# Patient Record
Sex: MALE | Race: WHITE | NOT HISPANIC OR LATINO | Employment: OTHER | URBAN - METROPOLITAN AREA
[De-identification: names, ages, dates, MRNs, and addresses within clinical notes are randomized per-mention and may not be internally consistent; named-entity substitution may affect disease eponyms.]

---

## 2023-10-20 ENCOUNTER — HOSPITAL ENCOUNTER (INPATIENT)
Facility: HOSPITAL | Age: 68
LOS: 4 days | Discharge: NURSING, REHAB OR LONG-TERM CARE | DRG: 064 | End: 2023-10-25
Attending: EMERGENCY MEDICINE | Admitting: FAMILY MEDICINE
Payer: MEDICARE

## 2023-10-20 DIAGNOSIS — R41.82 AMS (ALTERED MENTAL STATUS): ICD-10-CM

## 2023-10-20 DIAGNOSIS — N39.0 URINARY TRACT INFECTION WITHOUT HEMATURIA, SITE UNSPECIFIED: ICD-10-CM

## 2023-10-20 DIAGNOSIS — I63.9 STROKE: ICD-10-CM

## 2023-10-20 DIAGNOSIS — R07.9 CHEST PAIN: ICD-10-CM

## 2023-10-20 DIAGNOSIS — B37.49 CANDIDAL UTI (URINARY TRACT INFECTION): ICD-10-CM

## 2023-10-20 DIAGNOSIS — N50.89 SCROTUM SWELLING: ICD-10-CM

## 2023-10-20 DIAGNOSIS — I63.9 CEREBROVASCULAR ACCIDENT (CVA), UNSPECIFIED MECHANISM: Primary | ICD-10-CM

## 2023-10-20 DIAGNOSIS — R41.82 ALTERED MENTAL STATUS: Chronic | ICD-10-CM

## 2023-10-20 DIAGNOSIS — T40.601A OPIATE OVERDOSE, ACCIDENTAL OR UNINTENTIONAL, INITIAL ENCOUNTER: ICD-10-CM

## 2023-10-20 DIAGNOSIS — N28.9 RENAL DYSFUNCTION: ICD-10-CM

## 2023-10-20 DIAGNOSIS — T42.4X1A BENZODIAZEPINE OVERDOSE, ACCIDENTAL OR UNINTENTIONAL, INITIAL ENCOUNTER: ICD-10-CM

## 2023-10-20 LAB
ALBUMIN SERPL BCP-MCNC: 3.1 G/DL (ref 3.5–5.2)
ALLENS TEST: YES
ALP SERPL-CCNC: 113 U/L (ref 55–135)
ALT SERPL W/O P-5'-P-CCNC: 16 U/L (ref 10–44)
AMMONIA PLAS-SCNC: 26 UMOL/L (ref 10–50)
ANION GAP SERPL CALC-SCNC: 14 MMOL/L (ref 8–16)
AST SERPL-CCNC: 19 U/L (ref 10–40)
BACTERIA #/AREA URNS HPF: ABNORMAL /HPF
BASOPHILS # BLD AUTO: 0.03 K/UL (ref 0–0.2)
BASOPHILS NFR BLD: 0.3 % (ref 0–1.9)
BILIRUB SERPL-MCNC: 0.3 MG/DL (ref 0.1–1)
BILIRUB UR QL STRIP: NEGATIVE
BUN SERPL-MCNC: 30 MG/DL (ref 8–23)
CALCIUM SERPL-MCNC: 9.9 MG/DL (ref 8.7–10.5)
CHLORIDE SERPL-SCNC: 102 MMOL/L (ref 95–110)
CHOLEST SERPL-MCNC: 132 MG/DL (ref 120–199)
CHOLEST/HDLC SERPL: 2.4 {RATIO} (ref 2–5)
CLARITY UR: ABNORMAL
CO2 SERPL-SCNC: 21 MMOL/L (ref 23–29)
COLOR UR: YELLOW
CREAT SERPL-MCNC: 1.9 MG/DL (ref 0.5–1.4)
DIFFERENTIAL METHOD: ABNORMAL
EOSINOPHIL # BLD AUTO: 0.3 K/UL (ref 0–0.5)
EOSINOPHIL NFR BLD: 2.8 % (ref 0–8)
ERYTHROCYTE [DISTWIDTH] IN BLOOD BY AUTOMATED COUNT: 14.1 % (ref 11.5–14.5)
EST. GFR  (NO RACE VARIABLE): 38 ML/MIN/1.73 M^2
FIO2: 21 %
GLUCOSE SERPL-MCNC: 124 MG/DL (ref 70–110)
GLUCOSE UR QL STRIP: NEGATIVE
HCT VFR BLD AUTO: 28.3 % (ref 40–54)
HDLC SERPL-MCNC: 54 MG/DL (ref 40–75)
HDLC SERPL: 40.9 % (ref 20–50)
HGB BLD-MCNC: 9.1 G/DL (ref 14–18)
HGB UR QL STRIP: ABNORMAL
HYALINE CASTS #/AREA URNS LPF: 2 /LPF
IMM GRANULOCYTES # BLD AUTO: 0.06 K/UL (ref 0–0.04)
IMM GRANULOCYTES NFR BLD AUTO: 0.5 % (ref 0–0.5)
INR PPP: 1 (ref 0.8–1.2)
KETONES UR QL STRIP: NEGATIVE
LACTATE SERPL-SCNC: 1.3 MMOL/L (ref 0.5–2.2)
LDLC SERPL CALC-MCNC: 67 MG/DL (ref 63–159)
LEUKOCYTE ESTERASE UR QL STRIP: ABNORMAL
LYMPHOCYTES # BLD AUTO: 1 K/UL (ref 1–4.8)
LYMPHOCYTES NFR BLD: 8.3 % (ref 18–48)
MCH RBC QN AUTO: 27.7 PG (ref 27–31)
MCHC RBC AUTO-ENTMCNC: 32.2 G/DL (ref 32–36)
MCV RBC AUTO: 86 FL (ref 82–98)
MICROSCOPIC COMMENT: ABNORMAL
MONOCYTES # BLD AUTO: 0.8 K/UL (ref 0.3–1)
MONOCYTES NFR BLD: 6.6 % (ref 4–15)
NEUTROPHILS # BLD AUTO: 9.3 K/UL (ref 1.8–7.7)
NEUTROPHILS NFR BLD: 81.5 % (ref 38–73)
NITRITE UR QL STRIP: NEGATIVE
NONHDLC SERPL-MCNC: 78 MG/DL
NRBC BLD-RTO: 0 /100 WBC
PCO2 BLDA: 43.2 MMHG (ref 35–45)
PH SMN: 7.32 [PH] (ref 7.35–7.45)
PH UR STRIP: 6 [PH] (ref 5–8)
PLATELET # BLD AUTO: 281 K/UL (ref 150–450)
PMV BLD AUTO: 9.7 FL (ref 9.2–12.9)
PO2 BLDA: 89 MMHG (ref 80–100)
POC BASE DEFICIT: -3.5 MMOL/L (ref -2–2)
POC HCO3: 22.4 MMOL/L (ref 24–28)
POC PERFORMED BY: ABNORMAL
POC SATURATED O2: 97 % (ref 95–100)
POCT GLUCOSE: 127 MG/DL (ref 70–110)
POTASSIUM SERPL-SCNC: 4.7 MMOL/L (ref 3.5–5.1)
PROT SERPL-MCNC: 7.7 G/DL (ref 6–8.4)
PROT UR QL STRIP: ABNORMAL
PROTHROMBIN TIME: 11.3 SEC (ref 9–12.5)
RBC # BLD AUTO: 3.28 M/UL (ref 4.6–6.2)
RBC #/AREA URNS HPF: 24 /HPF (ref 0–4)
SODIUM SERPL-SCNC: 137 MMOL/L (ref 136–145)
SP GR UR STRIP: 1.02 (ref 1–1.03)
SPECIMEN SOURCE: ABNORMAL
SQUAMOUS #/AREA URNS HPF: 0 /HPF
TRIGL SERPL-MCNC: 55 MG/DL (ref 30–150)
TROPONIN I SERPL DL<=0.01 NG/ML-MCNC: 0.01 NG/ML (ref 0–0.03)
TSH SERPL DL<=0.005 MIU/L-ACNC: 1 UIU/ML (ref 0.4–4)
UNIDENT CRYS URNS QL MICRO: 3
URN SPEC COLLECT METH UR: ABNORMAL
UROBILINOGEN UR STRIP-ACNC: NEGATIVE EU/DL
WBC # BLD AUTO: 11.39 K/UL (ref 3.9–12.7)
WBC #/AREA URNS HPF: >100 /HPF (ref 0–5)
WBC CLUMPS URNS QL MICRO: ABNORMAL
YEAST URNS QL MICRO: ABNORMAL

## 2023-10-20 PROCEDURE — 96375 TX/PRO/DX INJ NEW DRUG ADDON: CPT

## 2023-10-20 PROCEDURE — 80061 LIPID PANEL: CPT | Performed by: EMERGENCY MEDICINE

## 2023-10-20 PROCEDURE — 96374 THER/PROPH/DIAG INJ IV PUSH: CPT

## 2023-10-20 PROCEDURE — 87077 CULTURE AEROBIC IDENTIFY: CPT | Performed by: EMERGENCY MEDICINE

## 2023-10-20 PROCEDURE — 87086 URINE CULTURE/COLONY COUNT: CPT | Performed by: EMERGENCY MEDICINE

## 2023-10-20 PROCEDURE — 25000003 PHARM REV CODE 250: Performed by: EMERGENCY MEDICINE

## 2023-10-20 PROCEDURE — 63600175 PHARM REV CODE 636 W HCPCS: Performed by: EMERGENCY MEDICINE

## 2023-10-20 PROCEDURE — 99285 EMERGENCY DEPT VISIT HI MDM: CPT | Mod: 25

## 2023-10-20 PROCEDURE — 84484 ASSAY OF TROPONIN QUANT: CPT | Performed by: EMERGENCY MEDICINE

## 2023-10-20 PROCEDURE — 87150 DNA/RNA AMPLIFIED PROBE: CPT | Performed by: EMERGENCY MEDICINE

## 2023-10-20 PROCEDURE — 84443 ASSAY THYROID STIM HORMONE: CPT | Performed by: EMERGENCY MEDICINE

## 2023-10-20 PROCEDURE — 96361 HYDRATE IV INFUSION ADD-ON: CPT

## 2023-10-20 PROCEDURE — 93010 EKG 12-LEAD: ICD-10-PCS | Mod: ,,, | Performed by: INTERNAL MEDICINE

## 2023-10-20 PROCEDURE — 63700000 PHARM REV CODE 250 ALT 637 W/O HCPCS: Performed by: EMERGENCY MEDICINE

## 2023-10-20 PROCEDURE — 93005 ELECTROCARDIOGRAM TRACING: CPT

## 2023-10-20 PROCEDURE — 87040 BLOOD CULTURE FOR BACTERIA: CPT | Mod: 59 | Performed by: EMERGENCY MEDICINE

## 2023-10-20 PROCEDURE — 85610 PROTHROMBIN TIME: CPT | Performed by: EMERGENCY MEDICINE

## 2023-10-20 PROCEDURE — 80053 COMPREHEN METABOLIC PANEL: CPT | Performed by: EMERGENCY MEDICINE

## 2023-10-20 PROCEDURE — 82803 BLOOD GASES ANY COMBINATION: CPT

## 2023-10-20 PROCEDURE — 82140 ASSAY OF AMMONIA: CPT | Performed by: EMERGENCY MEDICINE

## 2023-10-20 PROCEDURE — 81000 URINALYSIS NONAUTO W/SCOPE: CPT | Performed by: EMERGENCY MEDICINE

## 2023-10-20 PROCEDURE — 99900035 HC TECH TIME PER 15 MIN (STAT)

## 2023-10-20 PROCEDURE — 85025 COMPLETE CBC W/AUTO DIFF WBC: CPT | Performed by: EMERGENCY MEDICINE

## 2023-10-20 PROCEDURE — 87186 SC STD MICRODIL/AGAR DIL: CPT | Performed by: EMERGENCY MEDICINE

## 2023-10-20 PROCEDURE — 83605 ASSAY OF LACTIC ACID: CPT | Performed by: EMERGENCY MEDICINE

## 2023-10-20 PROCEDURE — G0378 HOSPITAL OBSERVATION PER HR: HCPCS

## 2023-10-20 PROCEDURE — 93010 ELECTROCARDIOGRAM REPORT: CPT | Mod: ,,, | Performed by: INTERNAL MEDICINE

## 2023-10-20 PROCEDURE — 87088 URINE BACTERIA CULTURE: CPT | Performed by: EMERGENCY MEDICINE

## 2023-10-20 PROCEDURE — 36600 WITHDRAWAL OF ARTERIAL BLOOD: CPT

## 2023-10-20 RX ORDER — ONDANSETRON 2 MG/ML
4 INJECTION INTRAMUSCULAR; INTRAVENOUS EVERY 8 HOURS PRN
Status: DISCONTINUED | OUTPATIENT
Start: 2023-10-21 | End: 2023-10-25 | Stop reason: HOSPADM

## 2023-10-20 RX ORDER — SODIUM CHLORIDE 0.9 % (FLUSH) 0.9 %
10 SYRINGE (ML) INJECTION EVERY 12 HOURS PRN
Status: DISCONTINUED | OUTPATIENT
Start: 2023-10-21 | End: 2023-10-25 | Stop reason: HOSPADM

## 2023-10-20 RX ORDER — IBUPROFEN 200 MG
24 TABLET ORAL
Status: DISCONTINUED | OUTPATIENT
Start: 2023-10-21 | End: 2023-10-25 | Stop reason: HOSPADM

## 2023-10-20 RX ORDER — GLUCAGON 1 MG
1 KIT INJECTION
Status: DISCONTINUED | OUTPATIENT
Start: 2023-10-21 | End: 2023-10-25 | Stop reason: HOSPADM

## 2023-10-20 RX ORDER — FLUCONAZOLE 100 MG/1
200 TABLET ORAL
Status: COMPLETED | OUTPATIENT
Start: 2023-10-20 | End: 2023-10-20

## 2023-10-20 RX ORDER — NALOXONE HCL 0.4 MG/ML
0.4 VIAL (ML) INJECTION
Status: COMPLETED | OUTPATIENT
Start: 2023-10-20 | End: 2023-10-20

## 2023-10-20 RX ORDER — CLOPIDOGREL BISULFATE 75 MG/1
600 TABLET ORAL
Status: COMPLETED | OUTPATIENT
Start: 2023-10-20 | End: 2023-10-20

## 2023-10-20 RX ORDER — ACETAMINOPHEN 325 MG/1
650 TABLET ORAL EVERY 4 HOURS PRN
Status: DISCONTINUED | OUTPATIENT
Start: 2023-10-21 | End: 2023-10-25 | Stop reason: HOSPADM

## 2023-10-20 RX ORDER — NALOXONE HCL 0.4 MG/ML
0.02 VIAL (ML) INJECTION
Status: DISCONTINUED | OUTPATIENT
Start: 2023-10-21 | End: 2023-10-25 | Stop reason: HOSPADM

## 2023-10-20 RX ORDER — IBUPROFEN 200 MG
16 TABLET ORAL
Status: DISCONTINUED | OUTPATIENT
Start: 2023-10-21 | End: 2023-10-25 | Stop reason: HOSPADM

## 2023-10-20 RX ORDER — POLYETHYLENE GLYCOL 3350 17 G/17G
17 POWDER, FOR SOLUTION ORAL DAILY
Status: DISCONTINUED | OUTPATIENT
Start: 2023-10-21 | End: 2023-10-25 | Stop reason: HOSPADM

## 2023-10-20 RX ORDER — ASPIRIN 325 MG
325 TABLET ORAL
Status: COMPLETED | OUTPATIENT
Start: 2023-10-20 | End: 2023-10-20

## 2023-10-20 RX ORDER — KETOROLAC TROMETHAMINE 30 MG/ML
15 INJECTION, SOLUTION INTRAMUSCULAR; INTRAVENOUS
Status: COMPLETED | OUTPATIENT
Start: 2023-10-20 | End: 2023-10-20

## 2023-10-20 RX ADMIN — IOHEXOL 100 ML: 350 INJECTION, SOLUTION INTRAVENOUS at 11:10

## 2023-10-20 RX ADMIN — NALXONE HYDROCHLORIDE 0.4 MG: 0.4 INJECTION INTRAMUSCULAR; INTRAVENOUS; SUBCUTANEOUS at 11:10

## 2023-10-20 RX ADMIN — ASPIRIN 325 MG ORAL TABLET 325 MG: 325 PILL ORAL at 11:10

## 2023-10-20 RX ADMIN — KETOROLAC TROMETHAMINE 15 MG: 30 INJECTION, SOLUTION INTRAMUSCULAR; INTRAVENOUS at 10:10

## 2023-10-20 RX ADMIN — FLUCONAZOLE 200 MG: 100 TABLET ORAL at 11:10

## 2023-10-20 RX ADMIN — SODIUM CHLORIDE 1000 ML: 9 INJECTION, SOLUTION INTRAVENOUS at 10:10

## 2023-10-20 RX ADMIN — NALXONE HYDROCHLORIDE 0.4 MG: 0.4 INJECTION INTRAMUSCULAR; INTRAVENOUS; SUBCUTANEOUS at 10:10

## 2023-10-20 RX ADMIN — CLOPIDOGREL BISULFATE 600 MG: 75 TABLET ORAL at 11:10

## 2023-10-20 RX ADMIN — CEFTRIAXONE SODIUM 1 G: 1 INJECTION, POWDER, FOR SOLUTION INTRAMUSCULAR; INTRAVENOUS at 11:10

## 2023-10-20 NOTE — Clinical Note
Diagnosis: Cerebrovascular accident (CVA), unspecified mechanism [[id number]]   Future Attending Provider: NORMAN BOB [[id number]]   Admitting Provider:: NORMAN BOB [[id number]]

## 2023-10-21 PROBLEM — D64.9 NORMOCYTIC ANEMIA: Status: ACTIVE | Noted: 2023-10-21

## 2023-10-21 PROBLEM — R73.9 HYPERGLYCEMIA: Status: ACTIVE | Noted: 2023-10-21

## 2023-10-21 PROBLEM — N39.0 UTI (URINARY TRACT INFECTION): Status: ACTIVE | Noted: 2023-10-21

## 2023-10-21 PROBLEM — Z85.118 HX OF CANCER OF LUNG: Status: ACTIVE | Noted: 2023-10-21

## 2023-10-21 PROBLEM — Z85.51 HX OF BLADDER CANCER: Status: ACTIVE | Noted: 2023-10-21

## 2023-10-21 PROBLEM — R41.82 ALTERED MENTAL STATUS: Chronic | Status: ACTIVE | Noted: 2023-10-21

## 2023-10-21 PROBLEM — R79.89 ELEVATED SERUM CREATININE: Status: ACTIVE | Noted: 2023-10-21

## 2023-10-21 PROBLEM — R41.82 ALTERED MENTAL STATUS: Status: ACTIVE | Noted: 2023-10-21

## 2023-10-21 LAB
ANION GAP SERPL CALC-SCNC: 10 MMOL/L (ref 8–16)
BASOPHILS # BLD AUTO: 0.03 K/UL (ref 0–0.2)
BASOPHILS NFR BLD: 0.3 % (ref 0–1.9)
BUN SERPL-MCNC: 25 MG/DL (ref 8–23)
CALCIUM SERPL-MCNC: 9.4 MG/DL (ref 8.7–10.5)
CHLORIDE SERPL-SCNC: 106 MMOL/L (ref 95–110)
CO2 SERPL-SCNC: 22 MMOL/L (ref 23–29)
CREAT SERPL-MCNC: 1.6 MG/DL (ref 0.5–1.4)
DIFFERENTIAL METHOD: ABNORMAL
EOSINOPHIL # BLD AUTO: 0.3 K/UL (ref 0–0.5)
EOSINOPHIL NFR BLD: 3.6 % (ref 0–8)
ERYTHROCYTE [DISTWIDTH] IN BLOOD BY AUTOMATED COUNT: 14 % (ref 11.5–14.5)
EST. GFR  (NO RACE VARIABLE): 46 ML/MIN/1.73 M^2
GLUCOSE SERPL-MCNC: 115 MG/DL (ref 70–110)
HCT VFR BLD AUTO: 25.4 % (ref 40–54)
HGB BLD-MCNC: 8.3 G/DL (ref 14–18)
IMM GRANULOCYTES # BLD AUTO: 0.03 K/UL (ref 0–0.04)
IMM GRANULOCYTES NFR BLD AUTO: 0.3 % (ref 0–0.5)
LYMPHOCYTES # BLD AUTO: 0.8 K/UL (ref 1–4.8)
LYMPHOCYTES NFR BLD: 9.1 % (ref 18–48)
MCH RBC QN AUTO: 27.9 PG (ref 27–31)
MCHC RBC AUTO-ENTMCNC: 32.7 G/DL (ref 32–36)
MCV RBC AUTO: 86 FL (ref 82–98)
MONOCYTES # BLD AUTO: 0.6 K/UL (ref 0.3–1)
MONOCYTES NFR BLD: 6.6 % (ref 4–15)
NEUTROPHILS # BLD AUTO: 7.4 K/UL (ref 1.8–7.7)
NEUTROPHILS NFR BLD: 80.1 % (ref 38–73)
NRBC BLD-RTO: 0 /100 WBC
PLATELET # BLD AUTO: 239 K/UL (ref 150–450)
PMV BLD AUTO: 9.5 FL (ref 9.2–12.9)
POCT GLUCOSE: 136 MG/DL (ref 70–110)
POTASSIUM SERPL-SCNC: 4.4 MMOL/L (ref 3.5–5.1)
RBC # BLD AUTO: 2.97 M/UL (ref 4.6–6.2)
SODIUM SERPL-SCNC: 138 MMOL/L (ref 136–145)
WBC # BLD AUTO: 9.19 K/UL (ref 3.9–12.7)

## 2023-10-21 PROCEDURE — 11000001 HC ACUTE MED/SURG PRIVATE ROOM

## 2023-10-21 PROCEDURE — 25000003 PHARM REV CODE 250: Performed by: FAMILY MEDICINE

## 2023-10-21 PROCEDURE — 80048 BASIC METABOLIC PNL TOTAL CA: CPT | Performed by: STUDENT IN AN ORGANIZED HEALTH CARE EDUCATION/TRAINING PROGRAM

## 2023-10-21 PROCEDURE — 25500020 PHARM REV CODE 255: Performed by: EMERGENCY MEDICINE

## 2023-10-21 PROCEDURE — 92610 EVALUATE SWALLOWING FUNCTION: CPT

## 2023-10-21 PROCEDURE — 94761 N-INVAS EAR/PLS OXIMETRY MLT: CPT

## 2023-10-21 PROCEDURE — 36415 COLL VENOUS BLD VENIPUNCTURE: CPT | Performed by: STUDENT IN AN ORGANIZED HEALTH CARE EDUCATION/TRAINING PROGRAM

## 2023-10-21 PROCEDURE — 99900035 HC TECH TIME PER 15 MIN (STAT)

## 2023-10-21 PROCEDURE — 99499 NO LOS: ICD-10-PCS | Mod: ,,, | Performed by: PSYCHIATRY & NEUROLOGY

## 2023-10-21 PROCEDURE — 27000221 HC OXYGEN, UP TO 24 HOURS

## 2023-10-21 PROCEDURE — 99499 UNLISTED E&M SERVICE: CPT | Mod: ,,, | Performed by: PSYCHIATRY & NEUROLOGY

## 2023-10-21 PROCEDURE — 97535 SELF CARE MNGMENT TRAINING: CPT

## 2023-10-21 PROCEDURE — 85025 COMPLETE CBC W/AUTO DIFF WBC: CPT | Performed by: STUDENT IN AN ORGANIZED HEALTH CARE EDUCATION/TRAINING PROGRAM

## 2023-10-21 RX ORDER — TAMSULOSIN HYDROCHLORIDE 0.4 MG/1
0.4 CAPSULE ORAL DAILY
COMMUNITY
End: 2023-11-03 | Stop reason: SDUPTHER

## 2023-10-21 RX ORDER — ALPRAZOLAM 0.5 MG/1
1 TABLET ORAL 3 TIMES DAILY PRN
COMMUNITY
End: 2023-11-03 | Stop reason: SDUPTHER

## 2023-10-21 RX ORDER — ESCITALOPRAM OXALATE 20 MG/1
20 TABLET ORAL DAILY
COMMUNITY
End: 2023-11-03 | Stop reason: SDUPTHER

## 2023-10-21 RX ORDER — TAMSULOSIN HYDROCHLORIDE 0.4 MG/1
0.4 CAPSULE ORAL DAILY
Status: DISCONTINUED | OUTPATIENT
Start: 2023-10-22 | End: 2023-10-25 | Stop reason: HOSPADM

## 2023-10-21 RX ORDER — METOPROLOL SUCCINATE 50 MG/1
50 TABLET, EXTENDED RELEASE ORAL DAILY
Status: DISCONTINUED | OUTPATIENT
Start: 2023-10-22 | End: 2023-10-25 | Stop reason: HOSPADM

## 2023-10-21 RX ORDER — HYDROCODONE BITARTRATE AND ACETAMINOPHEN 10; 325 MG/1; MG/1
1 TABLET ORAL EVERY 6 HOURS PRN
COMMUNITY
End: 2023-11-03 | Stop reason: SDUPTHER

## 2023-10-21 RX ORDER — SIMVASTATIN 40 MG/1
40 TABLET, FILM COATED ORAL NIGHTLY
COMMUNITY
End: 2023-11-03 | Stop reason: ALTCHOICE

## 2023-10-21 RX ORDER — LEVOTHYROXINE SODIUM 125 UG/1
125 TABLET ORAL
COMMUNITY
End: 2023-11-03 | Stop reason: SDUPTHER

## 2023-10-21 RX ORDER — ALPRAZOLAM 1 MG/1
1 TABLET ORAL 3 TIMES DAILY PRN
Status: DISCONTINUED | OUTPATIENT
Start: 2023-10-21 | End: 2023-10-25 | Stop reason: HOSPADM

## 2023-10-21 RX ORDER — AMLODIPINE BESYLATE 5 MG/1
5 TABLET ORAL DAILY
Status: DISCONTINUED | OUTPATIENT
Start: 2023-10-22 | End: 2023-10-25 | Stop reason: HOSPADM

## 2023-10-21 RX ORDER — ESCITALOPRAM OXALATE 10 MG/1
20 TABLET ORAL DAILY
Status: DISCONTINUED | OUTPATIENT
Start: 2023-10-22 | End: 2023-10-25 | Stop reason: HOSPADM

## 2023-10-21 RX ORDER — METOPROLOL SUCCINATE 50 MG/1
50 TABLET, EXTENDED RELEASE ORAL DAILY
COMMUNITY
End: 2023-11-03 | Stop reason: SDUPTHER

## 2023-10-21 RX ORDER — CYCLOBENZAPRINE HCL 10 MG
10 TABLET ORAL 3 TIMES DAILY PRN
COMMUNITY
End: 2023-11-03 | Stop reason: SDUPTHER

## 2023-10-21 RX ORDER — AMLODIPINE BESYLATE 5 MG/1
5 TABLET ORAL DAILY
COMMUNITY
End: 2023-11-03 | Stop reason: SDUPTHER

## 2023-10-21 RX ORDER — CYCLOBENZAPRINE HCL 10 MG
10 TABLET ORAL 3 TIMES DAILY PRN
Status: DISCONTINUED | OUTPATIENT
Start: 2023-10-21 | End: 2023-10-25 | Stop reason: HOSPADM

## 2023-10-21 RX ORDER — LEVOTHYROXINE SODIUM 125 UG/1
125 TABLET ORAL
Status: DISCONTINUED | OUTPATIENT
Start: 2023-10-22 | End: 2023-10-25 | Stop reason: HOSPADM

## 2023-10-21 RX ORDER — ALBUTEROL SULFATE 90 UG/1
1-2 AEROSOL, METERED RESPIRATORY (INHALATION) EVERY 4 HOURS PRN
COMMUNITY
End: 2023-11-03 | Stop reason: SDUPTHER

## 2023-10-21 RX ORDER — IBUPROFEN 200 MG
1 TABLET ORAL
COMMUNITY
End: 2023-11-03 | Stop reason: SDUPTHER

## 2023-10-21 RX ORDER — ATORVASTATIN CALCIUM 20 MG/1
20 TABLET, FILM COATED ORAL NIGHTLY
Status: DISCONTINUED | OUTPATIENT
Start: 2023-10-21 | End: 2023-10-25 | Stop reason: HOSPADM

## 2023-10-21 RX ORDER — ALBUTEROL SULFATE 0.63 MG/3ML
0.63 SOLUTION RESPIRATORY (INHALATION) 3 TIMES DAILY PRN
COMMUNITY
End: 2023-11-03 | Stop reason: SDUPTHER

## 2023-10-21 RX ORDER — HYDROCODONE BITARTRATE AND ACETAMINOPHEN 10; 325 MG/1; MG/1
1 TABLET ORAL EVERY 6 HOURS PRN
Status: DISCONTINUED | OUTPATIENT
Start: 2023-10-21 | End: 2023-10-23

## 2023-10-21 RX ADMIN — HYDROCODONE BITARTRATE AND ACETAMINOPHEN 1 TABLET: 10; 325 TABLET ORAL at 05:10

## 2023-10-21 RX ADMIN — ATORVASTATIN CALCIUM 20 MG: 20 TABLET, FILM COATED ORAL at 08:10

## 2023-10-21 NOTE — PROGRESS NOTES
[facility] Medicine  Progress Note    Patient Name: Geovani Currie  MRN: [id number]  Patient Class: IP- Inpatient   Admission Date: 10/20/2023  Length of Stay: 0 days  Attending Physician: Tae Motley MD  Primary Care Provider: No primary care provider on file.        Subjective:     Principal Problem:Altered mental status        HPI: Geovani Currie is a 68-year-old male with a past medical history of bladder cancer, lung cancer who presents with altered mental status.     Patient was sent in from the airport as patient was experiencing worsening lethargy and shallow respiration.  He was noted to be satting around 90% on room air and was placed on 2 L.  Patient just moved from [place] to [place] to be closer to family for cancer treatments.  Family is concerned that over the past few days he had become increasingly weak.  At this time, There is some concern that on the plane he may have taken additional doses of opioids, since he quickly awoke to Narcan.  Of note, patient recently had a stent placed in his left kidney right nephrostomy tube due to kidney issues.     In the ED, triage vitals were significant for hypoxia, patient was placed on 5 L. CBC showed normocytic anemia.  CMP was significant for low albumin, elevated sugars.  TSH , lactic acid, troponin within normal range.  UA was concerning for white blood cells, RBCs, yeast although no bacteria.  ABG was reassuring with no CO2 retention     Code stroke was called for altered mental status.  Neurologist recommends no lytics, CTA, admission for MRI.     CT head with moderate area of hypoattenuation in the parietal cortex suggesting infarct although radiologist favors chronic process.  Small probable remote areas of lacunar infarct involving the left caudate, bilateral thalamus and right lateral basal ganglia.  CTA head and neck with no evidence of acute intracranial abnormality.  No high-grade stenosis or major vessel occlusion.   Spiculated lesion noticed in the right posterior lung apex.     Chest x-ray shows no acute or significant focal chest abnormality.     With administration of Narcan, improvement in patient's mental status.     ED provider, gave patient ceftriaxone and Diflucan for UA.  Due to concern for subacute/acute CVA, aspirin and Plavix were ordered.     Medicine was consulted for patient's altered mental status    Interval History: no acute event overnight. Reviewed labs/vitals and radiology reports. Appreciate vascular neurology consult. Awaiting neuro consult in-house. On ASA for now. NPO and speech evaluation.    Review of Systems    Constitutional:  Positive for fatigue. Negative for activity change and appetite change.   HENT:  Negative for ear pain and facial swelling.    Respiratory:  Negative for choking.    Cardiovascular:  Negative for chest pain and leg swelling.   Gastrointestinal:  Negative for blood in stool and constipation.   Endocrine: Negative for polydipsia and polyphagia.   Genitourinary:  Negative for enuresis and flank pain.   Musculoskeletal:  Negative for gait problem and joint swelling.   Allergic/Immunologic: Negative for food allergies and immunocompromised state.   Neurological:  Negative for seizures and speech difficulty.   Hematological:  Negative for adenopathy. Does not bruise/bleed easily.   Psychiatric/Behavioral:  Positive for confusion. Negative for sleep disturbance.           Objective:     Vital Signs (Most Recent):  Temp: 97.5 °F (36.4 °C) (10/21/23 0524)  Pulse: 71 (10/21/23 0738)  Resp: 18 (10/21/23 0738)  BP: (!) 112/53 (10/21/23 0738)  SpO2: 100 % (10/21/23 0738) Vital Signs (24h Range):  Temp:  [97.5 °F (36.4 °C)-98.5 °F (36.9 °C)] 97.5 °F (36.4 °C)  Pulse:  [66-89] 71  Resp:  [8-31] 18  SpO2:  [80 %-100 %] 100 %  BP: (105-137)/(53-63) 112/53     Weight: 75.7 kg (166 lb 14.2 oz)  There is no height or weight on file to calculate BMI.    Intake/Output Summary (Last 24 hours) at  10/21/2023 0849  Last data filed at 10/21/2023 0838  Gross per 24 hour   Intake 1099 ml   Output 1200 ml   Net -101 ml      Physical Exam      Vitals reviewed.   Constitutional:       Appearance: He is ill-appearing.   HENT:      Head: Atraumatic.      Right Ear: There is no impacted cerumen.      Left Ear: There is no impacted cerumen.      Nose: No congestion or rhinorrhea.      Mouth/Throat:      Pharynx: No oropharyngeal exudate or posterior oropharyngeal erythema.   Eyes:      General:         Right eye: No discharge.         Left eye: No discharge.   Neck:      Vascular: No carotid bruit.   Cardiovascular:      Rate and Rhythm: Regular rhythm. Bradycardia present.      Heart sounds: No murmur heard.     No gallop.   Pulmonary:      Effort: No respiratory distress.      Breath sounds: No wheezing.   Abdominal:      General: There is no distension.      Tenderness: There is no abdominal tenderness.   Genitourinary:     Comments: Nephrostomy tube in place  Musculoskeletal:         General: No deformity.      Cervical back: No tenderness.   Skin:     Coloration: Skin is not jaundiced.      Findings: No bruising.   Neurological:      Mental Status: He is disoriented.   Psychiatric:         Mood and Affect: Mood normal.           Significant Labs: All pertinent labs within the past 24 hours have been reviewed.  CBC:   Recent Labs   Lab 10/20/23  2219 10/21/23  0830   WBC 11.39 9.19   HGB 9.1* 8.3*   HCT 28.3* 25.4*    239     CMP:   Recent Labs   Lab 10/20/23  2219      K 4.7      CO2 21*   *   BUN 30*   CREATININE 1.9*   CALCIUM 9.9   PROT 7.7   ALBUMIN 3.1*   BILITOT 0.3   ALKPHOS 113   AST 19   ALT 16   ANIONGAP 14       Significant Imaging: I have reviewed all pertinent imaging results/findings within the past 24 hours.  I have reviewed and interpreted all pertinent imaging results/findings within the past 24 hours.    Assessment/Plan:      Active Diagnoses:    Diagnosis Date Noted POA     PRINCIPAL PROBLEM:  Altered mental status [R41.82] 10/21/2023 Unknown     Chronic    Elevated serum creatinine [R79.89] 10/21/2023 Yes    Normocytic anemia [D64.9] 10/21/2023 Yes    Hx of bladder cancer [Z85.51] 10/21/2023 Not Applicable    Hx of cancer of lung [Z85.118] 10/21/2023 Not Applicable    UTI (urinary tract infection) [N39.0] 10/21/2023 Yes    Hyperglycemia [R73.9] 10/21/2023 Unknown      Problems Resolved During this Admission:     VTE Risk Mitigation (From admission, onward)           Ordered     IP VTE LOW RISK PATIENT  Once         10/20/23 2347     Place sequential compression device  Until discontinued         10/20/23 2347                * Altered mental status  -improvement with Narcan  -TSH, ammonia within normal range.  Point of care glucose elevated at 127.  -ABG unremarkable for CO2 elevation  -CT head unremarkable  -blood cultures and urine has been ordered  -stroke call in the ED     Plan:  -likely at least part due to opioid use  -MRI ordered  -continue to monitor  -consult neurology further evaluation        Hyperglycemia  Slightly elevated sugars on presentation     Plan:  May consider sliding scale if sugars consistently above 180        UTI (urinary tract infection)  UA noted to have WBC, RBC, yeast.  Increased WBC in UA, may be due to nephrostomy tube  Urine culture pending  S/p ceftriaxone and Diflucan in the ED     Plan:  We will currently hold ceftriaxone and Diflucan, low threshold to restart        Hx of cancer of lung  -CT scan shows spiculated mass  -known history     Plan:  May defer to outpatient treatment        Hx of bladder cancer  Hematuria noted on UA     Plan:  Defer to outpatient treatment        Normocytic anemia  Likely due to anemia of chronic disease     Plan:  Transfuse for less than hemoglobin 7     Elevated serum creatinine  Creatinine of 1.9 admission, unclear baseline  Patient with history of left ureteral stent, and right nephrostomy tube placement      Plan:  Daily BMP  Flush nephrostomy tube at least twice daily  No need to call Urology at this time          Tae Motley MD  Department of [facility] Medicine   [facility]

## 2023-10-21 NOTE — ASSESSMENT & PLAN NOTE
Creatinine of 1.9 admission, unclear baseline  Patient with history of left ureteral stent, and right nephrostomy tube placement    Plan:  Daily BMP  Flush nephrostomy tube at least twice daily  No need to call Urology at this time

## 2023-10-21 NOTE — ED PROVIDER NOTES
Encounter Date: 10/20/2023       History     Chief Complaint   Patient presents with    Altered Mental Status     Per EMS patient with altered mental status and shallow respirations noted during flight from [place], per wife.  Patient picked up at the airport.  Opens eyes to loud voice.  Rests with eyes closed between care.  History of lung CA reported.     68-year-old male history of bladder cancer, lung cancer presents for altered mental status.  Family states that he moved today from [place] to [place] to be closer to family so that he can continue cancer treatment.  He last received treatment involving chemotherapy and radiation 2 years ago.  He recently has had some kidney problems had a stent placed in the left kidney and a right nephrostomy tube.  Family has noted over the past few days a steady decline.  He is having more difficulty with ambulation has been more somnolent.  Today on the flight he became fairly obtunded and was transferred to the ED.  He is minimally responsive on arrival and unable to contribute to his own history.  Family is concerned he takes Norco and Xanax and there is a possibility for unintentional overdose due to multiple family members administering his medications today    The history is provided by a relative.     Review of patient's allergies indicates:  Not on File  No past medical history on file.  No past surgical history on file.  No family history on file.     Review of Systems    Physical Exam     Initial Vitals   BP Pulse Resp Temp SpO2   10/20/23 2151 10/20/23 2151 10/20/23 2151 10/20/23 2200 10/20/23 2151   114/61 73 10 98.5 °F (36.9 °C) 96 %      MAP       --                Physical Exam    Nursing note and vitals reviewed.  Constitutional: He appears well-developed and well-nourished. He appears distressed.   Chronically ill-appearing   HENT:   Head: Normocephalic and atraumatic.   Eyes: Conjunctivae and EOM are normal. Pupils are equal, round, and reactive to  light.   Neck: Neck supple. No tracheal deviation present. No JVD present.   Normal range of motion.  Cardiovascular:  Normal rate and regular rhythm.           Pulmonary/Chest: Breath sounds normal. No stridor. No respiratory distress.   Mild bradypnea   Abdominal: Abdomen is soft.   Right nephrostomy tube   Musculoskeletal:      Cervical back: Normal range of motion and neck supple.     Neurological: GCS eye subscore is 3. GCS verbal subscore is 2. GCS motor subscore is 6.   Globally decreased but equal strength bilaterally.  Able to follow basic commands   Skin: Skin is warm and dry.         ED Course   Procedures  Labs Reviewed   CBC W/ AUTO DIFFERENTIAL - Abnormal; Notable for the following components:       Result Value    RBC 3.28 (*)     Hemoglobin 9.1 (*)     Hematocrit 28.3 (*)     Gran # (ANC) 9.3 (*)     Immature Grans (Abs) 0.06 (*)     Gran % 81.5 (*)     Lymph % 8.3 (*)     All other components within normal limits   COMPREHENSIVE METABOLIC PANEL - Abnormal; Notable for the following components:    CO2 21 (*)     Glucose 124 (*)     BUN 30 (*)     Creatinine 1.9 (*)     Albumin 3.1 (*)     eGFR 38 (*)     All other components within normal limits   URINALYSIS, REFLEX TO URINE CULTURE - Abnormal; Notable for the following components:    Appearance, UA Hazy (*)     Protein, UA 1+ (*)     Occult Blood UA 2+ (*)     Leukocytes, UA 3+ (*)     All other components within normal limits    Narrative:     Specimen Source->Urine   URINALYSIS MICROSCOPIC - Abnormal; Notable for the following components:    RBC, UA 24 (*)     WBC, UA >100 (*)     WBC Clumps, UA Few (*)     Yeast, UA Occasional (*)     Hyaline Casts, UA 2 (*)     All other components within normal limits    Narrative:     Specimen Source->Urine   POCT GLUCOSE - Abnormal; Notable for the following components:    POCT Glucose 127 (*)     All other components within normal limits   CULTURE, BLOOD   CULTURE, BLOOD   CULTURE, URINE   PROTIME-INR   TSH    LIPID PANEL   TROPONIN I   LACTIC ACID, PLASMA   AMMONIA   POCT GLUCOSE, HAND-HELD DEVICE     EKG Readings: (Independently Interpreted)   Initial Reading: No STEMI. Rhythm: Normal Sinus Rhythm. Heart Rate: 73. Ectopy: No Ectopy. Conduction: Normal. ST Segments: Normal ST Segments. T Waves: Normal. Axis: Normal. Clinical Impression: Normal Sinus Rhythm       Imaging Results              CTA Head and Neck (xpd) (In process)                      X-Ray Chest AP Portable (In process)                       CT Head Without Contrast (Final result)  Result time 10/20/23 22:51:40      Final result by Beni Bernal MD (10/20/23 22:51:40)                   Impression:      1. Involutional changes with chronic microvascular ischemic changes in the periventricular white matter.  2. Moderate area of hypoattenuation in the left posterior parietal cortex suggesting infarction.  I favor a chronic process though age indeterminate.  MRI follow-up would better characterize.  3. Small probable remote areas of lacunar infarction involving the left caudate, bilateral thalamus and right lateral basal ganglia.  4. This report was flagged in Epic as abnormal.      Electronically signed by: Beni Bernal  Date:    10/20/2023  Time:    22:51               Narrative:    EXAMINATION:  CT HEAD WITHOUT CONTRAST    CLINICAL HISTORY:  Neuro deficit, acute, stroke suspected;  not otherwise specified.    TECHNIQUE:  Low dose axial CT images obtained throughout the head without intravenous contrast. Sagittal and coronal reconstructions were performed.    COMPARISON:  None.    FINDINGS:  Intracranial compartment:    No midline shift or acute hydrocephalus.  No extra-axial blood or fluid collections.    Moderate involutional changes and chronic microvascular ischemic changes in the periventricular white matter.    Moderate area of mild hypoattenuation in the left posterior parietal cortex suggesting infarction.  I favor a chronic process though age  indeterminate.  MRI follow-up would better characterize.    There is a small probable remote lacunar infarct of the left caudate.    Small probable remote lacunar infarcts at the bilateral thalamus and right lateral basal ganglia.    No parenchymal mass, hemorrhage, edema or major vascular distribution infarct.    Probable minimal bilateral senescent basal ganglia calcifications left greater than right.    Skull/extracranial contents (limited evaluation): No fracture. Mastoid air cells and paranasal sinuses are essentially clear.                                    X-Rays:   Independently Interpreted Readings:   Chest X-Ray: Normal heart size. Possible infiltrate vs malignancy     Medications   sodium chloride 0.9% bolus 1,000 mL 1,000 mL (1,000 mLs Intravenous New Bag 10/20/23 2227)   cefTRIAXone (ROCEPHIN) 1 g in dextrose 5 % in water (D5W) 100 mL IVPB (MB+) (has no administration in time range)   fluconazole tablet 200 mg (has no administration in time range)   aspirin tablet 325 mg (has no administration in time range)   clopidogreL tablet 600 mg (has no administration in time range)   iohexoL (OMNIPAQUE 350) injection 100 mL (has no administration in time range)   naloxone 0.4 mg/mL injection 0.4 mg (0.4 mg Intravenous Given 10/20/23 2227)   ketorolac injection 15 mg (15 mg Intravenous Given 10/20/23 2253)     Medical Decision Making  Stroke code called on initial evaluation due to change in mental status.  Suspect element of polypharmacy and unintentional overdose combined benzodiazepines and opiates.  Evaluate for sepsis, electrolyte derangement, respiratory failure, renal failure    Amount and/or Complexity of Data Reviewed  Independent Historian: caregiver  Labs: ordered. Decision-making details documented in ED Course.  Radiology: ordered and independent interpretation performed.     Details: CT evidence of remote verses subacute CVA  ECG/medicine tests: ordered and independent interpretation  performed.  Discussion of management or test interpretation with external provider(s): Case discussed with stroke Neurology Dr. Lora, recommends no lytics, CTA, admission for MRI.  Case discussed with hospital medicine service for admission to Dr. Otto oscar.    Risk  OTC drugs.  Prescription drug management.  Decision regarding hospitalization.    Critical Care  Total time providing critical care: 65 minutes      Additional MDM:     NIH Stroke Scale:   Interval = baseline (upon arrival/admit)  Level of consciousness = 2 - stuporous  LOC questions - ask patient the month and his/her age: unable to perform.  LOC commands = 1 - performs one correctly  Best gaze = 0 - normal  Visual = 0 - no visual loss  Facial palsy = 0 - normal  Motor left arm =  0 - no drift  Motor right arm =  0 - no drift  Motor left leg = 1 - drift  Motor right leg =  1 - drift  Limb ataxia - finger-nose-finger & heel-shin tests on both sides: unable to perform.  Sensory = 0 - normal  Best language = 1 - mild to moderate aphasia  Dysarthria = 2 - near to unintelligible  Extinction and inattention = 0 - no neglect              ED Course as of 10/20/23 2325   Fri Oct 20, 2023   2217 Case discussed with stroke neurology, no lytics, poor candidate, recommend cta, admit for mri [AP]   2258 Patients mental status much improved with narcan, patient complaining of headache [AP]   2258 ABG reassuring, no CO2 retention [AP]   2258 UA positive for yeast, many white cells, fewer red cells.  Diflucan and ceftriaxone ordered [AP]   2259 CBC shows mild anemia no leukocytosis [AP]   2259 Ammonia level normal.  Lactate normal unlikely sepsis [AP]   2259 Normal pt/inr [AP]   2300 CT shows possible subacute/acute CVA, asa and plavix ordered [AP]   2304 Chemistry shows renal dysfunction unknown baseline or if chronic or acute, glucose normal.  Patient receiving IV fluids [AP]      ED Course User Index  [AP] Archie Obrien, DO                    Clinical  Impression:   Final diagnoses:  [I63.9] Stroke  [R41.82] AMS (altered mental status)  [I63.9] Cerebrovascular accident (CVA), unspecified mechanism (Primary)  [N28.9] Renal dysfunction  [N39.0] Urinary tract infection without hematuria, site unspecified  [B37.49] Candidal UTI (urinary tract infection)  [T40.601A] Opiate overdose, accidental or unintentional, initial encounter  [T42.4X1A] Benzodiazepine overdose, accidental or unintentional, initial encounter        ED Disposition Condition    Observation Stable                Archie Obrien, DO  10/20/23 3845       Archie Obrien, DO  10/20/23 7190

## 2023-10-21 NOTE — ED NOTES
Pt awakens to sternal rub at this time. Pt quickly falling back asleep after opening eyes. MD made aware. Awaiting new orders.

## 2023-10-21 NOTE — ED NOTES
Pt reports to ED via EMS from airport. EMS was called s/t pt being lethargic and having shallow respirations. Upon EMS arrival pt's SPO2 was 90% on RA. Pt arrives on 2 L NC. Pt has hx of bladder and lung cancer. Pt moving to [place] from [place] to be closer to family and for cancer treatments. Upon arrival to ED, pt lethargic; pt opens eyes to sternal rub and then quickly closes eyes. Shallow respirations noted. MD at bedside.     Adult Physical Assessment  LOC: Geovani Currie, 68 y.o. male verified via two identifiers.  The patient is lethargic at this time. Pt opens eyes to sternal rub and quickly falls asleep. Pt requiring repeating stimulation.   APPEARANCE: Patient in respiratory distress at this time. Patient is clean and well groomed, patient's clothing is properly fastened.  SKIN:The skin is warm and dry, color consistent with ethnicity, patient has normal skin turgor and moist mucus membranes. Nephrostomy insertion site noted to right flank.   MUSCULOSKELETAL: pt obtunded. No movement at this time.  RESPIRATORY: Airway is open and patent. Bradypnea noted with RR of 8. Respirations shallow.   CARDIAC: Patient has a normal rate and rhythm. capillary refill < 3 seconds in all extremities. +1 pitting edema noted to BLE.   ABDOMEN: Soft to palpation, no abdominal distention noted. Bowel sounds present in all four quadrants.  NEUROLOGIC: pt obtunded. Pt opens eyes to vigorous shaking/pain. Pt disoriented to situation, place. Pt oriented to time and person. Slurred speech noted. Aphasia noted. No effort against gravity to BLE at this time. Some effort against gravity to BUE. No facial droop noted. PERRL noted, pupils 3 mm.

## 2023-10-21 NOTE — SUBJECTIVE & OBJECTIVE
No past medical history on file.    No past surgical history on file.    Review of patient's allergies indicates:  No Known Allergies    No current facility-administered medications on file prior to encounter.     No current outpatient medications on file prior to encounter.     Family History    None       Tobacco Use    Smoking status: Not on file    Smokeless tobacco: Not on file   Substance and Sexual Activity    Alcohol use: Not on file    Drug use: Not on file    Sexual activity: Not on file     Review of Systems   Constitutional:  Positive for fatigue. Negative for activity change and appetite change.   HENT:  Negative for ear pain and facial swelling.    Respiratory:  Negative for choking.    Cardiovascular:  Negative for chest pain and leg swelling.   Gastrointestinal:  Negative for blood in stool and constipation.   Endocrine: Negative for polydipsia and polyphagia.   Genitourinary:  Negative for enuresis and flank pain.   Musculoskeletal:  Negative for gait problem and joint swelling.   Allergic/Immunologic: Negative for food allergies and immunocompromised state.   Neurological:  Negative for seizures and speech difficulty.   Hematological:  Negative for adenopathy. Does not bruise/bleed easily.   Psychiatric/Behavioral:  Positive for confusion. Negative for sleep disturbance.      Objective:     Vital Signs (Most Recent):  Temp: 97.5 °F (36.4 °C) (10/21/23 0524)  Pulse: 69 (10/21/23 0524)  Resp: 20 (10/21/23 0524)  BP: (!) 106/54 (10/21/23 0524)  SpO2: 99 % (10/21/23 0524) Vital Signs (24h Range):  Temp:  [97.5 °F (36.4 °C)-98.5 °F (36.9 °C)] 97.5 °F (36.4 °C)  Pulse:  [66-89] 69  Resp:  [8-31] 20  SpO2:  [80 %-100 %] 99 %  BP: (105-137)/(53-63) 106/54     Weight: 75.7 kg (166 lb 14.2 oz)  There is no height or weight on file to calculate BMI.     Physical Exam  Vitals reviewed.   Constitutional:       Appearance: He is ill-appearing.   HENT:      Head: Atraumatic.      Right Ear: There is no impacted  "cerumen.      Left Ear: There is no impacted cerumen.      Nose: No congestion or rhinorrhea.      Mouth/Throat:      Pharynx: No oropharyngeal exudate or posterior oropharyngeal erythema.   Eyes:      General:         Right eye: No discharge.         Left eye: No discharge.   Neck:      Vascular: No carotid bruit.   Cardiovascular:      Rate and Rhythm: Regular rhythm. Bradycardia present.      Heart sounds: No murmur heard.     No gallop.   Pulmonary:      Effort: No respiratory distress.      Breath sounds: No wheezing.   Abdominal:      General: There is no distension.      Tenderness: There is no abdominal tenderness.   Genitourinary:     Comments: Nephrostomy tube in place  Musculoskeletal:         General: No deformity.      Cervical back: No tenderness.   Skin:     Coloration: Skin is not jaundiced.      Findings: No bruising.   Neurological:      Mental Status: He is disoriented.   Psychiatric:         Mood and Affect: Mood normal.                Significant Labs: All pertinent labs within the past 24 hours have been reviewed.  Bilirubin:   Recent Labs   Lab 10/20/23  2219   BILITOT 0.3     Blood Culture: No results for input(s): "LABBLOO" in the last 48 hours.  BMP:   Recent Labs   Lab 10/20/23  2219   *      K 4.7      CO2 21*   BUN 30*   CREATININE 1.9*   CALCIUM 9.9     CBC:   Recent Labs   Lab 10/20/23  2219   WBC 11.39   HGB 9.1*   HCT 28.3*        CMP:   Recent Labs   Lab 10/20/23  2219      K 4.7      CO2 21*   *   BUN 30*   CREATININE 1.9*   CALCIUM 9.9   PROT 7.7   ALBUMIN 3.1*   BILITOT 0.3   ALKPHOS 113   AST 19   ALT 16   ANIONGAP 14     Lactic Acid:   Recent Labs   Lab 10/20/23  2222   LACTATE 1.3     Lipase: No results for input(s): "LIPASE" in the last 48 hours.  Lipid Panel:   Recent Labs   Lab 10/20/23  2219   CHOL 132   HDL 54   LDLCALC 67.0   TRIG 55   CHOLHDL 40.9     Troponin:   Recent Labs   Lab 10/20/23  2219   TROPONINI 0.010     TSH: "   Recent Labs   Lab 10/20/23  2213   TSH 1.001       Significant Imaging: I have reviewed all pertinent imaging results/findings within the past 24 hours.  I have reviewed and interpreted all pertinent imaging results/findings within the past 24 hours.

## 2023-10-21 NOTE — CONSULTS
RD providing remote coverage.   Consulted for possible stroke - cardiac diet education. Pt diet recently advanced, SLP edie completed and pt able to tolerate regular solid diet with thin liquids.   - attached clinical reference materials handouts to discharge documents.    On site RD to provide in-person education as warranted,   education materials to be provided with discharge instructions.      Please re-consult as needed.  Thank you.   Indira Guo, RDN, LDN

## 2023-10-21 NOTE — SUBJECTIVE & OBJECTIVE
2  Woke up with symptoms?: no    Recent bleeding noted: no  Does the patient take any Blood Thinners? no  Medications: No relevant medications    Current Facility-Administered Medications:     [START ON 10/21/2023] acetaminophen tablet 650 mg, 650 mg, Oral, Q4H PRN, Aditi Campos MD    cefTRIAXone (ROCEPHIN) 1 g in dextrose 5 % in water (D5W) 100 mL IVPB (MB+), 1 g, Intravenous, ED 1 Time, Archie Obrien, DO, Last Rate: 200 mL/hr at 10/20/23 2333, 1 g at 10/20/23 2333    [START ON 10/21/2023] dextrose 10% bolus 125 mL 125 mL, 12.5 g, Intravenous, PRN, Aditi Campos MD    [START ON 10/21/2023] dextrose 10% bolus 250 mL 250 mL, 25 g, Intravenous, PRN, Aditi Campos MD    [START ON 10/21/2023] glucagon (human recombinant) injection 1 mg, 1 mg, Intramuscular, PRN, Aditi Campos MD    [START ON 10/21/2023] glucose chewable tablet 16 g, 16 g, Oral, PRN, Aditi Campos MD    [START ON 10/21/2023] glucose chewable tablet 24 g, 24 g, Oral, PRN, Aditi Campos MD    [START ON 10/21/2023] naloxone 0.4 mg/mL injection 0.02 mg, 0.02 mg, Intravenous, PRN, Aditi Campos MD    [START ON 10/21/2023] ondansetron injection 4 mg, 4 mg, Intravenous, Q8H PRN, Aditi Campos MD    [START ON 10/21/2023] ondansetron injection 4 mg, 4 mg, Intravenous, Q8H PRN, Aditi Campos MD    [START ON 10/21/2023] polyethylene glycol packet 17 g, 17 g, Oral, Daily, Aditi Campos MD    [START ON 10/21/2023] sodium chloride 0.9% flush 10 mL, 10 mL, Intravenous, Q12H PRN, Aditi Campos MD  No current outpatient medications on file.      Past Medical History: no relevant history    Past Surgical History: no major surgeries within the last 2 weeks    Family History: no relevant history    Social History: unable to obtain    Allergies: No Known Allergies No relevant allergies    Review of Systems  Objective:   Vitals: Blood pressure (!) 127/57, pulse 89, temperature 98.5  °F (36.9 °C), temperature source Axillary, resp. rate 19, weight 86.2 kg (190 lb), SpO2 98 %. BP: 127/57    CT READ: Yes  Abnormal CT Involutional changes with chronic microvascular ischemic changes in the periventricular white matter.. Moderate area of hypoattenuation in the left posterior parietal cortex suggesting infarction.    Physical Exam

## 2023-10-21 NOTE — ASSESSMENT & PLAN NOTE
-CT scan shows spiculated mass  -known history    Plan:  May defer to outpatient treatment     Cimetidine Counseling:  I discussed with the patient the risks of Cimetidine including but not limited to gynecomastia, headache, diarrhea, nausea, drowsiness, arrhythmias, pancreatitis, skin rashes, psychosis, bone marrow suppression and kidney toxicity.

## 2023-10-21 NOTE — ASSESSMENT & PLAN NOTE
-improvement with Narcan  -TSH, ammonia within normal range.  Point of care glucose elevated at 127.  -ABG unremarkable for CO2 elevation  -CT head unremarkable  -blood cultures and urine has been ordered  -stroke call in the ED    Plan:  -likely at least part due to opioid use  -MRI ordered  -continue to monitor  -consult neurology further evaluation

## 2023-10-21 NOTE — H&P
[facility] Medicine  History & Physical    Patient Name: Geovani Currie  MRN: [id number]  Patient Class: OP- Observation  Admission Date: 10/20/2023  Attending Physician: Naomi Lutz   Primary Care Provider: No primary care provider on file.         Patient information was obtained from patient and ER records.     Subjective:     Principal Problem:Altered mental status    Chief Complaint:   Chief Complaint   Patient presents with    Altered Mental Status     Per EMS patient with altered mental status and shallow respirations noted during flight from [place], per wife.  Patient picked up at the airport.  Opens eyes to loud voice.  Rests with eyes closed between care.  History of lung CA reported.        HPI: Geovani Currie is a 68-year-old male with a past medical history of bladder cancer, lung cancer who presents with altered mental status.    Patient was sent in from the airport as patient was experiencing worsening lethargy and shallow respiration.  He was noted to be satting around 90% on room air and was placed on 2 L.  Patient just moved from [place] to [place] to be closer to family for cancer treatments.  Family is concerned that over the past few days he had become increasingly weak.  At this time, There is some concern that on the plane he may have taken additional doses of opioids, since he quickly awoke to Narcan.  Of note, patient recently had a stent placed in his left kidney right nephrostomy tube due to kidney issues.    In the ED, triage vitals were significant for hypoxia, patient was placed on 5 L. CBC showed normocytic anemia.  CMP was significant for low albumin, elevated sugars.  TSH , lactic acid, troponin within normal range.  UA was concerning for white blood cells, RBCs, yeast although no bacteria.  ABG was reassuring with no CO2 retention    Code stroke was called for altered mental status.  Neurologist recommends no lytics, CTA, admission for  MRI.    CT head with moderate area of hypoattenuation in the parietal cortex suggesting infarct although radiologist favors chronic process.  Small probable remote areas of lacunar infarct involving the left caudate, bilateral thalamus and right lateral basal ganglia.  CTA head and neck with no evidence of acute intracranial abnormality.  No high-grade stenosis or major vessel occlusion.  Spiculated lesion noticed in the right posterior lung apex.    Chest x-ray shows no acute or significant focal chest abnormality.    With administration of Narcan, improvement in patient's mental status.    ED provider, gave patient ceftriaxone and Diflucan for UA.  Due to concern for subacute/acute CVA, aspirin and Plavix were ordered.    Medicine was consulted for patient's altered mental status      No past medical history on file.    No past surgical history on file.    Review of patient's allergies indicates:  No Known Allergies    No current facility-administered medications on file prior to encounter.     No current outpatient medications on file prior to encounter.     Family History    None       Tobacco Use    Smoking status: Not on file    Smokeless tobacco: Not on file   Substance and Sexual Activity    Alcohol use: Not on file    Drug use: Not on file    Sexual activity: Not on file     Review of Systems   Constitutional:  Positive for fatigue. Negative for activity change and appetite change.   HENT:  Negative for ear pain and facial swelling.    Respiratory:  Negative for choking.    Cardiovascular:  Negative for chest pain and leg swelling.   Gastrointestinal:  Negative for blood in stool and constipation.   Endocrine: Negative for polydipsia and polyphagia.   Genitourinary:  Negative for enuresis and flank pain.   Musculoskeletal:  Negative for gait problem and joint swelling.   Allergic/Immunologic: Negative for food allergies and immunocompromised state.   Neurological:  Negative for seizures and speech  difficulty.   Hematological:  Negative for adenopathy. Does not bruise/bleed easily.   Psychiatric/Behavioral:  Positive for confusion. Negative for sleep disturbance.      Objective:     Vital Signs (Most Recent):  Temp: 97.5 °F (36.4 °C) (10/21/23 0524)  Pulse: 69 (10/21/23 0524)  Resp: 20 (10/21/23 0524)  BP: (!) 106/54 (10/21/23 0524)  SpO2: 99 % (10/21/23 0524) Vital Signs (24h Range):  Temp:  [97.5 °F (36.4 °C)-98.5 °F (36.9 °C)] 97.5 °F (36.4 °C)  Pulse:  [66-89] 69  Resp:  [8-31] 20  SpO2:  [80 %-100 %] 99 %  BP: (105-137)/(53-63) 106/54     Weight: 75.7 kg (166 lb 14.2 oz)  There is no height or weight on file to calculate BMI.     Physical Exam  Vitals reviewed.   Constitutional:       Appearance: He is ill-appearing.   HENT:      Head: Atraumatic.      Right Ear: There is no impacted cerumen.      Left Ear: There is no impacted cerumen.      Nose: No congestion or rhinorrhea.      Mouth/Throat:      Pharynx: No oropharyngeal exudate or posterior oropharyngeal erythema.   Eyes:      General:         Right eye: No discharge.         Left eye: No discharge.   Neck:      Vascular: No carotid bruit.   Cardiovascular:      Rate and Rhythm: Regular rhythm. Bradycardia present.      Heart sounds: No murmur heard.     No gallop.   Pulmonary:      Effort: No respiratory distress.      Breath sounds: No wheezing.   Abdominal:      General: There is no distension.      Tenderness: There is no abdominal tenderness.   Genitourinary:     Comments: Nephrostomy tube in place  Musculoskeletal:         General: No deformity.      Cervical back: No tenderness.   Skin:     Coloration: Skin is not jaundiced.      Findings: No bruising.   Neurological:      Mental Status: He is disoriented.   Psychiatric:         Mood and Affect: Mood normal.                Significant Labs: All pertinent labs within the past 24 hours have been reviewed.  Bilirubin:   Recent Labs   Lab 10/20/23  2219   BILITOT 0.3     Blood Culture: No results  "for input(s): "LABBLOO" in the last 48 hours.  BMP:   Recent Labs   Lab 10/20/23  2219   *      K 4.7      CO2 21*   BUN 30*   CREATININE 1.9*   CALCIUM 9.9     CBC:   Recent Labs   Lab 10/20/23  2219   WBC 11.39   HGB 9.1*   HCT 28.3*        CMP:   Recent Labs   Lab 10/20/23  2219      K 4.7      CO2 21*   *   BUN 30*   CREATININE 1.9*   CALCIUM 9.9   PROT 7.7   ALBUMIN 3.1*   BILITOT 0.3   ALKPHOS 113   AST 19   ALT 16   ANIONGAP 14     Lactic Acid:   Recent Labs   Lab 10/20/23  2222   LACTATE 1.3     Lipase: No results for input(s): "LIPASE" in the last 48 hours.  Lipid Panel:   Recent Labs   Lab 10/20/23  2219   CHOL 132   HDL 54   LDLCALC 67.0   TRIG 55   CHOLHDL 40.9     Troponin:   Recent Labs   Lab 10/20/23  2219   TROPONINI 0.010     TSH:   Recent Labs   Lab 10/20/23  2219   TSH 1.001       Significant Imaging: I have reviewed all pertinent imaging results/findings within the past 24 hours.  I have reviewed and interpreted all pertinent imaging results/findings within the past 24 hours.    Assessment/Plan:     * Altered mental status  -improvement with Narcan  -TSH, ammonia within normal range.  Point of care glucose elevated at 127.  -ABG unremarkable for CO2 elevation  -CT head unremarkable  -blood cultures and urine has been ordered  -stroke call in the ED    Plan:  -likely at least part due to opioid use  -MRI ordered  -continue to monitor  -consult neurology further evaluation      Hyperglycemia  Slightly elevated sugars on presentation    Plan:  May consider sliding scale if sugars consistently above 180      UTI (urinary tract infection)  UA noted to have WBC, RBC, yeast.  Increased WBC in UA, may be due to nephrostomy tube  Urine culture pending  S/p ceftriaxone and Diflucan in the ED    Plan:  We will currently hold ceftriaxone and Diflucan, low threshold to restart      Hx of cancer of lung  -CT scan shows spiculated mass  -known history    Plan:  May " defer to outpatient treatment      Hx of bladder cancer  Hematuria noted on UA    Plan:  Defer to outpatient treatment      Normocytic anemia  Likely due to anemia of chronic disease    Plan:  Transfuse for less than hemoglobin 7    Elevated serum creatinine  Creatinine of 1.9 admission, unclear baseline  Patient with history of left ureteral stent, and right nephrostomy tube placement    Plan:  Daily BMP  Flush nephrostomy tube at least twice daily  No need to call Urology at this time        VTE Risk Mitigation (From admission, onward)         Ordered     IP VTE LOW RISK PATIENT  Once         10/20/23 2347     Place sequential compression device  Until discontinued         10/20/23 2347                   On 10/21/2023, patient should be placed in hospital observation services under my care.        Aditi Campos MD  Department of Hospital Medicine  [facility]

## 2023-10-21 NOTE — ED NOTES
[facility] called with symptoms per request of Dr Obrien.  Neuro telestroke spoke directly to Dr Obrien with order to cancel consult.

## 2023-10-21 NOTE — ASSESSMENT & PLAN NOTE
Slightly elevated sugars on presentation    Plan:  May consider sliding scale if sugars consistently above 180

## 2023-10-21 NOTE — PLAN OF CARE
Problem: SLP  Goal: SLP Goal  Description: Goals:   1. Patient will successfully participate in [id number] evaluation to further assess for any communication impairments.   2. Patient will successfully participate in clinical swallow evaluation and tolerate po trials with no overt s/s of aspiration.   3. Patient will tolerate regular consistency/thin liquids po diet with no overt s/s of aspiration.   Outcome: Ongoing, Progressing     10/21/2023: BSE completed. The patient is safe for a regular diet with thin liquids. ST noted slight L facial weakness, dysarthria with decreased intelligibility and difficulty with speech tasks. ST will further assess.   Marquis Hurt M.S., CCC-SLP   Speech Language Pathologist

## 2023-10-21 NOTE — HPI
"Per chart " 68-year-old male history of bladder cancer, lung cancer presents for altered mental status.  Family states that he moved today from [place] to [place] to be closer to family so that he can continue cancer treatment.  He last received treatment involving chemotherapy and radiation 2 years ago.  He recently has had some kidney problems had a stent placed in the left kidney and a right nephrostomy tube.  Family has noted over the past few days a steady decline.  He is having more difficulty with ambulation has been more somnolent.  Today on the flight he became fairly obtunded and was transferred to the ED.  He is minimally responsive on arrival and unable to contribute to his own history.  Family is concerned he takes Norco and Xanax and there is a possibility for unintentional overdose due to multiple family members administering his medications today"     "

## 2023-10-21 NOTE — PT/OT/SLP EVAL
Speech Language Pathology Evaluation  Bedside Swallow    Patient Name:  Geovani Currie   MRN:  [id number]  Admitting Diagnosis: Altered mental status    Recommendations:                 General Recommendations:  Dysphagia therapy and Speech language evaluation  Diet recommendations:  Regular Diet - IDDSI Level 7, Thin liquids - IDDSI Level 0   Aspiration Precautions: 1 bite/sip at a time, Alternating bites/sips, Assistance with meals, Feed only when awake/alert, HOB to 90 degrees, Meds whole 1 at a time, Monitor for s/s of aspiration, Remain upright 30 minutes post meal, Small bites/sips, and Standard aspiration precautions   General Precautions: Standard, fall  Communication strategies:  none    Assessment:     Geovani Currie is a 68 y.o. male admitted with altered mental status. The patient is presenting with slight L facial weakness, dysarthria with decreased intelligibility and possible speech/lang impairment. Patient is without dysphagia and safe for a regular diet with thin liquids. ST will follow.     History:     HPI: Geovani Currie is a 68-year-old male with a past medical history of bladder cancer, lung cancer who presents with altered mental status.     [place] as patient was experiencing worsening lethargy and shallow respiration.  He was noted to be satting around 90% on room air and was placed on 2 L.  Patient just moved from [place] to [place] to be closer to family for cancer treatments.  Family is concerned that over the past few days he had become increasingly weak.  At this time, There is some concern that on the plane he may have taken additional doses of opioids, since he quickly awoke to Narcan.  Of note, patient recently had a stent placed in his left kidney right nephrostomy tube due to kidney issues.     In the ED, triage vitals were significant for hypoxia, patient was placed on 5 L. CBC showed normocytic anemia.  CMP was significant for low albumin, elevated  "sugars.  TSH , lactic acid, troponin within normal range.  UA was concerning for white blood cells, RBCs, yeast although no bacteria.  ABG was reassuring with no CO2 retention     Code stroke was called for altered mental status.  Neurologist recommends no lytics, CTA, admission for MRI.     CT head with moderate area of hypoattenuation in the parietal cortex suggesting infarct although radiologist favors chronic process.  Small probable remote areas of lacunar infarct involving the left caudate, bilateral thalamus and right lateral basal ganglia.  CTA head and neck with no evidence of acute intracranial abnormality.  No high-grade stenosis or major vessel occlusion.  Spiculated lesion noticed in the right posterior lung apex.     Chest x-ray shows no acute or significant focal chest abnormality.     With administration of Narcan, improvement in patient's mental status.     ED provider, gave patient ceftriaxone and Diflucan for UA.  Due to concern for subacute/acute CVA, aspirin and Plavix were ordered.     Medicine was consulted for patient's altered mental status       No past medical history on file.    No past surgical history on file.    Social History: Patient is from [place] but was coming to [place] to be closer to his family for treatment    Prior Intubation HX:  None this admit     Modified Barium Swallow: None per EMR    Chest X-Rays: 10/20 - No acute or significant focal chest abnormality.     Prior diet: Regular/thin    Occupation/hobbies/homemaking: None stated     Subjective     RN approved ST for evaluation. ST entered the patients room with the patient laying in bed, asleep. Patient required MAX verbal and tactile cues to arouse and attend to ST. The patient was able to arouse and greet ST.   Patient goals: "get some coffee"    Pain/Comfort:  Pain Rating 1: 0/10    Respiratory Status:  Nasal Cannula    Objective:     Oral Musculature Evaluation  Oral Musculature: left weakness  Dentition: " edentulous  Secretion Management: adequate  Mucosal Quality: good  Mandibular Strength and Mobility: WFL  Oral Labial Strength and Mobility: WFL  Lingual Strength and Mobility: WFL  Velar Elevation: WFL  Buccal Strength and Mobility: WFL  Volitional Cough: intact  Volitional Swallow: intact  Voice Prior to PO Intake: audible, however, decreased intelligibility    Bedside Swallow Eval:   Consistencies Assessed:  Thin liquids x7 single straw sips   Puree x5 tsp bites   Solids x2 bite sized pieces       Oral Phase:   WFL    Pharyngeal Phase:   no overt clinical signs/symptoms of aspiration  no overt clinical signs/symptoms of pharyngeal dysphagia    Compensatory Strategies  None    Treatment: The patient was able to consume all items presented without overt s/s of aspiration. Patient is safe for a regular diet with thin liquids despite edentulous state.     ST noted patient with slight L facial weakness. Patient also with decreased intelligibility - pt stating his speech is currently not at his baseline. Patient stating that his speech sounds more slurred. The patient was AAOx4 and followed commands accurately. Patient with some difficulty naming common objects. ST will further assess speech/language.     ST completed extensive education with the patient on SLP role, POC, diagnostic information, importance of utilizing safe swallowing precautions and all results/recs. The patient verbalized good understanding. ST allowed time for all of the patients questions to be asked/answered that were within ST's scope. ST notified RN of all results/recs.     Goals:   Multidisciplinary Problems       SLP Goals          Problem: SLP    Goal Priority Disciplines Outcome   SLP Goal     SLP Ongoing, Progressing   Description: Goals:   1. Patient will successfully participate in [id number] evaluation to further assess for any communication impairments.   2. Patient will successfully participate in clinical swallow  evaluation and tolerate po trials with no overt s/s of aspiration.   3. Patient will tolerate regular consistency/thin liquids po diet with no overt s/s of aspiration.                        Plan:     Patient to be seen:  3 x/week   Plan of Care expires:  11/20/23  Plan of Care reviewed with:  patient   SLP Follow-Up:  Yes       Discharge recommendations:   (TBD)   Barriers to Discharge:  None    Time Tracking:     SLP Treatment Date:   10/21/23  Speech Start Time:  1000  Speech Stop Time:  1021     Speech Total Time (min):  21 min    Billable Minutes: Eval Swallow and Oral Function 11 and Self Care/Home Management Training 10    10/21/2023       Marquis Hurt M.S., CCC-SLP   Speech Language Pathologist

## 2023-10-21 NOTE — HPI
Geovani Currie is a 68-year-old male with a past medical history of bladder cancer, lung cancer who presents with altered mental status.    Patient was sent in from the airport as patient was experiencing worsening lethargy and shallow respiration.  He was noted to be satting around 90% on room air and was placed on 2 L.  Patient just moved from [place] to [place] to be closer to family for cancer treatments.  Family is concerned that over the past few days he had become increasingly weak.  At this time, There is some concern that on the plane he may have taken additional doses of opioids, since he quickly awoke to Narcan.  Of note, patient recently had a stent placed in his left kidney right nephrostomy tube due to kidney issues.    In the ED, triage vitals were significant for hypoxia, patient was placed on 5 L. CBC showed normocytic anemia.  CMP was significant for low albumin, elevated sugars.  TSH , lactic acid, troponin within normal range.  UA was concerning for white blood cells, RBCs, yeast although no bacteria.  ABG was reassuring with no CO2 retention    Code stroke was called for altered mental status.  Neurologist recommends no lytics, CTA, admission for MRI.    CT head with moderate area of hypoattenuation in the parietal cortex suggesting infarct although radiologist favors chronic process.  Small probable remote areas of lacunar infarct involving the left caudate, bilateral thalamus and right lateral basal ganglia.  CTA head and neck with no evidence of acute intracranial abnormality.  No high-grade stenosis or major vessel occlusion.  Spiculated lesion noticed in the right posterior lung apex.    Chest x-ray shows no acute or significant focal chest abnormality.    With administration of Narcan, improvement in patient's mental status.    ED provider, gave patient ceftriaxone and Diflucan for UA.  Due to concern for subacute/acute CVA, aspirin and Plavix were ordered.    Medicine was consulted  for patient's altered mental status

## 2023-10-21 NOTE — ED NOTES
Bed: EXAM 14  Expected date: 10/20/23  Expected time: 9:42 PM  Means of arrival: Ambulance Service  Comments:

## 2023-10-21 NOTE — CONSULTS
"NEUROLOGY FLOOR CONSULT    Reason for consult:  altered mental status    CC:  "altered mental status, respiratory distress"    HPI:   Geovani Currie is a 68 y.o. w/ Hx of bladder cancer status post R nephrostomy tube placement 3 wks ago, hypothyroidism, HTN who initially presented from the airport for lethargy and respiratory distress. In the ED, pt was found to be hypoxic with respiratory depression and was given naloxone which resulted in improvement in his respiratory status. He was also stroke activated and was not deemed a candidate for TNK.    Most of history is provided by daughters., who state that pt was in his usual state of health until around 3 days ago, when he became more lethargic with muffled speech, was not able to ambulate at all, and became diffuse tremulous. Per family, he had similar symptoms when he was diagnosed with sepsis secondary to a L nephrostomy tube last year. At baseline, pt is able to converse normally, although he does have some short term memory deficits and hearing impairment.  He is able to ambulate slowly with a walker, but has a shuffling gait; and he is able to feed and clothe himself.  At home he takes Xanax 1mg BID for anxiety which he has not received since being in the hospital.    Overall, since being admitted to the hospital, pt has improved, and is now more oriented to his surrounds although he is not back to baseline.     ROS: As per HPI    Histories:     Allergies:  Patient has no known allergies.    Current Medications:    Current Facility-Administered Medications   Medication Dose Route Frequency Provider Last Rate Last Admin    acetaminophen tablet 650 mg  650 mg Oral Q4H PRN Aditi Campos MD        dextrose 10% bolus 125 mL 125 mL  12.5 g Intravenous PRN Aditi Campos MD        dextrose 10% bolus 250 mL 250 mL  25 g Intravenous PRN Aditi Campos MD        glucagon (human recombinant) injection 1 mg  1 mg Intramuscular PRN Beth, " MD Aditi        glucose chewable tablet 16 g  16 g Oral PRN Aditi Campos MD        glucose chewable tablet 24 g  24 g Oral PRN Aditi Campos MD        naloxone 0.4 mg/mL injection 0.02 mg  0.02 mg Intravenous PRN Aditi Campos MD        ondansetron injection 4 mg  4 mg Intravenous Q8H PRN Aditi Campos MD        ondansetron injection 4 mg  4 mg Intravenous Q8H PRN Aditi Campos MD        polyethylene glycol packet 17 g  17 g Oral Daily Aditi Campos MD        sodium chloride 0.9% flush 10 mL  10 mL Intravenous Q12H PRN Aditi Campos MD           Past Medical/Surgical/Family History:  Medical: No past medical history on file.   Surgeries: No past surgical history on file.   Family: No family history on file., no family history of nerve or muscle disease    Social History:    Substance Abuse/Dependence History:  Tobacco: unknown tobacco use  EtOH:  uknown  Ilicits: denies    Occupational/Employment History:  Occupation: unknown      Current Evaluation:     Vital Signs:   Vitals:    10/21/23 1205   BP:    Pulse: 88   Resp:    Temp:         Neurological Examination   Orientation  Alert, awake, oriented to self, place, time, and situation. Perseverates and has difficulty following complex commands.   Memory  Recent memory impaired.  remote memory intact.  Language  No dysarthria, No aphasia.   Cranial Nerves  PERRL, VF intact, EOMI, V1-V3 intact, symmetric facial expression, hearing grossly intact, SCM & TPZ 5/5, tongue midline, symmetric palate elevation.  Motor  Normal Bulk  Normal Tone  5/5 strength in 4 extremities  Diffusely tremulous both at rest and with action in all extremities   Sensory  Normal to light touch throughout  DTR   +3 symmetric throughout in upper and lower extremities  Plantar responses flexor  Cerebellar/Gait  Normal finger to nose and heel to shin.   Gait deferred     LABORATORY STUDIES:          Component Ref Range & Units 08:30 1 d ago    Sodium 136 - 145 mmol/L 138 137   Potassium 3.5 - 5.1 mmol/L 4.4 4.7   Chloride 95 - 110 mmol/L 106 102   CO2 23 - 29 mmol/L 22 Low  21 Low    Glucose 70 - 110 mg/dL 115 High  124 High    BUN 8 - 23 mg/dL 25 High  30 High    Creatinine 0.5 - 1.4 mg/dL 1.6 High  1.9 High    Calcium 8.7 - 10.5 mg/dL 9.4 9.9   Anion Gap 8 - 16 mmol/L 10 14   eGFR >60 mL/min/1.73 m^2 46 Abnormal  38 Abnormal         omponent Ref Range & Units 08:30 1 d ago   WBC 3.90 - 12.70 K/uL 9.19 11.39   RBC 4.60 - 6.20 M/uL 2.97 Low  3.28 Low    Hemoglobin 14.0 - 18.0 g/dL 8.3 Low  9.1 Low    Hematocrit 40.0 - 54.0 % 25.4 Low  28.3 Low    MCV 82 - 98 fL 86 86   MCH 27.0 - 31.0 pg 27.9 27.7   MCHC 32.0 - 36.0 g/dL 32.7 32.2   RDW 11.5 - 14.5 % 14.0 14.1   Platelets 150 - 450 K/uL 239 281   MPV 9.2 - 12.9 fL 9.5 9.7   Immature Granulocytes 0.0 - 0.5 % 0.3 0.5   Gran # (ANC) 1.8 - 7.7 K/uL 7.4 9.3 High    Immature Grans (Abs) 0.00 - 0.04 K/uL 0.03 0.06 High  CM   Comment: Mild elevation in immature granulocytes is non specific and  can be seen in a variety of conditions including stress response,  acute inflammation, trauma and pregnancy. Correlation with other  laboratory and clinical findings is essential.   Lymph # 1.0 - 4.8 K/uL 0.8 Low  1.0   Mono # 0.3 - 1.0 K/uL 0.6 0.8   Eos # 0.0 - 0.5 K/uL 0.3 0.3   Baso # 0.00 - 0.20 K/uL 0.03 0.03   nRBC 0 /100 WBC 0 0   Gran % 38.0 - 73.0 % 80.1 High  81.5 High    Lymph % 18.0 - 48.0 % 9.1 Low  8.3 Low    Mono % 4.0 - 15.0 % 6.6 6.6   Eosinophil % 0.0 - 8.0 % 3.6 2.8   Basophil % 0.0 - 1.9 % 0.3 0.3   Differential Method  Automated Automated          Component Ref Range & Units 1 d ago   Ammonia 10 - 50 umol/L 26     omponent Ref Range & Units 1 d ago   TSH 0.400 - 4.000 uIU/mL 1.001     omponent Ref Range & Units 1 d ago   Specimen UA  Urine, Clean Catch   Color, UA Yellow, Straw, Andra Yellow   Appearance, UA Clear Hazy Abnormal    pH, UA 5.0 - 8.0 6.0   Specific Gravity, UA 1.005 - 1.030 1.020    Protein, UA Negative 1+ Abnormal    Comment: Recommend a 24 hour urine protein or a urine  protein/creatinine ratio if globulin induced proteinuria is  clinically suspected.   Glucose, UA Negative Negative   Ketones, UA Negative Negative   Bilirubin (UA) Negative Negative   Occult Blood UA Negative 2+ Abnormal    Nitrite, UA Negative Negative   Urobilinogen, UA <2.0 EU/dL Negative   Leukocytes, UA Negative 3+ Abnormal      RADIOLOGY STUDIES:  I have personally reviewed the images performed.     HEAD CT:  pression:     1. Involutional changes with chronic microvascular ischemic changes in the periventricular white matter.  2. Moderate area of hypoattenuation in the left posterior parietal cortex suggesting infarction.  I favor a chronic process though age indeterminate.  MRI follow-up would better characterize.  3. Small probable remote areas of lacunar infarction involving the left caudate, bilateral thalamus and right lateral basal ganglia.  4. This report was flagged in Epic as abnormal.    CTA head and neck:  mpression:     No evidence of acute intracranial abnormality since earlier examination 22:20.  Focus of low density favoring nonacute infarct in the posterior left parietal lobe.     Lacunar infarcts  in left caudate left thalami and basal ganglia previously described and better visualized on prior CT as motion artifact limits evaluation on this exam     There is no high-grade stenosis or major vessel occlusion.     Spiculated lesion measuring up to 16 mm in the right posterior lung apex.  For a solid nodule >8 mm, Fleischner Society 2017 guidelines recommend considering CT, PET/CT or tissue sampling at 3 months.  This finding was relayed to the emergency room physician, Dr. Obrien at the time of interpretation via secure chat.     BRAIN MRI  INDINGS:  There is no restricted diffusion to suggest acute infarction.  Mild moderate size region of encephalomalacia left parietal lobe suggestive for prior  infarction.  T2 flair signal hyperintensity underlies this compatible gliosis and scarring.  There is small focus of encephalomalacia extending across midline to the anterior body corpus callosum which may be additional prior infarction with punctate left thalamic and right basal gangliar remote lacunar-type infarcts with small remote left caudate lacunar type infarct     There is subtle scattered T2 FLAIR signal hyperintensities elsewhere supratentorial white matter which is nonspecific and may be mild chronic ischemic change.     Major intracranial skull base T2 flow voids are present     There is no midline shift.  Generalized cerebral volume loss with compensatory enlargement ventricles sulci and cisterns without hydrocephalus.  No abnormal parenchymal susceptibility to suggest parenchymal hemorrhage.     Impression:     Mild moderate sized remote left parietal infarction with encephalomalacia     There are few scattered small to punctate sized remote bilateral basal gangliar and left thalamic lacunar type infarcts     No evidence for acute infarction.     Please see above for additional details.      Assessment:  VILMA Currie is a 68 y.o. w/ Hx of bladder cancer, HTN, hypothyroidism who presented with respiratory depression and encephalopathy. Respiratory depression thought to be due to opioid and possible benzodiazepine use given response to naloxone. Although pt's mental status has improved since admission, he is still not at baseline per family and has some deficits in attention/concentration. Given this and pt's tremulousness and urine analysis, there is concern for a urinary tract infection.    Chronic L parietoocipital CVA  -Recommend TTE   -Aspirin 81 mg daily, continue home statin    Acute encephalopathy  -Restart home Xanax to avoid withdrawal  -Recommend empiric treatment of potential UTI         Case discussed with Dr. Reis   Will continue to follow.    Marcio Alvarenga MD  [facility],  PGY-IV

## 2023-10-21 NOTE — ASSESSMENT & PLAN NOTE
UA noted to have WBC, RBC, yeast.  Increased WBC in UA, may be due to nephrostomy tube  Urine culture pending  S/p ceftriaxone and Diflucan in the ED    Plan:  We will currently hold ceftriaxone and Diflucan, low threshold to restart

## 2023-10-21 NOTE — CONSULTS
[facility]  Vascular Neurology  Comprehensive Stroke Center  TeleVascular Neurology Acute Consultation Note      Consults    Consulting Provider: MAAME HUERTA  Current Providers  No providers found    Patient Location:  [facility] EMERGENCY DEPARTMENT Emergency Department  Spoke hospital nurse at bedside with patient assisting consultant.     Patient information was obtained from patient.         Assessment/Plan:   CT head - no acute intra-cranial process.      Oral aspirin 81 mg, if PO not possible then rectal aspirin 300 mg now.  Head of bed flat, IV Fluids, permissive hypertension  CTA head and neck with and without contrast- PRELIM NO LVO  Neuro consult if in house available or transfer for neuro consult  Stroke/TIA work-up with MRI brain, Echo, PT/OT/ Speech and swallow evaluation.        Diagnoses:   Stroke like symptoms    STROKE DOCUMENTATION     Acute Stroke Times:   Acute Stroke Times   Last Known Normal Date: 10/18/23  Unknown Normal Time: Unknown Time  Stroke Team Called Date: 10/20/23  Stroke Team Called Time: 2215  Stroke Team Arrival Date: 10/20/23  Stroke Team Arrival Time: 2215  Thrombolytic Therapy Recommended: No    NIH Scale:  1a. Level of Consciousness: 2-->Not alert, requires repeated stimulation to attend, or is obtunded and requires strong or painful stimulation to make movements (not stereotyped)  1b. LOC Questions: 0-->Answers both questions correctly  1c. LOC Commands: 1-->Performs one task correctly  2. Best Gaze: 0-->Normal  3. Visual: 0-->No visual loss  4. Facial Palsy: 0-->Normal symmetrical movements  5a. Motor Arm, Left: 0-->No drift, limb holds 90 (or 45) degrees for full 10 secs  5b. Motor Arm, Right: 0-->No drift, limb holds 90 (or 45) degrees for full 10 secs  6a. Motor Leg, Left: 1-->Drift, leg falls by the end of the 5-sec period but does not hit bed  6b. Motor Leg, Right: 1-->Drift, leg falls by the end of the 5-sec period but does not  "hit bed  7. Limb Ataxia: 0-->Absent  8. Sensory: 0-->Normal, no sensory loss  9. Best Language: 1-->Mild-to-moderate aphasia, some obvious loss of fluency or facility of comprehension, without significant limitation on ideas expressed or form of expression. Reduction of speech and/or comprehension, however, makes conversation. . . (see row details)  10. Dysarthria: 2-->Severe dysarthria, patients speech is so slurred as to be unintelligible in the absence of or out of proportion to any dysphasia, or is mute/anarthric  11. Extinction and Inattention (formerly Neglect): 0-->No abnormality  Total (NIH Stroke Scale): 8     Modified [place] Coma Scale:    ABCD2 Score:    [id number] Score:   HAS -BLED Score:   ICH Score:   Hunt & Hauser Classification:       Blood pressure (!) 127/57, pulse 89, temperature 98.5 °F (36.9 °C), temperature source Axillary, resp. rate 19, weight 86.2 kg (190 lb), SpO2 98 %.  Eligible for thrombolytic therapy?: Yes  Thrombolytic therapy recomended: Thrombolytic therapy not recommended due to Outside of treatment window   Possible Interventional Revascularization Candidate? No; No large vessel occlusion identified on imaging     Disposition Recommendation: admit to inpatient    Subjective:     History of Present Illness:  Per chart " 68-year-old male history of bladder cancer, lung cancer presents for altered mental status.  Family states that he moved today from [place] to [place] to be closer to family so that he can continue cancer treatment.  He last received treatment involving chemotherapy and radiation 2 years ago.  He recently has had some kidney problems had a stent placed in the left kidney and a right nephrostomy tube.  Family has noted over the past few days a steady decline.  He is having more difficulty with ambulation has been more somnolent.  Today on the flight he became fairly obtunded and was transferred to the ED.  He is minimally responsive on arrival and " "unable to contribute to his own history.  Family is concerned he takes Norco and Xanax and there is a possibility for unintentional overdose due to multiple family members administering his medications today"       2  Woke up with symptoms?: no    Recent bleeding noted: no  Does the patient take any Blood Thinners? no  Medications: No relevant medications    Current Facility-Administered Medications:     [START ON 10/21/2023] acetaminophen tablet 650 mg, 650 mg, Oral, Q4H PRN, Aditi Campos MD    cefTRIAXone (ROCEPHIN) 1 g in dextrose 5 % in water (D5W) 100 mL IVPB (MB+), 1 g, Intravenous, ED 1 Time, Archie Obrien, DO, Last Rate: 200 mL/hr at 10/20/23 2333, 1 g at 10/20/23 2333    [START ON 10/21/2023] dextrose 10% bolus 125 mL 125 mL, 12.5 g, Intravenous, PRN, Aditi Campos MD    [START ON 10/21/2023] dextrose 10% bolus 250 mL 250 mL, 25 g, Intravenous, PRN, Aditi Campos MD    [START ON 10/21/2023] glucagon (human recombinant) injection 1 mg, 1 mg, Intramuscular, PRN, Aditi Campos MD    [START ON 10/21/2023] glucose chewable tablet 16 g, 16 g, Oral, PRN, Aditi Campos MD    [START ON 10/21/2023] glucose chewable tablet 24 g, 24 g, Oral, PRN, Aditi Campos MD    [START ON 10/21/2023] naloxone 0.4 mg/mL injection 0.02 mg, 0.02 mg, Intravenous, PRN, Aditi Campos MD    [START ON 10/21/2023] ondansetron injection 4 mg, 4 mg, Intravenous, Q8H PRN, Aditi Campos MD    [START ON 10/21/2023] ondansetron injection 4 mg, 4 mg, Intravenous, Q8H PRN, Aditi Campos MD    [START ON 10/21/2023] polyethylene glycol packet 17 g, 17 g, Oral, Daily, Aditi Campos MD    [START ON 10/21/2023] sodium chloride 0.9% flush 10 mL, 10 mL, Intravenous, Q12H PRN, Aditi Campos MD  No current outpatient medications on file.      Past Medical History: no relevant history    Past Surgical History: no major surgeries within the last 2 weeks    Family " History: no relevant history    Social History: unable to obtain    Allergies: No Known Allergies No relevant allergies    Review of Systems  Objective:   Vitals: Blood pressure (!) 127/57, pulse 89, temperature 98.5 °F (36.9 °C), temperature source Axillary, resp. rate 19, weight 86.2 kg (190 lb), SpO2 98 %. BP: 127/57    CT READ: Yes  Abnormal CT Involutional changes with chronic microvascular ischemic changes in the periventricular white matter.. Moderate area of hypoattenuation in the left posterior parietal cortex suggesting infarction.    Physical Exam          Recommended the emergency room physician to have a brief discussion with the patient and/or family if available regarding the  risks and benefits of treatment, and to briefly document the occurrence of that discussion in his clinical encounter note.     The attending portion of this evaluation, treatment, and documentation was performed per Jessi Lora MD via audio only.    Billing code: No LOS (audio only consult)    In your opinion, this was a: Tier 1 Van Negative    Consult End Time: 12:10 AM     Jessi Lora MD  [facility] Stroke [facility]  Vascular Neurology   [facility]

## 2023-10-22 LAB
ANION GAP SERPL CALC-SCNC: 10 MMOL/L (ref 8–16)
BACTERIA UR CULT: ABNORMAL
BASOPHILS # BLD AUTO: 0.03 K/UL (ref 0–0.2)
BASOPHILS NFR BLD: 0.4 % (ref 0–1.9)
BUN SERPL-MCNC: 19 MG/DL (ref 8–23)
CALCIUM SERPL-MCNC: 9.5 MG/DL (ref 8.7–10.5)
CHLORIDE SERPL-SCNC: 105 MMOL/L (ref 95–110)
CO2 SERPL-SCNC: 24 MMOL/L (ref 23–29)
CREAT SERPL-MCNC: 1.3 MG/DL (ref 0.5–1.4)
DIFFERENTIAL METHOD: ABNORMAL
EOSINOPHIL # BLD AUTO: 0.5 K/UL (ref 0–0.5)
EOSINOPHIL NFR BLD: 5.3 % (ref 0–8)
ERYTHROCYTE [DISTWIDTH] IN BLOOD BY AUTOMATED COUNT: 14.1 % (ref 11.5–14.5)
EST. GFR  (NO RACE VARIABLE): 59 ML/MIN/1.73 M^2
GLUCOSE SERPL-MCNC: 100 MG/DL (ref 70–110)
HCT VFR BLD AUTO: 24.9 % (ref 40–54)
HGB BLD-MCNC: 7.8 G/DL (ref 14–18)
IMM GRANULOCYTES # BLD AUTO: 0.05 K/UL (ref 0–0.04)
IMM GRANULOCYTES NFR BLD AUTO: 0.6 % (ref 0–0.5)
LYMPHOCYTES # BLD AUTO: 1.4 K/UL (ref 1–4.8)
LYMPHOCYTES NFR BLD: 15.8 % (ref 18–48)
MCH RBC QN AUTO: 27 PG (ref 27–31)
MCHC RBC AUTO-ENTMCNC: 31.3 G/DL (ref 32–36)
MCV RBC AUTO: 86 FL (ref 82–98)
MONOCYTES # BLD AUTO: 0.8 K/UL (ref 0.3–1)
MONOCYTES NFR BLD: 9 % (ref 4–15)
MRSA ID BY PCR: NEGATIVE
NEUTROPHILS # BLD AUTO: 5.9 K/UL (ref 1.8–7.7)
NEUTROPHILS NFR BLD: 68.9 % (ref 38–73)
NRBC BLD-RTO: 0 /100 WBC
PLATELET # BLD AUTO: 258 K/UL (ref 150–450)
PMV BLD AUTO: 9.3 FL (ref 9.2–12.9)
POCT GLUCOSE: 113 MG/DL (ref 70–110)
POCT GLUCOSE: 130 MG/DL (ref 70–110)
POCT GLUCOSE: 134 MG/DL (ref 70–110)
POTASSIUM SERPL-SCNC: 5 MMOL/L (ref 3.5–5.1)
RBC # BLD AUTO: 2.89 M/UL (ref 4.6–6.2)
SODIUM SERPL-SCNC: 139 MMOL/L (ref 136–145)
STAPH AUREUS ID BY PCR: NEGATIVE
WBC # BLD AUTO: 8.53 K/UL (ref 3.9–12.7)

## 2023-10-22 PROCEDURE — 25000003 PHARM REV CODE 250: Performed by: FAMILY MEDICINE

## 2023-10-22 PROCEDURE — 11000001 HC ACUTE MED/SURG PRIVATE ROOM

## 2023-10-22 PROCEDURE — 94761 N-INVAS EAR/PLS OXIMETRY MLT: CPT

## 2023-10-22 PROCEDURE — 36415 COLL VENOUS BLD VENIPUNCTURE: CPT | Performed by: STUDENT IN AN ORGANIZED HEALTH CARE EDUCATION/TRAINING PROGRAM

## 2023-10-22 PROCEDURE — 25000003 PHARM REV CODE 250: Performed by: STUDENT IN AN ORGANIZED HEALTH CARE EDUCATION/TRAINING PROGRAM

## 2023-10-22 PROCEDURE — 85025 COMPLETE CBC W/AUTO DIFF WBC: CPT | Performed by: STUDENT IN AN ORGANIZED HEALTH CARE EDUCATION/TRAINING PROGRAM

## 2023-10-22 PROCEDURE — 63600175 PHARM REV CODE 636 W HCPCS: Performed by: FAMILY MEDICINE

## 2023-10-22 PROCEDURE — 80048 BASIC METABOLIC PNL TOTAL CA: CPT | Performed by: STUDENT IN AN ORGANIZED HEALTH CARE EDUCATION/TRAINING PROGRAM

## 2023-10-22 RX ADMIN — CEFTRIAXONE SODIUM 1 G: 1 INJECTION, POWDER, FOR SOLUTION INTRAMUSCULAR; INTRAVENOUS at 11:10

## 2023-10-22 RX ADMIN — AMLODIPINE BESYLATE 5 MG: 5 TABLET ORAL at 08:10

## 2023-10-22 RX ADMIN — ALPRAZOLAM 1 MG: 1 TABLET ORAL at 08:10

## 2023-10-22 RX ADMIN — METOPROLOL SUCCINATE 50 MG: 50 TABLET, EXTENDED RELEASE ORAL at 08:10

## 2023-10-22 RX ADMIN — TAMSULOSIN HYDROCHLORIDE 0.4 MG: 0.4 CAPSULE ORAL at 08:10

## 2023-10-22 RX ADMIN — LEVOTHYROXINE SODIUM 125 MCG: 125 TABLET ORAL at 06:10

## 2023-10-22 RX ADMIN — ATORVASTATIN CALCIUM 20 MG: 20 TABLET, FILM COATED ORAL at 08:10

## 2023-10-22 RX ADMIN — HYDROCODONE BITARTRATE AND ACETAMINOPHEN 1 TABLET: 10; 325 TABLET ORAL at 04:10

## 2023-10-22 RX ADMIN — HYDROCODONE BITARTRATE AND ACETAMINOPHEN 1 TABLET: 10; 325 TABLET ORAL at 06:10

## 2023-10-22 RX ADMIN — POLYETHYLENE GLYCOL 3350 17 G: 17 POWDER, FOR SOLUTION ORAL at 08:10

## 2023-10-22 RX ADMIN — ESCITALOPRAM OXALATE 20 MG: 10 TABLET ORAL at 08:10

## 2023-10-22 NOTE — PROGRESS NOTES
[facility] Medicine  Progress Note    Patient Name: Geovani Currie  MRN: [id number]  Patient Class: IP- Inpatient   Admission Date: 10/20/2023  Length of Stay: 1 days  Attending Physician: Tae Motley MD  Primary Care Provider: No primary care provider on file.        Subjective:     Principal Problem:Altered mental status      HPI: Geovani Currie is a 68-year-old male with a past medical history of bladder cancer, lung cancer who presents with altered mental status.     [place] airport as patient was experiencing worsening lethargy and shallow respiration.  He was noted to be satting around 90% on room air and was placed on 2 L.  Patient just moved from [place] to [place] to be closer to family for cancer treatments.  Family is concerned that over the past few days he had become increasingly weak.  At this time, There is some concern that on the plane he may have taken additional doses of opioids, since he quickly awoke to Narcan.  Of note, patient recently had a stent placed in his left kidney right nephrostomy tube due to kidney issues.     In the ED, triage vitals were significant for hypoxia, patient was placed on 5 L. CBC showed normocytic anemia.  CMP was significant for low albumin, elevated sugars.  TSH , lactic acid, troponin within normal range.  UA was concerning for white blood cells, RBCs, yeast although no bacteria.  ABG was reassuring with no CO2 retention     Code stroke was called for altered mental status.  Neurologist recommends no lytics, CTA, admission for MRI.     CT head with moderate area of hypoattenuation in the parietal cortex suggesting infarct although radiologist favors chronic process.  Small probable remote areas of lacunar infarct involving the left caudate, bilateral thalamus and right lateral basal ganglia.  CTA head and neck with no evidence of acute intracranial abnormality.  No high-grade stenosis or major vessel occlusion.   Spiculated lesion noticed in the right posterior lung apex.     Chest x-ray shows no acute or significant focal chest abnormality.     With administration of Narcan, improvement in patient's mental status.     ED provider, gave patient ceftriaxone and Diflucan for UA.  Due to concern for subacute/acute CVA, aspirin and Plavix were ordered.     Medicine was consulted for patient's altered mental status         Interval History: no acute event overnight. MRI shows old infarct. Will monitor for another day and plan DC in AM.     Review of Systems      Constitutional:  Positive for fatigue. Negative for activity change and appetite change.   HENT:  Negative for ear pain and facial swelling.    Respiratory:  Negative for choking.    Cardiovascular:  Negative for chest pain and leg swelling.   Gastrointestinal:  Negative for blood in stool and constipation.   Endocrine: Negative for polydipsia and polyphagia.   Genitourinary:  Negative for enuresis and flank pain.   Musculoskeletal:  Negative for gait problem and joint swelling.   Allergic/Immunologic: Negative for food allergies and immunocompromised state.   Neurological:  Negative for seizures and speech difficulty.   Hematological:  Negative for adenopathy. Does not bruise/bleed easily.   Psychiatric/Behavioral:  Positive for confusion. Negative for sleep disturbance.      Objective:     Vital Signs (Most Recent):  Temp: 98.7 °F (37.1 °C) (10/22/23 0735)  Pulse: 81 (10/22/23 0735)  Resp: 18 (10/22/23 0735)  BP: (!) 126/58 (10/22/23 0735)  SpO2: (!) 93 % (10/22/23 0850) Vital Signs (24h Range):  Temp:  [98.3 °F (36.8 °C)-99.4 °F (37.4 °C)] 98.7 °F (37.1 °C)  Pulse:  [] 81  Resp:  [18-20] 18  SpO2:  [92 %-99 %] 93 %  BP: (116-142)/(56-65) 126/58     Weight: 75.7 kg (166 lb 14.2 oz)  Body mass index is 24.65 kg/m².    Intake/Output Summary (Last 24 hours) at 10/22/2023 0997  Last data filed at 10/22/2023 0255  Gross per 24 hour   Intake --   Output 900 ml   Net  -900 ml      Physical Exam      Vitals reviewed.   Constitutional:       Appearance: He is ill-appearing.   HENT:      Head: Atraumatic.      Right Ear: There is no impacted cerumen.      Left Ear: There is no impacted cerumen.      Nose: No congestion or rhinorrhea.      Mouth/Throat:      Pharynx: No oropharyngeal exudate or posterior oropharyngeal erythema.   Eyes:      General:         Right eye: No discharge.         Left eye: No discharge.   Neck:      Vascular: No carotid bruit.   Cardiovascular:      Rate and Rhythm: Regular rhythm. Bradycardia present.      Heart sounds: No murmur heard.     No gallop.   Pulmonary:      Effort: No respiratory distress.      Breath sounds: No wheezing.   Abdominal:      General: There is no distension.      Tenderness: There is no abdominal tenderness.   Genitourinary:     Comments: Nephrostomy tube in place  Musculoskeletal:         General: No deformity.      Cervical back: No tenderness.   Skin:     Coloration: Skin is not jaundiced.      Findings: No bruising.   Neurological:      Mental Status: He is disoriented.   Psychiatric:         Mood and Affect: Mood normal.        Significant Labs: All pertinent labs within the past 24 hours have been reviewed.  BMP:   Recent Labs   Lab 10/22/23  0414         K 5.0      CO2 24   BUN 19   CREATININE 1.3   CALCIUM 9.5     CBC:   Recent Labs   Lab 10/20/23  2219 10/21/23  0830 10/22/23  0414   WBC 11.39 9.19 8.53   HGB 9.1* 8.3* 7.8*   HCT 28.3* 25.4* 24.9*    239 258     CMP:   Recent Labs   Lab 10/20/23  2219 10/21/23  0830 10/22/23  0414    138 139   K 4.7 4.4 5.0    106 105   CO2 21* 22* 24   * 115* 100   BUN 30* 25* 19   CREATININE 1.9* 1.6* 1.3   CALCIUM 9.9 9.4 9.5   PROT 7.7  --   --    ALBUMIN 3.1*  --   --    BILITOT 0.3  --   --    ALKPHOS 113  --   --    AST 19  --   --    ALT 16  --   --    ANIONGAP 14 10 10       Significant Imaging: I have reviewed all pertinent imaging  results/findings within the past 24 hours.  I have reviewed and interpreted all pertinent imaging results/findings within the past 24 hours.    Assessment/Plan:      Active Diagnoses:    Diagnosis Date Noted POA    PRINCIPAL PROBLEM:  Altered mental status [R41.82] 10/21/2023 Unknown     Chronic    Elevated serum creatinine [R79.89] 10/21/2023 Yes    Normocytic anemia [D64.9] 10/21/2023 Yes    Hx of bladder cancer [Z85.51] 10/21/2023 Not Applicable    Hx of cancer of lung [Z85.118] 10/21/2023 Not Applicable    UTI (urinary tract infection) [N39.0] 10/21/2023 Yes    Hyperglycemia [R73.9] 10/21/2023 Unknown      Problems Resolved During this Admission:     VTE Risk Mitigation (From admission, onward)           Ordered     IP VTE LOW RISK PATIENT  Once         10/20/23 2347     Place sequential compression device  Until discontinued         10/20/23 2347                     * Altered mental status  -improvement with Narcan  -TSH, ammonia within normal range.  Point of care glucose elevated at 127.  -ABG unremarkable for CO2 elevation  -CT head unremarkable  -blood cultures and urine has been ordered  -stroke call in the ED     Plan:  -likely at least part due to opioid use  -MRI ordered - no acute stroke  -continue to monitor  -consult neurology- appreciate assistance  -TTE ordered        Hyperglycemia  Slightly elevated sugars on presentation     Plan:  May consider sliding scale if sugars consistently above 180        UTI (urinary tract infection)  UA noted to have WBC, RBC, yeast.  Increased WBC in UA, may be due to nephrostomy tube  Urine culture pending  S/p ceftriaxone and Diflucan in the ED  Restart rocephin IV        Hx of cancer of lung  -CT scan shows spiculated mass  -known history     Plan:  May defer to outpatient treatment        Hx of bladder cancer  Hematuria noted on UA     Plan:  Defer to outpatient treatment        Normocytic anemia  Likely due to anemia of chronic disease     Plan:  Transfuse for  less than hemoglobin 7     Elevated serum creatinine  Creatinine of 1.9 admission, unclear baseline  Patient with history of left ureteral stent, and right nephrostomy tube placement     Plan:  Daily BMP  Flush nephrostomy tube at least twice daily  No need to call Urology at this time          Tae Motley MD  Department of [facility] Medicine   [facility]

## 2023-10-22 NOTE — PROGRESS NOTES
[facility] Progress Note    Geovani Currie  1955  [id number]      Subjective:   Patient is a 68 y.o. male who was admitted on 10/20/2023 for altered mental status and respiratory depression.     According to family, pt is much improved today, is speaking more and able to walk and is very close to baseline .     ROS: negative except as above    Objective:  Vitals:    10/22/23 1623   BP:    Pulse: 82   Resp:    Temp:      Scheduled Meds:   amLODIPine  5 mg Oral Daily    atorvastatin  20 mg Oral QHS    cefTRIAXone (ROCEPHIN) IVPB  1 g Intravenous Q12H    EScitalopram oxalate  20 mg Oral Daily    levothyroxine  125 mcg Oral Before breakfast    metoprolol succinate  50 mg Oral Daily    polyethylene glycol  17 g Oral Daily    tamsulosin  0.4 mg Oral Daily       Neurologic Physical Examination:   Orientation  Alert, awake, oriented to self, place, time, and situation. Some difficulty following complex commands.   Memory  Recent memory impaired.  remote memory intact.  Language  No dysarthria, No aphasia.   Cranial Nerves  PERRL, VF intact, EOMI, V1-V3 intact, symmetric facial expression, hearing grossly intact, SCM & TPZ 5/5, tongue midline, symmetric palate elevation.  Motor  Normal Bulk  Normal Tone  5/5 strength in 4 extremities   Sensory  Normal to light touch throughout  Romberg negative    DTR   +3 symmetric throughout in upper and lower extremities  Plantar responses flexor  Cerebellar/Gait  Normal finger to nose and heel to shin.   Shuffling gait with short strides .  Laboratory Findings:   Recent Results (from the past 24 hour(s))   CBC with Automated Differential    Collection Time: 10/22/23  4:14 AM   Result Value Ref Range    WBC 8.53 3.90 - 12.70 K/uL    RBC 2.89 (L) 4.60 - 6.20 M/uL    Hemoglobin 7.8 (L) 14.0 - 18.0 g/dL    Hematocrit 24.9 (L) 40.0 - 54.0 %    MCV 86 82 - 98 fL    MCH 27.0 27.0 - 31.0 pg    MCHC 31.3 (L) 32.0 - 36.0 g/dL    RDW 14.1 11.5 - 14.5 %    Platelets 258 150 - 450 K/uL    MPV  9.3 9.2 - 12.9 fL    Immature Granulocytes 0.6 (H) 0.0 - 0.5 %    Gran # (ANC) 5.9 1.8 - 7.7 K/uL    Immature Grans (Abs) 0.05 (H) 0.00 - 0.04 K/uL    Lymph # 1.4 1.0 - 4.8 K/uL    Mono # 0.8 0.3 - 1.0 K/uL    Eos # 0.5 0.0 - 0.5 K/uL    Baso # 0.03 0.00 - 0.20 K/uL    nRBC 0 0 /100 WBC    Gran % 68.9 38.0 - 73.0 %    Lymph % 15.8 (L) 18.0 - 48.0 %    Mono % 9.0 4.0 - 15.0 %    Eosinophil % 5.3 0.0 - 8.0 %    Basophil % 0.4 0.0 - 1.9 %    Differential Method Automated    Basic Metabolic Panel (BMP)    Collection Time: 10/22/23  4:14 AM   Result Value Ref Range    Sodium 139 136 - 145 mmol/L    Potassium 5.0 3.5 - 5.1 mmol/L    Chloride 105 95 - 110 mmol/L    CO2 24 23 - 29 mmol/L    Glucose 100 70 - 110 mg/dL    BUN 19 8 - 23 mg/dL    Creatinine 1.3 0.5 - 1.4 mg/dL    Calcium 9.5 8.7 - 10.5 mg/dL    Anion Gap 10 8 - 16 mmol/L    eGFR 59 (A) >60 mL/min/1.73 m^2   POCT glucose    Collection Time: 10/22/23 12:04 PM   Result Value Ref Range    POCT Glucose 134 (H) 70 - 110 mg/dL   POCT glucose    Collection Time: 10/22/23  4:13 PM   Result Value Ref Range    POCT Glucose 130 (H) 70 - 110 mg/dL     Urine Culture, Routine      Abnormal   GRAM NEGATIVE NICHOLE  >100,000 cfu/ml  Identification and susceptibility pending         Neuroimaging:   US Scrotum And Testicles    Result Date: 10/22/2023  EXAMINATION: US SCROTUM AND TESTICLES CLINICAL HISTORY: scotum swelling; Other specified disorders of the male genital organs TECHNIQUE: Sonography of the scrotum and testes. COMPARISON: None. FINDINGS: Right Testicle: *Size: 3.3 x 2.4 x 2.4 cm *Appearance: Normal. *Flow: Normal arterial and venous flow *Epididymis: Normal. *Hydrocele: None. *Varicocele: None. Left Testicle: *Size: 3.9 x 2.3 x 2.5 cm *Appearance: Normal. *Flow: Normal arterial and venous flow *Epididymis: Normal. *Hydrocele: Trace. *Varicocele: None. Other findings: None.     No significant abnormality. Electronically signed by: Daquan Laird MD Date:    10/22/2023  Time:    10:48    MRI Brain Without Contrast    Result Date: 10/21/2023  EXAMINATION: MRI BRAIN WITHOUT CONTRAST CLINICAL HISTORY: Stroke, follow up; TECHNIQUE: Sagittal and axial T1, axial T2, axial FLAIR, axial gradient and axial diffusion imaging of the whole brain without contrast. COMPARISON: CT with CTA 10/20/2023 FINDINGS: There is no restricted diffusion to suggest acute infarction.  Mild moderate size region of encephalomalacia left parietal lobe suggestive for prior infarction.  T2 flair signal hyperintensity underlies this compatible gliosis and scarring.  There is small focus of encephalomalacia extending across midline to the anterior body corpus callosum which may be additional prior infarction with punctate left thalamic and right basal gangliar remote lacunar-type infarcts with small remote left caudate lacunar type infarct There is subtle scattered T2 FLAIR signal hyperintensities elsewhere supratentorial white matter which is nonspecific and may be mild chronic ischemic change. Major intracranial skull base T2 flow voids are present There is no midline shift.  Generalized cerebral volume loss with compensatory enlargement ventricles sulci and cisterns without hydrocephalus.  No abnormal parenchymal susceptibility to suggest parenchymal hemorrhage.     Mild moderate sized remote left parietal infarction with encephalomalacia There are few scattered small to punctate sized remote bilateral basal gangliar and left thalamic lacunar type infarcts No evidence for acute infarction. Please see above for additional details. Electronically signed by: Elvin Alvarez DO Date:    10/21/2023 Time:    15:09    CTA Head and Neck (xpd)    Result Date: 10/21/2023  EXAMINATION: CTA HEAD AND NECK (XPD) CLINICAL HISTORY: Neuro deficit, acute, stroke suspected; TECHNIQUE: Non contrast low dose axial images were obtained through the head.  CT angiogram was performed from the level of the anjana to the top of the head  following the IV administration of 100mL of Omnipaque 350.   Sagittal and coronal reconstructions and maximum intensity projection reconstructions were performed. Arterial stenosis percentages are based on NASCET measurement criteria. COMPARISON: CT brain obtained earlier 10/20/23 FINDINGS: Motion artifact is present on several axial images prior to and following contrast. Intracranial Compartment: CT brain appears stable compared to the prior CT 22:20, the ventricles and sulci are mildly prominent consistent with patient's age without evidence of hydrocephalus. There is no extra-axial acute hemorrhage or fluid collection. There is focal low density in the left posterior parietal lobe with an appearance favoring a chronic infarct though there may be a component which is more age indeterminate.  Basal ganglia lacunar infarcts were described on prior CT and are better visualized previously and are noted on this exam in the left basal ganglia.  There is no parenchymal enhancing mass, hemorrhage, or edema.  No other loss of the gray-white matter junction differentiation is seen throughout the brain parenchyma.  Posterior fossa structures pituitary gland grossly appear intact as imaged allowing for artifact. Skull/Extracranial Contents (limited evaluation): No fracture. Mastoid air cells and paranasal sinuses are essentially clear. Non-Vascular Structures of the Neck/Thoracic Inlet (limited evaluation): There is a spiculated mass in the right apex posteriorly measuring 16 x 11 by 13 mm axial image 78 series 9 suspicious for a neoplastic nodule.  Emphysematous changes noted in the lung apices.  Cervical spondylosis noted without subluxation most pronounced at C5-C6. Aorta: The left vertebral artery appears to arise from the very proximal left subclavian artery.  Normal 3 vessel arch.  Heavy atherosclerotic calcifications noted in the imaged aortic arch. Extracranial carotid circulation: There is mild atherosclerotic  calcification and soft plaque in the carotid bifurcation bilaterally greater on the left.  No hemodynamically significant stenosis, aneurysmal dilatation, or dissection. Extracranial vertebral circulation: No hemodynamically significant stenosis, aneurysmal dilatation, or dissection. Intracranial Arteries: No focal high-grade stenosis, occlusion, or aneurysm. Venous structures (limited evaluation): Normal.     No evidence of acute intracranial abnormality since earlier examination 22:20.  Focus of low density favoring nonacute infarct in the posterior left parietal lobe. Lacunar infarcts  in left caudate left thalami and basal ganglia previously described and better visualized on prior CT as motion artifact limits evaluation on this exam There is no high-grade stenosis or major vessel occlusion. Spiculated lesion measuring up to 16 mm in the right posterior lung apex.  For a solid nodule >8 mm, Fleischner Society 2017 guidelines recommend considering CT, PET/CT or tissue sampling at 3 months.  This finding was relayed to the emergency room physician, Dr. Obrien at the time of interpretation via secure chat. Electronically signed by: Florinda Yousif Date:    10/21/2023 Time:    00:56    X-Ray Chest AP Portable    Result Date: 10/20/2023  EXAMINATION: XR CHEST AP PORTABLE CLINICAL HISTORY: Stroke; TECHNIQUE: Single frontal view of the chest was performed. COMPARISON: None FINDINGS: Small band of atelectasis or scarring is noted in the left costophrenic angle otherwise the lungs are clear.  There is a normal appearance of pulmonary vasculature and there is no pleural effusion or pneumothorax as imaged. The cardiac silhouette is normal in size. The hilar and mediastinal contours are unremarkable. Bones are intact.     No acute or significant focal chest abnormality. Electronically signed by: Florinda Yousif Date:    10/20/2023 Time:    23:47    CT Head Without Contrast    Result Date: 10/20/2023  EXAMINATION: CT HEAD  WITHOUT CONTRAST CLINICAL HISTORY: Neuro deficit, acute, stroke suspected;  not otherwise specified. TECHNIQUE: Low dose axial CT images obtained throughout the head without intravenous contrast. Sagittal and coronal reconstructions were performed. COMPARISON: None. FINDINGS: Intracranial compartment: No midline shift or acute hydrocephalus.  No extra-axial blood or fluid collections. Moderate involutional changes and chronic microvascular ischemic changes in the periventricular white matter. Moderate area of mild hypoattenuation in the left posterior parietal cortex suggesting infarction.  I favor a chronic process though age indeterminate.  MRI follow-up would better characterize. There is a small probable remote lacunar infarct of the left caudate. Small probable remote lacunar infarcts at the bilateral thalamus and right lateral basal ganglia. No parenchymal mass, hemorrhage, edema or major vascular distribution infarct. Probable minimal bilateral senescent basal ganglia calcifications left greater than right. Skull/extracranial contents (limited evaluation): No fracture. Mastoid air cells and paranasal sinuses are essentially clear.     1. Involutional changes with chronic microvascular ischemic changes in the periventricular white matter. 2. Moderate area of hypoattenuation in the left posterior parietal cortex suggesting infarction.  I favor a chronic process though age indeterminate.  MRI follow-up would better characterize. 3. Small probable remote areas of lacunar infarction involving the left caudate, bilateral thalamus and right lateral basal ganglia. 4. This report was flagged in Epic as abnormal. Electronically signed by: Beni Escamilla Date:    10/20/2023 Time:    22:51    \      Assessment/Plan:      Geovani Currie is a 68 y.o. w/ Hx of bladder cancer, HTN, hypothyroidism who presented with respiratory depression and encephalopathy. Respiratory depression thought to be due to opioid and possible  benzodiazepine use given response to naloxone.      Chronic L parietoocipital CVA  -Recommend TTE   -Aspirin 81 mg daily, continue home statin  -PT/OT     Acute encephalopathy  -Treatment of UTI per primary team  -Continue Xanax prn    Will sign off, please call with further questions/concerns.         Marcio Calix MD,   [employer] Neurology PGY 4

## 2023-10-23 LAB
ANION GAP SERPL CALC-SCNC: 10 MMOL/L (ref 8–16)
ASCENDING AORTA: 3.54 CM
AV INDEX (PROSTH): 0.38
AV MEAN GRADIENT: 28 MMHG
AV PEAK GRADIENT: 41 MMHG
AV REGURGITATION PRESSURE HALF TIME: 375.04 MS
AV VALVE AREA BY VELOCITY RATIO: 1.3 CM²
AV VALVE AREA: 1.34 CM²
AV VELOCITY RATIO: 0.37
BASOPHILS # BLD AUTO: 0.03 K/UL (ref 0–0.2)
BASOPHILS NFR BLD: 0.4 % (ref 0–1.9)
BSA FOR ECHO PROCEDURE: 1.91 M2
BUN SERPL-MCNC: 21 MG/DL (ref 8–23)
CALCIUM SERPL-MCNC: 9.5 MG/DL (ref 8.7–10.5)
CHLORIDE SERPL-SCNC: 105 MMOL/L (ref 95–110)
CO2 SERPL-SCNC: 24 MMOL/L (ref 23–29)
CREAT SERPL-MCNC: 1.3 MG/DL (ref 0.5–1.4)
CV ECHO LV RWT: 0.4 CM
DIFFERENTIAL METHOD: ABNORMAL
DOP CALC AO PEAK VEL: 3.2 M/S
DOP CALC AO VTI: 72.8 CM
DOP CALC LVOT AREA: 3.5 CM2
DOP CALC LVOT DIAMETER: 2.11 CM
DOP CALC LVOT PEAK VEL: 1.19 M/S
DOP CALC LVOT STROKE VOLUME: 97.51 CM3
DOP CALC MV VTI: 32.6 CM
DOP CALCLVOT PEAK VEL VTI: 27.9 CM
E WAVE DECELERATION TIME: 234.37 MSEC
E/A RATIO: 0.76
E/E' RATIO: 16.15 M/S
ECHO LV POSTERIOR WALL: 1.07 CM (ref 0.6–1.1)
EJECTION FRACTION: 60 %
EOSINOPHIL # BLD AUTO: 0.5 K/UL (ref 0–0.5)
EOSINOPHIL NFR BLD: 6.8 % (ref 0–8)
ERYTHROCYTE [DISTWIDTH] IN BLOOD BY AUTOMATED COUNT: 14.4 % (ref 11.5–14.5)
EST. GFR  (NO RACE VARIABLE): 59 ML/MIN/1.73 M^2
FRACTIONAL SHORTENING: 39 % (ref 28–44)
GLUCOSE SERPL-MCNC: 112 MG/DL (ref 70–110)
HCT VFR BLD AUTO: 26.6 % (ref 40–54)
HGB BLD-MCNC: 8.5 G/DL (ref 14–18)
IMM GRANULOCYTES # BLD AUTO: 0.04 K/UL (ref 0–0.04)
IMM GRANULOCYTES NFR BLD AUTO: 0.5 % (ref 0–0.5)
INTERVENTRICULAR SEPTUM: 1.08 CM (ref 0.6–1.1)
IVC DIAMETER: 1.82 CM
LA MAJOR: 4.32 CM
LA MINOR: 5.2 CM
LA WIDTH: 3.8 CM
LEFT ATRIUM SIZE: 3.29 CM
LEFT ATRIUM VOLUME INDEX MOD: 24.8 ML/M2
LEFT ATRIUM VOLUME INDEX: 26.3 ML/M2
LEFT ATRIUM VOLUME MOD: 47.45 CM3
LEFT ATRIUM VOLUME: 50.15 CM3
LEFT INTERNAL DIMENSION IN SYSTOLE: 3.27 CM (ref 2.1–4)
LEFT VENTRICLE DIASTOLIC VOLUME INDEX: 71.87 ML/M2
LEFT VENTRICLE DIASTOLIC VOLUME: 137.27 ML
LEFT VENTRICLE MASS INDEX: 117 G/M2
LEFT VENTRICLE SYSTOLIC VOLUME INDEX: 22.7 ML/M2
LEFT VENTRICLE SYSTOLIC VOLUME: 43.28 ML
LEFT VENTRICULAR INTERNAL DIMENSION IN DIASTOLE: 5.33 CM (ref 3.5–6)
LEFT VENTRICULAR MASS: 222.82 G
LV LATERAL E/E' RATIO: 15 M/S
LV SEPTAL E/E' RATIO: 17.5 M/S
LVOT MG: 3.06 MMHG
LVOT MV: 0.83 CM/S
LYMPHOCYTES # BLD AUTO: 1.1 K/UL (ref 1–4.8)
LYMPHOCYTES NFR BLD: 15 % (ref 18–48)
MCH RBC QN AUTO: 27 PG (ref 27–31)
MCHC RBC AUTO-ENTMCNC: 32 G/DL (ref 32–36)
MCV RBC AUTO: 84 FL (ref 82–98)
MONOCYTES # BLD AUTO: 0.7 K/UL (ref 0.3–1)
MONOCYTES NFR BLD: 9.2 % (ref 4–15)
MV A" WAVE DURATION": 106.57 MSEC
MV MEAN GRADIENT: 3 MMHG
MV PEAK A VEL: 1.39 M/S
MV PEAK E VEL: 1.05 M/S
MV PEAK GRADIENT: 8 MMHG
MV STENOSIS PRESSURE HALF TIME: 67.97 MS
MV VALVE AREA BY CONTINUITY EQUATION: 2.99 CM2
MV VALVE AREA P 1/2 METHOD: 3.24 CM2
NEUTROPHILS # BLD AUTO: 5.1 K/UL (ref 1.8–7.7)
NEUTROPHILS NFR BLD: 68.1 % (ref 38–73)
NRBC BLD-RTO: 0 /100 WBC
OHS LV EJECTION FRACTION SIMPSONS BIPLANE MOD: 61 %
PISA AR MAX VEL: 4.32 M/S
PISA TR MAX VEL: 2.71 M/S
PLATELET # BLD AUTO: 284 K/UL (ref 150–450)
PMV BLD AUTO: 9.1 FL (ref 9.2–12.9)
POCT GLUCOSE: 109 MG/DL (ref 70–110)
POTASSIUM SERPL-SCNC: 4.5 MMOL/L (ref 3.5–5.1)
PULM VEIN S/D RATIO: 1.39
PV MV: 0.96 M/S
PV PEAK D VEL: 0.38 M/S
PV PEAK GRADIENT: 8 MMHG
PV PEAK S VEL: 0.53 M/S
PV PEAK VELOCITY: 1.41 M/S
RA MAJOR: 4.87 CM
RA PRESSURE ESTIMATED: 8 MMHG
RA WIDTH: 4.16 CM
RBC # BLD AUTO: 3.15 M/UL (ref 4.6–6.2)
RIGHT VENTRICULAR END-DIASTOLIC DIMENSION: 3.33 CM
RV TB RVSP: 11 MMHG
RV TISSUE DOPPLER FREE WALL SYSTOLIC VELOCITY 1 (APICAL 4 CHAMBER VIEW): 14.34 CM/S
SINUS: 3.71 CM
SODIUM SERPL-SCNC: 139 MMOL/L (ref 136–145)
STJ: 3.08 CM
TDI LATERAL: 0.07 M/S
TDI SEPTAL: 0.06 M/S
TDI: 0.07 M/S
TR MAX PG: 29 MMHG
TRICUSPID ANNULAR PLANE SYSTOLIC EXCURSION: 1.73 CM
TV REST PULMONARY ARTERY PRESSURE: 37 MMHG
WBC # BLD AUTO: 7.48 K/UL (ref 3.9–12.7)
Z-SCORE OF LEFT VENTRICULAR DIMENSION IN END DIASTOLE: -0.08
Z-SCORE OF LEFT VENTRICULAR DIMENSION IN END SYSTOLE: -0.1

## 2023-10-23 PROCEDURE — 97530 THERAPEUTIC ACTIVITIES: CPT

## 2023-10-23 PROCEDURE — 25000003 PHARM REV CODE 250: Performed by: STUDENT IN AN ORGANIZED HEALTH CARE EDUCATION/TRAINING PROGRAM

## 2023-10-23 PROCEDURE — 11000001 HC ACUTE MED/SURG PRIVATE ROOM

## 2023-10-23 PROCEDURE — 97165 OT EVAL LOW COMPLEX 30 MIN: CPT

## 2023-10-23 PROCEDURE — 97116 GAIT TRAINING THERAPY: CPT

## 2023-10-23 PROCEDURE — 63600175 PHARM REV CODE 636 W HCPCS: Performed by: NURSE PRACTITIONER

## 2023-10-23 PROCEDURE — 25000003 PHARM REV CODE 250: Performed by: FAMILY MEDICINE

## 2023-10-23 PROCEDURE — 97162 PT EVAL MOD COMPLEX 30 MIN: CPT

## 2023-10-23 PROCEDURE — 25000003 PHARM REV CODE 250: Performed by: NURSE PRACTITIONER

## 2023-10-23 PROCEDURE — 80048 BASIC METABOLIC PNL TOTAL CA: CPT | Performed by: STUDENT IN AN ORGANIZED HEALTH CARE EDUCATION/TRAINING PROGRAM

## 2023-10-23 PROCEDURE — 85025 COMPLETE CBC W/AUTO DIFF WBC: CPT | Performed by: STUDENT IN AN ORGANIZED HEALTH CARE EDUCATION/TRAINING PROGRAM

## 2023-10-23 PROCEDURE — 36415 COLL VENOUS BLD VENIPUNCTURE: CPT | Performed by: STUDENT IN AN ORGANIZED HEALTH CARE EDUCATION/TRAINING PROGRAM

## 2023-10-23 PROCEDURE — 97535 SELF CARE MNGMENT TRAINING: CPT

## 2023-10-23 PROCEDURE — 92507 TX SP LANG VOICE COMM INDIV: CPT

## 2023-10-23 RX ORDER — HYDROCODONE BITARTRATE AND ACETAMINOPHEN 5; 325 MG/1; MG/1
1 TABLET ORAL EVERY 6 HOURS PRN
Status: DISCONTINUED | OUTPATIENT
Start: 2023-10-23 | End: 2023-10-25 | Stop reason: HOSPADM

## 2023-10-23 RX ADMIN — ALPRAZOLAM 1 MG: 1 TABLET ORAL at 09:10

## 2023-10-23 RX ADMIN — TAMSULOSIN HYDROCHLORIDE 0.4 MG: 0.4 CAPSULE ORAL at 08:10

## 2023-10-23 RX ADMIN — HYDROCODONE BITARTRATE AND ACETAMINOPHEN 1 TABLET: 5; 325 TABLET ORAL at 06:10

## 2023-10-23 RX ADMIN — METOPROLOL SUCCINATE 50 MG: 50 TABLET, EXTENDED RELEASE ORAL at 08:10

## 2023-10-23 RX ADMIN — POLYETHYLENE GLYCOL 3350 17 G: 17 POWDER, FOR SOLUTION ORAL at 08:10

## 2023-10-23 RX ADMIN — CEFEPIME 2 G: 2 INJECTION, POWDER, FOR SOLUTION INTRAVENOUS at 11:10

## 2023-10-23 RX ADMIN — AMLODIPINE BESYLATE 5 MG: 5 TABLET ORAL at 08:10

## 2023-10-23 RX ADMIN — ACETAMINOPHEN 650 MG: 325 TABLET ORAL at 09:10

## 2023-10-23 RX ADMIN — ESCITALOPRAM OXALATE 20 MG: 10 TABLET ORAL at 08:10

## 2023-10-23 RX ADMIN — LEVOTHYROXINE SODIUM 125 MCG: 125 TABLET ORAL at 05:10

## 2023-10-23 RX ADMIN — ATORVASTATIN CALCIUM 20 MG: 20 TABLET, FILM COATED ORAL at 09:10

## 2023-10-23 NOTE — ASSESSMENT & PLAN NOTE
-improvement with Narcan  -TSH, ammonia within normal range.  Point of care glucose elevated at 127.  -ABG unremarkable for CO2 elevation  -CT head unremarkable  -blood cultures and urine has been ordered  -stroke call in the ED    Plan:  -likely at least part due to opioid use  -MRI ordered  -continue to monitor  -consult neurology further evaluation      10/23/2023  MRI of brain, mild moderate sized remote left parietal infarction with encephalomalacia   There are few scattered small to punctate sized remote bilateral basal gangliar and left thalamic lacunar type infarcts   No evidence of acute infarction   PT/OT evaluate and treat

## 2023-10-23 NOTE — PT/OT/SLP PROGRESS
"Speech Language Pathology Treatment    Patient Name:  Geovani Currie   MRN:  [id number]  Admitting Diagnosis: Altered mental status    Recommendations:                 General Recommendations:  Follow-up not indicated  Diet recommendations:  Regular Diet - IDDSI Level 7, Liquid Diet Level: Thin liquids - IDDSI Level 0   Aspiration Precautions: Meds whole 1 at a time and Standard aspiration precautions   General Precautions: Standard, fall  Communication strategies:  none    Assessment:     Geovani Currie is a 68 y.o. male with a dx of Altered mental status (Chronic), who presents with improved cognition and speech, with family and pt reporting cognitive-communicative abilities are at baseline. Rec cont current regular diet with thin liquids, with no further ST required 2/2 pt at baseline for cognition and communication.      Subjective     Pt was alert and awake with multiple family members at b/s (x3 daughters and x1 son?). Pt agreeable to ST f/u for cont ax of speech and cognition.     Patient goals: "I want to get out of here"     Pain/Comfort:  Pain Rating 1: 0/10    Respiratory Status: Room air    Objective:     Has the patient been evaluated by SLP for swallowing?   Yes  Keep patient NPO? No   Current Respiratory Status:        Cognition/Communication: Pt's speech was 100% intelligible at the conversational level. Pt was able to make all wants/needs known verbally w/o difficulty. Pt oriented x3 and is aware of reason for admit.     Pt's family's main concern was getting pt out of bed and out of the room for mental health purposes. ST communicated role of ST and that concerns would be relayed to PT/OT. Per family and pt, pt's speech is at baseline, as well as cognition. Pt's family even stating they are seeing his personality come through again. Pt and family in agreement with ST recs for no further ST at this time 2/2 pt at baseline for cognition and speech.      Goals:   Multidisciplinary Problems       SLP " Goals       Not on file              Multidisciplinary Problems (Resolved)          Problem: SLP    Goal Priority Disciplines Outcome   SLP Goal   (Resolved)    Low SLP Met   Description: Goals:   1. Patient will successfully participate in pqaxwt-[id number]-nszlifmpv evaluation to further assess for any communication impairments.   2. Patient will successfully participate in clinical swallow evaluation and tolerate po trials with no overt s/s of aspiration.   3. Patient will tolerate regular consistency/thin liquids po diet with no overt s/s of aspiration.                        Plan:     Patient to be seen:  3 x/week   Plan of Care expires:  11/20/23  Plan of Care reviewed with:  patient, daughter, son   SLP Follow-Up:  No       Discharge recommendations:  No Therapy Indicated   Barriers to Discharge:  None    Time Tracking:     SLP Treatment Date:   10/23/23  Speech Start Time:  0926  Speech Stop Time:  0946     Speech Total Time (min):  20 min    Billable Minutes: Speech Therapy Individual 20    10/23/2023

## 2023-10-23 NOTE — PLAN OF CARE
went to meet with patient. Patient asleep, but daughters were at bedside. Daughter Stephania stated patient came from [place] to live here in [place] with his wife. He uses a walker and cane at home. He does not drive, so family will transport. PT/OT consulted to work with patient. Family requested [facility]. They are aware of REHAB process. Facility will establish care with new PCP after discharge and family are aware. Family encouraged to call with any questions or concerns.  will continue to follow patient through transitions of care and assist with any discharge needs.     Margaret with [facility] stated they do have beds (referral sent via [facility]).  will get back with her to update Phone#[phone].    --Ceferino CAMPOS stated patient would be medically ready possibly by tomorrow. Yahaira with [facility] updated and will follow-up with  tomorrow. I also updated daughter.    Patient Contacts    Name Relation Home Work Mobile   Stephania Mann Daughter   [phone]   Tessie Randle Daughter   [phone]   DARSHANAELIAS Spouse   [phone]     Future Appointments   Date Time Provider Department Center   10/30/2023 11:00 AM Gertrude Kimball MD [facility]         10/23/23 1255   Discharge Assessment   Assessment Type Discharge Planning Assessment   Confirmed/corrected address, phone number and insurance Yes   Confirmed Demographics Correct on Facesheet   Source of Information family;health record   People in Home significant other;spouse   Facility Arrived From: Home with Home Health   Do you expect to return to your current living situation? No   Prior to hospitilization cognitive status: Unable to Assess   Current cognitive status: Unable to Assess   Walking or Climbing Stairs ambulation difficulty, requires equipment   Dressing/Bathing bathing difficulty, requires equipment   Equipment Currently Used at Home walker, rolling;cane, straight   Do you  take prescription medications? Yes   Do you have prescription coverage? Yes   Do you have any problems affording any of your prescribed medications? No   Is the patient taking medications as prescribed? yes   How do you get to doctors appointments? family or friend will provide   Are you on dialysis? No   Do you take coumadin? No   Discharge Plan discussed with: Adult children   Transition of Care Barriers None   Discharge Plan A Rehab   Discharge Plan B Skilled Nursing Facility   Physical Activity   On average, how many days per week do you engage in moderate to strenuous exercise (like a brisk walk)? Patient refu   On average, how many minutes do you engage in exercise at this level? Patient refu   Financial Resource Strain   How hard is it for you to pay for the very basics like food, housing, medical care, and heating? Patient refu   Housing Stability   In the last 12 months, was there a time when you were not able to pay the mortgage or rent on time? OR   In the last 12 months, was there a time when you did not have a steady place to sleep or slept in a shelter (including now)? OR   Transportation Needs   In the past 12 months, has lack of transportation kept you from medical appointments or from getting medications? Patient refu   In the past 12 months, has lack of transportation kept you from meetings, work, or from getting things needed for daily living? Patient refu   Food Insecurity   Within the past 12 months, you worried that your food would run out before you got the money to buy more. Patient refu   Within the past 12 months, the food you bought just didn't last and you didn't have money to get more. Patient refu   Stress   Do you feel stress - tense, restless, nervous, or anxious, or unable to sleep at night because your mind is troubled all the time - these days? Patient refu   Social Connections   In a typical week, how many times do you talk on the phone with family, friends, or neighbors? More  than 3   How often do you get together with friends or relatives? More than 3   How often do you attend Roman Catholic or Voodoo services? Patient refu   Do you belong to any clubs or organizations such as Roman [employer] groups, unions, fraternal or athletic groups, or school groups? Patient refu   How often do you attend meetings of the clubs or organizations you belong to? Patient refu   Are you , , , , never , or living with a partner? Patient refu   Alcohol Use   Q1: How often do you have a drink containing alcohol? Patient refu   Q2: How many drinks containing alcohol do you have on a typical day when you are drinking? Patient refu   Q3: How often do you have six or more drinks on one occasion? Patient refu     Sondra Tai RN    [phone]

## 2023-10-23 NOTE — HOSPITAL COURSE
Mr Currie is a 68 year old male who presents to [facility] for evaluation of altered mental status. He was started on IV antibiotic and symptoms have improved. Mental status is now back to baseline. Family members are at bedside, states patient needs Rehab. Will consult PT/OT for further evaluation and treatment.

## 2023-10-23 NOTE — PLAN OF CARE
Problem: SLP  Goal: SLP Goal  Description: Goals:   1. Patient will successfully participate in abohad-awtleosd-ffwkwpeuw evaluation to further assess for any communication impairments.   2. Patient will successfully participate in clinical swallow evaluation and tolerate po trials with no overt s/s of aspiration.   3. Patient will tolerate regular consistency/thin liquids po diet with no overt s/s of aspiration.     Outcome: Met    Pt with resolved mild dysarthria and improved cognitive status today. Family at baseline stating pt is now at cognitive baseline. No further ST is required at this time.

## 2023-10-23 NOTE — PT/OT/SLP EVAL
Physical Therapy Evaluation and treatment    Patient Name:  Geovani Currie   MRN:  [id number]    Recommendations:     Discharge Recommendations: Moderate Intensity Therapy   Discharge Equipment Recommendations:  (TBD-defer to post acute)   Barriers to discharge: Decreased caregiver support    Assessment:     Geovani Currie is a 68 y.o. male admitted with a medical diagnosis of Altered mental status.  He presents with the following impairments/functional limitations: impaired endurance, impaired balance, decreased safety awareness, decreased coordination, gait instability, impaired coordination.  Pt is A&O but delay in processing time.  Good strength but decreased balance.  Gait with and without AD almost shuffling gait but can improve to reciprocal gait with cueing.  Then to push RW too far in front of him along with decreased endurance.    3 falls in past 3 months.  Remains fall risk.  Pt benefit from mod intensity post acute to improve balance and endurance especially since patient will be starting chemo soon.      Rehab Prognosis: Good; patient would benefit from acute skilled PT services to address these deficits and reach maximum level of function.    Recent Surgery: * No surgery found *      Plan:     During this hospitalization, patient to be seen 5 x/week to address the identified rehab impairments via gait training, therapeutic activities, therapeutic exercises and progress toward the following goals:    Plan of Care Expires:  11/22/23    Subjective     Chief Complaint: none stated   Patient/Family Comments/goals: get stronger before he starts chemo   Pain/Comfort:  Pain Rating 1: 0/10    Patients cultural, spiritual, Religion conflicts given the current situation:  (none stated)    Living Environment:  Pt lived in [place] with his wife.  In process of moving to [place]-flew here with wife and daughters -admitted after episode on plane her. Pt and wife will be living with daughter and her 5 kids ranging from  "6-18.  1 story house, 4" TAMI.  WIS with built in seat and GB   Prior to admission, patients level of function was amb with SBQC in house for limited distances.  Has rollator he will sit to rest.  Rollator becomes wc and that is used for community distances.  Requiring A recently for dressing and getting in and out of shower.  Equipment used at home: cane, quad, rollator (per daughter- rollator becomes wc).  DME owned (not currently used):  left in [place] .  Upon discharge, patient will have assistance from wife during day while daughter at work.  Wife is in fair health at best- recently hospitalized in [place].    Objective:     Communicated with nurse prior to session.  Patient found  slouched down in bed with feet almost off right side of bed and body near L side rail  with nephrostomy, peripheral IV  upon PT entry to room.daughter Sera present    General Precautions: Standard, fall  Orthopedic Precautions:N/A   Braces: N/A  Respiratory Status: Room air    Exams:  Cognitive Exam:  Patient is oriented to Person, Place, Time, and Situation.  There is a delay in response and processing time .  He is only able to recall 1/3 words OT asked him to remember  Gross Motor Coordination:  impaired  Postural Exam:  Patient presented with the following abnormalities:    -       Rounded shoulders  -       Forward head  Sensation: denies tingling or numbness in feet or hand   RLE ROM: WFL  RLE Strength: WFL  LLE ROM: WFL  LLE Strength: WFL    Functional Mobility:  Bed Mobility:     Supine to Sit: supervision  Sit to Supine: supervision  Transfers:     Sit to Stand:  stand by assistance with no AD  Gait: pt amb in room without AD and close SBA.  Almost shuffling gait- reciprocal but with very small step length B.  Amb 60' with RW and close SBA/CGA for safety-again very small steps  but able to take full reciprocal steps when instructed but quickly reverts back without repeated cues. Also RW is too far in front of him requiring assist " for walker management    Balance: P+/F-      AM-PAC 6 CLICK MOBILITY  Total Score:21       Treatment & Education:  Pt and daughter instructed in role of PT and POC.  Discussed post acute options.   Instructed in safety, use of call button, bed alarm when family not present. OK for pt to go in wc off unit if not hooked to IV and OK with nursing.    Walker management and gait sequence instructions as above.      Patient left HOB elevated with all lines intact, call button in reach, nurse notified, and daughter present.    GOALS:   Multidisciplinary Problems       Physical Therapy Goals          Problem: Physical Therapy    Goal Priority Disciplines Outcome Goal Variances Interventions   Physical Therapy Goal     PT, PT/OT Ongoing, Progressing     Description: Goals to be met by: 23     Patient will increase functional independence with mobility by performin. Supine to sit with Modified [place]  2. Sit to supine with Modified [place]  3. Sit to stand transfer with Modified [place]  4. Bed to chair transfer with Modified [place] using No Assistive Device  5. Gait  x >100 feet with Supervision using least restrictive AD.   6. Ascend/Descend 4 inch curb step with Supervision using least restrictive AD. .                         History:     No past medical history on file.    No past surgical history on file.    Time Tracking:     PT Received On: 10/23/23  PT Start Time: 1414     PT Stop Time: 1459  PT Total Time (min): 45 min total time, overlap OT    Billable Minutes: Evaluation 10, Gait Training 10, and Therapeutic Activity 15      10/23/2023

## 2023-10-23 NOTE — PT/OT/SLP EVAL
Occupational Therapy   Evaluation and Treatment    Name: Geovani Currie  MRN: [id number]  Admitting Diagnosis: Altered mental status  Recent Surgery: * No surgery found *      Recommendations:     Discharge Recommendations: Moderate Intensity Therapy  Discharge Equipment Recommendations:  to be determined by next level of care  Barriers to discharge:  Other (Comment) (fall risk)    Assessment:     Geovani Currie is a 68 y.o. male with a medical diagnosis of Altered mental status.  He presents with .The primary encounter diagnosis was Cerebrovascular accident (CVA), unspecified mechanism. Diagnoses of Stroke, AMS (altered mental status), Renal dysfunction, Urinary tract infection without hematuria, site unspecified, Candidal UTI (urinary tract infection), Opiate overdose, accidental or unintentional, initial encounter, Benzodiazepine overdose, accidental or unintentional, initial encounter, Chest pain, and Scrotum swelling were also pertinent to this visit.  . Performance deficits affecting function: weakness, gait instability, impaired endurance, impaired self care skills, impaired cognition, impaired functional mobility, decreased coordination, impaired balance, impaired cardiopulmonary response to activity.      OT evaluation completed, coeval with PT in consideration of patient's low endurance. Patient moved from [place] to [place] last week to live with daughter and have increased care/support during future cancer treatments. Per patient's daughter patient most recently has had 3 falls in past 3 months (2 in the past month), modified independently mobilizes with cane or rollator with shuffled pattern, modified independently completes toileting regimen including emptying nephrostomy bag, decline in cognition, and requiring light assist for dressing, bathing, and transfers in and out of shower. Patient on evaluation this date supervision for bed mobility, SBA for transfers, CGA/SBA for mobility with small steps  with and without rolling walker but is at risk for falls as is below age related norm for Timed Up and Go. Patient needing minimal assist to SBA for most ADLs. Brief Interview for Mental Status (BIMS) performed on this date with score of 8/15 indicating impairment (score of 00-07: severe impairment; score of 08-12: moderate impairment; score of 13-15 is cognitively intact). Upon medically stable recommend moderate intensity therapy.     Rehab Prognosis: Good; patient would benefit from acute skilled OT services to address these deficits and reach maximum level of function.       Plan:     Patient to be seen 4 x/week to address the above listed problems via self-care/home management, therapeutic activities, therapeutic exercises  Plan of Care Expires: 11/20/23  Plan of Care Reviewed with: patient, daughter    Subjective     Chief Complaint: no complaints  Patient/Family Comments/goals: patient's daughter indicates that patient has had more difficulty with cognition recently and has had more episodes of a shuffled walking pattern    Occupational Profile:  Living Environment: Patient and his spouse just moved from [place] to [place] so patient can have support of more family during future cancer treatments. Patient brought to [facility] from airport and has not yet been to daughter's home here. Home is a single story home with threshold to enter; daughter and her 5 children reside there ages 18 years old to 6 years old. Walk in shower with built in seat.  Previous level of function: Per patient's daughter patient most recently has had 3 falls in past 3 months (2 in the past month), modified independently mobilizes with cane or rollator with shuffled pattern, modified independently completes toileting regimen including emptying nephrostomy bag, decline in cognition, and requiring light assist for dressing, bathing, and transfers in and out of shower. Retired .  Equipment Used at Home: other (see comments),  cane, quad (combination rollator/wheelchair (per patient's daughter))  Assistance upon Discharge: daughter; however she works fulltime - in process of transitioning to part time    Pain/Comfort:  Pain Rating 1: 0/10  Pain Addressed 1: Reposition  Pain Rating Post-Intervention 1: 0/10      Objective:     Communicated with: nursing prior to session.  Patient found HOB elevated with nephrostomy, peripheral IV upon OT entry to room.    General Precautions: Standard, fall  Orthopedic Precautions: N/A  Braces: N/A  Respiratory Status: Room air    Occupational Performance:    Bed Mobility:    Patient completed Supine to Sit with supervision  Patient completed Sit to Supine with supervision    Functional Mobility/Transfers:  Patient completed Sit <> Stand Transfer with stand by assistance  with  no assistive device and rolling walker   Patient completed Toilet Transfer Step Transfer technique with stand by assistance with  grab bars  Patient completed  Shower Transfer Step Transfer technique with contact guard assistance with grab bars and via dry run / clothing on approach  Functional Mobility: in room mobility to / from bathroom to bed with CGA / SBA, short, shuffled steps with limited cervical/ BUE movement. Out of room mobility with rolling walker, CGA / SBA with short, shuffled steps minimally improved with verbal cueing. PT provided physical balance assist - OT placed small visual markers on the floor to enhance larger steps; improved step length with visual marker cue. Up to moderate verbal/visual  cues required for proximity of walker / turning within walker    Activities of Daily Living:  Bathing: in context of shower, seated on built in seat with clothing on; simulation performance - minimal assist to moderate assist inferred   Upper Body Dressing: full assessment not performed; adjusts gown, requires assist for management of buttons; infer minimal assist  Lower Body Dressing: contact guard assistance ; seated on  context of commode; performs cross leg technique to doff/ don socks  Toileting: no voiding occurred during session; has nephrostomy; anticipate minimal assist needed - patient able to manage pullup     Cognitive/Visual Perceptual:  Cognitive/Psychosocial Skills:     -       Oriented to: Person and hospital (not city but is new to this area), month, year, but not day of week   -       Follows Commands/attention:mild processing delay; responds better with layman term instruction  -       Memory: Impaired STM and  Brief Interview for Mental Status (BIMS) performed on this date with score of 8/15 indicating impairment (score of 00-07: severe impairment; score of 08-12: moderate impairment; score of 13-15 is cognitively intact).   -       Safety awareness/insight to disability: requires assistance  -       Mood/Affect/Coping skills/emotional control: cooperative  Visual/Perceptual: intact visual tracking    Physical Exam:  Dominant hand:    -       right  Upper Extremity Range of Motion:     -       Right Upper Extremity: WFL  -       Left Upper Extremity: WFL  Upper Extremity Strength:    -       Right Upper Extremity: WFL  -       Left Upper Extremity: WFL   Strength:    -       Right Upper Extremity: WFL  -       Left Upper Extremity: WFL  Timed Up and Go: impaired/ below age related norm (requires > 1 minute)    AMPAC 6 Click ADL:  AMPAC Total Score: 20    Treatment & Education:  Patient educated on role of OT/ POC development. Co-evaluation with physical therapy in consideration of complexity. Occupational profile developed with interview of patient but with frequent need to further ask information from daughter for further collateral. Patient guided to transition eob for assessment. Initiated ADL / functional mobility training as above with instruction on safety, scanning ahead, fall prevention, and sequencing cues. Instructed patient in above functional mobility with addition of visual cue markers on floor  resulting in improved step length. Denies shortness of breath but does demonstrate mild increased exertion. Returned to bed. Answered questions within scope.      Patient left HOB elevated with all lines intact, call button in reach, bed alarm on, and nursing notified    GOALS:   Multidisciplinary Problems       Occupational Therapy Goals          Problem: Occupational Therapy    Goal Priority Disciplines Outcome Interventions   Occupational Therapy Goal     OT, PT/OT Ongoing, Progressing    Description: Goals to be met by: 11/20/2023     Patient will increase functional independence with ADLs by performing:    UE Dressing with Stand-by Assistance with simplified clothing  LE Dressing with Set-up Assistance.  Toileting from toilet with Modified [place] inclusive of management of nephrostomy bag for hygiene and clothing management.   Functional mobility to /from bathroom inclusive of Toilet transfer to toilet with Modified [place].  Timed UP and go in < 20 seconds with use of least restrictive device to support reduced risk for falls when mobilizing in room to partake in self care regimen.                         History:     No past medical history on file.    No past surgical history on file.    Time Tracking:     OT Date of Treatment: 10/23/23  OT Start Time: 1414  OT Stop Time: 1459  OT Total Time (min): 45 min total time; overlap PT    Billable Minutes:Evaluation 10 min  Self Care/Home Management 15 min  Therapeutic Activity 10 min    10/23/2023

## 2023-10-23 NOTE — PROGRESS NOTES
[facility]  Progress Note    Patient Name: Geovani Currie  MRN: [id number]  Patient Class: IP- Inpatient   Admission Date: 10/20/2023  Length of Stay: 2 days  Attending Physician: Naomi Lutz*  Primary Care Provider: Regi, Primary Doctor        Subjective:     Principal Problem:Altered mental status        HPI:  Geovani Currie is a 68-year-old male with a past medical history of bladder cancer, lung cancer who presents with altered mental status.    [place] as patient was experiencing worsening lethargy and shallow respiration.  He was noted to be satting around 90% on room air and was placed on 2 L.  Patient just moved from [place] to [place] to be closer to family for cancer treatments.  Family is concerned that over the past few days he had become increasingly weak.  At this time, There is some concern that on the plane he may have taken additional doses of opioids, since he quickly awoke to Narcan.  Of note, patient recently had a stent placed in his left kidney right nephrostomy tube due to kidney issues.    In the ED, triage vitals were significant for hypoxia, patient was placed on 5 L. CBC showed normocytic anemia.  CMP was significant for low albumin, elevated sugars.  TSH , lactic acid, troponin within normal range.  UA was concerning for white blood cells, RBCs, yeast although no bacteria.  ABG was reassuring with no CO2 retention    Code stroke was called for altered mental status.  Neurologist recommends no lytics, CTA, admission for MRI.    CT head with moderate area of hypoattenuation in the parietal cortex suggesting infarct although radiologist favors chronic process.  Small probable remote areas of lacunar infarct involving the left caudate, bilateral thalamus and right lateral basal ganglia.  CTA head and neck with no evidence of acute intracranial abnormality.  No high-grade stenosis or major vessel occlusion.  Spiculated  lesion noticed in the right posterior lung apex.    Chest x-ray shows no acute or significant focal chest abnormality.    With administration of Narcan, improvement in patient's mental status.    ED provider, gave patient ceftriaxone and Diflucan for UA.  Due to concern for subacute/acute CVA, aspirin and Plavix were ordered.    Medicine was consulted for patient's altered mental status      Overview/Hospital Course:  Mr Currie is a 68 year old male who presents to [facility] for evaluation of altered mental status. He was started on IV antibiotic and symptoms have improved. Mental status is now back to baseline. Family members are at bedside, states patient needs Rehab. Will consult PT/OT for further evaluation and treatment.       Interval History: PT/OT evaluate and treat.     Review of Systems   Constitutional:  Positive for activity change and fatigue.   Gastrointestinal:  Positive for abdominal pain (mild lower abdominal pain).   Neurological:  Positive for weakness.     Objective:     Vital Signs (Most Recent):  Temp: 97.3 °F (36.3 °C) (10/23/23 0725)  Pulse: 78 (10/23/23 0725)  Resp: 18 (10/23/23 0725)  BP: (!) 149/65 (10/23/23 0725)  SpO2: (!) 94 % (10/23/23 0725) Vital Signs (24h Range):  Temp:  [97.1 °F (36.2 °C)-97.9 °F (36.6 °C)] 97.3 °F (36.3 °C)  Pulse:  [73-82] 78  Resp:  [16-20] 18  SpO2:  [92 %-98 %] 94 %  BP: (114-149)/(52-65) 149/65     Weight: 75.3 kg (166 lb)  Body mass index is 24.51 kg/m².  No intake or output data in the 24 hours ending 10/23/23 1152      Physical Exam  Vitals and nursing note reviewed.   HENT:      Head: Normocephalic and atraumatic.   Cardiovascular:      Heart sounds: No murmur heard.     No friction rub. No gallop.   Pulmonary:      Effort: No respiratory distress.      Breath sounds: No stridor. No wheezing, rhonchi or rales.   Chest:      Chest wall: No tenderness.   Abdominal:      General: There is no distension.      Palpations: There is no mass.      Tenderness: There  is abdominal tenderness in the suprapubic area. There is no right CVA tenderness, left CVA tenderness, guarding or rebound.      Hernia: No hernia is present.   Musculoskeletal:         General: No swelling, tenderness, deformity or signs of injury.      Right lower leg: No edema.      Left lower leg: No edema.   Skin:     Coloration: Skin is not jaundiced or pale.      Findings: No bruising, erythema, lesion or rash.   Neurological:      Cranial Nerves: No cranial nerve deficit.      Sensory: No sensory deficit.      Motor: No weakness.      Coordination: Coordination normal.      Gait: Gait normal.      Deep Tendon Reflexes: Reflexes normal.             Significant Labs: All pertinent labs within the past 24 hours have been reviewed.  CBC:   Recent Labs   Lab 10/22/23  0414 10/23/23  0340   WBC 8.53 7.48   HGB 7.8* 8.5*   HCT 24.9* 26.6*    284     CMP:   Recent Labs   Lab 10/22/23  0414 10/23/23  0340    139   K 5.0 4.5    105   CO2 24 24    112*   BUN 19 21   CREATININE 1.3 1.3   CALCIUM 9.5 9.5   ANIONGAP 10 10       Significant Imaging:     Imaging Results              CTA Head and Neck (xpd) (Final result)  Result time 10/21/23 00:56:04      Final result by Florinda Yousif MD (10/21/23 00:56:04)                   Impression:      No evidence of acute intracranial abnormality since earlier examination 22:20.  Focus of low density favoring nonacute infarct in the posterior left parietal lobe.    Lacunar infarcts  in left caudate left thalami and basal ganglia previously described and better visualized on prior CT as motion artifact limits evaluation on this exam    There is no high-grade stenosis or major vessel occlusion.    Spiculated lesion measuring up to 16 mm in the right posterior lung apex.  For a solid nodule >8 mm, Fleischner Society 2017 guidelines recommend considering CT, PET/CT or tissue sampling at 3 months.  This finding was relayed to the emergency room physician,  Dr. Obrien at the time of interpretation via secure chat.      Electronically signed by: Florindayue Yousif  Date:    10/21/2023  Time:    00:56               Narrative:    EXAMINATION:  CTA HEAD AND NECK (XPD)    CLINICAL HISTORY:  Neuro deficit, acute, stroke suspected;    TECHNIQUE:  Non contrast low dose axial images were obtained through the head.  CT angiogram was performed from the level of the anjana to the top of the head following the IV administration of 100mL of Omnipaque 350.   Sagittal and coronal reconstructions and maximum intensity projection reconstructions were performed. Arterial stenosis percentages are based on NASCET measurement criteria.    COMPARISON:  CT brain obtained earlier 10/20/23    FINDINGS:  Motion artifact is present on several axial images prior to and following contrast.    Intracranial Compartment:    CT brain appears stable compared to the prior CT 22:20, the ventricles and sulci are mildly prominent consistent with patient's age without evidence of hydrocephalus. There is no extra-axial acute hemorrhage or fluid collection.    There is focal low density in the left posterior parietal lobe with an appearance favoring a chronic infarct though there may be a component which is more age indeterminate.  Basal ganglia lacunar infarcts were described on prior CT and are better visualized previously and are noted on this exam in the left basal ganglia.  There is no parenchymal enhancing mass, hemorrhage, or edema.  No other loss of the gray-white matter junction differentiation is seen throughout the brain parenchyma.  Posterior fossa structures pituitary gland grossly appear intact as imaged allowing for artifact.    Skull/Extracranial Contents (limited evaluation): No fracture. Mastoid air cells and paranasal sinuses are essentially clear.    Non-Vascular Structures of the Neck/Thoracic Inlet (limited evaluation): There is a spiculated mass in the right apex posteriorly measuring 16 x  11 by 13 mm axial image 78 series 9 suspicious for a neoplastic nodule.  Emphysematous changes noted in the lung apices.  Cervical spondylosis noted without subluxation most pronounced at C5-C6.    Aorta: The left vertebral artery appears to arise from the very proximal left subclavian artery.  Normal 3 vessel arch.  Heavy atherosclerotic calcifications noted in the imaged aortic arch.    Extracranial carotid circulation: There is mild atherosclerotic calcification and soft plaque in the carotid bifurcation bilaterally greater on the left.  No hemodynamically significant stenosis, aneurysmal dilatation, or dissection.    Extracranial vertebral circulation: No hemodynamically significant stenosis, aneurysmal dilatation, or dissection.    Intracranial Arteries: No focal high-grade stenosis, occlusion, or aneurysm.    Venous structures (limited evaluation): Normal.                                       X-Ray Chest AP Portable (Final result)  Result time 10/20/23 23:47:45      Final result by Florinda Yousif MD (10/20/23 23:47:45)                   Impression:      No acute or significant focal chest abnormality.      Electronically signed by: Florinda Yousif  Date:    10/20/2023  Time:    23:47               Narrative:    EXAMINATION:  XR CHEST AP PORTABLE    CLINICAL HISTORY:  Stroke;    TECHNIQUE:  Single frontal view of the chest was performed.    COMPARISON:  None    FINDINGS:  Small band of atelectasis or scarring is noted in the left costophrenic angle otherwise the lungs are clear.  There is a normal appearance of pulmonary vasculature and there is no pleural effusion or pneumothorax as imaged.    The cardiac silhouette is normal in size. The hilar and mediastinal contours are unremarkable.    Bones are intact.                                        CT Head Without Contrast (Final result)  Result time 10/20/23 22:51:40      Final result by Beni Bernal MD (10/20/23 22:51:40)                    Impression:      1. Involutional changes with chronic microvascular ischemic changes in the periventricular white matter.  2. Moderate area of hypoattenuation in the left posterior parietal cortex suggesting infarction.  I favor a chronic process though age indeterminate.  MRI follow-up would better characterize.  3. Small probable remote areas of lacunar infarction involving the left caudate, bilateral thalamus and right lateral basal ganglia.  4. This report was flagged in Epic as abnormal.      Electronically signed by: Beni Escamilla  Date:    10/20/2023  Time:    22:51               Narrative:    EXAMINATION:  CT HEAD WITHOUT CONTRAST    CLINICAL HISTORY:  Neuro deficit, acute, stroke suspected;  not otherwise specified.    TECHNIQUE:  Low dose axial CT images obtained throughout the head without intravenous contrast. Sagittal and coronal reconstructions were performed.    COMPARISON:  None.    FINDINGS:  Intracranial compartment:    No midline shift or acute hydrocephalus.  No extra-axial blood or fluid collections.    Moderate involutional changes and chronic microvascular ischemic changes in the periventricular white matter.    Moderate area of mild hypoattenuation in the left posterior parietal cortex suggesting infarction.  I favor a chronic process though age indeterminate.  MRI follow-up would better characterize.    There is a small probable remote lacunar infarct of the left caudate.    Small probable remote lacunar infarcts at the bilateral thalamus and right lateral basal ganglia.    No parenchymal mass, hemorrhage, edema or major vascular distribution infarct.    Probable minimal bilateral senescent basal ganglia calcifications left greater than right.    Skull/extracranial contents (limited evaluation): No fracture. Mastoid air cells and paranasal sinuses are essentially clear.                                         Assessment/Plan:      * Altered mental status  -improvement with Narcan  -TSH,  ammonia within normal range.  Point of care glucose elevated at 127.  -ABG unremarkable for CO2 elevation  -CT head unremarkable  -blood cultures and urine has been ordered  -stroke call in the ED    Plan:  -likely at least part due to opioid use  -MRI ordered  -continue to monitor  -consult neurology further evaluation      10/23/2023  Resolved   MRI of brain, mild moderate sized remote left parietal infarction with encephalomalacia   There are few scattered small to punctate sized remote bilateral basal gangliar and left thalamic lacunar type infarcts   No evidence of acute infarction   PT/OT evaluate and treat     Hyperglycemia  Slightly elevated sugars on presentation    Plan:  May consider sliding scale if sugars consistently above 180      UTI (urinary tract infection)  UA noted to have WBC, RBC, yeast.  Increased WBC in UA, may be due to nephrostomy tube  Urine culture pending  S/p ceftriaxone and Diflucan in the ED    Plan:  We will currently hold ceftriaxone and Diflucan, low threshold to restart      10/23  Urine culture positive for Enterobacter cloacae  Transition Rocephin to Cefepime     Consult ID for further treatment recommendation       Hx of cancer of lung  -CT scan shows spiculated mass  -known history    Plan:  May defer to outpatient treatment      Hx of bladder cancer  Hematuria noted on UA    Plan:  Defer to outpatient treatment      Normocytic anemia  Likely due to anemia of chronic disease    Plan:  Transfuse for less than hemoglobin 7    Elevated serum creatinine  Creatinine of 1.9 admission, unclear baseline  Patient with history of left ureteral stent, and right nephrostomy tube placement    Plan:  Daily BMP  Flush nephrostomy tube at least twice daily  No need to call Urology at this time    10/23  Renal function stable         VTE Risk Mitigation (From admission, onward)           Ordered     IP VTE LOW RISK PATIENT  Once         10/20/23 5239     Place sequential compression device   Until discontinued         10/20/23 7237                    Discharge Planning   TANIA:      Code Status: Full Code   Is the patient medically ready for discharge?:     Reason for patient still in hospital (select all that apply): Patient trending condition and Treatment                     Dmoenico Rogers NP  Department of [facility]   [facility]

## 2023-10-23 NOTE — PLAN OF CARE
OT evaluation completed, coeval with PT in consideration of patient's low endurance. Patient moved from [place] to [place] last week to live with daughter and have increased care/support during future cancer treatments. Per patient's daughter patient most recently has had 3 falls in past 3 months (2 in the past month), modified independently mobilizes with cane or rollator with shuffled pattern, modified independently completes toileting regimen including emptying nephrostomy bag, decline in cognition, and requiring light assist for dressing, bathing, and transfers in and out of shower. Patient on evaluation this date supervision for bed mobility, SBA for transfers, CGA/SBA for mobility with small steps with and without rolling walker but is at risk for falls as is below age related norm for Timed Up and Go. Patient needing minimal assist to SBA for most ADLs. Brief Interview for Mental Status (BIMS) performed on this date with score of 8/15 indicating impairment (score of 00-07: severe impairment; score of 08-12: moderate impairment; score of 13-15 is cognitively intact). Upon medically stable recommend moderate intensity therapy.    Problem: Occupational Therapy  Goal: Occupational Therapy Goal  Description: Goals to be met by: 11/20/2023     Patient will increase functional independence with ADLs by performing:    UE Dressing with Stand-by Assistance with simplified clothing  LE Dressing with Set-up Assistance.  Toileting from toilet with Modified [place] inclusive of management of nephrostomy bag for hygiene and clothing management.   Functional mobility to /from bathroom inclusive of Toilet transfer to toilet with Modified [place].  Timed UP and go in < 20 seconds with use of least restrictive device to support reduced risk for falls when mobilizing in room to partake in self care regimen.    Outcome: Ongoing, Progressing

## 2023-10-23 NOTE — ASSESSMENT & PLAN NOTE
Creatinine of 1.9 admission, unclear baseline  Patient with history of left ureteral stent, and right nephrostomy tube placement    Plan:  Daily BMP  Flush nephrostomy tube at least twice daily  No need to call Urology at this time    10/23  Renal function stable

## 2023-10-23 NOTE — PT/OT/SLP PROGRESS
Occupational Therapy evaluation attempt      Patient Name:  Geovani Currie   MRN:  [id number]    Patient not seen today secondary to Other (Comment) (cardiology diagnostics). Will follow-up as able.    10/23/2023

## 2023-10-23 NOTE — SUBJECTIVE & OBJECTIVE
Interval History: PT/OT evaluate and treat.     Review of Systems   Constitutional:  Positive for activity change and fatigue.   Gastrointestinal:  Positive for abdominal pain (mild lower abdominal pain).   Neurological:  Positive for weakness.     Objective:     Vital Signs (Most Recent):  Temp: 97.3 °F (36.3 °C) (10/23/23 0725)  Pulse: 78 (10/23/23 0725)  Resp: 18 (10/23/23 0725)  BP: (!) 149/65 (10/23/23 0725)  SpO2: (!) 94 % (10/23/23 0725) Vital Signs (24h Range):  Temp:  [97.1 °F (36.2 °C)-97.9 °F (36.6 °C)] 97.3 °F (36.3 °C)  Pulse:  [73-82] 78  Resp:  [16-20] 18  SpO2:  [92 %-98 %] 94 %  BP: (114-149)/(52-65) 149/65     Weight: 75.3 kg (166 lb)  Body mass index is 24.51 kg/m².  No intake or output data in the 24 hours ending 10/23/23 1152      Physical Exam  Vitals and nursing note reviewed.   HENT:      Head: Normocephalic and atraumatic.   Cardiovascular:      Heart sounds: No murmur heard.     No friction rub. No gallop.   Pulmonary:      Effort: No respiratory distress.      Breath sounds: No stridor. No wheezing, rhonchi or rales.   Chest:      Chest wall: No tenderness.   Abdominal:      General: There is no distension.      Palpations: There is no mass.      Tenderness: There is abdominal tenderness in the suprapubic area. There is no right CVA tenderness, left CVA tenderness, guarding or rebound.      Hernia: No hernia is present.   Musculoskeletal:         General: No swelling, tenderness, deformity or signs of injury.      Right lower leg: No edema.      Left lower leg: No edema.   Skin:     Coloration: Skin is not jaundiced or pale.      Findings: No bruising, erythema, lesion or rash.   Neurological:      Cranial Nerves: No cranial nerve deficit.      Sensory: No sensory deficit.      Motor: No weakness.      Coordination: Coordination normal.      Gait: Gait normal.      Deep Tendon Reflexes: Reflexes normal.             Significant Labs: All pertinent labs within the past 24 hours have been  reviewed.  CBC:   Recent Labs   Lab 10/22/23  0414 10/23/23  0340   WBC 8.53 7.48   HGB 7.8* 8.5*   HCT 24.9* 26.6*    284     CMP:   Recent Labs   Lab 10/22/23  0414 10/23/23  0340    139   K 5.0 4.5    105   CO2 24 24    112*   BUN 19 21   CREATININE 1.3 1.3   CALCIUM 9.5 9.5   ANIONGAP 10 10       Significant Imaging:     Imaging Results              CTA Head and Neck (xpd) (Final result)  Result time 10/21/23 00:56:04      Final result by Florinda Yousif MD (10/21/23 00:56:04)                   Impression:      No evidence of acute intracranial abnormality since earlier examination 22:20.  Focus of low density favoring nonacute infarct in the posterior left parietal lobe.    Lacunar infarcts  in left caudate left thalami and basal ganglia previously described and better visualized on prior CT as motion artifact limits evaluation on this exam    There is no high-grade stenosis or major vessel occlusion.    Spiculated lesion measuring up to 16 mm in the right posterior lung apex.  For a solid nodule >8 mm, Fleischner Society 2017 guidelines recommend considering CT, PET/CT or tissue sampling at 3 months.  This finding was relayed to the emergency room physician, Dr. Obrien at the time of interpretation via secure chat.      Electronically signed by: Florinda Yousif  Date:    10/21/2023  Time:    00:56               Narrative:    EXAMINATION:  CTA HEAD AND NECK (XPD)    CLINICAL HISTORY:  Neuro deficit, acute, stroke suspected;    TECHNIQUE:  Non contrast low dose axial images were obtained through the head.  CT angiogram was performed from the level of the anjana to the top of the head following the IV administration of 100mL of Omnipaque 350.   Sagittal and coronal reconstructions and maximum intensity projection reconstructions were performed. Arterial stenosis percentages are based on NASCET measurement criteria.    COMPARISON:  CT brain obtained earlier  10/20/23    FINDINGS:  Motion artifact is present on several axial images prior to and following contrast.    Intracranial Compartment:    CT brain appears stable compared to the prior CT 22:20, the ventricles and sulci are mildly prominent consistent with patient's age without evidence of hydrocephalus. There is no extra-axial acute hemorrhage or fluid collection.    There is focal low density in the left posterior parietal lobe with an appearance favoring a chronic infarct though there may be a component which is more age indeterminate.  Basal ganglia lacunar infarcts were described on prior CT and are better visualized previously and are noted on this exam in the left basal ganglia.  There is no parenchymal enhancing mass, hemorrhage, or edema.  No other loss of the gray-white matter junction differentiation is seen throughout the brain parenchyma.  Posterior fossa structures pituitary gland grossly appear intact as imaged allowing for artifact.    Skull/Extracranial Contents (limited evaluation): No fracture. Mastoid air cells and paranasal sinuses are essentially clear.    Non-Vascular Structures of the Neck/Thoracic Inlet (limited evaluation): There is a spiculated mass in the right apex posteriorly measuring 16 x 11 by 13 mm axial image 78 series 9 suspicious for a neoplastic nodule.  Emphysematous changes noted in the lung apices.  Cervical spondylosis noted without subluxation most pronounced at C5-C6.    Aorta: The left vertebral artery appears to arise from the very proximal left subclavian artery.  Normal 3 vessel arch.  Heavy atherosclerotic calcifications noted in the imaged aortic arch.    Extracranial carotid circulation: There is mild atherosclerotic calcification and soft plaque in the carotid bifurcation bilaterally greater on the left.  No hemodynamically significant stenosis, aneurysmal dilatation, or dissection.    Extracranial vertebral circulation: No hemodynamically significant stenosis,  aneurysmal dilatation, or dissection.    Intracranial Arteries: No focal high-grade stenosis, occlusion, or aneurysm.    Venous structures (limited evaluation): Normal.                                       X-Ray Chest AP Portable (Final result)  Result time 10/20/23 23:47:45      Final result by Florinda Yousif MD (10/20/23 23:47:45)                   Impression:      No acute or significant focal chest abnormality.      Electronically signed by: Florinda Yousif  Date:    10/20/2023  Time:    23:47               Narrative:    EXAMINATION:  XR CHEST AP PORTABLE    CLINICAL HISTORY:  Stroke;    TECHNIQUE:  Single frontal view of the chest was performed.    COMPARISON:  None    FINDINGS:  Small band of atelectasis or scarring is noted in the left costophrenic angle otherwise the lungs are clear.  There is a normal appearance of pulmonary vasculature and there is no pleural effusion or pneumothorax as imaged.    The cardiac silhouette is normal in size. The hilar and mediastinal contours are unremarkable.    Bones are intact.                                        CT Head Without Contrast (Final result)  Result time 10/20/23 22:51:40      Final result by Beni Bernal MD (10/20/23 22:51:40)                   Impression:      1. Involutional changes with chronic microvascular ischemic changes in the periventricular white matter.  2. Moderate area of hypoattenuation in the left posterior parietal cortex suggesting infarction.  I favor a chronic process though age indeterminate.  MRI follow-up would better characterize.  3. Small probable remote areas of lacunar infarction involving the left caudate, bilateral thalamus and right lateral basal ganglia.  4. This report was flagged in Epic as abnormal.      Electronically signed by: Beni Bernal  Date:    10/20/2023  Time:    22:51               Narrative:    EXAMINATION:  CT HEAD WITHOUT CONTRAST    CLINICAL HISTORY:  Neuro deficit, acute, stroke suspected;  not  otherwise specified.    TECHNIQUE:  Low dose axial CT images obtained throughout the head without intravenous contrast. Sagittal and coronal reconstructions were performed.    COMPARISON:  None.    FINDINGS:  Intracranial compartment:    No midline shift or acute hydrocephalus.  No extra-axial blood or fluid collections.    Moderate involutional changes and chronic microvascular ischemic changes in the periventricular white matter.    Moderate area of mild hypoattenuation in the left posterior parietal cortex suggesting infarction.  I favor a chronic process though age indeterminate.  MRI follow-up would better characterize.    There is a small probable remote lacunar infarct of the left caudate.    Small probable remote lacunar infarcts at the bilateral thalamus and right lateral basal ganglia.    No parenchymal mass, hemorrhage, edema or major vascular distribution infarct.    Probable minimal bilateral senescent basal ganglia calcifications left greater than right.    Skull/extracranial contents (limited evaluation): No fracture. Mastoid air cells and paranasal sinuses are essentially clear.

## 2023-10-23 NOTE — PLAN OF CARE
Problem: Adult Inpatient Plan of Care  Goal: Plan of Care Review  10/23/2023 0631 by Estefany Jacob LPN  Outcome: Ongoing, Progressing  10/23/2023 0613 by Estefany Jacob LPN  Outcome: Ongoing, Progressing  Goal: Patient-Specific Goal (Individualized)  10/23/2023 0631 by Estefany Jacob LPN  Outcome: Ongoing, Progressing  10/23/2023 0613 by Estefany Jacob LPN  Outcome: Ongoing, Progressing  Goal: Absence of Hospital-Acquired Illness or Injury  10/23/2023 0631 by Estefany Jacob LPN  Outcome: Ongoing, Progressing  10/23/2023 0613 by Estefany Jacob LPN  Outcome: Ongoing, Progressing  Goal: Optimal Comfort and Wellbeing  Outcome: Ongoing, Progressing  Goal: Readiness for Transition of Care  Outcome: Ongoing, Progressing

## 2023-10-23 NOTE — ASSESSMENT & PLAN NOTE
UA noted to have WBC, RBC, yeast.  Increased WBC in UA, may be due to nephrostomy tube  Urine culture pending  S/p ceftriaxone and Diflucan in the ED    Plan:  We will currently hold ceftriaxone and Diflucan, low threshold to restart      10/23  Urine culture sensitives results reviewed   Transition Rocephin to Cefepime

## 2023-10-23 NOTE — PLAN OF CARE
Patient eval with OT.  Pt is A&O but delay in processing time.  Good strength but decreased balance.  Gait with and without AD almost shuffling gait but can improve to reciprocal gait with cueing.  Then to push RW too far in front of him along with decreased endurance  3 falls in past 3 months.  Remains fall risk.  Pt benefit from mod intensity post acute to improve balance and endurance prior to starting chemo   .   Problem: Physical Therapy  Goal: Physical Therapy Goal  Description: Goals to be met by: 23     Patient will increase functional independence with mobility by performin. Supine to sit with Modified [place]  2. Sit to supine with Modified [place]  3. Sit to stand transfer with Modified [place]  4. Bed to chair transfer with Modified [place] using No Assistive Device  5. Gait  x >100 feet with Supervision using least restrictive AD.   6. Ascend/Descend 4 inch curb step with Supervision using least restrictive AD. .    Outcome: Ongoing, Progressing

## 2023-10-24 LAB
ALBUMIN SERPL BCP-MCNC: 2.9 G/DL (ref 3.5–5.2)
ALP SERPL-CCNC: 128 U/L (ref 55–135)
ALT SERPL W/O P-5'-P-CCNC: 23 U/L (ref 10–44)
ANION GAP SERPL CALC-SCNC: 15 MMOL/L (ref 8–16)
AST SERPL-CCNC: 16 U/L (ref 10–40)
BACTERIA BLD CULT: ABNORMAL
BASOPHILS # BLD AUTO: 0.05 K/UL (ref 0–0.2)
BASOPHILS NFR BLD: 0.6 % (ref 0–1.9)
BILIRUB SERPL-MCNC: 0.2 MG/DL (ref 0.1–1)
BUN SERPL-MCNC: 18 MG/DL (ref 8–23)
CALCIUM SERPL-MCNC: 10 MG/DL (ref 8.7–10.5)
CHLORIDE SERPL-SCNC: 104 MMOL/L (ref 95–110)
CO2 SERPL-SCNC: 19 MMOL/L (ref 23–29)
CREAT SERPL-MCNC: 1.3 MG/DL (ref 0.5–1.4)
DIFFERENTIAL METHOD: ABNORMAL
EOSINOPHIL # BLD AUTO: 0.5 K/UL (ref 0–0.5)
EOSINOPHIL NFR BLD: 5.4 % (ref 0–8)
ERYTHROCYTE [DISTWIDTH] IN BLOOD BY AUTOMATED COUNT: 14.2 % (ref 11.5–14.5)
EST. GFR  (NO RACE VARIABLE): 59 ML/MIN/1.73 M^2
GLUCOSE SERPL-MCNC: 153 MG/DL (ref 70–110)
HCT VFR BLD AUTO: 30.4 % (ref 40–54)
HGB BLD-MCNC: 9.7 G/DL (ref 14–18)
IMM GRANULOCYTES # BLD AUTO: 0.05 K/UL (ref 0–0.04)
IMM GRANULOCYTES NFR BLD AUTO: 0.6 % (ref 0–0.5)
LYMPHOCYTES # BLD AUTO: 1 K/UL (ref 1–4.8)
LYMPHOCYTES NFR BLD: 11.6 % (ref 18–48)
MCH RBC QN AUTO: 27.4 PG (ref 27–31)
MCHC RBC AUTO-ENTMCNC: 31.9 G/DL (ref 32–36)
MCV RBC AUTO: 86 FL (ref 82–98)
MONOCYTES # BLD AUTO: 0.5 K/UL (ref 0.3–1)
MONOCYTES NFR BLD: 6 % (ref 4–15)
NEUTROPHILS # BLD AUTO: 6.7 K/UL (ref 1.8–7.7)
NEUTROPHILS NFR BLD: 75.8 % (ref 38–73)
NRBC BLD-RTO: 0 /100 WBC
PLATELET # BLD AUTO: 349 K/UL (ref 150–450)
PMV BLD AUTO: 9.2 FL (ref 9.2–12.9)
POCT GLUCOSE: 150 MG/DL (ref 70–110)
POTASSIUM SERPL-SCNC: 4.4 MMOL/L (ref 3.5–5.1)
PROT SERPL-MCNC: 7.1 G/DL (ref 6–8.4)
RBC # BLD AUTO: 3.54 M/UL (ref 4.6–6.2)
SARS-COV-2 RDRP RESP QL NAA+PROBE: NEGATIVE
SODIUM SERPL-SCNC: 138 MMOL/L (ref 136–145)
WBC # BLD AUTO: 8.86 K/UL (ref 3.9–12.7)

## 2023-10-24 PROCEDURE — 80053 COMPREHEN METABOLIC PANEL: CPT | Performed by: INTERNAL MEDICINE

## 2023-10-24 PROCEDURE — 25000003 PHARM REV CODE 250: Performed by: STUDENT IN AN ORGANIZED HEALTH CARE EDUCATION/TRAINING PROGRAM

## 2023-10-24 PROCEDURE — 63600175 PHARM REV CODE 636 W HCPCS: Performed by: INTERNAL MEDICINE

## 2023-10-24 PROCEDURE — C1751 CATH, INF, PER/CENT/MIDLINE: HCPCS

## 2023-10-24 PROCEDURE — 63600175 PHARM REV CODE 636 W HCPCS: Performed by: NURSE PRACTITIONER

## 2023-10-24 PROCEDURE — 97530 THERAPEUTIC ACTIVITIES: CPT

## 2023-10-24 PROCEDURE — 25000003 PHARM REV CODE 250: Performed by: NURSE PRACTITIONER

## 2023-10-24 PROCEDURE — 97116 GAIT TRAINING THERAPY: CPT | Mod: CQ

## 2023-10-24 PROCEDURE — 11000001 HC ACUTE MED/SURG PRIVATE ROOM

## 2023-10-24 PROCEDURE — 97535 SELF CARE MNGMENT TRAINING: CPT

## 2023-10-24 PROCEDURE — 97110 THERAPEUTIC EXERCISES: CPT

## 2023-10-24 PROCEDURE — 25000003 PHARM REV CODE 250: Performed by: FAMILY MEDICINE

## 2023-10-24 PROCEDURE — U0002 COVID-19 LAB TEST NON-CDC: HCPCS | Performed by: INTERNAL MEDICINE

## 2023-10-24 PROCEDURE — 97110 THERAPEUTIC EXERCISES: CPT | Mod: CQ

## 2023-10-24 PROCEDURE — 99222 1ST HOSP IP/OBS MODERATE 55: CPT | Mod: GC,,, | Performed by: INTERNAL MEDICINE

## 2023-10-24 PROCEDURE — 99222 PR INITIAL HOSPITAL CARE,LEVL II: ICD-10-PCS | Mod: GC,,, | Performed by: INTERNAL MEDICINE

## 2023-10-24 PROCEDURE — 25000003 PHARM REV CODE 250: Performed by: INTERNAL MEDICINE

## 2023-10-24 PROCEDURE — 36415 COLL VENOUS BLD VENIPUNCTURE: CPT | Performed by: INTERNAL MEDICINE

## 2023-10-24 PROCEDURE — 85025 COMPLETE CBC W/AUTO DIFF WBC: CPT | Performed by: INTERNAL MEDICINE

## 2023-10-24 PROCEDURE — 36410 VNPNXR 3YR/> PHY/QHP DX/THER: CPT

## 2023-10-24 RX ADMIN — LEVOTHYROXINE SODIUM 125 MCG: 125 TABLET ORAL at 06:10

## 2023-10-24 RX ADMIN — HYDROCODONE BITARTRATE AND ACETAMINOPHEN 1 TABLET: 5; 325 TABLET ORAL at 03:10

## 2023-10-24 RX ADMIN — ATORVASTATIN CALCIUM 20 MG: 20 TABLET, FILM COATED ORAL at 09:10

## 2023-10-24 RX ADMIN — ERTAPENEM 1 G: 1 INJECTION INTRAMUSCULAR; INTRAVENOUS at 02:10

## 2023-10-24 RX ADMIN — CEFEPIME 2 G: 2 INJECTION, POWDER, FOR SOLUTION INTRAVENOUS at 10:10

## 2023-10-24 RX ADMIN — ACETAMINOPHEN 650 MG: 325 TABLET ORAL at 09:10

## 2023-10-24 RX ADMIN — ESCITALOPRAM OXALATE 20 MG: 10 TABLET ORAL at 09:10

## 2023-10-24 RX ADMIN — POLYETHYLENE GLYCOL 3350 17 G: 17 POWDER, FOR SOLUTION ORAL at 09:10

## 2023-10-24 RX ADMIN — ALPRAZOLAM 1 MG: 1 TABLET ORAL at 09:10

## 2023-10-24 RX ADMIN — ALPRAZOLAM 1 MG: 1 TABLET ORAL at 10:10

## 2023-10-24 RX ADMIN — METOPROLOL SUCCINATE 50 MG: 50 TABLET, EXTENDED RELEASE ORAL at 09:10

## 2023-10-24 RX ADMIN — AMLODIPINE BESYLATE 5 MG: 5 TABLET ORAL at 09:10

## 2023-10-24 RX ADMIN — TAMSULOSIN HYDROCHLORIDE 0.4 MG: 0.4 CAPSULE ORAL at 09:10

## 2023-10-24 RX ADMIN — HYDROCODONE BITARTRATE AND ACETAMINOPHEN 1 TABLET: 5; 325 TABLET ORAL at 09:10

## 2023-10-24 NOTE — PLAN OF CARE
Continue OT POC. Progression this date. Indicates decreased mood and 10/10 aggravated mood secondary due to being in the hospital; improved (reduced) to 5/10 post session incorporating change of environment / out of room activity.  Handheld assist for functional mobility this date. Improved speed of movement with use of visual floor markers (which supports increasing step length).  Note to follow. Recommending moderate intensity therapy when medically stable.    Problem: Occupational Therapy  Goal: Occupational Therapy Goal  Description: Goals to be met by: 11/20/2023     Patient will increase functional independence with ADLs by performing:    UE Dressing with Stand-by Assistance with simplified clothing  LE Dressing with Set-up Assistance.  Toileting from toilet with Modified [place] inclusive of management of nephrostomy bag for hygiene and clothing management.   Functional mobility to /from bathroom inclusive of Toilet transfer to toilet with Modified [place].  Timed UP and go in < 20 seconds with use of least restrictive device to support reduced risk for falls when mobilizing in room to partake in self care regimen.    Outcome: Ongoing, Progressing

## 2023-10-24 NOTE — PLAN OF CARE
spoke with Margaret at [facility] (Phone#[phone]) to discuss discharge plans if patient ready today. I told her awaiting report from MD team and will update her. She requested a COVID screen as well. RAPID COVID TEST ordered.    RECOMMENDATIONS:   Discontinue Cefepime.  Start IV/IM Ertapenem 1g Q24H for total course of 14 days (10/22/23 - 11/4/23).  Urine culture evident for MDR Enterobacter.   Blood cultures (10/20) + CoNS, probable contaminant, no susceptibilities.     Thanks for this consult!. Please call if you have any questions. ID will sign off.        Margaret stated they do have medication available. Midline ordered for possible transfer today.    1430--Facility orders sent via Careport. ID and Urology follow-up appointments requested.     1520-- met with patient and daughter Tessie. Patient choice form signed. I updated them still awaiting midline/picc line for transport to facility. House Supervisor Shana unable to specify time they will come. Yahaira with [facility] updated. She will call me once she reviewed orders and let me know if cut off time to set up transport. Patient's daughter updated and follow-up information reviewed as well. All questions answered. PATIENT AWAITING PICC LINE AND CLEARANCE FROM FACILITY.    6805-- spoke with Yahaira at [facility]. She stated since line not placed today, plan for discharge to facility tomorrow since getting late for transfer. I called and updated daughter Tessie. Nursing staff aware and Dr. Stone updated.    Patient Contacts    Name Relation Home Work Mobile   Stephania Mann Daughter   [phone]   Tessie Randle Daughter   [phone]   ELIAS GILLILAND Spouse   [phone]     Name Relationship Specialty Phone Fax Address Order                [facility]  Rehabilitation [phone] [address] [place] [address]         Next Steps: Follow  up  Appointment:   Instructions: Rehab Facility        Infectious Diseases Infectious Diseases  Infectious Diseases [phone] [address] [facility] [address]         Next Steps: Follow up  Appointment:   Instructions: Infectious Disease Follow-Up Requested        [facility]  Urology [phone] [address] [facility] [address]         Next Steps: Follow up  Appointment:   Instructions: Urology Follow-Up Requested          10/24/23 1526   Final Note   Assessment Type Final Discharge Note   Anticipated Discharge Disposition Rehab   Post-Acute Status   Post-Acute Authorization Placement   Post-Acute Placement Status Pending post-acute provider review/more information requested   Discharge Delays   (Orders reviewed/picc line/transport)     Sondra Tai RN    [phone]

## 2023-10-24 NOTE — PLAN OF CARE
Problem: Fall Injury Risk  Goal: Absence of Fall and Fall-Related Injury  Outcome: Ongoing, Progressing     Problem: UTI (Urinary Tract Infection)  Goal: Improved Infection Symptoms  Outcome: Ongoing, Progressing     Problem: Adult Inpatient Plan of Care  Goal: Plan of Care Review  Outcome: Ongoing, Progressing  Goal: Patient-Specific Goal (Individualized)  Outcome: Ongoing, Progressing  Goal: Absence of Hospital-Acquired Illness or Injury  Outcome: Ongoing, Progressing  Goal: Optimal Comfort and Wellbeing  Outcome: Ongoing, Progressing  Goal: Readiness for Transition of Care  Outcome: Ongoing, Progressing

## 2023-10-24 NOTE — PT/OT/SLP PROGRESS
"Occupational Therapy   Treatment    Name: Geovani Currie  MRN: [id number]  Admitting Diagnosis:  Altered mental status       Recommendations:     Discharge Recommendations: Moderate Intensity Therapy  Discharge Equipment Recommendations:  to be determined by next level of care  Barriers to discharge:  Other (Comment) (no barriers to d/c to next care level)    Assessment:     Geovani Currie is a 68 y.o. male with a medical diagnosis of Altered mental status.  He presents with ..The primary encounter diagnosis was Cerebrovascular accident (CVA), unspecified mechanism. Diagnoses of Stroke, AMS (altered mental status), Renal dysfunction, Urinary tract infection without hematuria, site unspecified, Candidal UTI (urinary tract infection), Opiate overdose, accidental or unintentional, initial encounter, Benzodiazepine overdose, accidental or unintentional, initial encounter, Chest pain, and Scrotum swelling were also pertinent to this visit.    . Performance deficits affecting function are weakness, impaired endurance, impaired cognition, impaired self care skills, impaired functional mobility, gait instability, impaired balance, pain, impaired cardiopulmonary response to activity, decreased lower extremity function, impaired skin.     Continue OT POC. Progression this date. Recommending moderate intensity therapy when medically stable     Rehab Prognosis:  Good; patient would benefit from acute skilled OT services to address these deficits and reach maximum level of function.       Plan:     Patient to be seen 4 x/week to address the above listed problems via self-care/home management, therapeutic activities, therapeutic exercises  Plan of Care Expires: 11/20/23  Plan of Care Reviewed with: patient, daughter    Subjective     Chief Complaint: "10/10 aggravated today"  Patient/Family Comments/goals: Patient indicates decreased mood  Pain/Comfort:  Pain Rating 1: other (see comments) (8/10 back pain; 6/10 "kidney" pain; " indicates 10/10 aggravation)  Pain Addressed 1: Reposition, Cessation of Activity, Nurse notified  Pain Rating Post-Intervention 1: other (see comments) (pain not rated post task; indicates feeling better; aggravation lowered to 5/10)    Objective:     Communicated with: nursing prior to session.  Patient found HOB elevated with nephrostomy, Other (comments) (telesitter) upon OT entry to room.    General Precautions: Standard, fall    Orthopedic Precautions:N/A  Braces: N/A  Respiratory Status: Room air     Occupational Performance:     Bed Mobility:    Patient completed Supine to Sit with supervision     Functional Mobility/Transfers:  Patient completed Sit <> Stand Transfer with CGA  with  handheld assist to supervision   Patient completed Toilet Transfer Step Transfer technique with stand by assistance with  no AD  Patient completed  Shower Transfer Step Transfer technique with contact guard assistance with grab bar  with minimal verbal cues for placement  Functional Mobility: in room mobility with handheld assist, CGA slow steps inclusive in / out of bathroom; out of room (in quiet environment / hallway) mobility x4 trials during Timed Up and Go with handheld assist.   Trial 1 without cues: minimal assist > 40 seconds, small shuffled steps  Trial 2 with min feedback verbal cues for increasing step length: CGA ~40 seconds, small steps  Trial 3 with min feedback verbal cues AND with visual floor marker cues to increase step length: CGA, larger steps, 35 seconds (Improved speed of movement with use of visual floor markers which supports increasing step length).     Trial 4 (with same cues as trial 3): CGA, larger steps, 30 seconds  Standing rest break between trial 1 and 2; seated rest break between trial 2 and 3; standing rest break between trial 3 and 4; endorses 4/10 fatigue with activity    Activities of Daily Living:  Toileting: minimum assistance ; BM attempt; unable / does not void (but performs clothing  "management at [facility])      [facility] 6 Click ADL: 21    Treatment & Education:  Patient oriented to self, month/year, and hospital setting. Patient indicating 10/10 aggravated mood secondary due to being in the hospital.  Guided for ADL toileting / transitional movements in / out of shower as above followed by seated rest break.   Transported out of room; in context of large window room overlooking new scenery guided for therapuetic exercise of 2 x5 repetitive chair pushups/ with controlled tricep extension followed by alternating gentle BUE movement in chair.  Functional mobility training as above. Minimal pursed lip cues provided.  Improved (reduced) self rating of "aggravation" to 5/10 post session incorporating change of environment / out of room activity in addition to intentional therapeutic use of self.   Ended session sitting eob with meal tray in reach, daughter present.    Patient left sitting edge of bed with all lines intact, call button in reach, bed alarm on, nursing notified, and daughter present    GOALS:   Multidisciplinary Problems       Occupational Therapy Goals          Problem: Occupational Therapy    Goal Priority Disciplines Outcome Interventions   Occupational Therapy Goal     OT, PT/OT Ongoing, Progressing    Description: Goals to be met by: 11/20/2023     Patient will increase functional independence with ADLs by performing:    UE Dressing with Stand-by Assistance with simplified clothing  LE Dressing with Set-up Assistance.  Toileting from toilet with Modified [place] inclusive of management of nephrostomy bag for hygiene and clothing management.   Functional mobility to /from bathroom inclusive of Toilet transfer to toilet with Modified [place].  Timed UP and go in < 20 seconds with use of least restrictive device to support reduced risk for falls when mobilizing in room to partake in self care regimen.                         Time Tracking:     OT Date of Treatment: 10/24/23  OT " Start Time: 1138  OT Stop Time: 1219  OT Total Time (min): 41 min    Billable Minutes:Self Care/Home Management 10 min  Therapeutic Activity 20 min  Therapeutic Exercise 11 min    OT/CHAPARRITA: OT          10/24/2023

## 2023-10-24 NOTE — PLAN OF CARE
NENITA sent Facility Transfer Orders via careport to [facility] Phone: [phone]. CM will follow.     2:40PM NENITA sent updated facility transfer orders.     DEB Ramsey  [phone]     10/24/23 1426   Post-Acute Status   Post-Acute Authorization Placement   Discharge Plan   Discharge Plan A Rehab

## 2023-10-24 NOTE — PLAN OF CARE
Problem: Physical Therapy  Goal: Physical Therapy Goal  Description: Goals to be met by: 23     Patient will increase functional independence with mobility by performin. Supine to sit with Modified [place]  2. Sit to supine with Modified [place]  3. Sit to stand transfer with Modified [place]  4. Bed to chair transfer with Modified [place] using No Assistive Device  5. Gait  x >100 feet with Supervision using least restrictive AD.   6. Ascend/Descend 4 inch curb step with Supervision using least restrictive AD. .    Outcome: Ongoing, Progressing

## 2023-10-24 NOTE — PLAN OF CARE
[facility] [place]   Rehabilitation [phone] [address] [facility] [place] [address]              Next Steps: Follow up  Appointment:   Instructions: Rehab Facility           Infectious Diseases Infectious Diseases   Infectious Diseases [phone] [address] [facility] [address]             Next Steps: Follow up  Appointment:   Instructions: Infectious Disease Follow-Up Requested           [facility]   Urology [phone] [address] [facility] [address]             Next Steps: Follow up  Appointment:   Instructions: Urology Follow-Up Requested        Sondra Tai RN    [phone]

## 2023-10-24 NOTE — CONSULTS
Progress Note  [facility]      Consulting Physician: Bertha Martinez MD  Date of Admission: 10/20/2023  Date of Consult: 10/24/2023  Reason for Consult: UTI antibiotic recommendations    Antibiotics:  Ceftriaxone 10/20-dc; 10/22-d/c  Cefepime 10/23 - current    ASSESSMENT:   MDR Enterobacter UTI. Currently, patient is HDS and at baseline. Initially encephalopathic on admission. Mild neutrophil-predominant leukocytosis on initial labwork that has since resolved. Urine Cx on 10/21 evident for MDR Enterobacter UTI. Given patient's risk for encephalopathy with recent presentation, would rather switch to Ertapenem than cefepime which poses significant risk for cefepime-induced encephalopathy. Plan treatment for 14 days with ertapenem.  History of bladder cancer  L Parietal CVA  BANDAR s/p improvement with resuscitation  HTN  HLD  Hypothyroidism    RECOMMENDATIONS:   Discontinue Cefepime.  Start IV/IM Ertapenem 1g Q24H for total course of 14 days (10/22/23 - 11/4/23).  Urine culture evident for MDR Enterobacter.   Blood cultures (10/20) + CoNS, probable contaminant, no susceptibilities.    Thanks for this consult!. Please call if you have any questions. ID will sign off.     Linda Gaston MD  Internal Medicine/Pediatrics -IV  [facility]  [phone] pager    Subjective:   Medications reviewed; current antibiotics:  Antibiotics (From admission, onward)      Start     Stop Route Frequency Ordered    10/23/23 1045  ceFEPIme (MAXIPIME) 2 g in dextrose 5 % in water (D5W) 100 mL IVPB (MB+)         -- IV Every 12 hours (non-standard times) 10/23/23 0932            Chief Complaint: MDR Enterobacter UTI    History of Present Illness:  Patient is a 68 y.o. year old male with HTN, HLD, Hypothyroidism, Hx/o bladder cancer who presented initially with encephalopathy and respiratory distress and found to have L parietal CVA dx on MRI on 10/21 and MDR Enterobacter UTI, for which IV antibiotics with cefepime were started  on 1022. Initially had BANDAR, which has now since resolved.     Review of Symptoms:  Review of Systems   Constitutional:  Negative for chills, fever and malaise/fatigue.   HENT:  Negative for congestion, sinus pain and sore throat.    Respiratory:  Negative for cough, sputum production and shortness of breath.    Cardiovascular:  Negative for chest pain, palpitations and leg swelling.   Gastrointestinal:  Negative for abdominal pain, diarrhea, nausea and vomiting.   Genitourinary:  Negative for dysuria, frequency and urgency.   Musculoskeletal:  Negative for back pain, joint pain and neck pain.   Skin:  Negative for rash.   Neurological:  Negative for tremors, focal weakness, weakness and headaches.   All other systems reviewed and are negative.      Past Medical History:  No past medical history on file.    Past Surgical History:  No past surgical history on file.    Family History:  No family history on file.    Social History:  Social History     Socioeconomic History    Marital status:      Social Determinants of Health     Financial Resource Strain: Unknown (10/23/2023)    Overall Financial Resource Strain (CARDIA)     Difficulty of Paying Living Expenses: Patient refused   Food Insecurity: Unknown (10/23/2023)    Hunger Vital Sign     Worried About Running Out of Food in the Last Year: Patient refused     Ran Out of Food in the Last Year: Patient refused   Transportation Needs: Unknown (10/23/2023)    PRAPARE - Transportation     Lack of Transportation (Medical): Patient refused     Lack of Transportation (Non-Medical): Patient refused   Physical Activity: Unknown (10/23/2023)    Exercise Vital Sign     Days of Exercise per Week: Patient refused     Minutes of Exercise per Session: Patient refused   Stress: Unknown (10/23/2023)    [employer] - Occupational Stress Questionnaire     Feeling of Stress : Patient refused   Social Connections: Unknown (10/23/2023)    Social  Connection and Isolation Panel [NHANES]     Frequency of Communication with Friends and Family: More than three times a week     Frequency of Social Gatherings with Friends and Family: More than three times a week     Attends [employer] Services: Patient refused     Active Member of Clubs or Organizations: Patient refused     Attends Club or Organization Meetings: Patient refused     Marital Status: Patient refused   Housing Stability: Unknown (10/23/2023)    Housing Stability Vital Sign     Unable to Pay for Housing in the Last Year: Patient refused     Unstable Housing in the Last Year: Patient refused       Allergies:  Review of patient's allergies indicates:  No Known Allergies    Pertinent Medications:  Antibiotics:   Antibiotics (From admission, onward)      Start     Stop Route Frequency Ordered    10/23/23 1045  ceFEPIme (MAXIPIME) 2 g in dextrose 5 % in water (D5W) 100 mL IVPB (MB+)         -- IV Every 12 hours (non-standard times) 10/23/23 0932            Objective:     Physical Exam:  VS (24h):   Vitals:    10/24/23 0747   BP: 136/64   Pulse: 75   Resp: 18   Temp: 98.4 °F (36.9 °C)     Temp:  [97.5 °F (36.4 °C)-98.5 °F (36.9 °C)]     Physical Exam  Vitals reviewed.   Constitutional:       General: He is not in acute distress.     Appearance: Normal appearance. He is normal weight. He is not ill-appearing or toxic-appearing.   HENT:      Head: Normocephalic and atraumatic.      Nose: Nose normal. No congestion or rhinorrhea.      Mouth/Throat:      Mouth: Mucous membranes are moist.      Pharynx: Oropharynx is clear.   Eyes:      Extraocular Movements: Extraocular movements intact.      Pupils: Pupils are equal, round, and reactive to light.   Cardiovascular:      Rate and Rhythm: Normal rate and regular rhythm.      Pulses: Normal pulses.      Heart sounds: Normal heart sounds. No murmur heard.     No friction rub. No gallop.   Pulmonary:      Effort: Pulmonary effort is normal.      Breath sounds: Normal  "breath sounds.   Abdominal:      General: Abdomen is flat. Bowel sounds are normal. There is no distension.      Palpations: Abdomen is soft. There is no mass.      Tenderness: There is no abdominal tenderness.   Musculoskeletal:         General: Normal range of motion.      Cervical back: Normal range of motion. No rigidity.      Right lower leg: No edema.      Left lower leg: No edema.   Lymphadenopathy:      Cervical: No cervical adenopathy.   Skin:     General: Skin is warm and dry.      Capillary Refill: Capillary refill takes less than 2 seconds.   Neurological:      General: No focal deficit present.      Mental Status: He is alert and oriented to person, place, and time. Mental status is at baseline.   Psychiatric:         Mood and Affect: Mood normal.         Behavior: Behavior normal.         Thought Content: Thought content normal.         Laboratory  Lab Results   Component Value Date    WBC 7.48 10/23/2023    WBC 8.53 10/22/2023    WBC 9.19 10/21/2023    WBC 11.39 10/20/2023    HGB 8.5 (L) 10/23/2023    HCT 26.6 (L) 10/23/2023    MCV 84 10/23/2023     10/23/2023     No results for input(s): "GLU", "NA", "K", "CL", "CO2", "BUN", "CREATININE", "CALCIUM", "MG" in the last 24 hours.  Lab Results   Component Value Date    ALT 16 10/20/2023    AST 19 10/20/2023    ALKPHOS 113 10/20/2023    BILITOT 0.3 10/20/2023       Microbiology (Last 7 Days)  Microbiology Results (last 7 days)       Procedure Component Value Units Date/Time    Blood culture #1 **CANNOT BE ORDERED STAT** [[id number]] Collected: 10/20/23 2222    Order Status: Completed Specimen: Blood from Peripheral, Antecubital, Left Updated: 10/24/23 0612     Blood Culture, Routine No Growth to date      No Growth to date      No Growth to date      No Growth to date    Blood culture #2 **CANNOT BE ORDERED STAT** [[id number]]  (Abnormal) Collected: 10/20/23 2334    Order Status: Completed Specimen: Blood from Peripheral, Hand, Right Updated: " 10/23/23 0828     Blood Culture, Routine Gram stain aer bottle: Gram positive cocci in clusters resembling Staph      Results called to and read back by: Estefany Jacob 10/22/2023  03:30      COAGULASE-NEGATIVE STAPHYLOCOCCUS SPECIES  Organism is a probable contaminant      Urine culture [[id number]]  (Abnormal)  (Susceptibility) Collected: 10/20/23 2224    Order Status: Completed Specimen: Urine Updated: 10/22/23 2109     Urine Culture, Routine ENTEROBACTER CLOACAE  > 100,000 cfu/ml      Narrative:      Specimen Source->Urine    MRSA/SA Rapid ID by PCR from Blood culture [[id number]] Collected: 10/20/23 2344    Order Status: Completed Updated: 10/22/23 0442     Staph aureus ID by PCR Negative     Methicillin Resistant ID by PCR Negative              ASSESSMENT/PLAN:   See as above.    Active Hospital Problems    Diagnosis  POA    *Altered mental status [R41.82]  Unknown     Chronic    Elevated serum creatinine [R79.89]  Yes    Normocytic anemia [D64.9]  Yes    Hx of bladder cancer [Z85.51]  Not Applicable    Hx of cancer of lung [Z85.118]  Not Applicable    UTI (urinary tract infection) [N39.0]  Yes    Hyperglycemia [R73.9]  Unknown      Resolved Hospital Problems   No resolved problems to display.

## 2023-10-24 NOTE — PT/OT/SLP PROGRESS
Physical Therapy Treatment    Patient Name:  Geovani Currie   MRN:  [id number]    Recommendations:     Discharge Recommendations: Moderate Intensity Therapy  Discharge Equipment Recommendations:  (TBD)  Barriers to discharge:  decreased mobility,strength and endurance    Assessment:     Geovani Currie is a 68 y.o. male admitted with a medical diagnosis of Altered mental status.  He presents with the following impairments/functional limitations: impaired endurance, impaired functional mobility, impaired balance, gait instability, decreased lower extremity function, decreased safety awareness, impaired coordination,pt with good participation and requires assistance with mobility at this time,pt will benefit from moderate intensity therapy upon discharge.    Rehab Prognosis: Fair; patient would benefit from acute skilled PT services to address these deficits and reach maximum level of function.    Recent Surgery: * No surgery found *      Plan:     During this hospitalization, patient to be seen 5 x/week to address the identified rehab impairments via gait training, therapeutic activities, therapeutic exercises, neuromuscular re-education and progress toward the following goals:    Plan of Care Expires:  11/22/23    Subjective     Chief Complaint: n/a  Patient/Family Comments/goals: pt requested to use bathroom.  Pain/Comfort:  Pain Rating 1: 0/10      Objective:     Communicated with nsg prior to session.  Patient found supine with nephrostomy, peripheral IV upon PT entry to room.     General Precautions: Standard, fall  Orthopedic Precautions: N/A  Braces: N/A  Respiratory Status: Room air     Functional Mobility:  Bed Mobility:     Supine to Sit: supervision and stand by assistance  Transfers:     Sit to Stand:  contact guard assistance with rolling walker  Toilet Transfer: contact guard assistance and minimum assistance with  no AD  using  ambulation  Gait: amb ~65' with RW and SBA/CGA and amb ~15' w/o AD and Min A  to bathroom  Balance: fair standing balance with RW      AM-PAC 6 CLICK MOBILITY  Turning over in bed (including adjusting bedclothes, sheets and blankets)?: 4  Sitting down on and standing up from a chair with arms (e.g., wheelchair, bedside commode, etc.): 3  Moving from lying on back to sitting on the side of the bed?: 4  Moving to and from a bed to a chair (including a wheelchair)?: 3  Need to walk in hospital room?: 3  Climbing 3-5 steps with a railing?: 3  Basic Mobility Total Score: 20       Treatment & Education: le seated ex's X 10-12 reps inc: ap,laq's and hip flex      Patient left sitting edge of bed with all lines intact, call button in reach, and daughter present..    GOALS:   Multidisciplinary Problems       Physical Therapy Goals          Problem: Physical Therapy    Goal Priority Disciplines Outcome Goal Variances Interventions   Physical Therapy Goal     PT, PT/OT Ongoing, Progressing     Description: Goals to be met by: 23     Patient will increase functional independence with mobility by performin. Supine to sit with Modified [place]  2. Sit to supine with Modified [place]  3. Sit to stand transfer with Modified [place]  4. Bed to chair transfer with Modified [place] using No Assistive Device  5. Gait  x >100 feet with Supervision using least restrictive AD.   6. Ascend/Descend 4 inch curb step with Supervision using least restrictive AD. .                         Time Tracking:     PT Received On: 10/24/23  PT Start Time:       PT Stop Time:    PT Total Time (min):       Billable Minutes: Gait Training 14 and Therapeutic Exercise 10    Treatment Type: Treatment        Number of PTA visits since last PT visit: 1     10/24/2023

## 2023-10-24 NOTE — PLAN OF CARE
[facility]    FACILITY TRANSFER ORDERS      Patient Name: Geovani Currie  YOB: 1955    PCP: No, Primary Doctor   PCP Address: None  PCP Phone Number: None  PCP Fax: None    Encounter Date: 10/25/2023    Admit to: [facility]    Vital Signs:  Routine    Diagnoses:   Active Hospital Problems    Diagnosis  POA    *Altered mental status [R41.82]  Unknown     Chronic    Elevated serum creatinine [R79.89]  Yes    Normocytic anemia [D64.9]  Yes    Hx of bladder cancer [Z85.51]  Not Applicable    Hx of cancer of lung [Z85.118]  Not Applicable    UTI (urinary tract infection) [N39.0]  Yes    Hyperglycemia [R73.9]  Unknown      Resolved Hospital Problems   No resolved problems to display.       Allergies:Review of patient's allergies indicates:  No Known Allergies    Diet: cardiac diet    Activities: Activity as tolerated    Goals of Care Treatment Preferences:  Code Status: Full Code      Nursing:  Nephrostomy tube management    Labs: CBC and BMP Daily for 3 days     CONSULTS:    Physical Therapy to evaluate and treat. , Occupational Therapy to evaluate and treat., and Speech Therapy to evaluate and treat for Language and Cognition.    MISCELLANEOUS CARE:  Routine Skin for Bedridden Patients: Apply moisture barrier cream to all skin folds and wet areas in perineal area daily and after baths and all bowel movements. and Flush nephrostomy tube at least twice daily    MIDLINE Care change dressing every 7 days and PRN    WOUND CARE ORDERS  None    Medications: Review discharge medications with patient and family and provide education.      ERTAPENEM 1g IV q24 End Date: 11/4/2023    Current Discharge Medication List        START taking these medications    Details   sodium chloride 0.9 % PgBk 100 mL with ertapenem 1 gram SolR 1 g Inject 1 g into the vein once daily. for 11 days           CONTINUE these medications which have NOT CHANGED    Details   albuterol (ACCUNEB) 0.63 mg/3 mL Nebu Take 0.63 mg by  nebulization 3 (three) times daily as needed. Rescue      albuterol (PROVENTIL/VENTOLIN HFA) 90 mcg/actuation inhaler Inhale 1-2 puffs into the lungs every 4 (four) hours as needed for Wheezing. Rescue      ALPRAZolam (XANAX) 0.5 MG tablet Take 1 mg by mouth 3 (three) times daily as needed for Anxiety.      amLODIPine (NORVASC) 5 MG tablet Take 5 mg by mouth once daily.      cyclobenzaprine (FLEXERIL) 10 MG tablet Take 10 mg by mouth 3 (three) times daily as needed for Muscle spasms.      EScitalopram oxalate (LEXAPRO) 20 MG tablet Take 20 mg by mouth once daily.      HYDROcodone-acetaminophen (NORCO)  mg per tablet Take 1 tablet by mouth every 6 (six) hours as needed for Pain.      levothyroxine (SYNTHROID) 125 MCG tablet Take 125 mcg by mouth before breakfast.      metoprolol succinate (TOPROL-XL) 50 MG 24 hr tablet Take 50 mg by mouth once daily.      nicotine (NICODERM CQ) 21 mg/24 hr Place 1 patch onto the skin every 24 hours.      simvastatin (ZOCOR) 40 MG tablet Take 40 mg by mouth every evening.      tamsulosin (FLOMAX) 0.4 mg Cap Take 0.4 mg by mouth once daily.                Immunizations Administered as of 10/25/2023  Reviewed on 10/21/2023      No immunizations on file.            Unknown    Some patients may experience side effects after vaccination.  These may include fever, headache, muscle or joint aches.  Most symptoms resolve with 24-48 hours and do not require urgent medical evaluation unless they persist for more than 72 hours or symptoms are concerning for an unrelated medical condition.          _________________________________  Bertha Martinez MD  10/25/2023

## 2023-10-25 VITALS
HEART RATE: 74 BPM | RESPIRATION RATE: 12 BRPM | DIASTOLIC BLOOD PRESSURE: 56 MMHG | BODY MASS INDEX: 24.59 KG/M2 | TEMPERATURE: 98 F | WEIGHT: 166 LBS | SYSTOLIC BLOOD PRESSURE: 115 MMHG | OXYGEN SATURATION: 93 % | HEIGHT: 69 IN

## 2023-10-25 LAB
ALBUMIN SERPL BCP-MCNC: 3 G/DL (ref 3.5–5.2)
ALP SERPL-CCNC: 121 U/L (ref 55–135)
ALT SERPL W/O P-5'-P-CCNC: 24 U/L (ref 10–44)
ANION GAP SERPL CALC-SCNC: 9 MMOL/L (ref 8–16)
AST SERPL-CCNC: 15 U/L (ref 10–40)
BASOPHILS # BLD AUTO: 0.05 K/UL (ref 0–0.2)
BASOPHILS NFR BLD: 0.5 % (ref 0–1.9)
BILIRUB SERPL-MCNC: 0.2 MG/DL (ref 0.1–1)
BUN SERPL-MCNC: 25 MG/DL (ref 8–23)
CALCIUM SERPL-MCNC: 9.6 MG/DL (ref 8.7–10.5)
CHLORIDE SERPL-SCNC: 105 MMOL/L (ref 95–110)
CO2 SERPL-SCNC: 24 MMOL/L (ref 23–29)
CREAT SERPL-MCNC: 1.6 MG/DL (ref 0.5–1.4)
DIFFERENTIAL METHOD: ABNORMAL
EOSINOPHIL # BLD AUTO: 0.6 K/UL (ref 0–0.5)
EOSINOPHIL NFR BLD: 6.1 % (ref 0–8)
ERYTHROCYTE [DISTWIDTH] IN BLOOD BY AUTOMATED COUNT: 14.1 % (ref 11.5–14.5)
EST. GFR  (NO RACE VARIABLE): 46 ML/MIN/1.73 M^2
GLUCOSE SERPL-MCNC: 110 MG/DL (ref 70–110)
HCT VFR BLD AUTO: 28.7 % (ref 40–54)
HGB BLD-MCNC: 9.3 G/DL (ref 14–18)
IMM GRANULOCYTES # BLD AUTO: 0.07 K/UL (ref 0–0.04)
IMM GRANULOCYTES NFR BLD AUTO: 0.7 % (ref 0–0.5)
LYMPHOCYTES # BLD AUTO: 1.3 K/UL (ref 1–4.8)
LYMPHOCYTES NFR BLD: 14 % (ref 18–48)
MCH RBC QN AUTO: 27.3 PG (ref 27–31)
MCHC RBC AUTO-ENTMCNC: 32.4 G/DL (ref 32–36)
MCV RBC AUTO: 84 FL (ref 82–98)
MONOCYTES # BLD AUTO: 0.7 K/UL (ref 0.3–1)
MONOCYTES NFR BLD: 7 % (ref 4–15)
NEUTROPHILS # BLD AUTO: 6.7 K/UL (ref 1.8–7.7)
NEUTROPHILS NFR BLD: 71.7 % (ref 38–73)
NRBC BLD-RTO: 0 /100 WBC
PLATELET # BLD AUTO: 347 K/UL (ref 150–450)
PMV BLD AUTO: 9.2 FL (ref 9.2–12.9)
POCT GLUCOSE: 166 MG/DL (ref 70–110)
POTASSIUM SERPL-SCNC: 4.5 MMOL/L (ref 3.5–5.1)
PROT SERPL-MCNC: 7.2 G/DL (ref 6–8.4)
RBC # BLD AUTO: 3.41 M/UL (ref 4.6–6.2)
SODIUM SERPL-SCNC: 138 MMOL/L (ref 136–145)
WBC # BLD AUTO: 9.39 K/UL (ref 3.9–12.7)

## 2023-10-25 PROCEDURE — 80053 COMPREHEN METABOLIC PANEL: CPT | Performed by: INTERNAL MEDICINE

## 2023-10-25 PROCEDURE — 25000003 PHARM REV CODE 250: Performed by: STUDENT IN AN ORGANIZED HEALTH CARE EDUCATION/TRAINING PROGRAM

## 2023-10-25 PROCEDURE — 36415 COLL VENOUS BLD VENIPUNCTURE: CPT | Performed by: INTERNAL MEDICINE

## 2023-10-25 PROCEDURE — S4991 NICOTINE PATCH NONLEGEND: HCPCS | Performed by: INTERNAL MEDICINE

## 2023-10-25 PROCEDURE — 85025 COMPLETE CBC W/AUTO DIFF WBC: CPT | Performed by: INTERNAL MEDICINE

## 2023-10-25 PROCEDURE — 63600175 PHARM REV CODE 636 W HCPCS: Performed by: INTERNAL MEDICINE

## 2023-10-25 PROCEDURE — 25000003 PHARM REV CODE 250: Performed by: FAMILY MEDICINE

## 2023-10-25 PROCEDURE — 25000003 PHARM REV CODE 250: Performed by: INTERNAL MEDICINE

## 2023-10-25 PROCEDURE — 25000003 PHARM REV CODE 250: Performed by: NURSE PRACTITIONER

## 2023-10-25 RX ORDER — SODIUM CHLORIDE 0.9 % (FLUSH) 0.9 %
10 SYRINGE (ML) INJECTION EVERY 6 HOURS
Status: CANCELLED | OUTPATIENT
Start: 2023-10-25

## 2023-10-25 RX ORDER — SODIUM CHLORIDE 0.9 % (FLUSH) 0.9 %
10 SYRINGE (ML) INJECTION
Status: CANCELLED | OUTPATIENT
Start: 2023-10-25

## 2023-10-25 RX ORDER — IBUPROFEN 200 MG
1 TABLET ORAL DAILY
Status: DISCONTINUED | OUTPATIENT
Start: 2023-10-25 | End: 2023-10-25 | Stop reason: HOSPADM

## 2023-10-25 RX ADMIN — ACETAMINOPHEN 650 MG: 325 TABLET ORAL at 12:10

## 2023-10-25 RX ADMIN — ESCITALOPRAM OXALATE 20 MG: 10 TABLET ORAL at 09:10

## 2023-10-25 RX ADMIN — LEVOTHYROXINE SODIUM 125 MCG: 125 TABLET ORAL at 05:10

## 2023-10-25 RX ADMIN — HYDROCODONE BITARTRATE AND ACETAMINOPHEN 1 TABLET: 5; 325 TABLET ORAL at 05:10

## 2023-10-25 RX ADMIN — METOPROLOL SUCCINATE 50 MG: 50 TABLET, EXTENDED RELEASE ORAL at 09:10

## 2023-10-25 RX ADMIN — ERTAPENEM 1 G: 1 INJECTION INTRAMUSCULAR; INTRAVENOUS at 01:10

## 2023-10-25 RX ADMIN — TAMSULOSIN HYDROCHLORIDE 0.4 MG: 0.4 CAPSULE ORAL at 09:10

## 2023-10-25 RX ADMIN — ALPRAZOLAM 1 MG: 1 TABLET ORAL at 12:10

## 2023-10-25 RX ADMIN — AMLODIPINE BESYLATE 5 MG: 5 TABLET ORAL at 09:10

## 2023-10-25 RX ADMIN — POLYETHYLENE GLYCOL 3350 17 G: 17 POWDER, FOR SOLUTION ORAL at 09:10

## 2023-10-25 RX ADMIN — NICOTINE 1 PATCH: 21 PATCH, EXTENDED RELEASE TRANSDERMAL at 11:10

## 2023-10-25 NOTE — NURSING
Report called to [facility]  at this time and patient has been medicated with Xanax and Tylenol prior to discharge to facility.

## 2023-10-25 NOTE — ASSESSMENT & PLAN NOTE
UA noted to have WBC, RBC, yeast.  Increased WBC in UA, may be due to nephrostomy tube  Urine culture pending  S/p ceftriaxone and Diflucan in the ED    Plan:  We will currently hold ceftriaxone and Diflucan, low threshold to restart      10/23  Urine culture sensitives results reviewed   Enterobacter  ID recs 2 weeks of Ertapenem  Midline placed  Plan for transfer to SNF in am

## 2023-10-25 NOTE — PROGRESS NOTES
[facility] Medicine  Progress Note    Patient Name: Geovani Currie  MRN: [id number]  Patient Class: IP- Inpatient   Admission Date: 10/20/2023  Length of Stay: 3 days  Attending Physician: Bertha Martinez MD  Primary Care Provider: Regi, Primary Doctor        Subjective:     Principal Problem:Altered mental status        HPI:  Geovani Currie is a 68-year-old male with a past medical history of bladder cancer, lung cancer who presents with altered mental status.    Patient [place] from the airport as patient was experiencing worsening lethargy and shallow respiration.  He was noted to be satting around 90% on room air and was placed on 2 L.  Patient just moved from [place] to [place] to be closer to family for cancer treatments.  Family is concerned that over the past few days he had become increasingly weak.  At this time, There is some concern that on the plane he may have taken additional doses of opioids, since he quickly awoke to Narcan.  Of note, patient recently had a stent placed in his left kidney right nephrostomy tube due to kidney issues.    In the ED, triage vitals were significant for hypoxia, patient was placed on 5 L. CBC showed normocytic anemia.  CMP was significant for low albumin, elevated sugars.  TSH , lactic acid, troponin within normal range.  UA was concerning for white blood cells, RBCs, yeast although no bacteria.  ABG was reassuring with no CO2 retention    Code stroke was called for altered mental status.  Neurologist recommends no lytics, CTA, admission for MRI.    CT head with moderate area of hypoattenuation in the parietal cortex suggesting infarct although radiologist favors chronic process.  Small probable remote areas of lacunar infarct involving the left caudate, bilateral thalamus and right lateral basal ganglia.  CTA head and neck with no evidence of acute intracranial abnormality.  No high-grade stenosis or major vessel occlusion.  Spiculated lesion  noticed in the right posterior lung apex.    Chest x-ray shows no acute or significant focal chest abnormality.    With administration of Narcan, improvement in patient's mental status.    ED provider, gave patient ceftriaxone and Diflucan for UA.  Due to concern for subacute/acute CVA, aspirin and Plavix were ordered.    Medicine was consulted for patient's altered mental status      Overview/Hospital Course:  Mr Currie is a 68 year old male who presents to [facility] for evaluation of altered mental status. He was started on IV antibiotic and symptoms have improved. Mental status is now back to baseline. Family members are at bedside, states patient needs Rehab. Will consult PT/OT for further evaluation and treatment.       Interval History: Today is upset that he can not get up without assistance.    Review of Systems   Constitutional:  Positive for activity change and fatigue.   Gastrointestinal:  Positive for abdominal pain (mild lower abdominal pain).   Musculoskeletal:  Positive for back pain.   Neurological:  Positive for weakness.     Objective:     Vital Signs (Most Recent):  Temp: 98.4 °F (36.9 °C) (10/24/23 0747)  Pulse: 77 (10/24/23 1154)  Resp: 18 (10/24/23 1536)  BP: 136/64 (10/24/23 0747)  SpO2: (!) 93 % (10/24/23 0747) Vital Signs (24h Range):  Temp:  [97.7 °F (36.5 °C)-98.5 °F (36.9 °C)] 98.4 °F (36.9 °C)  Pulse:  [67-77] 77  Resp:  [16-18] 18  SpO2:  [90 %-93 %] 93 %  BP: (121-136)/(61-64) 136/64     Weight: 75.3 kg (166 lb)  Body mass index is 24.51 kg/m².    Intake/Output Summary (Last 24 hours) at 10/24/2023 1915  Last data filed at 10/24/2023 0918  Gross per 24 hour   Intake --   Output 1050 ml   Net -1050 ml         Physical Exam  Vitals and nursing note reviewed.   HENT:      Head: Normocephalic and atraumatic.   Cardiovascular:      Heart sounds: No murmur heard.     No friction rub. No gallop.   Pulmonary:      Effort: No respiratory distress.      Breath sounds: No stridor. No wheezing,  rhonchi or rales.   Chest:      Chest wall: No tenderness.   Abdominal:      General: There is no distension.      Palpations: There is no mass.      Tenderness: There is abdominal tenderness in the suprapubic area. There is no right CVA tenderness, left CVA tenderness, guarding or rebound.      Hernia: No hernia is present.   Musculoskeletal:         General: No swelling, tenderness, deformity or signs of injury.      Right lower leg: No edema.      Left lower leg: No edema.   Skin:     Coloration: Skin is not jaundiced or pale.      Findings: No bruising, erythema, lesion or rash.   Neurological:      Cranial Nerves: No cranial nerve deficit.      Sensory: No sensory deficit.      Motor: No weakness.      Coordination: Coordination normal.      Gait: Gait normal.      Deep Tendon Reflexes: Reflexes normal.             Significant Labs: All pertinent labs within the past 24 hours have been reviewed.  CBC:   Recent Labs   Lab 10/23/23  0340 10/24/23  0910   WBC 7.48 8.86   HGB 8.5* 9.7*   HCT 26.6* 30.4*    349     CMP:   Recent Labs   Lab 10/23/23  0340 10/24/23  0911    138   K 4.5 4.4    104   CO2 24 19*   * 153*   BUN 21 18   CREATININE 1.3 1.3   CALCIUM 9.5 10.0   PROT  --  7.1   ALBUMIN  --  2.9*   BILITOT  --  0.2   ALKPHOS  --  128   AST  --  16   ALT  --  23   ANIONGAP 10 15       Significant Imaging: I have reviewed all pertinent imaging results/findings within the past 24 hours.      Assessment/Plan:      * Altered mental status  -improvement with Narcan  -TSH, ammonia within normal range.  Point of care glucose elevated at 127.  -ABG unremarkable for CO2 elevation  -CT head unremarkable  -blood cultures and urine has been ordered  -stroke call in the ED    Plan:  -likely at least part due to opioid use  -MRI ordered  -continue to monitor  -consult neurology further evaluation      10/23/2023  MRI of brain, mild moderate sized remote left parietal infarction with encephalomalacia    There are few scattered small to punctate sized remote bilateral basal gangliar and left thalamic lacunar type infarcts   No evidence of acute infarction   PT/OT evaluate and treat     Hyperglycemia  Slightly elevated sugars on presentation    Plan:  May consider sliding scale if sugars consistently above 180      UTI (urinary tract infection)  UA noted to have WBC, RBC, yeast.  Increased WBC in UA, may be due to nephrostomy tube  Urine culture pending  S/p ceftriaxone and Diflucan in the ED    Plan:  We will currently hold ceftriaxone and Diflucan, low threshold to restart      10/23  Urine culture sensitives results reviewed   Enterobacter  ID recs 2 weeks of Ertapenem  Midline placed  Plan for transfer to SNF in am      Hx of cancer of lung  -CT scan shows spiculated mass  -known history    Plan:  May defer to outpatient treatment      Hx of bladder cancer  Hematuria noted on UA    Plan:  Defer to outpatient treatment      Normocytic anemia  Likely due to anemia of chronic disease    Plan:  Transfuse for less than hemoglobin 7    Elevated serum creatinine  Creatinine of 1.9 admission, unclear baseline  Patient with history of left ureteral stent, and right nephrostomy tube placement    Plan:  Daily BMP  Flush nephrostomy tube at least twice daily  No need to call Urology at this time    10/23  Renal function stable         VTE Risk Mitigation (From admission, onward)         Ordered     IP VTE LOW RISK PATIENT  Once         10/20/23 2347     Place sequential compression device  Until discontinued         10/20/23 2347                Discharge Planning   TANIA:      Code Status: Full Code   Is the patient medically ready for discharge?:     Reason for patient still in hospital (select all that apply): Pending disposition  Discharge Plan A: Rehab   Discharge Delays:  (Orders reviewed/picc line/transport)              Bertha Martinez MD  Department of Hospital Medicine   [facility]

## 2023-10-25 NOTE — PLAN OF CARE
Future Appointments   Date Time Provider Department Center   10/27/2023  3:20 PM Vy Holden, JEANINEP-МАРИЯ [facility] IM [place]   11/2/2023 11:00 AM Joshua Jorgensen MD [facility] URO [place]   11/3/2023 10:30 AM Jeremiah Hartman DO [facility]  Rehabilitation [phone] [address] [facility] [place] [address]      Next Steps: Follow up  Appointment:   Instructions: Rehab Facility     Sondra Tai RN    [phone]

## 2023-10-25 NOTE — PLAN OF CARE
VN note: VN completed AVS and attachments and notified bedside nurse, Valdo. Will cont to be available and intervene prn.

## 2023-10-25 NOTE — PT/OT/SLP PROGRESS
Physical Therapy      Patient Name:  Geovani Currie   MRN:  [id number]    Patient not seen today secondary to  (pt is being washed up by his daughter and awaiting transfer(1300)). Will follow-up n/a.

## 2023-10-25 NOTE — SUBJECTIVE & OBJECTIVE
Interval History: Today is upset that he can not get up without assistance.    Review of Systems   Constitutional:  Positive for activity change and fatigue.   Gastrointestinal:  Positive for abdominal pain (mild lower abdominal pain).   Musculoskeletal:  Positive for back pain.   Neurological:  Positive for weakness.     Objective:     Vital Signs (Most Recent):  Temp: 98.4 °F (36.9 °C) (10/24/23 0747)  Pulse: 77 (10/24/23 1154)  Resp: 18 (10/24/23 1536)  BP: 136/64 (10/24/23 0747)  SpO2: (!) 93 % (10/24/23 0747) Vital Signs (24h Range):  Temp:  [97.7 °F (36.5 °C)-98.5 °F (36.9 °C)] 98.4 °F (36.9 °C)  Pulse:  [67-77] 77  Resp:  [16-18] 18  SpO2:  [90 %-93 %] 93 %  BP: (121-136)/(61-64) 136/64     Weight: 75.3 kg (166 lb)  Body mass index is 24.51 kg/m².    Intake/Output Summary (Last 24 hours) at 10/24/2023 1915  Last data filed at 10/24/2023 0918  Gross per 24 hour   Intake --   Output 1050 ml   Net -1050 ml         Physical Exam  Vitals and nursing note reviewed.   HENT:      Head: Normocephalic and atraumatic.   Cardiovascular:      Heart sounds: No murmur heard.     No friction rub. No gallop.   Pulmonary:      Effort: No respiratory distress.      Breath sounds: No stridor. No wheezing, rhonchi or rales.   Chest:      Chest wall: No tenderness.   Abdominal:      General: There is no distension.      Palpations: There is no mass.      Tenderness: There is abdominal tenderness in the suprapubic area. There is no right CVA tenderness, left CVA tenderness, guarding or rebound.      Hernia: No hernia is present.   Musculoskeletal:         General: No swelling, tenderness, deformity or signs of injury.      Right lower leg: No edema.      Left lower leg: No edema.   Skin:     Coloration: Skin is not jaundiced or pale.      Findings: No bruising, erythema, lesion or rash.   Neurological:      Cranial Nerves: No cranial nerve deficit.      Sensory: No sensory deficit.      Motor: No weakness.      Coordination:  Coordination normal.      Gait: Gait normal.      Deep Tendon Reflexes: Reflexes normal.             Significant Labs: All pertinent labs within the past 24 hours have been reviewed.  CBC:   Recent Labs   Lab 10/23/23  0340 10/24/23  0910   WBC 7.48 8.86   HGB 8.5* 9.7*   HCT 26.6* 30.4*    349     CMP:   Recent Labs   Lab 10/23/23  0340 10/24/23  0911    138   K 4.5 4.4    104   CO2 24 19*   * 153*   BUN 21 18   CREATININE 1.3 1.3   CALCIUM 9.5 10.0   PROT  --  7.1   ALBUMIN  --  2.9*   BILITOT  --  0.2   ALKPHOS  --  128   AST  --  16   ALT  --  23   ANIONGAP 10 15       Significant Imaging: I have reviewed all pertinent imaging results/findings within the past 24 hours.

## 2023-10-26 ENCOUNTER — OFFICE VISIT (OUTPATIENT)
Dept: INFECTIOUS DISEASES | Facility: CLINIC | Age: 68
End: 2023-10-26
Payer: MEDICARE

## 2023-10-26 VITALS — BODY MASS INDEX: 24.59 KG/M2 | WEIGHT: 166 LBS | HEIGHT: 69 IN

## 2023-10-26 DIAGNOSIS — Z85.51 HX OF BLADDER CANCER: ICD-10-CM

## 2023-10-26 DIAGNOSIS — N30.00 ACUTE CYSTITIS WITHOUT HEMATURIA: ICD-10-CM

## 2023-10-26 DIAGNOSIS — Z85.118 HX OF CANCER OF LUNG: ICD-10-CM

## 2023-10-26 LAB — BACTERIA BLD CULT: NORMAL

## 2023-10-26 PROCEDURE — 99213 OFFICE O/P EST LOW 20 MIN: CPT | Mod: 95,,, | Performed by: STUDENT IN AN ORGANIZED HEALTH CARE EDUCATION/TRAINING PROGRAM

## 2023-10-26 PROCEDURE — 99213 PR OFFICE/OUTPT VISIT, EST, LEVL III, 20-29 MIN: ICD-10-PCS | Mod: 95,,, | Performed by: STUDENT IN AN ORGANIZED HEALTH CARE EDUCATION/TRAINING PROGRAM

## 2023-10-26 NOTE — ASSESSMENT & PLAN NOTE
--Patient completing 2 week course of ertapenem 1 g daily for MDR Enterobacter UTI  --EOC will be 11/4/23  --For intensive drug toxicity monitoring, patient needs weekly CBC and CMP due to renal insufficiency associated with ertapenem   --Recommend outpatient urology follow up

## 2023-10-26 NOTE — PROGRESS NOTES
The patient location is: Rehab  The chief complaint leading to consultation is: Hospital follow up, Enterobacter UTI     Visit type: audiovisual    Notes:    Subjective:    HPI: 68 y.o. male with pertinent PMHx of bladder and lung cancer who presents as hospital follow up after being brought to [facility] on 10/20 with altered mental status. Patient was [place] in from the airport with worsening lethargy and shallow respiration. He was noted to be saturating around 90% on room air and was placed on 2 L. Patient just moved from [place] to [place] to be closer to family for cancer treatments. Family is concerned that over the past few days he had become increasingly weak. In the ER, arrival vitals were significant for hypoxia, patient was placed on 5 L. CBC showed normocytic anemia. CMP was significant for low albumin, elevated sugars. TSH , lactic acid, troponin within normal range. U/A was concerning for pyuria >100. Urine culture grew Enterobacter cloacae >100,000 cfu/mL. CT head with moderate area of hypoattenuation in the parietal cortex suggesting infarct although radiologist favors chronic process. Small probable remote areas of lacunar infarct involving the left caudate, bilateral thalamus and right lateral basal ganglia. CTA head and neck with no evidence of acute intracranial abnormality. No high-grade stenosis or major vessel occlusion. Spiculated lesion noticed in the right posterior lung apex. Chest x-ray showed no acute or significant focal chest abnormality. MRI brain with no acute stroke. Infectious Diseases consulted. Recommend stopping cefepime patient had been on due to risk for neurotoxicity. Switched to ertapenem 1 g daily for total of 14 days (stop date 11/4).    Today patient being seen virtually. With daughter and wife. Per daughter, patient had received 10 days of oral antibiotics while in [place] and nephrostomy tube was infected. They are waiting for referral to heme/onc for 2  months of chemo. Still establishing care since moving to [place]. Will be in rehab for 2 weeks. Seeing urologist 11/2. Stent in left kidney 3 months ago for stones and kidney failed. 9/18 both kidneys failed so stent placed and nephrostomy tube on right. Only dribbles from penis. All urine from nephrostomy tube. High grade bladder cancer, lung cancer, and lymph node involvement. Still trying to get education on next steps for chemo. Patient denies burning with urine outflow at this time. No fever or chills. Discussed possible routes of infection including obstruction and proximity to GI tract where Enterobacter resides. Wife concerned because she was septic around same time as patient and thought it may be contagious. However, she had ischemic colitis which could also translocate the same family of bacteria. Family voiced understanding. Tolerating IV abx without incidence. Labs and culture data from admission reviewed.    Urine Culture, Routine      Abnormal   ENTEROBACTER CLOACAE   > 100,000 cfu/ml  VC      Resulting Agency [facility]      Susceptibility   Enterobacter cloacae     CULTURE, URINE     Cefepime 4 mcg/mL Sensitive     Ceftriaxone 2 mcg/mL Intermediate     Ciprofloxacin 2 mcg/mL Intermediate     Ertapenem <=0.5 mcg/mL Sensitive     Gentamicin <=4 mcg/mL Sensitive     Levofloxacin 4 mcg/mL Intermediate     Meropenem <=1 mcg/mL Sensitive     Nitrofurantoin >64 mcg/mL Resistant     Piperacillin/Tazo <=16 mcg/mL Sensitive     Tobramycin <=4 mcg/mL Sensitive     Trimeth/Sulfa >2/38 mcg/mL Resistant               Linear View          Narrative  Performed by: [facility]  Specimen Source->Urine      Specimen Collected: 10/20/23 22:24 Last Resulted: 10/22/23 21:09             Review of Systems:   Negative unless otherwise noted positive-  Gen- Weakness/ Fatigue  Neuro- Confusion  CV- Chest Pain/ Palpitations  Resp: Cough/ SOB  GI- Nausea/Vomiting  Extrem- Pain/Swelling      Objective:    Physical Exam:  General- Patient alert  and oriented x3 in NAD  IOANA PEREZ, EOMI, OP clear  Resp- No increased WOB noted. Not using accessory muscles.  Skin-  No Jaundice. No visible skin lesions.      Plan:  UTI (urinary tract infection)  --Patient completing 2 week course of ertapenem 1 g daily for MDR Enterobacter UTI  --EOC will be 11/4/23  --For intensive drug toxicity monitoring, patient needs weekly CBC and CMP due to renal insufficiency associated with ertapenem   --Recommend outpatient urology follow up     Hx of cancer of lung  Awaiting follow up with heme/onc.     Hx of bladder cancer  Awaiting follow up with urology.        Face to Face time with patient: 20 minutes   30 minutes of total time spent on the encounter, which includes face to face time and non-face to face time preparing to see the patient (eg, review of tests), Obtaining and/or reviewing separately obtained history, Documenting clinical information in the electronic or other health record, Independently interpreting results (not separately reported) and communicating results to the patient/family/caregiver, or Care coordination (not separately reported).     Each patient to whom he or she provides medical services by telemedicine is:  (1) informed of the relationship between the physician and patient and the respective role of any other health care provider with respect to management of the patient; and (2) notified that he or she may decline to receive medical services by telemedicine and may withdraw from such care at any time.

## 2023-10-31 ENCOUNTER — TELEPHONE (OUTPATIENT)
Dept: INFECTIOUS DISEASES | Facility: CLINIC | Age: 68
End: 2023-10-31
Payer: MEDICARE

## 2023-10-31 NOTE — TELEPHONE ENCOUNTER
Spoke with pt's daughter, she wants to know since pt infection is recurring will he need to be on IV ABx long term. Offered to have Dr. Hartman reach out, she opted to wait until appt on 11/6.

## 2023-11-01 NOTE — DISCHARGE SUMMARY
[facility] Discharge Summary    Attending Physician: Juan    Date of Admit: 10/20/2023  Date of Discharge: 10/25/2023    Discharge to: SNF  Condition: Stable    Discharge Diagnoses     Acute Encephalopathy  UTI, Enterobacter  H/o Bladder cancer  Spiculated Lesion [place] Lung  BANDAR on CKD    Consultants and Procedures     Consultants:  Infectious Disease, Vascular neurology, General Neurology    Procedures:   None    Brief History of Present Illness       Geovani Currie is a 68-year-old male with a past medical history of bladder cancer, lung cancer who presents with altered mental status.     Patient was [place] in from the airport as patient was experiencing worsening lethargy and shallow respiration.  He was noted to be satting around 90% on room air and was placed on 2 L.  Patient just moved from [place] to [place] to be closer to family for cancer treatments.  Family is concerned that over the past few days he had become increasingly weak.  At this time, There is some concern that on the plane he may have taken additional doses of opioids, since he quickly awoke to Narcan.  Of note, patient recently had a stent placed in his left kidney right nephrostomy tube due to kidney issues.     In the ED, triage vitals were significant for hypoxia, patient was placed on 5 L. CBC showed normocytic anemia.  CMP was significant for low albumin, elevated sugars.  TSH , lactic acid, troponin within normal range.  UA was concerning for white blood cells, RBCs, yeast although no bacteria.  ABG was reassuring with no CO2 retention     Code stroke was called for altered mental status.  Neurologist recommends no lytics, CTA, admission for MRI.     CT head with moderate area of hypoattenuation in the parietal cortex suggesting infarct although radiologist favors chronic process.  Small probable remote areas of lacunar infarct involving the left caudate, bilateral thalamus and right lateral basal ganglia.  CTA head and  "neck with no evidence of acute intracranial abnormality.  No high-grade stenosis or major vessel occlusion.  Spiculated lesion noticed in the right posterior lung apex.       For the full HPI please refer to the History & Physical from this admission.    Hospital Course By Problem with Pertinent Findings     Altered mental status-Improved  -improvement with Narcan  -likely at least part due to opioid use  -MRI   Mild moderate sized remote left parietal infarction with encephalomalacia  There are few scattered small to punctate sized remote bilateral basal gangliar and left thalamic lacunar type infarcts  No evidence for acute infarction.  No evidence of acute infarction   PT/OT evaluate and treat      UTI (urinary tract infection)  UA noted to have WBC, RBC, yeast.  Increased WBC in UA, may be due to nephrostomy tube  Urine culture sensitives results reviewed   Enterobacter  ID recs 2 weeks of Ertapenem  Midline placed  Plan for transfer to SNF i    Hx of cancer of lung  -CT scan shows spiculated mass  -known history  - Defer to outpatient treatment     Hx of bladder cancer  Hematuria noted on UA  Defer to outpatient treatment, Urology appt     Normocytic anemia  Likely due to anemia of chronic disease     Elevated serum creatinine  Creatinine of 1.9 admission--> improved to 1.6  Unclear baseline  Patient with history of left ureteral stent, and right nephrostomy tube placement  Flush nephrostomy tube at least twice daily  No need to call Urology at this time     Discharge Time: Greater than 30 minutes? Yes    BP (!) 115/56 (BP Location: Right arm, Patient Position: Lying)   Pulse 74   Temp 97.8 °F (36.6 °C)   Resp 12   Ht 5' 9" (1.753 m)   Wt 75.3 kg (166 lb)   SpO2 (!) 93%   BMI 24.51 kg/m²       Discharge Medications        Medication List        START taking these medications      sodium chloride 0.9 % PgBk 100 mL with ertapenem 1 gram SolR 1 g  Inject 1 g into the vein once daily. for 11 days        "     CONTINUE taking these medications      * albuterol 0.63 mg/3 mL Nebu  Commonly known as: ACCUNEB     * albuterol 90 mcg/actuation inhaler  Commonly known as: PROVENTIL/VENTOLIN HFA     ALPRAZolam 0.5 MG tablet  Commonly known as: XANAX     amLODIPine 5 MG tablet  Commonly known as: NORVASC     cyclobenzaprine 10 MG tablet  Commonly known as: FLEXERIL     EScitalopram oxalate 20 MG tablet  Commonly known as: LEXAPRO     HYDROcodone-acetaminophen  mg per tablet  Commonly known as: NORCO     levothyroxine 125 MCG tablet  Commonly known as: SYNTHROID     metoprolol succinate 50 MG 24 hr tablet  Commonly known as: TOPROL-XL     nicotine 21 mg/24 hr  Commonly known as: NICODERM CQ     simvastatin 40 MG tablet  Commonly known as: ZOCOR     tamsulosin 0.4 mg Cap  Commonly known as: FLOMAX           * This list has 2 medication(s) that are the same as other medications prescribed for you. Read the directions carefully, and ask your doctor or other care provider to review them with you.                   Where to Get Your Medications        Information about where to get these medications is not yet available    Ask your nurse or doctor about these medications  sodium chloride 0.9 % PgBk 100 mL with ertapenem 1 gram SolR 1 g         Discharge Information:   Diet:  Cardiac    Physical Activity:  As tolerated    Instructions:  1. Take all medications as prescribed  2. Keep all follow-up appointments  3. Return to the hospital or call your primary care physicians if any worsening symptoms such as  occur.      Follow-Up Appointments:  PCP in 1 week  Urology in 1-2 weeks  ID in 1-2 weeks      Follow up items for PCP and tests that have not resulted at time of discharge:   None      Bertha Martinez MD  [facility] Medicine

## 2023-11-02 ENCOUNTER — OFFICE VISIT (OUTPATIENT)
Dept: UROLOGY | Facility: CLINIC | Age: 68
End: 2023-11-02
Payer: MEDICARE

## 2023-11-02 VITALS
WEIGHT: 156.31 LBS | TEMPERATURE: 98 F | HEART RATE: 86 BPM | DIASTOLIC BLOOD PRESSURE: 72 MMHG | SYSTOLIC BLOOD PRESSURE: 113 MMHG | BODY MASS INDEX: 23.15 KG/M2 | RESPIRATION RATE: 18 BRPM | HEIGHT: 69 IN

## 2023-11-02 DIAGNOSIS — C67.9 BLADDER CANCER METASTASIZED TO INTRA-ABDOMINAL LYMPH NODES: Primary | ICD-10-CM

## 2023-11-02 DIAGNOSIS — I63.9 CEREBROVASCULAR ACCIDENT (CVA), UNSPECIFIED MECHANISM: ICD-10-CM

## 2023-11-02 DIAGNOSIS — C34.90 MALIGNANT NEOPLASM OF LUNG, UNSPECIFIED LATERALITY, UNSPECIFIED PART OF LUNG: ICD-10-CM

## 2023-11-02 DIAGNOSIS — C77.2 BLADDER CANCER METASTASIZED TO INTRA-ABDOMINAL LYMPH NODES: Primary | ICD-10-CM

## 2023-11-02 PROCEDURE — 99204 PR OFFICE/OUTPT VISIT, NEW, LEVL IV, 45-59 MIN: ICD-10-PCS | Mod: S$PBB,,, | Performed by: UROLOGY

## 2023-11-02 PROCEDURE — G0180 MD CERTIFICATION HHA PATIENT: HCPCS | Mod: ,,, | Performed by: FAMILY MEDICINE

## 2023-11-02 PROCEDURE — 99215 OFFICE O/P EST HI 40 MIN: CPT | Mod: PBBFAC,PN | Performed by: UROLOGY

## 2023-11-02 PROCEDURE — 99204 OFFICE O/P NEW MOD 45 MIN: CPT | Mod: S$PBB,,, | Performed by: UROLOGY

## 2023-11-02 PROCEDURE — 99999 PR PBB SHADOW E&M-EST. PATIENT-LVL V: CPT | Mod: PBBFAC,,, | Performed by: UROLOGY

## 2023-11-02 PROCEDURE — 99999 PR PBB SHADOW E&M-EST. PATIENT-LVL V: ICD-10-PCS | Mod: PBBFAC,,, | Performed by: UROLOGY

## 2023-11-02 NOTE — PHYSICIAN QUERY
PT Name: Geovani Currie  MR #: [id number]    DOCUMENTATION CLARIFICATION     CDS/: Talha Demarco RN, BSN   Contact information: [email or web address]   This form is a permanent document in the medical record.     Query Date: November 2, 2023    By submitting this query, we are merely seeking further clarification of documentation. Please utilize your independent clinical judgment when addressing the question(s) below.    The Medical Record contains the following:   Indicators   Supporting Clinical Findings Location in Medical Record   X AMS, Confusion,  LOC, etc.  presents with altered mental status.  patient was experiencing worsening lethargy and shallow respiration.        Assessment/Plan:   Altered mental status  -improvement with Narcan  -likely at least part due to opioid use          He was started on IV antibiotic and symptoms have improved. Mental status is now back to baseline.     Hospital Medicine H&P 10/20/2023 (filed 10/21/2023)          Hospital Medicine H&P 10/20/2023 (filed 10/21/2023)              Hospital Medicine PN 10/24/2023         X Acute/Chronic Illness Altered mental status  -likely at least part due to opioid use  UTI (urinary tract infection)        Chronic L parietoocipital CVA  Acute encephalopathy        Discharge Diagnoses:  Acute Encephalopathy  UTI, Enterobacter     Hospital Medicine H&P 10/20/2023 (filed 10/21/2023)          Neurology CN 10/21/2023 (filed 10/25/2023)        Hospital Medicine Discharge Summary 10/25/2023 (filed 11/1/2023   X Radiology Findings   MRI of brain, mild moderate sized remote left parietal infarction with encephalomalacia   There are few scattered small to punctate sized remote bilateral basal gangliar and left thalamic lacunar type infarcts   No evidence of acute infarction        Hospital Medicine PN 10/24/2023    Electrolyte Imbalance     X Medication cefTRIAXone (ROCEPHIN) 1 g in dextrose 5 % in water (D5W) 100 mL IVPB (MB+)Dose: 1 g : 200 mL/hr  : Intravenous : ED 1 Time    fluconazole tablet 200 mgDose: 200 mg : Oral : ED 1 Time    ketorolac injection 15 mgDose: 15 mg : Intravenous : ED 1 Time    naloxone 0.4 mg/mL injection 0.4 mgDose: 0.4 mg : Intravenous : ED 1 Time            cefTRIAXone (ROCEPHIN) 1 g in dextrose 5 % in water (D5W) 100 mL IVPB (MB+)Dose: 1 g : 200 mL/hr : Intravenous : Every 12 hours (non-standard times)              ceFEPIme (MAXIPIME) 2 g in dextrose 5 % in water (D5W) 100 mL IVPB (MB+)Dose: 2 g : 200 mL/hr : Intravenous : Every 12 hours (non-standard times)    ertapenem (INVANZ) 1 g in sodium chloride 0.9 % 100 mL IVPB (MB+)Dose: 1 g : 200 mL/hr : Intravenous : Every 24 hours (non-standard times)       MAR 10/20/2023                                MAR 10/22/2023                  MAR 10/24/2023   X Treatment         ED provider, gave patient ceftriaxone and Diflucan for UA.     Due to concern for subacute/acute CVA, aspirin and Plavix were ordered.            ID recs 2 weeks of Ertapenem  Midline placed  Plan for transfer to SNF [facility]   Hospital Medicine H&P 10/20/2023 (filed 10/21/2023)              Hospital Medicine Discharge Summary 10/25/2023 (filed 11/1/2023   X Other Pt's SPO2 80% on RA. 5 L NC applied to pt and SPO2 increased to >90%.          There is some concern that on the plane he may have taken additional doses of opioids, since he quickly awoke to Narcan.    --In the ED, triage vitals were significant for hypoxia, patient was placed on 5 L.       -TSH, ammonia within normal range.  Point of care glucose elevated at 127.  -ABG unremarkable for CO2 elevation  -CT head unremarkable   Registered Nurse note ED - 10/20/2023 (filed 10/21/2023)          Hospital Medicine H&P 10/20/2023 (filed 10/21/2023)            [facility] Medicine H&P 10/20/2023 (filed 10/21/2023)     The noted clinical guidelines are only system guidelines and do not replace the providers clinical judgment.    The National Spencer of Neurologic Disorders and  Stroke (NINDS) of the NIH describes encephalopathy as any diffuse disease of the brain that alters brain function or structure.      Provider, after study, please clarify the Acute Encephalopathy diagnosis associated with above clinical findings:    [   x] Metabolic Encephalopathy - Due to electrolyte imbalance, metabolic derangements, or infectious processes, includes Septic Encephalopathy, Uremic Encephalopathy     [   ] Toxic Encephalopathy - Due to drugs, chemicals, or other toxic substances     [   ] Encephalopathy, unspecified        [   ] Other Encephalopathy (please specify): ____________________     [   ] Other neurological condition- Includes Post-ictal altered mental status (please specify condition): ________________________     [   ]  Clinically Undetermined         Please document in your progress notes daily for the duration of treatment until resolved, and include in your discharge summary.    References:  CHRIS Hedrick RN, CCDS. (2018, June 9). Notes from the Instructor: Encephalopathy tips. Retrieved October 22, 2020, from [email or web address]    ICD-9-CM Coding Clinic First Quarter 2013, Effective with discharges: October 21, 2013 [facility] § Seizure with encephalopathy due to postictal state (2013).    ICD-10-CM/[facility] Integrated Codebook (Version V.20.8.10.0) [Computer software]. (2020). Retrieved October 21, 2020.    National [place] of Neurological Disorders and Stroke. (2019, March 27). Retrieved October 22, 2020, from [email or web address].nih.gov/Disorders/[email or web address]    Form No. 98591

## 2023-11-02 NOTE — PROGRESS NOTES
Chief Complaint:   Encounter Diagnoses   Name Primary?    Cerebrovascular accident (CVA), unspecified mechanism     Bladder cancer metastasized to intra-abdominal lymph nodes Yes    Malignant neoplasm of lung, unspecified laterality, unspecified part of lung        HPI:  MAURA Currie kennedi 68 y.o. male who presents with bladder cancer and lung cancer.  From what I can gather, Patient was recently brought over here by his daughter from [place] after recently being diagnosed with high-grade muscle invasive metastatic bladder cancer and new diagnosis of a lung cancer.  He has an indwelling right nephrostomy tube and left indwelling ureteral stent secondary to hydronephrosis due to his bladder cancer.  It sounds as if the left kidney is atrophic.  The right percutaneous nephrostomy tube was placed about 2 months ago.  The left ureteral stent was placed about 6 weeks ago.    His bladder cancer was recurrent.  He states he did have bladder cancer 3 years ago which was treated with a TURBT and chemo and radiation. Its unclear whether this was muscle invasive at the time.  It sounds as if radical cystectomy was offered at that time and patient elected not to proceed in this manner.    He did have a PET scan showing a 15 mm para-aortic node that was FDG avid on PET scan.        History:  Social History     Tobacco Use    Smoking status: Former     Types: Cigarettes     Passive exposure: Never    Smokeless tobacco: Never   Substance Use Topics    Alcohol use: Never    Drug use: Never     Past Medical History:   Diagnosis Date    COPD (chronic obstructive pulmonary disease)     Hypertension      Past Surgical History:   Procedure Laterality Date    APPENDECTOMY      CATARACT EXTRACTION      NEPHROSTOMY       Family History   Problem Relation Age of Onset    Cancer Father     Cancer Mother        Current Outpatient Medications on File Prior to Visit   Medication Sig Dispense Refill    albuterol (ACCUNEB) 0.63 mg/3 mL  "Nebu Take 0.63 mg by nebulization 3 (three) times daily as needed. Rescue      albuterol (PROVENTIL/VENTOLIN HFA) 90 mcg/actuation inhaler Inhale 1-2 puffs into the lungs every 4 (four) hours as needed for Wheezing. Rescue      ALPRAZolam (XANAX) 0.5 MG tablet Take 1 mg by mouth 3 (three) times daily as needed for Anxiety.      amLODIPine (NORVASC) 5 MG tablet Take 5 mg by mouth once daily.      cyclobenzaprine (FLEXERIL) 10 MG tablet Take 10 mg by mouth 3 (three) times daily as needed for Muscle spasms.      EScitalopram oxalate (LEXAPRO) 20 MG tablet Take 20 mg by mouth once daily.      HYDROcodone-acetaminophen (NORCO)  mg per tablet Take 1 tablet by mouth every 6 (six) hours as needed for Pain.      levothyroxine (SYNTHROID) 125 MCG tablet Take 125 mcg by mouth before breakfast.      metoprolol succinate (TOPROL-XL) 50 MG 24 hr tablet Take 50 mg by mouth once daily.      nicotine (NICODERM CQ) 21 mg/24 hr Place 1 patch onto the skin every 24 hours.      simvastatin (ZOCOR) 40 MG tablet Take 40 mg by mouth every evening.      sodium chloride 0.9 % PgBk 100 mL with ertapenem 1 gram SolR 1 g Inject 1 g into the vein once daily. for 11 days      tamsulosin (FLOMAX) 0.4 mg Cap Take 0.4 mg by mouth once daily.       No current facility-administered medications on file prior to visit.        Objective:     Vitals:    11/02/23 1106   BP: 113/72   Pulse: 86   Resp: 18   Temp: 97.8 °F (36.6 °C)   TempSrc: Oral   Weight: 70.9 kg (156 lb 4.9 oz)   Height: 5' 9" (1.753 m)      BMI Readings from Last 1 Encounters:   11/02/23 23.08 kg/m²          Physical Exam  Visibly short of breath   In a wheelchair   Appears older than stated age   Right nephrostomy tube in place    Lab Results   Component Value Date    CREATININE 1.6 (H) 10/25/2023      Assessment:       1. Bladder cancer metastasized to intra-abdominal lymph nodes    2. Cerebrovascular accident (CVA), unspecified mechanism    3. Malignant neoplasm of lung, " unspecified laterality, unspecified part of lung        Plan:     1. Bladder cancer metastasized to intra-abdominal lymph nodes    2. Cerebrovascular accident (CVA), unspecified mechanism    3. Malignant neoplasm of lung, unspecified laterality, unspecified part of lung           I have discussed at length best treatment for him would require multidisciplinary approach.  I would be happy to assist locally with anything I can.  I am going to refer him to [facility] to see medical oncology as well as urologic oncology.  I suspect his right nephrostomy tube is going to need to be exchanged in December.  At that time probably would be beneficial to place a nephron a left nephrostomy tube and remove his indwelling stentl to defunctionalize his bladder as he is floridly incontinent currently.  I will hold on placing these orders until he has seen urologic oncology.  I was able to obtain half of his records by searching [facility] in [place].  She was going to print out the rest of the records and bring them to her oncology appointments.

## 2023-11-03 ENCOUNTER — OFFICE VISIT (OUTPATIENT)
Dept: PRIMARY CARE CLINIC | Facility: CLINIC | Age: 68
End: 2023-11-03
Payer: MEDICARE

## 2023-11-03 ENCOUNTER — TELEPHONE (OUTPATIENT)
Dept: HEMATOLOGY/ONCOLOGY | Facility: CLINIC | Age: 68
End: 2023-11-03
Payer: MEDICARE

## 2023-11-03 DIAGNOSIS — Z76.89 ENCOUNTER TO ESTABLISH CARE: Primary | ICD-10-CM

## 2023-11-03 DIAGNOSIS — Z85.51 HX OF BLADDER CANCER: ICD-10-CM

## 2023-11-03 DIAGNOSIS — F41.9 ANXIETY: ICD-10-CM

## 2023-11-03 DIAGNOSIS — I10 PRIMARY HYPERTENSION: ICD-10-CM

## 2023-11-03 DIAGNOSIS — E03.9 HYPOTHYROIDISM, UNSPECIFIED TYPE: ICD-10-CM

## 2023-11-03 DIAGNOSIS — Z87.891 PERSONAL HISTORY OF SMOKING: ICD-10-CM

## 2023-11-03 DIAGNOSIS — M48.00 SPINAL STENOSIS, UNSPECIFIED SPINAL REGION: ICD-10-CM

## 2023-11-03 DIAGNOSIS — Z85.118 HX OF CANCER OF LUNG: ICD-10-CM

## 2023-11-03 DIAGNOSIS — J44.9 CHRONIC OBSTRUCTIVE PULMONARY DISEASE, UNSPECIFIED COPD TYPE: ICD-10-CM

## 2023-11-03 DIAGNOSIS — E78.5 HYPERLIPIDEMIA, UNSPECIFIED HYPERLIPIDEMIA TYPE: ICD-10-CM

## 2023-11-03 PROCEDURE — 99215 PR OFFICE/OUTPT VISIT, EST, LEVL V, 40-54 MIN: ICD-10-PCS | Mod: 95,,, | Performed by: FAMILY MEDICINE

## 2023-11-03 PROCEDURE — 99215 OFFICE O/P EST HI 40 MIN: CPT | Mod: 95,,, | Performed by: FAMILY MEDICINE

## 2023-11-03 RX ORDER — TAMSULOSIN HYDROCHLORIDE 0.4 MG/1
0.4 CAPSULE ORAL DAILY
Qty: 90 CAPSULE | Refills: 3 | Status: SHIPPED | OUTPATIENT
Start: 2023-11-03 | End: 2023-12-12 | Stop reason: SDUPTHER

## 2023-11-03 RX ORDER — ALBUTEROL SULFATE 0.63 MG/3ML
0.63 SOLUTION RESPIRATORY (INHALATION) 3 TIMES DAILY PRN
Qty: 75 ML | Refills: 3 | Status: ON HOLD | OUTPATIENT
Start: 2023-11-03

## 2023-11-03 RX ORDER — ESCITALOPRAM OXALATE 20 MG/1
20 TABLET ORAL DAILY
Qty: 90 TABLET | Refills: 3 | Status: SHIPPED | OUTPATIENT
Start: 2023-11-03 | End: 2023-12-12 | Stop reason: SDUPTHER

## 2023-11-03 RX ORDER — CYCLOBENZAPRINE HCL 10 MG
10 TABLET ORAL 3 TIMES DAILY PRN
Qty: 270 TABLET | Refills: 3 | Status: SHIPPED | OUTPATIENT
Start: 2023-11-03 | End: 2023-12-12 | Stop reason: SDUPTHER

## 2023-11-03 RX ORDER — IBUPROFEN 200 MG
1 TABLET ORAL DAILY
Qty: 90 PATCH | Refills: 3 | Status: SHIPPED | OUTPATIENT
Start: 2023-11-03 | End: 2023-12-21 | Stop reason: DRUGHIGH

## 2023-11-03 RX ORDER — METOPROLOL SUCCINATE 50 MG/1
50 TABLET, EXTENDED RELEASE ORAL DAILY
Qty: 90 TABLET | Refills: 3 | Status: SHIPPED | OUTPATIENT
Start: 2023-11-03 | End: 2023-12-12 | Stop reason: SDUPTHER

## 2023-11-03 RX ORDER — ATORVASTATIN CALCIUM 20 MG/1
20 TABLET, FILM COATED ORAL DAILY
Qty: 90 TABLET | Refills: 3 | Status: SHIPPED | OUTPATIENT
Start: 2023-11-03 | End: 2023-11-10 | Stop reason: ALTCHOICE

## 2023-11-03 RX ORDER — ALPRAZOLAM 0.5 MG/1
1 TABLET ORAL 3 TIMES DAILY PRN
Qty: 60 TABLET | Refills: 2 | Status: SHIPPED | OUTPATIENT
Start: 2023-11-03 | End: 2023-12-12 | Stop reason: SDUPTHER

## 2023-11-03 RX ORDER — ALBUTEROL SULFATE 90 UG/1
1-2 AEROSOL, METERED RESPIRATORY (INHALATION) EVERY 4 HOURS PRN
Qty: 18 G | Refills: 11 | Status: ON HOLD | OUTPATIENT
Start: 2023-11-03

## 2023-11-03 RX ORDER — LEVOTHYROXINE SODIUM 125 UG/1
125 TABLET ORAL
Qty: 90 TABLET | Refills: 3 | Status: SHIPPED | OUTPATIENT
Start: 2023-11-03 | End: 2023-12-12 | Stop reason: SDUPTHER

## 2023-11-03 RX ORDER — HYDROCODONE BITARTRATE AND ACETAMINOPHEN 10; 325 MG/1; MG/1
1 TABLET ORAL EVERY 6 HOURS PRN
Qty: 120 TABLET | Refills: 0 | Status: SHIPPED | OUTPATIENT
Start: 2023-11-03 | End: 2023-12-12 | Stop reason: SDUPTHER

## 2023-11-03 RX ORDER — AMLODIPINE BESYLATE 5 MG/1
5 TABLET ORAL DAILY
Qty: 90 TABLET | Refills: 3 | Status: SHIPPED | OUTPATIENT
Start: 2023-11-03 | End: 2023-11-10 | Stop reason: ALTCHOICE

## 2023-11-03 NOTE — PATIENT INSTRUCTIONS
Refills of your medications have been sent to the pharmacy.      Keep your appointment with Oncology on Tuesday in [place].  When you meet with them, ask them about possibilities of treatment in [facility].      At our next visit, you can fill me in on how that appointment went.  If you would like, we can always place a referral to [facility] on [address] to get a 2nd opinion.    Let us have you come back next Friday, around 2:20 p.m. for an in-person visit.

## 2023-11-03 NOTE — PROGRESS NOTES
"    [facility]       [address] [place] [address]      Phone: [phone]      Fax: [phone]    Faizan Antoine MD                Video Visit  11/03/2023        Subjective      HPI:  Geovani Currie is a 68 y.o. male presents today via video for "No chief complaint on file.  ."     68-year-old gentleman presents today via video visit to establish care, med refills.      His daughter, Stephania, is present on the call.  She provides most of the history.  His wife is also present.    Daughter states that patient and his wife moved here from [place] recently.  They need to get established.  They were discharged from [facility] on 10/25.  From there he was sent to skilled rehab.  Just came home recently.    He has lung cancer, metastatic bladder cancer.  Saw Urology yesterday.  They referred him to medical and urologic Oncology in [place].  They have an appointment this Tuesday.  She was under the impression that he would be able to see an oncologist here, in [place].    Currently, he is getting IV antibiotics every 12 hours until November 9.  Family is administering the antibiotics.  He does have home health coming out ([facility]).  They are providing skilled nursing, PT, OT.  Seems to be progressing somewhat from that, though he does have some limited mobility at this time.    When they left skilled nursing rehab, they were only given 14 day supplies of medications.  They need refills.    He is in a fair bit of pain from his cancer.  Currently uses Norco 10, 4 times per day.      This also stairs up his anxiety.  Has frequent doctor's appointments, tends to get easily upset.  Currently takes Lexapro 20 mg daily.  Supplements with Xanax 0.5 mg-1 mg, 3-4 times per day, as needed.  Exact dosing just depends on the day, appointments, how worked up he gets.    Also has hypertension.  Takes amlodipine 5 mg, Toprol-XL 50 mg daily.    Has HLD.  Was " taking atorvastatin 20 mg, but that was changed to simvastatin 40 mg while he was in skilled nursing.  Tolerating these fine.      Takes a vitamin-D supplement, 1000 IU, daily.    Hypothyroid.  Takes Synthroid 125 mcg daily.    As above, has bladder cancer.  Has gotten some stents placed in his left kidney.  Has a nephrostomy tube in his right kidney.  They have him taking Flomax 0.4 mg daily to help with the flow.    As above, with the lung cancer, COPD, often has breathing issues.  Uses albuterol inhaler/nebulizer as needed.  They state that his PCP in [place] had them keeping a supply of an antibiotic (? ) At home in case he would feel a pneumonia coming on.  Not sure what antibiotic this was.      Also has spinal stenosis.  Uses Flexeril, as needed, for muscle spasms.    PMH:  Bladder cancer.  Lung cancer.  HTN.  HLD.  COPD.  Hypothyroid.  Spinal stenosis.  PSH: Left kidney stent.  Nephrostomy tube right kidney.  Appendectomy.  Cataracts.  Finger amputations (accident).    Allergies: NKDA  Social:  Retired.  .  T: Denies current use.  Quit a couple of weeks ago.    A has   D:  Denies    Exercise:  No regular exercise program.  Limited mobility with his health issues.        The following were updated and reviewed by myself in the chart: medications, past medical history, past surgical history, family history, social history, and allergies.     Medications:  Current Outpatient Medications on File Prior to Visit   Medication Sig Dispense Refill    sodium chloride 0.9 % PgBk 100 mL with ertapenem 1 gram SolR 1 g Inject 1 g into the vein once daily. for 11 days      [DISCONTINUED] albuterol (ACCUNEB) 0.63 mg/3 mL Nebu Take 0.63 mg by nebulization 3 (three) times daily as needed. Rescue      [DISCONTINUED] albuterol (PROVENTIL/VENTOLIN HFA) 90 mcg/actuation inhaler Inhale 1-2 puffs into the lungs every 4 (four) hours as needed for Wheezing. Rescue      [DISCONTINUED] ALPRAZolam (XANAX) 0.5 MG tablet  Take 1 mg by mouth 3 (three) times daily as needed for Anxiety.      [DISCONTINUED] amLODIPine (NORVASC) 5 MG tablet Take 5 mg by mouth once daily.      [DISCONTINUED] cyclobenzaprine (FLEXERIL) 10 MG tablet Take 10 mg by mouth 3 (three) times daily as needed for Muscle spasms.      [DISCONTINUED] EScitalopram oxalate (LEXAPRO) 20 MG tablet Take 20 mg by mouth once daily.      [DISCONTINUED] HYDROcodone-acetaminophen (NORCO)  mg per tablet Take 1 tablet by mouth every 6 (six) hours as needed for Pain.      [DISCONTINUED] levothyroxine (SYNTHROID) 125 MCG tablet Take 125 mcg by mouth before breakfast.      [DISCONTINUED] metoprolol succinate (TOPROL-XL) 50 MG 24 hr tablet Take 50 mg by mouth once daily.      [DISCONTINUED] nicotine (NICODERM CQ) 21 mg/24 hr Place 1 patch onto the skin every 24 hours.      [DISCONTINUED] simvastatin (ZOCOR) 40 MG tablet Take 40 mg by mouth every evening.      [DISCONTINUED] tamsulosin (FLOMAX) 0.4 mg Cap Take 0.4 mg by mouth once daily.       No current facility-administered medications on file prior to visit.        PMHx:  Past Medical History:   Diagnosis Date    COPD (chronic obstructive pulmonary disease)     Hypertension       Patient Active Problem List    Diagnosis Date Noted    Altered mental status 10/21/2023    Elevated serum creatinine 10/21/2023    Normocytic anemia 10/21/2023    Hx of bladder cancer 10/21/2023    Hx of cancer of lung 10/21/2023    UTI (urinary tract infection) 10/21/2023    Hyperglycemia 10/21/2023        PSHx:  Past Surgical History:   Procedure Laterality Date    APPENDECTOMY      CATARACT EXTRACTION      NEPHROSTOMY          FHx:  Family History   Problem Relation Age of Onset    Cancer Father     Cancer Mother         Social:  Social History     Socioeconomic History    Marital status:    Tobacco Use    Smoking status: Former     Types: Cigarettes     Passive exposure: Never    Smokeless tobacco: Never   Substance and Sexual Activity     Alcohol use: Never    Drug use: Never    Sexual activity: Not Currently     Partners: Female     Social Determinants of Health     Financial Resource Strain: Unknown (10/23/2023)    Overall Financial Resource Strain (CARDIA)     Difficulty of Paying Living Expenses: Patient refused   Food Insecurity: Unknown (10/23/2023)    Hunger Vital Sign     Worried About Running Out of Food in the Last Year: Patient refused     Ran Out of Food in the Last Year: Patient refused   Transportation Needs: Unknown (10/23/2023)    PRAPARE - Transportation     Lack of Transportation (Medical): Patient refused     Lack of Transportation (Non-Medical): Patient refused   Physical Activity: Unknown (10/23/2023)    Exercise Vital Sign     Days of Exercise per Week: Patient refused     Minutes of Exercise per Session: Patient refused   Stress: Unknown (10/23/2023)    [place] [employer] - Occupational Stress Questionnaire     Feeling of Stress : Patient refused   Social Connections: Unknown (10/23/2023)    Social Connection and Isolation Panel [NHANES]     Frequency of Communication with Friends and Family: More than three times a week     Frequency of Social Gatherings with Friends and Family: More than three times a week     Attends Christianity Services: Patient refused     Active Member of Clubs or Organizations: Patient refused     Attends Club or Organization Meetings: Patient refused     Marital Status: Patient refused   Housing Stability: Unknown (10/23/2023)    Housing Stability Vital Sign     Unable to Pay for Housing in the Last Year: Patient refused     Unstable Housing in the Last Year: Patient refused        Allergies:  Review of patient's allergies indicates:  No Known Allergies     ROS:  Review of Systems   Constitutional:  Positive for activity change. Negative for unexpected weight change.   HENT:  Positive for hearing loss. Negative for rhinorrhea and trouble swallowing.    Eyes:  Negative for discharge  "and visual disturbance.   Respiratory:  Negative for chest tightness and wheezing.    Cardiovascular:  Negative for chest pain and palpitations.   Gastrointestinal:  Positive for constipation. Negative for blood in stool, diarrhea and vomiting.   Endocrine: Negative for polydipsia and polyuria.   Genitourinary:  Positive for difficulty urinating. Negative for hematuria and urgency.   Musculoskeletal:  Negative for arthralgias, joint swelling and neck pain.   Neurological:  Positive for weakness. Negative for headaches.   Psychiatric/Behavioral:  Positive for confusion and dysphoric mood.           Objective      There were no vitals taken for this visit.  Ht Readings from Last 3 Encounters:   11/02/23 5' 9" (1.753 m)   10/26/23 5' 9" (1.753 m)   10/23/23 5' 9" (1.753 m)     Wt Readings from Last 3 Encounters:   11/02/23 70.9 kg (156 lb 4.9 oz)   10/26/23 75.3 kg (166 lb 0.1 oz)   10/23/23 75.3 kg (166 lb)       PHYSICAL EXAM:  Physical Exam  Constitutional:       General: He is not in acute distress.     Appearance: He is ill-appearing.   HENT:      Head: Normocephalic and atraumatic.   Pulmonary:      Effort: Pulmonary effort is normal. No respiratory distress.   Neurological:      Mental Status: He is alert.   Psychiatric:         Mood and Affect: Mood normal.         Behavior: Behavior normal.         Thought Content: Thought content normal.              LABS / IMAGING:  Recent Results (from the past 4368 hour(s))   POCT glucose    Collection Time: 10/20/23  9:55 PM   Result Value Ref Range    POCT Glucose 127 (H) 70 - 110 mg/dL   CBC W/ AUTO DIFFERENTIAL    Collection Time: 10/20/23 10:19 PM   Result Value Ref Range    WBC 11.39 3.90 - 12.70 K/uL    RBC 3.28 (L) 4.60 - 6.20 M/uL    Hemoglobin 9.1 (L) 14.0 - 18.0 g/dL    Hematocrit 28.3 (L) 40.0 - 54.0 %    MCV 86 82 - 98 fL    MCH 27.7 27.0 - 31.0 pg    MCHC 32.2 32.0 - 36.0 g/dL    RDW 14.1 11.5 - 14.5 %    Platelets 281 150 - 450 K/uL    MPV 9.7 9.2 - 12.9 fL "    Immature Granulocytes 0.5 0.0 - 0.5 %    Gran # (ANC) 9.3 (H) 1.8 - 7.7 K/uL    Immature Grans (Abs) 0.06 (H) 0.00 - 0.04 K/uL    Lymph # 1.0 1.0 - 4.8 K/uL    Mono # 0.8 0.3 - 1.0 K/uL    Eos # 0.3 0.0 - 0.5 K/uL    Baso # 0.03 0.00 - 0.20 K/uL    nRBC 0 0 /100 WBC    Gran % 81.5 (H) 38.0 - 73.0 %    Lymph % 8.3 (L) 18.0 - 48.0 %    Mono % 6.6 4.0 - 15.0 %    Eosinophil % 2.8 0.0 - 8.0 %    Basophil % 0.3 0.0 - 1.9 %    Differential Method Automated    Comprehensive metabolic panel    Collection Time: 10/20/23 10:19 PM   Result Value Ref Range    Sodium 137 136 - 145 mmol/L    Potassium 4.7 3.5 - 5.1 mmol/L    Chloride 102 95 - 110 mmol/L    CO2 21 (L) 23 - 29 mmol/L    Glucose 124 (H) 70 - 110 mg/dL    BUN 30 (H) 8 - 23 mg/dL    Creatinine 1.9 (H) 0.5 - 1.4 mg/dL    Calcium 9.9 8.7 - 10.5 mg/dL    Total Protein 7.7 6.0 - 8.4 g/dL    Albumin 3.1 (L) 3.5 - 5.2 g/dL    Total Bilirubin 0.3 0.1 - 1.0 mg/dL    Alkaline Phosphatase 113 55 - 135 U/L    AST 19 10 - 40 U/L    ALT 16 10 - 44 U/L    eGFR 38 (A) >60 mL/min/1.73 m^2    Anion Gap 14 8 - 16 mmol/L   Protime-INR    Collection Time: 10/20/23 10:19 PM   Result Value Ref Range    Prothrombin Time 11.3 9.0 - 12.5 sec    INR 1.0 0.8 - 1.2   TSH    Collection Time: 10/20/23 10:19 PM   Result Value Ref Range    TSH 1.001 0.400 - 4.000 uIU/mL   LDL - Lipid Panel    Collection Time: 10/20/23 10:19 PM   Result Value Ref Range    Cholesterol 132 120 - 199 mg/dL    Triglycerides 55 30 - 150 mg/dL    HDL 54 40 - 75 mg/dL    LDL Cholesterol 67.0 63.0 - 159.0 mg/dL    HDL/Cholesterol Ratio 40.9 20.0 - 50.0 %    Total Cholesterol/HDL Ratio 2.4 2.0 - 5.0    Non-HDL Cholesterol 78 mg/dL   Troponin I    Collection Time: 10/20/23 10:19 PM   Result Value Ref Range    Troponin I 0.010 0.000 - 0.026 ng/mL   Lactic acid, plasma    Collection Time: 10/20/23 10:22 PM   Result Value Ref Range    Lactate (Lactic Acid) 1.3 0.5 - 2.2 mmol/L   Blood culture #1 **CANNOT BE ORDERED STAT**     Collection Time: 10/20/23 10:22 PM    Specimen: Peripheral, Antecubital, Left; Blood   Result Value Ref Range    Blood Culture, Routine No growth after 5 days.    Ammonia    Collection Time: 10/20/23 10:22 PM   Result Value Ref Range    Ammonia 26 10 - 50 umol/L   Urinalysis, Reflex to Urine Culture Urine, Clean Catch    Collection Time: 10/20/23 10:24 PM    Specimen: Urine   Result Value Ref Range    Specimen UA Urine, Clean Catch     Color, UA Yellow Yellow, Straw, Andra    Appearance, UA Hazy (A) Clear    pH, UA 6.0 5.0 - 8.0    Specific Gravity, UA 1.020 1.005 - 1.030    Protein, UA 1+ (A) Negative    Glucose, UA Negative Negative    Ketones, UA Negative Negative    Bilirubin (UA) Negative Negative    Occult Blood UA 2+ (A) Negative    Nitrite, UA Negative Negative    Urobilinogen, UA Negative <2.0 EU/dL    Leukocytes, UA 3+ (A) Negative   Urinalysis Microscopic    Collection Time: 10/20/23 10:24 PM   Result Value Ref Range    RBC, UA 24 (H) 0 - 4 /hpf    WBC, UA >100 (H) 0 - 5 /hpf    WBC Clumps, UA Few (A) None-Rare    Bacteria Rare None-Occ /hpf    Yeast, UA Occasional (A) None    Squam Epithel, UA 0 /hpf    Hyaline Casts, UA 2 (A) 0-1/lpf /lpf    Unclass Darling UA 3 None-Moderate    Microscopic Comment SEE COMMENT    Urine culture    Collection Time: 10/20/23 10:24 PM    Specimen: Urine   Result Value Ref Range    Urine Culture, Routine ENTEROBACTER CLOACAE  > 100,000 cfu/ml   (A)        Susceptibility    Enterobacter cloacae - CULTURE, URINE     Ceftriaxone 2 Intermediate mcg/mL     Ciprofloxacin 2 Intermediate mcg/mL     Cefepime 4 Sensitive mcg/mL     Ertapenem <=0.5 Sensitive mcg/mL     Nitrofurantoin >64 Resistant mcg/mL     Gentamicin <=4 Sensitive mcg/mL     Levofloxacin 4 Intermediate mcg/mL     Meropenem <=1 Sensitive mcg/mL     Piperacillin/Tazo <=16 Sensitive mcg/mL     Trimeth/Sulfa >2/38 Resistant mcg/mL     Tobramycin <=4 Sensitive mcg/mL   POCT ARTERIAL BLOOD GAS    Collection Time: 10/20/23  10:38 PM   Result Value Ref Range    POC PH 7.323 (L) 7.350 - 7.450    POC PCO2 43.2 35.0 - 45.0 mmHg    POC PO2 89.0 80.0 - 100 mmHg    POC SATURATED O2 97.0 95.0 - 100.0 %    POC HCO3 22.4 (L) 24.0 - 28.0 mmol/l    Base Deficit -3.5 (L) -2.0 - 2.0 mmol/l    Specimen source Arterial     Performed By: [email or web address]     Allens Test YES     FiO2 21.0 %   Blood culture #2 **CANNOT BE ORDERED STAT**    Collection Time: 10/20/23 11:34 PM    Specimen: Peripheral, Hand, Right; Blood   Result Value Ref Range    Blood Culture, Routine       Gram stain aer bottle: Gram positive cocci in clusters resembling Staph    Blood Culture, Routine       Results called to and read back by: Estefany Jacob 10/22/2023  03:30    Blood Culture, Routine (A)      COAGULASE-NEGATIVE STAPHYLOCOCCUS SPECIES  Organism is a probable contaminant     MRSA/SA Rapid ID by PCR from Blood culture    Collection Time: 10/20/23 11:44 PM   Result Value Ref Range    Staph aureus ID by PCR Negative Negative    Methicillin Resistant ID by PCR Negative Negative   CBC with Automated Differential    Collection Time: 10/21/23  8:30 AM   Result Value Ref Range    WBC 9.19 3.90 - 12.70 K/uL    RBC 2.97 (L) 4.60 - 6.20 M/uL    Hemoglobin 8.3 (L) 14.0 - 18.0 g/dL    Hematocrit 25.4 (L) 40.0 - 54.0 %    MCV 86 82 - 98 fL    MCH 27.9 27.0 - 31.0 pg    MCHC 32.7 32.0 - 36.0 g/dL    RDW 14.0 11.5 - 14.5 %    Platelets 239 150 - 450 K/uL    MPV 9.5 9.2 - 12.9 fL    Immature Granulocytes 0.3 0.0 - 0.5 %    Gran # (ANC) 7.4 1.8 - 7.7 K/uL    Immature Grans (Abs) 0.03 0.00 - 0.04 K/uL    Lymph # 0.8 (L) 1.0 - 4.8 K/uL    Mono # 0.6 0.3 - 1.0 K/uL    Eos # 0.3 0.0 - 0.5 K/uL    Baso # 0.03 0.00 - 0.20 K/uL    nRBC 0 0 /100 WBC    Gran % 80.1 (H) 38.0 - 73.0 %    Lymph % 9.1 (L) 18.0 - 48.0 %    Mono % 6.6 4.0 - 15.0 %    Eosinophil % 3.6 0.0 - 8.0 %    Basophil % 0.3 0.0 - 1.9 %    Differential Method Automated    Basic Metabolic Panel (BMP)    Collection Time: 10/21/23  8:30 AM    Result Value Ref Range    Sodium 138 136 - 145 mmol/L    Potassium 4.4 3.5 - 5.1 mmol/L    Chloride 106 95 - 110 mmol/L    CO2 22 (L) 23 - 29 mmol/L    Glucose 115 (H) 70 - 110 mg/dL    BUN 25 (H) 8 - 23 mg/dL    Creatinine 1.6 (H) 0.5 - 1.4 mg/dL    Calcium 9.4 8.7 - 10.5 mg/dL    Anion Gap 10 8 - 16 mmol/L    eGFR 46 (A) >60 mL/min/1.73 m^2   POCT glucose    Collection Time: 10/21/23  4:31 PM   Result Value Ref Range    POCT Glucose 136 (H) 70 - 110 mg/dL   CBC with Automated Differential    Collection Time: 10/22/23  4:14 AM   Result Value Ref Range    WBC 8.53 3.90 - 12.70 K/uL    RBC 2.89 (L) 4.60 - 6.20 M/uL    Hemoglobin 7.8 (L) 14.0 - 18.0 g/dL    Hematocrit 24.9 (L) 40.0 - 54.0 %    MCV 86 82 - 98 fL    MCH 27.0 27.0 - 31.0 pg    MCHC 31.3 (L) 32.0 - 36.0 g/dL    RDW 14.1 11.5 - 14.5 %    Platelets 258 150 - 450 K/uL    MPV 9.3 9.2 - 12.9 fL    Immature Granulocytes 0.6 (H) 0.0 - 0.5 %    Gran # (ANC) 5.9 1.8 - 7.7 K/uL    Immature Grans (Abs) 0.05 (H) 0.00 - 0.04 K/uL    Lymph # 1.4 1.0 - 4.8 K/uL    Mono # 0.8 0.3 - 1.0 K/uL    Eos # 0.5 0.0 - 0.5 K/uL    Baso # 0.03 0.00 - 0.20 K/uL    nRBC 0 0 /100 WBC    Gran % 68.9 38.0 - 73.0 %    Lymph % 15.8 (L) 18.0 - 48.0 %    Mono % 9.0 4.0 - 15.0 %    Eosinophil % 5.3 0.0 - 8.0 %    Basophil % 0.4 0.0 - 1.9 %    Differential Method Automated    Basic Metabolic Panel (BMP)    Collection Time: 10/22/23  4:14 AM   Result Value Ref Range    Sodium 139 136 - 145 mmol/L    Potassium 5.0 3.5 - 5.1 mmol/L    Chloride 105 95 - 110 mmol/L    CO2 24 23 - 29 mmol/L    Glucose 100 70 - 110 mg/dL    BUN 19 8 - 23 mg/dL    Creatinine 1.3 0.5 - 1.4 mg/dL    Calcium 9.5 8.7 - 10.5 mg/dL    Anion Gap 10 8 - 16 mmol/L    eGFR 59 (A) >60 mL/min/1.73 m^2   POCT glucose    Collection Time: 10/22/23 12:04 PM   Result Value Ref Range    POCT Glucose 134 (H) 70 - 110 mg/dL   POCT glucose    Collection Time: 10/22/23  4:13 PM   Result Value Ref Range    POCT Glucose 130 (H) 70 - 110  mg/dL   POCT glucose    Collection Time: 10/22/23  8:00 PM   Result Value Ref Range    POCT Glucose 113 (H) 70 - 110 mg/dL   CBC with Automated Differential    Collection Time: 10/23/23  3:40 AM   Result Value Ref Range    WBC 7.48 3.90 - 12.70 K/uL    RBC 3.15 (L) 4.60 - 6.20 M/uL    Hemoglobin 8.5 (L) 14.0 - 18.0 g/dL    Hematocrit 26.6 (L) 40.0 - 54.0 %    MCV 84 82 - 98 fL    MCH 27.0 27.0 - 31.0 pg    MCHC 32.0 32.0 - 36.0 g/dL    RDW 14.4 11.5 - 14.5 %    Platelets 284 150 - 450 K/uL    MPV 9.1 (L) 9.2 - 12.9 fL    Immature Granulocytes 0.5 0.0 - 0.5 %    Gran # (ANC) 5.1 1.8 - 7.7 K/uL    Immature Grans (Abs) 0.04 0.00 - 0.04 K/uL    Lymph # 1.1 1.0 - 4.8 K/uL    Mono # 0.7 0.3 - 1.0 K/uL    Eos # 0.5 0.0 - 0.5 K/uL    Baso # 0.03 0.00 - 0.20 K/uL    nRBC 0 0 /100 WBC    Gran % 68.1 38.0 - 73.0 %    Lymph % 15.0 (L) 18.0 - 48.0 %    Mono % 9.2 4.0 - 15.0 %    Eosinophil % 6.8 0.0 - 8.0 %    Basophil % 0.4 0.0 - 1.9 %    Differential Method Automated    Basic Metabolic Panel (BMP)    Collection Time: 10/23/23  3:40 AM   Result Value Ref Range    Sodium 139 136 - 145 mmol/L    Potassium 4.5 3.5 - 5.1 mmol/L    Chloride 105 95 - 110 mmol/L    CO2 24 23 - 29 mmol/L    Glucose 112 (H) 70 - 110 mg/dL    BUN 21 8 - 23 mg/dL    Creatinine 1.3 0.5 - 1.4 mg/dL    Calcium 9.5 8.7 - 10.5 mg/dL    Anion Gap 10 8 - 16 mmol/L    eGFR 59 (A) >60 mL/min/1.73 m^2   POCT glucose    Collection Time: 10/23/23  4:36 AM   Result Value Ref Range    POCT Glucose 109 70 - 110 mg/dL   Echo    Collection Time: 10/23/23 11:00 AM   Result Value Ref Range    BSA 1.91 m2    Harper's Biplane MOD Ejection Fraction 61 %    LVOT stroke volume 97.51 cm3    LVIDd 5.33 3.5 - 6.0 cm    LV Systolic Volume 43.28 mL    LV Systolic Volume Index 22.7 mL/m2    LVIDs 3.27 2.1 - 4.0 cm    LV Diastolic Volume 137.27 mL    LV Diastolic Volume Index 71.87 mL/m2    IVS 1.08 0.6 - 1.1 cm    LVOT diameter 2.11 cm    LVOT area 3.5 cm2    FS 39 28 - 44 %    Left  "Ventricle Relative Wall Thickness 0.40 cm    Posterior Wall 1.07 0.6 - 1.1 cm    LV mass 222.82 g    LV Mass Index 117 g/m2    MV Peak E Froylan 1.05 m/s    TDI LATERAL 0.07 m/s    TDI SEPTAL 0.06 m/s    E/E' ratio 16.15 m/s    MV Peak A Froylan 1.39 m/s    TR Max Froylan 2.71 m/s    E/A ratio 0.76     E wave deceleration time 234.37 msec    MV "A" wave duration [id number] msec    LV SEPTAL E/E' RATIO 17.50 m/s    LV LATERAL E/E' RATIO 15.00 m/s    PV Peak S Froylan 0.53 m/s    PV Peak D Froylan 0.38 m/s    Pulm vein S/D ratio 1.39     LVOT peak froylan 1.19 m/s    Left Ventricular Outflow Tract Mean Velocity 0.83 cm/s    Left Ventricular Outflow Tract Mean Gradient 3.06 mmHg    LA size 3.29 cm    Left Atrium Minor Axis 5.20 cm    Left Atrium Major Axis 4.32 cm    LA volume (mod) 47.45 cm3    LA Volume Index (Mod) 24.8 mL/m2    RVDD 3.33 cm    RV S' 14.34 cm/s    TAPSE 1.73 cm    RA Major Axis 4.87 cm    RA Width 4.16 cm    AV regurgitation pressure 1/2 time [id number] ms    AR Max Froylan 4.32 m/s    AV mean gradient 28 mmHg    AV peak gradient 41 mmHg    Ao peak froylan 3.20 m/s    Ao VTI 72.80 cm    LVOT peak VTI 27.90 cm    AV valve area 1.34 cm²    AV Velocity Ratio 0.37     AV index (prosthetic) 0.38     TOAN by Velocity Ratio 1.30 cm²    MV mean gradient 3 mmHg    MV peak gradient 8 mmHg    MV stenosis pressure 1/2 time 67.97 ms    MV valve area p 1/2 method 3.24 cm2    MV valve area by continuity eq 2.99 cm2    MV VTI 32.6 cm    Triscuspid Valve Regurgitation Peak Gradient 29 mmHg    PV PEAK VELOCITY 1.41 m/s    PV peak gradient 8 mmHg    Pulmonary Valve Mean Velocity 0.96 m/s    Sinus 3.71 cm    STJ 3.08 cm    Ascending aorta 3.54 cm    IVC diameter 1.82 cm    Mean e' 0.07 m/s    ZLVIDS -0.10     ZLVIDD -0.08     LA Volume Index 26.3 mL/m2    LA volume 50.15 cm3    LA WIDTH 3.8 cm    TV resting pulmonary artery pressure 37 mmHg    RV TB RVSP 11 mmHg    Est. RA pres 8 mmHg    EF 60 %   CBC auto differential    Collection " Time: 10/24/23  9:10 AM   Result Value Ref Range    WBC 8.86 3.90 - 12.70 K/uL    RBC 3.54 (L) 4.60 - 6.20 M/uL    Hemoglobin 9.7 (L) 14.0 - 18.0 g/dL    Hematocrit 30.4 (L) 40.0 - 54.0 %    MCV 86 82 - 98 fL    MCH 27.4 27.0 - 31.0 pg    MCHC 31.9 (L) 32.0 - 36.0 g/dL    RDW 14.2 11.5 - 14.5 %    Platelets 349 150 - 450 K/uL    MPV 9.2 9.2 - 12.9 fL    Immature Granulocytes 0.6 (H) 0.0 - 0.5 %    Gran # (ANC) 6.7 1.8 - 7.7 K/uL    Immature Grans (Abs) 0.05 (H) 0.00 - 0.04 K/uL    Lymph # 1.0 1.0 - 4.8 K/uL    Mono # 0.5 0.3 - 1.0 K/uL    Eos # 0.5 0.0 - 0.5 K/uL    Baso # 0.05 0.00 - 0.20 K/uL    nRBC 0 0 /100 WBC    Gran % 75.8 (H) 38.0 - 73.0 %    Lymph % 11.6 (L) 18.0 - 48.0 %    Mono % 6.0 4.0 - 15.0 %    Eosinophil % 5.4 0.0 - 8.0 %    Basophil % 0.6 0.0 - 1.9 %    Differential Method Automated    Comprehensive metabolic panel    Collection Time: 10/24/23  9:11 AM   Result Value Ref Range    Sodium 138 136 - 145 mmol/L    Potassium 4.4 3.5 - 5.1 mmol/L    Chloride 104 95 - 110 mmol/L    CO2 19 (L) 23 - 29 mmol/L    Glucose 153 (H) 70 - 110 mg/dL    BUN 18 8 - 23 mg/dL    Creatinine 1.3 0.5 - 1.4 mg/dL    Calcium 10.0 8.7 - 10.5 mg/dL    Total Protein 7.1 6.0 - 8.4 g/dL    Albumin 2.9 (L) 3.5 - 5.2 g/dL    Total Bilirubin 0.2 0.1 - 1.0 mg/dL    Alkaline Phosphatase 128 55 - 135 U/L    AST 16 10 - 40 U/L    ALT 23 10 - 44 U/L    eGFR 59 (A) >60 mL/min/1.73 m^2    Anion Gap 15 8 - 16 mmol/L   COVID-19 Rapid Screening    Collection Time: 10/24/23 12:33 PM   Result Value Ref Range    SARS-CoV-2 RNA, Amplification, Qual Negative Negative   POCT glucose    Collection Time: 10/24/23 10:06 PM   Result Value Ref Range    POCT Glucose 150 (H) 70 - 110 mg/dL   Comprehensive metabolic panel    Collection Time: 10/25/23  4:50 AM   Result Value Ref Range    Sodium 138 136 - 145 mmol/L    Potassium 4.5 3.5 - 5.1 mmol/L    Chloride 105 95 - 110 mmol/L    CO2 24 23 - 29 mmol/L    Glucose 110 70 - 110 mg/dL    BUN 25 (H) 8 - 23  mg/dL    Creatinine 1.6 (H) 0.5 - 1.4 mg/dL    Calcium 9.6 8.7 - 10.5 mg/dL    Total Protein 7.2 6.0 - 8.4 g/dL    Albumin 3.0 (L) 3.5 - 5.2 g/dL    Total Bilirubin 0.2 0.1 - 1.0 mg/dL    Alkaline Phosphatase 121 55 - 135 U/L    AST 15 10 - 40 U/L    ALT 24 10 - 44 U/L    eGFR 46 (A) >60 mL/min/1.73 m^2    Anion Gap 9 8 - 16 mmol/L   CBC auto differential    Collection Time: 10/25/23  4:50 AM   Result Value Ref Range    WBC 9.39 3.90 - 12.70 K/uL    RBC 3.41 (L) 4.60 - 6.20 M/uL    Hemoglobin 9.3 (L) 14.0 - 18.0 g/dL    Hematocrit 28.7 (L) 40.0 - 54.0 %    MCV 84 82 - 98 fL    MCH 27.3 27.0 - 31.0 pg    MCHC 32.4 32.0 - 36.0 g/dL    RDW 14.1 11.5 - 14.5 %    Platelets 347 150 - 450 K/uL    MPV 9.2 9.2 - 12.9 fL    Immature Granulocytes 0.7 (H) 0.0 - 0.5 %    Gran # (ANC) 6.7 1.8 - 7.7 K/uL    Immature Grans (Abs) 0.07 (H) 0.00 - 0.04 K/uL    Lymph # 1.3 1.0 - 4.8 K/uL    Mono # 0.7 0.3 - 1.0 K/uL    Eos # 0.6 (H) 0.0 - 0.5 K/uL    Baso # 0.05 0.00 - 0.20 K/uL    nRBC 0 0 /100 WBC    Gran % 71.7 38.0 - 73.0 %    Lymph % 14.0 (L) 18.0 - 48.0 %    Mono % 7.0 4.0 - 15.0 %    Eosinophil % 6.1 0.0 - 8.0 %    Basophil % 0.5 0.0 - 1.9 %    Differential Method Automated    POCT glucose    Collection Time: 10/25/23  6:10 AM   Result Value Ref Range    POCT Glucose 166 (H) 70 - 110 mg/dL         Assessment    1. Encounter to establish care    2. Hx of bladder cancer    3. Hx of cancer of lung    4. Anxiety    5. Chronic obstructive pulmonary disease, unspecified COPD type    6. Spinal stenosis, unspecified spinal region    7. Hypothyroidism, unspecified type    8. Primary hypertension    9. Hyperlipidemia, unspecified hyperlipidemia type    10. Personal history of smoking          Plan    Diagnoses and all orders for this visit:    Encounter to establish care    Hx of bladder cancer  -     HYDROcodone-acetaminophen (NORCO)  mg per tablet; Take 1 tablet by mouth every 6 (six) hours as needed for Pain.  -     tamsulosin  (FLOMAX) 0.4 mg Cap; Take 1 capsule (0.4 mg total) by mouth once daily.  -     atorvastatin (LIPITOR) 20 MG tablet; Take 1 tablet (20 mg total) by mouth once daily.    Hx of cancer of lung  -     albuterol (ACCUNEB) 0.63 mg/3 mL Nebu; Take 3 mLs (0.63 mg total) by nebulization 3 (three) times daily as needed (sob, wheezing). Rescue  -     albuterol (PROVENTIL/VENTOLIN HFA) 90 mcg/actuation inhaler; Inhale 1-2 puffs into the lungs every 4 (four) hours as needed for Wheezing. Rescue  -     HYDROcodone-acetaminophen (NORCO)  mg per tablet; Take 1 tablet by mouth every 6 (six) hours as needed for Pain.    Anxiety  -     ALPRAZolam (XANAX) 0.5 MG tablet; Take 2 tablets (1 mg total) by mouth 3 (three) times daily as needed for Anxiety.  -     EScitalopram oxalate (LEXAPRO) 20 MG tablet; Take 1 tablet (20 mg total) by mouth once daily.    Chronic obstructive pulmonary disease, unspecified COPD type  -     albuterol (ACCUNEB) 0.63 mg/3 mL Nebu; Take 3 mLs (0.63 mg total) by nebulization 3 (three) times daily as needed (sob, wheezing). Rescue  -     albuterol (PROVENTIL/VENTOLIN HFA) 90 mcg/actuation inhaler; Inhale 1-2 puffs into the lungs every 4 (four) hours as needed for Wheezing. Rescue    Spinal stenosis, unspecified spinal region  -     cyclobenzaprine (FLEXERIL) 10 MG tablet; Take 1 tablet (10 mg total) by mouth 3 (three) times daily as needed for Muscle spasms.    Hypothyroidism, unspecified type  -     levothyroxine (SYNTHROID) 125 MCG tablet; Take 1 tablet (125 mcg total) by mouth before breakfast.    Primary hypertension  -     amLODIPine (NORVASC) 5 MG tablet; Take 1 tablet (5 mg total) by mouth once daily.  -     metoprolol succinate (TOPROL-XL) 50 MG 24 hr tablet; Take 1 tablet (50 mg total) by mouth once daily.    Hyperlipidemia, unspecified hyperlipidemia type  -     atorvastatin (LIPITOR) 20 MG tablet; Take 1 tablet (20 mg total) by mouth once daily.    Personal history of smoking  -     nicotine  (NICODERM CQ) 21 mg/24 hr; Place 1 patch onto the skin once daily.    All things considered, he looks relatively okay on video today.      They have an appointment on Tuesday to get established with Oncology in [place].  Strongly recommended they keep that appointment to get plugged into the navigator system.    Daughter was disappointed that we do not have oncology services here in town.  That on their visit Tuesday, they discuss [facility]  Depending on how the visit goes Tuesday, we can always refer them to [facility] on [address] for another opinion.    In the meantime, we will refill his medications.      In light of his metastatic bladder cancer, lung cancer, we will go ahead and give 30 day supply of Norco and Xanax today.  We will have them follow up in clinic in 1-2 weeks to see how the oncology visit went.  Once we get them plugged in to an oncologist, we can get them set up with palliative care to continue these medications.      FOLLOW-UP:  Follow up in about 1 week (around 11/10/2023) for recheck.    The patient location is:  [place]  The chief complaint leading to consultation is:  Establish care    Visit type: audiovisual    Face to Face time with patient: 30 minutes    45 minutes of total time spent on the encounter, which includes face to face time and non-face to face time preparing to see the patient (eg, review of tests), Obtaining and/or reviewing separately obtained history, Documenting clinical information in the electronic or other health record, Independently interpreting results (not separately reported) and communicating results to the patient/family/caregiver, or Care coordination (not separately reported).     Each patient to whom he or she provides medical services by telemedicine is:  (1) informed of the relationship between the physician and patient and the respective role of any other health care provider with respect to  management of the patient; and (2) notified that he or she may decline to receive medical services by telemedicine and may withdraw from such care at any time.    Signed by:  Faizan Antoine MD

## 2023-11-03 NOTE — TELEPHONE ENCOUNTER
ONN contacted pt to let him know [facility] will reach out within 48 business hours to schedule, no answer.

## 2023-11-06 ENCOUNTER — OFFICE VISIT (OUTPATIENT)
Dept: INFECTIOUS DISEASES | Facility: CLINIC | Age: 68
End: 2023-11-06
Payer: MEDICARE

## 2023-11-06 DIAGNOSIS — N30.00 ACUTE CYSTITIS WITHOUT HEMATURIA: Primary | ICD-10-CM

## 2023-11-06 PROCEDURE — 99213 OFFICE O/P EST LOW 20 MIN: CPT | Mod: 95,,, | Performed by: STUDENT IN AN ORGANIZED HEALTH CARE EDUCATION/TRAINING PROGRAM

## 2023-11-06 PROCEDURE — 99213 PR OFFICE/OUTPT VISIT, EST, LEVL III, 20-29 MIN: ICD-10-PCS | Mod: 95,,, | Performed by: STUDENT IN AN ORGANIZED HEALTH CARE EDUCATION/TRAINING PROGRAM

## 2023-11-06 NOTE — PROGRESS NOTES
The patient location is: Home  The chief complaint leading to consultation is: OPAT follow up     Visit type: audiovisual    Notes:    Subjective:    HPI: 68 y.o. male with pertinent PMHx of bladder and lung cancer who presents as hospital follow up after being brought to [facility] on 10/20 with altered mental status. Patient [place] from the airport with worsening lethargy and shallow respiration. He was noted to be saturating around 90% on room air and was placed on 2 L. Patient just moved from [place] to [place] to be closer to family for cancer treatments. Family is concerned that over the past few days he had become increasingly weak. In the ER, arrival vitals were significant for hypoxia, patient was placed on 5 L. CBC showed normocytic anemia. CMP was significant for low albumin, elevated sugars. TSH , lactic acid, troponin within normal range. U/A was concerning for pyuria >100. Urine culture grew Enterobacter cloacae >100,000 cfu/mL. CT head with moderate area of hypoattenuation in the parietal cortex suggesting infarct although radiologist favors chronic process. Small probable remote areas of lacunar infarct involving the left caudate, bilateral thalamus and right lateral basal ganglia. CTA head and neck with no evidence of acute intracranial abnormality. No high-grade stenosis or major vessel occlusion. Spiculated lesion noticed in the right posterior lung apex. Chest x-ray showed no acute or significant focal chest abnormality. MRI brain with no acute stroke. Infectious Diseases consulted. Recommend stopping cefepime patient had been on due to risk for neurotoxicity. Switched to ertapenem 1 g daily for total of 14 days (stop date 11/4).     Patient last seen virtually. With daughter and wife. Per daughter, patient had received 10 days of oral antibiotics while in [place] and nephrostomy tube was infected. They are waiting for referral to heme/onc for 2 months of chemo. Still  establishing care since moving to [place]. Will be in rehab for 2 weeks. Seeing urologist 11/2. Stent in left kidney 3 months ago for stones and kidney failed. 9/18 both kidneys failed so stent placed and nephrostomy tube on right. Only dribbles from penis. All urine from nephrostomy tube. High grade bladder cancer, lung cancer, and lymph node involvement. Still trying to get education on next steps for chemo. Patient denies burning with urine outflow at this time. No fever or chills. Discussed possible routes of infection including obstruction and proximity to GI tract where Enterobacter resides. Wife concerned because she was septic around same time as patient and thought it may be contagious. However, she had ischemic colitis which could also translocate the same family of bacteria. Family voiced understanding. Tolerating IV abx without incidence. Labs and culture data from admission reviewed.    Today patient seen virtually. He is now at home with rehab facility discharging him. Has seen urology who will be presenting case before tumor board to discuss management of bladder cancer. May need to place nephrostomy tube on opposite side at some point with right side exchanged. Urology and PCP notes reviewed. Patient reports urinating normally this week. Urine normal appearance with no burning or fever. Will review labs from today. Plan to have midline removed by Tere CHOWDARY on Thursday.          Urine Culture, Routine        Abnormal   ENTEROBACTER CLOACAE   > 100,000 cfu/ml  VC      Resulting Agency [facility]      Susceptibility                Enterobacter cloacae        CULTURE, URINE        Cefepime 4 mcg/mL Sensitive        Ceftriaxone 2 mcg/mL Intermediate       Ciprofloxacin 2 mcg/mL Intermediate       Ertapenem <=0.5 mcg/mL Sensitive       Gentamicin <=4 mcg/mL Sensitive       Levofloxacin 4 mcg/mL Intermediate       Meropenem <=1 mcg/mL Sensitive       Nitrofurantoin >64 mcg/mL Resistant       Piperacillin/Tazo <=16  mcg/mL Sensitive       Tobramycin <=4 mcg/mL Sensitive       Trimeth/Sulfa >2/38 mcg/mL Resistant                     Linear View           Narrative    Performed by: [facility]  Specimen Source->Urine      Specimen Collected: 10/20/23 22:24             Review of Systems:   Negative unless otherwise noted positive-  Gen- Weakness/ Fatigue  Neuro- Confusion  CV- Chest Pain/ Palpitations  Resp: Cough/ SOB  GI- Nausea/Vomiting  Extrem- Pain/Swelling    Objective:    Physical Exam:  General- Patient alert and oriented x3 in NAD  HEENT- PERRLA, EOMI, OP clear  Resp- No increased WOB noted. Not using accessory muscles.  Extrem- No cyanosis, clubbing, edema.   Skin-  No Jaundice. No visible skin lesions.      Plan:  UTI (urinary tract infection)  --Patient completing course of IV ceftazidime per [facility], a change that was made at rehab facility after patient initially on ertapenem   --EOC 11/9/23  --For intensive drug toxicity monitoring, patient has had weekly CBC and CMP due to renal insufficiency associated with ceftazidime; will review today   --Recommend outpatient urology follow up   --Follow up with ID as needed      Hx of cancer of lung  Awaiting follow up with heme/onc.      Hx of bladder cancer  Awaiting follow up with urology.        Face to Face time with patient: 7 minutes  20 minutes of total time spent on the encounter, which includes face to face time and non-face to face time preparing to see the patient (eg, review of tests), Obtaining and/or reviewing separately obtained history, Documenting clinical information in the electronic or other health record, Independently interpreting results (not separately reported) and communicating results to the patient/family/caregiver, or Care coordination (not separately reported).     Each patient to whom he or she provides medical services by telemedicine is:  (1) informed of the relationship between the physician and patient and the respective role of any other health  care provider with respect to management of the patient; and (2) notified that he or she may decline to receive medical services by telemedicine and may withdraw from such care at any time.

## 2023-11-07 ENCOUNTER — OFFICE VISIT (OUTPATIENT)
Dept: UROLOGY | Facility: CLINIC | Age: 68
End: 2023-11-07
Payer: MEDICARE

## 2023-11-07 ENCOUNTER — TELEPHONE (OUTPATIENT)
Dept: INFECTIOUS DISEASES | Facility: CLINIC | Age: 68
End: 2023-11-07
Payer: MEDICARE

## 2023-11-07 ENCOUNTER — PATIENT MESSAGE (OUTPATIENT)
Dept: INFECTIOUS DISEASES | Facility: CLINIC | Age: 68
End: 2023-11-07
Payer: MEDICARE

## 2023-11-07 ENCOUNTER — LAB VISIT (OUTPATIENT)
Dept: LAB | Facility: HOSPITAL | Age: 68
End: 2023-11-07
Attending: STUDENT IN AN ORGANIZED HEALTH CARE EDUCATION/TRAINING PROGRAM
Payer: MEDICARE

## 2023-11-07 VITALS
HEIGHT: 69 IN | HEART RATE: 72 BPM | WEIGHT: 161.5 LBS | SYSTOLIC BLOOD PRESSURE: 107 MMHG | DIASTOLIC BLOOD PRESSURE: 55 MMHG | BODY MASS INDEX: 23.92 KG/M2

## 2023-11-07 DIAGNOSIS — C77.2 BLADDER CANCER METASTASIZED TO INTRA-ABDOMINAL LYMPH NODES: ICD-10-CM

## 2023-11-07 DIAGNOSIS — C34.90 MALIGNANT NEOPLASM OF LUNG, UNSPECIFIED LATERALITY, UNSPECIFIED PART OF LUNG: ICD-10-CM

## 2023-11-07 DIAGNOSIS — C67.9 BLADDER CANCER METASTASIZED TO INTRA-ABDOMINAL LYMPH NODES: ICD-10-CM

## 2023-11-07 LAB
ALBUMIN SERPL BCP-MCNC: 3 G/DL (ref 3.5–5.2)
ALP SERPL-CCNC: 114 U/L (ref 55–135)
ALT SERPL W/O P-5'-P-CCNC: 11 U/L (ref 10–44)
ANION GAP SERPL CALC-SCNC: 7 MMOL/L (ref 8–16)
AST SERPL-CCNC: 10 U/L (ref 10–40)
BASOPHILS # BLD AUTO: 0.06 K/UL (ref 0–0.2)
BASOPHILS NFR BLD: 0.7 % (ref 0–1.9)
BILIRUB SERPL-MCNC: 0.4 MG/DL (ref 0.1–1)
BUN SERPL-MCNC: 20 MG/DL (ref 8–23)
CALCIUM SERPL-MCNC: 9.8 MG/DL (ref 8.7–10.5)
CHLORIDE SERPL-SCNC: 107 MMOL/L (ref 95–110)
CO2 SERPL-SCNC: 27 MMOL/L (ref 23–29)
CREAT SERPL-MCNC: 1.4 MG/DL (ref 0.5–1.4)
DIFFERENTIAL METHOD: ABNORMAL
EOSINOPHIL # BLD AUTO: 0.2 K/UL (ref 0–0.5)
EOSINOPHIL NFR BLD: 2.7 % (ref 0–8)
ERYTHROCYTE [DISTWIDTH] IN BLOOD BY AUTOMATED COUNT: 14.6 % (ref 11.5–14.5)
EST. GFR  (NO RACE VARIABLE): 54.4 ML/MIN/1.73 M^2
GLUCOSE SERPL-MCNC: 89 MG/DL (ref 70–110)
HCT VFR BLD AUTO: 30.6 % (ref 40–54)
HGB BLD-MCNC: 9.4 G/DL (ref 14–18)
IMM GRANULOCYTES # BLD AUTO: 0.01 K/UL (ref 0–0.04)
IMM GRANULOCYTES NFR BLD AUTO: 0.1 % (ref 0–0.5)
LYMPHOCYTES # BLD AUTO: 1 K/UL (ref 1–4.8)
LYMPHOCYTES NFR BLD: 12.4 % (ref 18–48)
MCH RBC QN AUTO: 26.9 PG (ref 27–31)
MCHC RBC AUTO-ENTMCNC: 30.7 G/DL (ref 32–36)
MCV RBC AUTO: 88 FL (ref 82–98)
MONOCYTES # BLD AUTO: 0.6 K/UL (ref 0.3–1)
MONOCYTES NFR BLD: 6.6 % (ref 4–15)
NEUTROPHILS # BLD AUTO: 6.5 K/UL (ref 1.8–7.7)
NEUTROPHILS NFR BLD: 77.5 % (ref 38–73)
NRBC BLD-RTO: 0 /100 WBC
PLATELET # BLD AUTO: 262 K/UL (ref 150–450)
PMV BLD AUTO: 10.3 FL (ref 9.2–12.9)
POTASSIUM SERPL-SCNC: 4.9 MMOL/L (ref 3.5–5.1)
PROT SERPL-MCNC: 7.1 G/DL (ref 6–8.4)
RBC # BLD AUTO: 3.49 M/UL (ref 4.6–6.2)
SODIUM SERPL-SCNC: 141 MMOL/L (ref 136–145)
WBC # BLD AUTO: 8.37 K/UL (ref 3.9–12.7)

## 2023-11-07 PROCEDURE — 80053 COMPREHEN METABOLIC PANEL: CPT | Performed by: STUDENT IN AN ORGANIZED HEALTH CARE EDUCATION/TRAINING PROGRAM

## 2023-11-07 PROCEDURE — 99999 PR PBB SHADOW E&M-EST. PATIENT-LVL IV: CPT | Mod: PBBFAC,,, | Performed by: STUDENT IN AN ORGANIZED HEALTH CARE EDUCATION/TRAINING PROGRAM

## 2023-11-07 PROCEDURE — 99215 PR OFFICE/OUTPT VISIT, EST, LEVL V, 40-54 MIN: ICD-10-PCS | Mod: S$PBB,,, | Performed by: STUDENT IN AN ORGANIZED HEALTH CARE EDUCATION/TRAINING PROGRAM

## 2023-11-07 PROCEDURE — 85025 COMPLETE CBC W/AUTO DIFF WBC: CPT | Performed by: STUDENT IN AN ORGANIZED HEALTH CARE EDUCATION/TRAINING PROGRAM

## 2023-11-07 PROCEDURE — 99999 PR PBB SHADOW E&M-EST. PATIENT-LVL IV: ICD-10-PCS | Mod: PBBFAC,,, | Performed by: STUDENT IN AN ORGANIZED HEALTH CARE EDUCATION/TRAINING PROGRAM

## 2023-11-07 PROCEDURE — 99215 OFFICE O/P EST HI 40 MIN: CPT | Mod: S$PBB,,, | Performed by: STUDENT IN AN ORGANIZED HEALTH CARE EDUCATION/TRAINING PROGRAM

## 2023-11-07 PROCEDURE — 99214 OFFICE O/P EST MOD 30 MIN: CPT | Mod: PBBFAC | Performed by: STUDENT IN AN ORGANIZED HEALTH CARE EDUCATION/TRAINING PROGRAM

## 2023-11-07 PROCEDURE — 36415 COLL VENOUS BLD VENIPUNCTURE: CPT | Performed by: STUDENT IN AN ORGANIZED HEALTH CARE EDUCATION/TRAINING PROGRAM

## 2023-11-07 NOTE — TELEPHONE ENCOUNTER
Spoke with Sheila  nurse . Per Dr. Hartman ordered repeat CBC and results faxed over. She said she will have someone complete today and send results.

## 2023-11-07 NOTE — TELEPHONE ENCOUNTER
----- Message from Dorinda Ash sent at 11/7/2023 11:32 AM CST -----  Regarding: order  Contact: [phone]  olena w/Home Health calling stated Pt's daughter stated labs were drawn today.  Do Dr. Hartman still want repeat CBC, if so can order be place for tomorrow, because Pt is not at home today.  Call [facility] [phone]

## 2023-11-07 NOTE — PROGRESS NOTES
[facility]  Urologic Oncology Clinic Note        Date of Service: 11/7/2023        Chief Complaint/Reason for Consultation: Bladder Cancer    Requesting Provider:   Joshua Jorgensen MD  [id number] [place] [address]      History of Present Illness:   Patient 68 y.o. male presents with hx of presumed muscle invasive bladder cancer who in 2020, 3 yrs ago [facility] , [facility], Dr. Santiago -- had definitive trimodal therapy. He recently was brought back to [place] from [place] by his daughter and they want to transition care. They live in [place].     At present, it appears that he has metastatic bladder cancer to the RP nodes and possible new primary lung CA vs metastasis to his lungs.     He currently has a right perc and left ureteral stent.    There is also some remote history of a UTI, nephrostomy tube placement/ureteral stent placement, which lead to sepsis and possible stroke. Unclear of the timeline on this.    He is currently in a wheelchair in clinic.    Blood thinners:  none    No Hx of CVA when he septic  Abdominal surgeries:  Appendectomy, fingers removed, cataract        Urologic History:     7/22/2023 -- FDG PET -- multiple prominent enlarged retroperitoneal lymph nodes, bladder mass, left nephorureteral stent, spiculated right upper lobe lung nodule morphology highly suspicious for lung cancer    Patient Active Problem List    Diagnosis Date Noted    Altered mental status 10/21/2023    Elevated serum creatinine 10/21/2023    Normocytic anemia 10/21/2023    Hx of bladder cancer 10/21/2023    Hx of cancer of lung 10/21/2023    UTI (urinary tract infection) 10/21/2023    Hyperglycemia 10/21/2023        Review of patient's allergies indicates:  No Known Allergies     Past Medical History:   Diagnosis Date    COPD (chronic obstructive pulmonary disease)     Hypertension       Past Surgical History:   Procedure Laterality Date    APPENDECTOMY      CATARACT EXTRACTION   "    NEPHROSTOMY        Family History   Problem Relation Age of Onset    Cancer Father     Cancer Mother       Social History     Tobacco Use    Smoking status: Former     Types: Cigarettes     Passive exposure: Never    Smokeless tobacco: Never   Substance Use Topics    Alcohol use: Never        Review of Systems   Constitutional:  Negative for activity change, fatigue and fever.   HENT:  Negative for congestion.    Respiratory:  Negative for cough.    Cardiovascular:  Negative for chest pain.   Gastrointestinal:  Negative for abdominal pain.   Genitourinary:  Negative for hematuria.             OBJECTIVE:     Vitals:    11/07/23 0918   BP: (!) 107/55   Pulse: 72   Weight: 73.2 kg (161 lb 7.8 oz)   Height: 5' 9" (1.753 m)          General Appearance: Alert, cooperative, no distress  Head: Normocephalic  Eyes: Clear conjunctiva  Neck: No obvious LND or JVD  Lungs: Normal chest excursion, no accessory muscle use  Chest: Regular rate rhythm by palpation, no pedal edema  Abdomen: Soft, non-tender, non-distended  Extremities: Atraumatic   Lymph Nodes: No appreciable lymph adenopathy  Neurologic: No gross gait, motor or sensory deficits            LAB:      CMP  Sodium   Date Value Ref Range Status   11/07/2023 141 136 - 145 mmol/L Final     Potassium   Date Value Ref Range Status   11/07/2023 4.9 3.5 - 5.1 mmol/L Final     Chloride   Date Value Ref Range Status   11/07/2023 107 95 - 110 mmol/L Final     CO2   Date Value Ref Range Status   11/07/2023 27 23 - 29 mmol/L Final     Glucose   Date Value Ref Range Status   11/07/2023 89 70 - 110 mg/dL Final     BUN   Date Value Ref Range Status   11/07/2023 20 8 - 23 mg/dL Final     Creatinine   Date Value Ref Range Status   11/07/2023 1.4 0.5 - 1.4 mg/dL Final     Calcium   Date Value Ref Range Status   11/07/2023 9.8 8.7 - 10.5 mg/dL Final     Total Protein   Date Value Ref Range Status   11/07/2023 7.1 6.0 - 8.4 g/dL Final     Albumin   Date Value Ref Range Status "   11/07/2023 3.0 (L) 3.5 - 5.2 g/dL Final     Total Bilirubin   Date Value Ref Range Status   11/07/2023 0.4 0.1 - 1.0 mg/dL Final     Comment:     For infants and newborns, interpretation of results should be based  on gestational age, weight and in agreement with clinical  observations.    Premature Infant recommended reference ranges:  Up to 24 hours.............<8.0 mg/dL  Up to 48 hours............<12.0 mg/dL  3-5 days..................<15.0 mg/dL  6-29 days.................<15.0 mg/dL       Alkaline Phosphatase   Date Value Ref Range Status   11/07/2023 114 55 - 135 U/L Final     AST   Date Value Ref Range Status   11/07/2023 10 10 - 40 U/L Final     ALT   Date Value Ref Range Status   11/07/2023 11 10 - 44 U/L Final     Anion Gap   Date Value Ref Range Status   11/07/2023 7 (L) 8 - 16 mmol/L Final     eGFR   Date Value Ref Range Status   11/07/2023 54.4 (A) >60 mL/min/1.73 m^2 Final     Lab Results   Component Value Date    WBC 8.37 11/07/2023    HGB 9.4 (L) 11/07/2023    HCT 30.6 (L) 11/07/2023    MCV 88 11/07/2023     11/07/2023             IMAGING:        All imaging from outside hospitals has been reviewed in the imaging tab        ASSESSMENT/PLAN:     67 yo M w/ hx of muscle invasive bladder cancer s/p TMT in 2020 now with recurrence in the RP nodes and possible metastasis to the lungs vs primary lung CA    Plan:    Today I discussed with the patient that I would coordinate care with our medical oncology group. We discussed that any treatment from here would be palliative and likely not curative. I informed them that some of the medical oncologists are different that treat bladder vs lung. They do have an appt with a pulmonologist this week.     I did inform them I would like labs and a CT Scan to better understand the role of percutaneous nephrostomy tube vs ureteral stent. They did inform me that they would like to have ureteral stent removed and nephrostomy tubes placed in [place] if possible.      I will coordinate care with Dr. Joshua oJrgensen.      Consult to Heme/onc placed  CT ordered  Labs today  Will send message to Dr. Jorgensen to coordinate PCNs and ureteral stent removal       The total time for the established patient visit was at least 40 minutes in both face-to-face and non-face-to-face activities, which included chart review, interpretation of results, counseling, education, ordering meds/tests/procedures, and/or coordination of care.       Ashutosh Sanchez MD  Urologic Oncology  P: [phone]

## 2023-11-07 NOTE — TELEPHONE ENCOUNTER
Spoke with daughter, informed her per Dr. Hartman that pt should continue ceftazidime until Thursday. She verbalized understanding.

## 2023-11-07 NOTE — TELEPHONE ENCOUNTER
----- Message from Monserrat Hanson sent at 11/7/2023  2:24 PM CST -----  Contact: Andra/[facility]  JUANA Silverman with [facility] is calling to speak with someone regarding mutual patient. Reports CBC was drawn today and results are available on [facility] portal.  Reports also a shipment came in for antibiotics and request to confirm if needing to discontinue current antibiotic, ceftazidime, and begin the new prescription.  Please give patient's daughter a call back at [phone] to notify.   Thank you,  [facility]

## 2023-11-09 ENCOUNTER — OFFICE VISIT (OUTPATIENT)
Dept: PULMONOLOGY | Facility: CLINIC | Age: 68
DRG: 698 | End: 2023-11-09
Payer: MEDICARE

## 2023-11-09 VITALS
HEART RATE: 76 BPM | WEIGHT: 158.94 LBS | HEIGHT: 69 IN | RESPIRATION RATE: 16 BRPM | SYSTOLIC BLOOD PRESSURE: 96 MMHG | BODY MASS INDEX: 23.54 KG/M2 | DIASTOLIC BLOOD PRESSURE: 68 MMHG | OXYGEN SATURATION: 91 %

## 2023-11-09 DIAGNOSIS — C34.91 SQUAMOUS CELL CARCINOMA OF RIGHT LUNG: Primary | ICD-10-CM

## 2023-11-09 DIAGNOSIS — J43.2 CENTRILOBULAR EMPHYSEMA: ICD-10-CM

## 2023-11-09 PROCEDURE — 99205 OFFICE O/P NEW HI 60 MIN: CPT | Mod: S$PBB,,, | Performed by: HOSPITALIST

## 2023-11-09 PROCEDURE — 99205 PR OFFICE/OUTPT VISIT, NEW, LEVL V, 60-74 MIN: ICD-10-PCS | Mod: S$PBB,,, | Performed by: HOSPITALIST

## 2023-11-09 PROCEDURE — 99999 PR PBB SHADOW E&M-EST. PATIENT-LVL V: ICD-10-PCS | Mod: PBBFAC,,, | Performed by: HOSPITALIST

## 2023-11-09 PROCEDURE — 99999 PR PBB SHADOW E&M-EST. PATIENT-LVL V: CPT | Mod: PBBFAC,,, | Performed by: HOSPITALIST

## 2023-11-09 PROCEDURE — 99215 OFFICE O/P EST HI 40 MIN: CPT | Mod: PBBFAC | Performed by: HOSPITALIST

## 2023-11-09 RX ORDER — DOCUSATE SODIUM 100 MG/1
100 CAPSULE, LIQUID FILLED ORAL 2 TIMES DAILY
COMMUNITY
Start: 2023-11-01 | End: 2024-04-03

## 2023-11-09 RX ORDER — CEFTAZIDIME 2 G/1
INJECTION, POWDER, FOR SOLUTION INTRAVENOUS
COMMUNITY
Start: 2023-11-01 | End: 2023-11-10

## 2023-11-09 RX ORDER — CHOLECALCIFEROL (VITAMIN D3) 25 MCG
25 TABLET ORAL DAILY
Status: ON HOLD | COMMUNITY
Start: 2023-08-29

## 2023-11-09 RX ORDER — FLUTICASONE FUROATE AND VILANTEROL TRIFENATATE 100; 25 UG/1; UG/1
1 POWDER RESPIRATORY (INHALATION) DAILY
Qty: 30 EACH | Refills: 11 | Status: ON HOLD | OUTPATIENT
Start: 2023-11-09 | End: 2024-11-08

## 2023-11-09 NOTE — PROGRESS NOTES
"Subjective:      Patient ID: Geovani Currie is a 68 y.o. male.    Chief Complaint: Squamous cell carcinoma (recent dx), emphysema, tobacco use    68 year old male with history of invasive bladder cancer, newly diagnosed lung SCC who presents to Pulmonary clinic to establish care. Lung biopsy was done 10/18/23 in [place], he has since been moved to [place] to live with his daughter and receive treatment. He has not yet seen Oncology.    - Lung biopsy 10/2023- "Minute focus (approximately 16 cells) marked atypical cells   identified.   favor squamous cell carcinoma.:    - PET 7/2023- "1. Spiculated right upper lobe hypermetabolic pulmonary nodule suspicious for primary lung cancer. Metastatic disease from known bladder malignancy is possible although felt less likely given morphology. 2. Left-sided urinary bladder mass with left sided nephroureteral stents in place and severe hydroureteronephrosis. Left kidney has significantly reduced tracer clearance suggestive of compromised renal function. 3. Multiple prominent or mildly enlarged left para-aortic lymph nodes with mildly increased radiotracer activity, which could be reactive or represent metastatic involvement from bladder tumor."    Pulmonary Interventions:   Albuterol nebulized  Albuterol HFA  Trelegy- had a reaction, felt like he couldn't breathe  Supplemental O2- home concentrator, portable concentrator, tanks- Usually with exertion and at night    Smoking hx: 2 packs a day for 50 years, now vaping nicotine    Review of Systems   Constitutional:  Positive for weakness.   Respiratory:  Positive for dyspnea on extertion. Negative for sputum production and wheezing.      Objective:     Physical Exam   Constitutional: He is oriented to person, place, and time.   Chronically ill appearing male, awake and alert, in no distress   HENT:   Hard of hearing   Cardiovascular: Normal rate and regular rhythm.   Pulmonary/Chest:   Normal effort at rest on room air, " "extended expiratory phase, no wheezing   Neurological: He is alert and oriented to person, place, and time.   Skin: Skin is warm and dry.     Personal Diagnostic Review  As Above      11/7/2023     9:18 AM 11/2/2023    11:06 AM 10/26/2023    12:59 PM 10/25/2023    12:40 PM 10/25/2023     7:51 AM 10/25/2023     6:11 AM 10/25/2023    12:30 AM   Pulmonary Function Tests   SpO2    93 % 93 % 90 % 95 %   Height 5' 9" (1.753 m) 5' 9" (1.753 m) 5' 9" (1.753 m)       Weight 73.2 kg (161 lb 7.8 oz) 70.9 kg (156 lb 4.9 oz) 75.3 kg (166 lb 0.1 oz)       BMI (Calculated) 23.8 23.1 24.5            Assessment:     No diagnosis found.     Outpatient Encounter Medications as of 11/9/2023   Medication Sig Dispense Refill    albuterol (ACCUNEB) 0.63 mg/3 mL Nebu Take 3 mLs (0.63 mg total) by nebulization 3 (three) times daily as needed (sob, wheezing). Rescue 75 mL 3    albuterol (PROVENTIL/VENTOLIN HFA) 90 mcg/actuation inhaler Inhale 1-2 puffs into the lungs every 4 (four) hours as needed for Wheezing. Rescue 18 g 11    ALPRAZolam (XANAX) 0.5 MG tablet Take 2 tablets (1 mg total) by mouth 3 (three) times daily as needed for Anxiety. 60 tablet 2    amLODIPine (NORVASC) 5 MG tablet Take 1 tablet (5 mg total) by mouth once daily. 90 tablet 3    atorvastatin (LIPITOR) 20 MG tablet Take 1 tablet (20 mg total) by mouth once daily. 90 tablet 3    cyclobenzaprine (FLEXERIL) 10 MG tablet Take 1 tablet (10 mg total) by mouth 3 (three) times daily as needed for Muscle spasms. 270 tablet 3    EScitalopram oxalate (LEXAPRO) 20 MG tablet Take 1 tablet (20 mg total) by mouth once daily. 90 tablet 3    HYDROcodone-acetaminophen (NORCO)  mg per tablet Take 1 tablet by mouth every 6 (six) hours as needed for Pain. 120 tablet 0    levothyroxine (SYNTHROID) 125 MCG tablet Take 1 tablet (125 mcg total) by mouth before breakfast. 90 tablet 3    metoprolol succinate (TOPROL-XL) 50 MG 24 hr tablet Take 1 tablet (50 mg total) by mouth once daily. 90 " tablet 3    nicotine (NICODERM CQ) 21 mg/24 hr Place 1 patch onto the skin once daily. 90 patch 3    [] sodium chloride 0.9 % PgBk 100 mL with ertapenem 1 gram SolR 1 g Inject 1 g into the vein once daily. for 11 days      tamsulosin (FLOMAX) 0.4 mg Cap Take 1 capsule (0.4 mg total) by mouth once daily. 90 capsule 3    [DISCONTINUED] albuterol (ACCUNEB) 0.63 mg/3 mL Nebu Take 0.63 mg by nebulization 3 (three) times daily as needed. Rescue      [DISCONTINUED] albuterol (PROVENTIL/VENTOLIN HFA) 90 mcg/actuation inhaler Inhale 1-2 puffs into the lungs every 4 (four) hours as needed for Wheezing. Rescue      [DISCONTINUED] ALPRAZolam (XANAX) 0.5 MG tablet Take 1 mg by mouth 3 (three) times daily as needed for Anxiety.      [DISCONTINUED] amLODIPine (NORVASC) 5 MG tablet Take 5 mg by mouth once daily.      [DISCONTINUED] cyclobenzaprine (FLEXERIL) 10 MG tablet Take 10 mg by mouth 3 (three) times daily as needed for Muscle spasms.      [DISCONTINUED] EScitalopram oxalate (LEXAPRO) 20 MG tablet Take 20 mg by mouth once daily.      [DISCONTINUED] HYDROcodone-acetaminophen (NORCO)  mg per tablet Take 1 tablet by mouth every 6 (six) hours as needed for Pain.      [DISCONTINUED] levothyroxine (SYNTHROID) 125 MCG tablet Take 125 mcg by mouth before breakfast.      [DISCONTINUED] metoprolol succinate (TOPROL-XL) 50 MG 24 hr tablet Take 50 mg by mouth once daily.      [DISCONTINUED] nicotine (NICODERM CQ) 21 mg/24 hr Place 1 patch onto the skin every 24 hours.      [DISCONTINUED] simvastatin (ZOCOR) 40 MG tablet Take 40 mg by mouth every evening.      [DISCONTINUED] tamsulosin (FLOMAX) 0.4 mg Cap Take 0.4 mg by mouth once daily.       No facility-administered encounter medications on file as of 2023.     No orders of the defined types were placed in this encounter.        Plan:     Problem List Items Addressed This Visit          Pulmonary    Centrilobular emphysema     - uses Supplemental O2 during the day as  "needed with exertion and at times at night  - Per family, patient used Trelegy and then had issues with difficulty breathing and had to be hospitalized one time  - did tolerate Breo  - rx for Breo sent, instructed to use 1 puff daily  - albuterol as needed           Relevant Medications    fluticasone furoate-vilanteroL (BREO ELLIPTA) 100-25 mcg/dose diskus inhaler    Other Relevant Orders    Stress test, pulmonary    Spirometry with/without bronchodilator       Oncology    Squamous cell carcinoma of right lung - Primary     - referral placed to Thoracic Oncology    - dx by RUL nodule biopsy 10/18/23, also with satellite lesion seen on CT Chest with size increase to 7mm 9/2023  - Path in Care Everywhere (labs-path):  -"Minute focus (approximately 16 cells) marked atypical cells   identified.   favor squamous cell carcinoma"  - "MICROSCOPIC:   In section A1, there is a small focus (approximately 16 cells) marked   atypical cells identified.  The remaining tissue shows fibrosis,   histiocyte proliferation. Immunostain with adequate control were   performed.  The focus of marked atypical cells is positive for AE1/AE3   and P40.  The focus of marked atypical cells is negative for TTF-1,   napsin A, synaptophysin and CD68.     In section A2, there is no definitive malignancy identified. Immunostain   with adequate control were performed.  TTF-1 and napsin A shows multiple   fragments positive cells with "invasive" pattern.  These cells are   positive for CD68 and negative for P40 and AE1/AE3"      -CT Chest 9/2023- "Increasing size of the spiculated dominant nodule in the right upper lobe now measuring 1.7 x 1.2 cm. The smaller satellite nodule is also increased in size measuring up to 7 mm. Given the PET/CT findings, this is favored to be due for possible malignancy. There are emphysematous changes of the lungs  - PET 7/2023- "1. Spiculated right upper lobe hypermetabolic pulmonary nodule suspicious for primary lung " "cancer. Metastatic disease from known bladder malignancy is possible although felt less likely given morphology. 2. Left-sided urinary bladder mass with left sided nephroureteral stents in place and severe hydroureteronephrosis. Left kidney has significantly reduced tracer clearance suggestive of compromised renal function. 3. Multiple prominent or mildly enlarged left para-aortic lymph nodes with mildly increased radiotracer activity, which could be reactive or represent metastatic involvement from bladder tumor."         Relevant Orders    Ambulatory referral/consult to Oncology     Plan discussed with patient and his family, they expressed understanding, all questions answered. Referral sent to Oncology, RTC 3 months.     "

## 2023-11-09 NOTE — ASSESSMENT & PLAN NOTE
- uses Supplemental O2 during the day as needed with exertion and at times at night  - Per family, patient used Trelegy and then had issues with difficulty breathing and had to be hospitalized one time  - did tolerate Breo  - rx for Breo sent, instructed to use 1 puff daily  - albuterol as needed

## 2023-11-09 NOTE — ASSESSMENT & PLAN NOTE
"- referral placed to Thoracic Oncology    - dx by [facility] nodule biopsy 10/18/23, also with satellite lesion seen on CT Chest with size increase to 7mm 9/2023  - Path in Care Everywhere (labs-path):  -"Minute focus (approximately 16 cells) marked atypical cells   identified.   favor squamous cell carcinoma"  - "MICROSCOPIC:   In section A1, there is a small focus (approximately 16 cells) marked   atypical cells identified.  The remaining tissue shows fibrosis,   histiocyte proliferation. Immunostain with adequate control were   performed.  The focus of marked atypical cells is positive for AE1/AE3   and P40.  The focus of marked atypical cells is negative for TTF-1,   napsin A, synaptophysin and CD68.     In section A2, there is no definitive malignancy identified. Immunostain   with adequate control were performed.  TTF-1 and napsin A shows multiple   fragments positive cells with "invasive" pattern.  These cells are   positive for CD68 and negative for P40 and AE1/AE3"      -CT Chest 9/2023- "Increasing size of the spiculated dominant nodule in the right upper lobe now measuring 1.7 x 1.2 cm. The smaller satellite nodule is also increased in size measuring up to 7 mm. Given the PET/CT findings, this is favored to be due for possible malignancy. There are emphysematous changes of the lungs  - PET 7/2023- "1. Spiculated right upper lobe hypermetabolic pulmonary nodule suspicious for primary lung cancer. Metastatic disease from known bladder malignancy is possible although felt less likely given morphology. 2. Left-sided urinary bladder mass with left sided nephroureteral stents in place and severe hydroureteronephrosis. Left kidney has significantly reduced tracer clearance suggestive of compromised renal function. 3. Multiple prominent or mildly enlarged left para-aortic lymph nodes with mildly increased radiotracer activity, which could be reactive or represent metastatic involvement from bladder tumor."  "

## 2023-11-10 ENCOUNTER — TELEPHONE (OUTPATIENT)
Dept: UROLOGY | Facility: CLINIC | Age: 68
End: 2023-11-10
Payer: MEDICARE

## 2023-11-10 ENCOUNTER — OFFICE VISIT (OUTPATIENT)
Dept: PRIMARY CARE CLINIC | Facility: CLINIC | Age: 68
DRG: 698 | End: 2023-11-10
Payer: MEDICARE

## 2023-11-10 ENCOUNTER — HOSPITAL ENCOUNTER (INPATIENT)
Facility: HOSPITAL | Age: 68
LOS: 6 days | Discharge: HOME HEALTH | DRG: 698 | End: 2023-11-17
Attending: EMERGENCY MEDICINE | Admitting: FAMILY MEDICINE
Payer: MEDICARE

## 2023-11-10 VITALS
TEMPERATURE: 97 F | HEART RATE: 63 BPM | DIASTOLIC BLOOD PRESSURE: 58 MMHG | SYSTOLIC BLOOD PRESSURE: 98 MMHG | OXYGEN SATURATION: 98 % | WEIGHT: 159.81 LBS | HEIGHT: 69 IN | BODY MASS INDEX: 23.67 KG/M2

## 2023-11-10 DIAGNOSIS — N13.5 URETERAL OBSTRUCTION, LEFT: ICD-10-CM

## 2023-11-10 DIAGNOSIS — Z85.118 HX OF CANCER OF LUNG: ICD-10-CM

## 2023-11-10 DIAGNOSIS — I95.9 HYPOTENSION, UNSPECIFIED HYPOTENSION TYPE: ICD-10-CM

## 2023-11-10 DIAGNOSIS — C67.9 MALIGNANT NEOPLASM OF URINARY BLADDER, UNSPECIFIED SITE: ICD-10-CM

## 2023-11-10 DIAGNOSIS — T83.022A NEPHROSTOMY TUBE DISPLACED: Primary | ICD-10-CM

## 2023-11-10 DIAGNOSIS — T83.022A DISPLACEMENT OF NEPHROSTOMY TUBE: ICD-10-CM

## 2023-11-10 DIAGNOSIS — R07.9 CHEST PAIN: ICD-10-CM

## 2023-11-10 DIAGNOSIS — Z85.51 HX OF BLADDER CANCER: ICD-10-CM

## 2023-11-10 DIAGNOSIS — E03.9 HYPOTHYROIDISM, UNSPECIFIED TYPE: ICD-10-CM

## 2023-11-10 DIAGNOSIS — M48.00 SPINAL STENOSIS, UNSPECIFIED SPINAL REGION: ICD-10-CM

## 2023-11-10 DIAGNOSIS — I10 PRIMARY HYPERTENSION: ICD-10-CM

## 2023-11-10 DIAGNOSIS — N17.9 AKI (ACUTE KIDNEY INJURY): ICD-10-CM

## 2023-11-10 DIAGNOSIS — E78.5 HYPERLIPIDEMIA, UNSPECIFIED HYPERLIPIDEMIA TYPE: ICD-10-CM

## 2023-11-10 DIAGNOSIS — Z85.9 HISTORY OF CANCER: ICD-10-CM

## 2023-11-10 DIAGNOSIS — F41.9 ANXIETY: ICD-10-CM

## 2023-11-10 DIAGNOSIS — J44.9 CHRONIC OBSTRUCTIVE PULMONARY DISEASE, UNSPECIFIED COPD TYPE: ICD-10-CM

## 2023-11-10 LAB
ALBUMIN SERPL BCP-MCNC: 3.3 G/DL (ref 3.5–5.2)
ALP SERPL-CCNC: 112 U/L (ref 55–135)
ALT SERPL W/O P-5'-P-CCNC: 6 U/L (ref 10–44)
ANION GAP SERPL CALC-SCNC: 15 MMOL/L (ref 8–16)
APTT PPP: 31.6 SEC (ref 21–32)
AST SERPL-CCNC: 11 U/L (ref 10–40)
BASOPHILS # BLD AUTO: 0.04 K/UL (ref 0–0.2)
BASOPHILS NFR BLD: 0.5 % (ref 0–1.9)
BILIRUB SERPL-MCNC: 0.3 MG/DL (ref 0.1–1)
BUN SERPL-MCNC: 23 MG/DL (ref 8–23)
CALCIUM SERPL-MCNC: 9.7 MG/DL (ref 8.7–10.5)
CHLORIDE SERPL-SCNC: 103 MMOL/L (ref 95–110)
CO2 SERPL-SCNC: 21 MMOL/L (ref 23–29)
CREAT SERPL-MCNC: 1.7 MG/DL (ref 0.5–1.4)
DIFFERENTIAL METHOD: ABNORMAL
EOSINOPHIL # BLD AUTO: 0.3 K/UL (ref 0–0.5)
EOSINOPHIL NFR BLD: 3.8 % (ref 0–8)
ERYTHROCYTE [DISTWIDTH] IN BLOOD BY AUTOMATED COUNT: 14.6 % (ref 11.5–14.5)
EST. GFR  (NO RACE VARIABLE): 43 ML/MIN/1.73 M^2
GLUCOSE SERPL-MCNC: 111 MG/DL (ref 70–110)
HCT VFR BLD AUTO: 30.5 % (ref 40–54)
HGB BLD-MCNC: 9.4 G/DL (ref 14–18)
IMM GRANULOCYTES # BLD AUTO: 0.02 K/UL (ref 0–0.04)
IMM GRANULOCYTES NFR BLD AUTO: 0.3 % (ref 0–0.5)
INR PPP: 1.1 (ref 0.8–1.2)
LACTATE SERPL-SCNC: 0.8 MMOL/L (ref 0.5–2.2)
LYMPHOCYTES # BLD AUTO: 1.3 K/UL (ref 1–4.8)
LYMPHOCYTES NFR BLD: 16 % (ref 18–48)
MAGNESIUM SERPL-MCNC: 2.2 MG/DL (ref 1.6–2.6)
MCH RBC QN AUTO: 26.5 PG (ref 27–31)
MCHC RBC AUTO-ENTMCNC: 30.8 G/DL (ref 32–36)
MCV RBC AUTO: 86 FL (ref 82–98)
MONOCYTES # BLD AUTO: 0.6 K/UL (ref 0.3–1)
MONOCYTES NFR BLD: 7.4 % (ref 4–15)
NEUTROPHILS # BLD AUTO: 5.6 K/UL (ref 1.8–7.7)
NEUTROPHILS NFR BLD: 72 % (ref 38–73)
NRBC BLD-RTO: 0 /100 WBC
PLATELET # BLD AUTO: 222 K/UL (ref 150–450)
PMV BLD AUTO: 9.6 FL (ref 9.2–12.9)
POTASSIUM SERPL-SCNC: 3.9 MMOL/L (ref 3.5–5.1)
PROT SERPL-MCNC: 7.5 G/DL (ref 6–8.4)
PROTHROMBIN TIME: 11.2 SEC (ref 9–12.5)
RBC # BLD AUTO: 3.55 M/UL (ref 4.6–6.2)
SODIUM SERPL-SCNC: 139 MMOL/L (ref 136–145)
WBC # BLD AUTO: 7.82 K/UL (ref 3.9–12.7)

## 2023-11-10 PROCEDURE — 85610 PROTHROMBIN TIME: CPT | Performed by: EMERGENCY MEDICINE

## 2023-11-10 PROCEDURE — 96360 HYDRATION IV INFUSION INIT: CPT

## 2023-11-10 PROCEDURE — 99215 OFFICE O/P EST HI 40 MIN: CPT | Mod: PBBFAC,PN,25 | Performed by: FAMILY MEDICINE

## 2023-11-10 PROCEDURE — 99417 PR PROLONGED SVC, OUTPT, W/WO DIRECT PT CONTACT,  EA ADDTL 15 MIN: ICD-10-PCS | Mod: S$PBB,,, | Performed by: FAMILY MEDICINE

## 2023-11-10 PROCEDURE — 25000003 PHARM REV CODE 250: Performed by: EMERGENCY MEDICINE

## 2023-11-10 PROCEDURE — 83605 ASSAY OF LACTIC ACID: CPT | Performed by: EMERGENCY MEDICINE

## 2023-11-10 PROCEDURE — 99215 PR OFFICE/OUTPT VISIT, EST, LEVL V, 40-54 MIN: ICD-10-PCS | Mod: S$PBB,,, | Performed by: FAMILY MEDICINE

## 2023-11-10 PROCEDURE — 83735 ASSAY OF MAGNESIUM: CPT | Performed by: EMERGENCY MEDICINE

## 2023-11-10 PROCEDURE — 99999 PR PBB SHADOW E&M-EST. PATIENT-LVL V: CPT | Mod: PBBFAC,,, | Performed by: FAMILY MEDICINE

## 2023-11-10 PROCEDURE — 99215 OFFICE O/P EST HI 40 MIN: CPT | Mod: S$PBB,,, | Performed by: FAMILY MEDICINE

## 2023-11-10 PROCEDURE — 80053 COMPREHEN METABOLIC PANEL: CPT | Performed by: EMERGENCY MEDICINE

## 2023-11-10 PROCEDURE — 85730 THROMBOPLASTIN TIME PARTIAL: CPT | Performed by: EMERGENCY MEDICINE

## 2023-11-10 PROCEDURE — 87040 BLOOD CULTURE FOR BACTERIA: CPT | Mod: 59 | Performed by: EMERGENCY MEDICINE

## 2023-11-10 PROCEDURE — S4991 NICOTINE PATCH NONLEGEND: HCPCS | Performed by: EMERGENCY MEDICINE

## 2023-11-10 PROCEDURE — 99999 PR PBB SHADOW E&M-EST. PATIENT-LVL V: ICD-10-PCS | Mod: PBBFAC,,, | Performed by: FAMILY MEDICINE

## 2023-11-10 PROCEDURE — 85025 COMPLETE CBC W/AUTO DIFF WBC: CPT | Performed by: EMERGENCY MEDICINE

## 2023-11-10 PROCEDURE — 63600175 PHARM REV CODE 636 W HCPCS: Performed by: HOSPITALIST

## 2023-11-10 PROCEDURE — G0378 HOSPITAL OBSERVATION PER HR: HCPCS

## 2023-11-10 PROCEDURE — 99417 PROLNG OP E/M EACH 15 MIN: CPT | Mod: S$PBB,,, | Performed by: FAMILY MEDICINE

## 2023-11-10 PROCEDURE — 99285 EMERGENCY DEPT VISIT HI MDM: CPT | Mod: 25,27

## 2023-11-10 RX ORDER — PROMETHAZINE HYDROCHLORIDE 12.5 MG/1
25 TABLET ORAL EVERY 6 HOURS PRN
Status: DISCONTINUED | OUTPATIENT
Start: 2023-11-10 | End: 2023-11-18 | Stop reason: HOSPADM

## 2023-11-10 RX ORDER — SODIUM CHLORIDE, SODIUM LACTATE, POTASSIUM CHLORIDE, CALCIUM CHLORIDE 600; 310; 30; 20 MG/100ML; MG/100ML; MG/100ML; MG/100ML
INJECTION, SOLUTION INTRAVENOUS CONTINUOUS
Status: DISCONTINUED | OUTPATIENT
Start: 2023-11-10 | End: 2023-11-12

## 2023-11-10 RX ORDER — ONDANSETRON 2 MG/ML
4 INJECTION INTRAMUSCULAR; INTRAVENOUS EVERY 8 HOURS PRN
Status: DISCONTINUED | OUTPATIENT
Start: 2023-11-10 | End: 2023-11-18 | Stop reason: HOSPADM

## 2023-11-10 RX ORDER — GLUCAGON 1 MG
1 KIT INJECTION
Status: DISCONTINUED | OUTPATIENT
Start: 2023-11-10 | End: 2023-11-18 | Stop reason: HOSPADM

## 2023-11-10 RX ORDER — AMOXICILLIN 250 MG
1 CAPSULE ORAL 2 TIMES DAILY PRN
Status: DISCONTINUED | OUTPATIENT
Start: 2023-11-10 | End: 2023-11-18 | Stop reason: HOSPADM

## 2023-11-10 RX ORDER — NALOXONE HCL 0.4 MG/ML
0.02 VIAL (ML) INJECTION
Status: DISCONTINUED | OUTPATIENT
Start: 2023-11-10 | End: 2023-11-18 | Stop reason: HOSPADM

## 2023-11-10 RX ORDER — HYDROCODONE BITARTRATE AND ACETAMINOPHEN 5; 325 MG/1; MG/1
1 TABLET ORAL EVERY 6 HOURS PRN
Status: DISCONTINUED | OUTPATIENT
Start: 2023-11-10 | End: 2023-11-18 | Stop reason: HOSPADM

## 2023-11-10 RX ORDER — ACETAMINOPHEN 325 MG/1
650 TABLET ORAL EVERY 8 HOURS PRN
Status: DISCONTINUED | OUTPATIENT
Start: 2023-11-10 | End: 2023-11-18 | Stop reason: HOSPADM

## 2023-11-10 RX ORDER — HYDROCODONE BITARTRATE AND ACETAMINOPHEN 5; 325 MG/1; MG/1
1 TABLET ORAL
Status: COMPLETED | OUTPATIENT
Start: 2023-11-10 | End: 2023-11-10

## 2023-11-10 RX ORDER — TALC
6 POWDER (GRAM) TOPICAL NIGHTLY PRN
Status: DISCONTINUED | OUTPATIENT
Start: 2023-11-10 | End: 2023-11-18 | Stop reason: HOSPADM

## 2023-11-10 RX ORDER — ACETAMINOPHEN 650 MG/1
650 SUPPOSITORY RECTAL EVERY 6 HOURS PRN
Status: DISCONTINUED | OUTPATIENT
Start: 2023-11-10 | End: 2023-11-18 | Stop reason: HOSPADM

## 2023-11-10 RX ORDER — IBUPROFEN 200 MG
1 TABLET ORAL
Status: COMPLETED | OUTPATIENT
Start: 2023-11-10 | End: 2023-11-11

## 2023-11-10 RX ORDER — IBUPROFEN 200 MG
24 TABLET ORAL
Status: DISCONTINUED | OUTPATIENT
Start: 2023-11-10 | End: 2023-11-18 | Stop reason: HOSPADM

## 2023-11-10 RX ORDER — SODIUM CHLORIDE 0.9 % (FLUSH) 0.9 %
10 SYRINGE (ML) INJECTION EVERY 12 HOURS PRN
Status: DISCONTINUED | OUTPATIENT
Start: 2023-11-10 | End: 2023-11-18 | Stop reason: HOSPADM

## 2023-11-10 RX ORDER — IBUPROFEN 200 MG
16 TABLET ORAL
Status: DISCONTINUED | OUTPATIENT
Start: 2023-11-10 | End: 2023-11-18 | Stop reason: HOSPADM

## 2023-11-10 RX ORDER — MORPHINE SULFATE 4 MG/ML
2 INJECTION, SOLUTION INTRAMUSCULAR; INTRAVENOUS EVERY 4 HOURS PRN
Status: DISCONTINUED | OUTPATIENT
Start: 2023-11-10 | End: 2023-11-18 | Stop reason: HOSPADM

## 2023-11-10 RX ORDER — MAG HYDROX/ALUMINUM HYD/SIMETH 200-200-20
30 SUSPENSION, ORAL (FINAL DOSE FORM) ORAL 4 TIMES DAILY PRN
Status: DISCONTINUED | OUTPATIENT
Start: 2023-11-10 | End: 2023-11-18 | Stop reason: HOSPADM

## 2023-11-10 RX ADMIN — NICOTINE 1 PATCH: 21 PATCH, EXTENDED RELEASE TRANSDERMAL at 08:11

## 2023-11-10 RX ADMIN — SODIUM CHLORIDE 500 ML: 9 INJECTION, SOLUTION INTRAVENOUS at 08:11

## 2023-11-10 RX ADMIN — HYDROCODONE BITARTRATE AND ACETAMINOPHEN 1 TABLET: 5; 325 TABLET ORAL at 08:11

## 2023-11-10 RX ADMIN — SODIUM CHLORIDE, POTASSIUM CHLORIDE, SODIUM LACTATE AND CALCIUM CHLORIDE: 600; 310; 30; 20 INJECTION, SOLUTION INTRAVENOUS at 10:11

## 2023-11-10 NOTE — Clinical Note
Diagnosis: Displacement of nephrostomy tube [024219]   Future Attending Provider: BRODERICK GALAN [[id number]]   Admitting Provider:: BRODERICK GALAN. [[id number]]

## 2023-11-10 NOTE — PROGRESS NOTES
"    [facility]       [address] [place] [address]      Phone: [phone]      Fax: [phone]    Faizan Antoine MD                Office Visit  11/10/2023        Subjective      HPI:  Geovani Currie is a 68 y.o. male presents today in clinic for "Follow-up  ."     68-year-old gentleman presents today to discuss multiple issues.      His daughter, Stephania, is present with him.  She provides most of the history.    Since our video visit last week, they were able to fill all of their prescriptions.    Had a video visit with urologic oncology.  They are coordinating with local Urology to get a CT scan and a follow-up appointment set.  They are trying to decide if he needs a nephrostomy tube versus stent.    Speaking of nephrostomy tube, this morning when he was getting out of bed he felt his current tube get pulled out of place.  Has been leaking urine ever since.  They need to see about getting it replaced.    They have also followed up with Infectious Disease.  They wanted him to continue his antibiotics until Thursday (yesterday), then get the line removed.  They are not sure who is supposed to remove this line.    They have also seen pulmonology.  Was given refills of Breo, told to continue using albuterol, oxygen, as needed.    They have upcoming appointments on Monday with Oncology at the [facility].    Has history of hypertension.  Prescribed amlodipine 5 mg, metoprolol.  Blood pressure has been running low at home.  Low in clinic today.      Also has spinal stenosis, sciatica.  Has gotten injections in his back in the past.  Was recommended he get an epidural, but he moved from [place] before he can complete it.  He would like to see somebody out here about getting that done.    PMH:  Bladder cancer.  Lung cancer.  HTN.  HLD.  COPD.  Hypothyroid.  Spinal stenosis.  PSH: Left kidney stent.  Nephrostomy tube right kidney.  Appendectomy.  Cataracts.  Finger " amputations (accident).    Allergies: NKDA  Social:  Retired.  .  T: Denies current use.  Quit a couple of weeks ago.    A has   D:  Denies    Exercise:  No regular exercise program.  Limited mobility with his health issues.        The following were updated and reviewed by myself in the chart: medications, past medical history, past surgical history, family history, social history, and allergies.     Medications:  Current Outpatient Medications on File Prior to Visit   Medication Sig Dispense Refill    albuterol (ACCUNEB) 0.63 mg/3 mL Nebu Take 3 mLs (0.63 mg total) by nebulization 3 (three) times daily as needed (sob, wheezing). Rescue 75 mL 3    albuterol (PROVENTIL/VENTOLIN HFA) 90 mcg/actuation inhaler Inhale 1-2 puffs into the lungs every 4 (four) hours as needed for Wheezing. Rescue 18 g 11    ALPRAZolam (XANAX) 0.5 MG tablet Take 2 tablets (1 mg total) by mouth 3 (three) times daily as needed for Anxiety. 60 tablet 2    cyclobenzaprine (FLEXERIL) 10 MG tablet Take 1 tablet (10 mg total) by mouth 3 (three) times daily as needed for Muscle spasms. 270 tablet 3    docusate sodium (COLACE) 100 MG capsule Take 100 mg by mouth 2 (two) times daily.      EScitalopram oxalate (LEXAPRO) 20 MG tablet Take 1 tablet (20 mg total) by mouth once daily. 90 tablet 3    fluticasone furoate-vilanteroL (BREO ELLIPTA) 100-25 mcg/dose diskus inhaler Inhale 1 puff into the lungs once daily. Controller 30 each 11    HYDROcodone-acetaminophen (NORCO)  mg per tablet Take 1 tablet by mouth every 6 (six) hours as needed for Pain. 120 tablet 0    levothyroxine (SYNTHROID) 125 MCG tablet Take 1 tablet (125 mcg total) by mouth before breakfast. 90 tablet 3    metoprolol succinate (TOPROL-XL) 50 MG 24 hr tablet Take 1 tablet (50 mg total) by mouth once daily. 90 tablet 3    nicotine (NICODERM CQ) 21 mg/24 hr Place 1 patch onto the skin once daily. 90 patch 3    tamsulosin (FLOMAX) 0.4 mg Cap Take 1 capsule (0.4 mg total)  by mouth once daily. 90 capsule 3    vitamin D (VITAMIN D3) 1000 units Tab 25 mcg.      [DISCONTINUED] amLODIPine (NORVASC) 5 MG tablet Take 1 tablet (5 mg total) by mouth once daily. 90 tablet 3    [DISCONTINUED] atorvastatin (LIPITOR) 20 MG tablet Take 1 tablet (20 mg total) by mouth once daily. 90 tablet 3    [DISCONTINUED] ceftAZIDime (FORTAZ) 2 gram injection        No current facility-administered medications on file prior to visit.        PMHx:  Past Medical History:   Diagnosis Date    COPD (chronic obstructive pulmonary disease)     Hypertension       Patient Active Problem List    Diagnosis Date Noted    Squamous cell carcinoma of right lung 11/09/2023    Centrilobular emphysema 11/09/2023    Altered mental status 10/21/2023    Elevated serum creatinine 10/21/2023    Normocytic anemia 10/21/2023    Hx of bladder cancer 10/21/2023    Hx of cancer of lung 10/21/2023    UTI (urinary tract infection) 10/21/2023    Hyperglycemia 10/21/2023        PSHx:  Past Surgical History:   Procedure Laterality Date    APPENDECTOMY      CATARACT EXTRACTION      NEPHROSTOMY          FHx:  Family History   Problem Relation Age of Onset    Cancer Father     Cancer Mother         Social:  Social History     Socioeconomic History    Marital status:    Tobacco Use    Smoking status: Former     Types: Cigarettes     Passive exposure: Never    Smokeless tobacco: Never   Substance and Sexual Activity    Alcohol use: Never    Drug use: Never    Sexual activity: Not Currently     Partners: Female     Social Determinants of Health     Financial Resource Strain: Unknown (10/23/2023)    Overall Financial Resource Strain (CARDIA)     Difficulty of Paying Living Expenses: Patient refused   Food Insecurity: Unknown (10/23/2023)    Hunger Vital Sign     Worried About Running Out of Food in the Last Year: Patient refused     Ran Out of Food in the Last Year: Patient refused   Transportation Needs: Unknown (10/23/2023)    PRAPARE -  "Transportation     Lack of Transportation (Medical): Patient refused     Lack of Transportation (Non-Medical): Patient refused   Physical Activity: Unknown (10/23/2023)    Exercise Vital Sign     Days of Exercise per Week: Patient refused     Minutes of Exercise per Session: Patient refused   Stress: Unknown (10/23/2023)    [employer] [place] [employer] [employer] - Occupational Stress Questionnaire     Feeling of Stress : Patient refused   Social Connections: Unknown (10/23/2023)    Social Connection and Isolation Panel [NHANES]     Frequency of Communication with Friends and Family: More than three times a week     Frequency of Social Gatherings with Friends and Family: More than three times a week     Attends Muslim Services: Patient refused     Active Member of Clubs or Organizations: Patient refused     Attends Club or Organization Meetings: Patient refused     Marital Status: Patient refused   Housing Stability: Unknown (10/23/2023)    Housing Stability Vital Sign     Unable to Pay for Housing in the Last Year: Patient refused     Unstable Housing in the Last Year: Patient refused        Allergies:  Review of patient's allergies indicates:  No Known Allergies     ROS:  Review of Systems   Constitutional:  Negative for activity change, appetite change, chills and fever.   HENT:  Negative for congestion, postnasal drip, rhinorrhea, sore throat and trouble swallowing.    Respiratory:  Negative for cough and shortness of breath.    Cardiovascular:  Negative for chest pain and palpitations.   Gastrointestinal:  Negative for abdominal pain, constipation, diarrhea, nausea and vomiting.   Musculoskeletal:  Positive for arthralgias and back pain.   Skin:  Negative for color change and rash.   Neurological:  Negative for headaches.   All other systems reviewed and are negative.         Objective      BP (!) 98/58   Pulse 63   Temp 97.3 °F (36.3 °C)   Ht 5' 9" (1.753 m)   Wt 72.5 kg (159 lb 13.3 oz)   SpO2 98%   " "BMI 23.60 kg/m²   Ht Readings from Last 3 Encounters:   11/10/23 5' 9" (1.753 m)   11/09/23 5' 9" (1.753 m)   11/07/23 5' 9" (1.753 m)     Wt Readings from Last 3 Encounters:   11/10/23 72.5 kg (159 lb 13.3 oz)   11/09/23 72.1 kg (158 lb 15.2 oz)   11/07/23 73.2 kg (161 lb 7.8 oz)       PHYSICAL EXAM:  Physical Exam  Vitals and nursing note reviewed.   Constitutional:       General: He is not in acute distress.     Appearance: He is ill-appearing.   HENT:      Head: Normocephalic and atraumatic.      Right Ear: Tympanic membrane, ear canal and external ear normal.      Left Ear: Tympanic membrane, ear canal and external ear normal.      Nose: Nose normal. No congestion or rhinorrhea.      Mouth/Throat:      Mouth: Mucous membranes are moist.      Pharynx: Oropharynx is clear. No oropharyngeal exudate or posterior oropharyngeal erythema.   Eyes:      Extraocular Movements: Extraocular movements intact.      Conjunctiva/sclera: Conjunctivae normal.      Pupils: Pupils are equal, round, and reactive to light.   Cardiovascular:      Rate and Rhythm: Normal rate and regular rhythm.   Pulmonary:      Effort: Pulmonary effort is normal.      Breath sounds: No wheezing, rhonchi or rales.   Musculoskeletal:         General: Normal range of motion.      Cervical back: Normal range of motion.   Lymphadenopathy:      Cervical: No cervical adenopathy.   Skin:     General: Skin is warm and dry.      Comments: Right-sided nephrostomy tube partially removed.   Neurological:      General: No focal deficit present.      Mental Status: He is alert.              LABS / IMAGING:  Recent Results (from the past 4368 hour(s))   POCT glucose    Collection Time: 10/20/23  9:55 PM   Result Value Ref Range    POCT Glucose 127 (H) 70 - 110 mg/dL   CBC W/ AUTO DIFFERENTIAL    Collection Time: 10/20/23 10:19 PM   Result Value Ref Range    WBC 11.39 3.90 - 12.70 K/uL    RBC 3.28 (L) 4.60 - 6.20 M/uL    Hemoglobin 9.1 (L) 14.0 - 18.0 g/dL    " Hematocrit 28.3 (L) 40.0 - 54.0 %    MCV 86 82 - 98 fL    MCH 27.7 27.0 - 31.0 pg    MCHC 32.2 32.0 - 36.0 g/dL    RDW 14.1 11.5 - 14.5 %    Platelets 281 150 - 450 K/uL    MPV 9.7 9.2 - 12.9 fL    Immature Granulocytes 0.5 0.0 - 0.5 %    Gran # (ANC) 9.3 (H) 1.8 - 7.7 K/uL    Immature Grans (Abs) 0.06 (H) 0.00 - 0.04 K/uL    Lymph # 1.0 1.0 - 4.8 K/uL    Mono # 0.8 0.3 - 1.0 K/uL    Eos # 0.3 0.0 - 0.5 K/uL    Baso # 0.03 0.00 - 0.20 K/uL    nRBC 0 0 /100 WBC    Gran % 81.5 (H) 38.0 - 73.0 %    Lymph % 8.3 (L) 18.0 - 48.0 %    Mono % 6.6 4.0 - 15.0 %    Eosinophil % 2.8 0.0 - 8.0 %    Basophil % 0.3 0.0 - 1.9 %    Differential Method Automated    Comprehensive metabolic panel    Collection Time: 10/20/23 10:19 PM   Result Value Ref Range    Sodium 137 136 - 145 mmol/L    Potassium 4.7 3.5 - 5.1 mmol/L    Chloride 102 95 - 110 mmol/L    CO2 21 (L) 23 - 29 mmol/L    Glucose 124 (H) 70 - 110 mg/dL    BUN 30 (H) 8 - 23 mg/dL    Creatinine 1.9 (H) 0.5 - 1.4 mg/dL    Calcium 9.9 8.7 - 10.5 mg/dL    Total Protein 7.7 6.0 - 8.4 g/dL    Albumin 3.1 (L) 3.5 - 5.2 g/dL    Total Bilirubin 0.3 0.1 - 1.0 mg/dL    Alkaline Phosphatase 113 55 - 135 U/L    AST 19 10 - 40 U/L    ALT 16 10 - 44 U/L    eGFR 38 (A) >60 mL/min/1.73 m^2    Anion Gap 14 8 - 16 mmol/L   Protime-INR    Collection Time: 10/20/23 10:19 PM   Result Value Ref Range    Prothrombin Time 11.3 9.0 - 12.5 sec    INR 1.0 0.8 - 1.2   TSH    Collection Time: 10/20/23 10:19 PM   Result Value Ref Range    TSH 1.001 0.400 - 4.000 uIU/mL   LDL - Lipid Panel    Collection Time: 10/20/23 10:19 PM   Result Value Ref Range    Cholesterol 132 120 - 199 mg/dL    Triglycerides 55 30 - 150 mg/dL    HDL 54 40 - 75 mg/dL    LDL Cholesterol 67.0 63.0 - 159.0 mg/dL    HDL/Cholesterol Ratio 40.9 20.0 - 50.0 %    Total Cholesterol/HDL Ratio 2.4 2.0 - 5.0    Non-HDL Cholesterol 78 mg/dL   Troponin I    Collection Time: 10/20/23 10:19 PM   Result Value Ref Range    Troponin I 0.010 0.000  - 0.026 ng/mL   Lactic acid, plasma    Collection Time: 10/20/23 10:22 PM   Result Value Ref Range    Lactate (Lactic Acid) 1.3 0.5 - 2.2 mmol/L   Blood culture #1 **CANNOT BE ORDERED STAT**    Collection Time: 10/20/23 10:22 PM    Specimen: Peripheral, Antecubital, Left; Blood   Result Value Ref Range    Blood Culture, Routine No growth after 5 days.    Ammonia    Collection Time: 10/20/23 10:22 PM   Result Value Ref Range    Ammonia 26 10 - 50 umol/L   Urinalysis, Reflex to Urine Culture Urine, Clean Catch    Collection Time: 10/20/23 10:24 PM    Specimen: Urine   Result Value Ref Range    Specimen UA Urine, Clean Catch     Color, UA Yellow Yellow, Straw, Andra    Appearance, UA Hazy (A) Clear    pH, UA 6.0 5.0 - 8.0    Specific Gravity, UA 1.020 1.005 - 1.030    Protein, UA 1+ (A) Negative    Glucose, UA Negative Negative    Ketones, UA Negative Negative    Bilirubin (UA) Negative Negative    Occult Blood UA 2+ (A) Negative    Nitrite, UA Negative Negative    Urobilinogen, UA Negative <2.0 EU/dL    Leukocytes, UA 3+ (A) Negative   Urinalysis Microscopic    Collection Time: 10/20/23 10:24 PM   Result Value Ref Range    RBC, UA 24 (H) 0 - 4 /hpf    WBC, UA >100 (H) 0 - 5 /hpf    WBC Clumps, UA Few (A) None-Rare    Bacteria Rare None-Occ /hpf    Yeast, UA Occasional (A) None    Squam Epithel, UA 0 /hpf    Hyaline Casts, UA 2 (A) 0-1/lpf /lpf    Unclass Darling UA 3 None-Moderate    Microscopic Comment SEE COMMENT    Urine culture    Collection Time: 10/20/23 10:24 PM    Specimen: Urine   Result Value Ref Range    Urine Culture, Routine ENTEROBACTER CLOACAE  > 100,000 cfu/ml   (A)        Susceptibility    Enterobacter cloacae - CULTURE, URINE     Ceftriaxone 2 Intermediate mcg/mL     Ciprofloxacin 2 Intermediate mcg/mL     Cefepime 4 Sensitive mcg/mL     Ertapenem <=0.5 Sensitive mcg/mL     Nitrofurantoin >64 Resistant mcg/mL     Gentamicin <=4 Sensitive mcg/mL     Levofloxacin 4 Intermediate mcg/mL     Meropenem <=1  Sensitive mcg/mL     Piperacillin/Tazo <=16 Sensitive mcg/mL     Trimeth/Sulfa >2/38 Resistant mcg/mL     Tobramycin <=4 Sensitive mcg/mL   POCT ARTERIAL BLOOD GAS    Collection Time: 10/20/23 10:38 PM   Result Value Ref Range    POC PH 7.323 (L) 7.350 - 7.450    POC PCO2 43.2 35.0 - 45.0 mmHg    POC PO2 89.0 80.0 - 100 mmHg    POC SATURATED O2 97.0 95.0 - 100.0 %    POC HCO3 22.4 (L) 24.0 - 28.0 mmol/l    Base Deficit -3.5 (L) -2.0 - 2.0 mmol/l    Specimen source Arterial     Performed By: [email or web address]     Allens Test YES     FiO2 21.0 %   Blood culture #2 **CANNOT BE ORDERED STAT**    Collection Time: 10/20/23 11:34 PM    Specimen: Peripheral, Hand, Right; Blood   Result Value Ref Range    Blood Culture, Routine       Gram stain aer bottle: Gram positive cocci in clusters resembling Staph    Blood Culture, Routine       Results called to and read back by: Estefany Jacob 10/22/2023  03:30    Blood Culture, Routine (A)      COAGULASE-NEGATIVE STAPHYLOCOCCUS SPECIES  Organism is a probable contaminant     MRSA/SA Rapid ID by PCR from Blood culture    Collection Time: 10/20/23 11:44 PM   Result Value Ref Range    Staph aureus ID by PCR Negative Negative    Methicillin Resistant ID by PCR Negative Negative   CBC with Automated Differential    Collection Time: 10/21/23  8:30 AM   Result Value Ref Range    WBC 9.19 3.90 - 12.70 K/uL    RBC 2.97 (L) 4.60 - 6.20 M/uL    Hemoglobin 8.3 (L) 14.0 - 18.0 g/dL    Hematocrit 25.4 (L) 40.0 - 54.0 %    MCV 86 82 - 98 fL    MCH 27.9 27.0 - 31.0 pg    MCHC 32.7 32.0 - 36.0 g/dL    RDW 14.0 11.5 - 14.5 %    Platelets 239 150 - 450 K/uL    MPV 9.5 9.2 - 12.9 fL    Immature Granulocytes 0.3 0.0 - 0.5 %    Gran # (ANC) 7.4 1.8 - 7.7 K/uL    Immature Grans (Abs) 0.03 0.00 - 0.04 K/uL    Lymph # 0.8 (L) 1.0 - 4.8 K/uL    Mono # 0.6 0.3 - 1.0 K/uL    Eos # 0.3 0.0 - 0.5 K/uL    Baso # 0.03 0.00 - 0.20 K/uL    nRBC 0 0 /100 WBC    Gran % 80.1 (H) 38.0 - 73.0 %    Lymph % 9.1 (L) 18.0 - 48.0 %     Mono % 6.6 4.0 - 15.0 %    Eosinophil % 3.6 0.0 - 8.0 %    Basophil % 0.3 0.0 - 1.9 %    Differential Method Automated    Basic Metabolic Panel (BMP)    Collection Time: 10/21/23  8:30 AM   Result Value Ref Range    Sodium 138 136 - 145 mmol/L    Potassium 4.4 3.5 - 5.1 mmol/L    Chloride 106 95 - 110 mmol/L    CO2 22 (L) 23 - 29 mmol/L    Glucose 115 (H) 70 - 110 mg/dL    BUN 25 (H) 8 - 23 mg/dL    Creatinine 1.6 (H) 0.5 - 1.4 mg/dL    Calcium 9.4 8.7 - 10.5 mg/dL    Anion Gap 10 8 - 16 mmol/L    eGFR 46 (A) >60 mL/min/1.73 m^2   POCT glucose    Collection Time: 10/21/23  4:31 PM   Result Value Ref Range    POCT Glucose 136 (H) 70 - 110 mg/dL   CBC with Automated Differential    Collection Time: 10/22/23  4:14 AM   Result Value Ref Range    WBC 8.53 3.90 - 12.70 K/uL    RBC 2.89 (L) 4.60 - 6.20 M/uL    Hemoglobin 7.8 (L) 14.0 - 18.0 g/dL    Hematocrit 24.9 (L) 40.0 - 54.0 %    MCV 86 82 - 98 fL    MCH 27.0 27.0 - 31.0 pg    MCHC 31.3 (L) 32.0 - 36.0 g/dL    RDW 14.1 11.5 - 14.5 %    Platelets 258 150 - 450 K/uL    MPV 9.3 9.2 - 12.9 fL    Immature Granulocytes 0.6 (H) 0.0 - 0.5 %    Gran # (ANC) 5.9 1.8 - 7.7 K/uL    Immature Grans (Abs) 0.05 (H) 0.00 - 0.04 K/uL    Lymph # 1.4 1.0 - 4.8 K/uL    Mono # 0.8 0.3 - 1.0 K/uL    Eos # 0.5 0.0 - 0.5 K/uL    Baso # 0.03 0.00 - 0.20 K/uL    nRBC 0 0 /100 WBC    Gran % 68.9 38.0 - 73.0 %    Lymph % 15.8 (L) 18.0 - 48.0 %    Mono % 9.0 4.0 - 15.0 %    Eosinophil % 5.3 0.0 - 8.0 %    Basophil % 0.4 0.0 - 1.9 %    Differential Method Automated    Basic Metabolic Panel (BMP)    Collection Time: 10/22/23  4:14 AM   Result Value Ref Range    Sodium 139 136 - 145 mmol/L    Potassium 5.0 3.5 - 5.1 mmol/L    Chloride 105 95 - 110 mmol/L    CO2 24 23 - 29 mmol/L    Glucose 100 70 - 110 mg/dL    BUN 19 8 - 23 mg/dL    Creatinine 1.3 0.5 - 1.4 mg/dL    Calcium 9.5 8.7 - 10.5 mg/dL    Anion Gap 10 8 - 16 mmol/L    eGFR 59 (A) >60 mL/min/1.73 m^2   POCT glucose    Collection Time:  10/22/23 12:04 PM   Result Value Ref Range    POCT Glucose 134 (H) 70 - 110 mg/dL   POCT glucose    Collection Time: 10/22/23  4:13 PM   Result Value Ref Range    POCT Glucose 130 (H) 70 - 110 mg/dL   POCT glucose    Collection Time: 10/22/23  8:00 PM   Result Value Ref Range    POCT Glucose 113 (H) 70 - 110 mg/dL   CBC with Automated Differential    Collection Time: 10/23/23  3:40 AM   Result Value Ref Range    WBC 7.48 3.90 - 12.70 K/uL    RBC 3.15 (L) 4.60 - 6.20 M/uL    Hemoglobin 8.5 (L) 14.0 - 18.0 g/dL    Hematocrit 26.6 (L) 40.0 - 54.0 %    MCV 84 82 - 98 fL    MCH 27.0 27.0 - 31.0 pg    MCHC 32.0 32.0 - 36.0 g/dL    RDW 14.4 11.5 - 14.5 %    Platelets 284 150 - 450 K/uL    MPV 9.1 (L) 9.2 - 12.9 fL    Immature Granulocytes 0.5 0.0 - 0.5 %    Gran # (ANC) 5.1 1.8 - 7.7 K/uL    Immature Grans (Abs) 0.04 0.00 - 0.04 K/uL    Lymph # 1.1 1.0 - 4.8 K/uL    Mono # 0.7 0.3 - 1.0 K/uL    Eos # 0.5 0.0 - 0.5 K/uL    Baso # 0.03 0.00 - 0.20 K/uL    nRBC 0 0 /100 WBC    Gran % 68.1 38.0 - 73.0 %    Lymph % 15.0 (L) 18.0 - 48.0 %    Mono % 9.2 4.0 - 15.0 %    Eosinophil % 6.8 0.0 - 8.0 %    Basophil % 0.4 0.0 - 1.9 %    Differential Method Automated    Basic Metabolic Panel (BMP)    Collection Time: 10/23/23  3:40 AM   Result Value Ref Range    Sodium 139 136 - 145 mmol/L    Potassium 4.5 3.5 - 5.1 mmol/L    Chloride 105 95 - 110 mmol/L    CO2 24 23 - 29 mmol/L    Glucose 112 (H) 70 - 110 mg/dL    BUN 21 8 - 23 mg/dL    Creatinine 1.3 0.5 - 1.4 mg/dL    Calcium 9.5 8.7 - 10.5 mg/dL    Anion Gap 10 8 - 16 mmol/L    eGFR 59 (A) >60 mL/min/1.73 m^2   POCT glucose    Collection Time: 10/23/23  4:36 AM   Result Value Ref Range    POCT Glucose 109 70 - 110 mg/dL   Echo    Collection Time: 10/23/23 11:00 AM   Result Value Ref Range    BSA 1.91 m2    Harper's Biplane MOD Ejection Fraction 61 %    LVOT stroke volume 97.51 cm3    LVIDd 5.33 3.5 - 6.0 cm    LV Systolic Volume 43.28 mL    LV Systolic Volume Index 22.7 mL/m2     "LVIDs 3.27 2.1 - 4.0 cm    LV Diastolic Volume 137.27 mL    LV Diastolic Volume Index 71.87 mL/m2    IVS 1.08 0.6 - 1.1 cm    LVOT diameter 2.11 cm    LVOT area 3.5 cm2    FS 39 28 - 44 %    Left Ventricle Relative Wall Thickness 0.40 cm    Posterior Wall 1.07 0.6 - 1.1 cm    LV mass 222.82 g    LV Mass Index 117 g/m2    MV Peak E Froylan 1.05 m/s    TDI LATERAL 0.07 m/s    TDI SEPTAL 0.06 m/s    E/E' ratio 16.15 m/s    MV Peak A Froylan 1.39 m/s    TR Max Froylan 2.71 m/s    E/A ratio 0.76     E wave deceleration time 234.37 msec    MV "A" wave duration [id number] msec    LV SEPTAL E/E' RATIO 17.50 m/s    LV LATERAL E/E' RATIO 15.00 m/s    PV Peak S Froylan 0.53 m/s    PV Peak D Froylan 0.38 m/s    Pulm vein S/D ratio 1.39     LVOT peak froylan 1.19 m/s    Left Ventricular Outflow Tract Mean Velocity 0.83 cm/s    Left Ventricular Outflow Tract Mean Gradient 3.06 mmHg    LA size 3.29 cm    Left Atrium Minor Axis 5.20 cm    Left Atrium Major Axis 4.32 cm    LA volume (mod) 47.45 cm3    LA Volume Index (Mod) 24.8 mL/m2    RVDD 3.33 cm    RV S' 14.34 cm/s    TAPSE 1.73 cm    RA Major Axis 4.87 cm    RA Width 4.16 cm    AV regurgitation pressure 1/2 time [id number] ms    AR Max Froylan 4.32 m/s    AV mean gradient 28 mmHg    AV peak gradient 41 mmHg    Ao peak froylan 3.20 m/s    Ao VTI 72.80 cm    LVOT peak VTI 27.90 cm    AV valve area 1.34 cm²    AV Velocity Ratio 0.37     AV index (prosthetic) 0.38     TOAN by Velocity Ratio 1.30 cm²    MV mean gradient 3 mmHg    MV peak gradient 8 mmHg    MV stenosis pressure 1/2 time 67.97 ms    MV valve area p 1/2 method 3.24 cm2    MV valve area by continuity eq 2.99 cm2    MV VTI 32.6 cm    Triscuspid Valve Regurgitation Peak Gradient 29 mmHg    PV PEAK VELOCITY 1.41 m/s    PV peak gradient 8 mmHg    Pulmonary Valve Mean Velocity 0.96 m/s    Sinus 3.71 cm    STJ 3.08 cm    Ascending aorta 3.54 cm    IVC diameter 1.82 cm    Mean e' 0.07 m/s    ZLVIDS -0.10     ZLVIDD -0.08     LA Volume Index " 26.3 mL/m2    LA volume 50.15 cm3    LA WIDTH 3.8 cm    TV resting pulmonary artery pressure 37 mmHg    RV TB RVSP 11 mmHg    Est. RA pres 8 mmHg    EF 60 %   CBC auto differential    Collection Time: 10/24/23  9:10 AM   Result Value Ref Range    WBC 8.86 3.90 - 12.70 K/uL    RBC 3.54 (L) 4.60 - 6.20 M/uL    Hemoglobin 9.7 (L) 14.0 - 18.0 g/dL    Hematocrit 30.4 (L) 40.0 - 54.0 %    MCV 86 82 - 98 fL    MCH 27.4 27.0 - 31.0 pg    MCHC 31.9 (L) 32.0 - 36.0 g/dL    RDW 14.2 11.5 - 14.5 %    Platelets 349 150 - 450 K/uL    MPV 9.2 9.2 - 12.9 fL    Immature Granulocytes 0.6 (H) 0.0 - 0.5 %    Gran # (ANC) 6.7 1.8 - 7.7 K/uL    Immature Grans (Abs) 0.05 (H) 0.00 - 0.04 K/uL    Lymph # 1.0 1.0 - 4.8 K/uL    Mono # 0.5 0.3 - 1.0 K/uL    Eos # 0.5 0.0 - 0.5 K/uL    Baso # 0.05 0.00 - 0.20 K/uL    nRBC 0 0 /100 WBC    Gran % 75.8 (H) 38.0 - 73.0 %    Lymph % 11.6 (L) 18.0 - 48.0 %    Mono % 6.0 4.0 - 15.0 %    Eosinophil % 5.4 0.0 - 8.0 %    Basophil % 0.6 0.0 - 1.9 %    Differential Method Automated    Comprehensive metabolic panel    Collection Time: 10/24/23  9:11 AM   Result Value Ref Range    Sodium 138 136 - 145 mmol/L    Potassium 4.4 3.5 - 5.1 mmol/L    Chloride 104 95 - 110 mmol/L    CO2 19 (L) 23 - 29 mmol/L    Glucose 153 (H) 70 - 110 mg/dL    BUN 18 8 - 23 mg/dL    Creatinine 1.3 0.5 - 1.4 mg/dL    Calcium 10.0 8.7 - 10.5 mg/dL    Total Protein 7.1 6.0 - 8.4 g/dL    Albumin 2.9 (L) 3.5 - 5.2 g/dL    Total Bilirubin 0.2 0.1 - 1.0 mg/dL    Alkaline Phosphatase 128 55 - 135 U/L    AST 16 10 - 40 U/L    ALT 23 10 - 44 U/L    eGFR 59 (A) >60 mL/min/1.73 m^2    Anion Gap 15 8 - 16 mmol/L   COVID-19 Rapid Screening    Collection Time: 10/24/23 12:33 PM   Result Value Ref Range    SARS-CoV-2 RNA, Amplification, Qual Negative Negative   POCT glucose    Collection Time: 10/24/23 10:06 PM   Result Value Ref Range    POCT Glucose 150 (H) 70 - 110 mg/dL   Comprehensive metabolic panel    Collection Time: 10/25/23  4:50 AM    Result Value Ref Range    Sodium 138 136 - 145 mmol/L    Potassium 4.5 3.5 - 5.1 mmol/L    Chloride 105 95 - 110 mmol/L    CO2 24 23 - 29 mmol/L    Glucose 110 70 - 110 mg/dL    BUN 25 (H) 8 - 23 mg/dL    Creatinine 1.6 (H) 0.5 - 1.4 mg/dL    Calcium 9.6 8.7 - 10.5 mg/dL    Total Protein 7.2 6.0 - 8.4 g/dL    Albumin 3.0 (L) 3.5 - 5.2 g/dL    Total Bilirubin 0.2 0.1 - 1.0 mg/dL    Alkaline Phosphatase 121 55 - 135 U/L    AST 15 10 - 40 U/L    ALT 24 10 - 44 U/L    eGFR 46 (A) >60 mL/min/1.73 m^2    Anion Gap 9 8 - 16 mmol/L   CBC auto differential    Collection Time: 10/25/23  4:50 AM   Result Value Ref Range    WBC 9.39 3.90 - 12.70 K/uL    RBC 3.41 (L) 4.60 - 6.20 M/uL    Hemoglobin 9.3 (L) 14.0 - 18.0 g/dL    Hematocrit 28.7 (L) 40.0 - 54.0 %    MCV 84 82 - 98 fL    MCH 27.3 27.0 - 31.0 pg    MCHC 32.4 32.0 - 36.0 g/dL    RDW 14.1 11.5 - 14.5 %    Platelets 347 150 - 450 K/uL    MPV 9.2 9.2 - 12.9 fL    Immature Granulocytes 0.7 (H) 0.0 - 0.5 %    Gran # (ANC) 6.7 1.8 - 7.7 K/uL    Immature Grans (Abs) 0.07 (H) 0.00 - 0.04 K/uL    Lymph # 1.3 1.0 - 4.8 K/uL    Mono # 0.7 0.3 - 1.0 K/uL    Eos # 0.6 (H) 0.0 - 0.5 K/uL    Baso # 0.05 0.00 - 0.20 K/uL    nRBC 0 0 /100 WBC    Gran % 71.7 38.0 - 73.0 %    Lymph % 14.0 (L) 18.0 - 48.0 %    Mono % 7.0 4.0 - 15.0 %    Eosinophil % 6.1 0.0 - 8.0 %    Basophil % 0.5 0.0 - 1.9 %    Differential Method Automated    POCT glucose    Collection Time: 10/25/23  6:10 AM   Result Value Ref Range    POCT Glucose 166 (H) 70 - 110 mg/dL   CBC Auto Differential    Collection Time: 11/07/23 10:34 AM   Result Value Ref Range    WBC 8.37 3.90 - 12.70 K/uL    RBC 3.49 (L) 4.60 - 6.20 M/uL    Hemoglobin 9.4 (L) 14.0 - 18.0 g/dL    Hematocrit 30.6 (L) 40.0 - 54.0 %    MCV 88 82 - 98 fL    MCH 26.9 (L) 27.0 - 31.0 pg    MCHC 30.7 (L) 32.0 - 36.0 g/dL    RDW 14.6 (H) 11.5 - 14.5 %    Platelets 262 150 - 450 K/uL    MPV 10.3 9.2 - 12.9 fL    Immature Granulocytes 0.1 0.0 - 0.5 %    Gran #  (ANC) 6.5 1.8 - 7.7 K/uL    Immature Grans (Abs) 0.01 0.00 - 0.04 K/uL    Lymph # 1.0 1.0 - 4.8 K/uL    Mono # 0.6 0.3 - 1.0 K/uL    Eos # 0.2 0.0 - 0.5 K/uL    Baso # 0.06 0.00 - 0.20 K/uL    nRBC 0 0 /100 WBC    Gran % 77.5 (H) 38.0 - 73.0 %    Lymph % 12.4 (L) 18.0 - 48.0 %    Mono % 6.6 4.0 - 15.0 %    Eosinophil % 2.7 0.0 - 8.0 %    Basophil % 0.7 0.0 - 1.9 %    Differential Method Automated    Comprehensive Metabolic Panel    Collection Time: 11/07/23 10:34 AM   Result Value Ref Range    Sodium 141 136 - 145 mmol/L    Potassium 4.9 3.5 - 5.1 mmol/L    Chloride 107 95 - 110 mmol/L    CO2 27 23 - 29 mmol/L    Glucose 89 70 - 110 mg/dL    BUN 20 8 - 23 mg/dL    Creatinine 1.4 0.5 - 1.4 mg/dL    Calcium 9.8 8.7 - 10.5 mg/dL    Total Protein 7.1 6.0 - 8.4 g/dL    Albumin 3.0 (L) 3.5 - 5.2 g/dL    Total Bilirubin 0.4 0.1 - 1.0 mg/dL    Alkaline Phosphatase 114 55 - 135 U/L    AST 10 10 - 40 U/L    ALT 11 10 - 44 U/L    eGFR 54.4 (A) >60 mL/min/1.73 m^2    Anion Gap 7 (L) 8 - 16 mmol/L         Assessment    1. Nephrostomy tube displaced    2. Hx of bladder cancer    3. Hx of cancer of lung    4. Anxiety    5. Chronic obstructive pulmonary disease, unspecified COPD type    6. Spinal stenosis, unspecified spinal region    7. Hypothyroidism, unspecified type    8. Primary hypertension    9. Hyperlipidemia, unspecified hyperlipidemia type          Rajan Olivo was seen today for follow-up.    Diagnoses and all orders for this visit:    Nephrostomy tube displaced    Hx of bladder cancer  -     Ambulatory referral/consult to [facility] Palliative Care; Future    Hx of cancer of lung  -     Ambulatory referral/consult to [facility] Palliative Care; Future    Anxiety    Chronic obstructive pulmonary disease, unspecified COPD type    Spinal stenosis, unspecified spinal region  -     Ambulatory referral/consult to Pain Clinic; Future    Hypothyroidism, unspecified type    Primary hypertension    Hyperlipidemia, unspecified  hyperlipidemia type    Overall, doing okay.      Spoke with Urology.  They advised that Interventional Radiology could reset the nephrostomy tube.  Spoke with IR on-call.  They advised that he should go to the ER given the time of day.  From there, they will be consulted and can either replace the tube tonight, or admit him and replace it in the morning.  They can also look at his PICC line from his antibiotics and decide if that should be replaced/removed.    Blood pressure running clinic today.  We will stop the amlodipine.    Given his current lungs/bladder cancers, we can probably stop the atorvastatin, as well.    He was able to fill his prescription for pain medicine and for anxiety medicine.  He is interested in getting an epidural or other injection in the back to help his sciatica.  Referral to Dr. ANDRES bruno today.      Reviewing the urologic oncology notes, appears that any cancer treatments maybe palliative, rather than curative.  We will place separate referral to palliative care clinic today to get him established, take care of pain medications, anxiety medicines.    They have handicap tag from [place].  If they need one in [place], we can certainly do that paperwork.      Home health is coming out once a week.  They should come out Monday or Tuesday.  Recommended that they ask for a home safety assessment.  Can have home health send us orders for whatever safety equipment he might need.    Daughter has copies of some of his medical records from [place].  We will get those scanned into chart.  She also has additional records at home that she is unable to attach.  She may bring that up here on a thumb drive so we can upload them into the system.      FOLLOW-UP:  Follow up in about 3 months (around 2/10/2024) for check up.    I spent a total of 135 minutes face to face and non-face to face on the date of this visit.This includes time preparing to see the patient (eg, review of tests, notes),  obtaining and/or reviewing additional history from an independent historian and/or outside medical records, documenting clinical information in the electronic health record, independently interpreting results and/or communicating results to the patient/family/caregiver, or care coordinator.    Signed by:  Faizan Antoine MD

## 2023-11-10 NOTE — PATIENT INSTRUCTIONS
All things considered, you look pretty good today.      Regarding the nephrostomy tube, we do need to get that replaced asap.  Unfortunately, the only way we can do that tonight is to have you go to the [facility] on [address].  Please go to the ER and check-in.  They should consult Interventional Radiology who will put the tube back in place.  While you are there, you may want to ask them about removing the line for the antibiotics.    Blood pressure is a bit low.  Sounds like it has been low at home, as well.  Let us go ahead and stop the amlodipine.    With everything we are going through, we could probably safely stop the atorvastatin (cholesterol medicine) as well.    Placing a referral today for our palliative care clinic.  They should get in touch with you about scheduling an appointment ASAP.  They will take over prescriptions for pain medication, anxiety medication, etc..    For the spinal stenosis, sciatica, I am also placing a referral to our back/spine specialist, Dr. Rico.  He comes here every Tuesday.  We should be able to get you in with him to see about doing an injection in the back/spine to get you some relief from your sciatica.    Keep your upcoming appointments for the CT scan, Oncology, etc., as scheduled.

## 2023-11-11 ENCOUNTER — PATIENT MESSAGE (OUTPATIENT)
Dept: ADMINISTRATIVE | Facility: HOSPITAL | Age: 68
End: 2023-11-11
Payer: MEDICARE

## 2023-11-11 DIAGNOSIS — Z12.11 SCREENING FOR COLON CANCER: ICD-10-CM

## 2023-11-11 PROBLEM — Z85.9 HISTORY OF CANCER: Status: ACTIVE | Noted: 2023-10-21

## 2023-11-11 PROBLEM — N18.30 ACUTE RENAL FAILURE SUPERIMPOSED ON STAGE 3 CHRONIC KIDNEY DISEASE: Status: ACTIVE | Noted: 2023-11-11

## 2023-11-11 PROBLEM — N17.9 ACUTE RENAL FAILURE SUPERIMPOSED ON STAGE 3 CHRONIC KIDNEY DISEASE: Status: ACTIVE | Noted: 2023-11-11

## 2023-11-11 LAB
ALBUMIN SERPL BCP-MCNC: 2.9 G/DL (ref 3.5–5.2)
ALP SERPL-CCNC: 104 U/L (ref 55–135)
ALT SERPL W/O P-5'-P-CCNC: 5 U/L (ref 10–44)
ANION GAP SERPL CALC-SCNC: 16 MMOL/L (ref 8–16)
AST SERPL-CCNC: 9 U/L (ref 10–40)
BACTERIA #/AREA URNS HPF: ABNORMAL /HPF
BASOPHILS # BLD AUTO: 0.04 K/UL (ref 0–0.2)
BASOPHILS NFR BLD: 0.3 % (ref 0–1.9)
BILIRUB SERPL-MCNC: 0.3 MG/DL (ref 0.1–1)
BILIRUB UR QL STRIP: NEGATIVE
BUN SERPL-MCNC: 19 MG/DL (ref 8–23)
CALCIUM SERPL-MCNC: 9.3 MG/DL (ref 8.7–10.5)
CHLORIDE SERPL-SCNC: 106 MMOL/L (ref 95–110)
CLARITY UR: ABNORMAL
CO2 SERPL-SCNC: 22 MMOL/L (ref 23–29)
COLOR UR: YELLOW
CREAT SERPL-MCNC: 1.2 MG/DL (ref 0.5–1.4)
DIFFERENTIAL METHOD: ABNORMAL
EOSINOPHIL # BLD AUTO: 0.3 K/UL (ref 0–0.5)
EOSINOPHIL NFR BLD: 2.4 % (ref 0–8)
ERYTHROCYTE [DISTWIDTH] IN BLOOD BY AUTOMATED COUNT: 14.6 % (ref 11.5–14.5)
EST. GFR  (NO RACE VARIABLE): >60 ML/MIN/1.73 M^2
GLUCOSE SERPL-MCNC: 108 MG/DL (ref 70–110)
GLUCOSE UR QL STRIP: NEGATIVE
HCT VFR BLD AUTO: 30.1 % (ref 40–54)
HGB BLD-MCNC: 9.4 G/DL (ref 14–18)
HGB UR QL STRIP: ABNORMAL
HYALINE CASTS #/AREA URNS LPF: 0 /LPF
IMM GRANULOCYTES # BLD AUTO: 0.04 K/UL (ref 0–0.04)
IMM GRANULOCYTES NFR BLD AUTO: 0.3 % (ref 0–0.5)
KETONES UR QL STRIP: NEGATIVE
LEUKOCYTE ESTERASE UR QL STRIP: ABNORMAL
LYMPHOCYTES # BLD AUTO: 0.7 K/UL (ref 1–4.8)
LYMPHOCYTES NFR BLD: 6 % (ref 18–48)
MCH RBC QN AUTO: 26.9 PG (ref 27–31)
MCHC RBC AUTO-ENTMCNC: 31.2 G/DL (ref 32–36)
MCV RBC AUTO: 86 FL (ref 82–98)
MICROSCOPIC COMMENT: ABNORMAL
MONOCYTES # BLD AUTO: 0.8 K/UL (ref 0.3–1)
MONOCYTES NFR BLD: 6.5 % (ref 4–15)
NEUTROPHILS # BLD AUTO: 10 K/UL (ref 1.8–7.7)
NEUTROPHILS NFR BLD: 84.5 % (ref 38–73)
NITRITE UR QL STRIP: NEGATIVE
NRBC BLD-RTO: 0 /100 WBC
PH UR STRIP: 6 [PH] (ref 5–8)
PLATELET # BLD AUTO: 228 K/UL (ref 150–450)
PMV BLD AUTO: 10.2 FL (ref 9.2–12.9)
POTASSIUM SERPL-SCNC: 3.8 MMOL/L (ref 3.5–5.1)
PROT SERPL-MCNC: 6.7 G/DL (ref 6–8.4)
PROT UR QL STRIP: ABNORMAL
RBC # BLD AUTO: 3.49 M/UL (ref 4.6–6.2)
RBC #/AREA URNS HPF: >100 /HPF (ref 0–4)
SODIUM SERPL-SCNC: 144 MMOL/L (ref 136–145)
SP GR UR STRIP: 1.01 (ref 1–1.03)
UNIDENT CRYS URNS QL MICRO: ABNORMAL
URN SPEC COLLECT METH UR: ABNORMAL
UROBILINOGEN UR STRIP-ACNC: NEGATIVE EU/DL
WBC # BLD AUTO: 11.79 K/UL (ref 3.9–12.7)
WBC #/AREA URNS HPF: 76 /HPF (ref 0–5)
WBC CLUMPS URNS QL MICRO: ABNORMAL
YEAST URNS QL MICRO: ABNORMAL

## 2023-11-11 PROCEDURE — 25000003 PHARM REV CODE 250: Performed by: HOSPITALIST

## 2023-11-11 PROCEDURE — 36415 COLL VENOUS BLD VENIPUNCTURE: CPT | Performed by: HOSPITALIST

## 2023-11-11 PROCEDURE — 63600175 PHARM REV CODE 636 W HCPCS: Performed by: STUDENT IN AN ORGANIZED HEALTH CARE EDUCATION/TRAINING PROGRAM

## 2023-11-11 PROCEDURE — 87186 SC STD MICRODIL/AGAR DIL: CPT | Performed by: EMERGENCY MEDICINE

## 2023-11-11 PROCEDURE — 87088 URINE BACTERIA CULTURE: CPT | Performed by: EMERGENCY MEDICINE

## 2023-11-11 PROCEDURE — 25000003 PHARM REV CODE 250: Performed by: STUDENT IN AN ORGANIZED HEALTH CARE EDUCATION/TRAINING PROGRAM

## 2023-11-11 PROCEDURE — 99900035 HC TECH TIME PER 15 MIN (STAT)

## 2023-11-11 PROCEDURE — 63600175 PHARM REV CODE 636 W HCPCS: Performed by: HOSPITALIST

## 2023-11-11 PROCEDURE — 25500020 PHARM REV CODE 255: Performed by: FAMILY MEDICINE

## 2023-11-11 PROCEDURE — 25000003 PHARM REV CODE 250: Performed by: FAMILY MEDICINE

## 2023-11-11 PROCEDURE — 87077 CULTURE AEROBIC IDENTIFY: CPT | Performed by: EMERGENCY MEDICINE

## 2023-11-11 PROCEDURE — 63600175 PHARM REV CODE 636 W HCPCS: Performed by: FAMILY MEDICINE

## 2023-11-11 PROCEDURE — 11000001 HC ACUTE MED/SURG PRIVATE ROOM

## 2023-11-11 PROCEDURE — 81000 URINALYSIS NONAUTO W/SCOPE: CPT | Performed by: EMERGENCY MEDICINE

## 2023-11-11 PROCEDURE — 80053 COMPREHEN METABOLIC PANEL: CPT | Performed by: HOSPITALIST

## 2023-11-11 PROCEDURE — 85025 COMPLETE CBC W/AUTO DIFF WBC: CPT | Performed by: HOSPITALIST

## 2023-11-11 PROCEDURE — 87086 URINE CULTURE/COLONY COUNT: CPT | Performed by: EMERGENCY MEDICINE

## 2023-11-11 RX ORDER — DOCUSATE SODIUM 100 MG/1
100 CAPSULE, LIQUID FILLED ORAL 2 TIMES DAILY
Status: DISCONTINUED | OUTPATIENT
Start: 2023-11-11 | End: 2023-11-18 | Stop reason: HOSPADM

## 2023-11-11 RX ORDER — FENTANYL CITRATE 50 UG/ML
INJECTION, SOLUTION INTRAMUSCULAR; INTRAVENOUS CODE/TRAUMA/SEDATION MEDICATION
Status: COMPLETED | OUTPATIENT
Start: 2023-11-11 | End: 2023-11-11

## 2023-11-11 RX ORDER — ESCITALOPRAM OXALATE 5 MG/1
20 TABLET ORAL DAILY
Status: DISCONTINUED | OUTPATIENT
Start: 2023-11-12 | End: 2023-11-18 | Stop reason: HOSPADM

## 2023-11-11 RX ORDER — ALPRAZOLAM 1 MG/1
1 TABLET ORAL 3 TIMES DAILY PRN
Status: DISCONTINUED | OUTPATIENT
Start: 2023-11-11 | End: 2023-11-18 | Stop reason: HOSPADM

## 2023-11-11 RX ORDER — FLUTICASONE FUROATE AND VILANTEROL 100; 25 UG/1; UG/1
1 POWDER RESPIRATORY (INHALATION) DAILY
Status: DISCONTINUED | OUTPATIENT
Start: 2023-11-12 | End: 2023-11-18 | Stop reason: HOSPADM

## 2023-11-11 RX ORDER — LIDOCAINE HYDROCHLORIDE 20 MG/ML
INJECTION, SOLUTION EPIDURAL; INFILTRATION; INTRACAUDAL; PERINEURAL CODE/TRAUMA/SEDATION MEDICATION
Status: COMPLETED | OUTPATIENT
Start: 2023-11-11 | End: 2023-11-11

## 2023-11-11 RX ORDER — METOPROLOL SUCCINATE 50 MG/1
50 TABLET, EXTENDED RELEASE ORAL DAILY
Status: DISCONTINUED | OUTPATIENT
Start: 2023-11-12 | End: 2023-11-18 | Stop reason: HOSPADM

## 2023-11-11 RX ORDER — TAMSULOSIN HYDROCHLORIDE 0.4 MG/1
0.4 CAPSULE ORAL DAILY
Status: DISCONTINUED | OUTPATIENT
Start: 2023-11-12 | End: 2023-11-18 | Stop reason: HOSPADM

## 2023-11-11 RX ORDER — CYCLOBENZAPRINE HCL 10 MG
10 TABLET ORAL 3 TIMES DAILY PRN
Status: DISCONTINUED | OUTPATIENT
Start: 2023-11-11 | End: 2023-11-13

## 2023-11-11 RX ORDER — DIPHENHYDRAMINE HYDROCHLORIDE 50 MG/ML
INJECTION INTRAMUSCULAR; INTRAVENOUS CODE/TRAUMA/SEDATION MEDICATION
Status: COMPLETED | OUTPATIENT
Start: 2023-11-11 | End: 2023-11-11

## 2023-11-11 RX ORDER — HYDROMORPHONE HYDROCHLORIDE 2 MG/ML
INJECTION, SOLUTION INTRAMUSCULAR; INTRAVENOUS; SUBCUTANEOUS CODE/TRAUMA/SEDATION MEDICATION
Status: COMPLETED | OUTPATIENT
Start: 2023-11-11 | End: 2023-11-11

## 2023-11-11 RX ADMIN — ACETAMINOPHEN 650 MG: 325 TABLET ORAL at 12:11

## 2023-11-11 RX ADMIN — DOCUSATE SODIUM 100 MG: 100 CAPSULE, LIQUID FILLED ORAL at 08:11

## 2023-11-11 RX ADMIN — FENTANYL CITRATE 50 MCG: 0.05 INJECTION, SOLUTION INTRAMUSCULAR; INTRAVENOUS at 08:11

## 2023-11-11 RX ADMIN — CEFEPIME 1 G: 1 INJECTION, POWDER, FOR SOLUTION INTRAMUSCULAR; INTRAVENOUS at 10:11

## 2023-11-11 RX ADMIN — DIPHENHYDRAMINE HYDROCHLORIDE 25 MG: 50 INJECTION INTRAMUSCULAR; INTRAVENOUS at 08:11

## 2023-11-11 RX ADMIN — ALPRAZOLAM 1 MG: 1 TABLET ORAL at 11:11

## 2023-11-11 RX ADMIN — ERTAPENEM 1 G: 1 INJECTION INTRAMUSCULAR; INTRAVENOUS at 01:11

## 2023-11-11 RX ADMIN — SODIUM CHLORIDE, POTASSIUM CHLORIDE, SODIUM LACTATE AND CALCIUM CHLORIDE: 600; 310; 30; 20 INJECTION, SOLUTION INTRAVENOUS at 09:11

## 2023-11-11 RX ADMIN — ALPRAZOLAM 1 MG: 1 TABLET ORAL at 04:11

## 2023-11-11 RX ADMIN — LIDOCAINE HYDROCHLORIDE 5 ML: 20 INJECTION, SOLUTION EPIDURAL; INFILTRATION; INTRACAUDAL; PERINEURAL at 08:11

## 2023-11-11 RX ADMIN — HYDROMORPHONE HYDROCHLORIDE 1 MG: 2 INJECTION INTRAMUSCULAR; INTRAVENOUS; SUBCUTANEOUS at 08:11

## 2023-11-11 RX ADMIN — HYDROCODONE BITARTRATE AND ACETAMINOPHEN 1 TABLET: 5; 325 TABLET ORAL at 11:11

## 2023-11-11 RX ADMIN — CEFTRIAXONE SODIUM 1 G: 1 INJECTION, POWDER, FOR SOLUTION INTRAMUSCULAR; INTRAVENOUS at 08:11

## 2023-11-11 RX ADMIN — IOHEXOL 20 ML: 300 INJECTION, SOLUTION INTRAVENOUS at 09:11

## 2023-11-11 RX ADMIN — HYDROCODONE BITARTRATE AND ACETAMINOPHEN 1 TABLET: 5; 325 TABLET ORAL at 04:11

## 2023-11-11 NOTE — SUBJECTIVE & OBJECTIVE
Interval History: Patient underwent Nephrostomy tube placement on the right with IR this morning.  He was doing fine post procedure, but then began to spike fevers.  Case dw Dr. Hartman who recommended continuing Invanz which he was on prior to admission and follow up urine cultures.  Previous urine cultures were enterbacter with several drug resistance.    Review of Systems   Constitutional:  Negative for activity change, appetite change, chills, fatigue and fever.   Respiratory:  Negative for apnea, cough, shortness of breath and stridor.    Cardiovascular:  Negative for chest pain, palpitations and leg swelling.   Gastrointestinal:  Negative for abdominal distention, constipation, diarrhea, nausea and vomiting.   Genitourinary:  Negative for difficulty urinating, dysuria, frequency, hematuria and urgency.   Musculoskeletal:  Negative for arthralgias, back pain, gait problem and joint swelling.   Neurological:  Negative for dizziness, seizures, weakness, light-headedness, numbness and headaches.   Psychiatric/Behavioral:  Negative for agitation, behavioral problems, confusion, decreased concentration, dysphoric mood and hallucinations.    All other systems reviewed and are negative.    Objective:     Vital Signs (Most Recent):  Temp: (!) 101.1 °F (38.4 °C) (11/11/23 1210)  Pulse: (!) 198 (11/11/23 1200)  Resp: 20 (11/11/23 1200)  BP: 107/62 (11/11/23 1200)  SpO2: (!) 79 % (11/11/23 1200) Vital Signs (24h Range):  Temp:  [97.3 °F (36.3 °C)-101.1 °F (38.4 °C)] 101.1 °F (38.4 °C)  Pulse:  [] 198  Resp:  [11-20] 20  SpO2:  [79 %-98 %] 79 %  BP: ()/(51-64) 107/62     Weight: 72.5 kg (159 lb 13.3 oz)  Body mass index is 23.6 kg/m².    Intake/Output Summary (Last 24 hours) at 11/11/2023 1318  Last data filed at 11/10/2023 2054  Gross per 24 hour   Intake 500 ml   Output --   Net 500 ml         Physical Exam  Vitals and nursing note reviewed.   Constitutional:       Appearance: Normal appearance.   HENT:       Head: Normocephalic and atraumatic.      Nose: Nose normal.      Mouth/Throat:      Mouth: Mucous membranes are moist.   Eyes:      Extraocular Movements: Extraocular movements intact.      Conjunctiva/sclera: Conjunctivae normal.   Cardiovascular:      Rate and Rhythm: Normal rate and regular rhythm.      Pulses: Normal pulses.      Heart sounds: Normal heart sounds.   Pulmonary:      Effort: Pulmonary effort is normal.      Breath sounds: Normal breath sounds.   Abdominal:      General: Abdomen is flat. Bowel sounds are normal.      Palpations: Abdomen is soft.   Genitourinary:     Comments: L side nephrostomy tube in place.  Musculoskeletal:         General: Normal range of motion.      Cervical back: Normal range of motion and neck supple.      Comments: PICC line in left arm   Skin:     General: Skin is warm.      Capillary Refill: Capillary refill takes less than 2 seconds.   Neurological:      Mental Status: He is alert and oriented to person, place, and time. Mental status is at baseline.   Psychiatric:         Mood and Affect: Mood normal.         Behavior: Behavior normal.             Significant Labs: All pertinent labs within the past 24 hours have been reviewed.  Recent Lab Results         11/11/23  0443   11/11/23  0431   11/10/23  1859   11/10/23  1858        Albumin 2.9       3.3              112       ALT 5       6       Anion Gap 16       15       Appearance, UA   Hazy           aPTT       31.6  Comment: Refer to local heparin nomogram for intensity/dose specific   therapeutic   range.         AST 9       11       Bacteria, UA   Rare           Baso # 0.04       0.04       Basophil % 0.3       0.5       Bilirubin (UA)   Negative           BILIRUBIN TOTAL 0.3  Comment: For infants and newborns, interpretation of results should be based  on gestational age, weight and in agreement with clinical  observations.    Premature Infant recommended reference ranges:  Up to 24 hours.............<8.0  mg/dL  Up to 48 hours............<12.0 mg/dL  3-5 days..................<15.0 mg/dL  6-29 days.................<15.0 mg/dL         0.3  Comment: For infants and newborns, interpretation of results should be based  on gestational age, weight and in agreement with clinical  observations.    Premature Infant recommended reference ranges:  Up to 24 hours.............<8.0 mg/dL  Up to 48 hours............<12.0 mg/dL  3-5 days..................<15.0 mg/dL  6-29 days.................<15.0 mg/dL         Blood Culture, Routine     No Growth to date  [P]              No Growth to date  [P]         BUN 19       23       Calcium 9.3       9.7       Chloride 106       103       CO2 22       21       Color, UA   Yellow           Creatinine 1.2       1.7       Differential Method Automated       Automated       eGFR >60       43       Eos # 0.3       0.3       Eosinophil % 2.4       3.8       Glucose 108       111       Glucose, UA   Negative           Gran # (ANC) 10.0       5.6       Gran % 84.5       72.0       Hematocrit 30.1       30.5       Hemoglobin 9.4       9.4       Hyaline Casts, UA   0           Immature Grans (Abs) 0.04  Comment: Mild elevation in immature granulocytes is non specific and   can be seen in a variety of conditions including stress response,   acute inflammation, trauma and pregnancy. Correlation with other   laboratory and clinical findings is essential.         0.02  Comment: Mild elevation in immature granulocytes is non specific and   can be seen in a variety of conditions including stress response,   acute inflammation, trauma and pregnancy. Correlation with other   laboratory and clinical findings is essential.         Immature Granulocytes 0.3       0.3       INR       1.1  Comment: Coumadin Therapy:  2.0 - 3.0 for INR for all indicators except mechanical heart valves  and antiphospholipid syndromes which should use 2.5 - 3.5.         Ketones, UA   Negative           Lactate, Riccardo        0.8  Comment: Falsely low lactic acid results can be found in samples   containing >=13.0 mg/dL total bilirubin and/or >=3.5 mg/dL   direct bilirubin.         Leukocytes, UA   3+           Lymph # 0.7       1.3       Lymph % 6.0       16.0       Magnesium        2.2       MCH 26.9       26.5       MCHC 31.2       30.8       MCV 86       86       Microscopic Comment   SEE COMMENT  Comment: Other formed elements not mentioned in the report are not   present in the microscopic examination.              Mono # 0.8       0.6       Mono % 6.5       7.4       MPV 10.2       9.6       NITRITE UA   Negative           nRBC 0       0       Occult Blood UA   2+           pH, UA   6.0           Platelet Count 228       222       Potassium 3.8       3.9       PROTEIN TOTAL 6.7       7.5       Protein, UA   1+  Comment: Recommend a 24 hour urine protein or a urine   protein/creatinine ratio if globulin induced proteinuria is  clinically suspected.             Protime       11.2       RBC 3.49       3.55       RBC, UA   >100           RDW 14.6       14.6       Sodium 144       139       Specific [place], UA   1.010           Specimen UA   Urine, Unspecified  Comment: nephrostomy           Unclass [place] UA   Occasional           UROBILINOGEN UA   Negative           WBC Clumps, UA   Moderate           WBC, UA   76           WBC 11.79       7.82       Yeast, UA   Few                    [P] - Preliminary Result               Significant Imaging:   CT Abdomen Pelvis  Without Contrast   Final Result      Right-sided external drainage catheter extending to the subcutaneous region superiorly and extending to the distal right ureter.  Correlate clinically for desired position.      Double-J left-sided urinary stent extending from the left renal pelvis to the left bladder      Bladder wall thickening      Ectatic aorta with atherosclerotic disease      Moderate amount of stool in the colon.      Remaining findings as above      All CT scans    are performed using dose optimization techniques including the following: automated exposure control; adjustment of the mA and/or kV; use of iterative reconstruction technique.  Dose modulation was employed for ALARA by means of: Automated exposure control; adjustment of the mA and/or kV according to patient size (this includes techniques or standardized protocols for targeted exams where dose is matched to indication/reason for exam; i.e. extremities or head); and/or use of iterative reconstructive technique.         Electronically signed by: Jefferson Kunz   Date:    11/10/2023   Time:    21:34      IR Nephrostomy Tube Change    (Results Pending)

## 2023-11-11 NOTE — ASSESSMENT & PLAN NOTE
Patient presented following accidental dislodgement of nephrostomy tube in his sleep.  IR consulted by ED staff and aware of patient with plans to reinsert tomorrow morning.    - re placed by IR on 11/11

## 2023-11-11 NOTE — ASSESSMENT & PLAN NOTE
- continue home invanz  - Followed by Dr. Hartman - consult placed  - Urine cultures pending  - PICC line in left arm  - Fever of 101.1 post procedure

## 2023-11-11 NOTE — PROGRESS NOTES
Radiology Post-Procedure Note    Pre Op Diagnosis: right UVJ obstruction with dislodged PCNU    Post Op Diagnosis: Same    Procedure:right percutaneous nephroureteral tube replacement    Procedure performed by: Vivian Lara MD    Written Informed Consent Obtained: Yes    Specimen Removed: NO    Estimated Blood Loss: Minimal    Findings: Local anesthesia and moderate sedation were used.      Under fluoroscopic guidance, indewelling right dislodged PCNU was accessed and a wire and catheter was used to navigate to the urinary bladder.  The prior tube was removed and replaced over a wire with a 8.5 F x 20 cm PCNU with distal pigtail in the urinary bladder and proximal pigtail in the renal pelvis.  Position of the catheter in the collecting system was confirmed using injection of iodinated contrast.  Catheter was secured to the skin with 2 prolene sutures and a sterile dressing was applied.      The patient tolerated the procedure well and there were no complications.  Please see Imaging report for further details.    Vivian Lara MD  Interventional Radiology

## 2023-11-11 NOTE — HPI
Geovani Currie is a 68 y.o. male with a PMH  has a past medical history of COPD (chronic obstructive pulmonary disease) and Hypertension. who presented to the ED after accidentally pulling out his nephrostomy tube while sleeping.  At time of bedside assessment, patient currently without any concerns or complaints and stated he is just waiting to have his procedure done so he can go back home.  Interventional Radiology was consulted by ED staff and is aware of patient.  Patient being admitted to Hospital Medicine under observation while awaiting nephrostomy tube reinsertion by IR tomorrow.      PCP: Faizan Antoine

## 2023-11-11 NOTE — ED PROVIDER NOTES
SCRIBE #1 NOTE: I, Roderick Montaño, am scribing for, and in the presence of, Jamaica Nair DO. I have scribed the entire note.       History     Chief Complaint   Patient presents with    Post-op Problem     Pt pulled nephrostomy tube while sleeping, IR is aware and would like to be paged on Pt arrival, sent from [facility]     Review of patient's allergies indicates:  No Known Allergies      History of Present Illness     HPI    11/10/2023, 6:33 PM  History obtained from the patient      History of Present Illness: Geovani Currie is a 68 y.o. male patient with a PMHx of COPD and HTN who presents to the Emergency Department for evaluation of  pulled nephrotomy tube which onset while sleeping overnight. There is leakage from the nephrostomy. IR radiology is already aware of the issue and would like to be paged on pt arrival. Pt was sent from [facility]. Symptoms are constant and moderate in severity. No mitigating or exacerbating factors reported. Associated sxs include  chronic sciatica pain. Pt usually takes Norco for sciatica pain. Patient denies all other sxs at this time. Pt has taken IV and oral abx recently; currently has a PICC line in place. Pt vapes and uses nicotine patches. No further complaints or concerns at this time.       Arrival mode: Personal vehicle     PCP: Faizan Antoine MD        Past Medical History:  Past Medical History:   Diagnosis Date    COPD (chronic obstructive pulmonary disease)     Hypertension        Past Surgical History:  Past Surgical History:   Procedure Laterality Date    APPENDECTOMY      CATARACT EXTRACTION      NEPHROSTOMY           Family History:  Family History   Problem Relation Age of Onset    Cancer Father     Cancer Mother        Social History:  Social History     Tobacco Use    Smoking status: Former     Types: Cigarettes     Passive exposure: Never    Smokeless tobacco: Never   Substance and Sexual Activity    Alcohol use: Never    Drug use: Never     Sexual activity: Not Currently     Partners: Female        Review of Systems     Review of Systems   Constitutional:  Negative for fever.   Respiratory:  Negative for shortness of breath.    Cardiovascular:  Negative for chest pain.   Gastrointestinal:  Negative for abdominal pain, diarrhea and vomiting.   Genitourinary:  Negative for dysuria, flank pain and hematuria.        (+) Nephrostomy malfunction   Musculoskeletal:  Positive for back pain.        Physical Exam     Initial Vitals [11/10/23 1759]   BP Pulse Resp Temp SpO2   (!) 98/51 65 16 97.7 °F (36.5 °C) 95 %      MAP       --          Physical Exam  Nursing Notes and Vital Signs Reviewed.  Constitutional: Patient is in no acute distress. Chronically ill-appearing. Koi.  Head: Atraumatic. Normocephalic.  Eyes: PERRL. EOM intact. Conjunctivae are not pale. No scleral icterus.  ENT: Mucous membranes are moist.     Neck: Supple. Full ROM. No lymphadenopathy.  Cardiovascular: Regular rate. Regular rhythm. No murmurs, rubs, or gallops. Distal pulses are 2+ and symmetric. PICC line to LUE with no induration, warmth, erythema, or drainage to suggest infection.  Pulmonary/Chest: No respiratory distress.  Sounds are equal bilaterally.  No rhonchi or rales. Faint wheezing in the lungs.  Abdominal: Soft and non-distended.  There is no tenderness.  No rebound, guarding, or rigidity. Good bowel sounds.  Genitourinary: No CVA tenderness.  Partially displaced Nephrostomy tube to right kidney with pale-yellow urine leakage.  Musculoskeletal: Moves all extremities. No obvious deformities. No edema. No calf tenderness.   Skin: Warm and dry.  Neurological:  Alert, awake, and appropriate.  Normal speech.  No acute focal neurological deficits are appreciated.  Psychiatric: Normal affect. Good eye contact. Appropriate in content.     ED Course   Procedures  ED Vital Signs:  Vitals:    11/10/23 1759 11/10/23 1914 11/10/23 2012 11/10/23 2055   BP: (!) 98/51 (!) 99/55  (!) 92/55  "  Pulse: 65 (!) 58  73   Resp: 16 16 17 11   Temp: 97.7 °F (36.5 °C)      TempSrc: Oral      SpO2: 95% 96%  (!) 94%   Weight:       Height: 5' 9" (1.753 m)       11/10/23 2155 11/10/23 2250 11/11/23 0043 11/11/23 0300   BP: (!) 104/55 (!) 98/53 (!) 141/64    Pulse: 66 67 77 89   Resp: 11 18 19    Temp:   97.8 °F (36.6 °C)    TempSrc:   Oral    SpO2: (!) 94% 98% (!) 92% (!) 94%   Weight:   72.5 kg (159 lb 13.3 oz)    Height:   5' 9" (1.753 m)     11/11/23 0411 11/11/23 0453 11/11/23 0500   BP:   (!) 162/58   Pulse: 96  96   Resp:   19   Temp:   99.3 °F (37.4 °C)   TempSrc:   Oral   SpO2:  (!) 94% 96%   Weight:      Height:          Abnormal Lab Results:  Labs Reviewed   CBC W/ AUTO DIFFERENTIAL - Abnormal; Notable for the following components:       Result Value    RBC 3.55 (*)     Hemoglobin 9.4 (*)     Hematocrit 30.5 (*)     MCH 26.5 (*)     MCHC 30.8 (*)     RDW 14.6 (*)     Lymph % 16.0 (*)     All other components within normal limits   COMPREHENSIVE METABOLIC PANEL - Abnormal; Notable for the following components:    CO2 21 (*)     Glucose 111 (*)     Creatinine 1.7 (*)     Albumin 3.3 (*)     ALT 6 (*)     eGFR 43 (*)     All other components within normal limits   PROTIME-INR   APTT   MAGNESIUM   LACTIC ACID, PLASMA        All Lab Results:  Results for orders placed or performed during the hospital encounter of 11/10/23   CBC auto differential   Result Value Ref Range    WBC 7.82 3.90 - 12.70 K/uL    RBC 3.55 (L) 4.60 - 6.20 M/uL    Hemoglobin 9.4 (L) 14.0 - 18.0 g/dL    Hematocrit 30.5 (L) 40.0 - 54.0 %    MCV 86 82 - 98 fL    MCH 26.5 (L) 27.0 - 31.0 pg    MCHC 30.8 (L) 32.0 - 36.0 g/dL    RDW 14.6 (H) 11.5 - 14.5 %    Platelets 222 150 - 450 K/uL    MPV 9.6 9.2 - 12.9 fL    Immature Granulocytes 0.3 0.0 - 0.5 %    Gran # (ANC) 5.6 1.8 - 7.7 K/uL    Immature Grans (Abs) 0.02 0.00 - 0.04 K/uL    Lymph # 1.3 1.0 - 4.8 K/uL    Mono # 0.6 0.3 - 1.0 K/uL    Eos # 0.3 0.0 - 0.5 K/uL    Baso # 0.04 0.00 - 0.20 " K/uL    nRBC 0 0 /100 WBC    Gran % 72.0 38.0 - 73.0 %    Lymph % 16.0 (L) 18.0 - 48.0 %    Mono % 7.4 4.0 - 15.0 %    Eosinophil % 3.8 0.0 - 8.0 %    Basophil % 0.5 0.0 - 1.9 %    Differential Method Automated    Comprehensive metabolic panel   Result Value Ref Range    Sodium 139 136 - 145 mmol/L    Potassium 3.9 3.5 - 5.1 mmol/L    Chloride 103 95 - 110 mmol/L    CO2 21 (L) 23 - 29 mmol/L    Glucose 111 (H) 70 - 110 mg/dL    BUN 23 8 - 23 mg/dL    Creatinine 1.7 (H) 0.5 - 1.4 mg/dL    Calcium 9.7 8.7 - 10.5 mg/dL    Total Protein 7.5 6.0 - 8.4 g/dL    Albumin 3.3 (L) 3.5 - 5.2 g/dL    Total Bilirubin 0.3 0.1 - 1.0 mg/dL    Alkaline Phosphatase 112 55 - 135 U/L    AST 11 10 - 40 U/L    ALT 6 (L) 10 - 44 U/L    eGFR 43 (A) >60 mL/min/1.73 m^2    Anion Gap 15 8 - 16 mmol/L   Protime-INR   Result Value Ref Range    Prothrombin Time 11.2 9.0 - 12.5 sec    INR 1.1 0.8 - 1.2   APTT   Result Value Ref Range    aPTT 31.6 21.0 - 32.0 sec   Urinalysis, Reflex to Urine Culture Urine, Unspecified (nephrostomy)    Specimen: Urine   Result Value Ref Range    Specimen UA Urine, Unspecified     Color, UA Yellow Yellow, Straw, Andra    Appearance, UA Hazy (A) Clear    pH, UA 6.0 5.0 - 8.0    Specific Gravity, UA 1.010 1.005 - 1.030    Protein, UA 1+ (A) Negative    Glucose, UA Negative Negative    Ketones, UA Negative Negative    Bilirubin (UA) Negative Negative    Occult Blood UA 2+ (A) Negative    Nitrite, UA Negative Negative    Urobilinogen, UA Negative <2.0 EU/dL    Leukocytes, UA 3+ (A) Negative   Magnesium   Result Value Ref Range    Magnesium 2.2 1.6 - 2.6 mg/dL   Lactic acid, plasma   Result Value Ref Range    Lactate (Lactic Acid) 0.8 0.5 - 2.2 mmol/L   Urinalysis Microscopic   Result Value Ref Range    RBC, UA >100 (H) 0 - 4 /hpf    WBC, UA 76 (H) 0 - 5 /hpf    WBC Clumps, UA Moderate (A) None-Rare    Bacteria Rare None-Occ /hpf    Yeast, UA Few (A) None    Hyaline Casts, UA 0 0-1/lpf /lpf    Unclass Darling UA Occasional  None-Moderate    Microscopic Comment SEE COMMENT          Imaging Results:  Imaging Results              CT Abdomen Pelvis  Without Contrast (Final result)  Result time 11/10/23 21:34:45      Final result by Jefferson Kunz MD (11/10/23 21:34:45)                   Impression:      Right-sided external drainage catheter extending to the subcutaneous region superiorly and extending to the distal right ureter.  Correlate clinically for desired position.    Double-J left-sided urinary stent extending from the left renal pelvis to the left bladder    Bladder wall thickening    Ectatic aorta with atherosclerotic disease    Moderate amount of stool in the colon.    Remaining findings as above    All CT scans   are performed using dose optimization techniques including the following: automated exposure control; adjustment of the mA and/or kV; use of iterative reconstruction technique.  Dose modulation was employed for ALARA by means of: Automated exposure control; adjustment of the mA and/or kV according to patient size (this includes techniques or standardized protocols for targeted exams where dose is matched to indication/reason for exam; i.e. extremities or head); and/or use of iterative reconstructive technique.      Electronically signed by: Jefferson Kunz  Date:    11/10/2023  Time:    21:34               Narrative:    EXAMINATION:  CT ABDOMEN PELVIS WITHOUT CONTRAST    CLINICAL HISTORY:  Abdominal pain, post-op;    TECHNIQUE:  Low dose axial images, sagittal and coronal reformations were obtained from the lung bases to the pubic symphysis,    COMPARISON:  None    FINDINGS:  Left-sided urinary stent with pigtail in the left renal pelvis and distal pigtail in the urinary bladder.  Bladder wall thickening noted.  Right-sided external urinary drainage catheter extending to the subcutaneous fat in the external region superiorly and then extending along the renal pelvis and right ureter to extend to the distal right  ureter.  Correlate to desired position.  Ectatic abdominal aorta measuring up to 2.7 cm moderate amount of stool in the colon.  Extensive atherosclerotic disease.  Degenerative joint disease is identified.  Bilateral perinephric fat stranding is identified.  No focal liver lesions.  Mild hepatomegaly measuring up to 19 cm.  Mild subsegmental atelectasis.  Spleen is not enlarged.  No pancreatic lesions.  Small fat containing inguinal hernias.  No no adrenal masses.  No free fluid.  No bowel obstruction.                                                  The Emergency Provider reviewed the vital signs and test results, which are outlined above.     ED Discussion     7:00 PM: Vivian Lara MD (Radiology) visited pt at bedside and recommends CT w/o contrast of abd, PT/INR, NPO passed midnight, and  admit to .    10:27 PM: Discussed case with Dr. Parra ([facility]). Dr. Parra agrees with current care and management of pt and accepts admission.   Admitting Service:   Admitting Physician: Dr. Parra   Admit to: med/surg obs     10:27 PM: Re-evaluated pt. I have discussed test results, shared treatment plan, and the need for admission with patient and family at bedside. Pt and family express understanding at this time and agree with all information. All questions answered. Pt and family have no further questions or concerns at this time. Pt is ready for admit.       ED Course as of 11/11/23 0507   Fri Nov 10, 2023   1960 Hemoglobin(!): 9.4  Baseline [NF]   1959 Lactate, Riccardo: 0.8 [NF]   1959 WBC: 7.82 [NF]   1960 Creatinine(!): 1.7  Mild BANDAR [NF]   2143 68-year-old male presents with inadvertent displacement of his right nephrostomy tube.  He states he accidentally displaced it in his sleep last night.  He went to bed with it intact and woke up with it dislodged.  He has a mild BANDAR therefore CT abdomen and pelvis was obtained without contrast and confirms the displacement.  He has mild hypotension without  fever, leukocytosis, or tachycardia.  Given IV fluids.  Baseline H&H.  He does not meet SIRS criteria currently and has no evidence of sepsis, severe sepsis, or septic shock.  Electrolytes are normal.  Bleeding times are normal.  IR consulted and recommends admission, NPO after midnight, and replacement of nephrostomy tube tomorrow. [NF]   2143 Urinalysis with reflex cultures and blood cultures are pending at the time of admission.  Patient does have a PICC line in place.  His bladder appears to be distended and he has bilateral hydronephrosis on CT and he may need IV antibiotics for cystitis/pyelonephritis.  Admitting team to follow.  [NF]      ED Course User Index  [NF] Jamaica Nair, DO     Medical Decision Making  Amount and/or Complexity of Data Reviewed  Labs: ordered. Decision-making details documented in ED Course.  Radiology: ordered. Decision-making details documented in ED Course.    Risk  OTC drugs.  Prescription drug management.  Decision regarding hospitalization.  Decision not to resuscitate or to de-escalate care because of poor prognosis.  Minor surgery with identified risk factors.       Additional MDM:   Differential Diagnosis:   See MDM             ED Medication(s):  Medications   nicotine 21 mg/24 hr 1 patch (1 patch Transdermal Patch Applied 11/10/23 2013)   sodium chloride 0.9% flush 10 mL (has no administration in time range)   lactated ringers infusion ( Intravenous New Bag 11/10/23 2209)   melatonin tablet 6 mg (has no administration in time range)   ondansetron injection 4 mg (has no administration in time range)   promethazine tablet 25 mg (has no administration in time range)   senna-docusate 8.6-50 mg per tablet 1 tablet (has no administration in time range)   acetaminophen tablet 650 mg (has no administration in time range)   aluminum-magnesium hydroxide-simethicone 200-200-20 mg/5 mL suspension 30 mL (has no administration in time range)   acetaminophen suppository 650 mg (has no  administration in time range)   HYDROcodone-acetaminophen 5-325 mg per tablet 1 tablet (has no administration in time range)   morphine injection 2 mg (has no administration in time range)   naloxone 0.4 mg/mL injection 0.02 mg (has no administration in time range)   glucose chewable tablet 16 g (has no administration in time range)   glucose chewable tablet 24 g (has no administration in time range)   glucagon (human recombinant) injection 1 mg (has no administration in time range)   dextrose 10% bolus 125 mL 125 mL (has no administration in time range)   dextrose 10% bolus 250 mL 250 mL (has no administration in time range)   HYDROcodone-acetaminophen 5-325 mg per tablet 1 tablet (1 tablet Oral Given 11/10/23 2012)   sodium chloride 0.9% bolus 500 mL 500 mL (0 mLs Intravenous Stopped 11/10/23 2054)       Current Discharge Medication List                  Scribe Attestation:   Scribe #1: I performed the above scribed service and the documentation accurately describes the services I performed. I attest to the accuracy of the note.     Attending:   Physician Attestation Statement for Scribe #1: I, Jamaica Nair DO, personally performed the services described in this documentation, as scribed by Roderick Montaño, in my presence, and it is both accurate and complete.           Clinical Impression       ICD-10-CM ICD-9-CM   1. Displacement of nephrostomy tube  T83.022A 997.5   2. BANDAR (acute kidney injury)  N17.9 584.9   3. Hypotension, unspecified hypotension type  I95.9 458.9   4. Chest pain  R07.9 786.50       Disposition:   Disposition: Placed in Observation  Condition: Stable        Jamaica Nair DO  11/11/23 7576

## 2023-11-11 NOTE — PROGRESS NOTES
Pharmacist Renal Dose Adjustment Note    Geovani Currie is a 68 y.o. male being treated with the medication Cefepime    Patient Data:    Vital Signs (Most Recent):  Temp: 99.3 °F (37.4 °C) (11/11/23 0500)  Pulse: 96 (11/11/23 0500)  Resp: 19 (11/11/23 0500)  BP: (!) 162/58 (11/11/23 0500)  SpO2: 96 % (11/11/23 0500) Vital Signs (72h Range):  Temp:  [97.3 °F (36.3 °C)-99.3 °F (37.4 °C)]   Pulse:  [58-96]   Resp:  [11-19]   BP: ()/(51-68)   SpO2:  [91 %-98 %]      Recent Labs   Lab 11/07/23  1034 11/10/23  1858 11/11/23  0443   CREATININE 1.4 1.7* 1.2     Serum creatinine: 1.2 mg/dL 11/11/23 0443  Estimated creatinine clearance: 58.1 mL/min    Medication:Cefepime 1 gm IV every 8 hrs will be changed to medication:Cefepime 1 gm IV every 12 hrs  Pharmacist's Name: Jasmeet White

## 2023-11-11 NOTE — FIRST PROVIDER EVALUATION
"Medical screening examination initiated.  I have conducted a focused provider triage encounter, findings are as follows:    Brief history of present illness:  pt presents with partial nephrostomy tube removal.  Was sent to the ER for IR to either replace tonight or admit for replacement tomorrow.    Vitals:    11/10/23 1759   BP: (!) 98/51   BP Location: Right arm   Patient Position: Sitting   Pulse: 65   Resp: 16   Temp: 97.7 °F (36.5 °C)   TempSrc: Oral   SpO2: 95%   Height: 5' 9" (1.753 m)       Pertinent physical exam:  no acute distress noted.    Brief workup plan:      Preliminary workup initiated; this workup will be continued and followed by the physician or advanced practice provider that is assigned to the patient when roomed.  "

## 2023-11-11 NOTE — ASSESSMENT & PLAN NOTE
Patient's COPD is controlled currently.  Patient is currently off COPD Pathway. Continue scheduled inhalers prn and monitor respiratory status closely.

## 2023-11-11 NOTE — ASSESSMENT & PLAN NOTE
Patient with acute kidney injury/acute renal failure likely due to post-obstructive d/t Nephrostomy tube dislodgement BANDAR is currently awaiting reinsertion by IR. Baseline creatinine CKD Stage 3 - Labs reviewed- Renal function/electrolytes with Estimated Creatinine Clearance: 58.1 mL/min (based on SCr of 1.2 mg/dL). according to latest data. Monitor urine output and serial BMP and adjust therapy as needed. Avoid nephrotoxins and renally dose meds for GFR listed above.

## 2023-11-11 NOTE — HOSPITAL COURSE
Patient admitted for nephrostomy tube replacement after it was accidentally dislodge while sleeping.  Post procedure patient was to be discharged, but began shivering.  Temp increased to 101.  Dr. Hartman was consulted as he was following the patient for Enterobacter UTI with IV Invanz which scheduled to complete on 11/11, but due to the fever he advised to continue the Invanz. Repeat urine culture with Enterococcus VRE. Not covered by Invanz. Antibiotics changed to linezolid. Nephrostomy tube with no output. IR re-consulted. Nephrostomy tube replaced 11/15. Mentation improving per his wife. Clear urine in nephrostomy bag. NPO at midnight for procedure with Dr. Jorgensen. Held anticoagulation and antiplatelet as recommended by urology. Left nephrostomy tube placed. Cleared for discharge by urology. Outpatient follow-up with Dr. Jorgensen and his providers in [place]. Patient seen and examined, stable for discharge.

## 2023-11-11 NOTE — ASSESSMENT & PLAN NOTE
Appears near baseline without evidence of active bleeding noted.   Recent Labs   Lab 11/07/23  1034 11/10/23  1858 11/11/23  0443   HGB 9.4* 9.4* 9.4*   HCT 30.6* 30.5* 30.1*   MCV 88 86 86   MCHC 30.7* 30.8* 31.2*   RDW 14.6* 14.6* 14.6*    222 228   Plan:  -Monitor H/H and plts  -Type and screen, transfuse as needed   -Continue home medications   -Hold antiplatelets/anticoagulants in setting of active bleeding/pending surgical intervention

## 2023-11-11 NOTE — H&P
[facility]  [facility]  History & Physical    Patient Name: Geovani Currie  MRN: [id number]  Patient Class: OP- Observation  Admission Date: 11/10/2023  Attending Physician: Juan Carlos Parra MD   Primary Care Provider: Faizan Antoine MD         Patient information was obtained from patient, past medical records and ER records.     Subjective:     Principal Problem:Nephrostomy tube displaced    Chief Complaint:   Chief Complaint   Patient presents with    Post-op Problem     Pt pulled nephrostomy tube while sleeping, IR is aware and would like to be paged on Pt arrival, sent from [facility]        HPI: Geovani Currie is a 68 y.o. male with a PMH  has a past medical history of COPD (chronic obstructive pulmonary disease) and Hypertension. who presented to the ED after accidentally pulling out his nephrostomy tube while sleeping.  At time of bedside assessment, patient currently without any concerns or complaints and stated he is just waiting to have his procedure done so he can go back home.  Interventional Radiology was consulted by ED staff and is aware of patient.  [facility] under [facility] while awaiting nephrostomy tube reinsertion by IR tomorrow.      PCP: Faizan Antoine        Past Medical History:   Diagnosis Date    COPD (chronic obstructive pulmonary disease)     Hypertension        Past Surgical History:   Procedure Laterality Date    APPENDECTOMY      CATARACT EXTRACTION      NEPHROSTOMY         Review of patient's allergies indicates:  No Known Allergies    No current facility-administered medications on file prior to encounter.     Current Outpatient Medications on File Prior to Encounter   Medication Sig    albuterol (ACCUNEB) 0.63 mg/3 mL Nebu Take 3 mLs (0.63 mg total) by nebulization 3 (three) times daily as needed (sob, wheezing). Rescue    albuterol (PROVENTIL/VENTOLIN HFA) 90 mcg/actuation inhaler Inhale 1-2 puffs  into the lungs every 4 (four) hours as needed for Wheezing. Rescue    ALPRAZolam (XANAX) 0.5 MG tablet Take 2 tablets (1 mg total) by mouth 3 (three) times daily as needed for Anxiety.    cyclobenzaprine (FLEXERIL) 10 MG tablet Take 1 tablet (10 mg total) by mouth 3 (three) times daily as needed for Muscle spasms.    docusate sodium (COLACE) 100 MG capsule Take 100 mg by mouth 2 (two) times daily.    EScitalopram oxalate (LEXAPRO) 20 MG tablet Take 1 tablet (20 mg total) by mouth once daily.    fluticasone furoate-vilanteroL (BREO ELLIPTA) 100-25 mcg/dose diskus inhaler Inhale 1 puff into the lungs once daily. Controller    HYDROcodone-acetaminophen (NORCO)  mg per tablet Take 1 tablet by mouth every 6 (six) hours as needed for Pain.    levothyroxine (SYNTHROID) 125 MCG tablet Take 1 tablet (125 mcg total) by mouth before breakfast.    metoprolol succinate (TOPROL-XL) 50 MG 24 hr tablet Take 1 tablet (50 mg total) by mouth once daily.    nicotine (NICODERM CQ) 21 mg/24 hr Place 1 patch onto the skin once daily.    tamsulosin (FLOMAX) 0.4 mg Cap Take 1 capsule (0.4 mg total) by mouth once daily.    vitamin D (VITAMIN D3) 1000 units Tab 25 mcg.    [DISCONTINUED] amLODIPine (NORVASC) 5 MG tablet Take 1 tablet (5 mg total) by mouth once daily.    [DISCONTINUED] atorvastatin (LIPITOR) 20 MG tablet Take 1 tablet (20 mg total) by mouth once daily.    [DISCONTINUED] ceftAZIDime (FORTAZ) 2 gram injection      Family History       Problem Relation (Age of Onset)    Cancer Father, Mother          Tobacco Use    Smoking status: Former     Types: Cigarettes     Passive exposure: Never    Smokeless tobacco: Never   Substance and Sexual Activity    Alcohol use: Never    Drug use: Never    Sexual activity: Not Currently     Partners: Female     Review of Systems   All other systems reviewed and are negative.    Objective:     Vital Signs (Most Recent):  Temp: 97.7 °F (36.5 °C) (11/10/23 1759)  Pulse: 73  (11/10/23 2055)  Resp: 11 (11/10/23 2055)  BP: (!) 92/55 (11/10/23 2055)  SpO2: (!) 94 % (11/10/23 2055) Vital Signs (24h Range):  Temp:  [97.3 °F (36.3 °C)-97.7 °F (36.5 °C)] 97.7 °F (36.5 °C)  Pulse:  [58-73] 73  Resp:  [11-17] 11  SpO2:  [94 %-98 %] 94 %  BP: (92-99)/(51-58) 92/55        Body mass index is 23.6 kg/m².     Physical Exam  Vitals reviewed.   Constitutional:       General: He is not in acute distress.     Appearance: Normal appearance. He is normal weight. He is not ill-appearing, toxic-appearing or diaphoretic.   HENT:      Head: Normocephalic and atraumatic.      Right Ear: External ear normal.      Left Ear: External ear normal.      Nose: Nose normal. No congestion or rhinorrhea.      Mouth/Throat:      Mouth: Mucous membranes are moist.      Pharynx: Oropharynx is clear. No oropharyngeal exudate or posterior oropharyngeal erythema.   Eyes:      General: No scleral icterus.     Extraocular Movements: Extraocular movements intact.      Conjunctiva/sclera: Conjunctivae normal.      Pupils: Pupils are equal, round, and reactive to light.   Neck:      Vascular: No carotid bruit.   Cardiovascular:      Rate and Rhythm: Normal rate and regular rhythm.      Pulses: Normal pulses.      Heart sounds: Normal heart sounds. No murmur heard.     No friction rub. No gallop.   Pulmonary:      Effort: Pulmonary effort is normal. No respiratory distress.      Breath sounds: Normal breath sounds. No stridor. No wheezing, rhonchi or rales.   Chest:      Chest wall: No tenderness.   Abdominal:      General: Abdomen is flat. Bowel sounds are normal. There is no distension.      Palpations: Abdomen is soft. There is no mass.      Tenderness: There is no abdominal tenderness. There is no guarding or rebound.      Hernia: No hernia is present.   Musculoskeletal:         General: No swelling, tenderness, deformity or signs of injury. Normal range of motion.      Cervical back: Normal range of motion and neck supple. No  rigidity or tenderness.      Comments: Right nephrostomy tube present.    Lymphadenopathy:      Cervical: No cervical adenopathy.   Skin:     General: Skin is warm and dry.      Capillary Refill: Capillary refill takes less than 2 seconds.      Coloration: Skin is not jaundiced or pale.      Findings: No bruising, erythema, lesion or rash.   Neurological:      General: No focal deficit present.      Mental Status: He is alert and oriented to person, place, and time. Mental status is at baseline.      Cranial Nerves: No cranial nerve deficit.      Sensory: No sensory deficit.      Motor: No weakness.      Coordination: Coordination normal.   Psychiatric:         Mood and Affect: Mood normal.         Behavior: Behavior normal.         Thought Content: Thought content normal.         Judgment: Judgment normal.              CRANIAL NERVES     CN III, IV, VI   Pupils are equal, round, and reactive to light.       Significant Labs: All pertinent labs within the past 24 hours have been reviewed.    Significant Imaging: I have reviewed all pertinent imaging results/findings within the past 24 hours.    LABS:  Recent Results (from the past 24 hour(s))   CBC auto differential    Collection Time: 11/10/23  6:58 PM   Result Value Ref Range    WBC 7.82 3.90 - 12.70 K/uL    RBC 3.55 (L) 4.60 - 6.20 M/uL    Hemoglobin 9.4 (L) 14.0 - 18.0 g/dL    Hematocrit 30.5 (L) 40.0 - 54.0 %    MCV 86 82 - 98 fL    MCH 26.5 (L) 27.0 - 31.0 pg    MCHC 30.8 (L) 32.0 - 36.0 g/dL    RDW 14.6 (H) 11.5 - 14.5 %    Platelets 222 150 - 450 K/uL    MPV 9.6 9.2 - 12.9 fL    Immature Granulocytes 0.3 0.0 - 0.5 %    Gran # (ANC) 5.6 1.8 - 7.7 K/uL    Immature Grans (Abs) 0.02 0.00 - 0.04 K/uL    Lymph # 1.3 1.0 - 4.8 K/uL    Mono # 0.6 0.3 - 1.0 K/uL    Eos # 0.3 0.0 - 0.5 K/uL    Baso # 0.04 0.00 - 0.20 K/uL    nRBC 0 0 /100 WBC    Gran % 72.0 38.0 - 73.0 %    Lymph % 16.0 (L) 18.0 - 48.0 %    Mono % 7.4 4.0 - 15.0 %    Eosinophil % 3.8 0.0 - 8.0 %     Basophil % 0.5 0.0 - 1.9 %    Differential Method Automated    Comprehensive metabolic panel    Collection Time: 11/10/23  6:58 PM   Result Value Ref Range    Sodium 139 136 - 145 mmol/L    Potassium 3.9 3.5 - 5.1 mmol/L    Chloride 103 95 - 110 mmol/L    CO2 21 (L) 23 - 29 mmol/L    Glucose 111 (H) 70 - 110 mg/dL    BUN 23 8 - 23 mg/dL    Creatinine 1.7 (H) 0.5 - 1.4 mg/dL    Calcium 9.7 8.7 - 10.5 mg/dL    Total Protein 7.5 6.0 - 8.4 g/dL    Albumin 3.3 (L) 3.5 - 5.2 g/dL    Total Bilirubin 0.3 0.1 - 1.0 mg/dL    Alkaline Phosphatase 112 55 - 135 U/L    AST 11 10 - 40 U/L    ALT 6 (L) 10 - 44 U/L    eGFR 43 (A) >60 mL/min/1.73 m^2    Anion Gap 15 8 - 16 mmol/L   Protime-INR    Collection Time: 11/10/23  6:58 PM   Result Value Ref Range    Prothrombin Time 11.2 9.0 - 12.5 sec    INR 1.1 0.8 - 1.2   APTT    Collection Time: 11/10/23  6:58 PM   Result Value Ref Range    aPTT 31.6 21.0 - 32.0 sec   Magnesium    Collection Time: 11/10/23  6:58 PM   Result Value Ref Range    Magnesium 2.2 1.6 - 2.6 mg/dL   Lactic acid, plasma    Collection Time: 11/10/23  6:58 PM   Result Value Ref Range    Lactate (Lactic Acid) 0.8 0.5 - 2.2 mmol/L       RADIOLOGY  CT Abdomen Pelvis  Without Contrast    Result Date: 11/10/2023  EXAMINATION: CT ABDOMEN PELVIS WITHOUT CONTRAST CLINICAL HISTORY: Abdominal pain, post-op; TECHNIQUE: Low dose axial images, sagittal and coronal reformations were obtained from the lung bases to the pubic symphysis, COMPARISON: None FINDINGS: Left-sided urinary stent with pigtail in the left renal pelvis and distal pigtail in the urinary bladder.  Bladder wall thickening noted.  Right-sided external urinary drainage catheter extending to the subcutaneous fat in the external region superiorly and then extending along the renal pelvis and right ureter to extend to the distal right ureter.  Correlate to desired position.  Ectatic abdominal aorta measuring up to 2.7 cm moderate amount of stool in the colon.   Extensive atherosclerotic disease.  Degenerative joint disease is identified.  Bilateral perinephric fat stranding is identified.  No focal liver lesions.  Mild hepatomegaly measuring up to 19 cm.  Mild subsegmental atelectasis.  Spleen is not enlarged.  No pancreatic lesions.  Small fat containing inguinal hernias.  No no adrenal masses.  No free fluid.  No bowel obstruction.     Right-sided external drainage catheter extending to the subcutaneous region superiorly and extending to the distal right ureter.  Correlate clinically for desired position. Double-J left-sided urinary stent extending from the left renal pelvis to the left bladder Bladder wall thickening Ectatic aorta with atherosclerotic disease Moderate amount of stool in the colon. Remaining findings as above All CT scans   are performed using dose optimization techniques including the following: automated exposure control; adjustment of the mA and/or kV; use of iterative reconstruction technique.  Dose modulation was employed for ALARA by means of: Automated exposure control; adjustment of the mA and/or kV according to patient size (this includes techniques or standardized protocols for targeted exams where dose is matched to indication/reason for exam; i.e. extremities or head); and/or use of iterative reconstructive technique. Electronically signed by: Jefferson Kunz Date:    11/10/2023 Time:    21:34    Echo    Result Date: 10/23/2023    Left Ventricle: The left ventricle is normal in size. Normal wall thickness. There is eccentric hypertrophy. Normal wall motion. There is normal systolic function. Ejection fraction by visual approximation is 60%. Grade I diastolic dysfunction.   Left Atrium: Agitated saline study of the atrial septum is negative after vasalva maneuver, suggesting absence of intracardiac shunt at the atrial level.   Right Ventricle: Systolic function is normal.   Aortic Valve: The aortic valve is a trileaflet valve. Severely  calcified cusps. Moderately restricted motion. There is moderate stenosis. Aortic valve area by VTI is 1.34 cm². Aortic valve peak velocity is 3.20 m/s. Mean gradient is 28 mmHg. The dimensionless index is 0.38. There is mild aortic regurgitation.   Mitral Valve: There is moderate mitral annular calcification present.   Pulmonary Artery: The estimated pulmonary artery systolic pressure is 37 mmHg.   IVC/SVC: Intermediate venous pressure at 8 mmHg.     US Scrotum And Testicles    Result Date: 10/22/2023  EXAMINATION: US SCROTUM AND TESTICLES CLINICAL HISTORY: scotum swelling; Other specified disorders of the male genital organs TECHNIQUE: Sonography of the scrotum and testes. COMPARISON: None. FINDINGS: Right Testicle: *Size: 3.3 x 2.4 x 2.4 cm *Appearance: Normal. *Flow: Normal arterial and venous flow *Epididymis: Normal. *Hydrocele: None. *Varicocele: None. Left Testicle: *Size: 3.9 x 2.3 x 2.5 cm *Appearance: Normal. *Flow: Normal arterial and venous flow *Epididymis: Normal. *Hydrocele: Trace. *Varicocele: None. Other findings: None.     No significant abnormality. Electronically signed by: Daquan Laird MD Date:    10/22/2023 Time:    10:48    MRI Brain Without Contrast    Result Date: 10/21/2023  EXAMINATION: MRI BRAIN WITHOUT CONTRAST CLINICAL HISTORY: Stroke, follow up; TECHNIQUE: Sagittal and axial T1, axial T2, axial FLAIR, axial gradient and axial diffusion imaging of the whole brain without contrast. COMPARISON: CT with CTA 10/20/2023 FINDINGS: There is no restricted diffusion to suggest acute infarction.  Mild moderate size region of encephalomalacia left parietal lobe suggestive for prior infarction.  T2 flair signal hyperintensity underlies this compatible gliosis and scarring.  There is small focus of encephalomalacia extending across midline to the anterior body corpus callosum which may be additional prior infarction with punctate left thalamic and right basal gangliar remote lacunar-type  infarcts with small remote left caudate lacunar type infarct There is subtle scattered T2 FLAIR signal hyperintensities elsewhere supratentorial white matter which is nonspecific and may be mild chronic ischemic change. Major intracranial skull base T2 flow voids are present There is no midline shift.  Generalized cerebral volume loss with compensatory enlargement ventricles sulci and cisterns without hydrocephalus.  No abnormal parenchymal susceptibility to suggest parenchymal hemorrhage.     Mild moderate sized remote left parietal infarction with encephalomalacia There are few scattered small to punctate sized remote bilateral basal gangliar and left thalamic lacunar type infarcts No evidence for acute infarction. Please see above for additional details. Electronically signed by: Elvin Alvarez DO Date:    10/21/2023 Time:    15:09    CTA Head and Neck (xpd)    Result Date: 10/21/2023  EXAMINATION: CTA HEAD AND NECK (XPD) CLINICAL HISTORY: Neuro deficit, acute, stroke suspected; TECHNIQUE: Non contrast low dose axial images were obtained through the head.  CT angiogram was performed from the level of the anjana to the top of the head following the IV administration of 100mL of Omnipaque 350.   Sagittal and coronal reconstructions and maximum intensity projection reconstructions were performed. Arterial stenosis percentages are based on NASCET measurement criteria. COMPARISON: CT brain obtained earlier 10/20/23 FINDINGS: Motion artifact is present on several axial images prior to and following contrast. Intracranial Compartment: CT brain appears stable compared to the prior CT 22:20, the ventricles and sulci are mildly prominent consistent with patient's age without evidence of hydrocephalus. There is no extra-axial acute hemorrhage or fluid collection. There is focal low density in the left posterior parietal lobe with an appearance favoring a chronic infarct though there may be a component which is more age  indeterminate.  Basal ganglia lacunar infarcts were described on prior CT and are better visualized previously and are noted on this exam in the left basal ganglia.  There is no parenchymal enhancing mass, hemorrhage, or edema.  No other loss of the gray-white matter junction differentiation is seen throughout the brain parenchyma.  Posterior fossa structures pituitary gland grossly appear intact as imaged allowing for artifact. Skull/Extracranial Contents (limited evaluation): No fracture. Mastoid air cells and paranasal sinuses are essentially clear. Non-Vascular Structures of the Neck/Thoracic Inlet (limited evaluation): There is a spiculated mass in the right apex posteriorly measuring 16 x 11 by 13 mm axial image 78 series 9 suspicious for a neoplastic nodule.  Emphysematous changes noted in the lung apices.  Cervical spondylosis noted without subluxation most pronounced at C5-C6. Aorta: The left vertebral artery appears to arise from the very proximal left subclavian artery.  Normal 3 vessel arch.  Heavy atherosclerotic calcifications noted in the imaged aortic arch. Extracranial carotid circulation: There is mild atherosclerotic calcification and soft plaque in the carotid bifurcation bilaterally greater on the left.  No hemodynamically significant stenosis, aneurysmal dilatation, or dissection. Extracranial vertebral circulation: No hemodynamically significant stenosis, aneurysmal dilatation, or dissection. Intracranial Arteries: No focal high-grade stenosis, occlusion, or aneurysm. Venous structures (limited evaluation): Normal.     No evidence of acute intracranial abnormality since earlier examination 22:20.  Focus of low density favoring nonacute infarct in the posterior left parietal lobe. Lacunar infarcts  in left caudate left thalami and basal ganglia previously described and better visualized on prior CT as motion artifact limits evaluation on this exam There is no high-grade stenosis or major vessel  occlusion. Spiculated lesion measuring up to 16 mm in the right posterior lung apex.  For a solid nodule >8 mm, Fleischner Society 2017 guidelines recommend considering CT, PET/CT or tissue sampling at 3 months.  This finding was relayed to the emergency room physician, Dr. Obrien at the time of interpretation via secure chat. Electronically signed by: Florinda Yousif Date:    10/21/2023 Time:    00:56    X-Ray Chest AP Portable    Result Date: 10/20/2023  EXAMINATION: XR CHEST AP PORTABLE CLINICAL HISTORY: Stroke; TECHNIQUE: Single frontal view of the chest was performed. COMPARISON: None FINDINGS: Small band of atelectasis or scarring is noted in the left costophrenic angle otherwise the lungs are clear.  There is a normal appearance of pulmonary vasculature and there is no pleural effusion or pneumothorax as imaged. The cardiac silhouette is normal in size. The hilar and mediastinal contours are unremarkable. Bones are intact.     No acute or significant focal chest abnormality. Electronically signed by: Florinda Yousif Date:    10/20/2023 Time:    23:47    CT Head Without Contrast    Result Date: 10/20/2023  EXAMINATION: CT HEAD WITHOUT CONTRAST CLINICAL HISTORY: Neuro deficit, acute, stroke suspected;  not otherwise specified. TECHNIQUE: Low dose axial CT images obtained throughout the head without intravenous contrast. Sagittal and coronal reconstructions were performed. COMPARISON: None. FINDINGS: Intracranial compartment: No midline shift or acute hydrocephalus.  No extra-axial blood or fluid collections. Moderate involutional changes and chronic microvascular ischemic changes in the periventricular white matter. Moderate area of mild hypoattenuation in the left posterior parietal cortex suggesting infarction.  I favor a chronic process though age indeterminate.  MRI follow-up would better characterize. There is a small probable remote lacunar infarct of the left caudate. Small probable remote lacunar  infarcts at the bilateral thalamus and right lateral basal ganglia. No parenchymal mass, hemorrhage, edema or major vascular distribution infarct. Probable minimal bilateral senescent basal ganglia calcifications left greater than right. Skull/extracranial contents (limited evaluation): No fracture. Mastoid air cells and paranasal sinuses are essentially clear.     1. Involutional changes with chronic microvascular ischemic changes in the periventricular white matter. 2. Moderate area of hypoattenuation in the left posterior parietal cortex suggesting infarction.  I favor a chronic process though age indeterminate.  MRI follow-up would better characterize. 3. Small probable remote areas of lacunar infarction involving the left caudate, bilateral thalamus and right lateral basal ganglia. 4. This report was flagged in Epic as abnormal. Electronically signed by: Beni Escamilla Date:    10/20/2023 Time:    22:51      EKG    MICROBIOLOGY    MDM      Assessment/Plan:     * Nephrostomy tube displaced  Patient presented following accidental dislodgement of nephrostomy tube in his sleep.  IR consulted by ED staff and aware of patient with plans to reinsert tomorrow morning.  Plan:  -NPO  -wound care   -f/u IR      Acute renal failure superimposed on stage 3 chronic kidney disease  Patient with acute kidney injury/acute renal failure likely due to post-obstructive d/t Nephrostomy tube dislodgement BANDAR is currently awaiting reinsertion by IR. Baseline creatinine CKD Stage 3 - Labs reviewed- Renal function/electrolytes with Estimated Creatinine Clearance: 41 mL/min (A) (based on SCr of 1.7 mg/dL (H)). according to latest data. Monitor urine output and serial BMP and adjust therapy as needed. Avoid nephrotoxins and renally dose meds for GFR listed above.      Centrilobular emphysema  Patient's COPD is controlled currently.  Patient is currently off COPD Pathway. Continue scheduled inhalers prn and monitor respiratory status  closely.       Normocytic anemia  Appears near baseline without evidence of active bleeding noted.   Recent Labs   Lab 11/07/23  1034 11/10/23  1858   HGB 9.4* 9.4*   HCT 30.6* 30.5*   MCV 88 86   MCHC 30.7* 30.8*   RDW 14.6* 14.6*    222   Plan:  -Monitor H/H and plts  -Type and screen, transfuse as needed   -Continue home medications   -Hold antiplatelets/anticoagulants in setting of active bleeding/pending surgical intervention         History of cancer  Currently follows Hematology/Oncology as well as Urology outpatient.  Plan:  -f/u outpatient as directed      VTE Risk Mitigation (From admission, onward)         Ordered     Reason for No Pharmacological VTE Prophylaxis  Once        Question:  Reasons:  Answer:  Physician Provided (leave comment)  Comment:  awaiting surgical procedure    11/10/23 2148     IP VTE HIGH RISK PATIENT  Once         11/10/23 2148     Place sequential compression device  Until discontinued         11/10/23 2148              //Core Measures   -DVT proph: SCDs, withholding anticoagulation pending surgical intervention   -Code status: Full    -Surrogate: Full      Components of this note were documented using a voice recognition system and are subject to errors not corrected at the time the document was proof read. Please contact the author for any clarifications.       On 11/10/2023, patient should be placed in hospital observation services under my care.      Juan Carlos Parra MD  Department of Hospital Medicine  [facility] Emergency Dept.      COMPLETED  Family history is reviewed and has not changed   Pertinent information:

## 2023-11-11 NOTE — ASSESSMENT & PLAN NOTE
Currently follows Hematology/Oncology as well as Urology outpatient.  Plan:  -f/u outpatient as directed

## 2023-11-11 NOTE — PROGRESS NOTES
[facility]  [facility] Medicine  Progress Note    Patient Name: Geovani Currie  MRN: [id number]  Patient Class: OP- Observation   Admission Date: 11/10/2023  Length of Stay: 0 days  Attending Physician: Obdulia Hernandez MD  Primary Care Provider: Faizan Antoine MD        Subjective:     Principal Problem:Nephrostomy tube displaced        HPI:  Geovani Currie is a 68 y.o. male with a PMH  has a past medical history of COPD (chronic obstructive pulmonary disease) and Hypertension. who presented to the ED after accidentally pulling out his nephrostomy tube while sleeping.  At time of bedside assessment, patient currently without any concerns or complaints and stated he is just waiting to have his procedure done so he can go back home.  Interventional Radiology was consulted by ED staff and is aware of patient.  Patient being admitted to Hospital Medicine under observation while awaiting nephrostomy tube reinsertion by IR tomorrow.      PCP: Faizan Antoine        Overview/Hospital Course:  Patient admitted for nephrostomy tube replacement after it was accidentally dislodge while sleeping.  Post procedure patient was to be discharged, but began shivering.  Temp increased to 101.  Dr. Hartman was consulted as he was following the patient for Enterobacter UTI with IV Invanz which scheduled to complete on 11/11, but due to the fever he advised to continue the Invanz.  Patient Urine culture pending.      Interval History: Patient underwent Nephrostomy tube placement on the right with IR this morning.  He was doing fine post procedure, but then began to spike fevers.  Case dw Dr. Hartman who recommended continuing Invanz which he was on prior to admission and follow up urine cultures.  Previous urine cultures were enterbacter with several drug resistance.    Review of Systems   Constitutional:  Negative for activity change, appetite change, chills, fatigue and fever.   Respiratory:  Negative for apnea, cough,  shortness of breath and stridor.    Cardiovascular:  Negative for chest pain, palpitations and leg swelling.   Gastrointestinal:  Negative for abdominal distention, constipation, diarrhea, nausea and vomiting.   Genitourinary:  Negative for difficulty urinating, dysuria, frequency, hematuria and urgency.   Musculoskeletal:  Negative for arthralgias, back pain, gait problem and joint swelling.   Neurological:  Negative for dizziness, seizures, weakness, light-headedness, numbness and headaches.   Psychiatric/Behavioral:  Negative for agitation, behavioral problems, confusion, decreased concentration, dysphoric mood and hallucinations.    All other systems reviewed and are negative.    Objective:     Vital Signs (Most Recent):  Temp: (!) 101.1 °F (38.4 °C) (11/11/23 1210)  Pulse: (!) 198 (11/11/23 1200)  Resp: 20 (11/11/23 1200)  BP: 107/62 (11/11/23 1200)  SpO2: (!) 79 % (11/11/23 1200) Vital Signs (24h Range):  Temp:  [97.3 °F (36.3 °C)-101.1 °F (38.4 °C)] 101.1 °F (38.4 °C)  Pulse:  [] 198  Resp:  [11-20] 20  SpO2:  [79 %-98 %] 79 %  BP: ()/(51-64) 107/62     Weight: 72.5 kg (159 lb 13.3 oz)  Body mass index is 23.6 kg/m².    Intake/Output Summary (Last 24 hours) at 11/11/2023 1318  Last data filed at 11/10/2023 2054  Gross per 24 hour   Intake 500 ml   Output --   Net 500 ml         Physical Exam  Vitals and nursing note reviewed.   Constitutional:       Appearance: Normal appearance.   HENT:      Head: Normocephalic and atraumatic.      Nose: Nose normal.      Mouth/Throat:      Mouth: Mucous membranes are moist.   Eyes:      Extraocular Movements: Extraocular movements intact.      Conjunctiva/sclera: Conjunctivae normal.   Cardiovascular:      Rate and Rhythm: Normal rate and regular rhythm.      Pulses: Normal pulses.      Heart sounds: Normal heart sounds.   Pulmonary:      Effort: Pulmonary effort is normal.      Breath sounds: Normal breath sounds.   Abdominal:      General: Abdomen is flat.  Bowel sounds are normal.      Palpations: Abdomen is soft.   Genitourinary:     Comments: L side nephrostomy tube in place.  Musculoskeletal:         General: Normal range of motion.      Cervical back: Normal range of motion and neck supple.      Comments: PICC line in left arm   Skin:     General: Skin is warm.      Capillary Refill: Capillary refill takes less than 2 seconds.   Neurological:      Mental Status: He is alert and oriented to person, place, and time. Mental status is at baseline.   Psychiatric:         Mood and Affect: Mood normal.         Behavior: Behavior normal.             Significant Labs: All pertinent labs within the past 24 hours have been reviewed.  Recent Lab Results         11/11/23  0443   11/11/23  0431   11/10/23  1859   11/10/23  1858        Albumin 2.9       3.3              112       ALT 5       6       Anion Gap 16       15       Appearance, UA   Hazy           aPTT       31.6  Comment: Refer to local heparin nomogram for intensity/dose specific   therapeutic   range.         AST 9       11       Bacteria, UA   Rare           Baso # 0.04       0.04       Basophil % 0.3       0.5       Bilirubin (UA)   Negative           BILIRUBIN TOTAL 0.3  Comment: For infants and newborns, interpretation of results should be based  on gestational age, weight and in agreement with clinical  observations.    Premature Infant recommended reference ranges:  Up to 24 hours.............<8.0 mg/dL  Up to 48 hours............<12.0 mg/dL  3-5 days..................<15.0 mg/dL  6-29 days.................<15.0 mg/dL         0.3  Comment: For infants and newborns, interpretation of results should be based  on gestational age, weight and in agreement with clinical  observations.    Premature Infant recommended reference ranges:  Up to 24 hours.............<8.0 mg/dL  Up to 48 hours............<12.0 mg/dL  3-5 days..................<15.0 mg/dL  6-29 days.................<15.0 mg/dL         Blood  Culture, Routine     No Growth to date  [P]              No Growth to date  [P]         BUN 19       23       Calcium 9.3       9.7       Chloride 106       103       CO2 22       21       Color, UA   Yellow           Creatinine 1.2       1.7       Differential Method Automated       Automated       eGFR >60       43       Eos # 0.3       0.3       Eosinophil % 2.4       3.8       Glucose 108       111       Glucose, UA   Negative           Gran # (ANC) 10.0       5.6       Gran % 84.5       72.0       Hematocrit 30.1       30.5       Hemoglobin 9.4       9.4       Hyaline Casts, UA   0           Immature Grans (Abs) 0.04  Comment: Mild elevation in immature granulocytes is non specific and   can be seen in a variety of conditions including stress response,   acute inflammation, trauma and pregnancy. Correlation with other   laboratory and clinical findings is essential.         0.02  Comment: Mild elevation in immature granulocytes is non specific and   can be seen in a variety of conditions including stress response,   acute inflammation, trauma and pregnancy. Correlation with other   laboratory and clinical findings is essential.         Immature Granulocytes 0.3       0.3       INR       1.1  Comment: Coumadin Therapy:  2.0 - 3.0 for INR for all indicators except mechanical heart valves  and antiphospholipid syndromes which should use 2.5 - 3.5.         Ketones, UA   Negative           Lactate, Riccardo       0.8  Comment: Falsely low lactic acid results can be found in samples   containing >=13.0 mg/dL total bilirubin and/or >=3.5 mg/dL   direct bilirubin.         Leukocytes, UA   3+           Lymph # 0.7       1.3       Lymph % 6.0       16.0       Magnesium        2.2       MCH 26.9       26.5       MCHC 31.2       30.8       MCV 86       86       Microscopic Comment   SEE COMMENT  Comment: Other formed elements not mentioned in the report are not   present in the microscopic examination.              Mono # 0.8        0.6       Mono % 6.5       7.4       MPV 10.2       9.6       NITRITE UA   Negative           nRBC 0       0       Occult Blood UA   2+           pH, UA   6.0           Platelet Count 228       222       Potassium 3.8       3.9       PROTEIN TOTAL 6.7       7.5       Protein, UA   1+  Comment: Recommend a 24 hour urine protein or a urine   protein/creatinine ratio if globulin induced proteinuria is  clinically suspected.             Protime       11.2       RBC 3.49       3.55       RBC, UA   >100           RDW 14.6       14.6       Sodium 144       139       Specific Kevil, UA   1.010           Specimen UA   Urine, Unspecified  Comment: nephrostomy           Unclass Darling UA   Occasional           UROBILINOGEN UA   Negative           WBC Clumps, UA   Moderate           WBC, UA   76           WBC 11.79       7.82       Yeast, UA   Few                    [P] - Preliminary Result               Significant Imaging:   CT Abdomen Pelvis  Without Contrast   Final Result      Right-sided external drainage catheter extending to the subcutaneous region superiorly and extending to the distal right ureter.  Correlate clinically for desired position.      Double-J left-sided urinary stent extending from the left renal pelvis to the left bladder      Bladder wall thickening      Ectatic aorta with atherosclerotic disease      Moderate amount of stool in the colon.      Remaining findings as above      All CT scans   are performed using dose optimization techniques including the following: automated exposure control; adjustment of the mA and/or kV; use of iterative reconstruction technique.  Dose modulation was employed for ALARA by means of: Automated exposure control; adjustment of the mA and/or kV according to patient size (this includes techniques or standardized protocols for targeted exams where dose is matched to indication/reason for exam; i.e. extremities or head); and/or use of iterative reconstructive technique.          Electronically signed by: Jeffersonjessica Roseni   Date:    11/10/2023   Time:    21:34      IR Nephrostomy Tube Change    (Results Pending)          Assessment/Plan:      * Nephrostomy tube displaced  Patient presented following accidental dislodgement of nephrostomy tube in his sleep.  IR consulted by ED staff and aware of patient with plans to reinsert tomorrow morning.    - re placed by IR on 11/11      Acute renal failure superimposed on stage 3 chronic kidney disease  Patient with acute kidney injury/acute renal failure likely due to post-obstructive d/t Nephrostomy tube dislodgement BANDAR is currently awaiting reinsertion by IR. Baseline creatinine CKD Stage 3 - Labs reviewed- Renal function/electrolytes with Estimated Creatinine Clearance: 58.1 mL/min (based on SCr of 1.2 mg/dL). according to latest data. Monitor urine output and serial BMP and adjust therapy as needed. Avoid nephrotoxins and renally dose meds for GFR listed above.      Centrilobular emphysema  Patient's COPD is controlled currently.  Patient is currently off COPD Pathway. Continue scheduled inhalers prn and monitor respiratory status closely.       UTI (urinary tract infection)  - continue home invanz  - Followed by Dr. Hartman - consult placed  - Urine cultures pending  - PICC line in left arm  - Fever of 101.1 post procedure      History of cancer  Currently follows Hematology/Oncology as well as Urology outpatient.  Plan:  -f/u outpatient as directed      Normocytic anemia  Appears near baseline without evidence of active bleeding noted.   Recent Labs   Lab 11/07/23  1034 11/10/23  1858 11/11/23  0443   HGB 9.4* 9.4* 9.4*   HCT 30.6* 30.5* 30.1*   MCV 88 86 86   MCHC 30.7* 30.8* 31.2*   RDW 14.6* 14.6* 14.6*    222 228   Plan:  -Monitor H/H and plts  -Type and screen, transfuse as needed   -Continue home medications   -Hold antiplatelets/anticoagulants in setting of active bleeding/pending surgical intervention           VTE Risk  Mitigation (From admission, onward)         Ordered     Reason for No Pharmacological VTE Prophylaxis  Once        Question:  Reasons:  Answer:  Physician Provided (leave comment)  Comment:  awaiting surgical procedure    11/10/23 2148     IP VTE HIGH RISK PATIENT  Once         11/10/23 2148     Place sequential compression device  Until discontinued         11/10/23 2148                Discharge Planning   TANIA:      Code Status: Full Code   Is the patient medically ready for discharge?:     Reason for patient still in hospital (select all that apply): Patient trending condition, Laboratory test, Treatment and Consult recommendations                     Obdulia Hernandez MD  Department of Hospital Medicine   [facility]

## 2023-11-11 NOTE — ASSESSMENT & PLAN NOTE
Appears near baseline without evidence of active bleeding noted.   Recent Labs   Lab 11/07/23  1034 11/10/23  1858   HGB 9.4* 9.4*   HCT 30.6* 30.5*   MCV 88 86   MCHC 30.7* 30.8*   RDW 14.6* 14.6*    222   Plan:  -Monitor H/H and plts  -Type and screen, transfuse as needed   -Continue home medications   -Hold antiplatelets/anticoagulants in setting of active bleeding/pending surgical intervention

## 2023-11-11 NOTE — ASSESSMENT & PLAN NOTE
Patient presented following accidental dislodgement of nephrostomy tube in his sleep.  IR consulted by ED staff and aware of patient with plans to reinsert tomorrow morning.  Plan:  -NPO  -wound care   -f/u IR

## 2023-11-12 LAB
ALBUMIN SERPL BCP-MCNC: 2.6 G/DL (ref 3.5–5.2)
ALP SERPL-CCNC: 95 U/L (ref 55–135)
ALT SERPL W/O P-5'-P-CCNC: 8 U/L (ref 10–44)
ANION GAP SERPL CALC-SCNC: 11 MMOL/L (ref 8–16)
AST SERPL-CCNC: 8 U/L (ref 10–40)
BASOPHILS # BLD AUTO: 0.04 K/UL (ref 0–0.2)
BASOPHILS NFR BLD: 0.3 % (ref 0–1.9)
BILIRUB SERPL-MCNC: 0.5 MG/DL (ref 0.1–1)
BUN SERPL-MCNC: 17 MG/DL (ref 8–23)
CALCIUM SERPL-MCNC: 8.7 MG/DL (ref 8.7–10.5)
CHLORIDE SERPL-SCNC: 105 MMOL/L (ref 95–110)
CO2 SERPL-SCNC: 22 MMOL/L (ref 23–29)
CREAT SERPL-MCNC: 1.4 MG/DL (ref 0.5–1.4)
DIFFERENTIAL METHOD: ABNORMAL
EOSINOPHIL # BLD AUTO: 0.1 K/UL (ref 0–0.5)
EOSINOPHIL NFR BLD: 0.5 % (ref 0–8)
ERYTHROCYTE [DISTWIDTH] IN BLOOD BY AUTOMATED COUNT: 14.8 % (ref 11.5–14.5)
EST. GFR  (NO RACE VARIABLE): 54 ML/MIN/1.73 M^2
GLUCOSE SERPL-MCNC: 113 MG/DL (ref 70–110)
HCT VFR BLD AUTO: 30.4 % (ref 40–54)
HGB BLD-MCNC: 9.5 G/DL (ref 14–18)
IMM GRANULOCYTES # BLD AUTO: 0.05 K/UL (ref 0–0.04)
IMM GRANULOCYTES NFR BLD AUTO: 0.4 % (ref 0–0.5)
LYMPHOCYTES # BLD AUTO: 1.3 K/UL (ref 1–4.8)
LYMPHOCYTES NFR BLD: 10.2 % (ref 18–48)
MCH RBC QN AUTO: 26.9 PG (ref 27–31)
MCHC RBC AUTO-ENTMCNC: 31.3 G/DL (ref 32–36)
MCV RBC AUTO: 86 FL (ref 82–98)
MONOCYTES # BLD AUTO: 0.9 K/UL (ref 0.3–1)
MONOCYTES NFR BLD: 7 % (ref 4–15)
NEUTROPHILS # BLD AUTO: 10.4 K/UL (ref 1.8–7.7)
NEUTROPHILS NFR BLD: 81.6 % (ref 38–73)
NRBC BLD-RTO: 0 /100 WBC
PLATELET # BLD AUTO: 197 K/UL (ref 150–450)
PMV BLD AUTO: 10.5 FL (ref 9.2–12.9)
POTASSIUM SERPL-SCNC: 4 MMOL/L (ref 3.5–5.1)
PROT SERPL-MCNC: 6.2 G/DL (ref 6–8.4)
RBC # BLD AUTO: 3.53 M/UL (ref 4.6–6.2)
SODIUM SERPL-SCNC: 138 MMOL/L (ref 136–145)
WBC # BLD AUTO: 12.69 K/UL (ref 3.9–12.7)

## 2023-11-12 PROCEDURE — 11000001 HC ACUTE MED/SURG PRIVATE ROOM

## 2023-11-12 PROCEDURE — 80053 COMPREHEN METABOLIC PANEL: CPT | Performed by: HOSPITALIST

## 2023-11-12 PROCEDURE — S4991 NICOTINE PATCH NONLEGEND: HCPCS | Performed by: FAMILY MEDICINE

## 2023-11-12 PROCEDURE — 25000003 PHARM REV CODE 250: Performed by: FAMILY MEDICINE

## 2023-11-12 PROCEDURE — 63600175 PHARM REV CODE 636 W HCPCS: Performed by: FAMILY MEDICINE

## 2023-11-12 PROCEDURE — 85025 COMPLETE CBC W/AUTO DIFF WBC: CPT | Performed by: HOSPITALIST

## 2023-11-12 PROCEDURE — 99222 1ST HOSP IP/OBS MODERATE 55: CPT | Mod: ,,, | Performed by: STUDENT IN AN ORGANIZED HEALTH CARE EDUCATION/TRAINING PROGRAM

## 2023-11-12 PROCEDURE — 25000003 PHARM REV CODE 250: Performed by: HOSPITALIST

## 2023-11-12 PROCEDURE — 63600175 PHARM REV CODE 636 W HCPCS: Performed by: HOSPITALIST

## 2023-11-12 PROCEDURE — 99222 PR INITIAL HOSPITAL CARE,LEVL II: ICD-10-PCS | Mod: ,,, | Performed by: STUDENT IN AN ORGANIZED HEALTH CARE EDUCATION/TRAINING PROGRAM

## 2023-11-12 PROCEDURE — 36415 COLL VENOUS BLD VENIPUNCTURE: CPT | Performed by: HOSPITALIST

## 2023-11-12 RX ORDER — IBUPROFEN 200 MG
1 TABLET ORAL DAILY
Status: DISCONTINUED | OUTPATIENT
Start: 2023-11-12 | End: 2023-11-18 | Stop reason: HOSPADM

## 2023-11-12 RX ADMIN — METOPROLOL SUCCINATE 50 MG: 50 TABLET, EXTENDED RELEASE ORAL at 08:11

## 2023-11-12 RX ADMIN — ERTAPENEM 1 G: 1 INJECTION INTRAMUSCULAR; INTRAVENOUS at 12:11

## 2023-11-12 RX ADMIN — ALPRAZOLAM 1 MG: 1 TABLET ORAL at 04:11

## 2023-11-12 RX ADMIN — ESCITALOPRAM 20 MG: 5 TABLET, FILM COATED ORAL at 08:11

## 2023-11-12 RX ADMIN — ALPRAZOLAM 1 MG: 1 TABLET ORAL at 08:11

## 2023-11-12 RX ADMIN — TAMSULOSIN HYDROCHLORIDE 0.4 MG: 0.4 CAPSULE ORAL at 08:11

## 2023-11-12 RX ADMIN — SODIUM CHLORIDE, POTASSIUM CHLORIDE, SODIUM LACTATE AND CALCIUM CHLORIDE: 600; 310; 30; 20 INJECTION, SOLUTION INTRAVENOUS at 07:11

## 2023-11-12 RX ADMIN — NICOTINE 1 PATCH: 14 PATCH, EXTENDED RELEASE TRANSDERMAL at 09:11

## 2023-11-12 RX ADMIN — DOCUSATE SODIUM 100 MG: 100 CAPSULE, LIQUID FILLED ORAL at 08:11

## 2023-11-12 RX ADMIN — HYDROCODONE BITARTRATE AND ACETAMINOPHEN 1 TABLET: 5; 325 TABLET ORAL at 04:11

## 2023-11-12 RX ADMIN — HYDROCODONE BITARTRATE AND ACETAMINOPHEN 1 TABLET: 5; 325 TABLET ORAL at 08:11

## 2023-11-12 RX ADMIN — LEVOTHYROXINE SODIUM 125 MCG: 25 TABLET ORAL at 05:11

## 2023-11-12 RX ADMIN — DOCUSATE SODIUM 100 MG: 100 CAPSULE, LIQUID FILLED ORAL at 09:11

## 2023-11-12 NOTE — CONSULTS
[facility]  Infectious Disease  Consult Note    Patient Name: Geovani Currie  MRN: [id number]  Admission Date: 11/10/2023  Hospital Length of Stay: 1 days  Attending Physician: Obdulia Henrandez MD  Primary Care Provider: Faizan Antoine MD     Isolation Status: No active isolations    Patient information was obtained from patient, ER records and primary team.      Consults  Assessment/Plan:     Renal/  * Nephrostomy tube displaced  IR consulted and replaced nephrostomy on 11/11.     UTI (urinary tract infection)  69 yo M with history of SCC dx by RUL nodule biopsy 10/18/23, also with satellite lesion seen on CT Chest with size increase to 7mm 9/2023 and muscle invasive bladder cancer s/p TMT in 2020 now with recurrence in the RP nodes and possible metastasis to the lungs vs primary lung CA who presented with displaced nephrostomy. Replaced 11/11 by IR. Then spiked fever to 101.1 F with slight increase in WBC count. U/A with 76 WBC. Urine culture growing E faecium likely translocated from gut. CT a/p reports Right-sided external drainage catheter extending to the subcutaneous region superiorly and extending to the distal right ureter. Double-J left-sided urinary stent extending from the left renal pelvis to the left bladder. Bladder wall thickening.    Recommendations:  --No coverage expected for E faecium with ertapenem   --Recommend switching to PO linezolid 600 mg BID for anticipated 14 day course (E faecium typically susceptible; will ask micro lab to release once culture finalized)   --Will await susceptibilities prior to final recommendations   --Monitor fever curve and blood cx (NGTD)       Thank you for your consult. I will follow-up with patient. Please contact us if you have any additional questions.    Jeremiah Hartman, DO  Infectious Disease  [facility]    Subjective:     Principal Problem: Nephrostomy tube displaced    HPI: This is a 68 y.o. male with pertinent PMHx of bladder and  lung cancer who has been seen by ID outpatient as hospital follow up after being brought to [facility] on 10/20 with altered mental status. Patient was [place] from the airport with worsening lethargy and shallow respiration. He was noted to be saturating around 90% on room air and was placed on 2 L. Patient just moved from [place] to [place] to be closer to family for cancer treatments. Family is concerned that over the past few days he had become increasingly weak. In the ER, arrival vitals were significant for hypoxia, patient was placed on 5 L. CBC showed normocytic anemia. CMP was significant for low albumin, elevated sugars. TSH , lactic acid, troponin within normal range. U/A was concerning for pyuria >100. Urine culture grew Enterobacter cloacae >100,000 cfu/mL. CT head with moderate area of hypoattenuation in the parietal cortex suggesting infarct although radiologist favors chronic process. Small probable remote areas of lacunar infarct involving the left caudate, bilateral thalamus and right lateral basal ganglia. CTA head and neck with no evidence of acute intracranial abnormality. No high-grade stenosis or major vessel occlusion. Spiculated lesion noticed in the right posterior lung apex. Chest x-ray showed no acute or significant focal chest abnormality. MRI brain with no acute stroke. Infectious Diseases consulted. Recommend stopping cefepime patient had been on due to risk for neurotoxicity. Switched to ertapenem 1 g daily for total of 14 days (stop date 11/4). Per daughter, patient had received 10 days of oral antibiotics while in [place] and nephrostomy tube was infected. They are waiting for referral to heme/onc for 2 months of chemo. Still establishing care since moving to [place]. Will be in rehab for 2 weeks. Seeing urologist 11/2. Stent in left kidney 3 months ago for stones and kidney failed. 9/18 both kidneys failed so stent placed and nephrostomy tube on right. Only dribbles from  penis. All urine from nephrostomy tube. High grade bladder cancer, lung cancer, and lymph node involvement.     Patient now admitted for accidentally removing nephrostomy tube at home. Per IR note, indwelling right dislodged PCNU was removed and replaced. Prior to discharge patient spiked fever to 101.1 F. Very mild leukocytosis noted. UA with 76 WBC. Urine culture in process. ID consulted for UTI. On ertapenem.         Past Medical History:   Diagnosis Date    COPD (chronic obstructive pulmonary disease)     Hypertension        Past Surgical History:   Procedure Laterality Date    APPENDECTOMY      CATARACT EXTRACTION      NEPHROSTOMY         Review of patient's allergies indicates:  No Known Allergies    Medications:  Medications Prior to Admission   Medication Sig    albuterol (ACCUNEB) 0.63 mg/3 mL Nebu Take 3 mLs (0.63 mg total) by nebulization 3 (three) times daily as needed (sob, wheezing). Rescue    albuterol (PROVENTIL/VENTOLIN HFA) 90 mcg/actuation inhaler Inhale 1-2 puffs into the lungs every 4 (four) hours as needed for Wheezing. Rescue    ALPRAZolam (XANAX) 0.5 MG tablet Take 2 tablets (1 mg total) by mouth 3 (three) times daily as needed for Anxiety.    cyclobenzaprine (FLEXERIL) 10 MG tablet Take 1 tablet (10 mg total) by mouth 3 (three) times daily as needed for Muscle spasms.    docusate sodium (COLACE) 100 MG capsule Take 100 mg by mouth 2 (two) times daily.    EScitalopram oxalate (LEXAPRO) 20 MG tablet Take 1 tablet (20 mg total) by mouth once daily.    fluticasone furoate-vilanteroL (BREO ELLIPTA) 100-25 mcg/dose diskus inhaler Inhale 1 puff into the lungs once daily. Controller    HYDROcodone-acetaminophen (NORCO)  mg per tablet Take 1 tablet by mouth every 6 (six) hours as needed for Pain.    levothyroxine (SYNTHROID) 125 MCG tablet Take 1 tablet (125 mcg total) by mouth before breakfast.    metoprolol succinate (TOPROL-XL) 50 MG 24 hr tablet Take 1 tablet (50 mg total) by  mouth once daily.    nicotine (NICODERM CQ) 21 mg/24 hr Place 1 patch onto the skin once daily.    tamsulosin (FLOMAX) 0.4 mg Cap Take 1 capsule (0.4 mg total) by mouth once daily.    vitamin D (VITAMIN D3) 1000 units Tab 25 mcg.     Antibiotics (From admission, onward)      Start     Stop Route Frequency Ordered    11/12/23 1300  ertapenem (INVANZ) 1 g in sodium chloride 0.9 % 100 mL IVPB (MB+)         -- IV Every 24 hours (non-standard times) 11/12/23 0921          Antifungals (From admission, onward)      None          Antivirals (From admission, onward)      None               There is no immunization history on file for this patient.    Family History       Problem Relation (Age of Onset)    Cancer Father, Mother          Social History     Socioeconomic History    Marital status:    Tobacco Use    Smoking status: Former     Types: Cigarettes     Passive exposure: Never    Smokeless tobacco: Never   Substance and Sexual Activity    Alcohol use: Never    Drug use: Never    Sexual activity: Not Currently     Partners: Female     Social Determinants of Health     Financial Resource Strain: High Risk (11/10/2023)    Overall Financial Resource Strain (CARDIA)     Difficulty of Paying Living Expenses: Very hard   Food Insecurity: Food Insecurity Present (11/10/2023)    Hunger Vital Sign     Worried About Running Out of Food in the Last Year: Often true     Ran Out of Food in the Last Year: Often true   Transportation Needs: Unmet Transportation Needs (11/10/2023)    PRAPARE - Transportation     Lack of Transportation (Medical): Yes     Lack of Transportation (Non-Medical): Yes   Physical Activity: Inactive (11/10/2023)    Exercise Vital Sign     Days of Exercise per Week: 0 days     Minutes of Exercise per Session: 0 min   Stress: Stress Concern Present (11/10/2023)    [employer] - Occupational Stress Questionnaire     Feeling of Stress : Rather much   Social  Connections: Unknown (11/10/2023)    Social Connection and Isolation Panel [NHANES]     Frequency of Communication with Friends and Family: More than three times a week     Frequency of Social Gatherings with Friends and Family: Never     Attends Muslim Services: Patient refused     Active Member of Clubs or Organizations: No     Attends Club or Organization Meetings: Never     Marital Status:    Housing Stability: High Risk (11/10/2023)    Housing Stability Vital Sign     Unable to Pay for Housing in the Last Year: Yes     Number of Places Lived in the Last Year: 3     Unstable Housing in the Last Year: No     Review of Systems   Constitutional:  Positive for activity change. Negative for fever.   Gastrointestinal:  Negative for diarrhea.   Genitourinary:  Positive for difficulty urinating. Negative for dysuria.   Musculoskeletal:  Positive for arthralgias. Negative for back pain.   All other systems reviewed and are negative.    Objective:     Vital Signs (Most Recent):  Temp: 98.6 °F (37 °C) (11/12/23 1202)  Pulse: 90 (11/12/23 1500)  Resp: 18 (11/12/23 1202)  BP: (!) 101/55 (11/12/23 1202)  SpO2: (!) 94 % (11/12/23 1405) Vital Signs (24h Range):  Temp:  [98.6 °F (37 °C)-100 °F (37.8 °C)] 98.6 °F (37 °C)  Pulse:  [76-99] 90  Resp:  [18] 18  SpO2:  [90 %-96 %] 94 %  BP: (101-135)/(51-60) 101/55     Weight: 72.5 kg (159 lb 13.3 oz)  Body mass index is 23.6 kg/m².    Estimated Creatinine Clearance: 49.8 mL/min (based on SCr of 1.4 mg/dL).     Physical Exam  Constitutional:       General: He is not in acute distress.     Appearance: Normal appearance. He is not ill-appearing.   Cardiovascular:      Rate and Rhythm: Normal rate and regular rhythm.      Pulses: Normal pulses.      Heart sounds: Normal heart sounds. No murmur heard.     No friction rub. No gallop.   Pulmonary:      Effort: Pulmonary effort is normal. No respiratory distress.      Breath sounds: Normal breath sounds.   Abdominal:       General: Abdomen is flat. Bowel sounds are normal. There is no distension.      Palpations: Abdomen is soft.      Tenderness: There is no abdominal tenderness.   Genitourinary:     Comments: Left sided nephrostomy intact   Skin:     General: Skin is warm and dry.   Neurological:      Mental Status: He is alert.          Significant Labs: CBC:   Recent Labs   Lab 11/10/23  1858 11/11/23  0443 11/12/23  0441   WBC 7.82 11.79 12.69   HGB 9.4* 9.4* 9.5*   HCT 30.5* 30.1* 30.4*    228 197     CMP:   Recent Labs   Lab 11/10/23  1858 11/11/23  0443 11/12/23  0441    144 138   K 3.9 3.8 4.0    106 105   CO2 21* 22* 22*   * 108 113*   BUN 23 19 17   CREATININE 1.7* 1.2 1.4   CALCIUM 9.7 9.3 8.7   PROT 7.5 6.7 6.2   ALBUMIN 3.3* 2.9* 2.6*   BILITOT 0.3 0.3 0.5   ALKPHOS 112 104 95   AST 11 9* 8*   ALT 6* 5* 8*   ANIONGAP 15 16 11     Microbiology Results (last 7 days)       Procedure Component Value Units Date/Time    Urine culture [[id number]]  (Abnormal) Collected: 11/11/23 0431    Order Status: Completed Specimen: Urine Updated: 11/12/23 1315     Urine Culture, Routine ENTEROCOCCUS FAECIUM  >100,000 cfu/ml  Susceptibility pending  No other significant isolate      Narrative:      Specimen Source->Urine    Blood culture #1 **CANNOT BE ORDERED STAT** [[id number]] Collected: 11/10/23 1859    Order Status: Completed Specimen: Blood from Peripheral, Forearm, Right Updated: 11/12/23 0612     Blood Culture, Routine No Growth to date      No Growth to date    Blood culture #2 **CANNOT BE ORDERED STAT** [[id number]] Collected: 11/10/23 1859    Order Status: Completed Specimen: Blood from Peripheral, Antecubital, Right Updated: 11/12/23 0612     Blood Culture, Routine No Growth to date      No Growth to date          All pertinent labs within the past 24 hours have been reviewed.    Significant Imaging: I have reviewed all pertinent imaging results/findings within the past 24 hours.

## 2023-11-12 NOTE — ASSESSMENT & PLAN NOTE
69 yo M with history of SCC dx by RUL nodule biopsy 10/18/23, also with satellite lesion seen on CT Chest with size increase to 7mm 9/2023 and muscle invasive bladder cancer s/p TMT in 2020 now with recurrence in the RP nodes and possible metastasis to the lungs vs primary lung CA who presented with displaced nephrostomy. Replaced 11/11 by IR. Then spiked fever to 101.1 F with slight increase in WBC count. U/A with 76 WBC. Urine culture growing E faecium likely translocated from gut. CT a/p reports Right-sided external drainage catheter extending to the subcutaneous region superiorly and extending to the distal right ureter. Double-J left-sided urinary stent extending from the left renal pelvis to the left bladder. Bladder wall thickening.    Recommendations:  --No coverage expected for E faecium with ertapenem   --Recommend switching to PO linezolid 600 mg BID for anticipated 14 day course (E faecium typically susceptible; will ask micro lab to release once culture finalized)   --Will await susceptibilities prior to final recommendations   --Monitor fever curve and blood cx (NGTD)

## 2023-11-12 NOTE — ASSESSMENT & PLAN NOTE
Appears near baseline without evidence of active bleeding noted.   Recent Labs   Lab 11/10/23  1858 11/11/23  0443 11/12/23  0441   HGB 9.4* 9.4* 9.5*   HCT 30.5* 30.1* 30.4*   MCV 86 86 86   MCHC 30.8* 31.2* 31.3*   RDW 14.6* 14.6* 14.8*    228 197   Plan:  -Monitor H/H and plts  -Type and screen, transfuse as needed   -Continue home medications   -Hold antiplatelets/anticoagulants in setting of active bleeding/pending surgical intervention

## 2023-11-12 NOTE — SUBJECTIVE & OBJECTIVE
Interval History: Spoke with patient and wife at bedside.  She states that he had another episode of fever and chills overnight, but had some delirium with it.  His tmax overnight appeared to be 99.  Patient to be seen by Dr. Hartman today.    Review of Systems  Objective:     Vital Signs (Most Recent):  Temp: 99.8 °F (37.7 °C) (11/12/23 0715)  Pulse: 94 (11/12/23 0715)  Resp: 18 (11/12/23 0825)  BP: 135/60 (11/12/23 0715)  SpO2: 96 % (11/12/23 1010) Vital Signs (24h Range):  Temp:  [98.6 °F (37 °C)-101.1 °F (38.4 °C)] 99.8 °F (37.7 °C)  Pulse:  [] 94  Resp:  [18-20] 18  SpO2:  [79 %-96 %] 96 %  BP: (107-135)/(51-62) 135/60     Weight: 72.5 kg (159 lb 13.3 oz)  Body mass index is 23.6 kg/m².    Intake/Output Summary (Last 24 hours) at 11/12/2023 1150  Last data filed at 11/12/2023 0831  Gross per 24 hour   Intake 240 ml   Output --   Net 240 ml         Physical Exam      Vitals and nursing note reviewed.   Constitutional:       Appearance: Normal appearance.   HENT:      Head: Normocephalic and atraumatic.      Nose: Nose normal.      Mouth/Throat:      Mouth: Mucous membranes are moist.   Eyes:      Extraocular Movements: Extraocular movements intact.      Conjunctiva/sclera: Conjunctivae normal.   Cardiovascular:      Rate and Rhythm: Normal rate and regular rhythm.      Pulses: Normal pulses.      Heart sounds: Normal heart sounds.   Pulmonary:      Effort: Pulmonary effort is normal.      Breath sounds: Normal breath sounds.   Abdominal:      General: Abdomen is flat. Bowel sounds are normal.      Palpations: Abdomen is soft.   Genitourinary:     Comments: L side nephrostomy tube in place.  Musculoskeletal:         General: Normal range of motion.      Cervical back: Normal range of motion and neck supple.      Comments: PICC line in left arm   Skin:     General: Skin is warm.      Capillary Refill: Capillary refill takes less than 2 seconds.   Neurological:      Mental Status: He is alert and oriented to  person, place, and time. Mental status is at baseline.   Psychiatric:         Mood and Affect: Mood normal.         Behavior: Behavior normal.        Significant Labs: All pertinent labs within the past 24 hours have been reviewed.  Recent Lab Results         11/12/23  0441        Albumin 2.6       ALP 95       ALT 8       Anion Gap 11       AST 8       Baso # 0.04       Basophil % 0.3       BILIRUBIN TOTAL 0.5  Comment: For infants and newborns, interpretation of results should be based  on gestational age, weight and in agreement with clinical  observations.    Premature Infant recommended reference ranges:  Up to 24 hours.............<8.0 mg/dL  Up to 48 hours............<12.0 mg/dL  3-5 days..................<15.0 mg/dL  6-29 days.................<15.0 mg/dL         BUN 17       Calcium 8.7       Chloride 105       CO2 22       Creatinine 1.4       Differential Method Automated       eGFR 54       Eos # 0.1       Eosinophil % 0.5       Glucose 113       Gran # (ANC) 10.4       Gran % 81.6       Hematocrit 30.4       Hemoglobin 9.5       Immature Grans (Abs) 0.05  Comment: Mild elevation in immature granulocytes is non specific and   can be seen in a variety of conditions including stress response,   acute inflammation, trauma and pregnancy. Correlation with other   laboratory and clinical findings is essential.         Immature Granulocytes 0.4       Lymph # 1.3       Lymph % 10.2       MCH 26.9       MCHC 31.3       MCV 86       Mono # 0.9       Mono % 7.0       MPV 10.5       nRBC 0       Platelet Count 197       Potassium 4.0       PROTEIN TOTAL 6.2       RBC 3.53       RDW 14.8       Sodium 138       WBC 12.69               Significant Imaging:   X-Ray Chest 1 View   Final Result      No acute findings.         Electronically signed by: Rainer Chandler MD   Date:    11/12/2023   Time:    08:52      X-Ray Abdomen AP 1 View   Final Result      No acute findings.         Electronically signed by: Rainer  MD Ramesh   Date:    11/11/2023   Time:    15:17      CT Abdomen Pelvis  Without Contrast   Final Result      Right-sided external drainage catheter extending to the subcutaneous region superiorly and extending to the distal right ureter.  Correlate clinically for desired position.      Double-J left-sided urinary stent extending from the left renal pelvis to the left bladder      Bladder wall thickening      Ectatic aorta with atherosclerotic disease      Moderate amount of stool in the colon.      Remaining findings as above      All CT scans   are performed using dose optimization techniques including the following: automated exposure control; adjustment of the mA and/or kV; use of iterative reconstruction technique.  Dose modulation was employed for ALARA by means of: Automated exposure control; adjustment of the mA and/or kV according to patient size (this includes techniques or standardized protocols for targeted exams where dose is matched to indication/reason for exam; i.e. extremities or head); and/or use of iterative reconstructive technique.         Electronically signed by: Jefferson Kunz   Date:    11/10/2023   Time:    21:34      IR Nephrostomy Tube Change    (Results Pending)

## 2023-11-12 NOTE — HPI
This is a 68 y.o. male with pertinent PMHx of bladder and lung cancer who has been seen by ID outpatient as hospital follow up after being brought to [facility] on 10/20 with altered mental status. [place] worsening lethargy and shallow respiration. He was noted to be saturating around 90% on room air and was placed on 2 L. Patient just moved from [place] to [place] to be closer to family for cancer treatments. Family is concerned that over the past few days he had become increasingly weak. In the ER, arrival vitals were significant for hypoxia, patient was placed on 5 L. CBC showed normocytic anemia. CMP was significant for low albumin, elevated sugars. TSH , lactic acid, troponin within normal range. U/A was concerning for pyuria >100. Urine culture grew Enterobacter cloacae >100,000 cfu/mL. CT head with moderate area of hypoattenuation in the parietal cortex suggesting infarct although radiologist favors chronic process. Small probable remote areas of lacunar infarct involving the left caudate, bilateral thalamus and right lateral basal ganglia. CTA head and neck with no evidence of acute intracranial abnormality. No high-grade stenosis or major vessel occlusion. Spiculated lesion noticed in the right posterior lung apex. Chest x-ray showed no acute or significant focal chest abnormality. MRI brain with no acute stroke. Infectious Diseases consulted. Recommend stopping cefepime patient had been on due to risk for neurotoxicity. Switched to ertapenem 1 g daily for total of 14 days (stop date 11/4). Per daughter, patient had received 10 days of oral antibiotics while in [place] and nephrostomy tube was infected. They are waiting for referral to heme/onc for 2 months of chemo. Still establishing care since moving to [place]. Will be in rehab for 2 weeks. Seeing urologist 11/2. Stent in left kidney 3 months ago for stones and kidney failed. 9/18 both kidneys failed so stent  placed and nephrostomy tube on right. Only dribbles from penis. All urine from nephrostomy tube. High grade bladder cancer, lung cancer, and lymph node involvement.     Patient now admitted for accidentally removing nephrostomy tube at home. Per IR note, indwelling right dislodged PCNU was removed and replaced. Prior to discharge patient spiked fever to 101.1 F. Very mild leukocytosis noted. UA with 76 WBC. Urine culture in process. ID consulted for UTI. On ertapenem.

## 2023-11-12 NOTE — ASSESSMENT & PLAN NOTE
Patient with acute kidney injury/acute renal failure likely due to post-obstructive d/t Nephrostomy tube dislodgement BANDAR is currently awaiting reinsertion by IR. Baseline creatinine CKD Stage 3 - Labs reviewed- Renal function/electrolytes with Estimated Creatinine Clearance: 49.8 mL/min (based on SCr of 1.4 mg/dL). according to latest data. Monitor urine output and serial BMP and adjust therapy as needed. Avoid nephrotoxins and renally dose meds for GFR listed above.

## 2023-11-12 NOTE — PLAN OF CARE
[facility]  Initial Discharge Assessment       Primary Care Provider: Faizan Antoine MD    Admission Diagnosis: Displacement of nephrostomy tube [T83.022A]  BANDAR (acute kidney injury) [N17.9]  Chest pain [R07.9]  Hypotension, unspecified hypotension type [I95.9]  Nephrostomy tube displaced [T83.022A]    Admission Date: 11/10/2023  Expected Discharge Date:     Transition of Care Barriers: None    Payor: MEDICARE / Plan: MEDICARE PART A & B / Product Type: Government /     Extended Emergency Contact Information  Primary Emergency Contact: Stephania Mann  Address: [address]           [place] [address] [place]  Mobile Phone: [phone]  Relation: Daughter  Secondary Emergency Contact: ELIAS GILLILAND  Mobile Phone: [phone]  Relation: Spouse  Preferred language: English   needed? No    Discharge Plan A: Home with family, Home         [id number] - [place] - [address]  [address] [place] [address]  Phone: [phone] Fax: [phone]      Initial Assessment (most recent)       Adult Discharge Assessment - 11/12/23 0908          Discharge Assessment    Assessment Type Discharge Planning Assessment     Confirmed/corrected address, phone number and insurance Yes     Confirmed Demographics Correct on Facesheet     Source of Information patient     Does patient/caregiver understand observation status Yes     Communicated TANIA with patient/caregiver Date not available/Unable to determine     People in Home spouse     Do you expect to return to your current living situation? Yes     Do you have help at home or someone to help you manage your care at home? No     Prior to hospitilization cognitive status: Alert/Oriented     Current cognitive status: Alert/Oriented     Equipment Currently Used at Home cane, straight;walker, rolling;oxygen     Readmission within 30 days? No     Patient currently being followed by  outpatient case management? No     Do you currently have service(s) that help you manage your care at home? Yes     Name and Contact number of agency [facility]     Is the pt/caregiver preference to resume services with current agency Yes     Do you take prescription medications? Yes     Do you have prescription coverage? Yes     Do you have any problems affording any of your prescribed medications? No     Is the patient taking medications as prescribed? yes     Who is going to help you get home at discharge? family     How do you get to doctors appointments? family or friend will provide     Are you on dialysis? No     Do you take coumadin? No     DME Needed Upon Discharge  none     Discharge Plan discussed with: Patient     Transition of Care Barriers None     Discharge Plan A Home with family;Home                          Anticipated DC dispo: home with family   Prior Level of Function: independent with ADLs   People in home: spouse     Comments:  CM met with patient and spouse at bedside to introduce role and discuss discharge planning. Spouse will be help at home and can provide transport at time of discharge. Pt is current with [facility] for home health and [facility] for home O2. Confirmed demographics, insurance, and emergency contacts. CM discharge needs depends on hospital progress. CM will continue following to assist with other needs.

## 2023-11-12 NOTE — ASSESSMENT & PLAN NOTE
- continue home invanz  - Followed by Dr. Hartman - consult placed  - Urine cultures pending  - PICC line in left arm  - Fever of 101.1 post procedure  - Awaiting further recommendations from ID

## 2023-11-12 NOTE — SUBJECTIVE & OBJECTIVE
Past Medical History:   Diagnosis Date    COPD (chronic obstructive pulmonary disease)     Hypertension        Past Surgical History:   Procedure Laterality Date    APPENDECTOMY      CATARACT EXTRACTION      NEPHROSTOMY         Review of patient's allergies indicates:  No Known Allergies    Medications:  Medications Prior to Admission   Medication Sig    albuterol (ACCUNEB) 0.63 mg/3 mL Nebu Take 3 mLs (0.63 mg total) by nebulization 3 (three) times daily as needed (sob, wheezing). Rescue    albuterol (PROVENTIL/VENTOLIN HFA) 90 mcg/actuation inhaler Inhale 1-2 puffs into the lungs every 4 (four) hours as needed for Wheezing. Rescue    ALPRAZolam (XANAX) 0.5 MG tablet Take 2 tablets (1 mg total) by mouth 3 (three) times daily as needed for Anxiety.    cyclobenzaprine (FLEXERIL) 10 MG tablet Take 1 tablet (10 mg total) by mouth 3 (three) times daily as needed for Muscle spasms.    docusate sodium (COLACE) 100 MG capsule Take 100 mg by mouth 2 (two) times daily.    EScitalopram oxalate (LEXAPRO) 20 MG tablet Take 1 tablet (20 mg total) by mouth once daily.    fluticasone furoate-vilanteroL (BREO ELLIPTA) 100-25 mcg/dose diskus inhaler Inhale 1 puff into the lungs once daily. Controller    HYDROcodone-acetaminophen (NORCO)  mg per tablet Take 1 tablet by mouth every 6 (six) hours as needed for Pain.    levothyroxine (SYNTHROID) 125 MCG tablet Take 1 tablet (125 mcg total) by mouth before breakfast.    metoprolol succinate (TOPROL-XL) 50 MG 24 hr tablet Take 1 tablet (50 mg total) by mouth once daily.    nicotine (NICODERM CQ) 21 mg/24 hr Place 1 patch onto the skin once daily.    tamsulosin (FLOMAX) 0.4 mg Cap Take 1 capsule (0.4 mg total) by mouth once daily.    vitamin D (VITAMIN D3) 1000 units Tab 25 mcg.     Antibiotics (From admission, onward)      Start     Stop Route Frequency Ordered    11/12/23 1300  ertapenem (INVANZ) 1 g in sodium chloride 0.9 % 100 mL IVPB (MB+)         -- IV Every 24 hours  (non-standard times) 11/12/23 0921          Antifungals (From admission, onward)      None          Antivirals (From admission, onward)      None               There is no immunization history on file for this patient.    Family History       Problem Relation (Age of Onset)    Cancer Father, Mother          Social History     Socioeconomic History    Marital status:    Tobacco Use    Smoking status: Former     Types: Cigarettes     Passive exposure: Never    Smokeless tobacco: Never   Substance and Sexual Activity    Alcohol use: Never    Drug use: Never    Sexual activity: Not Currently     Partners: Female     Social Determinants of Health     Financial Resource Strain: High Risk (11/10/2023)    Overall Financial Resource Strain (CARDIA)     Difficulty of Paying Living Expenses: Very hard   Food Insecurity: Food Insecurity Present (11/10/2023)    Hunger Vital Sign     Worried About Running Out of Food in the Last Year: Often true     Ran Out of Food in the Last Year: Often true   Transportation Needs: Unmet Transportation Needs (11/10/2023)    PRAPARE - Transportation     Lack of Transportation (Medical): Yes     Lack of Transportation (Non-Medical): Yes   Physical Activity: Inactive (11/10/2023)    Exercise Vital Sign     Days of Exercise per Week: 0 days     Minutes of Exercise per Session: 0 min   Stress: Stress Concern Present (11/10/2023)    [employer] [place] [employer] - Occupational Stress Questionnaire     Feeling of Stress : Rather much   Social Connections: Unknown (11/10/2023)    Social Connection and Isolation Panel [NHANES]     Frequency of Communication with Friends and Family: More than three times a week     Frequency of Social Gatherings with Friends and Family: Never     Attends [employer] Services: Patient refused     Active Member of Clubs or Organizations: No     Attends Club or Organization Meetings: Never     Marital Status:    Housing Stability: High Risk  (11/10/2023)    Housing Stability Vital Sign     Unable to Pay for Housing in the Last Year: Yes     Number of Places Lived in the Last Year: 3     Unstable Housing in the Last Year: No     Review of Systems   Constitutional:  Positive for activity change. Negative for fever.   Gastrointestinal:  Negative for diarrhea.   Genitourinary:  Positive for difficulty urinating. Negative for dysuria.   Musculoskeletal:  Positive for arthralgias. Negative for back pain.   All other systems reviewed and are negative.    Objective:     Vital Signs (Most Recent):  Temp: 98.6 °F (37 °C) (11/12/23 1202)  Pulse: 90 (11/12/23 1500)  Resp: 18 (11/12/23 1202)  BP: (!) 101/55 (11/12/23 1202)  SpO2: (!) 94 % (11/12/23 1405) Vital Signs (24h Range):  Temp:  [98.6 °F (37 °C)-100 °F (37.8 °C)] 98.6 °F (37 °C)  Pulse:  [76-99] 90  Resp:  [18] 18  SpO2:  [90 %-96 %] 94 %  BP: (101-135)/(51-60) 101/55     Weight: 72.5 kg (159 lb 13.3 oz)  Body mass index is 23.6 kg/m².    Estimated Creatinine Clearance: 49.8 mL/min (based on SCr of 1.4 mg/dL).     Physical Exam  Constitutional:       General: He is not in acute distress.     Appearance: Normal appearance. He is not ill-appearing.   Cardiovascular:      Rate and Rhythm: Normal rate and regular rhythm.      Pulses: Normal pulses.      Heart sounds: Normal heart sounds. No murmur heard.     No friction rub. No gallop.   Pulmonary:      Effort: Pulmonary effort is normal. No respiratory distress.      Breath sounds: Normal breath sounds.   Abdominal:      General: Abdomen is flat. Bowel sounds are normal. There is no distension.      Palpations: Abdomen is soft.      Tenderness: There is no abdominal tenderness.   Genitourinary:     Comments: Left sided nephrostomy intact   Skin:     General: Skin is warm and dry.   Neurological:      Mental Status: He is alert.          Significant Labs: CBC:   Recent Labs   Lab 11/10/23  1858 11/11/23  0443 11/12/23  0441   WBC 7.82 11.79 12.69   HGB 9.4* 9.4*  9.5*   HCT 30.5* 30.1* 30.4*    228 197     CMP:   Recent Labs   Lab 11/10/23  1858 11/11/23  0443 11/12/23 0441    144 138   K 3.9 3.8 4.0    106 105   CO2 21* 22* 22*   * 108 113*   BUN 23 19 17   CREATININE 1.7* 1.2 1.4   CALCIUM 9.7 9.3 8.7   PROT 7.5 6.7 6.2   ALBUMIN 3.3* 2.9* 2.6*   BILITOT 0.3 0.3 0.5   ALKPHOS 112 104 95   AST 11 9* 8*   ALT 6* 5* 8*   ANIONGAP 15 16 11     Microbiology Results (last 7 days)       Procedure Component Value Units Date/Time    Urine culture [[id number]]  (Abnormal) Collected: 11/11/23 0431    Order Status: Completed Specimen: Urine Updated: 11/12/23 1315     Urine Culture, Routine ENTEROCOCCUS FAECIUM  >100,000 cfu/ml  Susceptibility pending  No other significant isolate      Narrative:      Specimen Source->Urine    Blood culture #1 **CANNOT BE ORDERED STAT** [[id number]] Collected: 11/10/23 1859    Order Status: Completed Specimen: Blood from Peripheral, Forearm, Right Updated: 11/12/23 0612     Blood Culture, Routine No Growth to date      No Growth to date    Blood culture #2 **CANNOT BE ORDERED STAT** [[id number]] Collected: 11/10/23 1859    Order Status: Completed Specimen: Blood from Peripheral, Antecubital, Right Updated: 11/12/23 0612     Blood Culture, Routine No Growth to date      No Growth to date          All pertinent labs within the past 24 hours have been reviewed.    Significant Imaging: I have reviewed all pertinent imaging results/findings within the past 24 hours.

## 2023-11-12 NOTE — PROGRESS NOTES
[facility]  [facility] Medicine  Progress Note    Patient Name: Geovani Currie  MRN: [id number]  Patient Class: IP- Inpatient   Admission Date: 11/10/2023  Length of Stay: 1 days  Attending Physician: Obdulia Hernandez MD  Primary Care Provider: Faizan Antoine MD        Subjective:     Principal Problem:Nephrostomy tube displaced        HPI:  Geovani Currie is a 68 y.o. male with a PMH  has a past medical history of COPD (chronic obstructive pulmonary disease) and Hypertension. who presented to the ED after accidentally pulling out his nephrostomy tube while sleeping.  At time of bedside assessment, patient currently without any concerns or complaints and stated he is just waiting to have his procedure done so he can go back home.  Interventional Radiology was consulted by ED staff and is aware of patient.  Patient being admitted to Hospital Medicine under observation while awaiting nephrostomy tube reinsertion by IR tomorrow.      PCP: Faizan Antoine        Overview/Hospital Course:  Patient admitted for nephrostomy tube replacement after it was accidentally dislodge while sleeping.  Post procedure patient was to be discharged, but began shivering.  Temp increased to 101.  Dr. Hartman was consulted as he was following the patient for Enterobacter UTI with IV Invanz which scheduled to complete on 11/11, but due to the fever he advised to continue the Invanz.  Patient Urine culture pending.      Interval History: Spoke with patient and wife at bedside.  She states that he had another episode of fever and chills overnight, but had some delirium with it.  His tmax overnight appeared to be 99.  Patient to be seen by Dr. Hartman today.    Review of Systems  Objective:     Vital Signs (Most Recent):  Temp: 99.8 °F (37.7 °C) (11/12/23 0715)  Pulse: 94 (11/12/23 0715)  Resp: 18 (11/12/23 0825)  BP: 135/60 (11/12/23 0715)  SpO2: 96 % (11/12/23 1010) Vital Signs (24h Range):  Temp:  [98.6 °F (37 °C)-101.1 °F  (38.4 °C)] 99.8 °F (37.7 °C)  Pulse:  [] 94  Resp:  [18-20] 18  SpO2:  [79 %-96 %] 96 %  BP: (107-135)/(51-62) 135/60     Weight: 72.5 kg (159 lb 13.3 oz)  Body mass index is 23.6 kg/m².    Intake/Output Summary (Last 24 hours) at 11/12/2023 1150  Last data filed at 11/12/2023 0831  Gross per 24 hour   Intake 240 ml   Output --   Net 240 ml         Physical Exam      Vitals and nursing note reviewed.   Constitutional:       Appearance: Normal appearance.   HENT:      Head: Normocephalic and atraumatic.      Nose: Nose normal.      Mouth/Throat:      Mouth: Mucous membranes are moist.   Eyes:      Extraocular Movements: Extraocular movements intact.      Conjunctiva/sclera: Conjunctivae normal.   Cardiovascular:      Rate and Rhythm: Normal rate and regular rhythm.      Pulses: Normal pulses.      Heart sounds: Normal heart sounds.   Pulmonary:      Effort: Pulmonary effort is normal.      Breath sounds: Normal breath sounds.   Abdominal:      General: Abdomen is flat. Bowel sounds are normal.      Palpations: Abdomen is soft.   Genitourinary:     Comments: L side nephrostomy tube in place.  Musculoskeletal:         General: Normal range of motion.      Cervical back: Normal range of motion and neck supple.      Comments: PICC line in left arm   Skin:     General: Skin is warm.      Capillary Refill: Capillary refill takes less than 2 seconds.   Neurological:      Mental Status: He is alert and oriented to person, place, and time. Mental status is at baseline.   Psychiatric:         Mood and Affect: Mood normal.         Behavior: Behavior normal.        Significant Labs: All pertinent labs within the past 24 hours have been reviewed.  Recent Lab Results         11/12/23  0441        Albumin 2.6       ALP 95       ALT 8       Anion Gap 11       AST 8       Baso # 0.04       Basophil % 0.3       BILIRUBIN TOTAL 0.5  Comment: For infants and newborns, interpretation of results should be based  on gestational age,  weight and in agreement with clinical  observations.    Premature Infant recommended reference ranges:  Up to 24 hours.............<8.0 mg/dL  Up to 48 hours............<12.0 mg/dL  3-5 days..................<15.0 mg/dL  6-29 days.................<15.0 mg/dL         BUN 17       Calcium 8.7       Chloride 105       CO2 22       Creatinine 1.4       Differential Method Automated       eGFR 54       Eos # 0.1       Eosinophil % 0.5       Glucose 113       Gran # (ANC) 10.4       Gran % 81.6       Hematocrit 30.4       Hemoglobin 9.5       Immature Grans (Abs) 0.05  Comment: Mild elevation in immature granulocytes is non specific and   can be seen in a variety of conditions including stress response,   acute inflammation, trauma and pregnancy. Correlation with other   laboratory and clinical findings is essential.         Immature Granulocytes 0.4       Lymph # 1.3       Lymph % 10.2       MCH 26.9       MCHC 31.3       MCV 86       Mono # 0.9       Mono % 7.0       MPV 10.5       nRBC 0       Platelet Count 197       Potassium 4.0       PROTEIN TOTAL 6.2       RBC 3.53       RDW 14.8       Sodium 138       WBC 12.69               Significant Imaging:   X-Ray Chest 1 View   Final Result      No acute findings.         Electronically signed by: Rainer Chandler MD   Date:    11/12/2023   Time:    08:52      X-Ray Abdomen AP 1 View   Final Result      No acute findings.         Electronically signed by: Rainer Chandler MD   Date:    11/11/2023   Time:    15:17      CT Abdomen Pelvis  Without Contrast   Final Result      Right-sided external drainage catheter extending to the subcutaneous region superiorly and extending to the distal right ureter.  Correlate clinically for desired position.      Double-J left-sided urinary stent extending from the left renal pelvis to the left bladder      Bladder wall thickening      Ectatic aorta with atherosclerotic disease      Moderate amount of stool in the colon.      Remaining  findings as above      All CT scans   are performed using dose optimization techniques including the following: automated exposure control; adjustment of the mA and/or kV; use of iterative reconstruction technique.  Dose modulation was employed for ALARA by means of: Automated exposure control; adjustment of the mA and/or kV according to patient size (this includes techniques or standardized protocols for targeted exams where dose is matched to indication/reason for exam; i.e. extremities or head); and/or use of iterative reconstructive technique.         Electronically signed by: Jefferson Kunz   Date:    11/10/2023   Time:    21:34      IR Nephrostomy Tube Change    (Results Pending)          Assessment/Plan:      * Nephrostomy tube displaced  Patient presented following accidental dislodgement of nephrostomy tube in his sleep.  IR consulted by ED staff and aware of patient with plans to reinsert tomorrow morning.    - re placed by IR on 11/11      UTI (urinary tract infection)  - continue home invanz  - Followed by Dr. Hartman - consult placed  - Urine cultures pending  - PICC line in left arm  - Fever of 101.1 post procedure  - Awaiting further recommendations from ID      Acute renal failure superimposed on stage 3 chronic kidney disease  Patient with acute kidney injury/acute renal failure likely due to post-obstructive d/t Nephrostomy tube dislodgement BANDAR is currently awaiting reinsertion by IR. Baseline creatinine CKD Stage 3 - Labs reviewed- Renal function/electrolytes with Estimated Creatinine Clearance: 49.8 mL/min (based on SCr of 1.4 mg/dL). according to latest data. Monitor urine output and serial BMP and adjust therapy as needed. Avoid nephrotoxins and renally dose meds for GFR listed above.      Centrilobular emphysema  Patient's COPD is controlled currently.  Patient is currently off COPD Pathway. Continue scheduled inhalers prn and monitor respiratory status closely.       History of  cancer  Currently follows Hematology/Oncology as well as Urology outpatient.  Plan:  -f/u outpatient as directed      Normocytic anemia  Appears near baseline without evidence of active bleeding noted.   Recent Labs   Lab 11/10/23  1858 11/11/23  0443 11/12/23  0441   HGB 9.4* 9.4* 9.5*   HCT 30.5* 30.1* 30.4*   MCV 86 86 86   MCHC 30.8* 31.2* 31.3*   RDW 14.6* 14.6* 14.8*    228 197   Plan:  -Monitor H/H and plts  -Type and screen, transfuse as needed   -Continue home medications   -Hold antiplatelets/anticoagulants in setting of active bleeding/pending surgical intervention           VTE Risk Mitigation (From admission, onward)         Ordered     Reason for No Pharmacological VTE Prophylaxis  Once        Question:  Reasons:  Answer:  Physician Provided (leave comment)  Comment:  awaiting surgical procedure    11/10/23 2148     IP VTE HIGH RISK PATIENT  Once         11/10/23 2148     Place sequential compression device  Until discontinued         11/10/23 2148                Discharge Planning   TANIA:      Code Status: Full Code   Is the patient medically ready for discharge?:     Reason for patient still in hospital (select all that apply): Patient trending condition, Laboratory test, Treatment and Consult recommendations  Discharge Plan A: Home with family, Home                  Obdulia Hernandez MD  Department of Hospital Medicine   [facility]

## 2023-11-12 NOTE — H&P (VIEW-ONLY)
[facility]  Infectious Disease  Consult Note    Patient Name: Geovani Currie  MRN: [id number]  Admission Date: 11/10/2023  Hospital Length of Stay: 1 days  Attending Physician: Obdulia Hernandez MD  Primary Care Provider: Faizan Antoine MD     Isolation Status: No active isolations    Patient information was obtained from patient, ER records and primary team.      Consults  Assessment/Plan:     Renal/  * Nephrostomy tube displaced  IR consulted and replaced nephrostomy on 11/11.     UTI (urinary tract infection)  67 yo M with history of SCC dx by RUL nodule biopsy 10/18/23, also with satellite lesion seen on CT Chest with size increase to 7mm 9/2023 and muscle invasive bladder cancer s/p TMT in 2020 now with recurrence in the RP nodes and possible metastasis to the lungs vs primary lung CA who presented with displaced nephrostomy. Replaced 11/11 by IR. Then spiked fever to 101.1 F with slight increase in WBC count. U/A with 76 WBC. Urine culture growing E faecium likely translocated from gut. CT a/p reports Right-sided external drainage catheter extending to the subcutaneous region superiorly and extending to the distal right ureter. Double-J left-sided urinary stent extending from the left renal pelvis to the left bladder. Bladder wall thickening.    Recommendations:  --No coverage expected for E faecium with ertapenem   --Recommend switching to PO linezolid 600 mg BID for anticipated 14 day course (E faecium typically susceptible; will ask micro lab to release once culture finalized)   --Will await susceptibilities prior to final recommendations   --Monitor fever curve and blood cx (NGTD)       Thank you for your consult. I will follow-up with patient. Please contact us if you have any additional questions.    Jeremiah Hartman, DO  Infectious Disease  [facility]    Subjective:     Principal Problem: Nephrostomy tube displaced    HPI: This is a 68 y.o. male with pertinent PMHx of bladder and  lung cancer who has been seen by ID outpatient as hospital follow up after being brought to [facility] on 10/20 with altered mental status. [place] with worsening lethargy and shallow respiration. He was noted to be saturating around 90% on room air and was placed on 2 L. Patient just moved from [place] to [place] to be closer to family for cancer treatments. Family is concerned that over the past few days he had become increasingly weak. In the ER, arrival vitals were significant for hypoxia, patient was placed on 5 L. CBC showed normocytic anemia. CMP was significant for low albumin, elevated sugars. TSH , lactic acid, troponin within normal range. U/A was concerning for pyuria >100. Urine culture grew Enterobacter cloacae >100,000 cfu/mL. CT head with moderate area of hypoattenuation in the parietal cortex suggesting infarct although radiologist favors chronic process. Small probable remote areas of lacunar infarct involving the left caudate, bilateral thalamus and right lateral basal ganglia. CTA head and neck with no evidence of acute intracranial abnormality. No high-grade stenosis or major vessel occlusion. Spiculated lesion noticed in the right posterior lung apex. Chest x-ray showed no acute or significant focal chest abnormality. MRI brain with no acute stroke. Infectious Diseases consulted. Recommend stopping cefepime patient had been on due to risk for neurotoxicity. Switched to ertapenem 1 g daily for total of 14 days (stop date 11/4). Per daughter, patient had received 10 days of oral antibiotics while in [place] and nephrostomy tube was infected. They are waiting for referral to heme/onc for 2 months of chemo. Still establishing care since moving to [place]. Will be in rehab for 2 weeks. Seeing urologist 11/2. Stent in left kidney 3 months ago for stones and kidney failed. 9/18 both kidneys failed so stent placed and nephrostomy tube on right. Only dribbles from  penis. All urine from nephrostomy tube. High grade bladder cancer, lung cancer, and lymph node involvement.     Patient now admitted for accidentally removing nephrostomy tube at home. Per IR note, indwelling right dislodged PCNU was removed and replaced. Prior to discharge patient spiked fever to 101.1 F. Very mild leukocytosis noted. UA with 76 WBC. Urine culture in process. ID consulted for UTI. On ertapenem.         Past Medical History:   Diagnosis Date    COPD (chronic obstructive pulmonary disease)     Hypertension        Past Surgical History:   Procedure Laterality Date    APPENDECTOMY      CATARACT EXTRACTION      NEPHROSTOMY         Review of patient's allergies indicates:  No Known Allergies    Medications:  Medications Prior to Admission   Medication Sig    albuterol (ACCUNEB) 0.63 mg/3 mL Nebu Take 3 mLs (0.63 mg total) by nebulization 3 (three) times daily as needed (sob, wheezing). Rescue    albuterol (PROVENTIL/VENTOLIN HFA) 90 mcg/actuation inhaler Inhale 1-2 puffs into the lungs every 4 (four) hours as needed for Wheezing. Rescue    ALPRAZolam (XANAX) 0.5 MG tablet Take 2 tablets (1 mg total) by mouth 3 (three) times daily as needed for Anxiety.    cyclobenzaprine (FLEXERIL) 10 MG tablet Take 1 tablet (10 mg total) by mouth 3 (three) times daily as needed for Muscle spasms.    docusate sodium (COLACE) 100 MG capsule Take 100 mg by mouth 2 (two) times daily.    EScitalopram oxalate (LEXAPRO) 20 MG tablet Take 1 tablet (20 mg total) by mouth once daily.    fluticasone furoate-vilanteroL (BREO ELLIPTA) 100-25 mcg/dose diskus inhaler Inhale 1 puff into the lungs once daily. Controller    HYDROcodone-acetaminophen (NORCO)  mg per tablet Take 1 tablet by mouth every 6 (six) hours as needed for Pain.    levothyroxine (SYNTHROID) 125 MCG tablet Take 1 tablet (125 mcg total) by mouth before breakfast.    metoprolol succinate (TOPROL-XL) 50 MG 24 hr tablet Take 1 tablet (50 mg total) by  mouth once daily.    nicotine (NICODERM CQ) 21 mg/24 hr Place 1 patch onto the skin once daily.    tamsulosin (FLOMAX) 0.4 mg Cap Take 1 capsule (0.4 mg total) by mouth once daily.    vitamin D (VITAMIN D3) 1000 units Tab 25 mcg.     Antibiotics (From admission, onward)      Start     Stop Route Frequency Ordered    11/12/23 1300  ertapenem (INVANZ) 1 g in sodium chloride 0.9 % 100 mL IVPB (MB+)         -- IV Every 24 hours (non-standard times) 11/12/23 0921          Antifungals (From admission, onward)      None          Antivirals (From admission, onward)      None               There is no immunization history on file for this patient.    Family History       Problem Relation (Age of Onset)    Cancer Father, Mother          Social History     Socioeconomic History    Marital status:    Tobacco Use    Smoking status: Former     Types: Cigarettes     Passive exposure: Never    Smokeless tobacco: Never   Substance and Sexual Activity    Alcohol use: Never    Drug use: Never    Sexual activity: Not Currently     Partners: Female     Social Determinants of Health     Financial Resource Strain: High Risk (11/10/2023)    Overall Financial Resource Strain (CARDIA)     Difficulty of Paying Living Expenses: Very hard   Food Insecurity: Food Insecurity Present (11/10/2023)    Hunger Vital Sign     Worried About Running Out of Food in the Last Year: Often true     Ran Out of Food in the Last Year: Often true   Transportation Needs: Unmet Transportation Needs (11/10/2023)    PRAPARE - Transportation     Lack of Transportation (Medical): Yes     Lack of Transportation (Non-Medical): Yes   Physical Activity: Inactive (11/10/2023)    Exercise Vital Sign     Days of Exercise per Week: 0 days     Minutes of Exercise per Session: 0 min   Stress: Stress Concern Present (11/10/2023)    [employer] - Occupational Stress Questionnaire     Feeling of Stress : Rather much   Social  Connections: Unknown (11/10/2023)    Social Connection and Isolation Panel [NHANES]     Frequency of Communication with Friends and Family: More than three times a week     Frequency of Social Gatherings with Friends and Family: Never     Attends Anabaptist Services: Patient refused     Active Member of Clubs or Organizations: No     Attends Club or Organization Meetings: Never     Marital Status:    Housing Stability: High Risk (11/10/2023)    Housing Stability Vital Sign     Unable to Pay for Housing in the Last Year: Yes     Number of Places Lived in the Last Year: 3     Unstable Housing in the Last Year: No     Review of Systems   Constitutional:  Positive for activity change. Negative for fever.   Gastrointestinal:  Negative for diarrhea.   Genitourinary:  Positive for difficulty urinating. Negative for dysuria.   Musculoskeletal:  Positive for arthralgias. Negative for back pain.   All other systems reviewed and are negative.    Objective:     Vital Signs (Most Recent):  Temp: 98.6 °F (37 °C) (11/12/23 1202)  Pulse: 90 (11/12/23 1500)  Resp: 18 (11/12/23 1202)  BP: (!) 101/55 (11/12/23 1202)  SpO2: (!) 94 % (11/12/23 1405) Vital Signs (24h Range):  Temp:  [98.6 °F (37 °C)-100 °F (37.8 °C)] 98.6 °F (37 °C)  Pulse:  [76-99] 90  Resp:  [18] 18  SpO2:  [90 %-96 %] 94 %  BP: (101-135)/(51-60) 101/55     Weight: 72.5 kg (159 lb 13.3 oz)  Body mass index is 23.6 kg/m².    Estimated Creatinine Clearance: 49.8 mL/min (based on SCr of 1.4 mg/dL).     Physical Exam  Constitutional:       General: He is not in acute distress.     Appearance: Normal appearance. He is not ill-appearing.   Cardiovascular:      Rate and Rhythm: Normal rate and regular rhythm.      Pulses: Normal pulses.      Heart sounds: Normal heart sounds. No murmur heard.     No friction rub. No gallop.   Pulmonary:      Effort: Pulmonary effort is normal. No respiratory distress.      Breath sounds: Normal breath sounds.   Abdominal:       General: Abdomen is flat. Bowel sounds are normal. There is no distension.      Palpations: Abdomen is soft.      Tenderness: There is no abdominal tenderness.   Genitourinary:     Comments: Left sided nephrostomy intact   Skin:     General: Skin is warm and dry.   Neurological:      Mental Status: He is alert.          Significant Labs: CBC:   Recent Labs   Lab 11/10/23  1858 11/11/23  0443 11/12/23  0441   WBC 7.82 11.79 12.69   HGB 9.4* 9.4* 9.5*   HCT 30.5* 30.1* 30.4*    228 197     CMP:   Recent Labs   Lab 11/10/23  1858 11/11/23  0443 11/12/23  0441    144 138   K 3.9 3.8 4.0    106 105   CO2 21* 22* 22*   * 108 113*   BUN 23 19 17   CREATININE 1.7* 1.2 1.4   CALCIUM 9.7 9.3 8.7   PROT 7.5 6.7 6.2   ALBUMIN 3.3* 2.9* 2.6*   BILITOT 0.3 0.3 0.5   ALKPHOS 112 104 95   AST 11 9* 8*   ALT 6* 5* 8*   ANIONGAP 15 16 11     Microbiology Results (last 7 days)       Procedure Component Value Units Date/Time    Urine culture [[id number]]  (Abnormal) Collected: 11/11/23 0431    Order Status: Completed Specimen: Urine Updated: 11/12/23 1315     Urine Culture, Routine ENTEROCOCCUS FAECIUM  >100,000 cfu/ml  Susceptibility pending  No other significant isolate      Narrative:      Specimen Source->Urine    Blood culture #1 **CANNOT BE ORDERED STAT** [[id number]] Collected: 11/10/23 1859    Order Status: Completed Specimen: Blood from Peripheral, Forearm, Right Updated: 11/12/23 0612     Blood Culture, Routine No Growth to date      No Growth to date    Blood culture #2 **CANNOT BE ORDERED STAT** [[id number]] Collected: 11/10/23 1859    Order Status: Completed Specimen: Blood from Peripheral, Antecubital, Right Updated: 11/12/23 0612     Blood Culture, Routine No Growth to date      No Growth to date          All pertinent labs within the past 24 hours have been reviewed.    Significant Imaging: I have reviewed all pertinent imaging results/findings within the past 24 hours.

## 2023-11-13 LAB
ALBUMIN SERPL BCP-MCNC: 2.4 G/DL (ref 3.5–5.2)
ALP SERPL-CCNC: 122 U/L (ref 55–135)
ALT SERPL W/O P-5'-P-CCNC: 9 U/L (ref 10–44)
ANION GAP SERPL CALC-SCNC: 11 MMOL/L (ref 8–16)
AST SERPL-CCNC: 14 U/L (ref 10–40)
BACTERIA UR CULT: ABNORMAL
BASOPHILS # BLD AUTO: 0.04 K/UL (ref 0–0.2)
BASOPHILS NFR BLD: 0.2 % (ref 0–1.9)
BILIRUB SERPL-MCNC: 0.3 MG/DL (ref 0.1–1)
BUN SERPL-MCNC: 21 MG/DL (ref 8–23)
CALCIUM SERPL-MCNC: 8.7 MG/DL (ref 8.7–10.5)
CHLORIDE SERPL-SCNC: 103 MMOL/L (ref 95–110)
CO2 SERPL-SCNC: 23 MMOL/L (ref 23–29)
CREAT SERPL-MCNC: 1.4 MG/DL (ref 0.5–1.4)
DIFFERENTIAL METHOD: ABNORMAL
EOSINOPHIL # BLD AUTO: 0.1 K/UL (ref 0–0.5)
EOSINOPHIL NFR BLD: 0.4 % (ref 0–8)
ERYTHROCYTE [DISTWIDTH] IN BLOOD BY AUTOMATED COUNT: 14.6 % (ref 11.5–14.5)
EST. GFR  (NO RACE VARIABLE): 54 ML/MIN/1.73 M^2
GLUCOSE SERPL-MCNC: 132 MG/DL (ref 70–110)
HCT VFR BLD AUTO: 26.2 % (ref 40–54)
HGB BLD-MCNC: 8.2 G/DL (ref 14–18)
IMM GRANULOCYTES # BLD AUTO: 0.08 K/UL (ref 0–0.04)
IMM GRANULOCYTES NFR BLD AUTO: 0.5 % (ref 0–0.5)
LYMPHOCYTES # BLD AUTO: 1 K/UL (ref 1–4.8)
LYMPHOCYTES NFR BLD: 6.2 % (ref 18–48)
MCH RBC QN AUTO: 26.6 PG (ref 27–31)
MCHC RBC AUTO-ENTMCNC: 31.3 G/DL (ref 32–36)
MCV RBC AUTO: 85 FL (ref 82–98)
MONOCYTES # BLD AUTO: 1.2 K/UL (ref 0.3–1)
MONOCYTES NFR BLD: 7 % (ref 4–15)
NEUTROPHILS # BLD AUTO: 14.1 K/UL (ref 1.8–7.7)
NEUTROPHILS NFR BLD: 85.7 % (ref 38–73)
NRBC BLD-RTO: 0 /100 WBC
PLATELET # BLD AUTO: 212 K/UL (ref 150–450)
PMV BLD AUTO: 10 FL (ref 9.2–12.9)
POTASSIUM SERPL-SCNC: 3.7 MMOL/L (ref 3.5–5.1)
PROT SERPL-MCNC: 6.2 G/DL (ref 6–8.4)
RBC # BLD AUTO: 3.08 M/UL (ref 4.6–6.2)
SODIUM SERPL-SCNC: 137 MMOL/L (ref 136–145)
WBC # BLD AUTO: 16.5 K/UL (ref 3.9–12.7)

## 2023-11-13 PROCEDURE — 11000001 HC ACUTE MED/SURG PRIVATE ROOM

## 2023-11-13 PROCEDURE — 94640 AIRWAY INHALATION TREATMENT: CPT

## 2023-11-13 PROCEDURE — 85025 COMPLETE CBC W/AUTO DIFF WBC: CPT | Performed by: HOSPITALIST

## 2023-11-13 PROCEDURE — 99900035 HC TECH TIME PER 15 MIN (STAT)

## 2023-11-13 PROCEDURE — 27000221 HC OXYGEN, UP TO 24 HOURS

## 2023-11-13 PROCEDURE — 25000003 PHARM REV CODE 250: Performed by: FAMILY MEDICINE

## 2023-11-13 PROCEDURE — 27000207 HC ISOLATION

## 2023-11-13 PROCEDURE — 80053 COMPREHEN METABOLIC PANEL: CPT | Performed by: HOSPITALIST

## 2023-11-13 PROCEDURE — S4991 NICOTINE PATCH NONLEGEND: HCPCS | Performed by: FAMILY MEDICINE

## 2023-11-13 PROCEDURE — 25000242 PHARM REV CODE 250 ALT 637 W/ HCPCS: Performed by: FAMILY MEDICINE

## 2023-11-13 PROCEDURE — 99232 SBSQ HOSP IP/OBS MODERATE 35: CPT | Mod: ,,, | Performed by: STUDENT IN AN ORGANIZED HEALTH CARE EDUCATION/TRAINING PROGRAM

## 2023-11-13 PROCEDURE — 25000003 PHARM REV CODE 250: Performed by: HOSPITALIST

## 2023-11-13 PROCEDURE — 36415 COLL VENOUS BLD VENIPUNCTURE: CPT | Performed by: HOSPITALIST

## 2023-11-13 PROCEDURE — 25000003 PHARM REV CODE 250: Performed by: INTERNAL MEDICINE

## 2023-11-13 PROCEDURE — 94761 N-INVAS EAR/PLS OXIMETRY MLT: CPT

## 2023-11-13 PROCEDURE — 94799 UNLISTED PULMONARY SVC/PX: CPT | Mod: XB

## 2023-11-13 PROCEDURE — 99232 PR SUBSEQUENT HOSPITAL CARE,LEVL II: ICD-10-PCS | Mod: ,,, | Performed by: STUDENT IN AN ORGANIZED HEALTH CARE EDUCATION/TRAINING PROGRAM

## 2023-11-13 RX ORDER — LINEZOLID 600 MG/1
600 TABLET, FILM COATED ORAL EVERY 12 HOURS
Status: DISCONTINUED | OUTPATIENT
Start: 2023-11-13 | End: 2023-11-18 | Stop reason: HOSPADM

## 2023-11-13 RX ADMIN — ESCITALOPRAM 20 MG: 5 TABLET, FILM COATED ORAL at 08:11

## 2023-11-13 RX ADMIN — TAMSULOSIN HYDROCHLORIDE 0.4 MG: 0.4 CAPSULE ORAL at 08:11

## 2023-11-13 RX ADMIN — HYDROCODONE BITARTRATE AND ACETAMINOPHEN 1 TABLET: 5; 325 TABLET ORAL at 06:11

## 2023-11-13 RX ADMIN — DOCUSATE SODIUM 100 MG: 100 CAPSULE, LIQUID FILLED ORAL at 08:11

## 2023-11-13 RX ADMIN — HYDROCODONE BITARTRATE AND ACETAMINOPHEN 1 TABLET: 5; 325 TABLET ORAL at 08:11

## 2023-11-13 RX ADMIN — LEVOTHYROXINE SODIUM 125 MCG: 25 TABLET ORAL at 06:11

## 2023-11-13 RX ADMIN — HYDROCODONE BITARTRATE AND ACETAMINOPHEN 1 TABLET: 5; 325 TABLET ORAL at 03:11

## 2023-11-13 RX ADMIN — METOPROLOL SUCCINATE 50 MG: 50 TABLET, EXTENDED RELEASE ORAL at 08:11

## 2023-11-13 RX ADMIN — LINEZOLID 600 MG: 600 TABLET, FILM COATED ORAL at 08:11

## 2023-11-13 RX ADMIN — ALPRAZOLAM 1 MG: 1 TABLET ORAL at 06:11

## 2023-11-13 RX ADMIN — ALPRAZOLAM 1 MG: 1 TABLET ORAL at 03:11

## 2023-11-13 RX ADMIN — FLUTICASONE FUROATE AND VILANTEROL TRIFENATATE 1 PUFF: 100; 25 POWDER RESPIRATORY (INHALATION) at 08:11

## 2023-11-13 RX ADMIN — NICOTINE 1 PATCH: 14 PATCH, EXTENDED RELEASE TRANSDERMAL at 08:11

## 2023-11-13 NOTE — PLAN OF CARE
Ongoing (interventions implemented as appropriate)  Pt confused at times  VSS  Pt able to make needs known.  Pt remained afebrile throughout this shift.   Pt remained free of falls this shift.   Prn pain medication given.  Plan of care reviewed. Patient verbalizes understanding.   Pt moving/turing independent. Frequent weight shifting encouraged.  Patient normal sinus rhythm on monitor.   Bed low, side rails up x 2, wheels locked, call light in reach.   Hourly rounding completed.   Will continue to observe.

## 2023-11-13 NOTE — ASSESSMENT & PLAN NOTE
67 yo M with history of SCC dx by RUL nodule biopsy 10/18/23, also with satellite lesion seen on CT Chest with size increase to 7mm 9/2023 and muscle invasive bladder cancer s/p TMT in 2020 now with recurrence in the RP nodes and possible metastasis to the lungs vs primary lung CA who presented with displaced nephrostomy. Replaced 11/11 by IR. Then spiked fever to 101.1 F with slight increase in WBC count. U/A with 76 WBC. Urine culture growing E faecium likely translocated from gut. CT a/p reports Right-sided external drainage catheter extending to the subcutaneous region superiorly and extending to the distal right ureter. Double-J left-sided urinary stent extending from the left renal pelvis to the left bladder. Bladder wall thickening.    Patient afebrile since 11/11. Leukocytosis to 16K on review of CBC today. Urine culture has grown E faecium not being covered by ertapenem. Will treat with Zyvox.    Recommendations:  --No coverage expected for E faecium with ertapenem   --Recommend switching to PO linezolid 600 mg BID for anticipated 14 day course   --Stop date for linezolid will be 11/27/23   --Monitor fever curve and blood cx (NGTD)  --Recommend PICC removal today   --Will schedule for ID clinic follow up   --Above d/w primary team.

## 2023-11-13 NOTE — SUBJECTIVE & OBJECTIVE
Interval History: discussed changing antibiotics. Noted nephrostomy bag empty. IR consulted to evaluate.plan procedure in am    Review of Systems  Objective:     Vital Signs (Most Recent):  Temp: 98.7 °F (37.1 °C) (11/13/23 1528)  Pulse: 75 (11/13/23 1528)  Resp: 18 (11/13/23 1528)  BP: (!) 103/54 (11/13/23 1528)  SpO2: (!) 91 % (11/13/23 1528) Vital Signs (24h Range):  Temp:  [98.7 °F (37.1 °C)-99.9 °F (37.7 °C)] 98.7 °F (37.1 °C)  Pulse:  [72-95] 75  Resp:  [16-19] 18  SpO2:  [91 %-97 %] 91 %  BP: ()/(52-65) 103/54     Weight: 72.5 kg (159 lb 13.3 oz)  Body mass index is 23.6 kg/m².    Intake/Output Summary (Last 24 hours) at 11/13/2023 1739  Last data filed at 11/13/2023 0942  Gross per 24 hour   Intake 2492.91 ml   Output 0 ml   Net 2492.91 ml         Physical Exam  HENT:      Head: Normocephalic and atraumatic.   Cardiovascular:      Rate and Rhythm: Normal rate and regular rhythm.      Heart sounds: No murmur heard.  Pulmonary:      Effort: Pulmonary effort is normal. No respiratory distress.      Breath sounds: Normal breath sounds. No wheezing.   Abdominal:      General: Bowel sounds are normal. There is no distension.      Palpations: Abdomen is soft.      Tenderness: There is no abdominal tenderness.   Musculoskeletal:         General: No swelling.   Skin:     General: Skin is warm and dry.   Neurological:      Mental Status: He is alert and oriented to person, place, and time. Mental status is at baseline.             Significant Labs: All pertinent labs within the past 24 hours have been reviewed.  CBC:   Recent Labs   Lab 11/12/23 0441 11/13/23 0325   WBC 12.69 16.50*   HGB 9.5* 8.2*   HCT 30.4* 26.2*    212     CMP:   Recent Labs   Lab 11/12/23 0441 11/13/23 0325    137   K 4.0 3.7    103   CO2 22* 23   * 132*   BUN 17 21   CREATININE 1.4 1.4   CALCIUM 8.7 8.7   PROT 6.2 6.2   ALBUMIN 2.6* 2.4*   BILITOT 0.5 0.3   ALKPHOS 95 122   AST 8* 14   ALT 8* 9*   ANIONGAP 11  11       Significant Imaging: I have reviewed all pertinent imaging results/findings within the past 24 hours.

## 2023-11-13 NOTE — CONSULTS
Chart reviewed by Dr. Sr.       ASSESSMENT/PLAN:      Right nephrostomy tube not draining       The order for a IR nephrostogram has been placed and the procedure will be performed in the morning.          Thank you for the consult.

## 2023-11-13 NOTE — CHAPLAIN
Initial visit with patient and his spouse.  Visited with patient and his spouse to assess for spiritual and emotional needs.  Pt was mostly sleeping throughout the visit and I spent time listening to their story from this spouse.  Pt's spouse described all the different things going on with his health, problems with her own health, and other changes within their lives.  She was struggling due to some of the overall medical decisions that have been made.  She has felt un listened to at times and is wondering if she could have advocated better for her spouse.  We talked some about other things as well and she wanted me to pray for them.  I did this for them before leaving and spiritual care remains available as needed.    Chaplain Juan C Amos M.Div., [facility]

## 2023-11-13 NOTE — SUBJECTIVE & OBJECTIVE
Interval History: Patient afebrile since 11/11. Leukocytosis to 16K. Has been on ertapenem. However, urine isolate E faecium. Not covered by ertapenem. Will switch to Zyvox and trend white count.     Review of Systems   Constitutional:  Positive for activity change. Negative for fever.   Gastrointestinal:  Negative for diarrhea.   Genitourinary:  Positive for difficulty urinating. Negative for dysuria.   Musculoskeletal:  Positive for arthralgias. Negative for back pain.   All other systems reviewed and are negative.    Objective:     Vital Signs (Most Recent):  Temp: 98.7 °F (37.1 °C) (11/13/23 0715)  Pulse: 91 (11/13/23 0715)  Resp: 16 (11/13/23 0715)  BP: (!) 90/53 (11/13/23 0715)  SpO2: 96 % (11/13/23 0715) Vital Signs (24h Range):  Temp:  [98.6 °F (37 °C)-99.5 °F (37.5 °C)] 98.7 °F (37.1 °C)  Pulse:  [76-95] 91  Resp:  [16-20] 16  SpO2:  [92 %-97 %] 96 %  BP: ()/(52-65) 90/53     Weight: 72.5 kg (159 lb 13.3 oz)  Body mass index is 23.6 kg/m².    Estimated Creatinine Clearance: 49.8 mL/min (based on SCr of 1.4 mg/dL).     Physical Exam  Constitutional:       General: He is not in acute distress.     Appearance: Normal appearance. He is not ill-appearing.   Cardiovascular:      Rate and Rhythm: Normal rate and regular rhythm.      Pulses: Normal pulses.      Heart sounds: Normal heart sounds. No murmur heard.     No friction rub. No gallop.   Pulmonary:      Effort: Pulmonary effort is normal. No respiratory distress.      Breath sounds: Normal breath sounds.   Abdominal:      General: Abdomen is flat. Bowel sounds are normal. There is no distension.      Palpations: Abdomen is soft.      Tenderness: There is no abdominal tenderness.   Genitourinary:     Comments: Left sided nephrostomy intact   Skin:     General: Skin is warm and dry.   Neurological:      Mental Status: He is alert.          Significant Labs: CBC:   Recent Labs   Lab 11/12/23  0441 11/13/23  0325   WBC 12.69 16.50*   HGB 9.5* 8.2*   HCT  30.4* 26.2*    212     CMP:   Recent Labs   Lab 11/12/23  0441 11/13/23  0325    137   K 4.0 3.7    103   CO2 22* 23   * 132*   BUN 17 21   CREATININE 1.4 1.4   CALCIUM 8.7 8.7   PROT 6.2 6.2   ALBUMIN 2.6* 2.4*   BILITOT 0.5 0.3   ALKPHOS 95 122   AST 8* 14   ALT 8* 9*   ANIONGAP 11 11     Microbiology Results (last 7 days)       Procedure Component Value Units Date/Time    Blood culture #1 **CANNOT BE ORDERED STAT** [[id number]] Collected: 11/10/23 1859    Order Status: Completed Specimen: Blood from Peripheral, Forearm, Right Updated: 11/13/23 0612     Blood Culture, Routine No Growth to date      No Growth to date      No Growth to date    Blood culture #2 **CANNOT BE ORDERED STAT** [[id number]] Collected: 11/10/23 1859    Order Status: Completed Specimen: Blood from Peripheral, Antecubital, Right Updated: 11/13/23 0612     Blood Culture, Routine No Growth to date      No Growth to date      No Growth to date    Urine culture [[id number]]  (Abnormal) Collected: 11/11/23 0431    Order Status: Completed Specimen: Urine Updated: 11/12/23 1315     Urine Culture, Routine ENTEROCOCCUS FAECIUM  >100,000 cfu/ml  Susceptibility pending  No other significant isolate      Narrative:      Specimen Source->Urine          All pertinent labs within the past 24 hours have been reviewed.    Significant Imaging: I have reviewed all pertinent imaging results/findings within the past 24 hours.

## 2023-11-13 NOTE — PROGRESS NOTES
[facility]  [facility]  Progress Note    Patient Name: Geovani Currie  MRN: [id number]  Patient Class: IP- Inpatient   Admission Date: 11/10/2023  Length of Stay: 2 days  Attending Physician: Lara Ribera MD  Primary Care Provider: Faizan Antoine MD        Subjective:     Principal Problem:Nephrostomy tube displaced        HPI:  Geovani Currie is a 68 y.o. male with a PMH  has a past medical history of COPD (chronic obstructive pulmonary disease) and Hypertension. who presented to the ED after accidentally pulling out his nephrostomy tube while sleeping.  At time of bedside assessment, patient currently without any concerns or complaints and stated he is just waiting to have his procedure done so he can go back home.  Interventional Radiology was consulted by ED staff and is aware of patient.  Patient being admitted to Hospital Medicine under observation while awaiting nephrostomy tube reinsertion by IR tomorrow.      PCP: Faizan Antoine      Overview/Hospital Course:  Patient admitted for nephrostomy tube replacement after it was accidentally dislodge while sleeping.  Post procedure patient was to be discharged, but began shivering.  Temp increased to 101.  Dr. Hartman was consulted as he was following the patient for Enterobacter UTI with IV Invanz which scheduled to complete on 11/11, but due to the fever he advised to continue the Invanz. Repeat urine culture with Enterococcus VRE. Not covered by Invanz. Antibiotics changed to linezolid. Nephrostomy tube with no output. IR consulted and plan to assess in am     Interval History: discussed changing antibiotics. Noted nephrostomy bag empty. IR consulted to evaluate.plan procedure in am    Review of Systems  Objective:     Vital Signs (Most Recent):  Temp: 98.7 °F (37.1 °C) (11/13/23 1528)  Pulse: 75 (11/13/23 1528)  Resp: 18 (11/13/23 1528)  BP: (!) 103/54 (11/13/23 1528)  SpO2: (!) 91 % (11/13/23 1528) Vital Signs (24h  Range):  Temp:  [98.7 °F (37.1 °C)-99.9 °F (37.7 °C)] 98.7 °F (37.1 °C)  Pulse:  [72-95] 75  Resp:  [16-19] 18  SpO2:  [91 %-97 %] 91 %  BP: ()/(52-65) 103/54     Weight: 72.5 kg (159 lb 13.3 oz)  Body mass index is 23.6 kg/m².    Intake/Output Summary (Last 24 hours) at 11/13/2023 1739  Last data filed at 11/13/2023 0942  Gross per 24 hour   Intake 2492.91 ml   Output 0 ml   Net 2492.91 ml         Physical Exam  HENT:      Head: Normocephalic and atraumatic.   Cardiovascular:      Rate and Rhythm: Normal rate and regular rhythm.      Heart sounds: No murmur heard.  Pulmonary:      Effort: Pulmonary effort is normal. No respiratory distress.      Breath sounds: Normal breath sounds. No wheezing.   Abdominal:      General: Bowel sounds are normal. There is no distension.      Palpations: Abdomen is soft.      Tenderness: There is no abdominal tenderness.   Musculoskeletal:         General: No swelling.   Skin:     General: Skin is warm and dry.   Neurological:      Mental Status: He is alert and oriented to person, place, and time. Mental status is at baseline.             Significant Labs: All pertinent labs within the past 24 hours have been reviewed.  CBC:   Recent Labs   Lab 11/12/23  0441 11/13/23  0325   WBC 12.69 16.50*   HGB 9.5* 8.2*   HCT 30.4* 26.2*    212     CMP:   Recent Labs   Lab 11/12/23  0441 11/13/23  0325    137   K 4.0 3.7    103   CO2 22* 23   * 132*   BUN 17 21   CREATININE 1.4 1.4   CALCIUM 8.7 8.7   PROT 6.2 6.2   ALBUMIN 2.6* 2.4*   BILITOT 0.5 0.3   ALKPHOS 95 122   AST 8* 14   ALT 8* 9*   ANIONGAP 11 11       Significant Imaging: I have reviewed all pertinent imaging results/findings within the past 24 hours.    Assessment/Plan:      * Nephrostomy tube displaced  -no output noted in nephrostomy  bag  -IR to evaluate      Acute renal failure superimposed on stage 3 chronic kidney disease  Patient with acute kidney injury/acute renal failure likely due to  post-obstructive d/t Nephrostomy tube dislodgement BANDAR is currently awaiting reinsertion by IR. Baseline creatinine CKD Stage 3 - Labs reviewed- Renal function/electrolytes with Estimated Creatinine Clearance: 49.8 mL/min (based on SCr of 1.4 mg/dL). according to latest data. Monitor urine output and serial BMP and adjust therapy as needed. Avoid nephrotoxins and renally dose meds for GFR listed above.      Centrilobular emphysema  Patient's COPD is controlled currently.  Patient is currently off COPD Pathway. Continue scheduled inhalers prn and monitor respiratory status closely.       UTI (urinary tract infection)  -changed abx to linezolid as VRE isolated  -- PICC line in left arm to be removed  - monitor fever curve    History of cancer  Currently follows Hematology/Oncology as well as Urology outpatient.  Plan:  -f/u outpatient as directed      Normocytic anemia  Appears near baseline without evidence of active bleeding noted.   Recent Labs   Lab 11/10/23  1858 11/11/23  0443 11/12/23  0441   HGB 9.4* 9.4* 9.5*   HCT 30.5* 30.1* 30.4*   MCV 86 86 86   MCHC 30.8* 31.2* 31.3*   RDW 14.6* 14.6* 14.8*    228 197   Plan:  -Monitor H/H and plts  -Type and screen, transfuse as needed   -Continue home medications   -Hold antiplatelets/anticoagulants in setting of active bleeding/pending surgical intervention           VTE Risk Mitigation (From admission, onward)           Ordered     Reason for No Pharmacological VTE Prophylaxis  Once        Question:  Reasons:  Answer:  Physician Provided (leave comment)  Comment:  awaiting surgical procedure    11/10/23 2148     IP VTE HIGH RISK PATIENT  Once         11/10/23 2148     Place sequential compression device  Until discontinued         11/10/23 2148                    Discharge Planning   TANIA:      Code Status: Full Code   Is the patient medically ready for discharge?:     Reason for patient still in hospital (select all that apply): Patient trending condition,  Laboratory test, Treatment, and Consult recommendations  Discharge Plan A: Home with family, Home                  Lara Ribera MD  Department of Hospital Medicine   [facility]

## 2023-11-13 NOTE — ASSESSMENT & PLAN NOTE
-changed abx to linezolid as VRE isolated  -- PICC line in left arm to be removed  - monitor fever curve

## 2023-11-14 LAB
BASOPHILS # BLD AUTO: 0.03 K/UL (ref 0–0.2)
BASOPHILS NFR BLD: 0.3 % (ref 0–1.9)
DIFFERENTIAL METHOD: ABNORMAL
EOSINOPHIL # BLD AUTO: 0.2 K/UL (ref 0–0.5)
EOSINOPHIL NFR BLD: 1.9 % (ref 0–8)
ERYTHROCYTE [DISTWIDTH] IN BLOOD BY AUTOMATED COUNT: 14.7 % (ref 11.5–14.5)
HCT VFR BLD AUTO: 25.6 % (ref 40–54)
HGB BLD-MCNC: 7.9 G/DL (ref 14–18)
IMM GRANULOCYTES # BLD AUTO: 0.04 K/UL (ref 0–0.04)
IMM GRANULOCYTES NFR BLD AUTO: 0.3 % (ref 0–0.5)
LYMPHOCYTES # BLD AUTO: 0.8 K/UL (ref 1–4.8)
LYMPHOCYTES NFR BLD: 6.5 % (ref 18–48)
MCH RBC QN AUTO: 26.4 PG (ref 27–31)
MCHC RBC AUTO-ENTMCNC: 30.9 G/DL (ref 32–36)
MCV RBC AUTO: 86 FL (ref 82–98)
MONOCYTES # BLD AUTO: 0.9 K/UL (ref 0.3–1)
MONOCYTES NFR BLD: 7.7 % (ref 4–15)
NEUTROPHILS # BLD AUTO: 9.7 K/UL (ref 1.8–7.7)
NEUTROPHILS NFR BLD: 83.3 % (ref 38–73)
NRBC BLD-RTO: 0 /100 WBC
PLATELET # BLD AUTO: 225 K/UL (ref 150–450)
PMV BLD AUTO: 10.2 FL (ref 9.2–12.9)
RBC # BLD AUTO: 2.99 M/UL (ref 4.6–6.2)
WBC # BLD AUTO: 11.66 K/UL (ref 3.9–12.7)

## 2023-11-14 PROCEDURE — 94761 N-INVAS EAR/PLS OXIMETRY MLT: CPT

## 2023-11-14 PROCEDURE — 25000003 PHARM REV CODE 250: Performed by: HOSPITALIST

## 2023-11-14 PROCEDURE — 25000003 PHARM REV CODE 250: Performed by: FAMILY MEDICINE

## 2023-11-14 PROCEDURE — 85025 COMPLETE CBC W/AUTO DIFF WBC: CPT | Performed by: INTERNAL MEDICINE

## 2023-11-14 PROCEDURE — 11000001 HC ACUTE MED/SURG PRIVATE ROOM

## 2023-11-14 PROCEDURE — 27000207 HC ISOLATION

## 2023-11-14 PROCEDURE — 36415 COLL VENOUS BLD VENIPUNCTURE: CPT | Performed by: INTERNAL MEDICINE

## 2023-11-14 PROCEDURE — 25000003 PHARM REV CODE 250: Performed by: INTERNAL MEDICINE

## 2023-11-14 PROCEDURE — S4991 NICOTINE PATCH NONLEGEND: HCPCS | Performed by: FAMILY MEDICINE

## 2023-11-14 PROCEDURE — 27000221 HC OXYGEN, UP TO 24 HOURS

## 2023-11-14 PROCEDURE — 99900035 HC TECH TIME PER 15 MIN (STAT)

## 2023-11-14 RX ADMIN — TAMSULOSIN HYDROCHLORIDE 0.4 MG: 0.4 CAPSULE ORAL at 08:11

## 2023-11-14 RX ADMIN — LEVOTHYROXINE SODIUM 125 MCG: 25 TABLET ORAL at 05:11

## 2023-11-14 RX ADMIN — HYDROCODONE BITARTRATE AND ACETAMINOPHEN 1 TABLET: 5; 325 TABLET ORAL at 08:11

## 2023-11-14 RX ADMIN — LINEZOLID 600 MG: 600 TABLET, FILM COATED ORAL at 08:11

## 2023-11-14 RX ADMIN — NICOTINE 1 PATCH: 14 PATCH, EXTENDED RELEASE TRANSDERMAL at 08:11

## 2023-11-14 RX ADMIN — FLUTICASONE FUROATE AND VILANTEROL TRIFENATATE 1 PUFF: 100; 25 POWDER RESPIRATORY (INHALATION) at 08:11

## 2023-11-14 RX ADMIN — DOCUSATE SODIUM 100 MG: 100 CAPSULE, LIQUID FILLED ORAL at 08:11

## 2023-11-14 RX ADMIN — ALPRAZOLAM 1 MG: 1 TABLET ORAL at 05:11

## 2023-11-14 RX ADMIN — HYDROCODONE BITARTRATE AND ACETAMINOPHEN 1 TABLET: 5; 325 TABLET ORAL at 05:11

## 2023-11-14 RX ADMIN — METOPROLOL SUCCINATE 50 MG: 50 TABLET, EXTENDED RELEASE ORAL at 08:11

## 2023-11-14 RX ADMIN — ESCITALOPRAM 20 MG: 5 TABLET, FILM COATED ORAL at 08:11

## 2023-11-14 NOTE — ASSESSMENT & PLAN NOTE
Currently follows Hematology/Oncology as well as Urology outpatient.  -f/u outpatient as directed

## 2023-11-14 NOTE — PROGRESS NOTES
[facility]  [facility]  Progress Note    Patient Name: Geovani Currie  MRN: [id number]  Patient Class: IP- Inpatient   Admission Date: 11/10/2023  Length of Stay: 3 days  Attending Physician: Lara Ribera MD  Primary Care Provider: Faizan Antoine MD        Subjective:     Principal Problem:Nephrostomy tube displaced        HPI:  Geovani Currie is a 68 y.o. male with a PMH  has a past medical history of COPD (chronic obstructive pulmonary disease) and Hypertension. who presented to the ED after accidentally pulling out his nephrostomy tube while sleeping.  At time of bedside assessment, patient currently without any concerns or complaints and stated he is just waiting to have his procedure done so he can go back home.  Interventional Radiology was consulted by ED staff and is aware of patient.  Patient being admitted to Hospital Medicine under observation while awaiting nephrostomy tube reinsertion by IR tomorrow.      PCP: Faizan Antoine      Overview/Hospital Course:  Patient admitted for nephrostomy tube replacement after it was accidentally dislodge while sleeping.  Post procedure patient was to be discharged, but began shivering.  Temp increased to 101.  Dr. Hartman was consulted as he was following the patient for Enterobacter UTI with IV Invanz which scheduled to complete on 11/11, but due to the fever he advised to continue the Invanz. Repeat urine culture with Enterococcus VRE. Not covered by Invanz. Antibiotics changed to linezolid. Nephrostomy tube with no output. IR consulted.    Interval History: awaiting IR evaluation, no output from nephrostomy. Afebrile but intermittent confusion per family    Review of Systems  Objective:     Vital Signs (Most Recent):  Temp: 98.8 °F (37.1 °C) (11/14/23 0702)  Pulse: 82 (11/14/23 0856)  Resp: 16 (11/14/23 0856)  BP: 130/62 (11/14/23 0702)  SpO2: 99 % (11/14/23 0856) Vital Signs (24h Range):  Temp:  [98.2 °F (36.8 °C)-99.9 °F (37.7 °C)]  98.8 °F (37.1 °C)  Pulse:  [65-95] 82  Resp:  [16-18] 16  SpO2:  [91 %-99 %] 99 %  BP: ()/(51-62) 130/62     Weight: 74.9 kg (165 lb 2 oz)  Body mass index is 24.38 kg/m².    Intake/Output Summary (Last 24 hours) at 11/14/2023 1021  Last data filed at 11/14/2023 0702  Gross per 24 hour   Intake 120 ml   Output 0 ml   Net 120 ml         Physical Exam  HENT:      Head: Normocephalic and atraumatic.   Cardiovascular:      Rate and Rhythm: Normal rate and regular rhythm.      Heart sounds: No murmur heard.  Pulmonary:      Effort: Pulmonary effort is normal. No respiratory distress.      Breath sounds: Normal breath sounds. No wheezing.   Abdominal:      General: Bowel sounds are normal. There is no distension.      Palpations: Abdomen is soft.      Tenderness: There is no abdominal tenderness.   Musculoskeletal:         General: No swelling.   Skin:     General: Skin is warm and dry.   Neurological:      Mental Status: He is alert.             Significant Labs: All pertinent labs within the past 24 hours have been reviewed.  CBC:   Recent Labs   Lab 11/13/23  0325 11/14/23  0756   WBC 16.50* 11.66   HGB 8.2* 7.9*   HCT 26.2* 25.6*    225     CMP:   Recent Labs   Lab 11/13/23  0325      K 3.7      CO2 23   *   BUN 21   CREATININE 1.4   CALCIUM 8.7   PROT 6.2   ALBUMIN 2.4*   BILITOT 0.3   ALKPHOS 122   AST 14   ALT 9*   ANIONGAP 11       Significant Imaging: I have reviewed all pertinent imaging results/findings within the past 24 hours.    Assessment/Plan:      * Nephrostomy tube displaced  -no output noted in nephrostomy  bag  -IR to evaluate      Acute renal failure superimposed on stage 3 chronic kidney disease  Patient with acute kidney injury/acute renal failure likely due to post-obstructive d/t Nephrostomy tube dislodgement BANDAR is currently awaiting reinsertion by IR. Baseline creatinine CKD Stage 3 - Labs reviewed- Renal function/electrolytes with Estimated Creatinine Clearance:  49.8 mL/min (based on SCr of 1.4 mg/dL). according to latest data. Monitor urine output and serial BMP and adjust therapy as needed. Avoid nephrotoxins and renally dose meds for GFR listed above.      Centrilobular emphysema  Patient's COPD is controlled currently.  Patient is currently off COPD Pathway. Continue scheduled inhalers prn and monitor respiratory status closely.       UTI (urinary tract infection)  -changed abx to linezolid as VRE isolated  -- PICC line removed  - monitor fever curve    History of cancer  Currently follows Hematology/Oncology as well as Urology outpatient.  -f/u outpatient as directed      Normocytic anemia  .   Recent Labs   Lab 11/12/23  0441 11/13/23  0325 11/14/23  0756   HGB 9.5* 8.2* 7.9*   HCT 30.4* 26.2* 25.6*   MCV 86 85 86   MCHC 31.3* 31.3* 30.9*   RDW 14.8* 14.6* 14.7*    212 225     Continue to monitor        VTE Risk Mitigation (From admission, onward)           Ordered     Reason for No Pharmacological VTE Prophylaxis  Once        Question:  Reasons:  Answer:  Physician Provided (leave comment)  Comment:  awaiting surgical procedure    11/10/23 2148     IP VTE HIGH RISK PATIENT  Once         11/10/23 2148     Place sequential compression device  Until discontinued         11/10/23 2148                    Discharge Planning   TANIA:      Code Status: Full Code   Is the patient medically ready for discharge?:     Reason for patient still in hospital (select all that apply): Patient trending condition, Laboratory test, and Treatment  Discharge Plan A: Home with family, Home                  Lara Ribera MD  Department of Hospital Medicine   [facility]

## 2023-11-14 NOTE — ASSESSMENT & PLAN NOTE
.   Recent Labs   Lab 11/12/23  0441 11/13/23  0325 11/14/23  0756   HGB 9.5* 8.2* 7.9*   HCT 30.4* 26.2* 25.6*   MCV 86 85 86   MCHC 31.3* 31.3* 30.9*   RDW 14.8* 14.6* 14.7*    212 225     Continue to monitor

## 2023-11-14 NOTE — SUBJECTIVE & OBJECTIVE
Interval History: awaiting IR evaluation, no output from nephrostomy. Afebrile but intermittent confusion per family    Review of Systems  Objective:     Vital Signs (Most Recent):  Temp: 98.8 °F (37.1 °C) (11/14/23 0702)  Pulse: 82 (11/14/23 0856)  Resp: 16 (11/14/23 0856)  BP: 130/62 (11/14/23 0702)  SpO2: 99 % (11/14/23 0856) Vital Signs (24h Range):  Temp:  [98.2 °F (36.8 °C)-99.9 °F (37.7 °C)] 98.8 °F (37.1 °C)  Pulse:  [65-95] 82  Resp:  [16-18] 16  SpO2:  [91 %-99 %] 99 %  BP: ()/(51-62) 130/62     Weight: 74.9 kg (165 lb 2 oz)  Body mass index is 24.38 kg/m².    Intake/Output Summary (Last 24 hours) at 11/14/2023 1021  Last data filed at 11/14/2023 0702  Gross per 24 hour   Intake 120 ml   Output 0 ml   Net 120 ml         Physical Exam  HENT:      Head: Normocephalic and atraumatic.   Cardiovascular:      Rate and Rhythm: Normal rate and regular rhythm.      Heart sounds: No murmur heard.  Pulmonary:      Effort: Pulmonary effort is normal. No respiratory distress.      Breath sounds: Normal breath sounds. No wheezing.   Abdominal:      General: Bowel sounds are normal. There is no distension.      Palpations: Abdomen is soft.      Tenderness: There is no abdominal tenderness.   Musculoskeletal:         General: No swelling.   Skin:     General: Skin is warm and dry.   Neurological:      Mental Status: He is alert.             Significant Labs: All pertinent labs within the past 24 hours have been reviewed.  CBC:   Recent Labs   Lab 11/13/23  0325 11/14/23  0756   WBC 16.50* 11.66   HGB 8.2* 7.9*   HCT 26.2* 25.6*    225     CMP:   Recent Labs   Lab 11/13/23  0325      K 3.7      CO2 23   *   BUN 21   CREATININE 1.4   CALCIUM 8.7   PROT 6.2   ALBUMIN 2.4*   BILITOT 0.3   ALKPHOS 122   AST 14   ALT 9*   ANIONGAP 11       Significant Imaging: I have reviewed all pertinent imaging results/findings within the past 24 hours.

## 2023-11-14 NOTE — PLAN OF CARE
Ongoing (interventions implemented as appropriate)  Pt is alert and oriented.   VSS  Pt able to make needs known.  Pt remained afebrile throughout this shift.   Pt remained free of falls this shift.   Pt denies pain this shift.  Plan of care reviewed. Patient verbalizes understanding.   Pt moving/turing independent. Frequent weight shifting encouraged.  Patient normal  sinus rhythm on monitor.   Bed low, side rails up x 2, wheels locked, call light in reach.   Hourly rounding completed.   Will continue to observe.

## 2023-11-15 ENCOUNTER — DOCUMENTATION ONLY (OUTPATIENT)
Dept: PALLIATIVE MEDICINE | Facility: CLINIC | Age: 68
End: 2023-11-15
Payer: MEDICARE

## 2023-11-15 PROBLEM — N13.5 URETERAL OBSTRUCTION, LEFT: Status: ACTIVE | Noted: 2023-11-15

## 2023-11-15 PROBLEM — C67.9 MALIGNANT NEOPLASM OF URINARY BLADDER: Status: ACTIVE | Noted: 2023-11-15

## 2023-11-15 LAB
ALBUMIN SERPL BCP-MCNC: 2.3 G/DL (ref 3.5–5.2)
ALP SERPL-CCNC: 114 U/L (ref 55–135)
ALT SERPL W/O P-5'-P-CCNC: 20 U/L (ref 10–44)
ANION GAP SERPL CALC-SCNC: 15 MMOL/L (ref 8–16)
AST SERPL-CCNC: 22 U/L (ref 10–40)
BASOPHILS # BLD AUTO: 0.04 K/UL (ref 0–0.2)
BASOPHILS NFR BLD: 0.5 % (ref 0–1.9)
BILIRUB SERPL-MCNC: 0.3 MG/DL (ref 0.1–1)
BUN SERPL-MCNC: 21 MG/DL (ref 8–23)
CALCIUM SERPL-MCNC: 8.9 MG/DL (ref 8.7–10.5)
CHLORIDE SERPL-SCNC: 106 MMOL/L (ref 95–110)
CO2 SERPL-SCNC: 20 MMOL/L (ref 23–29)
CREAT SERPL-MCNC: 1.4 MG/DL (ref 0.5–1.4)
DIFFERENTIAL METHOD: ABNORMAL
EOSINOPHIL # BLD AUTO: 0.4 K/UL (ref 0–0.5)
EOSINOPHIL NFR BLD: 4.1 % (ref 0–8)
ERYTHROCYTE [DISTWIDTH] IN BLOOD BY AUTOMATED COUNT: 14.7 % (ref 11.5–14.5)
EST. GFR  (NO RACE VARIABLE): 54 ML/MIN/1.73 M^2
GLUCOSE SERPL-MCNC: 83 MG/DL (ref 70–110)
HCT VFR BLD AUTO: 27.9 % (ref 40–54)
HGB BLD-MCNC: 8.6 G/DL (ref 14–18)
IMM GRANULOCYTES # BLD AUTO: 0.02 K/UL (ref 0–0.04)
IMM GRANULOCYTES NFR BLD AUTO: 0.2 % (ref 0–0.5)
LYMPHOCYTES # BLD AUTO: 1 K/UL (ref 1–4.8)
LYMPHOCYTES NFR BLD: 11.3 % (ref 18–48)
MCH RBC QN AUTO: 26.5 PG (ref 27–31)
MCHC RBC AUTO-ENTMCNC: 30.8 G/DL (ref 32–36)
MCV RBC AUTO: 86 FL (ref 82–98)
MONOCYTES # BLD AUTO: 0.7 K/UL (ref 0.3–1)
MONOCYTES NFR BLD: 7.6 % (ref 4–15)
NEUTROPHILS # BLD AUTO: 6.7 K/UL (ref 1.8–7.7)
NEUTROPHILS NFR BLD: 76.3 % (ref 38–73)
NRBC BLD-RTO: 0 /100 WBC
PLATELET # BLD AUTO: 272 K/UL (ref 150–450)
PMV BLD AUTO: 10.5 FL (ref 9.2–12.9)
POTASSIUM SERPL-SCNC: 4.1 MMOL/L (ref 3.5–5.1)
PROT SERPL-MCNC: 6.1 G/DL (ref 6–8.4)
RBC # BLD AUTO: 3.24 M/UL (ref 4.6–6.2)
SODIUM SERPL-SCNC: 141 MMOL/L (ref 136–145)
WBC # BLD AUTO: 8.8 K/UL (ref 3.9–12.7)

## 2023-11-15 PROCEDURE — S4991 NICOTINE PATCH NONLEGEND: HCPCS | Performed by: FAMILY MEDICINE

## 2023-11-15 PROCEDURE — 25000003 PHARM REV CODE 250: Performed by: INTERNAL MEDICINE

## 2023-11-15 PROCEDURE — 25500020 PHARM REV CODE 255: Performed by: INTERNAL MEDICINE

## 2023-11-15 PROCEDURE — 85025 COMPLETE CBC W/AUTO DIFF WBC: CPT | Performed by: INTERNAL MEDICINE

## 2023-11-15 PROCEDURE — 27000207 HC ISOLATION

## 2023-11-15 PROCEDURE — 80053 COMPREHEN METABOLIC PANEL: CPT | Performed by: INTERNAL MEDICINE

## 2023-11-15 PROCEDURE — 52310 PR CYSTOSCOPY,REMV CALCULUS,SIMPLE: ICD-10-PCS | Mod: ,,, | Performed by: UROLOGY

## 2023-11-15 PROCEDURE — 25000003 PHARM REV CODE 250: Performed by: HOSPITALIST

## 2023-11-15 PROCEDURE — 36415 COLL VENOUS BLD VENIPUNCTURE: CPT | Performed by: INTERNAL MEDICINE

## 2023-11-15 PROCEDURE — 25000003 PHARM REV CODE 250: Performed by: RADIOLOGY

## 2023-11-15 PROCEDURE — 11000001 HC ACUTE MED/SURG PRIVATE ROOM

## 2023-11-15 PROCEDURE — 52310 CYSTOSCOPY AND TREATMENT: CPT | Mod: ,,, | Performed by: UROLOGY

## 2023-11-15 PROCEDURE — 25000003 PHARM REV CODE 250: Performed by: FAMILY MEDICINE

## 2023-11-15 PROCEDURE — 63600175 PHARM REV CODE 636 W HCPCS: Performed by: RADIOLOGY

## 2023-11-15 PROCEDURE — 63600175 PHARM REV CODE 636 W HCPCS: Performed by: UROLOGY

## 2023-11-15 RX ORDER — DIPHENHYDRAMINE HYDROCHLORIDE 50 MG/ML
INJECTION INTRAMUSCULAR; INTRAVENOUS CODE/TRAUMA/SEDATION MEDICATION
Status: COMPLETED | OUTPATIENT
Start: 2023-11-15 | End: 2023-11-15

## 2023-11-15 RX ORDER — HYDROMORPHONE HYDROCHLORIDE 2 MG/ML
1 INJECTION, SOLUTION INTRAMUSCULAR; INTRAVENOUS; SUBCUTANEOUS EVERY 6 HOURS PRN
Status: DISCONTINUED | OUTPATIENT
Start: 2023-11-15 | End: 2023-11-18 | Stop reason: HOSPADM

## 2023-11-15 RX ORDER — LIDOCAINE HYDROCHLORIDE 20 MG/ML
INJECTION, SOLUTION EPIDURAL; INFILTRATION; INTRACAUDAL; PERINEURAL CODE/TRAUMA/SEDATION MEDICATION
Status: COMPLETED | OUTPATIENT
Start: 2023-11-15 | End: 2023-11-15

## 2023-11-15 RX ORDER — FENTANYL CITRATE 50 UG/ML
INJECTION, SOLUTION INTRAMUSCULAR; INTRAVENOUS CODE/TRAUMA/SEDATION MEDICATION
Status: COMPLETED | OUTPATIENT
Start: 2023-11-15 | End: 2023-11-15

## 2023-11-15 RX ADMIN — IOHEXOL 15 ML: 240 INJECTION, SOLUTION INTRATHECAL; INTRAVASCULAR; INTRAVENOUS; ORAL at 11:11

## 2023-11-15 RX ADMIN — ESCITALOPRAM 20 MG: 5 TABLET, FILM COATED ORAL at 11:11

## 2023-11-15 RX ADMIN — LINEZOLID 600 MG: 600 TABLET, FILM COATED ORAL at 08:11

## 2023-11-15 RX ADMIN — HYDROCODONE BITARTRATE AND ACETAMINOPHEN 1 TABLET: 5; 325 TABLET ORAL at 08:11

## 2023-11-15 RX ADMIN — FENTANYL CITRATE 50 MCG: 0.05 INJECTION, SOLUTION INTRAMUSCULAR; INTRAVENOUS at 11:11

## 2023-11-15 RX ADMIN — HYDROMORPHONE HYDROCHLORIDE 1 MG: 2 INJECTION INTRAMUSCULAR; INTRAVENOUS; SUBCUTANEOUS at 01:11

## 2023-11-15 RX ADMIN — LINEZOLID 600 MG: 600 TABLET, FILM COATED ORAL at 11:11

## 2023-11-15 RX ADMIN — NICOTINE 1 PATCH: 14 PATCH, EXTENDED RELEASE TRANSDERMAL at 09:11

## 2023-11-15 RX ADMIN — TAMSULOSIN HYDROCHLORIDE 0.4 MG: 0.4 CAPSULE ORAL at 11:11

## 2023-11-15 RX ADMIN — LIDOCAINE HYDROCHLORIDE 5 ML: 20 INJECTION, SOLUTION EPIDURAL; INFILTRATION; INTRACAUDAL; PERINEURAL at 11:11

## 2023-11-15 RX ADMIN — DIPHENHYDRAMINE HYDROCHLORIDE 25 MG: 50 INJECTION INTRAMUSCULAR; INTRAVENOUS at 11:11

## 2023-11-15 RX ADMIN — DOCUSATE SODIUM 100 MG: 100 CAPSULE, LIQUID FILLED ORAL at 08:11

## 2023-11-15 RX ADMIN — LEVOTHYROXINE SODIUM 125 MCG: 25 TABLET ORAL at 05:11

## 2023-11-15 RX ADMIN — ALPRAZOLAM 1 MG: 1 TABLET ORAL at 08:11

## 2023-11-15 RX ADMIN — METOPROLOL SUCCINATE 50 MG: 50 TABLET, EXTENDED RELEASE ORAL at 09:11

## 2023-11-15 RX ADMIN — DOCUSATE SODIUM 100 MG: 100 CAPSULE, LIQUID FILLED ORAL at 11:11

## 2023-11-15 NOTE — PROGRESS NOTES
[facility]  [facility]  Progress Note    Patient Name: Geovani Currie  MRN: [id number]  Patient Class: IP- Inpatient   Admission Date: 11/10/2023  Length of Stay: 4 days  Attending Physician: Lara Ribera MD  Primary Care Provider: Faizan Antoine MD        Subjective:     Principal Problem:Nephrostomy tube displaced        HPI:  Geovani Currie is a 68 y.o. male with a PMH  has a past medical history of COPD (chronic obstructive pulmonary disease) and Hypertension. who presented to the ED after accidentally pulling out his nephrostomy tube while sleeping.  At time of bedside assessment, patient currently without any concerns or complaints and stated he is just waiting to have his procedure done so he can go back home.  Interventional Radiology was consulted by ED staff and is aware of patient.  Patient being admitted to Hospital Medicine under observation while awaiting nephrostomy tube reinsertion by IR tomorrow.      PCP: Faizan Antoine      Overview/Hospital Course:  Patient admitted for nephrostomy tube replacement after it was accidentally dislodge while sleeping.  Post procedure patient was to be discharged, but began shivering.  Temp increased to 101.  Dr. Hartman was consulted as he was following the patient for Enterobacter UTI with IV Invanz which scheduled to complete on 11/11, but due to the fever he advised to continue the Invanz. Repeat urine culture with Enterococcus VRE. Not covered by Invanz. Antibiotics changed to linezolid. Nephrostomy tube with no output. IR re-consulted. Nephrostomy tube replaced 11/15. Mentation improving per his wife.    Interval History: patient asking for coffee. Discussed NPO for nephrostomy tube replacement. Mentation improving.     Review of Systems  Objective:     Vital Signs (Most Recent):  Temp: 98.1 °F (36.7 °C) (11/15/23 1136)  Pulse: 65 (11/15/23 1136)  Resp: 18 (11/15/23 1136)  BP: (!) 118/56 (11/15/23 1136)  SpO2: (!) 94 % (11/15/23  1136) Vital Signs (24h Range):  Temp:  [97.9 °F (36.6 °C)-98.9 °F (37.2 °C)] 98.1 °F (36.7 °C)  Pulse:  [61-67] 65  Resp:  [13-18] 18  SpO2:  [93 %-95 %] 94 %  BP: ()/(53-62) 118/56     Weight: 74.9 kg (165 lb 2 oz)  Body mass index is 24.38 kg/m².    Intake/Output Summary (Last 24 hours) at 11/15/2023 1154  Last data filed at 11/15/2023 0818  Gross per 24 hour   Intake 120 ml   Output 0 ml   Net 120 ml         Physical Exam  HENT:      Head: Normocephalic and atraumatic.   Cardiovascular:      Rate and Rhythm: Normal rate and regular rhythm.      Heart sounds: No murmur heard.  Pulmonary:      Effort: Pulmonary effort is normal. No respiratory distress.      Breath sounds: Normal breath sounds. No wheezing.   Abdominal:      General: Bowel sounds are normal. There is no distension.      Palpations: Abdomen is soft.      Tenderness: There is no abdominal tenderness.   Musculoskeletal:         General: No swelling.   Skin:     General: Skin is warm and dry.   Neurological:      Mental Status: He is alert and oriented to person, place, and time. Mental status is at baseline.             Significant Labs: All pertinent labs within the past 24 hours have been reviewed.  CBC:   Recent Labs   Lab 11/14/23  0756 11/15/23  0344   WBC 11.66 8.80   HGB 7.9* 8.6*   HCT 25.6* 27.9*    272     CMP:   Recent Labs   Lab 11/15/23  0344      K 4.1      CO2 20*   GLU 83   BUN 21   CREATININE 1.4   CALCIUM 8.9   PROT 6.1   ALBUMIN 2.3*   BILITOT 0.3   ALKPHOS 114   AST 22   ALT 20   ANIONGAP 15       Significant Imaging: I have reviewed all pertinent imaging results/findings within the past 24 hours.    Assessment/Plan:      * Nephrostomy tube displaced  -IR replaced 11/15      Acute renal failure superimposed on stage 3 chronic kidney disease  Patient with acute kidney injury/acute renal failure likely due to post-obstructive d/t Nephrostomy tube dislodgement BANDAR is currently awaiting reinsertion by IR.  Baseline creatinine CKD Stage 3 - Labs reviewed- Renal function/electrolytes with Estimated Creatinine Clearance: 49.8 mL/min (based on SCr of 1.4 mg/dL). according to latest data. Monitor urine output and serial BMP and adjust therapy as needed. Avoid nephrotoxins and renally dose meds for GFR listed above.      Centrilobular emphysema  Patient's COPD is controlled currently.  Patient is currently off COPD Pathway. Continue scheduled inhalers prn and monitor respiratory status closely.       UTI (urinary tract infection)  --continue  linezolid as VRE isolated  -- PICC line removed  - monitor fever curve    History of cancer  Currently follows Hematology/Oncology as well as Urology outpatient.  -f/u outpatient as directed      Normocytic anemia  .   Recent Labs   Lab 11/13/23  0325 11/14/23  0756 11/15/23  0344   HGB 8.2* 7.9* 8.6*   HCT 26.2* 25.6* 27.9*   MCV 85 86 86   MCHC 31.3* 30.9* 30.8*   RDW 14.6* 14.7* 14.7*    225 272     Continue to monitor        VTE Risk Mitigation (From admission, onward)           Ordered     Reason for No Pharmacological VTE Prophylaxis  Once        Question:  Reasons:  Answer:  Physician Provided (leave comment)  Comment:  awaiting surgical procedure    11/10/23 2148     IP VTE HIGH RISK PATIENT  Once         11/10/23 2148     Place sequential compression device  Until discontinued         11/10/23 2148                    Discharge Planning   TANIA:      Code Status: Full Code   Is the patient medically ready for discharge?:     Reason for patient still in hospital (select all that apply): Patient trending condition, Laboratory test, and Treatment  Discharge Plan A: Home with family, Home                  Lara Ribera MD  Department of Hospital Medicine   [facility]

## 2023-11-15 NOTE — PROCEDURES
"  Temp: 98.1 °F (36.7 °C) (11/15/23 1136)  Pulse: 65 (11/15/23 1136)  Resp: 18 (11/15/23 1332)  BP: (!) 118/56 (11/15/23 1136)  SpO2: (!) 94 % (11/15/23 1136)  Weight: 74.9 kg (165 lb 2 oz) (11/14/23 0433)  Height: 5' 9" (175.3 cm) (11/11/23 0043)  Mallampati Scale: Class II  ASA Classification: Class 2      11/15/2023    Procedure: Flexible cysto-uretheroscopy and left ureter stent removal    Pre Procedure Diagnosis:bladder cancer, left ureteral obstruction, indwelling left ureter stent  Post Procedure Diagnosis: same     Surgeon: Joshua Jorgensen MD    Anesthesia: 2% uro-jet lidocaine jelly for local analgesia    Procedure in detail:  Risks and benefits were discussed with the patient and informed consent was obtained.  Patient was positioned and supine position. 2% lidocaine ljelly was injected per urethra. The genitalia was prepped and draped in the sterile fashion with hibiclens. A 17 Fr Flexible cystoscope was passed thru the urethra into the bladder. There was significant debris dependent in the bladder which made finding the stent difficult. I was able to find the coil and grasp with a foreign body grasper and remove the stent intact. He tolerated the procedure well.       Plan: Plan to place left pcn tube on Friday.     "

## 2023-11-15 NOTE — ASSESSMENT & PLAN NOTE
.   Recent Labs   Lab 11/13/23  0325 11/14/23  0756 11/15/23  0344   HGB 8.2* 7.9* 8.6*   HCT 26.2* 25.6* 27.9*   MCV 85 86 86   MCHC 31.3* 30.9* 30.8*   RDW 14.6* 14.7* 14.7*    225 272     Continue to monitor

## 2023-11-15 NOTE — PLAN OF CARE
Remains injury free. Right nephrostomy tube replaced today, connected to leg bag. Draining yellow urine. Left stent removed at bedside today, tolerated well. Tolerating diet. Denies c/o pain . Contact isolation continues. No s/s acute distress.

## 2023-11-15 NOTE — SUBJECTIVE & OBJECTIVE
Interval History: patient asking for coffee. Discussed NPO for nephrostomy tube replacement. Mentation improving.     Review of Systems  Objective:     Vital Signs (Most Recent):  Temp: 98.1 °F (36.7 °C) (11/15/23 1136)  Pulse: 65 (11/15/23 1136)  Resp: 18 (11/15/23 1136)  BP: (!) 118/56 (11/15/23 1136)  SpO2: (!) 94 % (11/15/23 1136) Vital Signs (24h Range):  Temp:  [97.9 °F (36.6 °C)-98.9 °F (37.2 °C)] 98.1 °F (36.7 °C)  Pulse:  [61-67] 65  Resp:  [13-18] 18  SpO2:  [93 %-95 %] 94 %  BP: ()/(53-62) 118/56     Weight: 74.9 kg (165 lb 2 oz)  Body mass index is 24.38 kg/m².    Intake/Output Summary (Last 24 hours) at 11/15/2023 1154  Last data filed at 11/15/2023 0818  Gross per 24 hour   Intake 120 ml   Output 0 ml   Net 120 ml         Physical Exam  HENT:      Head: Normocephalic and atraumatic.   Cardiovascular:      Rate and Rhythm: Normal rate and regular rhythm.      Heart sounds: No murmur heard.  Pulmonary:      Effort: Pulmonary effort is normal. No respiratory distress.      Breath sounds: Normal breath sounds. No wheezing.   Abdominal:      General: Bowel sounds are normal. There is no distension.      Palpations: Abdomen is soft.      Tenderness: There is no abdominal tenderness.   Musculoskeletal:         General: No swelling.   Skin:     General: Skin is warm and dry.   Neurological:      Mental Status: He is alert and oriented to person, place, and time. Mental status is at baseline.             Significant Labs: All pertinent labs within the past 24 hours have been reviewed.  CBC:   Recent Labs   Lab 11/14/23  0756 11/15/23  0344   WBC 11.66 8.80   HGB 7.9* 8.6*   HCT 25.6* 27.9*    272     CMP:   Recent Labs   Lab 11/15/23  0344      K 4.1      CO2 20*   GLU 83   BUN 21   CREATININE 1.4   CALCIUM 8.9   PROT 6.1   ALBUMIN 2.3*   BILITOT 0.3   ALKPHOS 114   AST 22   ALT 20   ANIONGAP 15       Significant Imaging: I have reviewed all pertinent imaging results/findings within the  past 24 hours.

## 2023-11-16 LAB
BACTERIA BLD CULT: NORMAL
BACTERIA BLD CULT: NORMAL

## 2023-11-16 PROCEDURE — 25000003 PHARM REV CODE 250: Performed by: INTERNAL MEDICINE

## 2023-11-16 PROCEDURE — 25000003 PHARM REV CODE 250: Performed by: FAMILY MEDICINE

## 2023-11-16 PROCEDURE — 25000003 PHARM REV CODE 250: Performed by: HOSPITALIST

## 2023-11-16 PROCEDURE — 94761 N-INVAS EAR/PLS OXIMETRY MLT: CPT

## 2023-11-16 PROCEDURE — 27000221 HC OXYGEN, UP TO 24 HOURS

## 2023-11-16 PROCEDURE — S4991 NICOTINE PATCH NONLEGEND: HCPCS | Performed by: FAMILY MEDICINE

## 2023-11-16 PROCEDURE — 27000207 HC ISOLATION

## 2023-11-16 PROCEDURE — 11000001 HC ACUTE MED/SURG PRIVATE ROOM

## 2023-11-16 PROCEDURE — 99900035 HC TECH TIME PER 15 MIN (STAT)

## 2023-11-16 RX ADMIN — DOCUSATE SODIUM 100 MG: 100 CAPSULE, LIQUID FILLED ORAL at 08:11

## 2023-11-16 RX ADMIN — LINEZOLID 600 MG: 600 TABLET, FILM COATED ORAL at 08:11

## 2023-11-16 RX ADMIN — HYDROCODONE BITARTRATE AND ACETAMINOPHEN 1 TABLET: 5; 325 TABLET ORAL at 09:11

## 2023-11-16 RX ADMIN — NICOTINE 1 PATCH: 14 PATCH, EXTENDED RELEASE TRANSDERMAL at 08:11

## 2023-11-16 RX ADMIN — TAMSULOSIN HYDROCHLORIDE 0.4 MG: 0.4 CAPSULE ORAL at 08:11

## 2023-11-16 RX ADMIN — ALPRAZOLAM 1 MG: 1 TABLET ORAL at 08:11

## 2023-11-16 RX ADMIN — HYDROCODONE BITARTRATE AND ACETAMINOPHEN 1 TABLET: 5; 325 TABLET ORAL at 03:11

## 2023-11-16 RX ADMIN — LEVOTHYROXINE SODIUM 125 MCG: 25 TABLET ORAL at 06:11

## 2023-11-16 RX ADMIN — ALPRAZOLAM 1 MG: 1 TABLET ORAL at 09:11

## 2023-11-16 RX ADMIN — METOPROLOL SUCCINATE 50 MG: 50 TABLET, EXTENDED RELEASE ORAL at 08:11

## 2023-11-16 RX ADMIN — ESCITALOPRAM 20 MG: 5 TABLET, FILM COATED ORAL at 08:11

## 2023-11-16 NOTE — PLAN OF CARE
Remains injury free. Pain managed with oral pain medication. Nephrostomy tube, right, putting out clear yellow urine. Repositions independently. Tolerating diet. No s/s acute distress.

## 2023-11-16 NOTE — SUBJECTIVE & OBJECTIVE
Interval History: nephrostomy replaced yesterday clear urine  noted in bag. discussed    Review of Systems  Objective:     Vital Signs (Most Recent):  Temp: 98.3 °F (36.8 °C) (11/16/23 1523)  Pulse: 63 (11/16/23 1523)  Resp: 18 (11/16/23 1523)  BP: 134/63 (11/16/23 1523)  SpO2: 95 % (11/16/23 1523) Vital Signs (24h Range):  Temp:  [97.7 °F (36.5 °C)-98.8 °F (37.1 °C)] 98.3 °F (36.8 °C)  Pulse:  [61-69] 63  Resp:  [16-18] 18  SpO2:  [95 %-99 %] 95 %  BP: (107-134)/(53-63) 134/63     Weight: 74.8 kg (164 lb 14.5 oz)  Body mass index is 24.35 kg/m².    Intake/Output Summary (Last 24 hours) at 11/16/2023 1727  Last data filed at 11/16/2023 0631  Gross per 24 hour   Intake 240 ml   Output 400 ml   Net -160 ml         Physical Exam  HENT:      Head: Normocephalic and atraumatic.   Cardiovascular:      Rate and Rhythm: Normal rate and regular rhythm.      Heart sounds: No murmur heard.  Pulmonary:      Effort: Pulmonary effort is normal. No respiratory distress.      Breath sounds: Normal breath sounds. No wheezing.   Abdominal:      General: Bowel sounds are normal. There is no distension.      Palpations: Abdomen is soft.      Tenderness: There is no abdominal tenderness.   Musculoskeletal:         General: No swelling.   Skin:     General: Skin is warm and dry.   Neurological:      Mental Status: He is alert and oriented to person, place, and time. Mental status is at baseline.             Significant Labs: All pertinent labs within the past 24 hours have been reviewed.  CBC:   Recent Labs   Lab 11/15/23  0344   WBC 8.80   HGB 8.6*   HCT 27.9*        CMP:   Recent Labs   Lab 11/15/23  0344      K 4.1      CO2 20*   GLU 83   BUN 21   CREATININE 1.4   CALCIUM 8.9   PROT 6.1   ALBUMIN 2.3*   BILITOT 0.3   ALKPHOS 114   AST 22   ALT 20   ANIONGAP 15       Significant Imaging: I have reviewed all pertinent imaging results/findings within the past 24 hours.

## 2023-11-16 NOTE — ASSESSMENT & PLAN NOTE
"Call was placed to patient, voicemail left with home number and mobile number was with no voicemail set-up. No answer at both numbers. Call was then placed to daughter, Noelle who states patient was complaining of itching and believes it is the keppra prescribed during recent hospital visit related to hyperglycemia. Daughter stated patient did not complain of respiratory, nausea/vomiting or dizziness. The itching "just started" given as onset by daughter. Unable to verify with patient. Assured daughter the message would be forwarded to PCP for further advice. Verbalized understanding and thank you.   " -IR replaced 11/15  -clear urine draining

## 2023-11-16 NOTE — PROGRESS NOTES
[facility] - Med Surg 3  [facility]  Progress Note    Patient Name: Geovani Currie  MRN: [id number]  Patient Class: IP- Inpatient   Admission Date: 11/10/2023  Length of Stay: 5 days  Attending Physician: Lara Ribera MD  Primary Care Provider: Faizan Antoine MD        Subjective:     Principal Problem:Nephrostomy tube displaced        HPI:  Geovani Currie is a 68 y.o. male with a PMH  has a past medical history of COPD (chronic obstructive pulmonary disease) and Hypertension. who presented to the ED after accidentally pulling out his nephrostomy tube while sleeping.  At time of bedside assessment, patient currently without any concerns or complaints and stated he is just waiting to have his procedure done so he can go back home.  Interventional Radiology was consulted by ED staff and is aware of patient.  Patient being admitted to Hospital Medicine under observation while awaiting nephrostomy tube reinsertion by IR tomorrow.      PCP: Faizan Antoine      Overview/Hospital Course:  Patient admitted for nephrostomy tube replacement after it was accidentally dislodge while sleeping.  Post procedure patient was to be discharged, but began shivering.  Temp increased to 101.  Dr. Hartman was consulted as he was following the patient for Enterobacter UTI with IV Invanz which scheduled to complete on 11/11, but due to the fever he advised to continue the Invanz. Repeat urine culture with Enterococcus VRE. Not covered by Invanz. Antibiotics changed to linezolid. Nephrostomy tube with no output. IR re-consulted. Nephrostomy tube replaced 11/15. Mentation improving per his wife. Clear urine in nephrostomy bag. NPO at midnight for procedure with Dr. Jorgensen. Holding anticoagulation and antiplatelet as recommended by urology.    Interval History: nephrostomy replaced yesterday clear urine  noted in bag. discussed    Review of Systems  Objective:     Vital Signs (Most Recent):  Temp: 98.3 °F (36.8 °C) (11/16/23  1523)  Pulse: 63 (11/16/23 1523)  Resp: 18 (11/16/23 1523)  BP: 134/63 (11/16/23 1523)  SpO2: 95 % (11/16/23 1523) Vital Signs (24h Range):  Temp:  [97.7 °F (36.5 °C)-98.8 °F (37.1 °C)] 98.3 °F (36.8 °C)  Pulse:  [61-69] 63  Resp:  [16-18] 18  SpO2:  [95 %-99 %] 95 %  BP: (107-134)/(53-63) 134/63     Weight: 74.8 kg (164 lb 14.5 oz)  Body mass index is 24.35 kg/m².    Intake/Output Summary (Last 24 hours) at 11/16/2023 1727  Last data filed at 11/16/2023 0631  Gross per 24 hour   Intake 240 ml   Output 400 ml   Net -160 ml         Physical Exam  HENT:      Head: Normocephalic and atraumatic.   Cardiovascular:      Rate and Rhythm: Normal rate and regular rhythm.      Heart sounds: No murmur heard.  Pulmonary:      Effort: Pulmonary effort is normal. No respiratory distress.      Breath sounds: Normal breath sounds. No wheezing.   Abdominal:      General: Bowel sounds are normal. There is no distension.      Palpations: Abdomen is soft.      Tenderness: There is no abdominal tenderness.   Musculoskeletal:         General: No swelling.   Skin:     General: Skin is warm and dry.   Neurological:      Mental Status: He is alert and oriented to person, place, and time. Mental status is at baseline.             Significant Labs: All pertinent labs within the past 24 hours have been reviewed.  CBC:   Recent Labs   Lab 11/15/23  0344   WBC 8.80   HGB 8.6*   HCT 27.9*        CMP:   Recent Labs   Lab 11/15/23  0344      K 4.1      CO2 20*   GLU 83   BUN 21   CREATININE 1.4   CALCIUM 8.9   PROT 6.1   ALBUMIN 2.3*   BILITOT 0.3   ALKPHOS 114   AST 22   ALT 20   ANIONGAP 15       Significant Imaging: I have reviewed all pertinent imaging results/findings within the past 24 hours.    Assessment/Plan:      * Nephrostomy tube displaced  -IR replaced 11/15  -clear urine draining      Malignant neoplasm of urinary bladder  -followed by urology  -plan left PCN tube placement 11/17      Acute renal failure  superimposed on stage 3 chronic kidney disease  Patient with acute kidney injury/acute renal failure likely due to post-obstructive d/t Nephrostomy tube dislodgement BANDAR is currently awaiting reinsertion by IR. Baseline creatinine CKD Stage 3 - Labs reviewed- Renal function/electrolytes with Estimated Creatinine Clearance: 49.8 mL/min (based on SCr of 1.4 mg/dL). according to latest data. Monitor urine output and serial BMP and adjust therapy as needed. Avoid nephrotoxins and renally dose meds for GFR listed above.      Centrilobular emphysema  Patient's COPD is controlled currently.  Patient is currently off COPD Pathway. Continue scheduled inhalers prn and monitor respiratory status closely.       UTI (urinary tract infection)  --continue  linezolid as VRE isolated  - monitor fever curve    History of cancer  Currently follows Hematology/Oncology as well as Urology outpatient.  -f/u outpatient as directed      Normocytic anemia  .   Recent Labs   Lab 11/13/23  0325 11/14/23  0756 11/15/23  0344   HGB 8.2* 7.9* 8.6*   HCT 26.2* 25.6* 27.9*   MCV 85 86 86   MCHC 31.3* 30.9* 30.8*   RDW 14.6* 14.7* 14.7*    225 272     Continue to monitor        VTE Risk Mitigation (From admission, onward)           Ordered     Reason for No Pharmacological VTE Prophylaxis  Once        Question:  Reasons:  Answer:  Physician Provided (leave comment)  Comment:  awaiting surgical procedure    11/10/23 2148     IP VTE HIGH RISK PATIENT  Once         11/10/23 2148     Place sequential compression device  Until discontinued         11/10/23 2148                    Discharge Planning   TANIA:      Code Status: Full Code   Is the patient medically ready for discharge?:     Reason for patient still in hospital (select all that apply): Patient trending condition, Laboratory test, Treatment, and Consult recommendations  Discharge Plan A: Home with family, Home                  Lara Ribera MD  Department of Hospital Medicine   [facility] access hospital  - Med Surg 3

## 2023-11-17 VITALS
WEIGHT: 164.88 LBS | RESPIRATION RATE: 16 BRPM | HEIGHT: 69 IN | OXYGEN SATURATION: 99 % | HEART RATE: 78 BPM | SYSTOLIC BLOOD PRESSURE: 117 MMHG | TEMPERATURE: 98 F | DIASTOLIC BLOOD PRESSURE: 60 MMHG | BODY MASS INDEX: 24.42 KG/M2

## 2023-11-17 LAB
ALBUMIN SERPL BCP-MCNC: 2.4 G/DL (ref 3.5–5.2)
ANION GAP SERPL CALC-SCNC: 13 MMOL/L (ref 8–16)
BASOPHILS # BLD AUTO: 0.05 K/UL (ref 0–0.2)
BASOPHILS NFR BLD: 0.5 % (ref 0–1.9)
BUN SERPL-MCNC: 13 MG/DL (ref 8–23)
CALCIUM SERPL-MCNC: 8.8 MG/DL (ref 8.7–10.5)
CHLORIDE SERPL-SCNC: 105 MMOL/L (ref 95–110)
CO2 SERPL-SCNC: 25 MMOL/L (ref 23–29)
CREAT SERPL-MCNC: 1.3 MG/DL (ref 0.5–1.4)
DIFFERENTIAL METHOD: ABNORMAL
EOSINOPHIL # BLD AUTO: 0.2 K/UL (ref 0–0.5)
EOSINOPHIL NFR BLD: 2.2 % (ref 0–8)
ERYTHROCYTE [DISTWIDTH] IN BLOOD BY AUTOMATED COUNT: 14.8 % (ref 11.5–14.5)
EST. GFR  (NO RACE VARIABLE): 59 ML/MIN/1.73 M^2
GLUCOSE SERPL-MCNC: 107 MG/DL (ref 70–110)
HCT VFR BLD AUTO: 27.9 % (ref 40–54)
HGB BLD-MCNC: 8.6 G/DL (ref 14–18)
IMM GRANULOCYTES # BLD AUTO: 0.06 K/UL (ref 0–0.04)
IMM GRANULOCYTES NFR BLD AUTO: 0.6 % (ref 0–0.5)
LYMPHOCYTES # BLD AUTO: 1.1 K/UL (ref 1–4.8)
LYMPHOCYTES NFR BLD: 11.4 % (ref 18–48)
MCH RBC QN AUTO: 26.5 PG (ref 27–31)
MCHC RBC AUTO-ENTMCNC: 30.8 G/DL (ref 32–36)
MCV RBC AUTO: 86 FL (ref 82–98)
MONOCYTES # BLD AUTO: 0.6 K/UL (ref 0.3–1)
MONOCYTES NFR BLD: 6.4 % (ref 4–15)
NEUTROPHILS # BLD AUTO: 7.9 K/UL (ref 1.8–7.7)
NEUTROPHILS NFR BLD: 78.9 % (ref 38–73)
NRBC BLD-RTO: 0 /100 WBC
PHOSPHATE SERPL-MCNC: 3.5 MG/DL (ref 2.7–4.5)
PLATELET # BLD AUTO: 308 K/UL (ref 150–450)
PMV BLD AUTO: 9.8 FL (ref 9.2–12.9)
POTASSIUM SERPL-SCNC: 3.7 MMOL/L (ref 3.5–5.1)
RBC # BLD AUTO: 3.25 M/UL (ref 4.6–6.2)
SODIUM SERPL-SCNC: 143 MMOL/L (ref 136–145)
WBC # BLD AUTO: 9.96 K/UL (ref 3.9–12.7)

## 2023-11-17 PROCEDURE — 25000003 PHARM REV CODE 250: Performed by: RADIOLOGY

## 2023-11-17 PROCEDURE — S4991 NICOTINE PATCH NONLEGEND: HCPCS | Performed by: FAMILY MEDICINE

## 2023-11-17 PROCEDURE — 85025 COMPLETE CBC W/AUTO DIFF WBC: CPT | Performed by: INTERNAL MEDICINE

## 2023-11-17 PROCEDURE — 99900035 HC TECH TIME PER 15 MIN (STAT)

## 2023-11-17 PROCEDURE — 94799 UNLISTED PULMONARY SVC/PX: CPT | Mod: XB

## 2023-11-17 PROCEDURE — 94640 AIRWAY INHALATION TREATMENT: CPT

## 2023-11-17 PROCEDURE — 27000221 HC OXYGEN, UP TO 24 HOURS

## 2023-11-17 PROCEDURE — 94761 N-INVAS EAR/PLS OXIMETRY MLT: CPT

## 2023-11-17 PROCEDURE — 80069 RENAL FUNCTION PANEL: CPT | Performed by: INTERNAL MEDICINE

## 2023-11-17 PROCEDURE — 25000003 PHARM REV CODE 250: Performed by: FAMILY MEDICINE

## 2023-11-17 PROCEDURE — 63600175 PHARM REV CODE 636 W HCPCS: Performed by: RADIOLOGY

## 2023-11-17 PROCEDURE — 99231 PR SUBSEQUENT HOSPITAL CARE,LEVL I: ICD-10-PCS | Mod: ,,, | Performed by: UROLOGY

## 2023-11-17 PROCEDURE — 36415 COLL VENOUS BLD VENIPUNCTURE: CPT | Performed by: INTERNAL MEDICINE

## 2023-11-17 PROCEDURE — 99231 SBSQ HOSP IP/OBS SF/LOW 25: CPT | Mod: ,,, | Performed by: UROLOGY

## 2023-11-17 PROCEDURE — 25000003 PHARM REV CODE 250: Performed by: INTERNAL MEDICINE

## 2023-11-17 PROCEDURE — 25000003 PHARM REV CODE 250: Performed by: HOSPITALIST

## 2023-11-17 RX ORDER — LINEZOLID 600 MG/1
600 TABLET, FILM COATED ORAL EVERY 12 HOURS
Qty: 20 TABLET | Refills: 0 | Status: SHIPPED | OUTPATIENT
Start: 2023-11-17 | End: 2023-11-27

## 2023-11-17 RX ORDER — MIDAZOLAM HYDROCHLORIDE 1 MG/ML
INJECTION INTRAMUSCULAR; INTRAVENOUS CODE/TRAUMA/SEDATION MEDICATION
Status: COMPLETED | OUTPATIENT
Start: 2023-11-17 | End: 2023-11-17

## 2023-11-17 RX ORDER — FENTANYL CITRATE 50 UG/ML
INJECTION, SOLUTION INTRAMUSCULAR; INTRAVENOUS CODE/TRAUMA/SEDATION MEDICATION
Status: COMPLETED | OUTPATIENT
Start: 2023-11-17 | End: 2023-11-17

## 2023-11-17 RX ORDER — LIDOCAINE HYDROCHLORIDE 20 MG/ML
INJECTION, SOLUTION INFILTRATION; PERINEURAL CODE/TRAUMA/SEDATION MEDICATION
Status: COMPLETED | OUTPATIENT
Start: 2023-11-17 | End: 2023-11-17

## 2023-11-17 RX ADMIN — LIDOCAINE HYDROCHLORIDE 3 ML: 20 INJECTION, SOLUTION INFILTRATION; PERINEURAL at 12:11

## 2023-11-17 RX ADMIN — MIDAZOLAM HYDROCHLORIDE 1 MG: 1 INJECTION, SOLUTION INTRAMUSCULAR; INTRAVENOUS at 12:11

## 2023-11-17 RX ADMIN — METOPROLOL SUCCINATE 50 MG: 50 TABLET, EXTENDED RELEASE ORAL at 08:11

## 2023-11-17 RX ADMIN — DOCUSATE SODIUM 100 MG: 100 CAPSULE, LIQUID FILLED ORAL at 08:11

## 2023-11-17 RX ADMIN — HYDROCODONE BITARTRATE AND ACETAMINOPHEN 1 TABLET: 5; 325 TABLET ORAL at 08:11

## 2023-11-17 RX ADMIN — LEVOTHYROXINE SODIUM 125 MCG: 25 TABLET ORAL at 05:11

## 2023-11-17 RX ADMIN — ESCITALOPRAM 20 MG: 5 TABLET, FILM COATED ORAL at 08:11

## 2023-11-17 RX ADMIN — TAMSULOSIN HYDROCHLORIDE 0.4 MG: 0.4 CAPSULE ORAL at 08:11

## 2023-11-17 RX ADMIN — FLUTICASONE FUROATE AND VILANTEROL TRIFENATATE 1 PUFF: 100; 25 POWDER RESPIRATORY (INHALATION) at 07:11

## 2023-11-17 RX ADMIN — LINEZOLID 600 MG: 600 TABLET, FILM COATED ORAL at 08:11

## 2023-11-17 RX ADMIN — FENTANYL CITRATE 50 MCG: 50 INJECTION, SOLUTION INTRAMUSCULAR; INTRAVENOUS at 12:11

## 2023-11-17 RX ADMIN — MIDAZOLAM HYDROCHLORIDE 0.5 MG: 1 INJECTION, SOLUTION INTRAMUSCULAR; INTRAVENOUS at 12:11

## 2023-11-17 RX ADMIN — FENTANYL CITRATE 25 MCG: 50 INJECTION, SOLUTION INTRAMUSCULAR; INTRAVENOUS at 12:11

## 2023-11-17 RX ADMIN — NICOTINE 1 PATCH: 14 PATCH, EXTENDED RELEASE TRANSDERMAL at 08:11

## 2023-11-17 NOTE — INTERVAL H&P NOTE
The patient has been examined and the H&P has been reviewed:    I concur with the findings and no changes have occurred since H&P was written.        Active Hospital Problems    Diagnosis  POA    *Nephrostomy tube displaced [T83.022A]  Unknown    Acute renal failure superimposed on stage 3 chronic kidney disease [N17.9, N18.30]  Yes    Centrilobular emphysema [J43.2]  Yes    Normocytic anemia [D64.9]  Yes    History of cancer [Z85.9]  Not Applicable      Resolved Hospital Problems   No resolved problems to display.     
The patient has been examined and the H&P has been reviewed:    I concur with the findings and no changes have occurred since H&P was written.        Active Hospital Problems    Diagnosis  POA    *Nephrostomy tube displaced [T83.022A]  Yes    Acute renal failure superimposed on stage 3 chronic kidney disease [N17.9, N18.30]  Yes    Centrilobular emphysema [J43.2]  Yes    History of cancer [Z85.9]  Not Applicable    UTI (urinary tract infection) [N39.0]  Yes    Normocytic anemia [D64.9]  Yes      Resolved Hospital Problems   No resolved problems to display.     
The patient has been examined and the H&P has been reviewed:    I concur with the findings and no changes have occurred since H&P was written.        Active Hospital Problems    Diagnosis  POA    *Nephrostomy tube displaced [T83.022A]  Yes    Malignant neoplasm of urinary bladder [C67.9]  Yes    Ureteral obstruction, left [N13.5]  Yes    Acute renal failure superimposed on stage 3 chronic kidney disease [N17.9, N18.30]  Yes    Centrilobular emphysema [J43.2]  Yes    History of cancer [Z85.9]  Not Applicable    UTI (urinary tract infection) [N39.0]  Yes    Normocytic anemia [D64.9]  Yes      Resolved Hospital Problems   No resolved problems to display.     
No difficulties

## 2023-11-17 NOTE — PLAN OF CARE
[facility] - Med Surg 3  Discharge Final Note    Primary Care Provider: Faizan Antoine MD    Expected Discharge Date: 11/17/2023    Final Discharge Note (most recent)       Final Note - 11/17/23 1348          Final Note    Assessment Type Final Discharge Note     Anticipated Discharge Disposition Home-Health Care [facility]     Hospital Resources/Appts/Education Provided Appointments scheduled and added to AVS;Post-Acute resouces added to AVS        Post-Acute Status    Post-Acute Authorization Home Health     Home Health Status Set-up Complete/Auth obtained     Discharge Delays None known at this time                     Important Message from Medicare             Contact Info       Joshua Jorgensen MD   Specialty: Urology    [address]  [place] [address]   Phone: [phone]       Next Steps: Follow up    Faizan Antoine MD   Specialty: Family Medicine   Relationship: PCP - General    [address] [place] [address]   Phone: [phone]       Next Steps: Follow up    [facility]   Specialty: Home Health Services, Home Therapy Services, Home Living Aide Services    [address] [place] [address]   Phone: [phone]       Next Steps: Follow up          Referral sent to [facility] to resume care at discharge. Urology follow up appt scheduled with Dr. Jorgensen for 2/16/24. No other CM needs.

## 2023-11-17 NOTE — PROGRESS NOTES
Seen an examined. bilateral nephrostomy tubes in place. Right side is clear. Left side is pink tinged. He is cleared from Urology standpoint to be discharged. He needs to follow up with me in the [facility] at three months to arrange for tube changes. He needs to keep his follow up with radiation oncology and medical oncology in [place] to proceed with treatment of his primary lung cancer, as well as his metastatic bladder canc er.

## 2023-11-17 NOTE — PLAN OF CARE
11/17/23 1325   Discharge Reassessment   Assessment Type Discharge Planning Reassessment   Did the patient's condition or plan change since previous assessment? No   Communicated TANIA with patient/caregiver Date not available/Unable to determine   Discharge Plan A Home Health     [facility] is current with [facility] . Will need orders at discharge to resume care due to inpatient status.

## 2023-11-17 NOTE — DISCHARGE SUMMARY
[facility] Med [facility]  Hospital Medicine  Discharge Summary      Patient Name: Geovani Currie  MRN: [id number]  CARMELLA: [id number]  Patient Class: IP- Inpatient  Admission Date: 11/10/2023  Hospital Length of Stay: 6 days  Discharge Date and Time:  11/17/2023 4:47 PM  Attending Physician: Lara Ribera MD   Discharging Provider: Lara Ribera MD  Primary Care Provider: Faizan Antoine MD    Primary Care Team: Networked reference to record PCT     HPI:   Geovani Currie is a 68 y.o. male with a PMH  has a past medical history of COPD (chronic obstructive pulmonary disease) and Hypertension. who presented to the ED after accidentally pulling out his nephrostomy tube while sleeping.  At time of bedside assessment, patient currently without any concerns or complaints and stated he is just waiting to have his procedure done so he can go back home.  Interventional Radiology was consulted by ED staff and is aware of patient.  Patient being admitted to Hospital Medicine under observation while awaiting nephrostomy tube reinsertion by IR tomorrow.      PCP: Faizan Antoine      * No surgery found *      Hospital Course:   Patient admitted for nephrostomy tube replacement after it was accidentally dislodge while sleeping.  Post procedure patient was to be discharged, but began shivering.  Temp increased to 101.  Dr. Hartman was consulted as he was following the patient for Enterobacter UTI with IV Invanz which scheduled to complete on 11/11, but due to the fever he advised to continue the Invanz. Repeat urine culture with Enterococcus VRE. Not covered by Invanz. Antibiotics changed to linezolid. Nephrostomy tube with no output. IR re-consulted. Nephrostomy tube replaced 11/15. Mentation improving per his wife. Clear urine in nephrostomy bag. NPO at midnight for procedure with Dr. Jorgensen. Held anticoagulation and antiplatelet as recommended by urology. Left nephrostomy tube placed. Cleared for discharge by urology.  Outpatient follow-up with Dr. Jrogensen and his providers in [place]. Patient seen and examined, stable for discharge.     Goals of Care Treatment Preferences:  Code Status: Full Code      Consults:   Consults (From admission, onward)          Status Ordering Provider     Inpatient consult to Interventional Radiology  Once        Provider:  Oleg Sr MD    Completed ZAYRA CABALLERO                Final Active Diagnoses:    Diagnosis Date Noted POA    PRINCIPAL PROBLEM:  Nephrostomy tube displaced [T83.022A] 11/10/2023 Yes    Malignant neoplasm of urinary bladder [C67.9] 11/15/2023 Yes    Ureteral obstruction, left [N13.5] 11/15/2023 Yes    Acute renal failure superimposed on stage 3 chronic kidney disease [N17.9, N18.30] 11/11/2023 Yes    Centrilobular emphysema [J43.2] 11/09/2023 Yes    History of cancer [Z85.9] 10/21/2023 Not Applicable    UTI (urinary tract infection) [N39.0] 10/21/2023 Yes    Normocytic anemia [D64.9] 10/21/2023 Yes      Problems Resolved During this Admission:       Discharged Condition: stable    Disposition: Home or Self Care    Follow Up:   Follow-up Information       Joshua Jorgensen MD Follow up.    Specialty: Urology  Contact information:  [address]  [facility] [address] [phone]               Faizan Antoine MD Follow up.    Specialty: Family Medicine  Contact information:  [address] [place] [address] [phone]               [facility] Follow up.    Specialties: Home Health Services, Home Therapy Services, Home Living Aide Services  Contact information:  [address]  [facility] [address] [phone]                         Patient Instructions:      IR Nephrostomy Tube Change   Standing Status: Future Number of Occurrences: 1 Standing Exp. Date: 11/11/24     Order Specific Question Answer Comments   Reason for Exam: dislodged nephrostomy tube    Has the patient had a prior contrast or iodine reaction? No    Is the patient  currently on any blood thinners like Aspirin, Coumadin, or Plavix? No    Is the patient on any Metformin drug, such as Glucophage or Glucovance? No    May the Radiologist modify the order per protocol to meet the clinical needs of the patient? Yes    Release to patient Immediate      Diet Adult Regular     SUBSEQUENT HOME HEALTH ORDERS   Order Comments: Resume home health     Order Specific Question Answer Comments   What Home Health Agency is the patient currently using? Other/External      Activity as tolerated       Significant Diagnostic Studies: Radiology:   Imaging Results              CT Abdomen Pelvis  Without Contrast (Final result)  Result time 11/10/23 21:34:45      Final result by Jefferson Kunz MD (11/10/23 21:34:45)                   Impression:      Right-sided external drainage catheter extending to the subcutaneous region superiorly and extending to the distal right ureter.  Correlate clinically for desired position.    Double-J left-sided urinary stent extending from the left renal pelvis to the left bladder    Bladder wall thickening    Ectatic aorta with atherosclerotic disease    Moderate amount of stool in the colon.    Remaining findings as above    All CT scans   are performed using dose optimization techniques including the following: automated exposure control; adjustment of the mA and/or kV; use of iterative reconstruction technique.  Dose modulation was employed for ALARA by means of: Automated exposure control; adjustment of the mA and/or kV according to patient size (this includes techniques or standardized protocols for targeted exams where dose is matched to indication/reason for exam; i.e. extremities or head); and/or use of iterative reconstructive technique.      Electronically signed by: Jefferson Kunz  Date:    11/10/2023  Time:    21:34               Narrative:    EXAMINATION:  CT ABDOMEN PELVIS WITHOUT CONTRAST    CLINICAL HISTORY:  Abdominal pain, post-op;    TECHNIQUE:  Low  dose axial images, sagittal and coronal reformations were obtained from the lung bases to the pubic symphysis,    COMPARISON:  None    FINDINGS:  Left-sided urinary stent with pigtail in the left renal pelvis and distal pigtail in the urinary bladder.  Bladder wall thickening noted.  Right-sided external urinary drainage catheter extending to the subcutaneous fat in the external region superiorly and then extending along the renal pelvis and right ureter to extend to the distal right ureter.  Correlate to desired position.  Ectatic abdominal aorta measuring up to 2.7 cm moderate amount of stool in the colon.  Extensive atherosclerotic disease.  Degenerative joint disease is identified.  Bilateral perinephric fat stranding is identified.  No focal liver lesions.  Mild hepatomegaly measuring up to 19 cm.  Mild subsegmental atelectasis.  Spleen is not enlarged.  No pancreatic lesions.  Small fat containing inguinal hernias.  No no adrenal masses.  No free fluid.  No bowel obstruction.                                        Pending Diagnostic Studies:       None           Medications:  Reconciled Home Medications:      Medication List        START taking these medications      linezolid 600 mg Tab  Commonly known as: ZYVOX  Take 1 tablet (600 mg total) by mouth every 12 (twelve) hours. for 10 days            CONTINUE taking these medications      * albuterol 0.63 mg/3 mL Nebu  Commonly known as: ACCUNEB  Take 3 mLs (0.63 mg total) by nebulization 3 (three) times daily as needed (sob, wheezing). Rescue     * albuterol 90 mcg/actuation inhaler  Commonly known as: PROVENTIL/VENTOLIN HFA  Inhale 1-2 puffs into the lungs every 4 (four) hours as needed for Wheezing. Rescue     ALPRAZolam 0.5 MG tablet  Commonly known as: XANAX  Take 2 tablets (1 mg total) by mouth 3 (three) times daily as needed for Anxiety.     BREO ELLIPTA 100-25 mcg/dose diskus inhaler  Generic drug: fluticasone furoate-vilanteroL  Inhale 1 puff into the  lungs once daily. Controller     cyclobenzaprine 10 MG tablet  Commonly known as: FLEXERIL  Take 1 tablet (10 mg total) by mouth 3 (three) times daily as needed for Muscle spasms.     docusate sodium 100 MG capsule  Commonly known as: COLACE  Take 100 mg by mouth 2 (two) times daily.     EScitalopram oxalate 20 MG tablet  Commonly known as: LEXAPRO  Take 1 tablet (20 mg total) by mouth once daily.     HYDROcodone-acetaminophen  mg per tablet  Commonly known as: NORCO  Take 1 tablet by mouth every 6 (six) hours as needed for Pain.     levothyroxine 125 MCG tablet  Commonly known as: SYNTHROID  Take 1 tablet (125 mcg total) by mouth before breakfast.     metoprolol succinate 50 MG 24 hr tablet  Commonly known as: TOPROL-XL  Take 1 tablet (50 mg total) by mouth once daily.     nicotine 21 mg/24 hr  Commonly known as: NICODERM CQ  Place 1 patch onto the skin once daily.     tamsulosin 0.4 mg Cap  Commonly known as: FLOMAX  Take 1 capsule (0.4 mg total) by mouth once daily.     vitamin D 1000 units Tab  Commonly known as: VITAMIN D3  25 mcg.           * This list has 2 medication(s) that are the same as other medications prescribed for you. Read the directions carefully, and ask your doctor or other care provider to review them with you.                  Indwelling Lines/Drains at time of discharge:   Lines/Drains/Airways       Drain  Duration                  Nephrostomy 11/15/23 1108 Right 8 Fr. 2 days         Nephrostomy 11/17/23 1232 Left 14.5 Fr. <1 day                    Time spent on the discharge of patient: 31 minutes         Lara Ribera MD  Department of Hospital Medicine  [facility]

## 2023-11-17 NOTE — SEDATION DOCUMENTATION
IR Left nephrostomy tube placement complete. Pt james well. Pt's wife updated per Dr. Sr. Report called to Med surge RN. Pt awake and alert post procedure. VSS.Placed back in bed and transferred back to room with RN.

## 2023-11-20 ENCOUNTER — OFFICE VISIT (OUTPATIENT)
Dept: HEMATOLOGY/ONCOLOGY | Facility: CLINIC | Age: 68
DRG: 481 | End: 2023-11-20
Payer: MEDICARE

## 2023-11-20 ENCOUNTER — HOSPITAL ENCOUNTER (INPATIENT)
Facility: HOSPITAL | Age: 68
LOS: 7 days | Discharge: NURSING, REHAB OR LONG-TERM CARE | DRG: 481 | End: 2023-11-27
Attending: EMERGENCY MEDICINE | Admitting: HOSPITALIST
Payer: MEDICARE

## 2023-11-20 VITALS
HEART RATE: 66 BPM | SYSTOLIC BLOOD PRESSURE: 81 MMHG | HEIGHT: 65 IN | DIASTOLIC BLOOD PRESSURE: 46 MMHG | WEIGHT: 166.88 LBS | TEMPERATURE: 98 F | BODY MASS INDEX: 27.81 KG/M2 | OXYGEN SATURATION: 98 % | RESPIRATION RATE: 20 BRPM

## 2023-11-20 DIAGNOSIS — B95.2 UTI (URINARY TRACT INFECTION) DUE TO ENTEROCOCCUS: ICD-10-CM

## 2023-11-20 DIAGNOSIS — S72.001A CLOSED FRACTURE OF RIGHT HIP, INITIAL ENCOUNTER: Primary | ICD-10-CM

## 2023-11-20 DIAGNOSIS — C34.90 MALIGNANT NEOPLASM OF LUNG, UNSPECIFIED LATERALITY, UNSPECIFIED PART OF LUNG: Primary | Chronic | ICD-10-CM

## 2023-11-20 DIAGNOSIS — S72.141A DISPLACED INTERTROCHANTERIC FRACTURE OF RIGHT FEMUR, INITIAL ENCOUNTER FOR CLOSED FRACTURE: ICD-10-CM

## 2023-11-20 DIAGNOSIS — Z01.818 PREOP EXAMINATION: ICD-10-CM

## 2023-11-20 DIAGNOSIS — N39.0 UTI (URINARY TRACT INFECTION) DUE TO ENTEROCOCCUS: ICD-10-CM

## 2023-11-20 DIAGNOSIS — N18.31 ACUTE RENAL FAILURE SUPERIMPOSED ON STAGE 3A CHRONIC KIDNEY DISEASE, UNSPECIFIED ACUTE RENAL FAILURE TYPE: ICD-10-CM

## 2023-11-20 DIAGNOSIS — Z23 NEED FOR TETANUS, DIPHTHERIA, AND ACELLULAR PERTUSSIS (TDAP) VACCINE: ICD-10-CM

## 2023-11-20 DIAGNOSIS — M25.521 RIGHT ELBOW PAIN: ICD-10-CM

## 2023-11-20 DIAGNOSIS — R07.9 CHEST PAIN: ICD-10-CM

## 2023-11-20 DIAGNOSIS — C77.2 BLADDER CANCER METASTASIZED TO INTRA-ABDOMINAL LYMPH NODES: ICD-10-CM

## 2023-11-20 DIAGNOSIS — S50.311A ABRASION OF RIGHT ELBOW, INITIAL ENCOUNTER: ICD-10-CM

## 2023-11-20 DIAGNOSIS — C67.9 BLADDER CANCER METASTASIZED TO INTRA-ABDOMINAL LYMPH NODES: ICD-10-CM

## 2023-11-20 DIAGNOSIS — D64.9 ANEMIA, UNSPECIFIED TYPE: ICD-10-CM

## 2023-11-20 DIAGNOSIS — N17.9 ACUTE RENAL FAILURE SUPERIMPOSED ON STAGE 3A CHRONIC KIDNEY DISEASE, UNSPECIFIED ACUTE RENAL FAILURE TYPE: ICD-10-CM

## 2023-11-20 DIAGNOSIS — N17.9 AKI (ACUTE KIDNEY INJURY): ICD-10-CM

## 2023-11-20 DIAGNOSIS — I95.9 HYPOTENSION, UNSPECIFIED HYPOTENSION TYPE: ICD-10-CM

## 2023-11-20 LAB
ALBUMIN SERPL BCP-MCNC: 2.8 G/DL (ref 3.5–5.2)
ALP SERPL-CCNC: 95 U/L (ref 55–135)
ALT SERPL W/O P-5'-P-CCNC: 19 U/L (ref 10–44)
ANION GAP SERPL CALC-SCNC: 13 MMOL/L (ref 8–16)
AST SERPL-CCNC: 15 U/L (ref 10–40)
BASOPHILS # BLD AUTO: 0.04 K/UL (ref 0–0.2)
BASOPHILS NFR BLD: 0.4 % (ref 0–1.9)
BILIRUB SERPL-MCNC: 0.3 MG/DL (ref 0.1–1)
BUN SERPL-MCNC: 17 MG/DL (ref 8–23)
CALCIUM SERPL-MCNC: 9.6 MG/DL (ref 8.7–10.5)
CHLORIDE SERPL-SCNC: 102 MMOL/L (ref 95–110)
CO2 SERPL-SCNC: 23 MMOL/L (ref 23–29)
CREAT SERPL-MCNC: 2.1 MG/DL (ref 0.5–1.4)
DIFFERENTIAL METHOD: ABNORMAL
EOSINOPHIL # BLD AUTO: 0.4 K/UL (ref 0–0.5)
EOSINOPHIL NFR BLD: 3.3 % (ref 0–8)
ERYTHROCYTE [DISTWIDTH] IN BLOOD BY AUTOMATED COUNT: 15.2 % (ref 11.5–14.5)
EST. GFR  (NO RACE VARIABLE): 33 ML/MIN/1.73 M^2
GLUCOSE SERPL-MCNC: 105 MG/DL (ref 70–110)
HCT VFR BLD AUTO: 25.9 % (ref 40–54)
HGB BLD-MCNC: 7.9 G/DL (ref 14–18)
IMM GRANULOCYTES # BLD AUTO: 0.06 K/UL (ref 0–0.04)
IMM GRANULOCYTES NFR BLD AUTO: 0.5 % (ref 0–0.5)
LYMPHOCYTES # BLD AUTO: 1.2 K/UL (ref 1–4.8)
LYMPHOCYTES NFR BLD: 10.6 % (ref 18–48)
MCH RBC QN AUTO: 26.1 PG (ref 27–31)
MCHC RBC AUTO-ENTMCNC: 30.5 G/DL (ref 32–36)
MCV RBC AUTO: 86 FL (ref 82–98)
MONOCYTES # BLD AUTO: 0.5 K/UL (ref 0.3–1)
MONOCYTES NFR BLD: 4.4 % (ref 4–15)
NEUTROPHILS # BLD AUTO: 9 K/UL (ref 1.8–7.7)
NEUTROPHILS NFR BLD: 80.8 % (ref 38–73)
NRBC BLD-RTO: 0 /100 WBC
PLATELET # BLD AUTO: 258 K/UL (ref 150–450)
PMV BLD AUTO: 9.5 FL (ref 9.2–12.9)
POTASSIUM SERPL-SCNC: 4.7 MMOL/L (ref 3.5–5.1)
PROT SERPL-MCNC: 6.8 G/DL (ref 6–8.4)
RBC # BLD AUTO: 3.03 M/UL (ref 4.6–6.2)
SODIUM SERPL-SCNC: 138 MMOL/L (ref 136–145)
T4 FREE SERPL-MCNC: 1.4 NG/DL (ref 0.71–1.51)
TSH SERPL DL<=0.005 MIU/L-ACNC: 0.3 UIU/ML (ref 0.4–4)
WBC # BLD AUTO: 11.18 K/UL (ref 3.9–12.7)

## 2023-11-20 PROCEDURE — 99205 OFFICE O/P NEW HI 60 MIN: CPT | Mod: S$PBB,,, | Performed by: HOSPITALIST

## 2023-11-20 PROCEDURE — 84439 ASSAY OF FREE THYROXINE: CPT | Performed by: EMERGENCY MEDICINE

## 2023-11-20 PROCEDURE — 99213 OFFICE O/P EST LOW 20 MIN: CPT | Mod: PBBFAC,25 | Performed by: HOSPITALIST

## 2023-11-20 PROCEDURE — 80053 COMPREHEN METABOLIC PANEL: CPT | Performed by: EMERGENCY MEDICINE

## 2023-11-20 PROCEDURE — 96374 THER/PROPH/DIAG INJ IV PUSH: CPT

## 2023-11-20 PROCEDURE — 99291 CRITICAL CARE FIRST HOUR: CPT

## 2023-11-20 PROCEDURE — 96361 HYDRATE IV INFUSION ADD-ON: CPT

## 2023-11-20 PROCEDURE — 93010 ELECTROCARDIOGRAM REPORT: CPT | Mod: ,,, | Performed by: INTERNAL MEDICINE

## 2023-11-20 PROCEDURE — 11000001 HC ACUTE MED/SURG PRIVATE ROOM

## 2023-11-20 PROCEDURE — 93010 EKG 12-LEAD: ICD-10-PCS | Mod: ,,, | Performed by: INTERNAL MEDICINE

## 2023-11-20 PROCEDURE — 85025 COMPLETE CBC W/AUTO DIFF WBC: CPT | Performed by: EMERGENCY MEDICINE

## 2023-11-20 PROCEDURE — 93005 ELECTROCARDIOGRAM TRACING: CPT

## 2023-11-20 PROCEDURE — 99205 PR OFFICE/OUTPT VISIT, NEW, LEVL V, 60-74 MIN: ICD-10-PCS | Mod: S$PBB,,, | Performed by: HOSPITALIST

## 2023-11-20 PROCEDURE — 63600175 PHARM REV CODE 636 W HCPCS: Performed by: EMERGENCY MEDICINE

## 2023-11-20 PROCEDURE — 99999 PR PBB SHADOW E&M-EST. PATIENT-LVL III: ICD-10-PCS | Mod: PBBFAC,,, | Performed by: HOSPITALIST

## 2023-11-20 PROCEDURE — 25000003 PHARM REV CODE 250: Performed by: EMERGENCY MEDICINE

## 2023-11-20 PROCEDURE — 84443 ASSAY THYROID STIM HORMONE: CPT | Performed by: EMERGENCY MEDICINE

## 2023-11-20 PROCEDURE — 99999 PR PBB SHADOW E&M-EST. PATIENT-LVL III: CPT | Mod: PBBFAC,,, | Performed by: HOSPITALIST

## 2023-11-20 RX ORDER — ACETAMINOPHEN 650 MG/1
650 SUPPOSITORY RECTAL EVERY 6 HOURS PRN
Status: DISCONTINUED | OUTPATIENT
Start: 2023-11-21 | End: 2023-11-27 | Stop reason: HOSPADM

## 2023-11-20 RX ORDER — IBUPROFEN 200 MG
24 TABLET ORAL
Status: DISCONTINUED | OUTPATIENT
Start: 2023-11-21 | End: 2023-11-27 | Stop reason: HOSPADM

## 2023-11-20 RX ORDER — ACETAMINOPHEN 325 MG/1
650 TABLET ORAL EVERY 8 HOURS PRN
Status: DISCONTINUED | OUTPATIENT
Start: 2023-11-21 | End: 2023-11-27 | Stop reason: HOSPADM

## 2023-11-20 RX ORDER — HYDROCODONE BITARTRATE AND ACETAMINOPHEN 5; 325 MG/1; MG/1
1 TABLET ORAL EVERY 6 HOURS PRN
Status: DISCONTINUED | OUTPATIENT
Start: 2023-11-21 | End: 2023-11-23

## 2023-11-20 RX ORDER — IPRATROPIUM BROMIDE AND ALBUTEROL SULFATE 2.5; .5 MG/3ML; MG/3ML
3 SOLUTION RESPIRATORY (INHALATION) EVERY 6 HOURS PRN
Status: DISCONTINUED | OUTPATIENT
Start: 2023-11-21 | End: 2023-11-27 | Stop reason: HOSPADM

## 2023-11-20 RX ORDER — IBUPROFEN 200 MG
16 TABLET ORAL
Status: DISCONTINUED | OUTPATIENT
Start: 2023-11-21 | End: 2023-11-27 | Stop reason: HOSPADM

## 2023-11-20 RX ORDER — SODIUM CHLORIDE 9 MG/ML
1000 INJECTION, SOLUTION INTRAVENOUS
Status: COMPLETED | OUTPATIENT
Start: 2023-11-20 | End: 2023-11-21

## 2023-11-20 RX ORDER — MORPHINE SULFATE 4 MG/ML
4 INJECTION, SOLUTION INTRAMUSCULAR; INTRAVENOUS
Status: COMPLETED | OUTPATIENT
Start: 2023-11-20 | End: 2023-11-20

## 2023-11-20 RX ORDER — AMOXICILLIN 250 MG
1 CAPSULE ORAL 2 TIMES DAILY PRN
Status: DISCONTINUED | OUTPATIENT
Start: 2023-11-21 | End: 2023-11-27 | Stop reason: HOSPADM

## 2023-11-20 RX ORDER — MAG HYDROX/ALUMINUM HYD/SIMETH 200-200-20
30 SUSPENSION, ORAL (FINAL DOSE FORM) ORAL 4 TIMES DAILY PRN
Status: DISCONTINUED | OUTPATIENT
Start: 2023-11-21 | End: 2023-11-27 | Stop reason: HOSPADM

## 2023-11-20 RX ORDER — SODIUM CHLORIDE 0.9 % (FLUSH) 0.9 %
10 SYRINGE (ML) INJECTION EVERY 12 HOURS PRN
Status: DISCONTINUED | OUTPATIENT
Start: 2023-11-21 | End: 2023-11-27 | Stop reason: HOSPADM

## 2023-11-20 RX ORDER — MORPHINE SULFATE 4 MG/ML
2 INJECTION, SOLUTION INTRAMUSCULAR; INTRAVENOUS EVERY 4 HOURS PRN
Status: DISCONTINUED | OUTPATIENT
Start: 2023-11-21 | End: 2023-11-27 | Stop reason: HOSPADM

## 2023-11-20 RX ORDER — MUPIROCIN 20 MG/G
OINTMENT TOPICAL
Status: COMPLETED | OUTPATIENT
Start: 2023-11-20 | End: 2023-11-20

## 2023-11-20 RX ORDER — GLUCAGON 1 MG
1 KIT INJECTION
Status: DISCONTINUED | OUTPATIENT
Start: 2023-11-21 | End: 2023-11-27 | Stop reason: HOSPADM

## 2023-11-20 RX ORDER — TALC
6 POWDER (GRAM) TOPICAL NIGHTLY PRN
Status: DISCONTINUED | OUTPATIENT
Start: 2023-11-21 | End: 2023-11-27 | Stop reason: HOSPADM

## 2023-11-20 RX ORDER — NALOXONE HCL 0.4 MG/ML
0.02 VIAL (ML) INJECTION
Status: DISCONTINUED | OUTPATIENT
Start: 2023-11-21 | End: 2023-11-27 | Stop reason: HOSPADM

## 2023-11-20 RX ORDER — PROMETHAZINE HYDROCHLORIDE 12.5 MG/1
25 TABLET ORAL EVERY 6 HOURS PRN
Status: DISCONTINUED | OUTPATIENT
Start: 2023-11-21 | End: 2023-11-27 | Stop reason: HOSPADM

## 2023-11-20 RX ORDER — ONDANSETRON 2 MG/ML
4 INJECTION INTRAMUSCULAR; INTRAVENOUS EVERY 8 HOURS PRN
Status: DISCONTINUED | OUTPATIENT
Start: 2023-11-21 | End: 2023-11-27 | Stop reason: HOSPADM

## 2023-11-20 RX ADMIN — MUPIROCIN: 20 OINTMENT TOPICAL at 10:11

## 2023-11-20 RX ADMIN — SODIUM CHLORIDE 1000 ML: 9 INJECTION, SOLUTION INTRAVENOUS at 10:11

## 2023-11-20 RX ADMIN — MORPHINE SULFATE 4 MG: 4 INJECTION INTRAVENOUS at 10:11

## 2023-11-20 RX ADMIN — SODIUM CHLORIDE 1000 ML: 9 INJECTION, SOLUTION INTRAVENOUS at 11:11

## 2023-11-20 NOTE — PHYSICIAN QUERY
PT Name: Geovani Currie  MR #: [id number]    DOCUMENTATION CLARIFICATION     CDS/: Lissa Powers RN              Contact information: [email or web address]  This form is a permanent document in the medical record.     Query Date: November 20, 2023    By submitting this query, we are merely seeking further clarification of documentation. Please utilize your independent clinical judgment when addressing the question(s) below.    The Medical Record contains the following:   Indicators   Supporting Clinical Findings Location in Medical Record   x AMS, Confusion,  LOC, etc.  Pt confused at times    Intermittent confusion per family    Mentation improving    Mentation improving per his wife   11/13 Hosp Med MD PN    11/14 Hosp Med MD PN    11/15 Hosp Med MD PN    11/16 Hosp Med MD PN   x Acute/Chronic Illness Nephrostomy tube displacement  Urinary tract infection  Malignant neoplasm of bladder  Uretheral obstruction  BANDAR on CKD    11/17 DC summary   x Radiology Findings Right-sided external drainage catheter extending to the subcutaneous region superiorly and extending to the distal right ureter.  Correlate clinically for desired position.     Double-J left-sided urinary stent extending from the left renal pelvis to the left bladder     Bladder wall thickening 11/10 CT abdomen   x Electrolyte Imbalance/ Lab Bun/cr= 23/1.7    Urine culture:  Enterococcus faecium VRE  No growth do date   11/1 0-Lab    11/11 Urine culture  11/10 Blood culture   x Medication NS 500mL bolus 11/10  LR 75cc/hr 11/10-12  Ceftriaxone IVPB 11/11  Cefepime IVPB 11/11  Ertapenem IVPB 11/11-12  Linezolid PO 11/13-15   11/11-12 MAR   x Treatment         CT Abdomen  Urine culture  BMP, CBC  Blood culture  ID consult  Cardiac monitor   Pulse oximetry       11/11 orders    Other       The noted clinical guidelines are only system guidelines and do not replace the providers clinical judgment.    The National [place] of Neurologic  Disorders and Stroke (NINDS) of the NIH describes encephalopathy as any diffuse disease of the brain that alters brain function or structure.    Provider, please specify the diagnosis or diagnoses associated with above clinical findings.    [ x  ] Metabolic Encephalopathy -   Due to electrolyte imbalance, metabolic derangements, or infectious processes, includes Septic Encephalopathy, Uremic Encephalopathy     [   ] Encephalopathy, unspecified        [   ] Other Encephalopathy (please specify): ____________________     [   ] Other neurological condition- Includes Post-ictal altered mental status (please specify condition): __________     [   ]  Clinically Undetermined       Please document in your progress notes daily for the duration of treatment until resolved, and include in your discharge summary.    References:  CHRIS Hedrick RN, CCDS. (2018, June 9). Notes from the Instructor: Encephalopathy tips. Retrieved October 22, 2020, from [email or web address]/[email or web address]-[email or web address]-encephalopathy-tips    ICD-9-CM Coding Clinic First Quarter 2013, Effective with discharges: October 21, 2013 [facility] § Seizure with encephalopathy due to postictal state (2013).    ICD-10-CM/PCS Between Integrated Codebook (Version V.20.8.10.0) [Computer software]. (2020). Retrieved October 21, 2020.    National [place] of Neurological Disorders and Stroke. (2019, March 27). Retrieved October 22, 2020, from https://www.ninds.nih.gov/Disorders/All-Disorders/Dqemugwficscgk-Sirsfvczwbj-Wdbr    Form No. 27389

## 2023-11-20 NOTE — PROGRESS NOTES
"[facility] MEDICAL ONCOLOGY - NEW PATIENT VISIT    Reason for visit: New diagnosis of squamous cell carcinoma of the RUL      Oncology History   Squamous cell carcinoma of right lung   7/22/2023 Imaging Significant Findings    PET CT ([facility])  - 1.1 x 1.0 cm spiculated RUL hypermetabolic pulmonary nodule, SUV 3.32  - L sided urinary bladder mass w/ L sided nephroureteral stents in place, and severe hydroureteronephrosis  - Multiple prominent or mildly enlarged L para-aortic LN w/ mildly increased radiotracer activity, could be reactive or represent metastatic involvement from bladder tumor     9/26/2023 Imaging Significant Findings    CT C, Non-Con ([facility])  - 1.7 x 1.2 cm spiculated nodule in RUL, increased from prior  - 0.7 mm satellite nodule, increased in size     10/11/2023 Procedure    RUL, CT Guided Bx ([facility])  - Minute focus (approximately 16 cells), marked atypical cells identified. Favor squamous cell carcinoma (p40, AE1/AE3 positive; TTF-1, napsin A, synaptophysin, CD68 negative)     11/26/2023 Cancer Staged    Staging form: Lung, AJCC 8th Edition  - Clinical: Stage IA2 (cT1b, cN0, cM0)     Malignant neoplasm of urinary bladder   7/6/2020 Procedure    TURBT  - Reportedly stage cT3b UCB        - 11/2020 Radiation Therapy    Reportedly received CRT for urothelial carcinoma     8/30/2023 Procedure    TURBT  Path: Invasive high-grade urothelial carcinoma, muscularis propria is present and involved by carcinoma     10/16/2023 Notable Event    Urology appointment  "Discussed urinary retention, will need L ureteral stent exchange and exchange of R PCNU in 12/2023. Will need salvage chemothearpy or immunotherapy for recurrent metastatic bladder cancer"  - Dr. Grimaldo     10/20/2023 - 10/25/2023 Hospital Admission    Presented from airport after flying from [place] for worsening weakness and lethargy. Imaging demonstrate remote infarctions but no acute CNS processes. Found to have an " enterobacter infection. Started on ertapenem.      11/10/2023 - 11/17/2023 Hospital Admission    Admitted after inadvertently pulling out nephrostomy tube. Was on treatment for Enterobacter UTI with ertapenem, found that this was not sufficient coverage and changed to linezolid. Nephrostomy tube replaced.             HPI:     Geovani Currie is a 68 y.o. 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  Allergic history as above, however briefly regarding lung cancer, a 1.1 cm right upper lobe nodule was found in July of 2023 PET scan which was obtained in the setting of urologic cancer (see below).  Subsequent imaging reveals a 1.7 cm spiculated nodule on 9 06/20/2023 and a biopsy on 10/11/2023 demonstrated a minute focus of atypical cells favor to represent squamous cell carcinoma.  The patient has not received treatment for this yet.  As regards his urothelial cancer, he was initially diagnosed in 2020 and received CRT and was found to have muscle invasive recurrent disease on 08/30/2023.  Patient has had multiple admissions in the past 2 months for recurrent UTIs.  This includes 1 admission in [place] for a fall when he was found to have a UTI, second admission in [place] for renal failure, and admission in [place] for worsening  "weakness and lethargy when he was found to have an Enterobacter UTI and was treated with ertapenem, and most recently admission for inadvertent removal of the nephrostomy tube which antibiotics were changed to linezolid in the nephrostomy tube placed; this most recent hospital stay and did on 11/17/2023.    The patient states that his breathing is good but has a difficult time quantifying what makes him short of breath in the setting of lower extremity weakness which precludes significant activity.  He uses a Rollator when he is out of the home and uses a cane while at his home.  He notes that his home PT and OT which he is needed for the past 2 months, describing shaky legs in a shuffling gait.  He is had multiple falls (526) over the same time.  Since his most recent hospitalization, he says that his appetite is slowly improving.  His family notes that he is somewhat slow to answer questions and speaks more slowly than usual but note that his mentation has been continuing to improve since his most recent hospital stay. His left and his right nephrostomy tubes continue to put out clear urine.  He will complete his linezolid at the end of this week.    Living Situation: Pt currently lives with his wife and recently moved in with his daughter in [place], [place]; his home is in [place], [place].     Tobacco Use: Pt currently uses a vape pen. He started smoking cigarettes at age 13 and stopped approximately 1.5 months ago; he smoked 2 packs a day at his heaviest. In addition to using the vape pen, he uses a nicotine patch.     EtOH Use: None.     Employment / Exposure History: He was previously employed as an . He also lived in a house with asbestos.     Family Cancer History: His maternal grandfather had "stomach cancer", his maternal grandmother had "throat cancer", his mother had "throat cancer", and there are some other relatives with cancer that he cannot remember.     History has been obtained by chart " review and discussion with the patient.    Past Medical History:   Past Medical History:   Diagnosis Date    COPD (chronic obstructive pulmonary disease)     Hypertension         Past Surgical History:   Past Surgical History:   Procedure Laterality Date    APPENDECTOMY      CATARACT EXTRACTION      NEPHROSTOMY      OPEN REDUCTION AND INTERNAL FIXATION (ORIF) OF INTERTROCHANTERIC FRACTURE OF FEMUR Right 11/21/2023    Procedure: ORIF, FRACTURE, FEMUR, INTERTROCHANTERIC;  Surgeon: Tanisha Guidry DO;  Location: [facility];  Service: Orthopedics;  Laterality: Right;  Gamma nail        Family History:   Family History   Problem Relation Age of Onset    Cancer Father     Cancer Mother         Social History:   Social History     Tobacco Use    Smoking status: Former     Types: Cigarettes     Passive exposure: Never    Smokeless tobacco: Never   Substance Use Topics    Alcohol use: Never        I have reviewed and updated the patient's past medical, surgical, family and social histories.      ROS:   As per HPI.     Allergies:   Review of patient's allergies indicates:  No Known Allergies     Medications:   No current facility-administered medications for this visit.     No current outpatient medications on file.     Facility-Administered Medications Ordered in Other Visits   Medication Dose Route Frequency Provider Last Rate Last Admin    0.9%  NaCl infusion (for blood administration)   Intravenous Q24H PRN Yuval Orozco MD        0.9%  NaCl infusion (for blood administration)   Intravenous Q24H PRN Suresh Larsen MD        0.9%  NaCl infusion (for blood administration)   Intravenous Q24H PRN Obdulia Hernandez MD        acetaminophen suppository 650 mg  650 mg Rectal Q6H PRN Juan Carlos Parra MD        acetaminophen tablet 650 mg  650 mg Oral Q8H PRN Juan Carlos Parra MD        albuterol-ipratropium 2.5 mg-0.5 mg/3 mL nebulizer solution 3 mL  3 mL Nebulization Q6H PRN Juan Carlos Parra MD         ALPRAZolam tablet 1 mg  1 mg Oral TID PRN Juan Carlos Parra MD   1 mg at 11/25/23 2040    aluminum-magnesium hydroxide-simethicone 200-200-20 mg/5 mL suspension 30 mL  30 mL Oral QID PRN Juan Carlos Parra MD        arformoteroL nebulizer solution 15 mcg  15 mcg Nebulization BID Juan Carlos Parra MD   15 mcg at 11/25/23 1908    budesonide nebulizer solution 0.5 mg  0.5 mg Nebulization Q12H Juan Carlos Parra MD   0.5 mg at 11/25/23 1911    cyclobenzaprine tablet 10 mg  10 mg Oral TID PRN Juan Carlos Parra MD        dextrose 10% bolus 125 mL 125 mL  12.5 g Intravenous PRN Juan Carlos Parra MD        dextrose 10% bolus 250 mL 250 mL  25 g Intravenous PRN Juan Carlos Parra MD        docusate sodium capsule 100 mg  100 mg Oral BID Juan Carlos Parra MD   100 mg at 11/26/23 0916    enoxaparin injection 40 mg  40 mg Subcutaneous Q24H (prophylaxis, 1700) Obdulia Hernandez MD   40 mg at 11/25/23 1648    EScitalopram oxalate tablet 20 mg  20 mg Oral Daily Juan Carlos Parra MD   20 mg at 11/26/23 0916    glucagon (human recombinant) injection 1 mg  1 mg Intramuscular PRN Juan Carlos Parra MD        glucose chewable tablet 16 g  16 g Oral PRN Juan Carlos Parra MD        glucose chewable tablet 24 g  24 g Oral PRN Juan Carlos Parra MD        HYDROcodone-acetaminophen 5-325 mg per tablet 1 tablet  1 tablet Oral Q4H PRN Faizan Parra NP   1 tablet at 11/26/23 0920    lactated ringers infusion   Intravenous Continuous Juan Carlos Parra MD 75 mL/hr at 11/26/23 0646 Rate Verify at 11/26/23 0646    levothyroxine tablet 125 mcg  125 mcg Oral Before breakfast Juan Carlos Parra MD   125 mcg at 11/26/23 0614    linezolid tablet 600 mg  600 mg Oral Q12H Juan Carlos Parra MD   600 mg at 11/26/23 0916    melatonin tablet 6 mg  6 mg Oral Nightly PRN Juan Carlos Parra MD   6 mg at 11/25/23 2040    metoprolol succinate (TOPROL-XL) 24 hr tablet 50 mg  50 mg Oral Daily Juan Carlos Parra MD   50  "mg at 11/26/23 0916    morphine injection 2 mg  2 mg Intravenous Q4H PRN Juan Carlos Parra MD   2 mg at 11/24/23 0621    naloxone 0.4 mg/mL injection 0.02 mg  0.02 mg Intravenous PRN Juan Carlos Parra MD        ondansetron injection 4 mg  4 mg Intravenous Q8H PRN Juan Carlos Parra MD        promethazine tablet 25 mg  25 mg Oral Q6H PRN Juan Carlos Parra MD        senna-docusate 8.6-50 mg per tablet 1 tablet  1 tablet Oral BID PRN Juan Carlos Parra MD        sodium chloride 0.9% flush 10 mL  10 mL Intravenous Q12H PRN Juan Carlos Parra MD        tamsulosin 24 hr capsule 0.4 mg  0.4 mg Oral Daily Juan Carlos Parra MD   0.4 mg at 11/26/23 0916    Tdap (BOOSTRIX) vaccine injection 0.5 mL  0.5 mL Intramuscular vaccine x 1 dose Rubén Helms Jr., MD              Physical Exam:       BP (!) 81/46   Pulse 66   Temp 98.2 °F (36.8 °C) (Temporal)   Resp 20   Ht 5' 5" (1.651 m)   Wt 75.7 kg (166 lb 14.2 oz)   SpO2 98%   BMI 27.77 kg/m²                Physical Exam  Constitutional:       Comments: Frail appearing, sitting in wheelchair   HENT:      Head: Normocephalic and atraumatic.   Eyes:      Extraocular Movements: Extraocular movements intact.      Conjunctiva/sclera: Conjunctivae normal.      Pupils: Pupils are equal, round, and reactive to light.   Cardiovascular:      Rate and Rhythm: Normal rate and regular rhythm.      Heart sounds: Murmur (2/6 systolic murmur) heard.      No friction rub. No gallop.   Pulmonary:      Effort: Pulmonary effort is normal.      Breath sounds: No wheezing, rhonchi or rales.   Genitourinary:     Comments: B/l nephrostomy tubes, bot with clear yellow urine output  Musculoskeletal:         General: Normal range of motion.      Right lower leg: No edema.      Left lower leg: No edema.   Skin:     General: Skin is warm and dry.   Neurological:      Mental Status: He is alert.      Comments: Somewhat slow to respond to questions, appears confused when answering some " questions   Psychiatric:         Mood and Affect: Mood normal.         Thought Content: Thought content normal.         Judgment: Judgment normal.           Labs:   Recent Results (from the past 48 hour(s))   Basic Metabolic Panel    Collection Time: 11/26/23  7:55 AM   Result Value Ref Range    Sodium 143 136 - 145 mmol/L    Potassium 3.9 3.5 - 5.1 mmol/L    Chloride 105 95 - 110 mmol/L    CO2 25 23 - 29 mmol/L    Glucose 118 (H) 70 - 110 mg/dL    BUN 14 8 - 23 mg/dL    Creatinine 1.0 0.5 - 1.4 mg/dL    Calcium 8.6 (L) 8.7 - 10.5 mg/dL    Anion Gap 13 8 - 16 mmol/L    eGFR >60 >60 mL/min/1.73 m^2   Prepare RBC 1 Unit    Collection Time: 11/26/23  7:55 AM   Result Value Ref Range    UNIT NUMBER [id number]     Product Code [id number]     DISPENSE STATUS CROSSMATCHED     CODING SYSTEM ULUE425     Unit Blood Type Code 7300     Unit Blood Type B POS     Unit Expiration [id number]     CROSSMATCH INTERPRETATION Compatible    Type & Screen    Collection Time: 11/26/23  7:55 AM   Result Value Ref Range    Group & Rh B POS     Indirect Bertin NEG     Specimen Outdate 11/29/2023 23:59    CBC Auto Differential    Collection Time: 11/26/23  7:56 AM   Result Value Ref Range    WBC 7.42 3.90 - 12.70 K/uL    RBC 2.64 (L) 4.60 - 6.20 M/uL    Hemoglobin 7.0 (L) 14.0 - 18.0 g/dL    Hematocrit 21.9 (L) 40.0 - 54.0 %    MCV 83 82 - 98 fL    MCH 26.5 (L) 27.0 - 31.0 pg    MCHC 32.0 32.0 - 36.0 g/dL    RDW 14.9 (H) 11.5 - 14.5 %    Platelets 190 150 - 450 K/uL    MPV 9.6 9.2 - 12.9 fL    Immature Granulocytes 0.9 (H) 0.0 - 0.5 %    Gran # (ANC) 6.0 1.8 - 7.7 K/uL    Immature Grans (Abs) 0.07 (H) 0.00 - 0.04 K/uL    Lymph # 0.8 (L) 1.0 - 4.8 K/uL    Mono # 0.4 0.3 - 1.0 K/uL    Eos # 0.2 0.0 - 0.5 K/uL    Baso # 0.03 0.00 - 0.20 K/uL    nRBC 0 0 /100 WBC    Gran % 80.3 (H) 38.0 - 73.0 %    Lymph % 10.6 (L) 18.0 - 48.0 %    Mono % 5.1 4.0 - 15.0 %    Eosinophil % 2.7 0.0 - 8.0 %    Basophil % 0.4 0.0 - 1.9 %    Differential Method  Automated           Imaging:    See oncologic history above.     Path:  See oncologic history above.      Assessment and Plan:     Geovani Currie is a 68 y.o. male with pmh significant for b/l nephrostomy tubes c/b multiple UTIs, COPD, HTN not on medication, sciatica, anxiety/depression, spinal stenosis, h/o CVA, and multiple primary cancers who presents to establish care:     1) Recurrent muscle invasive urothelial carcinoma, initially diagnosed as stage cT3b disease on 7/6/23, s/p TURBT (7/6/23), CRT with uncertain regimen (completed 11/2020), found to have recurrent muscle invasive disease via TURBT (8/30/23), course complicated by urinary retention and b/l percutaneous nephrostomy tubes w/ multiple UTIs    2) Stage IA2 ([id number]) squamous cell carcinoma of the RUL (no NGS, PD-L1), initially dx'd 10/11/23, currently treatment naive    Stage IA2 Squamous Cell Carcinoma of the RUL  ECOG PS 2 (limited by unstable gait, weakness, recent UTI).  Patient presents today to establish care for recently diagnosed stage IA to squamous cell carcinoma of the right upper lobe.  Patient's workup occurred at [facility] in [place] prior to patient moving to [place] to live with his daughter on 10/20/2023.  The biopsy on 10/11/23 revealed scant tissue with only 16 atypical cells which were favored to represent squamous cell carcinoma.  PET imaging did demonstrate hypermetabolic activity in the sample lesion which appears consistent with a low stage squamous cell carcinoma.  Notably, staging scans were approximately 5 months ago (PET-CT on 07/22/2023 and CT chest on 09/26/2023).  Discussed with the patient that based on previous imaging, this is likely a Stage IA2 cancer, however will need to obtain repeat imaging for a new baseline prior to determination of final staging and treatment options.  Discussed with the patient that if there is no evidence of emily or metastatic involvement and if the size of the lesion  remains relatively unchanged, he may be a candidate for either definitive surgery or definitive radiation therapy.    Of note, an MRI brain on 10/21/23 was without evidence of intracranial metastatic disease.     PLAN:   -- Obtain images and reports for PET CT (7/22/23) and CT C (9/26/23) from [facility] in [place]  -- Repeat PET CT now to evaluate for regional / metastatic disease; may need dedicated CT C pending results and treatment plan for surgical planning  -- RTC at [facility] after repeat PET imaging with likely referral to radiation therapy and cardiothoracic surgery; treatment decisions will likely need to be made in conjunction with plan for treatment of urothelial carcinoma as below      Recurrent Muscle-Invasive Urothelial Carcinoma  ECOG PS 2 (limited by unstable gait, weakness, recent UTI).  Oncologic history as above, however briefly patient was initially diagnosed with muscle invasive urothelial carcinoma on 07/06/2023 by TURBT (reportedly T3b disease) for which he received CRT with an uncertain regimen.  Treatment was completed in 11/2020 and patient was followed on surveillance subsequently.  A TURBT on 08/30/2023 demonstrated recurrent muscle invasive disease.  Per outside urology notes, plan was for salvage chemotherapy or immunotherapy.  CT A/P on 11/10/23 at [facility] demonstrates bladder wall thickening. His course has been complicated by urinary outlet obstruction s/p bilateral percutaneous nephrostomy tubes and multiply recurrent UTI; he has been admitted at least 4 times in the past 2-3 months, most recently discharged on 11/17/2023 for linezolid for Enterobacter UTI.  The entirety of his previous workup and treatment was in [place] at [facility], though he has established care with Dr. Jorgensen of Urology in [place]. Will obtain previous treatment and workup in refer to medical oncology here at [facility].    PLAN:   -- Obtain radiation records from Dr. Santiago in [place], [place]  (radiation in 2020)  -- Obtain chemotherapy records from Dr. Hi Wagner in [facility] (chemotherapy in 2020)  -- Obtain urology records from Dr. Suresh Grimadlo at [facility] and Dr. Avitia in [place]  -- Pt follows with Dr. Jorgensen in [place] and has been referred to Dr. Sanchez at [facility]      Enterobacter UTI  Patient with multiple recent admissions for Enterobacter UTI, most recently placed on Eliquis should in this week.  Mentation was initially significantly impaired, per family continues to improve daily while on antibiotics.  Patient does not appear to have follow up with Infectious diseases.  -- F/u with PCP (Dr. Antoine)  -- F/u with urology (Dr. Jorgensen)      Hypotension  BP 81/46 today.  Patient is currently taking metoprolol, though states he is no history of cardiac disease including CHF or CAD.  Discussed that it is okay to stop his metoprolol at this time.  -- Message sent to Dr. Antoine (PCP)      The above information has been reviewed with the patient and all questions have been answered to their apparent satisfaction.  They understand that they can call the clinic with any questions.    Jett Wagoner MD  Hematology/Oncology  [facility]        Med Onc Chart Routing      Follow up with physician . RTC with me after PET CT; after records obtained, pt will also need to see a different medical oncologist for discussion of his urothelial cancer (I have discussed the case with Dr. Jin)   Follow up with [facility]    Infusion scheduling note    Injection scheduling note    Labs    Imaging   PET CT prior to RTC   Pharmacy appointment    Other referrals

## 2023-11-20 NOTE — Clinical Note
Hi all!  Need a few things:  - Report and images for PET CT (7/22/23) and CT C (9/26/23), both at [facility] in [place] - Records from Dr. Santiago (radiation oncology, [place], [place]) and Dr. Hi Wagner (med onc, [place], [place]) from 2020. [facility]  Also, pt has a recurrent muscle invasive bladder cancer that will need to be seen by a different med onc - I've already discussed with Dr. Jin who can see the patient, but I would wait until we get a repeat PET CT and the records before he sees her  Thanks!!

## 2023-11-21 ENCOUNTER — ANESTHESIA EVENT (OUTPATIENT)
Dept: SURGERY | Facility: HOSPITAL | Age: 68
DRG: 481 | End: 2023-11-21
Payer: MEDICARE

## 2023-11-21 ENCOUNTER — ANESTHESIA (OUTPATIENT)
Dept: SURGERY | Facility: HOSPITAL | Age: 68
DRG: 481 | End: 2023-11-21
Payer: MEDICARE

## 2023-11-21 DIAGNOSIS — M25.551 PAIN OF RIGHT HIP: Primary | ICD-10-CM

## 2023-11-21 PROBLEM — S72.141A DISPLACED INTERTROCHANTERIC FRACTURE OF RIGHT FEMUR, INITIAL ENCOUNTER FOR CLOSED FRACTURE: Status: ACTIVE | Noted: 2023-11-20

## 2023-11-21 LAB
ABO + RH BLD: NORMAL
ALBUMIN SERPL BCP-MCNC: 2.4 G/DL (ref 3.5–5.2)
ALP SERPL-CCNC: 94 U/L (ref 55–135)
ALT SERPL W/O P-5'-P-CCNC: 18 U/L (ref 10–44)
ANION GAP SERPL CALC-SCNC: 11 MMOL/L (ref 8–16)
AST SERPL-CCNC: 17 U/L (ref 10–40)
BASOPHILS # BLD AUTO: 0.04 K/UL (ref 0–0.2)
BASOPHILS NFR BLD: 0.5 % (ref 0–1.9)
BILIRUB SERPL-MCNC: 0.3 MG/DL (ref 0.1–1)
BLD GP AB SCN CELLS X3 SERPL QL: NORMAL
BLD PROD TYP BPU: NORMAL
BLOOD UNIT EXPIRATION DATE: NORMAL
BLOOD UNIT TYPE CODE: 7300
BLOOD UNIT TYPE: NORMAL
BUN SERPL-MCNC: 17 MG/DL (ref 8–23)
CALCIUM SERPL-MCNC: 8.8 MG/DL (ref 8.7–10.5)
CHLORIDE SERPL-SCNC: 107 MMOL/L (ref 95–110)
CO2 SERPL-SCNC: 24 MMOL/L (ref 23–29)
CODING SYSTEM: NORMAL
CREAT SERPL-MCNC: 1.8 MG/DL (ref 0.5–1.4)
CROSSMATCH INTERPRETATION: NORMAL
DIFFERENTIAL METHOD: ABNORMAL
DISPENSE STATUS: NORMAL
EOSINOPHIL # BLD AUTO: 0.4 K/UL (ref 0–0.5)
EOSINOPHIL NFR BLD: 4.7 % (ref 0–8)
ERYTHROCYTE [DISTWIDTH] IN BLOOD BY AUTOMATED COUNT: 15 % (ref 11.5–14.5)
EST. GFR  (NO RACE VARIABLE): 40 ML/MIN/1.73 M^2
GLUCOSE SERPL-MCNC: 106 MG/DL (ref 70–110)
HCT VFR BLD AUTO: 23.9 % (ref 40–54)
HGB BLD-MCNC: 7.3 G/DL (ref 14–18)
IMM GRANULOCYTES # BLD AUTO: 0.04 K/UL (ref 0–0.04)
IMM GRANULOCYTES NFR BLD AUTO: 0.5 % (ref 0–0.5)
LYMPHOCYTES # BLD AUTO: 1 K/UL (ref 1–4.8)
LYMPHOCYTES NFR BLD: 13 % (ref 18–48)
MCH RBC QN AUTO: 26.3 PG (ref 27–31)
MCHC RBC AUTO-ENTMCNC: 30.5 G/DL (ref 32–36)
MCV RBC AUTO: 86 FL (ref 82–98)
MONOCYTES # BLD AUTO: 0.4 K/UL (ref 0.3–1)
MONOCYTES NFR BLD: 5.3 % (ref 4–15)
NEUTROPHILS # BLD AUTO: 5.9 K/UL (ref 1.8–7.7)
NEUTROPHILS NFR BLD: 76 % (ref 38–73)
NRBC BLD-RTO: 0 /100 WBC
NUM UNITS TRANS PACKED RBC: NORMAL
PLATELET # BLD AUTO: 227 K/UL (ref 150–450)
PMV BLD AUTO: 9.4 FL (ref 9.2–12.9)
POTASSIUM SERPL-SCNC: 4 MMOL/L (ref 3.5–5.1)
PROT SERPL-MCNC: 6 G/DL (ref 6–8.4)
RBC # BLD AUTO: 2.78 M/UL (ref 4.6–6.2)
SODIUM SERPL-SCNC: 142 MMOL/L (ref 136–145)
SPECIMEN OUTDATE: NORMAL
WBC # BLD AUTO: 7.72 K/UL (ref 3.9–12.7)

## 2023-11-21 PROCEDURE — 71000033 HC RECOVERY, INTIAL HOUR: Performed by: ORTHOPAEDIC SURGERY

## 2023-11-21 PROCEDURE — 36000710: Performed by: ORTHOPAEDIC SURGERY

## 2023-11-21 PROCEDURE — 37000009 HC ANESTHESIA EA ADD 15 MINS: Performed by: ORTHOPAEDIC SURGERY

## 2023-11-21 PROCEDURE — 94761 N-INVAS EAR/PLS OXIMETRY MLT: CPT

## 2023-11-21 PROCEDURE — 63600175 PHARM REV CODE 636 W HCPCS: Performed by: HOSPITALIST

## 2023-11-21 PROCEDURE — 63600175 PHARM REV CODE 636 W HCPCS: Performed by: STUDENT IN AN ORGANIZED HEALTH CARE EDUCATION/TRAINING PROGRAM

## 2023-11-21 PROCEDURE — 27245 TREAT THIGH FRACTURE: CPT | Mod: RT,,, | Performed by: ORTHOPAEDIC SURGERY

## 2023-11-21 PROCEDURE — C1713 ANCHOR/SCREW BN/BN,TIS/BN: HCPCS | Performed by: ORTHOPAEDIC SURGERY

## 2023-11-21 PROCEDURE — 27201423 OPTIME MED/SURG SUP & DEVICES STERILE SUPPLY: Performed by: ORTHOPAEDIC SURGERY

## 2023-11-21 PROCEDURE — 25000003 PHARM REV CODE 250: Performed by: STUDENT IN AN ORGANIZED HEALTH CARE EDUCATION/TRAINING PROGRAM

## 2023-11-21 PROCEDURE — 37000008 HC ANESTHESIA 1ST 15 MINUTES: Performed by: ORTHOPAEDIC SURGERY

## 2023-11-21 PROCEDURE — C1769 GUIDE WIRE: HCPCS | Performed by: ORTHOPAEDIC SURGERY

## 2023-11-21 PROCEDURE — 99223 1ST HOSP IP/OBS HIGH 75: CPT | Mod: 57,,, | Performed by: ORTHOPAEDIC SURGERY

## 2023-11-21 PROCEDURE — 36000711: Performed by: ORTHOPAEDIC SURGERY

## 2023-11-21 PROCEDURE — 27245 PR OPEN FIX INTER/SUBTROCH FX,IMPLNT: ICD-10-PCS | Mod: RT,,, | Performed by: ORTHOPAEDIC SURGERY

## 2023-11-21 PROCEDURE — 36415 COLL VENOUS BLD VENIPUNCTURE: CPT | Performed by: STUDENT IN AN ORGANIZED HEALTH CARE EDUCATION/TRAINING PROGRAM

## 2023-11-21 PROCEDURE — 63600175 PHARM REV CODE 636 W HCPCS: Performed by: ORTHOPAEDIC SURGERY

## 2023-11-21 PROCEDURE — 25000242 PHARM REV CODE 250 ALT 637 W/ HCPCS: Performed by: HOSPITALIST

## 2023-11-21 PROCEDURE — 25000003 PHARM REV CODE 250: Performed by: HOSPITALIST

## 2023-11-21 PROCEDURE — 85025 COMPLETE CBC W/AUTO DIFF WBC: CPT | Performed by: HOSPITALIST

## 2023-11-21 PROCEDURE — 94640 AIRWAY INHALATION TREATMENT: CPT

## 2023-11-21 PROCEDURE — 27000221 HC OXYGEN, UP TO 24 HOURS

## 2023-11-21 PROCEDURE — 11000001 HC ACUTE MED/SURG PRIVATE ROOM

## 2023-11-21 PROCEDURE — 80053 COMPREHEN METABOLIC PANEL: CPT | Performed by: HOSPITALIST

## 2023-11-21 PROCEDURE — 99223 PR INITIAL HOSPITAL CARE,LEVL III: ICD-10-PCS | Mod: 57,,, | Performed by: ORTHOPAEDIC SURGERY

## 2023-11-21 PROCEDURE — 99900035 HC TECH TIME PER 15 MIN (STAT)

## 2023-11-21 PROCEDURE — 86850 RBC ANTIBODY SCREEN: CPT | Performed by: STUDENT IN AN ORGANIZED HEALTH CARE EDUCATION/TRAINING PROGRAM

## 2023-11-21 PROCEDURE — 86920 COMPATIBILITY TEST SPIN: CPT | Performed by: STUDENT IN AN ORGANIZED HEALTH CARE EDUCATION/TRAINING PROGRAM

## 2023-11-21 PROCEDURE — P9016 RBC LEUKOCYTES REDUCED: HCPCS | Performed by: STUDENT IN AN ORGANIZED HEALTH CARE EDUCATION/TRAINING PROGRAM

## 2023-11-21 DEVICE — LOCKING SCREW
Type: IMPLANTABLE DEVICE | Site: HIP | Status: FUNCTIONAL
Brand: T2 ALPHA

## 2023-11-21 DEVICE — SCREW LOCKING BONE T2 5X50MM: Type: IMPLANTABLE DEVICE | Site: HIP | Status: FUNCTIONAL

## 2023-11-21 DEVICE — IMPLANTABLE DEVICE: Type: IMPLANTABLE DEVICE | Site: HIP | Status: FUNCTIONAL

## 2023-11-21 RX ORDER — CYCLOBENZAPRINE HCL 10 MG
10 TABLET ORAL 3 TIMES DAILY PRN
Status: DISCONTINUED | OUTPATIENT
Start: 2023-11-21 | End: 2023-11-27 | Stop reason: HOSPADM

## 2023-11-21 RX ORDER — CEFAZOLIN SODIUM 2 G/50ML
2 SOLUTION INTRAVENOUS
Status: COMPLETED | OUTPATIENT
Start: 2023-11-21 | End: 2023-11-22

## 2023-11-21 RX ORDER — HYDROCODONE BITARTRATE AND ACETAMINOPHEN 500; 5 MG/1; MG/1
TABLET ORAL
Status: DISCONTINUED | OUTPATIENT
Start: 2023-11-21 | End: 2023-11-27 | Stop reason: HOSPADM

## 2023-11-21 RX ORDER — DEXAMETHASONE SODIUM PHOSPHATE 4 MG/ML
INJECTION, SOLUTION INTRA-ARTICULAR; INTRALESIONAL; INTRAMUSCULAR; INTRAVENOUS; SOFT TISSUE
Status: DISCONTINUED | OUTPATIENT
Start: 2023-11-21 | End: 2023-11-21

## 2023-11-21 RX ORDER — FLUTICASONE FUROATE AND VILANTEROL 100; 25 UG/1; UG/1
1 POWDER RESPIRATORY (INHALATION) DAILY
Status: DISCONTINUED | OUTPATIENT
Start: 2023-11-21 | End: 2023-11-21

## 2023-11-21 RX ORDER — ONDANSETRON 2 MG/ML
4 INJECTION INTRAMUSCULAR; INTRAVENOUS DAILY PRN
Status: DISCONTINUED | OUTPATIENT
Start: 2023-11-21 | End: 2023-11-21 | Stop reason: HOSPADM

## 2023-11-21 RX ORDER — CEFAZOLIN SODIUM 1 G/3ML
INJECTION, POWDER, FOR SOLUTION INTRAMUSCULAR; INTRAVENOUS
Status: DISCONTINUED | OUTPATIENT
Start: 2023-11-21 | End: 2023-11-21

## 2023-11-21 RX ORDER — SUCCINYLCHOLINE CHLORIDE 20 MG/ML
INJECTION INTRAMUSCULAR; INTRAVENOUS
Status: DISCONTINUED | OUTPATIENT
Start: 2023-11-21 | End: 2023-11-21

## 2023-11-21 RX ORDER — METOPROLOL SUCCINATE 50 MG/1
50 TABLET, EXTENDED RELEASE ORAL DAILY
Status: DISCONTINUED | OUTPATIENT
Start: 2023-11-21 | End: 2023-11-27 | Stop reason: HOSPADM

## 2023-11-21 RX ORDER — BUDESONIDE 0.5 MG/2ML
0.5 INHALANT ORAL EVERY 12 HOURS
Status: DISCONTINUED | OUTPATIENT
Start: 2023-11-21 | End: 2023-11-27 | Stop reason: HOSPADM

## 2023-11-21 RX ORDER — ARFORMOTEROL TARTRATE 15 UG/2ML
15 SOLUTION RESPIRATORY (INHALATION) 2 TIMES DAILY
Status: DISCONTINUED | OUTPATIENT
Start: 2023-11-21 | End: 2023-11-27 | Stop reason: HOSPADM

## 2023-11-21 RX ORDER — ESCITALOPRAM OXALATE 5 MG/1
20 TABLET ORAL DAILY
Status: DISCONTINUED | OUTPATIENT
Start: 2023-11-21 | End: 2023-11-27 | Stop reason: HOSPADM

## 2023-11-21 RX ORDER — OXYCODONE AND ACETAMINOPHEN 5; 325 MG/1; MG/1
1 TABLET ORAL
Status: DISCONTINUED | OUTPATIENT
Start: 2023-11-21 | End: 2023-11-21 | Stop reason: HOSPADM

## 2023-11-21 RX ORDER — ALPRAZOLAM 1 MG/1
1 TABLET ORAL 3 TIMES DAILY PRN
Status: DISCONTINUED | OUTPATIENT
Start: 2023-11-21 | End: 2023-11-27 | Stop reason: HOSPADM

## 2023-11-21 RX ORDER — LINEZOLID 600 MG/1
600 TABLET, FILM COATED ORAL EVERY 12 HOURS
Status: DISCONTINUED | OUTPATIENT
Start: 2023-11-21 | End: 2023-11-27 | Stop reason: HOSPADM

## 2023-11-21 RX ORDER — FENTANYL CITRATE 50 UG/ML
INJECTION, SOLUTION INTRAMUSCULAR; INTRAVENOUS
Status: DISCONTINUED | OUTPATIENT
Start: 2023-11-21 | End: 2023-11-21

## 2023-11-21 RX ORDER — TRANEXAMIC ACID 10 MG/ML
1000 INJECTION, SOLUTION INTRAVENOUS ONCE
Status: COMPLETED | OUTPATIENT
Start: 2023-11-21 | End: 2023-11-21

## 2023-11-21 RX ORDER — ROCURONIUM BROMIDE 10 MG/ML
INJECTION, SOLUTION INTRAVENOUS
Status: DISCONTINUED | OUTPATIENT
Start: 2023-11-21 | End: 2023-11-21

## 2023-11-21 RX ORDER — PROPOFOL 10 MG/ML
VIAL (ML) INTRAVENOUS
Status: DISCONTINUED | OUTPATIENT
Start: 2023-11-21 | End: 2023-11-21

## 2023-11-21 RX ORDER — PHENYLEPHRINE HYDROCHLORIDE 10 MG/ML
INJECTION INTRAVENOUS
Status: DISCONTINUED | OUTPATIENT
Start: 2023-11-21 | End: 2023-11-21

## 2023-11-21 RX ORDER — TAMSULOSIN HYDROCHLORIDE 0.4 MG/1
0.4 CAPSULE ORAL DAILY
Status: DISCONTINUED | OUTPATIENT
Start: 2023-11-21 | End: 2023-11-27 | Stop reason: HOSPADM

## 2023-11-21 RX ORDER — HYDROMORPHONE HYDROCHLORIDE 2 MG/ML
0.2 INJECTION, SOLUTION INTRAMUSCULAR; INTRAVENOUS; SUBCUTANEOUS EVERY 5 MIN PRN
Status: DISCONTINUED | OUTPATIENT
Start: 2023-11-21 | End: 2023-11-21 | Stop reason: HOSPADM

## 2023-11-21 RX ORDER — LIDOCAINE HYDROCHLORIDE 20 MG/ML
INJECTION INTRAVENOUS
Status: DISCONTINUED | OUTPATIENT
Start: 2023-11-21 | End: 2023-11-21

## 2023-11-21 RX ORDER — SODIUM CHLORIDE, SODIUM LACTATE, POTASSIUM CHLORIDE, CALCIUM CHLORIDE 600; 310; 30; 20 MG/100ML; MG/100ML; MG/100ML; MG/100ML
INJECTION, SOLUTION INTRAVENOUS CONTINUOUS
Status: DISCONTINUED | OUTPATIENT
Start: 2023-11-21 | End: 2023-11-22

## 2023-11-21 RX ORDER — ONDANSETRON 2 MG/ML
INJECTION INTRAMUSCULAR; INTRAVENOUS
Status: DISCONTINUED | OUTPATIENT
Start: 2023-11-21 | End: 2023-11-21

## 2023-11-21 RX ORDER — DOCUSATE SODIUM 100 MG/1
100 CAPSULE, LIQUID FILLED ORAL 2 TIMES DAILY
Status: DISCONTINUED | OUTPATIENT
Start: 2023-11-21 | End: 2023-11-27 | Stop reason: HOSPADM

## 2023-11-21 RX ORDER — MIDAZOLAM HYDROCHLORIDE 1 MG/ML
INJECTION INTRAMUSCULAR; INTRAVENOUS
Status: DISCONTINUED | OUTPATIENT
Start: 2023-11-21 | End: 2023-11-21

## 2023-11-21 RX ADMIN — ESCITALOPRAM 20 MG: 5 TABLET, FILM COATED ORAL at 08:11

## 2023-11-21 RX ADMIN — ARFORMOTEROL TARTRATE 15 MCG: 15 SOLUTION RESPIRATORY (INHALATION) at 08:11

## 2023-11-21 RX ADMIN — PHENYLEPHRINE HYDROCHLORIDE 200 MCG: 10 INJECTION INTRAVENOUS at 04:11

## 2023-11-21 RX ADMIN — FENTANYL CITRATE 100 MCG: 50 INJECTION, SOLUTION INTRAMUSCULAR; INTRAVENOUS at 03:11

## 2023-11-21 RX ADMIN — PROPOFOL 150 MG: 10 INJECTION, EMULSION INTRAVENOUS at 03:11

## 2023-11-21 RX ADMIN — MORPHINE SULFATE 2 MG: 4 INJECTION INTRAVENOUS at 12:11

## 2023-11-21 RX ADMIN — CEFAZOLIN 2 G: 330 INJECTION, POWDER, FOR SOLUTION INTRAMUSCULAR; INTRAVENOUS at 04:11

## 2023-11-21 RX ADMIN — LEVOTHYROXINE SODIUM 125 MCG: 0.03 TABLET ORAL at 07:11

## 2023-11-21 RX ADMIN — LINEZOLID 600 MG: 600 TABLET, FILM COATED ORAL at 08:11

## 2023-11-21 RX ADMIN — TRANEXAMIC ACID 1000 MG: 10 INJECTION, SOLUTION INTRAVENOUS at 08:11

## 2023-11-21 RX ADMIN — ROCURONIUM BROMIDE 5 MG: 10 SOLUTION INTRAVENOUS at 03:11

## 2023-11-21 RX ADMIN — ALPRAZOLAM 1 MG: 1 TABLET ORAL at 01:11

## 2023-11-21 RX ADMIN — HYDROMORPHONE HYDROCHLORIDE 0.2 MG: 2 INJECTION INTRAMUSCULAR; INTRAVENOUS; SUBCUTANEOUS at 06:11

## 2023-11-21 RX ADMIN — TAMSULOSIN HYDROCHLORIDE 0.4 MG: 0.4 CAPSULE ORAL at 08:11

## 2023-11-21 RX ADMIN — LINEZOLID 600 MG: 600 TABLET, FILM COATED ORAL at 01:11

## 2023-11-21 RX ADMIN — ROCURONIUM BROMIDE 45 MG: 10 SOLUTION INTRAVENOUS at 03:11

## 2023-11-21 RX ADMIN — SUGAMMADEX 200 MG: 100 INJECTION, SOLUTION INTRAVENOUS at 05:11

## 2023-11-21 RX ADMIN — DOCUSATE SODIUM 100 MG: 100 CAPSULE, LIQUID FILLED ORAL at 08:11

## 2023-11-21 RX ADMIN — BUDESONIDE INHALATION 0.5 MG: 0.5 SUSPENSION RESPIRATORY (INHALATION) at 08:11

## 2023-11-21 RX ADMIN — MIDAZOLAM HYDROCHLORIDE 2 MG: 1 INJECTION, SOLUTION INTRAMUSCULAR; INTRAVENOUS at 03:11

## 2023-11-21 RX ADMIN — DEXAMETHASONE SODIUM PHOSPHATE 4 MG: 4 INJECTION, SOLUTION INTRA-ARTICULAR; INTRALESIONAL; INTRAMUSCULAR; INTRAVENOUS; SOFT TISSUE at 03:11

## 2023-11-21 RX ADMIN — SUCCINYLCHOLINE CHLORIDE 100 MG: 20 INJECTION, SOLUTION INTRAMUSCULAR; INTRAVENOUS; PARENTERAL at 03:11

## 2023-11-21 RX ADMIN — SODIUM CHLORIDE, SODIUM LACTATE, POTASSIUM CHLORIDE, AND CALCIUM CHLORIDE: .6; .31; .03; .02 INJECTION, SOLUTION INTRAVENOUS at 03:11

## 2023-11-21 RX ADMIN — ARFORMOTEROL TARTRATE 15 MCG: 15 SOLUTION RESPIRATORY (INHALATION) at 07:11

## 2023-11-21 RX ADMIN — BUDESONIDE INHALATION 0.5 MG: 0.5 SUSPENSION RESPIRATORY (INHALATION) at 07:11

## 2023-11-21 RX ADMIN — ONDANSETRON 4 MG: 2 INJECTION INTRAMUSCULAR; INTRAVENOUS at 03:11

## 2023-11-21 RX ADMIN — SODIUM CHLORIDE, SODIUM LACTATE, POTASSIUM CHLORIDE, AND CALCIUM CHLORIDE: 600; 310; 30; 20 INJECTION, SOLUTION INTRAVENOUS at 04:11

## 2023-11-21 RX ADMIN — LIDOCAINE HYDROCHLORIDE 100 MG: 20 INJECTION INTRAVENOUS at 03:11

## 2023-11-21 RX ADMIN — MORPHINE SULFATE 2 MG: 4 INJECTION INTRAVENOUS at 08:11

## 2023-11-21 RX ADMIN — DOCUSATE SODIUM 100 MG: 100 CAPSULE, LIQUID FILLED ORAL at 01:11

## 2023-11-21 RX ADMIN — CEFAZOLIN SODIUM 2 G: 2 SOLUTION INTRAVENOUS at 11:11

## 2023-11-21 NOTE — PROGRESS NOTES
[facility] Medicine  Progress Note    Patient Name: Geovani Currie  MRN: [id number]  Patient Class: IP- Inpatient   Admission Date: 11/20/2023  Length of Stay: 1 days  Attending Physician: Yuval Orozco MD  Primary Care Provider: Faizan Antoine MD        Subjective:     Principal Problem:Right hip pain        HPI:  Geovani Currie is a 68 y.o. male with a PMH  has a past medical history of COPD (chronic obstructive pulmonary disease) and Hypertension. who presented to the ED for further evaluation of right hip pain following ground level fall earlier today.  Patient reported stepping onto a curb at which time he lost his balance causing him to fall on his right side endorsing immediate pain with inability to stand/bear weight.  Patient denied endorsing any head trauma, loss of consciousness, or sustaining any other injuries with the exception of abrasions to his elbow.  Patient reported pain was localized throughout his right hip, nonradiating, currently rated 8/10 in severity with aggravating factors including weight-bearing, movement, and palpation to the affected area while alleviating factors included immobility, nonweightbearing, and pain medication.  Associated symptoms include generalized myalgias, arthralgias, and weakness but denied endorsing any numbness, tingling, or penetrating trauma, but did report decreased range of motion muscle strength secondary to exacerbation of pain.  All other review of systems negative except as noted above.  Of note, patient was recently discharged on 11/17/2023 following admission for dislodged nephrostomy tube with IR replacement and treatment of UTI which he is currently being treated with linezolid.  Prior to incident, patient reported being in his usual state of health with no other concerns or complaints.  Initial workup in the ED revealed patient has sustained an acute traumatic right intertrochanteric femur fracture on imaging in his  "being admitted to Hospital Medicine inpatient for continued medical management while awaiting surgical intervention from Orthopedics.    PCP: Faizan Antoine      Overview/Hospital Course:  No notes on file    Interval History: NAEON. Surgery for 1pm today. Did not see patient as he was in the OR    Review of Systems  Objective:     Vital Signs (Most Recent):  Temp: 97.8 °F (36.6 °C) (11/21/23 1247)  Pulse: 67 (11/21/23 1247)  Resp: 18 (11/21/23 1247)  BP: 127/61 (11/21/23 1247)  SpO2: 98 % (11/21/23 1247) Vital Signs (24h Range):  Temp:  [97.8 °F (36.6 °C)-98.9 °F (37.2 °C)] 97.8 °F (36.6 °C)  Pulse:  [54-84] 67  Resp:  [10-20] 18  SpO2:  [90 %-100 %] 98 %  BP: ()/(46-78) 127/61        There is no height or weight on file to calculate BMI.    Intake/Output Summary (Last 24 hours) at 11/21/2023 1449  Last data filed at 11/21/2023 1345  Gross per 24 hour   Intake 908.33 ml   Output 3200 ml   Net -2291.67 ml         Physical Exam    Could not obtain     Significant Labs: BMP:   Recent Labs   Lab 11/21/23  0609         K 4.0      CO2 24   BUN 17   CREATININE 1.8*   CALCIUM 8.8     CBC:   Recent Labs   Lab 11/20/23 2118 11/21/23  0609   WBC 11.18 7.72   HGB 7.9* 7.3*   HCT 25.9* 23.9*    227     CMP:   Recent Labs   Lab 11/20/23 2118 11/21/23  0609    142   K 4.7 4.0    107   CO2 23 24    106   BUN 17 17   CREATININE 2.1* 1.8*   CALCIUM 9.6 8.8   PROT 6.8 6.0   ALBUMIN 2.8* 2.4*   BILITOT 0.3 0.3   ALKPHOS 95 94   AST 15 17   ALT 19 18   ANIONGAP 13 11     Cardiac Markers: No results for input(s): "CKMB", "MYOGLOBIN", "BNP", "TROPISTAT" in the last 48 hours.  Coagulation: No results for input(s): "PT", "INR", "APTT" in the last 48 hours.  Lactic Acid: No results for input(s): "LACTATE" in the last 48 hours.  Magnesium: No results for input(s): "MG" in the last 48 hours.  Troponin: No results for input(s): "TROPONINI", "TROPONINIHS" in the last 48 hours.  TSH: " "  Recent Labs   Lab 11/20/23 2118   TSH 0.296*     Urine Studies:   No results for input(s): "COLORU", "APPEARANCEUA", "PHUR", "SPECGRAV", "PROTEINUA", "GLUCUA", "KETONESU", "BILIRUBINUA", "OCCULTUA", "NITRITE", "UROBILINOGEN", "LEUKOCYTESUR", "RBCUA", "WBCUA", "BACTERIA", "SQUAMEPITHEL", "HYALINECASTS" in the last 48 hours.    Invalid input(s): "WRIGHTSUR"      Significant Imaging:   Imaging Results              X-Ray Elbow Complete Right (Final result)  Result time 11/20/23 21:26:18      Final result by Jefferson Kunz MD (11/20/23 21:26:18)                   Impression:      As above      Electronically signed by: Jefferson Kunz  Date:    11/20/2023  Time:    21:26               Narrative:    EXAMINATION:  XR ELBOW COMPLETE 3 VIEW RIGHT    CLINICAL HISTORY:  XR ELBOW COMPLETE 3 VIEW RIGHTPain in right elbow    COMPARISON:  None    FINDINGS:  Multiple radiographic views  were obtained.    Osseous detail reduced by casting material overlying matter.  Slight anterior fat pad is noted.  Limited positioning.  Otherwise no definite acute process seen                                       X-Ray Hip 2 or 3 views Right (with Pelvis when performed) (Final result)  Result time 11/20/23 21:27:20      Final result by Jefferson Kunz MD (11/20/23 21:27:20)                   Impression:      As above      Electronically signed by: Jefferson Kunz  Date:    11/20/2023  Time:    21:27               Narrative:    EXAMINATION:  XR HIP WITH PELVIS WHEN PERFORMED, 2 OR 3  VIEWS RIGHT    CLINICAL HISTORY:  XR HIP WITH PELVIS WHEN PERFORMED, 2 OR 3  VIEWS RIGHT    COMPARISON:  None    FINDINGS:  Multiple radiographic views  were obtained.    Intertrochanteric fracture of the right hip.  Mild angulation at the fracture site.                                        Assessment/Plan:      * Right hip pain        Right elbow pain        Displaced intertrochanteric fracture of right femur, initial encounter for closed fracture  Patient " presented following ground level fall earlier today and was found to have sustained an acute traumatic displaced right intertrochanteric femur fracture noted on imaging.  Patient also sustained abrasion to right elbow with imaging negative for acute fractures or dislocation.  Orthopedics consulted by ED staff and awaiting further evaluation/recommendations regarding surgical intervention.  According to [employer], patient is classified as a class 2 risk with a 6.0% 30 day risk of death, MI, cardiac arrest.  Plan:  -NPO  -Continue current pain regimen, titrate as needed  -Bowel regimen  -Bedrest  -Wound care   -None weightbearing  -IVFs prn  -Antiemetics prn  -Tylenol as needed for fever   -f/u orthopedics   -PT/OT     11/21/2023  In OR at time of documentation      Malignant neoplasm of urinary bladder  Currently followed by Hematology/Oncology and Urology outpatient.  Plan:  -f/u outpatient as directed      Acute renal failure superimposed on stage 3 chronic kidney disease  Patient with acute kidney injury/acute renal failure likely due to pre-renal azotemia due to dehydration BANDAR is currently  undergoing fluid resuscitation . Patient s/o bilateral nephrostomy tube placement give prior obstructive uropathy. Baseline creatinine  stage 3  - Labs reviewed- Renal function/electrolytes with CrCl cannot be calculated (Unknown ideal weight.). according to latest data. Monitor urine output and serial BMP and adjust therapy as needed. Avoid nephrotoxins and renally dose meds for GFR listed above.      Centrilobular emphysema  Patient's COPD is controlled currently.  Patient is currently off COPD Pathway. Continue scheduled inhalers  as needed  and monitor respiratory status closely.       UTI (urinary tract infection)  Currently undergoing treatment with linezolid.  Plan:  -continue home medication      Anemia  Patient's anemia is currently controlled. Has not received any PRBCs to date. Etiology likely d/t  chronic  disease.  Current CBC reviewed-   Lab Results   Component Value Date    HGB 7.9 (L) 11/20/2023    HCT 25.9 (L) 11/20/2023     Monitor serial CBC and transfuse if patient becomes hemodynamically unstable, symptomatic or H/H drops below 7/21.        VTE Risk Mitigation (From admission, onward)           Ordered     IP VTE HIGH RISK PATIENT  Once         11/20/23 2351     Place sequential compression device  Until discontinued         11/20/23 2351     Reason for No Pharmacological VTE Prophylaxis  Once        Question:  Reasons:  Answer:  Physician Provided (leave comment)  Comment:  awaiting surgical intervention    11/20/23 2351                    Discharge Planning   TANIA:      Code Status: Full Code   Is the patient medically ready for discharge?:     Reason for patient still in hospital (select all that apply): Patient trending condition, Laboratory test, and Consult recommendations                     Yuval Orozco MD  Department of Hospital Medicine   [facility] ([facility])

## 2023-11-21 NOTE — TRANSFER OF CARE
Anesthesia Transfer of Care Note    Patient: Geovani Currie    Procedure(s) Performed: Procedure(s) (LRB):  ORIF, FRACTURE, FEMUR, INTERTROCHANTERIC (Right)    Patient location: PACU    Anesthesia Type: general    Transport from OR: Transported from OR on room air with adequate spontaneous ventilation    Post pain: adequate analgesia    Post assessment: no apparent anesthetic complications and tolerated procedure well    Post vital signs: stable    Level of consciousness: awake, responds to stimulation and alert    Nausea/Vomiting: no nausea/vomiting    Complications: none    Transfer of care protocol was followedComments: Report given to PACU RN at bedside. RN given opportunity to ask questions or clarify concerns. No Concerns verbalized. RN was asked if ready to assume care of patient. RN verbally confirmed. Pt. left in stable condition. SV. Vital Signs Return to Near Baseline. No s/s of distress noted.     Last vitals: Visit Vitals  /61   Pulse 67   Temp 36.6 °C (97.8 °F) (Oral)   Resp 18   SpO2 98%

## 2023-11-21 NOTE — ASSESSMENT & PLAN NOTE
Currently followed by Hematology/Oncology and Urology outpatient.  Plan:  -f/u outpatient as directed

## 2023-11-21 NOTE — ASSESSMENT & PLAN NOTE
Patient with acute kidney injury/acute renal failure likely due to pre-renal azotemia due to dehydration BANDAR is currently  undergoing fluid resuscitation . Patient s/o bilateral nephrostomy tube placement give prior obstructive uropathy. Baseline creatinine  stage 3  - Labs reviewed- Renal function/electrolytes with CrCl cannot be calculated (Unknown ideal weight.). according to latest data. Monitor urine output and serial BMP and adjust therapy as needed. Avoid nephrotoxins and renally dose meds for GFR listed above.

## 2023-11-21 NOTE — ED PROVIDER NOTES
SCRIBE #1 NOTE: I, Marybeth Holloway, am scribing for, and in the presence of, Rubén Helms Jr., MD. I have scribed the entire note.       History     Chief Complaint   Patient presents with    Fall     Pt brought in by EMS for Hip pain, after a fall today. Pt has right side hip pain. Pt right leg is shortened and rotated. Pt had + pulses (-) blood thinners       Review of patient's allergies indicates:  No Known Allergies      History of Present Illness     HPI    11/20/2023, 8:54 PM  History obtained from the patient      History of Present Illness: Geovani Currie is a 68 y.o. male patient with a PMHx of HTN and COPD who presents to the Emergency Department for evaluation of injuries secondary to fall which onset today. Pt reports being discharged from hospital for an abscess and is currently on antibiotics. Pt states he stepped onto a curb and lost his balance. He denies any head trauma but reports hitting the right hip. Pt's last meal was at 10 AM. Symptoms are constant and moderate in severity. No mitigating or exacerbating factors reported. Associated sxs include myalgias, arthralgias, generalized weakness, . Patient denies any LOC, headache, numbness, nausea, dizziness, and all other sxs at this time. Prior Tx includes hydrocodone 10 mg. No further complaints or concerns at this time.       Arrival mode: Ambulance Service    PCP: Faizan Antoine MD        Past Medical History:  Past Medical History:   Diagnosis Date    COPD (chronic obstructive pulmonary disease)     Hypertension        Past Surgical History:  Past Surgical History:   Procedure Laterality Date    APPENDECTOMY      CATARACT EXTRACTION      NEPHROSTOMY      OPEN REDUCTION AND INTERNAL FIXATION (ORIF) OF INTERTROCHANTERIC FRACTURE OF FEMUR Right 11/21/2023    Procedure: ORIF, FRACTURE, FEMUR, INTERTROCHANTERIC;  Surgeon: Tanisha Guidry DO;  Location: [facility], [place] OR;  Service: Orthopedics;  Laterality: Right;  Gamma nail         Family  History:  Family History   Problem Relation Age of Onset    Cancer Father     Cancer Mother        Social History:  Social History     Tobacco Use    Smoking status: Former     Types: Cigarettes     Passive exposure: Never    Smokeless tobacco: Never   Substance and Sexual Activity    Alcohol use: Never    Drug use: Never    Sexual activity: Not Currently     Partners: Female        Review of Systems     Review of Systems   Constitutional:  Negative for fever.   HENT:  Negative for sore throat.    Respiratory:  Negative for shortness of breath.    Cardiovascular:  Negative for chest pain.   Gastrointestinal:  Negative for nausea.   Genitourinary:  Negative for dysuria.   Musculoskeletal:  Positive for arthralgias (right hip) and myalgias (right hip). Negative for back pain.   Skin:  Negative for rash.   Neurological:  Positive for weakness (generalized). Negative for dizziness and numbness.        (-) LOC   Hematological:  Does not bruise/bleed easily.   All other systems reviewed and are negative.       Physical Exam     Initial Vitals [11/20/23 1900]   BP Pulse Resp Temp SpO2   115/60 (!) 55 18 98.9 °F (37.2 °C) 98 %      MAP       --          Physical Exam  Nursing Notes and Vital Signs Reviewed.  Constitutional: Patient is in no apparent distress. Well-developed and well-nourished.  Head: Atraumatic. Normocephalic. Full ROM of neck. No palpible skull compressions.  Eyes: PERRL. EOM intact. Conjunctivae are not pale. No scleral icterus.  ENT: Mucous membranes are moist. Oropharynx is clear and symmetric.    Neck: Supple. Full ROM. No lymphadenopathy.  Cardiovascular: Regular rate. Regular rhythm. No murmurs, rubs, or gallops. Distal pulses are 2+ and symmetric.  Pulmonary/Chest: No respiratory distress. Clear to auscultation bilaterally. No wheezing or rales.  Abdominal: Soft and non-distended.  There is no tenderness.  No rebound, guarding, or rigidity. Good bowel sounds.  Genitourinary: No CVA tenderness.  Bilateral nephrostomy bags that are clear. No signs of blood or infection.Appear to be functioning properly.  Musculoskeletal: Tenderness to right greater trochanter. Limited ROM secondary to pain. Tendons intact. No midline spine tenderness. Abrasion to right elbow.   Skin: Warm and dry.  Neurological:  Alert, awake, and appropriate. Normal speech.  No acute focal neurological deficits are appreciated.  Psychiatric: Normal affect. Good eye contact. Appropriate in content. No paresthesia     ED Course   Critical Care    Date/Time: 11/20/2023 10:53 PM    Performed by: Rubén Helms Jr., MD  Authorized by: Rubén Helms Jr., MD  Direct patient critical care time: 25 minutes  Additional history critical care time: 15 minutes  Ordering / reviewing critical care time: 15 minutes  Documentation critical care time: 10 minutes  Consulting other physicians critical care time: 5 minutes  Consult with family critical care time: 5 minutes  Total critical care time (exclusive of procedural time) : 75 minutes  Critical care time was exclusive of separately billable procedures and treating other patients and teaching time.  Critical care was necessary to treat or prevent imminent or life-threatening deterioration of the following conditions: renal failure, right hip fracture.  Critical care was time spent personally by me on the following activities: blood draw for specimens, development of treatment plan with patient or surrogate, discussions with consultants, interpretation of cardiac output measurements, evaluation of patient's response to treatment, examination of patient, obtaining history from patient or surrogate, ordering and performing treatments and interventions, ordering and review of laboratory studies, ordering and review of radiographic studies, pulse oximetry, re-evaluation of patient's condition and review of old charts.        ED Vital Signs:  Vitals:    11/22/23 2131 11/22/23 2326 11/23/23 0052 11/23/23 0112    BP:   (!) 97/49    Pulse: 72 67 64 63   Resp:   18    Temp:   98.2 °F (36.8 °C)    TempSrc:   Oral    SpO2:   96%    Weight:       Height:        11/23/23 0323 11/23/23 0400 11/23/23 0445 11/23/23 0543   BP:   (!) 136/55    Pulse: 66 68 66 71   Resp:   18    Temp:   98.2 °F (36.8 °C)    TempSrc:   Tympanic    SpO2:   95%    Weight:       Height:        11/23/23 0745 11/23/23 0746 11/23/23 0802 11/23/23 0828   BP: 122/60      Pulse: 70 73  66   Resp: 18 18 18    Temp: 97.8 °F (36.6 °C)      TempSrc: Oral      SpO2: 97% 97%     Weight:       Height:        11/23/23 0900 11/23/23 1100 11/23/23 1209   BP:   (!) 112/54   Pulse: 70 71 70   Resp:   20   Temp:   98.5 °F (36.9 °C)   TempSrc:   Oral   SpO2:   (!) 90%   Weight:      Height:          Abnormal Lab Results:  Labs Reviewed   CBC W/ AUTO DIFFERENTIAL - Abnormal; Notable for the following components:       Result Value    RBC 3.03 (*)     Hemoglobin 7.9 (*)     Hematocrit 25.9 (*)     MCH 26.1 (*)     MCHC 30.5 (*)     RDW 15.2 (*)     Gran # (ANC) 9.0 (*)     Immature Grans (Abs) 0.06 (*)     Gran % 80.8 (*)     Lymph % 10.6 (*)     All other components within normal limits   COMPREHENSIVE METABOLIC PANEL - Abnormal; Notable for the following components:    Creatinine 2.1 (*)     Albumin 2.8 (*)     eGFR 33 (*)     All other components within normal limits   TSH - Abnormal; Notable for the following components:    TSH 0.296 (*)     All other components within normal limits   COMPREHENSIVE METABOLIC PANEL - Abnormal; Notable for the following components:    Creatinine 1.8 (*)     Albumin 2.4 (*)     eGFR 40 (*)     All other components within normal limits   CBC W/ AUTO DIFFERENTIAL - Abnormal; Notable for the following components:    RBC 2.78 (*)     Hemoglobin 7.3 (*)     Hematocrit 23.9 (*)     MCH 26.3 (*)     MCHC 30.5 (*)     RDW 15.0 (*)     Gran % 76.0 (*)     Lymph % 13.0 (*)     All other components within normal limits   T4, FREE   TYPE & SCREEN    PREPARE RBC SOFT        All Lab Results:  Results for orders placed or performed during the hospital encounter of 11/20/23   CBC auto differential   Result Value Ref Range    WBC 11.18 3.90 - 12.70 K/uL    RBC 3.03 (L) 4.60 - 6.20 M/uL    Hemoglobin 7.9 (L) 14.0 - 18.0 g/dL    Hematocrit 25.9 (L) 40.0 - 54.0 %    MCV 86 82 - 98 fL    MCH 26.1 (L) 27.0 - 31.0 pg    MCHC 30.5 (L) 32.0 - 36.0 g/dL    RDW 15.2 (H) 11.5 - 14.5 %    Platelets 258 150 - 450 K/uL    MPV 9.5 9.2 - 12.9 fL    Immature Granulocytes 0.5 0.0 - 0.5 %    Gran # (ANC) 9.0 (H) 1.8 - 7.7 K/uL    Immature Grans (Abs) 0.06 (H) 0.00 - 0.04 K/uL    Lymph # 1.2 1.0 - 4.8 K/uL    Mono # 0.5 0.3 - 1.0 K/uL    Eos # 0.4 0.0 - 0.5 K/uL    Baso # 0.04 0.00 - 0.20 K/uL    nRBC 0 0 /100 WBC    Gran % 80.8 (H) 38.0 - 73.0 %    Lymph % 10.6 (L) 18.0 - 48.0 %    Mono % 4.4 4.0 - 15.0 %    Eosinophil % 3.3 0.0 - 8.0 %    Basophil % 0.4 0.0 - 1.9 %    Differential Method Automated    Comprehensive metabolic panel   Result Value Ref Range    Sodium 138 136 - 145 mmol/L    Potassium 4.7 3.5 - 5.1 mmol/L    Chloride 102 95 - 110 mmol/L    CO2 23 23 - 29 mmol/L    Glucose 105 70 - 110 mg/dL    BUN 17 8 - 23 mg/dL    Creatinine 2.1 (H) 0.5 - 1.4 mg/dL    Calcium 9.6 8.7 - 10.5 mg/dL    Total Protein 6.8 6.0 - 8.4 g/dL    Albumin 2.8 (L) 3.5 - 5.2 g/dL    Total Bilirubin 0.3 0.1 - 1.0 mg/dL    Alkaline Phosphatase 95 55 - 135 U/L    AST 15 10 - 40 U/L    ALT 19 10 - 44 U/L    eGFR 33 (A) >60 mL/min/1.73 m^2    Anion Gap 13 8 - 16 mmol/L   TSH   Result Value Ref Range    TSH 0.296 (L) 0.400 - 4.000 uIU/mL   T4, Free   Result Value Ref Range    Free T4 1.40 0.71 - 1.51 ng/dL   Comprehensive Metabolic Panel (CMP)   Result Value Ref Range    Sodium 142 136 - 145 mmol/L    Potassium 4.0 3.5 - 5.1 mmol/L    Chloride 107 95 - 110 mmol/L    CO2 24 23 - 29 mmol/L    Glucose 106 70 - 110 mg/dL    BUN 17 8 - 23 mg/dL    Creatinine 1.8 (H) 0.5 - 1.4 mg/dL    Calcium 8.8 8.7 - 10.5  mg/dL    Total Protein 6.0 6.0 - 8.4 g/dL    Albumin 2.4 (L) 3.5 - 5.2 g/dL    Total Bilirubin 0.3 0.1 - 1.0 mg/dL    Alkaline Phosphatase 94 55 - 135 U/L    AST 17 10 - 40 U/L    ALT 18 10 - 44 U/L    eGFR 40 (A) >60 mL/min/1.73 m^2    Anion Gap 11 8 - 16 mmol/L   CBC with Automated Differential   Result Value Ref Range    WBC 7.72 3.90 - 12.70 K/uL    RBC 2.78 (L) 4.60 - 6.20 M/uL    Hemoglobin 7.3 (L) 14.0 - 18.0 g/dL    Hematocrit 23.9 (L) 40.0 - 54.0 %    MCV 86 82 - 98 fL    MCH 26.3 (L) 27.0 - 31.0 pg    MCHC 30.5 (L) 32.0 - 36.0 g/dL    RDW 15.0 (H) 11.5 - 14.5 %    Platelets 227 150 - 450 K/uL    MPV 9.4 9.2 - 12.9 fL    Immature Granulocytes 0.5 0.0 - 0.5 %    Gran # (ANC) 5.9 1.8 - 7.7 K/uL    Immature Grans (Abs) 0.04 0.00 - 0.04 K/uL    Lymph # 1.0 1.0 - 4.8 K/uL    Mono # 0.4 0.3 - 1.0 K/uL    Eos # 0.4 0.0 - 0.5 K/uL    Baso # 0.04 0.00 - 0.20 K/uL    nRBC 0 0 /100 WBC    Gran % 76.0 (H) 38.0 - 73.0 %    Lymph % 13.0 (L) 18.0 - 48.0 %    Mono % 5.3 4.0 - 15.0 %    Eosinophil % 4.7 0.0 - 8.0 %    Basophil % 0.5 0.0 - 1.9 %    Differential Method Automated    Comprehensive Metabolic Panel (CMP)   Result Value Ref Range    Sodium 143 136 - 145 mmol/L    Potassium 3.9 3.5 - 5.1 mmol/L    Chloride 107 95 - 110 mmol/L    CO2 26 23 - 29 mmol/L    Glucose 146 (H) 70 - 110 mg/dL    BUN 16 8 - 23 mg/dL    Creatinine 1.3 0.5 - 1.4 mg/dL    Calcium 8.5 (L) 8.7 - 10.5 mg/dL    Total Protein 5.4 (L) 6.0 - 8.4 g/dL    Albumin 2.2 (L) 3.5 - 5.2 g/dL    Total Bilirubin 0.3 0.1 - 1.0 mg/dL    Alkaline Phosphatase 78 55 - 135 U/L    AST 14 10 - 40 U/L    ALT 17 10 - 44 U/L    eGFR 59 (A) >60 mL/min/1.73 m^2    Anion Gap 10 8 - 16 mmol/L   CBC with Automated Differential   Result Value Ref Range    WBC 9.20 3.90 - 12.70 K/uL    RBC 2.86 (L) 4.60 - 6.20 M/uL    Hemoglobin 7.8 (L) 14.0 - 18.0 g/dL    Hematocrit 24.2 (L) 40.0 - 54.0 %    MCV 85 82 - 98 fL    MCH 27.3 27.0 - 31.0 pg    MCHC 32.2 32.0 - 36.0 g/dL    RDW 14.3  11.5 - 14.5 %    Platelets 177 150 - 450 K/uL    MPV 9.2 9.2 - 12.9 fL    Immature Granulocytes 0.8 (H) 0.0 - 0.5 %    Gran # (ANC) 8.4 (H) 1.8 - 7.7 K/uL    Immature Grans (Abs) 0.07 (H) 0.00 - 0.04 K/uL    Lymph # 0.5 (L) 1.0 - 4.8 K/uL    Mono # 0.2 (L) 0.3 - 1.0 K/uL    Eos # 0.0 0.0 - 0.5 K/uL    Baso # 0.01 0.00 - 0.20 K/uL    nRBC 0 0 /100 WBC    Gran % 90.7 (H) 38.0 - 73.0 %    Lymph % 5.9 (L) 18.0 - 48.0 %    Mono % 2.5 (L) 4.0 - 15.0 %    Eosinophil % 0.0 0.0 - 8.0 %    Basophil % 0.1 0.0 - 1.9 %    Differential Method Automated    Comprehensive Metabolic Panel (CMP)   Result Value Ref Range    Sodium 141 136 - 145 mmol/L    Potassium 3.8 3.5 - 5.1 mmol/L    Chloride 106 95 - 110 mmol/L    CO2 26 23 - 29 mmol/L    Glucose 116 (H) 70 - 110 mg/dL    BUN 18 8 - 23 mg/dL    Creatinine 1.3 0.5 - 1.4 mg/dL    Calcium 8.7 8.7 - 10.5 mg/dL    Total Protein 6.0 6.0 - 8.4 g/dL    Albumin 2.4 (L) 3.5 - 5.2 g/dL    Total Bilirubin 0.3 0.1 - 1.0 mg/dL    Alkaline Phosphatase 78 55 - 135 U/L    AST 12 10 - 40 U/L    ALT 10 10 - 44 U/L    eGFR 59 (A) >60 mL/min/1.73 m^2    Anion Gap 9 8 - 16 mmol/L   CBC with Automated Differential   Result Value Ref Range    WBC 10.52 3.90 - 12.70 K/uL    RBC 2.80 (L) 4.60 - 6.20 M/uL    Hemoglobin 7.6 (L) 14.0 - 18.0 g/dL    Hematocrit 24.2 (L) 40.0 - 54.0 %    MCV 86 82 - 98 fL    MCH 27.1 27.0 - 31.0 pg    MCHC 31.4 (L) 32.0 - 36.0 g/dL    RDW 15.0 (H) 11.5 - 14.5 %    Platelets 179 150 - 450 K/uL    MPV 9.4 9.2 - 12.9 fL    Immature Granulocytes 0.7 (H) 0.0 - 0.5 %    Gran # (ANC) 8.9 (H) 1.8 - 7.7 K/uL    Immature Grans (Abs) 0.07 (H) 0.00 - 0.04 K/uL    Lymph # 0.9 (L) 1.0 - 4.8 K/uL    Mono # 0.4 0.3 - 1.0 K/uL    Eos # 0.2 0.0 - 0.5 K/uL    Baso # 0.03 0.00 - 0.20 K/uL    nRBC 0 0 /100 WBC    Gran % 84.8 (H) 38.0 - 73.0 %    Lymph % 8.3 (L) 18.0 - 48.0 %    Mono % 3.8 (L) 4.0 - 15.0 %    Eosinophil % 2.1 0.0 - 8.0 %    Basophil % 0.3 0.0 - 1.9 %    Differential Method Automated     Type & Screen   Result Value Ref Range    Group & Rh B POS     Indirect Bertin NEG     Specimen Outdate 11/24/2023 23:59    Prepare RBC 1 Unit   Result Value Ref Range    UNIT NUMBER [id number]     Product Code [id number]     DISPENSE STATUS TRANSFUSED     CODING SYSTEM [id number]     Unit Blood Type Code 7300     Unit Blood Type B POS     Unit Expiration [id number]     CROSSMATCH INTERPRETATION Compatible         Imaging Results:  Imaging Results              X-Ray Elbow Complete Right (Final result)  Result time 11/20/23 21:26:18      Final result by Jefferson Kunz MD (11/20/23 21:26:18)                   Impression:      As above      Electronically signed by: Jefferson Kunz  Date:    11/20/2023  Time:    21:26               Narrative:    EXAMINATION:  XR ELBOW COMPLETE 3 VIEW RIGHT    CLINICAL HISTORY:  XR ELBOW COMPLETE 3 VIEW RIGHTPain in right elbow    COMPARISON:  None    FINDINGS:  Multiple radiographic views  were obtained.    Osseous detail reduced by casting material overlying matter.  Slight anterior fat pad is noted.  Limited positioning.  Otherwise no definite acute process seen                                       X-Ray Hip 2 or 3 views Right (with Pelvis when performed) (Final result)  Result time 11/20/23 21:27:20      Final result by Jefferson Kunz MD (11/20/23 21:27:20)                   Impression:      As above      Electronically signed by: Jefferson Kunz  Date:    11/20/2023  Time:    21:27               Narrative:    EXAMINATION:  XR HIP WITH PELVIS WHEN PERFORMED, 2 OR 3  VIEWS RIGHT    CLINICAL HISTORY:  XR HIP WITH PELVIS WHEN PERFORMED, 2 OR 3  VIEWS RIGHT    COMPARISON:  None    FINDINGS:  Multiple radiographic views  were obtained.    Intertrochanteric fracture of the right hip.  Mild angulation at the fracture site.                                       The EKG was ordered, reviewed, and independently interpreted by the ED provider.  Interpretation time: 21:46  Rate: 57  BPM  Rhythm: sinus bradycardia  Interpretation: No acute ST changes. No STEMI.           The Emergency Provider reviewed the vital signs and test results, which are outlined above.     ED Discussion     10:11 PM: Discussed pt's case with Dr. Guidry (Orthopedic Surgery) who recommends admission to hospital medicine and will see pt in consult.    11:01 PM: Discussed case with Dr. Parra (Hospital Medicine). Dr. Parra agrees with current care and management of pt and accepts admission.   Admitting Service: Hospital medicine  Admitting Physician: Dr. Parra  Admit to: Inpatient med/tele    11:01 PM: Re-evaluated pt. I have discussed test results, shared treatment plan, and the need for admission with patient and family at bedside. Pt and family express understanding at this time and agree with all information. All questions answered. Pt and family have no further questions or concerns at this time. Pt is ready for admit.        Medical Decision Making  Problems Addressed:  Abrasion of right elbow, initial encounter: acute illness or injury  BANDAR (acute kidney injury): chronic illness or injury  Anemia, unspecified type: chronic illness or injury  Closed fracture of right hip, initial encounter: acute illness or injury  Need for tetanus, diphtheria, and acellular pertussis (Tdap) vaccine: acute illness or injury  Preop examination: acute illness or injury  Right elbow pain: acute illness or injury    Amount and/or Complexity of Data Reviewed  Independent Historian: EMS  Labs: ordered. Decision-making details documented in ED Course.  Radiology: ordered and independent interpretation performed. Decision-making details documented in ED Course.  ECG/medicine tests: ordered and independent interpretation performed. Decision-making details documented in ED Course.    Risk  OTC drugs.  Prescription drug management.  Parenteral controlled substances.  Decision regarding hospitalization.  Diagnosis or treatment significantly  limited by social determinants of health.  Emergency major surgery.                ED Medication(s):  Medications   Tdap (BOOSTRIX) vaccine injection 0.5 mL (has no administration in time range)   sodium chloride 0.9% flush 10 mL (has no administration in time range)   albuterol-ipratropium 2.5 mg-0.5 mg/3 mL nebulizer solution 3 mL (has no administration in time range)   melatonin tablet 6 mg (has no administration in time range)   ondansetron injection 4 mg (has no administration in time range)   promethazine tablet 25 mg (has no administration in time range)   senna-docusate 8.6-50 mg per tablet 1 tablet (has no administration in time range)   acetaminophen tablet 650 mg (has no administration in time range)   aluminum-magnesium hydroxide-simethicone 200-200-20 mg/5 mL suspension 30 mL (has no administration in time range)   acetaminophen suppository 650 mg (has no administration in time range)   HYDROcodone-acetaminophen 5-325 mg per tablet 1 tablet (1 tablet Oral Given 11/23/23 0802)   morphine injection 2 mg (2 mg Intravenous Given 11/22/23 0201)   naloxone 0.4 mg/mL injection 0.02 mg (has no administration in time range)   glucose chewable tablet 16 g (has no administration in time range)   glucose chewable tablet 24 g (has no administration in time range)   glucagon (human recombinant) injection 1 mg (has no administration in time range)   dextrose 10% bolus 125 mL 125 mL (has no administration in time range)   dextrose 10% bolus 250 mL 250 mL (has no administration in time range)   ALPRAZolam tablet 1 mg (1 mg Oral Given 11/22/23 2030)   cyclobenzaprine tablet 10 mg (has no administration in time range)   docusate sodium capsule 100 mg (100 mg Oral Given 11/23/23 0803)   EScitalopram oxalate tablet 20 mg (20 mg Oral Given 11/23/23 0803)   levothyroxine tablet 125 mcg (125 mcg Oral Given 11/23/23 0521)   linezolid tablet 600 mg (600 mg Oral Given 11/23/23 0803)   metoprolol succinate (TOPROL-XL) 24 hr tablet  50 mg (50 mg Oral Given 11/23/23 0803)   tamsulosin 24 hr capsule 0.4 mg (0.4 mg Oral Given 11/23/23 0803)   budesonide nebulizer solution 0.5 mg (0.5 mg Nebulization Given 11/23/23 0746)   arformoteroL nebulizer solution 15 mcg (15 mcg Nebulization Given 11/23/23 0746)   0.9%  NaCl infusion (for blood administration) (has no administration in time range)   0.9%  NaCl infusion (for blood administration) (has no administration in time range)   enoxaparin injection 40 mg (has no administration in time range)   lactated ringers infusion ( Intravenous Verify Only 11/23/23 0543)   mupirocin 2 % ointment ( Topical (Top) Given 11/20/23 2210)   morphine injection 4 mg (4 mg Intravenous Given 11/20/23 2206)   0.9%  NaCl infusion (0 mLs Intravenous Stopped 11/21/23 0430)   sodium chloride 0.9% bolus 1,000 mL 1,000 mL (0 mLs Intravenous Stopped 11/21/23 0015)   tranexamic acid in NaCl,iso-os IVPB 1,000 mg (0 mg Intravenous Stopped 11/21/23 0934)   cefazolin (ANCEF) 2 gram in dextrose 5% 50 mL IVPB (premix) (0 g Intravenous Stopped 11/22/23 1018)       Current Discharge Medication List                  Scribe Attestation:   Scribe #1: I performed the above scribed service and the documentation accurately describes the services I performed. I attest to the accuracy of the note.     Attending:   Physician Attestation Statement for Scribe #1: I, Rubén Helms Jr., MD, personally performed the services described in this documentation, as scribed by Marybeth Holloway, in my presence, and it is both accurate and complete.           Clinical Impression       ICD-10-CM ICD-9-CM   1. Closed fracture of right hip, initial encounter  S72.001A 820.8   2. Right elbow pain  M25.521 719.42   3. Preop examination  Z01.818 V72.84   4. Abrasion of right elbow, initial encounter  S50.311A 913.0   5. Need for tetanus, diphtheria, and acellular pertussis (Tdap) vaccine  Z23 V06.1   6. Anemia, unspecified type  D64.9 285.9   7. BANDAR (acute kidney  injury)  N17.9 584.9   8. Chest pain  R07.9 786.50   9. Acute renal failure superimposed on stage 3a chronic kidney disease, unspecified acute renal failure type  N17.9 584.9    N18.31 585.3   10. Displaced intertrochanteric fracture of right femur, initial encounter for closed fracture  S72.141A 820.21              Rubén Helms Jr., MD  11/23/23 4248

## 2023-11-21 NOTE — ANESTHESIA PROCEDURE NOTES
Intubation    Date/Time: 11/21/2023 3:30 PM    Performed by: Miguel Huynh CRNA  Authorized by: Suresh Larsen MD    Intubation:     Induction:  Intravenous    Intubated:  Postinduction    Mask Ventilation:  Easy mask    Attempts:  1    Attempted By:  CRNA    Method of Intubation:  Direct    Blade:  Sarahi 4    Laryngeal View Grade: Grade I - full view of cords      Difficult Airway Encountered?: No      Complications:  None    Airway Device:  Oral endotracheal tube    Airway Device Size:  7.5    Style/Cuff Inflation:  Cuffed (inflated to minimal occlusive pressure)    Tube secured:  23    Secured at:  The lips    Placement Verified By:  Capnometry    Findings Post-Intubation:  BS equal bilateral and atraumatic/condition of teeth unchanged

## 2023-11-21 NOTE — ANESTHESIA PREPROCEDURE EVALUATION
11/21/2023  Geovani Currie is a 68 y.o., male.    Patient Active Problem List   Diagnosis    Altered mental status    Elevated serum creatinine    Anemia    History of cancer    Hx of cancer of lung    UTI (urinary tract infection)    Hyperglycemia    Squamous cell carcinoma of right lung    Centrilobular emphysema    Nephrostomy tube displaced    Acute renal failure superimposed on stage 3 chronic kidney disease    Malignant neoplasm of urinary bladder    Ureteral obstruction, left    Need for tetanus, diphtheria, and acellular pertussis (Tdap) vaccine    Abrasion of right elbow    Displaced intertrochanteric fracture of right femur, initial encounter for closed fracture    Preop examination    Right elbow pain    Right hip pain     Past Medical History:   Diagnosis Date    COPD (chronic obstructive pulmonary disease)     Hypertension      Past Surgical History:   Procedure Laterality Date    APPENDECTOMY      CATARACT EXTRACTION      NEPHROSTOMY       ECHO: (10/22/23)    Left Ventricle: The left ventricle is normal in size. Normal wall thickness. There is eccentric hypertrophy. Normal wall motion. There is normal systolic function. Ejection fraction by visual approximation is 60%. Grade I diastolic dysfunction.    Left Atrium: Agitated saline study of the atrial septum is negative after vasalva maneuver, suggesting absence of intracardiac shunt at the atrial level.    Right Ventricle: Systolic function is normal.    Aortic Valve: The aortic valve is a trileaflet valve. Severely calcified cusps. Moderately restricted motion. There is moderate stenosis. Aortic valve area by VTI is 1.34 cm². Aortic valve peak velocity is 3.20 m/s. Mean gradient is 28 mmHg. The dimensionless index is 0.38. There is mild aortic regurgitation.    Mitral Valve: There is moderate mitral annular calcification present.    Pulmonary  Artery: The estimated pulmonary artery systolic pressure is 37 mmHg.    IVC/SVC: Intermediate venous pressure at 8 mmHg.    Pre-op Assessment    I have reviewed the Patient Summary Reports.    I have reviewed the NPO Status.   I have reviewed the Medications.     Review of Systems  Anesthesia Hx:  No problems with previous Anesthesia   History of prior surgery of interest to airway management or planning:  Previous anesthesia: General          Denies Personal Hx of Anesthesia complications.                    Social:  Smoker Daily vaper - denies any recent fever, chills, new cough or new SOB      Hematology/Oncology:       -- Anemia:               Hematology Comments: H/H 7.3/23.9 upon admission  - 1u PRBC given in ED     Current/Recent Cancer.            Oncology Comments: Lung cancer - SCC of Right lung (recently diagnosed, currently on no treatment, PET scan in near future to stage)      Cardiovascular:     Hypertension              ECG has been reviewed. Sinus bradycardia (57)  Otherwise normal ECG   When compared with ECG of 20-OCT-2023 21:56,   No significant change was found                              Pulmonary:   COPD, severe      Centrilobar emphysema                Renal/:  Chronic Renal Disease, ARF, CKD   CKD-3             Musculoskeletal:  Musculoskeletal Normal                Neurological:  Neurology Normal                                      Endocrine:  Endocrine Normal    BMI 28            Chemistry        Component Value Date/Time     11/21/2023 0609    K 4.0 11/21/2023 0609     11/21/2023 0609    CO2 24 11/21/2023 0609    BUN 17 11/21/2023 0609    CREATININE 1.8 (H) 11/21/2023 0609     11/21/2023 0609        Component Value Date/Time    CALCIUM 8.8 11/21/2023 0609    ALKPHOS 94 11/21/2023 0609    AST 17 11/21/2023 0609    ALT 18 11/21/2023 0609    BILITOT 0.3 11/21/2023 0609        Lab Results   Component Value Date    WBC 7.72 11/21/2023    HGB 7.3 (L) 11/21/2023    HCT  23.9 (L) 11/21/2023    MCV 86 11/21/2023     11/21/2023       Physical Exam  General: Well nourished, Cooperative, Alert and Oriented    Airway:  Mallampati: III   Mouth Opening: Small, but > 3cm  TM Distance: Normal  Tongue: Normal  Neck ROM: Normal ROM    Dental:  Edentulous        Anesthesia Plan  Type of Anesthesia, risks & benefits discussed:    Anesthesia Type: Gen ETT  Intra-op Monitoring Plan: Standard ASA Monitors  Post Op Pain Control Plan: multimodal analgesia and IV/PO Opioids PRN  Induction:  IV  Airway Plan: Direct, Post-Induction  Informed Consent: Informed consent signed with the Patient and all parties understand the risks and agree with anesthesia plan.  All questions answered.   ASA Score: 3  Day of Surgery Review of History & Physical: H&P Update referred to the surgeon/provider.    Ready For Surgery From Anesthesia Perspective.     .

## 2023-11-21 NOTE — HPI
Geovani Currie is a 68 y.o. male with a PMH  has a past medical history of COPD (chronic obstructive pulmonary disease) and Hypertension. who presented to the ED for further evaluation of right hip pain following ground level fall earlier today.  Patient reported stepping onto a curb at which time he lost his balance causing him to fall on his right side endorsing immediate pain with inability to stand/bear weight.  Patient denied endorsing any head trauma, loss of consciousness, or sustaining any other injuries with the exception of abrasions to his elbow.  Patient reported pain was localized throughout his right hip, nonradiating, currently rated 8/10 in severity with aggravating factors including weight-bearing, movement, and palpation to the affected area while alleviating factors included immobility, nonweightbearing, and pain medication.  Associated symptoms include generalized myalgias, arthralgias, and weakness but denied endorsing any numbness, tingling, or penetrating trauma, but did report decreased range of motion muscle strength secondary to exacerbation of pain.  All other review of systems negative except as noted above.  Of note, patient was recently discharged on 11/17/2023 following admission for dislodged nephrostomy tube with IR replacement and treatment of UTI which he is currently being treated with linezolid.  Prior to incident, patient reported being in his usual state of health with no other concerns or complaints.  Initial workup in the ED revealed patient has sustained an acute traumatic right intertrochanteric femur fracture on imaging in his being admitted to Hospital Medicine inpatient for continued medical management while awaiting surgical intervention from Orthopedics.    PCP: Faizan Antoine

## 2023-11-21 NOTE — CONSULTS
DATE OF INJURY  (date: 11/20/2023)     CC    right hip pain     HPI     Geovani Currie is a 68 y.o. male I amasked to see regarding his hip pain.    He reports he fell when he was stepping up onto a curb.  The pain is in his right hip.  The hip hurts more if he tries to move it.     Lives with spouse.  he   does not have steps to get into the home.  he lives in a single level.  he  has assistive devices        PAST MEDICAL HISTORY   Past Medical History:   Diagnosis Date    COPD (chronic obstructive pulmonary disease)     Hypertension               PAST SURGICAL HISTORY    Past Surgical History:   Procedure Laterality Date    APPENDECTOMY      CATARACT EXTRACTION      NEPHROSTOMY       Nephrostomy tube revision, right ring/small finger amputations     ALLERGIES    Review of patient's allergies indicates:  No Known Allergies           MEDICATIONS    Reviewed via gripNote     SOCIAL HISTORY        Social History     Occupational History    Not on file   Tobacco Use    Smoking status: Former     Types: Cigarettes     Passive exposure: Never    Smokeless tobacco: Never   Substance and Sexual Activity    Alcohol use: Never    Drug use: Never    Sexual activity: Not Currently     Partners: Female        OCCUPATIONAL HISTORY Retired          FAMILY HISTORY   Family History   Problem Relation Age of Onset    Cancer Father     Cancer Mother           REVIEW OF SYSTEMS:      GEN:   Denies fever, chills, malaise, unexpected weight changes.    HEENT: Denies headaches, diplopia, rhinnorea, epistaxis,          difficulty swallowing.    CV:    Denies hypertension, chest pain, CAD, MI, palpitations,         PVD  ENDO:  Denies diabetes, temperature intolerance  GI:    Denies reflux, nausea, vomiting, constipation, diarrhea.       Denies dysuria/nocturia/prostate problems  NEURO: Denies stroke, epilepsy/seizures, paralysis/paresis or         neuropathy.  PSYCH  Denies PTSD,depression and ADD  PULM:  Denies sleep  apnea, shortness of breath, wheezing, cough,         hemoptysis, COPD, asthma  MSK:   See HPI. Denies arthritis, myalgias, fracture.     PHYSICAL EXAMINATION  Vitals:    11/21/23 0232 11/21/23 0302 11/21/23 0333 11/21/23 0403   BP: (!) 115/54 106/77 (!) 128/59 120/67   Pulse: (!) 55 60 (!) 59 62   Resp: 12 15 12 13   Temp:       TempSrc:       SpO2: 100% 100% 98% 97%    11/21/23 0432 11/21/23 0502 11/21/23 0532 11/21/23 0602   BP: (!) 110/53 (!) 128/59 (!) 118/56 (!) 124/56   Pulse: 60 60 66 64   Resp: 14 12 12 15   Temp:       TempSrc:       SpO2: 95% 99% 98% 97%    11/21/23 0631 11/21/23 0632 11/21/23 0703 11/21/23 0722   BP:  (!) 132/57 128/60    Pulse: 62  63 61   Resp: 14  15 18   Temp:       TempSrc:       SpO2: 98%  99% 98%    11/21/23 0730 11/21/23 0834 11/21/23 0841   BP: (!) 141/62  (!) 121/57   Pulse: 61     Resp: 16 17    Temp:      TempSrc:      SpO2: 98%           GEN            NAD,   appearing     PSYCH          A&Ox3, pleasant and cooperative    STATION     right HIP:    Skin intact.  Negative ecchymoses.  Positive swelling.  Thigh/calf soft.  Positive tenderness about the hip.  Pain with log rolling.  Pain with attempted active flexion of hip.  Sensation Intact.  EHL strength 5/5.  Ankle plantar/dorsiflexion strength.  Cap refill <2s.        DIAGNOSTIC IMAGING  right  hip/pelvis  XR: Multiple Views personally reviewed.  Displaced intertrochanteric hip fracture on the right.  Appears to have some comminution.       DISCUSSION  Diagnostic imaging, clinical findings, and patient's symptoms reviewed and correlated.  Discussed non-operative treatments including bedrest and pain control and its risks of pneumonia/blood clots/pressure ulcers.  Discussed operative interventions, ORIF with IMN and the recovery, and generalized progression of activity.    Patient given an opportunity to ask questions.  When his questions were answered, he agreed with the plan noted below.       DIAGNOSIS:    Right hip  fracture closed, displaced intertrochanteric type  UTI on linezolid  B/l nephrostomy tubes           PLAN  1.     Risks and benefits discussed with patient including possible failure of procedure to relieve symptoms, need for further surgery, risks of infection, bleeding, damage to nerves/arteries/tendons/cartilage/ligaments, blood clots, pneumonia and risks of anesthesia.  Patient given an opportunity to ask questions.  When his  questions were answered, he decided to proceed with surgery.      Discussed the 30% mortality rate of hip fractures that are treated operatively.  Discussed the mortality rate approaches 95% in 1st 30 days when hip fractures are treated non-operatively.  Discussed discharge disposition depends on ability to mobilize after surgery, stability of all medical conditions, safety to go home.       Consent for surgery signed and taken to pre-op    Consent for blood signed and placed on chart     Surgery scheduled for today       2.  Pre-op Antibiotic:  Ancef 2g IV to be given by anesthesia  3.  Ortho Trauma will follow while inpt

## 2023-11-21 NOTE — SUBJECTIVE & OBJECTIVE
"Interval History: NAEON. Surgery for 1pm today. Did not see patient as he was in the OR    Review of Systems  Objective:     Vital Signs (Most Recent):  Temp: 97.8 °F (36.6 °C) (11/21/23 1247)  Pulse: 67 (11/21/23 1247)  Resp: 18 (11/21/23 1247)  BP: 127/61 (11/21/23 1247)  SpO2: 98 % (11/21/23 1247) Vital Signs (24h Range):  Temp:  [97.8 °F (36.6 °C)-98.9 °F (37.2 °C)] 97.8 °F (36.6 °C)  Pulse:  [54-84] 67  Resp:  [10-20] 18  SpO2:  [90 %-100 %] 98 %  BP: ()/(46-78) 127/61        There is no height or weight on file to calculate BMI.    Intake/Output Summary (Last 24 hours) at 11/21/2023 1449  Last data filed at 11/21/2023 1345  Gross per 24 hour   Intake 908.33 ml   Output 3200 ml   Net -2291.67 ml         Physical Exam    Could not obtain     Significant Labs: BMP:   Recent Labs   Lab 11/21/23  0609         K 4.0      CO2 24   BUN 17   CREATININE 1.8*   CALCIUM 8.8     CBC:   Recent Labs   Lab 11/20/23 2118 11/21/23  0609   WBC 11.18 7.72   HGB 7.9* 7.3*   HCT 25.9* 23.9*    227     CMP:   Recent Labs   Lab 11/20/23 2118 11/21/23  0609    142   K 4.7 4.0    107   CO2 23 24    106   BUN 17 17   CREATININE 2.1* 1.8*   CALCIUM 9.6 8.8   PROT 6.8 6.0   ALBUMIN 2.8* 2.4*   BILITOT 0.3 0.3   ALKPHOS 95 94   AST 15 17   ALT 19 18   ANIONGAP 13 11     Cardiac Markers: No results for input(s): "CKMB", "MYOGLOBIN", "BNP", "TROPISTAT" in the last 48 hours.  Coagulation: No results for input(s): "PT", "INR", "APTT" in the last 48 hours.  Lactic Acid: No results for input(s): "LACTATE" in the last 48 hours.  Magnesium: No results for input(s): "MG" in the last 48 hours.  Troponin: No results for input(s): "TROPONINI", "TROPONINIHS" in the last 48 hours.  TSH:   Recent Labs   Lab 11/20/23 2118   TSH 0.296*     Urine Studies:   No results for input(s): "COLORU", "APPEARANCEUA", "PHUR", "SPECGRAV", "PROTEINUA", "GLUCUA", "KETONESU", "BILIRUBINUA", "OCCULTUA", "NITRITE", " ""UROBILINOGEN", "LEUKOCYTESUR", "RBCUA", "WBCUA", "BACTERIA", "SQUAMEPITHEL", "HYALINECASTS" in the last 48 hours.    Invalid input(s): "WRIGHTSUR"      Significant Imaging:   Imaging Results              X-Ray Elbow Complete Right (Final result)  Result time 11/20/23 21:26:18      Final result by Jefferson Kunz MD (11/20/23 21:26:18)                   Impression:      As above      Electronically signed by: Jefferson Kunz  Date:    11/20/2023  Time:    21:26               Narrative:    EXAMINATION:  XR ELBOW COMPLETE 3 VIEW RIGHT    CLINICAL HISTORY:  XR ELBOW COMPLETE 3 VIEW RIGHTPain in right elbow    COMPARISON:  None    FINDINGS:  Multiple radiographic views  were obtained.    Osseous detail reduced by casting material overlying matter.  Slight anterior fat pad is noted.  Limited positioning.  Otherwise no definite acute process seen                                       X-Ray Hip 2 or 3 views Right (with Pelvis when performed) (Final result)  Result time 11/20/23 21:27:20      Final result by Jefferson Kunz MD (11/20/23 21:27:20)                   Impression:      As above      Electronically signed by: Jefferson Kunz  Date:    11/20/2023  Time:    21:27               Narrative:    EXAMINATION:  XR HIP WITH PELVIS WHEN PERFORMED, 2 OR 3  VIEWS RIGHT    CLINICAL HISTORY:  XR HIP WITH PELVIS WHEN PERFORMED, 2 OR 3  VIEWS RIGHT    COMPARISON:  None    FINDINGS:  Multiple radiographic views  were obtained.    Intertrochanteric fracture of the right hip.  Mild angulation at the fracture site.                                      "

## 2023-11-21 NOTE — ASSESSMENT & PLAN NOTE
Patient's COPD is controlled currently.  Patient is currently off COPD Pathway. Continue scheduled inhalers  as needed  and monitor respiratory status closely.

## 2023-11-21 NOTE — SUBJECTIVE & OBJECTIVE
Past Medical History:   Diagnosis Date    COPD (chronic obstructive pulmonary disease)     Hypertension        Past Surgical History:   Procedure Laterality Date    APPENDECTOMY      CATARACT EXTRACTION      NEPHROSTOMY         Review of patient's allergies indicates:  No Known Allergies    No current facility-administered medications on file prior to encounter.     Current Outpatient Medications on File Prior to Encounter   Medication Sig    albuterol (ACCUNEB) 0.63 mg/3 mL Nebu Take 3 mLs (0.63 mg total) by nebulization 3 (three) times daily as needed (sob, wheezing). Rescue    albuterol (PROVENTIL/VENTOLIN HFA) 90 mcg/actuation inhaler Inhale 1-2 puffs into the lungs every 4 (four) hours as needed for Wheezing. Rescue    ALPRAZolam (XANAX) 0.5 MG tablet Take 2 tablets (1 mg total) by mouth 3 (three) times daily as needed for Anxiety.    cyclobenzaprine (FLEXERIL) 10 MG tablet Take 1 tablet (10 mg total) by mouth 3 (three) times daily as needed for Muscle spasms.    docusate sodium (COLACE) 100 MG capsule Take 100 mg by mouth 2 (two) times daily.    EScitalopram oxalate (LEXAPRO) 20 MG tablet Take 1 tablet (20 mg total) by mouth once daily.    fluticasone furoate-vilanteroL (BREO ELLIPTA) 100-25 mcg/dose diskus inhaler Inhale 1 puff into the lungs once daily. Controller    HYDROcodone-acetaminophen (NORCO)  mg per tablet Take 1 tablet by mouth every 6 (six) hours as needed for Pain.    levothyroxine (SYNTHROID) 125 MCG tablet Take 1 tablet (125 mcg total) by mouth before breakfast.    linezolid (ZYVOX) 600 mg Tab Take 1 tablet (600 mg total) by mouth every 12 (twelve) hours. for 10 days    metoprolol succinate (TOPROL-XL) 50 MG 24 hr tablet Take 1 tablet (50 mg total) by mouth once daily.    nicotine (NICODERM CQ) 21 mg/24 hr Place 1 patch onto the skin once daily.    tamsulosin (FLOMAX) 0.4 mg Cap Take 1 capsule (0.4 mg total) by mouth once daily.    vitamin D (VITAMIN D3) 1000 units Tab 25 mcg.     Family  History       Problem Relation (Age of Onset)    Cancer Father, Mother          Tobacco Use    Smoking status: Former     Types: Cigarettes     Passive exposure: Never    Smokeless tobacco: Never   Substance and Sexual Activity    Alcohol use: Never    Drug use: Never    Sexual activity: Not Currently     Partners: Female     Review of Systems   All other systems reviewed and are negative.    Objective:     Vital Signs (Most Recent):  Temp: 98.9 °F (37.2 °C) (11/20/23 1900)  Pulse: 60 (11/21/23 0302)  Resp: 15 (11/21/23 0302)  BP: 106/77 (11/21/23 0302)  SpO2: 100 % (11/21/23 0302) Vital Signs (24h Range):  Temp:  [98.2 °F (36.8 °C)-98.9 °F (37.2 °C)] 98.9 °F (37.2 °C)  Pulse:  [54-84] 60  Resp:  [10-20] 15  SpO2:  [90 %-100 %] 100 %  BP: ()/(46-78) 106/77        There is no height or weight on file to calculate BMI.     Physical Exam  Vitals reviewed.   Constitutional:       General: He is in acute distress.      Appearance: Normal appearance. He is normal weight. He is not ill-appearing, toxic-appearing or diaphoretic.   HENT:      Head: Normocephalic and atraumatic.      Right Ear: External ear normal.      Left Ear: External ear normal.      Nose: Nose normal. No congestion or rhinorrhea.      Mouth/Throat:      Mouth: Mucous membranes are moist.      Pharynx: Oropharynx is clear. No oropharyngeal exudate or posterior oropharyngeal erythema.   Eyes:      General: No scleral icterus.     Extraocular Movements: Extraocular movements intact.      Conjunctiva/sclera: Conjunctivae normal.      Pupils: Pupils are equal, round, and reactive to light.   Neck:      Vascular: No carotid bruit.   Cardiovascular:      Rate and Rhythm: Normal rate and regular rhythm.      Pulses: Normal pulses.      Heart sounds: Normal heart sounds. No murmur heard.     No friction rub. No gallop.   Pulmonary:      Effort: Pulmonary effort is normal. No respiratory distress.      Breath sounds: Normal breath sounds. No stridor. No  wheezing, rhonchi or rales.   Chest:      Chest wall: No tenderness.   Abdominal:      General: Abdomen is flat. Bowel sounds are normal. There is no distension.      Palpations: Abdomen is soft. There is no mass.      Tenderness: There is no abdominal tenderness. There is no guarding or rebound.      Hernia: No hernia is present.   Musculoskeletal:         General: Tenderness present. No swelling, deformity or signs of injury. Normal range of motion.      Cervical back: Normal range of motion and neck supple. No rigidity or tenderness.      Comments: Bilateral nephrostomy tubes present.  TTP throughout right hip with associated decreased range of motion.  Right lower extremity shortened and externally rotated.   Lymphadenopathy:      Cervical: No cervical adenopathy.   Skin:     General: Skin is warm and dry.      Capillary Refill: Capillary refill takes less than 2 seconds.      Coloration: Skin is not jaundiced or pale.      Findings: No bruising, erythema, lesion or rash.      Comments: Abrasions noted on right elbow   Neurological:      General: No focal deficit present.      Mental Status: He is alert and oriented to person, place, and time. Mental status is at baseline.      Cranial Nerves: No cranial nerve deficit.      Sensory: No sensory deficit.      Motor: Weakness present.      Coordination: Coordination normal.      Comments: Unable to fully assess right lower extremity secondary to exacerbation of pain given recent trauma however, right dorsi/plantar flexion yielding 5/5 strength.  Left lower extremity yielding 5/5 strength throughout.  Bilateral upper extremities yielding 5/5 strength throughout.  Sensation to light touch grossly intact throughout.   Psychiatric:         Mood and Affect: Mood normal.         Behavior: Behavior normal.         Thought Content: Thought content normal.         Judgment: Judgment normal.              CRANIAL NERVES     CN III, IV, VI   Pupils are equal, round, and  reactive to light.       Significant Labs: All pertinent labs within the past 24 hours have been reviewed.    Significant Imaging: I have reviewed all pertinent imaging results/findings within the past 24 hours.    LABS:  Recent Results (from the past 24 hour(s))   CBC auto differential    Collection Time: 11/20/23  9:18 PM   Result Value Ref Range    WBC 11.18 3.90 - 12.70 K/uL    RBC 3.03 (L) 4.60 - 6.20 M/uL    Hemoglobin 7.9 (L) 14.0 - 18.0 g/dL    Hematocrit 25.9 (L) 40.0 - 54.0 %    MCV 86 82 - 98 fL    MCH 26.1 (L) 27.0 - 31.0 pg    MCHC 30.5 (L) 32.0 - 36.0 g/dL    RDW 15.2 (H) 11.5 - 14.5 %    Platelets 258 150 - 450 K/uL    MPV 9.5 9.2 - 12.9 fL    Immature Granulocytes 0.5 0.0 - 0.5 %    Gran # (ANC) 9.0 (H) 1.8 - 7.7 K/uL    Immature Grans (Abs) 0.06 (H) 0.00 - 0.04 K/uL    Lymph # 1.2 1.0 - 4.8 K/uL    Mono # 0.5 0.3 - 1.0 K/uL    Eos # 0.4 0.0 - 0.5 K/uL    Baso # 0.04 0.00 - 0.20 K/uL    nRBC 0 0 /100 WBC    Gran % 80.8 (H) 38.0 - 73.0 %    Lymph % 10.6 (L) 18.0 - 48.0 %    Mono % 4.4 4.0 - 15.0 %    Eosinophil % 3.3 0.0 - 8.0 %    Basophil % 0.4 0.0 - 1.9 %    Differential Method Automated    Comprehensive metabolic panel    Collection Time: 11/20/23  9:18 PM   Result Value Ref Range    Sodium 138 136 - 145 mmol/L    Potassium 4.7 3.5 - 5.1 mmol/L    Chloride 102 95 - 110 mmol/L    CO2 23 23 - 29 mmol/L    Glucose 105 70 - 110 mg/dL    BUN 17 8 - 23 mg/dL    Creatinine 2.1 (H) 0.5 - 1.4 mg/dL    Calcium 9.6 8.7 - 10.5 mg/dL    Total Protein 6.8 6.0 - 8.4 g/dL    Albumin 2.8 (L) 3.5 - 5.2 g/dL    Total Bilirubin 0.3 0.1 - 1.0 mg/dL    Alkaline Phosphatase 95 55 - 135 U/L    AST 15 10 - 40 U/L    ALT 19 10 - 44 U/L    eGFR 33 (A) >60 mL/min/1.73 m^2    Anion Gap 13 8 - 16 mmol/L   TSH    Collection Time: 11/20/23  9:18 PM   Result Value Ref Range    TSH 0.296 (L) 0.400 - 4.000 uIU/mL   T4, Free    Collection Time: 11/20/23  9:18 PM   Result Value Ref Range    Free T4 1.40 0.71 - 1.51 ng/dL        RADIOLOGY  X-Ray Hip 2 or 3 views Right (with Pelvis when performed)    Result Date: 11/20/2023  EXAMINATION: XR HIP WITH PELVIS WHEN PERFORMED, 2 OR 3  VIEWS RIGHT CLINICAL HISTORY: XR HIP WITH PELVIS WHEN PERFORMED, 2 OR 3  VIEWS RIGHT COMPARISON: None FINDINGS: Multiple radiographic views  were obtained. Intertrochanteric fracture of the right hip.  Mild angulation at the fracture site.     As above Electronically signed by: Jefferson Kunz Date:    11/20/2023 Time:    21:27    X-Ray Elbow Complete Right    Result Date: 11/20/2023  EXAMINATION: XR ELBOW COMPLETE 3 VIEW RIGHT CLINICAL HISTORY: XR ELBOW COMPLETE 3 VIEW RIGHTPain in right elbow COMPARISON: None FINDINGS: Multiple radiographic views  were obtained. Osseous detail reduced by casting material overlying matter.  Slight anterior fat pad is noted.  Limited positioning.  Otherwise no definite acute process seen     As above Electronically signed by: Jefferson Kunz Date:    11/20/2023 Time:    21:26    IR Nephrostomy Tube Placement    Result Date: 11/17/2023  EXAMINATION: IR NEPHROSTOMY TUBE PLACEMENT CLINICAL HISTORY: Left ureteral obstruction; TECHNIQUE: Informed consent was obtained prior to the exam after discussion of risk and benefits of the procedure.  Sedation was provided by a registered nurse trained in physiologic monitoring.  Total duration of sedation was 20 minutes. Radiation dose area product was 21 mGy.  2.3 minutes fluoro time.  Approximately 10 cc Omni 300 contrast material was used. The patient was placed prone on the fluoroscopy table.  The skin was prepped and draped under sterile conditions and 1% lidocaine was used for local anesthesia.  Ultrasound documented moderate hydronephrosis.  A posterior shasha in the left kidney was accessed with a micropuncture needle using ultrasound guidance.  A microwire was advanced centrally within the renal pelvis using fluoroscopic guidance.  The AccuStick sheath was placed.  Position was confirmed  after injection of contrast.  The wire was upsized.  The tract was dilated to 8-Dominican.  An 8-Dominican nephrostomy tube is placed in good position within the renal pelvis.  Images demonstrated appropriate positioning of the nephrostomy tube. FINDINGS: The catheter was secured at the skin surface and connected to a bag.     Successful left-sided nephrostomy tube placement as above. Electronically signed by: Oleg Sr MD Date:    11/17/2023 Time:    14:40    IR Nephrostomy Tube Change    Result Date: 11/15/2023  EXAMINATION: Genitourinary catheter conversion Procedural Personnel Attending physician(s): Fellow physician(s): None Resident physician(s): None Advanced practice provider(s): None Pre-procedure diagnosis: Bladder cancer Post-procedure diagnosis: Same Indication: Continued ureteral obstruction Complications: No immediate complications. CLINICAL HISTORY: 68-year-old male with bladder cancer TECHNIQUE: See below COMPARISON: 11/11/2023 FINDINGS: Pre-procedure Consent: Informed consent for the procedure was obtained and time-out was performed prior to the procedure. Preparation: The site was prepared and draped using maximal sterile barrier technique including 2% chlorhexidine for cutaneous antisepsis, hand hygiene, sterile full-body drape, sterile gown, cap, mask, sterile gloves, sterile ultrasound gel, sterile ultrasound probe cover. Antibiotic administered: Scheduled dose more than 1 hour from procedure start time  or more than 2 hours for vancomycin or fluoroquinolones Anesthesia/sedation The IR procedural team has confirmed the patient ID and re-evaluated the patient and sedation plan confirming it is suitable for the patient's condition and procedure. Level of anesthesia/sedation: Moderate sedation (conscious sedation) Anesthesia/sedation administered by: Independent trained observer under attending supervision with continuous monitoring of the patient's level of consciousness and physiologic status Total  intra-service sedation time (minutes): 20 Right genitourinary catheter conversion Local anesthesia was administered. A wire was placed through the existing nephroureteral tube in the native kidney and it was removed over the wire. The new nephrostomy tube was advanced into position. Collecting system dilation: Moderately dilated New catheter(s): 8 [place]se nephrostomy Final catheter position(s): Pigtail in the renal pelvis External catheter securement: Non-absorbable suture Additional findings: None Additional genitourinary system intervention Genitourinary intervention: None Location of intervention: Not applicable Device used: Not applicable Description of intervention: Not applicable Post-intervention findings: Not applicable Contrast Contrast agent: Omnipaque 350 Contrast volume (mL): 10 Radiation Dose Fluoroscopy time ( minutes): 2.0 Reference air kerma ( mGy): 16 Kerma area product ( cGy-m2): 316 Additional Details Additional description of procedure: None Equipment details: None Specimens removed: None. A sample was not sent for analysis. Estimated blood loss (mL): Less than 10 Standardized report: [email or web address] Attestation Signer name: Alfredo I attest that I was present for the entire procedure. I reviewed the stored images and agree with the report as written.     Successful conversion of the right nephroureteral catheter to an 8 [place]orese nephrostomy tube. Plan: Follow-up with urology and oncology. _______________________________________________________________ PROCEDURE SUMMARY - Antegrade nephrostogram via the existing access - Conversion of nephroureteral to nephrostomy tube as described below - Additional procedure(s): None Electronically signed by: Florentin Fuentes Date:    11/15/2023 Time:    14:42    IR Nephrostomy Tube Change    Result Date: 11/12/2023  EXAMINATION: Genitourinary catheter replacement Procedural Personnel Attending physician(s): Vivian Lara MD Fellow physician(s): None  Resident physician(s): None Advanced practice provider(s): None Pre-procedure diagnosis: Dislodged nephroureteral tube Post-procedure diagnosis: Same Indication: Catheter malposition Complications: No immediate complications. CLINICAL HISTORY: Patient with history of bladder cancer with left ureteral stent and right nephroureteral tube presenting after right PCNU dislodged. COMPARISON: CT abdomen pelvis with out contrast 11/10/2023 FINDINGS: Pre-procedure Consent: Informed consent for the procedure was obtained and time-out was performed prior to the procedure. Preparation: The site was prepared and draped using maximal sterile barrier technique including cutaneous antisepsis. Antibiotic administered: Prophylactic dose within 1 hour of procedure start time or 2 hours for vancomycin or fluoroquinolones Anesthesia/sedation The IR procedural team has confirmed the patient ID and re-evaluated the patient and sedation plan confirming it is suitable for the patient's condition and procedure. Level of anesthesia/sedation: Moderate sedation (conscious sedation) Anesthesia/sedation administered by: Independent trained observer under attending supervision with continuous monitoring of the patient's level of consciousness and physiologic status as the sole assigned responsibility Right genitourinary catheter exchange Direct visualization in  images demonstrate the proximal retention loop to be located external to the skin with catheter remaining over the course of the urinary bladder and right kidney and right ureter.  Local anesthesia was administered. A wire was placed through the existing tube in the native kidney and into the ureter and it was removed over the wire.  A 4 Malagasy glide catheter was then advanced over the wire to the level of the ureteropelvic junction.  Contrast injection demonstrated significant stenosis at the ureterovesical junction however with small volume contrast extending into the urinary  bladder through narrowed tract.  A stiff Glidewire was then used with the glide catheter to navigate through the tract and into the urinary bladder.  The Glidewire was removed and exchanged for a Amplatz.  The new nephroureteral tube was advanced into position. Collecting system dilation: Non-dilated New catheter(s): 8.5 Angolan by 20 cm nephroureteral catheter Final catheter position(s): Proximal pigtail in the renal pelvis and distal pigtail in the bladder External catheter securement: Non-absorbable suture (Prolene) x 2 Additional findings: None Contrast Contrast agent: Omnipaque 300 Contrast volume (mL): 20 Radiation Dose Fluoroscopy time ( minutes): 8.9 Reference air kerma ( mGy ): 63 Kerma area product ( cGy-cm2 ): 1684 Additional Details Additional description of procedure: None Equipment details: None Specimens removed: None. A sample was not sent for analysis. Estimated blood loss (mL): Less than 10 Standardized report: [email or web address] Attestation Signer name: Vivian Lara MD I attest that I was present for the entire procedure. I reviewed the stored images and agree with the report as written.     Successful replacement of a dislodged right nephroureteral tube. Plan: Continue gravity drainage times 24 hours post tube replacement.  At that time, tube may be capped for internal drainage. Electronically signed by: Vivian Lara Date:    11/12/2023 Time:    20:59    X-Ray Chest 1 View    Result Date: 11/12/2023  EXAMINATION: XR CHEST 1 VIEW CLINICAL HISTORY: Respiratory distress., Hypoxia; COMPARISON: 10/20/2023 x-ray. FINDINGS: Patient is rotated and tilted to the right.  Portable chest x-ray.  Several wire leads overlie the chest.  The heart size is normal.  No definite infiltrate is seen at this time Old right rib fracture or fractures.     No acute findings. Electronically signed by: Rainer Chandler MD Date:    11/12/2023 Time:    08:52    X-Ray Abdomen AP 1 View    Result Date:  11/11/2023  EXAMINATION: XR ABDOMEN AP 1 VIEW CLINICAL HISTORY: Hydronephrosis.  Nephrostomy tube placement; TECHNIQUE: Single AP View of the abdomen was performed. COMPARISON: None FINDINGS: A left ureteral stent is present.  A right percutaneous nephroureteral catheter/stent is present extending from the right kidney to the bladder. The bowel gas pattern is unremarkable.     No acute findings. Electronically signed by: Rainer Chandler MD Date:    11/11/2023 Time:    15:17    CT Abdomen Pelvis  Without Contrast    Result Date: 11/10/2023  EXAMINATION: CT ABDOMEN PELVIS WITHOUT CONTRAST CLINICAL HISTORY: Abdominal pain, post-op; TECHNIQUE: Low dose axial images, sagittal and coronal reformations were obtained from the lung bases to the pubic symphysis, COMPARISON: None FINDINGS: Left-sided urinary stent with pigtail in the left renal pelvis and distal pigtail in the urinary bladder.  Bladder wall thickening noted.  Right-sided external urinary drainage catheter extending to the subcutaneous fat in the external region superiorly and then extending along the renal pelvis and right ureter to extend to the distal right ureter.  Correlate to desired position.  Ectatic abdominal aorta measuring up to 2.7 cm moderate amount of stool in the colon.  Extensive atherosclerotic disease.  Degenerative joint disease is identified.  Bilateral perinephric fat stranding is identified.  No focal liver lesions.  Mild hepatomegaly measuring up to 19 cm.  Mild subsegmental atelectasis.  Spleen is not enlarged.  No pancreatic lesions.  Small fat containing inguinal hernias.  No no adrenal masses.  No free fluid.  No bowel obstruction.     Right-sided external drainage catheter extending to the subcutaneous region superiorly and extending to the distal right ureter.  Correlate clinically for desired position. Double-J left-sided urinary stent extending from the left renal pelvis to the left bladder Bladder wall thickening Ectatic aorta  with atherosclerotic disease Moderate amount of stool in the colon. Remaining findings as above All CT scans   are performed using dose optimization techniques including the following: automated exposure control; adjustment of the mA and/or kV; use of iterative reconstruction technique.  Dose modulation was employed for ALARA by means of: Automated exposure control; adjustment of the mA and/or kV according to patient size (this includes techniques or standardized protocols for targeted exams where dose is matched to indication/reason for exam; i.e. extremities or head); and/or use of iterative reconstructive technique. Electronically signed by: Jefferson Kunz Date:    11/10/2023 Time:    21:34    Echo    Result Date: 10/23/2023    Left Ventricle: The left ventricle is normal in size. Normal wall thickness. There is eccentric hypertrophy. Normal wall motion. There is normal systolic function. Ejection fraction by visual approximation is 60%. Grade I diastolic dysfunction.   Left Atrium: Agitated saline study of the atrial septum is negative after vasalva maneuver, suggesting absence of intracardiac shunt at the atrial level.   Right Ventricle: Systolic function is normal.   Aortic Valve: The aortic valve is a trileaflet valve. Severely calcified cusps. Moderately restricted motion. There is moderate stenosis. Aortic valve area by VTI is 1.34 cm². Aortic valve peak velocity is 3.20 m/s. Mean gradient is 28 mmHg. The dimensionless index is 0.38. There is mild aortic regurgitation.   Mitral Valve: There is moderate mitral annular calcification present.   Pulmonary Artery: The estimated pulmonary artery systolic pressure is 37 mmHg.   IVC/SVC: Intermediate venous pressure at 8 mmHg.     US Scrotum And Testicles    Result Date: 10/22/2023  EXAMINATION: US SCROTUM AND TESTICLES CLINICAL HISTORY: scotum swelling; Other specified disorders of the male genital organs TECHNIQUE: Sonography of the scrotum and testes. COMPARISON:  None. FINDINGS: Right Testicle: *Size: 3.3 x 2.4 x 2.4 cm *Appearance: Normal. *Flow: Normal arterial and venous flow *Epididymis: Normal. *Hydrocele: None. *Varicocele: None. Left Testicle: *Size: 3.9 x 2.3 x 2.5 cm *Appearance: Normal. *Flow: Normal arterial and venous flow *Epididymis: Normal. *Hydrocele: Trace. *Varicocele: None. Other findings: None.     No significant abnormality. Electronically signed by: Daquan Laird MD Date:    10/22/2023 Time:    10:48      EKG    MICROBIOLOGY    MDM

## 2023-11-21 NOTE — ASSESSMENT & PLAN NOTE
Patient presented following ground level fall earlier today and was found to have sustained an acute traumatic displaced right intertrochanteric femur fracture noted on imaging.  Patient also sustained abrasion to right elbow with imaging negative for acute fractures or dislocation.  Orthopedics consulted by ED staff and awaiting further evaluation/recommendations regarding surgical intervention.  According to RCRI, patient is classified as a class 2 risk with a 6.0% 30 day risk of death, MI, cardiac arrest.  Plan:  -NPO  -Continue current pain regimen, titrate as needed  -Bowel regimen  -Bedrest  -Wound care   -None weightbearing  -IVFs prn  -Antiemetics prn  -Tylenol as needed for fever   -f/u orthopedics   -PT/OT

## 2023-11-21 NOTE — PHARMACY MED REC
"Admission Medication History     The home medication history was taken by Randa Cardoso.    You may go to "Admission" then "Reconcile Home Medications" tabs to review and/or act upon these items.     The home medication list has been updated by the Pharmacy department.   Please read ALL comments highlighted in yellow.   Please address this information as you see fit.    Feel free to contact us if you have any questions or require assistance.      The medications listed below were removed from the home medication list. Please reorder if appropriate:  Patient reports no longer taking the following medication(s):          Medications listed below were obtained from: Patient/family and Analytic software- BunnyStephania (daughter)  (Not in a hospital admission)      LAST MED REC COMPLETED:     Randamary Cardoso  [id number]          Current Outpatient Medications on File Prior to Encounter   Medication Sig Dispense Refill Last Dose    albuterol (ACCUNEB) 0.63 mg/3 mL Nebu Take 3 mLs (0.63 mg total) by nebulization 3 (three) times daily as needed (sob, wheezing). Rescue 75 mL 3 11/20/2023    albuterol (PROVENTIL/VENTOLIN HFA) 90 mcg/actuation inhaler Inhale 1-2 puffs into the lungs every 4 (four) hours as needed for Wheezing. Rescue 18 g 11 11/20/2023    ALPRAZolam (XANAX) 0.5 MG tablet Take 2 tablets (1 mg total) by mouth 3 (three) times daily as needed for Anxiety. 60 tablet 2 11/20/2023    cyclobenzaprine (FLEXERIL) 10 MG tablet Take 1 tablet (10 mg total) by mouth 3 (three) times daily as needed for Muscle spasms. 270 tablet 3 11/20/2023    docusate sodium (COLACE) 100 MG capsule Take 100 mg by mouth 2 (two) times daily.   11/20/2023    EScitalopram oxalate (LEXAPRO) 20 MG tablet Take 1 tablet (20 mg total) by mouth once daily. 90 tablet 3 11/20/2023    fluticasone furoate-vilanteroL (BREO ELLIPTA) 100-25 mcg/dose diskus inhaler Inhale 1 puff into the lungs once daily. Controller 30 each 11 11/20/2023    " HYDROcodone-acetaminophen (NORCO)  mg per tablet Take 1 tablet by mouth every 6 (six) hours as needed for Pain. 120 tablet 0 11/20/2023 at 3:00pm    levothyroxine (SYNTHROID) 125 MCG tablet Take 1 tablet (125 mcg total) by mouth before breakfast. 90 tablet 3 11/20/2023    linezolid (ZYVOX) 600 mg Tab Take 1 tablet (600 mg total) by mouth every 12 (twelve) hours. for 10 days 20 tablet 0 11/20/2023    metoprolol succinate (TOPROL-XL) 50 MG 24 hr tablet Take 1 tablet (50 mg total) by mouth once daily. 90 tablet 3 11/20/2023    tamsulosin (FLOMAX) 0.4 mg Cap Take 1 capsule (0.4 mg total) by mouth once daily. 90 capsule 3 11/20/2023    vitamin D (VITAMIN D3) 1000 units Tab 25 mcg.   11/20/2023    nicotine (NICODERM CQ) 21 mg/24 hr Place 1 patch onto the skin once daily. (Patient taking differently: Place 1 patch onto the skin once daily. Dr ordered for patient to put on hold because blood pressure hasn't been high) 90 patch 3                              .

## 2023-11-21 NOTE — H&P
[facility] - Emergency Dept.  [facility]  History & Physical    Patient Name: Geovani Currie  MRN: [id number]  Patient Class: IP- Inpatient  Admission Date: 11/20/2023  Attending Physician: Juan Carlos Parra MD   Primary Care Provider: Faizan Antoine MD         Patient information was obtained from patient, past medical records, and ER records.     Subjective:     Principal Problem:Right hip pain    Chief Complaint:   Chief Complaint   Patient presents with    Fall     Pt brought in by EMS for Hip pain, after a fall today. Pt has right side hip pain. Pt right leg is shortened and rotated. Pt had + pulses (-) blood thinners          HPI: Geovani Currie is a 68 y.o. male with a PMH  has a past medical history of COPD (chronic obstructive pulmonary disease) and Hypertension. who presented to the ED for further evaluation of right hip pain following ground level fall earlier today.  Patient reported stepping onto a curb at which time he lost his balance causing him to fall on his right side endorsing immediate pain with inability to stand/bear weight.  Patient denied endorsing any head trauma, loss of consciousness, or sustaining any other injuries with the exception of abrasions to his elbow.  Patient reported pain was localized throughout his right hip, nonradiating, currently rated 8/10 in severity with aggravating factors including weight-bearing, movement, and palpation to the affected area while alleviating factors included immobility, nonweightbearing, and pain medication.  Associated symptoms include generalized myalgias, arthralgias, and weakness but denied endorsing any numbness, tingling, or penetrating trauma, but did report decreased range of motion muscle strength secondary to exacerbation of pain.  All other review of systems negative except as noted above.  Of note, patient was recently discharged on 11/17/2023 following admission for dislodged nephrostomy tube with IR replacement and treatment  of UTI which he is currently being treated with linezolid.  Prior to incident, patient reported being in his usual state of health with no other concerns or complaints.  Initial workup in the ED revealed patient has sustained an acute traumatic right intertrochanteric femur fracture on imaging in his being admitted to Hospital Medicine inpatient for continued medical management while awaiting surgical intervention from Orthopedics.    PCP: Faizan Antoine      Past Medical History:   Diagnosis Date    COPD (chronic obstructive pulmonary disease)     Hypertension        Past Surgical History:   Procedure Laterality Date    APPENDECTOMY      CATARACT EXTRACTION      NEPHROSTOMY         Review of patient's allergies indicates:  No Known Allergies    No current facility-administered medications on file prior to encounter.     Current Outpatient Medications on File Prior to Encounter   Medication Sig    albuterol (ACCUNEB) 0.63 mg/3 mL Nebu Take 3 mLs (0.63 mg total) by nebulization 3 (three) times daily as needed (sob, wheezing). Rescue    albuterol (PROVENTIL/VENTOLIN HFA) 90 mcg/actuation inhaler Inhale 1-2 puffs into the lungs every 4 (four) hours as needed for Wheezing. Rescue    ALPRAZolam (XANAX) 0.5 MG tablet Take 2 tablets (1 mg total) by mouth 3 (three) times daily as needed for Anxiety.    cyclobenzaprine (FLEXERIL) 10 MG tablet Take 1 tablet (10 mg total) by mouth 3 (three) times daily as needed for Muscle spasms.    docusate sodium (COLACE) 100 MG capsule Take 100 mg by mouth 2 (two) times daily.    EScitalopram oxalate (LEXAPRO) 20 MG tablet Take 1 tablet (20 mg total) by mouth once daily.    fluticasone furoate-vilanteroL (BREO ELLIPTA) 100-25 mcg/dose diskus inhaler Inhale 1 puff into the lungs once daily. Controller    HYDROcodone-acetaminophen (NORCO)  mg per tablet Take 1 tablet by mouth every 6 (six) hours as needed for Pain.    levothyroxine (SYNTHROID) 125 MCG tablet Take 1 tablet (125 mcg  total) by mouth before breakfast.    linezolid (ZYVOX) 600 mg Tab Take 1 tablet (600 mg total) by mouth every 12 (twelve) hours. for 10 days    metoprolol succinate (TOPROL-XL) 50 MG 24 hr tablet Take 1 tablet (50 mg total) by mouth once daily.    nicotine (NICODERM CQ) 21 mg/24 hr Place 1 patch onto the skin once daily.    tamsulosin (FLOMAX) 0.4 mg Cap Take 1 capsule (0.4 mg total) by mouth once daily.    vitamin D (VITAMIN D3) 1000 units Tab 25 mcg.     Family History       Problem Relation (Age of Onset)    Cancer Father, Mother          Tobacco Use    Smoking status: Former     Types: Cigarettes     Passive exposure: Never    Smokeless tobacco: Never   Substance and Sexual Activity    Alcohol use: Never    Drug use: Never    Sexual activity: Not Currently     Partners: Female     Review of Systems   All other systems reviewed and are negative.    Objective:     Vital Signs (Most Recent):  Temp: 98.9 °F (37.2 °C) (11/20/23 1900)  Pulse: 60 (11/21/23 0302)  Resp: 15 (11/21/23 0302)  BP: 106/77 (11/21/23 0302)  SpO2: 100 % (11/21/23 0302) Vital Signs (24h Range):  Temp:  [98.2 °F (36.8 °C)-98.9 °F (37.2 °C)] 98.9 °F (37.2 °C)  Pulse:  [54-84] 60  Resp:  [10-20] 15  SpO2:  [90 %-100 %] 100 %  BP: ()/(46-78) 106/77        There is no height or weight on file to calculate BMI.     Physical Exam  Vitals reviewed.   Constitutional:       General: He is in acute distress.      Appearance: Normal appearance. He is normal weight. He is not ill-appearing, toxic-appearing or diaphoretic.   HENT:      Head: Normocephalic and atraumatic.      Right Ear: External ear normal.      Left Ear: External ear normal.      Nose: Nose normal. No congestion or rhinorrhea.      Mouth/Throat:      Mouth: Mucous membranes are moist.      Pharynx: Oropharynx is clear. No oropharyngeal exudate or posterior oropharyngeal erythema.   Eyes:      General: No scleral icterus.     Extraocular Movements: Extraocular movements intact.       Conjunctiva/sclera: Conjunctivae normal.      Pupils: Pupils are equal, round, and reactive to light.   Neck:      Vascular: No carotid bruit.   Cardiovascular:      Rate and Rhythm: Normal rate and regular rhythm.      Pulses: Normal pulses.      Heart sounds: Normal heart sounds. No murmur heard.     No friction rub. No gallop.   Pulmonary:      Effort: Pulmonary effort is normal. No respiratory distress.      Breath sounds: Normal breath sounds. No stridor. No wheezing, rhonchi or rales.   Chest:      Chest wall: No tenderness.   Abdominal:      General: Abdomen is flat. Bowel sounds are normal. There is no distension.      Palpations: Abdomen is soft. There is no mass.      Tenderness: There is no abdominal tenderness. There is no guarding or rebound.      Hernia: No hernia is present.   Musculoskeletal:         General: Tenderness present. No swelling, deformity or signs of injury. Normal range of motion.      Cervical back: Normal range of motion and neck supple. No rigidity or tenderness.      Comments: Bilateral nephrostomy tubes present.  TTP throughout right hip with associated decreased range of motion.  Right lower extremity shortened and externally rotated.   Lymphadenopathy:      Cervical: No cervical adenopathy.   Skin:     General: Skin is warm and dry.      Capillary Refill: Capillary refill takes less than 2 seconds.      Coloration: Skin is not jaundiced or pale.      Findings: No bruising, erythema, lesion or rash.      Comments: Abrasions noted on right elbow   Neurological:      General: No focal deficit present.      Mental Status: He is alert and oriented to person, place, and time. Mental status is at baseline.      Cranial Nerves: No cranial nerve deficit.      Sensory: No sensory deficit.      Motor: Weakness present.      Coordination: Coordination normal.      Comments: Unable to fully assess right lower extremity secondary to exacerbation of pain given recent trauma however, right  dorsi/plantar flexion yielding 5/5 strength.  Left lower extremity yielding 5/5 strength throughout.  Bilateral upper extremities yielding 5/5 strength throughout.  Sensation to light touch grossly intact throughout.   Psychiatric:         Mood and Affect: Mood normal.         Behavior: Behavior normal.         Thought Content: Thought content normal.         Judgment: Judgment normal.              CRANIAL NERVES     CN III, IV, VI   Pupils are equal, round, and reactive to light.       Significant Labs: All pertinent labs within the past 24 hours have been reviewed.    Significant Imaging: I have reviewed all pertinent imaging results/findings within the past 24 hours.    LABS:  Recent Results (from the past 24 hour(s))   CBC auto differential    Collection Time: 11/20/23  9:18 PM   Result Value Ref Range    WBC 11.18 3.90 - 12.70 K/uL    RBC 3.03 (L) 4.60 - 6.20 M/uL    Hemoglobin 7.9 (L) 14.0 - 18.0 g/dL    Hematocrit 25.9 (L) 40.0 - 54.0 %    MCV 86 82 - 98 fL    MCH 26.1 (L) 27.0 - 31.0 pg    MCHC 30.5 (L) 32.0 - 36.0 g/dL    RDW 15.2 (H) 11.5 - 14.5 %    Platelets 258 150 - 450 K/uL    MPV 9.5 9.2 - 12.9 fL    Immature Granulocytes 0.5 0.0 - 0.5 %    Gran # (ANC) 9.0 (H) 1.8 - 7.7 K/uL    Immature Grans (Abs) 0.06 (H) 0.00 - 0.04 K/uL    Lymph # 1.2 1.0 - 4.8 K/uL    Mono # 0.5 0.3 - 1.0 K/uL    Eos # 0.4 0.0 - 0.5 K/uL    Baso # 0.04 0.00 - 0.20 K/uL    nRBC 0 0 /100 WBC    Gran % 80.8 (H) 38.0 - 73.0 %    Lymph % 10.6 (L) 18.0 - 48.0 %    Mono % 4.4 4.0 - 15.0 %    Eosinophil % 3.3 0.0 - 8.0 %    Basophil % 0.4 0.0 - 1.9 %    Differential Method Automated    Comprehensive metabolic panel    Collection Time: 11/20/23  9:18 PM   Result Value Ref Range    Sodium 138 136 - 145 mmol/L    Potassium 4.7 3.5 - 5.1 mmol/L    Chloride 102 95 - 110 mmol/L    CO2 23 23 - 29 mmol/L    Glucose 105 70 - 110 mg/dL    BUN 17 8 - 23 mg/dL    Creatinine 2.1 (H) 0.5 - 1.4 mg/dL    Calcium 9.6 8.7 - 10.5 mg/dL    Total Protein  6.8 6.0 - 8.4 g/dL    Albumin 2.8 (L) 3.5 - 5.2 g/dL    Total Bilirubin 0.3 0.1 - 1.0 mg/dL    Alkaline Phosphatase 95 55 - 135 U/L    AST 15 10 - 40 U/L    ALT 19 10 - 44 U/L    eGFR 33 (A) >60 mL/min/1.73 m^2    Anion Gap 13 8 - 16 mmol/L   TSH    Collection Time: 11/20/23  9:18 PM   Result Value Ref Range    TSH 0.296 (L) 0.400 - 4.000 uIU/mL   T4, Free    Collection Time: 11/20/23  9:18 PM   Result Value Ref Range    Free T4 1.40 0.71 - 1.51 ng/dL       RADIOLOGY  X-Ray Hip 2 or 3 views Right (with Pelvis when performed)    Result Date: 11/20/2023  EXAMINATION: XR HIP WITH PELVIS WHEN PERFORMED, 2 OR 3  VIEWS RIGHT CLINICAL HISTORY: XR HIP WITH PELVIS WHEN PERFORMED, 2 OR 3  VIEWS RIGHT COMPARISON: None FINDINGS: Multiple radiographic views  were obtained. Intertrochanteric fracture of the right hip.  Mild angulation at the fracture site.     As above Electronically signed by: Jefferson Kunz Date:    11/20/2023 Time:    21:27    X-Ray Elbow Complete Right    Result Date: 11/20/2023  EXAMINATION: XR ELBOW COMPLETE 3 VIEW RIGHT CLINICAL HISTORY: XR ELBOW COMPLETE 3 VIEW RIGHTPain in right elbow COMPARISON: None FINDINGS: Multiple radiographic views  were obtained. Osseous detail reduced by casting material overlying matter.  Slight anterior fat pad is noted.  Limited positioning.  Otherwise no definite acute process seen     As above Electronically signed by: Jefferson Kunz Date:    11/20/2023 Time:    21:26    IR Nephrostomy Tube Placement    Result Date: 11/17/2023  EXAMINATION: IR NEPHROSTOMY TUBE PLACEMENT CLINICAL HISTORY: Left ureteral obstruction; TECHNIQUE: Informed consent was obtained prior to the exam after discussion of risk and benefits of the procedure.  Sedation was provided by a registered nurse trained in physiologic monitoring.  Total duration of sedation was 20 minutes. Radiation dose area product was 21 mGy.  2.3 minutes fluoro time.  Approximately 10 cc Omni 300 contrast material was used. The  patient was placed prone on the fluoroscopy table.  The skin was prepped and draped under sterile conditions and 1% lidocaine was used for local anesthesia.  Ultrasound documented moderate hydronephrosis.  A posterior shasha in the left kidney was accessed with a micropuncture needle using ultrasound guidance.  A microwire was advanced centrally within the renal pelvis using fluoroscopic guidance.  The AccuStick sheath was placed.  Position was confirmed after injection of contrast.  The wire was upsized.  The tract was dilated to 8-Dutch.  An 8-Dutch nephrostomy tube is placed in good position within the renal pelvis.  Images demonstrated appropriate positioning of the nephrostomy tube. FINDINGS: The catheter was secured at the skin surface and connected to a bag.     Successful left-sided nephrostomy tube placement as above. Electronically signed by: Oleg Sr MD Date:    11/17/2023 Time:    14:40    IR Nephrostomy Tube Change    Result Date: 11/15/2023  EXAMINATION: Genitourinary catheter conversion Procedural Personnel Attending physician(s): Fellow physician(s): None Resident physician(s): None Advanced practice provider(s): None Pre-procedure diagnosis: Bladder cancer Post-procedure diagnosis: Same Indication: Continued ureteral obstruction Complications: No immediate complications. CLINICAL HISTORY: 68-year-old male with bladder cancer TECHNIQUE: See below COMPARISON: 11/11/2023 FINDINGS: Pre-procedure Consent: Informed consent for the procedure was obtained and time-out was performed prior to the procedure. Preparation: The site was prepared and draped using maximal sterile barrier technique including 2% chlorhexidine for cutaneous antisepsis, hand hygiene, sterile full-body drape, sterile gown, cap, mask, sterile gloves, sterile ultrasound gel, sterile ultrasound probe cover. Antibiotic administered: Scheduled dose more than 1 hour from procedure start time  or more than 2 hours for vancomycin or  fluoroquinolones Anesthesia/sedation The IR procedural team has confirmed the patient ID and re-evaluated the patient and sedation plan confirming it is suitable for the patient's condition and procedure. Level of anesthesia/sedation: Moderate sedation (conscious sedation) Anesthesia/sedation administered by: Independent trained observer under attending supervision with continuous monitoring of the patient's level of consciousness and physiologic status Total intra-service sedation time (minutes): 20 Right genitourinary catheter conversion Local anesthesia was administered. A wire was placed through the existing nephroureteral tube in the native kidney and it was removed over the wire. The new nephrostomy tube was advanced into position. Collecting system dilation: Moderately dilated New catheter(s): 8 Mongolian nephrostomy Final catheter position(s): Pigtail in the renal pelvis External catheter securement: Non-absorbable suture Additional findings: None Additional genitourinary system intervention Genitourinary intervention: None Location of intervention: Not applicable Device used: Not applicable Description of intervention: Not applicable Post-intervention findings: Not applicable Contrast Contrast agent: Omnipaque 350 Contrast volume (mL): 10 Radiation Dose Fluoroscopy time ( minutes): 2.0 Reference air kerma ( mGy): 16 Kerma area product ( cGy-m2): 316 Additional Details Additional description of procedure: None Equipment details: None Specimens removed: None. A sample was not sent for analysis. Estimated blood loss (mL): Less than 10 Standardized report: [email or web address] Attestation Signer name: Alfredo I attest that I was present for the entire procedure. I reviewed the stored images and agree with the report as written.     Successful conversion of the right nephroureteral catheter to an 8 Mongolian nephrostomy tube. Plan: Follow-up with urology and oncology.  _______________________________________________________________ PROCEDURE SUMMARY - Antegrade nephrostogram via the existing access - Conversion of nephroureteral to nephrostomy tube as described below - Additional procedure(s): None Electronically signed by: Florentin Fuentes Date:    11/15/2023 Time:    14:42    IR Nephrostomy Tube Change    Result Date: 11/12/2023  EXAMINATION: Genitourinary catheter replacement Procedural Personnel Attending physician(s): Vivian Lara MD Fellow physician(s): None Resident physician(s): None Advanced practice provider(s): None Pre-procedure diagnosis: Dislodged nephroureteral tube Post-procedure diagnosis: Same Indication: Catheter malposition Complications: No immediate complications. CLINICAL HISTORY: Patient with history of bladder cancer with left ureteral stent and right nephroureteral tube presenting after right PCNU dislodged. COMPARISON: CT abdomen pelvis with out contrast 11/10/2023 FINDINGS: Pre-procedure Consent: Informed consent for the procedure was obtained and time-out was performed prior to the procedure. Preparation: The site was prepared and draped using maximal sterile barrier technique including cutaneous antisepsis. Antibiotic administered: Prophylactic dose within 1 hour of procedure start time or 2 hours for vancomycin or fluoroquinolones Anesthesia/sedation The IR procedural team has confirmed the patient ID and re-evaluated the patient and sedation plan confirming it is suitable for the patient's condition and procedure. Level of anesthesia/sedation: Moderate sedation (conscious sedation) Anesthesia/sedation administered by: Independent trained observer under attending supervision with continuous monitoring of the patient's level of consciousness and physiologic status as the sole assigned responsibility Right genitourinary catheter exchange Direct visualization in  images demonstrate the proximal retention loop to be located external to the skin with  catheter remaining over the course of the urinary bladder and right kidney and right ureter.  Local anesthesia was administered. A wire was placed through the existing tube in the native kidney and into the ureter and it was removed over the wire.  A 4 Mosotho glide catheter was then advanced over the wire to the level of the ureteropelvic junction.  Contrast injection demonstrated significant stenosis at the ureterovesical junction however with small volume contrast extending into the urinary bladder through narrowed tract.  A stiff Glidewire was then used with the glide catheter to navigate through the tract and into the urinary bladder.  The Glidewire was removed and exchanged for a Amplatz.  The new nephroureteral tube was advanced into position. Collecting system dilation: Non-dilated New catheter(s): 8.5 Mosotho by 20 cm nephroureteral catheter Final catheter position(s): Proximal pigtail in the renal pelvis and distal pigtail in the bladder External catheter securement: Non-absorbable suture (Prolene) x 2 Additional findings: None Contrast Contrast agent: Omnipaque 300 Contrast volume (mL): 20 Radiation Dose Fluoroscopy time ( minutes): 8.9 Reference air kerma ( mGy ): 63 Kerma area product ( cGy-cm2 ): 1684 Additional Details Additional description of procedure: None Equipment details: None Specimens removed: None. A sample was not sent for analysis. Estimated blood loss (mL): Less than 10 Standardized report: [id number] Attestation Signer name: Vivian Lara MD I attest that I was present for the entire procedure. I reviewed the stored images and agree with the report as written.     Successful replacement of a dislodged right nephroureteral tube. Plan: Continue gravity drainage times 24 hours post tube replacement.  At that time, tube may be capped for internal drainage. Electronically signed by: Vivian Lara Date:    11/12/2023 Time:    20:59    X-Ray Chest 1 View    Result Date:  11/12/2023  EXAMINATION: XR CHEST 1 VIEW CLINICAL HISTORY: Respiratory distress., Hypoxia; COMPARISON: 10/20/2023 x-ray. FINDINGS: Patient is rotated and tilted to the right.  Portable chest x-ray.  Several wire leads overlie the chest.  The heart size is normal.  No definite infiltrate is seen at this time Old right rib fracture or fractures.     No acute findings. Electronically signed by: Rainer Chandler MD Date:    11/12/2023 Time:    08:52    X-Ray Abdomen AP 1 View    Result Date: 11/11/2023  EXAMINATION: XR ABDOMEN AP 1 VIEW CLINICAL HISTORY: Hydronephrosis.  Nephrostomy tube placement; TECHNIQUE: Single AP View of the abdomen was performed. COMPARISON: None FINDINGS: A left ureteral stent is present.  A right percutaneous nephroureteral catheter/stent is present extending from the right kidney to the bladder. The bowel gas pattern is unremarkable.     No acute findings. Electronically signed by: Rainer Chandler MD Date:    11/11/2023 Time:    15:17    CT Abdomen Pelvis  Without Contrast    Result Date: 11/10/2023  EXAMINATION: CT ABDOMEN PELVIS WITHOUT CONTRAST CLINICAL HISTORY: Abdominal pain, post-op; TECHNIQUE: Low dose axial images, sagittal and coronal reformations were obtained from the lung bases to the pubic symphysis, COMPARISON: None FINDINGS: Left-sided urinary stent with pigtail in the left renal pelvis and distal pigtail in the urinary bladder.  Bladder wall thickening noted.  Right-sided external urinary drainage catheter extending to the subcutaneous fat in the external region superiorly and then extending along the renal pelvis and right ureter to extend to the distal right ureter.  Correlate to desired position.  Ectatic abdominal aorta measuring up to 2.7 cm moderate amount of stool in the colon.  Extensive atherosclerotic disease.  Degenerative joint disease is identified.  Bilateral perinephric fat stranding is identified.  No focal liver lesions.  Mild hepatomegaly measuring up to 19  cm.  Mild subsegmental atelectasis.  Spleen is not enlarged.  No pancreatic lesions.  Small fat containing inguinal hernias.  No no adrenal masses.  No free fluid.  No bowel obstruction.     Right-sided external drainage catheter extending to the subcutaneous region superiorly and extending to the distal right ureter.  Correlate clinically for desired position. Double-J left-sided urinary stent extending from the left renal pelvis to the left bladder Bladder wall thickening Ectatic aorta with atherosclerotic disease Moderate amount of stool in the colon. Remaining findings as above All CT scans   are performed using dose optimization techniques including the following: automated exposure control; adjustment of the mA and/or kV; use of iterative reconstruction technique.  Dose modulation was employed for ALARA by means of: Automated exposure control; adjustment of the mA and/or kV according to patient size (this includes techniques or standardized protocols for targeted exams where dose is matched to indication/reason for exam; i.e. extremities or head); and/or use of iterative reconstructive technique. Electronically signed by: Jefferson Kunz Date:    11/10/2023 Time:    21:34    Echo    Result Date: 10/23/2023    Left Ventricle: The left ventricle is normal in size. Normal wall thickness. There is eccentric hypertrophy. Normal wall motion. There is normal systolic function. Ejection fraction by visual approximation is 60%. Grade I diastolic dysfunction.   Left Atrium: Agitated saline study of the atrial septum is negative after vasalva maneuver, suggesting absence of intracardiac shunt at the atrial level.   Right Ventricle: Systolic function is normal.   Aortic Valve: The aortic valve is a trileaflet valve. Severely calcified cusps. Moderately restricted motion. There is moderate stenosis. Aortic valve area by VTI is 1.34 cm². Aortic valve peak velocity is 3.20 m/s. Mean gradient is 28 mmHg. The dimensionless index  is 0.38. There is mild aortic regurgitation.   Mitral Valve: There is moderate mitral annular calcification present.   Pulmonary Artery: The estimated pulmonary artery systolic pressure is 37 mmHg.   IVC/SVC: Intermediate venous pressure at 8 mmHg.     US Scrotum And Testicles    Result Date: 10/22/2023  EXAMINATION: US SCROTUM AND TESTICLES CLINICAL HISTORY: scotum swelling; Other specified disorders of the male genital organs TECHNIQUE: Sonography of the scrotum and testes. COMPARISON: None. FINDINGS: Right Testicle: *Size: 3.3 x 2.4 x 2.4 cm *Appearance: Normal. *Flow: Normal arterial and venous flow *Epididymis: Normal. *Hydrocele: None. *Varicocele: None. Left Testicle: *Size: 3.9 x 2.3 x 2.5 cm *Appearance: Normal. *Flow: Normal arterial and venous flow *Epididymis: Normal. *Hydrocele: Trace. *Varicocele: None. Other findings: None.     No significant abnormality. Electronically signed by: Daquan Laird MD Date:    10/22/2023 Time:    10:48      EKG    MICROBIOLOGY    MDM    Assessment/Plan:     * Right hip pain    Right elbow pain    Displaced intertrochanteric fracture of right femur, initial encounter for closed fracture  Patient presented following ground level fall earlier today and was found to have sustained an acute traumatic displaced right intertrochanteric femur fracture noted on imaging.  Patient also sustained abrasion to right elbow with imaging negative for acute fractures or dislocation.  Orthopedics consulted by ED staff and awaiting further evaluation/recommendations regarding surgical intervention.  According to RCRI, patient is classified as a class 2 risk with a 6.0% 30 day risk of death, MI, cardiac arrest.  Plan:  -NPO  -Continue current pain regimen, titrate as needed  -Bowel regimen  -Bedrest  -Wound care   -None weightbearing  -IVFs prn  -Antiemetics prn  -Tylenol as needed for fever   -f/u orthopedics   -PT/OT       Acute renal failure superimposed on stage 3 chronic kidney  disease  Patient with acute kidney injury/acute renal failure likely due to pre-renal azotemia due to dehydration BANDAR is currently  undergoing fluid resuscitation . Patient s/o bilateral nephrostomy tube placement give prior obstructive uropathy. Baseline creatinine  stage 3  - Labs reviewed- Renal function/electrolytes with CrCl cannot be calculated (Unknown ideal weight.). according to latest data. Monitor urine output and serial BMP and adjust therapy as needed. Avoid nephrotoxins and renally dose meds for GFR listed above.      Centrilobular emphysema  Patient's COPD is controlled currently.  Patient is currently off COPD Pathway. Continue scheduled inhalers  as needed  and monitor respiratory status closely.       Anemia  Patient's anemia is currently controlled. Has not received any PRBCs to date. Etiology likely d/t  chronic disease.  Current CBC reviewed-   Lab Results   Component Value Date    HGB 7.9 (L) 11/20/2023    HCT 25.9 (L) 11/20/2023     Monitor serial CBC and transfuse if patient becomes hemodynamically unstable, symptomatic or H/H drops below 7/21.      UTI (urinary tract infection)  Currently undergoing treatment with linezolid.  Plan:  -continue home medication      Malignant neoplasm of urinary bladder  Currently followed by Hematology/Oncology and Urology outpatient.  Plan:  -f/u outpatient as directed        VTE Risk Mitigation (From admission, onward)           Ordered     IP VTE HIGH RISK PATIENT  Once         11/20/23 2351     Place sequential compression device  Until discontinued         11/20/23 2351     Reason for No Pharmacological VTE Prophylaxis  Once        Question:  Reasons:  Answer:  Physician Provided (leave comment)  Comment:  awaiting surgical intervention    11/20/23 2351                  //Core Measures   -DVT proph: SCDs, withholding anticoagulation pending surgical intervention   -Code status: Full    -Surrogate: spouse       Components of this note were documented using  a voice recognition system and are subject to errors not corrected at the time the document was proof read. Please contact the author for any clarifications.        Juan Carlos Parra MD  Department of Hospital Medicine  [facility] - Emergency Dept.      COMPLETED  Family history is reviewed and has not changed   Pertinent information:

## 2023-11-21 NOTE — ASSESSMENT & PLAN NOTE
Patient's anemia is currently controlled. Has not received any PRBCs to date. Etiology likely d/t  chronic disease.  Current CBC reviewed-   Lab Results   Component Value Date    HGB 7.9 (L) 11/20/2023    HCT 25.9 (L) 11/20/2023     Monitor serial CBC and transfuse if patient becomes hemodynamically unstable, symptomatic or H/H drops below 7/21.

## 2023-11-22 LAB
ALBUMIN SERPL BCP-MCNC: 2.2 G/DL (ref 3.5–5.2)
ALP SERPL-CCNC: 78 U/L (ref 55–135)
ALT SERPL W/O P-5'-P-CCNC: 17 U/L (ref 10–44)
ANION GAP SERPL CALC-SCNC: 10 MMOL/L (ref 8–16)
AST SERPL-CCNC: 14 U/L (ref 10–40)
BASOPHILS # BLD AUTO: 0.01 K/UL (ref 0–0.2)
BASOPHILS NFR BLD: 0.1 % (ref 0–1.9)
BILIRUB SERPL-MCNC: 0.3 MG/DL (ref 0.1–1)
BUN SERPL-MCNC: 16 MG/DL (ref 8–23)
CALCIUM SERPL-MCNC: 8.5 MG/DL (ref 8.7–10.5)
CHLORIDE SERPL-SCNC: 107 MMOL/L (ref 95–110)
CO2 SERPL-SCNC: 26 MMOL/L (ref 23–29)
CREAT SERPL-MCNC: 1.3 MG/DL (ref 0.5–1.4)
DIFFERENTIAL METHOD: ABNORMAL
EOSINOPHIL # BLD AUTO: 0 K/UL (ref 0–0.5)
EOSINOPHIL NFR BLD: 0 % (ref 0–8)
ERYTHROCYTE [DISTWIDTH] IN BLOOD BY AUTOMATED COUNT: 14.3 % (ref 11.5–14.5)
EST. GFR  (NO RACE VARIABLE): 59 ML/MIN/1.73 M^2
GLUCOSE SERPL-MCNC: 146 MG/DL (ref 70–110)
HCT VFR BLD AUTO: 24.2 % (ref 40–54)
HGB BLD-MCNC: 7.8 G/DL (ref 14–18)
IMM GRANULOCYTES # BLD AUTO: 0.07 K/UL (ref 0–0.04)
IMM GRANULOCYTES NFR BLD AUTO: 0.8 % (ref 0–0.5)
LYMPHOCYTES # BLD AUTO: 0.5 K/UL (ref 1–4.8)
LYMPHOCYTES NFR BLD: 5.9 % (ref 18–48)
MCH RBC QN AUTO: 27.3 PG (ref 27–31)
MCHC RBC AUTO-ENTMCNC: 32.2 G/DL (ref 32–36)
MCV RBC AUTO: 85 FL (ref 82–98)
MONOCYTES # BLD AUTO: 0.2 K/UL (ref 0.3–1)
MONOCYTES NFR BLD: 2.5 % (ref 4–15)
NEUTROPHILS # BLD AUTO: 8.4 K/UL (ref 1.8–7.7)
NEUTROPHILS NFR BLD: 90.7 % (ref 38–73)
NRBC BLD-RTO: 0 /100 WBC
PLATELET # BLD AUTO: 177 K/UL (ref 150–450)
PMV BLD AUTO: 9.2 FL (ref 9.2–12.9)
POTASSIUM SERPL-SCNC: 3.9 MMOL/L (ref 3.5–5.1)
PROT SERPL-MCNC: 5.4 G/DL (ref 6–8.4)
RBC # BLD AUTO: 2.86 M/UL (ref 4.6–6.2)
SODIUM SERPL-SCNC: 143 MMOL/L (ref 136–145)
WBC # BLD AUTO: 9.2 K/UL (ref 3.9–12.7)

## 2023-11-22 PROCEDURE — 94640 AIRWAY INHALATION TREATMENT: CPT

## 2023-11-22 PROCEDURE — 99900035 HC TECH TIME PER 15 MIN (STAT)

## 2023-11-22 PROCEDURE — 94761 N-INVAS EAR/PLS OXIMETRY MLT: CPT

## 2023-11-22 PROCEDURE — 97530 THERAPEUTIC ACTIVITIES: CPT

## 2023-11-22 PROCEDURE — 99024 POSTOP FOLLOW-UP VISIT: CPT | Mod: POP,,, | Performed by: PHYSICIAN ASSISTANT

## 2023-11-22 PROCEDURE — 94799 UNLISTED PULMONARY SVC/PX: CPT | Mod: XB

## 2023-11-22 PROCEDURE — 63600175 PHARM REV CODE 636 W HCPCS: Performed by: HOSPITALIST

## 2023-11-22 PROCEDURE — 25000242 PHARM REV CODE 250 ALT 637 W/ HCPCS: Performed by: HOSPITALIST

## 2023-11-22 PROCEDURE — 27000207 HC ISOLATION

## 2023-11-22 PROCEDURE — 99024 PR POST-OP FOLLOW-UP VISIT: ICD-10-PCS | Mod: POP,,, | Performed by: PHYSICIAN ASSISTANT

## 2023-11-22 PROCEDURE — 80053 COMPREHEN METABOLIC PANEL: CPT | Performed by: HOSPITALIST

## 2023-11-22 PROCEDURE — 97535 SELF CARE MNGMENT TRAINING: CPT

## 2023-11-22 PROCEDURE — 63600175 PHARM REV CODE 636 W HCPCS: Performed by: ORTHOPAEDIC SURGERY

## 2023-11-22 PROCEDURE — 27000221 HC OXYGEN, UP TO 24 HOURS

## 2023-11-22 PROCEDURE — 97166 OT EVAL MOD COMPLEX 45 MIN: CPT

## 2023-11-22 PROCEDURE — 97162 PT EVAL MOD COMPLEX 30 MIN: CPT

## 2023-11-22 PROCEDURE — 25000003 PHARM REV CODE 250: Performed by: HOSPITALIST

## 2023-11-22 PROCEDURE — 11000001 HC ACUTE MED/SURG PRIVATE ROOM

## 2023-11-22 PROCEDURE — 36415 COLL VENOUS BLD VENIPUNCTURE: CPT | Performed by: HOSPITALIST

## 2023-11-22 PROCEDURE — 85025 COMPLETE CBC W/AUTO DIFF WBC: CPT | Performed by: HOSPITALIST

## 2023-11-22 RX ORDER — ENOXAPARIN SODIUM 100 MG/ML
40 INJECTION SUBCUTANEOUS EVERY 24 HOURS
Status: DISCONTINUED | OUTPATIENT
Start: 2023-11-23 | End: 2023-11-27 | Stop reason: HOSPADM

## 2023-11-22 RX ADMIN — BUDESONIDE INHALATION 0.5 MG: 0.5 SUSPENSION RESPIRATORY (INHALATION) at 08:11

## 2023-11-22 RX ADMIN — CEFAZOLIN SODIUM 2 G: 2 SOLUTION INTRAVENOUS at 09:11

## 2023-11-22 RX ADMIN — LINEZOLID 600 MG: 600 TABLET, FILM COATED ORAL at 08:11

## 2023-11-22 RX ADMIN — MORPHINE SULFATE 2 MG: 4 INJECTION INTRAVENOUS at 02:11

## 2023-11-22 RX ADMIN — BUDESONIDE INHALATION 0.5 MG: 0.5 SUSPENSION RESPIRATORY (INHALATION) at 07:11

## 2023-11-22 RX ADMIN — HYDROCODONE BITARTRATE AND ACETAMINOPHEN 1 TABLET: 5; 325 TABLET ORAL at 05:11

## 2023-11-22 RX ADMIN — ARFORMOTEROL TARTRATE 15 MCG: 15 SOLUTION RESPIRATORY (INHALATION) at 08:11

## 2023-11-22 RX ADMIN — ARFORMOTEROL TARTRATE 15 MCG: 15 SOLUTION RESPIRATORY (INHALATION) at 07:11

## 2023-11-22 RX ADMIN — ESCITALOPRAM 20 MG: 5 TABLET, FILM COATED ORAL at 09:11

## 2023-11-22 RX ADMIN — LEVOTHYROXINE SODIUM 125 MCG: 0.03 TABLET ORAL at 05:11

## 2023-11-22 RX ADMIN — DOCUSATE SODIUM 100 MG: 100 CAPSULE, LIQUID FILLED ORAL at 08:11

## 2023-11-22 RX ADMIN — LINEZOLID 600 MG: 600 TABLET, FILM COATED ORAL at 09:11

## 2023-11-22 RX ADMIN — DOCUSATE SODIUM 100 MG: 100 CAPSULE, LIQUID FILLED ORAL at 09:11

## 2023-11-22 RX ADMIN — SODIUM CHLORIDE, SODIUM LACTATE, POTASSIUM CHLORIDE, AND CALCIUM CHLORIDE: 600; 310; 30; 20 INJECTION, SOLUTION INTRAVENOUS at 02:11

## 2023-11-22 RX ADMIN — HYDROCODONE BITARTRATE AND ACETAMINOPHEN 1 TABLET: 5; 325 TABLET ORAL at 09:11

## 2023-11-22 RX ADMIN — METOPROLOL SUCCINATE 50 MG: 50 TABLET, EXTENDED RELEASE ORAL at 09:11

## 2023-11-22 RX ADMIN — TAMSULOSIN HYDROCHLORIDE 0.4 MG: 0.4 CAPSULE ORAL at 09:11

## 2023-11-22 RX ADMIN — ALPRAZOLAM 1 MG: 1 TABLET ORAL at 08:11

## 2023-11-22 NOTE — OP NOTE
Procedure Date: 11/21/2023     PRE-OP DIAGNOSIS: Right intertrochanteric femur fracture, closed,displaced,initial     POST-OP DIAGNOSIS:  Right intertrochanteric femur fracture, closed, displaced,initial     PROCEDURE: ORIF with IM Nail, Right intertrochanteric femur fracture, closed, and intraoperative interpretation of c-arm images.     SURGEON: Tanisha Guidry DO      ANESTHESIA:    General  ETA    EBL (ml): 150     COMPLICATIONS:  None     POST-OP CONDITION:     Fair     IMPLANTS:  Briana Gamma 4  10x400 nail, 100mm lag screws, 0mm end cap, 47.5 and 50mm distal locking screw          PRE-OPERATIVE COURSE  Geovani Currie  is a 68 y.o.  year old patient who had a fall.  He had immediate right hip pain and inability to bear weight.  X-rays showed a  right intertrochanteric femur fracture.  The risks and benefits of surgery were discussed with the patient including failure of the procedure to relieve symptoms, need for further surgery, risk of infection, damage to nerves, arteries, tendons, veins, risks of blood clots, pneumonia, risks inherent to anesthesia.  The patient was given the opportunity to ask questions.  When his questions were satisfactorily answered, he decided to proceed with surgery.  The consent was signed electronically and saved to the electronic health record.     The patient was seen and examined in the preoperative holding area.  The right hip was marked with the word yes and my initials.        OPERATIVE REPORT:  The patient was taken to the operative suite. Anesthesia was then induced.  Once anesthesia was induced and the airway protected, he was transferred to the fracture table.   The left leg was placed in the boot and placed in the scissored position.  His right leg was also placed in a boot and attached to the fracture table.  His right arm was placed across his chest and secured.     Using fluoroscopic guidance in two planes, the fracture was reduced.  The right leg was then  sterilely prepped and draped in the standard fashion.      A time out was performed, identifying the patient as Geovani Currie and that the consented procedure was ORIF OF THE right HIP and that the right leg was indeed, the prepped and draped extremity.  It was confirmed that the word yes and my initials were visible within the operative field.  It was confirmed that the patient had received perioperative antibiotics in a timely fashion.     An incision was made in line with the tip of the greater trochanter approximately 5cm proximal to it.  Using fluoroscopic guidance, the guide wire was placed and re-adjusted.  Positioning was checked in both AP and lateral planes  The incision was then extended through the IT band.  The opening reamer was then used under fluoroscopic guidance.  The ball tipped guidewire was then inserted and placement verified in 2 planes with fluoroscopy.  The nail length was measured at 420cm.  The canal was then reamed to 12mm.  The nail was then placed without difficulty and impacted to the proper depth.  The incision through skin and the IT band was then made to place the lag screw.  The drill sleeve  was then placed through the guide and adjustments made under fluoroscopic guidance. The lag screw guidewire was then placed under fluorscopic guidance with placement checked in two planes.  Lag screw length was then measured at 100mm. The reaming was then done and the length verified.  The lag screw was then placed.  The set screw was then placed and locked.  A 0mm endcap was then placed. X-rays were taken at the knee and the nail was noted to be too long.      The end cap was removed.  The set screw was loosened.  The lag screw was removed.  The nail was then easily removed.    A 400mm nail was then inserted without difficulty.  It was impacted to the proper depth.  The 100mm screw was then placed, with placement verified in 2 planes.  Compression was then done using the insertion handle.   The set screw was then tightened all the way down.  The 0mm end cap was placed.  The insertion jig was removed.     Two distal locking screws were placed in static   All instruments were removed and final x-rays taken.  The incisions were then closed using O' vicryl, 2'0 vicryl, and dermabond.  A sterile dressing was then placed.     The patient was awakened from anesthesia and extubated.  He  was transferred to his hospital bed and taken to the recovery in fair condition.      POSTOPERATIVE INSTRUCTIONS:   WBAT Right leg  Maintain dressings x2 days.  Then may remove and leave open to air.    Patient may shower with wound uncovered once dressing has been removed.  DVT prophylaxis x6 per primary team  PT/OT has been ordered  Follow-up with orthopedic trauma in 3 weeks with x-rays.  Appt will be scheduled.

## 2023-11-22 NOTE — PT/OT/SLP EVAL
Occupational Therapy   Evaluation    Name: Geovani Currie  MRN: [id number]  Admitting Diagnosis: Right hip pain  Recent Surgery: Procedure(s) (LRB):  ORIF, FRACTURE, FEMUR, INTERTROCHANTERIC (Right) 1 Day Post-Op    Recommendations:     Discharge Recommendations: Moderate Intensity Therapy  Discharge Equipment Recommendations:  walker, rolling, to be determined by next level of care  Barriers to discharge:  Decreased caregiver support    Assessment:     Geovani Currie is a 68 y.o. male with a medical diagnosis of Right hip pain.  He presents with the following performance deficits affecting function: weakness, impaired endurance, impaired self care skills, impaired functional mobility, gait instability, impaired balance, decreased coordination, decreased upper extremity function, decreased lower extremity function, decreased safety awareness, pain, decreased ROM, impaired skin, orthopedic precautions.      Rehab Prognosis: Fair; patient would benefit from acute skilled OT services to address these deficits and reach maximum level of function.       Plan:     Patient to be seen 2 x/week to address the above listed problems via self-care/home management, therapeutic activities, therapeutic exercises  Plan of Care Expires: 12/06/23  Plan of Care Reviewed with: patient, spouse    Subjective     Chief Complaint: weakness, Pain  Patient/Family Comments/goals: improve strength and mobility    Occupational Profile:  Living Environment: Pt recently moved to [place] from [place] to stay with daughter. Pt lives with wife and daughter in a 1 story house with no steps to enter. Pt has a walk-in shower with built in bench and hand held shower head. Pt's wife able to assist at home as needed.  Previous level of function: Pt with prior hospitalizations, and wife reports functional decline. Pt receiving therapy PTA. Pt requires minimal assist with bathing and dressing. Mod (I) with transfers and ambulation; pt initially using QC  but then needing to use rollator with functional decline. Pt's wife reports pt briefly using w/c due to weakness.  Roles and Routines: does not drive.  Equipment Used at Home: cane, quad, rollator, wheelchair, grab bar  Assistance upon Discharge: wife    Pain/Comfort:  Pain Rating 1: 7/10  Location - Side 1: Bilateral  Location - Orientation 1: generalized  Location 1: back  Pain Addressed 1: Reposition, Distraction  Pain Rating Post-Intervention 1: 7/10  Pain Rating 2: 7/10  Location - Side 2: Right  Location - Orientation 2: generalized  Location 2: leg  Pain Addressed 2: Reposition, Distraction  Pain Rating Post-Intervention 2: 7/10    Objective:     Communicated with: Nurse and epic chart review prior to session.  Patient found left sidelying with peripheral IV, telemetry, oxygen, nephrostomy upon OT entry to room.    General Precautions: Standard, fall  Orthopedic Precautions: RLE weight bearing as tolerated  Braces: N/A  Respiratory Status: Nasal cannula, flow 2 L/min    Occupational Performance:    Bed Mobility:    Patient completed Rolling/Turning to Left with  moderate assistance  Patient completed Supine to Sit with moderate assistance  Patient completed Sit to Supine with moderate assistance and 2 persons  Forward scoot to EOB with Min A.  Supine scoot to HOB with Max A x2 and bed in trend.    Functional Mobility/Transfers:  Patient completed Sit <> Stand Transfer with moderate assistance and of 2 persons  with  rolling walker  x4 trials.  Functional Mobility: Pt completed ~3 side steps to L side with Max A x2 and RW.  Pt's knees buckling frequently.     Cognitive/Visual Perceptual:  Cognitive/Psychosocial Skills:     -       Oriented to: Person, Place, and Time   -       Follows Commands/attention:Inattentive and Follows two-step commands  -       Communication: clear/fluent  -       Safety awareness/insight to disability: impaired     Physical Exam:  Sensation:    -       Intact  Upper Extremity Range  of Motion:     -       Right Upper Extremity: WFL except shoulder AROM limited to 90 degrees d/t pain in back. Elbow WFL.  -       Left Upper Extremity: WFL  Upper Extremity Strength:    -       Right Upper Extremity: 3-/5 shoulder, 4/5 elbow  -       Left Upper Extremity: 4/5 grossly   Strength:    -       Right Upper Extremity: Deficits: poor palmar grasp, fair pincer grasp. 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  Patient educated on role of OT in acute setting and benefits of participation. Educated on techniques to use to increase independence and decrease fall risk with functional transfers. Educated on importance of OOB activity and calling for A to transfer and meet needs. Encouraged completion of B UE AROM therex throughout the day to tolerance to increase functional strength and activity tolerance. Educated patient on importance of increased tolerance to upright position and direct impact on CV endurance and strength. Patient encouraged to sit up bed in chair position for a minimum of 2 consecutive hours per day and for meals until safe to t/f to chair. Patient stated understanding and in agreement with POC.     Patient left HOB elevated with all lines intact, call button in reach, bed alarm on, and wife present    GOALS:   Multidisciplinary Problems       Occupational Therapy Goals          Problem: Occupational Therapy    Goal Priority Disciplines Outcome Interventions   Occupational Therapy Goal     OT, PT/OT     Description: Goals to be met by: 12/6/23     Patient will increase functional independence with ADLs by performing:    UE Dressing with Stand-by Assistance.  Grooming while bedside chair  with Modified [place].  Toileting from bedside commode with Stand-by Assistance for hygiene and clothing management.   Toilet transfer to bedside commode with Minimal Assistance.  Upper extremity exercise program x15 reps per handout, with independence.                         History:     Past Medical History:   Diagnosis Date    COPD (chronic obstructive pulmonary disease)     Hypertension          Past Surgical History:   Procedure Laterality Date    APPENDECTOMY      CATARACT EXTRACTION      NEPHROSTOMY      OPEN REDUCTION AND INTERNAL FIXATION (ORIF) OF INTERTROCHANTERIC FRACTURE OF FEMUR Right 11/21/2023    Procedure: ORIF, FRACTURE, FEMUR, INTERTROCHANTERIC;  Surgeon: Tanisha Guidry DO;  Location: [facility];  Service: Orthopedics;  Laterality: Right;  Gamma nail       Time Tracking:     OT Date of Treatment: 11/22/23  OT Start Time: 1335  OT Stop Time: 1420  OT Total Time (min): 45 min    Billable Minutes:Evaluation 15  Self Care/Home Management 15  Therapeutic Activity 15    11/22/2023  Violeta Saldivar, OT

## 2023-11-22 NOTE — ASSESSMENT & PLAN NOTE
Patient's anemia is currently controlled. Has not received any PRBCs to date. Etiology likely d/t  chronic disease.  Current CBC reviewed-   Lab Results   Component Value Date    HGB 7.8 (L) 11/22/2023    HCT 24.2 (L) 11/22/2023     Monitor serial CBC and transfuse if patient becomes hemodynamically unstable, symptomatic or H/H drops below 7/21.

## 2023-11-22 NOTE — PLAN OF CARE
PT EVAL complete. Required MOD A for bed mobility, MAX A of 2 for side steps, unable to progress gait due to B knees buckling. Recommending moderate intensity PT upon d/c.

## 2023-11-22 NOTE — PLAN OF CARE
OT edie completed. Recommends mod intensity therapy.  Mod A for bed mobility. Mod A x2 for STS with RW. Max A x2 for side steps with RW.

## 2023-11-22 NOTE — PLAN OF CARE
Problem: Adult Inpatient Plan of Care  Goal: Plan of Care Review  Outcome: Ongoing, Progressing     Problem: Skin Injury Risk Increased  Goal: Skin Health and Integrity  11/22/2023 0423 by Sheila Sierra RN  Outcome: Ongoing, Progressing    Problem: Pain Acute  Goal: Acceptable Pain Control and Functional Ability  11/22/2023 0423 by Sheila Sierra, RN  Outcome: Ongoing, Progressing    Problem: Infection  Goal: Absence of Infection Signs and Symptoms  Outcome: Ongoing, Progressing

## 2023-11-22 NOTE — PT/OT/SLP PROGRESS
Physical Therapy      Patient Name:  Geovani Currie   MRN:  [id number]    PT orders received, EVAL initiated via chart review. EVAL attempted at 1130. Patient not seen secondary to  (pt eating breakfast at this time). Will continue efforts.    Renée Jin, PT, DPT  11/22/2023   1130

## 2023-11-22 NOTE — PLAN OF CARE
[facility] - Med Surg 3  Initial Discharge Assessment       Primary Care Provider: Faizan Antoine MD    Admission Diagnosis: Preop examination [Z01.818]  Right elbow pain [M25.521]  BANDAR (acute kidney injury) [N17.9]  Chest pain [R07.9]  Abrasion of right elbow, initial encounter [S50.311A]  Closed fracture of right hip, initial encounter [S72.001A]  Displaced intertrochanteric fracture of right femur, initial encounter for closed fracture [S72.141A]  Anemia, unspecified type [D64.9]  Need for tetanus, diphtheria, and acellular pertussis (Tdap) vaccine [Z23]  Acute renal failure superimposed on stage 3a chronic kidney disease, unspecified acute renal failure type [N17.9, N18.31]    Admission Date: 11/20/2023  Expected Discharge Date: Per Attending     Transition of Care Barriers: None    Payor: MEDICARE / Plan: MEDICARE PART A & B / Product Type: Government /     Extended Emergency Contact Information  Primary Emergency Contact: Stephania Mann  Address: [id number] [place] [address]           [place] [address] [place]  Mobile Phone: [phone]  Relation: Daughter  Secondary Emergency Contact: ELIAS GILLILAND  Mobile Phone: [phone]  Relation: Spouse  Preferred language: English   needed? No    Discharge Plan A: Home Health         [id number] - [place] - [address]  [address] [place] [address]  Phone: [phone] Fax: [phone]      Initial Assessment (most recent)       Adult Discharge Assessment - 11/22/23 1118          Discharge Assessment    Assessment Type Discharge Planning Assessment     Confirmed/corrected address, phone number and insurance Yes     Confirmed Demographics Correct on Facesheet     Source of Information patient;family     Communicated TANIA with patient/caregiver Date not available/Unable to determine     Reason For Admission right hip pain     People in Home child(qing), adult;spouse     Do you expect to return  to your current living situation? Yes     Do you have help at home or someone to help you manage your care at home? Yes     Who are your caregiver(s) and their phone number(s)? family     Prior to hospitilization cognitive status: Alert/Oriented     Current cognitive status: Alert/Oriented     Walking or Climbing Stairs ambulation difficulty, requires equipment     Home Accessibility wheelchair accessible     Home Layout Able to live on 1st floor     Equipment Currently Used at Home rollator;wheelchair;cane, straight;oxygen;nebulizer     Readmission within 30 days? No     Patient currently being followed by outpatient case management? No     Do you currently have service(s) that help you manage your care at home? No     Do you take prescription medications? Yes     Do you have prescription coverage? Yes     Coverage medicare     Do you have any problems affording any of your prescribed medications? No     Is the patient taking medications as prescribed? yes     Who is going to help you get home at discharge? Family     How do you get to doctors appointments? family or friend will provide     Are you on dialysis? No     Do you take coumadin? No     DME Needed Upon Discharge  none     Discharge Plan discussed with: Adult children;Spouse/sig other     Transition of Care Barriers None     Discharge Plan A Home Health                   Sw spoke with patient's family to complete assessment and to discuss d/c planning needs. Patient has no d/c needs at this time. Sw to follow up, as needed, for d/c planning purposes.     [facility] in the past.     Pt has O2 through [email or web address]; contact is Debra at [phone]

## 2023-11-22 NOTE — SUBJECTIVE & OBJECTIVE
Interval History: Spoke with patient and wife at bedside.  His pain is well controlled at this time.  Patient's only concern was that his left nephrostomy tube was not putting out as much as the right.  Will monitor for now.  He was seen by PT after my visit.  They are recommending SNF.    Review of Systems  Objective:     Vital Signs (Most Recent):  Temp: 98.3 °F (36.8 °C) (11/22/23 1615)  Pulse: 72 (11/22/23 1615)  Resp: 20 (11/22/23 1748)  BP: (!) 91/42 (11/22/23 1615)  SpO2: 95 % (11/22/23 1615) Vital Signs (24h Range):  Temp:  [97.8 °F (36.6 °C)-98.7 °F (37.1 °C)] 98.3 °F (36.8 °C)  Pulse:  [64-91] 72  Resp:  [15-22] 20  SpO2:  [90 %-100 %] 95 %  BP: ()/(42-91) 91/42     Weight: 76 kg (167 lb 8.8 oz)  Body mass index is 27.88 kg/m².    Intake/Output Summary (Last 24 hours) at 11/22/2023 1753  Last data filed at 11/22/2023 0534  Gross per 24 hour   Intake 100 ml   Output 1525 ml   Net -1425 ml         Physical Exam  Vitals and nursing note reviewed.   Constitutional:       Appearance: Normal appearance.   HENT:      Head: Normocephalic and atraumatic.      Nose: Nose normal.      Mouth/Throat:      Mouth: Mucous membranes are moist.   Eyes:      Extraocular Movements: Extraocular movements intact.      Conjunctiva/sclera: Conjunctivae normal.   Cardiovascular:      Rate and Rhythm: Normal rate and regular rhythm.      Pulses: Normal pulses.      Heart sounds: Normal heart sounds.   Pulmonary:      Effort: Pulmonary effort is normal.      Breath sounds: Normal breath sounds.   Abdominal:      General: Abdomen is flat. Bowel sounds are normal.      Palpations: Abdomen is soft.   Genitourinary:     Comments: B/L nephrostomy tubes (R draining better than L)  Musculoskeletal:         General: Tenderness and signs of injury present.      Cervical back: Normal range of motion and neck supple.      Comments: Right hip bandage and thigh bandage CDI   Skin:     General: Skin is warm.      Capillary Refill: Capillary  refill takes less than 2 seconds.   Neurological:      Mental Status: He is alert and oriented to person, place, and time. Mental status is at baseline.   Psychiatric:         Mood and Affect: Mood normal.         Behavior: Behavior normal.             Significant Labs: All pertinent labs within the past 24 hours have been reviewed.  Recent Lab Results         11/22/23  0322        Albumin 2.2       ALP 78       ALT 17       Anion Gap 10       AST 14       Baso # 0.01       Basophil % 0.1       BILIRUBIN TOTAL 0.3  Comment: For infants and newborns, interpretation of results should be based  on gestational age, weight and in agreement with clinical  observations.    Premature Infant recommended reference ranges:  Up to 24 hours.............<8.0 mg/dL  Up to 48 hours............<12.0 mg/dL  3-5 days..................<15.0 mg/dL  6-29 days.................<15.0 mg/dL         BUN 16       Calcium 8.5       Chloride 107       CO2 26       Creatinine 1.3       Differential Method Automated       eGFR 59       Eos # 0.0       Eosinophil % 0.0       Glucose 146       Gran # (ANC) 8.4       Gran % 90.7       Hematocrit 24.2       Hemoglobin 7.8       Immature Grans (Abs) 0.07  Comment: Mild elevation in immature granulocytes is non specific and   can be seen in a variety of conditions including stress response,   acute inflammation, trauma and pregnancy. Correlation with other   laboratory and clinical findings is essential.         Immature Granulocytes 0.8       Lymph # 0.5       Lymph % 5.9       MCH 27.3       MCHC 32.2       MCV 85       Mono # 0.2       Mono % 2.5       MPV 9.2       nRBC 0       Platelet Count 177       Potassium 3.9       PROTEIN TOTAL 5.4       RBC 2.86       RDW 14.3       Sodium 143       WBC 9.20               Significant Imaging:   X-Ray Pelvis Routine AP   Final Result      As above         Electronically signed by: Jefferson Kunz   Date:    11/21/2023   Time:    18:51      X-Ray Femur 2 View  Right   Final Result      Satisfactory post operative changes of right hip replacement with inter medullary eyad         Electronically signed by: Jefferson Kunz   Date:    11/21/2023   Time:    18:48      SURG FL Surgery Fluoro Usage   Final Result      X-Ray Hip 2 or 3 views Right (with Pelvis when performed)   Final Result      As above         Electronically signed by: Jefferson Kunz   Date:    11/21/2023   Time:    18:15      X-Ray Elbow Complete Right   Final Result      As above         Electronically signed by: Jefferson Kunz   Date:    11/20/2023   Time:    21:26      X-Ray Hip 2 or 3 views Right (with Pelvis when performed)   Final Result      As above         Electronically signed by: Jefferson Kunz   Date:    11/20/2023   Time:    21:27

## 2023-11-22 NOTE — HOSPITAL COURSE
Mr Currie was admitted with a right femur fracture s/p GLF.  He had an ORIF by Dr. Guidry on 11/21.  Patient was seen by PT/OT and recommended SNF.  Patient is weight bearing as tolerated.  He is also noted to have 2 nephrostomy tubes placed prior to admission.  Patient was noted to have some penile discharge. Dr. Gonzales was consulted who felt it was due to the bladder cancer.  Spoke with wife about outpatient follow up with Dr. Jorgensen (primary urology) to make sure he is aware as well.  Patient also had a drop in Hbg and therefore transfused 1 unit of prbc.  He participated with therapy.  He was accepted to [facility] for continued therapy. Patient seen and examined, stable for discharge.

## 2023-11-22 NOTE — ANESTHESIA POSTPROCEDURE EVALUATION
Anesthesia Post Evaluation    Patient: Geovani Currie    Procedure(s) Performed: Procedure(s) (LRB):  ORIF, FRACTURE, FEMUR, INTERTROCHANTERIC (Right)    Final Anesthesia Type: general      Patient location during evaluation: PACU  Patient participation: Yes- Able to Participate  Level of consciousness: awake and alert and oriented  Post-procedure vital signs: reviewed and stable  Pain management: adequate  Airway patency: patent  EM mitigation strategies: Verification of full reversal of neuromuscular block  PONV status at discharge: No PONV  Anesthetic complications: no      Cardiovascular status: blood pressure returned to baseline and hemodynamically stable  Respiratory status: unassisted  Hydration status: euvolemic  Follow-up not needed.          Vitals Value Taken Time   /58 11/21/23 1830   Temp 36.8 °C (98.2 °F) 11/21/23 1800   Pulse 84 11/21/23 1841   Resp 22 11/21/23 1841   SpO2 94 % 11/21/23 1841   Vitals shown include unvalidated device data.      Event Time   Out of Recovery 11/21/2023 18:40:09         Pain/Susy Score: Pain Rating Prior to Med Admin: 7 (11/21/2023  6:25 PM)  Pain Rating Post Med Admin: 3 (11/21/2023 12:47 PM)  Susy Score: 10 (11/21/2023  6:20 PM)

## 2023-11-22 NOTE — PT/OT/SLP EVAL
Physical Therapy Evaluation and Treatment    Patient Name: Geovani Currie   MRN: [id number]  Recent Surgery: Procedure(s) (LRB):  ORIF, FRACTURE, FEMUR, INTERTROCHANTERIC (Right) 1 Day Post-Op    Recommendations:     Discharge Recommendations: Moderate Intensity Therapy   Discharge Equipment Recommendations: to be determined by next level of care, walker, rolling   Barriers to discharge: Increased level of assist    Assessment:     Geovani Currie is a 68 y.o. male admitted with a medical diagnosis of Right hip pain. He presents with the following impairments/functional limitations: weakness, impaired endurance, impaired functional mobility, gait instability, impaired balance, pain, decreased safety awareness, decreased lower extremity function, decreased coordination, impaired cardiopulmonary response to activity, decreased ROM, orthopedic precautions.    Rehab Prognosis: Good; patient would benefit from acute PT services to address these deficits and reach maximum level of function.    Plan:     During this hospitalization, patient to be seen 3 x/week to address the above listed problems via gait training, therapeutic activities, therapeutic exercises    Plan of Care Expires: 12/06/23    Subjective     Chief Complaint: Pt is motivated to participate  Patient Comments/Goals: none stated  Pain/Comfort:  Pain Rating 1: 7/10  Location - Side 1: Bilateral  Location - Orientation 1: generalized  Location 1: back  Pain Addressed 1: Reposition, Distraction  Pain Rating Post-Intervention 1: 7/10  Pain Rating 2: 7/10  Location - Side 2: Right  Location - Orientation 2: generalized  Location 2: leg  Pain Addressed 2: Reposition, Distraction, Cessation of Activity  Pain Rating Post-Intervention 2: 7/10    Social History:  Living Environment: Patient lives with their spouse in a single story home with number of outside stair(s): 0. Pt lives in [place] with his wife but is currently staying with their daughter.  Prior Level of  Function: Prior to admission, patient was modified independent, not driving and retired, and ambulated household distances using straight cane. Reports decrease in mobility due to health requiring assistance with bathing and dressing and use of rollator.  Equipment Used at Home: cane, straight, wheelchair, rollator, grab bar (pt has SC and BSC at home in [place])  DME owned (not currently used): none  Assistance Upon Discharge: significant other and family    Objective:     Communicated with nurse Robert and epic chart review prior to session. Patient found left sidelying with peripheral IV, nephrostomy, oxygen, telemetry, bed alarm upon PT entry to room.    General Precautions: Standard, fall   Orthopedic Precautions: RLE weight bearing as tolerated   Braces: N/A    Respiratory Status: Nasal cannula, flow 2 L/min    Exams:  Cognition: Patient is oriented to Person, Place, Time, Situation  RLE ROM: WFL  RLE Strength:  NT due to surgery  LLE ROM: WFL  LLE Strength:  Grossly 4/5  Sensation:    -       Intact  Skin Integrity/Edema:     -       Skin integrity: Visible skin intact    Functional Mobility:  Gait belt applied - Yes  Bed Mobility  Rolling Left: moderate assistance  Scooting: minimum assistance  Supine scoot: MAX A of 2  Supine to Sit: moderate assistance for LE management and trunk management  Sit to Supine: moderate assistance and of 2 persons for LE management and trunk management  Transfers  Sit to Stand x4: moderate assistance and of 2 persons with rolling walker  Toilet Transfer to bed pan x2: total A for toileting hygiene  Gait  Pre-gait weight shifting x5 ea  Patient able to take 3 side steps to the L with rolling walker and moderate assistance and of 2 persons. Patient demonstrates unsteady gait, decreased foot clearance, and antalgic gait. No c/o dizziness, SOB with exertion requiring rest, educated about pursed lip breathing technique and cued for use with mobility, B LE knee buckling, quick to  "fatigue needing frequent rest. All lines remained intact throughout ambulation trail.  Balance  Sitting: minimum assistance  Standing: moderate assistance and of 2 persons  Assistance to don clean gown    Therapeutic Activities and Exercises:   Pt educated on role of PT in acute care and POC. Educated on R LE WBAT. Educated on importance of OOB activities, activity pacing, and HEP (marching/hip flex, hip abd, heel slides/LAQ, quad sets, ankle pumps) in order to maintain/regain strength. Encouraged to sit up for all meals. Educated on proper use of RW for safety and to reduce risk of falling. Educated on "call don't fall" policy and increased risk of falling due to weakness, instructed to utilize call bell for assistance with all transfers. Pt agreeable to all requests.    AM-PAC 6 CLICK MOBILITY  Total Score:10    Patient left HOB elevated with all lines intact, call button in reach, bed alarm on, and spouse present.    GOALS:   Multidisciplinary Problems       Physical Therapy Goals          Problem: Physical Therapy    Goal Priority Disciplines Outcome Goal Variances Interventions   Physical Therapy Goal     PT, PT/OT      Description: Goals to be met by 12/6/23.  1. Pt will complete bed mobility CGA.  2. Pt will complete sit to stand MIN A.  3. Pt will ambulate 50ft MIN A using RW.  4. Pt will increase AMPAC score by 2 points to progress functional mobility.                       History:     Past Medical History:   Diagnosis Date    COPD (chronic obstructive pulmonary disease)     Hypertension        Past Surgical History:   Procedure Laterality Date    APPENDECTOMY      CATARACT EXTRACTION      NEPHROSTOMY      OPEN REDUCTION AND INTERNAL FIXATION (ORIF) OF INTERTROCHANTERIC FRACTURE OF FEMUR Right 11/21/2023    Procedure: ORIF, FRACTURE, FEMUR, INTERTROCHANTERIC;  Surgeon: Tanisha Guidry DO;  [facility]: [facility];  Service: Orthopedics;  Laterality: Right;  Gamma nail       Time Tracking:     PT Received On: " 11/22/23  PT Start Time: 1335  PT Stop Time: 1420  PT Total Time (min): 45 min     Billable Minutes: Evaluation 15min and Therapeutic Activity 30min    11/22/2023

## 2023-11-22 NOTE — PROGRESS NOTES
[facility] - Med Surg 3  Orthopedics  Progress Note    Patient Name: Geovani Currie  MRN: [id number]  Admission Date: 11/20/2023  Hospital Length of Stay: 2 days  Attending Provider: Obdulia Hernandez MD  Primary Care Provider: Faizan Antoine MD  Follow-up For: Procedure(s) (LRB):  ORIF, FRACTURE, FEMUR, INTERTROCHANTERIC (Right)    Post-Operative Day: 1 Day Post-Op  Subjective:     Principal Problem:Right hip pain    Principal Orthopedic Problem:  Right intertrochanteric femur fracture    Interval History: Geovani Currie is a 68-year-old male postop day 1 status post operative repair of right intertrochanteric femur fracture with cephalomedullary nail.  Patient is resting comfortably in bed.  No new complaints at this time.    Review of patient's allergies indicates:  No Known Allergies    Current Facility-Administered Medications   Medication    0.9%  NaCl infusion (for blood administration)    0.9%  NaCl infusion (for blood administration)    acetaminophen suppository 650 mg    acetaminophen tablet 650 mg    albuterol-ipratropium 2.5 mg-0.5 mg/3 mL nebulizer solution 3 mL    ALPRAZolam tablet 1 mg    aluminum-magnesium hydroxide-simethicone 200-200-20 mg/5 mL suspension 30 mL    arformoteroL nebulizer solution 15 mcg    budesonide nebulizer solution 0.5 mg    cyclobenzaprine tablet 10 mg    dextrose 10% bolus 125 mL 125 mL    dextrose 10% bolus 250 mL 250 mL    docusate sodium capsule 100 mg    EScitalopram oxalate tablet 20 mg    glucagon (human recombinant) injection 1 mg    glucose chewable tablet 16 g    glucose chewable tablet 24 g    HYDROcodone-acetaminophen 5-325 mg per tablet 1 tablet    lactated ringers infusion    levothyroxine tablet 125 mcg    linezolid tablet 600 mg    melatonin tablet 6 mg    metoprolol succinate (TOPROL-XL) 24 hr tablet 50 mg    morphine injection 2 mg    naloxone 0.4 mg/mL injection 0.02 mg    ondansetron injection 4 mg    promethazine tablet 25 mg    senna-docusate 8.6-50 mg per  "tablet 1 tablet    sodium chloride 0.9% flush 10 mL    tamsulosin 24 hr capsule 0.4 mg    Tdap (BOOSTRIX) vaccine injection 0.5 mL     Objective:     Vital Signs (Most Recent):  Temp: 98 °F (36.7 °C) (11/22/23 0744)  Pulse: 85 (11/22/23 0817)  Resp: 20 (11/22/23 0945)  BP: (!) 111/56 (11/22/23 0744)  SpO2: 99 % (11/22/23 0817) Vital Signs (24h Range):  Temp:  [97.8 °F (36.6 °C)-98.7 °F (37.1 °C)] 98 °F (36.7 °C)  Pulse:  [64-91] 85  Resp:  [15-22] 20  SpO2:  [90 %-100 %] 99 %  BP: (107-150)/(53-91) 111/56     Weight: 76 kg (167 lb 8.8 oz)  Height: 5' 5" (165.1 cm)  Body mass index is 27.88 kg/m².      Intake/Output Summary (Last 24 hours) at 11/22/2023 1043  Last data filed at 11/22/2023 0534  Gross per 24 hour   Intake 100 ml   Output 2175 ml   Net -2075 ml       Ortho/SPM Exam  Right lower extremity:  Dressings are clean, dry, and intact  Mild edema of the upper thigh  Minimal TTP around the incision   No pain with logroll   Calf and compartments are soft and compressible   Motor exam normal   Sensation and pulses intact  Cap refill brisk     GEN: Well developed, well nourished male. AAOX3. No acute distress.   Head: Normocephalic, atraumatic.   Eyes: CARTER  Neck: Trachea is midline, no adenopathy  Resp: Breathing unlabored.  Neuro: Motor function normal, Cranial nerves intact  Psych: Mood and affect appropriate.      Significant Labs:   Recent Lab Results         11/22/23  0322        Albumin 2.2       ALP 78       ALT 17       Anion Gap 10       AST 14       Baso # 0.01       Basophil % 0.1       BILIRUBIN TOTAL 0.3  Comment: For infants and newborns, interpretation of results should be based  on gestational age, weight and in agreement with clinical  observations.    Premature Infant recommended reference ranges:  Up to 24 hours.............<8.0 mg/dL  Up to 48 hours............<12.0 mg/dL  3-5 days..................<15.0 mg/dL  6-29 days.................<15.0 mg/dL         BUN 16       Calcium 8.5       " Chloride 107       CO2 26       Creatinine 1.3       Differential Method Automated       eGFR 59       Eos # 0.0       Eosinophil % 0.0       Glucose 146       Gran # (ANC) 8.4       Gran % 90.7       Hematocrit 24.2       Hemoglobin 7.8       Immature Grans (Abs) 0.07  Comment: Mild elevation in immature granulocytes is non specific and   can be seen in a variety of conditions including stress response,   acute inflammation, trauma and pregnancy. Correlation with other   laboratory and clinical findings is essential.         Immature Granulocytes 0.8       Lymph # 0.5       Lymph % 5.9       MCH 27.3       MCHC 32.2       MCV 85       Mono # 0.2       Mono % 2.5       MPV 9.2       nRBC 0       Platelet Count 177       Potassium 3.9       PROTEIN TOTAL 5.4       RBC 2.86       RDW 14.3       Sodium 143       WBC 9.20               Significant Imaging: I have reviewed and interpreted all pertinent imaging results/findings.    Assessment/Plan:     Active Diagnoses:    Diagnosis Date Noted POA    PRINCIPAL PROBLEM:  Right hip pain [M25.551] 11/21/2023 Yes    Displaced intertrochanteric fracture of right femur, initial encounter for closed fracture [S72.141A] 11/20/2023 Yes    Right elbow pain [M25.521] 11/20/2023 Yes    Malignant neoplasm of urinary bladder [C67.9] 11/15/2023 Yes    Acute renal failure superimposed on stage 3 chronic kidney disease [N17.9, N18.30] 11/11/2023 Yes    Centrilobular emphysema [J43.2] 11/09/2023 Yes    Anemia [D64.9] 10/21/2023 Yes    UTI (urinary tract infection) [N39.0] 10/21/2023 Yes      Problems Resolved During this Admission:     Assessment:  68-year-old male postop day 1 status post operative repair of right intertrochanteric femur fracture with cephalomedullary nail     Plan:  Weightbearing as tolerated to the right lower extremity   PT/OT for gait training and ADLs   Dressings may be removed on postop day 2 and incisions left open to air after that   DVT prophylaxis per primary  team   Patient will likely need rehab/SNF placement and is ready for discharge from orthopedic standpoint once this has been arranged  Follow-up with [facility] at 3 weeks postop    Indio Yanez PA-C  Orthopedics  [facility] - Med S[facility] 3

## 2023-11-23 LAB
ALBUMIN SERPL BCP-MCNC: 2.4 G/DL (ref 3.5–5.2)
ALP SERPL-CCNC: 78 U/L (ref 55–135)
ALT SERPL W/O P-5'-P-CCNC: 10 U/L (ref 10–44)
ANION GAP SERPL CALC-SCNC: 9 MMOL/L (ref 8–16)
AST SERPL-CCNC: 12 U/L (ref 10–40)
BASOPHILS # BLD AUTO: 0.03 K/UL (ref 0–0.2)
BASOPHILS NFR BLD: 0.3 % (ref 0–1.9)
BILIRUB SERPL-MCNC: 0.3 MG/DL (ref 0.1–1)
BUN SERPL-MCNC: 18 MG/DL (ref 8–23)
CALCIUM SERPL-MCNC: 8.7 MG/DL (ref 8.7–10.5)
CHLORIDE SERPL-SCNC: 106 MMOL/L (ref 95–110)
CO2 SERPL-SCNC: 26 MMOL/L (ref 23–29)
CREAT SERPL-MCNC: 1.3 MG/DL (ref 0.5–1.4)
DIFFERENTIAL METHOD: ABNORMAL
EOSINOPHIL # BLD AUTO: 0.2 K/UL (ref 0–0.5)
EOSINOPHIL NFR BLD: 2.1 % (ref 0–8)
ERYTHROCYTE [DISTWIDTH] IN BLOOD BY AUTOMATED COUNT: 15 % (ref 11.5–14.5)
EST. GFR  (NO RACE VARIABLE): 59 ML/MIN/1.73 M^2
GLUCOSE SERPL-MCNC: 116 MG/DL (ref 70–110)
HCT VFR BLD AUTO: 24.2 % (ref 40–54)
HGB BLD-MCNC: 7.6 G/DL (ref 14–18)
IMM GRANULOCYTES # BLD AUTO: 0.07 K/UL (ref 0–0.04)
IMM GRANULOCYTES NFR BLD AUTO: 0.7 % (ref 0–0.5)
LYMPHOCYTES # BLD AUTO: 0.9 K/UL (ref 1–4.8)
LYMPHOCYTES NFR BLD: 8.3 % (ref 18–48)
MCH RBC QN AUTO: 27.1 PG (ref 27–31)
MCHC RBC AUTO-ENTMCNC: 31.4 G/DL (ref 32–36)
MCV RBC AUTO: 86 FL (ref 82–98)
MONOCYTES # BLD AUTO: 0.4 K/UL (ref 0.3–1)
MONOCYTES NFR BLD: 3.8 % (ref 4–15)
NEUTROPHILS # BLD AUTO: 8.9 K/UL (ref 1.8–7.7)
NEUTROPHILS NFR BLD: 84.8 % (ref 38–73)
NRBC BLD-RTO: 0 /100 WBC
PLATELET # BLD AUTO: 179 K/UL (ref 150–450)
PMV BLD AUTO: 9.4 FL (ref 9.2–12.9)
POTASSIUM SERPL-SCNC: 3.8 MMOL/L (ref 3.5–5.1)
PROT SERPL-MCNC: 6 G/DL (ref 6–8.4)
RBC # BLD AUTO: 2.8 M/UL (ref 4.6–6.2)
SODIUM SERPL-SCNC: 141 MMOL/L (ref 136–145)
WBC # BLD AUTO: 10.52 K/UL (ref 3.9–12.7)

## 2023-11-23 PROCEDURE — 25000242 PHARM REV CODE 250 ALT 637 W/ HCPCS: Performed by: HOSPITALIST

## 2023-11-23 PROCEDURE — 94640 AIRWAY INHALATION TREATMENT: CPT

## 2023-11-23 PROCEDURE — 99024 PR POST-OP FOLLOW-UP VISIT: ICD-10-PCS | Mod: POP,,, | Performed by: PHYSICIAN ASSISTANT

## 2023-11-23 PROCEDURE — 63600175 PHARM REV CODE 636 W HCPCS: Performed by: HOSPITALIST

## 2023-11-23 PROCEDURE — 94799 UNLISTED PULMONARY SVC/PX: CPT | Mod: XB

## 2023-11-23 PROCEDURE — 80053 COMPREHEN METABOLIC PANEL: CPT | Performed by: HOSPITALIST

## 2023-11-23 PROCEDURE — 36415 COLL VENOUS BLD VENIPUNCTURE: CPT | Performed by: HOSPITALIST

## 2023-11-23 PROCEDURE — 99900035 HC TECH TIME PER 15 MIN (STAT)

## 2023-11-23 PROCEDURE — 85025 COMPLETE CBC W/AUTO DIFF WBC: CPT | Performed by: HOSPITALIST

## 2023-11-23 PROCEDURE — 25000003 PHARM REV CODE 250: Performed by: NURSE PRACTITIONER

## 2023-11-23 PROCEDURE — 11000001 HC ACUTE MED/SURG PRIVATE ROOM

## 2023-11-23 PROCEDURE — 27000221 HC OXYGEN, UP TO 24 HOURS

## 2023-11-23 PROCEDURE — 27000207 HC ISOLATION

## 2023-11-23 PROCEDURE — 63600175 PHARM REV CODE 636 W HCPCS: Performed by: FAMILY MEDICINE

## 2023-11-23 PROCEDURE — 94761 N-INVAS EAR/PLS OXIMETRY MLT: CPT

## 2023-11-23 PROCEDURE — 99024 POSTOP FOLLOW-UP VISIT: CPT | Mod: POP,,, | Performed by: PHYSICIAN ASSISTANT

## 2023-11-23 PROCEDURE — 25000003 PHARM REV CODE 250: Performed by: HOSPITALIST

## 2023-11-23 RX ORDER — HYDROCODONE BITARTRATE AND ACETAMINOPHEN 5; 325 MG/1; MG/1
1 TABLET ORAL EVERY 4 HOURS PRN
Status: DISCONTINUED | OUTPATIENT
Start: 2023-11-23 | End: 2023-11-27 | Stop reason: HOSPADM

## 2023-11-23 RX ORDER — TRAMADOL HYDROCHLORIDE 50 MG/1
50 TABLET ORAL ONCE
Status: COMPLETED | OUTPATIENT
Start: 2023-11-23 | End: 2023-11-23

## 2023-11-23 RX ORDER — SODIUM CHLORIDE, SODIUM LACTATE, POTASSIUM CHLORIDE, CALCIUM CHLORIDE 600; 310; 30; 20 MG/100ML; MG/100ML; MG/100ML; MG/100ML
INJECTION, SOLUTION INTRAVENOUS CONTINUOUS
Status: DISCONTINUED | OUTPATIENT
Start: 2023-11-23 | End: 2023-11-27

## 2023-11-23 RX ADMIN — BUDESONIDE INHALATION 0.5 MG: 0.5 SUSPENSION RESPIRATORY (INHALATION) at 07:11

## 2023-11-23 RX ADMIN — HYDROCODONE BITARTRATE AND ACETAMINOPHEN 1 TABLET: 5; 325 TABLET ORAL at 08:11

## 2023-11-23 RX ADMIN — SODIUM CHLORIDE, POTASSIUM CHLORIDE, SODIUM LACTATE AND CALCIUM CHLORIDE: 600; 310; 30; 20 INJECTION, SOLUTION INTRAVENOUS at 01:11

## 2023-11-23 RX ADMIN — ALPRAZOLAM 1 MG: 1 TABLET ORAL at 08:11

## 2023-11-23 RX ADMIN — ENOXAPARIN SODIUM 40 MG: 40 INJECTION SUBCUTANEOUS at 04:11

## 2023-11-23 RX ADMIN — LINEZOLID 600 MG: 600 TABLET, FILM COATED ORAL at 08:11

## 2023-11-23 RX ADMIN — DOCUSATE SODIUM 100 MG: 100 CAPSULE, LIQUID FILLED ORAL at 08:11

## 2023-11-23 RX ADMIN — TAMSULOSIN HYDROCHLORIDE 0.4 MG: 0.4 CAPSULE ORAL at 08:11

## 2023-11-23 RX ADMIN — LEVOTHYROXINE SODIUM 125 MCG: 0.03 TABLET ORAL at 05:11

## 2023-11-23 RX ADMIN — ESCITALOPRAM 20 MG: 5 TABLET, FILM COATED ORAL at 08:11

## 2023-11-23 RX ADMIN — ARFORMOTEROL TARTRATE 15 MCG: 15 SOLUTION RESPIRATORY (INHALATION) at 07:11

## 2023-11-23 RX ADMIN — MORPHINE SULFATE 2 MG: 4 INJECTION INTRAVENOUS at 12:11

## 2023-11-23 RX ADMIN — HYDROCODONE BITARTRATE AND ACETAMINOPHEN 1 TABLET: 5; 325 TABLET ORAL at 02:11

## 2023-11-23 RX ADMIN — METOPROLOL SUCCINATE 50 MG: 50 TABLET, EXTENDED RELEASE ORAL at 08:11

## 2023-11-23 RX ADMIN — TRAMADOL HYDROCHLORIDE 50 MG: 50 TABLET, COATED ORAL at 08:11

## 2023-11-23 NOTE — ASSESSMENT & PLAN NOTE
Patient with acute kidney injury/acute renal failure likely due to pre-renal azotemia due to dehydration BANDAR is currently  undergoing fluid resuscitation . Patient s/o bilateral nephrostomy tube placement give prior obstructive uropathy. Baseline creatinine  stage 3  - Labs reviewed- Renal function/electrolytes with Estimated Creatinine Clearance: 51.1 mL/min (based on SCr of 1.3 mg/dL). according to latest data. Monitor urine output and serial BMP and adjust therapy as needed. Avoid nephrotoxins and renally dose meds for GFR listed above.

## 2023-11-23 NOTE — PROGRESS NOTES
[facility]  [facility] Medicine  Progress Note    Patient Name: Geovani Currie  MRN: [id number]  Patient Class: IP- Inpatient   Admission Date: 11/20/2023  Length of Stay: 2 days  Attending Physician: Obdulia Hernandez MD  Primary Care Provider: Faizan Antoine MD        Subjective:     Principal Problem:Displaced intertrochanteric fracture of right femur, initial encounter for closed fracture        HPI:  Geovani Currie is a 68 y.o. male with a PMH  has a past medical history of COPD (chronic obstructive pulmonary disease) and Hypertension. who presented to the ED for further evaluation of right hip pain following ground level fall earlier today.  Patient reported stepping onto a curb at which time he lost his balance causing him to fall on his right side endorsing immediate pain with inability to stand/bear weight.  Patient denied endorsing any head trauma, loss of consciousness, or sustaining any other injuries with the exception of abrasions to his elbow.  Patient reported pain was localized throughout his right hip, nonradiating, currently rated 8/10 in severity with aggravating factors including weight-bearing, movement, and palpation to the affected area while alleviating factors included immobility, nonweightbearing, and pain medication.  Associated symptoms include generalized myalgias, arthralgias, and weakness but denied endorsing any numbness, tingling, or penetrating trauma, but did report decreased range of motion muscle strength secondary to exacerbation of pain.  All other review of systems negative except as noted above.  Of note, patient was recently discharged on 11/17/2023 following admission for dislodged nephrostomy tube with IR replacement and treatment of UTI which he is currently being treated with linezolid.  Prior to incident, patient reported being in his usual state of health with no other concerns or complaints.  Initial workup in the ED revealed patient has sustained an acute  traumatic right intertrochanteric femur fracture on imaging in his being admitted to Hospital Medicine inpatient for continued medical management while awaiting surgical intervention from Orthopedics.    PCP: Faizan Antoine      Overview/Hospital Course:  Mr Currie was admitted with a right femur fracture s/p GLF.  He had an ORIF by Dr. Guidry on 11/21.  Patient was seen by PT/OT and recommended SNF.  Patient is weight bearing as tolerated.  He is also noted to have 2 nephrostomy tubes placed prior to admission.  CM consulted for placement.    Interval History: Spoke with patient and wife at bedside.  His pain is well controlled at this time.  Patient's only concern was that his left nephrostomy tube was not putting out as much as the right.  Will monitor for now.  He was seen by PT after my visit.  They are recommending SNF.    Review of Systems  Objective:     Vital Signs (Most Recent):  Temp: 98.3 °F (36.8 °C) (11/22/23 1615)  Pulse: 72 (11/22/23 1615)  Resp: 20 (11/22/23 1748)  BP: (!) 91/42 (11/22/23 1615)  SpO2: 95 % (11/22/23 1615) Vital Signs (24h Range):  Temp:  [97.8 °F (36.6 °C)-98.7 °F (37.1 °C)] 98.3 °F (36.8 °C)  Pulse:  [64-91] 72  Resp:  [15-22] 20  SpO2:  [90 %-100 %] 95 %  BP: ()/(42-91) 91/42     Weight: 76 kg (167 lb 8.8 oz)  Body mass index is 27.88 kg/m².    Intake/Output Summary (Last 24 hours) at 11/22/2023 1753  Last data filed at 11/22/2023 0534  Gross per 24 hour   Intake 100 ml   Output 1525 ml   Net -1425 ml         Physical Exam  Vitals and nursing note reviewed.   Constitutional:       Appearance: Normal appearance.   HENT:      Head: Normocephalic and atraumatic.      Nose: Nose normal.      Mouth/Throat:      Mouth: Mucous membranes are moist.   Eyes:      Extraocular Movements: Extraocular movements intact.      Conjunctiva/sclera: Conjunctivae normal.   Cardiovascular:      Rate and Rhythm: Normal rate and regular rhythm.      Pulses: Normal pulses.      Heart sounds:  Normal heart sounds.   Pulmonary:      Effort: Pulmonary effort is normal.      Breath sounds: Normal breath sounds.   Abdominal:      General: Abdomen is flat. Bowel sounds are normal.      Palpations: Abdomen is soft.   Genitourinary:     Comments: B/L nephrostomy tubes (R draining better than L)  Musculoskeletal:         General: Tenderness and signs of injury present.      Cervical back: Normal range of motion and neck supple.      Comments: Right hip bandage and thigh bandage CDI   Skin:     General: Skin is warm.      Capillary Refill: Capillary refill takes less than 2 seconds.   Neurological:      Mental Status: He is alert and oriented to person, place, and time. Mental status is at baseline.   Psychiatric:         Mood and Affect: Mood normal.         Behavior: Behavior normal.             Significant Labs: All pertinent labs within the past 24 hours have been reviewed.  Recent Lab Results         11/22/23  0322        Albumin 2.2       ALP 78       ALT 17       Anion Gap 10       AST 14       Baso # 0.01       Basophil % 0.1       BILIRUBIN TOTAL 0.3  Comment: For infants and newborns, interpretation of results should be based  on gestational age, weight and in agreement with clinical  observations.    Premature Infant recommended reference ranges:  Up to 24 hours.............<8.0 mg/dL  Up to 48 hours............<12.0 mg/dL  3-5 days..................<15.0 mg/dL  6-29 days.................<15.0 mg/dL         BUN 16       Calcium 8.5       Chloride 107       CO2 26       Creatinine 1.3       Differential Method Automated       eGFR 59       Eos # 0.0       Eosinophil % 0.0       Glucose 146       Gran # (ANC) 8.4       Gran % 90.7       Hematocrit 24.2       Hemoglobin 7.8       Immature Grans (Abs) 0.07  Comment: Mild elevation in immature granulocytes is non specific and   can be seen in a variety of conditions including stress response,   acute inflammation, trauma and pregnancy. Correlation with other    laboratory and clinical findings is essential.         Immature Granulocytes 0.8       Lymph # 0.5       Lymph % 5.9       MCH 27.3       MCHC 32.2       MCV 85       Mono # 0.2       Mono % 2.5       MPV 9.2       nRBC 0       Platelet Count 177       Potassium 3.9       PROTEIN TOTAL 5.4       RBC 2.86       RDW 14.3       Sodium 143       WBC 9.20               Significant Imaging:   X-Ray Pelvis Routine AP   Final Result      As above         Electronically signed by: Jefferson Kunz   Date:    11/21/2023   Time:    18:51      X-Ray Femur 2 View Right   Final Result      Satisfactory post operative changes of right hip replacement with inter medullary eyad         Electronically signed by: Jefferson Kunz   Date:    11/21/2023   Time:    18:48      SURG FL Surgery Fluoro Usage   Final Result      X-Ray Hip 2 or 3 views Right (with Pelvis when performed)   Final Result      As above         Electronically signed by: Jefferson Kunz   Date:    11/21/2023   Time:    18:15      X-Ray Elbow Complete Right   Final Result      As above         Electronically signed by: Jefferson Kunz   Date:    11/20/2023   Time:    21:26      X-Ray Hip 2 or 3 views Right (with Pelvis when performed)   Final Result      As above         Electronically signed by: Jefferson Kunz   Date:    11/20/2023   Time:    21:27           Assessment/Plan:      * Displaced intertrochanteric fracture of right femur, initial encounter for closed fracture  Patient presented following ground level fall earlier today and was found to have sustained an acute traumatic displaced right intertrochanteric femur fracture noted on imaging.  Patient also sustained abrasion to right elbow with imaging negative for acute fractures or dislocation.  Orthopedics consulted by ED staff and awaiting further evaluation/recommendations regarding surgical intervention.  According to RCRI, patient is classified as a class 2 risk with a 6.0% 30 day risk of death, MI, cardiac  arrest.  Plan:  - Dr. Guidry performed an ORIF on 11/21  - Activity: WBAT  - DVT proph: Lovenox  - SIL consulted for SNF      Right hip pain        UTI (urinary tract infection)  Currently undergoing treatment with linezolid.  Plan:  -continue home medication      Right elbow pain        Malignant neoplasm of urinary bladder  Currently followed by Hematology/Oncology and Urology outpatient.  Plan:  -f/u outpatient as directed      Acute renal failure superimposed on stage 3 chronic kidney disease  Patient with acute kidney injury/acute renal failure likely due to pre-renal azotemia due to dehydration BANDAR is currently  undergoing fluid resuscitation . Patient s/o bilateral nephrostomy tube placement give prior obstructive uropathy. Baseline creatinine  stage 3  - Labs reviewed- Renal function/electrolytes with Estimated Creatinine Clearance: 51.1 mL/min (based on SCr of 1.3 mg/dL). according to latest data. Monitor urine output and serial BMP and adjust therapy as needed. Avoid nephrotoxins and renally dose meds for GFR listed above.      Centrilobular emphysema  Patient's COPD is controlled currently.  Patient is currently off COPD Pathway. Continue scheduled inhalers  as needed  and monitor respiratory status closely.       Anemia  Patient's anemia is currently controlled. Has not received any PRBCs to date. Etiology likely d/t  chronic disease.  Current CBC reviewed-   Lab Results   Component Value Date    HGB 7.8 (L) 11/22/2023    HCT 24.2 (L) 11/22/2023     Monitor serial CBC and transfuse if patient becomes hemodynamically unstable, symptomatic or H/H drops below 7/21.        VTE Risk Mitigation (From admission, onward)           Ordered     enoxaparin injection 40 mg  Every 24 hours         11/22/23 1757     IP VTE HIGH RISK PATIENT  Once         11/20/23 2351     Place sequential compression device  Until discontinued         11/20/23 2351     Reason for No Pharmacological VTE Prophylaxis  Once         Question:  Reasons:  Answer:  Physician Provided (leave comment)  Comment:  awaiting surgical intervention    11/20/23 2910                    Discharge Planning   TANIA:      Code Status: Full Code   Is the patient medically ready for discharge?:     Reason for patient still in hospital (select all that apply): Patient trending condition, PT / OT recommendations, and Pending disposition  Discharge Plan A: Home Health                  Obdulia Hernandze MD  Department of Hospital Medicine   [facility]

## 2023-11-23 NOTE — ASSESSMENT & PLAN NOTE
Patient presented following ground level fall earlier today and was found to have sustained an acute traumatic displaced right intertrochanteric femur fracture noted on imaging.  Patient also sustained abrasion to right elbow with imaging negative for acute fractures or dislocation.  Orthopedics consulted by ED staff and awaiting further evaluation/recommendations regarding surgical intervention.  According to RCRI, patient is classified as a class 2 risk with a 6.0% 30 day risk of death, MI, cardiac arrest.  Plan:  - Dr. Guidry performed an ORIF on 11/21  - Activity: WBAT  - DVT proph: Lovenox  - SIL consulted for SNF.  CM working with wife on placement.

## 2023-11-23 NOTE — PLAN OF CARE
Problem: Adult Inpatient Plan of Care  Goal: Plan of Care Review  Outcome: Ongoing, Progressing  Goal: Patient-Specific Goal (Individualized)  Outcome: Ongoing, Progressing  Goal: Absence of Hospital-Acquired Illness or Injury  Outcome: Ongoing, Progressing  Goal: Optimal Comfort and Wellbeing  Outcome: Ongoing, Progressing  Goal: Readiness for Transition of Care  Outcome: Ongoing, Progressing     Problem: Fluid and Electrolyte Imbalance (Acute Kidney Injury/Impairment)  Goal: Fluid and Electrolyte Balance  Outcome: Ongoing, Progressing     Problem: Oral Intake Inadequate (Acute Kidney Injury/Impairment)  Goal: Optimal Nutrition Intake  Outcome: Ongoing, Progressing     Problem: Renal Function Impairment (Acute Kidney Injury/Impairment)  Goal: Effective Renal Function  Outcome: Ongoing, Progressing     Problem: Skin Injury Risk Increased  Goal: Skin Health and Integrity  Outcome: Ongoing, Progressing     Problem: Pain Acute  Goal: Acceptable Pain Control and Functional Ability  Outcome: Ongoing, Progressing     Problem: Infection  Goal: Absence of Infection Signs and Symptoms  Outcome: Ongoing, Progressing     Problem: Fall Injury Risk  Goal: Absence of Fall and Fall-Related Injury  Outcome: Ongoing, Progressing

## 2023-11-23 NOTE — PROGRESS NOTES
[facility]  Orthopedics  Progress Note    Patient Name: Geovani Currie  MRN: [id number]  Admission Date: 11/20/2023  Hospital Length of Stay: 3 days  Attending Provider: Obdulia Hernandez MD  Primary Care Provider: Faizan Antoine MD  Follow-up For: Procedure(s) (LRB):  ORIF, FRACTURE, FEMUR, INTERTROCHANTERIC (Right)    Post-Operative Day: 2 Days Post-Op  Subjective:     Principal Problem:Displaced intertrochanteric fracture of right femur, initial encounter for closed fracture    Principal Orthopedic Problem:  Right intertrochanteric femur fracture    Interval History: Geovani Currie is a 68-year-old male postop day 2 status post operative repair of right intertrochanteric femur fracture with cephalomedullary nail.  Patient is resting comfortably in bed.  No new complaints at this time.    Review of patient's allergies indicates:  No Known Allergies    Current Facility-Administered Medications   Medication    0.9%  NaCl infusion (for blood administration)    0.9%  NaCl infusion (for blood administration)    acetaminophen suppository 650 mg    acetaminophen tablet 650 mg    albuterol-ipratropium 2.5 mg-0.5 mg/3 mL nebulizer solution 3 mL    ALPRAZolam tablet 1 mg    aluminum-magnesium hydroxide-simethicone 200-200-20 mg/5 mL suspension 30 mL    arformoteroL nebulizer solution 15 mcg    budesonide nebulizer solution 0.5 mg    cyclobenzaprine tablet 10 mg    dextrose 10% bolus 125 mL 125 mL    dextrose 10% bolus 250 mL 250 mL    docusate sodium capsule 100 mg    enoxaparin injection 40 mg    EScitalopram oxalate tablet 20 mg    glucagon (human recombinant) injection 1 mg    glucose chewable tablet 16 g    glucose chewable tablet 24 g    HYDROcodone-acetaminophen 5-325 mg per tablet 1 tablet    lactated ringers infusion    levothyroxine tablet 125 mcg    linezolid tablet 600 mg    melatonin tablet 6 mg    metoprolol succinate (TOPROL-XL) 24 hr tablet 50 mg    morphine injection 2 mg    naloxone 0.4 mg/mL  "injection 0.02 mg    ondansetron injection 4 mg    promethazine tablet 25 mg    senna-docusate 8.6-50 mg per tablet 1 tablet    sodium chloride 0.9% flush 10 mL    tamsulosin 24 hr capsule 0.4 mg    Tdap (BOOSTRIX) vaccine injection 0.5 mL     Objective:     Vital Signs (Most Recent):  Temp: 97.8 °F (36.6 °C) (11/23/23 0745)  Pulse: 66 (11/23/23 0828)  Resp: 18 (11/23/23 0802)  BP: 122/60 (11/23/23 0745)  SpO2: 97 % (11/23/23 0746) Vital Signs (24h Range):  Temp:  [97.8 °F (36.6 °C)-98.4 °F (36.9 °C)] 97.8 °F (36.6 °C)  Pulse:  [63-73] 66  Resp:  [18-20] 18  SpO2:  [90 %-97 %] 97 %  BP: ()/(42-60) 122/60     Weight: 76 kg (167 lb 8.8 oz)  Height: 5' 5" (165.1 cm)  Body mass index is 27.88 kg/m².      Intake/Output Summary (Last 24 hours) at 11/23/2023 0953  Last data filed at 11/23/2023 0557  Gross per 24 hour   Intake 341.64 ml   Output 1475 ml   Net -1133.36 ml       Ortho/SPM Exam  Right lower extremity   Dressings have been removed   Incision is well approximated, no signs of infection   Mild edema of the upper thigh  Minimal TTP   No pain with logroll  Calf and compartments are soft and compressible  Motor exam normal   Sensation and pulses intact  Cap refill brisk     GEN: Well developed, well nourished male. AAOX3. No acute distress.   Head: Normocephalic, atraumatic.   Eyes: CARTER  Neck: Trachea is midline, no adenopathy  Resp: Breathing unlabored.  Neuro: Motor function normal, Cranial nerves intact  Psych: Mood and affect appropriate.      Significant Labs:   Recent Lab Results         11/23/23 0723        Albumin 2.4       ALP 78       ALT 10       Anion Gap 9       AST 12       Baso # 0.03       Basophil % 0.3       BILIRUBIN TOTAL 0.3  Comment: For infants and newborns, interpretation of results should be based  on gestational age, weight and in agreement with clinical  observations.    Premature Infant recommended reference ranges:  Up to 24 hours.............<8.0 mg/dL  Up to 48 " hours............<12.0 mg/dL  3-5 days..................<15.0 mg/dL  6-29 days.................<15.0 mg/dL         BUN 18       Calcium 8.7       Chloride 106       CO2 26       Creatinine 1.3       Differential Method Automated       eGFR 59       Eos # 0.2       Eosinophil % 2.1       Glucose 116       Gran # (ANC) 8.9       Gran % 84.8       Hematocrit 24.2       Hemoglobin 7.6       Immature Grans (Abs) 0.07  Comment: Mild elevation in immature granulocytes is non specific and   can be seen in a variety of conditions including stress response,   acute inflammation, trauma and pregnancy. Correlation with other   laboratory and clinical findings is essential.         Immature Granulocytes 0.7       Lymph # 0.9       Lymph % 8.3       MCH 27.1       MCHC 31.4       MCV 86       Mono # 0.4       Mono % 3.8       MPV 9.4       nRBC 0       Platelet Count 179       Potassium 3.8       PROTEIN TOTAL 6.0       RBC 2.80       RDW 15.0       Sodium 141       WBC 10.52               Significant Imaging: I have reviewed and interpreted all pertinent imaging results/findings.    Assessment/Plan:     Active Diagnoses:    Diagnosis Date Noted POA    PRINCIPAL PROBLEM:  Displaced intertrochanteric fracture of right femur, initial encounter for closed fracture [S72.141A] 11/20/2023 Yes    Right elbow pain [M25.521] 11/20/2023 Yes    Malignant neoplasm of urinary bladder [C67.9] 11/15/2023 Yes    Acute renal failure superimposed on stage 3 chronic kidney disease [N17.9, N18.30] 11/11/2023 Yes    Centrilobular emphysema [J43.2] 11/09/2023 Yes    Anemia [D64.9] 10/21/2023 Yes    UTI (urinary tract infection) [N39.0] 10/21/2023 Yes      Problems Resolved During this Admission:     Assessment:  68-year-old male postop day 2 status post operative repair of right intertrochanteric femur fracture with cephalomedullary nail     Plan:  Weightbearing as tolerated to the right lower extremity  PT/OT for gait training and ADLs   Dressings  have been removed and incision may be left open to the air, patient may shower and get the incision wet, but it should not be submerged  DVT prophylaxis per primary team   Patient will likely need rehab/SNF placement and is ready for discharge from orthopedic standpoint once this has been arranged   Follow-up with [facility] at 3 weeks postop    Indio Yanez PA-C  Orthopedics  [facility]

## 2023-11-23 NOTE — ASSESSMENT & PLAN NOTE
Patient presented following ground level fall earlier today and was found to have sustained an acute traumatic displaced right intertrochanteric femur fracture noted on imaging.  Patient also sustained abrasion to right elbow with imaging negative for acute fractures or dislocation.  Orthopedics consulted by ED staff and awaiting further evaluation/recommendations regarding surgical intervention.  According to RCRI, patient is classified as a class 2 risk with a 6.0% 30 day risk of death, MI, cardiac arrest.  Plan:  - Dr. Guidry performed an ORIF on 11/21  - Activity: WBAT  - DVT proph: Lovenox  - CM consulted for SNF

## 2023-11-23 NOTE — ASSESSMENT & PLAN NOTE
Patient's anemia is currently controlled. Has not received any PRBCs to date. Etiology likely d/t  chronic disease.  Current CBC reviewed-   Lab Results   Component Value Date    HGB 7.6 (L) 11/23/2023    HCT 24.2 (L) 11/23/2023     Monitor serial CBC and transfuse if patient becomes hemodynamically unstable, symptomatic or H/H drops below 7/21.

## 2023-11-23 NOTE — NURSING
Pt's daughter came to desk and informed nurse that pt's wife was giving pt additional pain medication; educated wife on importance of notifying staff of pt's discomfort and to avoid administering any additional medication she may have; per wife she did not administer any additional medication and verbalized understanding that she must notify staff if pt needs pain managed; notified Jacinto Tristan Sup; will continue to monitor

## 2023-11-23 NOTE — PROGRESS NOTES
[facility] - Med Surg 3  [facility] Medicine  Progress Note    Patient Name: Geovani Currie  MRN: [id number]  Patient Class: IP- Inpatient   Admission Date: 11/20/2023  Length of Stay: 3 days  Attending Physician: Obdulia Hernandez MD  Primary Care Provider: Faizan Antoine MD        Subjective:     Principal Problem:Displaced intertrochanteric fracture of right femur, initial encounter for closed fracture        HPI:  Geovani Currie is a 68 y.o. male with a PMH  has a past medical history of COPD (chronic obstructive pulmonary disease) and Hypertension. who presented to the ED for further evaluation of right hip pain following ground level fall earlier today.  Patient reported stepping onto a curb at which time he lost his balance causing him to fall on his right side endorsing immediate pain with inability to stand/bear weight.  Patient denied endorsing any head trauma, loss of consciousness, or sustaining any other injuries with the exception of abrasions to his elbow.  Patient reported pain was localized throughout his right hip, nonradiating, currently rated 8/10 in severity with aggravating factors including weight-bearing, movement, and palpation to the affected area while alleviating factors included immobility, nonweightbearing, and pain medication.  Associated symptoms include generalized myalgias, arthralgias, and weakness but denied endorsing any numbness, tingling, or penetrating trauma, but did report decreased range of motion muscle strength secondary to exacerbation of pain.  All other review of systems negative except as noted above.  Of note, patient was recently discharged on 11/17/2023 following admission for dislodged nephrostomy tube with IR replacement and treatment of UTI which he is currently being treated with linezolid.  Prior to incident, patient reported being in his usual state of health with no other concerns or complaints.  Initial workup in the ED revealed patient has sustained an acute  traumatic right intertrochanteric femur fracture on imaging in his being admitted to Hospital Medicine inpatient for continued medical management while awaiting surgical intervention from Orthopedics.    PCP: Faizan Antoine      Overview/Hospital Course:  Mr Currie was admitted with a right femur fracture s/p GLF.  He had an ORIF by Dr. Guidry on 11/21.  Patient was seen by PT/OT and recommended SNF.  Patient is weight bearing as tolerated.  He is also noted to have 2 nephrostomy tubes placed prior to admission.  CM consulted for placement.    Interval History: Patient in bed resting comfortably.  Wife at bedside states she spoke with CM regarding SNF placement.  Awaiting bed availability information.  Hbg low but stable.  Vitals stable.    Review of Systems  Objective:     Vital Signs (Most Recent):  Temp: 97.8 °F (36.6 °C) (11/23/23 0745)  Pulse: 71 (11/23/23 1100)  Resp: 18 (11/23/23 0802)  BP: 122/60 (11/23/23 0745)  SpO2: 97 % (11/23/23 0746) Vital Signs (24h Range):  Temp:  [97.8 °F (36.6 °C)-98.4 °F (36.9 °C)] 97.8 °F (36.6 °C)  Pulse:  [63-73] 71  Resp:  [18-20] 18  SpO2:  [90 %-97 %] 97 %  BP: ()/(42-60) 122/60     Weight: 76 kg (167 lb 8.8 oz)  Body mass index is 27.88 kg/m².    Intake/Output Summary (Last 24 hours) at 11/23/2023 1115  Last data filed at 11/23/2023 0746  Gross per 24 hour   Intake 341.64 ml   Output 1750 ml   Net -1408.36 ml         Physical Exam      Vitals and nursing note reviewed.   Constitutional:       Appearance: Normal appearance.   HENT:      Head: Normocephalic and atraumatic.      Nose: Nose normal.      Mouth/Throat:      Mouth: Mucous membranes are moist.   Eyes:      Extraocular Movements: Extraocular movements intact.      Conjunctiva/sclera: Conjunctivae normal.   Cardiovascular:      Rate and Rhythm: Normal rate and regular rhythm.      Pulses: Normal pulses.      Heart sounds: Normal heart sounds.   Pulmonary:      Effort: Pulmonary effort is normal.       Breath sounds: Normal breath sounds.   Abdominal:      General: Abdomen is flat. Bowel sounds are normal.      Palpations: Abdomen is soft.   Genitourinary:     Comments: B/L nephrostomy tubes (R draining better than L)  Musculoskeletal:         General: Tenderness and signs of injury present.      Cervical back: Normal range of motion and neck supple.      Comments: Right hip bandage and thigh bandage CDI   Skin:     General: Skin is warm.      Capillary Refill: Capillary refill takes less than 2 seconds.   Neurological:      Mental Status: He is alert and oriented to person, place, and time. Mental status is at baseline.   Psychiatric:         Mood and Affect: Mood normal.         Behavior: Behavior normal.        Significant Labs: All pertinent labs within the past 24 hours have been reviewed.  Recent Lab Results         11/23/23  0723        Albumin 2.4       ALP 78       ALT 10       Anion Gap 9       AST 12       Baso # 0.03       Basophil % 0.3       BILIRUBIN TOTAL 0.3  Comment: For infants and newborns, interpretation of results should be based  on gestational age, weight and in agreement with clinical  observations.    Premature Infant recommended reference ranges:  Up to 24 hours.............<8.0 mg/dL  Up to 48 hours............<12.0 mg/dL  3-5 days..................<15.0 mg/dL  6-29 days.................<15.0 mg/dL         BUN 18       Calcium 8.7       Chloride 106       CO2 26       Creatinine 1.3       Differential Method Automated       eGFR 59       Eos # 0.2       Eosinophil % 2.1       Glucose 116       Gran # (ANC) 8.9       Gran % 84.8       Hematocrit 24.2       Hemoglobin 7.6       Immature Grans (Abs) 0.07  Comment: Mild elevation in immature granulocytes is non specific and   can be seen in a variety of conditions including stress response,   acute inflammation, trauma and pregnancy. Correlation with other   laboratory and clinical findings is essential.         Immature Granulocytes 0.7        Lymph # 0.9       Lymph % 8.3       MCH 27.1       MCHC 31.4       MCV 86       Mono # 0.4       Mono % 3.8       MPV 9.4       nRBC 0       Platelet Count 179       Potassium 3.8       PROTEIN TOTAL 6.0       RBC 2.80       RDW 15.0       Sodium 141       WBC 10.52               Significant Imaging:   X-Ray Pelvis Routine AP   Final Result      As above         Electronically signed by: Jefferson Kunz   Date:    11/21/2023   Time:    18:51      X-Ray Femur 2 View Right   Final Result      Satisfactory post operative changes of right hip replacement with inter medullary eyad         Electronically signed by: Jefferson Kunz   Date:    11/21/2023   Time:    18:48      SURG FL Surgery Fluoro Usage   Final Result      X-Ray Hip 2 or 3 views Right (with Pelvis when performed)   Final Result      As above         Electronically signed by: Jefferson Kunz   Date:    11/21/2023   Time:    18:15      X-Ray Elbow Complete Right   Final Result      As above         Electronically signed by: Jefferson Kunz   Date:    11/20/2023   Time:    21:26      X-Ray Hip 2 or 3 views Right (with Pelvis when performed)   Final Result      As above         Electronically signed by: Jefferson Kunz   Date:    11/20/2023   Time:    21:27           Assessment/Plan:      * Displaced intertrochanteric fracture of right femur, initial encounter for closed fracture  Patient presented following ground level fall earlier today and was found to have sustained an acute traumatic displaced right intertrochanteric femur fracture noted on imaging.  Patient also sustained abrasion to right elbow with imaging negative for acute fractures or dislocation.  Orthopedics consulted by ED staff and awaiting further evaluation/recommendations regarding surgical intervention.  According to RCRI, patient is classified as a class 2 risk with a 6.0% 30 day risk of death, MI, cardiac arrest.  Plan:  - Dr. Guidry performed an ORIF on 11/21  - Activity: WBAT  - DVT proph:  Lovenox  - SIL consulted for SNF.  CM working with wife on placement.      Right hip pain        UTI (urinary tract infection)  Currently undergoing treatment with linezolid.  Plan:  -Urine Culture + VRE  -continue home medication      Right elbow pain        Malignant neoplasm of urinary bladder  Currently followed by Hematology/Oncology and Urology outpatient.  Plan:  -f/u outpatient as directed      Acute renal failure superimposed on stage 3 chronic kidney disease  Patient with acute kidney injury/acute renal failure likely due to pre-renal azotemia due to dehydration BANDAR is currently  undergoing fluid resuscitation . Patient s/o bilateral nephrostomy tube placement give prior obstructive uropathy. Baseline creatinine  stage 3  - Labs reviewed- Renal function/electrolytes with Estimated Creatinine Clearance: 51.1 mL/min (based on SCr of 1.3 mg/dL). according to latest data. Monitor urine output and serial BMP and adjust therapy as needed. Avoid nephrotoxins and renally dose meds for GFR listed above.      Centrilobular emphysema  Patient's COPD is controlled currently.  Patient is currently off COPD Pathway. Continue scheduled inhalers  as needed  and monitor respiratory status closely.       Anemia  Patient's anemia is currently controlled. Has not received any PRBCs to date. Etiology likely d/t  chronic disease.  Current CBC reviewed-   Lab Results   Component Value Date    HGB 7.6 (L) 11/23/2023    HCT 24.2 (L) 11/23/2023     Monitor serial CBC and transfuse if patient becomes hemodynamically unstable, symptomatic or H/H drops below 7/21.        VTE Risk Mitigation (From admission, onward)           Ordered     enoxaparin injection 40 mg  Every 24 hours         11/22/23 1900     IP VTE HIGH RISK PATIENT  Once         11/20/23 2351     Place sequential compression device  Until discontinued         11/20/23 2351     Reason for No Pharmacological VTE Prophylaxis  Once        Question:  Reasons:  Answer:   Physician Provided (leave comment)  Comment:  awaiting surgical intervention    11/20/23 3576                    Discharge Planning   TANIA:      Code Status: Full Code   Is the patient medically ready for discharge?:     Reason for patient still in hospital (select all that apply): Pending disposition  Discharge Plan A: Skilled Nursing Facility   Discharge Delays:  (State closed on Friday- unable to obtain 142 for SNF placement)              Obdulia Hernandez MD  Department of Hospital Medicine   [facility] - Med [facility] 3

## 2023-11-23 NOTE — PLAN OF CARE
11/23/23 0900   Post-Acute Status   Post-Acute Authorization Placement   Post-Acute Placement Status Referrals Sent   Discharge Delays   (State closed on Friday- unable to obtain 142 for SNF placement)   Discharge Plan   Discharge Plan A Skilled Nursing Facility     Poli spoke with pt's spouse, Chantal, to update her on treatment team recommendations for SNF placement upon d/c. Pt's wife is agreeable and stated pt has done SNF before. Pt's spouse is agreeable for Sw to send referral in the [place] area; she will speak with her family in regards to Sw send mass referrals in the  area as well. Pt's spouse stated pt has been to a facility in [place] previously, she doesn't remember the name, but she doesn't want him to return there upon d/c. Poli verbalized her understanding.

## 2023-11-23 NOTE — PLAN OF CARE
Patient updated on plan of care. Instructed  patient to use call light, call light within reach. Hourly rounding performed. Fall precautions maintained; bed alarm on. Vitals [id number]. Chart check complete. Education provided, questions encouraged. Rhythm-SR on tele box # [id number]. Patient's nephrostomy tubes draining to gravity- right nephro tube draining much more than the left tube. Incisions X3 (R hip X2, R knee X1) dressings removed per order POD X2 opened to air- if draining place 4X4 gauze and tegraderm over incisions-supplies at bedside. IV fluids started around 0110 D/T patient's BP being a little low.      Problem: Adult Inpatient Plan of Care  Goal: Plan of Care Review  Outcome: Ongoing, Progressing

## 2023-11-23 NOTE — SUBJECTIVE & OBJECTIVE
Interval History: Patient in bed resting comfortably.  Wife at bedside states she spoke with CM regarding SNF placement.  Awaiting bed availability information.  Hbg low but stable.  Vitals stable.    Review of Systems  Objective:     Vital Signs (Most Recent):  Temp: 97.8 °F (36.6 °C) (11/23/23 0745)  Pulse: 71 (11/23/23 1100)  Resp: 18 (11/23/23 0802)  BP: 122/60 (11/23/23 0745)  SpO2: 97 % (11/23/23 0746) Vital Signs (24h Range):  Temp:  [97.8 °F (36.6 °C)-98.4 °F (36.9 °C)] 97.8 °F (36.6 °C)  Pulse:  [63-73] 71  Resp:  [18-20] 18  SpO2:  [90 %-97 %] 97 %  BP: ()/(42-60) 122/60     Weight: 76 kg (167 lb 8.8 oz)  Body mass index is 27.88 kg/m².    Intake/Output Summary (Last 24 hours) at 11/23/2023 1115  Last data filed at 11/23/2023 0746  Gross per 24 hour   Intake 341.64 ml   Output 1750 ml   Net -1408.36 ml         Physical Exam      Vitals and nursing note reviewed.   Constitutional:       Appearance: Normal appearance.   HENT:      Head: Normocephalic and atraumatic.      Nose: Nose normal.      Mouth/Throat:      Mouth: Mucous membranes are moist.   Eyes:      Extraocular Movements: Extraocular movements intact.      Conjunctiva/sclera: Conjunctivae normal.   Cardiovascular:      Rate and Rhythm: Normal rate and regular rhythm.      Pulses: Normal pulses.      Heart sounds: Normal heart sounds.   Pulmonary:      Effort: Pulmonary effort is normal.      Breath sounds: Normal breath sounds.   Abdominal:      General: Abdomen is flat. Bowel sounds are normal.      Palpations: Abdomen is soft.   Genitourinary:     Comments: B/L nephrostomy tubes (R draining better than L)  Musculoskeletal:         General: Tenderness and signs of injury present.      Cervical back: Normal range of motion and neck supple.      Comments: Right hip bandage and thigh bandage CDI   Skin:     General: Skin is warm.      Capillary Refill: Capillary refill takes less than 2 seconds.   Neurological:      Mental Status: He is alert  and oriented to person, place, and time. Mental status is at baseline.   Psychiatric:         Mood and Affect: Mood normal.         Behavior: Behavior normal.        Significant Labs: All pertinent labs within the past 24 hours have been reviewed.  Recent Lab Results         11/23/23  0723        Albumin 2.4       ALP 78       ALT 10       Anion Gap 9       AST 12       Baso # 0.03       Basophil % 0.3       BILIRUBIN TOTAL 0.3  Comment: For infants and newborns, interpretation of results should be based  on gestational age, weight and in agreement with clinical  observations.    Premature Infant recommended reference ranges:  Up to 24 hours.............<8.0 mg/dL  Up to 48 hours............<12.0 mg/dL  3-5 days..................<15.0 mg/dL  6-29 days.................<15.0 mg/dL         BUN 18       Calcium 8.7       Chloride 106       CO2 26       Creatinine 1.3       Differential Method Automated       eGFR 59       Eos # 0.2       Eosinophil % 2.1       Glucose 116       Gran # (ANC) 8.9       Gran % 84.8       Hematocrit 24.2       Hemoglobin 7.6       Immature Grans (Abs) 0.07  Comment: Mild elevation in immature granulocytes is non specific and   can be seen in a variety of conditions including stress response,   acute inflammation, trauma and pregnancy. Correlation with other   laboratory and clinical findings is essential.         Immature Granulocytes 0.7       Lymph # 0.9       Lymph % 8.3       MCH 27.1       MCHC 31.4       MCV 86       Mono # 0.4       Mono % 3.8       MPV 9.4       nRBC 0       Platelet Count 179       Potassium 3.8       PROTEIN TOTAL 6.0       RBC 2.80       RDW 15.0       Sodium 141       WBC 10.52               Significant Imaging:   X-Ray Pelvis Routine AP   Final Result      As above         Electronically signed by: Jefferson Kunz   Date:    11/21/2023   Time:    18:51      X-Ray Femur 2 View Right   Final Result      Satisfactory post operative changes of right hip replacement  with inter medullary eyad         Electronically signed by: Jefferson Kunz   Date:    11/21/2023   Time:    18:48      SURG FL Surgery Fluoro Usage   Final Result      X-Ray Hip 2 or 3 views Right (with Pelvis when performed)   Final Result      As above         Electronically signed by: Jefferson Kunz   Date:    11/21/2023   Time:    18:15      X-Ray Elbow Complete Right   Final Result      As above         Electronically signed by: Jefferson Kunz   Date:    11/20/2023   Time:    21:26      X-Ray Hip 2 or 3 views Right (with Pelvis when performed)   Final Result      As above         Electronically signed by: Jefferson Kunz   Date:    11/20/2023   Time:    21:27

## 2023-11-23 NOTE — NURSING
Per order removed incision bandages X3 open to air- no drainage noted and skin intact and healing.

## 2023-11-23 NOTE — ASSESSMENT & PLAN NOTE
Currently undergoing treatment with linezolid.  Plan:  -Urine Culture + VRE  -continue home medication

## 2023-11-24 LAB
BASOPHILS # BLD AUTO: 0.02 K/UL (ref 0–0.2)
BASOPHILS NFR BLD: 0.2 % (ref 0–1.9)
DIFFERENTIAL METHOD: ABNORMAL
EOSINOPHIL # BLD AUTO: 0.3 K/UL (ref 0–0.5)
EOSINOPHIL NFR BLD: 2.8 % (ref 0–8)
ERYTHROCYTE [DISTWIDTH] IN BLOOD BY AUTOMATED COUNT: 14.8 % (ref 11.5–14.5)
HCT VFR BLD AUTO: 21.1 % (ref 40–54)
HGB BLD-MCNC: 6.6 G/DL (ref 14–18)
IMM GRANULOCYTES # BLD AUTO: 0.06 K/UL (ref 0–0.04)
IMM GRANULOCYTES NFR BLD AUTO: 0.6 % (ref 0–0.5)
LYMPHOCYTES # BLD AUTO: 0.9 K/UL (ref 1–4.8)
LYMPHOCYTES NFR BLD: 8.6 % (ref 18–48)
MCH RBC QN AUTO: 26.6 PG (ref 27–31)
MCHC RBC AUTO-ENTMCNC: 31.3 G/DL (ref 32–36)
MCV RBC AUTO: 85 FL (ref 82–98)
MONOCYTES # BLD AUTO: 0.3 K/UL (ref 0.3–1)
MONOCYTES NFR BLD: 3.3 % (ref 4–15)
NEUTROPHILS # BLD AUTO: 8.5 K/UL (ref 1.8–7.7)
NEUTROPHILS NFR BLD: 84.5 % (ref 38–73)
NRBC BLD-RTO: 0 /100 WBC
PLATELET # BLD AUTO: 172 K/UL (ref 150–450)
PMV BLD AUTO: 9.6 FL (ref 9.2–12.9)
RBC # BLD AUTO: 2.48 M/UL (ref 4.6–6.2)
WBC # BLD AUTO: 10.06 K/UL (ref 3.9–12.7)

## 2023-11-24 PROCEDURE — 27000207 HC ISOLATION

## 2023-11-24 PROCEDURE — 63600175 PHARM REV CODE 636 W HCPCS: Performed by: HOSPITALIST

## 2023-11-24 PROCEDURE — 11000001 HC ACUTE MED/SURG PRIVATE ROOM

## 2023-11-24 PROCEDURE — 36415 COLL VENOUS BLD VENIPUNCTURE: CPT | Performed by: FAMILY MEDICINE

## 2023-11-24 PROCEDURE — 99024 PR POST-OP FOLLOW-UP VISIT: ICD-10-PCS | Mod: POP,,, | Performed by: PHYSICIAN ASSISTANT

## 2023-11-24 PROCEDURE — 85025 COMPLETE CBC W/AUTO DIFF WBC: CPT | Performed by: FAMILY MEDICINE

## 2023-11-24 PROCEDURE — 97530 THERAPEUTIC ACTIVITIES: CPT

## 2023-11-24 PROCEDURE — 25000003 PHARM REV CODE 250: Performed by: NURSE PRACTITIONER

## 2023-11-24 PROCEDURE — 99024 POSTOP FOLLOW-UP VISIT: CPT | Mod: POP,,, | Performed by: PHYSICIAN ASSISTANT

## 2023-11-24 PROCEDURE — 63600175 PHARM REV CODE 636 W HCPCS: Performed by: FAMILY MEDICINE

## 2023-11-24 PROCEDURE — 25000242 PHARM REV CODE 250 ALT 637 W/ HCPCS: Performed by: HOSPITALIST

## 2023-11-24 PROCEDURE — 99900035 HC TECH TIME PER 15 MIN (STAT)

## 2023-11-24 PROCEDURE — 94640 AIRWAY INHALATION TREATMENT: CPT

## 2023-11-24 PROCEDURE — 25000003 PHARM REV CODE 250: Performed by: HOSPITALIST

## 2023-11-24 RX ADMIN — MELATONIN TAB 3 MG 6 MG: 3 TAB at 09:11

## 2023-11-24 RX ADMIN — ENOXAPARIN SODIUM 40 MG: 40 INJECTION SUBCUTANEOUS at 05:11

## 2023-11-24 RX ADMIN — MORPHINE SULFATE 2 MG: 4 INJECTION INTRAVENOUS at 06:11

## 2023-11-24 RX ADMIN — ARFORMOTEROL TARTRATE 15 MCG: 15 SOLUTION RESPIRATORY (INHALATION) at 07:11

## 2023-11-24 RX ADMIN — LINEZOLID 600 MG: 600 TABLET, FILM COATED ORAL at 09:11

## 2023-11-24 RX ADMIN — DOCUSATE SODIUM 100 MG: 100 CAPSULE, LIQUID FILLED ORAL at 08:11

## 2023-11-24 RX ADMIN — METOPROLOL SUCCINATE 50 MG: 50 TABLET, EXTENDED RELEASE ORAL at 08:11

## 2023-11-24 RX ADMIN — HYDROCODONE BITARTRATE AND ACETAMINOPHEN 1 TABLET: 5; 325 TABLET ORAL at 10:11

## 2023-11-24 RX ADMIN — SODIUM CHLORIDE, POTASSIUM CHLORIDE, SODIUM LACTATE AND CALCIUM CHLORIDE: 600; 310; 30; 20 INJECTION, SOLUTION INTRAVENOUS at 05:11

## 2023-11-24 RX ADMIN — BUDESONIDE INHALATION 0.5 MG: 0.5 SUSPENSION RESPIRATORY (INHALATION) at 07:11

## 2023-11-24 RX ADMIN — ESCITALOPRAM 20 MG: 5 TABLET, FILM COATED ORAL at 08:11

## 2023-11-24 RX ADMIN — SODIUM CHLORIDE, POTASSIUM CHLORIDE, SODIUM LACTATE AND CALCIUM CHLORIDE: 600; 310; 30; 20 INJECTION, SOLUTION INTRAVENOUS at 04:11

## 2023-11-24 RX ADMIN — LINEZOLID 600 MG: 600 TABLET, FILM COATED ORAL at 08:11

## 2023-11-24 RX ADMIN — LEVOTHYROXINE SODIUM 125 MCG: 0.03 TABLET ORAL at 05:11

## 2023-11-24 RX ADMIN — TAMSULOSIN HYDROCHLORIDE 0.4 MG: 0.4 CAPSULE ORAL at 08:11

## 2023-11-24 RX ADMIN — HYDROCODONE BITARTRATE AND ACETAMINOPHEN 1 TABLET: 5; 325 TABLET ORAL at 05:11

## 2023-11-24 RX ADMIN — DOCUSATE SODIUM 100 MG: 100 CAPSULE, LIQUID FILLED ORAL at 09:11

## 2023-11-24 NOTE — PT/OT/SLP PROGRESS
"Physical Therapy  Treatment    Geovani Currie   MRN: [id number]   Admitting Diagnosis: Displaced intertrochanteric fracture of right femur, initial encounter for closed fracture    PT Received On: 11/24/23  PT Start Time: 0845     PT Stop Time: 0910    PT Total Time (min): 25 min       Billable Minutes:  Therapeutic Activity 25    Treatment Type: Treatment  PT/PTA: PT     Number of PTA visits since last PT visit: 0       General Precautions: Standard, fall  Orthopedic Precautions: RLE weight bearing as tolerated  Braces: N/A  Respiratory Status: Nasal cannula, flow 2 L/min    Spiritual, Cultural Beliefs, Alevism Practices, Values that Affect Care: no    Subjective:  Communicated with WILBERT Diallo prior to session.  Pt reports no complaints. Wife states that she is happy that patient will be getting up today    Pain/Comfort  Pain Rating 1: 5/10  Location - Side 1: Right  Location 1: hip  Pain Addressed 1: Reposition, Distraction, Nurse notified  Pain Rating Post-Intervention 1: 5/10    Objective:   Patient found with: peripheral IV, nephrostomy, oxygen, bed alarm      Treatment and Education:  Treatment completed. Pt performed bed mobility including rolling and sup to sit with mod A. Pt sat EOB with CGA with good balance noted for 3 minutes. Pt performed STS x 4 with mod A of therapist. Attempted with RW initially, however, pt was unable to coordinate use. Pt transferred bed to chair with stand pivot technique and mod A of therapist.       Pt educated on role of PT in acute care and POC. Educated on importance of OOB activities, activity pacing, and HEP (marching/hip flex, hip abd, heel slides/LAQ, quad sets, ankle pumps) in order to maintain/regain strength. Encouraged to sit up in chair for all meals. Educated on proper use of RW for safety and to reduce risk of falling. Educated on "call don't fall" policy and increased risk of falling due to weakness, instructed to utilize call bell for assistance with all " transfers. Pt agreeable to all requests.       AM-PAC 6 CLICK MOBILITY  How much help from another person does this patient currently need?   1 = Unable, Total/Dependent Assistance  2 = A lot, Maximum/Moderate Assistance  3 = A little, Minimum/Contact Guard/Supervision  4 = None, Modified Sawyer/Independent    Turning over in bed (including adjusting bedclothes, sheets and blankets)?: 2  Sitting down on and standing up from a chair with arms (e.g., wheelchair, bedside commode, etc.): 2  Moving from lying on back to sitting on the side of the bed?: 2  Moving to and from a bed to a chair (including a wheelchair)?: 2  Need to walk in hospital room?: 1  Climbing 3-5 steps with a railing?: 1  Basic Mobility Total Score: 10    AM-PAC Raw Score CMS G-Code Modifier Level of Impairment Assistance   6 % Total / Unable   7 - 9 CM 80 - 100% Maximal Assist   10 - 14 CL 60 - 80% Moderate Assist   15 - 19 CK 40 - 60% Moderate Assist   20 - 22 CJ 20 - 40% Minimal Assist   23 CI 1-20% SBA / CGA   24 CH 0% Independent/ Mod I     Patient left up in chair with all lines intact, call button in reach, and RN present.    Assessment:  Geovani Currie is a 68 y.o. male with a medical diagnosis of Displaced intertrochanteric fracture of right femur, initial encounter for closed fracture and presents with weakness, impaired endurance, impaired cognition, decreased lower extremity function, impaired functional mobility, gait instability, decreased safety awareness, pain, impaired balance, impaired cardiopulmonary response to activity, impaired muscle length Pt with poor ability to bear weight in R LE today and poor ability to assist with transfer. No functional gait able to be performed d/t inability to take purposeful steps.     Rehab potential is fair.    Activity tolerance: Fair    Discharge recommendations: Moderate Intensity Therapy      Barriers to discharge:      Equipment recommendations: to be determined by next level of  care, walker, rolling     GOALS:   Multidisciplinary Problems       Physical Therapy Goals          Problem: Physical Therapy    Goal Priority Disciplines Outcome Goal Variances Interventions   Physical Therapy Goal     PT, PT/OT Ongoing, Progressing     Description: Goals to be met by 12/6/23.  1. Pt will complete bed mobility CGA.  2. Pt will complete sit to stand MIN A.  3. Pt will ambulate 50ft MIN A using RW.  4. Pt will increase AMPAC score by 2 points to progress functional mobility.                       PLAN:    Patient to be seen 3 x/week to address the above listed problems via gait training, therapeutic activities, therapeutic exercises, neuromuscular re-education  Plan of Care expires: 12/06/23  Plan of Care reviewed with: patient, spouse         11/24/2023

## 2023-11-24 NOTE — PROGRESS NOTES
[facility]  Orthopedics  Progress Note    Patient Name: Geovani Currie  MRN: [id number]  Admission Date: 11/20/2023  Hospital Length of Stay: 4 days  Attending Provider: Obdulia Hernandez MD  Primary Care Provider: Faizan Antoine MD  Follow-up For: Procedure(s) (LRB):  ORIF, FRACTURE, FEMUR, INTERTROCHANTERIC (Right)    Post-Operative Day: 3 Days Post-Op  Subjective:     Principal Problem:Displaced intertrochanteric fracture of right femur, initial encounter for closed fracture    Principal Orthopedic Problem: Right intertrochanteric femur fracture     Interval History: Geovani Currie is a 68-year-old male postop day 3 status post operative repair of right intertrochanteric femur fracture with cephalomedullary nail.  Patient is resting comfortably in bed.  No new complaints at this time.     Review of patient's allergies indicates:  No Known Allergies    Current Facility-Administered Medications   Medication    0.9%  NaCl infusion (for blood administration)    0.9%  NaCl infusion (for blood administration)    acetaminophen suppository 650 mg    acetaminophen tablet 650 mg    albuterol-ipratropium 2.5 mg-0.5 mg/3 mL nebulizer solution 3 mL    ALPRAZolam tablet 1 mg    aluminum-magnesium hydroxide-simethicone 200-200-20 mg/5 mL suspension 30 mL    arformoteroL nebulizer solution 15 mcg    budesonide nebulizer solution 0.5 mg    cyclobenzaprine tablet 10 mg    dextrose 10% bolus 125 mL 125 mL    dextrose 10% bolus 250 mL 250 mL    docusate sodium capsule 100 mg    enoxaparin injection 40 mg    EScitalopram oxalate tablet 20 mg    glucagon (human recombinant) injection 1 mg    glucose chewable tablet 16 g    glucose chewable tablet 24 g    HYDROcodone-acetaminophen 5-325 mg per tablet 1 tablet    lactated ringers infusion    levothyroxine tablet 125 mcg    linezolid tablet 600 mg    melatonin tablet 6 mg    metoprolol succinate (TOPROL-XL) 24 hr tablet 50 mg    morphine injection 2 mg    naloxone 0.4 mg/mL  "injection 0.02 mg    ondansetron injection 4 mg    promethazine tablet 25 mg    senna-docusate 8.6-50 mg per tablet 1 tablet    sodium chloride 0.9% flush 10 mL    tamsulosin 24 hr capsule 0.4 mg    Tdap (BOOSTRIX) vaccine injection 0.5 mL     Objective:     Vital Signs (Most Recent):  Temp: 98.4 °F (36.9 °C) (11/24/23 0727)  Pulse: 96 (11/24/23 0801)  Resp: (!) 1 (11/24/23 0727)  BP: (!) 125/55 (11/24/23 0727)  SpO2: 99 % (11/24/23 0727) Vital Signs (24h Range):  Temp:  [98 °F (36.7 °C)-98.5 °F (36.9 °C)] 98.4 °F (36.9 °C)  Pulse:  [66-96] 96  Resp:  [1-20] 1  SpO2:  [90 %-99 %] 99 %  BP: (102-128)/(52-60) 125/55     Weight: 76 kg (167 lb 8.8 oz)  Height: 5' 5" (165.1 cm)  Body mass index is 27.88 kg/m².      Intake/Output Summary (Last 24 hours) at 11/24/2023 0806  Last data filed at 11/24/2023 0745  Gross per 24 hour   Intake 546.76 ml   Output 2925 ml   Net -2378.24 ml       Ortho/SPM Exam  Right lower extremity   Dressings have been removed   Incision is well approximated, no signs of infection   Mild edema of the upper thigh  Minimal TTP   No pain with logroll  Calf and compartments are soft and compressible  Motor exam normal   Sensation and pulses intact  Cap refill brisk      GEN: Well developed, well nourished male. AAOX3. No acute distress.   Head: Normocephalic, atraumatic.   Eyes: CARTER  Neck: Trachea is midline, no adenopathy  Resp: Breathing unlabored.  Neuro: Motor function normal, Cranial nerves intact  Psych: Mood and affect appropriate.      Significant Labs:   Recent Lab Results       None            Significant Imaging: I have reviewed and interpreted all pertinent imaging results/findings.    Assessment/Plan:     Active Diagnoses:    Diagnosis Date Noted POA    PRINCIPAL PROBLEM:  Displaced intertrochanteric fracture of right femur, initial encounter for closed fracture [S72.760A] 11/20/2023 Yes    Right elbow pain [M25.521] 11/20/2023 Yes    Malignant neoplasm of urinary bladder [C67.9] " 11/15/2023 Yes    Acute renal failure superimposed on stage 3 chronic kidney disease [N17.9, N18.30] 11/11/2023 Yes    Centrilobular emphysema [J43.2] 11/09/2023 Yes    Anemia [D64.9] 10/21/2023 Yes    UTI (urinary tract infection) [N39.0] 10/21/2023 Yes      Problems Resolved During this Admission:     Assessment:  68-year-old male postop day 3 status post operative repair of right intertrochanteric femur fracture with cephalomedullary nail      Plan:  Weightbearing as tolerated to the right lower extremity  PT/OT for gait training and ADLs   Incision may be left open to the air, patient may shower and get the incision wet, but it should not be submerged  DVT prophylaxis per primary team   Patient will likely need rehab/SNF placement and is ready for discharge from orthopedic standpoint once this has been arranged   Follow-up with [facility] at 3 weeks postop    Indio Yanez PA-C  Orthopedics  [facility]

## 2023-11-24 NOTE — PLAN OF CARE
Patient updated on plan of care. Instructed  patient to use call light, call light within reach. Hourly rounding performed. Fall precautions maintained; bed alarm on. Vitals [id number]. Chart check complete. Education provided, questions encouraged. Rhythm-SR on tele box # [id number]. Patient has had no changes noted during shift. Total output for right nephro tube- 1900 mls, left nephro tube- 200 mls.      Problem: Adult Inpatient Plan of Care  Goal: Plan of Care Review  Outcome: Ongoing, Progressing

## 2023-11-24 NOTE — ASSESSMENT & PLAN NOTE
Patient presented following ground level fall earlier today and was found to have sustained an acute traumatic displaced right intertrochanteric femur fracture noted on imaging.  Patient also sustained abrasion to right elbow with imaging negative for acute fractures or dislocation.  Orthopedics consulted by ED staff and awaiting further evaluation/recommendations regarding surgical intervention.  According to RCRI, patient is classified as a class 2 risk with a 6.0% 30 day risk of death, MI, cardiac arrest.  Plan:  - Dr. Guidry performed an ORIF on 11/21  - Activity: WBAT  - DVT proph: Lovenox  - CM consulted for SNF.  CM working with wife on placement.  Due to gvt agencies being closed during thanksgiving the 142 will not be back until Monday.

## 2023-11-24 NOTE — PROGRESS NOTES
[facility]  [facility] Medicine  Progress Note    Patient Name: Geovani Currie  MRN: [id number]  Patient Class: IP- Inpatient   Admission Date: 11/20/2023  Length of Stay: 4 days  Attending Physician: Obdulia Hernandez MD  Primary Care Provider: Faizan Antoine MD        Subjective:     Principal Problem:Displaced intertrochanteric fracture of right femur, initial encounter for closed fracture        HPI:  Geovani Currie is a 68 y.o. male with a PMH  has a past medical history of COPD (chronic obstructive pulmonary disease) and Hypertension. who presented to the ED for further evaluation of right hip pain following ground level fall earlier today.  Patient reported stepping onto a curb at which time he lost his balance causing him to fall on his right side endorsing immediate pain with inability to stand/bear weight.  Patient denied endorsing any head trauma, loss of consciousness, or sustaining any other injuries with the exception of abrasions to his elbow.  Patient reported pain was localized throughout his right hip, nonradiating, currently rated 8/10 in severity with aggravating factors including weight-bearing, movement, and palpation to the affected area while alleviating factors included immobility, nonweightbearing, and pain medication.  Associated symptoms include generalized myalgias, arthralgias, and weakness but denied endorsing any numbness, tingling, or penetrating trauma, but did report decreased range of motion muscle strength secondary to exacerbation of pain.  All other review of systems negative except as noted above.  Of note, patient was recently discharged on 11/17/2023 following admission for dislodged nephrostomy tube with IR replacement and treatment of UTI which he is currently being treated with linezolid.  Prior to incident, patient reported being in his usual state of health with no other concerns or complaints.  Initial workup in the ED revealed patient has sustained an acute  traumatic right intertrochanteric femur fracture on imaging in his being admitted to Hospital Medicine inpatient for continued medical management while awaiting surgical intervention from Orthopedics.    PCP: aFizan Antoine      Overview/Hospital Course:  Mr Currie was admitted with a right femur fracture s/p GLF.  He had an ORIF by Dr. Guidry on 11/21.  Patient was seen by PT/OT and recommended SNF.  Patient is weight bearing as tolerated.  He is also noted to have 2 nephrostomy tubes placed prior to admission.  CM consulted for placement.    Interval History: Patient up in chair feeling good after working with therapy, but still requiring significant assistance.  CM working on SNF placement but due to govt agencies being closed a 142 will not be able to be obtained until Monday.    Review of Systems  Objective:     Vital Signs (Most Recent):  Temp: 98.2 °F (36.8 °C) (11/24/23 1222)  Pulse: 69 (11/24/23 1222)  Resp: 19 (11/24/23 1222)  BP: (!) 122/54 (11/24/23 1222)  SpO2: (!) 94 % (11/24/23 1222) Vital Signs (24h Range):  Temp:  [98 °F (36.7 °C)-98.4 °F (36.9 °C)] 98.2 °F (36.8 °C)  Pulse:  [69-96] 69  Resp:  [1-19] 19  SpO2:  [94 %-99 %] 94 %  BP: (102-128)/(52-60) 122/54     Weight: 76 kg (167 lb 8.8 oz)  Body mass index is 27.88 kg/m².    Intake/Output Summary (Last 24 hours) at 11/24/2023 1333  Last data filed at 11/24/2023 1140  Gross per 24 hour   Intake 546.76 ml   Output 3450 ml   Net -2903.24 ml         Physical Exam      Vitals and nursing note reviewed.   Constitutional:       Appearance: Normal appearance.   HENT:      Head: Normocephalic and atraumatic.      Nose: Nose normal.      Mouth/Throat:      Mouth: Mucous membranes are moist.   Eyes:      Extraocular Movements: Extraocular movements intact.      Conjunctiva/sclera: Conjunctivae normal.   Cardiovascular:      Rate and Rhythm: Normal rate and regular rhythm.      Pulses: Normal pulses.      Heart sounds: Normal heart sounds.   Pulmonary:       Effort: Pulmonary effort is normal.      Breath sounds: Normal breath sounds.   Abdominal:      General: Abdomen is flat. Bowel sounds are normal.      Palpations: Abdomen is soft.   Genitourinary:     Comments: B/L nephrostomy tubes (R draining better than L)  Musculoskeletal:         General: Tenderness and signs of injury present.      Cervical back: Normal range of motion and neck supple.      Comments: Right hip bandage and thigh bandage CDI   Skin:     General: Skin is warm.      Capillary Refill: Capillary refill takes less than 2 seconds.   Neurological:      Mental Status: He is alert and oriented to person, place, and time. Mental status is at baseline.   Psychiatric:         Mood and Affect: Mood normal.         Behavior: Behavior normal.     Significant Labs: All pertinent labs within the past 24 hours have been reviewed.  Recent Lab Results         11/24/23  0809        Baso # 0.02       Basophil % 0.2       Differential Method Automated       Eos # 0.3       Eosinophil % 2.8       Gran # (ANC) 8.5       Gran % 84.5       Hematocrit 21.1       Hemoglobin 6.6       Immature Grans (Abs) 0.06  Comment: Mild elevation in immature granulocytes is non specific and   can be seen in a variety of conditions including stress response,   acute inflammation, trauma and pregnancy. Correlation with other   laboratory and clinical findings is essential.         Immature Granulocytes 0.6       Lymph # 0.9       Lymph % 8.6       MCH 26.6       MCHC 31.3       MCV 85       Mono # 0.3       Mono % 3.3       MPV 9.6       nRBC 0       Platelet Count 172       RBC 2.48       RDW 14.8       WBC 10.06               Significant Imaging:   X-Ray Pelvis Routine AP   Final Result      As above         Electronically signed by: Jefferson Kunz   Date:    11/21/2023   Time:    18:51      X-Ray Femur 2 View Right   Final Result      Satisfactory post operative changes of right hip replacement with inter medullary eyad          Electronically signed by: Jefferson Kunz   Date:    11/21/2023   Time:    18:48      SURG FL Surgery Fluoro Usage   Final Result      X-Ray Hip 2 or 3 views Right (with Pelvis when performed)   Final Result      As above         Electronically signed by: Jefferson Kunz   Date:    11/21/2023   Time:    18:15      X-Ray Elbow Complete Right   Final Result      As above         Electronically signed by: Jefferson Kunz   Date:    11/20/2023   Time:    21:26      X-Ray Hip 2 or 3 views Right (with Pelvis when performed)   Final Result      As above         Electronically signed by: Jefferson Kunz   Date:    11/20/2023   Time:    21:27           Assessment/Plan:      * Displaced intertrochanteric fracture of right femur, initial encounter for closed fracture  Patient presented following ground level fall earlier today and was found to have sustained an acute traumatic displaced right intertrochanteric femur fracture noted on imaging.  Patient also sustained abrasion to right elbow with imaging negative for acute fractures or dislocation.  Orthopedics consulted by ED staff and awaiting further evaluation/recommendations regarding surgical intervention.  According to RCRI, patient is classified as a class 2 risk with a 6.0% 30 day risk of death, MI, cardiac arrest.  Plan:  - Dr. Guidry performed an ORIF on 11/21  - Activity: WBAT  - DVT proph: Lovenox  - CM consulted for SNF.  CM working with wife on placement.  Due to gvt agencies being closed during thanksgiving the 142 will not be back until Monday.      Right hip pain        UTI (urinary tract infection)  Currently undergoing treatment with linezolid.  Plan:  -Urine Culture + VRE  -continue home medication      Right elbow pain        Malignant neoplasm of urinary bladder  Currently followed by Hematology/Oncology and Urology outpatient.  Plan:  -f/u outpatient as directed      Acute renal failure superimposed on stage 3 chronic kidney disease  Patient with acute kidney  injury/acute renal failure likely due to pre-renal azotemia due to dehydration BANDAR is currently  undergoing fluid resuscitation . Patient s/o bilateral nephrostomy tube placement give prior obstructive uropathy. Baseline creatinine  stage 3  - Labs reviewed- Renal function/electrolytes with Estimated Creatinine Clearance: 51.1 mL/min (based on SCr of 1.3 mg/dL). according to latest data. Monitor urine output and serial BMP and adjust therapy as needed. Avoid nephrotoxins and renally dose meds for GFR listed above.    - BANDAR resolved. (Cr 1.3)    Centrilobular emphysema  Patient's COPD is controlled currently.  Patient is currently off COPD Pathway. Continue scheduled inhalers  as needed  and monitor respiratory status closely.       Anemia  Patient's anemia is currently controlled. Has not received any PRBCs to date. Etiology likely d/t  chronic disease.  Current CBC reviewed-   Lab Results   Component Value Date    HGB 7.6 (L) 11/23/2023    HCT 24.2 (L) 11/23/2023     Monitor serial CBC and transfuse if patient becomes hemodynamically unstable, symptomatic or H/H drops below 7/21.        VTE Risk Mitigation (From admission, onward)           Ordered     enoxaparin injection 40 mg  Every 24 hours         11/22/23 1757     IP VTE HIGH RISK PATIENT  Once         11/20/23 2351     Place sequential compression device  Until discontinued         11/20/23 2351     Reason for No Pharmacological VTE Prophylaxis  Once        Question:  Reasons:  Answer:  Physician Provided (leave comment)  Comment:  awaiting surgical intervention    11/20/23 2351                    Discharge Planning   TANIA:      Code Status: Full Code   Is the patient medically ready for discharge?:     Reason for patient still in hospital (select all that apply): Pending disposition  Discharge Plan A: Skilled Nursing Facility   Discharge Delays:  (State closed on Friday- unable to obtain 142 for SNF placement)              Obdulia Hernandez MD  Department of  [facility] Medicine   [facility] - Med Surg 3

## 2023-11-24 NOTE — ASSESSMENT & PLAN NOTE
Patient with acute kidney injury/acute renal failure likely due to pre-renal azotemia due to dehydration BANDAR is currently  undergoing fluid resuscitation . Patient s/o bilateral nephrostomy tube placement give prior obstructive uropathy. Baseline creatinine  stage 3  - Labs reviewed- Renal function/electrolytes with Estimated Creatinine Clearance: 51.1 mL/min (based on SCr of 1.3 mg/dL). according to latest data. Monitor urine output and serial BMP and adjust therapy as needed. Avoid nephrotoxins and renally dose meds for GFR listed above.    - BANDAR resolved. (Cr 1.3)

## 2023-11-24 NOTE — PLAN OF CARE
[facility]  Discharge Reassessment    Primary Care Provider: Faizan Antoine MD    Expected Discharge Date:     Reassessment (most recent)       Discharge Reassessment - 11/24/23 0856          Discharge Reassessment    Assessment Type Discharge Planning Reassessment     Did the patient's condition or plan change since previous assessment? No     Discharge Plan discussed with: Patient     Communicated TANIA with patient/caregiver Date not available/Unable to determine     Discharge Plan A Skilled Nursing Facility

## 2023-11-24 NOTE — PLAN OF CARE
SNF pending acceptance and selection    Has HME; O2 through [facility] in the past; wishes to resume

## 2023-11-24 NOTE — SUBJECTIVE & OBJECTIVE
Interval History: Patient up in chair feeling good after working with therapy, but still requiring significant assistance.  CM working on SNF placement but due to govt agencies being closed a 142 will not be able to be obtained until Monday.    Review of Systems  Objective:     Vital Signs (Most Recent):  Temp: 98.2 °F (36.8 °C) (11/24/23 1222)  Pulse: 69 (11/24/23 1222)  Resp: 19 (11/24/23 1222)  BP: (!) 122/54 (11/24/23 1222)  SpO2: (!) 94 % (11/24/23 1222) Vital Signs (24h Range):  Temp:  [98 °F (36.7 °C)-98.4 °F (36.9 °C)] 98.2 °F (36.8 °C)  Pulse:  [69-96] 69  Resp:  [1-19] 19  SpO2:  [94 %-99 %] 94 %  BP: (102-128)/(52-60) 122/54     Weight: 76 kg (167 lb 8.8 oz)  Body mass index is 27.88 kg/m².    Intake/Output Summary (Last 24 hours) at 11/24/2023 1333  Last data filed at 11/24/2023 1140  Gross per 24 hour   Intake 546.76 ml   Output 3450 ml   Net -2903.24 ml         Physical Exam      Vitals and nursing note reviewed.   Constitutional:       Appearance: Normal appearance.   HENT:      Head: Normocephalic and atraumatic.      Nose: Nose normal.      Mouth/Throat:      Mouth: Mucous membranes are moist.   Eyes:      Extraocular Movements: Extraocular movements intact.      Conjunctiva/sclera: Conjunctivae normal.   Cardiovascular:      Rate and Rhythm: Normal rate and regular rhythm.      Pulses: Normal pulses.      Heart sounds: Normal heart sounds.   Pulmonary:      Effort: Pulmonary effort is normal.      Breath sounds: Normal breath sounds.   Abdominal:      General: Abdomen is flat. Bowel sounds are normal.      Palpations: Abdomen is soft.   Genitourinary:     Comments: B/L nephrostomy tubes (R draining better than L)  Musculoskeletal:         General: Tenderness and signs of injury present.      Cervical back: Normal range of motion and neck supple.      Comments: Right hip bandage and thigh bandage CDI   Skin:     General: Skin is warm.      Capillary Refill: Capillary refill takes less than 2  seconds.   Neurological:      Mental Status: He is alert and oriented to person, place, and time. Mental status is at baseline.   Psychiatric:         Mood and Affect: Mood normal.         Behavior: Behavior normal.     Significant Labs: All pertinent labs within the past 24 hours have been reviewed.  Recent Lab Results         11/24/23  0809        Baso # 0.02       Basophil % 0.2       Differential Method Automated       Eos # 0.3       Eosinophil % 2.8       Gran # (ANC) 8.5       Gran % 84.5       Hematocrit 21.1       Hemoglobin 6.6       Immature Grans (Abs) 0.06  Comment: Mild elevation in immature granulocytes is non specific and   can be seen in a variety of conditions including stress response,   acute inflammation, trauma and pregnancy. Correlation with other   laboratory and clinical findings is essential.         Immature Granulocytes 0.6       Lymph # 0.9       Lymph % 8.6       MCH 26.6       MCHC 31.3       MCV 85       Mono # 0.3       Mono % 3.3       MPV 9.6       nRBC 0       Platelet Count 172       RBC 2.48       RDW 14.8       WBC 10.06               Significant Imaging:   X-Ray Pelvis Routine AP   Final Result      As above         Electronically signed by: Jefferson Kunz   Date:    11/21/2023   Time:    18:51      X-Ray Femur 2 View Right   Final Result      Satisfactory post operative changes of right hip replacement with inter medullary eyad         Electronically signed by: Jefferson Kunz   Date:    11/21/2023   Time:    18:48      SURG FL Surgery Fluoro Usage   Final Result      X-Ray Hip 2 or 3 views Right (with Pelvis when performed)   Final Result      As above         Electronically signed by: Jefferson Kunz   Date:    11/21/2023   Time:    18:15      X-Ray Elbow Complete Right   Final Result      As above         Electronically signed by: Jefferson Kunz   Date:    11/20/2023   Time:    21:26      X-Ray Hip 2 or 3 views Right (with Pelvis when performed)   Final Result      As above          Electronically signed by: Jefferson Kunz   Date:    11/20/2023   Time:    21:27

## 2023-11-24 NOTE — PLAN OF CARE
Treatment completed. Pt performed bed mobility including rolling and sup to sit with mod A. Pt sat EOB with CGA with good balance noted for 3 minutes. Pt performed STS x 4 with mod A of therapist. Attempted with RW initially, however, pt was unable to coordinate use. Pt transferred bed to chair with stand pivot technique and mod A of therapist.

## 2023-11-25 PROCEDURE — 97116 GAIT TRAINING THERAPY: CPT | Mod: CQ

## 2023-11-25 PROCEDURE — 97530 THERAPEUTIC ACTIVITIES: CPT

## 2023-11-25 PROCEDURE — 25000003 PHARM REV CODE 250: Performed by: HOSPITALIST

## 2023-11-25 PROCEDURE — 27000207 HC ISOLATION

## 2023-11-25 PROCEDURE — 63600175 PHARM REV CODE 636 W HCPCS: Performed by: HOSPITALIST

## 2023-11-25 PROCEDURE — 25000003 PHARM REV CODE 250: Performed by: NURSE PRACTITIONER

## 2023-11-25 PROCEDURE — 94799 UNLISTED PULMONARY SVC/PX: CPT | Mod: XB

## 2023-11-25 PROCEDURE — 99900035 HC TECH TIME PER 15 MIN (STAT)

## 2023-11-25 PROCEDURE — 63600175 PHARM REV CODE 636 W HCPCS: Performed by: FAMILY MEDICINE

## 2023-11-25 PROCEDURE — 11000001 HC ACUTE MED/SURG PRIVATE ROOM

## 2023-11-25 PROCEDURE — 99024 POSTOP FOLLOW-UP VISIT: CPT | Mod: POP,,, | Performed by: PHYSICIAN ASSISTANT

## 2023-11-25 PROCEDURE — 25000242 PHARM REV CODE 250 ALT 637 W/ HCPCS: Performed by: HOSPITALIST

## 2023-11-25 PROCEDURE — 94640 AIRWAY INHALATION TREATMENT: CPT

## 2023-11-25 PROCEDURE — 27000221 HC OXYGEN, UP TO 24 HOURS

## 2023-11-25 PROCEDURE — 97110 THERAPEUTIC EXERCISES: CPT

## 2023-11-25 PROCEDURE — 94761 N-INVAS EAR/PLS OXIMETRY MLT: CPT

## 2023-11-25 PROCEDURE — 97530 THERAPEUTIC ACTIVITIES: CPT | Mod: CQ

## 2023-11-25 PROCEDURE — 99024 PR POST-OP FOLLOW-UP VISIT: ICD-10-PCS | Mod: POP,,, | Performed by: PHYSICIAN ASSISTANT

## 2023-11-25 RX ADMIN — LINEZOLID 600 MG: 600 TABLET, FILM COATED ORAL at 09:11

## 2023-11-25 RX ADMIN — ARFORMOTEROL TARTRATE 15 MCG: 15 SOLUTION RESPIRATORY (INHALATION) at 07:11

## 2023-11-25 RX ADMIN — DOCUSATE SODIUM 100 MG: 100 CAPSULE, LIQUID FILLED ORAL at 09:11

## 2023-11-25 RX ADMIN — SODIUM CHLORIDE, POTASSIUM CHLORIDE, SODIUM LACTATE AND CALCIUM CHLORIDE: 600; 310; 30; 20 INJECTION, SOLUTION INTRAVENOUS at 05:11

## 2023-11-25 RX ADMIN — ESCITALOPRAM 20 MG: 5 TABLET, FILM COATED ORAL at 09:11

## 2023-11-25 RX ADMIN — BUDESONIDE INHALATION 0.5 MG: 0.5 SUSPENSION RESPIRATORY (INHALATION) at 07:11

## 2023-11-25 RX ADMIN — HYDROCODONE BITARTRATE AND ACETAMINOPHEN 1 TABLET: 5; 325 TABLET ORAL at 11:11

## 2023-11-25 RX ADMIN — MELATONIN TAB 3 MG 6 MG: 3 TAB at 08:11

## 2023-11-25 RX ADMIN — LEVOTHYROXINE SODIUM 125 MCG: 0.03 TABLET ORAL at 05:11

## 2023-11-25 RX ADMIN — ALPRAZOLAM 1 MG: 1 TABLET ORAL at 08:11

## 2023-11-25 RX ADMIN — ENOXAPARIN SODIUM 40 MG: 40 INJECTION SUBCUTANEOUS at 04:11

## 2023-11-25 RX ADMIN — TAMSULOSIN HYDROCHLORIDE 0.4 MG: 0.4 CAPSULE ORAL at 09:11

## 2023-11-25 RX ADMIN — DOCUSATE SODIUM 100 MG: 100 CAPSULE, LIQUID FILLED ORAL at 08:11

## 2023-11-25 RX ADMIN — SODIUM CHLORIDE, POTASSIUM CHLORIDE, SODIUM LACTATE AND CALCIUM CHLORIDE: 600; 310; 30; 20 INJECTION, SOLUTION INTRAVENOUS at 08:11

## 2023-11-25 RX ADMIN — LINEZOLID 600 MG: 600 TABLET, FILM COATED ORAL at 08:11

## 2023-11-25 RX ADMIN — METOPROLOL SUCCINATE 50 MG: 50 TABLET, EXTENDED RELEASE ORAL at 09:11

## 2023-11-25 RX ADMIN — HYDROCODONE BITARTRATE AND ACETAMINOPHEN 1 TABLET: 5; 325 TABLET ORAL at 04:11

## 2023-11-25 NOTE — PT/OT/SLP PROGRESS
"Physical Therapy  Treatment    Geovani Currie   MRN: [id number]   Admitting Diagnosis: Displaced intertrochanteric fracture of right femur, initial encounter for closed fracture    PT Received On: 11/25/23  PT Start Time: 1205     PT Stop Time: 1230    PT Total Time (min): 25 min       Billable Minutes:  Therapeutic Activity 25    Treatment Type: Treatment  PT/PTA: PTA     Number of PTA visits since last PT visit: 1       General Precautions: Standard, fall  Orthopedic Precautions: RLE weight bearing as tolerated  Braces: N/A  Respiratory Status: Nasal cannula, flow 2 L/min    Spiritual, Cultural Beliefs, Faith Practices, Values that Affect Care: no    Subjective:  Communicated with nurse Diallo and completed epic chart review prior to session. Pt's wife states he wants to change his brief.         Objective:   Patient found with: nephrostomy, peripheral IV, bed alarm, oxygen    Roll L: Mod A assistants with RLE management  Supine > Sit: Mod A  Sit EOB 5 mins to improve tolerance to upright position, core and trunk stability    Sit > Stand from EOB: with RW Mod A     Step to T/F to chair: Mod A for RW and RLE management, utilized small uncoordinated steps, VC for proper RW management pt unable to self correct. No functional gait able to be performed due to inability to take purposeful steps.     Performed BLE AROM TE x15 reps: Hip flex, AP, TKE intermittent cues for proper technique       Treatment and Education:  Educated pt on importance of OOB activity and HEP ( hip flex, LAQ, AP, quad sets) in order to maintain/ regain strength. Encouraged pt to sit up in chair for all meals. Educated pt on proper use of RW for safety and to reduce risk of falls. Educated on "call don't fall" policy and increased risk of falling due to weakness, instructed to utilize call bell for assistance with all transfers. Pt agreeable to all requests.     AM-PAC 6 CLICK MOBILITY  How much help from another person does this patient " currently need?   1 = Unable, Total/Dependent Assistance  2 = A lot, Maximum/Moderate Assistance  3 = A little, Minimum/Contact Guard/Supervision  4 = None, Modified [place]/Independent    Turning over in bed (including adjusting bedclothes, sheets and blankets)?: 2  Sitting down on and standing up from a chair with arms (e.g., wheelchair, bedside commode, etc.): 3  Moving from lying on back to sitting on the side of the bed?: 2  Moving to and from a bed to a chair (including a wheelchair)?: 3  Need to walk in hospital room?: 2  Climbing 3-5 steps with a railing?: 1 (NT)  Basic Mobility Total Score: 13    AM-PAC Raw Score CMS G-Code Modifier Level of Impairment Assistance   6 % Total / Unable   7 - 9 CM 80 - 100% Maximal Assist   10 - 14 CL 60 - 80% Moderate Assist   15 - 19 CK 40 - 60% Moderate Assist   20 - 22 CJ 20 - 40% Minimal Assist   23 CI 1-20% SBA / CGA   24 CH 0% Independent/ Mod I     Patient left up in chair with all lines intact, call button in reach, chair alarm on, nurse notified, and family present.    Assessment:  Geovani Currie is a 68 y.o. male with a medical diagnosis of Displaced intertrochanteric fracture of right femur, initial encounter for closed fracture and presents with the following impairments listed below. Pt continues with poor ability to bear weight through RLE and poor ability to transfer. Pt will continue to benefit from skilled PT to address functional limitations in order to return to PLOF/ decrease caregiver burden.    Rehab identified problem list/impairments: weakness, impaired endurance, impaired cognition, decreased lower extremity function, decreased safety awareness, impaired cardiopulmonary response to activity, impaired muscle length, gait instability    Rehab potential is fair.    Activity tolerance: Fair    Discharge recommendations: Moderate Intensity Therapy      Barriers to discharge:      Equipment recommendations: to be determined by next level of  care     GOALS:   Multidisciplinary Problems       Physical Therapy Goals          Problem: Physical Therapy    Goal Priority Disciplines Outcome Goal Variances Interventions   Physical Therapy Goal     PT, PT/OT Ongoing, Progressing     Description: Goals to be met by 12/6/23.  1. Pt will complete bed mobility CGA.  2. Pt will complete sit to stand MIN A.  3. Pt will ambulate 50ft MIN A using RW.  4. Pt will increase AMPAC score by 2 points to progress functional mobility.                       PLAN:    Patient to be seen 3 x/week to address the above listed problems via gait training, therapeutic activities, therapeutic exercises, neuromuscular re-education  Plan of Care expires: 12/06/23  Plan of Care reviewed with: patient         11/25/2023

## 2023-11-25 NOTE — PT/OT/SLP PROGRESS
Occupational Therapy  Treatment    Geovani Currie   MRN: [id number]   Admitting Diagnosis: Displaced intertrochanteric fracture of right femur, initial encounter for closed fracture    OT Date of Treatment: 11/25/23   OT Start Time: 1505  OT Stop Time: 1530  OT Total Time (min): 25 min    Billable Minutes:  Therapeutic Activity 10 and Therapeutic Exercise 15    OT/CHAPARRITA: OT          General Precautions: Standard, fall  Orthopedic Precautions: RLE weight bearing as tolerated  Braces: N/A  Subjective:  Communicated with nurse and epic chart review prior to session.    Pain/Comfort  Pain Rating 1: 5/10 (during are exercises)  Pain Addressed 1: Cessation of Activity, Reposition, Nurse notified    Objective:  Patient found with: nephrostomy, peripheral IV, bed alarm, oxygen     Functional Mobility:  Bed Mobility:  Min a with leg lift with supine <>sit  SBA with rolling L<>R    Transfers:   MIN/ CGA WITH SIT<>STAND EOB AND VC'S FOR HAND PLACEMENT AND SAFETY    Balance:   Static Sit: GOOD: Takes MODERATE challenges from all directions  Dynamic Sit: GOOD-: Incosistently Maintains balance through MODERATE excursions of active trunk movement,     Static Stand: FAIR: Maintains without assist but unable to take challenges  Dynamic stand: POOR+: Needs MIN (minimal ) assist during gait    Therapeutic Activities and Exercises:  Educated patient on importance of increased tolerance to upright position and direct impact on CV endurance and strength. Patient encouraged to sit up in chair/ EOB, for a minimum of 2 consecutive hours including for all meals.. Encouraged patient to perform AROM TE to BUE throughout the day within all available planes of motion. Re enforced importance of utilizing call light to meet needs in room and not attempt to get up without staff assistance. Patient verbalized understanding and agreed to comply.           AM-PAC 6 CLICK ADL   How much help from another person does this patient currently need?   1 =  "Unable, Total/Dependent Assistance  2 = A lot, Maximum/Moderate Assistance  3 = A little, Minimum/Contact Guard/Supervision  4 = None, Modified [place]/Independent    Putting on and taking off regular lower body clothing? : 2  Bathing (including washing, rinsing, drying)?: 2  Toileting, which includes using toilet, bedpan, or urinal? : 3  Putting on and taking off regular upper body clothing?: 3  Taking care of personal grooming such as brushing teeth?: 4  Eating meals?: 4  Daily Activity Total Score: 18     AM-PAC Raw Score CMS "G-Code Modifier Level of Impairment Assistance   6 % Total / Unable   7 - 8 CM 80 - 100% Maximal Assist   9-13 CL 60 - 80% Moderate Assist   14 - 19 CK 40 - 60% Moderate Assist   20 - 22 CJ 20 - 40% Minimal Assist   23 CI 1-20% SBA / CGA   24 CH 0% Independent/ Mod I       Patient left HOB elevated with all lines intact, call button in reach, bed alarm on, NURSE notified, and NURSE present    ASSESSMENT:  Geovani Currie is a 68 y.o. male with a medical diagnosis of Displaced intertrochanteric fracture of right femur, initial encounter for closed fracture and presents with DEBILITY AND GENERALIZED WEAKNESS.    Rehab identified problem list/impairments:  weakness, impaired self care skills, impaired balance, decreased safety awareness, impaired endurance, impaired functional mobility, gait instability, decreased lower extremity function, decreased ROM    Rehab potential is good.    Activity tolerance: Good    Discharge recommendations: Moderate Intensity Therapy   Barriers to discharge:      Equipment recommendations: to be determined by next level of care    GOALS:   Multidisciplinary Problems       Occupational Therapy Goals          Problem: Occupational Therapy    Goal Priority Disciplines Outcome Interventions   Occupational Therapy Goal     OT, PT/OT Ongoing, Progressing    Description: Goals to be met by: 12/6/23     Patient will increase functional independence with ADLs " by performing:    UE Dressing with Stand-by Assistance.  Grooming while bedside chair with Modified [place].  Toileting from bedside commode with Stand-by Assistance for hygiene and clothing management.   Toilet transfer to bedside commode with Minimal Assistance.  Upper extremity exercise program x15 reps per handout, with independence.                         Plan:  Patient to be seen 2 x/week to address the above listed problems via self-care/home management, therapeutic activities, therapeutic exercises  Plan of Care expires: 12/06/23  Plan of Care reviewed with: patient         11/25/2023

## 2023-11-25 NOTE — PROGRESS NOTES
[facility]  [facility] [place]  Progress Note    Patient Name: Geovani Crurie  MRN: [id number]  Patient Class: IP- Inpatient   Admission Date: 11/20/2023  Length of Stay: 5 days  Attending Physician: Obdulia Hernandez MD  Primary Care Provider: Faizan Antoine MD        Subjective:     Principal Problem:Displaced intertrochanteric fracture of right femur, initial encounter for closed fracture        HPI:  Geovani Currie is a 68 y.o. male with a PMH  has a past medical history of COPD (chronic obstructive pulmonary disease) and Hypertension. who presented to the ED for further evaluation of right hip pain following ground level fall earlier today.  Patient reported stepping onto a curb at which time he lost his balance causing him to fall on his right side endorsing immediate pain with inability to stand/bear weight.  Patient denied endorsing any head trauma, loss of consciousness, or sustaining any other injuries with the exception of abrasions to his elbow.  Patient reported pain was localized throughout his right hip, nonradiating, currently rated 8/10 in severity with aggravating factors including weight-bearing, movement, and palpation to the affected area while alleviating factors included immobility, nonweightbearing, and pain medication.  Associated symptoms include generalized myalgias, arthralgias, and weakness but denied endorsing any numbness, tingling, or penetrating trauma, but did report decreased range of motion muscle strength secondary to exacerbation of pain.  All other review of systems negative except as noted above.  Of note, patient was recently discharged on 11/17/2023 following admission for dislodged nephrostomy tube with IR replacement and treatment of UTI which he is currently being treated with linezolid.  Prior to incident, patient reported being in his usual state of health with no other concerns or complaints.  Initial workup in the ED revealed patient has sustained an acute  traumatic right intertrochanteric femur fracture on imaging in his being admitted to Hospital Medicine inpatient for continued medical management while awaiting surgical intervention from Orthopedics.    PCP: Faizan Antoine      Overview/Hospital Course:  Mr Currie was admitted with a right femur fracture s/p GLF.  He had an ORIF by Dr. Guidry on 11/21.  Patient was seen by PT/OT and recommended SNF.  Patient is weight bearing as tolerated.  He is also noted to have 2 nephrostomy tubes placed prior to admission.  CM consulted for placement.    Interval History: Patient is less alert today.  Wife concerned as he had become more confused with his last infection.  He is on Zyvox.  She also note urethral blood discharge.  Urology was consulted and Dr. Gonzales felt this was due to his bladder cancer.      From a orthopedic standpoint.  Awaiting placement.  Will need 142 which has been delayed due to thanksgiving week and gvt offices being closed.    Review of Systems  Objective:     Vital Signs (Most Recent):  Temp: 98 °F (36.7 °C) (11/25/23 0734)  Pulse: 81 (11/25/23 1100)  Resp: 18 (11/25/23 1105)  BP: (!) 117/56 (11/25/23 0734)  SpO2: 96 % (11/25/23 0734) Vital Signs (24h Range):  Temp:  [97.7 °F (36.5 °C)-98.6 °F (37 °C)] 98 °F (36.7 °C)  Pulse:  [69-90] 81  Resp:  [16-20] 18  SpO2:  [89 %-98 %] 96 %  BP: ()/(46-60) 117/56     Weight: 76 kg (167 lb 8.8 oz)  Body mass index is 27.88 kg/m².    Intake/Output Summary (Last 24 hours) at 11/25/2023 1227  Last data filed at 11/25/2023 0951  Gross per 24 hour   Intake 1596.76 ml   Output 3325 ml   Net -1728.24 ml         Physical Exam  Vitals and nursing note reviewed.   Constitutional:       Appearance: Normal appearance.   HENT:      Head: Normocephalic and atraumatic.      Nose: Nose normal.      Mouth/Throat:      Mouth: Mucous membranes are moist.   Eyes:      Extraocular Movements: Extraocular movements intact.      Conjunctiva/sclera: Conjunctivae normal.    Cardiovascular:      Rate and Rhythm: Normal rate and regular rhythm.      Pulses: Normal pulses.      Heart sounds: Normal heart sounds.   Pulmonary:      Effort: Pulmonary effort is normal.      Breath sounds: Normal breath sounds.   Abdominal:      General: Abdomen is flat. Bowel sounds are normal.      Palpations: Abdomen is soft.   Genitourinary:     Comments: B/L nephrostomy tubes.  Penile blood tinged drainage noted (wife also has pictures on her phone)  Musculoskeletal:         General: Tenderness present.      Comments: Right hip bandages - CDI   Skin:     General: Skin is warm.      Capillary Refill: Capillary refill takes less than 2 seconds.   Neurological:      General: No focal deficit present.      Mental Status: He is alert. Mental status is at baseline.      Comments: Slow thought process   Psychiatric:         Mood and Affect: Mood normal.         Behavior: Behavior normal.             Significant Labs: All pertinent labs within the past 24 hours have been reviewed.  Recent Lab Results       None            Significant Imaging:   X-Ray Pelvis Routine AP   Final Result      As above         Electronically signed by: Jefferson Kunz   Date:    11/21/2023   Time:    18:51      X-Ray Femur 2 View Right   Final Result      Satisfactory post operative changes of right hip replacement with inter medullary eyad         Electronically signed by: Jefferson Kunz   Date:    11/21/2023   Time:    18:48      SURG FL Surgery Fluoro Usage   Final Result      X-Ray Hip 2 or 3 views Right (with Pelvis when performed)   Final Result      As above         Electronically signed by: Jefferson Kunz   Date:    11/21/2023   Time:    18:15      X-Ray Elbow Complete Right   Final Result      As above         Electronically signed by: Jefferson Kunz   Date:    11/20/2023   Time:    21:26      X-Ray Hip 2 or 3 views Right (with Pelvis when performed)   Final Result      As above         Electronically signed by: Jefferson Kunz    Date:    11/20/2023   Time:    21:27           Assessment/Plan:      * Displaced intertrochanteric fracture of right femur, initial encounter for closed fracture  Patient presented following ground level fall earlier today and was found to have sustained an acute traumatic displaced right intertrochanteric femur fracture noted on imaging.  Patient also sustained abrasion to right elbow with imaging negative for acute fractures or dislocation.  Orthopedics consulted by ED staff and awaiting further evaluation/recommendations regarding surgical intervention.  According to RCRI, patient is classified as a class 2 risk with a 6.0% 30 day risk of death, MI, cardiac arrest.  Plan:  - Dr. Guidry performed an ORIF on 11/21  - Activity: WBAT  - DVT proph: Lovenox  - CM consulted for SNF.  CM working with wife on placement.  Due to gvt agencies being closed during thanksgiving the 142 will not be back until Monday.      Right hip pain        UTI (urinary tract infection)  Currently undergoing treatment with linezolid.  Plan:  -Urine Culture + VRE  -continue home medication      Right elbow pain        Malignant neoplasm of urinary bladder  Currently followed by Hematology/Oncology and Urology outpatient.    - Blood tinged penile discharge  - Urology consulted.  Dr Gonzales felt the discharge was due to the bladder cancer.      Acute renal failure superimposed on stage 3 chronic kidney disease  Patient with acute kidney injury/acute renal failure likely due to pre-renal azotemia due to dehydration BANDAR is currently  undergoing fluid resuscitation . Patient s/o bilateral nephrostomy tube placement give prior obstructive uropathy. Baseline creatinine  stage 3  - Labs reviewed- Renal function/electrolytes with Estimated Creatinine Clearance: 51.1 mL/min (based on SCr of 1.3 mg/dL). according to latest data. Monitor urine output and serial BMP and adjust therapy as needed. Avoid nephrotoxins and renally dose meds for GFR listed  above.    - BANDAR resolved. (Cr 1.3)    Centrilobular emphysema  Patient's COPD is controlled currently.  Patient is currently off COPD Pathway. Continue scheduled inhalers  as needed  and monitor respiratory status closely.       Anemia  Patient's anemia is currently controlled. Has not received any PRBCs to date. Etiology likely d/t  chronic disease.  Current CBC reviewed-   Lab Results   Component Value Date    HGB 7.6 (L) 11/23/2023    HCT 24.2 (L) 11/23/2023     Monitor serial CBC and transfuse if patient becomes hemodynamically unstable, symptomatic or H/H drops below 7/21.        VTE Risk Mitigation (From admission, onward)           Ordered     enoxaparin injection 40 mg  Every 24 hours         11/22/23 1757     IP VTE HIGH RISK PATIENT  Once         11/20/23 2351     Place sequential compression device  Until discontinued         11/20/23 2351     Reason for No Pharmacological VTE Prophylaxis  Once        Question:  Reasons:  Answer:  Physician Provided (leave comment)  Comment:  awaiting surgical intervention    11/20/23 2351                    Discharge Planning   TANIA:      Code Status: Full Code   Is the patient medically ready for discharge?:     Reason for patient still in hospital (select all that apply): Patient trending condition, Treatment, and Pending disposition  Discharge Plan A: Skilled Nursing Facility   Discharge Delays:  (State closed on Friday- unable to obtain 142 for SNF placement)              Obdulia Hernandez MD  Department of Hospital Medicine   [facility]

## 2023-11-25 NOTE — SUBJECTIVE & OBJECTIVE
Interval History: Patient is less alert today.  Wife concerned as he had become more confused with his last infection.  He is on Zyvox.  She also note urethral blood discharge.  Urology was consulted and Dr. Gonzales felt this was due to his bladder cancer.      From a orthopedic standpoint.  Awaiting placement.  Will need 142 which has been delayed due to thanksgiving week and gvt offices being closed.    Review of Systems  Objective:     Vital Signs (Most Recent):  Temp: 98 °F (36.7 °C) (11/25/23 0734)  Pulse: 81 (11/25/23 1100)  Resp: 18 (11/25/23 1105)  BP: (!) 117/56 (11/25/23 0734)  SpO2: 96 % (11/25/23 0734) Vital Signs (24h Range):  Temp:  [97.7 °F (36.5 °C)-98.6 °F (37 °C)] 98 °F (36.7 °C)  Pulse:  [69-90] 81  Resp:  [16-20] 18  SpO2:  [89 %-98 %] 96 %  BP: ()/(46-60) 117/56     Weight: 76 kg (167 lb 8.8 oz)  Body mass index is 27.88 kg/m².    Intake/Output Summary (Last 24 hours) at 11/25/2023 1227  Last data filed at 11/25/2023 0951  Gross per 24 hour   Intake 1596.76 ml   Output 3325 ml   Net -1728.24 ml         Physical Exam  Vitals and nursing note reviewed.   Constitutional:       Appearance: Normal appearance.   HENT:      Head: Normocephalic and atraumatic.      Nose: Nose normal.      Mouth/Throat:      Mouth: Mucous membranes are moist.   Eyes:      Extraocular Movements: Extraocular movements intact.      Conjunctiva/sclera: Conjunctivae normal.   Cardiovascular:      Rate and Rhythm: Normal rate and regular rhythm.      Pulses: Normal pulses.      Heart sounds: Normal heart sounds.   Pulmonary:      Effort: Pulmonary effort is normal.      Breath sounds: Normal breath sounds.   Abdominal:      General: Abdomen is flat. Bowel sounds are normal.      Palpations: Abdomen is soft.   Genitourinary:     Comments: B/L nephrostomy tubes.  Penile blood tinged drainage noted (wife also has pictures on her phone)  Musculoskeletal:         General: Tenderness present.      Comments: Right hip bandages -  CDI   Skin:     General: Skin is warm.      Capillary Refill: Capillary refill takes less than 2 seconds.   Neurological:      General: No focal deficit present.      Mental Status: He is alert. Mental status is at baseline.      Comments: Slow thought process   Psychiatric:         Mood and Affect: Mood normal.         Behavior: Behavior normal.             Significant Labs: All pertinent labs within the past 24 hours have been reviewed.  Recent Lab Results       None            Significant Imaging:   X-Ray Pelvis Routine AP   Final Result      As above         Electronically signed by: Jefferson Kunz   Date:    11/21/2023   Time:    18:51      X-Ray Femur 2 View Right   Final Result      Satisfactory post operative changes of right hip replacement with inter medullary eyad         Electronically signed by: Jefferson Kunz   Date:    11/21/2023   Time:    18:48      SURG FL Surgery Fluoro Usage   Final Result      X-Ray Hip 2 or 3 views Right (with Pelvis when performed)   Final Result      As above         Electronically signed by: Jefferson Kunz   Date:    11/21/2023   Time:    18:15      X-Ray Elbow Complete Right   Final Result      As above         Electronically signed by: Jefferson Kunz   Date:    11/20/2023   Time:    21:26      X-Ray Hip 2 or 3 views Right (with Pelvis when performed)   Final Result      As above         Electronically signed by: Jefferson Kunz   Date:    11/20/2023   Time:    21:27

## 2023-11-25 NOTE — CONSULTS
Patient with history of metastatic UCC presented with right hip fracture. Urology consulted for bloody urethral discharge. This is almost certainly due to his bladder cancer. As his urine is diverted with perc tubes, would recommend continued observation. Call with further questions or concerns    Elmo Gonzales MD

## 2023-11-25 NOTE — ASSESSMENT & PLAN NOTE
Currently followed by Hematology/Oncology and Urology outpatient.    - Blood tinged penile discharge  - Urology consulted.  Dr Gonzales felt the discharge was due to the bladder cancer.

## 2023-11-25 NOTE — PROGRESS NOTES
[facility]  Orthopedics  Progress Note    Patient Name: Geovani Currie  MRN: [id number]  Admission Date: 11/20/2023  Hospital Length of Stay: 5 days  Attending Provider: Obdulia Hernandez MD  Primary Care Provider: Faizan Antoine MD  Follow-up For: Procedure(s) (LRB):  ORIF, FRACTURE, FEMUR, INTERTROCHANTERIC (Right)    Post-Operative Day: 4 Days Post-Op  Subjective:     Principal Problem:Displaced intertrochanteric fracture of right femur, initial encounter for closed fracture    Principal Orthopedic Problem:  Right intertrochanteric femur fracture    Interval History: Geovani Currie is a 68-year-old male postop day 4 status post operative repair of right intertrochanteric femur fracture with cephalomedullary nail.  Patient is resting comfortably in bed.  No new complaints at this time.    Review of patient's allergies indicates:  No Known Allergies    Current Facility-Administered Medications   Medication    0.9%  NaCl infusion (for blood administration)    0.9%  NaCl infusion (for blood administration)    acetaminophen suppository 650 mg    acetaminophen tablet 650 mg    albuterol-ipratropium 2.5 mg-0.5 mg/3 mL nebulizer solution 3 mL    ALPRAZolam tablet 1 mg    aluminum-magnesium hydroxide-simethicone 200-200-20 mg/5 mL suspension 30 mL    arformoteroL nebulizer solution 15 mcg    budesonide nebulizer solution 0.5 mg    cyclobenzaprine tablet 10 mg    dextrose 10% bolus 125 mL 125 mL    dextrose 10% bolus 250 mL 250 mL    docusate sodium capsule 100 mg    enoxaparin injection 40 mg    EScitalopram oxalate tablet 20 mg    glucagon (human recombinant) injection 1 mg    glucose chewable tablet 16 g    glucose chewable tablet 24 g    HYDROcodone-acetaminophen 5-325 mg per tablet 1 tablet    lactated ringers infusion    levothyroxine tablet 125 mcg    linezolid tablet 600 mg    melatonin tablet 6 mg    metoprolol succinate (TOPROL-XL) 24 hr tablet 50 mg    morphine injection 2 mg    naloxone 0.4 mg/mL  "injection 0.02 mg    ondansetron injection 4 mg    promethazine tablet 25 mg    senna-docusate 8.6-50 mg per tablet 1 tablet    sodium chloride 0.9% flush 10 mL    tamsulosin 24 hr capsule 0.4 mg    Tdap (BOOSTRIX) vaccine injection 0.5 mL     Objective:     Vital Signs (Most Recent):  Temp: 98 °F (36.7 °C) (11/25/23 0734)  Pulse: 69 (11/25/23 0756)  Resp: 18 (11/25/23 0734)  BP: (!) 117/56 (11/25/23 0734)  SpO2: 96 % (11/25/23 0734) Vital Signs (24h Range):  Temp:  [97.7 °F (36.5 °C)-98.6 °F (37 °C)] 98 °F (36.7 °C)  Pulse:  [69-90] 69  Resp:  [16-20] 18  SpO2:  [89 %-98 %] 96 %  BP: ()/(46-60) 117/56     Weight: 76 kg (167 lb 8.8 oz)  Height: 5' 5" (165.1 cm)  Body mass index is 27.88 kg/m².      Intake/Output Summary (Last 24 hours) at 11/25/2023 0821  Last data filed at 11/25/2023 0715  Gross per 24 hour   Intake 1596.76 ml   Output 3375 ml   Net -1778.24 ml       Ortho/SPM Exam  Right lower extremity   Dressings have been removed   Incision is well approximated, no signs of infection   Mild edema of the upper thigh  Minimal TTP   No pain with logroll  Calf and compartments are soft and compressible  Motor exam normal   Sensation and pulses intact  Cap refill brisk      GEN: Well developed, well nourished male. AAOX3. No acute distress.   Head: Normocephalic, atraumatic.   Eyes: CARTER  Neck: Trachea is midline, no adenopathy  Resp: Breathing unlabored.  Neuro: Motor function normal, Cranial nerves intact  Psych: Mood and affect appropriate.      Significant Labs:   Recent Lab Results       None            Significant Imaging: I have reviewed and interpreted all pertinent imaging results/findings.    Assessment/Plan:     Active Diagnoses:    Diagnosis Date Noted POA    PRINCIPAL PROBLEM:  Displaced intertrochanteric fracture of right femur, initial encounter for closed fracture [S72.141A] 11/20/2023 Yes    Right elbow pain [M25.521] 11/20/2023 Yes    Malignant neoplasm of urinary bladder [C67.9] 11/15/2023 " Yes    Acute renal failure superimposed on stage 3 chronic kidney disease [N17.9, N18.30] 11/11/2023 Yes    Centrilobular emphysema [J43.2] 11/09/2023 Yes    Anemia [D64.9] 10/21/2023 Yes    UTI (urinary tract infection) [N39.0] 10/21/2023 Yes      Problems Resolved During this Admission:     Assessment:  68-year-old male postop day 4 status post operative repair of right intertrochanteric femur fracture with cephalomedullary nail     Plan:  Weightbearing as tolerated to the right lower extremity   PT/OT for gait training and ADLs   Incision may be left open in the air, patient may shower and get incision wet, but it should not be submerged   DVT prophylaxis per primary team   Patient will likely need rehab/SNF placement and is ready for discharge from orthopedic standpoint once this has been arranged   Follow-up with [facility] at 3 weeks postop    Indio Yanez PA-C  Orthopedics  [facility]

## 2023-11-25 NOTE — NURSING
Pt's wife reports penile discharge while changing pt yesterday. Pt has a small amount of serous yellow, blood tinged drainage in diaper this morning. Pt appears more confused this morning disoriented to place and situation. VSS. Notified Dr. Hernandez. MD ordered urology consult. Plan of care ongoing.

## 2023-11-26 LAB
ABO + RH BLD: NORMAL
ANION GAP SERPL CALC-SCNC: 13 MMOL/L (ref 8–16)
BASOPHILS # BLD AUTO: 0.03 K/UL (ref 0–0.2)
BASOPHILS NFR BLD: 0.4 % (ref 0–1.9)
BLD GP AB SCN CELLS X3 SERPL QL: NORMAL
BLD PROD TYP BPU: NORMAL
BLOOD UNIT EXPIRATION DATE: NORMAL
BLOOD UNIT TYPE CODE: 7300
BLOOD UNIT TYPE: NORMAL
BUN SERPL-MCNC: 14 MG/DL (ref 8–23)
CALCIUM SERPL-MCNC: 8.6 MG/DL (ref 8.7–10.5)
CHLORIDE SERPL-SCNC: 105 MMOL/L (ref 95–110)
CO2 SERPL-SCNC: 25 MMOL/L (ref 23–29)
CODING SYSTEM: NORMAL
CREAT SERPL-MCNC: 1 MG/DL (ref 0.5–1.4)
CROSSMATCH INTERPRETATION: NORMAL
DIFFERENTIAL METHOD: ABNORMAL
DISPENSE STATUS: NORMAL
EOSINOPHIL # BLD AUTO: 0.2 K/UL (ref 0–0.5)
EOSINOPHIL NFR BLD: 2.7 % (ref 0–8)
ERYTHROCYTE [DISTWIDTH] IN BLOOD BY AUTOMATED COUNT: 14.9 % (ref 11.5–14.5)
EST. GFR  (NO RACE VARIABLE): >60 ML/MIN/1.73 M^2
GLUCOSE SERPL-MCNC: 118 MG/DL (ref 70–110)
HCT VFR BLD AUTO: 21.9 % (ref 40–54)
HGB BLD-MCNC: 7 G/DL (ref 14–18)
IMM GRANULOCYTES # BLD AUTO: 0.07 K/UL (ref 0–0.04)
IMM GRANULOCYTES NFR BLD AUTO: 0.9 % (ref 0–0.5)
LYMPHOCYTES # BLD AUTO: 0.8 K/UL (ref 1–4.8)
LYMPHOCYTES NFR BLD: 10.6 % (ref 18–48)
MCH RBC QN AUTO: 26.5 PG (ref 27–31)
MCHC RBC AUTO-ENTMCNC: 32 G/DL (ref 32–36)
MCV RBC AUTO: 83 FL (ref 82–98)
MONOCYTES # BLD AUTO: 0.4 K/UL (ref 0.3–1)
MONOCYTES NFR BLD: 5.1 % (ref 4–15)
NEUTROPHILS # BLD AUTO: 6 K/UL (ref 1.8–7.7)
NEUTROPHILS NFR BLD: 80.3 % (ref 38–73)
NRBC BLD-RTO: 0 /100 WBC
NUM UNITS TRANS PACKED RBC: NORMAL
PLATELET # BLD AUTO: 190 K/UL (ref 150–450)
PMV BLD AUTO: 9.6 FL (ref 9.2–12.9)
POTASSIUM SERPL-SCNC: 3.9 MMOL/L (ref 3.5–5.1)
RBC # BLD AUTO: 2.64 M/UL (ref 4.6–6.2)
SODIUM SERPL-SCNC: 143 MMOL/L (ref 136–145)
SPECIMEN OUTDATE: NORMAL
WBC # BLD AUTO: 7.42 K/UL (ref 3.9–12.7)

## 2023-11-26 PROCEDURE — 25000242 PHARM REV CODE 250 ALT 637 W/ HCPCS: Performed by: HOSPITALIST

## 2023-11-26 PROCEDURE — 36415 COLL VENOUS BLD VENIPUNCTURE: CPT | Performed by: FAMILY MEDICINE

## 2023-11-26 PROCEDURE — 99024 POSTOP FOLLOW-UP VISIT: CPT | Mod: POP,,, | Performed by: PHYSICIAN ASSISTANT

## 2023-11-26 PROCEDURE — 94640 AIRWAY INHALATION TREATMENT: CPT

## 2023-11-26 PROCEDURE — 25000003 PHARM REV CODE 250: Performed by: HOSPITALIST

## 2023-11-26 PROCEDURE — 99024 PR POST-OP FOLLOW-UP VISIT: ICD-10-PCS | Mod: POP,,, | Performed by: PHYSICIAN ASSISTANT

## 2023-11-26 PROCEDURE — 99900035 HC TECH TIME PER 15 MIN (STAT)

## 2023-11-26 PROCEDURE — 27000207 HC ISOLATION

## 2023-11-26 PROCEDURE — 85025 COMPLETE CBC W/AUTO DIFF WBC: CPT | Performed by: FAMILY MEDICINE

## 2023-11-26 PROCEDURE — 86850 RBC ANTIBODY SCREEN: CPT | Performed by: FAMILY MEDICINE

## 2023-11-26 PROCEDURE — 36430 TRANSFUSION BLD/BLD COMPNT: CPT

## 2023-11-26 PROCEDURE — 11000001 HC ACUTE MED/SURG PRIVATE ROOM

## 2023-11-26 PROCEDURE — 25000003 PHARM REV CODE 250: Performed by: NURSE PRACTITIONER

## 2023-11-26 PROCEDURE — 86920 COMPATIBILITY TEST SPIN: CPT | Performed by: FAMILY MEDICINE

## 2023-11-26 PROCEDURE — 80048 BASIC METABOLIC PNL TOTAL CA: CPT | Performed by: FAMILY MEDICINE

## 2023-11-26 PROCEDURE — P9016 RBC LEUKOCYTES REDUCED: HCPCS | Performed by: FAMILY MEDICINE

## 2023-11-26 PROCEDURE — 63600175 PHARM REV CODE 636 W HCPCS: Performed by: FAMILY MEDICINE

## 2023-11-26 RX ORDER — HYDROCODONE BITARTRATE AND ACETAMINOPHEN 500; 5 MG/1; MG/1
TABLET ORAL
Status: DISCONTINUED | OUTPATIENT
Start: 2023-11-26 | End: 2023-11-27 | Stop reason: HOSPADM

## 2023-11-26 RX ADMIN — ENOXAPARIN SODIUM 40 MG: 40 INJECTION SUBCUTANEOUS at 06:11

## 2023-11-26 RX ADMIN — DOCUSATE SODIUM 100 MG: 100 CAPSULE, LIQUID FILLED ORAL at 08:11

## 2023-11-26 RX ADMIN — ESCITALOPRAM 20 MG: 5 TABLET, FILM COATED ORAL at 09:11

## 2023-11-26 RX ADMIN — DOCUSATE SODIUM 100 MG: 100 CAPSULE, LIQUID FILLED ORAL at 09:11

## 2023-11-26 RX ADMIN — METOPROLOL SUCCINATE 50 MG: 50 TABLET, EXTENDED RELEASE ORAL at 09:11

## 2023-11-26 RX ADMIN — MELATONIN TAB 3 MG 6 MG: 3 TAB at 08:11

## 2023-11-26 RX ADMIN — HYDROCODONE BITARTRATE AND ACETAMINOPHEN 1 TABLET: 5; 325 TABLET ORAL at 09:11

## 2023-11-26 RX ADMIN — HYDROCODONE BITARTRATE AND ACETAMINOPHEN 1 TABLET: 5; 325 TABLET ORAL at 08:11

## 2023-11-26 RX ADMIN — LEVOTHYROXINE SODIUM 125 MCG: 0.03 TABLET ORAL at 06:11

## 2023-11-26 RX ADMIN — BUDESONIDE INHALATION 0.5 MG: 0.5 SUSPENSION RESPIRATORY (INHALATION) at 09:11

## 2023-11-26 RX ADMIN — ARFORMOTEROL TARTRATE 15 MCG: 15 SOLUTION RESPIRATORY (INHALATION) at 08:11

## 2023-11-26 RX ADMIN — LINEZOLID 600 MG: 600 TABLET, FILM COATED ORAL at 09:11

## 2023-11-26 RX ADMIN — ALPRAZOLAM 1 MG: 1 TABLET ORAL at 08:11

## 2023-11-26 RX ADMIN — LINEZOLID 600 MG: 600 TABLET, FILM COATED ORAL at 08:11

## 2023-11-26 RX ADMIN — TAMSULOSIN HYDROCHLORIDE 0.4 MG: 0.4 CAPSULE ORAL at 09:11

## 2023-11-26 NOTE — ASSESSMENT & PLAN NOTE
Patient with acute kidney injury/acute renal failure likely due to pre-renal azotemia due to dehydration BANDAR is currently  undergoing fluid resuscitation . Patient s/o bilateral nephrostomy tube placement give prior obstructive uropathy. Baseline creatinine  stage 3  - Labs reviewed- Renal function/electrolytes with Estimated Creatinine Clearance: 66.4 mL/min (based on SCr of 1 mg/dL). according to latest data. Monitor urine output and serial BMP and adjust therapy as needed. Avoid nephrotoxins and renally dose meds for GFR listed above.    - BANDAR resolved. (Cr 1.0)

## 2023-11-26 NOTE — PROGRESS NOTES
[facility]  Orthopedics  Progress Note    Patient Name: Geovani Currie  MRN: [id number]  Admission Date: 11/20/2023  Hospital Length of Stay: 6 days  Attending Provider: Obdulia Hernandez MD  Primary Care Provider: Faizan Antoine MD  Follow-up For: Procedure(s) (LRB):  ORIF, FRACTURE, FEMUR, INTERTROCHANTERIC (Right)    Post-Operative Day: 5 Days Post-Op  Subjective:     Principal Problem:Displaced intertrochanteric fracture of right femur, initial encounter for closed fracture    Principal Orthopedic Problem:  Right intertrochanteric femur fracture    Interval History: Geovani Currie is a 68-year-old male postop day 5 status post operative repair of right intertrochanteric femur fracture with cephalomedullary nail.  Patient is resting comfortably in bed.  No new complaints at this time.  He did get up and walk a few steps with therapy yesterday.    Review of patient's allergies indicates:  No Known Allergies    Current Facility-Administered Medications   Medication    0.9%  NaCl infusion (for blood administration)    0.9%  NaCl infusion (for blood administration)    0.9%  NaCl infusion (for blood administration)    acetaminophen suppository 650 mg    acetaminophen tablet 650 mg    albuterol-ipratropium 2.5 mg-0.5 mg/3 mL nebulizer solution 3 mL    ALPRAZolam tablet 1 mg    aluminum-magnesium hydroxide-simethicone 200-200-20 mg/5 mL suspension 30 mL    arformoteroL nebulizer solution 15 mcg    budesonide nebulizer solution 0.5 mg    cyclobenzaprine tablet 10 mg    dextrose 10% bolus 125 mL 125 mL    dextrose 10% bolus 250 mL 250 mL    docusate sodium capsule 100 mg    enoxaparin injection 40 mg    EScitalopram oxalate tablet 20 mg    glucagon (human recombinant) injection 1 mg    glucose chewable tablet 16 g    glucose chewable tablet 24 g    HYDROcodone-acetaminophen 5-325 mg per tablet 1 tablet    lactated ringers infusion    levothyroxine tablet 125 mcg    linezolid tablet 600 mg    melatonin tablet 6  "mg    metoprolol succinate (TOPROL-XL) 24 hr tablet 50 mg    morphine injection 2 mg    naloxone 0.4 mg/mL injection 0.02 mg    ondansetron injection 4 mg    promethazine tablet 25 mg    senna-docusate 8.6-50 mg per tablet 1 tablet    sodium chloride 0.9% flush 10 mL    tamsulosin 24 hr capsule 0.4 mg    Tdap (BOOSTRIX) vaccine injection 0.5 mL     Objective:     Vital Signs (Most Recent):  Temp: 98.2 °F (36.8 °C) (11/26/23 0915)  Pulse: 76 (11/26/23 0915)  Resp: 18 (11/26/23 0915)  BP: (!) 123/57 (11/26/23 0915)  SpO2: 98 % (11/26/23 0915) Vital Signs (24h Range):  Temp:  [98 °F (36.7 °C)-98.7 °F (37.1 °C)] 98.2 °F (36.8 °C)  Pulse:  [61-92] 76  Resp:  [16-20] 18  SpO2:  [98 %-99 %] 98 %  BP: (104-131)/(51-59) 123/57     Weight: 76 kg (167 lb 8.8 oz)  Height: 5' 5" (165.1 cm)  Body mass index is 27.88 kg/m².      Intake/Output Summary (Last 24 hours) at 11/26/2023 0939  Last data filed at 11/26/2023 0646  Gross per 24 hour   Intake 750.55 ml   Output 3200 ml   Net -2449.45 ml       Ortho/SPM Exam  Right lower extremity   Dressings have been removed   Incision is well approximated, no signs of infection   Mild edema of the upper thigh  Minimal TTP   No pain with logroll  Calf and compartments are soft and compressible  Motor exam normal   Sensation and pulses intact  Cap refill brisk      GEN: Well developed, well nourished male. AAOX3. No acute distress.   Head: Normocephalic, atraumatic.   Eyes: CARTER  Neck: Trachea is midline, no adenopathy  Resp: Breathing unlabored.  Neuro: Motor function normal, Cranial nerves intact  Psych: Mood and affect appropriate.    Significant Labs:   Recent Lab Results         11/26/23  0756   11/26/23  0755        Unit Blood Type Code   7300  [P]       Unit Expiration   [id number]  [P]       Unit Blood Type   B POS  [P]       Anion Gap   13       Baso # 0.03         Basophil % 0.4         BUN   14       Calcium   8.6       Chloride   105       CO2   25       CODING SYSTEM   " [id number]  [P]       Creatinine   1.0       Crossmatch Interpretation   Compatible  [P]       Differential Method Automated         DISPENSE STATUS   CROSSMATCHED  [P]       eGFR   >60       Eos # 0.2         Eosinophil % 2.7         Glucose   118       Gran # (ANC) 6.0         Gran % 80.3         Group & Rh   B POS       Hematocrit 21.9         Hemoglobin 7.0         Immature Grans (Abs) 0.07  Comment: Mild elevation in immature granulocytes is non specific and   can be seen in a variety of conditions including stress response,   acute inflammation, trauma and pregnancy. Correlation with other   laboratory and clinical findings is essential.           Immature Granulocytes 0.9         INDIRECT GERARD   NEG       Lymph # 0.8         Lymph % 10.6         MCH 26.5         MCHC 32.0         MCV 83         Mono # 0.4         Mono % 5.1         MPV 9.6         nRBC 0         Platelet Count 190         Potassium   3.9       Product Code   H7464D68  [P]       RBC 2.64         RDW 14.9         Sodium   143       Specimen Outdate   11/29/2023 23:59       UNIT NUMBER   [id number]  [P]       WBC 7.42                  [P] - Preliminary Result               Significant Imaging: I have reviewed and interpreted all pertinent imaging results/findings.    Assessment/Plan:     Active Diagnoses:    Diagnosis Date Noted POA    PRINCIPAL PROBLEM:  Displaced intertrochanteric fracture of right femur, initial encounter for closed fracture [S72.141A] 11/20/2023 Yes    Right elbow pain [M25.521] 11/20/2023 Yes    Malignant neoplasm of urinary bladder [C67.9] 11/15/2023 Yes    Acute renal failure superimposed on stage 3 chronic kidney disease [N17.9, N18.30] 11/11/2023 Yes    Centrilobular emphysema [J43.2] 11/09/2023 Yes    Anemia [D64.9] 10/21/2023 Yes    UTI (urinary tract infection) [N39.0] 10/21/2023 Yes      Problems Resolved During this Admission:     Assessment:  68-year-old male postop day 5 status post operative repair of right  intertrochanteric femur fracture with cephalomedullary nail     Plan:   Weightbearing as tolerated to the right lower extremity  PT/OT for gait training and ADLs   Incision may be left open to the air, patient may shower and get incision wet, but it should not be submerged   DVT prophylaxis per primary team   Patient will likely need rehab/SNF placement and is ready for discharge from orthopedic standpoint once this has been arranged   Follow-up with [facility] at 3 weeks postop    Indio Yanez PA-C  Orthopedics  [facility]

## 2023-11-26 NOTE — ASSESSMENT & PLAN NOTE
Patient's anemia is currently controlled. Has not received any PRBCs to date. Etiology likely d/t  chronic disease.  Current CBC reviewed-   Lab Results   Component Value Date    HGB 7.0 (L) 11/26/2023    HCT 21.9 (L) 11/26/2023     Monitor serial CBC and transfuse if patient becomes hemodynamically unstable, symptomatic or H/H drops below 7/21.  - Transfuse 1 unit pRBC  - Of note, 6.6 was likely diluation - IV fluids stopped and hbg improved to 7.0

## 2023-11-26 NOTE — SUBJECTIVE & OBJECTIVE
Interval History: Patient's appears pale and had a drop in hbg to 6.6 that improved to 7.0 (likely some dilution which improved with dc fluids).  Due to lack of energy, low bp, and paleness will transfuse 1 unit of pRBC.    Review of Systems  Objective:     Vital Signs (Most Recent):  Temp: 98.2 °F (36.8 °C) (11/26/23 0915)  Pulse: 76 (11/26/23 0915)  Resp: 18 (11/26/23 0915)  BP: (!) 123/57 (11/26/23 0915)  SpO2: 98 % (11/26/23 0915) Vital Signs (24h Range):  Temp:  [98 °F (36.7 °C)-98.7 °F (37.1 °C)] 98.2 °F (36.8 °C)  Pulse:  [61-92] 76  Resp:  [16-20] 18  SpO2:  [98 %-99 %] 98 %  BP: (104-131)/(51-59) 123/57     Weight: 76 kg (167 lb 8.8 oz)  Body mass index is 27.88 kg/m².    Intake/Output Summary (Last 24 hours) at 11/26/2023 1036  Last data filed at 11/26/2023 0646  Gross per 24 hour   Intake 750.55 ml   Output 2725 ml   Net -1974.45 ml         Physical Exam      Vitals and nursing note reviewed.   Constitutional:       Appearance: Normal appearance.   HENT:      Head: Normocephalic and atraumatic.      Nose: Nose normal.      Mouth/Throat:      Mouth: Mucous membranes are moist.   Eyes:      Extraocular Movements: Extraocular movements intact.      Conjunctiva/sclera: Conjunctivae normal.   Cardiovascular:      Rate and Rhythm: Normal rate and regular rhythm.      Pulses: Normal pulses.      Heart sounds: Normal heart sounds.   Pulmonary:      Effort: Pulmonary effort is normal.      Breath sounds: Normal breath sounds.   Abdominal:      General: Abdomen is flat. Bowel sounds are normal.      Palpations: Abdomen is soft.   Genitourinary:     Comments: B/L nephrostomy tubes.  Penile blood tinged drainage noted (wife also has pictures on her phone)  Musculoskeletal:         General: Tenderness present.      Comments: Right hip bandages - CDI   Skin:     General: Skin is warm.      Capillary Refill: Capillary refill takes less than 2 seconds.   Neurological:      General: No focal deficit present.      Mental  Status: He is alert. Mental status is at baseline.      Comments: Slow thought process   Psychiatric:         Mood and Affect: Mood normal.         Behavior: Behavior normal.     Significant Labs: All pertinent labs within the past 24 hours have been reviewed.  Recent Lab Results         11/26/23  0756   11/26/23  0755        Unit Blood Type Code   7300  [P]       Unit Expiration   [id number]  [P]       Unit Blood Type   B POS  [P]       Anion Gap   13       Baso # 0.03         Basophil % 0.4         BUN   14       Calcium   8.6       Chloride   105       CO2   25       CODING SYSTEM   [id number]  [P]       Creatinine   1.0       Crossmatch Interpretation   Compatible  [P]       Differential Method Automated         DISPENSE STATUS   CROSSMATCHED  [P]       eGFR   >60       Eos # 0.2         Eosinophil % 2.7         Glucose   118       Gran # (ANC) 6.0         Gran % 80.3         Group & Rh   B POS       Hematocrit 21.9         Hemoglobin 7.0         Immature Grans (Abs) 0.07  Comment: Mild elevation in immature granulocytes is non specific and   can be seen in a variety of conditions including stress response,   acute inflammation, trauma and pregnancy. Correlation with other   laboratory and clinical findings is essential.           Immature Granulocytes 0.9         INDIRECT GERARD   NEG       Lymph # 0.8         Lymph % 10.6         MCH 26.5         MCHC 32.0         MCV 83         Mono # 0.4         Mono % 5.1         MPV 9.6         nRBC 0         Platelet Count 190         Potassium   3.9       Product Code   [id number]  [P]       RBC 2.64         RDW 14.9         Sodium   143       Specimen Outdate   11/29/2023 23:59       UNIT NUMBER   [id number]  [P]       WBC 7.42                  [P] - Preliminary Result               Significant Imaging:   X-Ray Pelvis Routine AP   Final Result      As above         Electronically signed by: Jefferson Kunz   Date:    11/21/2023   Time:    18:51      X-Ray Femur 2 View  Right   Final Result      Satisfactory post operative changes of right hip replacement with inter medullary eyad         Electronically signed by: Jefferson Kunz   Date:    11/21/2023   Time:    18:48      SURG FL Surgery Fluoro Usage   Final Result      X-Ray Hip 2 or 3 views Right (with Pelvis when performed)   Final Result      As above         Electronically signed by: Jefferson Kunz   Date:    11/21/2023   Time:    18:15      X-Ray Elbow Complete Right   Final Result      As above         Electronically signed by: Jefferson Kunz   Date:    11/20/2023   Time:    21:26      X-Ray Hip 2 or 3 views Right (with Pelvis when performed)   Final Result      As above         Electronically signed by: Jefferson Kunz   Date:    11/20/2023   Time:    21:27

## 2023-11-26 NOTE — PLAN OF CARE
Problem: Adult Inpatient Plan of Care  Goal: Plan of Care Review  Outcome: Ongoing, Progressing  Goal: Patient-Specific Goal (Individualized)  Outcome: Ongoing, Progressing  Goal: Absence of Hospital-Acquired Illness or Injury  Outcome: Ongoing, Progressing  Goal: Optimal Comfort and Wellbeing  Outcome: Ongoing, Progressing  Goal: Readiness for Transition of Care  Outcome: Ongoing, Progressing     Problem: Fluid and Electrolyte Imbalance (Acute Kidney Injury/Impairment)  Goal: Fluid and Electrolyte Balance  Outcome: Ongoing, Progressing     Problem: Fluid and Electrolyte Imbalance (Acute Kidney Injury/Impairment)  Goal: Fluid and Electrolyte Balance  Outcome: Ongoing, Progressing

## 2023-11-26 NOTE — PROGRESS NOTES
[facility]  [facility] Medicine  Progress Note    Patient Name: Geovani Currie  MRN: [id number]  Patient Class: IP- Inpatient   Admission Date: 11/20/2023  Length of Stay: 6 days  Attending Physician: Obdulia Hernandez MD  Primary Care Provider: Faizan Antoine MD        Subjective:     Principal Problem:Displaced intertrochanteric fracture of right femur, initial encounter for closed fracture        HPI:  Geovani Currie is a 68 y.o. male with a PMH  has a past medical history of COPD (chronic obstructive pulmonary disease) and Hypertension. who presented to the ED for further evaluation of right hip pain following ground level fall earlier today.  Patient reported stepping onto a curb at which time he lost his balance causing him to fall on his right side endorsing immediate pain with inability to stand/bear weight.  Patient denied endorsing any head trauma, loss of consciousness, or sustaining any other injuries with the exception of abrasions to his elbow.  Patient reported pain was localized throughout his right hip, nonradiating, currently rated 8/10 in severity with aggravating factors including weight-bearing, movement, and palpation to the affected area while alleviating factors included immobility, nonweightbearing, and pain medication.  Associated symptoms include generalized myalgias, arthralgias, and weakness but denied endorsing any numbness, tingling, or penetrating trauma, but did report decreased range of motion muscle strength secondary to exacerbation of pain.  All other review of systems negative except as noted above.  Of note, patient was recently discharged on 11/17/2023 following admission for dislodged nephrostomy tube with IR replacement and treatment of UTI which he is currently being treated with linezolid.  Prior to incident, patient reported being in his usual state of health with no other concerns or complaints.  Initial workup in the ED revealed patient has sustained an acute  traumatic right intertrochanteric femur fracture on imaging in his being admitted to Hospital Medicine inpatient for continued medical management while awaiting surgical intervention from Orthopedics.    PCP: Faizan Antoine      Overview/Hospital Course:  Mr Currie was admitted with a right femur fracture s/p GLF.  He had an ORIF by Dr. Guidry on 11/21.  Patient was seen by PT/OT and recommended SNF.  Patient is weight bearing as tolerated.  He is also noted to have 2 nephrostomy tubes placed prior to admission.  Patient was noted to have some penile discharge. Dr. Gonzales was consulted who felt it was due to the bladder cancer.  Spoke with wife about outpatient follow up with Dr. Jorgensen (primary urology) to make sure he is aware as well.  Patient also had a drop in Hbg and therefore transfused 1 unit of prbc.  Awaiting placement to SNF.  Due to holiday weekend, pts 142 is delayed.  Likely DC early next week.    Interval History: Patient's appears pale and had a drop in hbg to 6.6 that improved to 7.0 (likely some dilution which improved with dc fluids).  Due to lack of energy, low bp, and paleness will transfuse 1 unit of pRBC.    Review of Systems  Objective:     Vital Signs (Most Recent):  Temp: 98.2 °F (36.8 °C) (11/26/23 0915)  Pulse: 76 (11/26/23 0915)  Resp: 18 (11/26/23 0915)  BP: (!) 123/57 (11/26/23 0915)  SpO2: 98 % (11/26/23 0915) Vital Signs (24h Range):  Temp:  [98 °F (36.7 °C)-98.7 °F (37.1 °C)] 98.2 °F (36.8 °C)  Pulse:  [61-92] 76  Resp:  [16-20] 18  SpO2:  [98 %-99 %] 98 %  BP: (104-131)/(51-59) 123/57     Weight: 76 kg (167 lb 8.8 oz)  Body mass index is 27.88 kg/m².    Intake/Output Summary (Last 24 hours) at 11/26/2023 1036  Last data filed at 11/26/2023 0646  Gross per 24 hour   Intake 750.55 ml   Output 2725 ml   Net -1974.45 ml         Physical Exam      Vitals and nursing note reviewed.   Constitutional:       Appearance: Normal appearance.   HENT:      Head: Normocephalic and  atraumatic.      Nose: Nose normal.      Mouth/Throat:      Mouth: Mucous membranes are moist.   Eyes:      Extraocular Movements: Extraocular movements intact.      Conjunctiva/sclera: Conjunctivae normal.   Cardiovascular:      Rate and Rhythm: Normal rate and regular rhythm.      Pulses: Normal pulses.      Heart sounds: Normal heart sounds.   Pulmonary:      Effort: Pulmonary effort is normal.      Breath sounds: Normal breath sounds.   Abdominal:      General: Abdomen is flat. Bowel sounds are normal.      Palpations: Abdomen is soft.   Genitourinary:     Comments: B/L nephrostomy tubes.  Penile blood tinged drainage noted (wife also has pictures on her phone)  Musculoskeletal:         General: Tenderness present.      Comments: Right hip bandages - CDI   Skin:     General: Skin is warm.      Capillary Refill: Capillary refill takes less than 2 seconds.   Neurological:      General: No focal deficit present.      Mental Status: He is alert. Mental status is at baseline.      Comments: Slow thought process   Psychiatric:         Mood and Affect: Mood normal.         Behavior: Behavior normal.     Significant Labs: All pertinent labs within the past 24 hours have been reviewed.  Recent Lab Results         11/26/23  0756   11/26/23  0755        Unit Blood Type Code   7300  [P]       Unit Expiration   [id number]  [P]       Unit Blood Type   B POS  [P]       Anion Gap   13       Baso # 0.03         Basophil % 0.4         BUN   14       Calcium   8.6       Chloride   105       CO2   25       CODING SYSTEM   RYCS885  [P]       Creatinine   1.0       Crossmatch Interpretation   Compatible  [P]       Differential Method Automated         DISPENSE STATUS   CROSSMATCHED  [P]       eGFR   >60       Eos # 0.2         Eosinophil % 2.7         Glucose   118       Gran # (ANC) 6.0         Gran % 80.3         Group & Rh   B POS       Hematocrit 21.9         Hemoglobin 7.0         Immature Grans (Abs) 0.07  Comment: Mild  elevation in immature granulocytes is non specific and   can be seen in a variety of conditions including stress response,   acute inflammation, trauma and pregnancy. Correlation with other   laboratory and clinical findings is essential.           Immature Granulocytes 0.9         INDIRECT GERARD   NEG       Lymph # 0.8         Lymph % 10.6         MCH 26.5         MCHC 32.0         MCV 83         Mono # 0.4         Mono % 5.1         MPV 9.6         nRBC 0         Platelet Count 190         Potassium   3.9       Product Code   B5933I06  [P]       RBC 2.64         RDW 14.9         Sodium   143       Specimen Outdate   11/29/2023 23:59       UNIT NUMBER   [id number]  [P]       WBC 7.42                  [P] - Preliminary Result               Significant Imaging:   X-Ray Pelvis Routine AP   Final Result      As above         Electronically signed by: Jefferson Kunz   Date:    11/21/2023   Time:    18:51      X-Ray Femur 2 View Right   Final Result      Satisfactory post operative changes of right hip replacement with inter medullary eyad         Electronically signed by: Jefferson Kunz   Date:    11/21/2023   Time:    18:48      SURG FL Surgery Fluoro Usage   Final Result      X-Ray Hip 2 or 3 views Right (with Pelvis when performed)   Final Result      As above         Electronically signed by: Jefferson Kunz   Date:    11/21/2023   Time:    18:15      X-Ray Elbow Complete Right   Final Result      As above         Electronically signed by: Jefferson Kunz   Date:    11/20/2023   Time:    21:26      X-Ray Hip 2 or 3 views Right (with Pelvis when performed)   Final Result      As above         Electronically signed by: Jefferson Kunz   Date:    11/20/2023   Time:    21:27           Assessment/Plan:      * Displaced intertrochanteric fracture of right femur, initial encounter for closed fracture  Patient presented following ground level fall earlier today and was found to have sustained an acute traumatic displaced right  intertrochanteric femur fracture noted on imaging.  Patient also sustained abrasion to right elbow with imaging negative for acute fractures or dislocation.  Orthopedics consulted by ED staff and awaiting further evaluation/recommendations regarding surgical intervention.  According to RCRI, patient is classified as a class 2 risk with a 6.0% 30 day risk of death, MI, cardiac arrest.  Plan:  - Dr. Guidry performed an ORIF on 11/21  - Activity: WBAT  - DVT proph: Lovenox  - CM consulted for SNF.  CM working with wife on placement.  Due to gvt agencies being closed during thanksgiving the 142 will not be back until Monday.      Right hip pain        UTI (urinary tract infection)  Currently undergoing treatment with linezolid.  Plan:  -Urine Culture + VRE  -continue home medication      Right elbow pain        Malignant neoplasm of urinary bladder  Currently followed by Hematology/Oncology and Urology outpatient.    - Blood tinged penile discharge  - Urology consulted.  Dr Gonzales felt the discharge was due to the bladder cancer.  - Recommended also following up with his primary urologist upon discharge, Dr. Jorgensen.      Acute renal failure superimposed on stage 3 chronic kidney disease  Patient with acute kidney injury/acute renal failure likely due to pre-renal azotemia due to dehydration BANDAR is currently  undergoing fluid resuscitation . Patient s/o bilateral nephrostomy tube placement give prior obstructive uropathy. Baseline creatinine  stage 3  - Labs reviewed- Renal function/electrolytes with Estimated Creatinine Clearance: 66.4 mL/min (based on SCr of 1 mg/dL). according to latest data. Monitor urine output and serial BMP and adjust therapy as needed. Avoid nephrotoxins and renally dose meds for GFR listed above.    - BANDAR resolved. (Cr 1.0)    Centrilobular emphysema  Patient's COPD is controlled currently.  Patient is currently off COPD Pathway. Continue scheduled inhalers  as needed  and monitor respiratory  status closely.       Anemia  Patient's anemia is currently controlled. Has not received any PRBCs to date. Etiology likely d/t  chronic disease.  Current CBC reviewed-   Lab Results   Component Value Date    HGB 7.0 (L) 11/26/2023    HCT 21.9 (L) 11/26/2023     Monitor serial CBC and transfuse if patient becomes hemodynamically unstable, symptomatic or H/H drops below 7/21.  - Transfuse 1 unit pRBC  - Of note, 6.6 was likely diluation - IV fluids stopped and hbg improved to 7.0        VTE Risk Mitigation (From admission, onward)           Ordered     enoxaparin injection 40 mg  Every 24 hours         11/22/23 1757     IP VTE HIGH RISK PATIENT  Once         11/20/23 2351     Place sequential compression device  Until discontinued         11/20/23 2351     Reason for No Pharmacological VTE Prophylaxis  Once        Question:  Reasons:  Answer:  Physician Provided (leave comment)  Comment:  awaiting surgical intervention    11/20/23 2351                    Discharge Planning   TANIA:      Code Status: Full Code   Is the patient medically ready for discharge?:     Reason for patient still in hospital (select all that apply): Patient trending condition and Pending disposition  Discharge Plan A: Skilled Nursing Facility   Discharge Delays:  (State closed on Friday- unable to obtain 142 for SNF placement)              Obdulia Hernandez MD  Department of Hospital Medicine   [facility]

## 2023-11-26 NOTE — ASSESSMENT & PLAN NOTE
Currently followed by Hematology/Oncology and Urology outpatient.    - Blood tinged penile discharge  - Urology consulted.  Dr Gonzales felt the discharge was due to the bladder cancer.  - Recommended also following up with his primary urologist upon discharge, Dr. Jorgensen.

## 2023-11-27 ENCOUNTER — DOCUMENTATION ONLY (OUTPATIENT)
Dept: HEMATOLOGY/ONCOLOGY | Facility: CLINIC | Age: 68
End: 2023-11-27
Payer: MEDICARE

## 2023-11-27 VITALS
OXYGEN SATURATION: 99 % | RESPIRATION RATE: 18 BRPM | DIASTOLIC BLOOD PRESSURE: 50 MMHG | HEIGHT: 65 IN | BODY MASS INDEX: 27.92 KG/M2 | TEMPERATURE: 99 F | WEIGHT: 167.56 LBS | HEART RATE: 86 BPM | SYSTOLIC BLOOD PRESSURE: 108 MMHG

## 2023-11-27 LAB
BASOPHILS # BLD AUTO: 0.03 K/UL (ref 0–0.2)
BASOPHILS NFR BLD: 0.4 % (ref 0–1.9)
DIFFERENTIAL METHOD: ABNORMAL
EOSINOPHIL # BLD AUTO: 0.2 K/UL (ref 0–0.5)
EOSINOPHIL NFR BLD: 3.2 % (ref 0–8)
ERYTHROCYTE [DISTWIDTH] IN BLOOD BY AUTOMATED COUNT: 14.8 % (ref 11.5–14.5)
HCT VFR BLD AUTO: 23.8 % (ref 40–54)
HGB BLD-MCNC: 7.8 G/DL (ref 14–18)
IMM GRANULOCYTES # BLD AUTO: 0.03 K/UL (ref 0–0.04)
IMM GRANULOCYTES NFR BLD AUTO: 0.4 % (ref 0–0.5)
LYMPHOCYTES # BLD AUTO: 0.9 K/UL (ref 1–4.8)
LYMPHOCYTES NFR BLD: 12.6 % (ref 18–48)
MCH RBC QN AUTO: 27.9 PG (ref 27–31)
MCHC RBC AUTO-ENTMCNC: 32.8 G/DL (ref 32–36)
MCV RBC AUTO: 85 FL (ref 82–98)
MONOCYTES # BLD AUTO: 0.6 K/UL (ref 0.3–1)
MONOCYTES NFR BLD: 9 % (ref 4–15)
NEUTROPHILS # BLD AUTO: 5.3 K/UL (ref 1.8–7.7)
NEUTROPHILS NFR BLD: 74.4 % (ref 38–73)
NRBC BLD-RTO: 0 /100 WBC
PLATELET # BLD AUTO: 188 K/UL (ref 150–450)
PMV BLD AUTO: 9.2 FL (ref 9.2–12.9)
RBC # BLD AUTO: 2.8 M/UL (ref 4.6–6.2)
WBC # BLD AUTO: 7.12 K/UL (ref 3.9–12.7)

## 2023-11-27 PROCEDURE — 94640 AIRWAY INHALATION TREATMENT: CPT

## 2023-11-27 PROCEDURE — 99024 POSTOP FOLLOW-UP VISIT: CPT | Mod: POP,,, | Performed by: PHYSICIAN ASSISTANT

## 2023-11-27 PROCEDURE — 97530 THERAPEUTIC ACTIVITIES: CPT | Mod: CQ

## 2023-11-27 PROCEDURE — 27000221 HC OXYGEN, UP TO 24 HOURS

## 2023-11-27 PROCEDURE — 25000003 PHARM REV CODE 250: Performed by: HOSPITALIST

## 2023-11-27 PROCEDURE — 99024 PR POST-OP FOLLOW-UP VISIT: ICD-10-PCS | Mod: POP,,, | Performed by: PHYSICIAN ASSISTANT

## 2023-11-27 PROCEDURE — 85025 COMPLETE CBC W/AUTO DIFF WBC: CPT | Performed by: FAMILY MEDICINE

## 2023-11-27 PROCEDURE — 94761 N-INVAS EAR/PLS OXIMETRY MLT: CPT

## 2023-11-27 PROCEDURE — 25000242 PHARM REV CODE 250 ALT 637 W/ HCPCS: Performed by: HOSPITALIST

## 2023-11-27 PROCEDURE — 99900035 HC TECH TIME PER 15 MIN (STAT)

## 2023-11-27 PROCEDURE — 94799 UNLISTED PULMONARY SVC/PX: CPT | Mod: XB

## 2023-11-27 PROCEDURE — 25000003 PHARM REV CODE 250: Performed by: NURSE PRACTITIONER

## 2023-11-27 PROCEDURE — 36415 COLL VENOUS BLD VENIPUNCTURE: CPT | Performed by: FAMILY MEDICINE

## 2023-11-27 RX ADMIN — HYDROCODONE BITARTRATE AND ACETAMINOPHEN 1 TABLET: 5; 325 TABLET ORAL at 02:11

## 2023-11-27 RX ADMIN — LEVOTHYROXINE SODIUM 125 MCG: 0.03 TABLET ORAL at 05:11

## 2023-11-27 RX ADMIN — TAMSULOSIN HYDROCHLORIDE 0.4 MG: 0.4 CAPSULE ORAL at 09:11

## 2023-11-27 RX ADMIN — ALPRAZOLAM 1 MG: 1 TABLET ORAL at 10:11

## 2023-11-27 RX ADMIN — BUDESONIDE INHALATION 0.5 MG: 0.5 SUSPENSION RESPIRATORY (INHALATION) at 08:11

## 2023-11-27 RX ADMIN — ARFORMOTEROL TARTRATE 15 MCG: 15 SOLUTION RESPIRATORY (INHALATION) at 08:11

## 2023-11-27 RX ADMIN — ESCITALOPRAM 20 MG: 5 TABLET, FILM COATED ORAL at 09:11

## 2023-11-27 RX ADMIN — DOCUSATE SODIUM 100 MG: 100 CAPSULE, LIQUID FILLED ORAL at 09:11

## 2023-11-27 RX ADMIN — METOPROLOL SUCCINATE 50 MG: 50 TABLET, EXTENDED RELEASE ORAL at 09:11

## 2023-11-27 RX ADMIN — LINEZOLID 600 MG: 600 TABLET, FILM COATED ORAL at 09:11

## 2023-11-27 RX ADMIN — HYDROCODONE BITARTRATE AND ACETAMINOPHEN 1 TABLET: 5; 325 TABLET ORAL at 06:11

## 2023-11-27 RX ADMIN — HYDROCODONE BITARTRATE AND ACETAMINOPHEN 1 TABLET: 5; 325 TABLET ORAL at 10:11

## 2023-11-27 NOTE — PHYSICIAN QUERY
PT Name: Geovani Currie  MR #: [id number]    DOCUMENTATION CLARIFICATION     CDS/: Lissa Powers RN             Contact information: [email or web address]    This form is a permanent document in the medical record.     Query Date: November 27, 2023    By submitting this query, we are merely seeking further clarification of documentation. Please utilize your independent clinical judgment when addressing the question(s) below.    Supporting Clinical Findings Location in Medical Record       Admitted for nephrostomy tube replacement after it was accidentally dislodge while sleeping.    Post procedure patient was to be discharged, but began shivering.  Temp increased to 101.    Dr. Hartman was consulted as he was following the patient for Enterobacter UTI with IV Invanz which scheduled to complete on 11/11, but due to the fever he advised to continue the Invanz.     Repeat urine culture with Enterococcus VRE. Not covered by Invanz.  Antibiotics changed to linezolid.   Nephrostomy tube with no output. IR re-consulted.   Nephrostomy tube replaced 11/15.      UTI --continue  linezolid as VRE isolated    U/A with 76 WBC.   Urine culture growing E faecium likely translocated from gut  11/16 Hosp Med MD                         11/15 ID MD PN       Provider, please clarify if there is any clinical correlation between ___Nephrostomy tubes______ and ___UTI___.           Are the conditions:      [ x ] Due to or associated with each other   [  ] Unrelated to each other   [  ] Other explanation (Please Specify): ______________   [  ] Clinically Undetermined                                                                               Please document in your progress notes daily for the duration of treatment until resolved and include in your discharge summary.

## 2023-11-27 NOTE — PROGRESS NOTES
Contacted and faxed records request for records and imaging   Oncology Navigation   Intake      Treatment                              Acuity      Follow Up  No follow-ups on file.     to Dr. Gonzalez @ [facility] [phone], Dr. Santiago [phone] and Dr. Hi Lozano [phone]. Included signed MARIETTA with each fax, will follow.

## 2023-11-27 NOTE — PT/OT/SLP PROGRESS
"Physical Therapy  Treatment    Geovani Currie   MRN: [id number]   Admitting Diagnosis: Displaced intertrochanteric fracture of right femur, initial encounter for closed fracture    PT Received On: 11/27/23  PT Start Time: 1110     PT Stop Time: 1140    PT Total Time (min): 30 min       Billable Minutes:  Therapeutic Activity 30    Treatment Type: Treatment  PT/PTA: PTA     Number of PTA visits since last PT visit: 2       General Precautions: Standard, contact, fall  Orthopedic Precautions: RLE weight bearing as tolerated  Braces: N/A  Respiratory Status: Nasal cannula, flow 2 L/min    Spiritual, Cultural Beliefs, Alevism Practices, Values that Affect Care: no    Subjective:  Communicated with patient's nurse, Henry, and completed Epic chart review prior to session.  Patient agreed to PT session with some encouragement from therapist.   "My wife got me up in the chair earlier today."    Pain/Comfort  Pain Rating 1: 0/10  Pain Rating Post-Intervention 1: 0/10    Objective:   Patient found with: nephrostomy, peripheral IV, oxygen    Supine > sit EOB: Min A (VC for sequencing of task)    Forward scoot towards EOB: Min A     STS from EOB > RW x3 reps: Min A (VC for hand placement)    Standing MIP with BUE self support on RW 2x5 reps: Min A     Stand pivot T/F from EOB > BSC w/ RW (90 degree turn): Min A     STS from BSC > RW: Min A (VC for hand placement)    Stand pivot T/F from BSC > chair w/ RW (180 degree turn): Min A     Educated patient on importance of increased tolerance to upright position and direct impact on CV endurance and strength. Patient encouraged to sit up in chair/ EOB, for a minimum of 2 consecutive hours, 3x per day. Encouraged patient to perform AROM TE to BLE throughout the day within all available planes of motion. Re enforced importance of utilizing call light to meet needs in room and not attempt to get up without staff assistance. Patient verbalized understanding and agreed to comply.  "     AM-PAC 6 CLICK MOBILITY  How much help from another person does this patient currently need?   1 = Unable, Total/Dependent Assistance  2 = A lot, Maximum/Moderate Assistance  3 = A little, Minimum/Contact Guard/Supervision  4 = None, Modified Ritchie/Independent    Turning over in bed (including adjusting bedclothes, sheets and blankets)?: 3  Sitting down on and standing up from a chair with arms (e.g., wheelchair, bedside commode, etc.): 3  Moving from lying on back to sitting on the side of the bed?: 3  Moving to and from a bed to a chair (including a wheelchair)?: 3  Need to walk in hospital room?: 2  Climbing 3-5 steps with a railing?: 1  Basic Mobility Total Score: 15    AM-PAC Raw Score CMS G-Code Modifier Level of Impairment Assistance   6 % Total / Unable   7 - 9 CM 80 - 100% Maximal Assist   10 - 14 CL 60 - 80% Moderate Assist   15 - 19 CK 40 - 60% Moderate Assist   20 - 22 CJ 20 - 40% Minimal Assist   23 CI 1-20% SBA / CGA   24 CH 0% Independent/ Mod I     Patient left up in chair with call button in reach and chair alarm on.    Assessment:  Geovani Currie is a 68 y.o. male with a medical diagnosis of Displaced intertrochanteric fracture of right femur, initial encounter for closed fracture and presents with overall decline in functional mobility. Patient would continue to benefit from skilled PT to address functional limitations listed below in order to return to PLOF/decrease caregiver burden.      Rehab identified problem list/impairments: weakness, impaired endurance, impaired self care skills, impaired functional mobility, gait instability, impaired balance, impaired cognition, decreased coordination, decreased upper extremity function, decreased lower extremity function, decreased safety awareness, decreased ROM, impaired coordination, impaired cardiopulmonary response to activity    Rehab potential is good.    Activity tolerance: Fair    Discharge recommendations: Moderate Intensity  Therapy      Barriers to discharge:      Equipment recommendations: to be determined by next level of care     GOALS:   Multidisciplinary Problems       Physical Therapy Goals          Problem: Physical Therapy    Goal Priority Disciplines Outcome Goal Variances Interventions   Physical Therapy Goal     PT, PT/OT Ongoing, Progressing     Description: Goals to be met by 12/6/23.  1. Pt will complete bed mobility CGA.  2. Pt will complete sit to stand MIN A.  3. Pt will ambulate 50ft MIN A using RW.  4. Pt will increase AMPAC score by 2 points to progress functional mobility.                       PLAN:    Patient to be seen 3 x/week to address the above listed problems via gait training, therapeutic activities, therapeutic exercises  Plan of Care expires: 12/06/23  Plan of Care reviewed with: patient         11/27/2023

## 2023-11-27 NOTE — PLAN OF CARE
Problem: Adult Inpatient Plan of Care  Goal: Plan of Care Review  Outcome: Met  Goal: Patient-Specific Goal (Individualized)  Outcome: Met  Goal: Absence of Hospital-Acquired Illness or Injury  Outcome: Met  Goal: Optimal Comfort and Wellbeing  Outcome: Met  Goal: Readiness for Transition of Care  Outcome: Met     Problem: Fluid and Electrolyte Imbalance (Acute Kidney Injury/Impairment)  Goal: Fluid and Electrolyte Balance  Outcome: Met     Problem: Oral Intake Inadequate (Acute Kidney Injury/Impairment)  Goal: Optimal Nutrition Intake  Outcome: Met     Problem: Renal Function Impairment (Acute Kidney Injury/Impairment)  Goal: Effective Renal Function  Outcome: Met     Problem: Skin Injury Risk Increased  Goal: Skin Health and Integrity  Outcome: Met     Problem: Pain Acute  Goal: Acceptable Pain Control and Functional Ability  Outcome: Met     Problem: Infection  Goal: Absence of Infection Signs and Symptoms  Outcome: Met     Problem: Fall Injury Risk  Goal: Absence of Fall and Fall-Related Injury  Outcome: Met

## 2023-11-27 NOTE — PLAN OF CARE
CM spoke with patient, wife and daughter (Stephania) regarding d/c dispo.Patient/wife defer decision making to daughter on phone. Daughter requests referral to [facility]. Referral placed and liaison reviewing.

## 2023-11-27 NOTE — PLAN OF CARE
"No accepting SNFs at this time, referrals "reviewing" per careport. Updated clinicals sent and messages left with SNF admissions staff for [facility] and [facility].  "

## 2023-11-27 NOTE — PLAN OF CARE
[facility]  Discharge Final Note    Primary Care Provider: Faizan Antoine MD    Expected Discharge Date: 11/27/2023    Final Discharge Note (most recent)       Final Note - 11/27/23 1509          Final Note    Assessment Type Final Discharge Note     Anticipated Discharge Disposition Rehab Facility        Post-Acute Status    Post-Acute Authorization Placement     Post-Acute Placement Status Set-up Complete/Auth obtained                     Important Message from Medicare             Contact Info       Faizan Antoine MD   Specialty: Family Medicine   Relationship: PCP - General    [address] [place] [address]   Phone: [phone]       Next Steps: Follow up    Tanisha Guidry DO   Specialty: Orthopedic Surgery    [address] [place] [address]   [facility]  [facility] [address]   Phone: [phone]       Next Steps: Follow up in 2 month(s)          DC Dispo: [facility]  Transport: 4p w/c van per rehab  Family notified: family notified at bedside  All orders uploaded and nurse provided with number for report.

## 2023-11-27 NOTE — PROGRESS NOTES
[facility]  Orthopedics  Progress Note    Patient Name: Geovani Currie  MRN: [id number]  Admission Date: 11/20/2023  Hospital Length of Stay: 7 days  Attending Provider: Lara Ribera MD  Primary Care Provider: Faizan Antoine MD  Follow-up For: Procedure(s) (LRB):  ORIF, FRACTURE, FEMUR, INTERTROCHANTERIC (Right)    Post-Operative Day: 6 Days Post-Op  Subjective:     Principal Problem:Displaced intertrochanteric fracture of right femur, initial encounter for closed fracture    Principal Orthopedic Problem:  Right intertrochanteric femur fracture    Interval History: Geovani Currie is a 68-year-old male postop day 6 status post operative repair of right intertrochanteric femur fracture with cephalomedullary nail.  Patient is resting comfortably in bed.  No new complaints at this time.  Continues to progress with therapy.    Review of patient's allergies indicates:  No Known Allergies    Current Facility-Administered Medications   Medication    0.9%  NaCl infusion (for blood administration)    0.9%  NaCl infusion (for blood administration)    0.9%  NaCl infusion (for blood administration)    acetaminophen suppository 650 mg    acetaminophen tablet 650 mg    albuterol-ipratropium 2.5 mg-0.5 mg/3 mL nebulizer solution 3 mL    ALPRAZolam tablet 1 mg    aluminum-magnesium hydroxide-simethicone 200-200-20 mg/5 mL suspension 30 mL    arformoteroL nebulizer solution 15 mcg    budesonide nebulizer solution 0.5 mg    cyclobenzaprine tablet 10 mg    dextrose 10% bolus 125 mL 125 mL    dextrose 10% bolus 250 mL 250 mL    docusate sodium capsule 100 mg    enoxaparin injection 40 mg    EScitalopram oxalate tablet 20 mg    glucagon (human recombinant) injection 1 mg    glucose chewable tablet 16 g    glucose chewable tablet 24 g    HYDROcodone-acetaminophen 5-325 mg per tablet 1 tablet    lactated ringers infusion    levothyroxine tablet 125 mcg    linezolid tablet 600 mg    melatonin tablet 6 mg    metoprolol  "succinate (TOPROL-XL) 24 hr tablet 50 mg    morphine injection 2 mg    naloxone 0.4 mg/mL injection 0.02 mg    ondansetron injection 4 mg    promethazine tablet 25 mg    senna-docusate 8.6-50 mg per tablet 1 tablet    sodium chloride 0.9% flush 10 mL    tamsulosin 24 hr capsule 0.4 mg    Tdap (BOOSTRIX) vaccine injection 0.5 mL     Objective:     Vital Signs (Most Recent):  Temp: 98 °F (36.7 °C) (11/27/23 0405)  Pulse: 70 (11/27/23 0809)  Resp: 20 (11/27/23 0809)  BP: (!) 117/55 (11/27/23 0405)  SpO2: 99 % (11/27/23 0809) Vital Signs (24h Range):  Temp:  [97.9 °F (36.6 °C)-99.6 °F (37.6 °C)] 98 °F (36.7 °C)  Pulse:  [65-90] 70  Resp:  [16-20] 20  SpO2:  [94 %-99 %] 99 %  BP: (107-139)/(52-65) 117/55     Weight: 76 kg (167 lb 8.8 oz)  Height: 5' 5" (165.1 cm)  Body mass index is 27.88 kg/m².      Intake/Output Summary (Last 24 hours) at 11/27/2023 0819  Last data filed at 11/27/2023 0646  Gross per 24 hour   Intake 221.22 ml   Output 1475 ml   Net -1253.78 ml       Ortho/SPM Exam  Right lower extremity   Dressings have been removed   Incision is well approximated, no signs of infection   Mild edema of the upper thigh  Minimal TTP   No pain with logroll  Calf and compartments are soft and compressible  Motor exam normal   Sensation and pulses intact  Cap refill brisk      GEN: Well developed, well nourished male. AAOX3. No acute distress.   Head: Normocephalic, atraumatic.   Eyes: CARTER  Neck: Trachea is midline, no adenopathy  Resp: Breathing unlabored.  Neuro: Motor function normal, Cranial nerves intact  Psych: Mood and affect appropriate.    Significant Labs:   Recent Lab Results         11/27/23  0439        Baso # 0.03       Basophil % 0.4       Differential Method Automated       Eos # 0.2       Eosinophil % 3.2       Gran # (ANC) 5.3       Gran % 74.4       Hematocrit 23.8       Hemoglobin 7.8       Immature Grans (Abs) 0.03  Comment: Mild elevation in immature granulocytes is non specific and   can be seen in " a variety of conditions including stress response,   acute inflammation, trauma and pregnancy. Correlation with other   laboratory and clinical findings is essential.         Immature Granulocytes 0.4       Lymph # 0.9       Lymph % 12.6       MCH 27.9       MCHC 32.8       MCV 85       Mono # 0.6       Mono % 9.0       MPV 9.2       nRBC 0       Platelet Count 188       RBC 2.80       RDW 14.8       WBC 7.12               Significant Imaging: I have reviewed and interpreted all pertinent imaging results/findings.    Assessment/Plan:     Active Diagnoses:    Diagnosis Date Noted POA    PRINCIPAL PROBLEM:  Displaced intertrochanteric fracture of right femur, initial encounter for closed fracture [S72.141A] 11/20/2023 Yes    Right elbow pain [M25.521] 11/20/2023 Yes    Malignant neoplasm of urinary bladder [C67.9] 11/15/2023 Yes    Acute renal failure superimposed on stage 3 chronic kidney disease [N17.9, N18.30] 11/11/2023 Yes    Centrilobular emphysema [J43.2] 11/09/2023 Yes    Anemia [D64.9] 10/21/2023 Yes    UTI (urinary tract infection) [N39.0] 10/21/2023 Yes      Problems Resolved During this Admission:     Assessment:  68-year-old male postop day 6 status post operative repair of right intertrochanteric femur fracture with cephalomedullary nail      Plan:   Weightbearing as tolerated to the right lower extremity  PT/OT for gait training and ADLs   Incision may be left open to the air, patient may shower and get incision wet, but it should not be submerged   DVT prophylaxis per primary team   Patient will likely need rehab/SNF placement and is ready for discharge from orthopedic standpoint once this has been arranged   Follow-up with [facility] at 3 weeks postop    Indio Yanez PA-C  Orthopedics  [facility]

## 2023-11-27 NOTE — NURSING
Called report to Jeanine at  rehab. POC reviewed with patient and spouse PRN pain med given. No questions at this time.

## 2023-12-01 ENCOUNTER — PATIENT MESSAGE (OUTPATIENT)
Dept: UROLOGY | Facility: CLINIC | Age: 68
End: 2023-12-01
Payer: MEDICARE

## 2023-12-04 NOTE — DISCHARGE SUMMARY
[facility]  [facility] Medicine  Discharge Summary      Patient Name: Geovani Currie  MRN: [id number]  [facility]: [id number]  Patient Class: IP- Inpatient  Admission Date: 11/20/2023  Hospital Length of Stay: 7 days  Discharge Date and Time: 11/27/2023  3:50 PM  Attending Physician: No att. providers found   Discharging Provider: Lara Ribera MD  Primary Care Provider: Faizan Antoine MD    Primary Care Team: Networked reference to record PCT     HPI:   Geovani Currie is a 68 y.o. male with a PMH  has a past medical history of COPD (chronic obstructive pulmonary disease) and Hypertension. who presented to the ED for further evaluation of right hip pain following ground level fall earlier today.  Patient reported stepping onto a curb at which time he lost his balance causing him to fall on his right side endorsing immediate pain with inability to stand/bear weight.  Patient denied endorsing any head trauma, loss of consciousness, or sustaining any other injuries with the exception of abrasions to his elbow.  Patient reported pain was localized throughout his right hip, nonradiating, currently rated 8/10 in severity with aggravating factors including weight-bearing, movement, and palpation to the affected area while alleviating factors included immobility, nonweightbearing, and pain medication.  Associated symptoms include generalized myalgias, arthralgias, and weakness but denied endorsing any numbness, tingling, or penetrating trauma, but did report decreased range of motion muscle strength secondary to exacerbation of pain.  All other review of systems negative except as noted above.  Of note, patient was recently discharged on 11/17/2023 following admission for dislodged nephrostomy tube with IR replacement and treatment of UTI which he is currently being treated with linezolid.  Prior to incident, patient reported being in his usual state of health with no other concerns or complaints.  Initial workup  in the ED revealed patient has sustained an acute traumatic right intertrochanteric femur fracture on imaging in his being admitted to Hospital Medicine inpatient for continued medical management while awaiting surgical intervention from Orthopedics.    PCP: Faizan Antoine      Procedure(s) (LRB):  ORIF, FRACTURE, FEMUR, INTERTROCHANTERIC (Right)      Hospital Course:   Mr Currie was admitted with a right femur fracture s/p GLF.  He had an ORIF by Dr. Guidry on 11/21.  Patient was seen by PT/OT and recommended SNF.  Patient is weight bearing as tolerated.  He is also noted to have 2 nephrostomy tubes placed prior to admission.  Patient was noted to have some penile discharge. Dr. Gonzales was consulted who felt it was due to the bladder cancer.  Spoke with wife about outpatient follow up with Dr. Jorgensen (primary urology) to make sure he is aware as well.  Patient also had a drop in Hbg and therefore transfused 1 unit of prbc.  He participated with therapy.  He was accepted to [facility] for continued therapy. Patient seen and examined, stable for discharge.     Goals of Care Treatment Preferences:  Code Status: Full Code      Consults:   Consults (From admission, onward)          Status Ordering Provider     Inpatient consult to Urology  Once        Provider:  Elmo Gonzales MD    Completed LIZET GONCALVES     Inpatient consult to Social Work  Once        Provider:  (Not yet assigned)    LIZET Regalado                Final Active Diagnoses:    Diagnosis Date Noted POA    PRINCIPAL PROBLEM:  Displaced intertrochanteric fracture of right femur, initial encounter for closed fracture [S72.141A] 11/20/2023 Yes    Right elbow pain [M25.521] 11/20/2023 Yes    Malignant neoplasm of urinary bladder [C67.9] 11/15/2023 Yes    Acute renal failure superimposed on stage 3 chronic kidney disease [N17.9, N18.30] 11/11/2023 Yes    Centrilobular emphysema [J43.2] 11/09/2023 Yes    Anemia [D64.9] 10/21/2023  Yes    UTI (urinary tract infection) [N39.0] 10/21/2023 Yes      Problems Resolved During this Admission:       Discharged Condition: stable    Disposition: Rehab Facility    Follow Up:   Follow-up Information       Faizan Antoine MD Follow up.    Specialty: Family Medicine  Contact information:  [address] [place] [address]  [phone]               Tanisha Guidry, DO Follow up in 2 month(s).    Specialty: Orthopedic Surgery  Contact information:  [address] [facility]   [facility] - Orthopedics  [facility] [address]  [phone]                           Patient Instructions:      Diet Adult Regular     Activity as tolerated       Significant Diagnostic Studies: Radiology:   Imaging Results              X-Ray Elbow Complete Right (Final result)  Result time 11/20/23 21:26:18      Final result by Jefferson Kunz MD (11/20/23 21:26:18)                   Impression:      As above      Electronically signed by: Jefferson Kunz  Date:    11/20/2023  Time:    21:26               Narrative:    EXAMINATION:  XR ELBOW COMPLETE 3 VIEW RIGHT    CLINICAL HISTORY:  XR ELBOW COMPLETE 3 VIEW RIGHTPain in right elbow    COMPARISON:  None    FINDINGS:  Multiple radiographic views  were obtained.    Osseous detail reduced by casting material overlying matter.  Slight anterior fat pad is noted.  Limited positioning.  Otherwise no definite acute process seen                                       X-Ray Hip 2 or 3 views Right (with Pelvis when performed) (Final result)  Result time 11/20/23 21:27:20      Final result by Jefferson Kunz MD (11/20/23 21:27:20)                   Impression:      As above      Electronically signed by: Jefferson Kunz  Date:    11/20/2023  Time:    21:27               Narrative:    EXAMINATION:  XR HIP WITH PELVIS WHEN PERFORMED, 2 OR 3  VIEWS RIGHT    CLINICAL HISTORY:  XR HIP WITH PELVIS WHEN PERFORMED, 2 OR 3  VIEWS  RIGHT    COMPARISON:  None    FINDINGS:  Multiple radiographic views  were obtained.    Intertrochanteric fracture of the right hip.  Mild angulation at the fracture site.                                  \    Pending Diagnostic Studies:       None           Medications:  Reconciled Home Medications:      Medication List        CONTINUE taking these medications      * albuterol 0.63 mg/3 mL Nebu  Commonly known as: ACCUNEB  Take 3 mLs (0.63 mg total) by nebulization 3 (three) times daily as needed (sob, wheezing). Rescue     * albuterol 90 mcg/actuation inhaler  Commonly known as: PROVENTIL/VENTOLIN HFA  Inhale 1-2 puffs into the lungs every 4 (four) hours as needed for Wheezing. Rescue     ALPRAZolam 0.5 MG tablet  Commonly known as: XANAX  Take 2 tablets (1 mg total) by mouth 3 (three) times daily as needed for Anxiety.     BREO ELLIPTA 100-25 mcg/dose diskus inhaler  Generic drug: fluticasone furoate-vilanteroL  Inhale 1 puff into the lungs once daily. Controller     cyclobenzaprine 10 MG tablet  Commonly known as: FLEXERIL  Take 1 tablet (10 mg total) by mouth 3 (three) times daily as needed for Muscle spasms.     docusate sodium 100 MG capsule  Commonly known as: COLACE  Take 100 mg by mouth 2 (two) times daily.     EScitalopram oxalate 20 MG tablet  Commonly known as: LEXAPRO  Take 1 tablet (20 mg total) by mouth once daily.     HYDROcodone-acetaminophen  mg per tablet  Commonly known as: NORCO  Take 1 tablet by mouth every 6 (six) hours as needed for Pain.     levothyroxine 125 MCG tablet  Commonly known as: SYNTHROID  Take 1 tablet (125 mcg total) by mouth before breakfast.     metoprolol succinate 50 MG 24 hr tablet  Commonly known as: TOPROL-XL  Take 1 tablet (50 mg total) by mouth once daily.     nicotine 21 mg/24 hr  Commonly known as: NICODERM CQ  Place 1 patch onto the skin once daily.     tamsulosin 0.4 mg Cap  Commonly known as: FLOMAX  Take 1 capsule (0.4 mg total) by mouth once daily.      vitamin D 1000 units Tab  Commonly known as: VITAMIN D3  25 mcg.           * This list has 2 medication(s) that are the same as other medications prescribed for you. Read the directions carefully, and ask your doctor or other care provider to review them with you.                ASK your doctor about these medications      linezolid 600 mg Tab  Commonly known as: ZYVOX  Take 1 tablet (600 mg total) by mouth every 12 (twelve) hours. for 10 days  Ask about: Should I take this medication?              Indwelling Lines/Drains at time of discharge:   Lines/Drains/Airways       Drain  Duration                  Nephrostomy 11/15/23 1108 Right 8 Fr. 18 days         Nephrostomy 11/17/23 1232 Left 14.5 Fr. 16 days                    Time spent on the discharge of patient: 31 minutes         Lara Ribera MD  Department of Hospital Medicine  [facility]

## 2023-12-06 ENCOUNTER — TELEPHONE (OUTPATIENT)
Dept: PRIMARY CARE CLINIC | Facility: CLINIC | Age: 68
End: 2023-12-06
Payer: MEDICARE

## 2023-12-06 NOTE — TELEPHONE ENCOUNTER
----- Message from Eliana George sent at 12/6/2023  3:20 PM CST -----  [facility] would like for the pt to be contacted @[phone], in regards to scheduling a hospital f/u. He will be discharged on Friday 12/8/23.

## 2023-12-08 ENCOUNTER — TELEPHONE (OUTPATIENT)
Dept: INFECTIOUS DISEASES | Facility: CLINIC | Age: 68
End: 2023-12-08
Payer: MEDICARE

## 2023-12-08 NOTE — TELEPHONE ENCOUNTER
Returned call pt in rehab for broken hip,  nephrostomy tube is leaking a frosty white substance into the bag. Rehab is unable to address this issue. Per Dr. Hartman advised that pt go to ER for evaluation. Understanding verbalized.

## 2023-12-08 NOTE — TELEPHONE ENCOUNTER
----- Message from Bhargavi De Leon sent at 12/8/2023 10:19 AM CST -----  Sera The tubes has white filmy substance coming from the coloscopy bags , She needs instructions on how to care for this or take him to [phone]

## 2023-12-09 ENCOUNTER — HOSPITAL ENCOUNTER (EMERGENCY)
Facility: HOSPITAL | Age: 68
Discharge: HOME | End: 2023-12-09
Attending: EMERGENCY MEDICINE
Payer: MEDICARE

## 2023-12-09 VITALS
HEART RATE: 68 BPM | TEMPERATURE: 99 F | RESPIRATION RATE: 18 BRPM | HEIGHT: 65 IN | BODY MASS INDEX: 27.88 KG/M2 | DIASTOLIC BLOOD PRESSURE: 63 MMHG | SYSTOLIC BLOOD PRESSURE: 133 MMHG | OXYGEN SATURATION: 96 %

## 2023-12-09 DIAGNOSIS — N10 ACUTE PYELONEPHRITIS: Primary | ICD-10-CM

## 2023-12-09 LAB
ALBUMIN SERPL BCP-MCNC: 2.9 G/DL (ref 3.5–5.2)
ALP SERPL-CCNC: 142 U/L (ref 55–135)
ALT SERPL W/O P-5'-P-CCNC: 24 U/L (ref 10–44)
ANION GAP SERPL CALC-SCNC: 11 MMOL/L (ref 8–16)
AST SERPL-CCNC: 19 U/L (ref 10–40)
BASOPHILS # BLD AUTO: 0.02 K/UL (ref 0–0.2)
BASOPHILS NFR BLD: 0.2 % (ref 0–1.9)
BILIRUB SERPL-MCNC: 0.3 MG/DL (ref 0.1–1)
BUN SERPL-MCNC: 26 MG/DL (ref 8–23)
CALCIUM SERPL-MCNC: 9.5 MG/DL (ref 8.7–10.5)
CHLORIDE SERPL-SCNC: 102 MMOL/L (ref 95–110)
CO2 SERPL-SCNC: 26 MMOL/L (ref 23–29)
CREAT SERPL-MCNC: 1.6 MG/DL (ref 0.5–1.4)
DIFFERENTIAL METHOD: ABNORMAL
EOSINOPHIL # BLD AUTO: 0.3 K/UL (ref 0–0.5)
EOSINOPHIL NFR BLD: 3 % (ref 0–8)
ERYTHROCYTE [DISTWIDTH] IN BLOOD BY AUTOMATED COUNT: 18 % (ref 11.5–14.5)
EST. GFR  (NO RACE VARIABLE): 46 ML/MIN/1.73 M^2
GLUCOSE SERPL-MCNC: 111 MG/DL (ref 70–110)
HCT VFR BLD AUTO: 24.2 % (ref 40–54)
HGB BLD-MCNC: 7.7 G/DL (ref 14–18)
IMM GRANULOCYTES # BLD AUTO: 0.11 K/UL (ref 0–0.04)
IMM GRANULOCYTES NFR BLD AUTO: 1.3 % (ref 0–0.5)
LYMPHOCYTES # BLD AUTO: 1.4 K/UL (ref 1–4.8)
LYMPHOCYTES NFR BLD: 16.9 % (ref 18–48)
MCH RBC QN AUTO: 28 PG (ref 27–31)
MCHC RBC AUTO-ENTMCNC: 31.8 G/DL (ref 32–36)
MCV RBC AUTO: 88 FL (ref 82–98)
MONOCYTES # BLD AUTO: 0.7 K/UL (ref 0.3–1)
MONOCYTES NFR BLD: 8.5 % (ref 4–15)
NEUTROPHILS # BLD AUTO: 6 K/UL (ref 1.8–7.7)
NEUTROPHILS NFR BLD: 70.1 % (ref 38–73)
NRBC BLD-RTO: 0 /100 WBC
PLATELET # BLD AUTO: 311 K/UL (ref 150–450)
PMV BLD AUTO: 9.2 FL (ref 9.2–12.9)
POTASSIUM SERPL-SCNC: 4.5 MMOL/L (ref 3.5–5.1)
PROT SERPL-MCNC: 6.9 G/DL (ref 6–8.4)
RBC # BLD AUTO: 2.75 M/UL (ref 4.6–6.2)
SODIUM SERPL-SCNC: 139 MMOL/L (ref 136–145)
WBC # BLD AUTO: 8.54 K/UL (ref 3.9–12.7)

## 2023-12-09 PROCEDURE — 99284 EMERGENCY DEPT VISIT MOD MDM: CPT | Mod: 25

## 2023-12-09 PROCEDURE — 85025 COMPLETE CBC W/AUTO DIFF WBC: CPT | Performed by: FAMILY MEDICINE

## 2023-12-09 PROCEDURE — 80053 COMPREHEN METABOLIC PANEL: CPT | Performed by: FAMILY MEDICINE

## 2023-12-09 RX ORDER — OXYCODONE AND ACETAMINOPHEN 5; 325 MG/1; MG/1
1 TABLET ORAL EVERY 6 HOURS PRN
Qty: 24 TABLET | Refills: 0 | Status: SHIPPED | OUTPATIENT
Start: 2023-12-09 | End: 2023-12-21 | Stop reason: ALTCHOICE

## 2023-12-09 RX ORDER — OXYCODONE AND ACETAMINOPHEN 10; 325 MG/1; MG/1
1 TABLET ORAL EVERY 6 HOURS PRN
Qty: 12 TABLET | Refills: 0 | Status: SHIPPED | OUTPATIENT
Start: 2023-12-09 | End: 2023-12-09 | Stop reason: RX

## 2023-12-09 RX ORDER — LINEZOLID 600 MG/1
600 TABLET, FILM COATED ORAL EVERY 12 HOURS
Qty: 14 TABLET | Refills: 0 | Status: ON HOLD | OUTPATIENT
Start: 2023-12-09 | End: 2023-12-23

## 2023-12-09 NOTE — ED PROVIDER NOTES
SCRIBE #1 NOTE: I, Gilda Ladd, am scribing for, and in the presence of, Emily Randall MD. I have scribed the HPI, ROS and Pex.      SCRIBE #2 NOTE: I, Radha Nur, am scribing for, and in the presence of,  Lizett Fragoso MD. I have scribed the remaining portions of the note not scribed by Scribe #1.      History     Chief Complaint   Patient presents with    Post-op Problem     Pt from [facility], sent for eval of left nephrostomy tube, left side decreased output.      Review of patient's allergies indicates:  No Known Allergies      History of Present Illness     HPI    12/9/2023, 3:20 PM  History obtained from the patient and family member       History of Present Illness: Geovani Currie is a 68 y.o. male patient with a PMHx of HTN and COPD who presents to the Emergency Department for evaluation of decreased output from left nephrostomy tube with sediment. Per family, pt has been in a rehabilitation facility for right hip fracture on 11/20. Patient's family spoke to his infectious disease doctor who recommended pt come to Ed for further evaluation. Pt is still on abx. Denies fever.  No further complaints or concerns at this time.       Arrival mode: Ambulance service    PCP: Faizan Antoine MD        Past Medical History:  Past Medical History:   Diagnosis Date    COPD (chronic obstructive pulmonary disease)     Hypertension        Past Surgical History:  Past Surgical History:   Procedure Laterality Date    APPENDECTOMY      CATARACT EXTRACTION      NEPHROSTOMY      OPEN REDUCTION AND INTERNAL FIXATION (ORIF) OF INTERTROCHANTERIC FRACTURE OF FEMUR Right 11/21/2023    Procedure: ORIF, FRACTURE, FEMUR, INTERTROCHANTERIC;  Surgeon: Tanisha Guidry DO;  Location: [facility] OR;  Service: Orthopedics;  Laterality: Right;  Gamma nail         Family History:  Family History   Problem Relation Age of Onset    Cancer Father     Cancer Mother        Social History:  Social History     Tobacco Use     "Smoking status: Former     Types: Cigarettes     Passive exposure: Never    Smokeless tobacco: Never   Substance and Sexual Activity    Alcohol use: Never    Drug use: Never    Sexual activity: Not Currently     Partners: Female        Review of Systems     Review of Systems   Constitutional:  Negative for fever.   Genitourinary:  Positive for decreased urine volume.      Physical Exam     Initial Vitals [12/09/23 1437]   BP Pulse Resp Temp SpO2   126/60 74 18 98.6 °F (37 °C) 98 %      MAP       --          Physical Exam  Nursing Notes and Vital Signs Reviewed.  Constitutional: Patient is in no acute distress. Well-developed and well-nourished.  Head: Atraumatic. Normocephalic.  Eyes: PERRL. EOM intact. Conjunctivae are not pale. No scleral icterus.  ENT: Mucous membranes are moist. Oropharynx is clear and symmetric.    Neck: Supple. Full ROM. No lymphadenopathy.  Cardiovascular: Regular rate. Regular rhythm. No murmurs, rubs, or gallops. Distal pulses are 2+ and symmetric.  Pulmonary/Chest: No respiratory distress. Clear to auscultation bilaterally. No wheezing or rales.  Abdominal: Soft and non-distended.  There is no tenderness.  No rebound, guarding, or rigidity.  Genitourinary: No CVA tenderness  Musculoskeletal: Moves all extremities. No obvious deformities. No edema.  Skin: Warm and dry.  Neurological:  Alert, awake, and appropriate.  Normal speech.  No acute focal neurological deficits are appreciated.  Psychiatric: Normal affect. Good eye contact. Appropriate in content.     ED Course   Procedures  ED Vital Signs:  Vitals:    12/09/23 1437 12/09/23 1638   BP: 126/60 133/63   Pulse: 74 68   Resp: 18    Temp: 98.6 °F (37 °C)    TempSrc: Oral    SpO2: 98% 96%   Height: 5' 5" (1.651 m)        Abnormal Lab Results:  Labs Reviewed   CBC W/ AUTO DIFFERENTIAL - Abnormal; Notable for the following components:       Result Value    RBC 2.75 (*)     Hemoglobin 7.7 (*)     Hematocrit 24.2 (*)     MCHC 31.8 (*)     RDW " 18.0 (*)     Immature Granulocytes 1.3 (*)     Immature Grans (Abs) 0.11 (*)     Lymph % 16.9 (*)     All other components within normal limits   COMPREHENSIVE METABOLIC PANEL - Abnormal; Notable for the following components:    Glucose 111 (*)     BUN 26 (*)     Creatinine 1.6 (*)     Albumin 2.9 (*)     Alkaline Phosphatase 142 (*)     eGFR 46 (*)     All other components within normal limits        All Lab Results:  Results for orders placed or performed during the hospital encounter of 12/09/23   CBC auto differential   Result Value Ref Range    WBC 8.54 3.90 - 12.70 K/uL    RBC 2.75 (L) 4.60 - 6.20 M/uL    Hemoglobin 7.7 (L) 14.0 - 18.0 g/dL    Hematocrit 24.2 (L) 40.0 - 54.0 %    MCV 88 82 - 98 fL    MCH 28.0 27.0 - 31.0 pg    MCHC 31.8 (L) 32.0 - 36.0 g/dL    RDW 18.0 (H) 11.5 - 14.5 %    Platelets 311 150 - 450 K/uL    MPV 9.2 9.2 - 12.9 fL    Immature Granulocytes 1.3 (H) 0.0 - 0.5 %    Gran # (ANC) 6.0 1.8 - 7.7 K/uL    Immature Grans (Abs) 0.11 (H) 0.00 - 0.04 K/uL    Lymph # 1.4 1.0 - 4.8 K/uL    Mono # 0.7 0.3 - 1.0 K/uL    Eos # 0.3 0.0 - 0.5 K/uL    Baso # 0.02 0.00 - 0.20 K/uL    nRBC 0 0 /100 WBC    Gran % 70.1 38.0 - 73.0 %    Lymph % 16.9 (L) 18.0 - 48.0 %    Mono % 8.5 4.0 - 15.0 %    Eosinophil % 3.0 0.0 - 8.0 %    Basophil % 0.2 0.0 - 1.9 %    Differential Method Automated    Comprehensive metabolic panel   Result Value Ref Range    Sodium 139 136 - 145 mmol/L    Potassium 4.5 3.5 - 5.1 mmol/L    Chloride 102 95 - 110 mmol/L    CO2 26 23 - 29 mmol/L    Glucose 111 (H) 70 - 110 mg/dL    BUN 26 (H) 8 - 23 mg/dL    Creatinine 1.6 (H) 0.5 - 1.4 mg/dL    Calcium 9.5 8.7 - 10.5 mg/dL    Total Protein 6.9 6.0 - 8.4 g/dL    Albumin 2.9 (L) 3.5 - 5.2 g/dL    Total Bilirubin 0.3 0.1 - 1.0 mg/dL    Alkaline Phosphatase 142 (H) 55 - 135 U/L    AST 19 10 - 40 U/L    ALT 24 10 - 44 U/L    eGFR 46 (A) >60 mL/min/1.73 m^2    Anion Gap 11 8 - 16 mmol/L         Imaging Results:  Imaging Results              CT  Renal Stone Study ABD Pelvis WO (Final result)  Result time 12/09/23 17:08:47      Final result by Jefferson Kunz MD (12/09/23 17:08:47)                   Impression:      Satisfactory positioning the nephrostomy with a posterior approach with pigtail the renal collecting system bilaterally.  Asymmetric greater left perinephric fat stranding    Asymmetric fluid in the dependent portion of pelvis in the Suzanne rectal region.    All CT scans   are performed using dose optimization techniques including the following: automated exposure control; adjustment of the mA and/or kV; use of iterative reconstruction technique.  Dose modulation was employed for ALARA by means of: Automated exposure control; adjustment of the mA and/or kV according to patient size (this includes techniques or standardized protocols for targeted exams where dose is matched to indication/reason for exam; i.e. extremities or head); and/or use of iterative reconstructive technique.      Electronically signed by: Jefferson Kunz  Date:    12/09/2023  Time:    17:08               Narrative:    EXAMINATION:  CT RENAL STONE STUDY ABD PELVIS WO    CLINICAL HISTORY:  Nephrostomy catheter displacement;    TECHNIQUE:  Low dose axial images, sagittal and coronal reformations were obtained from the lung bases to the pubic symphysis.  Contrast was not administered.    COMPARISON:  None    FINDINGS:  Heart: Normal in size. No pericardial effusion.  Mild subsegmental atelectasis of the lung bases.    Lung Bases: Mild subsegmental atelectasis of the lung bases.    Liver: Normal in size and attenuation, with no focal hepatic lesions.    Gallbladder: No calcified gallstones.    Bile Ducts: No evidence of dilated ducts.    Pancreas: No mass or peripancreatic fat stranding.    Spleen: Unremarkable.    Adrenals: Unremarkable.    Kidneys/ Ureters: Posterior nephrostomy.  Perinephric fat stranding with relative atrophy of the left kidney.  Perinephric fat stranding appears  more prominent involving the left kidney.  Atherosclerotic disease of the intracranial renal vasculature.  Punctate nonobstructing right renal calculi    Bladder: Bhat catheter in place.  Question bladder wall thickening.    Reproductive organs: Unremarkable.    GI Tract/Mesentery: No evidence of bowel obstruction or inflammation.  Moderate of stool in the colon.    Peritoneal Space: Fluid in the dependent portion of the pelvis.  No free air.    Retroperitoneum: No significant adenopathy.    Abdominal wall: Few foci of anterior    Vasculature: No  aneurysm.  Atherosclerotic changes.    Bones: No acute fracture.  Right hip prosthesis.                                              The Emergency Provider reviewed the vital signs and test results, which are outlined above.     ED Discussion     4:00 PM: Dr. Randall transfers care of patient to Dr. Fragoso pending imaging results.    6:20 PM: Reassessed pt at this time. Discussed with pt all pertinent ED information and results. Discussed pt dx and plan of tx. Gave pt all f/u and return to the ED instructions. All questions and concerns were addressed at this time. Pt expresses understanding of information and instructions, and is comfortable with plan to discharge. Pt is stable for discharge.    I discussed with patient and/or family/caretaker that evaluation in the ED does not suggest any emergent or life threatening medical conditions requiring immediate intervention beyond what was provided in the ED, and I believe patient is safe for discharge.  Regardless, an unremarkable evaluation in the ED does not preclude the development or presence of a serious of life threatening condition. As such, patient was instructed to return immediately for any worsening or change in current symptoms.         Medical Decision Making  Amount and/or Complexity of Data Reviewed  Labs: ordered. Decision-making details documented in ED Course.  Radiology: ordered. Decision-making details  documented in ED Course.    Risk  Prescription drug management.                ED Medication(s):  Medications - No data to display    Discharge Medication List as of 12/9/2023  6:16 PM        START taking these medications    Details   linezolid (ZYVOX) 600 mg Tab Take 1 tablet (600 mg total) by mouth every 12 (twelve) hours., Starting Sat 12/9/2023, Print      oxyCODONE-acetaminophen (PERCOCET)  mg per tablet Take 1 tablet by mouth every 6 (six) hours as needed for Pain., Starting Sat 12/9/2023, Print              Follow-up Information       Schedule an appointment as soon as possible for a visit  with Faizan Antoine MD.    Specialty: Family Medicine  Why: As needed  Contact information:  [address] [place] [address]  [phone]                                 Scribe Attestation:   Scribe #1: I performed the above scribed service and the documentation accurately describes the services I performed. I attest to the accuracy of the note.     Attending:   Physician Attestation Statement for Scribe #1: I, Emily Randall MD, personally performed the services described in this documentation, as scribed by Gilda Ladd, in my presence, and it is both accurate and complete.       Scribe Attestation:   Scribe #2: I performed the above scribed service and the documentation accurately describes the services I performed. I attest to the accuracy of the note.    Attending Attestation:           Physician Attestation for Scribe:    Physician Attestation Statement for Scribe #2: I, Lizett Fragoso MD, reviewed documentation, as scribed by Radha Nur in my presence, and it is both accurate and complete. I also acknowledge and confirm the content of the note done by Nbaibe #1.           Clinical Impression       ICD-10-CM ICD-9-CM   1. Acute pyelonephritis  N10 590.10       Disposition:   Disposition: Discharged  Condition: Stable         Lizett Fragoso MD  12/12/23 6700

## 2023-12-11 ENCOUNTER — OFFICE VISIT (OUTPATIENT)
Dept: UROLOGY | Facility: CLINIC | Age: 68
End: 2023-12-11
Payer: MEDICARE

## 2023-12-11 VITALS
RESPIRATION RATE: 18 BRPM | WEIGHT: 167.56 LBS | BODY MASS INDEX: 27.88 KG/M2 | DIASTOLIC BLOOD PRESSURE: 72 MMHG | SYSTOLIC BLOOD PRESSURE: 123 MMHG | HEART RATE: 101 BPM

## 2023-12-11 DIAGNOSIS — C77.2 BLADDER CANCER METASTASIZED TO INTRA-ABDOMINAL LYMPH NODES: Primary | ICD-10-CM

## 2023-12-11 DIAGNOSIS — N13.5 BILATERAL URETERAL OBSTRUCTION: ICD-10-CM

## 2023-12-11 DIAGNOSIS — C67.9 BLADDER CANCER METASTASIZED TO INTRA-ABDOMINAL LYMPH NODES: Primary | ICD-10-CM

## 2023-12-11 DIAGNOSIS — C34.90 MALIGNANT NEOPLASM OF LUNG, UNSPECIFIED LATERALITY, UNSPECIFIED PART OF LUNG: ICD-10-CM

## 2023-12-11 PROCEDURE — 99214 OFFICE O/P EST MOD 30 MIN: CPT | Mod: S$PBB,,, | Performed by: UROLOGY

## 2023-12-11 PROCEDURE — 99212 OFFICE O/P EST SF 10 MIN: CPT | Mod: PBBFAC | Performed by: UROLOGY

## 2023-12-11 PROCEDURE — 99214 PR OFFICE/OUTPT VISIT, EST, LEVL IV, 30-39 MIN: ICD-10-PCS | Mod: S$PBB,,, | Performed by: UROLOGY

## 2023-12-11 PROCEDURE — 99999 PR PBB SHADOW E&M-EST. PATIENT-LVL II: ICD-10-PCS | Mod: PBBFAC,,, | Performed by: UROLOGY

## 2023-12-11 PROCEDURE — 99999 PR PBB SHADOW E&M-EST. PATIENT-LVL II: CPT | Mod: PBBFAC,,, | Performed by: UROLOGY

## 2023-12-11 NOTE — PROGRESS NOTES
Chief Complaint:   Encounter Diagnoses   Name Primary?    Bladder cancer metastasized to intra-abdominal lymph nodes Yes    Malignant neoplasm of lung, unspecified laterality, unspecified part of lung     Bilateral ureteral obstruction        HPI:  HPI Geovani Currie kennedi 68 y.o. male who presents with   His daughter for evaluation of his nephrostomy tube on the left side.  Patient has a known history of metastatic bladder cancer.  He also has a new lung cancer.   He was seeking treatment and [facility] for this.  He was scheduled to have a PET scan next week.  He has follow up with Oncology.  He was hospitalized not too long ago with a fracture and required ORIF.  He was on antibiotics due to an infection related to this.      His daughter brings him in due to some sediment builds up in the left nephrostomy tube and some in the right as well.  Patient was seen in the emergency room a couple of days ago and evaluation revealed normal white count.  He did undergo a CT showing good position of both nephrostomy tubes.  She also reports some mucus from the tip of the penis occasionally.    History:  Social History     Tobacco Use    Smoking status: Former     Types: Cigarettes     Passive exposure: Never    Smokeless tobacco: Never   Substance Use Topics    Alcohol use: Never    Drug use: Never     Past Medical History:   Diagnosis Date    COPD (chronic obstructive pulmonary disease)     Hypertension      Past Surgical History:   Procedure Laterality Date    APPENDECTOMY      CATARACT EXTRACTION      NEPHROSTOMY      OPEN REDUCTION AND INTERNAL FIXATION (ORIF) OF INTERTROCHANTERIC FRACTURE OF FEMUR Right 11/21/2023    Procedure: ORIF, FRACTURE, FEMUR, INTERTROCHANTERIC;  Surgeon: Tanisha Guidry DO;  Location: [facility];  Service: Orthopedics;  Laterality: Right;  Gamma nail     Family History   Problem Relation Age of Onset    Cancer Father     Cancer Mother        Current Outpatient Medications on File Prior to  Visit   Medication Sig Dispense Refill    albuterol (ACCUNEB) 0.63 mg/3 mL Nebu Take 3 mLs (0.63 mg total) by nebulization 3 (three) times daily as needed (sob, wheezing). Rescue 75 mL 3    albuterol (PROVENTIL/VENTOLIN HFA) 90 mcg/actuation inhaler Inhale 1-2 puffs into the lungs every 4 (four) hours as needed for Wheezing. Rescue 18 g 11    ALPRAZolam (XANAX) 0.5 MG tablet Take 2 tablets (1 mg total) by mouth 3 (three) times daily as needed for Anxiety. 60 tablet 2    cyclobenzaprine (FLEXERIL) 10 MG tablet Take 1 tablet (10 mg total) by mouth 3 (three) times daily as needed for Muscle spasms. 270 tablet 3    docusate sodium (COLACE) 100 MG capsule Take 100 mg by mouth 2 (two) times daily.      EScitalopram oxalate (LEXAPRO) 20 MG tablet Take 1 tablet (20 mg total) by mouth once daily. 90 tablet 3    fluticasone furoate-vilanteroL (BREO ELLIPTA) 100-25 mcg/dose diskus inhaler Inhale 1 puff into the lungs once daily. Controller 30 each 11    HYDROcodone-acetaminophen (NORCO)  mg per tablet Take 1 tablet by mouth every 6 (six) hours as needed for Pain. 120 tablet 0    levothyroxine (SYNTHROID) 125 MCG tablet Take 1 tablet (125 mcg total) by mouth before breakfast. 90 tablet 3    linezolid (ZYVOX) 600 mg Tab Take 1 tablet (600 mg total) by mouth every 12 (twelve) hours. 14 tablet 0    metoprolol succinate (TOPROL-XL) 50 MG 24 hr tablet Take 1 tablet (50 mg total) by mouth once daily. 90 tablet 3    nicotine (NICODERM CQ) 21 mg/24 hr Place 1 patch onto the skin once daily. (Patient taking differently: Place 1 patch onto the skin once daily. Dr ordered for patient to put on hold because blood pressure hasn't been high) 90 patch 3    oxyCODONE-acetaminophen (PERCOCET) 5-325 mg per tablet Take 1 tablet by mouth every 6 (six) hours as needed for Pain. 24 tablet 0    tamsulosin (FLOMAX) 0.4 mg Cap Take 1 capsule (0.4 mg total) by mouth once daily. 90 capsule 3    vitamin D (VITAMIN D3) 1000 units Tab 25 mcg.       No  current facility-administered medications on file prior to visit.        Objective:     Vitals:    12/11/23 0841   BP: 123/72   Pulse: 101   Resp: 18   Weight: 76 kg (167 lb 8.8 oz)      BMI Readings from Last 1 Encounters:   12/11/23 27.88 kg/m²          Physical Exam    Bilateral nephrostomy tubes in place.  Sediment and left side greater than right.  Both are draining yellow urine.    Lab Results   Component Value Date    CREATININE 1.6 (H) 12/09/2023      Assessment:       1. Bladder cancer metastasized to intra-abdominal lymph nodes    2. Malignant neoplasm of lung, unspecified laterality, unspecified part of lung    3. Bilateral ureteral obstruction        Plan:     1. Bladder cancer metastasized to intra-abdominal lymph nodes    2. Malignant neoplasm of lung, unspecified laterality, unspecified part of lung    3. Bilateral ureteral obstruction             Bilateral ureteral obstruction managed with bilateral nephrostomy tubes.  I reassured the daughter that the sediment in the tubes is likely related to chronic bacterial colonization.  Given no intervention planned in the immediate future and no symptoms of urosepsis I would not recommend treatment of this.  Also reassured her that the mucus urethral drainage in a non functional urethra is normal.   I did once again discuss with her  that treatment for bladder cancer will likely be palliative and not curative.   She was to follow up with urologic oncology and  [facility] Oncology in [place].

## 2023-12-12 ENCOUNTER — TELEPHONE (OUTPATIENT)
Dept: PRIMARY CARE CLINIC | Facility: CLINIC | Age: 68
End: 2023-12-12
Payer: MEDICARE

## 2023-12-12 ENCOUNTER — OFFICE VISIT (OUTPATIENT)
Dept: PRIMARY CARE CLINIC | Facility: CLINIC | Age: 68
End: 2023-12-12
Payer: MEDICARE

## 2023-12-12 VITALS
BODY MASS INDEX: 26 KG/M2 | SYSTOLIC BLOOD PRESSURE: 122 MMHG | HEART RATE: 85 BPM | DIASTOLIC BLOOD PRESSURE: 62 MMHG | HEIGHT: 65 IN | OXYGEN SATURATION: 99 % | TEMPERATURE: 98 F | WEIGHT: 156.06 LBS

## 2023-12-12 DIAGNOSIS — Z85.51 HX OF BLADDER CANCER: ICD-10-CM

## 2023-12-12 DIAGNOSIS — I10 PRIMARY HYPERTENSION: ICD-10-CM

## 2023-12-12 DIAGNOSIS — Z85.118 HX OF CANCER OF LUNG: ICD-10-CM

## 2023-12-12 DIAGNOSIS — Z09 HOSPITAL DISCHARGE FOLLOW-UP: Primary | ICD-10-CM

## 2023-12-12 DIAGNOSIS — E03.9 HYPOTHYROIDISM, UNSPECIFIED TYPE: ICD-10-CM

## 2023-12-12 DIAGNOSIS — M48.00 SPINAL STENOSIS, UNSPECIFIED SPINAL REGION: ICD-10-CM

## 2023-12-12 DIAGNOSIS — F41.9 ANXIETY: ICD-10-CM

## 2023-12-12 PROCEDURE — 99215 OFFICE O/P EST HI 40 MIN: CPT | Mod: S$PBB,,, | Performed by: FAMILY MEDICINE

## 2023-12-12 PROCEDURE — 99999 PR PBB SHADOW E&M-EST. PATIENT-LVL III: CPT | Mod: PBBFAC,,, | Performed by: FAMILY MEDICINE

## 2023-12-12 PROCEDURE — 99215 PR OFFICE/OUTPT VISIT, EST, LEVL V, 40-54 MIN: ICD-10-PCS | Mod: S$PBB,,, | Performed by: FAMILY MEDICINE

## 2023-12-12 PROCEDURE — 99999 PR PBB SHADOW E&M-EST. PATIENT-LVL III: ICD-10-PCS | Mod: PBBFAC,,, | Performed by: FAMILY MEDICINE

## 2023-12-12 PROCEDURE — 99213 OFFICE O/P EST LOW 20 MIN: CPT | Mod: PBBFAC,PN | Performed by: FAMILY MEDICINE

## 2023-12-12 RX ORDER — ALPRAZOLAM 0.5 MG/1
1 TABLET ORAL 3 TIMES DAILY PRN
Qty: 60 TABLET | Refills: 2 | Status: SHIPPED | OUTPATIENT
Start: 2023-12-12 | End: 2024-02-27

## 2023-12-12 RX ORDER — CYCLOBENZAPRINE HCL 10 MG
10 TABLET ORAL 3 TIMES DAILY PRN
Qty: 270 TABLET | Refills: 3 | Status: SHIPPED | OUTPATIENT
Start: 2023-12-12 | End: 2024-02-12 | Stop reason: SDUPTHER

## 2023-12-12 RX ORDER — TAMSULOSIN HYDROCHLORIDE 0.4 MG/1
0.4 CAPSULE ORAL DAILY
Qty: 90 CAPSULE | Refills: 3 | Status: SHIPPED | OUTPATIENT
Start: 2023-12-12 | End: 2024-01-12 | Stop reason: ALTCHOICE

## 2023-12-12 RX ORDER — IBUPROFEN 800 MG/1
800 TABLET ORAL EVERY 6 HOURS PRN
COMMUNITY
Start: 2023-11-28 | End: 2023-12-21

## 2023-12-12 RX ORDER — METOPROLOL SUCCINATE 50 MG/1
50 TABLET, EXTENDED RELEASE ORAL DAILY
Qty: 90 TABLET | Refills: 3 | Status: SHIPPED | OUTPATIENT
Start: 2023-12-12 | End: 2024-01-12 | Stop reason: ALTCHOICE

## 2023-12-12 RX ORDER — LEVOTHYROXINE SODIUM 125 UG/1
125 TABLET ORAL
Qty: 90 TABLET | Refills: 3 | Status: ON HOLD | OUTPATIENT
Start: 2023-12-12

## 2023-12-12 RX ORDER — ESCITALOPRAM OXALATE 20 MG/1
20 TABLET ORAL DAILY
Qty: 90 TABLET | Refills: 3 | Status: ON HOLD | OUTPATIENT
Start: 2023-12-12

## 2023-12-12 RX ORDER — HYDROCODONE BITARTRATE AND ACETAMINOPHEN 5; 325 MG/1; MG/1
2 TABLET ORAL EVERY 6 HOURS PRN
Qty: 240 TABLET | Refills: 0 | Status: SHIPPED | OUTPATIENT
Start: 2023-12-12 | End: 2024-01-25 | Stop reason: DRUGHIGH

## 2023-12-12 RX ORDER — HYDROCODONE BITARTRATE AND ACETAMINOPHEN 10; 325 MG/1; MG/1
1 TABLET ORAL EVERY 6 HOURS PRN
Qty: 120 TABLET | Refills: 0 | Status: SHIPPED | OUTPATIENT
Start: 2023-12-12 | End: 2023-12-12

## 2023-12-12 NOTE — TELEPHONE ENCOUNTER
[facility] called the Pt daughter and stated Norco 10 are out of stock. Pt is requesting an alternative medication be sent in  to [facility].

## 2023-12-12 NOTE — TELEPHONE ENCOUNTER
----- Message from Kizzy Xiong sent at 12/12/2023  3:24 PM CST -----  Contact: pt's daughter/ Chelsi Gagnon is calling in regard to the pt's med, HYDROcodone-acetaminophen (NORCO)  mg per tablet, [facility] doesn't have stock and she was told to have dr send something else.  Please call her [phone]

## 2023-12-12 NOTE — PROGRESS NOTES
"    [facility]       [address] [place] [address]      Phone: [phone]      Fax: [phone]    Faizan Antoine MD                Office Visit  12/12/2023        Subjective      HPI:  Geovani Currie is a 68 y.o. male presents today in clinic for "hostipal follow up (Fell and broke his right hip  onset 11/21/2023)  ."     68-year-old gentleman presents today for hospital follow-up.      His wife, Chantal, is present.  She provides portions of the history.    After our last visit, he was admitted to the hospital to get the nephrostomy tubes replaced.  Those procedures went well, he was eventually discharged.  After that, he met with the hematologist.  Wife states he got some good news at the appointment, and he was in a hurry to get out of the vehicle.  When he did, he fell, broke his hip.  Wound up getting readmitted.  Had surgery.  Was sent to skilled nursing/inpatient rehab.  Recently got discharged.  Has a follow-up appointment with orthopedist tomorrow.  Still in pain.  Some of this is related to his back, some of it related to the cancer.    Saw Urology yesterday.  Nephrology STEMI tubes still in place.  Kidneys are doing okay.    We will also be getting PET-CT done tomorrow.  We will be following up with oncologist next week.  Scheduled to see infectious disease on Tuesday.  Has appointment palliative care clinic in January.    Since coming home, still hurting, as above.  Some of this is chronic pain.  Otherwise, appetite is okay.  No chest pain, shortness O breath.  No fever, chills.  Bowel movements normal.    Has history of hypertension.  Originally, was prescribed amlodipine 5 mg, metoprolol.  Blood pressure was running low at home, and in clinic, so we discontinued the amlodipine.  Blood pressure doing much better without it.      Also has spinal stenosis, sciatica.  Has gotten injections in his back in the past.  Was recommended he get an " epidural, but he moved from [place] before he can complete it.  Is scheduled to see Dr. CAMPOS in January.  In the meantime, we will be continuing his Norco until he can get in with palliative care clinic.    PMH:  Bladder cancer.  Lung cancer.  HTN.  HLD.  COPD.  Hypothyroid.  Spinal stenosis.  PSH: Left kidney stent.  Nephrostomy tube right kidney.  Appendectomy.  Cataracts.  Finger amputations (accident).  Right hip.  Allergies: NKDA  Social:  Retired.  .  T: Denies current use.  Quit a couple of weeks ago.    A has   D:  Denies    Exercise:  No regular exercise program.  Limited mobility with his health issues.        The following were updated and reviewed by myself in the chart: medications, past medical history, past surgical history, family history, social history, and allergies.     Medications:  Current Outpatient Medications on File Prior to Visit   Medication Sig Dispense Refill    albuterol (ACCUNEB) 0.63 mg/3 mL Nebu Take 3 mLs (0.63 mg total) by nebulization 3 (three) times daily as needed (sob, wheezing). Rescue 75 mL 3    albuterol (PROVENTIL/VENTOLIN HFA) 90 mcg/actuation inhaler Inhale 1-2 puffs into the lungs every 4 (four) hours as needed for Wheezing. Rescue 18 g 11    docusate sodium (COLACE) 100 MG capsule Take 100 mg by mouth 2 (two) times daily.      fluticasone furoate-vilanteroL (BREO ELLIPTA) 100-25 mcg/dose diskus inhaler Inhale 1 puff into the lungs once daily. Controller 30 each 11    ibuprofen (ADVIL,MOTRIN) 800 MG tablet Take 800 mg by mouth every 6 (six) hours as needed.      linezolid (ZYVOX) 600 mg Tab Take 1 tablet (600 mg total) by mouth every 12 (twelve) hours. 14 tablet 0    nicotine (NICODERM CQ) 21 mg/24 hr Place 1 patch onto the skin once daily. (Patient taking differently: Place 1 patch onto the skin once daily.  ordered for patient to put on hold because blood pressure hasn't been high) 90 patch 3    oxyCODONE-acetaminophen (PERCOCET) 5-325 mg per tablet Take  1 tablet by mouth every 6 (six) hours as needed for Pain. 24 tablet 0    vitamin D (VITAMIN D3) 1000 units Tab 25 mcg.      [DISCONTINUED] ALPRAZolam (XANAX) 0.5 MG tablet Take 2 tablets (1 mg total) by mouth 3 (three) times daily as needed for Anxiety. 60 tablet 2    [DISCONTINUED] cyclobenzaprine (FLEXERIL) 10 MG tablet Take 1 tablet (10 mg total) by mouth 3 (three) times daily as needed for Muscle spasms. 270 tablet 3    [DISCONTINUED] EScitalopram oxalate (LEXAPRO) 20 MG tablet Take 1 tablet (20 mg total) by mouth once daily. 90 tablet 3    [DISCONTINUED] HYDROcodone-acetaminophen (NORCO)  mg per tablet Take 1 tablet by mouth every 6 (six) hours as needed for Pain. 120 tablet 0    [DISCONTINUED] levothyroxine (SYNTHROID) 125 MCG tablet Take 1 tablet (125 mcg total) by mouth before breakfast. 90 tablet 3    [DISCONTINUED] metoprolol succinate (TOPROL-XL) 50 MG 24 hr tablet Take 1 tablet (50 mg total) by mouth once daily. 90 tablet 3    [DISCONTINUED] tamsulosin (FLOMAX) 0.4 mg Cap Take 1 capsule (0.4 mg total) by mouth once daily. 90 capsule 3     No current facility-administered medications on file prior to visit.        PMHx:  Past Medical History:   Diagnosis Date    COPD (chronic obstructive pulmonary disease)     Hypertension       Patient Active Problem List    Diagnosis Date Noted    Right hip pain 11/21/2023    Need for tetanus, diphtheria, and acellular pertussis (Tdap) vaccine 11/20/2023    Abrasion of right elbow 11/20/2023    Displaced intertrochanteric fracture of right femur, initial encounter for closed fracture 11/20/2023    Preop examination 11/20/2023    Right elbow pain 11/20/2023    Malignant neoplasm of urinary bladder 11/15/2023    Ureteral obstruction, left 11/15/2023    Acute renal failure superimposed on stage 3 chronic kidney disease 11/11/2023    Nephrostomy tube displaced 11/10/2023    Squamous cell carcinoma of right lung 11/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    Centrilobular emphysema 11/09/2023     Altered mental status 10/21/2023    Elevated serum creatinine 10/21/2023    Anemia 10/21/2023    History of cancer 10/21/2023    Hx of cancer of lung 10/21/2023    UTI (urinary tract infection) 10/21/2023    Hyperglycemia 10/21/2023        PSHx:  Past Surgical History:   Procedure Laterality Date    APPENDECTOMY      CATARACT EXTRACTION      NEPHROSTOMY      OPEN REDUCTION AND INTERNAL FIXATION (ORIF) OF INTERTROCHANTERIC FRACTURE OF FEMUR Right 11/21/2023    Procedure: ORIF, FRACTURE, FEMUR, INTERTROCHANTERIC;  Surgeon: Tanisha Guidry DO;  [facility]: [facility];  Service: Orthopedics;  Laterality: Right;  Gamma nail        FHx:  Family History   Problem Relation Age of Onset    Cancer Father     Cancer Mother         Social:  Social History     Socioeconomic History    Marital status:    Tobacco Use    Smoking status: Former     Types: Cigarettes     Passive exposure: Never    Smokeless tobacco: Never   Substance and Sexual Activity    Alcohol use: Never    Drug use: Never    Sexual activity: Not Currently     Partners: Female     Social Determinants of Health     Financial Resource Strain: High Risk (11/10/2023)    Overall Financial Resource Strain (CARDIA)     Difficulty of Paying Living Expenses: Very hard   Food Insecurity: Food Insecurity Present (11/10/2023)    Hunger Vital Sign     Worried About Running Out of Food in the Last Year: Often true     Ran Out of Food in the Last Year: Often true   Transportation Needs: Unmet Transportation Needs (11/10/2023)    PRAPARE - Transportation     Lack of Transportation (Medical): Yes     Lack of Transportation (Non-Medical): Yes   Physical Activity: Inactive (11/10/2023)    Exercise Vital Sign     Days of Exercise per Week: 0 days     Minutes of Exercise per Session: 0 min   Stress: Stress Concern Present (11/10/2023)    [employer] - Occupational Stress Questionnaire     Feeling of Stress : Rather much   Social Connections: Unknown  "(11/10/2023)    Social Connection and Isolation Panel [NHANES]     Frequency of Communication with Friends and Family: More than three times a week     Frequency of Social Gatherings with Friends and Family: Never     Attends Yazdanism Services: Patient refused     Active Member of Clubs or Organizations: No     Attends Club or Organization Meetings: Never     Marital Status:    Housing Stability: High Risk (11/10/2023)    Housing Stability Vital Sign     Unable to Pay for Housing in the Last Year: Yes     Number of Places Lived in the Last Year: 3     Unstable Housing in the Last Year: No        Allergies:  Review of patient's allergies indicates:  No Known Allergies     ROS:  Review of Systems   Constitutional:  Negative for activity change, appetite change, chills and fever.   HENT:  Negative for congestion, postnasal drip, rhinorrhea, sore throat and trouble swallowing.    Respiratory:  Negative for cough and shortness of breath.    Cardiovascular:  Negative for chest pain and palpitations.   Gastrointestinal:  Negative for abdominal pain, constipation, diarrhea, nausea and vomiting.   Genitourinary:  Negative for difficulty urinating.   Musculoskeletal:  Positive for arthralgias, back pain and myalgias.   Skin:  Negative for color change and rash.   Neurological:  Negative for headaches.   All other systems reviewed and are negative.         Objective      /62   Pulse 85   Temp 97.9 °F (36.6 °C)   Ht 5' 5" (1.651 m)   Wt 70.8 kg (156 lb 1.4 oz)   SpO2 99%   BMI 25.97 kg/m²   Ht Readings from Last 3 Encounters:   12/12/23 5' 5" (1.651 m)   12/09/23 5' 5" (1.651 m)   11/21/23 5' 5" (1.651 m)     Wt Readings from Last 3 Encounters:   12/12/23 70.8 kg (156 lb 1.4 oz)   12/11/23 76 kg (167 lb 8.8 oz)   11/21/23 76 kg (167 lb 8.8 oz)       PHYSICAL EXAM:  Physical Exam  Vitals and nursing note reviewed.   Constitutional:       General: He is not in acute distress.     Appearance: Normal appearance. "   HENT:      Head: Normocephalic and atraumatic.      Right Ear: Tympanic membrane, ear canal and external ear normal.      Left Ear: Tympanic membrane, ear canal and external ear normal.      Nose: Nose normal. No congestion or rhinorrhea.      Mouth/Throat:      Mouth: Mucous membranes are moist.      Pharynx: Oropharynx is clear. No oropharyngeal exudate or posterior oropharyngeal erythema.   Eyes:      Extraocular Movements: Extraocular movements intact.      Conjunctiva/sclera: Conjunctivae normal.      Pupils: Pupils are equal, round, and reactive to light.   Cardiovascular:      Rate and Rhythm: Normal rate and regular rhythm.   Pulmonary:      Effort: Pulmonary effort is normal.      Breath sounds: No wheezing, rhonchi or rales.   Musculoskeletal:         General: Normal range of motion.      Cervical back: Normal range of motion.   Lymphadenopathy:      Cervical: No cervical adenopathy.   Skin:     General: Skin is warm and dry.   Neurological:      General: No focal deficit present.      Mental Status: He is alert.              LABS / IMAGING:  Recent Results (from the past 4368 hour(s))   POCT glucose    Collection Time: 10/20/23  9:55 PM   Result Value Ref Range    POCT Glucose 127 (H) 70 - 110 mg/dL   CBC W/ AUTO DIFFERENTIAL    Collection Time: 10/20/23 10:19 PM   Result Value Ref Range    WBC 11.39 3.90 - 12.70 K/uL    RBC 3.28 (L) 4.60 - 6.20 M/uL    Hemoglobin 9.1 (L) 14.0 - 18.0 g/dL    Hematocrit 28.3 (L) 40.0 - 54.0 %    MCV 86 82 - 98 fL    MCH 27.7 27.0 - 31.0 pg    MCHC 32.2 32.0 - 36.0 g/dL    RDW 14.1 11.5 - 14.5 %    Platelets 281 150 - 450 K/uL    MPV 9.7 9.2 - 12.9 fL    Immature Granulocytes 0.5 0.0 - 0.5 %    Gran # (ANC) 9.3 (H) 1.8 - 7.7 K/uL    Immature Grans (Abs) 0.06 (H) 0.00 - 0.04 K/uL    Lymph # 1.0 1.0 - 4.8 K/uL    Mono # 0.8 0.3 - 1.0 K/uL    Eos # 0.3 0.0 - 0.5 K/uL    Baso # 0.03 0.00 - 0.20 K/uL    nRBC 0 0 /100 WBC    Gran % 81.5 (H) 38.0 - 73.0 %    Lymph % 8.3 (L) 18.0  - 48.0 %    Mono % 6.6 4.0 - 15.0 %    Eosinophil % 2.8 0.0 - 8.0 %    Basophil % 0.3 0.0 - 1.9 %    Differential Method Automated    Comprehensive metabolic panel    Collection Time: 10/20/23 10:19 PM   Result Value Ref Range    Sodium 137 136 - 145 mmol/L    Potassium 4.7 3.5 - 5.1 mmol/L    Chloride 102 95 - 110 mmol/L    CO2 21 (L) 23 - 29 mmol/L    Glucose 124 (H) 70 - 110 mg/dL    BUN 30 (H) 8 - 23 mg/dL    Creatinine 1.9 (H) 0.5 - 1.4 mg/dL    Calcium 9.9 8.7 - 10.5 mg/dL    Total Protein 7.7 6.0 - 8.4 g/dL    Albumin 3.1 (L) 3.5 - 5.2 g/dL    Total Bilirubin 0.3 0.1 - 1.0 mg/dL    Alkaline Phosphatase 113 55 - 135 U/L    AST 19 10 - 40 U/L    ALT 16 10 - 44 U/L    eGFR 38 (A) >60 mL/min/1.73 m^2    Anion Gap 14 8 - 16 mmol/L   Protime-INR    Collection Time: 10/20/23 10:19 PM   Result Value Ref Range    Prothrombin Time 11.3 9.0 - 12.5 sec    INR 1.0 0.8 - 1.2   TSH    Collection Time: 10/20/23 10:19 PM   Result Value Ref Range    TSH 1.001 0.400 - 4.000 uIU/mL   LDL - Lipid Panel    Collection Time: 10/20/23 10:19 PM   Result Value Ref Range    Cholesterol 132 120 - 199 mg/dL    Triglycerides 55 30 - 150 mg/dL    HDL 54 40 - 75 mg/dL    LDL Cholesterol 67.0 63.0 - 159.0 mg/dL    HDL/Cholesterol Ratio 40.9 20.0 - 50.0 %    Total Cholesterol/HDL Ratio 2.4 2.0 - 5.0    Non-HDL Cholesterol 78 mg/dL   Troponin I    Collection Time: 10/20/23 10:19 PM   Result Value Ref Range    Troponin I 0.010 0.000 - 0.026 ng/mL   Lactic acid, plasma    Collection Time: 10/20/23 10:22 PM   Result Value Ref Range    Lactate (Lactic Acid) 1.3 0.5 - 2.2 mmol/L   Blood culture #1 **CANNOT BE ORDERED STAT**    Collection Time: 10/20/23 10:22 PM    Specimen: Peripheral, Antecubital, Left; Blood   Result Value Ref Range    Blood Culture, Routine No growth after 5 days.    Ammonia    Collection Time: 10/20/23 10:22 PM   Result Value Ref Range    Ammonia 26 10 - 50 umol/L   Urinalysis, Reflex to Urine Culture Urine, Clean Catch     Collection Time: 10/20/23 10:24 PM    Specimen: Urine   Result Value Ref Range    Specimen UA Urine, Clean Catch     Color, UA Yellow Yellow, Straw, Andra    Appearance, UA Hazy (A) Clear    pH, UA 6.0 5.0 - 8.0    Specific Gravity, UA 1.020 1.005 - 1.030    Protein, UA 1+ (A) Negative    Glucose, UA Negative Negative    Ketones, UA Negative Negative    Bilirubin (UA) Negative Negative    Occult Blood UA 2+ (A) Negative    Nitrite, UA Negative Negative    Urobilinogen, UA Negative <2.0 EU/dL    Leukocytes, UA 3+ (A) Negative   Urinalysis Microscopic    Collection Time: 10/20/23 10:24 PM   Result Value Ref Range    RBC, UA 24 (H) 0 - 4 /hpf    WBC, UA >100 (H) 0 - 5 /hpf    WBC Clumps, UA Few (A) None-Rare    Bacteria Rare None-Occ /hpf    Yeast, UA Occasional (A) None    Squam Epithel, UA 0 /hpf    Hyaline Casts, UA 2 (A) 0-1/lpf /lpf    Unclass Darling UA 3 None-Moderate    Microscopic Comment SEE COMMENT    Urine culture    Collection Time: 10/20/23 10:24 PM    Specimen: Urine   Result Value Ref Range    Urine Culture, Routine ENTEROBACTER CLOACAE  > 100,000 cfu/ml   (A)        Susceptibility    Enterobacter cloacae - CULTURE, URINE     Ceftriaxone 2 Intermediate mcg/mL     Ciprofloxacin 2 Intermediate mcg/mL     Cefepime 4 Sensitive mcg/mL     Ertapenem <=0.5 Sensitive mcg/mL     Nitrofurantoin >64 Resistant mcg/mL     Gentamicin <=4 Sensitive mcg/mL     Levofloxacin 4 Intermediate mcg/mL     Meropenem <=1 Sensitive mcg/mL     Piperacillin/Tazo <=16 Sensitive mcg/mL     Trimeth/Sulfa >2/38 Resistant mcg/mL     Tobramycin <=4 Sensitive mcg/mL   POCT ARTERIAL BLOOD GAS    Collection Time: 10/20/23 10:38 PM   Result Value Ref Range    POC PH 7.323 (L) 7.350 - 7.450    POC PCO2 43.2 35.0 - 45.0 mmHg    POC PO2 89.0 80.0 - 100 mmHg    POC SATURATED O2 97.0 95.0 - 100.0 %    POC HCO3 22.4 (L) 24.0 - 28.0 mmol/l    Base Deficit -3.5 (L) -2.0 - 2.0 mmol/l    Specimen source Arterial     Performed By: kaden Krishnamurthy  Test YES     FiO2 21.0 %   Blood culture #2 **CANNOT BE ORDERED STAT**    Collection Time: 10/20/23 11:34 PM    Specimen: Peripheral, Hand, Right; Blood   Result Value Ref Range    Blood Culture, Routine       Gram stain aer bottle: Gram positive cocci in clusters resembling Staph    Blood Culture, Routine       Results called to and read back by: Estefany Jacob 10/22/2023  03:30    Blood Culture, Routine (A)      COAGULASE-NEGATIVE STAPHYLOCOCCUS SPECIES  Organism is a probable contaminant     MRSA/SA Rapid ID by PCR from Blood culture    Collection Time: 10/20/23 11:44 PM   Result Value Ref Range    Staph aureus ID by PCR Negative Negative    Methicillin Resistant ID by PCR Negative Negative   CBC with Automated Differential    Collection Time: 10/21/23  8:30 AM   Result Value Ref Range    WBC 9.19 3.90 - 12.70 K/uL    RBC 2.97 (L) 4.60 - 6.20 M/uL    Hemoglobin 8.3 (L) 14.0 - 18.0 g/dL    Hematocrit 25.4 (L) 40.0 - 54.0 %    MCV 86 82 - 98 fL    MCH 27.9 27.0 - 31.0 pg    MCHC 32.7 32.0 - 36.0 g/dL    RDW 14.0 11.5 - 14.5 %    Platelets 239 150 - 450 K/uL    MPV 9.5 9.2 - 12.9 fL    Immature Granulocytes 0.3 0.0 - 0.5 %    Gran # (ANC) 7.4 1.8 - 7.7 K/uL    Immature Grans (Abs) 0.03 0.00 - 0.04 K/uL    Lymph # 0.8 (L) 1.0 - 4.8 K/uL    Mono # 0.6 0.3 - 1.0 K/uL    Eos # 0.3 0.0 - 0.5 K/uL    Baso # 0.03 0.00 - 0.20 K/uL    nRBC 0 0 /100 WBC    Gran % 80.1 (H) 38.0 - 73.0 %    Lymph % 9.1 (L) 18.0 - 48.0 %    Mono % 6.6 4.0 - 15.0 %    Eosinophil % 3.6 0.0 - 8.0 %    Basophil % 0.3 0.0 - 1.9 %    Differential Method Automated    Basic Metabolic Panel (BMP)    Collection Time: 10/21/23  8:30 AM   Result Value Ref Range    Sodium 138 136 - 145 mmol/L    Potassium 4.4 3.5 - 5.1 mmol/L    Chloride 106 95 - 110 mmol/L    CO2 22 (L) 23 - 29 mmol/L    Glucose 115 (H) 70 - 110 mg/dL    BUN 25 (H) 8 - 23 mg/dL    Creatinine 1.6 (H) 0.5 - 1.4 mg/dL    Calcium 9.4 8.7 - 10.5 mg/dL    Anion Gap 10 8 - 16 mmol/L    eGFR 46 (A)  >60 mL/min/1.73 m^2   POCT glucose    Collection Time: 10/21/23  4:31 PM   Result Value Ref Range    POCT Glucose 136 (H) 70 - 110 mg/dL   CBC with Automated Differential    Collection Time: 10/22/23  4:14 AM   Result Value Ref Range    WBC 8.53 3.90 - 12.70 K/uL    RBC 2.89 (L) 4.60 - 6.20 M/uL    Hemoglobin 7.8 (L) 14.0 - 18.0 g/dL    Hematocrit 24.9 (L) 40.0 - 54.0 %    MCV 86 82 - 98 fL    MCH 27.0 27.0 - 31.0 pg    MCHC 31.3 (L) 32.0 - 36.0 g/dL    RDW 14.1 11.5 - 14.5 %    Platelets 258 150 - 450 K/uL    MPV 9.3 9.2 - 12.9 fL    Immature Granulocytes 0.6 (H) 0.0 - 0.5 %    Gran # (ANC) 5.9 1.8 - 7.7 K/uL    Immature Grans (Abs) 0.05 (H) 0.00 - 0.04 K/uL    Lymph # 1.4 1.0 - 4.8 K/uL    Mono # 0.8 0.3 - 1.0 K/uL    Eos # 0.5 0.0 - 0.5 K/uL    Baso # 0.03 0.00 - 0.20 K/uL    nRBC 0 0 /100 WBC    Gran % 68.9 38.0 - 73.0 %    Lymph % 15.8 (L) 18.0 - 48.0 %    Mono % 9.0 4.0 - 15.0 %    Eosinophil % 5.3 0.0 - 8.0 %    Basophil % 0.4 0.0 - 1.9 %    Differential Method Automated    Basic Metabolic Panel (BMP)    Collection Time: 10/22/23  4:14 AM   Result Value Ref Range    Sodium 139 136 - 145 mmol/L    Potassium 5.0 3.5 - 5.1 mmol/L    Chloride 105 95 - 110 mmol/L    CO2 24 23 - 29 mmol/L    Glucose 100 70 - 110 mg/dL    BUN 19 8 - 23 mg/dL    Creatinine 1.3 0.5 - 1.4 mg/dL    Calcium 9.5 8.7 - 10.5 mg/dL    Anion Gap 10 8 - 16 mmol/L    eGFR 59 (A) >60 mL/min/1.73 m^2   POCT glucose    Collection Time: 10/22/23 12:04 PM   Result Value Ref Range    POCT Glucose 134 (H) 70 - 110 mg/dL   POCT glucose    Collection Time: 10/22/23  4:13 PM   Result Value Ref Range    POCT Glucose 130 (H) 70 - 110 mg/dL   POCT glucose    Collection Time: 10/22/23  8:00 PM   Result Value Ref Range    POCT Glucose 113 (H) 70 - 110 mg/dL   CBC with Automated Differential    Collection Time: 10/23/23  3:40 AM   Result Value Ref Range    WBC 7.48 3.90 - 12.70 K/uL    RBC 3.15 (L) 4.60 - 6.20 M/uL    Hemoglobin 8.5 (L) 14.0 - 18.0 g/dL     Hematocrit 26.6 (L) 40.0 - 54.0 %    MCV 84 82 - 98 fL    MCH 27.0 27.0 - 31.0 pg    MCHC 32.0 32.0 - 36.0 g/dL    RDW 14.4 11.5 - 14.5 %    Platelets 284 150 - 450 K/uL    MPV 9.1 (L) 9.2 - 12.9 fL    Immature Granulocytes 0.5 0.0 - 0.5 %    Gran # (ANC) 5.1 1.8 - 7.7 K/uL    Immature Grans (Abs) 0.04 0.00 - 0.04 K/uL    Lymph # 1.1 1.0 - 4.8 K/uL    Mono # 0.7 0.3 - 1.0 K/uL    Eos # 0.5 0.0 - 0.5 K/uL    Baso # 0.03 0.00 - 0.20 K/uL    nRBC 0 0 /100 WBC    Gran % 68.1 38.0 - 73.0 %    Lymph % 15.0 (L) 18.0 - 48.0 %    Mono % 9.2 4.0 - 15.0 %    Eosinophil % 6.8 0.0 - 8.0 %    Basophil % 0.4 0.0 - 1.9 %    Differential Method Automated    Basic Metabolic Panel (BMP)    Collection Time: 10/23/23  3:40 AM   Result Value Ref Range    Sodium 139 136 - 145 mmol/L    Potassium 4.5 3.5 - 5.1 mmol/L    Chloride 105 95 - 110 mmol/L    CO2 24 23 - 29 mmol/L    Glucose 112 (H) 70 - 110 mg/dL    BUN 21 8 - 23 mg/dL    Creatinine 1.3 0.5 - 1.4 mg/dL    Calcium 9.5 8.7 - 10.5 mg/dL    Anion Gap 10 8 - 16 mmol/L    eGFR 59 (A) >60 mL/min/1.73 m^2   POCT glucose    Collection Time: 10/23/23  4:36 AM   Result Value Ref Range    POCT Glucose 109 70 - 110 mg/dL   Echo    Collection Time: 10/23/23 11:00 AM   Result Value Ref Range    BSA 1.91 m2    Harper's Biplane MOD Ejection Fraction 61 %    LVOT stroke volume 97.51 cm3    LVIDd 5.33 3.5 - 6.0 cm    LV Systolic Volume 43.28 mL    LV Systolic Volume Index 22.7 mL/m2    LVIDs 3.27 2.1 - 4.0 cm    LV Diastolic Volume 137.27 mL    LV Diastolic Volume Index 71.87 mL/m2    IVS 1.08 0.6 - 1.1 cm    LVOT diameter 2.11 cm    LVOT area 3.5 cm2    FS 39 28 - 44 %    Left Ventricle Relative Wall Thickness 0.40 cm    Posterior Wall 1.07 0.6 - 1.1 cm    LV mass 222.82 g    LV Mass Index 117 g/m2    MV Peak E Froylan 1.05 m/s    TDI LATERAL 0.07 m/s    TDI SEPTAL 0.06 m/s    E/E' ratio 16.15 m/s    MV Peak A Froylan 1.39 m/s    TR Max Froylan 2.71 m/s    E/A ratio 0.76     E wave deceleration time 234.37  "msec    MV "A" wave duration [id number] msec    LV SEPTAL E/E' RATIO 17.50 m/s    LV LATERAL E/E' RATIO 15.00 m/s    PV Peak S Froylan 0.53 m/s    PV Peak D Froylan 0.38 m/s    Pulm vein S/D ratio 1.39     LVOT peak froylan 1.19 m/s    Left Ventricular Outflow Tract Mean Velocity 0.83 cm/s    Left Ventricular Outflow Tract Mean Gradient 3.06 mmHg    LA size 3.29 cm    Left Atrium Minor Axis 5.20 cm    Left Atrium Major Axis 4.32 cm    LA volume (mod) 47.45 cm3    LA Volume Index (Mod) 24.8 mL/m2    RVDD 3.33 cm    RV S' 14.34 cm/s    TAPSE 1.73 cm    RA Major Axis 4.87 cm    RA Width 4.16 cm    AV regurgitation pressure 1/2 time [id number] ms    AR Max Froylan 4.32 m/s    AV mean gradient 28 mmHg    AV peak gradient 41 mmHg    Ao peak froylan 3.20 m/s    Ao VTI 72.80 cm    LVOT peak VTI 27.90 cm    AV valve area 1.34 cm²    AV Velocity Ratio 0.37     AV index (prosthetic) 0.38     TOAN by Velocity Ratio 1.30 cm²    MV mean gradient 3 mmHg    MV peak gradient 8 mmHg    MV stenosis pressure 1/2 time 67.97 ms    MV valve area p 1/2 method 3.24 cm2    MV valve area by continuity eq 2.99 cm2    MV VTI 32.6 cm    Triscuspid Valve Regurgitation Peak Gradient 29 mmHg    PV PEAK VELOCITY 1.41 m/s    PV peak gradient 8 mmHg    Pulmonary Valve Mean Velocity 0.96 m/s    Sinus 3.71 cm    STJ 3.08 cm    Ascending aorta 3.54 cm    IVC diameter 1.82 cm    Mean e' 0.07 m/s    ZLVIDS -0.10     ZLVIDD -0.08     LA Volume Index 26.3 mL/m2    LA volume 50.15 cm3    LA WIDTH 3.8 cm    TV resting pulmonary artery pressure 37 mmHg    RV TB RVSP 11 mmHg    Est. RA pres 8 mmHg    EF 60 %   CBC auto differential    Collection Time: 10/24/23  9:10 AM   Result Value Ref Range    WBC 8.86 3.90 - 12.70 K/uL    RBC 3.54 (L) 4.60 - 6.20 M/uL    Hemoglobin 9.7 (L) 14.0 - 18.0 g/dL    Hematocrit 30.4 (L) 40.0 - 54.0 %    MCV 86 82 - 98 fL    MCH 27.4 27.0 - 31.0 pg    MCHC 31.9 (L) 32.0 - 36.0 g/dL    RDW 14.2 11.5 - 14.5 %    Platelets 349 150 - 450 " K/uL    MPV 9.2 9.2 - 12.9 fL    Immature Granulocytes 0.6 (H) 0.0 - 0.5 %    Gran # (ANC) 6.7 1.8 - 7.7 K/uL    Immature Grans (Abs) 0.05 (H) 0.00 - 0.04 K/uL    Lymph # 1.0 1.0 - 4.8 K/uL    Mono # 0.5 0.3 - 1.0 K/uL    Eos # 0.5 0.0 - 0.5 K/uL    Baso # 0.05 0.00 - 0.20 K/uL    nRBC 0 0 /100 WBC    Gran % 75.8 (H) 38.0 - 73.0 %    Lymph % 11.6 (L) 18.0 - 48.0 %    Mono % 6.0 4.0 - 15.0 %    Eosinophil % 5.4 0.0 - 8.0 %    Basophil % 0.6 0.0 - 1.9 %    Differential Method Automated    Comprehensive metabolic panel    Collection Time: 10/24/23  9:11 AM   Result Value Ref Range    Sodium 138 136 - 145 mmol/L    Potassium 4.4 3.5 - 5.1 mmol/L    Chloride 104 95 - 110 mmol/L    CO2 19 (L) 23 - 29 mmol/L    Glucose 153 (H) 70 - 110 mg/dL    BUN 18 8 - 23 mg/dL    Creatinine 1.3 0.5 - 1.4 mg/dL    Calcium 10.0 8.7 - 10.5 mg/dL    Total Protein 7.1 6.0 - 8.4 g/dL    Albumin 2.9 (L) 3.5 - 5.2 g/dL    Total Bilirubin 0.2 0.1 - 1.0 mg/dL    Alkaline Phosphatase 128 55 - 135 U/L    AST 16 10 - 40 U/L    ALT 23 10 - 44 U/L    eGFR 59 (A) >60 mL/min/1.73 m^2    Anion Gap 15 8 - 16 mmol/L   COVID-19 Rapid Screening    Collection Time: 10/24/23 12:33 PM   Result Value Ref Range    SARS-CoV-2 RNA, Amplification, Qual Negative Negative   POCT glucose    Collection Time: 10/24/23 10:06 PM   Result Value Ref Range    POCT Glucose 150 (H) 70 - 110 mg/dL   Comprehensive metabolic panel    Collection Time: 10/25/23  4:50 AM   Result Value Ref Range    Sodium 138 136 - 145 mmol/L    Potassium 4.5 3.5 - 5.1 mmol/L    Chloride 105 95 - 110 mmol/L    CO2 24 23 - 29 mmol/L    Glucose 110 70 - 110 mg/dL    BUN 25 (H) 8 - 23 mg/dL    Creatinine 1.6 (H) 0.5 - 1.4 mg/dL    Calcium 9.6 8.7 - 10.5 mg/dL    Total Protein 7.2 6.0 - 8.4 g/dL    Albumin 3.0 (L) 3.5 - 5.2 g/dL    Total Bilirubin 0.2 0.1 - 1.0 mg/dL    Alkaline Phosphatase 121 55 - 135 U/L    AST 15 10 - 40 U/L    ALT 24 10 - 44 U/L    eGFR 46 (A) >60 mL/min/1.73 m^2    Anion Gap 9 8 -  16 mmol/L   CBC auto differential    Collection Time: 10/25/23  4:50 AM   Result Value Ref Range    WBC 9.39 3.90 - 12.70 K/uL    RBC 3.41 (L) 4.60 - 6.20 M/uL    Hemoglobin 9.3 (L) 14.0 - 18.0 g/dL    Hematocrit 28.7 (L) 40.0 - 54.0 %    MCV 84 82 - 98 fL    MCH 27.3 27.0 - 31.0 pg    MCHC 32.4 32.0 - 36.0 g/dL    RDW 14.1 11.5 - 14.5 %    Platelets 347 150 - 450 K/uL    MPV 9.2 9.2 - 12.9 fL    Immature Granulocytes 0.7 (H) 0.0 - 0.5 %    Gran # (ANC) 6.7 1.8 - 7.7 K/uL    Immature Grans (Abs) 0.07 (H) 0.00 - 0.04 K/uL    Lymph # 1.3 1.0 - 4.8 K/uL    Mono # 0.7 0.3 - 1.0 K/uL    Eos # 0.6 (H) 0.0 - 0.5 K/uL    Baso # 0.05 0.00 - 0.20 K/uL    nRBC 0 0 /100 WBC    Gran % 71.7 38.0 - 73.0 %    Lymph % 14.0 (L) 18.0 - 48.0 %    Mono % 7.0 4.0 - 15.0 %    Eosinophil % 6.1 0.0 - 8.0 %    Basophil % 0.5 0.0 - 1.9 %    Differential Method Automated    POCT glucose    Collection Time: 10/25/23  6:10 AM   Result Value Ref Range    POCT Glucose 166 (H) 70 - 110 mg/dL   CBC Auto Differential    Collection Time: 11/07/23 10:34 AM   Result Value Ref Range    WBC 8.37 3.90 - 12.70 K/uL    RBC 3.49 (L) 4.60 - 6.20 M/uL    Hemoglobin 9.4 (L) 14.0 - 18.0 g/dL    Hematocrit 30.6 (L) 40.0 - 54.0 %    MCV 88 82 - 98 fL    MCH 26.9 (L) 27.0 - 31.0 pg    MCHC 30.7 (L) 32.0 - 36.0 g/dL    RDW 14.6 (H) 11.5 - 14.5 %    Platelets 262 150 - 450 K/uL    MPV 10.3 9.2 - 12.9 fL    Immature Granulocytes 0.1 0.0 - 0.5 %    Gran # (ANC) 6.5 1.8 - 7.7 K/uL    Immature Grans (Abs) 0.01 0.00 - 0.04 K/uL    Lymph # 1.0 1.0 - 4.8 K/uL    Mono # 0.6 0.3 - 1.0 K/uL    Eos # 0.2 0.0 - 0.5 K/uL    Baso # 0.06 0.00 - 0.20 K/uL    nRBC 0 0 /100 WBC    Gran % 77.5 (H) 38.0 - 73.0 %    Lymph % 12.4 (L) 18.0 - 48.0 %    Mono % 6.6 4.0 - 15.0 %    Eosinophil % 2.7 0.0 - 8.0 %    Basophil % 0.7 0.0 - 1.9 %    Differential Method Automated    Comprehensive Metabolic Panel    Collection Time: 11/07/23 10:34 AM   Result Value Ref Range    Sodium 141 136 - 145  mmol/L    Potassium 4.9 3.5 - 5.1 mmol/L    Chloride 107 95 - 110 mmol/L    CO2 27 23 - 29 mmol/L    Glucose 89 70 - 110 mg/dL    BUN 20 8 - 23 mg/dL    Creatinine 1.4 0.5 - 1.4 mg/dL    Calcium 9.8 8.7 - 10.5 mg/dL    Total Protein 7.1 6.0 - 8.4 g/dL    Albumin 3.0 (L) 3.5 - 5.2 g/dL    Total Bilirubin 0.4 0.1 - 1.0 mg/dL    Alkaline Phosphatase 114 55 - 135 U/L    AST 10 10 - 40 U/L    ALT 11 10 - 44 U/L    eGFR 54.4 (A) >60 mL/min/1.73 m^2    Anion Gap 7 (L) 8 - 16 mmol/L   CBC auto differential    Collection Time: 11/10/23  6:58 PM   Result Value Ref Range    WBC 7.82 3.90 - 12.70 K/uL    RBC 3.55 (L) 4.60 - 6.20 M/uL    Hemoglobin 9.4 (L) 14.0 - 18.0 g/dL    Hematocrit 30.5 (L) 40.0 - 54.0 %    MCV 86 82 - 98 fL    MCH 26.5 (L) 27.0 - 31.0 pg    MCHC 30.8 (L) 32.0 - 36.0 g/dL    RDW 14.6 (H) 11.5 - 14.5 %    Platelets 222 150 - 450 K/uL    MPV 9.6 9.2 - 12.9 fL    Immature Granulocytes 0.3 0.0 - 0.5 %    Gran # (ANC) 5.6 1.8 - 7.7 K/uL    Immature Grans (Abs) 0.02 0.00 - 0.04 K/uL    Lymph # 1.3 1.0 - 4.8 K/uL    Mono # 0.6 0.3 - 1.0 K/uL    Eos # 0.3 0.0 - 0.5 K/uL    Baso # 0.04 0.00 - 0.20 K/uL    nRBC 0 0 /100 WBC    Gran % 72.0 38.0 - 73.0 %    Lymph % 16.0 (L) 18.0 - 48.0 %    Mono % 7.4 4.0 - 15.0 %    Eosinophil % 3.8 0.0 - 8.0 %    Basophil % 0.5 0.0 - 1.9 %    Differential Method Automated    Comprehensive metabolic panel    Collection Time: 11/10/23  6:58 PM   Result Value Ref Range    Sodium 139 136 - 145 mmol/L    Potassium 3.9 3.5 - 5.1 mmol/L    Chloride 103 95 - 110 mmol/L    CO2 21 (L) 23 - 29 mmol/L    Glucose 111 (H) 70 - 110 mg/dL    BUN 23 8 - 23 mg/dL    Creatinine 1.7 (H) 0.5 - 1.4 mg/dL    Calcium 9.7 8.7 - 10.5 mg/dL    Total Protein 7.5 6.0 - 8.4 g/dL    Albumin 3.3 (L) 3.5 - 5.2 g/dL    Total Bilirubin 0.3 0.1 - 1.0 mg/dL    Alkaline Phosphatase 112 55 - 135 U/L    AST 11 10 - 40 U/L    ALT 6 (L) 10 - 44 U/L    eGFR 43 (A) >60 mL/min/1.73 m^2    Anion Gap 15 8 - 16 mmol/L    Protime-INR    Collection Time: 11/10/23  6:58 PM   Result Value Ref Range    Prothrombin Time 11.2 9.0 - 12.5 sec    INR 1.1 0.8 - 1.2   APTT    Collection Time: 11/10/23  6:58 PM   Result Value Ref Range    aPTT 31.6 21.0 - 32.0 sec   Magnesium    Collection Time: 11/10/23  6:58 PM   Result Value Ref Range    Magnesium 2.2 1.6 - 2.6 mg/dL   Lactic acid, plasma    Collection Time: 11/10/23  6:58 PM   Result Value Ref Range    Lactate (Lactic Acid) 0.8 0.5 - 2.2 mmol/L   Blood culture #1 **CANNOT BE ORDERED STAT**    Collection Time: 11/10/23  6:59 PM    Specimen: Peripheral, Forearm, Right; Blood   Result Value Ref Range    Blood Culture, Routine No growth after 5 days.    Blood culture #2 **CANNOT BE ORDERED STAT**    Collection Time: 11/10/23  6:59 PM    Specimen: Peripheral, Antecubital, Right; Blood   Result Value Ref Range    Blood Culture, Routine No growth after 5 days.    Urinalysis, Reflex to Urine Culture Urine, Unspecified (nephrostomy)    Collection Time: 11/11/23  4:31 AM    Specimen: Urine   Result Value Ref Range    Specimen UA Urine, Unspecified     Color, UA Yellow Yellow, Straw, Andra    Appearance, UA Hazy (A) Clear    pH, UA 6.0 5.0 - 8.0    Specific Gravity, UA 1.010 1.005 - 1.030    Protein, UA 1+ (A) Negative    Glucose, UA Negative Negative    Ketones, UA Negative Negative    Bilirubin (UA) Negative Negative    Occult Blood UA 2+ (A) Negative    Nitrite, UA Negative Negative    Urobilinogen, UA Negative <2.0 EU/dL    Leukocytes, UA 3+ (A) Negative   Urinalysis Microscopic    Collection Time: 11/11/23  4:31 AM   Result Value Ref Range    RBC, UA >100 (H) 0 - 4 /hpf    WBC, UA 76 (H) 0 - 5 /hpf    WBC Clumps, UA Moderate (A) None-Rare    Bacteria Rare None-Occ /hpf    Yeast, UA Few (A) None    Hyaline Casts, UA 0 0-1/lpf /lpf    Unclass Darling UA Occasional None-Moderate    Microscopic Comment SEE COMMENT    Urine culture    Collection Time: 11/11/23  4:31 AM    Specimen: Urine   Result Value  Ref Range    Urine Culture, Routine (A)      ENTEROCOCCUS FAECIUM VRE  >100,000 cfu/ml  No other significant isolate         Susceptibility    Enterococcus faecium VRE - CULTURE, URINE     Ampicillin >8 Resistant mcg/mL     Daptomycin 4 Sensitive mcg/mL     Nitrofurantoin <=32 Sensitive mcg/mL     Linezolid 2 Sensitive mcg/mL     Vancomycin >16 Resistant mcg/mL   Comprehensive Metabolic Panel (CMP)    Collection Time: 11/11/23  4:43 AM   Result Value Ref Range    Sodium 144 136 - 145 mmol/L    Potassium 3.8 3.5 - 5.1 mmol/L    Chloride 106 95 - 110 mmol/L    CO2 22 (L) 23 - 29 mmol/L    Glucose 108 70 - 110 mg/dL    BUN 19 8 - 23 mg/dL    Creatinine 1.2 0.5 - 1.4 mg/dL    Calcium 9.3 8.7 - 10.5 mg/dL    Total Protein 6.7 6.0 - 8.4 g/dL    Albumin 2.9 (L) 3.5 - 5.2 g/dL    Total Bilirubin 0.3 0.1 - 1.0 mg/dL    Alkaline Phosphatase 104 55 - 135 U/L    AST 9 (L) 10 - 40 U/L    ALT 5 (L) 10 - 44 U/L    eGFR >60 >60 mL/min/1.73 m^2    Anion Gap 16 8 - 16 mmol/L   CBC with Automated Differential    Collection Time: 11/11/23  4:43 AM   Result Value Ref Range    WBC 11.79 3.90 - 12.70 K/uL    RBC 3.49 (L) 4.60 - 6.20 M/uL    Hemoglobin 9.4 (L) 14.0 - 18.0 g/dL    Hematocrit 30.1 (L) 40.0 - 54.0 %    MCV 86 82 - 98 fL    MCH 26.9 (L) 27.0 - 31.0 pg    MCHC 31.2 (L) 32.0 - 36.0 g/dL    RDW 14.6 (H) 11.5 - 14.5 %    Platelets 228 150 - 450 K/uL    MPV 10.2 9.2 - 12.9 fL    Immature Granulocytes 0.3 0.0 - 0.5 %    Gran # (ANC) 10.0 (H) 1.8 - 7.7 K/uL    Immature Grans (Abs) 0.04 0.00 - 0.04 K/uL    Lymph # 0.7 (L) 1.0 - 4.8 K/uL    Mono # 0.8 0.3 - 1.0 K/uL    Eos # 0.3 0.0 - 0.5 K/uL    Baso # 0.04 0.00 - 0.20 K/uL    nRBC 0 0 /100 WBC    Gran % 84.5 (H) 38.0 - 73.0 %    Lymph % 6.0 (L) 18.0 - 48.0 %    Mono % 6.5 4.0 - 15.0 %    Eosinophil % 2.4 0.0 - 8.0 %    Basophil % 0.3 0.0 - 1.9 %    Differential Method Automated    Comprehensive Metabolic Panel (CMP)    Collection Time: 11/12/23  4:41 AM   Result Value Ref Range     Sodium 138 136 - 145 mmol/L    Potassium 4.0 3.5 - 5.1 mmol/L    Chloride 105 95 - 110 mmol/L    CO2 22 (L) 23 - 29 mmol/L    Glucose 113 (H) 70 - 110 mg/dL    BUN 17 8 - 23 mg/dL    Creatinine 1.4 0.5 - 1.4 mg/dL    Calcium 8.7 8.7 - 10.5 mg/dL    Total Protein 6.2 6.0 - 8.4 g/dL    Albumin 2.6 (L) 3.5 - 5.2 g/dL    Total Bilirubin 0.5 0.1 - 1.0 mg/dL    Alkaline Phosphatase 95 55 - 135 U/L    AST 8 (L) 10 - 40 U/L    ALT 8 (L) 10 - 44 U/L    eGFR 54 (A) >60 mL/min/1.73 m^2    Anion Gap 11 8 - 16 mmol/L   CBC with Automated Differential    Collection Time: 11/12/23  4:41 AM   Result Value Ref Range    WBC 12.69 3.90 - 12.70 K/uL    RBC 3.53 (L) 4.60 - 6.20 M/uL    Hemoglobin 9.5 (L) 14.0 - 18.0 g/dL    Hematocrit 30.4 (L) 40.0 - 54.0 %    MCV 86 82 - 98 fL    MCH 26.9 (L) 27.0 - 31.0 pg    MCHC 31.3 (L) 32.0 - 36.0 g/dL    RDW 14.8 (H) 11.5 - 14.5 %    Platelets 197 150 - 450 K/uL    MPV 10.5 9.2 - 12.9 fL    Immature Granulocytes 0.4 0.0 - 0.5 %    Gran # (ANC) 10.4 (H) 1.8 - 7.7 K/uL    Immature Grans (Abs) 0.05 (H) 0.00 - 0.04 K/uL    Lymph # 1.3 1.0 - 4.8 K/uL    Mono # 0.9 0.3 - 1.0 K/uL    Eos # 0.1 0.0 - 0.5 K/uL    Baso # 0.04 0.00 - 0.20 K/uL    nRBC 0 0 /100 WBC    Gran % 81.6 (H) 38.0 - 73.0 %    Lymph % 10.2 (L) 18.0 - 48.0 %    Mono % 7.0 4.0 - 15.0 %    Eosinophil % 0.5 0.0 - 8.0 %    Basophil % 0.3 0.0 - 1.9 %    Differential Method Automated    Comprehensive Metabolic Panel (CMP)    Collection Time: 11/13/23  3:25 AM   Result Value Ref Range    Sodium 137 136 - 145 mmol/L    Potassium 3.7 3.5 - 5.1 mmol/L    Chloride 103 95 - 110 mmol/L    CO2 23 23 - 29 mmol/L    Glucose 132 (H) 70 - 110 mg/dL    BUN 21 8 - 23 mg/dL    Creatinine 1.4 0.5 - 1.4 mg/dL    Calcium 8.7 8.7 - 10.5 mg/dL    Total Protein 6.2 6.0 - 8.4 g/dL    Albumin 2.4 (L) 3.5 - 5.2 g/dL    Total Bilirubin 0.3 0.1 - 1.0 mg/dL    Alkaline Phosphatase 122 55 - 135 U/L    AST 14 10 - 40 U/L    ALT 9 (L) 10 - 44 U/L    eGFR 54 (A) >60  mL/min/1.73 m^2    Anion Gap 11 8 - 16 mmol/L   CBC with Automated Differential    Collection Time: 11/13/23  3:25 AM   Result Value Ref Range    WBC 16.50 (H) 3.90 - 12.70 K/uL    RBC 3.08 (L) 4.60 - 6.20 M/uL    Hemoglobin 8.2 (L) 14.0 - 18.0 g/dL    Hematocrit 26.2 (L) 40.0 - 54.0 %    MCV 85 82 - 98 fL    MCH 26.6 (L) 27.0 - 31.0 pg    MCHC 31.3 (L) 32.0 - 36.0 g/dL    RDW 14.6 (H) 11.5 - 14.5 %    Platelets 212 150 - 450 K/uL    MPV 10.0 9.2 - 12.9 fL    Immature Granulocytes 0.5 0.0 - 0.5 %    Gran # (ANC) 14.1 (H) 1.8 - 7.7 K/uL    Immature Grans (Abs) 0.08 (H) 0.00 - 0.04 K/uL    Lymph # 1.0 1.0 - 4.8 K/uL    Mono # 1.2 (H) 0.3 - 1.0 K/uL    Eos # 0.1 0.0 - 0.5 K/uL    Baso # 0.04 0.00 - 0.20 K/uL    nRBC 0 0 /100 WBC    Gran % 85.7 (H) 38.0 - 73.0 %    Lymph % 6.2 (L) 18.0 - 48.0 %    Mono % 7.0 4.0 - 15.0 %    Eosinophil % 0.4 0.0 - 8.0 %    Basophil % 0.2 0.0 - 1.9 %    Differential Method Automated    CBC auto differential    Collection Time: 11/14/23  7:56 AM   Result Value Ref Range    WBC 11.66 3.90 - 12.70 K/uL    RBC 2.99 (L) 4.60 - 6.20 M/uL    Hemoglobin 7.9 (L) 14.0 - 18.0 g/dL    Hematocrit 25.6 (L) 40.0 - 54.0 %    MCV 86 82 - 98 fL    MCH 26.4 (L) 27.0 - 31.0 pg    MCHC 30.9 (L) 32.0 - 36.0 g/dL    RDW 14.7 (H) 11.5 - 14.5 %    Platelets 225 150 - 450 K/uL    MPV 10.2 9.2 - 12.9 fL    Immature Granulocytes 0.3 0.0 - 0.5 %    Gran # (ANC) 9.7 (H) 1.8 - 7.7 K/uL    Immature Grans (Abs) 0.04 0.00 - 0.04 K/uL    Lymph # 0.8 (L) 1.0 - 4.8 K/uL    Mono # 0.9 0.3 - 1.0 K/uL    Eos # 0.2 0.0 - 0.5 K/uL    Baso # 0.03 0.00 - 0.20 K/uL    nRBC 0 0 /100 WBC    Gran % 83.3 (H) 38.0 - 73.0 %    Lymph % 6.5 (L) 18.0 - 48.0 %    Mono % 7.7 4.0 - 15.0 %    Eosinophil % 1.9 0.0 - 8.0 %    Basophil % 0.3 0.0 - 1.9 %    Differential Method Automated    CBC Auto Differential    Collection Time: 11/15/23  3:44 AM   Result Value Ref Range    WBC 8.80 3.90 - 12.70 K/uL    RBC 3.24 (L) 4.60 - 6.20 M/uL    Hemoglobin  8.6 (L) 14.0 - 18.0 g/dL    Hematocrit 27.9 (L) 40.0 - 54.0 %    MCV 86 82 - 98 fL    MCH 26.5 (L) 27.0 - 31.0 pg    MCHC 30.8 (L) 32.0 - 36.0 g/dL    RDW 14.7 (H) 11.5 - 14.5 %    Platelets 272 150 - 450 K/uL    MPV 10.5 9.2 - 12.9 fL    Immature Granulocytes 0.2 0.0 - 0.5 %    Gran # (ANC) 6.7 1.8 - 7.7 K/uL    Immature Grans (Abs) 0.02 0.00 - 0.04 K/uL    Lymph # 1.0 1.0 - 4.8 K/uL    Mono # 0.7 0.3 - 1.0 K/uL    Eos # 0.4 0.0 - 0.5 K/uL    Baso # 0.04 0.00 - 0.20 K/uL    nRBC 0 0 /100 WBC    Gran % 76.3 (H) 38.0 - 73.0 %    Lymph % 11.3 (L) 18.0 - 48.0 %    Mono % 7.6 4.0 - 15.0 %    Eosinophil % 4.1 0.0 - 8.0 %    Basophil % 0.5 0.0 - 1.9 %    Differential Method Automated    Comprehensive Metabolic Panel    Collection Time: 11/15/23  3:44 AM   Result Value Ref Range    Sodium 141 136 - 145 mmol/L    Potassium 4.1 3.5 - 5.1 mmol/L    Chloride 106 95 - 110 mmol/L    CO2 20 (L) 23 - 29 mmol/L    Glucose 83 70 - 110 mg/dL    BUN 21 8 - 23 mg/dL    Creatinine 1.4 0.5 - 1.4 mg/dL    Calcium 8.9 8.7 - 10.5 mg/dL    Total Protein 6.1 6.0 - 8.4 g/dL    Albumin 2.3 (L) 3.5 - 5.2 g/dL    Total Bilirubin 0.3 0.1 - 1.0 mg/dL    Alkaline Phosphatase 114 55 - 135 U/L    AST 22 10 - 40 U/L    ALT 20 10 - 44 U/L    eGFR 54 (A) >60 mL/min/1.73 m^2    Anion Gap 15 8 - 16 mmol/L   CBC auto differential    Collection Time: 11/17/23  3:38 AM   Result Value Ref Range    WBC 9.96 3.90 - 12.70 K/uL    RBC 3.25 (L) 4.60 - 6.20 M/uL    Hemoglobin 8.6 (L) 14.0 - 18.0 g/dL    Hematocrit 27.9 (L) 40.0 - 54.0 %    MCV 86 82 - 98 fL    MCH 26.5 (L) 27.0 - 31.0 pg    MCHC 30.8 (L) 32.0 - 36.0 g/dL    RDW 14.8 (H) 11.5 - 14.5 %    Platelets 308 150 - 450 K/uL    MPV 9.8 9.2 - 12.9 fL    Immature Granulocytes 0.6 (H) 0.0 - 0.5 %    Gran # (ANC) 7.9 (H) 1.8 - 7.7 K/uL    Immature Grans (Abs) 0.06 (H) 0.00 - 0.04 K/uL    Lymph # 1.1 1.0 - 4.8 K/uL    Mono # 0.6 0.3 - 1.0 K/uL    Eos # 0.2 0.0 - 0.5 K/uL    Baso # 0.05 0.00 - 0.20 K/uL    nRBC 0 0  /100 WBC    Gran % 78.9 (H) 38.0 - 73.0 %    Lymph % 11.4 (L) 18.0 - 48.0 %    Mono % 6.4 4.0 - 15.0 %    Eosinophil % 2.2 0.0 - 8.0 %    Basophil % 0.5 0.0 - 1.9 %    Differential Method Automated    Renal Function Panel    Collection Time: 11/17/23  3:38 AM   Result Value Ref Range    Glucose 107 70 - 110 mg/dL    Sodium 143 136 - 145 mmol/L    Potassium 3.7 3.5 - 5.1 mmol/L    Chloride 105 95 - 110 mmol/L    CO2 25 23 - 29 mmol/L    BUN 13 8 - 23 mg/dL    Calcium 8.8 8.7 - 10.5 mg/dL    Creatinine 1.3 0.5 - 1.4 mg/dL    Albumin 2.4 (L) 3.5 - 5.2 g/dL    Phosphorus 3.5 2.7 - 4.5 mg/dL    eGFR 59 (A) >60 mL/min/1.73 m^2    Anion Gap 13 8 - 16 mmol/L   CBC auto differential    Collection Time: 11/20/23  9:18 PM   Result Value Ref Range    WBC 11.18 3.90 - 12.70 K/uL    RBC 3.03 (L) 4.60 - 6.20 M/uL    Hemoglobin 7.9 (L) 14.0 - 18.0 g/dL    Hematocrit 25.9 (L) 40.0 - 54.0 %    MCV 86 82 - 98 fL    MCH 26.1 (L) 27.0 - 31.0 pg    MCHC 30.5 (L) 32.0 - 36.0 g/dL    RDW 15.2 (H) 11.5 - 14.5 %    Platelets 258 150 - 450 K/uL    MPV 9.5 9.2 - 12.9 fL    Immature Granulocytes 0.5 0.0 - 0.5 %    Gran # (ANC) 9.0 (H) 1.8 - 7.7 K/uL    Immature Grans (Abs) 0.06 (H) 0.00 - 0.04 K/uL    Lymph # 1.2 1.0 - 4.8 K/uL    Mono # 0.5 0.3 - 1.0 K/uL    Eos # 0.4 0.0 - 0.5 K/uL    Baso # 0.04 0.00 - 0.20 K/uL    nRBC 0 0 /100 WBC    Gran % 80.8 (H) 38.0 - 73.0 %    Lymph % 10.6 (L) 18.0 - 48.0 %    Mono % 4.4 4.0 - 15.0 %    Eosinophil % 3.3 0.0 - 8.0 %    Basophil % 0.4 0.0 - 1.9 %    Differential Method Automated    Comprehensive metabolic panel    Collection Time: 11/20/23  9:18 PM   Result Value Ref Range    Sodium 138 136 - 145 mmol/L    Potassium 4.7 3.5 - 5.1 mmol/L    Chloride 102 95 - 110 mmol/L    CO2 23 23 - 29 mmol/L    Glucose 105 70 - 110 mg/dL    BUN 17 8 - 23 mg/dL    Creatinine 2.1 (H) 0.5 - 1.4 mg/dL    Calcium 9.6 8.7 - 10.5 mg/dL    Total Protein 6.8 6.0 - 8.4 g/dL    Albumin 2.8 (L) 3.5 - 5.2 g/dL    Total Bilirubin  0.3 0.1 - 1.0 mg/dL    Alkaline Phosphatase 95 55 - 135 U/L    AST 15 10 - 40 U/L    ALT 19 10 - 44 U/L    eGFR 33 (A) >60 mL/min/1.73 m^2    Anion Gap 13 8 - 16 mmol/L   TSH    Collection Time: 11/20/23  9:18 PM   Result Value Ref Range    TSH 0.296 (L) 0.400 - 4.000 uIU/mL   T4, Free    Collection Time: 11/20/23  9:18 PM   Result Value Ref Range    Free T4 1.40 0.71 - 1.51 ng/dL   Comprehensive Metabolic Panel (CMP)    Collection Time: 11/21/23  6:09 AM   Result Value Ref Range    Sodium 142 136 - 145 mmol/L    Potassium 4.0 3.5 - 5.1 mmol/L    Chloride 107 95 - 110 mmol/L    CO2 24 23 - 29 mmol/L    Glucose 106 70 - 110 mg/dL    BUN 17 8 - 23 mg/dL    Creatinine 1.8 (H) 0.5 - 1.4 mg/dL    Calcium 8.8 8.7 - 10.5 mg/dL    Total Protein 6.0 6.0 - 8.4 g/dL    Albumin 2.4 (L) 3.5 - 5.2 g/dL    Total Bilirubin 0.3 0.1 - 1.0 mg/dL    Alkaline Phosphatase 94 55 - 135 U/L    AST 17 10 - 40 U/L    ALT 18 10 - 44 U/L    eGFR 40 (A) >60 mL/min/1.73 m^2    Anion Gap 11 8 - 16 mmol/L   CBC with Automated Differential    Collection Time: 11/21/23  6:09 AM   Result Value Ref Range    WBC 7.72 3.90 - 12.70 K/uL    RBC 2.78 (L) 4.60 - 6.20 M/uL    Hemoglobin 7.3 (L) 14.0 - 18.0 g/dL    Hematocrit 23.9 (L) 40.0 - 54.0 %    MCV 86 82 - 98 fL    MCH 26.3 (L) 27.0 - 31.0 pg    MCHC 30.5 (L) 32.0 - 36.0 g/dL    RDW 15.0 (H) 11.5 - 14.5 %    Platelets 227 150 - 450 K/uL    MPV 9.4 9.2 - 12.9 fL    Immature Granulocytes 0.5 0.0 - 0.5 %    Gran # (ANC) 5.9 1.8 - 7.7 K/uL    Immature Grans (Abs) 0.04 0.00 - 0.04 K/uL    Lymph # 1.0 1.0 - 4.8 K/uL    Mono # 0.4 0.3 - 1.0 K/uL    Eos # 0.4 0.0 - 0.5 K/uL    Baso # 0.04 0.00 - 0.20 K/uL    nRBC 0 0 /100 WBC    Gran % 76.0 (H) 38.0 - 73.0 %    Lymph % 13.0 (L) 18.0 - 48.0 %    Mono % 5.3 4.0 - 15.0 %    Eosinophil % 4.7 0.0 - 8.0 %    Basophil % 0.5 0.0 - 1.9 %    Differential Method Automated    Prepare RBC 1 Unit    Collection Time: 11/21/23  8:19 AM   Result Value Ref Range    UNIT NUMBER  [id number]     Product Code [id number]     DISPENSE STATUS TRANSFUSED     CODING SYSTEM [id number]     Unit Blood Type Code 7300     Unit Blood Type B POS     Unit Expiration [id number]     CROSSMATCH INTERPRETATION Compatible    Type & Screen    Collection Time: 11/21/23  8:19 AM   Result Value Ref Range    Group & Rh B POS     Indirect Bertin NEG     Specimen Outdate 11/24/2023 23:59    Comprehensive Metabolic Panel (CMP)    Collection Time: 11/22/23  3:22 AM   Result Value Ref Range    Sodium 143 136 - 145 mmol/L    Potassium 3.9 3.5 - 5.1 mmol/L    Chloride 107 95 - 110 mmol/L    CO2 26 23 - 29 mmol/L    Glucose 146 (H) 70 - 110 mg/dL    BUN 16 8 - 23 mg/dL    Creatinine 1.3 0.5 - 1.4 mg/dL    Calcium 8.5 (L) 8.7 - 10.5 mg/dL    Total Protein 5.4 (L) 6.0 - 8.4 g/dL    Albumin 2.2 (L) 3.5 - 5.2 g/dL    Total Bilirubin 0.3 0.1 - 1.0 mg/dL    Alkaline Phosphatase 78 55 - 135 U/L    AST 14 10 - 40 U/L    ALT 17 10 - 44 U/L    eGFR 59 (A) >60 mL/min/1.73 m^2    Anion Gap 10 8 - 16 mmol/L   CBC with Automated Differential    Collection Time: 11/22/23  3:22 AM   Result Value Ref Range    WBC 9.20 3.90 - 12.70 K/uL    RBC 2.86 (L) 4.60 - 6.20 M/uL    Hemoglobin 7.8 (L) 14.0 - 18.0 g/dL    Hematocrit 24.2 (L) 40.0 - 54.0 %    MCV 85 82 - 98 fL    MCH 27.3 27.0 - 31.0 pg    MCHC 32.2 32.0 - 36.0 g/dL    RDW 14.3 11.5 - 14.5 %    Platelets 177 150 - 450 K/uL    MPV 9.2 9.2 - 12.9 fL    Immature Granulocytes 0.8 (H) 0.0 - 0.5 %    Gran # (ANC) 8.4 (H) 1.8 - 7.7 K/uL    Immature Grans (Abs) 0.07 (H) 0.00 - 0.04 K/uL    Lymph # 0.5 (L) 1.0 - 4.8 K/uL    Mono # 0.2 (L) 0.3 - 1.0 K/uL    Eos # 0.0 0.0 - 0.5 K/uL    Baso # 0.01 0.00 - 0.20 K/uL    nRBC 0 0 /100 WBC    Gran % 90.7 (H) 38.0 - 73.0 %    Lymph % 5.9 (L) 18.0 - 48.0 %    Mono % 2.5 (L) 4.0 - 15.0 %    Eosinophil % 0.0 0.0 - 8.0 %    Basophil % 0.1 0.0 - 1.9 %    Differential Method Automated    Comprehensive Metabolic Panel (CMP)    Collection Time: 11/23/23  7:23 AM    Result Value Ref Range    Sodium 141 136 - 145 mmol/L    Potassium 3.8 3.5 - 5.1 mmol/L    Chloride 106 95 - 110 mmol/L    CO2 26 23 - 29 mmol/L    Glucose 116 (H) 70 - 110 mg/dL    BUN 18 8 - 23 mg/dL    Creatinine 1.3 0.5 - 1.4 mg/dL    Calcium 8.7 8.7 - 10.5 mg/dL    Total Protein 6.0 6.0 - 8.4 g/dL    Albumin 2.4 (L) 3.5 - 5.2 g/dL    Total Bilirubin 0.3 0.1 - 1.0 mg/dL    Alkaline Phosphatase 78 55 - 135 U/L    AST 12 10 - 40 U/L    ALT 10 10 - 44 U/L    eGFR 59 (A) >60 mL/min/1.73 m^2    Anion Gap 9 8 - 16 mmol/L   CBC with Automated Differential    Collection Time: 11/23/23  7:23 AM   Result Value Ref Range    WBC 10.52 3.90 - 12.70 K/uL    RBC 2.80 (L) 4.60 - 6.20 M/uL    Hemoglobin 7.6 (L) 14.0 - 18.0 g/dL    Hematocrit 24.2 (L) 40.0 - 54.0 %    MCV 86 82 - 98 fL    MCH 27.1 27.0 - 31.0 pg    MCHC 31.4 (L) 32.0 - 36.0 g/dL    RDW 15.0 (H) 11.5 - 14.5 %    Platelets 179 150 - 450 K/uL    MPV 9.4 9.2 - 12.9 fL    Immature Granulocytes 0.7 (H) 0.0 - 0.5 %    Gran # (ANC) 8.9 (H) 1.8 - 7.7 K/uL    Immature Grans (Abs) 0.07 (H) 0.00 - 0.04 K/uL    Lymph # 0.9 (L) 1.0 - 4.8 K/uL    Mono # 0.4 0.3 - 1.0 K/uL    Eos # 0.2 0.0 - 0.5 K/uL    Baso # 0.03 0.00 - 0.20 K/uL    nRBC 0 0 /100 WBC    Gran % 84.8 (H) 38.0 - 73.0 %    Lymph % 8.3 (L) 18.0 - 48.0 %    Mono % 3.8 (L) 4.0 - 15.0 %    Eosinophil % 2.1 0.0 - 8.0 %    Basophil % 0.3 0.0 - 1.9 %    Differential Method Automated    CBC Auto Differential    Collection Time: 11/24/23  8:09 AM   Result Value Ref Range    WBC 10.06 3.90 - 12.70 K/uL    RBC 2.48 (L) 4.60 - 6.20 M/uL    Hemoglobin 6.6 (L) 14.0 - 18.0 g/dL    Hematocrit 21.1 (L) 40.0 - 54.0 %    MCV 85 82 - 98 fL    MCH 26.6 (L) 27.0 - 31.0 pg    MCHC 31.3 (L) 32.0 - 36.0 g/dL    RDW 14.8 (H) 11.5 - 14.5 %    Platelets 172 150 - 450 K/uL    MPV 9.6 9.2 - 12.9 fL    Immature Granulocytes 0.6 (H) 0.0 - 0.5 %    Gran # (ANC) 8.5 (H) 1.8 - 7.7 K/uL    Immature Grans (Abs) 0.06 (H) 0.00 - 0.04 K/uL    Lymph #  0.9 (L) 1.0 - 4.8 K/uL    Mono # 0.3 0.3 - 1.0 K/uL    Eos # 0.3 0.0 - 0.5 K/uL    Baso # 0.02 0.00 - 0.20 K/uL    nRBC 0 0 /100 WBC    Gran % 84.5 (H) 38.0 - 73.0 %    Lymph % 8.6 (L) 18.0 - 48.0 %    Mono % 3.3 (L) 4.0 - 15.0 %    Eosinophil % 2.8 0.0 - 8.0 %    Basophil % 0.2 0.0 - 1.9 %    Differential Method Automated    Basic Metabolic Panel    Collection Time: 11/26/23  7:55 AM   Result Value Ref Range    Sodium 143 136 - 145 mmol/L    Potassium 3.9 3.5 - 5.1 mmol/L    Chloride 105 95 - 110 mmol/L    CO2 25 23 - 29 mmol/L    Glucose 118 (H) 70 - 110 mg/dL    BUN 14 8 - 23 mg/dL    Creatinine 1.0 0.5 - 1.4 mg/dL    Calcium 8.6 (L) 8.7 - 10.5 mg/dL    Anion Gap 13 8 - 16 mmol/L    eGFR >60 >60 mL/min/1.73 m^2   Prepare RBC 1 Unit    Collection Time: 11/26/23  7:55 AM   Result Value Ref Range    UNIT NUMBER [id number]     Product Code [id number]     DISPENSE STATUS TRANSFUSED     CODING SYSTEM [id number]     Unit Blood Type Code 7300     Unit Blood Type B POS     Unit Expiration [id number]     CROSSMATCH INTERPRETATION Compatible    Type & Screen    Collection Time: 11/26/23  7:55 AM   Result Value Ref Range    Group & Rh B POS     Indirect Bertin NEG     Specimen Outdate 11/29/2023 23:59    CBC Auto Differential    Collection Time: 11/26/23  7:56 AM   Result Value Ref Range    WBC 7.42 3.90 - 12.70 K/uL    RBC 2.64 (L) 4.60 - 6.20 M/uL    Hemoglobin 7.0 (L) 14.0 - 18.0 g/dL    Hematocrit 21.9 (L) 40.0 - 54.0 %    MCV 83 82 - 98 fL    MCH 26.5 (L) 27.0 - 31.0 pg    MCHC 32.0 32.0 - 36.0 g/dL    RDW 14.9 (H) 11.5 - 14.5 %    Platelets 190 150 - 450 K/uL    MPV 9.6 9.2 - 12.9 fL    Immature Granulocytes 0.9 (H) 0.0 - 0.5 %    Gran # (ANC) 6.0 1.8 - 7.7 K/uL    Immature Grans (Abs) 0.07 (H) 0.00 - 0.04 K/uL    Lymph # 0.8 (L) 1.0 - 4.8 K/uL    Mono # 0.4 0.3 - 1.0 K/uL    Eos # 0.2 0.0 - 0.5 K/uL    Baso # 0.03 0.00 - 0.20 K/uL    nRBC 0 0 /100 WBC    Gran % 80.3 (H) 38.0 - 73.0 %    Lymph % 10.6 (L) 18.0 - 48.0 %     Mono % 5.1 4.0 - 15.0 %    Eosinophil % 2.7 0.0 - 8.0 %    Basophil % 0.4 0.0 - 1.9 %    Differential Method Automated    CBC Auto Differential    Collection Time: 11/27/23  4:39 AM   Result Value Ref Range    WBC 7.12 3.90 - 12.70 K/uL    RBC 2.80 (L) 4.60 - 6.20 M/uL    Hemoglobin 7.8 (L) 14.0 - 18.0 g/dL    Hematocrit 23.8 (L) 40.0 - 54.0 %    MCV 85 82 - 98 fL    MCH 27.9 27.0 - 31.0 pg    MCHC 32.8 32.0 - 36.0 g/dL    RDW 14.8 (H) 11.5 - 14.5 %    Platelets 188 150 - 450 K/uL    MPV 9.2 9.2 - 12.9 fL    Immature Granulocytes 0.4 0.0 - 0.5 %    Gran # (ANC) 5.3 1.8 - 7.7 K/uL    Immature Grans (Abs) 0.03 0.00 - 0.04 K/uL    Lymph # 0.9 (L) 1.0 - 4.8 K/uL    Mono # 0.6 0.3 - 1.0 K/uL    Eos # 0.2 0.0 - 0.5 K/uL    Baso # 0.03 0.00 - 0.20 K/uL    nRBC 0 0 /100 WBC    Gran % 74.4 (H) 38.0 - 73.0 %    Lymph % 12.6 (L) 18.0 - 48.0 %    Mono % 9.0 4.0 - 15.0 %    Eosinophil % 3.2 0.0 - 8.0 %    Basophil % 0.4 0.0 - 1.9 %    Differential Method Automated    CBC auto differential    Collection Time: 12/09/23  5:52 PM   Result Value Ref Range    WBC 8.54 3.90 - 12.70 K/uL    RBC 2.75 (L) 4.60 - 6.20 M/uL    Hemoglobin 7.7 (L) 14.0 - 18.0 g/dL    Hematocrit 24.2 (L) 40.0 - 54.0 %    MCV 88 82 - 98 fL    MCH 28.0 27.0 - 31.0 pg    MCHC 31.8 (L) 32.0 - 36.0 g/dL    RDW 18.0 (H) 11.5 - 14.5 %    Platelets 311 150 - 450 K/uL    MPV 9.2 9.2 - 12.9 fL    Immature Granulocytes 1.3 (H) 0.0 - 0.5 %    Gran # (ANC) 6.0 1.8 - 7.7 K/uL    Immature Grans (Abs) 0.11 (H) 0.00 - 0.04 K/uL    Lymph # 1.4 1.0 - 4.8 K/uL    Mono # 0.7 0.3 - 1.0 K/uL    Eos # 0.3 0.0 - 0.5 K/uL    Baso # 0.02 0.00 - 0.20 K/uL    nRBC 0 0 /100 WBC    Gran % 70.1 38.0 - 73.0 %    Lymph % 16.9 (L) 18.0 - 48.0 %    Mono % 8.5 4.0 - 15.0 %    Eosinophil % 3.0 0.0 - 8.0 %    Basophil % 0.2 0.0 - 1.9 %    Differential Method Automated    Comprehensive metabolic panel    Collection Time: 12/09/23  5:52 PM   Result Value Ref Range    Sodium 139 136 - 145 mmol/L     Potassium 4.5 3.5 - 5.1 mmol/L    Chloride 102 95 - 110 mmol/L    CO2 26 23 - 29 mmol/L    Glucose 111 (H) 70 - 110 mg/dL    BUN 26 (H) 8 - 23 mg/dL    Creatinine 1.6 (H) 0.5 - 1.4 mg/dL    Calcium 9.5 8.7 - 10.5 mg/dL    Total Protein 6.9 6.0 - 8.4 g/dL    Albumin 2.9 (L) 3.5 - 5.2 g/dL    Total Bilirubin 0.3 0.1 - 1.0 mg/dL    Alkaline Phosphatase 142 (H) 55 - 135 U/L    AST 19 10 - 40 U/L    ALT 24 10 - 44 U/L    eGFR 46 (A) >60 mL/min/1.73 m^2    Anion Gap 11 8 - 16 mmol/L         Assessment    1. Hospital discharge follow-up    2. Anxiety    3. Spinal stenosis, unspecified spinal region    4. Hx of bladder cancer    5. Hx of cancer of lung    6. Hypothyroidism, unspecified type    7. Primary hypertension          Plan    Geovani was seen today for hostipal follow up.    Diagnoses and all orders for this visit:    Hospital discharge follow-up    Anxiety  -     ALPRAZolam (XANAX) 0.5 MG tablet; Take 2 tablets (1 mg total) by mouth 3 (three) times daily as needed for Anxiety.  -     EScitalopram oxalate (LEXAPRO) 20 MG tablet; Take 1 tablet (20 mg total) by mouth once daily.    Spinal stenosis, unspecified spinal region  -     cyclobenzaprine (FLEXERIL) 10 MG tablet; Take 1 tablet (10 mg total) by mouth 3 (three) times daily as needed for Muscle spasms.  -     HYDROcodone-acetaminophen (NORCO) 5-325 mg per tablet; Take 2 tablets by mouth every 6 (six) hours as needed for Pain.    Hx of bladder cancer  -     Discontinue: HYDROcodone-acetaminophen (NORCO)  mg per tablet; Take 1 tablet by mouth every 6 (six) hours as needed for Pain.  -     tamsulosin (FLOMAX) 0.4 mg Cap; Take 1 capsule (0.4 mg total) by mouth once daily.  -     HYDROcodone-acetaminophen (NORCO) 5-325 mg per tablet; Take 2 tablets by mouth every 6 (six) hours as needed for Pain.    Hx of cancer of lung  -     Discontinue: HYDROcodone-acetaminophen (NORCO)  mg per tablet; Take 1 tablet by mouth every 6 (six) hours as needed for Pain.  -      HYDROcodone-acetaminophen (NORCO) 5-325 mg per tablet; Take 2 tablets by mouth every 6 (six) hours as needed for Pain.    Hypothyroidism, unspecified type  -     levothyroxine (SYNTHROID) 125 MCG tablet; Take 1 tablet (125 mcg total) by mouth before breakfast.    Primary hypertension  -     metoprolol succinate (TOPROL-XL) 50 MG 24 hr tablet; Take 1 tablet (50 mg total) by mouth once daily.    All things considered, he looks okay today.      Keep appointments tomorrow for PET scan, x-ray, orthopedist.  Keep appointments on Monday with Oncology, Tuesday with Infectious Disease.      Med refills, as above.      Pharmacy called to say they are out of [place] 10.  Will send prescription for Norco five instead.      FOLLOW-UP:  Follow up in about 6 months (around 6/12/2024), or if symptoms worsen or fail to improve, for check up.    I spent a total of 45 minutes face to face and non-face to face on the date of this visit.This includes time preparing to see the patient (eg, review of tests, notes), obtaining and/or reviewing additional history from an independent historian and/or outside medical records, documenting clinical information in the electronic health record, independently interpreting results and/or communicating results to the patient/family/caregiver, or care coordinator.    Signed by:  Faizan Antoine MD

## 2023-12-13 ENCOUNTER — HOSPITAL ENCOUNTER (OUTPATIENT)
Dept: RADIOLOGY | Facility: HOSPITAL | Age: 68
Discharge: HOME | End: 2023-12-13
Attending: ORTHOPAEDIC SURGERY
Payer: MEDICARE

## 2023-12-13 ENCOUNTER — OFFICE VISIT (OUTPATIENT)
Dept: ORTHOPEDICS | Facility: CLINIC | Age: 68
End: 2023-12-13
Payer: MEDICARE

## 2023-12-13 ENCOUNTER — HOSPITAL ENCOUNTER (OUTPATIENT)
Dept: RADIOLOGY | Facility: HOSPITAL | Age: 68
Discharge: HOME | End: 2023-12-13
Attending: HOSPITALIST
Payer: MEDICARE

## 2023-12-13 VITALS
WEIGHT: 156.06 LBS | DIASTOLIC BLOOD PRESSURE: 53 MMHG | HEART RATE: 85 BPM | SYSTOLIC BLOOD PRESSURE: 87 MMHG | TEMPERATURE: 97 F | HEIGHT: 65 IN | BODY MASS INDEX: 26 KG/M2

## 2023-12-13 DIAGNOSIS — M25.551 PAIN OF RIGHT HIP: ICD-10-CM

## 2023-12-13 DIAGNOSIS — M89.8X5 PAIN OF RIGHT FEMUR: Primary | ICD-10-CM

## 2023-12-13 DIAGNOSIS — R10.2 PAIN IN PELVIS: ICD-10-CM

## 2023-12-13 DIAGNOSIS — Z47.89 ORTHOPEDIC AFTERCARE: Primary | ICD-10-CM

## 2023-12-13 DIAGNOSIS — S72.141D CLOSED DISPLACED INTERTROCHANTERIC FRACTURE OF RIGHT FEMUR WITH ROUTINE HEALING, SUBSEQUENT ENCOUNTER: ICD-10-CM

## 2023-12-13 DIAGNOSIS — C34.90 MALIGNANT NEOPLASM OF LUNG, UNSPECIFIED LATERALITY, UNSPECIFIED PART OF LUNG: Chronic | ICD-10-CM

## 2023-12-13 PROCEDURE — 78815 NM PET CT FDG SKULL BASE TO MID THIGH: ICD-10-PCS | Mod: 26,PI,, | Performed by: RADIOLOGY

## 2023-12-13 PROCEDURE — 99024 POSTOP FOLLOW-UP VISIT: CPT | Mod: POP,,, | Performed by: ORTHOPAEDIC SURGERY

## 2023-12-13 PROCEDURE — 72170 X-RAY EXAM OF PELVIS: CPT | Mod: 26,,, | Performed by: RADIOLOGY

## 2023-12-13 PROCEDURE — 99999 PR PBB SHADOW E&M-EST. PATIENT-LVL V: ICD-10-PCS | Mod: PBBFAC,,, | Performed by: ORTHOPAEDIC SURGERY

## 2023-12-13 PROCEDURE — 73552 XR FEMUR 2 VIEW RIGHT: ICD-10-PCS | Mod: 26,RT,, | Performed by: RADIOLOGY

## 2023-12-13 PROCEDURE — 99024 PR POST-OP FOLLOW-UP VISIT: ICD-10-PCS | Mod: POP,,, | Performed by: ORTHOPAEDIC SURGERY

## 2023-12-13 PROCEDURE — 78815 PET IMAGE W/CT SKULL-THIGH: CPT | Mod: 26,PI,, | Performed by: RADIOLOGY

## 2023-12-13 PROCEDURE — 99999 PR PBB SHADOW E&M-EST. PATIENT-LVL V: CPT | Mod: PBBFAC,,, | Performed by: ORTHOPAEDIC SURGERY

## 2023-12-13 PROCEDURE — 73552 X-RAY EXAM OF FEMUR 2/>: CPT | Mod: 26,RT,, | Performed by: RADIOLOGY

## 2023-12-13 PROCEDURE — 73552 X-RAY EXAM OF FEMUR 2/>: CPT | Mod: TC,RT

## 2023-12-13 PROCEDURE — A9552 F18 FDG: HCPCS

## 2023-12-13 PROCEDURE — 72170 XR PELVIS ROUTINE AP: ICD-10-PCS | Mod: 26,,, | Performed by: RADIOLOGY

## 2023-12-13 PROCEDURE — 99215 OFFICE O/P EST HI 40 MIN: CPT | Mod: PBBFAC,25 | Performed by: ORTHOPAEDIC SURGERY

## 2023-12-13 PROCEDURE — 72170 X-RAY EXAM OF PELVIS: CPT | Mod: TC

## 2023-12-13 NOTE — PROGRESS NOTES
Date of Surgery     (11/21/203)  Surgery                   RIGHT Hip Gamma Nail                                Chief Complaint: Post-op Evaluation and Pain of the Right Hip      HPI: Geovani Currie is a 68 y.o.  male who is here for follow-up of his  surgery.  He is accompanied by his wife and daugther.     Today, the patient's pain is moderate.      His wife and daughter report that he was walking nearly 85ft in the rehab facility.    He had a PET scan today.     PROBLEM LIST  Patient Active Problem List   Diagnosis    Altered mental status    Elevated serum creatinine    Anemia    History of cancer    Hx of cancer of lung    UTI (urinary tract infection)    Hyperglycemia    Squamous cell carcinoma of right lung    Centrilobular emphysema    Nephrostomy tube displaced    Acute renal failure superimposed on stage 3 chronic kidney disease    Malignant neoplasm of urinary bladder    Ureteral obstruction, left    Need for tetanus, diphtheria, and acellular pertussis (Tdap) vaccine    Abrasion of right elbow    Displaced intertrochanteric fracture of right femur, initial encounter for closed fracture    Preop examination    Right elbow pain    Right hip pain         Past Medical History:   Diagnosis Date    COPD (chronic obstructive pulmonary disease)     Hypertension        Past Surgical History:   Procedure Laterality Date    APPENDECTOMY      CATARACT EXTRACTION      NEPHROSTOMY      OPEN REDUCTION AND INTERNAL FIXATION (ORIF) OF INTERTROCHANTERIC FRACTURE OF FEMUR Right 11/21/2023    Procedure: ORIF, FRACTURE, FEMUR, INTERTROCHANTERIC;  Surgeon: Tanisha Guidry DO;  Location: [facility] OR;  Service: Orthopedics;  Laterality: Right;  Gamma nail     Family History   Problem Relation Age of Onset    Cancer Father     Cancer Mother      Social History     Socioeconomic History    Marital status:    Tobacco Use    Smoking status: Former     Types: Cigarettes     Passive exposure: Never    Smokeless tobacco:  Never   Substance and Sexual Activity    Alcohol use: Never    Drug use: Never    Sexual activity: Not Currently     Partners: Female     Social Determinants of Health     Financial Resource Strain: High Risk (11/10/2023)    Overall Financial Resource Strain (CARDIA)     Difficulty of Paying Living Expenses: Very hard   Food Insecurity: Food Insecurity Present (11/10/2023)    Hunger Vital Sign     Worried About Running Out of Food in the Last Year: Often true     Ran Out of Food in the Last Year: Often true   Transportation Needs: Unmet Transportation Needs (11/10/2023)    PRAPARE - Transportation     Lack of Transportation (Medical): Yes     Lack of Transportation (Non-Medical): Yes   Physical Activity: Inactive (11/10/2023)    Exercise Vital Sign     Days of Exercise per Week: 0 days     Minutes of Exercise per Session: 0 min   Stress: Stress Concern Present (11/10/2023)    [employer] - Occupational Stress Questionnaire     Feeling of Stress : Rather much   Social Connections: Unknown (11/10/2023)    Social Connection and Isolation Panel [NHANES]     Frequency of Communication with Friends and Family: More than three times a week     Frequency of Social Gatherings with Friends and Family: Never     Attends Faith Services: Patient refused     Active Member of Clubs or Organizations: No     Attends Club or Organization Meetings: Never     Marital Status:    Housing Stability: High Risk (11/10/2023)    Housing Stability Vital Sign     Unable to Pay for Housing in the Last Year: Yes     Number of Places Lived in the Last Year: 3     Unstable Housing in the Last Year: No     Medication List with Changes/Refills   Current Medications    ALBUTEROL (ACCUNEB) 0.63 MG/3 ML NEBU    Take 3 mLs (0.63 mg total) by nebulization 3 (three) times daily as needed (sob, wheezing). Rescue    ALBUTEROL (PROVENTIL/VENTOLIN HFA) 90 MCG/ACTUATION INHALER    Inhale 1-2 puffs into the lungs every 4  (four) hours as needed for Wheezing. Rescue    ALPRAZOLAM (XANAX) 0.5 MG TABLET    Take 2 tablets (1 mg total) by mouth 3 (three) times daily as needed for Anxiety.    CYCLOBENZAPRINE (FLEXERIL) 10 MG TABLET    Take 1 tablet (10 mg total) by mouth 3 (three) times daily as needed for Muscle spasms.    DOCUSATE SODIUM (COLACE) 100 MG CAPSULE    Take 100 mg by mouth 2 (two) times daily.    ESCITALOPRAM OXALATE (LEXAPRO) 20 MG TABLET    Take 1 tablet (20 mg total) by mouth once daily.    FLUTICASONE FUROATE-VILANTEROL (BREO ELLIPTA) 100-25 MCG/DOSE DISKUS INHALER    Inhale 1 puff into the lungs once daily. Controller    HYDROCODONE-ACETAMINOPHEN (NORCO) 5-325 MG PER TABLET    Take 2 tablets by mouth every 6 (six) hours as needed for Pain.    IBUPROFEN (ADVIL,MOTRIN) 800 MG TABLET    Take 800 mg by mouth every 6 (six) hours as needed.    LEVOTHYROXINE (SYNTHROID) 125 MCG TABLET    Take 1 tablet (125 mcg total) by mouth before breakfast.    LINEZOLID (ZYVOX) 600 MG TAB    Take 1 tablet (600 mg total) by mouth every 12 (twelve) hours.    METOPROLOL SUCCINATE (TOPROL-XL) 50 MG 24 HR TABLET    Take 1 tablet (50 mg total) by mouth once daily.    NICOTINE (NICODERM CQ) 21 MG/24 HR    Place 1 patch onto the skin once daily.    OXYCODONE-ACETAMINOPHEN (PERCOCET) 5-325 MG PER TABLET    Take 1 tablet by mouth every 6 (six) hours as needed for Pain.    TAMSULOSIN (FLOMAX) 0.4 MG CAP    Take 1 capsule (0.4 mg total) by mouth once daily.    VITAMIN D (VITAMIN D3) 1000 UNITS TAB    25 mcg.     Review of patient's allergies indicates:  No Known Allergies    Physical Exam:     Wt Readings from Last 1 Encounters:   12/13/23 70.8 kg (156 lb 1.4 oz)     Temp Readings from Last 1 Encounters:   12/13/23 97.2 °F (36.2 °C) (Oral)     BP Readings from Last 1 Encounters:   12/13/23 (!) 87/53     Pulse Readings from Last 1 Encounters:   12/13/23 85       Body mass index is 25.97 kg/m².    General Appearance:   NAD, appears older than stated age,  cooperative    Neurologic:  Alert and oriented x3    Pysch:  Age appropriate    STATION:  Seated in w/c    Musculoskeletal:     Right   leg:  Incisions well healed.  No erythema.  Swelling mild.  Ecchymosis resolved.  ROM of hip/knee good.  Calf soft/NTTP.  Distal neurovascular status intact.                        IMAGING: AP Pelvis/ap-lat femur   Hardware in unchanged position. Fracture in good alignment.      Assessment:       Encounter Diagnoses   Name Primary?    Orthopedic aftercare Yes    Closed displaced intertrochanteric fracture of right femur with routine healing, subsequent encounter               DISCUSSION:   Patient's symptoms, imaging, diagnosis and prognosis reviewed and discussed.  Discussed  healing progression.   Discussed weight bearing status and the progression Discussed the need for compliance with treatment.     Patient given an opportunity to ask questions.       Plan:       Geovani was seen today for post-op evaluation and pain.    Diagnoses and all orders for this visit:    Orthopedic aftercare    Closed displaced intertrochanteric fracture of right femur with routine healing, subsequent encounter  -     Ambulatory referral/consult to Home Health; Future         Weight bearing:    Right   leg As Tolerated   Ice/elevate right hip to help decrease pain and swelling  Home health ordered placed.  Follow-up in 4 weeks with XR    The patient understands, chooses and consents to this plan and accepts all   the risks which include but are not limited to the risks mentioned above.             Tanisha Guidry, DO, CAQSM, [facility]  Orthopaedic Surgeon

## 2023-12-14 ENCOUNTER — TELEPHONE (OUTPATIENT)
Dept: ORTHOPEDICS | Facility: CLINIC | Age: 68
End: 2023-12-14
Payer: MEDICARE

## 2023-12-14 NOTE — TELEPHONE ENCOUNTER
Referral faxed to [facility]  Received: Today  Irene Rendon Staff  Phone Number: [phone]     Good morning,    A home health referral was received on ely Currie MRN [id number].    Patient is currently on service with another  agency, [facility].    Thank you  Irene CAMPOS LPN     [facility]-

## 2023-12-15 ENCOUNTER — TELEPHONE (OUTPATIENT)
Dept: ORTHOPEDICS | Facility: CLINIC | Age: 68
End: 2023-12-15
Payer: MEDICARE

## 2023-12-15 NOTE — TELEPHONE ENCOUNTER
12/13/23 office note faxed       ----- Message from Girish Amrbocio sent at 12/15/2023 10:04 AM CST -----  Contact: Tala / [facility] is calling needing notes from the patients visit. Please fax to [phone]. Contact # is [phone].

## 2023-12-18 ENCOUNTER — OFFICE VISIT (OUTPATIENT)
Dept: HEMATOLOGY/ONCOLOGY | Facility: CLINIC | Age: 68
End: 2023-12-18
Payer: MEDICARE

## 2023-12-18 VITALS
WEIGHT: 155.44 LBS | HEART RATE: 82 BPM | SYSTOLIC BLOOD PRESSURE: 103 MMHG | BODY MASS INDEX: 23.02 KG/M2 | HEIGHT: 69 IN | RESPIRATION RATE: 20 BRPM | OXYGEN SATURATION: 87 % | DIASTOLIC BLOOD PRESSURE: 58 MMHG | TEMPERATURE: 98 F

## 2023-12-18 DIAGNOSIS — C77.2 BLADDER CANCER METASTASIZED TO INTRA-ABDOMINAL LYMPH NODES: ICD-10-CM

## 2023-12-18 DIAGNOSIS — C34.11 SQUAMOUS CELL CARCINOMA OF BRONCHUS IN RIGHT UPPER LOBE: Primary | ICD-10-CM

## 2023-12-18 DIAGNOSIS — C67.9 BLADDER CANCER METASTASIZED TO INTRA-ABDOMINAL LYMPH NODES: ICD-10-CM

## 2023-12-18 PROCEDURE — 99215 OFFICE O/P EST HI 40 MIN: CPT | Mod: S$PBB,,, | Performed by: HOSPITALIST

## 2023-12-18 PROCEDURE — 99999 PR PBB SHADOW E&M-EST. PATIENT-LVL IV: ICD-10-PCS | Mod: PBBFAC,,, | Performed by: HOSPITALIST

## 2023-12-18 PROCEDURE — 99214 OFFICE O/P EST MOD 30 MIN: CPT | Mod: PBBFAC | Performed by: HOSPITALIST

## 2023-12-18 PROCEDURE — 99999 PR PBB SHADOW E&M-EST. PATIENT-LVL IV: CPT | Mod: PBBFAC,,, | Performed by: HOSPITALIST

## 2023-12-18 PROCEDURE — 99215 PR OFFICE/OUTPT VISIT, EST, LEVL V, 40-54 MIN: ICD-10-PCS | Mod: S$PBB,,, | Performed by: HOSPITALIST

## 2023-12-18 NOTE — PROGRESS NOTES
[facility] MEDICAL ONCOLOGY - FOLLOW UP VISIT    Reason for visit: Follow up for squamous cell carcinoma of the RUL      Oncology History   Squamous cell carcinoma of right lung   7/22/2023 Imaging Significant Findings    PET CT ([facility])  - 1.1 x 1.0 cm spiculated RUL hypermetabolic pulmonary nodule, SUV 3.32  - L sided urinary bladder mass w/ L sided nephroureteral stents in place, and severe hydroureteronephrosis  - Multiple prominent or mildly enlarged L para-aortic LN w/ mildly increased radiotracer activity, could be reactive or represent metastatic involvement from bladder tumor     9/26/2023 Imaging Significant Findings    CT C, Non-Con ([facility])  - 1.7 x 1.2 cm spiculated nodule in RUL, increased from prior  - 0.7 mm satellite nodule, increased in size     10/11/2023 Procedure    RUL, CT Guided Bx ([facility])  - Minute focus (approximately 16 cells), marked atypical cells identified. Favor squamous cell carcinoma (p40, AE1/AE3 positive; TTF-1, napsin A, synaptophysin, CD68 negative)     10/21/2023 Imaging Significant Findings    MRI Brain  - No evidence of intracranial metastatic disease     11/26/2023 Cancer Staged    Staging form: Lung, AJCC 8th Edition  - Clinical: Stage IA2 (cT1b, cN0, cM0)     12/13/2023 Imaging Significant Findings    PET CT  - 2.0 x 1.3 cm R lung apex nodule, hypermetabolic  - No hypermetabolic intrathoracic or axillary LAD  - Circumferential urinary bladder wall thickening w/ circumferential heterogenous increased uptake involving L side of mesorectal fascia  - Curvilinear intraluminal calcifications within urinary bladder along L lateral wall  - 9 hypermetabolic pelvic LN     Malignant neoplasm of urinary bladder   7/6/2020 Procedure    TURBT  - Reportedly stage cT3b UCB, however radiation oncology notes describe T2a disease       9/29/2020 - 11/25/2020 Radiation Therapy    CRT with Gemcitabine  35 fractions IMRT/IGRT     8/30/2023 Procedure     "TURBT  Path: Invasive high-grade urothelial carcinoma, muscularis propria is present and involved by carcinoma     10/16/2023 Notable Event    Urology appointment  "Discussed urinary retention, will need L ureteral stent exchange and exchange of R PCNU in 12/2023. Will need salvage chemothearpy or immunotherapy for recurrent metastatic bladder cancer"  - Dr. Grimaldo     10/20/2023 - 10/25/2023 Hospital Admission    Presented from airport after flying from [place] for worsening weakness and lethargy. Imaging demonstrate remote infarctions but no acute CNS processes. Found to have an enterobacter infection. Started on ertapenem.      11/10/2023 - 11/17/2023 Hospital Admission    Admitted after inadvertently pulling out nephrostomy tube. Was on treatment for Enterobacter UTI with ertapenem, found that this was not sufficient coverage and changed to linezolid. Nephrostomy tube replaced.      12/13/2023 Imaging Significant Findings    PET CT  - 2.0 x 1.3 cm R lung apex nodule, hypermetabolic  - No hypermetabolic intrathoracic or axillary LAD  - Circumferential urinary bladder wall thickening w/ circumferential heterogenous increased uptake involving L side of mesorectal fascia  - Curvilinear intraluminal calcifications within urinary bladder along L lateral wall  - 9 hypermetabolic pelvic LN            HPI:     Geovani Currie is a 68 y.o. male with pmh significant for b/l nephrostomy tubes c/b multiple UTIs, COPD, HTN not on medication, sciatica, anxiety/depression, spinal stenosis, h/o CVA, and multiple primary cancers who presents to establish care:     1) Recurrent muscle invasive urothelial carcinoma, initially diagnosed as stage cT3b disease on 7/6/23, s/p TURBT (7/6/23), CRT with gemcitabine (9/29/2020 - 11/25/2020), found to have recurrent muscle invasive disease via TURBT (8/30/23), course complicated by urinary retention and b/l percutaneous nephrostomy tubes w/ multiple UTIs    2) Stage IA2 ([id number]) squamous cell " "carcinoma of the RUL (no NGS, PD-L1), initially dx'd 10/11/23, currently treatment naive    Last clinic 11/20/23, obtain original images and also repeat PET CT now given duration. RTC after PET, then referral to radiation oncology vs CT surgery.     Interval History:   - 11/20/23 - 11/27/23: Admitted for GLF, s/p ORIF on 11/21/23. Penile discharge, deemed likely due to bladder cancer. Required 1 u PRBC. Discharged to rehab.   - 12/09/23: ED visit for decreased output from L nephrostomy tube  - 12/11/23: Urology, sediment likely due to chronic bacterial colonization. Mucus urethral drainage is normal.   - 12/12/23: PCP, hospital follow up  - 12/13/23: PET CT, 2.0 x 1.3 cm R lung apex nodule w/o new nodules or FDG avid LAD  - 12/14/23: Orthopedic follow up, pt walking 85 ft at rehab facility. Home rehab pending. F/u in 4 weeks w/ XR.     The pt states that he is "alright" today. He says that his chronic sciatica pain has been bothering him, and the RLE pain is worse since his fracture as well. He reports that he had been rehabbing well, however had the "stomach flu" over the weekend and has regressed somewhat (notes that he had a few episodes of emesis and was not walking much for three days). Otherwise, he denies new or bothersome symptoms at this time. He presents with his wife and daughter and they are eager to learn the results of the PET scan. Of note, he has lost approximately 10 lbs in the past 1 month.       History has been obtained by chart review and discussion with the patient.    Past Medical History:   Past Medical History:   Diagnosis Date    COPD (chronic obstructive pulmonary disease)     Hypertension         Past Surgical History:   Past Surgical History:   Procedure Laterality Date    APPENDECTOMY      CATARACT EXTRACTION      NEPHROSTOMY      OPEN REDUCTION AND INTERNAL FIXATION (ORIF) OF INTERTROCHANTERIC FRACTURE OF FEMUR Right 11/21/2023    Procedure: ORIF, FRACTURE, FEMUR, INTERTROCHANTERIC;  " Surgeon: Tanisha Guidry DO;  Location: [facility] OR;  Service: Orthopedics;  Laterality: Right;  Gamma nail        Family History:   Family History   Problem Relation Age of Onset    Cancer Father     Cancer Mother         Social History:   Social History     Tobacco Use    Smoking status: Former     Types: Cigarettes     Passive exposure: Never    Smokeless tobacco: Never   Substance Use Topics    Alcohol use: Never        I have reviewed and updated the patient's past medical, surgical, family and social histories.      ROS:   As per HPI.     Allergies:   Review of patient's allergies indicates:  No Known Allergies     Medications:   Current Outpatient Medications   Medication Sig Dispense Refill    albuterol (ACCUNEB) 0.63 mg/3 mL Nebu Take 3 mLs (0.63 mg total) by nebulization 3 (three) times daily as needed (sob, wheezing). Rescue 75 mL 3    albuterol (PROVENTIL/VENTOLIN HFA) 90 mcg/actuation inhaler Inhale 1-2 puffs into the lungs every 4 (four) hours as needed for Wheezing. Rescue 18 g 11    ALPRAZolam (XANAX) 0.5 MG tablet Take 2 tablets (1 mg total) by mouth 3 (three) times daily as needed for Anxiety. 60 tablet 2    cyclobenzaprine (FLEXERIL) 10 MG tablet Take 1 tablet (10 mg total) by mouth 3 (three) times daily as needed for Muscle spasms. 270 tablet 3    docusate sodium (COLACE) 100 MG capsule Take 100 mg by mouth 2 (two) times daily.      EScitalopram oxalate (LEXAPRO) 20 MG tablet Take 1 tablet (20 mg total) by mouth once daily. 90 tablet 3    fluticasone furoate-vilanteroL (BREO ELLIPTA) 100-25 mcg/dose diskus inhaler Inhale 1 puff into the lungs once daily. Controller 30 each 11    HYDROcodone-acetaminophen (NORCO) 5-325 mg per tablet Take 2 tablets by mouth every 6 (six) hours as needed for Pain. 240 tablet 0    ibuprofen (ADVIL,MOTRIN) 800 MG tablet Take 800 mg by mouth every 6 (six) hours as needed.      levothyroxine (SYNTHROID) 125 MCG tablet Take 1 tablet (125 mcg total) by mouth before  "breakfast. 90 tablet 3    linezolid (ZYVOX) 600 mg Tab Take 1 tablet (600 mg total) by mouth every 12 (twelve) hours. 14 tablet 0    metoprolol succinate (TOPROL-XL) 50 MG 24 hr tablet Take 1 tablet (50 mg total) by mouth once daily. 90 tablet 3    nicotine (NICODERM CQ) 21 mg/24 hr Place 1 patch onto the skin once daily. (Patient taking differently: Place 1 patch onto the skin once daily. Dr ordered for patient to put on hold because blood pressure hasn't been high) 90 patch 3    oxyCODONE-acetaminophen (PERCOCET) 5-325 mg per tablet Take 1 tablet by mouth every 6 (six) hours as needed for Pain. 24 tablet 0    tamsulosin (FLOMAX) 0.4 mg Cap Take 1 capsule (0.4 mg total) by mouth once daily. 90 capsule 3    vitamin D (VITAMIN D3) 1000 units Tab 25 mcg.       No current facility-administered medications for this visit.          Physical Exam:       BP (!) 103/58   Pulse 82   Temp 97.6 °F (36.4 °C) (Temporal)   Resp 20   Ht 5' 9" (1.753 m)   Wt 70.5 kg (155 lb 6.8 oz)   SpO2 (!) 87%   BMI 22.95 kg/m²                Physical Exam  Constitutional:       Comments: Frail appearing, sitting in wheelchair   HENT:      Head: Normocephalic and atraumatic.   Eyes:      Extraocular Movements: Extraocular movements intact.      Conjunctiva/sclera: Conjunctivae normal.      Pupils: Pupils are equal, round, and reactive to light.   Cardiovascular:      Rate and Rhythm: Normal rate and regular rhythm.      Heart sounds: Murmur (2/6 systolic murmur) heard.      No friction rub. No gallop.   Pulmonary:      Effort: Pulmonary effort is normal.      Breath sounds: No wheezing, rhonchi or rales.   Genitourinary:     Comments: B/l nephrostomy tubes, bot with clear yellow urine output  Musculoskeletal:         General: Normal range of motion.      Right lower leg: No edema.      Left lower leg: No edema.   Skin:     General: Skin is warm and dry.   Neurological:      Mental Status: He is alert.      Comments: Somewhat slow to " "respond to questions, appears confused when answering some questions   Psychiatric:         Mood and Affect: Mood normal.         Thought Content: Thought content normal.         Judgment: Judgment normal.           Labs:   No results found for this or any previous visit (from the past 48 hour(s)).         Imaging:    See oncologic history above.     Path:  See oncologic history above.      Assessment and Plan:     Geovani Currie is a 68 y.o. male with pmh significant for b/l nephrostomy tubes c/b multiple UTIs, COPD, HTN not on medication, sciatica, anxiety/depression, spinal stenosis, h/o CVA, and multiple primary cancers who presents to establish care:     1) Recurrent muscle invasive urothelial carcinoma, initially diagnosed as stage cT3b disease on 7/6/23, s/p TURBT (7/6/23), CRT with gemcitabine (9/29/2020 - 11/25/2020), found to have recurrent muscle invasive disease via TURBT (8/30/23), course complicated by urinary retention and b/l percutaneous nephrostomy tubes w/ multiple UTIs    2) Stage IA2 ([id number]) squamous cell carcinoma of the RUL (no NGS, PD-L1), initially dx'd 10/11/23, currently treatment naive      Stage IA2 Squamous Cell Carcinoma of the RUL  ECOG PS 2 (limited by unstable gait, weakness, recent UTI). Initial biopsy with only 16 atypical cells which were "favored" to represent squamous cell carcinoma. Patient presents today for follow up. Since last visit, a new baseline PET CT again demonstrates T1b disease without hypermetabolic LAD (MRI brain on 10/21/23 w/ [facility]). I discussed with the patient that this represents stage IA2 disease, treatment for which is typically surgery or radiation therapy alone. Given the patient's multiple other co-morbidities, including recurrent urothelial cancer and recurrent UTIs w/ b/l nephrostomy tubes, I favor that he would be a better candidate for SBRT than for thoracic surgery. He and his family understand and agree with this plan.     PLAN:   -- Referral to " radiation oncology for evaluation for SBRT      Recurrent Muscle-Invasive Urothelial Carcinoma  ECOG PS 2 (limited by unstable gait, weakness, recent UTI).  Oncologic history as above, however briefly patient was initially diagnosed with muscle invasive urothelial carcinoma on 07/06/2020 by TURBT (reportedly T3b disease) for which he received CRT with w/ gemcitabine.  Treatment was completed in 11/2020 and patient was followed on surveillance subsequently.  A TURBT on 08/30/2023 demonstrated recurrent muscle invasive disease.  Per outside urology notes, plan was for salvage chemotherapy or immunotherapy.  CT A/P on 11/10/23 at [facility] demonstrates bladder wall thickening. His course has been complicated by urinary outlet obstruction s/p bilateral percutaneous nephrostomy tubes and multiply recurrent UTI; he has been admitted at least 4 times in the past 3-4 months, most recently discharged on 11/17/2023 for linezolid for Enterobacter UTI.  The entirety of his previous workup and treatment was in [place] at [facility], though he has established care with Dr. Jorgensen of Urology in [place] as well as Dr. Sanchez at [facility]; records are now uploaded (Dr. Santiago, radiation oncology; Dr. Wagner, medical oncology; Dr. Grimaldo and Dr. Avitia, urology).      PLAN:   -- Referral to Dr. Frazier for 12/22/23      The above information has been reviewed with the patient and all questions have been answered to their apparent satisfaction.  They understand that they can call the clinic with any questions.    Jett Wagoner MD  Hematology/Oncology  [facility]        Med Onc Chart Routing      Follow up with physician . RTC   Follow up with [facility]    Infusion scheduling note    Injection scheduling note    Labs    Imaging    Pharmacy appointment    Other referrals         Dr. Frazier on 12/22/23; radiation oncology

## 2023-12-18 NOTE — Clinical Note
Demetrius: Sending this patient to you on Friday for recurrent muscle-invasive urothelial carcinoma. He was worked up and previously treated in [place], records now uploaded. My note mostly reflects this info, though there may be some more details to fish out of the records themselves. For his lung cancer, he just needs radiation therapy.   Team: Radiation oncology referral in place for this patient's lung cancer. I believe Fred has already made the Friday  appointment.

## 2023-12-19 ENCOUNTER — TELEPHONE (OUTPATIENT)
Dept: RADIATION ONCOLOGY | Facility: CLINIC | Age: 68
End: 2023-12-19
Payer: MEDICARE

## 2023-12-20 PROBLEM — Z85.9 HISTORY OF CANCER: Status: RESOLVED | Noted: 2023-10-21 | Resolved: 2023-12-20

## 2023-12-21 ENCOUNTER — HOSPITAL ENCOUNTER (INPATIENT)
Facility: HOSPITAL | Age: 68
LOS: 2 days | Discharge: HOME HEALTH | DRG: 698 | End: 2023-12-24
Attending: EMERGENCY MEDICINE | Admitting: INTERNAL MEDICINE
Payer: MEDICARE

## 2023-12-21 DIAGNOSIS — R41.82 AMS (ALTERED MENTAL STATUS): ICD-10-CM

## 2023-12-21 DIAGNOSIS — N17.9 AKI (ACUTE KIDNEY INJURY): Primary | ICD-10-CM

## 2023-12-21 DIAGNOSIS — N13.5 URETERAL OBSTRUCTION, LEFT: ICD-10-CM

## 2023-12-21 DIAGNOSIS — C34.91 SQUAMOUS CELL CARCINOMA OF RIGHT LUNG: ICD-10-CM

## 2023-12-21 DIAGNOSIS — C67.9 MALIGNANT NEOPLASM OF URINARY BLADDER, UNSPECIFIED SITE: ICD-10-CM

## 2023-12-21 DIAGNOSIS — R07.9 CHEST PAIN: ICD-10-CM

## 2023-12-21 DIAGNOSIS — N30.01 ACUTE CYSTITIS WITH HEMATURIA: ICD-10-CM

## 2023-12-21 PROBLEM — Z51.5 ENCOUNTER FOR PALLIATIVE CARE: Status: ACTIVE | Noted: 2023-12-21

## 2023-12-21 LAB
ALBUMIN SERPL BCP-MCNC: 2.7 G/DL (ref 3.5–5.2)
ALP SERPL-CCNC: 113 U/L (ref 55–135)
ALT SERPL W/O P-5'-P-CCNC: 10 U/L (ref 10–44)
AMMONIA PLAS-SCNC: 19 UMOL/L (ref 10–50)
ANION GAP SERPL CALC-SCNC: 11 MMOL/L (ref 8–16)
AST SERPL-CCNC: 11 U/L (ref 10–40)
BACTERIA #/AREA URNS HPF: ABNORMAL /HPF
BASOPHILS # BLD AUTO: 0.05 K/UL (ref 0–0.2)
BASOPHILS NFR BLD: 0.3 % (ref 0–1.9)
BILIRUB SERPL-MCNC: 0.4 MG/DL (ref 0.1–1)
BILIRUB UR QL STRIP: NEGATIVE
BNP SERPL-MCNC: 313 PG/ML (ref 0–99)
BUN SERPL-MCNC: 28 MG/DL (ref 8–23)
CALCIUM SERPL-MCNC: 9.7 MG/DL (ref 8.7–10.5)
CHLORIDE SERPL-SCNC: 102 MMOL/L (ref 95–110)
CK SERPL-CCNC: 142 U/L (ref 20–200)
CLARITY UR: CLEAR
CO2 SERPL-SCNC: 22 MMOL/L (ref 23–29)
COLOR UR: YELLOW
CREAT SERPL-MCNC: 2.5 MG/DL (ref 0.5–1.4)
DIFFERENTIAL METHOD BLD: ABNORMAL
EOSINOPHIL # BLD AUTO: 0.1 K/UL (ref 0–0.5)
EOSINOPHIL NFR BLD: 0.3 % (ref 0–8)
ERYTHROCYTE [DISTWIDTH] IN BLOOD BY AUTOMATED COUNT: 18.5 % (ref 11.5–14.5)
EST. GFR  (NO RACE VARIABLE): 27 ML/MIN/1.73 M^2
GLUCOSE SERPL-MCNC: 124 MG/DL (ref 70–110)
GLUCOSE UR QL STRIP: NEGATIVE
HCT VFR BLD AUTO: 25.1 % (ref 40–54)
HGB BLD-MCNC: 8 G/DL (ref 14–18)
HGB UR QL STRIP: ABNORMAL
HYALINE CASTS #/AREA URNS LPF: 0 /LPF
IMM GRANULOCYTES # BLD AUTO: 0.07 K/UL (ref 0–0.04)
IMM GRANULOCYTES NFR BLD AUTO: 0.4 % (ref 0–0.5)
KETONES UR QL STRIP: NEGATIVE
LEUKOCYTE ESTERASE UR QL STRIP: ABNORMAL
LYMPHOCYTES # BLD AUTO: 0.6 K/UL (ref 1–4.8)
LYMPHOCYTES NFR BLD: 3.8 % (ref 18–48)
MCH RBC QN AUTO: 28.1 PG (ref 27–31)
MCHC RBC AUTO-ENTMCNC: 31.9 G/DL (ref 32–36)
MCV RBC AUTO: 88 FL (ref 82–98)
MICROSCOPIC COMMENT: ABNORMAL
MONOCYTES # BLD AUTO: 0.9 K/UL (ref 0.3–1)
MONOCYTES NFR BLD: 5.4 % (ref 4–15)
NEUTROPHILS # BLD AUTO: 15 K/UL (ref 1.8–7.7)
NEUTROPHILS NFR BLD: 89.8 % (ref 38–73)
NITRITE UR QL STRIP: NEGATIVE
NRBC BLD-RTO: 0 /100 WBC
PH UR STRIP: 8 [PH] (ref 5–8)
PLATELET # BLD AUTO: 237 K/UL (ref 150–450)
PMV BLD AUTO: 9.6 FL (ref 9.2–12.9)
POTASSIUM SERPL-SCNC: 4.5 MMOL/L (ref 3.5–5.1)
PROT SERPL-MCNC: 6.6 G/DL (ref 6–8.4)
PROT UR QL STRIP: ABNORMAL
RBC # BLD AUTO: 2.85 M/UL (ref 4.6–6.2)
RBC #/AREA URNS HPF: 10 /HPF (ref 0–4)
SODIUM SERPL-SCNC: 135 MMOL/L (ref 136–145)
SP GR UR STRIP: 1.01 (ref 1–1.03)
TROPONIN I SERPL DL<=0.01 NG/ML-MCNC: <0.006 NG/ML (ref 0–0.03)
URN SPEC COLLECT METH UR: ABNORMAL
UROBILINOGEN UR STRIP-ACNC: NEGATIVE EU/DL
WBC # BLD AUTO: 16.75 K/UL (ref 3.9–12.7)
WBC #/AREA URNS HPF: >100 /HPF (ref 0–5)
WBC CLUMPS URNS QL MICRO: ABNORMAL

## 2023-12-21 PROCEDURE — 87086 URINE CULTURE/COLONY COUNT: CPT | Performed by: EMERGENCY MEDICINE

## 2023-12-21 PROCEDURE — 84484 ASSAY OF TROPONIN QUANT: CPT | Performed by: EMERGENCY MEDICINE

## 2023-12-21 PROCEDURE — 25000003 PHARM REV CODE 250: Performed by: INTERNAL MEDICINE

## 2023-12-21 PROCEDURE — G0378 HOSPITAL OBSERVATION PER HR: HCPCS

## 2023-12-21 PROCEDURE — 25000003 PHARM REV CODE 250: Performed by: EMERGENCY MEDICINE

## 2023-12-21 PROCEDURE — 81000 URINALYSIS NONAUTO W/SCOPE: CPT | Performed by: EMERGENCY MEDICINE

## 2023-12-21 PROCEDURE — 93005 ELECTROCARDIOGRAM TRACING: CPT

## 2023-12-21 PROCEDURE — 85025 COMPLETE CBC W/AUTO DIFF WBC: CPT | Performed by: EMERGENCY MEDICINE

## 2023-12-21 PROCEDURE — 0T9330Z DRAINAGE OF RIGHT KIDNEY PELVIS WITH DRAINAGE DEVICE, PERCUTANEOUS APPROACH: ICD-10-PCS | Performed by: RADIOLOGY

## 2023-12-21 PROCEDURE — 82550 ASSAY OF CK (CPK): CPT | Performed by: EMERGENCY MEDICINE

## 2023-12-21 PROCEDURE — 25500020 PHARM REV CODE 255: Performed by: INTERNAL MEDICINE

## 2023-12-21 PROCEDURE — 96365 THER/PROPH/DIAG IV INF INIT: CPT

## 2023-12-21 PROCEDURE — 99291 CRITICAL CARE FIRST HOUR: CPT

## 2023-12-21 PROCEDURE — S4991 NICOTINE PATCH NONLEGEND: HCPCS | Performed by: INTERNAL MEDICINE

## 2023-12-21 PROCEDURE — 99498 ADVNCD CARE PLAN ADDL 30 MIN: CPT | Mod: ,,, | Performed by: NURSE PRACTITIONER

## 2023-12-21 PROCEDURE — 99204 OFFICE O/P NEW MOD 45 MIN: CPT | Mod: ,,, | Performed by: NURSE PRACTITIONER

## 2023-12-21 PROCEDURE — 80053 COMPREHEN METABOLIC PANEL: CPT | Performed by: EMERGENCY MEDICINE

## 2023-12-21 PROCEDURE — 93010 ELECTROCARDIOGRAM REPORT: CPT | Mod: ,,, | Performed by: INTERNAL MEDICINE

## 2023-12-21 PROCEDURE — 82140 ASSAY OF AMMONIA: CPT | Performed by: EMERGENCY MEDICINE

## 2023-12-21 PROCEDURE — 63600175 PHARM REV CODE 636 W HCPCS: Performed by: INTERNAL MEDICINE

## 2023-12-21 PROCEDURE — 99497 ADVNCD CARE PLAN 30 MIN: CPT | Mod: 25,,, | Performed by: NURSE PRACTITIONER

## 2023-12-21 PROCEDURE — 63600175 PHARM REV CODE 636 W HCPCS: Performed by: EMERGENCY MEDICINE

## 2023-12-21 PROCEDURE — 83880 ASSAY OF NATRIURETIC PEPTIDE: CPT | Performed by: EMERGENCY MEDICINE

## 2023-12-21 RX ORDER — IBUPROFEN 200 MG
16 TABLET ORAL
Status: DISCONTINUED | OUTPATIENT
Start: 2023-12-21 | End: 2023-12-24 | Stop reason: HOSPADM

## 2023-12-21 RX ORDER — POLYETHYLENE GLYCOL 3350 17 G/17G
17 POWDER, FOR SOLUTION ORAL DAILY
Status: DISCONTINUED | OUTPATIENT
Start: 2023-12-21 | End: 2023-12-24 | Stop reason: HOSPADM

## 2023-12-21 RX ORDER — DOCUSATE SODIUM 100 MG/1
100 CAPSULE, LIQUID FILLED ORAL 2 TIMES DAILY
Status: DISCONTINUED | OUTPATIENT
Start: 2023-12-21 | End: 2023-12-21

## 2023-12-21 RX ORDER — IBUPROFEN 200 MG
1 TABLET ORAL
COMMUNITY

## 2023-12-21 RX ORDER — LEVOTHYROXINE SODIUM 125 UG/1
125 TABLET ORAL
Status: DISCONTINUED | OUTPATIENT
Start: 2023-12-22 | End: 2023-12-24 | Stop reason: HOSPADM

## 2023-12-21 RX ORDER — IBUPROFEN 200 MG
24 TABLET ORAL
Status: DISCONTINUED | OUTPATIENT
Start: 2023-12-21 | End: 2023-12-24 | Stop reason: HOSPADM

## 2023-12-21 RX ORDER — SODIUM CHLORIDE 9 MG/ML
INJECTION, SOLUTION INTRAVENOUS CONTINUOUS
Status: DISCONTINUED | OUTPATIENT
Start: 2023-12-21 | End: 2023-12-23

## 2023-12-21 RX ORDER — OXYCODONE AND ACETAMINOPHEN 10; 325 MG/1; MG/1
1 TABLET ORAL EVERY 4 HOURS PRN
Status: DISCONTINUED | OUTPATIENT
Start: 2023-12-21 | End: 2023-12-24 | Stop reason: HOSPADM

## 2023-12-21 RX ORDER — ESCITALOPRAM OXALATE 10 MG/1
20 TABLET ORAL DAILY
Status: DISCONTINUED | OUTPATIENT
Start: 2023-12-22 | End: 2023-12-24 | Stop reason: HOSPADM

## 2023-12-21 RX ORDER — TALC
6 POWDER (GRAM) TOPICAL NIGHTLY PRN
Status: DISCONTINUED | OUTPATIENT
Start: 2023-12-21 | End: 2023-12-24 | Stop reason: HOSPADM

## 2023-12-21 RX ORDER — AMOXICILLIN 250 MG
1 CAPSULE ORAL 2 TIMES DAILY
Status: DISCONTINUED | OUTPATIENT
Start: 2023-12-21 | End: 2023-12-24 | Stop reason: HOSPADM

## 2023-12-21 RX ORDER — LINEZOLID 2 MG/ML
600 INJECTION, SOLUTION INTRAVENOUS
Status: DISCONTINUED | OUTPATIENT
Start: 2023-12-21 | End: 2023-12-21

## 2023-12-21 RX ORDER — ONDANSETRON 2 MG/ML
4 INJECTION INTRAMUSCULAR; INTRAVENOUS EVERY 8 HOURS PRN
Status: DISCONTINUED | OUTPATIENT
Start: 2023-12-21 | End: 2023-12-24 | Stop reason: HOSPADM

## 2023-12-21 RX ORDER — METOPROLOL SUCCINATE 50 MG/1
50 TABLET, EXTENDED RELEASE ORAL DAILY
Status: DISCONTINUED | OUTPATIENT
Start: 2023-12-22 | End: 2023-12-24 | Stop reason: HOSPADM

## 2023-12-21 RX ORDER — MORPHINE SULFATE 4 MG/ML
2 INJECTION, SOLUTION INTRAMUSCULAR; INTRAVENOUS ONCE
Status: COMPLETED | OUTPATIENT
Start: 2023-12-21 | End: 2023-12-21

## 2023-12-21 RX ORDER — ALPRAZOLAM 1 MG/1
1 TABLET ORAL 3 TIMES DAILY PRN
Status: DISCONTINUED | OUTPATIENT
Start: 2023-12-21 | End: 2023-12-24 | Stop reason: HOSPADM

## 2023-12-21 RX ORDER — SODIUM CHLORIDE 0.9 % (FLUSH) 0.9 %
3 SYRINGE (ML) INJECTION EVERY 8 HOURS PRN
Status: DISCONTINUED | OUTPATIENT
Start: 2023-12-21 | End: 2023-12-24 | Stop reason: HOSPADM

## 2023-12-21 RX ORDER — ACETAMINOPHEN 325 MG/1
650 TABLET ORAL EVERY 8 HOURS PRN
Status: DISCONTINUED | OUTPATIENT
Start: 2023-12-21 | End: 2023-12-24 | Stop reason: HOSPADM

## 2023-12-21 RX ORDER — TAMSULOSIN HYDROCHLORIDE 0.4 MG/1
0.4 CAPSULE ORAL DAILY
Status: DISCONTINUED | OUTPATIENT
Start: 2023-12-22 | End: 2023-12-24 | Stop reason: HOSPADM

## 2023-12-21 RX ORDER — IBUPROFEN 200 MG
1 TABLET ORAL
Status: DISCONTINUED | OUTPATIENT
Start: 2023-12-21 | End: 2023-12-24 | Stop reason: HOSPADM

## 2023-12-21 RX ORDER — ALBUTEROL SULFATE 0.83 MG/ML
2.5 SOLUTION RESPIRATORY (INHALATION) EVERY 6 HOURS PRN
Status: DISCONTINUED | OUTPATIENT
Start: 2023-12-21 | End: 2023-12-24 | Stop reason: HOSPADM

## 2023-12-21 RX ORDER — ENOXAPARIN SODIUM 100 MG/ML
30 INJECTION SUBCUTANEOUS EVERY 24 HOURS
Status: DISCONTINUED | OUTPATIENT
Start: 2023-12-22 | End: 2023-12-23

## 2023-12-21 RX ORDER — ACETAMINOPHEN 325 MG/1
650 TABLET ORAL EVERY 4 HOURS PRN
Status: DISCONTINUED | OUTPATIENT
Start: 2023-12-21 | End: 2023-12-24 | Stop reason: HOSPADM

## 2023-12-21 RX ORDER — CYCLOBENZAPRINE HCL 10 MG
10 TABLET ORAL 3 TIMES DAILY PRN
Status: DISCONTINUED | OUTPATIENT
Start: 2023-12-21 | End: 2023-12-24 | Stop reason: HOSPADM

## 2023-12-21 RX ORDER — GLUCAGON 1 MG
1 KIT INJECTION
Status: DISCONTINUED | OUTPATIENT
Start: 2023-12-21 | End: 2023-12-24 | Stop reason: HOSPADM

## 2023-12-21 RX ORDER — NALOXONE HCL 0.4 MG/ML
0.02 VIAL (ML) INJECTION
Status: DISCONTINUED | OUTPATIENT
Start: 2023-12-21 | End: 2023-12-24 | Stop reason: HOSPADM

## 2023-12-21 RX ADMIN — OXYCODONE AND ACETAMINOPHEN 1 TABLET: 10; 325 TABLET ORAL at 08:12

## 2023-12-21 RX ADMIN — SENNOSIDES AND DOCUSATE SODIUM 1 TABLET: 8.6; 5 TABLET ORAL at 08:12

## 2023-12-21 RX ADMIN — ALPRAZOLAM 1 MG: 1 TABLET ORAL at 08:12

## 2023-12-21 RX ADMIN — CEFTRIAXONE 1 G: 1 INJECTION, POWDER, FOR SOLUTION INTRAMUSCULAR; INTRAVENOUS at 09:12

## 2023-12-21 RX ADMIN — MORPHINE SULFATE 2 MG: 4 INJECTION INTRAVENOUS at 01:12

## 2023-12-21 RX ADMIN — IOHEXOL 20 ML: 300 INJECTION, SOLUTION INTRAVENOUS at 04:12

## 2023-12-21 RX ADMIN — SODIUM CHLORIDE: 9 INJECTION, SOLUTION INTRAVENOUS at 01:12

## 2023-12-21 RX ADMIN — DAPTOMYCIN 500 MG: 500 INJECTION, POWDER, LYOPHILIZED, FOR SOLUTION INTRAVENOUS at 06:12

## 2023-12-21 RX ADMIN — NICOTINE 1 PATCH: 14 PATCH, EXTENDED RELEASE TRANSDERMAL at 08:12

## 2023-12-21 RX ADMIN — SODIUM CHLORIDE 1000 ML: 9 INJECTION, SOLUTION INTRAVENOUS at 09:12

## 2023-12-21 NOTE — HPI
History of Present Illness: Geovani Currie is a 68 y.o. male patient with a PMHx of COPD, HTN, Right lung cancer, and metastatic bladder cancer, bilateral ureteral obstruction s/p bilateral nephrostomy tubes, and falls in which he was recently at  rehab due to right femur fracture. He presented to the Emergency Department for AMS, onset this morning. EMS reports that the pt was altered this morning and pulled out his nephrostomy tube in which right nephrostomy was replaced by Dr. Sr. Patient with multiple, recurrent UTIs as well. Patient followed by oncology in which it was noted that in the setting of lung cancer they discussed treatment vs surgery in which SBRT was chosen due to multiple co-morbidities, including recurrent urothelial cancer and recurrent UTIs with bilateral nephrostomy tubes. Patient followed by urology as well in which it was noted for urothelial cancer treatment would likely be palliative and not curative in nature. Patient was scheduled to see palliative medicine in clinic in January 2024 but presented to hospital prior to thus palliative medicine was consulted for goals of care and advance care planning.

## 2023-12-21 NOTE — CONSULTS
12/21/23 1500   Post-Acute Status   Post-Acute Authorization Hospice   Hospice Status Referrals Sent   Discharge Delays None known at this time   Discharge Plan   Discharge Plan A Hospice/home     Referral sent to [facility].

## 2023-12-21 NOTE — ASSESSMENT & PLAN NOTE
-Code status: DNR/I, defer [place]   -HCPOA: PM team completed document. Pt designated his wife, Chantal Currie ([phone]), as his healthcare agent and his daughter, Stephania Mann ([phone]), as an alternative healthcare agent. HCPOA was scanned into Epic and returned to pt with copies.   -GOC: Focus on spending time at home, avoiding the hospital, remaining as independent as possible, symptom/pain control, quality of life, even if it means sacrificing a little time, and comfort and QOL. Accordingly, we have decided that the best plan to meet the patient's goals includes enrolling in hospice care with [facility]. Info visit today.  -See HPI for further details

## 2023-12-21 NOTE — ED PROVIDER NOTES
SCRIBE #1 NOTE: I, Bruce Bashir, am scribing for, and in the presence of, Vin Cat MD. I have scribed the entire note.      History      Chief Complaint   Patient presents with    Altered Mental Status     Altered mental status, pulled nephrostomy tube out this am     Review of patient's allergies indicates:  No Known Allergies     HPI   HPI    12/21/2023, 7:58 AM   History obtained from EMS  HPI and ROS limited secondary to pt's AMS      History of Present Illness: Geovani Currie is a 68 y.o. male patient with a PMHx of COPD, HTN, bladder cancer who presents to the Emergency Department for AMS, onset this morning. EMS reports that the pt was altered this morning and pulled out his nephrostomy tube. Symptoms are constant and moderate in severity. No mitigating or exacerbating factors reported. No associated sxs reported. No prior Tx reported. No further complaints or concerns at this time.     Arrival mode: EMS    PCP: Faizan Antoine MD       Past Medical History:  Past Medical History:   Diagnosis Date    COPD (chronic obstructive pulmonary disease)     Hypertension        Past Surgical History:  Past Surgical History:   Procedure Laterality Date    APPENDECTOMY      CATARACT EXTRACTION      NEPHROSTOMY      OPEN REDUCTION AND INTERNAL FIXATION (ORIF) OF INTERTROCHANTERIC FRACTURE OF FEMUR Right 11/21/2023    Procedure: ORIF, FRACTURE, FEMUR, INTERTROCHANTERIC;  Surgeon: Tanisha Guidry DO;  Location: [facility];  Service: Orthopedics;  Laterality: Right;  Gamma nail         Family History:  Family History   Problem Relation Age of Onset    Cancer Father     Cancer Mother        Social History:  Social History     Tobacco Use    Smoking status: Former     Types: Cigarettes     Passive exposure: Never    Smokeless tobacco: Never   Substance and Sexual Activity    Alcohol use: Never    Drug use: Never    Sexual activity: Not Currently     Partners: Female       ROS   Review of Systems   Unable to  perform ROS: Mental status change   Genitourinary:         (+) pulled out nephrostomy tube   Psychiatric/Behavioral:  Positive for confusion (AMS).      Physical Exam      Initial Vitals [12/21/23 0754]   BP Pulse Resp Temp SpO2   (!) 102/55 100 18 99.2 °F (37.3 °C) (!) 94 %      MAP       --          Physical Exam  Nursing Notes and Vital Signs Reviewed.  Constitutional: Patient is in no acute distress. Elderly. Frail.  Head: Atraumatic. Normocephalic.  Eyes: PERRL. EOM intact. Conjunctivae are not pale. No scleral icterus.  ENT: Mucous membranes are moist. Oropharynx is clear and symmetric.    Neck: Supple. Full ROM.   Cardiovascular: Regular rate. Regular rhythm. No murmurs, rubs, or gallops. Distal pulses are 2+ and symmetric.  Pulmonary/Chest: No respiratory distress. Clear to auscultation bilaterally. No wheezing or rales.  Abdominal: Soft and non-distended.  There is no tenderness.  No rebound, guarding, or rigidity.  Musculoskeletal: Moves all extremities. No obvious deformities. No edema.  Skin: Warm and dry.  Neurological:  Awake. Normal speech.     ED Course    Critical Care    Date/Time: 12/21/2023 9:26 AM    Performed by: Vin Cat MD  Authorized by: Vin Cat MD  Direct patient critical care time: 25 minutes  Additional history critical care time: 5 minutes  Ordering / reviewing critical care time: 15 minutes  Documentation critical care time: 10 minutes  Consulting other physicians critical care time: 5 minutes  Total critical care time (exclusive of procedural time) : 60 minutes  Critical care time was exclusive of separately billable procedures and treating other patients and teaching time.  Critical care was necessary to treat or prevent imminent or life-threatening deterioration of the following conditions: AMS, BANDAR.  Critical care was time spent personally by me on the following activities: blood draw for specimens, development of treatment plan with patient or surrogate,  discussions with consultants, interpretation of cardiac output measurements, evaluation of patient's response to treatment, examination of patient, obtaining history from patient or surrogate, ordering and performing treatments and interventions, ordering and review of laboratory studies, ordering and review of radiographic studies, pulse oximetry, re-evaluation of patient's condition and review of old charts.        ED Vital Signs:  Vitals:    12/21/23 0754 12/21/23 0820   BP: (!) 102/55 (!) 92/53   Pulse: 100 92   Resp: 18 (!) 24   Temp: 99.2 °F (37.3 °C)    TempSrc: Oral    SpO2: (!) 94%        Abnormal Lab Results:  Labs Reviewed   CBC W/ AUTO DIFFERENTIAL - Abnormal; Notable for the following components:       Result Value    WBC 16.75 (*)     RBC 2.85 (*)     Hemoglobin 8.0 (*)     Hematocrit 25.1 (*)     MCHC 31.9 (*)     RDW 18.5 (*)     Gran # (ANC) 15.0 (*)     Immature Grans (Abs) 0.07 (*)     Lymph # 0.6 (*)     Gran % 89.8 (*)     Lymph % 3.8 (*)     All other components within normal limits   COMPREHENSIVE METABOLIC PANEL - Abnormal; Notable for the following components:    Sodium 135 (*)     CO2 22 (*)     Glucose 124 (*)     BUN 28 (*)     Creatinine 2.5 (*)     Albumin 2.7 (*)     eGFR 27 (*)     All other components within normal limits   URINALYSIS, REFLEX TO URINE CULTURE - Abnormal; Notable for the following components:    Protein, UA 1+ (*)     Occult Blood UA 1+ (*)     Leukocytes, UA 2+ (*)     All other components within normal limits    Narrative:     Specimen Source->Urine   B-TYPE NATRIURETIC PEPTIDE - Abnormal; Notable for the following components:     (*)     All other components within normal limits   URINALYSIS MICROSCOPIC - Abnormal; Notable for the following components:    RBC, UA 10 (*)     WBC, UA >100 (*)     WBC Clumps, UA Occasional (*)     Bacteria Moderate (*)     All other components within normal limits    Narrative:     Specimen Source->Urine   CULTURE, URINE   CK    TROPONIN I   AMMONIA        All Lab Results:  Results for orders placed or performed during the hospital encounter of 12/21/23   CBC Auto Differential   Result Value Ref Range    WBC 16.75 (H) 3.90 - 12.70 K/uL    RBC 2.85 (L) 4.60 - 6.20 M/uL    Hemoglobin 8.0 (L) 14.0 - 18.0 g/dL    Hematocrit 25.1 (L) 40.0 - 54.0 %    MCV 88 82 - 98 fL    MCH 28.1 27.0 - 31.0 pg    MCHC 31.9 (L) 32.0 - 36.0 g/dL    RDW 18.5 (H) 11.5 - 14.5 %    Platelets 237 150 - 450 K/uL    MPV 9.6 9.2 - 12.9 fL    Immature Granulocytes 0.4 0.0 - 0.5 %    Gran # (ANC) 15.0 (H) 1.8 - 7.7 K/uL    Immature Grans (Abs) 0.07 (H) 0.00 - 0.04 K/uL    Lymph # 0.6 (L) 1.0 - 4.8 K/uL    Mono # 0.9 0.3 - 1.0 K/uL    Eos # 0.1 0.0 - 0.5 K/uL    Baso # 0.05 0.00 - 0.20 K/uL    nRBC 0 0 /100 WBC    Gran % 89.8 (H) 38.0 - 73.0 %    Lymph % 3.8 (L) 18.0 - 48.0 %    Mono % 5.4 4.0 - 15.0 %    Eosinophil % 0.3 0.0 - 8.0 %    Basophil % 0.3 0.0 - 1.9 %    Differential Method Automated    Comprehensive Metabolic Panel   Result Value Ref Range    Sodium 135 (L) 136 - 145 mmol/L    Potassium 4.5 3.5 - 5.1 mmol/L    Chloride 102 95 - 110 mmol/L    CO2 22 (L) 23 - 29 mmol/L    Glucose 124 (H) 70 - 110 mg/dL    BUN 28 (H) 8 - 23 mg/dL    Creatinine 2.5 (H) 0.5 - 1.4 mg/dL    Calcium 9.7 8.7 - 10.5 mg/dL    Total Protein 6.6 6.0 - 8.4 g/dL    Albumin 2.7 (L) 3.5 - 5.2 g/dL    Total Bilirubin 0.4 0.1 - 1.0 mg/dL    Alkaline Phosphatase 113 55 - 135 U/L    AST 11 10 - 40 U/L    ALT 10 10 - 44 U/L    eGFR 27 (A) >60 mL/min/1.73 m^2    Anion Gap 11 8 - 16 mmol/L   Urinalysis, Reflex to Urine Culture Urine, Clean Catch    Specimen: Urine   Result Value Ref Range    Specimen UA Urine, Clean Catch     Color, UA Yellow Yellow, Straw, Andra    Appearance, UA Clear Clear    pH, UA 8.0 5.0 - 8.0    Specific Gravity, UA 1.015 1.005 - 1.030    Protein, UA 1+ (A) Negative    Glucose, UA Negative Negative    Ketones, UA Negative Negative    Bilirubin (UA) Negative Negative    Occult  Blood UA 1+ (A) Negative    Nitrite, UA Negative Negative    Urobilinogen, UA Negative <2.0 EU/dL    Leukocytes, UA 2+ (A) Negative   BNP   Result Value Ref Range     (H) 0 - 99 pg/mL   CK   Result Value Ref Range     20 - 200 U/L   Troponin I   Result Value Ref Range    Troponin I <0.006 0.000 - 0.026 ng/mL   Urinalysis Microscopic   Result Value Ref Range    RBC, UA 10 (H) 0 - 4 /hpf    WBC, UA >100 (H) 0 - 5 /hpf    WBC Clumps, UA Occasional (A) None-Rare    Bacteria Moderate (A) None-Occ /hpf    Hyaline Casts, UA 0 0-1/lpf /lpf    Microscopic Comment SEE COMMENT      Imaging Results:  Imaging Results              CT Head Without Contrast (Final result)  Result time 12/21/23 09:02:37      Final result by Felipe Glass III, MD (12/21/23 09:02:37)                   Impression:      No acute intracranial abnormality.  Additional findings as above.      Electronically signed by: Puma Glass  Date:    12/21/2023  Time:    09:02               Narrative:    EXAMINATION:  CT HEAD WITHOUT CONTRAST    CLINICAL HISTORY:  Mental status change, unknown cause;    TECHNIQUE:  Low dose axial images were obtained through the head.  Coronal and sagittal reformations were also performed. Contrast was not administered.    COMPARISON:  CT brain 10/20/2023    FINDINGS:  Encephalomalacia again noted within the left parietal lobe.  Diffuse atrophy commensurate with patient age.  Low attenuation in the periventricular white matter, unchanged, compatible with small vessel ischemic disease.  Old lacunar infarctions again noted within the left basal ganglia and left thalamus.    No mass, mass effect, hemorrhage, hydrocephalus, acute infarction or abnormal fluid collection.                                       X-Ray Chest AP Portable (Final result)  Result time 12/21/23 08:24:44      Final result by Felipe Glass III, MD (12/21/23 08:24:44)                   Impression:      As  above      Electronically signed by: Puma Glass  Date:    12/21/2023  Time:    08:24               Narrative:    EXAMINATION:  XR CHEST AP PORTABLE    CLINICAL HISTORY:  waeknss;    TECHNIQUE:  Single frontal view of the chest was performed.    COMPARISON:  11/12/2023    FINDINGS:  Poor inspiratory effort with bronchovascular prominence.  The cardiac silhouette and mediastinum are unremarkable.  No pneumothorax, pleural effusion or acute infiltrate.                                     The EKG was ordered, reviewed, and independently interpreted by the ED provider.  Interpretation time: 08:10  Rate: 94 BPM  Rhythm: normal sinus rhythm  Interpretation: No acute ST changes. No STEMI.           The Emergency Provider reviewed the vital signs and test results, which are outlined above.    ED Discussion     9:27 AM: Discussed case with Dr. Hernandez ([facility]). Dr. Sandoval agrees with current care and management of pt and accepts admission.   Admitting Service: Hospital Medicine  Admitting Physician: Dr. Sandoval  Admit to: Obs    9:28 AM: Re-evaluated pt. I have discussed test results, shared treatment plan, and the need for admission with family. Family expresses understanding at this time and agrees with all information. All questions answered. Family has no further questions or concerns at this time. Pt is ready for admit.    9:29 AM: Discussed pt's case with Dr. Mckeon (Urology) who recommends consulting Interventional Radiology for nephrostomy tube replacement and further medical management.         ED Medication(s):  Medications   sodium chloride 0.9% bolus 1,000 mL 1,000 mL (has no administration in time range)   cefTRIAXone (ROCEPHIN) 1 g in dextrose 5 % in water (D5W) 100 mL IVPB (MB+) (has no administration in time range)   sodium chloride 0.9% bolus 1,000 mL 1,000 mL (has no administration in time range)         New Prescriptions    No medications on file         Medical Decision Making     Medical Decision Making  Pulled out his nephrostomy tube, and is now confused  DDx: UTI, jose, electrolyte abnormality    Amount and/or Complexity of Data Reviewed  Independent Historian: EMS  Labs: ordered. Decision-making details documented in ED Course.     Details: Creatinine 2.5 and wbc 16  Radiology: ordered. Decision-making details documented in ED Course.  ECG/medicine tests: ordered and independent interpretation performed. Decision-making details documented in ED Course.    Risk  Decision regarding hospitalization.                Scribe Attestation:   Scribe #1: I performed the above scribed service and the documentation accurately describes the services I performed. I attest to the accuracy of the note.    Attending:   Physician Attestation Statement for Scribe #1: I, Vin Cat MD, personally performed the services described in this documentation, as scribed by Bruce Bashir, in my presence, and it is both accurate and complete.          Clinical Impression       ICD-10-CM ICD-9-CM   1. JOSE (acute kidney injury)  N17.9 584.9   2. AMS (altered mental status)  R41.82 780.97   3. Acute cystitis with hematuria  N30.01 595.0       Disposition:   Disposition: Placed in Observation  Condition: Vin Hay MD  12/21/23 0959

## 2023-12-21 NOTE — SUBJECTIVE & OBJECTIVE
Interval History: Upon examination, patient had just returned from having nephrostomy tube replaced, chronically ill appearing, in no acute distress, intermittent confusion noted but answered some questions appropriately. Patient's daughter/HCPOA Stephania at bedside during exam. Patient and wife live with daughter Stephania who is very involved in patient's medical care and attends MD visits. She noted functional decline with falls in which he fractured his right femur and despite going to rehab still has not improved, decreased po intake, recurrent UTIs, and unintentional weight loss. She noted that they have had conversations in the past in which patient has told them that he is tired and has poor quality of life and when he dies to allow him to die naturally and peacefully. We then discussed goals of care and code status: full code vs DNR/I along with risks, benefits, and alternatives. Stephania noted that she wanted to honor her father's wishes and focus on avoiding the hospital and MD visits, being at home with loved ones, pain/symptom management, comfort and quality of life and thus elected to enroll patient in hospice care with [facility]. Patient was DNR/I in [place] before moving with Stephania so she wanted to continue to honor that thus elected DNR/I code status as well. All questions and concerns addressed. Primary team updated. Info visit with hospice today.    Past Medical History:   Diagnosis Date    COPD (chronic obstructive pulmonary disease)     Hypertension        Past Surgical History:   Procedure Laterality Date    APPENDECTOMY      CATARACT EXTRACTION      NEPHROSTOMY      OPEN REDUCTION AND INTERNAL FIXATION (ORIF) OF INTERTROCHANTERIC FRACTURE OF FEMUR Right 11/21/2023    Procedure: ORIF, FRACTURE, FEMUR, INTERTROCHANTERIC;  Surgeon: Tanisha Guidry DO;  Location: [facility];  Service: Orthopedics;  Laterality: Right;  Gamma nail       Review of patient's allergies indicates:  No Known  Allergies    Medications:  Continuous Infusions:   sodium chloride 0.9% 150 mL/hr at 12/21/23 1347     Scheduled Meds:   DAPTOmycin (CUBICIN) 500 mg in sodium chloride 0.9% SolP 50 mL IVPB  500 mg Intravenous Q48H    polyethylene glycol  17 g Oral Daily    senna-docusate 8.6-50 mg  1 tablet Oral BID     PRN Meds:acetaminophen, acetaminophen, dextrose 10%, dextrose 10%, glucagon (human recombinant), glucose, glucose, melatonin, naloxone, ondansetron, oxyCODONE-acetaminophen, sodium chloride 0.9%    Family History       Problem Relation (Age of Onset)    Cancer Father, Mother          Tobacco Use    Smoking status: Former     Types: Cigarettes     Passive exposure: Never    Smokeless tobacco: Never   Substance and Sexual Activity    Alcohol use: Never    Drug use: Never    Sexual activity: Not Currently     Partners: Female       Review of Systems   Reason unable to perform ROS: Limited ROS due to AMS, answers some questiosn appropriate.   Musculoskeletal:  Positive for arthralgias.     Objective:     Vital Signs (Most Recent):  Temp: 100.3 °F (37.9 °C) (12/21/23 1353)  Pulse: (!) 120 (12/21/23 1353)  Resp: 18 (12/21/23 1353)  BP: (!) 102/58 (12/21/23 1353)  SpO2: (!) 94 % (12/21/23 1353) Vital Signs (24h Range):  Temp:  [99.2 °F (37.3 °C)-100.3 °F (37.9 °C)] 100.3 °F (37.9 °C)  Pulse:  [] 120  Resp:  [17-24] 18  SpO2:  [94 %-98 %] 94 %  BP: ()/(53-70) 102/58     Weight: 70.3 kg (155 lb)  Body mass index is 22.89 kg/m².       Physical Exam  Vitals and nursing note reviewed.   Constitutional:       Appearance: He is ill-appearing.      Comments: Chronically ill appearing   HENT:      Head: Normocephalic.      Nose: Nose normal.      Mouth/Throat:      Mouth: Mucous membranes are dry.   Eyes:      General:         Right eye: No discharge.         Left eye: No discharge.   Cardiovascular:      Rate and Rhythm: Normal rate.   Pulmonary:      Effort: No respiratory distress.   Abdominal:      Palpations:  Abdomen is soft.   Genitourinary:     Comments: Bilateral nephrostomy tubes  Musculoskeletal:      Cervical back: Normal range of motion.      Comments: Generalized weakness, unsteady gait, Hx of falls   Skin:     General: Skin is warm and dry.      Coloration: Skin is pale.   Neurological:      Mental Status: He is alert.      Comments: Intermittent confusion but answered some questions appropriately   Psychiatric:         Mood and Affect: Mood normal.            Review of Symptoms      Symptom Assessment (ESAS 0-10 Scale)  Pain:  0  Dyspnea:  0  Anxiety:  0  Nausea:  0  Depression:  0  Anorexia:  0  Fatigue:  0  Insomnia:  0  Restlessness:  0  Agitation:  0         ECOG Performance Status thGthrthathdtheth:th th4th Living Arrangements:  Lives with family    Psychosocial/Cultural:   See Palliative Psychosocial Note: No  Patient has 4 adult children but listed his daughter Stephania Mann and his wife Chantal Currie as HCPOAs. Patient and wife live with Stephania.  **Primary  to Follow**  Palliative Care  Consult: No        Advance Care Planning   Advance Directives:   [place]: Defer to hospice agency.    Do Not Resuscitate Status: Yes    Medical Power of : Yes      Decision Making:  Family answered questions and Patient unable to communicate due to disease severity/cognitive impairment  Goals of Care: The family and healthcare power of  endorses that what is most important right now is to focus on spending time at home, avoiding the hospital, remaining as independent as possible, symptom/pain control, quality of life, even if it means sacrificing a little time, and comfort and QOL     Accordingly, we have decided that the best plan to meet the patient's goals includes enrolling in hospice care with [facility].          Significant Labs: All pertinent labs within the past 24 hours have been reviewed.  CBC:   Met with pt and his daughter to assist with completion of a Healthcare Power of  . Pt designated his wife, Chantal Currie ([phone]), as his healthcare agent and his daughter, Stephania Mann ([phone]), as an alternative healthcare agent. HCPOA was scanned into Epic and returned to pt with copies.    BMP:  Recent Labs   Lab 12/21/23  0819   *   *   K 4.5      CO2 22*   BUN 28*   CREATININE 2.5*   CALCIUM 9.7     LFT:  Lab Results   Component Value Date    AST 11 12/21/2023    ALKPHOS 113 12/21/2023    BILITOT 0.4 12/21/2023     Albumin:   Albumin   Date Value Ref Range Status   12/21/2023 2.7 (L) 3.5 - 5.2 g/dL Final     Protein:   Total Protein   Date Value Ref Range Status   12/21/2023 6.6 6.0 - 8.4 g/dL Final     Lactic acid:   Lab Results   Component Value Date    LACTATE 0.8 11/10/2023    LACTATE 1.3 10/20/2023       Significant Imaging: I have reviewed all pertinent imaging results/findings within the past 24 hours.

## 2023-12-21 NOTE — PHARMACY MED REC
"Admission Medication History     The home medication history was taken by Landon Hurt.    You may go to "Admission" then "Reconcile Home Medications" tabs to review and/or act upon these items.     The home medication list has been updated by the Pharmacy department.   Please read ALL comments highlighted in yellow.   Please address this information as you see fit.    Feel free to contact us if you have any questions or require assistance.      The medications listed below were removed from the home medication list. Please reorder if appropriate:  Patient reports no longer taking the following medication(s):  NICOTINE 21MG PATCH  IBUPROFEN 800MG  PERCOCET 5-325MG    Medications listed below were obtained from: Patient/family and Analytic software- [employer]: SPOUSE  (Not in a hospital admission)        Landon Hurt  [id number]    Current Outpatient Medications on File Prior to Encounter   Medication Sig Dispense Refill Last Dose    albuterol (ACCUNEB) 0.63 mg/3 mL Nebu Take 3 mLs (0.63 mg total) by nebulization 3 (three) times daily as needed (sob, wheezing). Rescue 75 mL 3 12/20/2023    albuterol (PROVENTIL/VENTOLIN HFA) 90 mcg/actuation inhaler Inhale 1-2 puffs into the lungs every 4 (four) hours as needed for Wheezing. Rescue 18 g 11 12/20/2023    ALPRAZolam (XANAX) 0.5 MG tablet Take 2 tablets (1 mg total) by mouth 3 (three) times daily as needed for Anxiety. 60 tablet 2 12/20/2023    cyclobenzaprine (FLEXERIL) 10 MG tablet Take 1 tablet (10 mg total) by mouth 3 (three) times daily as needed for Muscle spasms. 270 tablet 3 12/20/2023    docusate sodium (COLACE) 100 MG capsule Take 100 mg by mouth 2 (two) times daily.   12/20/2023    EScitalopram oxalate (LEXAPRO) 20 MG tablet Take 1 tablet (20 mg total) by mouth once daily. 90 tablet 3 12/20/2023    fluticasone furoate-vilanteroL (BREO ELLIPTA) 100-25 mcg/dose diskus inhaler Inhale 1 puff into the lungs once daily. Controller 30 each 11 12/20/2023    " HYDROcodone-acetaminophen (NORCO) 5-325 mg per tablet Take 2 tablets by mouth every 6 (six) hours as needed for Pain. 240 tablet 0 12/21/2023 at 4:30am    levothyroxine (SYNTHROID) 125 MCG tablet Take 1 tablet (125 mcg total) by mouth before breakfast. 90 tablet 3 12/20/2023    linezolid (ZYVOX) 600 mg Tab Take 1 tablet (600 mg total) by mouth every 12 (twelve) hours. 14 tablet 0 12/20/2023    metoprolol succinate (TOPROL-XL) 50 MG 24 hr tablet Take 1 tablet (50 mg total) by mouth once daily. 90 tablet 3 12/20/2023    nicotine (NICODERM CQ) 14 mg/24 hr Place 1 patch onto the skin every 24 hours.   12/20/2023    tamsulosin (FLOMAX) 0.4 mg Cap Take 1 capsule (0.4 mg total) by mouth once daily. 90 capsule 3 12/20/2023    vitamin D (VITAMIN D3) 1000 units Tab Take 25 mcg by mouth once daily.   12/20/2023                           .

## 2023-12-21 NOTE — CONSULTS
[facility]  Palliative Medicine  Consult Note    Patient Name: Geovani Currie  MRN: [id number]  Admission Date: 12/21/2023  Hospital Length of Stay: 0 days  Code Status: DNR   Attending Provider: Dolly Sandoval MD  Consulting Provider: Randa Castillo NP  Primary Care Physician: Faizan Antoine MD  Principal Problem:<principal problem not specified>    Patient information was obtained from relative(s), past medical records, and primary team.      Inpatient consult to Palliative Care  Consult performed by: Randa Castillo NP  Consult ordered by: Dolly Sandoval MD        Assessment/Plan:     Oncology  Malignant neoplasm of urinary bladder  -Patient with multiple, recurrent UTIs as well. Patient followed by oncology in which it was noted that in the setting of lung cancer they discussed treatment vs surgery in which SBRT was chosen due to multiple co-morbidities, including recurrent urothelial cancer and recurrent UTIs with bilateral nephrostomy tubes. Patient followed by urology as well in which it was noted for urothelial cancer treatment would likely be palliative and not curative in nature. Patient was scheduled to see palliative medicine in clinic in January 2024 but presented to hospital prior to. Patient presented to hospice due to AMS  in which he pulled out his nephrostomy tube now s/po replacement of nephrostomy tube by Dr. Sr. Daughter Stephania noted functional decline with falls in which he fractured his right femur and despite going to rehab still has not improved, decreased po intake, recurrent UTIs, and unintentional weight loss. She noted that they have had conversations in the past in which patient has told them that he is tired and has poor quality of life and when he dies to allow him to die naturally and peacefully.  -Family elected to transition patient to comfort focused treatment with enrollment in hospice care to honor patient's wishes.   -DNR/I    Palliative  Care  Encounter for palliative care  -Code status: DNR/I, defer [place]   -HCPOA: PM team completed document. Pt designated his wife, Chantal Currie ([phone]), as his healthcare agent and his daughter, Stephania Mann ([phone]), as an alternative healthcare agent. HCPOA was scanned into Epic and returned to pt with copies.   -GOC: Focus on spending time at home, avoiding the hospital, remaining as independent as possible, symptom/pain control, quality of life, even if it means sacrificing a little time, and comfort and QOL. Accordingly, we have decided that the best plan to meet the patient's goals includes enrolling in hospice care with [facility]. Info visit today.  -See HPI for further details          Thank you for your consult. I will follow-up with patient. Please contact us if you have any additional questions.    Subjective:     HPI:    History of Present Illness: Geovani Currie is a 68 y.o. male patient with a PMHx of COPD, HTN, Right lung cancer, and metastatic bladder cancer, bilateral ureteral obstruction s/p bilateral nephrostomy tubes, and falls in which he was recently at  rehab due to right femur fracture. He presented to the Emergency Department for AMS, onset this morning. EMS reports that the pt was altered this morning and pulled out his nephrostomy tube in which right nephrostomy was replaced by Dr. Sr. Patient with multiple, recurrent UTIs as well. Patient followed by oncology in which it was noted that in the setting of lung cancer they discussed treatment vs surgery in which SBRT was chosen due to multiple co-morbidities, including recurrent urothelial cancer and recurrent UTIs with bilateral nephrostomy tubes. Patient followed by urology as well in which it was noted for urothelial cancer treatment would likely be palliative and not curative in nature. Patient was scheduled to see palliative medicine in clinic in January 2024 but presented to hospital prior to thus  palliative medicine was consulted for goals of care and advance care planning.     Hospital Course:  No notes on file    Interval History: Upon examination, patient had just returned from having nephrostomy tube replaced, chronically ill appearing, in no acute distress, intermittent confusion noted but answered some questions appropriately. Patient's daughter/HCPOA Stephania at bedside during exam. Patient and wife live with daughter Stephania who is very involved in patient's medical care and attends MD visits. She noted functional decline with falls in which he fractured his right femur and despite going to rehab still has not improved, decreased po intake, recurrent UTIs, and unintentional weight loss. She noted that they have had conversations in the past in which patient has told them that he is tired and has poor quality of life and when he dies to allow him to die naturally and peacefully. We then discussed goals of care and code status: full code vs DNR/I along with risks, benefits, and alternatives. Stephania noted that she wanted to honor her father's wishes and focus on avoiding the hospital and MD visits, being at home with loved ones, pain/symptom management, comfort and quality of life and thus elected to enroll patient in hospice care with [facility]. Patient was DNR/I in [place] before moving with Stephania so she wanted to continue to honor that thus elected DNR/I code status as well. All questions and concerns addressed. Primary team updated. Info visit with hospice today.    Past Medical History:   Diagnosis Date    COPD (chronic obstructive pulmonary disease)     Hypertension        Past Surgical History:   Procedure Laterality Date    APPENDECTOMY      CATARACT EXTRACTION      NEPHROSTOMY      OPEN REDUCTION AND INTERNAL FIXATION (ORIF) OF INTERTROCHANTERIC FRACTURE OF FEMUR Right 11/21/2023    Procedure: ORIF, FRACTURE, FEMUR, INTERTROCHANTERIC;  Surgeon: Tanisha Guidry DO;  Location: [facility] OR;   Service: Orthopedics;  Laterality: Right;  Gamma nail       Review of patient's allergies indicates:  No Known Allergies    Medications:  Continuous Infusions:   sodium chloride 0.9% 150 mL/hr at 12/21/23 1347     Scheduled Meds:   DAPTOmycin (CUBICIN) 500 mg in sodium chloride 0.9% SolP 50 mL IVPB  500 mg Intravenous Q48H    polyethylene glycol  17 g Oral Daily    senna-docusate 8.6-50 mg  1 tablet Oral BID     PRN Meds:acetaminophen, acetaminophen, dextrose 10%, dextrose 10%, glucagon (human recombinant), glucose, glucose, melatonin, naloxone, ondansetron, oxyCODONE-acetaminophen, sodium chloride 0.9%    Family History       Problem Relation (Age of Onset)    Cancer Father, Mother          Tobacco Use    Smoking status: Former     Types: Cigarettes     Passive exposure: Never    Smokeless tobacco: Never   Substance and Sexual Activity    Alcohol use: Never    Drug use: Never    Sexual activity: Not Currently     Partners: Female       Review of Systems   Reason unable to perform ROS: Limited ROS due to AMS, answers some questiosn appropriate.   Musculoskeletal:  Positive for arthralgias.     Objective:     Vital Signs (Most Recent):  Temp: 100.3 °F (37.9 °C) (12/21/23 1353)  Pulse: (!) 120 (12/21/23 1353)  Resp: 18 (12/21/23 1353)  BP: (!) 102/58 (12/21/23 1353)  SpO2: (!) 94 % (12/21/23 1353) Vital Signs (24h Range):  Temp:  [99.2 °F (37.3 °C)-100.3 °F (37.9 °C)] 100.3 °F (37.9 °C)  Pulse:  [] 120  Resp:  [17-24] 18  SpO2:  [94 %-98 %] 94 %  BP: ()/(53-70) 102/58     Weight: 70.3 kg (155 lb)  Body mass index is 22.89 kg/m².       Physical Exam  Vitals and nursing note reviewed.   Constitutional:       Appearance: He is ill-appearing.      Comments: Chronically ill appearing   HENT:      Head: Normocephalic.      Nose: Nose normal.      Mouth/Throat:      Mouth: Mucous membranes are dry.   Eyes:      General:         Right eye: No discharge.         Left eye: No discharge.   Cardiovascular:      Rate  and Rhythm: Normal rate.   Pulmonary:      Effort: No respiratory distress.   Abdominal:      Palpations: Abdomen is soft.   Genitourinary:     Comments: Bilateral nephrostomy tubes  Musculoskeletal:      Cervical back: Normal range of motion.      Comments: Generalized weakness, unsteady gait, Hx of falls   Skin:     General: Skin is warm and dry.      Coloration: Skin is pale.   Neurological:      Mental Status: He is alert.      Comments: Intermittent confusion but answered some questions appropriately   Psychiatric:         Mood and Affect: Mood normal.            Review of Symptoms      Symptom Assessment (ESAS 0-10 Scale)  Pain:  0  Dyspnea:  0  Anxiety:  0  Nausea:  0  Depression:  0  Anorexia:  0  Fatigue:  0  Insomnia:  0  Restlessness:  0  Agitation:  0         ECOG Performance Status thGthrthathdtheth:th th4th Living Arrangements:  Lives with family    Psychosocial/Cultural:   See Palliative Psychosocial Note: No  Patient has 4 adult children but listed his daughter Stephania Mann and his wife Chantal Currie as HCPOAs. Patient and wife live with Stephania.  **Primary  to Follow**  Palliative Care  Consult: No        Advance Care Planning  Advance Directives:   [place]: Defer to hospice agency.    Do Not Resuscitate Status: Yes    Medical Power of : Yes      Decision Making:  Family answered questions and Patient unable to communicate due to disease severity/cognitive impairment  Goals of Care: The family and healthcare power of  endorses that what is most important right now is to focus on spending time at home, avoiding the hospital, remaining as independent as possible, symptom/pain control, quality of life, even if it means sacrificing a little time, and comfort and QOL     Accordingly, we have decided that the best plan to meet the patient's goals includes enrolling in hospice care with [facility].          Significant Labs: All pertinent labs within the past 24 hours  have been reviewed.  CBC:   Met with pt and his daughter to assist with completion of a Healthcare Power of . Pt designated his wife, Chantal Currie ([phone]), as his healthcare agent and his daughter, Stephania Mann ([phone]), as an alternative healthcare agent. HCPOA was scanned into Epic and returned to pt with copies.    BMP:  Recent Labs   Lab 12/21/23  0819   *   *   K 4.5      CO2 22*   BUN 28*   CREATININE 2.5*   CALCIUM 9.7     LFT:  Lab Results   Component Value Date    AST 11 12/21/2023    ALKPHOS 113 12/21/2023    BILITOT 0.4 12/21/2023     Albumin:   Albumin   Date Value Ref Range Status   12/21/2023 2.7 (L) 3.5 - 5.2 g/dL Final     Protein:   Total Protein   Date Value Ref Range Status   12/21/2023 6.6 6.0 - 8.4 g/dL Final     Lactic acid:   Lab Results   Component Value Date    LACTATE 0.8 11/10/2023    LACTATE 1.3 10/20/2023       Significant Imaging: I have reviewed all pertinent imaging results/findings within the past 24 hours.    I spent face to face time and non-face to face time preparing to see the patient (eg, review of tests), Obtaining and/or reviewing separately obtained history, Documenting clinical information in the electronic or other health record, Independently interpreting results and communicating results to the patient/family/caregiver, or Care coordination.     > 46 minutes spent in discussing ACP    Randa Castillo NP  Palliative Medicine  [facility]

## 2023-12-21 NOTE — ASSESSMENT & PLAN NOTE
-Patient with multiple, recurrent UTIs as well. Patient followed by oncology in which it was noted that in the setting of lung cancer they discussed treatment vs surgery in which SBRT was chosen due to multiple co-morbidities, including recurrent urothelial cancer and recurrent UTIs with bilateral nephrostomy tubes. Patient followed by urology as well in which it was noted for urothelial cancer treatment would likely be palliative and not curative in nature. Patient was scheduled to see palliative medicine in clinic in January 2024 but presented to hospital prior to. Patient presented to hospice due to AMS  in which he pulled out his nephrostomy tube now s/po replacement of nephrostomy tube by Dr. Sr. Daughter Stephania noted functional decline with falls in which he fractured his right femur and despite going to rehab still has not improved, decreased po intake, recurrent UTIs, and unintentional weight loss. She noted that they have had conversations in the past in which patient has told them that he is tired and has poor quality of life and when he dies to allow him to die naturally and peacefully.  -Family elected to transition patient to comfort focused treatment with enrollment in hospice care to honor patient's wishes.   -DNR/I

## 2023-12-21 NOTE — SEDATION DOCUMENTATION
Procedure completed at this time. Right nephrostomy replaced by Dr. Sr. Pt tolerated well. No sedation utilized.

## 2023-12-21 NOTE — SEDATION DOCUMENTATION
Transferred pt to stretcher in supine position. Drain bag attached to right nephrostomy tube. Dressing remains CDI. Transported pt back to ER 08. Bedside report given to ER nurse.

## 2023-12-21 NOTE — ACP (ADVANCE CARE PLANNING)
Advance Care Planning   [facility] - Emergency Dept.  Palliative Care       Patient Name: Geovani Currie  MRN: [id number]  Admission Date: 12/21/2023  Hospital Length of Stay: 0 days  Code Status: DNR   Attending Provider: Dolly Sandoval MD  Palliative Care Provider: Randa Castillo NP  Primary Care Physician: Faizan Antoine MD  Principal Problem:<principal problem not specified>    Met with pt and his daughter to assist with completion of a Healthcare Power of . Pt designated his wife, Chantal Currie ([phone]), as his healthcare agent and his daughter, Stephania Mann ([phone]), as an alternative healthcare agent. HCPOA was scanned into Epic and returned to pt with copies.       DEB Narayanan, LCSW-BACS  Palliative Medicine  [phone]

## 2023-12-21 NOTE — PROGRESS NOTES
Pharmacist Renal Dose Adjustment Note    Geovani Currie is a 68 y.o. male being treated with the medication Daptomycin    Patient Data:    Vital Signs (Most Recent):  Temp: 99.2 °F (37.3 °C) (12/21/23 0754)  Pulse: 102 (12/21/23 1057)  Resp: 18 (12/21/23 1032)  BP: 116/70 (12/21/23 1057)  SpO2: 95 % (12/21/23 1057) Vital Signs (72h Range):  Temp:  [99.2 °F (37.3 °C)]   Pulse:  []   Resp:  [17-24]   BP: ()/(53-70)   SpO2:  [94 %-98 %]      Recent Labs   Lab 12/21/23  0819   CREATININE 2.5*     Serum creatinine: 2.5 mg/dL (H) 12/21/23 0819  Estimated creatinine clearance: 27.7 mL/min (A)    Medication:Daptomycin 500 mg IV q24h was changed to Daptomycin 500 mg IV q48 per protocol / CRCL between 10 - 30.    Pharmacist's Name: Dustin Vail  Pharmacist's Extension: [phone]

## 2023-12-21 NOTE — PLAN OF CARE
[facility]  Discharge Assessment    Primary Care Provider: Faizan Antoine MD     Discharge Assessment (most recent)       BRIEF DISCHARGE ASSESSMENT - 12/21/23 1513          Discharge Planning    Assessment Type Discharge Planning Brief Assessment     Resource/Environmental Concerns none     Support Systems Children     Equipment Currently Used at Home none     Current Living Arrangements home     Patient/Family Anticipates Transition to home with help/services     Patient/Family Anticipated Services at Transition hospice care     DME Needed Upon Discharge  none     Discharge Plan A Hospice/home                     Plan for d/c home with [facility].

## 2023-12-22 LAB
ALBUMIN SERPL BCP-MCNC: 2.3 G/DL (ref 3.5–5.2)
ALP SERPL-CCNC: 111 U/L (ref 55–135)
ALT SERPL W/O P-5'-P-CCNC: 12 U/L (ref 10–44)
ANION GAP SERPL CALC-SCNC: 10 MMOL/L (ref 8–16)
AST SERPL-CCNC: 20 U/L (ref 10–40)
BACTERIA UR CULT: NORMAL
BACTERIA UR CULT: NORMAL
BASOPHILS # BLD AUTO: 0.04 K/UL (ref 0–0.2)
BASOPHILS NFR BLD: 0.3 % (ref 0–1.9)
BILIRUB SERPL-MCNC: 0.3 MG/DL (ref 0.1–1)
BUN SERPL-MCNC: 28 MG/DL (ref 8–23)
CALCIUM SERPL-MCNC: 9.1 MG/DL (ref 8.7–10.5)
CHLORIDE SERPL-SCNC: 107 MMOL/L (ref 95–110)
CO2 SERPL-SCNC: 21 MMOL/L (ref 23–29)
CREAT SERPL-MCNC: 2.4 MG/DL (ref 0.5–1.4)
DIFFERENTIAL METHOD BLD: ABNORMAL
EOSINOPHIL # BLD AUTO: 0.2 K/UL (ref 0–0.5)
EOSINOPHIL NFR BLD: 1.1 % (ref 0–8)
ERYTHROCYTE [DISTWIDTH] IN BLOOD BY AUTOMATED COUNT: 18.6 % (ref 11.5–14.5)
EST. GFR  (NO RACE VARIABLE): 28 ML/MIN/1.73 M^2
GLUCOSE SERPL-MCNC: 107 MG/DL (ref 70–110)
HCT VFR BLD AUTO: 24.1 % (ref 40–54)
HGB BLD-MCNC: 7.3 G/DL (ref 14–18)
IMM GRANULOCYTES # BLD AUTO: 0.08 K/UL (ref 0–0.04)
IMM GRANULOCYTES NFR BLD AUTO: 0.6 % (ref 0–0.5)
LYMPHOCYTES # BLD AUTO: 0.9 K/UL (ref 1–4.8)
LYMPHOCYTES NFR BLD: 6.3 % (ref 18–48)
MCH RBC QN AUTO: 27 PG (ref 27–31)
MCHC RBC AUTO-ENTMCNC: 30.3 G/DL (ref 32–36)
MCV RBC AUTO: 89 FL (ref 82–98)
MONOCYTES # BLD AUTO: 1 K/UL (ref 0.3–1)
MONOCYTES NFR BLD: 7.1 % (ref 4–15)
NEUTROPHILS # BLD AUTO: 12 K/UL (ref 1.8–7.7)
NEUTROPHILS NFR BLD: 84.6 % (ref 38–73)
NRBC BLD-RTO: 0 /100 WBC
PLATELET # BLD AUTO: 211 K/UL (ref 150–450)
PMV BLD AUTO: 9.4 FL (ref 9.2–12.9)
POTASSIUM SERPL-SCNC: 4.5 MMOL/L (ref 3.5–5.1)
PROT SERPL-MCNC: 6 G/DL (ref 6–8.4)
RBC # BLD AUTO: 2.7 M/UL (ref 4.6–6.2)
SODIUM SERPL-SCNC: 138 MMOL/L (ref 136–145)
WBC # BLD AUTO: 14.18 K/UL (ref 3.9–12.7)

## 2023-12-22 PROCEDURE — 36415 COLL VENOUS BLD VENIPUNCTURE: CPT | Performed by: INTERNAL MEDICINE

## 2023-12-22 PROCEDURE — 99223 1ST HOSP IP/OBS HIGH 75: CPT | Mod: NSCH,,, | Performed by: INTERNAL MEDICINE

## 2023-12-22 PROCEDURE — 11000001 HC ACUTE MED/SURG PRIVATE ROOM

## 2023-12-22 PROCEDURE — S4991 NICOTINE PATCH NONLEGEND: HCPCS | Performed by: INTERNAL MEDICINE

## 2023-12-22 PROCEDURE — 80053 COMPREHEN METABOLIC PANEL: CPT | Performed by: INTERNAL MEDICINE

## 2023-12-22 PROCEDURE — 25000003 PHARM REV CODE 250: Performed by: INTERNAL MEDICINE

## 2023-12-22 PROCEDURE — 63600175 PHARM REV CODE 636 W HCPCS: Performed by: INTERNAL MEDICINE

## 2023-12-22 PROCEDURE — 87040 BLOOD CULTURE FOR BACTERIA: CPT | Performed by: INTERNAL MEDICINE

## 2023-12-22 PROCEDURE — 85025 COMPLETE CBC W/AUTO DIFF WBC: CPT | Performed by: INTERNAL MEDICINE

## 2023-12-22 RX ORDER — LINEZOLID 600 MG/1
600 TABLET, FILM COATED ORAL EVERY 12 HOURS
Status: DISCONTINUED | OUTPATIENT
Start: 2023-12-22 | End: 2023-12-24 | Stop reason: HOSPADM

## 2023-12-22 RX ADMIN — OXYCODONE AND ACETAMINOPHEN 1 TABLET: 10; 325 TABLET ORAL at 01:12

## 2023-12-22 RX ADMIN — SENNOSIDES AND DOCUSATE SODIUM 1 TABLET: 8.6; 5 TABLET ORAL at 09:12

## 2023-12-22 RX ADMIN — SENNOSIDES AND DOCUSATE SODIUM 1 TABLET: 8.6; 5 TABLET ORAL at 08:12

## 2023-12-22 RX ADMIN — LINEZOLID 600 MG: 600 TABLET, FILM COATED ORAL at 12:12

## 2023-12-22 RX ADMIN — TAMSULOSIN HYDROCHLORIDE 0.4 MG: 0.4 CAPSULE ORAL at 08:12

## 2023-12-22 RX ADMIN — ALPRAZOLAM 1 MG: 1 TABLET ORAL at 09:12

## 2023-12-22 RX ADMIN — METOPROLOL SUCCINATE 50 MG: 50 TABLET, EXTENDED RELEASE ORAL at 08:12

## 2023-12-22 RX ADMIN — ESCITALOPRAM OXALATE 20 MG: 10 TABLET ORAL at 08:12

## 2023-12-22 RX ADMIN — MELATONIN TAB 3 MG 6 MG: 3 TAB at 09:12

## 2023-12-22 RX ADMIN — LEVOTHYROXINE SODIUM 125 MCG: 125 TABLET ORAL at 05:12

## 2023-12-22 RX ADMIN — NICOTINE 1 PATCH: 14 PATCH, EXTENDED RELEASE TRANSDERMAL at 09:12

## 2023-12-22 RX ADMIN — SODIUM CHLORIDE: 9 INJECTION, SOLUTION INTRAVENOUS at 05:12

## 2023-12-22 RX ADMIN — LINEZOLID 600 MG: 600 TABLET, FILM COATED ORAL at 09:12

## 2023-12-22 RX ADMIN — SODIUM CHLORIDE: 9 INJECTION, SOLUTION INTRAVENOUS at 12:12

## 2023-12-22 RX ADMIN — ENOXAPARIN SODIUM 30 MG: 30 INJECTION SUBCUTANEOUS at 04:12

## 2023-12-22 RX ADMIN — OXYCODONE AND ACETAMINOPHEN 1 TABLET: 10; 325 TABLET ORAL at 09:12

## 2023-12-22 NOTE — CARE UPDATE
"Advance Care Planning   [facility] Med [facility]  Palliative Care RN      Patient Name: Geovani Currie  MRN: [id number]  Admission Date: 12/21/2023  Hospital Length of Stay: 0 days  Code Status: DNR   Attending Provider: Dolly Sandoval MD  Palliative Care Provider: FIORDALIZA Park  Primary Care Physician: Faizan Antoine MD  Principal Problem:UTI (urinary tract infection)    Accompanied DEB Narayanan, LCSW-BACS to bedside to follow-up with patient, wife and daughter Sera. Sera stated she wasn't aware patient would not be able to continue cancer treatment and stated "I don't think we are there yet". We clarified the philosophy of hospice to focus on aggressive symptom management and living the best quality of life despite serious illness, avoiding hospitalizations and clinic visits, bringing the care to the individual and being at peace with a natural, peaceful death when it occurs. Family and patient would like to speak to urology-oncology to discuss treatment and PET scan results. We reviewed this may not be possible and may need to occur outpatient, but we would discuss with Dr. Sandoval. Plan for now will be to follow-up with urology-oncology and palliative clinic (appointment 01/02/24). Sera asked if they could change their mind about hospice. We discussed this is a personal choice that can be made at any point patient's GOC change. Updated CM and attending MD.       Anali Bangura RN-[facility], Palliative Medicine   [phone]  "

## 2023-12-22 NOTE — PLAN OF CARE
Problem: Adult Inpatient Plan of Care  Goal: Plan of Care Review  Outcome: Ongoing, Progressing  Goal: Patient-Specific Goal (Individualized)  Outcome: Ongoing, Progressing  Goal: Absence of Hospital-Acquired Illness or Injury  Outcome: Ongoing, Progressing  Goal: Optimal Comfort and Wellbeing  Outcome: Ongoing, Progressing  Goal: Readiness for Transition of Care  Outcome: Ongoing, Progressing     Problem: Coping Ineffective  Goal: Effective Coping  Outcome: Ongoing, Progressing     Problem: Fluid and Electrolyte Imbalance (Acute Kidney Injury/Impairment)  Goal: Fluid and Electrolyte Balance  Outcome: Ongoing, Progressing     Problem: Oral Intake Inadequate (Acute Kidney Injury/Impairment)  Goal: Optimal Nutrition Intake  Outcome: Ongoing, Progressing     Problem: Renal Function Impairment (Acute Kidney Injury/Impairment)  Goal: Effective Renal Function  Outcome: Ongoing, Progressing     Problem: Skin Injury Risk Increased  Goal: Skin Health and Integrity  Outcome: Ongoing, Progressing     Problem: Impaired Wound Healing  Goal: Optimal Wound Healing  Outcome: Ongoing, Progressing

## 2023-12-22 NOTE — ASSESSMENT & PLAN NOTE
Patient seen by palliative care.  He met with palliative care nurse practitioner with his daughter.  It was decided that his resuscitation status be changed to DNR.  Long discussion wife, daughter, and patient they would like to follow up outpatient with his physicians to determine his prognosis as well as treatment plan.  They have met and had a informational visit with [facility] who will remain available if and when he decides to go with hospice care.

## 2023-12-22 NOTE — ASSESSMENT & PLAN NOTE
Patient seen by palliative care.  He met with palliative care nurse practitioner with his daughter.  It was decided that his resuscitation status be changed to DNR and that when he does discharge home he would like to go home with hospice.

## 2023-12-22 NOTE — ASSESSMENT & PLAN NOTE
Currently on palliative treatment.  He has nephrostomy tubes which were DC accidentally.  These has been replaced by IR urinalysis obtained and started on empiric antibiotic therapy with daptomycin given his history of VRE

## 2023-12-22 NOTE — SUBJECTIVE & OBJECTIVE
Past Medical History:   Diagnosis Date    COPD (chronic obstructive pulmonary disease)     Hypertension        Past Surgical History:   Procedure Laterality Date    APPENDECTOMY      CATARACT EXTRACTION      NEPHROSTOMY      OPEN REDUCTION AND INTERNAL FIXATION (ORIF) OF INTERTROCHANTERIC FRACTURE OF FEMUR Right 11/21/2023    Procedure: ORIF, FRACTURE, FEMUR, INTERTROCHANTERIC;  Surgeon: Tanisha Guidry DO;  Location: [facility];  Service: Orthopedics;  Laterality: Right;  Gamma nail       Review of patient's allergies indicates:  No Known Allergies    No current facility-administered medications on file prior to encounter.     Current Outpatient Medications on File Prior to Encounter   Medication Sig    albuterol (ACCUNEB) 0.63 mg/3 mL Nebu Take 3 mLs (0.63 mg total) by nebulization 3 (three) times daily as needed (sob, wheezing). Rescue    albuterol (PROVENTIL/VENTOLIN HFA) 90 mcg/actuation inhaler Inhale 1-2 puffs into the lungs every 4 (four) hours as needed for Wheezing. Rescue    ALPRAZolam (XANAX) 0.5 MG tablet Take 2 tablets (1 mg total) by mouth 3 (three) times daily as needed for Anxiety.    cyclobenzaprine (FLEXERIL) 10 MG tablet Take 1 tablet (10 mg total) by mouth 3 (three) times daily as needed for Muscle spasms.    docusate sodium (COLACE) 100 MG capsule Take 100 mg by mouth 2 (two) times daily.    EScitalopram oxalate (LEXAPRO) 20 MG tablet Take 1 tablet (20 mg total) by mouth once daily.    fluticasone furoate-vilanteroL (BREO ELLIPTA) 100-25 mcg/dose diskus inhaler Inhale 1 puff into the lungs once daily. Controller    HYDROcodone-acetaminophen (NORCO) 5-325 mg per tablet Take 2 tablets by mouth every 6 (six) hours as needed for Pain.    levothyroxine (SYNTHROID) 125 MCG tablet Take 1 tablet (125 mcg total) by mouth before breakfast.    linezolid (ZYVOX) 600 mg Tab Take 1 tablet (600 mg total) by mouth every 12 (twelve) hours.    metoprolol succinate (TOPROL-XL) 50 MG 24 hr tablet Take 1 tablet  (50 mg total) by mouth once daily.    nicotine (NICODERM CQ) 14 mg/24 hr Place 1 patch onto the skin every 24 hours.    tamsulosin (FLOMAX) 0.4 mg Cap Take 1 capsule (0.4 mg total) by mouth once daily.    vitamin D (VITAMIN D3) 1000 units Tab Take 25 mcg by mouth once daily.    [DISCONTINUED] ibuprofen (ADVIL,MOTRIN) 800 MG tablet Take 800 mg by mouth every 6 (six) hours as needed.    [DISCONTINUED] nicotine (NICODERM CQ) 21 mg/24 hr Place 1 patch onto the skin once daily. (Patient taking differently: Place 1 patch onto the skin once daily. Dr ordered for patient to put on hold because blood pressure hasn't been high)    [DISCONTINUED] oxyCODONE-acetaminophen (PERCOCET) 5-325 mg per tablet Take 1 tablet by mouth every 6 (six) hours as needed for Pain.     Family History       Problem Relation (Age of Onset)    Cancer Father, Mother          Tobacco Use    Smoking status: Former     Types: Cigarettes     Passive exposure: Never    Smokeless tobacco: Never   Substance and Sexual Activity    Alcohol use: Never    Drug use: Never    Sexual activity: Not Currently     Partners: Female     Review of Systems   Constitutional:  Positive for activity change, appetite change, fatigue, fever and unexpected weight change.   Respiratory:  Positive for shortness of breath.    Cardiovascular:  Positive for palpitations.   Gastrointestinal:  Positive for nausea.   Genitourinary:  Positive for decreased urine volume and flank pain.   Musculoskeletal:  Positive for back pain.   Neurological:  Positive for weakness.   All other systems reviewed and are negative.    Objective:     Vital Signs (Most Recent):  Temp: 99.2 °F (37.3 °C) (12/21/23 1554)  Pulse: (!) 115 (12/21/23 1554)  Resp: 18 (12/21/23 1554)  BP: (!) 94/50 (12/21/23 1554)  SpO2: (!) 92 % (12/21/23 1554) Vital Signs (24h Range):  Temp:  [99.2 °F (37.3 °C)-100.3 °F (37.9 °C)] 99.2 °F (37.3 °C)  Pulse:  [] 115  Resp:  [17-24] 18  SpO2:  [92 %-98 %] 92 %  BP:  "()/(50-70) 94/50     Weight: 70.3 kg (155 lb)  Body mass index is 22.89 kg/m².     Physical Exam  Vitals reviewed.   Constitutional:       Appearance: He is ill-appearing.   HENT:      Head: Normocephalic and atraumatic.      Mouth/Throat:      Mouth: Mucous membranes are moist.      Pharynx: Oropharynx is clear.   Eyes:      Extraocular Movements: Extraocular movements intact.      Conjunctiva/sclera: Conjunctivae normal.   Cardiovascular:      Rate and Rhythm: Regular rhythm. Tachycardia present.      Pulses: Normal pulses.      Heart sounds: Normal heart sounds.   Pulmonary:      Effort: Pulmonary effort is normal.      Breath sounds: Normal breath sounds.   Abdominal:      General: Bowel sounds are normal.      Palpations: Abdomen is soft.   Musculoskeletal:         General: Tenderness present. Normal range of motion.      Cervical back: Normal range of motion and neck supple.   Skin:     General: Skin is warm and dry.   Neurological:      General: No focal deficit present.      Mental Status: He is alert and oriented to person, place, and time. Mental status is at baseline.   Psychiatric:         Mood and Affect: Mood normal.         Behavior: Behavior normal.         Thought Content: Thought content normal.                Significant Labs: All pertinent labs within the past 24 hours have been reviewed.  Blood Culture: No results for input(s): "LABBLOO" in the last 48 hours.  CBC:   Recent Labs   Lab 12/21/23  0819   WBC 16.75*   HGB 8.0*   HCT 25.1*        CMP:   Recent Labs   Lab 12/21/23  0819   *   K 4.5      CO2 22*   *   BUN 28*   CREATININE 2.5*   CALCIUM 9.7   PROT 6.6   ALBUMIN 2.7*   BILITOT 0.4   ALKPHOS 113   AST 11   ALT 10   ANIONGAP 11       Significant Imaging: I have reviewed all pertinent imaging results/findings within the past 24 hours.  "

## 2023-12-22 NOTE — ASSESSMENT & PLAN NOTE
Patient's COPD is controlled currently.  Patient is currently off COPD Pathway. Continue scheduled inhalers Steroids, Antibiotics, and Supplemental oxygen and monitor respiratory status closely.

## 2023-12-22 NOTE — CONSULTS
12/22/23 0915   WOCN Assessment   WOCN Total Time (mins) 45   Visit Date 12/22/23   Visit Time 0915   Consult Type New   WOCN Speciality Wound   Wound moisture   Intervention assessed;applied   Teaching on-going        Altered Skin Integrity 12/21/23 1948 Scrotum Moisture associated dermatitis   Date First Assessed/Time First Assessed: 12/21/23 1948   Altered Skin Integrity Present on Admission - Did Patient arrive to the hospital with altered skin?: yes  Location: Scrotum  Primary Wound Type: Moisture associated dermatitis   Dressing Appearance Open to air   Drainage Amount None   Appearance Pink;Red   Care Cleansed with:;Soap and water;Applied:;Skin Barrier  (moisture barrier cream)        Altered Skin Integrity 12/22/23 0915 Left Groin Moisture associated dermatitis   Date First Assessed/Time First Assessed: 12/22/23 0915   Altered Skin Integrity Present on Admission - Did Patient arrive to the hospital with altered skin?: yes  Side: Left  Location: Groin  Primary Wound Type: Moisture associated dermatitis   Wound Image     Dressing Appearance Open to air   Drainage Amount None   Appearance Pink;Red   Tissue loss description Partial thickness   Care Cleansed with:;Soap and water;Applied:;Skin Barrier;Other (see comments)  (moisture barrier cream)

## 2023-12-22 NOTE — HPI
Patient is 68-year-old male who has a past medical history of COPD (chronic obstructive pulmonary disease) and Hypertension.   Patient has a complicated medical history including lung cancer, urothelial cancer with recurrent UTIs, bilateral nephrostomy tubes.  He was living in [place] was recently moved here by his daughter so that she could participate more in his care.  He presents after having had his nephrostomy tube pulled out accidentally home.  Patient had nephrostomy tube replaced by IR.  Given his multiple urinary tract infections which have been resistant and his ongoing weight loss.  Palliative Care was consulted.  After speaking with patient in his family they decided proceed with DNR and be discharged with hospice.  Patient likely has sepsis secondary to recurrent UTI.  He will be admitted to hospital medicine for IV antibiotics and when he is stable for discharge we will go home on p.o. antibiotics with hospice.

## 2023-12-22 NOTE — SUBJECTIVE & OBJECTIVE
Interval History:  Patient seen examined with wife and daughter at bedside.  Discussed with Infectious Disease.  After long discussion with palliative care patient remains DNR however he would like to not go to hospice at this time and follow up with his physicians to get a better understanding of the status of his disease.  Thus when ready for discharge he will go home with home health and follow up with his physicians.  He is met with [facility] for an informational visit and they will remain available if needed.  He also has an outpatient appointment with palliative Care in January.    Review of Systems   Constitutional:  Positive for activity change, appetite change, fatigue, fever and unexpected weight change.   Respiratory:  Positive for shortness of breath.    Cardiovascular:  Negative for palpitations.   Gastrointestinal:  Positive for nausea.   Genitourinary:  Positive for decreased urine volume. Negative for flank pain.   Musculoskeletal:  Positive for back pain.   Neurological:  Positive for weakness.   All other systems reviewed and are negative.    Objective:     Vital Signs (Most Recent):  Temp: 98.1 °F (36.7 °C) (12/22/23 1550)  Pulse: 78 (12/22/23 1550)  Resp: 18 (12/22/23 1550)  BP: (!) 104/51 (12/22/23 1550)  SpO2: 95 % (12/22/23 1550) Vital Signs (24h Range):  Temp:  [97.9 °F (36.6 °C)-99 °F (37.2 °C)] 98.1 °F (36.7 °C)  Pulse:  [] 78  Resp:  [15-19] 18  SpO2:  [90 %-96 %] 95 %  BP: (103-123)/(51-62) 104/51     Weight: 73.6 kg (162 lb 4.1 oz)  Body mass index is 23.96 kg/m².    Intake/Output Summary (Last 24 hours) at 12/22/2023 1715  Last data filed at 12/22/2023 1523  Gross per 24 hour   Intake --   Output 2475 ml   Net -2475 ml         Physical Exam  Vitals reviewed.   Constitutional:       Appearance: He is ill-appearing.   HENT:      Head: Normocephalic and atraumatic.      Mouth/Throat:      Mouth: Mucous membranes are moist.      Pharynx: Oropharynx is clear.   Eyes:       "Extraocular Movements: Extraocular movements intact.      Conjunctiva/sclera: Conjunctivae normal.   Cardiovascular:      Rate and Rhythm: Regular rhythm. Tachycardia present.      Pulses: Normal pulses.      Heart sounds: Normal heart sounds.   Pulmonary:      Effort: Pulmonary effort is normal.      Breath sounds: Rhonchi present.   Abdominal:      General: Bowel sounds are normal.      Palpations: Abdomen is soft.   Musculoskeletal:         General: Tenderness present. Normal range of motion.      Cervical back: Normal range of motion and neck supple.   Skin:     General: Skin is warm and dry.   Neurological:      General: No focal deficit present.      Mental Status: He is alert and oriented to person, place, and time. Mental status is at baseline.   Psychiatric:         Mood and Affect: Mood normal.         Behavior: Behavior normal.         Thought Content: Thought content normal.             Significant Labs: All pertinent labs within the past 24 hours have been reviewed.  Blood Culture: No results for input(s): "LABBLOO" in the last 48 hours.  CBC:   Recent Labs   Lab 12/21/23  0819 12/22/23  0526   WBC 16.75* 14.18*   HGB 8.0* 7.3*   HCT 25.1* 24.1*    211     CMP:   Recent Labs   Lab 12/21/23  0819 12/22/23  0526   * 138   K 4.5 4.5    107   CO2 22* 21*   * 107   BUN 28* 28*   CREATININE 2.5* 2.4*   CALCIUM 9.7 9.1   PROT 6.6 6.0   ALBUMIN 2.7* 2.3*   BILITOT 0.4 0.3   ALKPHOS 113 111   AST 11 20   ALT 10 12   ANIONGAP 11 10     Urine Culture: No results for input(s): "LABURIN" in the last 48 hours.    Significant Imaging: I have reviewed all pertinent imaging results/findings within the past 24 hours.  "

## 2023-12-22 NOTE — PLAN OF CARE
12/22/23 1406   Post-Acute Status   Post-Acute Authorization Home Health   Home Health Status Set-up Complete/Auth obtained   Discharge Delays None known at this time   Discharge Plan   Discharge Plan A Home Health     Sw spoke with pt and pt's spouse this afternoon regarding HH needs; pt is current with [facility]. Referral sent.

## 2023-12-22 NOTE — PROGRESS NOTES
[facility]  Progress Note    Patient Name: Geovani Currie  MRN: [id number]  Patient Class: IP- Inpatient   Admission Date: 12/21/2023  Length of Stay: 0 days  Attending Physician: Dolly Sandoval MD  Primary Care Provider: Faizan Antoine MD        Subjective:     Principal Problem:UTI (urinary tract infection)        HPI:  Patient is 68-year-old male who has a past medical history of COPD (chronic obstructive pulmonary disease) and Hypertension.   Patient has a complicated medical history including lung cancer, urothelial cancer with recurrent UTIs, bilateral nephrostomy tubes.  He was living in [place] was recently moved here by his daughter so that she could participate more in his care.  He presents after having had his nephrostomy tube pulled out accidentally home.  Patient had nephrostomy tube replaced by IR.  Given his multiple urinary tract infections which have been resistant and his ongoing weight loss.  Palliative Care was consulted.  After speaking with patient in his family they decided proceed with DNR and be discharged with hospice.  Patient likely has sepsis secondary to recurrent UTI.  He will be admitted to hospital medicine for IV antibiotics and when he is stable for discharge we will go home on p.o. antibiotics with hospice.    Overview/Hospital Course:  No notes on file    Interval History:  Patient seen examined with wife and daughter at bedside.  Discussed with Infectious Disease.  After long discussion with palliative care patient remains DNR however he would like to not go to hospice at this time and follow up with his physicians to get a better understanding of the status of his disease.  Thus when ready for discharge he will go home with home health and follow up with his physicians.  He is met with [facility] for an informational visit and they will remain available if needed.  He also has an outpatient appointment with palliative Care in  January.    Review of Systems   Constitutional:  Positive for activity change, appetite change, fatigue, fever and unexpected weight change.   Respiratory:  Positive for shortness of breath.    Cardiovascular:  Negative for palpitations.   Gastrointestinal:  Positive for nausea.   Genitourinary:  Positive for decreased urine volume. Negative for flank pain.   Musculoskeletal:  Positive for back pain.   Neurological:  Positive for weakness.   All other systems reviewed and are negative.    Objective:     Vital Signs (Most Recent):  Temp: 98.1 °F (36.7 °C) (12/22/23 1550)  Pulse: 78 (12/22/23 1550)  Resp: 18 (12/22/23 1550)  BP: (!) 104/51 (12/22/23 1550)  SpO2: 95 % (12/22/23 1550) Vital Signs (24h Range):  Temp:  [97.9 °F (36.6 °C)-99 °F (37.2 °C)] 98.1 °F (36.7 °C)  Pulse:  [] 78  Resp:  [15-19] 18  SpO2:  [90 %-96 %] 95 %  BP: (103-123)/(51-62) 104/51     Weight: 73.6 kg (162 lb 4.1 oz)  Body mass index is 23.96 kg/m².    Intake/Output Summary (Last 24 hours) at 12/22/2023 1715  Last data filed at 12/22/2023 1523  Gross per 24 hour   Intake --   Output 2475 ml   Net -2475 ml         Physical Exam  Vitals reviewed.   Constitutional:       Appearance: He is ill-appearing.   HENT:      Head: Normocephalic and atraumatic.      Mouth/Throat:      Mouth: Mucous membranes are moist.      Pharynx: Oropharynx is clear.   Eyes:      Extraocular Movements: Extraocular movements intact.      Conjunctiva/sclera: Conjunctivae normal.   Cardiovascular:      Rate and Rhythm: Regular rhythm. Tachycardia present.      Pulses: Normal pulses.      Heart sounds: Normal heart sounds.   Pulmonary:      Effort: Pulmonary effort is normal.      Breath sounds: Rhonchi present.   Abdominal:      General: Bowel sounds are normal.      Palpations: Abdomen is soft.   Musculoskeletal:         General: Tenderness present. Normal range of motion.      Cervical back: Normal range of motion and neck supple.   Skin:     General: Skin is warm  "and dry.   Neurological:      General: No focal deficit present.      Mental Status: He is alert and oriented to person, place, and time. Mental status is at baseline.   Psychiatric:         Mood and Affect: Mood normal.         Behavior: Behavior normal.         Thought Content: Thought content normal.             Significant Labs: All pertinent labs within the past 24 hours have been reviewed.  Blood Culture: No results for input(s): "LABBLOO" in the last 48 hours.  CBC:   Recent Labs   Lab 12/21/23  0819 12/22/23  0526   WBC 16.75* 14.18*   HGB 8.0* 7.3*   HCT 25.1* 24.1*    211     CMP:   Recent Labs   Lab 12/21/23  0819 12/22/23  0526   * 138   K 4.5 4.5    107   CO2 22* 21*   * 107   BUN 28* 28*   CREATININE 2.5* 2.4*   CALCIUM 9.7 9.1   PROT 6.6 6.0   ALBUMIN 2.7* 2.3*   BILITOT 0.4 0.3   ALKPHOS 113 111   AST 11 20   ALT 10 12   ANIONGAP 11 10     Urine Culture: No results for input(s): "LABURIN" in the last 48 hours.    Significant Imaging: I have reviewed all pertinent imaging results/findings within the past 24 hours.    Assessment/Plan:      * UTI (urinary tract infection)    Patient has a history of VRE urinary tract infection  We will start daptomycin  Blood and urine cultures obtained    Encounter for palliative care    Patient seen by palliative care.  He met with palliative care nurse practitioner with his daughter.  It was decided that his resuscitation status be changed to DNR.  Long discussion wife, daughter, and patient they would like to follow up outpatient with his physicians to determine his prognosis as well as treatment plan.  They have met and had a informational visit with [facility] who will remain available if and when he decides to go with hospice care.    Malignant neoplasm of urinary bladder    Currently on palliative treatment.  He has nephrostomy tubes which were DC accidentally.  These has been replaced by IR urinalysis obtained and started on " empiric antibiotic therapy with daptomycin given his history of VRE    Acute renal failure superimposed on stage 3 chronic kidney disease  Patient with acute kidney injury/acute renal failure likely due to post-obstructive d/t tumor  BANDAR is currently worsening. Baseline creatinine  1-1.3  - Labs reviewed- Renal function/electrolytes with Estimated Creatinine Clearance: 29 mL/min (A) (based on SCr of 2.4 mg/dL (H)). according to latest data. Monitor urine output and serial BMP and adjust therapy as needed. Avoid nephrotoxins and renally dose meds for GFR listed above.  Nephrostomy tube replaced with good urinary output  Renal function remained stable    Centrilobular emphysema  Patient's COPD is controlled currently.  Patient is currently off COPD Pathway. Continue scheduled inhalers Steroids, Antibiotics, and Supplemental oxygen and monitor respiratory status closely.     Squamous cell carcinoma of right lung  Oncology History   Squamous cell carcinoma of right lung   Malignant neoplasm of urinary bladder   9/29/2020 - 11/25/2020 Radiation Therapy    CRT with Gemcitabine  35 fractions IMRT/IGRT             VTE Risk Mitigation (From admission, onward)           Ordered     enoxaparin injection 30 mg  Every 24 hours         12/21/23 1918     IP VTE HIGH RISK PATIENT  Once         12/21/23 1050     Place sequential compression device  Until discontinued         12/21/23 1050     Reason for No Pharmacological VTE Prophylaxis  Once        Question:  Reasons:  Answer:  Physician Provided (leave comment)    12/21/23 1050                    Discharge Planning   TANIA:      Code Status: DNR   Is the patient medically ready for discharge?:     Reason for patient still in hospital (select all that apply): Patient trending condition, Laboratory test, Treatment, and Consult recommendations  Discharge Plan A: Home Health   Discharge Delays: None known at this time              Dolly Tinajero MD  Department of Hospital Medicine    [facility]

## 2023-12-22 NOTE — ASSESSMENT & PLAN NOTE
Oncology History   Squamous cell carcinoma of right lung   Malignant neoplasm of urinary bladder   9/29/2020 - 11/25/2020 Radiation Therapy    CRT with Gemcitabine  35 fractions IMRT/IGRT

## 2023-12-22 NOTE — ASSESSMENT & PLAN NOTE
Patient with acute kidney injury/acute renal failure likely due to post-obstructive d/t tumor  BANDAR is currently worsening. Baseline creatinine  1-1.3  - Labs reviewed- Renal function/electrolytes with Estimated Creatinine Clearance: 29 mL/min (A) (based on SCr of 2.4 mg/dL (H)). according to latest data. Monitor urine output and serial BMP and adjust therapy as needed. Avoid nephrotoxins and renally dose meds for GFR listed above.  Nephrostomy tube replaced with good urinary output  Renal function remained stable

## 2023-12-22 NOTE — ASSESSMENT & PLAN NOTE
Patient with acute kidney injury/acute renal failure likely due to post-obstructive d/t tumor  BANDAR is currently worsening. Baseline creatinine  1-1.3  - Labs reviewed- Renal function/electrolytes with Estimated Creatinine Clearance: 27.7 mL/min (A) (based on SCr of 2.5 mg/dL (H)). according to latest data. Monitor urine output and serial BMP and adjust therapy as needed. Avoid nephrotoxins and renally dose meds for GFR listed above.  Nephrostomy tube replaced with good urinary output

## 2023-12-22 NOTE — H&P
[facility] Medicine  History & Physical    Patient Name: Geovani Currie  MRN: [id number]  Patient Class: OP- Observation  Admission Date: 12/21/2023  Attending Physician: Dolly Sandoval MD   Primary Care Provider: Faizan Antoine MD         Patient information was obtained from patient, relative(s), past medical records, and ER records.     Subjective:     Principal Problem:UTI (urinary tract infection)    Chief Complaint:   Chief Complaint   Patient presents with    Altered Mental Status     Altered mental status, pulled nephrostomy tube out this am        HPI: Patient is 68-year-old male who has a past medical history of COPD (chronic obstructive pulmonary disease) and Hypertension.   Patient has a complicated medical history including lung cancer, urothelial cancer with recurrent UTIs, bilateral nephrostomy tubes.  He was living in [place] was recently moved here by his daughter so that she could participate more in his care.  He presents after having had his nephrostomy tube pulled out accidentally home.  Patient had nephrostomy tube replaced by IR.  Given his multiple urinary tract infections which have been resistant and his ongoing weight loss.  Palliative Care was consulted.  After speaking with patient in his family they decided proceed with DNR and be discharged with hospice.  Patient likely has sepsis secondary to recurrent UTI.  He will be admitted to hospital medicine for IV antibiotics and when he is stable for discharge we will go home on p.o. antibiotics with hospice.    Past Medical History:   Diagnosis Date    COPD (chronic obstructive pulmonary disease)     Hypertension        Past Surgical History:   Procedure Laterality Date    APPENDECTOMY      CATARACT EXTRACTION      NEPHROSTOMY      OPEN REDUCTION AND INTERNAL FIXATION (ORIF) OF INTERTROCHANTERIC FRACTURE OF FEMUR Right 11/21/2023    Procedure: ORIF, FRACTURE, FEMUR, INTERTROCHANTERIC;  Surgeon: Chao  Tanisha RUBIN DO;  Location: [facility] OR;  Service: Orthopedics;  Laterality: Right;  Gamma nail       Review of patient's allergies indicates:  No Known Allergies    No current facility-administered medications on file prior to encounter.     Current Outpatient Medications on File Prior to Encounter   Medication Sig    albuterol (ACCUNEB) 0.63 mg/3 mL Nebu Take 3 mLs (0.63 mg total) by nebulization 3 (three) times daily as needed (sob, wheezing). Rescue    albuterol (PROVENTIL/VENTOLIN HFA) 90 mcg/actuation inhaler Inhale 1-2 puffs into the lungs every 4 (four) hours as needed for Wheezing. Rescue    ALPRAZolam (XANAX) 0.5 MG tablet Take 2 tablets (1 mg total) by mouth 3 (three) times daily as needed for Anxiety.    cyclobenzaprine (FLEXERIL) 10 MG tablet Take 1 tablet (10 mg total) by mouth 3 (three) times daily as needed for Muscle spasms.    docusate sodium (COLACE) 100 MG capsule Take 100 mg by mouth 2 (two) times daily.    EScitalopram oxalate (LEXAPRO) 20 MG tablet Take 1 tablet (20 mg total) by mouth once daily.    fluticasone furoate-vilanteroL (BREO ELLIPTA) 100-25 mcg/dose diskus inhaler Inhale 1 puff into the lungs once daily. Controller    HYDROcodone-acetaminophen (NORCO) 5-325 mg per tablet Take 2 tablets by mouth every 6 (six) hours as needed for Pain.    levothyroxine (SYNTHROID) 125 MCG tablet Take 1 tablet (125 mcg total) by mouth before breakfast.    linezolid (ZYVOX) 600 mg Tab Take 1 tablet (600 mg total) by mouth every 12 (twelve) hours.    metoprolol succinate (TOPROL-XL) 50 MG 24 hr tablet Take 1 tablet (50 mg total) by mouth once daily.    nicotine (NICODERM CQ) 14 mg/24 hr Place 1 patch onto the skin every 24 hours.    tamsulosin (FLOMAX) 0.4 mg Cap Take 1 capsule (0.4 mg total) by mouth once daily.    vitamin D (VITAMIN D3) 1000 units Tab Take 25 mcg by mouth once daily.    [DISCONTINUED] ibuprofen (ADVIL,MOTRIN) 800 MG tablet Take 800 mg by mouth every 6 (six) hours as needed.    [DISCONTINUED]  nicotine (NICODERM CQ) 21 mg/24 hr Place 1 patch onto the skin once daily. (Patient taking differently: Place 1 patch onto the skin once daily. Dr ordered for patient to put on hold because blood pressure hasn't been high)    [DISCONTINUED] oxyCODONE-acetaminophen (PERCOCET) 5-325 mg per tablet Take 1 tablet by mouth every 6 (six) hours as needed for Pain.     Family History       Problem Relation (Age of Onset)    Cancer Father, Mother          Tobacco Use    Smoking status: Former     Types: Cigarettes     Passive exposure: Never    Smokeless tobacco: Never   Substance and Sexual Activity    Alcohol use: Never    Drug use: Never    Sexual activity: Not Currently     Partners: Female     Review of Systems   Constitutional:  Positive for activity change, appetite change, fatigue, fever and unexpected weight change.   Respiratory:  Positive for shortness of breath.    Cardiovascular:  Positive for palpitations.   Gastrointestinal:  Positive for nausea.   Genitourinary:  Positive for decreased urine volume and flank pain.   Musculoskeletal:  Positive for back pain.   Neurological:  Positive for weakness.   All other systems reviewed and are negative.    Objective:     Vital Signs (Most Recent):  Temp: 99.2 °F (37.3 °C) (12/21/23 1554)  Pulse: (!) 115 (12/21/23 1554)  Resp: 18 (12/21/23 1554)  BP: (!) 94/50 (12/21/23 1554)  SpO2: (!) 92 % (12/21/23 1554) Vital Signs (24h Range):  Temp:  [99.2 °F (37.3 °C)-100.3 °F (37.9 °C)] 99.2 °F (37.3 °C)  Pulse:  [] 115  Resp:  [17-24] 18  SpO2:  [92 %-98 %] 92 %  BP: ()/(50-70) 94/50     Weight: 70.3 kg (155 lb)  Body mass index is 22.89 kg/m².     Physical Exam  Vitals reviewed.   Constitutional:       Appearance: He is ill-appearing.   HENT:      Head: Normocephalic and atraumatic.      Mouth/Throat:      Mouth: Mucous membranes are moist.      Pharynx: Oropharynx is clear.   Eyes:      Extraocular Movements: Extraocular movements intact.      Conjunctiva/sclera:  "Conjunctivae normal.   Cardiovascular:      Rate and Rhythm: Regular rhythm. Tachycardia present.      Pulses: Normal pulses.      Heart sounds: Normal heart sounds.   Pulmonary:      Effort: Pulmonary effort is normal.      Breath sounds: Normal breath sounds.   Abdominal:      General: Bowel sounds are normal.      Palpations: Abdomen is soft.   Musculoskeletal:         General: Tenderness present. Normal range of motion.      Cervical back: Normal range of motion and neck supple.   Skin:     General: Skin is warm and dry.   Neurological:      General: No focal deficit present.      Mental Status: He is alert and oriented to person, place, and time. Mental status is at baseline.   Psychiatric:         Mood and Affect: Mood normal.         Behavior: Behavior normal.         Thought Content: Thought content normal.                Significant Labs: All pertinent labs within the past 24 hours have been reviewed.  Blood Culture: No results for input(s): "LABBLOO" in the last 48 hours.  CBC:   Recent Labs   Lab 12/21/23  0819   WBC 16.75*   HGB 8.0*   HCT 25.1*        CMP:   Recent Labs   Lab 12/21/23  0819   *   K 4.5      CO2 22*   *   BUN 28*   CREATININE 2.5*   CALCIUM 9.7   PROT 6.6   ALBUMIN 2.7*   BILITOT 0.4   ALKPHOS 113   AST 11   ALT 10   ANIONGAP 11       Significant Imaging: I have reviewed all pertinent imaging results/findings within the past 24 hours.  Assessment/Plan:     * UTI (urinary tract infection)    Patient has a history of VRE urinary tract infection  We will start daptomycin  Blood and urine cultures obtained    Encounter for palliative care    Patient seen by palliative care.  He met with palliative care nurse practitioner with his daughter.  It was decided that his resuscitation status be changed to DNR and that when he does discharge home he would like to go home with hospice.    Malignant neoplasm of urinary bladder    Currently on palliative treatment.  He has " nephrostomy tubes which were DC accidentally.  These has been replaced by IR urinalysis obtained and started on empiric antibiotic therapy with daptomycin given his history of VRE    Acute renal failure superimposed on stage 3 chronic kidney disease  Patient with acute kidney injury/acute renal failure likely due to post-obstructive d/t tumor  BANDAR is currently worsening. Baseline creatinine  1-1.3  - Labs reviewed- Renal function/electrolytes with Estimated Creatinine Clearance: 27.7 mL/min (A) (based on SCr of 2.5 mg/dL (H)). according to latest data. Monitor urine output and serial BMP and adjust therapy as needed. Avoid nephrotoxins and renally dose meds for GFR listed above.  Nephrostomy tube replaced with good urinary output    Centrilobular emphysema  Patient's COPD is controlled currently.  Patient is currently off COPD Pathway. Continue scheduled inhalers Steroids, Antibiotics, and Supplemental oxygen and monitor respiratory status closely.     Squamous cell carcinoma of right lung  Oncology History   Squamous cell carcinoma of right lung   Malignant neoplasm of urinary bladder   9/29/2020 - 11/25/2020 Radiation Therapy    CRT with Gemcitabine  35 fractions IMRT/IGRT             VTE Risk Mitigation (From admission, onward)           Ordered     IP VTE HIGH RISK PATIENT  Once         12/21/23 1050     Place sequential compression device  Until discontinued         12/21/23 1050     Reason for No Pharmacological VTE Prophylaxis  Once        Question:  Reasons:  Answer:  Physician Provided (leave comment)    12/21/23 1050                       On 12/21/2023, patient should be placed in hospital observation services under my care.        Pharmacist Renal Dose Adjustment Note    Geovani Currie is a 68 y.o. male being treated with the medication Daptomycin    Patient Data:    Vital Signs (Most Recent):  Temp: 99.2 °F (37.3 °C) (12/21/23 0754)  Pulse: 102 (12/21/23 1057)  Resp: 18 (12/21/23 1032)  BP: 116/70  (12/21/23 1057)  SpO2: 95 % (12/21/23 1057) Vital Signs (72h Range):  Temp:  [99.2 °F (37.3 °C)]   Pulse:  []   Resp:  [17-24]   BP: ()/(53-70)   SpO2:  [94 %-98 %]      Recent Labs   Lab 12/21/23  0819   CREATININE 2.5*     Serum creatinine: 2.5 mg/dL (H) 12/21/23 0819  Estimated creatinine clearance: 27.7 mL/min (A)    Medication:Daptomycin 500 mg IV q24h was changed to Daptomycin 500 mg IV q48 per protocol / CRCL between 10 - 30.    Pharmacist's Name: Dustin Vail  Pharmacist's Extension: [phone]        Dolly Tinajero MD  Department of Hospital Medicine  [facility] - Med Surg

## 2023-12-22 NOTE — ASSESSMENT & PLAN NOTE
Patient has a history of VRE urinary tract infection  We will start daptomycin  Blood and urine cultures obtained

## 2023-12-23 LAB
ABO + RH BLD: NORMAL
ALBUMIN SERPL BCP-MCNC: 2.1 G/DL (ref 3.5–5.2)
ANION GAP SERPL CALC-SCNC: 10 MMOL/L (ref 8–16)
BASOPHILS # BLD AUTO: 0.03 K/UL (ref 0–0.2)
BASOPHILS NFR BLD: 0.4 % (ref 0–1.9)
BLD GP AB SCN CELLS X3 SERPL QL: NORMAL
BUN SERPL-MCNC: 26 MG/DL (ref 8–23)
CALCIUM SERPL-MCNC: 8.7 MG/DL (ref 8.7–10.5)
CHLORIDE SERPL-SCNC: 108 MMOL/L (ref 95–110)
CO2 SERPL-SCNC: 21 MMOL/L (ref 23–29)
CREAT SERPL-MCNC: 1.9 MG/DL (ref 0.5–1.4)
DIFFERENTIAL METHOD BLD: ABNORMAL
EOSINOPHIL # BLD AUTO: 0.4 K/UL (ref 0–0.5)
EOSINOPHIL NFR BLD: 4.7 % (ref 0–8)
ERYTHROCYTE [DISTWIDTH] IN BLOOD BY AUTOMATED COUNT: 18.6 % (ref 11.5–14.5)
EST. GFR  (NO RACE VARIABLE): 38 ML/MIN/1.73 M^2
GLUCOSE SERPL-MCNC: 110 MG/DL (ref 70–110)
HCT VFR BLD AUTO: 22.8 % (ref 40–54)
HGB BLD-MCNC: 7 G/DL (ref 14–18)
IMM GRANULOCYTES # BLD AUTO: 0.02 K/UL (ref 0–0.04)
IMM GRANULOCYTES NFR BLD AUTO: 0.2 % (ref 0–0.5)
LYMPHOCYTES # BLD AUTO: 0.9 K/UL (ref 1–4.8)
LYMPHOCYTES NFR BLD: 10.8 % (ref 18–48)
MCH RBC QN AUTO: 27.3 PG (ref 27–31)
MCHC RBC AUTO-ENTMCNC: 30.7 G/DL (ref 32–36)
MCV RBC AUTO: 89 FL (ref 82–98)
MONOCYTES # BLD AUTO: 0.8 K/UL (ref 0.3–1)
MONOCYTES NFR BLD: 9.4 % (ref 4–15)
NEUTROPHILS # BLD AUTO: 6 K/UL (ref 1.8–7.7)
NEUTROPHILS NFR BLD: 74.5 % (ref 38–73)
NRBC BLD-RTO: 0 /100 WBC
PHOSPHATE SERPL-MCNC: 4.1 MG/DL (ref 2.7–4.5)
PLATELET # BLD AUTO: 218 K/UL (ref 150–450)
PMV BLD AUTO: 10.1 FL (ref 9.2–12.9)
POTASSIUM SERPL-SCNC: 3.7 MMOL/L (ref 3.5–5.1)
RBC # BLD AUTO: 2.56 M/UL (ref 4.6–6.2)
SODIUM SERPL-SCNC: 139 MMOL/L (ref 136–145)
SPECIMEN OUTDATE: NORMAL
WBC # BLD AUTO: 8.07 K/UL (ref 3.9–12.7)

## 2023-12-23 PROCEDURE — 80069 RENAL FUNCTION PANEL: CPT | Performed by: INTERNAL MEDICINE

## 2023-12-23 PROCEDURE — 25000003 PHARM REV CODE 250: Performed by: INTERNAL MEDICINE

## 2023-12-23 PROCEDURE — 36415 COLL VENOUS BLD VENIPUNCTURE: CPT | Performed by: INTERNAL MEDICINE

## 2023-12-23 PROCEDURE — P9016 RBC LEUKOCYTES REDUCED: HCPCS | Performed by: INTERNAL MEDICINE

## 2023-12-23 PROCEDURE — 97530 THERAPEUTIC ACTIVITIES: CPT

## 2023-12-23 PROCEDURE — 97535 SELF CARE MNGMENT TRAINING: CPT

## 2023-12-23 PROCEDURE — 99233 SBSQ HOSP IP/OBS HIGH 50: CPT | Mod: NSCH,,, | Performed by: INTERNAL MEDICINE

## 2023-12-23 PROCEDURE — 63600175 PHARM REV CODE 636 W HCPCS: Performed by: INTERNAL MEDICINE

## 2023-12-23 PROCEDURE — 86901 BLOOD TYPING SEROLOGIC RH(D): CPT | Performed by: INTERNAL MEDICINE

## 2023-12-23 PROCEDURE — 97166 OT EVAL MOD COMPLEX 45 MIN: CPT

## 2023-12-23 PROCEDURE — S4991 NICOTINE PATCH NONLEGEND: HCPCS | Performed by: INTERNAL MEDICINE

## 2023-12-23 PROCEDURE — 86920 COMPATIBILITY TEST SPIN: CPT | Performed by: INTERNAL MEDICINE

## 2023-12-23 PROCEDURE — 97162 PT EVAL MOD COMPLEX 30 MIN: CPT

## 2023-12-23 PROCEDURE — 30233N1 TRANSFUSION OF NONAUTOLOGOUS RED BLOOD CELLS INTO PERIPHERAL VEIN, PERCUTANEOUS APPROACH: ICD-10-PCS | Performed by: INTERNAL MEDICINE

## 2023-12-23 PROCEDURE — 85025 COMPLETE CBC W/AUTO DIFF WBC: CPT | Performed by: INTERNAL MEDICINE

## 2023-12-23 PROCEDURE — 11000001 HC ACUTE MED/SURG PRIVATE ROOM

## 2023-12-23 RX ORDER — LINEZOLID 600 MG/1
600 TABLET, FILM COATED ORAL EVERY 12 HOURS
Qty: 14 TABLET | Refills: 0 | Status: SHIPPED | OUTPATIENT
Start: 2023-12-23 | End: 2023-12-24

## 2023-12-23 RX ORDER — MAG HYDROX/ALUMINUM HYD/SIMETH 200-200-20
30 SUSPENSION, ORAL (FINAL DOSE FORM) ORAL EVERY 6 HOURS PRN
Status: DISCONTINUED | OUTPATIENT
Start: 2023-12-23 | End: 2023-12-24 | Stop reason: HOSPADM

## 2023-12-23 RX ORDER — ENOXAPARIN SODIUM 100 MG/ML
40 INJECTION SUBCUTANEOUS EVERY 24 HOURS
Status: DISCONTINUED | OUTPATIENT
Start: 2023-12-23 | End: 2023-12-24 | Stop reason: HOSPADM

## 2023-12-23 RX ORDER — CEFDINIR 300 MG/1
300 CAPSULE ORAL 2 TIMES DAILY
Qty: 14 CAPSULE | Refills: 0 | Status: SHIPPED | OUTPATIENT
Start: 2023-12-23 | End: 2023-12-24

## 2023-12-23 RX ORDER — HYDROCODONE BITARTRATE AND ACETAMINOPHEN 500; 5 MG/1; MG/1
TABLET ORAL
Status: DISCONTINUED | OUTPATIENT
Start: 2023-12-23 | End: 2023-12-24 | Stop reason: HOSPADM

## 2023-12-23 RX ORDER — LINEZOLID 600 MG/1
600 TABLET, FILM COATED ORAL EVERY 12 HOURS
Qty: 14 TABLET | Refills: 0 | Status: SHIPPED | OUTPATIENT
Start: 2023-12-23 | End: 2023-12-23

## 2023-12-23 RX ADMIN — ALPRAZOLAM 1 MG: 1 TABLET ORAL at 09:12

## 2023-12-23 RX ADMIN — SENNOSIDES AND DOCUSATE SODIUM 1 TABLET: 8.6; 5 TABLET ORAL at 09:12

## 2023-12-23 RX ADMIN — LINEZOLID 600 MG: 600 TABLET, FILM COATED ORAL at 09:12

## 2023-12-23 RX ADMIN — OXYCODONE AND ACETAMINOPHEN 1 TABLET: 10; 325 TABLET ORAL at 05:12

## 2023-12-23 RX ADMIN — ALPRAZOLAM 1 MG: 1 TABLET ORAL at 08:12

## 2023-12-23 RX ADMIN — SODIUM CHLORIDE: 9 INJECTION, SOLUTION INTRAVENOUS at 08:12

## 2023-12-23 RX ADMIN — SENNOSIDES AND DOCUSATE SODIUM 1 TABLET: 8.6; 5 TABLET ORAL at 08:12

## 2023-12-23 RX ADMIN — ONDANSETRON 4 MG: 2 INJECTION INTRAMUSCULAR; INTRAVENOUS at 08:12

## 2023-12-23 RX ADMIN — CYCLOBENZAPRINE HYDROCHLORIDE 10 MG: 10 TABLET, FILM COATED ORAL at 01:12

## 2023-12-23 RX ADMIN — METOPROLOL SUCCINATE 50 MG: 50 TABLET, EXTENDED RELEASE ORAL at 08:12

## 2023-12-23 RX ADMIN — OXYCODONE AND ACETAMINOPHEN 1 TABLET: 10; 325 TABLET ORAL at 01:12

## 2023-12-23 RX ADMIN — ENOXAPARIN SODIUM 40 MG: 40 INJECTION SUBCUTANEOUS at 04:12

## 2023-12-23 RX ADMIN — LEVOTHYROXINE SODIUM 125 MCG: 125 TABLET ORAL at 06:12

## 2023-12-23 RX ADMIN — ALUMINUM HYDROXIDE, MAGNESIUM HYDROXIDE, AND SIMETHICONE 30 ML: 200; 200; 20 SUSPENSION ORAL at 01:12

## 2023-12-23 RX ADMIN — OXYCODONE AND ACETAMINOPHEN 1 TABLET: 10; 325 TABLET ORAL at 08:12

## 2023-12-23 RX ADMIN — ESCITALOPRAM OXALATE 20 MG: 10 TABLET ORAL at 08:12

## 2023-12-23 RX ADMIN — NICOTINE 1 PATCH: 14 PATCH, EXTENDED RELEASE TRANSDERMAL at 09:12

## 2023-12-23 RX ADMIN — TAMSULOSIN HYDROCHLORIDE 0.4 MG: 0.4 CAPSULE ORAL at 08:12

## 2023-12-23 RX ADMIN — LINEZOLID 600 MG: 600 TABLET, FILM COATED ORAL at 08:12

## 2023-12-23 RX ADMIN — POLYETHYLENE GLYCOL 3350 17 G: 17 POWDER, FOR SOLUTION ORAL at 08:12

## 2023-12-23 NOTE — ASSESSMENT & PLAN NOTE
Patient with acute kidney injury/acute renal failure likely due to post-obstructive d/t tumor  BANDAR is currently worsening. Baseline creatinine  1-1.3  - Labs reviewed- Renal function/electrolytes with Estimated Creatinine Clearance: 36.7 mL/min (A) (based on SCr of 1.9 mg/dL (H)). according to latest data. Monitor urine output and serial BMP and adjust therapy as needed. Avoid nephrotoxins and renally dose meds for GFR listed above.  Nephrostomy tube replaced with good urinary output  Renal function remained stable

## 2023-12-23 NOTE — SUBJECTIVE & OBJECTIVE
Past Medical History:   Diagnosis Date    COPD (chronic obstructive pulmonary disease)     Hypertension        Past Surgical History:   Procedure Laterality Date    APPENDECTOMY      CATARACT EXTRACTION      NEPHROSTOMY      OPEN REDUCTION AND INTERNAL FIXATION (ORIF) OF INTERTROCHANTERIC FRACTURE OF FEMUR Right 11/21/2023    Procedure: ORIF, FRACTURE, FEMUR, INTERTROCHANTERIC;  Surgeon: Tanisha Guidry DO;  [facility];  Service: Orthopedics;  Laterality: Right;  Gamma nail       Review of patient's allergies indicates:  No Known Allergies    Medications:  Medications Prior to Admission   Medication Sig    albuterol (ACCUNEB) 0.63 mg/3 mL Nebu Take 3 mLs (0.63 mg total) by nebulization 3 (three) times daily as needed (sob, wheezing). Rescue    albuterol (PROVENTIL/VENTOLIN HFA) 90 mcg/actuation inhaler Inhale 1-2 puffs into the lungs every 4 (four) hours as needed for Wheezing. Rescue    ALPRAZolam (XANAX) 0.5 MG tablet Take 2 tablets (1 mg total) by mouth 3 (three) times daily as needed for Anxiety.    cyclobenzaprine (FLEXERIL) 10 MG tablet Take 1 tablet (10 mg total) by mouth 3 (three) times daily as needed for Muscle spasms.    docusate sodium (COLACE) 100 MG capsule Take 100 mg by mouth 2 (two) times daily.    EScitalopram oxalate (LEXAPRO) 20 MG tablet Take 1 tablet (20 mg total) by mouth once daily.    fluticasone furoate-vilanteroL (BREO ELLIPTA) 100-25 mcg/dose diskus inhaler Inhale 1 puff into the lungs once daily. Controller    HYDROcodone-acetaminophen (NORCO) 5-325 mg per tablet Take 2 tablets by mouth every 6 (six) hours as needed for Pain.    levothyroxine (SYNTHROID) 125 MCG tablet Take 1 tablet (125 mcg total) by mouth before breakfast.    linezolid (ZYVOX) 600 mg Tab Take 1 tablet (600 mg total) by mouth every 12 (twelve) hours.    metoprolol succinate (TOPROL-XL) 50 MG 24 hr tablet Take 1 tablet (50 mg total) by mouth once daily.    nicotine (NICODERM CQ) 14 mg/24 hr Place 1 patch onto  the skin every 24 hours.    tamsulosin (FLOMAX) 0.4 mg Cap Take 1 capsule (0.4 mg total) by mouth once daily.    vitamin D (VITAMIN D3) 1000 units Tab Take 25 mcg by mouth once daily.     Antibiotics (From admission, onward)      Start     Stop Route Frequency Ordered    12/22/23 1115  linezolid tablet 600 mg         12/27/23 0859 Oral Every 12 hours 12/22/23 1013          Antifungals (From admission, onward)      None          Antivirals (From admission, onward)      None             There is no immunization history for the selected administration types on file for this patient.    Family History       Problem Relation (Age of Onset)    Cancer Father, Mother          Social History     Socioeconomic History    Marital status:    Tobacco Use    Smoking status: Former     Types: Cigarettes     Passive exposure: Never    Smokeless tobacco: Never   Substance and Sexual Activity    Alcohol use: Never    Drug use: Never    Sexual activity: Not Currently     Partners: Female     Social Determinants of Health     Financial Resource Strain: High Risk (11/10/2023)    Overall Financial Resource Strain (CARDIA)     Difficulty of Paying Living Expenses: Very hard   Food Insecurity: Food Insecurity Present (11/10/2023)    Hunger Vital Sign     Worried About Running Out of Food in the Last Year: Often true     Ran Out of Food in the Last Year: Often true   Transportation Needs: Unmet Transportation Needs (11/10/2023)    PRAPARE - Transportation     Lack of Transportation (Medical): Yes     Lack of Transportation (Non-Medical): Yes   Physical Activity: Inactive (11/10/2023)    Exercise Vital Sign     Days of Exercise per Week: 0 days     Minutes of Exercise per Session: 0 min   Stress: Stress Concern Present (11/10/2023)    [employer] - Occupational Stress Questionnaire     Feeling of Stress : Rather much   Social Connections: Unknown (11/10/2023)    Social Connection and Isolation Panel  [NHANES]     Frequency of Communication with Friends and Family: More than three times a week     Frequency of Social Gatherings with Friends and Family: Never     Attends [employer] Services: Patient declined     Active Member of Clubs or Organizations: No     Attends Club or Organization Meetings: Never     Marital Status:    Housing Stability: High Risk (11/10/2023)    Housing Stability Vital Sign     Unable to Pay for Housing in the Last Year: Yes     Number of Places Lived in the Last Year: 3     Unstable Housing in the Last Year: No     Review of Systems   Constitutional:  Negative for activity change, appetite change, chills and diaphoresis.   Eyes:  Negative for discharge.   Skin:  Negative for color change.   Neurological:  Negative for dizziness and facial asymmetry.     Objective:     Vital Signs (Most Recent):  Temp: 98 °F (36.7 °C) (12/22/23 1951)  Pulse: 73 (12/22/23 1951)  Resp: 18 (12/22/23 2145)  BP: (!) 100/50 (12/22/23 1951)  SpO2: (!) 94 % (12/22/23 1951) Vital Signs (24h Range):  Temp:  [97.9 °F (36.6 °C)-98.7 °F (37.1 °C)] 98 °F (36.7 °C)  Pulse:  [] 73  Resp:  [15-19] 18  SpO2:  [90 %-96 %] 94 %  BP: (100-123)/(50-61) 100/50     Weight: 73.6 kg (162 lb 4.1 oz)  Body mass index is 23.96 kg/m².    Estimated Creatinine Clearance: 29 mL/min (A) (based on SCr of 2.4 mg/dL (H)).     Physical Exam  Vitals and nursing note reviewed.   HENT:      Head: Normocephalic and atraumatic.   Eyes:      Pupils: Pupils are equal, round, and reactive to light.   Neck:      Thyroid: No thyromegaly.   Cardiovascular:      Rate and Rhythm: Normal rate and regular rhythm.   Pulmonary:      Effort: Pulmonary effort is normal. No respiratory distress.      Breath sounds: No wheezing.   Abdominal:      General: Bowel sounds are normal.      Palpations: Abdomen is soft.      Tenderness: There is no abdominal tenderness.   Musculoskeletal:      Cervical back: Normal range of motion and neck supple.         "  Significant Labs: Blood Culture:   Recent Labs   Lab 10/20/23  2222 10/20/23  2334 11/10/23  1859   LABBLOO No growth after 5 days. Gram stain aer bottle: Gram positive cocci in clusters resembling Staph  Results called to and read back by: Estefany Jacob 10/22/2023  03:30  COAGULASE-NEGATIVE STAPHYLOCOCCUS SPECIES  Organism is a probable contaminant  * No growth after 5 days.  No growth after 5 days.     BMP:   Recent Labs   Lab 12/22/23  0526         K 4.5      CO2 21*   BUN 28*   CREATININE 2.4*   CALCIUM 9.1     CBC:   Recent Labs   Lab 12/21/23  0819 12/22/23  0526   WBC 16.75* 14.18*   HGB 8.0* 7.3*   HCT 25.1* 24.1*    211     CMP:   Recent Labs   Lab 12/21/23  0819 12/22/23  0526   * 138   K 4.5 4.5    107   CO2 22* 21*   * 107   BUN 28* 28*   CREATININE 2.5* 2.4*   CALCIUM 9.7 9.1   PROT 6.6 6.0   ALBUMIN 2.7* 2.3*   BILITOT 0.4 0.3   ALKPHOS 113 111   AST 11 20   ALT 10 12   ANIONGAP 11 10     Wound Culture: No results for input(s): "LABAERO" in the last 4320 hours.  All pertinent labs within the past 24 hours have been reviewed.    Significant Imaging: I have reviewed all pertinent imaging results/findings within the past 24 hours.  "

## 2023-12-23 NOTE — PT/OT/SLP EVAL
"Physical Therapy Evaluation    Patient Name:  Geovani Currie   MRN:  [id number]    Recommendations:     Discharge Recommendations: Low Intensity Therapy   Discharge Equipment Recommendations: none   Barriers to discharge: None    Assessment:     Geovani Currie is a 68 y.o. male admitted with a medical diagnosis of UTI (urinary tract infection).  He presents with the following impairments/functional limitations: weakness, decreased lower extremity function, impaired functional mobility, gait instability, decreased safety awareness, impaired balance, pain, impaired cardiopulmonary response to activity.    Rehab Prognosis: Fair; patient would benefit from acute skilled PT services to address these deficits and reach maximum level of function.    Recent Surgery: * No surgery found *      Plan:     During this hospitalization, patient to be seen 3 x/week to address the identified rehab impairments via gait training, therapeutic activities, therapeutic exercises, neuromuscular re-education and progress toward the following goals:    Plan of Care Expires:  01/06/24    Subjective     Chief Complaint: L hip pain  Patient/Family Comments/goals: "to go home"   Pain/Comfort:  Pain Rating 1: 7/10  Location - Side 1:  (L hip)  Pain Addressed 1: Pre-medicate for activity, Reposition, Nurse notified  Pain Rating Post-Intervention 1: 7/10    Patients cultural, spiritual, Mandaen conflicts given the current situation: no    Living Environment:  Pt lives in single story home with wife with no TAMI  Prior to admission, patients level of function was reported as mod I with RW and WC, however, unsure how accurate report is.  Equipment used at home: walker, rolling, wheelchair, bath bench.  DME owned (not currently used): none.  Upon discharge, patient will have assistance from wife.    Objective:     Communicated with WILBERT Holguin prior to session.  Patient found supine with peripheral IV, telemetry, Other (comments) (urostomy bags)  " "upon PT entry to room.    General Precautions: Standard, fall  Orthopedic Precautions:N/A   Braces: N/A  Respiratory Status: Room air    Exams:  Cognitive Exam:  Patient is oriented to Person and Place  RLE ROM: WNL  RLE Strength: weakness in all antigravity groups   LLE ROM: significant knee flexion contracture noted  LLE Strength: weakness in all antigravity groups    Functional Mobility:  Bed Mobility:     Rolling Right: moderate assistance  Supine to Sit: moderate assistance  Transfers:     Sit to Stand:  moderate assistance with rolling walker  Bed to Chair: moderate assistance with  rolling walker  using  Stand Pivot  Gait: 10' with RW and mod A       AM-PAC 6 CLICK MOBILITY  Total Score:12       Treatment & Education:  Pt educated on role of PT in acute care and POC. Educated on importance of OOB activities, activity pacing, and HEP (marching/hip flex, hip abd, heel slides/LAQ, quad sets, ankle pumps) in order to maintain/regain strength. Encouraged to sit up in chair for all meals. Educated on proper use of RW for safety and to reduce risk of falling. Educated on "call don't fall" policy and increased risk of falling due to weakness, instructed to utilize call bell for assistance with all transfers. Pt agreeable to all requests.    Evaluation completed. Pt performed bed mobility with mod A. Pt performed STS with mod A. Ambulated to bathroom with mod A and RW. Pt then required mod A for STS from toilet. He ambulated 10' with RW and mod A and much increased time to chair and transferred to chair with mod A and RW.     Patient left up in chair with all lines intact, call button in reach, RN notified, and wife and CNA present.    GOALS:   Multidisciplinary Problems       Physical Therapy Goals          Problem: Physical Therapy    Goal Priority Disciplines Outcome Goal Variances Interventions   Physical Therapy Goal     PT, PT/OT Ongoing, Progressing     Description: All LTGs to be met by 1/6/24    Bed mobility " mod I   Transfers mod I   Gait of at least 50 feet mod I with RW  Pt will increase AMPAC score by 2 points to progress functional mobility  Pt will tolerate > 10 min of functional activity to progress gross functional mobility                          History:     Past Medical History:   Diagnosis Date    COPD (chronic obstructive pulmonary disease)     Hypertension        Past Surgical History:   Procedure Laterality Date    APPENDECTOMY      CATARACT EXTRACTION      NEPHROSTOMY      OPEN REDUCTION AND INTERNAL FIXATION (ORIF) OF INTERTROCHANTERIC FRACTURE OF FEMUR Right 11/21/2023    Procedure: ORIF, FRACTURE, FEMUR, INTERTROCHANTERIC;  Surgeon: Tanisha Guidry DO;  Location: [facility];  Service: Orthopedics;  Laterality: Right;  Gamma nail       Time Tracking:     PT Received On: 12/23/23  PT Start Time: 0720     PT Stop Time: 0745  PT Total Time (min): 25 min     Billable Minutes: Evaluation 10 and Therapeutic Activity 15      12/23/2023

## 2023-12-23 NOTE — ASSESSMENT & PLAN NOTE
Will follow repeat urine culture- will complete 12 days of PO Zyvox as previous urine  culture was VRE

## 2023-12-23 NOTE — HOSPITAL COURSE
Patient is 68-year-old male with a history of COPD and hypertension has a history lung cancer, urothelial cancer with recurrent UTIs.  He was at home and accidentally pulled out his nephrostomy tube therefore presented to the hospital for replacement.  He also had sepsis secondary to recurrent UTI and after his nephrostomy tube was replaced by IR he was to the hospital for IV antibiotics to await cultures.  Blood cultures were ordered prior to antibiotics being given however these were not drawn.  They were drawn the next day.  Urine culture was positive multiple organisms.  His last urine culture was positive for VRE he was placed on the az in the hospital with plans to DC on Zyvox.  Patient's functional status improved.  He, his wife, and daughter met with palliative Care as well as [facility] for an informational visit.  It was determined that patient would keep his follow up appointments with Urology, Oncology, Radiation Oncology.  And after he in his family had a clear understanding of his condition and prognosis they would consider hospice again.  Patient will be discharged home with home health and p.o. antibiotics.  Id was consulted with recommendations to DC home on a 7 day course of Zyvox and cefdinir.  Patient's hemoglobin dropped to 7.0 and with shortness a breath it was felt this was symptomatic and he was transfused 2 units of packed blood cells.  Patient seen and examined on day of discharge and deemed stable for discharge home.

## 2023-12-23 NOTE — PROGRESS NOTES
[facility] Medicine  Progress Note    Patient Name: Geovani Currie  MRN: [id number]  Patient Class: IP- Inpatient   Admission Date: 12/21/2023  Length of Stay: 1 days  Attending Physician: Dolly Sandoval MD  Primary Care Provider: Faizan Antoine MD        Subjective:     Principal Problem:UTI (urinary tract infection)        HPI:  Patient is 68-year-old male who has a past medical history of COPD (chronic obstructive pulmonary disease) and Hypertension.   Patient has a complicated medical history including lung cancer, urothelial cancer with recurrent UTIs, bilateral nephrostomy tubes.  He was living in [place] was recently moved here by his daughter so that she could participate more in his care.  He presents after having had his nephrostomy tube pulled out accidentally home.  Patient had nephrostomy tube replaced by IR.  Given his multiple urinary tract infections which have been resistant and his ongoing weight loss.  Palliative Care was consulted.  After speaking with patient in his family they decided proceed with DNR and be discharged with hospice.  Patient likely has sepsis secondary to recurrent UTI.  He will be admitted to hospital medicine for IV antibiotics and when he is stable for discharge we will go home on p.o. antibiotics with hospice.    Overview/Hospital Course:  Patient is 68-year-old male with a history of COPD and hypertension has a history lung cancer, urothelial cancer with recurrent UTIs.  He was at home and accidentally pulled out his nephrostomy tube therefore presented to the hospital for replacement.  He also had sepsis secondary to recurrent UTI and after his nephrostomy tube was replaced by IR he was to the hospital for IV antibiotics to await cultures.  Blood cultures were ordered prior to antibiotics being given however these were not drawn.  They were drawn the next day.  Urine culture was positive multiple organisms.  His last urine culture was  positive for VRE he was placed on the az in the hospital with plans to DC on Zyvox.  Patient's functional status improved.  He, his wife, and daughter met with palliative Care as well as [facility] for an informational visit.  It was determined that patient would keep his follow up appointments with Urology, Oncology, Radiation Oncology.  And after he in his family had a clear understanding of his condition and prognosis they would consider hospice again.  Patient will be discharged home with home health and p.o. antibiotics.  Id was consulted with recommendations to DC home on a 7 day course of Zyvox and cefdinir.  Patient's hemoglobin dropped to 7.0 and with shortness a breath it was felt this was symptomatic and he was transfused 2 units of packed blood cells.  Patient seen and examined on day of discharge and deemed stable for discharge home.    Interval History:  Patient seen with wife in room sitting in lounge chair.  He states he is feeling better but is tired because he has been for quite a while.  He denies any chest pain.  Complains of shortness a breath even at rest.  It is noted that hemoglobin dropped to 7 therefore he will be transfused prior to discharge.    Review of Systems   Constitutional:  Positive for activity change, appetite change, fatigue and unexpected weight change. Negative for fever.   Respiratory:  Positive for shortness of breath.    Cardiovascular:  Negative for palpitations.   Gastrointestinal:  Negative for nausea.   Genitourinary:  Negative for decreased urine volume and flank pain.   Musculoskeletal:  Positive for back pain.   Neurological:  Positive for weakness.   All other systems reviewed and are negative.    Objective:     Vital Signs (Most Recent):  Temp: 98.4 °F (36.9 °C) (12/23/23 1226)  Pulse: 70 (12/23/23 1226)  Resp: 17 (12/23/23 1335)  BP: (!) 110/52 (12/23/23 1226)  SpO2: (!) 93 % (12/23/23 1226) Vital Signs (24h Range):  Temp:  [98 °F (36.7 °C)-98.4 °F  (36.9 °C)] 98.4 °F (36.9 °C)  Pulse:  [] 70  Resp:  [16-19] 17  SpO2:  [93 %-98 %] 93 %  BP: (100-131)/(50-58) 110/52     Weight: 73.6 kg (162 lb 4.1 oz)  Body mass index is 23.96 kg/m².    Intake/Output Summary (Last 24 hours) at 12/23/2023 1418  Last data filed at 12/23/2023 0809  Gross per 24 hour   Intake 358 ml   Output 475 ml   Net -117 ml         Physical Exam  Vitals reviewed.   Constitutional:       Appearance: He is ill-appearing.   HENT:      Head: Normocephalic and atraumatic.      Mouth/Throat:      Mouth: Mucous membranes are moist.      Pharynx: Oropharynx is clear.   Eyes:      Extraocular Movements: Extraocular movements intact.      Conjunctiva/sclera: Conjunctivae normal.   Cardiovascular:      Rate and Rhythm: Regular rhythm. Tachycardia present.      Pulses: Normal pulses.      Heart sounds: Normal heart sounds.   Pulmonary:      Effort: Pulmonary effort is normal.      Breath sounds: Rhonchi present.   Abdominal:      General: Bowel sounds are normal.      Palpations: Abdomen is soft.   Musculoskeletal:         General: Tenderness present. Normal range of motion.      Cervical back: Normal range of motion and neck supple.   Skin:     General: Skin is warm and dry.   Neurological:      General: No focal deficit present.      Mental Status: He is alert and oriented to person, place, and time. Mental status is at baseline.   Psychiatric:         Mood and Affect: Mood normal.         Behavior: Behavior normal.         Thought Content: Thought content normal.             Significant Labs: All pertinent labs within the past 24 hours have been reviewed.  Blood Culture:   Recent Labs   Lab 12/22/23  0922   LABBLOO No Growth to date     CBC:   Recent Labs   Lab 12/22/23  0526 12/23/23  0422   WBC 14.18* 8.07   HGB 7.3* 7.0*   HCT 24.1* 22.8*    218     CMP:   Recent Labs   Lab 12/22/23  0526 12/23/23  0422    139   K 4.5 3.7    108   CO2 21* 21*    110   BUN 28* 26*    CREATININE 2.4* 1.9*   CALCIUM 9.1 8.7   PROT 6.0  --    ALBUMIN 2.3* 2.1*   BILITOT 0.3  --    ALKPHOS 111  --    AST 20  --    ALT 12  --    ANIONGAP 10 10         Significant Imaging: I have reviewed all pertinent imaging results/findings within the past 24 hours.    Assessment/Plan:      * UTI (urinary tract infection)    Patient has a history of VRE urinary tract infection  We will start daptomycin  Blood and urine cultures obtained    Encounter for palliative care    Patient seen by palliative care.  He met with palliative care nurse practitioner with his daughter.  It was decided that his resuscitation status be changed to DNR.  Long discussion wife, daughter, and patient they would like to follow up outpatient with his physicians to determine his prognosis as well as treatment plan.  They have met and had a informational visit with [facility] who will remain available if and when he decides to go with hospice care.    Malignant neoplasm of urinary bladder    Currently on palliative treatment.  He has nephrostomy tubes which were DC accidentally.  These has been replaced by IR urinalysis obtained and started on empiric antibiotic therapy with daptomycin given his history of VRE    Acute renal failure superimposed on stage 3 chronic kidney disease  Patient with acute kidney injury/acute renal failure likely due to post-obstructive d/t tumor  BANDAR is currently worsening. Baseline creatinine  1-1.3  - Labs reviewed- Renal function/electrolytes with Estimated Creatinine Clearance: 36.7 mL/min (A) (based on SCr of 1.9 mg/dL (H)). according to latest data. Monitor urine output and serial BMP and adjust therapy as needed. Avoid nephrotoxins and renally dose meds for GFR listed above.  Nephrostomy tube replaced with good urinary output  Renal function remained stable    Centrilobular emphysema  Patient's COPD is controlled currently.  Patient is currently off COPD Pathway. Continue scheduled inhalers  Steroids, Antibiotics, and Supplemental oxygen and monitor respiratory status closely.     Squamous cell carcinoma of right lung  Oncology History   Squamous cell carcinoma of right lung   Malignant neoplasm of urinary bladder   9/29/2020 - 11/25/2020 Radiation Therapy    CRT with Gemcitabine  35 fractions IMRT/IGRT             VTE Risk Mitigation (From admission, onward)           Ordered     enoxaparin injection 40 mg  Every 24 hours         12/23/23 1125     IP VTE HIGH RISK PATIENT  Once         12/21/23 1050     Place sequential compression device  Until discontinued         12/21/23 1050     Reason for No Pharmacological VTE Prophylaxis  Once        Question:  Reasons:  Answer:  Physician Provided (leave comment)    12/21/23 1050                    Discharge Planning   TANIA:      Code Status: DNR   Is the patient medically ready for discharge?:     Reason for patient still in hospital (select all that apply): Patient trending condition and Treatment  Discharge Plan A: Home Health   Discharge Delays: None known at this time              Dolly Tinajero MD  Department of Hospital Medicine   [facility] - Med Surg

## 2023-12-23 NOTE — PLAN OF CARE
Goals to be met by: 1/6/24     Patient will increase functional independence with ADLs by performing:    LE Dressing with Set-up Assistance.  Toileting from toilet with Set-up Assistance for hygiene and clothing management.   Toilet transfer to toilet with Supervision.    DC with low intensity OT indicated.

## 2023-12-23 NOTE — PT/OT/SLP EVAL
Occupational Therapy Evaluation and Treatment    Name: Geovani Currie  MRN: [id number]  Admitting Diagnosis: UTI (urinary tract infection)  Recent Surgery: * No surgery found *      Recommendations:     Discharge Recommendations: Low Intensity Therapy  Level of Assistance Recommended: 24 hours light assistance  Discharge Equipment Recommendations: none  Barriers to discharge: None    Assessment:     Geovani Currie is a 68 y.o. male with a medical diagnosis of UTI (urinary tract infection). He presents with performance deficits affecting function including weakness, impaired endurance, impaired self care skills, gait instability, impaired functional mobility, impaired balance, decreased upper extremity function, decreased lower extremity function, decreased safety awareness, pain.     Rehab Prognosis: Fair; patient would benefit from acute OT services to address these deficits and reach maximum level of function.    Plan:     Patient to be seen 2 x/week to address the above listed problems via self-care/home management, therapeutic activities, therapeutic exercises, neuromuscular re-education  Plan of Care Expires: 01/06/24  Plan of Care Reviewed with: patient, spouse    Subjective     Chief Complaint: weakness, low back pain, AMS  Patient Comments/Goals: DC home before [place].  Pain/Comfort:  Pain Rating 1: 7/10  Location - Orientation 1: lower  Location 1: back  Pain Addressed 1: Nurse notified, Reposition  Pain Rating Post-Intervention 1: 7/10    Patients cultural, spiritual, Zoroastrian conflicts given the current situation: no    Social History:  Living Environment: Patient lives with their spouse in a single story home with number of outside stair(s): 1 and number of inside stair(s): 0  Prior Level of Function: Prior to admission, patient was modified independent  Roles and Routines: Patient was not driving and retired prior to admission.  Equipment Used at Home: wheelchair, walker, rolling, rollator, bath  bench  DME owned (not currently used): none  Assistance Upon Discharge: significant other    Objective:     Communicated with nurse prior to session. Patient found HOB elevated with peripheral IV, telemetry (urostomy bags x 2) upon OT entry to room.    General Precautions: Standard, fall   Orthopedic Precautions: N/A   Braces: N/A    Respiratory Status: Room air    Occupational Performance    Gait belt applied - Yes    Bed Mobility:   Rolling/Turning to Right with independence  Supine to sit from right side of bed with supervision    Functional Mobility/Transfers:  Sit <> Stand Transfer with minimum assistance with rolling walker  Toilet Transfer Step Transfer technique with minimum assistance with rolling walker    Activities of Daily Living:  Upper Body Dressing: set up assistance  Lower Body Dressing: minimum assistance  Toileting: minimum assistance    Cognitive/Visual Perceptual:  Cognitive/Psychosocial Skills:    -     Oriented to: Person, Place, Time, Situation  -     Safety awareness/insight to disability: intact  -     Mood/Affect/Coping skills/emotional control: Appropriate to situation and Cooperative  Visual/Perceptual:    -     Intact    Physical Exam:  Balance:    -     Sitting: independence  -     Standing: minimum assistance  Upper Extremity Range of Motion:     -       Right Upper Extremity: WNL  -       Left Upper Extremity: WNL  Upper Extremity Strength:    -       Right Upper Extremity: WNL  -       Left Upper Extremity: WNL    AMPAC 6 Click ADL:  AMPAC Total Score: 21    Treatment & Education:  Therapist provided facilitation and instruction of proper body mechanics, energy conservation, and fall prevention strategies during tasks listed above  Patient educated on role of OT, POC, and goals for therapy  Patient educated on importance of OOB activities with staff member assistance and sitting OOB majority of the day    Patient left up in chair with all lines intact and call button in  reach.    GOALS:   Multidisciplinary Problems       Occupational Therapy Goals          Problem: Occupational Therapy    Goal Priority Disciplines Outcome Interventions   Occupational Therapy Goal     OT, PT/OT     Description: Goals to be met by: 1/6/24     Patient will increase functional independence with ADLs by performing:    LE Dressing with Set-up Assistance.  Toileting from toilet with Set-up Assistance for hygiene and clothing management.   Toilet transfer to toilet with Supervision.                         History:     Past Medical History:   Diagnosis Date    COPD (chronic obstructive pulmonary disease)     Hypertension          Past Surgical History:   Procedure Laterality Date    APPENDECTOMY      CATARACT EXTRACTION      NEPHROSTOMY      OPEN REDUCTION AND INTERNAL FIXATION (ORIF) OF INTERTROCHANTERIC FRACTURE OF FEMUR Right 11/21/2023    Procedure: ORIF, FRACTURE, FEMUR, INTERTROCHANTERIC;  Surgeon: Tanisha Guidry DO;  Location: [facility];  Service: Orthopedics;  Laterality: Right;  Gamma nail       Time Tracking:     OT Date of Treatment: 12/23/23  OT Start Time: 0730  OT Stop Time: 0755  OT Total Time (min): 25 min    Billable Minutes: Evaluation 10 and Self Care/Home Management 15    12/23/2023

## 2023-12-23 NOTE — CONSULTS
[facility]  Infectious Disease  Consult Note    Patient Name: Geovani Currie  MRN: [id number]  Admission Date: 12/21/2023  Hospital Length of Stay: 0 days  Attending Physician: Dolly Sandoval MD  Primary Care Provider: Faizan Antoine MD     Isolation Status: No active isolations    Patient information was obtained from patient, past medical records, and ER records.      Consults  Assessment/Plan:     Renal/  * UTI (urinary tract infection)  Will follow repeat urine culture- will complete 12 days of PO Zyvox as previous urine  culture was VRE    Oncology  Squamous cell carcinoma of right lung  Will follow oncology     Palliative Care  Encounter for palliative care  Will follow primary team/hospice        Thank you for your consult. I will follow-up with patient. Please contact us if you have any additional questions.    Elmo Pace MD, [facility]  Infectious Disease  [facility]    Subjective:     Principal Problem: UTI (urinary tract infection)    HPI:  68-year-old male with  past medical history of COPD (chronic obstructive pulmonary disease) and Hypertension,lung cancer, urothelial cancer with recurrent UTIs, bilateral nephrostomy tubes. .    He was seen here after his nephrostomy tube pulled out accidentally home.  ID was consulted for UTI.    Urine culture-11/11/23- VRE  Component      Latest Ref Rng 12/9/2023 12/21/2023 12/22/2023   WBC      3.90 - 12.70 K/uL 8.54  16.75 (H)  14.18 (H)         Past Medical History:   Diagnosis Date    COPD (chronic obstructive pulmonary disease)     Hypertension        Past Surgical History:   Procedure Laterality Date    APPENDECTOMY      CATARACT EXTRACTION      NEPHROSTOMY      OPEN REDUCTION AND INTERNAL FIXATION (ORIF) OF INTERTROCHANTERIC FRACTURE OF FEMUR Right 11/21/2023    Procedure: ORIF, FRACTURE, FEMUR, INTERTROCHANTERIC;  Surgeon: Tanisha Guidry DO;  Location: [facility] OR;  Service: Orthopedics;  Laterality: Right;  Gamma nail       Review  of patient's allergies indicates:  No Known Allergies    Medications:  Medications Prior to Admission   Medication Sig    albuterol (ACCUNEB) 0.63 mg/3 mL Nebu Take 3 mLs (0.63 mg total) by nebulization 3 (three) times daily as needed (sob, wheezing). Rescue    albuterol (PROVENTIL/VENTOLIN HFA) 90 mcg/actuation inhaler Inhale 1-2 puffs into the lungs every 4 (four) hours as needed for Wheezing. Rescue    ALPRAZolam (XANAX) 0.5 MG tablet Take 2 tablets (1 mg total) by mouth 3 (three) times daily as needed for Anxiety.    cyclobenzaprine (FLEXERIL) 10 MG tablet Take 1 tablet (10 mg total) by mouth 3 (three) times daily as needed for Muscle spasms.    docusate sodium (COLACE) 100 MG capsule Take 100 mg by mouth 2 (two) times daily.    EScitalopram oxalate (LEXAPRO) 20 MG tablet Take 1 tablet (20 mg total) by mouth once daily.    fluticasone furoate-vilanteroL (BREO ELLIPTA) 100-25 mcg/dose diskus inhaler Inhale 1 puff into the lungs once daily. Controller    HYDROcodone-acetaminophen (NORCO) 5-325 mg per tablet Take 2 tablets by mouth every 6 (six) hours as needed for Pain.    levothyroxine (SYNTHROID) 125 MCG tablet Take 1 tablet (125 mcg total) by mouth before breakfast.    linezolid (ZYVOX) 600 mg Tab Take 1 tablet (600 mg total) by mouth every 12 (twelve) hours.    metoprolol succinate (TOPROL-XL) 50 MG 24 hr tablet Take 1 tablet (50 mg total) by mouth once daily.    nicotine (NICODERM CQ) 14 mg/24 hr Place 1 patch onto the skin every 24 hours.    tamsulosin (FLOMAX) 0.4 mg Cap Take 1 capsule (0.4 mg total) by mouth once daily.    vitamin D (VITAMIN D3) 1000 units Tab Take 25 mcg by mouth once daily.     Antibiotics (From admission, onward)      Start     Stop Route Frequency Ordered    12/22/23 1115  linezolid tablet 600 mg         12/27/23 0859 Oral Every 12 hours 12/22/23 1013          Antifungals (From admission, onward)      None          Antivirals (From admission, onward)      None             There is no  immunization history for the selected administration types on file for this patient.    Family History       Problem Relation (Age of Onset)    Cancer Father, Mother          Social History     Socioeconomic History    Marital status:    Tobacco Use    Smoking status: Former     Types: Cigarettes     Passive exposure: Never    Smokeless tobacco: Never   Substance and Sexual Activity    Alcohol use: Never    Drug use: Never    Sexual activity: Not Currently     Partners: Female     Social Determinants of Health     Financial Resource Strain: High Risk (11/10/2023)    Overall Financial Resource Strain (CARDIA)     Difficulty of Paying Living Expenses: Very hard   Food Insecurity: Food Insecurity Present (11/10/2023)    Hunger Vital Sign     Worried About Running Out of Food in the Last Year: Often true     Ran Out of Food in the Last Year: Often true   Transportation Needs: Unmet Transportation Needs (11/10/2023)    PRAPARE - Transportation     Lack of Transportation (Medical): Yes     Lack of Transportation (Non-Medical): Yes   Physical Activity: Inactive (11/10/2023)    Exercise Vital Sign     Days of Exercise per Week: 0 days     Minutes of Exercise per Session: 0 min   Stress: Stress Concern Present (11/10/2023)    [employer] - Occupational Stress Questionnaire     Feeling of Stress : Rather much   Social Connections: Unknown (11/10/2023)    Social Connection and Isolation Panel [NHANES]     Frequency of Communication with Friends and Family: More than three times a week     Frequency of Social Gatherings with Friends and Family: Never     Attends Congregational Services: Patient declined     Active Member of Clubs or Organizations: No     Attends Club or Organization Meetings: Never     Marital Status:    Housing Stability: High Risk (11/10/2023)    Housing Stability Vital Sign     Unable to Pay for Housing in the Last Year: Yes     Number of Places Lived in the Last Year: 3      Unstable Housing in the Last Year: No     Review of Systems   Constitutional:  Negative for activity change, appetite change, chills and diaphoresis.   Eyes:  Negative for discharge.   Skin:  Negative for color change.   Neurological:  Negative for dizziness and facial asymmetry.     Objective:     Vital Signs (Most Recent):  Temp: 98 °F (36.7 °C) (12/22/23 1951)  Pulse: 73 (12/22/23 1951)  Resp: 18 (12/22/23 2145)  BP: (!) 100/50 (12/22/23 1951)  SpO2: (!) 94 % (12/22/23 1951) Vital Signs (24h Range):  Temp:  [97.9 °F (36.6 °C)-98.7 °F (37.1 °C)] 98 °F (36.7 °C)  Pulse:  [] 73  Resp:  [15-19] 18  SpO2:  [90 %-96 %] 94 %  BP: (100-123)/(50-61) 100/50     Weight: 73.6 kg (162 lb 4.1 oz)  Body mass index is 23.96 kg/m².    Estimated Creatinine Clearance: 29 mL/min (A) (based on SCr of 2.4 mg/dL (H)).     Physical Exam  Vitals and nursing note reviewed.   HENT:      Head: Normocephalic and atraumatic.   Eyes:      Pupils: Pupils are equal, round, and reactive to light.   Neck:      Thyroid: No thyromegaly.   Cardiovascular:      Rate and Rhythm: Normal rate and regular rhythm.   Pulmonary:      Effort: Pulmonary effort is normal. No respiratory distress.      Breath sounds: No wheezing.   Abdominal:      General: Bowel sounds are normal.      Palpations: Abdomen is soft.      Tenderness: There is no abdominal tenderness.   Musculoskeletal:      Cervical back: Normal range of motion and neck supple.          Significant Labs: Blood Culture:   Recent Labs   Lab 10/20/23  2222 10/20/23  2334 11/10/23  1859   LABBLOO No growth after 5 days. Gram stain aer bottle: Gram positive cocci in clusters resembling Staph  Results called to and read back by: Estefany Jacob 10/22/2023  03:30  COAGULASE-NEGATIVE STAPHYLOCOCCUS SPECIES  Organism is a probable contaminant  * No growth after 5 days.  No growth after 5 days.     BMP:   Recent Labs   Lab 12/22/23  0526         K 4.5      CO2 21*   BUN 28*  "  CREATININE 2.4*   CALCIUM 9.1     CBC:   Recent Labs   Lab 12/21/23  0819 12/22/23  0526   WBC 16.75* 14.18*   HGB 8.0* 7.3*   HCT 25.1* 24.1*    211     CMP:   Recent Labs   Lab 12/21/23  0819 12/22/23  0526   * 138   K 4.5 4.5    107   CO2 22* 21*   * 107   BUN 28* 28*   CREATININE 2.5* 2.4*   CALCIUM 9.7 9.1   PROT 6.6 6.0   ALBUMIN 2.7* 2.3*   BILITOT 0.4 0.3   ALKPHOS 113 111   AST 11 20   ALT 10 12   ANIONGAP 11 10     Wound Culture: No results for input(s): "LABAERO" in the last 4320 hours.  All pertinent labs within the past 24 hours have been reviewed.    Significant Imaging: I have reviewed all pertinent imaging results/findings within the past 24 hours.              "

## 2023-12-23 NOTE — PLAN OF CARE
Evaluation completed. Pt performed bed mobility with mod A. Pt performed STS with mod A. Ambulated to bathroom with mod A and RW. Pt then required mod A for STS from toilet. He ambulated 10' with RW and mod A and much increased time to chair and transferred to chair with mod A and RW.

## 2023-12-23 NOTE — SUBJECTIVE & OBJECTIVE
Interval History:  Patient seen with wife in room sitting in lounge chair.  He states he is feeling better but is tired because he has been for quite a while.  He denies any chest pain.  Complains of shortness a breath even at rest.  It is noted that hemoglobin dropped to 7 therefore he will be transfused prior to discharge.    Review of Systems   Constitutional:  Positive for activity change, appetite change, fatigue and unexpected weight change. Negative for fever.   Respiratory:  Positive for shortness of breath.    Cardiovascular:  Negative for palpitations.   Gastrointestinal:  Negative for nausea.   Genitourinary:  Negative for decreased urine volume and flank pain.   Musculoskeletal:  Positive for back pain.   Neurological:  Positive for weakness.   All other systems reviewed and are negative.    Objective:     Vital Signs (Most Recent):  Temp: 98.4 °F (36.9 °C) (12/23/23 1226)  Pulse: 70 (12/23/23 1226)  Resp: 17 (12/23/23 1335)  BP: (!) 110/52 (12/23/23 1226)  SpO2: (!) 93 % (12/23/23 1226) Vital Signs (24h Range):  Temp:  [98 °F (36.7 °C)-98.4 °F (36.9 °C)] 98.4 °F (36.9 °C)  Pulse:  [] 70  Resp:  [16-19] 17  SpO2:  [93 %-98 %] 93 %  BP: (100-131)/(50-58) 110/52     Weight: 73.6 kg (162 lb 4.1 oz)  Body mass index is 23.96 kg/m².    Intake/Output Summary (Last 24 hours) at 12/23/2023 1418  Last data filed at 12/23/2023 0809  Gross per 24 hour   Intake 358 ml   Output 475 ml   Net -117 ml         Physical Exam  Vitals reviewed.   Constitutional:       Appearance: He is ill-appearing.   HENT:      Head: Normocephalic and atraumatic.      Mouth/Throat:      Mouth: Mucous membranes are moist.      Pharynx: Oropharynx is clear.   Eyes:      Extraocular Movements: Extraocular movements intact.      Conjunctiva/sclera: Conjunctivae normal.   Cardiovascular:      Rate and Rhythm: Regular rhythm. Tachycardia present.      Pulses: Normal pulses.      Heart sounds: Normal heart sounds.   Pulmonary:      Effort:  Pulmonary effort is normal.      Breath sounds: Rhonchi present.   Abdominal:      General: Bowel sounds are normal.      Palpations: Abdomen is soft.   Musculoskeletal:         General: Tenderness present. Normal range of motion.      Cervical back: Normal range of motion and neck supple.   Skin:     General: Skin is warm and dry.   Neurological:      General: No focal deficit present.      Mental Status: He is alert and oriented to person, place, and time. Mental status is at baseline.   Psychiatric:         Mood and Affect: Mood normal.         Behavior: Behavior normal.         Thought Content: Thought content normal.             Significant Labs: All pertinent labs within the past 24 hours have been reviewed.  Blood Culture:   Recent Labs   Lab 12/22/23  0922   LABBLOO No Growth to date     CBC:   Recent Labs   Lab 12/22/23 0526 12/23/23  0422   WBC 14.18* 8.07   HGB 7.3* 7.0*   HCT 24.1* 22.8*    218     CMP:   Recent Labs   Lab 12/22/23 0526 12/23/23  0422    139   K 4.5 3.7    108   CO2 21* 21*    110   BUN 28* 26*   CREATININE 2.4* 1.9*   CALCIUM 9.1 8.7   PROT 6.0  --    ALBUMIN 2.3* 2.1*   BILITOT 0.3  --    ALKPHOS 111  --    AST 20  --    ALT 12  --    ANIONGAP 10 10         Significant Imaging: I have reviewed all pertinent imaging results/findings within the past 24 hours.

## 2023-12-23 NOTE — PROGRESS NOTES
Pharmacist Renal Dose Adjustment Note    Geovani Currie is a 68 y.o. male being treated with the medication enoxaparin.    Patient Data:    Vital Signs (Most Recent):  Temp: 98.2 °F (36.8 °C) (12/23/23 0754)  Pulse: 102 (12/23/23 0754)  Resp: 19 (12/23/23 0804)  BP: (!) 122/58 (12/23/23 0754)  SpO2: 98 % (12/23/23 0754) Vital Signs (72h Range):  Temp:  [97.9 °F (36.6 °C)-100.3 °F (37.9 °C)]   Pulse:  []   Resp:  [15-24]   BP: ()/(50-70)   SpO2:  [90 %-98 %]      Recent Labs   Lab 12/21/23  0819 12/22/23  0526 12/23/23  0422   CREATININE 2.5* 2.4* 1.9*     Serum creatinine: 1.9 mg/dL (H) 12/23/23 0422  Estimated creatinine clearance: 36.7 mL/min (A)    Enoxaparin 30 mg daily was adjusted to 40 mg daily according to [facility]'s renal dose adjustment policy for Crcl >30.    Pharmacist's Name: Julisa Blanc PharmVICKIE 12/23/2023 11:25 AM    Pharmacist's Extension: [phone]

## 2023-12-23 NOTE — PLAN OF CARE
Plan of care reviewed and followed with patient understanding of POC verbalized. Patient's call bell in reach and instructed to call for needs. Remains free of falls or injury. Pain controlled with PRN medications. Tolerating medications well.  Vital signs stable. NADN. Will continue to monitor.      Problem: Adult Inpatient Plan of Care  Goal: Plan of Care Review  Outcome: Ongoing, Progressing  Goal: Patient-Specific Goal (Individualized)  Outcome: Ongoing, Progressing  Goal: Absence of Hospital-Acquired Illness or Injury  Outcome: Ongoing, Progressing  Goal: Optimal Comfort and Wellbeing  Outcome: Ongoing, Progressing  Goal: Readiness for Transition of Care  Outcome: Ongoing, Progressing     Problem: Coping Ineffective  Goal: Effective Coping  Outcome: Ongoing, Progressing     Problem: Fluid and Electrolyte Imbalance (Acute Kidney Injury/Impairment)  Goal: Fluid and Electrolyte Balance  Outcome: Ongoing, Progressing     Problem: Oral Intake Inadequate (Acute Kidney Injury/Impairment)  Goal: Optimal Nutrition Intake  Outcome: Ongoing, Progressing     Problem: Renal Function Impairment (Acute Kidney Injury/Impairment)  Goal: Effective Renal Function  Outcome: Ongoing, Progressing     Problem: Skin Injury Risk Increased  Goal: Skin Health and Integrity  Outcome: Ongoing, Progressing     Problem: Impaired Wound Healing  Goal: Optimal Wound Healing  Outcome: Ongoing, Progressing

## 2023-12-23 NOTE — HPI
68-year-old male with  past medical history of COPD (chronic obstructive pulmonary disease) and Hypertension,lung cancer, urothelial cancer with recurrent UTIs, bilateral nephrostomy tubes. .    He was seen here after his nephrostomy tube pulled out accidentally home.  ID was consulted for UTI.    Urine culture-11/11/23- VRE  Component      Latest Ref Rng 12/9/2023 12/21/2023 12/22/2023   WBC      3.90 - 12.70 K/uL 8.54  16.75 (H)  14.18 (H)

## 2023-12-24 VITALS
SYSTOLIC BLOOD PRESSURE: 113 MMHG | HEART RATE: 71 BPM | TEMPERATURE: 98 F | RESPIRATION RATE: 17 BRPM | BODY MASS INDEX: 25.59 KG/M2 | OXYGEN SATURATION: 97 % | DIASTOLIC BLOOD PRESSURE: 57 MMHG | WEIGHT: 173.31 LBS

## 2023-12-24 LAB
ALBUMIN SERPL BCP-MCNC: 2.2 G/DL (ref 3.5–5.2)
ANION GAP SERPL CALC-SCNC: 10 MMOL/L (ref 8–16)
BASOPHILS # BLD AUTO: 0.03 K/UL (ref 0–0.2)
BASOPHILS NFR BLD: 0.4 % (ref 0–1.9)
BLD PROD TYP BPU: NORMAL
BLD PROD TYP BPU: NORMAL
BLOOD UNIT EXPIRATION DATE: NORMAL
BLOOD UNIT EXPIRATION DATE: NORMAL
BLOOD UNIT TYPE CODE: 7300
BLOOD UNIT TYPE CODE: 7300
BLOOD UNIT TYPE: NORMAL
BLOOD UNIT TYPE: NORMAL
BUN SERPL-MCNC: 21 MG/DL (ref 8–23)
CALCIUM SERPL-MCNC: 9.2 MG/DL (ref 8.7–10.5)
CHLORIDE SERPL-SCNC: 105 MMOL/L (ref 95–110)
CO2 SERPL-SCNC: 24 MMOL/L (ref 23–29)
CODING SYSTEM: NORMAL
CODING SYSTEM: NORMAL
CREAT SERPL-MCNC: 1.8 MG/DL (ref 0.5–1.4)
CROSSMATCH INTERPRETATION: NORMAL
CROSSMATCH INTERPRETATION: NORMAL
DIFFERENTIAL METHOD BLD: ABNORMAL
DISPENSE STATUS: NORMAL
DISPENSE STATUS: NORMAL
EOSINOPHIL # BLD AUTO: 0.4 K/UL (ref 0–0.5)
EOSINOPHIL NFR BLD: 5.1 % (ref 0–8)
ERYTHROCYTE [DISTWIDTH] IN BLOOD BY AUTOMATED COUNT: 17 % (ref 11.5–14.5)
EST. GFR  (NO RACE VARIABLE): 40 ML/MIN/1.73 M^2
GLUCOSE SERPL-MCNC: 90 MG/DL (ref 70–110)
HCT VFR BLD AUTO: 30.1 % (ref 40–54)
HGB BLD-MCNC: 9.3 G/DL (ref 14–18)
IMM GRANULOCYTES # BLD AUTO: 0.03 K/UL (ref 0–0.04)
IMM GRANULOCYTES NFR BLD AUTO: 0.4 % (ref 0–0.5)
LYMPHOCYTES # BLD AUTO: 0.9 K/UL (ref 1–4.8)
LYMPHOCYTES NFR BLD: 11.8 % (ref 18–48)
MCH RBC QN AUTO: 27.9 PG (ref 27–31)
MCHC RBC AUTO-ENTMCNC: 30.9 G/DL (ref 32–36)
MCV RBC AUTO: 90 FL (ref 82–98)
MONOCYTES # BLD AUTO: 0.6 K/UL (ref 0.3–1)
MONOCYTES NFR BLD: 7.3 % (ref 4–15)
NEUTROPHILS # BLD AUTO: 5.9 K/UL (ref 1.8–7.7)
NEUTROPHILS NFR BLD: 75 % (ref 38–73)
NRBC BLD-RTO: 0 /100 WBC
NUM UNITS TRANS PACKED RBC: NORMAL
NUM UNITS TRANS PACKED RBC: NORMAL
PHOSPHATE SERPL-MCNC: 4.2 MG/DL (ref 2.7–4.5)
PLATELET # BLD AUTO: 234 K/UL (ref 150–450)
PMV BLD AUTO: 10 FL (ref 9.2–12.9)
POTASSIUM SERPL-SCNC: 4.6 MMOL/L (ref 3.5–5.1)
RBC # BLD AUTO: 3.33 M/UL (ref 4.6–6.2)
SODIUM SERPL-SCNC: 139 MMOL/L (ref 136–145)
WBC # BLD AUTO: 7.91 K/UL (ref 3.9–12.7)

## 2023-12-24 PROCEDURE — P9016 RBC LEUKOCYTES REDUCED: HCPCS | Performed by: INTERNAL MEDICINE

## 2023-12-24 PROCEDURE — 36430 TRANSFUSION BLD/BLD COMPNT: CPT

## 2023-12-24 PROCEDURE — 25000003 PHARM REV CODE 250: Performed by: INTERNAL MEDICINE

## 2023-12-24 PROCEDURE — 80069 RENAL FUNCTION PANEL: CPT | Performed by: INTERNAL MEDICINE

## 2023-12-24 PROCEDURE — 85025 COMPLETE CBC W/AUTO DIFF WBC: CPT | Performed by: INTERNAL MEDICINE

## 2023-12-24 PROCEDURE — 36415 COLL VENOUS BLD VENIPUNCTURE: CPT | Performed by: INTERNAL MEDICINE

## 2023-12-24 RX ORDER — CEFDINIR 300 MG/1
300 CAPSULE ORAL 2 TIMES DAILY
Qty: 14 CAPSULE | Refills: 0 | Status: SHIPPED | OUTPATIENT
Start: 2023-12-24 | End: 2023-12-28

## 2023-12-24 RX ORDER — LINEZOLID 600 MG/1
600 TABLET, FILM COATED ORAL EVERY 12 HOURS
Qty: 14 TABLET | Refills: 0 | Status: SHIPPED | OUTPATIENT
Start: 2023-12-24 | End: 2023-12-31

## 2023-12-24 RX ADMIN — TAMSULOSIN HYDROCHLORIDE 0.4 MG: 0.4 CAPSULE ORAL at 08:12

## 2023-12-24 RX ADMIN — ESCITALOPRAM OXALATE 20 MG: 10 TABLET ORAL at 08:12

## 2023-12-24 RX ADMIN — LINEZOLID 600 MG: 600 TABLET, FILM COATED ORAL at 08:12

## 2023-12-24 RX ADMIN — LEVOTHYROXINE SODIUM 125 MCG: 125 TABLET ORAL at 05:12

## 2023-12-24 RX ADMIN — SENNOSIDES AND DOCUSATE SODIUM 1 TABLET: 8.6; 5 TABLET ORAL at 08:12

## 2023-12-24 RX ADMIN — ALPRAZOLAM 1 MG: 1 TABLET ORAL at 08:12

## 2023-12-24 RX ADMIN — POLYETHYLENE GLYCOL 3350 17 G: 17 POWDER, FOR SOLUTION ORAL at 08:12

## 2023-12-24 RX ADMIN — OXYCODONE AND ACETAMINOPHEN 1 TABLET: 10; 325 TABLET ORAL at 08:12

## 2023-12-24 NOTE — PLAN OF CARE
Plan of care reviewed with patient, pt verbalized understanding.  Pt remains free from falls this shift, safety precautions in place.bed alarm set.  Pt remains free from skin breakdown, pt educated on the importance of frequent weight shift to decrease the risk of pressure injury. Pt verbalized understanding teaching and outcomes.  AAOx4,NAD noted at this time.  PIV 20 G to R AC , Saline locked  Pt remained afebrile.  2L O2 via NC PRN  Pt admitted for UTI.  Pt currently resting comfortably in bed.  Hourly rounding complete. Bed in lowest position, side rails up, call light in reach.  Wife remains at the bedside.  Pt received 2 units of PRBCs.   Pt R nephrostomy tube is putting out blood tinged urine the whole shift MD aware.   Pt was placed on 2 L NA PRN d/t O2 dipping into the mid 80's low 90's on RA while sleeping, wife states that pt does wear O2 at night while at home.     Problem: Adult Inpatient Plan of Care  Goal: Plan of Care Review  Outcome: Ongoing, Progressing  Goal: Patient-Specific Goal (Individualized)  Outcome: Ongoing, Progressing  Goal: Absence of Hospital-Acquired Illness or Injury  Outcome: Ongoing, Progressing  Goal: Optimal Comfort and Wellbeing  Outcome: Ongoing, Progressing  Goal: Readiness for Transition of Care  Outcome: Ongoing, Progressing     Problem: Coping Ineffective  Goal: Effective Coping  Outcome: Ongoing, Progressing     Problem: Fluid and Electrolyte Imbalance (Acute Kidney Injury/Impairment)  Goal: Fluid and Electrolyte Balance  Outcome: Ongoing, Progressing     Problem: Oral Intake Inadequate (Acute Kidney Injury/Impairment)  Goal: Optimal Nutrition Intake  Outcome: Ongoing, Progressing     Problem: Renal Function Impairment (Acute Kidney Injury/Impairment)  Goal: Effective Renal Function  Outcome: Ongoing, Progressing     Problem: Skin Injury Risk Increased  Goal: Skin Health and Integrity  Outcome: Ongoing, Progressing     Problem: Impaired Wound Healing  Goal: Optimal Wound  Healing  Outcome: Ongoing, Progressing

## 2023-12-24 NOTE — PLAN OF CARE
[facility]  Discharge Final Note    Primary Care Provider: Faizan Antoine MD    Expected Discharge Date: 12/24/2023    Final Discharge Note (most recent)       Final Note - 12/24/23 1258          Final Note    Assessment Type Final Discharge Note     Anticipated Discharge Disposition Home or Self Care        Post-Acute Status    Post-Acute Authorization Home Health     Home Health Status Set-up Complete/Auth obtained     Discharge Delays None known at this time                     Important Message from Medicare              Follow-up providers       Faizan Antoine MD   Specialty: Family Medicine   Relationship: PCP - General    [address] [place] [address]   Phone: [phone]       Next Steps: Follow up in 3 day(s)              After-discharge care                Home Medical Care       [facility]   Service: Home Health Services    [address] [facility] [place] [address]   Phone: [phone]

## 2023-12-24 NOTE — SUBJECTIVE & OBJECTIVE
Interval History:   68 year old man with cancer/UTI  Urine  culture -12/21- multiple org    Review of Systems   Constitutional:  Negative for activity change, appetite change, chills and diaphoresis.   HENT:  Negative for congestion.    Psychiatric/Behavioral:  Negative for agitation and behavioral problems.      Objective:     Vital Signs (Most Recent):  Temp: 98 °F (36.7 °C) (12/24/23 0354)  Pulse: 68 (12/24/23 0354)  Resp: 16 (12/24/23 0354)  BP: 130/62 (12/24/23 0354)  SpO2: 96 % (12/24/23 0354) Vital Signs (24h Range):  Temp:  [97.4 °F (36.3 °C)-98.4 °F (36.9 °C)] 98 °F (36.7 °C)  Pulse:  [] 68  Resp:  [14-19] 16  SpO2:  [90 %-98 %] 96 %  BP: ()/(52-74) 130/62     Weight: 78.6 kg (173 lb 4.5 oz)  Body mass index is 25.59 kg/m².    Estimated Creatinine Clearance: 36.7 mL/min (A) (based on SCr of 1.9 mg/dL (H)).     Physical Exam  Vitals and nursing note reviewed.   HENT:      Head: Normocephalic and atraumatic.   Eyes:      Pupils: Pupils are equal, round, and reactive to light.   Neck:      Thyroid: No thyromegaly.   Cardiovascular:      Rate and Rhythm: Normal rate and regular rhythm.   Pulmonary:      Effort: Pulmonary effort is normal. No respiratory distress.      Breath sounds: No wheezing.   Abdominal:      General: Bowel sounds are normal.      Palpations: Abdomen is soft.      Tenderness: There is no abdominal tenderness.   Musculoskeletal:      Cervical back: Normal range of motion and neck supple.          Significant Labs: Blood Culture:   Recent Labs   Lab 10/20/23  2222 10/20/23  2334 11/10/23  1859 12/22/23  0922   LABBLOO No growth after 5 days. Gram stain aer bottle: Gram positive cocci in clusters resembling Staph  Results called to and read back by: Estefany Jacob 10/22/2023  03:30  COAGULASE-NEGATIVE STAPHYLOCOCCUS SPECIES  Organism is a probable contaminant  * No growth after 5 days.  No growth after 5 days. No Growth to date  No Growth to date     BMP:   Recent Labs   Lab  12/23/23  0422         K 3.7      CO2 21*   BUN 26*   CREATININE 1.9*   CALCIUM 8.7     CBC:   Recent Labs   Lab 12/22/23  0526 12/23/23 0422   WBC 14.18* 8.07   HGB 7.3* 7.0*   HCT 24.1* 22.8*    218     CMP:   Recent Labs   Lab 12/22/23  0526 12/23/23 0422    139   K 4.5 3.7    108   CO2 21* 21*    110   BUN 28* 26*   CREATININE 2.4* 1.9*   CALCIUM 9.1 8.7   PROT 6.0  --    ALBUMIN 2.3* 2.1*   BILITOT 0.3  --    ALKPHOS 111  --    AST 20  --    ALT 12  --    ANIONGAP 10 10     All pertinent labs within the past 24 hours have been reviewed.    Significant Imaging: I have reviewed all pertinent imaging results/findings within the past 24 hours.

## 2023-12-24 NOTE — ASSESSMENT & PLAN NOTE
Will follow repeat urine culture- will complete 12 days of PO Zyvox as previous urine  culture was VRE    12/23- will complete Zyvox as planned ,wbc is on a downward trend

## 2023-12-24 NOTE — PROGRESS NOTES
[facility] - Med Surg  Infectious Disease  Progress Note    Patient Name: Geovani Currie  MRN: [id number]  Admission Date: 12/21/2023  Length of Stay: 2 days  Attending Physician: Dolly Sandoval MD  Primary Care Provider: Faizan Antoine MD    Isolation Status: No active isolations  Assessment/Plan:      Pulmonary  Centrilobular emphysema  Duonebs as needed    Renal/  * UTI (urinary tract infection)  Will follow repeat urine culture- will complete 12 days of PO Zyvox as previous urine  culture was VRE    12/23- will complete Zyvox as planned ,wbc is on a downward trend     Oncology  Malignant neoplasm of urinary bladder  Oncology follow up        Anticipated Disposition:     Thank you for your consult. I will follow-up with patient. Please contact us if you have any additional questions.    Elmo Pace MD, [facility]  Infectious Disease  [facility]    Subjective:     Principal Problem:UTI (urinary tract infection)    HPI:  68-year-old male with  past medical history of COPD (chronic obstructive pulmonary disease) and Hypertension,lung cancer, urothelial cancer with recurrent UTIs, bilateral nephrostomy tubes. .    He was seen here after his nephrostomy tube pulled out accidentally home.  ID was consulted for UTI.    Urine culture-11/11/23- VRE  Component      Latest Ref Rng 12/9/2023 12/21/2023 12/22/2023   WBC      3.90 - 12.70 K/uL 8.54  16.75 (H)  14.18 (H)       Interval History:   68 year old man with cancer/UTI  Urine  culture -12/21- multiple org    Review of Systems   Constitutional:  Negative for activity change, appetite change, chills and diaphoresis.   HENT:  Negative for congestion.    Psychiatric/Behavioral:  Negative for agitation and behavioral problems.      Objective:     Vital Signs (Most Recent):  Temp: 98 °F (36.7 °C) (12/24/23 0354)  Pulse: 68 (12/24/23 0354)  Resp: 16 (12/24/23 0354)  BP: 130/62 (12/24/23 0354)  SpO2: 96 % (12/24/23 0354) Vital Signs (24h Range):  Temp:  [97.4 °F  (36.3 °C)-98.4 °F (36.9 °C)] 98 °F (36.7 °C)  Pulse:  [] 68  Resp:  [14-19] 16  SpO2:  [90 %-98 %] 96 %  BP: ()/(52-74) 130/62     Weight: 78.6 kg (173 lb 4.5 oz)  Body mass index is 25.59 kg/m².    Estimated Creatinine Clearance: 36.7 mL/min (A) (based on SCr of 1.9 mg/dL (H)).     Physical Exam  Vitals and nursing note reviewed.   HENT:      Head: Normocephalic and atraumatic.   Eyes:      Pupils: Pupils are equal, round, and reactive to light.   Neck:      Thyroid: No thyromegaly.   Cardiovascular:      Rate and Rhythm: Normal rate and regular rhythm.   Pulmonary:      Effort: Pulmonary effort is normal. No respiratory distress.      Breath sounds: No wheezing.   Abdominal:      General: Bowel sounds are normal.      Palpations: Abdomen is soft.      Tenderness: There is no abdominal tenderness.   Musculoskeletal:      Cervical back: Normal range of motion and neck supple.          Significant Labs: Blood Culture:   Recent Labs   Lab 10/20/23  2222 10/20/23  2334 11/10/23  1859 12/22/23  0922   LABBLOO No growth after 5 days. Gram stain aer bottle: Gram positive cocci in clusters resembling Staph  Results called to and read back by: Estefany Jacob 10/22/2023  03:30  COAGULASE-NEGATIVE STAPHYLOCOCCUS SPECIES  Organism is a probable contaminant  * No growth after 5 days.  No growth after 5 days. No Growth to date  No Growth to date     BMP:   Recent Labs   Lab 12/23/23  0422         K 3.7      CO2 21*   BUN 26*   CREATININE 1.9*   CALCIUM 8.7     CBC:   Recent Labs   Lab 12/22/23  0526 12/23/23  0422   WBC 14.18* 8.07   HGB 7.3* 7.0*   HCT 24.1* 22.8*    218     CMP:   Recent Labs   Lab 12/22/23  0526 12/23/23  0422    139   K 4.5 3.7    108   CO2 21* 21*    110   BUN 28* 26*   CREATININE 2.4* 1.9*   CALCIUM 9.1 8.7   PROT 6.0  --    ALBUMIN 2.3* 2.1*   BILITOT 0.3  --    ALKPHOS 111  --    AST 20  --    ALT 12  --    ANIONGAP 10 10     All pertinent labs  within the past 24 hours have been reviewed.    Significant Imaging: I have reviewed all pertinent imaging results/findings within the past 24 hours.

## 2023-12-26 ENCOUNTER — TELEPHONE (OUTPATIENT)
Dept: PRIMARY CARE CLINIC | Facility: CLINIC | Age: 68
End: 2023-12-26
Payer: MEDICARE

## 2023-12-26 NOTE — TELEPHONE ENCOUNTER
----- Message from Lissy Cooley sent at 12/26/2023  9:41 AM CST -----  Contact: Sera/daughter  Type:  Patient Returning Call    Who Called:Sera/daughter  Who Left Message for Patient:not sure   Does the patient know what this is regarding?: schedule appointment   Would the patient rather a call back or a response via [facility]? Call back  Best Call Back Number:[phone]  Additional Information:     Thanks   Am

## 2023-12-27 LAB — BACTERIA BLD CULT: NORMAL

## 2023-12-27 PROCEDURE — 99285 EMERGENCY DEPT VISIT HI MDM: CPT | Mod: 25

## 2023-12-27 PROCEDURE — 99284 EMERGENCY DEPT VISIT MOD MDM: CPT

## 2023-12-28 ENCOUNTER — HOSPITAL ENCOUNTER (OUTPATIENT)
Facility: HOSPITAL | Age: 68
Discharge: HOME HEALTH | End: 2023-12-28
Attending: EMERGENCY MEDICINE | Admitting: HOSPITALIST
Payer: MEDICARE

## 2023-12-28 ENCOUNTER — PATIENT MESSAGE (OUTPATIENT)
Dept: HEMATOLOGY/ONCOLOGY | Facility: CLINIC | Age: 68
End: 2023-12-28
Payer: MEDICARE

## 2023-12-28 VITALS
BODY MASS INDEX: 25.65 KG/M2 | RESPIRATION RATE: 18 BRPM | HEIGHT: 69 IN | WEIGHT: 173.19 LBS | SYSTOLIC BLOOD PRESSURE: 155 MMHG | HEART RATE: 70 BPM | TEMPERATURE: 98 F | DIASTOLIC BLOOD PRESSURE: 69 MMHG | OXYGEN SATURATION: 98 %

## 2023-12-28 DIAGNOSIS — C67.9 MALIGNANT NEOPLASM OF URINARY BLADDER, UNSPECIFIED SITE: ICD-10-CM

## 2023-12-28 DIAGNOSIS — T83.022A NEPHROSTOMY TUBE DISPLACED: Primary | ICD-10-CM

## 2023-12-28 DIAGNOSIS — N28.9 RENAL INSUFFICIENCY: ICD-10-CM

## 2023-12-28 DIAGNOSIS — R07.9 CHEST PAIN: ICD-10-CM

## 2023-12-28 PROBLEM — N18.9 CKD (CHRONIC KIDNEY DISEASE): Chronic | Status: ACTIVE | Noted: 2023-12-28

## 2023-12-28 LAB
ALBUMIN SERPL BCP-MCNC: 2.7 G/DL (ref 3.5–5.2)
ALP SERPL-CCNC: 109 U/L (ref 55–135)
ALT SERPL W/O P-5'-P-CCNC: 14 U/L (ref 10–44)
ANION GAP SERPL CALC-SCNC: 13 MMOL/L (ref 8–16)
ANION GAP SERPL CALC-SCNC: 13 MMOL/L (ref 8–16)
AST SERPL-CCNC: 11 U/L (ref 10–40)
BASOPHILS # BLD AUTO: 0.04 K/UL (ref 0–0.2)
BASOPHILS NFR BLD: 0.4 % (ref 0–1.9)
BILIRUB SERPL-MCNC: 0.3 MG/DL (ref 0.1–1)
BUN SERPL-MCNC: 26 MG/DL (ref 8–23)
BUN SERPL-MCNC: 30 MG/DL (ref 8–23)
CALCIUM SERPL-MCNC: 8.7 MG/DL (ref 8.7–10.5)
CALCIUM SERPL-MCNC: 9 MG/DL (ref 8.7–10.5)
CHLORIDE SERPL-SCNC: 105 MMOL/L (ref 95–110)
CHLORIDE SERPL-SCNC: 105 MMOL/L (ref 95–110)
CO2 SERPL-SCNC: 22 MMOL/L (ref 23–29)
CO2 SERPL-SCNC: 23 MMOL/L (ref 23–29)
CREAT SERPL-MCNC: 2.6 MG/DL (ref 0.5–1.4)
CREAT SERPL-MCNC: 3.1 MG/DL (ref 0.5–1.4)
DIFFERENTIAL METHOD BLD: ABNORMAL
EOSINOPHIL # BLD AUTO: 0.3 K/UL (ref 0–0.5)
EOSINOPHIL NFR BLD: 3.1 % (ref 0–8)
ERYTHROCYTE [DISTWIDTH] IN BLOOD BY AUTOMATED COUNT: 16.9 % (ref 11.5–14.5)
EST. GFR  (NO RACE VARIABLE): 21 ML/MIN/1.73 M^2
EST. GFR  (NO RACE VARIABLE): 26 ML/MIN/1.73 M^2
GLUCOSE SERPL-MCNC: 115 MG/DL (ref 70–110)
GLUCOSE SERPL-MCNC: 121 MG/DL (ref 70–110)
HCT VFR BLD AUTO: 32.1 % (ref 40–54)
HGB BLD-MCNC: 10.1 G/DL (ref 14–18)
IMM GRANULOCYTES # BLD AUTO: 0.03 K/UL (ref 0–0.04)
IMM GRANULOCYTES NFR BLD AUTO: 0.3 % (ref 0–0.5)
LYMPHOCYTES # BLD AUTO: 1.1 K/UL (ref 1–4.8)
LYMPHOCYTES NFR BLD: 11.4 % (ref 18–48)
MCH RBC QN AUTO: 27.4 PG (ref 27–31)
MCHC RBC AUTO-ENTMCNC: 31.5 G/DL (ref 32–36)
MCV RBC AUTO: 87 FL (ref 82–98)
MONOCYTES # BLD AUTO: 0.6 K/UL (ref 0.3–1)
MONOCYTES NFR BLD: 5.9 % (ref 4–15)
NEUTROPHILS # BLD AUTO: 7.5 K/UL (ref 1.8–7.7)
NEUTROPHILS NFR BLD: 78.9 % (ref 38–73)
NRBC BLD-RTO: 0 /100 WBC
PLATELET # BLD AUTO: 279 K/UL (ref 150–450)
PMV BLD AUTO: 8.8 FL (ref 9.2–12.9)
POTASSIUM SERPL-SCNC: 4.1 MMOL/L (ref 3.5–5.1)
POTASSIUM SERPL-SCNC: 4.2 MMOL/L (ref 3.5–5.1)
PROT SERPL-MCNC: 6.8 G/DL (ref 6–8.4)
RBC # BLD AUTO: 3.68 M/UL (ref 4.6–6.2)
SODIUM SERPL-SCNC: 140 MMOL/L (ref 136–145)
SODIUM SERPL-SCNC: 141 MMOL/L (ref 136–145)
WBC # BLD AUTO: 9.47 K/UL (ref 3.9–12.7)

## 2023-12-28 PROCEDURE — G0378 HOSPITAL OBSERVATION PER HR: HCPCS

## 2023-12-28 PROCEDURE — 80048 BASIC METABOLIC PNL TOTAL CA: CPT | Mod: XB | Performed by: INTERNAL MEDICINE

## 2023-12-28 PROCEDURE — 25000003 PHARM REV CODE 250: Performed by: HOSPITALIST

## 2023-12-28 PROCEDURE — 80053 COMPREHEN METABOLIC PANEL: CPT | Performed by: HOSPITALIST

## 2023-12-28 PROCEDURE — 85025 COMPLETE CBC W/AUTO DIFF WBC: CPT | Performed by: HOSPITALIST

## 2023-12-28 RX ORDER — IBUPROFEN 200 MG
16 TABLET ORAL
Status: DISCONTINUED | OUTPATIENT
Start: 2023-12-28 | End: 2023-12-28 | Stop reason: HOSPADM

## 2023-12-28 RX ORDER — IPRATROPIUM BROMIDE AND ALBUTEROL SULFATE 2.5; .5 MG/3ML; MG/3ML
3 SOLUTION RESPIRATORY (INHALATION) EVERY 6 HOURS PRN
Status: DISCONTINUED | OUTPATIENT
Start: 2023-12-28 | End: 2023-12-28 | Stop reason: HOSPADM

## 2023-12-28 RX ORDER — CEFDINIR 300 MG/1
300 CAPSULE ORAL DAILY
Qty: 7 CAPSULE | Refills: 0 | Status: SHIPPED | OUTPATIENT
Start: 2023-12-28 | End: 2024-01-04

## 2023-12-28 RX ORDER — ONDANSETRON 2 MG/ML
4 INJECTION INTRAMUSCULAR; INTRAVENOUS EVERY 6 HOURS PRN
Status: DISCONTINUED | OUTPATIENT
Start: 2023-12-28 | End: 2023-12-28 | Stop reason: HOSPADM

## 2023-12-28 RX ORDER — ESCITALOPRAM OXALATE 10 MG/1
20 TABLET ORAL DAILY
Status: DISCONTINUED | OUTPATIENT
Start: 2023-12-28 | End: 2023-12-28 | Stop reason: HOSPADM

## 2023-12-28 RX ORDER — GLUCAGON 1 MG
1 KIT INJECTION
Status: DISCONTINUED | OUTPATIENT
Start: 2023-12-28 | End: 2023-12-28 | Stop reason: HOSPADM

## 2023-12-28 RX ORDER — NALOXONE HCL 0.4 MG/ML
0.02 VIAL (ML) INJECTION
Status: DISCONTINUED | OUTPATIENT
Start: 2023-12-28 | End: 2023-12-28 | Stop reason: HOSPADM

## 2023-12-28 RX ORDER — CEFDINIR 300 MG/1
300 CAPSULE ORAL 2 TIMES DAILY
Status: DISCONTINUED | OUTPATIENT
Start: 2023-12-28 | End: 2023-12-28 | Stop reason: HOSPADM

## 2023-12-28 RX ORDER — LINEZOLID 600 MG/1
600 TABLET, FILM COATED ORAL EVERY 12 HOURS
Status: DISCONTINUED | OUTPATIENT
Start: 2023-12-28 | End: 2023-12-28 | Stop reason: HOSPADM

## 2023-12-28 RX ORDER — SODIUM CHLORIDE 0.9 % (FLUSH) 0.9 %
10 SYRINGE (ML) INJECTION EVERY 12 HOURS PRN
Status: DISCONTINUED | OUTPATIENT
Start: 2023-12-28 | End: 2023-12-28 | Stop reason: HOSPADM

## 2023-12-28 RX ORDER — TAMSULOSIN HYDROCHLORIDE 0.4 MG/1
0.4 CAPSULE ORAL DAILY
Status: DISCONTINUED | OUTPATIENT
Start: 2023-12-28 | End: 2023-12-28 | Stop reason: HOSPADM

## 2023-12-28 RX ORDER — HYDROCODONE BITARTRATE AND ACETAMINOPHEN 5; 325 MG/1; MG/1
2 TABLET ORAL EVERY 6 HOURS PRN
Status: DISCONTINUED | OUTPATIENT
Start: 2023-12-28 | End: 2023-12-28 | Stop reason: HOSPADM

## 2023-12-28 RX ORDER — IBUPROFEN 200 MG
24 TABLET ORAL
Status: DISCONTINUED | OUTPATIENT
Start: 2023-12-28 | End: 2023-12-28 | Stop reason: HOSPADM

## 2023-12-28 RX ORDER — METOPROLOL SUCCINATE 50 MG/1
50 TABLET, EXTENDED RELEASE ORAL DAILY
Status: DISCONTINUED | OUTPATIENT
Start: 2023-12-28 | End: 2023-12-28 | Stop reason: HOSPADM

## 2023-12-28 RX ORDER — ALPRAZOLAM 0.5 MG/1
1 TABLET ORAL 3 TIMES DAILY PRN
Status: DISCONTINUED | OUTPATIENT
Start: 2023-12-28 | End: 2023-12-28 | Stop reason: HOSPADM

## 2023-12-28 RX ORDER — ACETAMINOPHEN 325 MG/1
650 TABLET ORAL EVERY 4 HOURS PRN
Status: DISCONTINUED | OUTPATIENT
Start: 2023-12-28 | End: 2023-12-28 | Stop reason: HOSPADM

## 2023-12-28 RX ADMIN — CEFDINIR 300 MG: 300 CAPSULE ORAL at 12:12

## 2023-12-28 RX ADMIN — HYDROCODONE BITARTRATE AND ACETAMINOPHEN 2 TABLET: 5; 325 TABLET ORAL at 12:12

## 2023-12-28 RX ADMIN — METOPROLOL SUCCINATE 50 MG: 50 TABLET, EXTENDED RELEASE ORAL at 12:12

## 2023-12-28 RX ADMIN — ESCITALOPRAM OXALATE 20 MG: 10 TABLET ORAL at 12:12

## 2023-12-28 RX ADMIN — LEVOTHYROXINE SODIUM 125 MCG: 75 TABLET ORAL at 12:12

## 2023-12-28 RX ADMIN — TAMSULOSIN HYDROCHLORIDE 0.4 MG: 0.4 CAPSULE ORAL at 12:12

## 2023-12-28 RX ADMIN — LINEZOLID 600 MG: 600 TABLET, FILM COATED ORAL at 12:12

## 2023-12-28 NOTE — ED PROVIDER NOTES
SCRIBE #1 NOTE: Yohana HAN am scribing for, and in the presence of, Yuniel Ladd MD. I have scribed the entire note.       History     Chief Complaint   Patient presents with    Post-op Problem     Pt had bilateral nephrostomy placed 3 days ago. Pt reports urine leakage from right and tube dislodgement      Review of patient's allergies indicates:  No Known Allergies      History of Present Illness     HPI    12/28/2023, 12:14 AM  History obtained from the patient  Additional history obtained from an independent historian: patient's daughter at bedside      History of Present Illness: Geovani Currie is a 68 y.o. male patient with a PMHx of CKD3, malignant neoplasm of the urinary bladder, squamous cell carcinoma of right lung, COPD, and HTN who presents to the Emergency Department for evaluation of a post-op problem. The patient had bilateral nephrostomy placed 3 days ago. The patient's daughter reports urine leakage from the right tube and states that this tube has been dislodged. She notes that the patient's shirt was soaked in urine when he woke up this morning. She states that this is the 2nd time that the right tube has dislodged. The patient has no complaints at this time. Patient denies any fever, chills, abdominal pain, CP, SOB, and all other sxs at this time. No prior Tx reported. No further complaints or concerns at this time.       Arrival mode: Personal vehicle    PCP: Faizan Antoine MD        Past Medical History:  Past Medical History:   Diagnosis Date    COPD (chronic obstructive pulmonary disease)     Hypertension        Past Surgical History:  Past Surgical History:   Procedure Laterality Date    APPENDECTOMY      CATARACT EXTRACTION      NEPHROSTOMY      OPEN REDUCTION AND INTERNAL FIXATION (ORIF) OF INTERTROCHANTERIC FRACTURE OF FEMUR Right 11/21/2023    Procedure: ORIF, FRACTURE, FEMUR, INTERTROCHANTERIC;  Surgeon: Tanisha Guidry DO;  Location: [facility] OR;  Service: Orthopedics;   Laterality: Right;  Gamma nail         Family History:  Family History   Problem Relation Age of Onset    Cancer Father     Cancer Mother        Social History:  Social History     Tobacco Use    Smoking status: Former     Types: Cigarettes     Passive exposure: Never    Smokeless tobacco: Never   Substance and Sexual Activity    Alcohol use: Never    Drug use: Never    Sexual activity: Not Currently     Partners: Female        Review of Systems     Review of Systems   Physical Exam     Initial Vitals [12/27/23 2026]   BP Pulse Resp Temp SpO2   (!) 151/71 90 18 98.1 °F (36.7 °C) 98 %      MAP       --          Physical Exam  Nursing Notes and Vital Signs Reviewed.  Constitutional: Patient is in no acute distress. Well-developed and well-nourished.  Head: Atraumatic. Normocephalic.  Eyes: PERRL. EOM intact. Conjunctivae are not pale. No scleral icterus.  ENT: Mucous membranes are moist. Oropharynx is clear and symmetric.    Neck: Supple. Full ROM. No lymphadenopathy.  Cardiovascular: Regular rate. Regular rhythm. Murmur auscultated in the 2nd intercostal space. No rubs or gallops. Distal pulses are 2+ and symmetric.  Pulmonary/Chest: No respiratory distress. Clear to auscultation bilaterally. No wheezing or rales.  Abdominal: Soft and non-distended.  There is no tenderness.  No rebound, guarding, or rigidity. Good bowel sounds.  Genitourinary: No CVA tenderness. Bilateral nephrostomy tubes in place, no tenderness, no urine leakage, no signs of infection.   Musculoskeletal: Moves all extremities. No obvious deformities. No edema. No calf tenderness.  Skin: Warm and dry.  Neurological:  Alert, awake, and appropriate.  Normal speech.  No acute focal neurological deficits are appreciated.  Psychiatric: Normal affect. Good eye contact. Appropriate in content.     ED Course   Procedures  ED Vital Signs:  Vitals:    12/28/23 0431 12/28/23 0701 12/28/23 0702 12/28/23 0802   BP: (!) 157/72  (!) 170/77 (!) 181/85   Pulse: 85  76  93   Resp: 18 12  15   Temp:       TempSrc:       SpO2: 96% 96%  95%   Weight:       Height:        12/28/23 0902 12/28/23 0903 12/28/23 1031 12/28/23 1032   BP: (!) 174/80   (!) 164/78   Pulse:  69 91    Resp:  15 19    Temp:       TempSrc:       SpO2:  97% 96%    Weight:       Height:        12/28/23 1202 12/28/23 1215 12/28/23 1302 12/28/23 1402   BP: (!) 177/82  (!) 150/70 (!) 177/77   Pulse: 88  71    Resp: 13 16 15    Temp:       TempSrc:       SpO2: 97%  95%    Weight:       Height:        12/28/23 1403 12/28/23 1502 12/28/23 1504   BP:  (!) 155/69    Pulse: 68  70   Resp: 20  18   Temp:      TempSrc:      SpO2: 98%  98%   Weight:      Height:          Abnormal Lab Results:  Labs Reviewed   CBC W/ AUTO DIFFERENTIAL - Abnormal; Notable for the following components:       Result Value    RBC 3.68 (*)     Hemoglobin 10.1 (*)     Hematocrit 32.1 (*)     MCHC 31.5 (*)     RDW 16.9 (*)     MPV 8.8 (*)     Gran % 78.9 (*)     Lymph % 11.4 (*)     All other components within normal limits   COMPREHENSIVE METABOLIC PANEL - Abnormal; Notable for the following components:    CO2 22 (*)     Glucose 115 (*)     BUN 30 (*)     Creatinine 3.1 (*)     Albumin 2.7 (*)     eGFR 21 (*)     All other components within normal limits   BASIC METABOLIC PANEL - Abnormal; Notable for the following components:    Glucose 121 (*)     BUN 26 (*)     Creatinine 2.6 (*)     eGFR 26 (*)     All other components within normal limits        All Lab Results:  No lab orders were placed during this ER visit    Imaging Results:  Imaging Results              IR Nephrostomy Tube Placement (Final result)  Result time 12/28/23 15:05:38      Final result by Ihsan Gifford MD (12/28/23 15:05:38)                   Impression:      Successful right nephrostomy tube placement through an existing tract.      Electronically signed by: Ihsan Gifford  Date:    12/28/2023  Time:    15:05               Narrative:    EXAMINATION:  IR NEPHROSTOMY TUBE  PLACEMENT    CLINICAL HISTORY:  Right nephrostomy tube migration;    TECHNIQUE:  Informed consent was obtained prior to the exam after discussion of risk and benefits of the procedure.  Local anesthesia was provided with lidocaine.  Air kerma: 10 mGy.  Approximately 10 mL Omnipaque 300 contrast material was used.    The patient was placed prone on the fluoroscopy table.  The skin and prior nephrostomy was prepped and draped under sterile conditions and 1% lidocaine was used for local anesthesia.  The previous tube was completely dislodged.  Utilizing fluoroscopic guidance a Kumpe the catheter was advanced into the right renal pelvis through the existing tract.  A 10.2 [place] citizen Beltre Schaefer catheter was placed over the wire.  Contrast injection demonstrated good positioning of the catheter.  The catheter was sutured in place.  Images demonstrated appropriate positioning of the nephrostomy tube.  The catheter was secured at the skin surface within nonabsorbable suture and connected to a bag.    FINDINGS:  The new catheter is coiled in the right renal pelvis.                                       X-Ray Abdomen AP 1 View (KUB) (Final result)  Result time 12/28/23 07:22:58      Final result by Ihsan Gifford MD (12/28/23 07:22:58)                   Impression:      Bilateral nephrostomy tubes are present.  Right nephrostomy tube may have been pulled back.      Electronically signed by: Ihsan Gifford  Date:    12/28/2023  Time:    07:22               Narrative:    EXAMINATION:  XR ABDOMEN AP 1 VIEW    CLINICAL HISTORY:  Displacement of nephrostomy catheter, initial encounter    COMPARISON:  None    FINDINGS:  Bilateral percutaneous nephrostomy tubes.  Right nephrostomy tube may have been pulled back.  Nonobstructive bowel gas pattern. No abnormal calcifications.  Osseous structures intact.                                                The Emergency Provider reviewed the vital signs and test results, which are outlined  above.     ED Discussion     1:52 AM: Discussed pt's case with Dr. Jorgensen (Urology) who recommends admission to  and having IR replace the right nephrostomy tube in the morning.    1:55 AM: Discussed case with Kristopher Hooper MD ([facility]). Dr. Hooper agrees with current care and management of pt and accepts admission.   Admitting Service: [facility]  Admitting Physician: Dr. Hooper  Admit to: Obs Med/Surg    1:55 AM: Re-evaluated pt. I have discussed test results, shared treatment plan, and the need for admission with patient and family at bedside. Pt and family express understanding at this time and agree with all information. All questions answered. Pt and family have no further questions or concerns at this time. Pt is ready for admit.       Medical Decision Making  Amount and/or Complexity of Data Reviewed  Independent Historian: caregiver     Details: Patient's daughter  Radiology: ordered and independent interpretation performed. Decision-making details documented in ED Course.    Risk  Decision regarding hospitalization.                ED Medication(s):  Medications - No data to display    Discharge Medication List as of 12/28/2023  3:39 PM           Follow-up Information       Faizan Antoine MD. Schedule an appointment as soon as possible for a visit in 3 day(s).    Specialty: Family Medicine  Why: will need repeat labs to check kidney function  Contact information:  [address]  [place] [address]  [phone]               Suresh Antunez MD Follow up on 12/29/2023.    Specialty: Radiation Oncology  Why: follow up as previously scheduled  Contact information:  [id number] [place] [address] [place] [address]  [phone]               Demetrius Frazier MD Follow up.    Specialty: Hematology and Oncology  Why: followup as prev scheduled  Contact information:  [address] [facility] [place] [address]  [phone]                                 Scribe Attestation:   Scribe #1:  I performed the above scribed service and the documentation accurately describes the services I performed. I attest to the accuracy of the note.     Attending:   Physician Attestation Statement for Scribe #1: I, Yuniel Ladd MD, personally performed the services described in this documentation, as scribed by Yohana Lam, in my presence, and it is both accurate and complete.           Clinical Impression       ICD-10-CM ICD-9-CM   1. Nephrostomy tube displaced  T83.022A 997.5   2. Chest pain  R07.9 786.50   3. Renal insufficiency  N28.9 593.9   4. Malignant neoplasm of urinary bladder, unspecified site  C67.9 188.9       Disposition:   Disposition: Placed in Observation  Condition: Fair         Yuniel Ladd MD  12/28/23 1112

## 2023-12-28 NOTE — PHYSICIAN QUERY
PT Name: Geovani Currie  MR #: [id number]     Documentation Clarification      CDS: Radha Devries RN         Contact information:[email or web address] or (cell) [phone]    This form is a permanent document in the medical record.     Query Date: December 28, 2023    By submitting this query, we are merely seeking further clarification of documentation.  Please utilize your independent clinical judgment when addressing the question(s) below.     The medical record contains the following:  Indicators Supporting Clinical Findings Location in Medical Record   x Chronic Illness Squamous cell carcinoma of right lung  Squamous cell carcinoma of right lung  Malignant neoplasm of urinary bladder   H&P 12/22    Total Care or Max Assist     x Immobility or Debility Comments: Generalized weakness, unsteady gait, Hx of falls    noted functional decline with falls in which he fractured his right femur and despite going to rehab still has not improved, decreased po intake, recurrent UTIs, and unintentional weight loss.    presents with performance deficits affecting function including weakness, impaired endurance, impaired self care skills, gait instability, impaired functional mobility, impaired balance, decreased upper extremity function, decreased lower extremity function, decreased safety awareness, pain.    Bed Mobility:   Rolling/Turning to Right with independence  Supine to sit from right side of bed with supervision     Functional Mobility/Transfers:  Sit <> Stand Transfer with minimum assistance with rolling walker  Toilet Transfer Step Transfer technique with minimum assistance with rolling walker     Activities of Daily Living:  Upper Body Dressing: set up assistance  Lower Body Dressing: minimum assistance  Toileting: minimum assistance   Palliative Med Consult 12/21            PT/OT/SLP Eval 12/23    Contractures      Late effects of stroke (sequelae of stroke)     x Treatment Treatment & Education:    Therapist  provided facilitation and instruction of proper body mechanics, energy conservation, and fall prevention strategies during tasks listed above  Patient educated on role of OT, POC, and goals for therapy  Patient educated on importance of OOB activities with staff member assistance and sitting OOB majority of the day   PT/OT/SLP Eval 12/23    Other       Provider, please specify the diagnosis or diagnoses associated with above clinical findings:    [    ] Neoplastic related fatigue    [    ] Chronic fatigue, unspecified     [  x  ] General debility/deconditioning    [    ] Other diagnosis (please specify): ________________________        Form No. [id number]

## 2023-12-28 NOTE — HOSPITAL COURSE
Pt admitted to hospital medicine. He underwent R percutaneous nephrostomy replacement with IR on 12/28/2023. He was continued on PO Zyvox + Cefdinir from previous admission. He was evaluated by urology who recommended outpatient follow up with med onc and  onc as previously scheduled.  Cr improved from 3.1 to 2.6 after replacement of nephrostomy tube, BANDAR likely postobstructive in nature in setting of nephrostomy displacement.     Pt seen and examined on day of discharge with daughter at bedside. Denies abd pain, back pain, pain CP, SOB. Nephrostomy tubes draining well. Appears stable for discharge home with HH per my exam. Advised pt's daughter that he will need to have labs rechecked in the next 3-5 days to ensure renal function continues to improve, home Cefdinir dose adjusted for renal function. Resume HH on discharge. Followup with PCP within 3-5 days. Follow up with rad onc as scheduled on 12/29 and med onc as previously scheduled on 01/12/24

## 2023-12-28 NOTE — ED NOTES
Pt resting in ER stretcher, aaox4, rr e/u, NAD noted. Pt remains on cardiac monitor with vss noted. Bed low and locked, call light in reach, side rails up x2. Pt's linens were wet; pt was cleaned, soiled bedding and clothes were removed, and fresh gown and linens were placed.  IR to the bedside for consent for procedures.  Pt verbalized understanding of status and POC; denies further needs. Will continue to monitor.

## 2023-12-28 NOTE — FIRST PROVIDER EVALUATION
"Medical screening examination initiated.  I have conducted a focused provider triage encounter, findings are as follows:    Brief history of present illness:  Patient presents to the emergency room after nephrostomy tube that was placed 3 days ago came almost all the way out.    Vitals:    12/27/23 2026   BP: (!) 151/71   BP Location: Right arm   Patient Position: Sitting   Pulse: 90   Resp: 18   Temp: 98.1 °F (36.7 °C)   TempSrc: Oral   SpO2: 98%   Weight: 78.6 kg (173 lb 3.2 oz)   Height: 5' 9" (1.753 m)       Pertinent physical exam:      Brief workup plan:      Preliminary workup initiated; this workup will be continued and followed by the physician or advanced practice provider that is assigned to the patient when roomed.  "

## 2023-12-28 NOTE — CONSULTS
Consult placed for nephrostomy tube migration.   Pt was seen in the ED. Nephrostomy tube has already been replaced by the IR service.   Discussed securing of the nephrostomy tubes with the pt so that they don't become displaced as easily.   Both nephrostomy tubes currently draining clear yellow urine.   No further urologic recommendations today.   Pt to follow up with urologic oncology and  medical Oncology in [place].

## 2023-12-28 NOTE — ASSESSMENT & PLAN NOTE
Currently on palliative treatment, has nephrostomy tubes  Patient wishes to remain DNR   Previous admission Palliative Care consulted  Patient/family considering hospice

## 2023-12-28 NOTE — H&P
"  [facility] - Emergency Dept.  [facility] Medicine  History & Physical    Patient Name: Geovani Currie  MRN: [id number]  Patient Class: OP- Observation  Admission Date: 12/28/2023  Attending Physician: Hetal Santos MD   Primary Care Provider: Faizan Antoine MD         Patient information was obtained from patient, past medical records, and ER records.     Subjective:     Principal Problem:Displacement of nephrostomy tube    Chief Complaint:   Chief Complaint   Patient presents with    Post-op Problem     Pt had bilateral nephrostomy placed 3 days ago. Pt reports urine leakage from right and tube dislodgement         HPI: 67 y/o male with PMHx of COPD, HTN, CKD-3, hx of lung cancer, urothelial cancer with recurrent UTI, bilateral nephrostomy tubes who presented to the ER today with complaitns of leaking around right nephrostomy tube since this morning, states that the tube "feels like its almost out". Patient recently hospitzalied for VRE UTI, malpositioend R nephrostomy which was exchanged on 12/24/23. He denies fevers/chills, nausea/vomiting, chest pain, SOB or any other complaints. Urology and IR consult in AM for nephrostomy management. HMS to place in observation.    Past Medical History:   Diagnosis Date    COPD (chronic obstructive pulmonary disease)     Hypertension        Past Surgical History:   Procedure Laterality Date    APPENDECTOMY      CATARACT EXTRACTION      NEPHROSTOMY      OPEN REDUCTION AND INTERNAL FIXATION (ORIF) OF INTERTROCHANTERIC FRACTURE OF FEMUR Right 11/21/2023    Procedure: ORIF, FRACTURE, FEMUR, INTERTROCHANTERIC;  Surgeon: Tanisha Guidry DO;  [facility]: [facility];  Service: Orthopedics;  Laterality: Right;  Gamma nail       Review of patient's allergies indicates:  No Known Allergies    No current facility-administered medications on file prior to encounter.     Current Outpatient Medications on File Prior to Encounter   Medication Sig    albuterol (ACCUNEB) 0.63 mg/3 mL Nebu " Take 3 mLs (0.63 mg total) by nebulization 3 (three) times daily as needed (sob, wheezing). Rescue    albuterol (PROVENTIL/VENTOLIN HFA) 90 mcg/actuation inhaler Inhale 1-2 puffs into the lungs every 4 (four) hours as needed for Wheezing. Rescue    ALPRAZolam (XANAX) 0.5 MG tablet Take 2 tablets (1 mg total) by mouth 3 (three) times daily as needed for Anxiety.    cefdinir (OMNICEF) 300 MG capsule Take 1 capsule (300 mg total) by mouth 2 (two) times daily. for 7 days    cyclobenzaprine (FLEXERIL) 10 MG tablet Take 1 tablet (10 mg total) by mouth 3 (three) times daily as needed for Muscle spasms.    docusate sodium (COLACE) 100 MG capsule Take 100 mg by mouth 2 (two) times daily.    EScitalopram oxalate (LEXAPRO) 20 MG tablet Take 1 tablet (20 mg total) by mouth once daily.    fluticasone furoate-vilanteroL (BREO ELLIPTA) 100-25 mcg/dose diskus inhaler Inhale 1 puff into the lungs once daily. Controller    HYDROcodone-acetaminophen (NORCO) 5-325 mg per tablet Take 2 tablets by mouth every 6 (six) hours as needed for Pain.    levothyroxine (SYNTHROID) 125 MCG tablet Take 1 tablet (125 mcg total) by mouth before breakfast.    linezolid (ZYVOX) 600 mg Tab Take 1 tablet (600 mg total) by mouth every 12 (twelve) hours. for 7 days    metoprolol succinate (TOPROL-XL) 50 MG 24 hr tablet Take 1 tablet (50 mg total) by mouth once daily.    nicotine (NICODERM CQ) 14 mg/24 hr Place 1 patch onto the skin every 24 hours.    tamsulosin (FLOMAX) 0.4 mg Cap Take 1 capsule (0.4 mg total) by mouth once daily.    vitamin D (VITAMIN D3) 1000 units Tab Take 25 mcg by mouth once daily.     Family History       Problem Relation (Age of Onset)    Cancer Father, Mother          Tobacco Use    Smoking status: Former     Types: Cigarettes     Passive exposure: Never    Smokeless tobacco: Never   Substance and Sexual Activity    Alcohol use: Never    Drug use: Never    Sexual activity: Not Currently     Partners: Female     Review of Systems    All other systems reviewed and are negative.    Objective:     Vital Signs (Most Recent):  Temp: 98.1 °F (36.7 °C) (12/27/23 2026)  Pulse: 90 (12/27/23 2026)  Resp: 18 (12/27/23 2026)  BP: (!) 151/71 (12/27/23 2026)  SpO2: 98 % (12/27/23 2026) Vital Signs (24h Range):  Temp:  [98.1 °F (36.7 °C)] 98.1 °F (36.7 °C)  Pulse:  [90] 90  Resp:  [18] 18  SpO2:  [98 %] 98 %  BP: (151)/(71) 151/71     Weight: 78.6 kg (173 lb 3.2 oz)  Body mass index is 25.58 kg/m².     Physical Exam  Vitals and nursing note reviewed.   Constitutional:       General: He is not in acute distress.     Appearance: Normal appearance. He is normal weight.   HENT:      Head: Normocephalic and atraumatic.      Nose: Nose normal.      Mouth/Throat:      Mouth: Mucous membranes are dry.      Pharynx: Oropharynx is clear.   Eyes:      Extraocular Movements: Extraocular movements intact.      Pupils: Pupils are equal, round, and reactive to light.   Cardiovascular:      Rate and Rhythm: Normal rate and regular rhythm.      Pulses: Normal pulses.      Heart sounds: Normal heart sounds. No murmur heard.  Pulmonary:      Effort: Pulmonary effort is normal. No respiratory distress.      Breath sounds: Normal breath sounds. No wheezing, rhonchi or rales.   Abdominal:      General: Abdomen is flat. Bowel sounds are normal. There is no distension.      Palpations: Abdomen is soft.      Tenderness: There is no abdominal tenderness. There is no guarding.   Genitourinary:     Comments: Bilateral nephrostomy tubes  Musculoskeletal:      Comments: Bilateral nephrostomy tubes in place   Neurological:      General: No focal deficit present.      Mental Status: He is alert and oriented to person, place, and time.   Psychiatric:         Mood and Affect: Mood normal.         Behavior: Behavior normal.              CRANIAL NERVES     CN III, IV, VI   Pupils are equal, round, and reactive to light.       Significant Labs: All pertinent labs within the past 24 hours have  been reviewed.  Recent Lab Results       None            Significant Imaging: I have reviewed all pertinent imaging results/findings within the past 24 hours.    X-Ray Abdomen AP 1 View (KUB)    (Results Pending)   IR Nephrostomy Tube Placement    (Results Pending)       Assessment/Plan:     * Displacement of nephrostomy tube  Urology and IR consult for management  Recently exchanged 12/24/23  Continue linezolid and cefedinir    Malignant neoplasm of urinary bladder  Currently on palliative treatment, has nephrostomy tubes  Patient wishes to remain DNR   Previous admission Palliative Care consulted  Patient/family considering hospice    Centrilobular emphysema  Patient's COPD is controlled currently.  Patient is currently off COPD Pathway. Continue PRN inhalers and monitor respiratory status closely.     CKD (chronic kidney disease)  Creatine stable for now. BMP reviewed- noted Estimated Creatinine Clearance: 22.5 mL/min (A) (based on SCr of 3.1 mg/dL (H)). according to latest data. Based on current GFR, CKD stage is stage 4 - GFR 15-29.  Monitor UOP and serial BMP and adjust therapy as needed. Renally dose meds. Avoid nephrotoxic medications and procedures.    Anemia  Patient's anemia is currently controlled. Has not received any PRBCs to date. Etiology likely d/t chronic disease due to Chronic Kidney Disease/ESRD  Current CBC reviewed-   Lab Results   Component Value Date    HGB 10.1 (L) 12/28/2023    HCT 32.1 (L) 12/28/2023     Monitor serial CBC and transfuse if patient becomes hemodynamically unstable, symptomatic or H/H drops below 7/21.      VTE Risk Mitigation (From admission, onward)           Ordered     IP VTE HIGH RISK PATIENT  Once         12/28/23 0245     Place sequential compression device  Until discontinued         12/28/23 0245                       On 12/28/2023, patient should be placed in hospital observation services under my care.             Kristopher Hooper MD  Department of Hospital  Medicine  [facility] - Emergency Dept.

## 2023-12-28 NOTE — ED NOTES
T/c from patient's daughter, Sera at [phone].  Provider her with an update as to patient's status.

## 2023-12-28 NOTE — PROGRESS NOTES
Radiation oncology consult note    HISTORY OF PRESENT ILLNESS:  68-year-old man with a history of bladder (T3B versus T2A disease treated with TURBT and gemcitabine/radiotherapy last on 11/25/2020, with repeat TURBT on 08/30/2023 showing recurrent high-grade muscle invasive carcinoma) and lung cancer, who developed progressive AMS in the days after arriving from [place] to live in [place] to be near family while going through his cancer treatments for residual/recurrent bladder cancer.  Workup shows possible acute but likely remote stroke foci, with a negative CTA of the head and neck.  He was found to have a UTI, and it was noted that he had recently required stent placement on the left and nephrostomy tube on the right.  He was discharged on 10/25/2023      PET scan in [place] on 07/22/2023 showed a 1.1 cm spiculated right upper lobe mass with an SUV of 3.3 suspicious for primary lung cancer and with masslike thickening of the left posterior bladder with calcification, unchanged, with stent noted on the left and mild to moderate hydroureteronephrosis on the right for which he later underwent nephrostomy tube placement, multiple prominent or enlarged retroperitoneal lymph nodes as large as 1.5 cm with an SUV of 3, consistent with metastatic involvement, and reduced bone marrow activity in the pelvis and L5 most consistent with prior pelvic radiotherapy.    The lung mass had enlarged to 1.7 cm x 05183 and CT-guided lung biopsy on 10/11/2023 recovered a minute focus of markedly atypical cells favoring squamous cell carcinoma, per Dr. Raya's note    MRI of the brain on 10/21/2023 was benign    He required ORIF on 11/21/2023 after suffering a fall with right hip fracture    PET scan on 12/13/2023, which I have reviewed, showed the right apical pulmonary nodule to measured 2 cm and was described as hypermetabolic without quantifier.  No comment is made on the multiple borderline enlarged retroperitoneal lymph  nodes without pee hypermetabolism, though I am unable to quantify.  A correction was submitted, changing 9 pelvic lymphadenopathy to no pelvic lymphadenopathy, and on my review I concur, no pelvic adenopathy.            He required an admission last week after accidentally pulling out his nephrostomy tube.  His course has been complicated by recurrent UTIs and he has required four admissions since moving to [place], most recently discharged yesterday.    REVIEW OF SYSTEMS:     PAST MEDICAL HISTORY:  Past Medical History:   Diagnosis Date    COPD (chronic obstructive pulmonary disease)     Hypertension        PAST SURGICAL HISTORY:  Past Surgical History:   Procedure Laterality Date    APPENDECTOMY      CATARACT EXTRACTION      NEPHROSTOMY      OPEN REDUCTION AND INTERNAL FIXATION (ORIF) OF INTERTROCHANTERIC FRACTURE OF FEMUR Right 11/21/2023    Procedure: ORIF, FRACTURE, FEMUR, INTERTROCHANTERIC;  Surgeon: Tanisha Guidry DO;  Location: [facility];  Service: Orthopedics;  Laterality: Right;  Gamma nail       ALLERGIES:   Review of patient's allergies indicates:  No Known Allergies    MEDICATIONS:  Current Outpatient Medications   Medication Sig    albuterol (ACCUNEB) 0.63 mg/3 mL Nebu Take 3 mLs (0.63 mg total) by nebulization 3 (three) times daily as needed (sob, wheezing). Rescue    albuterol (PROVENTIL/VENTOLIN HFA) 90 mcg/actuation inhaler Inhale 1-2 puffs into the lungs every 4 (four) hours as needed for Wheezing. Rescue    ALPRAZolam (XANAX) 0.5 MG tablet Take 2 tablets (1 mg total) by mouth 3 (three) times daily as needed for Anxiety.    cefdinir (OMNICEF) 300 MG capsule Take 1 capsule (300 mg total) by mouth once daily. for 7 days    cyclobenzaprine (FLEXERIL) 10 MG tablet Take 1 tablet (10 mg total) by mouth 3 (three) times daily as needed for Muscle spasms.    docusate sodium (COLACE) 100 MG capsule Take 100 mg by mouth 2 (two) times daily.    EScitalopram oxalate (LEXAPRO) 20 MG tablet Take 1  tablet (20 mg total) by mouth once daily.    fluticasone furoate-vilanteroL (BREO ELLIPTA) 100-25 mcg/dose diskus inhaler Inhale 1 puff into the lungs once daily. Controller    HYDROcodone-acetaminophen (NORCO) 5-325 mg per tablet Take 2 tablets by mouth every 6 (six) hours as needed for Pain.    levothyroxine (SYNTHROID) 125 MCG tablet Take 1 tablet (125 mcg total) by mouth before breakfast.    linezolid (ZYVOX) 600 mg Tab Take 1 tablet (600 mg total) by mouth every 12 (twelve) hours. for 7 days    metoprolol succinate (TOPROL-XL) 50 MG 24 hr tablet Take 1 tablet (50 mg total) by mouth once daily.    nicotine (NICODERM CQ) 14 mg/24 hr Place 1 patch onto the skin every 24 hours.    tamsulosin (FLOMAX) 0.4 mg Cap Take 1 capsule (0.4 mg total) by mouth once daily.    vitamin D (VITAMIN D3) 1000 units Tab Take 25 mcg by mouth once daily.     No current facility-administered medications for this visit.       SOCIAL HISTORY:  Social History     Socioeconomic History    Marital status:    Tobacco Use    Smoking status: Former     Current packs/day: 0.00     Types: Cigarettes     Quit date: 10/2023     Years since quittin.2     Passive exposure: Never    Smokeless tobacco: Never   Substance and Sexual Activity    Alcohol use: Never    Drug use: Never    Sexual activity: Not Currently     Partners: Female     Social Determinants of Health     Financial Resource Strain: High Risk (11/10/2023)    Overall Financial Resource Strain (CARDIA)     Difficulty of Paying Living Expenses: Very hard   Food Insecurity: Food Insecurity Present (11/10/2023)    Hunger Vital Sign     Worried About Running Out of Food in the Last Year: Often true     Ran Out of Food in the Last Year: Often true   Transportation Needs: Unmet Transportation Needs (11/10/2023)    PRAPARE - Transportation     Lack of Transportation (Medical): Yes     Lack of Transportation (Non-Medical): Yes   Physical Activity: Inactive (11/10/2023)    Exercise Vital  "Sign     Days of Exercise per Week: 0 days     Minutes of Exercise per Session: 0 min   Stress: Stress Concern Present (11/10/2023)    [facility] - Occupational Stress Questionnaire     Feeling of Stress : Rather much   Social Connections: Unknown (11/10/2023)    Social Connection and Isolation Panel [NHANES]     Frequency of Communication with Friends and Family: More than three times a week     Frequency of Social Gatherings with Friends and Family: Never     Attends Restoration Services: Patient declined     Active Member of Clubs or Organizations: No     Attends Club or Organization Meetings: Never     Marital Status:    Housing Stability: High Risk (11/10/2023)    Housing Stability Vital Sign     Unable to Pay for Housing in the Last Year: Yes     Number of Places Lived in the Last Year: 3     Unstable Housing in the Last Year: No     He is  and accompanied by his attentive wife and daughter    FAMILY HISTORY:  Family History   Problem Relation Age of Onset    Cancer Father     Cancer Mother          PHYSICAL EXAMINATION:    Vitals:    12/29/23 1307   BP: 127/69   Pulse: 71   Resp: 18   Temp: 98.3 °F (36.8 °C)   SpO2: 95%   Weight: 71.8 kg (158 lb 4.6 oz)   Height: 5' 9" (1.753 m)        General:  A&O x4, NAD , he is seen and examined in his wheelchair  Head:  PERRLA, EOM intact, CN II-XII intact  Lymphatics:  No cervical or supraclavicular adenopathy   Lungs: Clear to auscultation bilaterally   Heart regular:  rate and rhythm  Abdomen:  nontender and nondistended with positive bowel sounds     KPS:  50, recent hip fx, ambulating at home with the aid of a walker    ASSESSMENT:  68-year-old man with complicated medical history including history of muscle invasive bladder cancer treated with radiation and Gemzar in 2020, with pathologic confirmation of recurrence in August 2023, when he moved to [place] to be near family during his workup and treatment.  Imaging shows " thickening, recurrence in the bladder with suspicious retroperitoneal adenopathy.  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    PLAN:  We had a lengthy discussion regarding the nature of his diagnosis and treatment options including primary surgery or stereotactic radiosurgery.  He has a poor surgical candidate and I have recommended definitive SBRT.  We reviewed the logistics of therapy, including the planning and treatment visits, as well as possible acute and chronic side effects in great detail.  He and his family voiced an understanding and a desire to proceed.  Informed written consent was obtained and he was given the original after scanning into the EMR     He will return at his convenience next week for immobilization and prospectively gated stereotactic CT simulation.  Treatment will begin about 1 week later.  I anticipate delivering 54 Gy in 3 fractions    He is scheduled to meet with Dr. Frazier in Urology on 01/12/2024 to discuss his surgical options for salvage management of his recurrent bladder cancer.  I encouraged them to point out concerning retroperitoneal adenopathy, which was not mentioned in the report from his most recent PET scan, prior to committing to that procedure.  See images above    I spent approximately 60 minutes reviewing the available records and evaluating the patient, out of which over 50% of the time was spent face to face with the patient in counseling and coordinating this patient's care.

## 2023-12-28 NOTE — ASSESSMENT & PLAN NOTE
Creatine stable for now. BMP reviewed- noted Estimated Creatinine Clearance: 22.5 mL/min (A) (based on SCr of 3.1 mg/dL (H)). according to latest data. Based on current GFR, CKD stage is stage 4 - GFR 15-29.  Monitor UOP and serial BMP and adjust therapy as needed. Renally dose meds. Avoid nephrotoxic medications and procedures.

## 2023-12-28 NOTE — ASSESSMENT & PLAN NOTE
Urology and IR consult for management  Recently exchanged 12/24/23  Continue linezolid and cefedinir

## 2023-12-28 NOTE — SEDATION DOCUMENTATION
Procedure completed at this time. VSS, NADN, and pt tolerated procedure well. Pt transferred to stretcher and transported back to ED at this time. Report given bedside.

## 2023-12-28 NOTE — DISCHARGE SUMMARY
[facility] - Emergency Dept.  Discharge Note  Short Stay    * No surgery found *      OUTCOME: Patient tolerated treatment/procedure well without complication and is now ready for discharge.    DISPOSITION: Home or Self Care    FINAL DIAGNOSIS:  Displacement of nephrostomy tube    FOLLOWUP: With primary care provider    DISCHARGE INSTRUCTIONS:  No discharge procedures on file.     TIME SPENT ON DISCHARGE: 15 minutes    Pre Op Diagnosis: Displace nephrostomy tube     Post Op Diagnosis: same     Procedure:  Nephrostomy tube correction     Procedure performed by: Darron AMBRIZ, Marsha DEL REAL     Written Informed Consent Obtained: Yes     Specimen Removed:  yes     Estimated Blood Loss:  minimal     Findings: Local anesthesia     Sedation:  yes     The patient tolerated the procedure well and there were no complications.      Disposition:  F/U in clinic or with ordering physician    Discharge instructions:  Light activity for 24 hours.  Remove band aid in 24 hours.  No baths (showers are appropriate).      Sterile technique was performed in the right flank, lidocaine was used as a local anesthetic.  Pt tolerated the procedure well without immediate complications.  Please see radiologist report for details. F/u with PCP and/or ordering physician.

## 2023-12-28 NOTE — ED NOTES
Pt's daughter is at the bedside and expressed concern regarding the patient's admission and possibly missing his oncology appointment scheduled for tomorrow.  Hospital medicine was notified.

## 2023-12-28 NOTE — DISCHARGE SUMMARY
"[facility] - Emergency Dept.  Hospital Medicine  Discharge Summary      Patient Name: Geovani Currie  MRN: [id number]  CARMELLA: [id number]  Patient Class: OP- Observation  Admission Date: 12/28/2023  Hospital Length of Stay: 0 days  Discharge Date and Time:  12/28/2023 5:41 PM  Attending Physician: Regi att. providers found   Discharging Provider: Hetal Santos MD  Primary Care Provider: Faizan Antoine MD    Primary Care Team: Networked reference to record PCT     HPI:   67 y/o male with PMHx of COPD, HTN, CKD-3, hx of lung cancer, urothelial cancer with recurrent UTI, bilateral nephrostomy tubes who presented to the ER today with complaitns of leaking around right nephrostomy tube since this morning, states that the tube "feels like its almost out". Patient recently hospitzalied for VRE UTI, malpositioend R nephrostomy which was exchanged on 12/24/23. He denies fevers/chills, nausea/vomiting, chest pain, SOB or any other complaints. Urology and IR consult in AM for nephrostomy management. HMS to place in observation.    * No surgery found *      Hospital Course:   Pt admitted to hospital medicine. He underwent R percutaneous nephrostomy replacement with IR on 12/28/2023. He was continued on PO Zyvox + Cefdinir from previous admission. He was evaluated by urology who recommended outpatient follow up with med onc and  onc as previously scheduled.  Cr improved from 3.1 to 2.6 after replacement of nephrostomy tube, BANDAR likely postobstructive in nature in setting of nephrostomy displacement.     Pt seen and examined on day of discharge with daughter at bedside. Denies abd pain, back pain, pain CP, SOB. Nephrostomy tubes draining well. Appears stable for discharge home with HH per my exam. Advised pt's daughter that he will need to have labs rechecked in the next 3-5 days to ensure renal function continues to improve, home Cefdinir dose adjusted for renal function. Resume HH on discharge. Followup with PCP within 3-5 " days. Follow up with rad onc as scheduled on 12/29 and med onc as previously scheduled on 01/12/24     Goals of Care Treatment Preferences:  Code Status: DNR        Consults:   Consults (From admission, onward)          Status Ordering Provider     Inpatient consult to Urology  Once        Provider:  Joshua Jorgensen MD    Completed STEPHANIE POWERS.            No new Assessment & Plan notes have been filed under this hospital service since the last note was generated.  Service: Hospital Medicine    Final Active Diagnoses:    Diagnosis Date Noted POA    PRINCIPAL PROBLEM:  Displacement of nephrostomy tube [T83.022A] 11/10/2023 Yes    CKD (chronic kidney disease) [N18.9] 12/28/2023 Yes     Chronic    Malignant neoplasm of urinary bladder [C67.9] 11/15/2023 Yes    Centrilobular emphysema [J43.2] 11/09/2023 Yes    Anemia [D64.9] 10/21/2023 Yes      Problems Resolved During this Admission:       Discharged Condition: good    Disposition: [place]-Health Care [facility]    Follow Up:   Follow-up Information       Faizan Antoine MD. Schedule an appointment as soon as possible for a visit in 3 day(s).    Specialty: Family Medicine  Why: will need repeat labs to check kidney function  Contact information:  [address]  [place] [address]  [phone]               Suresh Antunez MD Follow up on 12/29/2023.    Specialty: Radiation Oncology  Why: follow up as previously scheduled  Contact information:  [id number] [place] [address] [place] [address]  [phone]               Demetrius Frazier MD Follow up.    Specialty: Hematology and Oncology  Why: followup as prev scheduled  Contact information:  [address] [facility] [address]  [phone]                           Patient Instructions:      Ambulatory referral/consult to [facility]   Standing Status: Future   Referral Priority: Routine Referral Type: Consultation   Referral Reason: Specialty Services Required   Number of  Visits Requested: 1     Diet Adult Regular     Notify your health care provider if you experience any of the following:  temperature >100.4     Notify your health care provider if you experience any of the following:  redness, tenderness, or signs of infection (pain, swelling, redness, odor or green/yellow discharge around incision site)     SUBSEQUENT HOME HEALTH ORDERS     Order Specific Question Answer Comments   What Home Health Agency is the patient currently using? Other/External      Activity as tolerated       Significant Diagnostic Studies: See Hospital Course     Pending Diagnostic Studies:       None           Medications:  Reconciled Home Medications:      Medication List        CHANGE how you take these medications      cefdinir 300 MG capsule  Commonly known as: OMNICEF  Take 1 capsule (300 mg total) by mouth once daily. for 7 days  What changed: when to take this            CONTINUE taking these medications      * albuterol 0.63 mg/3 mL Nebu  Commonly known as: ACCUNEB  Take 3 mLs (0.63 mg total) by nebulization 3 (three) times daily as needed (sob, wheezing). Rescue     * albuterol 90 mcg/actuation inhaler  Commonly known as: PROVENTIL/VENTOLIN HFA  Inhale 1-2 puffs into the lungs every 4 (four) hours as needed for Wheezing. Rescue     ALPRAZolam 0.5 MG tablet  Commonly known as: XANAX  Take 2 tablets (1 mg total) by mouth 3 (three) times daily as needed for Anxiety.     BREO ELLIPTA 100-25 mcg/dose diskus inhaler  Generic drug: fluticasone furoate-vilanteroL  Inhale 1 puff into the lungs once daily. Controller     cyclobenzaprine 10 MG tablet  Commonly known as: FLEXERIL  Take 1 tablet (10 mg total) by mouth 3 (three) times daily as needed for Muscle spasms.     docusate sodium 100 MG capsule  Commonly known as: COLACE  Take 100 mg by mouth 2 (two) times daily.     EScitalopram oxalate 20 MG tablet  Commonly known as: LEXAPRO  Take 1 tablet (20 mg total) by mouth once daily.      HYDROcodone-acetaminophen 5-325 mg per tablet  Commonly known as: NORCO  Take 2 tablets by mouth every 6 (six) hours as needed for Pain.     levothyroxine 125 MCG tablet  Commonly known as: SYNTHROID  Take 1 tablet (125 mcg total) by mouth before breakfast.     linezolid 600 mg Tab  Commonly known as: ZYVOX  Take 1 tablet (600 mg total) by mouth every 12 (twelve) hours. for 7 days     metoprolol succinate 50 MG 24 hr tablet  Commonly known as: TOPROL-XL  Take 1 tablet (50 mg total) by mouth once daily.     nicotine 14 mg/24 hr  Commonly known as: NICODERM CQ  Place 1 patch onto the skin every 24 hours.     tamsulosin 0.4 mg Cap  Commonly known as: FLOMAX  Take 1 capsule (0.4 mg total) by mouth once daily.     vitamin D 1000 units Tab  Commonly known as: VITAMIN D3  Take 25 mcg by mouth once daily.           * This list has 2 medication(s) that are the same as other medications prescribed for you. Read the directions carefully, and ask your doctor or other care provider to review them with you.                  Indwelling Lines/Drains at time of discharge:   Lines/Drains/Airways       Drain  Duration                  Nephrostomy Left 8 Fr. -- days         Nephrostomy 11/17/23 1232 Left 8 Fr. 41 days         Nephrostomy 12/28/23 0959 Right 10.2 Fr. <1 day                    Time spent on the discharge of patient: 33 minutes         Hetal Santos MD  Department of Hospital Medicine  '[facility] - Emergency Dept.

## 2023-12-28 NOTE — ASSESSMENT & PLAN NOTE
Patient's COPD is controlled currently.  Patient is currently off COPD Pathway. Continue PRN inhalers and monitor respiratory status closely.

## 2023-12-28 NOTE — PHYSICIAN QUERY
PT Name: Geovani Currie  MR #: [id number]    DOCUMENTATION CLARIFICATION     CDS: Radha Devries RN         Contact information:[email or web address] or (cell) [phone]    This form is a permanent document in the medical record.     Query Date: December 28, 2023    By submitting this query, we are merely seeking further clarification of documentation. Please utilize your independent clinical judgment when addressing the question(s) below.    Supporting Clinical Findings Location in Medical Record   Patient likely has sepsis secondary to recurrent UTI  UTI (urinary tract infection)   Patient has a history of VRE urinary tract infection  We will start daptomycin   H&P 12/22   Malignant neoplasm of urinary bladder   Currently on palliative treatment.  He has nephrostomy tubes which were DC accidentally.  These has been replaced by IR urinalysis obtained and started on empiric antibiotic therapy with daptomycin given his history of VRE   H&P 12/22   Procedure: R nephrostomy exchange   Pre-op Diagnosis:  malpositioned R nephrostomy, urine leakage at the skin insertion site Op Note 12/24       Provider, please clarify if there is any clinical correlation between Sepsis/UTI and Nephrostomy Tube.           Are the conditions:      [ x ] Due to or associated with each other     [  ] Unrelated to each other     [  ] Other explanation (Please Specify): ______________     [  ] Clinically Undetermined

## 2023-12-28 NOTE — SEDATION DOCUMENTATION
Pt placed prone on fluoro table at this time. CM in place, VSS, NADN, and pt verbalizes understanding of procedure.

## 2023-12-28 NOTE — PHYSICIAN QUERY
PT Name: Geovani Currie  MR #: [id number]    DOCUMENTATION CLARIFICATION     CDS: Radha Devries RN        Contact information:[email or web address] or (cell) [phone]  This form is a permanent document in the medical record.     Query Date: December 28, 2023    By submitting this query, we are merely seeking further clarification of documentation. Please utilize your independent clinical judgment when addressing the question(s) below.    The Medical Record contains the following:   Indicators   Supporting Clinical Findings Location in Medical Record   x AMS, Confusion,  LOC, etc.  68 y.o. male patient with a PMHx of COPD, HTN, bladder cancer who presents to the Emergency Department for AMS, onset this morning    Genitourinary:  (+) pulled out nephrostomy tube   Psychiatric/Behavioral:  Positive for confusion (AMS).  intermittent confusion noted but answered some questions appropriately   ED Note 12/21   x Acute/Chronic Illness UTI (urinary tract infection)   Patient has a history of VRE urinary tract infection    Malignant neoplasm of urinary bladder   Currently on palliative treatment.  He has nephrostomy tubes which were DC accidentally.  These has been replaced by IR urinalysis obtained and started on empiric antibiotic therapy with daptomycin given his history of VRE    Squamous cell carcinoma of right lung   H&P 12/22    Radiology Findings      Electrolyte Imbalance     x Medication will start daptomycin   H&P 12/22    Treatment              Other       The noted clinical guidelines are only system guidelines and do not replace the providers clinical judgment.    The National [place] of Neurologic Disorders and Stroke (NINDS) of the NIH describes encephalopathy as any diffuse disease of the brain that alters brain function or structure.    Provider, please specify the diagnosis or diagnoses associated with above clinical findings.    [ x  ] Metabolic Encephalopathy - Due to electrolyte imbalance,  metabolic derangements, or infectious processes, includes Septic Encephalopathy, Uremic Encephalopathy     [   ] Encephalopathy, unspecified        [   ] Other Encephalopathy (please specify): ____________________     [   ] Other neurological condition- Includes Post-ictal altered mental status (please specify condition): __________     [   ]  Clinically Undetermined       References:  CHRIS Hedrick, RN, CCDS. (2018, June 9). Notes from the Instructor: Encephalopathy tips. Retrieved October 22, 2020, from https://acdis.org/articles/note-instructor-encephalopathy-tips    ICD-9-CM Coding Clinic First Quarter 2013, Effective with discharges: October 21, 2013 [facility] § Seizure with encephalopathy due to postictal state (2013).    ICD-10-CM/[facility] Integrated Codebook (Version V.20.8.10.0) [Computer software]. (2020). Retrieved October 21, 2020.    National [place] of Neurological Disorders and Stroke. (2019, March 27). Retrieved October 22, 2020, from https://www.ninds.nih.gov/Disorders/All-Disorders/Uoanzzrrowlupi-Spcgspmgnyf-Jhkl    Form No. 37060

## 2023-12-28 NOTE — ASSESSMENT & PLAN NOTE
Patient's anemia is currently controlled. Has not received any PRBCs to date. Etiology likely d/t chronic disease due to Chronic Kidney Disease/ESRD  Current CBC reviewed-   Lab Results   Component Value Date    HGB 10.1 (L) 12/28/2023    HCT 32.1 (L) 12/28/2023     Monitor serial CBC and transfuse if patient becomes hemodynamically unstable, symptomatic or H/H drops below 7/21.

## 2023-12-28 NOTE — SUBJECTIVE & OBJECTIVE
Past Medical History:   Diagnosis Date    COPD (chronic obstructive pulmonary disease)     Hypertension        Past Surgical History:   Procedure Laterality Date    APPENDECTOMY      CATARACT EXTRACTION      NEPHROSTOMY      OPEN REDUCTION AND INTERNAL FIXATION (ORIF) OF INTERTROCHANTERIC FRACTURE OF FEMUR Right 11/21/2023    Procedure: ORIF, FRACTURE, FEMUR, INTERTROCHANTERIC;  Surgeon: Tanisha Guidry DO;  Location: [facility];  Service: Orthopedics;  Laterality: Right;  Gamma nail       Review of patient's allergies indicates:  No Known Allergies    No current facility-administered medications on file prior to encounter.     Current Outpatient Medications on File Prior to Encounter   Medication Sig    albuterol (ACCUNEB) 0.63 mg/3 mL Nebu Take 3 mLs (0.63 mg total) by nebulization 3 (three) times daily as needed (sob, wheezing). Rescue    albuterol (PROVENTIL/VENTOLIN HFA) 90 mcg/actuation inhaler Inhale 1-2 puffs into the lungs every 4 (four) hours as needed for Wheezing. Rescue    ALPRAZolam (XANAX) 0.5 MG tablet Take 2 tablets (1 mg total) by mouth 3 (three) times daily as needed for Anxiety.    cefdinir (OMNICEF) 300 MG capsule Take 1 capsule (300 mg total) by mouth 2 (two) times daily. for 7 days    cyclobenzaprine (FLEXERIL) 10 MG tablet Take 1 tablet (10 mg total) by mouth 3 (three) times daily as needed for Muscle spasms.    docusate sodium (COLACE) 100 MG capsule Take 100 mg by mouth 2 (two) times daily.    EScitalopram oxalate (LEXAPRO) 20 MG tablet Take 1 tablet (20 mg total) by mouth once daily.    fluticasone furoate-vilanteroL (BREO ELLIPTA) 100-25 mcg/dose diskus inhaler Inhale 1 puff into the lungs once daily. Controller    HYDROcodone-acetaminophen (NORCO) 5-325 mg per tablet Take 2 tablets by mouth every 6 (six) hours as needed for Pain.    levothyroxine (SYNTHROID) 125 MCG tablet Take 1 tablet (125 mcg total) by mouth before breakfast.    linezolid (ZYVOX) 600 mg Tab Take 1 tablet (600 mg  total) by mouth every 12 (twelve) hours. for 7 days    metoprolol succinate (TOPROL-XL) 50 MG 24 hr tablet Take 1 tablet (50 mg total) by mouth once daily.    nicotine (NICODERM CQ) 14 mg/24 hr Place 1 patch onto the skin every 24 hours.    tamsulosin (FLOMAX) 0.4 mg Cap Take 1 capsule (0.4 mg total) by mouth once daily.    vitamin D (VITAMIN D3) 1000 units Tab Take 25 mcg by mouth once daily.     Family History       Problem Relation (Age of Onset)    Cancer Father, Mother          Tobacco Use    Smoking status: Former     Types: Cigarettes     Passive exposure: Never    Smokeless tobacco: Never   Substance and Sexual Activity    Alcohol use: Never    Drug use: Never    Sexual activity: Not Currently     Partners: Female     Review of Systems   All other systems reviewed and are negative.    Objective:     Vital Signs (Most Recent):  Temp: 98.1 °F (36.7 °C) (12/27/23 2026)  Pulse: 90 (12/27/23 2026)  Resp: 18 (12/27/23 2026)  BP: (!) 151/71 (12/27/23 2026)  SpO2: 98 % (12/27/23 2026) Vital Signs (24h Range):  Temp:  [98.1 °F (36.7 °C)] 98.1 °F (36.7 °C)  Pulse:  [90] 90  Resp:  [18] 18  SpO2:  [98 %] 98 %  BP: (151)/(71) 151/71     Weight: 78.6 kg (173 lb 3.2 oz)  Body mass index is 25.58 kg/m².     Physical Exam  Vitals and nursing note reviewed.   Constitutional:       General: He is not in acute distress.     Appearance: Normal appearance. He is normal weight.   HENT:      Head: Normocephalic and atraumatic.      Nose: Nose normal.      Mouth/Throat:      Mouth: Mucous membranes are dry.      Pharynx: Oropharynx is clear.   Eyes:      Extraocular Movements: Extraocular movements intact.      Pupils: Pupils are equal, round, and reactive to light.   Cardiovascular:      Rate and Rhythm: Normal rate and regular rhythm.      Pulses: Normal pulses.      Heart sounds: Normal heart sounds. No murmur heard.  Pulmonary:      Effort: Pulmonary effort is normal. No respiratory distress.      Breath sounds: Normal breath  sounds. No wheezing, rhonchi or rales.   Abdominal:      General: Abdomen is flat. Bowel sounds are normal. There is no distension.      Palpations: Abdomen is soft.      Tenderness: There is no abdominal tenderness. There is no guarding.   Musculoskeletal:      Comments: Nephrostomy tubes   Neurological:      General: No focal deficit present.      Mental Status: He is alert and oriented to person, place, and time.   Psychiatric:         Mood and Affect: Mood normal.         Behavior: Behavior normal.              CRANIAL NERVES     CN III, IV, VI   Pupils are equal, round, and reactive to light.       Significant Labs: All pertinent labs within the past 24 hours have been reviewed.  Recent Lab Results       None            Significant Imaging: I have reviewed all pertinent imaging results/findings within the past 24 hours.    X-Ray Abdomen AP 1 View (KUB)    (Results Pending)   IR Nephrostomy Tube Placement    (Results Pending)

## 2023-12-29 ENCOUNTER — OFFICE VISIT (OUTPATIENT)
Dept: RADIATION ONCOLOGY | Facility: CLINIC | Age: 68
End: 2023-12-29
Payer: MEDICARE

## 2023-12-29 VITALS
WEIGHT: 158.31 LBS | HEART RATE: 71 BPM | RESPIRATION RATE: 18 BRPM | OXYGEN SATURATION: 95 % | DIASTOLIC BLOOD PRESSURE: 69 MMHG | TEMPERATURE: 98 F | HEIGHT: 69 IN | SYSTOLIC BLOOD PRESSURE: 127 MMHG | BODY MASS INDEX: 23.45 KG/M2

## 2023-12-29 DIAGNOSIS — C34.11 SQUAMOUS CELL CARCINOMA OF BRONCHUS IN RIGHT UPPER LOBE: Primary | ICD-10-CM

## 2023-12-29 PROCEDURE — 99205 PR OFFICE/OUTPT VISIT, NEW, LEVL V, 60-74 MIN: ICD-10-PCS | Mod: S$PBB,,, | Performed by: SPECIALIST

## 2023-12-29 PROCEDURE — 99999 PR PBB SHADOW E&M-EST. PATIENT-LVL IV: ICD-10-PCS | Mod: PBBFAC,,, | Performed by: SPECIALIST

## 2023-12-29 PROCEDURE — 99205 OFFICE O/P NEW HI 60 MIN: CPT | Mod: S$PBB,,, | Performed by: SPECIALIST

## 2023-12-29 PROCEDURE — 99214 OFFICE O/P EST MOD 30 MIN: CPT | Mod: PBBFAC | Performed by: SPECIALIST

## 2023-12-29 PROCEDURE — 99999 PR PBB SHADOW E&M-EST. PATIENT-LVL IV: CPT | Mod: PBBFAC,,, | Performed by: SPECIALIST

## 2024-01-02 ENCOUNTER — TELEPHONE (OUTPATIENT)
Dept: ORTHOPEDICS | Facility: CLINIC | Age: 69
End: 2024-01-02
Payer: MEDICARE

## 2024-01-02 ENCOUNTER — TELEPHONE (OUTPATIENT)
Dept: HEMATOLOGY/ONCOLOGY | Facility: CLINIC | Age: 69
End: 2024-01-02
Payer: MEDICARE

## 2024-01-02 ENCOUNTER — OFFICE VISIT (OUTPATIENT)
Dept: PALLIATIVE MEDICINE | Facility: CLINIC | Age: 69
End: 2024-01-02
Payer: MEDICARE

## 2024-01-02 VITALS — TEMPERATURE: 99 F | BODY MASS INDEX: 22.79 KG/M2 | WEIGHT: 154.31 LBS

## 2024-01-02 DIAGNOSIS — Z85.51 HX OF BLADDER CANCER: Primary | ICD-10-CM

## 2024-01-02 DIAGNOSIS — Z51.5 PALLIATIVE CARE ENCOUNTER: ICD-10-CM

## 2024-01-02 DIAGNOSIS — F32.A ANXIETY AND DEPRESSION: ICD-10-CM

## 2024-01-02 DIAGNOSIS — M25.551 RIGHT HIP PAIN: ICD-10-CM

## 2024-01-02 DIAGNOSIS — F41.9 ANXIETY AND DEPRESSION: ICD-10-CM

## 2024-01-02 DIAGNOSIS — Z85.118 HX OF CANCER OF LUNG: ICD-10-CM

## 2024-01-02 DIAGNOSIS — G89.3 NEOPLASM RELATED PAIN: ICD-10-CM

## 2024-01-02 PROCEDURE — 99215 OFFICE O/P EST HI 40 MIN: CPT | Mod: S$PBB,,,

## 2024-01-02 PROCEDURE — 99497 ADVNCD CARE PLAN 30 MIN: CPT | Mod: S$PBB,,,

## 2024-01-02 PROCEDURE — 99499 UNLISTED E&M SERVICE: CPT | Mod: S$PBB,,,

## 2024-01-02 PROCEDURE — 99497 ADVNCD CARE PLAN 30 MIN: CPT | Mod: PBBFAC,27

## 2024-01-02 PROCEDURE — 99213 OFFICE O/P EST LOW 20 MIN: CPT | Mod: PBBFAC,25

## 2024-01-02 PROCEDURE — 99999 PR PBB SHADOW E&M-EST. PATIENT-LVL III: CPT | Mod: PBBFAC,,,

## 2024-01-02 RX ORDER — NALOXONE HYDROCHLORIDE 4 MG/.1ML
1 SPRAY NASAL ONCE
Qty: 1 EACH | Refills: 0 | Status: SHIPPED | OUTPATIENT
Start: 2024-01-02 | End: 2024-01-02

## 2024-01-02 NOTE — PROGRESS NOTES
Palliative Medicine  Consult  Consult Requested By: Dr. Faizan Antoine  Reason for Consult: Symptom Management/Advance Care Planning/Goals of Care Discussion        SUBJECTIVE:     History of Present Illness:  Geovani Currie is a 68 y.o. male with Recurrent Stage 3B Bladder Cancer and Stage 1A Lung Cancer.   Plans to receive SBRT for Lung cancer. He is a poor surgical candidate for lung resection, per Dr. Wagoner and Dr. Antunez (Rad Onc). Pending referral to Dr. Frazier for Bladder Cancer treatment recommendations.   S/p: TURBT and gemcitabine/radiotherapy last on 11/25/2020, with repeat TURBT on 08/30/2023   S/p: Right Nephrostomy tube replacement-12/28/2023.  S/P; Left Kidney Stent  S/p; R Hip ORIF on 11/21/23.   PMHX: CKD Stage III, HTN, Spinal stenosis, progressive functional decline.   Multiple UTIs requiring frequent hospital admission (Dec 28. 2023).   He is followed by Dr. Wagoner, Urology, and Rad Onc      Pt attended clinic with his daughter, Stephania. He was in no acute distress. Hypotension: 86/51- Asymptomatic.   I explained the role palliative care often plays in supporting patients with serious illness and their families regarding symptom management, advance care planning, and goals of care discussion.  Pt and daughter verbalized understanding.     Pt reported Right hip pain 8/10 following his ORIF and pelvic/lower back pain. He is inconsistent with physiotherapy due to pain, fatigue, and declining functional status. He is taking Norco 5-10mg QID PRN.   He also noted anxiety especially during the day, managed with Xanax 0.5mg PRN. He stated his anxiety slightly improves with Xanax 0.5mg, and wants to increase to Xanax 1mg BID PRN.  He notes he sometimes takes Flexeril for anxiety with no significant effect. He is sometimes unsure of how to take his medications, but his daughter and wife assist with managing his medications.  His daughter expressed concerns about pt falling due to taking medications  with sedative effects.    He is unsure if Lexapro is improving his mood, and would like to stop/change it, per his wife's request.   Pt stated he will defer behavioral health counseling for now.     We discussed advance care planning, goals of care, and code status, Full Code vs. DNR including risks, benefits, and alternatives.   Pt and his daughter informed me they would like to discuss his cancer diagnosis with Dr. Frazier and explore other treatment options prior to choosing hospice care. Pt and his family had previously considered [facility], but would like to defer further conversations with them for now.       Furthermore,  pt stated he would like to remain FULL CODE, which is a change from his previous wish for DNR code status during a previous conversation with my colleague, Randa Castillo NP. Pt's daughter also noted this is change she was unaware of. She stated pt had expressed his wish for DNR code status since he lived in [place]. However, she stated she respects his wishes and would like to discuss this further.    Pt stated he is unsure of how long he would like to remain on life support if no physical/neurological recovery, but he will discuss this further with his family.   Given, pt's change in code status, I explained to pt and his daughter that there is no resuscitation attempt in hospice care. We discussed the focus of hospice care in optimizing symptom management and quality of life without aggressive medical treatments including chemotherapy. Pt's daughter stated she was unaware that pt would not be resuscitated should he enrol in hospice care.      Pt's elected HCPOAs are: 1. Wife: Chantal Currie: [phone]  Daughter: Stephania Mann: [phone]    LA  reviewed and summarized:        Past Medical History:   Diagnosis Date    COPD (chronic obstructive pulmonary disease)     Hypertension      Past Surgical History:   Procedure Laterality Date    APPENDECTOMY      CATARACT  EXTRACTION      NEPHROSTOMY      OPEN REDUCTION AND INTERNAL FIXATION (ORIF) OF INTERTROCHANTERIC FRACTURE OF FEMUR Right 11/21/2023    Procedure: ORIF, FRACTURE, FEMUR, INTERTROCHANTERIC;  Surgeon: Tanisha Guidry DO;  Location: [facility];  Service: Orthopedics;  Laterality: Right;  Gamma nail     Family History   Problem Relation Age of Onset    Cancer Father     Cancer Mother      Review of patient's allergies indicates:  No Known Allergies    Medications:    Current Outpatient Medications:     albuterol (ACCUNEB) 0.63 mg/3 mL Nebu, Take 3 mLs (0.63 mg total) by nebulization 3 (three) times daily as needed (sob, wheezing). Rescue, Disp: 75 mL, Rfl: 3    albuterol (PROVENTIL/VENTOLIN HFA) 90 mcg/actuation inhaler, Inhale 1-2 puffs into the lungs every 4 (four) hours as needed for Wheezing. Rescue, Disp: 18 g, Rfl: 11    ALPRAZolam (XANAX) 0.5 MG tablet, Take 2 tablets (1 mg total) by mouth 3 (three) times daily as needed for Anxiety., Disp: 60 tablet, Rfl: 2    cefdinir (OMNICEF) 300 MG capsule, Take 1 capsule (300 mg total) by mouth once daily. for 7 days, Disp: 7 capsule, Rfl: 0    cyclobenzaprine (FLEXERIL) 10 MG tablet, Take 1 tablet (10 mg total) by mouth 3 (three) times daily as needed for Muscle spasms., Disp: 270 tablet, Rfl: 3    docusate sodium (COLACE) 100 MG capsule, Take 100 mg by mouth 2 (two) times daily., Disp: , Rfl:     EScitalopram oxalate (LEXAPRO) 20 MG tablet, Take 1 tablet (20 mg total) by mouth once daily., Disp: 90 tablet, Rfl: 3    fluticasone furoate-vilanteroL (BREO ELLIPTA) 100-25 mcg/dose diskus inhaler, Inhale 1 puff into the lungs once daily. Controller, Disp: 30 each, Rfl: 11    HYDROcodone-acetaminophen (NORCO) 5-325 mg per tablet, Take 2 tablets by mouth every 6 (six) hours as needed for Pain., Disp: 240 tablet, Rfl: 0    levothyroxine (SYNTHROID) 125 MCG tablet, Take 1 tablet (125 mcg total) by mouth before breakfast., Disp: 90 tablet, Rfl: 3    metoprolol succinate  (TOPROL-XL) 50 MG 24 hr tablet, Take 1 tablet (50 mg total) by mouth once daily., Disp: 90 tablet, Rfl: 3    nicotine (NICODERM CQ) 14 mg/24 hr, Place 1 patch onto the skin every 24 hours., Disp: , Rfl:     tamsulosin (FLOMAX) 0.4 mg Cap, Take 1 capsule (0.4 mg total) by mouth once daily., Disp: 90 capsule, Rfl: 3    vitamin D (VITAMIN D3) 1000 units Tab, Take 25 mcg by mouth once daily., Disp: , Rfl:     OBJECTIVE:     ROS:  Review of Systems   Constitutional:  Positive for activity change and fatigue. Negative for appetite change, fever and unexpected weight change.   HENT:  Negative for trouble swallowing and voice change.    Eyes: Negative.    Respiratory:  Negative for cough, chest tightness, shortness of breath and wheezing.    Cardiovascular:  Negative for chest pain, palpitations and leg swelling.   Gastrointestinal:  Positive for abdominal pain (Lower Abdomen/Pelvis) and constipation. Negative for abdominal distention, diarrhea, nausea and vomiting.   Genitourinary:         Bilat Nephrostomy bag   Musculoskeletal:  Positive for back pain, gait problem (Ambulates wi/ Walker/Wheelchair) and myalgias. Negative for arthralgias and joint swelling.   Skin:  Negative for color change, pallor, rash and wound.   Neurological:  Positive for weakness (Right leg). Negative for dizziness, tremors, speech difficulty, numbness and headaches.   Psychiatric/Behavioral:  Positive for dysphoric mood and sleep disturbance (Due to pain). Negative for agitation, behavioral problems, confusion and suicidal ideas. The patient is nervous/anxious.        Review of Symptoms      Symptom Assessment (ESAS 0-10 Scale)  Pain:  8  Dyspnea:  0  Anxiety:  8  Nausea:  0  Depression:  0  Anorexia:  0  Fatigue:  0  Insomnia:  0  Restlessness:  0  Agitation:  0     CAM / Delirium:  Negative  Constipation:  Positive  Diarrhea:  Negative    Anxiety:  Is nervous/anxious  Constipation:  Constipation    Bowel Management Plan (BMP):  No      Pain  Assessment:    Location(s): abdomen and leg    Abdomen       Location: bilateral, anterior and posterior        Quality: Throbbing and pressure-like        Quantity: 8/10 in intensity        Chronicity: Onset 0 year(s) ago, gradually worsening        Aggravating Factors: Movement        Alleviating Factors: Opiates        Associated Symptoms: Arthralgias and myalgias  Leg       Location: right (Hip)        Quality: Sharp and aching        Quantity: 8/10 in intensity        Chronicity: Onset 1 month(s) ago, unchanged        Aggravating Factors: Movement        Alleviating Factors: Opiates        Associated Symptoms: Arthralgias and myalgias    Modified Cony Scale:  0    Performance Status:  50    Living Arrangements:  Lives with family    Psychosocial/Cultural:   See Palliative Psychosocial Note: Yes  Retired. Pt and his wife live with their daughter and her five children. Pt's daughter often travels for work.   **Primary  to Follow**  Palliative Care  Consult: Yes      Advance Care Planning   Advance Directives:   Living Will: No    [place]: No    Do Not Resuscitate Status: No    Medical Power of : Yes    Agent's Name:  1. Wife: Chantal Currie: [phone] Daughter: Stephania Mann: [phone]    Decision Making:  Patient answered questions and Family answered questions  Goals of Care: The patient, family, and healthcare power of   endorses that what is most important right now is to focus on symptom/pain control, extending life as long as possible, even it it means sacrificing quality, and curative/life-prolongation (regardless of treatment burdens)    Accordingly, we have decided that the best plan to meet the patient's goals includes continuing with treatment  Pt and his daughter informed me they would like to discuss his cancer diagnosis with Dr. Frazier and explore other treatment options prior to choosing hospice care. Pt and his family had previously considered [facility], but would like to defer further conversations with them for now.       Furthermore,  pt stated he would like to remain FULL CODE, which is a change from his previous wish for DNR code status during a previous conversation with my colleague, Randa Castillo NP. Pt's daughter also noted this is change she was unaware of. She stated pt had expressed his wish for DNR code status since he lived in [place]. However, she stated she respects his wishes and would like to discuss this further.    Pt stated he is unsure of how long he would like to remain on life support if no physical/neurological recovery, but he will discuss this further with his family.   Given, pt's change in code status, I explained to pt and his daughter that there is no resuscitation attempt in hospice care. We discussed the focus of hospice care in optimizing symptom management and quality of life without aggressive medical treatments including chemotherapy. Pt's daughter stated she was unaware that pt would not be resuscitated should he enrol in hospice care.                    Physical Exam:  Vitals:    Physical Exam  Vitals and nursing note reviewed.   Constitutional:       General: He is not in acute distress.     Appearance: He is underweight. He is ill-appearing.   HENT:      Head: Normocephalic and atraumatic.      Nose: Nose normal. No congestion or rhinorrhea.      Mouth/Throat:      Mouth: Mucous membranes are moist.      Pharynx: Oropharynx is clear. No oropharyngeal exudate or posterior oropharyngeal erythema.   Eyes:      General: No scleral icterus.        Right eye: No discharge.         Left eye: No discharge.      Conjunctiva/sclera: Conjunctivae normal.      Pupils: Pupils are equal, round, and reactive to light.   Cardiovascular:      Rate and Rhythm: Normal rate and regular rhythm.      Pulses: Normal pulses.      Heart sounds: Murmur heard.      No gallop.   Pulmonary:      Effort: Pulmonary effort is normal. No  respiratory distress.      Breath sounds: Normal breath sounds. No stridor. No wheezing.   Chest:      Chest wall: No tenderness.   Abdominal:      General: Bowel sounds are normal. There is no distension.      Palpations: Abdomen is soft.      Tenderness: There is abdominal tenderness (Lower abdomen/Pelvis). There is no right CVA tenderness or left CVA tenderness.   Genitourinary:     Comments: Bilat nephrostomy bag. Site CDI. Urine normal color  Musculoskeletal:         General: Tenderness (R hip) and signs of injury (s/p R hip ORIF) present. No swelling.      Cervical back: Normal range of motion and neck supple.      Right lower leg: No edema.      Left lower leg: No edema.   Skin:     General: Skin is warm and dry.      Capillary Refill: Capillary refill takes less than 2 seconds.      Coloration: Skin is not jaundiced or pale.      Findings: No rash.   Neurological:      Mental Status: He is alert and oriented to person, place, and time.      Motor: Weakness present.      Gait: Gait abnormal (Ambulates with walker/wheelchair).   Psychiatric:         Attention and Perception: Attention normal.         Mood and Affect: Affect normal. Mood is anxious.         Speech: Speech normal.         Behavior: Behavior normal. Behavior is cooperative.         Thought Content: Thought content normal.         Cognition and Memory: Cognition and memory normal.         Judgment: Judgment normal.         Labs and radiology data reviewed    ASSESSMENT/PLAN:   Recurrent Stage 3B Bladder Cancer and Stage 1A Lung Cancer  Followed by Dr. Wagoner/ Pending visit with Dr. Frazier (Uro Onc)  Plans to receive SBRT for Lung cancer. He is a poor surgical candidate for lung resection, per Dr. Wagoner and Dr. Antunez (Rad Onc).  PET 12/13/2023: Neck: No abnormal foci of FDG uptake identified.  Chest: Hypermetabolic spiculated nodule measuring 2.0 x 1.3 cm in the right lung apex.  No other pulmonary nodules or masses.  No hypermetabolic  intrathoracic or axillary lymphadenopathy.  Aortic atherosclerosis and coronary artery calcifications. Abdomen: Bilateral nephrostomy tubes in satisfactory position.  Left renal atrophy with cortical thinning. Pelvis: Circumferential urinary bladder wall thickening with circumferential heterogeneous increased uptake that involves the left side of the mesorectal fascia.  Curvilinear intraluminal calcifications within the urinary bladder along the left lateral wall.  Nine hypermetabolic pelvic lymphadenopathy. Musculoskeletal: Right hip fracture transfixed orthopedic hardware.  Right hip post-op pain/Neoplasm related pain in the setting on Stage 3 Bladder Cancer  Reviewed scans with collaborating physician, Dr. Rebolledo.   Uncontrolled-   Continue Norco 5-10mg q6H PRN for pain  Informed Dr. Antoine and pt via u.sit that I can manage cancer pain only. Will defer Right hip pain to interventional pain for adjunctive therapies.  Narcan RX given and explained use to pt and family. Pt and family verbalized understanding.    Pt and his daughter informed to take Norco 1-2 hours before or after Xanax to minimize risks for CNS depression.  Miralax 1pack/day or Senna 2 tabs at bedtime for opioid-induced constipation    Pt and daughter instructed to stop Flexeril if not effective, and causing CNS depression.  -Pain Contract- To be completed prior to prescribing   -UDS-  N/A   Anxiety/Depression  Chronic and exacerbated by cancer diagnosis  Xanax 1mg Q8H PRN  Pt and daughter to discuss with Dr. Antoine if ok to change Lexapro to another antidepressant.   Will refer to behavioral health for counseling once pt consents.   Encounter for Palliative Care  -Code status: Full Code. Pt does not wish to remain DNR. He will discuss further his family.   -HCPOA: 1. Wife: Chantal Currie: [phone]  Daughter: Stephania Mann: [phone]  -[facility]: .Wants to explore other treatment options for bladder cancer prior to considering hospice.   -See  HPI for further details      Follow up: 1 month. Virtual visit to accommodate transportation/scheduling with pt's daughter.       60 minutes total visit  40 minutes of total time spent on the encounter, which includes face to face time and non-face to face time preparing to see the patient (eg, review of tests), Obtaining and/or reviewing separately obtained history, Documenting clinical information in the electronic or other health record, Independently interpreting results (not separately reported) and communicating results to the patient/family/caregiver, or Care coordination (not separately reported).    20 minutes spent in discussing ACP    Signature: PADILLA CARDONA NP

## 2024-01-02 NOTE — TELEPHONE ENCOUNTER
----- Message from Lan Monreal sent at 1/2/2024  4:10 PM CST -----  Regarding: pt call back  Name of Who is Calling:Pt daughter         What is the request in detail: Requesting call back in regards to reschedule           Can the clinic reply by [facility]: yes         [facility]:Telephone Information:  Mobile          [phone]

## 2024-01-02 NOTE — TELEPHONE ENCOUNTER
----- Message from Linda Santos LPN sent at 1/2/2024  1:15 PM CST -----  Contact: Stephania  Can you call and get her appt and xr r/s please   ----- Message -----  From: Monserrat Hanson  Sent: 1/2/2024  12:49 PM CST  To: [facility] - Ortho Trauma    Type:  Sooner Apoointment Request    Caller is requesting a sooner appointment. Caller will not accept being placed on the waitlist and is requesting a message be sent to doctor.  Name of Caller:Stephania  When is the first available appointment?scheduled   Symptoms:right hip/X-ray  Would the patient rather a call back or a response via [facility]? call  Best Call Back Number:[phone]   Additional Information: Request to reschedule patient visit. Please give Ms. Cat a call back to assist.   Thank you,  GH

## 2024-01-02 NOTE — TELEPHONE ENCOUNTER
I spoke to Stephania. She asked if we could move the appointment with Dr. Frazier to the following week. I told her we could the following Friday, as Dr. Frazier is only in [place] on Friday. She said lets just leave it where it is for now.

## 2024-01-02 NOTE — TELEPHONE ENCOUNTER
Returned Stephania phone call in regards to their message about the patient. Informed them that we do not have any openings this week. Stephania  states that they need to reschedule them for the following week. I got the patient reschedule for 01/19/24 at 10:40 with a 10:15 x-ray. Verbalized understanding.

## 2024-01-02 NOTE — PATIENT INSTRUCTIONS
I will contact Dr. Rico to see you sooner.   Miralax 1pack/day or Senna 2 tabs at bedtime for opioid-induced constipation    Take your Norco 1-2 hours before or after your Xanax  Take Xanax 1mg (2 0.5mg pills) every 8 hours for anxiety.   Do not take Flexeril with Norco at the same time  Do not take Flexeril with Xanax at the same time  Discuss switching Lexapro with Dr. Antoine.   I can refer you to mental health counseling.   We have noted you want Full Code with life support   Narcan has been prescribed if there is any concern of opioid overdose.   Signs of opioid overdose include:   Loss of consciousness  Unresponsive to outside stimulus  Awake, but unable to talk  Breathing is very slow and shallow, erratic, or has stopped  For lighter skinned people, the skin tone turns bluish purple, for darker skinned people, it turns grayish or ashen.  Choking sounds, or a snore-like gurgling noise (sometimes called the death rattle)  Vomiting  Body is very limp  Face is very pale or clammy  Fingernails and lips turn blue or purplish black  Pulse (heartbeat) is slow, erratic, or not there at all.   If you administer this medicine immediately go to the nearest emergency department for evaluation and stop taking all opioids.

## 2024-01-02 NOTE — TELEPHONE ENCOUNTER
----- Message from Monserrat Hanson sent at 1/2/2024 12:45 PM CST -----  Contact: Stephania  Type:  Sooner Apoointment Request    Caller is requesting a sooner appointment. Caller will not accept being placed on the waitlist and is requesting a message be sent to doctor.  Name of Caller:Stephania  When is the first available appointment?scheduled   Symptoms:new patient  Would the patient rather a call back or a response via [facility]? call  Best Call Back Number:[phone]   Additional Information: Request to reschedule patient visit. Please give Ms. Cat a call back to assist.   Thank you,  GH

## 2024-01-05 ENCOUNTER — HOSPITAL ENCOUNTER (OUTPATIENT)
Dept: RADIATION THERAPY | Facility: HOSPITAL | Age: 69
Discharge: HOME | End: 2024-01-05
Attending: INTERNAL MEDICINE
Payer: MEDICARE

## 2024-01-05 ENCOUNTER — HOSPITAL ENCOUNTER (OUTPATIENT)
Dept: RADIOLOGY | Facility: HOSPITAL | Age: 69
Discharge: HOME | End: 2024-01-05
Attending: INTERNAL MEDICINE
Payer: MEDICARE

## 2024-01-05 PROCEDURE — 77014 HC CT GUIDANCE RADIATION THERAPY FLDS PLACEMENT: CPT | Mod: TC | Performed by: SPECIALIST

## 2024-01-05 PROCEDURE — 77334 RADIATION TREATMENT AID(S): CPT | Mod: 26,,, | Performed by: SPECIALIST

## 2024-01-05 PROCEDURE — 77290 THER RAD SIMULAJ FIELD CPLX: CPT | Mod: TC | Performed by: SPECIALIST

## 2024-01-05 PROCEDURE — 77290 THER RAD SIMULAJ FIELD CPLX: CPT | Mod: 26,,, | Performed by: SPECIALIST

## 2024-01-05 PROCEDURE — 77334 RADIATION TREATMENT AID(S): CPT | Mod: TC | Performed by: SPECIALIST

## 2024-01-08 NOTE — TELEPHONE ENCOUNTER
I spoke to daughter to reschedule. First availability would be 01/22/24. She said to keep in on the 12th as that's too far out. I told her I would keep an eye on the schedule and if anything comes up, I will let her know.

## 2024-01-09 ENCOUNTER — PATIENT MESSAGE (OUTPATIENT)
Dept: PALLIATIVE MEDICINE | Facility: CLINIC | Age: 69
End: 2024-01-09
Payer: MEDICARE

## 2024-01-12 ENCOUNTER — HOSPITAL ENCOUNTER (INPATIENT)
Facility: HOSPITAL | Age: 69
LOS: 3 days | Discharge: HOME HEALTH | DRG: 871 | End: 2024-01-15
Attending: EMERGENCY MEDICINE | Admitting: INTERNAL MEDICINE
Payer: MEDICARE

## 2024-01-12 ENCOUNTER — OFFICE VISIT (OUTPATIENT)
Dept: INFECTIOUS DISEASES | Facility: CLINIC | Age: 69
DRG: 871 | End: 2024-01-12
Payer: MEDICARE

## 2024-01-12 ENCOUNTER — OFFICE VISIT (OUTPATIENT)
Dept: HEMATOLOGY/ONCOLOGY | Facility: CLINIC | Age: 69
DRG: 871 | End: 2024-01-12
Payer: MEDICARE

## 2024-01-12 VITALS
TEMPERATURE: 97 F | DIASTOLIC BLOOD PRESSURE: 49 MMHG | HEIGHT: 69 IN | OXYGEN SATURATION: 95 % | SYSTOLIC BLOOD PRESSURE: 87 MMHG | BODY MASS INDEX: 23.11 KG/M2 | HEART RATE: 84 BPM | WEIGHT: 156 LBS

## 2024-01-12 VITALS
HEIGHT: 69 IN | BODY MASS INDEX: 23.12 KG/M2 | HEART RATE: 83 BPM | OXYGEN SATURATION: 89 % | SYSTOLIC BLOOD PRESSURE: 76 MMHG | DIASTOLIC BLOOD PRESSURE: 50 MMHG | WEIGHT: 156.06 LBS

## 2024-01-12 DIAGNOSIS — N99.528 COMPLICATION OF NEPHROSTOMY: ICD-10-CM

## 2024-01-12 DIAGNOSIS — C34.91 SQUAMOUS CELL CARCINOMA OF RIGHT LUNG: ICD-10-CM

## 2024-01-12 DIAGNOSIS — I95.9 HYPOTENSION, UNSPECIFIED HYPOTENSION TYPE: ICD-10-CM

## 2024-01-12 DIAGNOSIS — Z79.899 LONG TERM CURRENT USE OF THERAPEUTIC DRUG: ICD-10-CM

## 2024-01-12 DIAGNOSIS — C67.9 MALIGNANT NEOPLASM OF URINARY BLADDER, UNSPECIFIED SITE: Primary | ICD-10-CM

## 2024-01-12 DIAGNOSIS — N18.4 STAGE 4 CHRONIC KIDNEY DISEASE: Chronic | ICD-10-CM

## 2024-01-12 DIAGNOSIS — Z85.51 HX OF BLADDER CANCER: ICD-10-CM

## 2024-01-12 DIAGNOSIS — N18.9 CHRONIC RENAL FAILURE, UNSPECIFIED CKD STAGE: ICD-10-CM

## 2024-01-12 DIAGNOSIS — E03.9 HYPOTHYROIDISM, UNSPECIFIED TYPE: ICD-10-CM

## 2024-01-12 DIAGNOSIS — Z85.118 HX OF CANCER OF LUNG: ICD-10-CM

## 2024-01-12 DIAGNOSIS — C67.9 MALIGNANT NEOPLASM OF URINARY BLADDER, UNSPECIFIED SITE: ICD-10-CM

## 2024-01-12 DIAGNOSIS — D63.0 ANEMIA IN NEOPLASTIC DISEASE: ICD-10-CM

## 2024-01-12 DIAGNOSIS — Z85.51 PERSONAL HISTORY OF BLADDER CANCER: ICD-10-CM

## 2024-01-12 DIAGNOSIS — R53.1 WEAKNESS: ICD-10-CM

## 2024-01-12 DIAGNOSIS — N39.0 URINARY TRACT INFECTION WITHOUT HEMATURIA, SITE UNSPECIFIED: Primary | ICD-10-CM

## 2024-01-12 DIAGNOSIS — N30.00 ACUTE CYSTITIS WITHOUT HEMATURIA: Primary | ICD-10-CM

## 2024-01-12 DIAGNOSIS — K59.00 CONSTIPATION, UNSPECIFIED CONSTIPATION TYPE: ICD-10-CM

## 2024-01-12 PROBLEM — T83.098A MALFUNCTION OF NEPHROSTOMY TUBE: Status: ACTIVE | Noted: 2024-01-12

## 2024-01-12 PROBLEM — N18.30 ACUTE RENAL FAILURE SUPERIMPOSED ON STAGE 3 CHRONIC KIDNEY DISEASE: Status: RESOLVED | Noted: 2023-11-11 | Resolved: 2024-01-12

## 2024-01-12 PROBLEM — N17.9 ACUTE RENAL FAILURE SUPERIMPOSED ON STAGE 3 CHRONIC KIDNEY DISEASE: Status: RESOLVED | Noted: 2023-11-11 | Resolved: 2024-01-12

## 2024-01-12 PROBLEM — R41.82 ALTERED MENTAL STATUS: Chronic | Status: RESOLVED | Noted: 2023-10-21 | Resolved: 2024-01-12

## 2024-01-12 PROBLEM — G93.41 ENCEPHALOPATHY, METABOLIC: Status: ACTIVE | Noted: 2024-01-12

## 2024-01-12 PROBLEM — A41.9 SEPSIS: Status: ACTIVE | Noted: 2024-01-12

## 2024-01-12 LAB
ALBUMIN SERPL BCP-MCNC: 3.1 G/DL (ref 3.5–5.2)
ALP SERPL-CCNC: 117 U/L (ref 55–135)
ALT SERPL W/O P-5'-P-CCNC: 11 U/L (ref 10–44)
ANION GAP SERPL CALC-SCNC: 14 MMOL/L (ref 8–16)
AST SERPL-CCNC: 8 U/L (ref 10–40)
BACTERIA #/AREA URNS HPF: ABNORMAL /HPF
BASOPHILS # BLD AUTO: 0.06 K/UL (ref 0–0.2)
BASOPHILS NFR BLD: 0.4 % (ref 0–1.9)
BILIRUB SERPL-MCNC: 0.9 MG/DL (ref 0.1–1)
BILIRUB UR QL STRIP: NEGATIVE
BUN SERPL-MCNC: 35 MG/DL (ref 8–23)
CALCIUM SERPL-MCNC: 9.7 MG/DL (ref 8.7–10.5)
CHLORIDE SERPL-SCNC: 101 MMOL/L (ref 95–110)
CLARITY UR: ABNORMAL
CO2 SERPL-SCNC: 21 MMOL/L (ref 23–29)
COLOR UR: YELLOW
CREAT SERPL-MCNC: 2.2 MG/DL (ref 0.5–1.4)
DIFFERENTIAL METHOD BLD: ABNORMAL
EOSINOPHIL # BLD AUTO: 0.1 K/UL (ref 0–0.5)
EOSINOPHIL NFR BLD: 0.4 % (ref 0–8)
ERYTHROCYTE [DISTWIDTH] IN BLOOD BY AUTOMATED COUNT: 17.8 % (ref 11.5–14.5)
EST. GFR  (NO RACE VARIABLE): 32 ML/MIN/1.73 M^2
GLUCOSE SERPL-MCNC: 133 MG/DL (ref 70–110)
GLUCOSE UR QL STRIP: ABNORMAL
HCT VFR BLD AUTO: 33.3 % (ref 40–54)
HGB BLD-MCNC: 10.6 G/DL (ref 14–18)
HGB UR QL STRIP: ABNORMAL
HYALINE CASTS #/AREA URNS LPF: 0 /LPF
IMM GRANULOCYTES # BLD AUTO: 0.05 K/UL (ref 0–0.04)
IMM GRANULOCYTES NFR BLD AUTO: 0.4 % (ref 0–0.5)
KETONES UR QL STRIP: NEGATIVE
LACTATE SERPL-SCNC: 1.5 MMOL/L (ref 0.5–2.2)
LACTATE SERPL-SCNC: 2.6 MMOL/L (ref 0.5–2.2)
LEUKOCYTE ESTERASE UR QL STRIP: ABNORMAL
LYMPHOCYTES # BLD AUTO: 1.1 K/UL (ref 1–4.8)
LYMPHOCYTES NFR BLD: 8.1 % (ref 18–48)
MCH RBC QN AUTO: 28.2 PG (ref 27–31)
MCHC RBC AUTO-ENTMCNC: 31.8 G/DL (ref 32–36)
MCV RBC AUTO: 89 FL (ref 82–98)
MICROSCOPIC COMMENT: ABNORMAL
MONOCYTES # BLD AUTO: 1 K/UL (ref 0.3–1)
MONOCYTES NFR BLD: 7 % (ref 4–15)
NEUTROPHILS # BLD AUTO: 11.6 K/UL (ref 1.8–7.7)
NEUTROPHILS NFR BLD: 83.7 % (ref 38–73)
NITRITE UR QL STRIP: POSITIVE
NRBC BLD-RTO: 0 /100 WBC
PH UR STRIP: 8 [PH] (ref 5–8)
PLATELET # BLD AUTO: 161 K/UL (ref 150–450)
PMV BLD AUTO: 9.8 FL (ref 9.2–12.9)
POCT GLUCOSE: 113 MG/DL (ref 70–110)
POTASSIUM SERPL-SCNC: 4.3 MMOL/L (ref 3.5–5.1)
PROCALCITONIN SERPL IA-MCNC: 0.47 NG/ML
PROT SERPL-MCNC: 7.7 G/DL (ref 6–8.4)
PROT UR QL STRIP: ABNORMAL
RBC # BLD AUTO: 3.76 M/UL (ref 4.6–6.2)
RBC #/AREA URNS HPF: 19 /HPF (ref 0–4)
SODIUM SERPL-SCNC: 136 MMOL/L (ref 136–145)
SP GR UR STRIP: 1.01 (ref 1–1.03)
TROPONIN I SERPL DL<=0.01 NG/ML-MCNC: <0.006 NG/ML (ref 0–0.03)
URN SPEC COLLECT METH UR: ABNORMAL
UROBILINOGEN UR STRIP-ACNC: NEGATIVE EU/DL
WBC # BLD AUTO: 13.89 K/UL (ref 3.9–12.7)
WBC #/AREA URNS HPF: 57 /HPF (ref 0–5)
WBC CLUMPS URNS QL MICRO: ABNORMAL

## 2024-01-12 PROCEDURE — 85025 COMPLETE CBC W/AUTO DIFF WBC: CPT | Performed by: NURSE PRACTITIONER

## 2024-01-12 PROCEDURE — 25000003 PHARM REV CODE 250: Performed by: NURSE PRACTITIONER

## 2024-01-12 PROCEDURE — 80053 COMPREHEN METABOLIC PANEL: CPT | Performed by: NURSE PRACTITIONER

## 2024-01-12 PROCEDURE — 87077 CULTURE AEROBIC IDENTIFY: CPT | Performed by: NURSE PRACTITIONER

## 2024-01-12 PROCEDURE — 63600175 PHARM REV CODE 636 W HCPCS: Mod: JZ,JG | Performed by: NURSE PRACTITIONER

## 2024-01-12 PROCEDURE — 96365 THER/PROPH/DIAG IV INF INIT: CPT

## 2024-01-12 PROCEDURE — 83605 ASSAY OF LACTIC ACID: CPT | Performed by: NURSE PRACTITIONER

## 2024-01-12 PROCEDURE — 99285 EMERGENCY DEPT VISIT HI MDM: CPT | Mod: 25,27

## 2024-01-12 PROCEDURE — 99215 OFFICE O/P EST HI 40 MIN: CPT | Mod: PBBFAC,25,27 | Performed by: HOSPITALIST

## 2024-01-12 PROCEDURE — 99214 OFFICE O/P EST MOD 30 MIN: CPT | Mod: PBBFAC | Performed by: STUDENT IN AN ORGANIZED HEALTH CARE EDUCATION/TRAINING PROGRAM

## 2024-01-12 PROCEDURE — 11000001 HC ACUTE MED/SURG PRIVATE ROOM

## 2024-01-12 PROCEDURE — 93005 ELECTROCARDIOGRAM TRACING: CPT

## 2024-01-12 PROCEDURE — 25000003 PHARM REV CODE 250: Performed by: EMERGENCY MEDICINE

## 2024-01-12 PROCEDURE — 99999 PR PBB SHADOW E&M-EST. PATIENT-LVL V: CPT | Mod: PBBFAC,,, | Performed by: HOSPITALIST

## 2024-01-12 PROCEDURE — 87040 BLOOD CULTURE FOR BACTERIA: CPT | Mod: 59 | Performed by: NURSE PRACTITIONER

## 2024-01-12 PROCEDURE — 84145 PROCALCITONIN (PCT): CPT | Performed by: NURSE PRACTITIONER

## 2024-01-12 PROCEDURE — 87086 URINE CULTURE/COLONY COUNT: CPT | Performed by: NURSE PRACTITIONER

## 2024-01-12 PROCEDURE — 81000 URINALYSIS NONAUTO W/SCOPE: CPT | Performed by: NURSE PRACTITIONER

## 2024-01-12 PROCEDURE — 96360 HYDRATION IV INFUSION INIT: CPT | Mod: 59

## 2024-01-12 PROCEDURE — 93010 ELECTROCARDIOGRAM REPORT: CPT | Mod: ,,, | Performed by: INTERNAL MEDICINE

## 2024-01-12 PROCEDURE — 99214 OFFICE O/P EST MOD 30 MIN: CPT | Mod: S$PBB,,, | Performed by: STUDENT IN AN ORGANIZED HEALTH CARE EDUCATION/TRAINING PROGRAM

## 2024-01-12 PROCEDURE — 63600175 PHARM REV CODE 636 W HCPCS: Performed by: EMERGENCY MEDICINE

## 2024-01-12 PROCEDURE — 99999 PR PBB SHADOW E&M-EST. PATIENT-LVL IV: CPT | Mod: PBBFAC,,, | Performed by: STUDENT IN AN ORGANIZED HEALTH CARE EDUCATION/TRAINING PROGRAM

## 2024-01-12 PROCEDURE — 99215 OFFICE O/P EST HI 40 MIN: CPT | Mod: S$PBB,,, | Performed by: HOSPITALIST

## 2024-01-12 PROCEDURE — 84484 ASSAY OF TROPONIN QUANT: CPT | Performed by: NURSE PRACTITIONER

## 2024-01-12 PROCEDURE — 83605 ASSAY OF LACTIC ACID: CPT | Mod: 91 | Performed by: NURSE PRACTITIONER

## 2024-01-12 PROCEDURE — 87088 URINE BACTERIA CULTURE: CPT | Performed by: NURSE PRACTITIONER

## 2024-01-12 PROCEDURE — 36415 COLL VENOUS BLD VENIPUNCTURE: CPT | Performed by: NURSE PRACTITIONER

## 2024-01-12 PROCEDURE — 87186 SC STD MICRODIL/AGAR DIL: CPT | Performed by: NURSE PRACTITIONER

## 2024-01-12 RX ORDER — PROCHLORPERAZINE EDISYLATE 5 MG/ML
5 INJECTION INTRAMUSCULAR; INTRAVENOUS EVERY 6 HOURS PRN
Status: DISCONTINUED | OUTPATIENT
Start: 2024-01-12 | End: 2024-01-15 | Stop reason: HOSPADM

## 2024-01-12 RX ORDER — MORPHINE SULFATE 4 MG/ML
4 INJECTION, SOLUTION INTRAMUSCULAR; INTRAVENOUS EVERY 4 HOURS PRN
Status: DISCONTINUED | OUTPATIENT
Start: 2024-01-12 | End: 2024-01-15 | Stop reason: HOSPADM

## 2024-01-12 RX ORDER — IBUPROFEN 200 MG
16 TABLET ORAL
Status: DISCONTINUED | OUTPATIENT
Start: 2024-01-12 | End: 2024-01-15 | Stop reason: HOSPADM

## 2024-01-12 RX ORDER — BISACODYL 10 MG/1
10 SUPPOSITORY RECTAL DAILY PRN
Status: DISCONTINUED | OUTPATIENT
Start: 2024-01-12 | End: 2024-01-15 | Stop reason: HOSPADM

## 2024-01-12 RX ORDER — NALOXONE HYDROCHLORIDE 4 MG/.1ML
1 SPRAY NASAL ONCE
COMMUNITY
Start: 2024-01-02

## 2024-01-12 RX ORDER — POLYETHYLENE GLYCOL 3350 17 G/17G
17 POWDER, FOR SOLUTION ORAL 2 TIMES DAILY
Status: DISCONTINUED | OUTPATIENT
Start: 2024-01-12 | End: 2024-01-15 | Stop reason: HOSPADM

## 2024-01-12 RX ORDER — IBUPROFEN 200 MG
24 TABLET ORAL
Status: DISCONTINUED | OUTPATIENT
Start: 2024-01-12 | End: 2024-01-15 | Stop reason: HOSPADM

## 2024-01-12 RX ORDER — ACETAMINOPHEN 325 MG/1
650 TABLET ORAL EVERY 4 HOURS PRN
Status: DISCONTINUED | OUTPATIENT
Start: 2024-01-12 | End: 2024-01-15 | Stop reason: HOSPADM

## 2024-01-12 RX ORDER — GLUCAGON 1 MG
1 KIT INJECTION
Status: DISCONTINUED | OUTPATIENT
Start: 2024-01-12 | End: 2024-01-15 | Stop reason: HOSPADM

## 2024-01-12 RX ORDER — GLYCERIN 1 G/1
1 SUPPOSITORY RECTAL ONCE
Status: COMPLETED | OUTPATIENT
Start: 2024-01-12 | End: 2024-01-12

## 2024-01-12 RX ORDER — HYDROCODONE BITARTRATE AND ACETAMINOPHEN 10; 325 MG/1; MG/1
1 TABLET ORAL EVERY 4 HOURS PRN
Status: DISCONTINUED | OUTPATIENT
Start: 2024-01-12 | End: 2024-01-15 | Stop reason: HOSPADM

## 2024-01-12 RX ORDER — ONDANSETRON HYDROCHLORIDE 2 MG/ML
4 INJECTION, SOLUTION INTRAVENOUS EVERY 8 HOURS PRN
Status: DISCONTINUED | OUTPATIENT
Start: 2024-01-12 | End: 2024-01-15 | Stop reason: HOSPADM

## 2024-01-12 RX ORDER — SODIUM CHLORIDE 9 MG/ML
1000 INJECTION, SOLUTION INTRAVENOUS
Status: COMPLETED | OUTPATIENT
Start: 2024-01-12 | End: 2024-01-12

## 2024-01-12 RX ADMIN — SODIUM CHLORIDE 2166 ML: 9 INJECTION, SOLUTION INTRAVENOUS at 11:01

## 2024-01-12 RX ADMIN — SODIUM CHLORIDE 1000 ML: 9 INJECTION, SOLUTION INTRAVENOUS at 02:01

## 2024-01-12 RX ADMIN — DAPTOMYCIN 580 MG: 500 INJECTION, POWDER, LYOPHILIZED, FOR SOLUTION INTRAVENOUS at 02:01

## 2024-01-12 RX ADMIN — GLYCERIN 1 SUPPOSITORY: 2 SUPPOSITORY RECTAL at 03:01

## 2024-01-12 RX ADMIN — HYDROCODONE BITARTRATE AND ACETAMINOPHEN 1 TABLET: 10; 325 TABLET ORAL at 11:01

## 2024-01-12 RX ADMIN — CEFEPIME 2 G: 2 INJECTION, POWDER, FOR SOLUTION INTRAVENOUS at 11:01

## 2024-01-12 RX ADMIN — BISACODYL 10 MG: 10 SUPPOSITORY RECTAL at 06:01

## 2024-01-12 NOTE — PROGRESS NOTES
MEDICAL ONCOLOGY - NEW PATIENT VISIT    Best Contact Phone Number(s): [phone] (home)      Cancer/Stage/TNM:    Cancer Staging   Squamous cell carcinoma of right lung  Staging form: Lung, AJCC 8th Edition  - Clinical: Stage IA2 (cT1b, cN0, cM0) - Signed by Jett Wagoner IV, MD on 11/26/2023       Reason for visit: Geovani Currie is a 68 y.o. male found to have muscle invasive bladder cancer treated with definitive gemcitabine based CRT in [place] 09/2020 - 11/2020. Subsequently with recurrent biopsy proven recurrent disease by cystoscopy 08/2023 in the setting of urinary obstruction and hydronephrosis requiring ureteral stenting and percutaneous nephrostomy placement. Staging imaging has been equivocal for metastatic disease, and notable for a separate primary stage I SCC of the RUL with plan for RT. He presents to  medical oncology clinic for initial evaluation.    History has been obtained by chart review and discussion with the patient.    HPI:     Recent medical course has been quite complicated. His initial diagnosis of bladder cancer was in [place] in 2020 with associated left sided hydronephrosis, and underwent definitive intent CRT. Found to have recurrent left hyrdonephrosis early 2023 and required PCNU placement. He was hospitalized in [place] 08/2023 with COPD exacerbation, at which time he developed gross hematuria. Cystoscopy at the OSH reportedly found recurrent high grade muscle invasive bladder cancer with associted right hydronephrosis requiring right PCNU. In October, he relocated to [place] for improved social support. From 10/20/23 - 10/25/23 he was admitted with acute encephalopathy immediately upon relocating from [place] to [facility]. Imaging was suggestive of chronic ischemic encephalopathy. Also found to have enterobacter UTI and treated with ertapenem per ID. Readmtted 11/10/23 - 11/17/23 with dislodgement of this nephrostomy tube; following replacement of PCN he  developed fever and found to have VRE UTI with abx switched to linezolid.  Subsequentlyh admitted 11/20/23 - 11/27/23 with right hip fracture requiring ORIF 11/21/23.  He has had ongoing complications including  PCN dislodgement, BANDAR, and ongoing urinary tract infection. most recently 12/28/23. He enrolled in hospice care 12/21/23; later rescinded on 12/22/23.    Currrently he has bilaterl nephrostomy tubes in place. Left sided tube placed 11/17/23 and right sided tube exchanged 12/28/23. He has history of emphysema on Breo Ellipta and albuterol. He has a known stage I SCC of the RUL with plan for SBRT. Imaging also concerning for prior CVA. He has stage IV CKD. He takes escitalopram and alprazolam for anxiety. He takes levothyroxine for hypothyroidism. He is on tamsulosin for urinary outlet obsruction. He takes Norco 5-325 and flexeril for pain.  No known CAD. No DM2.     Family reports new soft tissue nodularity PCN site with associated increased somlelence over the last day. No fevers. Has appointment with Dr. Hartman in ID later today.Typically has a fair appetite but low po intake last few days. No nausea. Reports chronic pain in the right leg and pain he refers to as sciatica. Uses Norco up to 3-4 per day. Spends most of his day in couch. Typically can transfer to wheelchair or ambulate short distances with walker. ECOG 3.         Anemia - baseline HGB mid 7's. Check iron, B12      ? Watchful waiting vs pembro vs EV pembro    Recurrent UTI: Last seen by ID inpatient 12/23/23. Plan to complete course of linezolid for VRE.      Prior CVA - left parietal enephalomalacia noted by MR 10/21/23      Oncology History   Squamous cell carcinoma of right lung   7/22/2023 Imaging Significant Findings    PET CT ([facility])  - 1.1 x 1.0 cm spiculated RUL hypermetabolic pulmonary nodule, SUV 3.32  - L sided urinary bladder mass w/ L sided nephroureteral stents in place, and severe hydroureteronephrosis  - Multiple prominent or  "mildly enlarged L para-aortic LN w/ mildly increased radiotracer activity, could be reactive or represent metastatic involvement from bladder tumor     9/26/2023 Imaging Significant Findings    CT C, Non-Con ([facility])  - 1.7 x 1.2 cm spiculated nodule in RUL, increased from prior  - 0.7 mm satellite nodule, increased in size     10/11/2023 Procedure    RUL, CT Guided Bx ([facility])  - Minute focus (approximately 16 cells), marked atypical cells identified. Favor squamous cell carcinoma (p40, AE1/AE3 positive; TTF-1, napsin A, synaptophysin, CD68 negative)     10/21/2023 Imaging Significant Findings    MRI Brain  - No evidence of intracranial metastatic disease     11/26/2023 Cancer Staged    Staging form: Lung, AJCC 8th Edition  - Clinical: Stage IA2 (cT1b, cN0, cM0)     12/13/2023 Imaging Significant Findings    PET CT  - 2.0 x 1.3 cm R lung apex nodule, hypermetabolic  - No hypermetabolic intrathoracic or axillary LAD  - Circumferential urinary bladder wall thickening w/ circumferential heterogenous increased uptake involving L side of mesorectal fascia  - Curvilinear intraluminal calcifications within urinary bladder along L lateral wall  - 9 hypermetabolic pelvic LN     Malignant neoplasm of urinary bladder   7/6/2020 Procedure    TURBT  - Reportedly stage cT3b UCB, however radiation oncology notes describe T2a disease       9/29/2020 - 11/25/2020 Radiation Therapy    CRT with Gemcitabine  35 fractions IMRT/IGRT     8/30/2023 Procedure    TURBT  Path: Invasive high-grade urothelial carcinoma, muscularis propria is present and involved by carcinoma     10/16/2023 Notable Event    Urology appointment  "Discussed urinary retention, will need L ureteral stent exchange and exchange of R PCNU in 12/2023. Will need salvage chemothearpy or immunotherapy for recurrent metastatic bladder cancer"  - Dr. Grimaldo     10/20/2023 - 10/25/2023 Hospital Admission    Presented from airport after flying from [place] for worsening weakness and " lethargy. Imaging demonstrate remote infarctions but no acute CNS processes. Found to have an enterobacter infection. Started on ertapenem.      11/10/2023 - 2023 Hospital Admission    Admitted after inadvertently pulling out nephrostomy tube. Was on treatment for Enterobacter UTI with ertapenem, found that this was not sufficient coverage and changed to linezolid. Nephrostomy tube replaced.      2023 Imaging Significant Findings    PET CT  - 2.0 x 1.3 cm R lung apex nodule, hypermetabolic  - No hypermetabolic intrathoracic or axillary LAD  - Circumferential urinary bladder wall thickening w/ circumferential heterogenous increased uptake involving L side of mesorectal fascia  - Curvilinear intraluminal calcifications within urinary bladder along L lateral wall  - 9 hypermetabolic pelvic LN          Past Medical History:   Diagnosis Date    COPD (chronic obstructive pulmonary disease)     Hypertension         Past Surgical History:   Procedure Laterality Date    APPENDECTOMY      CATARACT EXTRACTION      NEPHROSTOMY      OPEN REDUCTION AND INTERNAL FIXATION (ORIF) OF INTERTROCHANTERIC FRACTURE OF FEMUR Right 2023    Procedure: ORIF, FRACTURE, FEMUR, INTERTROCHANTERIC;  Surgeon: Tanisha Guidry DO;  Location: [facility];  Service: Orthopedics;  Laterality: Right;  Gamma nail        Family History   Problem Relation Age of Onset    Cancer Mother         NOS    Cancer Father         NOS    Cancer Maternal Grandfather         NOS    Bladder Cancer Neg Hx         Social History     Tobacco Use    Smoking status: Former     Current packs/day: 0.00     Types: Cigarettes     Quit date: 10/2023     Years since quittin.2     Passive exposure: Never    Smokeless tobacco: Never   Substance Use Topics    Alcohol use: Never        I have reviewed and updated the patient's past medical, surgical, family and social histories.    Review of patient's allergies indicates:  No Known Allergies     No current  "facility-administered medications for this visit.     No current outpatient medications on file.     Facility-Administered Medications Ordered in Other Visits   Medication Dose Route Frequency Provider Last Rate Last Admin    acetaminophen tablet 650 mg  650 mg Oral Q4H PRN Tete, April DANIELA, NP        bisacodyL suppository 10 mg  10 mg Rectal Daily PRN Tete April DANIELA, NP        [START ON 1/13/2024] ceFEPIme (MAXIPIME) 2 g in dextrose 5 % in water (D5W) 100 mL IVPB (MB+)  2 g Intravenous Q12H Tete, April DANIELA, NP        DAPTOmycin (CUBICIN) 580 mg in sodium chloride 0.9% SolP 50 mL IVPB  8 mg/kg Intravenous Q24H Tete, April DANIELA, NP   Stopped at 01/12/24 1457    dextrose 10% bolus 125 mL 125 mL  12.5 g Intravenous PRN Tete, April DANIELA, NP        dextrose 10% bolus 250 mL 250 mL  25 g Intravenous PRN Tete, April DANIELA, NP        glucagon (human recombinant) injection 1 mg  1 mg Intramuscular PRN Normand, April JDaryl, NP        glucose chewable tablet 16 g  16 g Oral PRN Normand, April JDaryl, NP        glucose chewable tablet 24 g  24 g Oral PRN Tete, April JDaryl, NP        glycerin adult suppository 1 suppository  1 suppository Rectal Once Tete, April DANIELA, NP        HYDROcodone-acetaminophen  mg per tablet 1 tablet  1 tablet Oral Q4H PRN Normand, April JDaryl, NP        morphine injection 4 mg  4 mg Intravenous Q4H PRN Normand, April JDaryl, NP        ondansetron injection 4 mg  4 mg Intravenous Q8H PRN Tete, April JDaryl, NP        polyethylene glycol packet 17 g  17 g Oral BID Tete, April JDaryl, NP        prochlorperazine injection Soln 5 mg  5 mg Intravenous Q6H PRN Tete, April JDaryl, NP            Physical Exam:   BP (!) 87/49   Pulse 84   Temp 97.1 °F (36.2 °C) (Temporal)   Ht 5' 9" (1.753 m)   Wt 70.8 kg (156 lb) Comment: 2 weeks ago  SpO2 95%   BMI 23.04 kg/m²      ECOG Performance status: 3            Physical Exam  Constitutional:       Appearance: He is ill-appearing.      Comments: Somnelent in " wheelchair. Oriented to person, place and time. Defers much history to his family.   HENT:      Head: Normocephalic.   Eyes:      General: No scleral icterus.     Extraocular Movements: Extraocular movements intact.      Conjunctiva/sclera: Conjunctivae normal.   Cardiovascular:      Rate and Rhythm: Normal rate.      Heart sounds: No murmur heard.  Pulmonary:      Effort: Pulmonary effort is normal. No respiratory distress.   Abdominal:      General: There is no distension.      Palpations: Abdomen is soft.   Skin:     General: Skin is warm and dry.   Neurological:      Mental Status: He is oriented to person, place, and time.      Motor: Weakness present.   Psychiatric:         Mood and Affect: Mood normal.         Behavior: Behavior normal.         Thought Content: Thought content normal.           Labs:   Recent Results (from the past 48 hour(s))   CBC Oncology    Collection Time: 01/12/24  9:26 AM   Result Value Ref Range    WBC 13.24 (H) 3.90 - 12.70 K/uL    RBC 3.52 (L) 4.60 - 6.20 M/uL    Hemoglobin 9.8 (L) 14.0 - 18.0 g/dL    Hematocrit 30.9 (L) 40.0 - 54.0 %    MCV 88 82 - 98 fL    MCH 27.8 27.0 - 31.0 pg    MCHC 31.7 (L) 32.0 - 36.0 g/dL    RDW 17.8 (H) 11.5 - 14.5 %    Platelets 163 150 - 450 K/uL    MPV 10.1 9.2 - 12.9 fL    Gran # (ANC) 11.3 (H) 1.8 - 7.7 K/uL    Immature Grans (Abs) 0.05 (H) 0.00 - 0.04 K/uL   Comprehensive Metabolic Panel    Collection Time: 01/12/24  9:26 AM   Result Value Ref Range    Sodium 135 (L) 136 - 145 mmol/L    Potassium 4.4 3.5 - 5.1 mmol/L    Chloride 102 95 - 110 mmol/L    CO2 22 (L) 23 - 29 mmol/L    Glucose 132 (H) 70 - 110 mg/dL    BUN 34 (H) 8 - 23 mg/dL    Creatinine 2.1 (H) 0.5 - 1.4 mg/dL    Calcium 9.4 8.7 - 10.5 mg/dL    Total Protein 7.3 6.0 - 8.4 g/dL    Albumin 3.0 (L) 3.5 - 5.2 g/dL    Total Bilirubin 0.9 0.1 - 1.0 mg/dL    Alkaline Phosphatase 111 55 - 135 U/L    AST 7 (L) 10 - 40 U/L    ALT 10 10 - 44 U/L    eGFR 33 (A) >60 mL/min/1.73 m^2    Anion Gap 11  8 - 16 mmol/L   TSH    Collection Time: 01/12/24  9:26 AM   Result Value Ref Range    TSH 0.188 (L) 0.400 - 4.000 uIU/mL   T4, Free    Collection Time: 01/12/24  9:26 AM   Result Value Ref Range    Free T4 1.51 0.71 - 1.51 ng/dL   CBC auto differential    Collection Time: 01/12/24 11:29 AM   Result Value Ref Range    WBC 13.89 (H) 3.90 - 12.70 K/uL    RBC 3.76 (L) 4.60 - 6.20 M/uL    Hemoglobin 10.6 (L) 14.0 - 18.0 g/dL    Hematocrit 33.3 (L) 40.0 - 54.0 %    MCV 89 82 - 98 fL    MCH 28.2 27.0 - 31.0 pg    MCHC 31.8 (L) 32.0 - 36.0 g/dL    RDW 17.8 (H) 11.5 - 14.5 %    Platelets 161 150 - 450 K/uL    MPV 9.8 9.2 - 12.9 fL    Immature Granulocytes 0.4 0.0 - 0.5 %    Gran # (ANC) 11.6 (H) 1.8 - 7.7 K/uL    Immature Grans (Abs) 0.05 (H) 0.00 - 0.04 K/uL    Lymph # 1.1 1.0 - 4.8 K/uL    Mono # 1.0 0.3 - 1.0 K/uL    Eos # 0.1 0.0 - 0.5 K/uL    Baso # 0.06 0.00 - 0.20 K/uL    nRBC 0 0 /100 WBC    Gran % 83.7 (H) 38.0 - 73.0 %    Lymph % 8.1 (L) 18.0 - 48.0 %    Mono % 7.0 4.0 - 15.0 %    Eosinophil % 0.4 0.0 - 8.0 %    Basophil % 0.4 0.0 - 1.9 %    Differential Method Automated    Comprehensive metabolic panel    Collection Time: 01/12/24 11:29 AM   Result Value Ref Range    Sodium 136 136 - 145 mmol/L    Potassium 4.3 3.5 - 5.1 mmol/L    Chloride 101 95 - 110 mmol/L    CO2 21 (L) 23 - 29 mmol/L    Glucose 133 (H) 70 - 110 mg/dL    BUN 35 (H) 8 - 23 mg/dL    Creatinine 2.2 (H) 0.5 - 1.4 mg/dL    Calcium 9.7 8.7 - 10.5 mg/dL    Total Protein 7.7 6.0 - 8.4 g/dL    Albumin 3.1 (L) 3.5 - 5.2 g/dL    Total Bilirubin 0.9 0.1 - 1.0 mg/dL    Alkaline Phosphatase 117 55 - 135 U/L    AST 8 (L) 10 - 40 U/L    ALT 11 10 - 44 U/L    eGFR 32 (A) >60 mL/min/1.73 m^2    Anion Gap 14 8 - 16 mmol/L   Lactic acid, plasma    Collection Time: 01/12/24 11:29 AM   Result Value Ref Range    Lactate (Lactic Acid) 1.5 0.5 - 2.2 mmol/L   Troponin I    Collection Time: 01/12/24 11:29 AM   Result Value Ref Range    Troponin I <0.006 0.000 - 0.026  ng/mL   Procalcitonin    Collection Time: 01/12/24 11:29 AM   Result Value Ref Range    Procalcitonin 0.47 (H) <0.25 ng/mL   Urinalysis, Reflex to Urine Culture (left urostomy)    Collection Time: 01/12/24 11:55 AM    Specimen: Urine   Result Value Ref Range    Specimen UA Urine, Clean Catch     Color, UA Yellow Yellow, Straw, Andra    Appearance, UA Hazy (A) Clear    pH, UA 8.0 5.0 - 8.0    Specific Gravity, UA 1.010 1.005 - 1.030    Protein, UA 1+ (A) Negative    Glucose, UA Trace (A) Negative    Ketones, UA Negative Negative    Bilirubin (UA) Negative Negative    Occult Blood UA 1+ (A) Negative    Nitrite, UA Positive (A) Negative    Urobilinogen, UA Negative <2.0 EU/dL    Leukocytes, UA 3+ (A) Negative   Urinalysis Microscopic    Collection Time: 01/12/24 11:55 AM   Result Value Ref Range    RBC, UA 19 (H) 0 - 4 /hpf    WBC, UA 57 (H) 0 - 5 /hpf    WBC Clumps, UA Many (A) None-Rare    Bacteria Few (A) None-Occ /hpf    Hyaline Casts, UA 0 0-1/lpf /lpf    Microscopic Comment SEE COMMENT    POCT glucose    Collection Time: 01/12/24  1:40 PM   Result Value Ref Range    POCT Glucose 113 (H) 70 - 110 mg/dL   Lactic acid, plasma #2    Collection Time: 01/12/24  3:45 PM   Result Value Ref Range    Lactate (Lactic Acid) 2.6 (H) 0.5 - 2.2 mmol/L          Imaging:         I have personally reviewed the above imaging    Path:   Reviewed pathology as documented in oncology history      Assessment and Plan:   1. Malignant neoplasm of urinary bladder, unspecified site  Overview:  Metachronous recurrence MIBC following definitive CRT 11/2020. Extensive local disease with bilateral hydronephrosis, renal failure, and recurrent infection. Review of PET imaging suggestive of emily metastases as well.     Assessment & Plan:  He is not fit for surgery or reirradiation. Treatment is strictly palliative. He is not a candidate for cytotoxic therapy. In patients with poor functional status, single agent pembrolizumab may be beneficial.  In KEYNOTE 052, cisplatin ineligible patients received single agent pembrolizumab. For patients with poor functional status, MURPHY was between 27-31% with 12 month OS ~ 36%. I expressed my signficant concern that given his exceptional comorbidilty he would be expected to have signficantly shorter PFS/OS.    Despite this, patient would like to trial single agent pembrolizumab if at all possible. He will need to be more fit than his current state, and he is being referred to the hospital today for management of presumed ongoing urinary infection. He can follow up with me in two weeks with tentative plan to start pembro in 3 weeks. If no signficant improvement in functional status or infection, I would favor best supportive care.    - Optimize medical comorbidities  - FU with me in 2 weeks  - Tentative plan to start pembro in 3 weeks if he can improve his current status, otherwise plan for best supportive care    Orders:  -     CBC Oncology; Standing  -     Comprehensive Metabolic Panel; Standing  -     TSH; Standing  -     IRON AND TIBC; Future; Expected date: 01/12/2024  -     FERRITIN; Future; Expected date: 01/12/2024  -     VITAMIN B12; Future; Expected date: 01/12/2024  -     CBC Oncology; Standing  -     Comprehensive Metabolic Panel; Standing  -     TSH; Standing    2. Hypotension, unspecified hypotension type  Assessment & Plan:  - Recommended he stop tamsulosin and metoprolol  - He wis being referred to the hospital       3. Stage 4 chronic kidney disease  Overview:  Recent baseline Cr ~2.5. Presumably from obstructive uropathy. B/L PCN in place.    Assessment & Plan:  - Stable  - PCN remain in place      4. Hypothyroidism, unspecified type    5. Squamous cell carcinoma of right lung  Assessment & Plan:  Stage IA2  - Follow with Dr. Raya and rad onc  - Plan for SBRT      6. Anemia in neoplastic disease  -     IRON AND TIBC; Future; Expected date: 01/12/2024  -     FERRITIN; Future; Expected date:  01/12/2024  -     VITAMIN B12; Future; Expected date: 01/12/2024    7. Long term current use of therapeutic drug  -     TSH; Standing  -     VITAMIN B12; Future; Expected date: 01/12/2024  -     TSH; Standing               Follow up:   Route Chart for Scheduling    Med Onc Chart Routing      Follow up with physician 2 weeks.   Follow up with [facility]    Infusion scheduling note New or changed treatment   Pembro 3 weeks Pillow   Injection scheduling note    Labs CBC, CMP, TSH, iron and TIBC and vitamin B12   Scheduling:  Preferred lab:  Lab interval:  ferritin   Imaging    Pharmacy appointment    Other referrals                         The above information has been reviewed with the patient and all questions have been answered to their apparent satisfaction.  They understand that they can call the clinic with any questions.    Demetrius Frazier MD  Hematology/Oncology  [facility]

## 2024-01-12 NOTE — ASSESSMENT & PLAN NOTE
This patient does have evidence of infective focus  My overall impression is sepsis.  Source: Urinary Tract  Antibiotics given-   Antibiotics (72h ago, onward)      Start     Stop Route Frequency Ordered    01/13/24 0000  ceFEPIme (MAXIPIME) 2 g in dextrose 5 % in water (D5W) 100 mL IVPB (MB+)         -- IV Every 12 hours (non-standard times) 01/12/24 1245    01/12/24 1345  DAPTOmycin (CUBICIN) 580 mg in sodium chloride 0.9% SolP 50 mL IVPB         -- IV Every 24 hours (non-standard times) 01/12/24 1244          Latest lactate reviewed-  Recent Labs   Lab 01/12/24  1129   LACTATE 1.5     Organ dysfunction indicated by Acute kidney injury and Encephalopathy    Fluid challenge Ideal Body Weight- The patient's ideal body weight is Ideal body weight: 70.7 kg (155 lb 13.8 oz) which will be used to calculate fluid bolus of 30 ml/kg for treatment of septic shock.      Post- resuscitation assessment No - Post resuscitation assessment not needed       Will Not start Pressors- Levophed for MAP of 65  Source control achieved by: Cefepime, Daptomycin

## 2024-01-12 NOTE — H&P
[facility] - Emergency Dept.  [facility] Medicine  History & Physical    Patient Name: Geovani Currie  MRN: [id number]  Patient Class: IP- Inpatient  Admission Date: 1/12/2024  Attending Physician: Lara Ribera MD   Primary Care Provider: Faizan Antoine MD         Patient information was obtained from patient, relative(s), past medical records, and ER records.     Subjective:     Principal Problem:Sepsis    Chief Complaint:   Chief Complaint   Patient presents with    Weakness     Pt went to dr goldberg today and had low BP readings today pt reports weakness pt denies n/v/d         HPI: 68 y.o. male patient with a PMHx of bladder and lung cancer. Presented to the Emergency Department for evaluation of weakness, hypotension. Referred from Infectious Disease clinic due to BP: 70/50. Family member reports pt has hx of stage 3 CKD and invasive bladder cancer that is unsuitable for chemo. Pt is a poor historian. Patient denies any fever, chills, N/V/D, and all other sxs at this time. No prior tx reported. In the ED, vitals: 74/37, improved to 98/49. HR: 76, afebrile. Significant labs: WBC: 13.89, H/H: 10.6/33.3, Cr: 2.2, Lactic: 1.5, Procal: 0.47, TSH: 0.188, UA: Occult Blood+, Nitrite +, Leuk: 3+, Bact: Few. Blood Cultures collected, Urine Culture pending. ID recommendations reviewed. Treated with IV antibiotics, IV fluids. Urology consulted. Patient is a full code. Admitted to Hospital Medicine for management of Sepsis.     Past Medical History:   Diagnosis Date    COPD (chronic obstructive pulmonary disease)     Hypertension        Past Surgical History:   Procedure Laterality Date    APPENDECTOMY      CATARACT EXTRACTION      NEPHROSTOMY      OPEN REDUCTION AND INTERNAL FIXATION (ORIF) OF INTERTROCHANTERIC FRACTURE OF FEMUR Right 11/21/2023    Procedure: ORIF, FRACTURE, FEMUR, INTERTROCHANTERIC;  Surgeon: Tanisha Guidry DO;  Location: [facility];  Service: Orthopedics;  Laterality: Right;  Gamma nail        Review of patient's allergies indicates:  No Known Allergies    No current facility-administered medications on file prior to encounter.     Current Outpatient Medications on File Prior to Encounter   Medication Sig    albuterol (ACCUNEB) 0.63 mg/3 mL Nebu Take 3 mLs (0.63 mg total) by nebulization 3 (three) times daily as needed (sob, wheezing). Rescue    albuterol (PROVENTIL/VENTOLIN HFA) 90 mcg/actuation inhaler Inhale 1-2 puffs into the lungs every 4 (four) hours as needed for Wheezing. Rescue    ALPRAZolam (XANAX) 0.5 MG tablet Take 2 tablets (1 mg total) by mouth 3 (three) times daily as needed for Anxiety.    CEFDINIR ORAL 300 mg.    cyclobenzaprine (FLEXERIL) 10 MG tablet Take 1 tablet (10 mg total) by mouth 3 (three) times daily as needed for Muscle spasms.    docusate sodium (COLACE) 100 MG capsule Take 100 mg by mouth 2 (two) times daily.    EScitalopram oxalate (LEXAPRO) 20 MG tablet Take 1 tablet (20 mg total) by mouth once daily.    fluticasone furoate-vilanteroL (BREO ELLIPTA) 100-25 mcg/dose diskus inhaler Inhale 1 puff into the lungs once daily. Controller    HYDROcodone-acetaminophen (NORCO) 5-325 mg per tablet Take 2 tablets by mouth every 6 (six) hours as needed for Pain.    levothyroxine (SYNTHROID) 125 MCG tablet Take 1 tablet (125 mcg total) by mouth before breakfast.    naloxone (NARCAN) 4 mg/actuation Spry 1 spray once.    nicotine (NICODERM CQ) 14 mg/24 hr Place 1 patch onto the skin every 24 hours.    vitamin D (VITAMIN D3) 1000 units Tab Take 25 mcg by mouth once daily.    [DISCONTINUED] metoprolol succinate (TOPROL-XL) 50 MG 24 hr tablet Take 1 tablet (50 mg total) by mouth once daily.    [DISCONTINUED] tamsulosin (FLOMAX) 0.4 mg Cap Take 1 capsule (0.4 mg total) by mouth once daily.     Family History       Problem Relation (Age of Onset)    Cancer Mother, Father, Maternal Grandfather          Tobacco Use    Smoking status: Former     Current packs/day: 0.00     Types: Cigarettes      Quit date: 10/2023     Years since quittin.2     Passive exposure: Never    Smokeless tobacco: Never   Substance and Sexual Activity    Alcohol use: Never    Drug use: Never    Sexual activity: Not Currently     Partners: Female     Review of Systems   Constitutional:  Positive for appetite change, fatigue and fever.   Gastrointestinal:  Positive for abdominal distention and nausea.   Musculoskeletal:  Positive for arthralgias and back pain.   Skin:  Positive for wound.   Neurological:  Positive for dizziness and weakness.   All other systems reviewed and are negative.    Objective:     Vital Signs (Most Recent):  Temp: 98.1 °F (36.7 °C) (24 1105)  Pulse: 88 (24 1530)  Resp: (!) 26 (24 1330)  BP: (!) 125/59 (24 1530)  SpO2: 97 % (24 1530) Vital Signs (24h Range):  Temp:  [97.1 °F (36.2 °C)-98.1 °F (36.7 °C)] 98.1 °F (36.7 °C)  Pulse:  [71-92] 88  Resp:  [14-26] 26  SpO2:  [89 %-98 %] 97 %  BP: ()/(37-71) 125/59     Weight: 72.2 kg (159 lb 3.2 oz)  Body mass index is 23.51 kg/m².     Physical Exam  Vitals and nursing note reviewed.   Constitutional:       General: He is not in acute distress.     Appearance: He is ill-appearing. He is not diaphoretic.   HENT:      Head: Normocephalic and atraumatic.      Right Ear: Hearing and external ear normal.      Left Ear: Hearing and external ear normal.      Nose: Nose normal. No mucosal edema or rhinorrhea.      Mouth/Throat:      Pharynx: Uvula midline.   Eyes:      General:         Right eye: No discharge.         Left eye: No discharge.      Conjunctiva/sclera: Conjunctivae normal.      Right eye: No chemosis.     Left eye: No chemosis.     Pupils: Pupils are equal, round, and reactive to light.   Neck:      Thyroid: No thyroid mass or thyromegaly.      Trachea: Trachea normal.   Cardiovascular:      Rate and Rhythm: Regular rhythm. Tachycardia present.      Pulses:           Dorsalis pedis pulses are 2+ on the right side and 2+  on the left side.      Heart sounds: Normal heart sounds. No murmur heard.  Pulmonary:      Effort: Pulmonary effort is normal. No respiratory distress.      Breath sounds: Examination of the right-lower field reveals decreased breath sounds. Examination of the left-lower field reveals decreased breath sounds. Decreased breath sounds present. No wheezing.   Abdominal:      General: Bowel sounds are decreased. There is no distension.      Palpations: Abdomen is soft.      Tenderness: There is generalized abdominal tenderness.   Musculoskeletal:         General: Normal range of motion.      Cervical back: Normal range of motion and neck supple.   Lymphadenopathy:      Cervical: No cervical adenopathy.      Upper Body:      Right upper body: No supraclavicular adenopathy.      Left upper body: No supraclavicular adenopathy.   Skin:     General: Skin is warm and dry.      Capillary Refill: Capillary refill takes less than 2 seconds.      Findings: No rash.          Neurological:      Mental Status: He is oriented to person, place, and time. He is lethargic.   Psychiatric:         Behavior: Behavior is slowed.         Cognition and Memory: He exhibits impaired recent memory.              CRANIAL NERVES     CN III, IV, VI   Pupils are equal, round, and reactive to light.       Significant Labs: All pertinent labs within the past 24 hours have been reviewed.  CBC:   Recent Labs   Lab 01/12/24  0926 01/12/24  1129   WBC 13.24* 13.89*   HGB 9.8* 10.6*   HCT 30.9* 33.3*    161     CMP:   Recent Labs   Lab 01/12/24  0926 01/12/24  1129   * 136   K 4.4 4.3    101   CO2 22* 21*   * 133*   BUN 34* 35*   CREATININE 2.1* 2.2*   CALCIUM 9.4 9.7   PROT 7.3 7.7   ALBUMIN 3.0* 3.1*   BILITOT 0.9 0.9   ALKPHOS 111 117   AST 7* 8*   ALT 10 11   ANIONGAP 11 14     Lactic Acid:   Recent Labs   Lab 01/12/24  1129   LACTATE 1.5     Troponin:   Recent Labs   Lab 01/12/24  1129   TROPONINI <0.006       Significant  Imaging: I have reviewed all pertinent imaging results/findings within the past 24 hours.  Assessment/Plan:     * Sepsis  This patient does have evidence of infective focus  My overall impression is sepsis.  Source: Urinary Tract  Antibiotics given-   Antibiotics (72h ago, onward)      Start     Stop Route Frequency Ordered    01/13/24 0000  ceFEPIme (MAXIPIME) 2 g in dextrose 5 % in water (D5W) 100 mL IVPB (MB+)         -- IV Every 12 hours (non-standard times) 01/12/24 1245    01/12/24 1345  DAPTOmycin (CUBICIN) 580 mg in sodium chloride 0.9% SolP 50 mL IVPB         -- IV Every 24 hours (non-standard times) 01/12/24 1244          Latest lactate reviewed-  Recent Labs   Lab 01/12/24  1129   LACTATE 1.5     Organ dysfunction indicated by Acute kidney injury and Encephalopathy    Fluid challenge Ideal Body Weight- The patient's ideal body weight is Ideal body weight: 70.7 kg (155 lb 13.8 oz) which will be used to calculate fluid bolus of 30 ml/kg for treatment of septic shock.      Post- resuscitation assessment No - Post resuscitation assessment not needed       Will Not start Pressors- Levophed for MAP of 65  Source control achieved by: Cefepime, Daptomycin    Constipation  CT Abdomen: significant stool burden    --Glycerin suppository  --Miralax BID  --Senna BID  --If no results, Fleet Enema      Malfunction of nephrostomy tube  Erythema, Edema around right tube site.     --Consult Urology      Encephalopathy, metabolic  Secondary to infection, dehydration    --treat underlying causes  --Fall risk  --Delirium precautions      Malignant neoplasm of urinary bladder    Followed by Urology, Oncology    --F/U OP    Squamous cell carcinoma of right lung  Followed by Oncology      --F/U OP      UTI (urinary tract infection)  Multifactorial, severe constipation, presence of nephrostomy tubes, bladder cancer  Seen by ID in clinic prior to presentation to ED, reviewed note, recommended Daptomycin and Ceftriaxone. Reviewed  previous culture report, intermediate to Ceftriaxone, Sensitive to Cefepime. Will treat with cefepime due to sepsis    --Cefepime IV for now  --Daptomycin  --PO recommendations per ID for d/c unless culture changes: PO linezolid 600 mg BID and cefdinir 300 mg BID for 14 days        Elevated serum creatinine  Per spouse, urine collection bags are overfilling during the night and urine is leaking from the tube sites. Reported limited PO intake over the past 1-2 days    --S/P IV Fluids 30cc/hr  --Antiemetics PRN  --Encourage PO intake        VTE Risk Mitigation (From admission, onward)           Ordered     IP VTE HIGH RISK PATIENT  Once         01/12/24 1252     Place sequential compression device  Until discontinued         01/12/24 1252                               AdmissionCare    Guideline: Sepsis (and Other Febrile Illness without Focal Infection) - INPT, Inpatient    Based on the indications selected for the patient, the bed status of Admit to Inpatient was determined to be MET    The following indications were selected as present at the time of evaluation of the patient:      Hemodynamic instability, as indicated by 1 or more of the following:   -     Vital sign abnormality not readily corrected by appropriate treatment, as indicated by 1 or more of the following:    -      Hypotension that persists despite appropriate treatment (eg, volume repletion, treatment of underlying cause)     -       Hypotension, as indicated by ALL of the following:      -        - Not patient baseline (eg, healthy adult with low SBP) or intentional therapeutic goal (eg, low SBP as treatment goal in heart failure)       Low blood pressure, as indicated by 1 or more of the following:       -         - SBP less than 90 mm Hg in adult or child 10 years or older        - Mean arterial pressure less than 70 mm Hg in adult or child 10 years or older    AdmissionCare documentation entered by: Antoinette Epstein    [facility], 27th edition,  Copyright © 2023 [facility] [employer], [facility] All Rights Reserved.  [id number]    April TAN Epstein NP  Department of Hospital Medicine  [facility] - Emergency Dept.

## 2024-01-12 NOTE — HPI
68 y.o. male patient with a PMHx of bladder and lung cancer. Presented to the Emergency Department for evaluation of weakness, hypotension. Referred from Infectious Disease clinic due to BP: 70/50. Family member reports pt has hx of stage 3 CKD and invasive bladder cancer that is unsuitable for chemo. Pt is a poor historian. Patient denies any fever, chills, N/V/D, and all other sxs at this time. No prior tx reported. In the ED, vitals: 74/37, improved to 98/49. HR: 76, afebrile. Significant labs: WBC: 13.89, H/H: 10.6/33.3, Cr: 2.2, Lactic: 1.5, Procal: 0.47, TSH: 0.188, UA: Occult Blood+, Nitrite +, Leuk: 3+, Bact: Few. Blood Cultures collected, Urine Culture pending. ID recommendations reviewed. Treated with IV antibiotics, IV fluids. Urology consulted. Patient is a full code. Admitted to Hospital Medicine for management of Sepsis.

## 2024-01-12 NOTE — ASSESSMENT & PLAN NOTE
Secondary to infection, dehydration    --treat underlying causes  --Fall risk  --Delirium precautions

## 2024-01-12 NOTE — ASSESSMENT & PLAN NOTE
Multifactorial, severe constipation, presence of nephrostomy tubes, bladder cancer  Seen by ID in clinic prior to presentation to ED, reviewed note, recommended Daptomycin and Ceftriaxone. Reviewed previous culture report, intermediate to Ceftriaxone, Sensitive to Cefepime. Will treat with cefepime due to sepsis    --Cefepime IV for now  --Daptomycin  --PO recommendations per ID for d/c unless culture changes: PO linezolid 600 mg BID and cefdinir 300 mg BID for 14 days

## 2024-01-12 NOTE — ASSESSMENT & PLAN NOTE
He is not fit for surgery or reirradiation. Treatment is strictly palliative. He is not a candidate for cytotoxic therapy. In patients with poor functional status, single agent pembrolizumab may be beneficial. In KEYNOTE 052, cisplatin ineligible patients received single agent pembrolizumab. For patients with poor functional status, MURPHY was between 27-31% with 12 month OS ~ 36%. I expressed my signficant concern that given his exceptional comorbidilty he would be expected to have signficantly shorter PFS/OS.    Despite this, patient would like to trial single agent pembrolizumab if at all possible. He will need to be more fit than his current state, and he is being referred to the hospital today for management of presumed ongoing urinary infection. He can follow up with me in two weeks with tentative plan to start pembro in 3 weeks. If no signficant improvement in functional status or infection, I would favor best supportive care.    - Optimize medical comorbidities  - FU with me in 2 weeks  - Tentative plan to start pembro in 3 weeks if he can improve his current status, otherwise plan for best supportive care

## 2024-01-12 NOTE — SUBJECTIVE & OBJECTIVE
Past Medical History:   Diagnosis Date    COPD (chronic obstructive pulmonary disease)     Hypertension        Past Surgical History:   Procedure Laterality Date    APPENDECTOMY      CATARACT EXTRACTION      NEPHROSTOMY      OPEN REDUCTION AND INTERNAL FIXATION (ORIF) OF INTERTROCHANTERIC FRACTURE OF FEMUR Right 11/21/2023    Procedure: ORIF, FRACTURE, FEMUR, INTERTROCHANTERIC;  Surgeon: Tanisha Guidry DO;  Location: [facility];  Service: Orthopedics;  Laterality: Right;  Gamma nail       Review of patient's allergies indicates:  No Known Allergies    No current facility-administered medications on file prior to encounter.     Current Outpatient Medications on File Prior to Encounter   Medication Sig    albuterol (ACCUNEB) 0.63 mg/3 mL Nebu Take 3 mLs (0.63 mg total) by nebulization 3 (three) times daily as needed (sob, wheezing). Rescue    albuterol (PROVENTIL/VENTOLIN HFA) 90 mcg/actuation inhaler Inhale 1-2 puffs into the lungs every 4 (four) hours as needed for Wheezing. Rescue    ALPRAZolam (XANAX) 0.5 MG tablet Take 2 tablets (1 mg total) by mouth 3 (three) times daily as needed for Anxiety.    CEFDINIR ORAL 300 mg.    cyclobenzaprine (FLEXERIL) 10 MG tablet Take 1 tablet (10 mg total) by mouth 3 (three) times daily as needed for Muscle spasms.    docusate sodium (COLACE) 100 MG capsule Take 100 mg by mouth 2 (two) times daily.    EScitalopram oxalate (LEXAPRO) 20 MG tablet Take 1 tablet (20 mg total) by mouth once daily.    fluticasone furoate-vilanteroL (BREO ELLIPTA) 100-25 mcg/dose diskus inhaler Inhale 1 puff into the lungs once daily. Controller    HYDROcodone-acetaminophen (NORCO) 5-325 mg per tablet Take 2 tablets by mouth every 6 (six) hours as needed for Pain.    levothyroxine (SYNTHROID) 125 MCG tablet Take 1 tablet (125 mcg total) by mouth before breakfast.    naloxone (NARCAN) 4 mg/actuation Spry 1 spray once.    nicotine (NICODERM CQ) 14 mg/24 hr Place 1 patch onto the skin every 24 hours.     vitamin D (VITAMIN D3) 1000 units Tab Take 25 mcg by mouth once daily.    [DISCONTINUED] metoprolol succinate (TOPROL-XL) 50 MG 24 hr tablet Take 1 tablet (50 mg total) by mouth once daily.    [DISCONTINUED] tamsulosin (FLOMAX) 0.4 mg Cap Take 1 capsule (0.4 mg total) by mouth once daily.     Family History       Problem Relation (Age of Onset)    Cancer Mother, Father, Maternal Grandfather          Tobacco Use    Smoking status: Former     Current packs/day: 0.00     Types: Cigarettes     Quit date: 10/2023     Years since quittin.2     Passive exposure: Never    Smokeless tobacco: Never   Substance and Sexual Activity    Alcohol use: Never    Drug use: Never    Sexual activity: Not Currently     Partners: Female     Review of Systems   Constitutional:  Positive for appetite change, fatigue and fever.   Gastrointestinal:  Positive for abdominal distention and nausea.   Musculoskeletal:  Positive for arthralgias and back pain.   Skin:  Positive for wound.   Neurological:  Positive for dizziness and weakness.   All other systems reviewed and are negative.    Objective:     Vital Signs (Most Recent):  Temp: 98.1 °F (36.7 °C) (24 1105)  Pulse: 88 (24 1530)  Resp: (!) 26 (24 1330)  BP: (!) 125/59 (24 1530)  SpO2: 97 % (24 1530) Vital Signs (24h Range):  Temp:  [97.1 °F (36.2 °C)-98.1 °F (36.7 °C)] 98.1 °F (36.7 °C)  Pulse:  [71-92] 88  Resp:  [14-26] 26  SpO2:  [89 %-98 %] 97 %  BP: ()/(37-71) 125/59     Weight: 72.2 kg (159 lb 3.2 oz)  Body mass index is 23.51 kg/m².     Physical Exam  Vitals and nursing note reviewed.   Constitutional:       General: He is not in acute distress.     Appearance: He is ill-appearing. He is not diaphoretic.   HENT:      Head: Normocephalic and atraumatic.      Right Ear: Hearing and external ear normal.      Left Ear: Hearing and external ear normal.      Nose: Nose normal. No mucosal edema or rhinorrhea.      Mouth/Throat:      Pharynx: Uvula  midline.   Eyes:      General:         Right eye: No discharge.         Left eye: No discharge.      Conjunctiva/sclera: Conjunctivae normal.      Right eye: No chemosis.     Left eye: No chemosis.     Pupils: Pupils are equal, round, and reactive to light.   Neck:      Thyroid: No thyroid mass or thyromegaly.      Trachea: Trachea normal.   Cardiovascular:      Rate and Rhythm: Regular rhythm. Tachycardia present.      Pulses:           Dorsalis pedis pulses are 2+ on the right side and 2+ on the left side.      Heart sounds: Normal heart sounds. No murmur heard.  Pulmonary:      Effort: Pulmonary effort is normal. No respiratory distress.      Breath sounds: Examination of the right-lower field reveals decreased breath sounds. Examination of the left-lower field reveals decreased breath sounds. Decreased breath sounds present. No wheezing.   Abdominal:      General: Bowel sounds are decreased. There is no distension.      Palpations: Abdomen is soft.      Tenderness: There is generalized abdominal tenderness.   Musculoskeletal:         General: Normal range of motion.      Cervical back: Normal range of motion and neck supple.   Lymphadenopathy:      Cervical: No cervical adenopathy.      Upper Body:      Right upper body: No supraclavicular adenopathy.      Left upper body: No supraclavicular adenopathy.   Skin:     General: Skin is warm and dry.      Capillary Refill: Capillary refill takes less than 2 seconds.      Findings: No rash.          Neurological:      Mental Status: He is oriented to person, place, and time. He is lethargic.   Psychiatric:         Behavior: Behavior is slowed.         Cognition and Memory: He exhibits impaired recent memory.              CRANIAL NERVES     CN III, IV, VI   Pupils are equal, round, and reactive to light.       Significant Labs: All pertinent labs within the past 24 hours have been reviewed.  CBC:   Recent Labs   Lab 01/12/24  0926 01/12/24  1129   WBC 13.24* 13.89*    HGB 9.8* 10.6*   HCT 30.9* 33.3*    161     CMP:   Recent Labs   Lab 01/12/24  0926 01/12/24  1129   * 136   K 4.4 4.3    101   CO2 22* 21*   * 133*   BUN 34* 35*   CREATININE 2.1* 2.2*   CALCIUM 9.4 9.7   PROT 7.3 7.7   ALBUMIN 3.0* 3.1*   BILITOT 0.9 0.9   ALKPHOS 111 117   AST 7* 8*   ALT 10 11   ANIONGAP 11 14     Lactic Acid:   Recent Labs   Lab 01/12/24  1129   LACTATE 1.5     Troponin:   Recent Labs   Lab 01/12/24  1129   TROPONINI <0.006       Significant Imaging: I have reviewed all pertinent imaging results/findings within the past 24 hours.

## 2024-01-12 NOTE — ASSESSMENT & PLAN NOTE
Per spouse, urine collection bags are overfilling during the night and urine is leaking from the tube sites. Reported limited PO intake over the past 1-2 days    --S/P IV Fluids 30cc/hr  --Antiemetics PRN  --Encourage PO intake

## 2024-01-12 NOTE — PATIENT INSTRUCTIONS
We discussed your recent diagnosis of recurrent bladder cancer. Based on PET-CT it appear the cancer has likely spread to nearby lymph nodes. We discussed surgical removal of the bladder is not viable given extreme likelihood of surgical complications with low likelihood of cure. Additional radiation would not recommended. Medical therapy cannot be expected to cure the cancer, in some cases it can prolong life. Tradititional chemotherapy would likely have more harm than good. Immunotherapy treatment like pembrolizumab would be our best option, however I would want him a better place from his infections and weakness.    We discussed that even with immunotherapy, prognosis remains guarded with expectancy likely measured on the order of months. We discussed potential side effects of immunotherapy as well.    Will plan to allow improved function over the next two weeks. Follow up with ID and stop tamsulosin and metoprolol.     Follow up in 2 weeks and I'll tentatively plan to start pembrolizumab in about 3 weeks.

## 2024-01-12 NOTE — FIRST PROVIDER EVALUATION
Medical screening examination initiated.  I have conducted a focused provider triage encounter, findings are as follows:    Brief history of present illness:  68-year-old male with complaint of weakness over the past day.  Patient had blood pressure taken at PCP this morning blood pressure was really low.  Denies fever chills any other complaints.    There were no vitals filed for this visit.    Pertinent physical exam:  alert, awake, oriented

## 2024-01-12 NOTE — PROGRESS NOTES
Infectious Disease Clinic Note    Patient Name: Geovani Currie  YOB: 1955    PRESENTING HISTORY       History of Present Illness:  Mr. Geovani Currie is a 68 y.o. male w/ significant PMHx of bladder and lung cancer who has been seen by ID outpatient as hospital follow up after being brought to [facility] on 10/20 with altered mental status. Patient was [place] from the airport with worsening lethargy and shallow respiration. He was noted to be saturating around 90% on room air and was placed on 2 L. Patient just moved from [place] to [place] to be closer to family for cancer treatments. Family is concerned that over the past few days he had become increasingly weak. In the ER, arrival vitals were significant for hypoxia, patient was placed on 5 L. CBC showed normocytic anemia. CMP was significant for low albumin, elevated sugars. TSH , lactic acid, troponin within normal range. U/A was concerning for pyuria >100. Urine culture grew Enterobacter cloacae >100,000 cfu/mL. CT head with moderate area of hypoattenuation in the parietal cortex suggesting infarct although radiologist favors chronic process. Small probable remote areas of lacunar infarct involving the left caudate, bilateral thalamus and right lateral basal ganglia. CTA head and neck with no evidence of acute intracranial abnormality. No high-grade stenosis or major vessel occlusion. Spiculated lesion noticed in the right posterior lung apex. Chest x-ray showed no acute or significant focal chest abnormality. MRI brain with no acute stroke. Infectious Diseases consulted. Recommend stopping cefepime patient had been on due to risk for neurotoxicity. Switched to ertapenem 1 g daily for total of 14 days (stop date 11/4). Per daughter, patient had received 10 days of oral antibiotics while in [place] and nephrostomy tube was infected. They are waiting for referral to heme/onc for 2 months of chemo. Still establishing care since moving  "to [place]. Will be in rehab for 2 weeks. Seeing urologist 11/2. Stent in left kidney 3 months ago for stones and kidney failed. 9/18 both kidneys failed so stent placed and nephrostomy tube on right. Only dribbles from penis. All urine from nephrostomy tube. High grade bladder cancer, lung cancer, and lymph node involvement. Patient admitted 11/10/23 for accidentally removing nephrostomy tube at home. Per IR note, indwelling right dislodged PCNU was removed and replaced. Prior to discharge patient spiked fever to 101.1 F. Very mild leukocytosis noted. UA with 76 WBC. Urine culture in process. ID consulted for UTI. Discharged on 14 day course of linezolid for VRE in urine. Then admitted 12/21. Urine culture no growth. Given 14 day course of linezolid for prior VRE isolate.     1/12/24: Today wife reports new lump along right nephrostomy site, inability to stand, chills, and lack of energy. BP low in 70s systolic. Holding metoprolol. Frustrated trying to figure out how keep nephrostomy tubes in place. They overflow overnight and then urine comes out of patient's back. No fever at home. Wife convinced patient has an infection. Hypotensive in 70s systolic after two checks in office today. Notably patient took dose of metoprolol and Norco prior to appointment. Has been on Norco for years per daughter. O2 sat 89%. Recommend going to ER for hypotensive workup. Would benefit from chest and renal imaging as well as viral URI workup. Can begin empiric UTI coverage and attempt sterile urine collection for culture. Also needs urology consultation. Family voiced understanding.    Heme/onc note reviewed from 1/12: "Has appointment with Dr. Hartman in ID later today. Noted to have recurrent soft tissue abscess about the right PCN site. Associated increased somlelence over the last day. No fevers. Typically has a fair appetite but low po intake last few days. No nausea. Reports chronic pain in the right leg and pain he refers to as " "sciatica. Uses [place] up to 3-4 per day. Spends most of his day in couch. Typically can transfer to wheelchair or ambulate short distances with walker. ECOG 3.   Currrently he has bilaterl nephrostomy tubes in place. Left sided tube placed 11/17/23 and right sided tube exchanged 12/28/23. He has history of emphysema on Breo Ellipta and albuterol. He has a known stage I SCC of the RUL with plan for SBRT. Imaging also concerning for prior CVA. He has stage IV CKD. He takes escitalopram and alprazolam for anxiety. He takes levothyroxine for hypothyroidism. He is on tamsulosin for urinary outlet obsruction. He takes Norco 5-325 and flexeril for pain.  No known CAD. No DM2."    Urine Culture, Routine  Abnormal   ENTEROCOCCUS FAECIUM VRE  >100,000 cfu/ml  No other significant isolate    Resulting Agency [facility]        Susceptibility     Enterococcus faecium VRE     CULTURE, URINE     Ampicillin >8 mcg/mL Resistant     Daptomycin 4 mcg/mL Sensitive     Linezolid 2 mcg/mL Sensitive     Nitrofurantoin <=32 mcg/mL Sensitive     Vancomycin >16 mcg/mL Resistant               Linear View         Narrative  Performed by: [facility]  Specimen Source->Urine      Specimen Collected: 11/11/23 04:31 CST Last Resulted: 11/13/23 11:02 CST             Review of Systems:  Constitutional: no fever or chills; generalized weakness, fatigue  Eyes: no visual changes  ENT: no nasal congestion or sore throat  Respiratory: no cough or shortness of breath  Cardiovascular: no chest pain  Gastrointestinal: no nausea or vomiting, no abdominal pain, no constipation, no diarrhea  Genitourinary: "lump" along right nephrostomy site, tender at home, urine coming out of back when bag overflows  Musculoskeletal: no arthralgias or myalgias  Skin: no rash  Neurological: no headaches, numbness, or paresthesias    The following portions of the patient's history were reviewed and updated as appropriate: allergies, current medications, past family history, past medical " history, past social history, past surgical history, and problem list.    PAST HISTORY:     There is no immunization history for the selected administration types on file for this patient.    Past Medical History:   Diagnosis Date    COPD (chronic obstructive pulmonary disease)     Hypertension        Past Surgical History:   Procedure Laterality Date    APPENDECTOMY      CATARACT EXTRACTION      NEPHROSTOMY      OPEN REDUCTION AND INTERNAL FIXATION (ORIF) OF INTERTROCHANTERIC FRACTURE OF FEMUR Right 2023    Procedure: ORIF, FRACTURE, FEMUR, INTERTROCHANTERIC;  Surgeon: Tanisha Guidry DO;  Location: [facility] OR;  Service: Orthopedics;  Laterality: Right;  Gamma nail       Family History   Problem Relation Age of Onset    Cancer Mother         NOS    Cancer Father         NOS    Cancer Maternal Grandfather         NOS    Bladder Cancer Neg Hx        Social History     Socioeconomic History    Marital status:    Tobacco Use    Smoking status: Former     Current packs/day: 0.00     Types: Cigarettes     Quit date: 10/2023     Years since quittin.2     Passive exposure: Never    Smokeless tobacco: Never   Substance and Sexual Activity    Alcohol use: Never    Drug use: Never    Sexual activity: Not Currently     Partners: Female   Social History Narrative    Lives in [place] with daughter, Sera. Accompanied by Sera and wife Kailey. Long term smoking history. No longer smoking; uses a vape pen.     Social Determinants of Health     Financial Resource Strain: High Risk (11/10/2023)    Overall Financial Resource Strain (CARDIA)     Difficulty of Paying Living Expenses: Very hard   Food Insecurity: Food Insecurity Present (11/10/2023)    Hunger Vital Sign     Worried About Running Out of Food in the Last Year: Often true     Ran Out of Food in the Last Year: Often true   Transportation Needs: Unmet Transportation Needs (11/10/2023)    PRAPARE - Transportation     Lack of Transportation (Medical): Yes      Lack of Transportation (Non-Medical): Yes   Physical Activity: Inactive (11/10/2023)    Exercise Vital Sign     Days of Exercise per Week: 0 days     Minutes of Exercise per Session: 0 min   Stress: Stress Concern Present (11/10/2023)    [employer] - Occupational Stress Questionnaire     Feeling of Stress : Rather much   Social Connections: Unknown (11/10/2023)    Social Connection and Isolation Panel [NHANES]     Frequency of Communication with Friends and Family: More than three times a week     Frequency of Social Gatherings with Friends and Family: Never     Attends Hoahaoism Services: Patient declined     Active Member of Clubs or Organizations: No     Attends Club or Organization Meetings: Never     Marital Status:    Housing Stability: High Risk (11/10/2023)    Housing Stability Vital Sign     Unable to Pay for Housing in the Last Year: Yes     Number of Places Lived in the Last Year: 3     Unstable Housing in the Last Year: No       MEDICATIONS & ALLERGIES:     Current Outpatient Medications on File Prior to Visit   Medication Sig    albuterol (ACCUNEB) 0.63 mg/3 mL Nebu Take 3 mLs (0.63 mg total) by nebulization 3 (three) times daily as needed (sob, wheezing). Rescue    albuterol (PROVENTIL/VENTOLIN HFA) 90 mcg/actuation inhaler Inhale 1-2 puffs into the lungs every 4 (four) hours as needed for Wheezing. Rescue    ALPRAZolam (XANAX) 0.5 MG tablet Take 2 tablets (1 mg total) by mouth 3 (three) times daily as needed for Anxiety.    CEFDINIR ORAL 300 mg.    cyclobenzaprine (FLEXERIL) 10 MG tablet Take 1 tablet (10 mg total) by mouth 3 (three) times daily as needed for Muscle spasms.    docusate sodium (COLACE) 100 MG capsule Take 100 mg by mouth 2 (two) times daily.    EScitalopram oxalate (LEXAPRO) 20 MG tablet Take 1 tablet (20 mg total) by mouth once daily.    fluticasone furoate-vilanteroL (BREO ELLIPTA) 100-25 mcg/dose diskus inhaler Inhale 1 puff into the lungs once  "daily. Controller    HYDROcodone-acetaminophen (NORCO) 5-325 mg per tablet Take 2 tablets by mouth every 6 (six) hours as needed for Pain.    levothyroxine (SYNTHROID) 125 MCG tablet Take 1 tablet (125 mcg total) by mouth before breakfast.    naloxone (NARCAN) 4 mg/actuation Spry 1 spray once.    nicotine (NICODERM CQ) 14 mg/24 hr Place 1 patch onto the skin every 24 hours.    vitamin D (VITAMIN D3) 1000 units Tab Take 25 mcg by mouth once daily.    [DISCONTINUED] metoprolol succinate (TOPROL-XL) 50 MG 24 hr tablet Take 1 tablet (50 mg total) by mouth once daily.    [DISCONTINUED] tamsulosin (FLOMAX) 0.4 mg Cap Take 1 capsule (0.4 mg total) by mouth once daily.     No current facility-administered medications on file prior to visit.       Review of patient's allergies indicates:  No Known Allergies    OBJECTIVE:   Vital Signs:  Vitals:    01/12/24 0956 01/12/24 1033   BP: (!) 72/47 (!) 76/50   Pulse: 76 83   SpO2: (!) 91% (!) 89%   Weight: 70.8 kg (156 lb 1.4 oz)    Height: 5' 9" (1.753 m)        Recent Results (from the past 24 hour(s))   CBC Oncology    Collection Time: 01/12/24  9:26 AM   Result Value Ref Range    WBC 13.24 (H) 3.90 - 12.70 K/uL    RBC 3.52 (L) 4.60 - 6.20 M/uL    Hemoglobin 9.8 (L) 14.0 - 18.0 g/dL    Hematocrit 30.9 (L) 40.0 - 54.0 %    MCV 88 82 - 98 fL    MCH 27.8 27.0 - 31.0 pg    MCHC 31.7 (L) 32.0 - 36.0 g/dL    RDW 17.8 (H) 11.5 - 14.5 %    Platelets 163 150 - 450 K/uL    MPV 10.1 9.2 - 12.9 fL    Gran # (ANC) 11.3 (H) 1.8 - 7.7 K/uL    Immature Grans (Abs) 0.05 (H) 0.00 - 0.04 K/uL   Comprehensive Metabolic Panel    Collection Time: 01/12/24  9:26 AM   Result Value Ref Range    Sodium 135 (L) 136 - 145 mmol/L    Potassium 4.4 3.5 - 5.1 mmol/L    Chloride 102 95 - 110 mmol/L    CO2 22 (L) 23 - 29 mmol/L    Glucose 132 (H) 70 - 110 mg/dL    BUN 34 (H) 8 - 23 mg/dL    Creatinine 2.1 (H) 0.5 - 1.4 mg/dL    Calcium 9.4 8.7 - 10.5 mg/dL    Total Protein 7.3 6.0 - 8.4 g/dL    Albumin 3.0 (L) " "3.5 - 5.2 g/dL    Total Bilirubin 0.9 0.1 - 1.0 mg/dL    Alkaline Phosphatase 111 55 - 135 U/L    AST 7 (L) 10 - 40 U/L    ALT 10 10 - 44 U/L    eGFR 33 (A) >60 mL/min/1.73 m^2    Anion Gap 11 8 - 16 mmol/L         Physical Exam:   General:  Frail, lethargic, not cooperating in exam   HEENT:  Normocephalic, atraumatic  CVS:  RRR, S1 and S2 normal, no murmurs, rubs, gallops  Resp:  Lungs clear to auscultation, no wheezes, rales, rhonchi  GI:  Abdomen soft, non-tender, non-distended, normoactive bowel sounds, no masses  MSK:  No muscle atrophy, peripheral edema, full range of motion  : b/l nephrostomy sites appear clean and intact; no drainage; edema not appreciated; not tender to palpation   Skin:  No rashes, ulcers, erythema  Psych:  Alert and oriented to person, place, and time    ASSESSMENT:     UTI (urinary tract infection)  --Patient completed 14 day course of linezolid last month   --Has b/l PCN intact  --Needs close urology follow up; recommend ER consultation to evaluate nephrostomy tubes   --Can give empiric IV daptomycin 8 mg/kg daily and ceftriaxone 2 g daily in ER   --If discharged home can provide PO linezolid 600 mg BID and cefdinir 300 mg BID for 14 days   --Possible that cancer treatment will help prevent future infections; heme/onc waiting for infection to clear   --Follow up with ID as needed     Hypotension  --70s systolic in office today. Advised patient to go to ER for IVF and BP management.   --Mild leukocytosis on CBC today   --Would also rule out infectious causes including pneumonia with CXR/CT chest   --Respiratory infection panel   --CT a/p to evaluate for hydronephrosis/abscess  --Consider US soft tissue of right nephrostomy site to evaluate "lump" noted by family   --Empiric antibiotics as listed above   --Urology consultation to evaluate nephrostomy tubes and address family concerns      Hx of cancer of lung  Follow up with heme/onc.      Hx of bladder cancer  Follow up with rad onc " and urology.       PLAN:     Diagnoses and all orders for this visit:    Acute cystitis without hematuria    Hx of cancer of lung    Hx of bladder cancer    Hypotension, unspecified hypotension type        The total time for evaluation and management services performed on 1/12/24 was greater than 45 minutes.        Crhist Hartman, DO   Infectious Diseases

## 2024-01-12 NOTE — ED PROVIDER NOTES
SCRIBE #1 NOTE: I, Renate Arrieta, am scribing for, and in the presence of, Sunny Mcghee Jr., MD. I have scribed the entire note.       History     Chief Complaint   Patient presents with    Weakness     Pt went to dr goldberg today and had low BP readings today pt reports weakness pt denies n/v/d      Review of patient's allergies indicates:  No Known Allergies      History of Present Illness     HPI    1/12/2024, 11:24 AM  History obtained from family member      History of Present Illness: Geovani Currie is a 68 y.o. male patient with a PMHx of bladder and lung cancer who presents to the Emergency Department for evaluation of weakness. Family member reports pt has hx of stage 3 CKD and invasive bladder cancer that is unsuitable for chemo. Pt is a poor historian. Pt had low blood pressure at doctor's appointment today. Patient denies any fever, chills, N/V/D, and all other sxs at this time. No prior tx reported. No further complaints or concerns at this time.       Arrival mode: Personal vehicle    PCP: Fazian Antoine MD        Past Medical History:  Past Medical History:   Diagnosis Date    COPD (chronic obstructive pulmonary disease)     Hypertension        Past Surgical History:  Past Surgical History:   Procedure Laterality Date    APPENDECTOMY      CATARACT EXTRACTION      NEPHROSTOMY      OPEN REDUCTION AND INTERNAL FIXATION (ORIF) OF INTERTROCHANTERIC FRACTURE OF FEMUR Right 11/21/2023    Procedure: ORIF, FRACTURE, FEMUR, INTERTROCHANTERIC;  Surgeon: Tanisha Guidry DO;  Location: [facility];  Service: Orthopedics;  Laterality: Right;  Gamma nail         Family History:  Family History   Problem Relation Age of Onset    Cancer Mother         NOS    Cancer Father         NOS    Cancer Maternal Grandfather         NOS    Bladder Cancer Neg Hx        Social History:  Social History     Tobacco Use    Smoking status: Former     Current packs/day: 0.00     Types: Cigarettes     Quit date: 10/2023      Years since quittin.2     Passive exposure: Never    Smokeless tobacco: Never   Substance and Sexual Activity    Alcohol use: Never    Drug use: Never    Sexual activity: Not Currently     Partners: Female        Review of Systems     Review of Systems   Reason unable to perform ROS: secondary to condition.        Physical Exam     Initial Vitals [24 1105]   BP Pulse Resp Temp SpO2   (S) (!) 74/37 76 16 98.1 °F (36.7 °C) 97 %      MAP       --          Physical Exam  Nursing Notes and Vital Signs Reviewed.  Constitutional: Patient is in no acute distress. Well-developed and well-nourished. Elderly and ill-appearing.  Head: Atraumatic. Normocephalic.  Eyes:  EOM intact.  No scleral icterus.  ENT: Mucous membranes are moist.  Nares clear   Neck:  Full ROM. No JVD.  Cardiovascular: Regular rate. Regular rhythm No murmurs, rubs, or gallops. Distal pulses are 2+ and symmetric  Pulmonary/Chest: No respiratory distress. Clear to auscultation bilaterally. No wheezing or rales.  Equal chest wall rise bilaterally  Abdominal: Soft and non-distended.  There is no tenderness.  No rebound, guarding, or rigidity. Good bowel sounds.  Genitourinary: No CVA tenderness.  No suprapubic tenderness  Musculoskeletal: Moves all extremities. No obvious deformities.  5 x 5 strength in all extremities   Skin: Warm and dry.  Neurological:  Alert, awake, and appropriate.  Normal speech.  No acute focal neurological deficits are appreciated.  Two through 12 intact bilaterally.  Psychiatric: Normal affect. Good eye contact. Appropriate in content.        ED Course   Critical Care    Date/Time: 2024 11:33 AM    Performed by: Sunny Mcghee Jr., MD  Authorized by: Sunny Mcghee Jr., MD  Direct patient critical care time: 18 minutes  Additional history critical care time: 7 minutes  Ordering / reviewing critical care time: 6 minutes  Documentation critical care time: 5 minutes  Consulting other physicians critical care time: 5  "minutes  Consult with family critical care time: 4 minutes  Total critical care time (exclusive of procedural time) : 45 minutes  Critical care time was exclusive of teaching time and separately billable procedures and treating other patients.  Critical care was necessary to treat or prevent imminent or life-threatening deterioration of the following conditions: circulatory failure.  Critical care was time spent personally by me on the following activities: blood draw for specimens, development of treatment plan with patient or surrogate, interpretation of cardiac output measurements, examination of patient, evaluation of patient's response to treatment, obtaining history from patient or surrogate, ordering and performing treatments and interventions, ordering and review of laboratory studies, ordering and review of radiographic studies, pulse oximetry, re-evaluation of patient's condition and review of old charts.      12:44 PM: 30mL/kg IVF have been administered within 3 hours of identification of SIRS criteria. Vital signs were reviewed and a focal sepsis perfusion assessment was performed.  Skin is warm to the touch and non mottled.  Cardiopulmonary exam unchanged.  Antibiotics on board    ED Vital Signs:  Vitals:    01/12/24 1105 01/12/24 1115 01/12/24 1127 01/12/24 1300   BP: (S) (!) 74/37 (!) 105/52 (!) 98/49 (!) 115/57   Pulse: 76 75 72 89   Resp: 16 16 15 20   Temp: 98.1 °F (36.7 °C)      TempSrc: Oral      SpO2: 97% 98% 98% 98%   Weight:   72.2 kg (159 lb 3.2 oz)    Height: 5' 9" (1.753 m)       01/12/24 1304   BP:    Pulse: 87   Resp:    Temp:    TempSrc:    SpO2:    Weight:    Height:        Abnormal Lab Results:  Labs Reviewed   CBC W/ AUTO DIFFERENTIAL - Abnormal; Notable for the following components:       Result Value    WBC 13.89 (*)     RBC 3.76 (*)     Hemoglobin 10.6 (*)     Hematocrit 33.3 (*)     MCHC 31.8 (*)     RDW 17.8 (*)     Gran # (ANC) 11.6 (*)     Immature Grans (Abs) 0.05 (*)     Gran " % 83.7 (*)     Lymph % 8.1 (*)     All other components within normal limits   COMPREHENSIVE METABOLIC PANEL - Abnormal; Notable for the following components:    CO2 21 (*)     Glucose 133 (*)     BUN 35 (*)     Creatinine 2.2 (*)     Albumin 3.1 (*)     AST 8 (*)     eGFR 32 (*)     All other components within normal limits   URINALYSIS, REFLEX TO URINE CULTURE - Abnormal; Notable for the following components:    Appearance, UA Hazy (*)     Protein, UA 1+ (*)     Glucose, UA Trace (*)     Occult Blood UA 1+ (*)     Nitrite, UA Positive (*)     Leukocytes, UA 3+ (*)     All other components within normal limits    Narrative:     Specimen Source->Urine   PROCALCITONIN - Abnormal; Notable for the following components:    Procalcitonin 0.47 (*)     All other components within normal limits   URINALYSIS MICROSCOPIC - Abnormal; Notable for the following components:    RBC, UA 19 (*)     WBC, UA 57 (*)     WBC Clumps, UA Many (*)     Bacteria Few (*)     All other components within normal limits    Narrative:     Specimen Source->Urine   POCT GLUCOSE - Abnormal; Notable for the following components:    POCT Glucose 113 (*)     All other components within normal limits   CULTURE, BLOOD   CULTURE, BLOOD   CULTURE, URINE   LACTIC ACID, PLASMA   TROPONIN I   LACTIC ACID, PLASMA   POCT GLUCOSE MONITORING CONTINUOUS        All Lab Results:  Results for orders placed or performed during the hospital encounter of 01/12/24   CBC auto differential   Result Value Ref Range    WBC 13.89 (H) 3.90 - 12.70 K/uL    RBC 3.76 (L) 4.60 - 6.20 M/uL    Hemoglobin 10.6 (L) 14.0 - 18.0 g/dL    Hematocrit 33.3 (L) 40.0 - 54.0 %    MCV 89 82 - 98 fL    MCH 28.2 27.0 - 31.0 pg    MCHC 31.8 (L) 32.0 - 36.0 g/dL    RDW 17.8 (H) 11.5 - 14.5 %    Platelets 161 150 - 450 K/uL    MPV 9.8 9.2 - 12.9 fL    Immature Granulocytes 0.4 0.0 - 0.5 %    Gran # (ANC) 11.6 (H) 1.8 - 7.7 K/uL    Immature Grans (Abs) 0.05 (H) 0.00 - 0.04 K/uL    Lymph # 1.1 1.0 -  4.8 K/uL    Mono # 1.0 0.3 - 1.0 K/uL    Eos # 0.1 0.0 - 0.5 K/uL    Baso # 0.06 0.00 - 0.20 K/uL    nRBC 0 0 /100 WBC    Gran % 83.7 (H) 38.0 - 73.0 %    Lymph % 8.1 (L) 18.0 - 48.0 %    Mono % 7.0 4.0 - 15.0 %    Eosinophil % 0.4 0.0 - 8.0 %    Basophil % 0.4 0.0 - 1.9 %    Differential Method Automated    Comprehensive metabolic panel   Result Value Ref Range    Sodium 136 136 - 145 mmol/L    Potassium 4.3 3.5 - 5.1 mmol/L    Chloride 101 95 - 110 mmol/L    CO2 21 (L) 23 - 29 mmol/L    Glucose 133 (H) 70 - 110 mg/dL    BUN 35 (H) 8 - 23 mg/dL    Creatinine 2.2 (H) 0.5 - 1.4 mg/dL    Calcium 9.7 8.7 - 10.5 mg/dL    Total Protein 7.7 6.0 - 8.4 g/dL    Albumin 3.1 (L) 3.5 - 5.2 g/dL    Total Bilirubin 0.9 0.1 - 1.0 mg/dL    Alkaline Phosphatase 117 55 - 135 U/L    AST 8 (L) 10 - 40 U/L    ALT 11 10 - 44 U/L    eGFR 32 (A) >60 mL/min/1.73 m^2    Anion Gap 14 8 - 16 mmol/L   Lactic acid, plasma   Result Value Ref Range    Lactate (Lactic Acid) 1.5 0.5 - 2.2 mmol/L   Urinalysis, Reflex to Urine Culture (left urostomy)    Specimen: Urine   Result Value Ref Range    Specimen UA Urine, Clean Catch     Color, UA Yellow Yellow, Straw, Andra    Appearance, UA Hazy (A) Clear    pH, UA 8.0 5.0 - 8.0    Specific Gravity, UA 1.010 1.005 - 1.030    Protein, UA 1+ (A) Negative    Glucose, UA Trace (A) Negative    Ketones, UA Negative Negative    Bilirubin (UA) Negative Negative    Occult Blood UA 1+ (A) Negative    Nitrite, UA Positive (A) Negative    Urobilinogen, UA Negative <2.0 EU/dL    Leukocytes, UA 3+ (A) Negative   Troponin I   Result Value Ref Range    Troponin I <0.006 0.000 - 0.026 ng/mL   Procalcitonin   Result Value Ref Range    Procalcitonin 0.47 (H) <0.25 ng/mL   Urinalysis Microscopic   Result Value Ref Range    RBC, UA 19 (H) 0 - 4 /hpf    WBC, UA 57 (H) 0 - 5 /hpf    WBC Clumps, UA Many (A) None-Rare    Bacteria Few (A) None-Occ /hpf    Hyaline Casts, UA 0 0-1/lpf /lpf    Microscopic Comment SEE COMMENT    POCT  glucose   Result Value Ref Range    POCT Glucose 113 (H) 70 - 110 mg/dL         Imaging Results:  Imaging Results              CT Abdomen Pelvis  Without Contrast (Final result)  Result time 01/12/24 14:29:34      Final result by HOLLY Prabhakar Sr., MD (01/12/24 14:29:34)                   Impression:      1. Bilateral nephrostomy tubes remain in place.  There has been interval development of a mild amount of right hydronephrosis.  2. There is persistent thickening of the wall of the urinary bladder. The wall thickness measures 14 mm on the current examination.  On the prior examination it measured 14 mm. There is persistent calcification on the right side of the urinary bladder.  This is characteristic of chronic cystitis.  3. There are healing fractures in the right 8th, 9th, 10th, and 11th ribs.  4. There is an intramedullary nail across a healing fracture in the intertrochanteric portion of the right femur.  All CT scans at this facility use dose modulation, iterative reconstruction, and/or weight base dosing when appropriate to reduce radiation dose when appropriate to reduce radiation dose to as low as reasonably achievable.      Electronically signed by: Ildefonso Prabhakar MD  Date:    01/12/2024  Time:    14:29               Narrative:    EXAMINATION:  CT ABDOMEN PELVIS WITHOUT CONTRAST    CLINICAL HISTORY:  Nephrostomy;    TECHNIQUE:  Standard abdomen and pelvis CT protocol without oral or IV contrast was performed.    COMPARISON:  12/24/2023 and 12/09/2022    FINDINGS:  Finding: The size of the heart is within normal limits.  There is a mild amount of atherosclerosis in the left anterior descending, left circumflex, and right main coronary arteries.  There are mild dependent atelectatic changes in both lungs.  There is no pneumothorax or pleural effusion.    The liver, gallbladder, pancreas, spleen, and adrenals are normal in appearance.  Bilateral nephrostomy tubes remain in place.  There has been  interval development of a mild amount of right hydronephrosis.  There is mild bilateral perinephric stranding.  The ureters are normal in appearance.  There is persistent thickening of the wall of the urinary bladder.  The wall thickness measures 14 mm on the current examination.  On the prior examination it measured 14 mm.  There is persistent calcification on the right side of the urinary bladder.  The prostate is not well seen.  The appendix is absent.  There is a prominent amount of fecal material within the colon.  There is no free fluid within the abdomen or pelvis. There is no pneumoperitoneum.  There is an intramedullary nail across a healing fracture in the intertrochanteric portion of the right femur.  There are healing fractures in the right 8th, 9th, 10th, and 11th ribs.                                       CT Head Without Contrast (Final result)  Result time 01/12/24 14:19:40      Final result by Oleg Sr MD (01/12/24 14:19:40)                   Impression:      No acute intracranial abnormalities identified.      Electronically signed by: Oleg Sr MD  Date:    01/12/2024  Time:    14:19               Narrative:    EXAMINATION:  CT HEAD WITHOUT CONTRAST    CLINICAL HISTORY:  Mental status change, unknown cause;    TECHNIQUE:  Low dose axial CT images obtained throughout the head without intravenous contrast. Sagittal and coronal reconstructions were performed.  All CT scans at this facility use dose modulation, iterative reconstruction and/or weight based dosing when appropriate to reduce radiation dose to as low as reasonably achievable.    COMPARISON:  12/21/2023    FINDINGS:  Intracranial compartment:    The brain parenchyma demonstrates areas of decreased attenuation with mild to moderate periventricular white matter consistent with chronic microvascular ischemic changes.  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  Remote left-sided lacunar  infarct..  No parenchymal mass, hemorrhage, edema or major vascular distribution infarct.  Vascular calcifications are noted.    Mild prominence of the sulci and ventricles are consistent with age-related involutional changes.    No extra-axial blood or fluid collections.    Skull/extracranial contents (limited evaluation): No fracture. Mastoid air cells and paranasal sinuses are essentially clear.                                       X-Ray Chest 1 View (Final result)  Result time 01/12/24 11:33:19      Final result by HOLLY Prabhakar Sr., MD (01/12/24 11:33:19)                   Impression:      The lungs are clear..      Electronically signed by: Ildefonso Prabhakar MD  Date:    01/12/2024  Time:    11:33               Narrative:    EXAMINATION:  XR CHEST 1 VIEW    CLINICAL HISTORY:  Weakness    COMPARISON:  12/21/2023    FINDINGS:  The size of the heart is normal. The lungs are clear. There is no pneumothorax.  The costophrenic angles are sharp.  There are old fractures on the right side of the thoracic cage.                                       The EKG was ordered, reviewed, and independently interpreted by the ED provider.  Interpretation time: 11:18  Rate: 73 BPM  Rhythm: sinus rhythm with marked sinus arrhythmia.  Interpretation: Otherwise normal ECG. No STEMI.             The Emergency Provider reviewed the vital signs and test results, which are outlined above.     ED Discussion       12:38 PM: Discussed pt's case with Tiffany Mckeon MD (Urology) who recommends culture and abx, no procedures.    1:17 PM: Lara Ribera ([facility]) agrees with current care and management of pt and accepts admission.   Admitting Service: Hospital Medicine  Admitting Physician: Dr. Ribera  Admit to: Inpatient med tele    1:18 PM: Re-evaluated pt. I have discussed test results, shared treatment plan, and the need for admission with patient and family at bedside. Pt and family express understanding at this time  and agree with all information. All questions answered. Pt and family have no further questions or concerns at this time. Pt is ready for admit.               Medical Decision Making  Differential diagnosis: Sepsis, hypotension, cardiac event, cardiac ischemia, NSTEMI, STEMI, urinary tract infection, anemia    Patient with history of metastatic bladder cancer with bilateral nephrostomy tubes secondary to bladder outlet obstruction presenting with hypotension from primary care.  Workup shows urinary tract infection.  He is responding to IV fluids.  Discussed the case with urology on-call recommends admission for IV antibiotics and fluids.  [facility] has graciously accepted the patient for admission    Amount and/or Complexity of Data Reviewed  Independent Historian: friend  External Data Reviewed: labs, radiology and notes.  Labs: ordered. Decision-making details documented in ED Course.  Radiology: ordered. Decision-making details documented in ED Course.  ECG/medicine tests: ordered and independent interpretation performed. Decision-making details documented in ED Course.  Discussion of management or test interpretation with external provider(s): 12:38 PM: Discussed pt's case with Tiffany Mckeon MD (Urology) who recommends culture and abx, no procedures.    1:17 PM: Lara Ribera ([facility]) agrees with current care and management of pt and accepts admission.   Admitting Service: [facility]  Admitting Physician: Dr. Ribera  Admit to: Inpatient med tele      Risk  OTC drugs.  Prescription drug management.  Decision regarding hospitalization.  Decision not to resuscitate or to de-escalate care because of poor prognosis.  Diagnosis or treatment significantly limited by social determinants of health.  Risk Details: We did discuss code status.  Patient is a full code currently.    Critical Care  Total time providing critical care: 45 minutes                ED Medication(s):  Medications    DAPTOmycin (CUBICIN) 580 mg in sodium chloride 0.9% SolP 50 mL IVPB (580 mg Intravenous New Bag 1/12/24 1427)   ceFEPIme (MAXIPIME) 2 g in dextrose 5 % in water (D5W) 100 mL IVPB (MB+) (has no administration in time range)   glucose chewable tablet 16 g (has no administration in time range)   glucose chewable tablet 24 g (has no administration in time range)   glucagon (human recombinant) injection 1 mg (has no administration in time range)   acetaminophen tablet 650 mg (has no administration in time range)   HYDROcodone-acetaminophen  mg per tablet 1 tablet (has no administration in time range)   morphine injection 4 mg (has no administration in time range)   ondansetron injection 4 mg (has no administration in time range)   prochlorperazine injection Soln 5 mg (has no administration in time range)   dextrose 10% bolus 125 mL 125 mL (has no administration in time range)   dextrose 10% bolus 250 mL 250 mL (has no administration in time range)   glycerin adult suppository 1 suppository (has no administration in time range)   polyethylene glycol packet 17 g (has no administration in time range)   0.9%  NaCl infusion (1,000 mLs Intravenous New Bag 1/12/24 1427)   sodium chloride 0.9% bolus 2,166 mL 2,166 mL (0 mLs Intravenous Stopped 1/12/24 1426)   ceFEPIme (MAXIPIME) 2 g in dextrose 5 % in water (D5W) 100 mL IVPB (MB+) (0 g Intravenous Stopped 1/12/24 1223)       New Prescriptions    No medications on file               Scribe Attestation:   Scribe #1: I performed the above scribed service and the documentation accurately describes the services I performed. I attest to the accuracy of the note.     Attending:   Physician Attestation Statement for Scribe #1: I, Sunny Mcghee Jr., MD, personally performed the services described in this documentation, as scribed by Renate Arrieta, in my presence, and it is both accurate and complete.           Clinical Impression       ICD-10-CM ICD-9-CM   1. Urinary tract  infection without hematuria, site unspecified  N39.0 599.0   2. Weakness  R53.1 780.79   3. Complication of nephrostomy  N99.528 997.5   4. Chronic renal failure, unspecified CKD stage  N18.9 585.9   5. Personal history of bladder cancer  Z85.51 V10.51       Disposition:   Disposition: Admitted  Condition: Sunny Jon Jr., MD  01/12/24 0335

## 2024-01-12 NOTE — ASSESSMENT & PLAN NOTE
CT Abdomen: significant stool burden    --Glycerin suppository  --Miralax BID  --Senna BID  --If no results, Fleet Enema

## 2024-01-12 NOTE — ADMISSIONCARE
AdmissionCare    Guideline: Sepsis (and Other Febrile Illness without Focal Infection) - INPT, Inpatient    Based on the indications selected for the patient, the bed status of Admit to Inpatient was determined to be MET    The following indications were selected as present at the time of evaluation of the patient:      Hemodynamic instability, as indicated by 1 or more of the following:   -     Vital sign abnormality not readily corrected by appropriate treatment, as indicated by 1 or more of the following:    -      Hypotension that persists despite appropriate treatment (eg, volume repletion, treatment of underlying cause)     -       Hypotension, as indicated by ALL of the following:      -        - Not patient baseline (eg, healthy adult with low SBP) or intentional therapeutic goal (eg, low SBP as treatment goal in heart failure)       Low blood pressure, as indicated by 1 or more of the following:       -         - SBP less than 90 mm Hg in adult or child 10 years or older        - Mean arterial pressure less than 70 mm Hg in adult or child 10 years or older    AdmissionCare documentation entered by: Antoinette Epstein    [facility] [employer], 27th edition, Copyright © 2023 [facility] [employer], [facility] All Rights Reserved.  [id number]

## 2024-01-13 LAB
ALBUMIN SERPL BCP-MCNC: 2.6 G/DL (ref 3.5–5.2)
ALP SERPL-CCNC: 106 U/L (ref 55–135)
ALT SERPL W/O P-5'-P-CCNC: 8 U/L (ref 10–44)
ANION GAP SERPL CALC-SCNC: 7 MMOL/L (ref 8–16)
AST SERPL-CCNC: 10 U/L (ref 10–40)
BASOPHILS # BLD AUTO: 0.03 K/UL (ref 0–0.2)
BASOPHILS NFR BLD: 0.4 % (ref 0–1.9)
BILIRUB SERPL-MCNC: 0.5 MG/DL (ref 0.1–1)
BUN SERPL-MCNC: 22 MG/DL (ref 8–23)
CALCIUM SERPL-MCNC: 9.7 MG/DL (ref 8.7–10.5)
CHLORIDE SERPL-SCNC: 110 MMOL/L (ref 95–110)
CO2 SERPL-SCNC: 24 MMOL/L (ref 23–29)
CREAT SERPL-MCNC: 1.5 MG/DL (ref 0.5–1.4)
DIFFERENTIAL METHOD BLD: ABNORMAL
EOSINOPHIL # BLD AUTO: 0.2 K/UL (ref 0–0.5)
EOSINOPHIL NFR BLD: 2.9 % (ref 0–8)
ERYTHROCYTE [DISTWIDTH] IN BLOOD BY AUTOMATED COUNT: 17.9 % (ref 11.5–14.5)
EST. GFR  (NO RACE VARIABLE): 50 ML/MIN/1.73 M^2
GLUCOSE SERPL-MCNC: 106 MG/DL (ref 70–110)
HCT VFR BLD AUTO: 29.5 % (ref 40–54)
HGB BLD-MCNC: 9.3 G/DL (ref 14–18)
IMM GRANULOCYTES # BLD AUTO: 0.04 K/UL (ref 0–0.04)
IMM GRANULOCYTES NFR BLD AUTO: 0.5 % (ref 0–0.5)
LYMPHOCYTES # BLD AUTO: 0.5 K/UL (ref 1–4.8)
LYMPHOCYTES NFR BLD: 5.7 % (ref 18–48)
MAGNESIUM SERPL-MCNC: 1.7 MG/DL (ref 1.6–2.6)
MCH RBC QN AUTO: 28.1 PG (ref 27–31)
MCHC RBC AUTO-ENTMCNC: 31.5 G/DL (ref 32–36)
MCV RBC AUTO: 89 FL (ref 82–98)
MONOCYTES # BLD AUTO: 0.8 K/UL (ref 0.3–1)
MONOCYTES NFR BLD: 9.1 % (ref 4–15)
NEUTROPHILS # BLD AUTO: 6.7 K/UL (ref 1.8–7.7)
NEUTROPHILS NFR BLD: 81.4 % (ref 38–73)
NRBC BLD-RTO: 0 /100 WBC
PHOSPHATE SERPL-MCNC: 3.5 MG/DL (ref 2.7–4.5)
PLATELET # BLD AUTO: 134 K/UL (ref 150–450)
PMV BLD AUTO: 9.6 FL (ref 9.2–12.9)
POTASSIUM SERPL-SCNC: 4 MMOL/L (ref 3.5–5.1)
PROT SERPL-MCNC: 6.7 G/DL (ref 6–8.4)
RBC # BLD AUTO: 3.31 M/UL (ref 4.6–6.2)
SODIUM SERPL-SCNC: 141 MMOL/L (ref 136–145)
WBC # BLD AUTO: 8.21 K/UL (ref 3.9–12.7)

## 2024-01-13 PROCEDURE — 85025 COMPLETE CBC W/AUTO DIFF WBC: CPT | Performed by: NURSE PRACTITIONER

## 2024-01-13 PROCEDURE — 36415 COLL VENOUS BLD VENIPUNCTURE: CPT | Performed by: NURSE PRACTITIONER

## 2024-01-13 PROCEDURE — 25000003 PHARM REV CODE 250: Performed by: NURSE PRACTITIONER

## 2024-01-13 PROCEDURE — 83735 ASSAY OF MAGNESIUM: CPT | Performed by: NURSE PRACTITIONER

## 2024-01-13 PROCEDURE — 63600175 PHARM REV CODE 636 W HCPCS: Performed by: NURSE PRACTITIONER

## 2024-01-13 PROCEDURE — 80053 COMPREHEN METABOLIC PANEL: CPT | Performed by: NURSE PRACTITIONER

## 2024-01-13 PROCEDURE — 11000001 HC ACUTE MED/SURG PRIVATE ROOM

## 2024-01-13 PROCEDURE — 99232 SBSQ HOSP IP/OBS MODERATE 35: CPT | Mod: ,,, | Performed by: UROLOGY

## 2024-01-13 PROCEDURE — 84100 ASSAY OF PHOSPHORUS: CPT | Performed by: NURSE PRACTITIONER

## 2024-01-13 RX ORDER — TALC
6 POWDER (GRAM) TOPICAL NIGHTLY PRN
Status: DISCONTINUED | OUTPATIENT
Start: 2024-01-13 | End: 2024-01-15 | Stop reason: HOSPADM

## 2024-01-13 RX ORDER — SYRING-NEEDL,DISP,INSUL,0.3 ML 29 G X1/2"
148 SYRINGE, EMPTY DISPOSABLE MISCELLANEOUS ONCE
Status: COMPLETED | OUTPATIENT
Start: 2024-01-13 | End: 2024-01-13

## 2024-01-13 RX ADMIN — Medication 6 MG: at 09:01

## 2024-01-13 RX ADMIN — Medication 6 MG: at 12:01

## 2024-01-13 RX ADMIN — DAPTOMYCIN 580 MG: 500 INJECTION, POWDER, LYOPHILIZED, FOR SOLUTION INTRAVENOUS at 01:01

## 2024-01-13 RX ADMIN — MAGNESIUM CITRATE 148 ML: 1.75 LIQUID ORAL at 09:01

## 2024-01-13 RX ADMIN — POLYETHYLENE GLYCOL 3350 17 G: 17 POWDER, FOR SOLUTION ORAL at 09:01

## 2024-01-13 RX ADMIN — CEFEPIME 2 G: 2 INJECTION, POWDER, FOR SOLUTION INTRAVENOUS at 12:01

## 2024-01-13 RX ADMIN — MORPHINE SULFATE 4 MG: 4 INJECTION INTRAVENOUS at 12:01

## 2024-01-13 NOTE — HOSPITAL COURSE
68 year old male, under the care of [facility] for management of Sepsis, UTI, Constipation. Patient reported falling overnight, assessed patient, abrasion to left flank inferior to nephrostomy tube, scabbed, scant blood to bed linen. nephrostomy tube in place. Denies pain to left hip pain, FROM on exam. Significant stool burden on CT. Urology evaluated, recommend to continue IV antibiotics, no acute intervention recommended. Erythematous area around Right Nephrostomy tube, improved. Pt was started on aggressive bowel regimen and was able to have multiple bowel movements prior to discharge.     Urine culture returned with pansensitive Pseudomonas, abx adjusted from Dapto + Cefepime to PO Ciprofloxacin based on culture. Cr stable at 1.4. Pt seen and examined on day of discharge and discharged home with HH in stable condition per my exam.     Will continue bowel regimen, abx as above, followup with PCP within 1 week. Followup with infectious disease and ID as prev scheduled.

## 2024-01-13 NOTE — PROGRESS NOTES
[facility] [place] Medicine  Progress Note    Patient Name: Geovani Currie  MRN: [id number]  Patient Class: IP- Inpatient   Admission Date: 1/12/2024  Length of Stay: 1 days  Attending Physician: Rosio Davila MD  Primary Care Provider: Faizan Antoine MD        Subjective:     Principal Problem:Sepsis        HPI:  68 y.o. male patient with a PMHx of bladder and lung cancer. Presented to the Emergency Department for evaluation of weakness, hypotension. Referred from Infectious Disease clinic due to BP: 70/50. Family member reports pt has hx of stage 3 CKD and invasive bladder cancer that is unsuitable for chemo. Pt is a poor historian. Patient denies any fever, chills, N/V/D, and all other sxs at this time. No prior tx reported. In the ED, vitals: 74/37, improved to 98/49. HR: 76, afebrile. Significant labs: WBC: 13.89, H/H: 10.6/33.3, Cr: 2.2, Lactic: 1.5, Procal: 0.47, TSH: 0.188, UA: Occult Blood+, Nitrite +, Leuk: 3+, Bact: Few. Blood Cultures collected, Urine Culture pending. ID recommendations reviewed. Treated with IV antibiotics, IV fluids. Urology consulted. Patient is a full code. Admitted to Hospital Medicine for management of Sepsis.     Overview/Hospital Course:  68 year old male, under the care of Hospital Medicine for management of Sepsis, UTI, Constipation. Patient reported falling overnight, assessed patient, abrasion to left flank inferior to nephrostomy tube, scabbed, scant blood to bed linen. nephrostomy tube in place. Denies pain to left hip pain, FROM on exam. Significant stool burden on CT, treated with glycerin suppository on day of admit, ordered additional laxatives this AM, 1x BM (small), active BS. Urology evaluated, recommend to continue IV antibiotics, will consider nephrostomy tube exchange pending clinical course.  Significant improvement in mental status.     Interval History: Patient reported fall overnight, abrasion to left flank, Nephrostomy tube in place. Urology  following, cont Abx for now. +Mag citrate.     Review of Systems   Constitutional:  Positive for appetite change, fatigue and fever.   Gastrointestinal:  Positive for abdominal distention and nausea.   Musculoskeletal:  Positive for arthralgias and back pain.   Skin:  Positive for wound.   Neurological:  Positive for dizziness and weakness.   All other systems reviewed and are negative.    Objective:     Vital Signs (Most Recent):  Temp: 98 °F (36.7 °C) (01/13/24 1159)  Pulse: 93 (01/13/24 1159)  Resp: 18 (01/13/24 1159)  BP: (!) 140/65 (01/13/24 1159)  SpO2: 97 % (01/13/24 1159) Vital Signs (24h Range):  Temp:  [97.7 °F (36.5 °C)-98.2 °F (36.8 °C)] 98 °F (36.7 °C)  Pulse:  [] 93  Resp:  [14-26] 18  SpO2:  [96 %-98 %] 97 %  BP: ()/(48-71) 140/65     Weight: 69.3 kg (152 lb 12.5 oz)  Body mass index is 22.56 kg/m².    Intake/Output Summary (Last 24 hours) at 1/13/2024 1200  Last data filed at 1/13/2024 1025  Gross per 24 hour   Intake 2935.61 ml   Output 1850 ml   Net 1085.61 ml         Physical Exam  Vitals and nursing note reviewed.   Constitutional:       General: He is not in acute distress.     Appearance: He is ill-appearing. He is not diaphoretic.   HENT:      Head: Normocephalic and atraumatic.      Right Ear: Hearing and external ear normal.      Left Ear: Hearing and external ear normal.      Nose: Nose normal. No mucosal edema or rhinorrhea.      Mouth/Throat:      Pharynx: Uvula midline.   Eyes:      General:         Right eye: No discharge.         Left eye: No discharge.      Conjunctiva/sclera: Conjunctivae normal.      Right eye: No chemosis.     Left eye: No chemosis.     Pupils: Pupils are equal, round, and reactive to light.   Neck:      Thyroid: No thyroid mass or thyromegaly.      Trachea: Trachea normal.   Cardiovascular:      Rate and Rhythm: Regular rhythm. Tachycardia present.      Pulses:           Dorsalis pedis pulses are 2+ on the right side and 2+ on the left side.      Heart  sounds: Normal heart sounds. No murmur heard.  Pulmonary:      Effort: Pulmonary effort is normal. No respiratory distress.      Breath sounds: Examination of the right-lower field reveals decreased breath sounds. Examination of the left-lower field reveals decreased breath sounds. Decreased breath sounds present. No wheezing.   Abdominal:      General: Bowel sounds are decreased. There is no distension.      Palpations: Abdomen is soft.      Tenderness: There is generalized abdominal tenderness.   Musculoskeletal:         General: Normal range of motion.      Cervical back: Normal range of motion and neck supple.   Lymphadenopathy:      Cervical: No cervical adenopathy.      Upper Body:      Right upper body: No supraclavicular adenopathy.      Left upper body: No supraclavicular adenopathy.   Skin:     General: Skin is warm and dry.      Capillary Refill: Capillary refill takes less than 2 seconds.      Findings: Abrasion present.          Neurological:      Mental Status: He is alert and oriented to person, place, and time.   Psychiatric:         Mood and Affect: Mood is not anxious. Affect is angry.         Behavior: Behavior is not slowed.         Cognition and Memory: He does not exhibit impaired recent memory or impaired remote memory.             Significant Labs: All pertinent labs within the past 24 hours have been reviewed.  CBC:   Recent Labs   Lab 01/12/24  0926 01/12/24  1129 01/13/24  0747   WBC 13.24* 13.89* 8.21   HGB 9.8* 10.6* 9.3*   HCT 30.9* 33.3* 29.5*    161 134*     CMP:   Recent Labs   Lab 01/12/24  0926 01/12/24  1129 01/13/24  0747   * 136 141   K 4.4 4.3 4.0    101 110   CO2 22* 21* 24   * 133* 106   BUN 34* 35* 22   CREATININE 2.1* 2.2* 1.5*   CALCIUM 9.4 9.7 9.7   PROT 7.3 7.7 6.7   ALBUMIN 3.0* 3.1* 2.6*   BILITOT 0.9 0.9 0.5   ALKPHOS 111 117 106   AST 7* 8* 10   ALT 10 11 8*   ANIONGAP 11 14 7*       Significant Imaging: I have reviewed all pertinent imaging  results/findings within the past 24 hours.    Assessment/Plan:      * Sepsis  This patient does have evidence of infective focus  My overall impression is sepsis.  Source: Urinary Tract  Antibiotics given-   Antibiotics (72h ago, onward)      Start     Stop Route Frequency Ordered    01/13/24 0000  ceFEPIme (MAXIPIME) 2 g in dextrose 5 % in water (D5W) 100 mL IVPB (MB+)         -- IV Every 12 hours (non-standard times) 01/12/24 1245    01/12/24 1345  DAPTOmycin (CUBICIN) 580 mg in sodium chloride 0.9% SolP 50 mL IVPB         -- IV Every 24 hours (non-standard times) 01/12/24 1244          Latest lactate reviewed-  Recent Labs   Lab 01/12/24  1545   LACTATE 2.6*       Organ dysfunction indicated by Acute kidney injury and Encephalopathy    Fluid challenge Ideal Body Weight- The patient's ideal body weight is Ideal body weight: 70.7 kg (155 lb 13.8 oz) which will be used to calculate fluid bolus of 30 ml/kg for treatment of septic shock.      Post- resuscitation assessment No - Post resuscitation assessment not needed       Will Not start Pressors- Levophed for MAP of 65  Source control achieved by: Cefepime, Daptomycin    --Improving    Constipation  CT Abdomen: significant stool burden. +BS, +flatus    --Glycerin suppository  --Miralax BID  --+1/2 bottle Mag citrate AM, follow for results      Malfunction of nephrostomy tube  Erythema, Edema around right tube site. CT: right hydronephrosis    --Consult Urology  --Possible tube exchange pending clinical course, cont IV Antibiotics per Urology      Encephalopathy, metabolic  Secondary to infection, dehydration    --treat underlying causes  --Fall risk  --Delirium precautions  --improving      Malignant neoplasm of urinary bladder    Followed by Urology, Oncology    --F/U OP    Squamous cell carcinoma of right lung  Followed by Oncology      --F/U OP      UTI (urinary tract infection)  Multifactorial, severe constipation, presence of nephrostomy tubes, bladder  cancer  Seen by ID in clinic prior to presentation to ED, reviewed note, recommended Daptomycin and Ceftriaxone. Reviewed previous culture report, intermediate to Ceftriaxone, Sensitive to Cefepime. Will treat with cefepime due to sepsis    --Cefepime IV for now  --Daptomycin  --PO recommendations per ID for d/c unless culture changes: PO linezolid 600 mg BID and cefdinir 300 mg BID for 14 days        Elevated serum creatinine  Per spouse, urine collection bags are overfilling during the night and urine is leaking from the tube sites. Reported limited PO intake over the past 1-2 days    --S/P IV Fluids 30cc/hr  --Antiemetics PRN  --Encourage PO intake  --Improving        VTE Risk Mitigation (From admission, onward)           Ordered     IP VTE HIGH RISK PATIENT  Once         01/12/24 1252     Place sequential compression device  Until discontinued         01/12/24 1252                    Discharge Planning   TANIA:      Code Status: Full Code   Is the patient medically ready for discharge?:     Reason for patient still in hospital (select all that apply): Patient trending condition                     Antoinette Epstein NP  Department of Hospital Medicine   [facility] - Med Surg

## 2024-01-13 NOTE — CONSULTS
Reason for consultation: nephrostomy tubes    Provider requesting consultation:   Antoinette Epstein NP       History of Present Illness:   Geovani Currie is a 68 y.o. male with a history of metastatic bladder cancer, who presented to the ED yesterday with weakness and hypotension, found at his outpatient ID appointment. He is managed with bilateral nephrostomy tubes, and pt's wife reports that the site of the right PCN tube was erythematous yesterday with a palpable nodule. She reports that when the bags aren't emptied overnight, he develops palpable swelling in the subcutaneous tissue. She reports that he has demonstrated confusion lately. No fever or chills. Pt currently denying any pain. Pt does have a history of multiple falls, including one last night. Per wife's history, pcn tubes have been putting out yellow urine as normal.         Review of Systems   Constitutional:  Negative for fever.   Cardiovascular:  Negative for chest pain.   Gastrointestinal:  Negative for abdominal pain.   Musculoskeletal:  Positive for back pain.   Neurological:  Positive for weakness.   Psychiatric/Behavioral:  Positive for confusion.    All other systems reviewed and are negative.        Past Medical History:   Diagnosis Date    COPD (chronic obstructive pulmonary disease)     Hypertension        Past Surgical History:   Procedure Laterality Date    APPENDECTOMY      CATARACT EXTRACTION      NEPHROSTOMY      OPEN REDUCTION AND INTERNAL FIXATION (ORIF) OF INTERTROCHANTERIC FRACTURE OF FEMUR Right 11/21/2023    Procedure: ORIF, FRACTURE, FEMUR, INTERTROCHANTERIC;  Surgeon: Tanisha Guidry DO;  Location: [facility];  Service: Orthopedics;  Laterality: Right;  Gamma nail       Family History   Problem Relation Age of Onset    Cancer Mother         NOS    Cancer Father         NOS    Cancer Maternal Grandfather         NOS    Bladder Cancer Neg Hx        Social History     Tobacco Use    Smoking status: Former     Current packs/day:  0.00     Types: Cigarettes     Quit date: 10/2023     Years since quittin.2     Passive exposure: Never    Smokeless tobacco: Never   Substance Use Topics    Alcohol use: Never    Drug use: Never       Current Facility-Administered Medications   Medication Dose Route Frequency Provider Last Rate Last Admin    acetaminophen tablet 650 mg  650 mg Oral Q4H PRN Antoinette Epstein, NP        bisacodyL suppository 10 mg  10 mg Rectal Daily PRN Antoinette Epstein, NP   10 mg at 24 1838    ceFEPIme (MAXIPIME) 2 g in dextrose 5 % in water (D5W) 100 mL IVPB (MB+)  2 g Intravenous Q12H Antoinette Epstein, NP   Stopped at 24 0012    DAPTOmycin (CUBICIN) 580 mg in sodium chloride 0.9% SolP 50 mL IVPB  8 mg/kg Intravenous Q24H Antoinette Epstein, NP   Stopped at 24 1457    dextrose 10% bolus 125 mL 125 mL  12.5 g Intravenous PRN Antoinette Epstein, NP        dextrose 10% bolus 250 mL 250 mL  25 g Intravenous PRN Antoinette Epstein, NP        glucagon (human recombinant) injection 1 mg  1 mg Intramuscular PRN Antoinette Epstein, NP        glucose chewable tablet 16 g  16 g Oral PRN Antoinette Epstein, NP        glucose chewable tablet 24 g  24 g Oral PRN Antoinette Epstein, NP        HYDROcodone-acetaminophen  mg per tablet 1 tablet  1 tablet Oral Q4H PRN Antoinette Epstein, NP   1 tablet at 24 2341    melatonin tablet 6 mg  6 mg Oral Nightly PRN Tessie Claudio NP   6 mg at 24 0056    morphine injection 4 mg  4 mg Intravenous Q4H PRN Antoinette Epstein, BETH        ondansetron injection 4 mg  4 mg Intravenous Q8H PRN Antoinette Epstein, BETH        polyethylene glycol packet 17 g  17 g Oral BID Antoinette Epstein, NP   17 g at 24 0933    prochlorperazine injection Soln 5 mg  5 mg Intravenous Q6H PRN Antoinette Epstein NP           Review of patient's allergies indicates:  No Known Allergies    Physical Exam  Vitals:    24 0730   BP: (!) 116/53   Pulse: 94   Resp: 18   Temp: 98.2 °F (36.8 °C)        General: Well-developed, well-nourished in no acute distress  HEENT: Normocephalic, atraumatic, Extraocular movements intact  Neck: supple, trachea midline, no cervical or supraclavicular lymphadenopathy  Respirations: even and unlabored  Back: midline spine, no CVA tenderness, Bilateral PCN tubes draining clear yellow urine. No evidence of soft tissue infection at PCN site, no erythema or edema.   Abdomen: soft, Non-tender, non-distended, no organomegaly or palpable masses, no rebound or guarding  Extremities: atraumatic, moves all equally, no clubbing, cyanosis or edema  Psych: normal affect  Skin: warm and dry, no lesions  Neuro: Alert and oriented, Cranial nerves II-XII grossly intact    Urinalysis  pH, UA   Date Value Ref Range Status   01/12/2024 8.0 5.0 - 8.0 Final     Glucose, UA   Date Value Ref Range Status   01/12/2024 Trace (A) Negative Final     Occult Blood UA   Date Value Ref Range Status   01/12/2024 1+ (A) Negative Final     Nitrite, UA   Date Value Ref Range Status   01/12/2024 Positive (A) Negative Final     Leukocytes, UA   Date Value Ref Range Status   01/12/2024 3+ (A) Negative Final     Lab Results   Component Value Date    WBC 8.21 01/13/2024    HGB 9.3 (L) 01/13/2024    HCT 29.5 (L) 01/13/2024    MCV 89 01/13/2024     (L) 01/13/2024       BMP  Lab Results   Component Value Date     01/13/2024    K 4.0 01/13/2024     01/13/2024    CO2 24 01/13/2024    BUN 22 01/13/2024    CREATININE 1.5 (H) 01/13/2024    CALCIUM 9.7 01/13/2024    ANIONGAP 7 (L) 01/13/2024    EGFRNORACEVR 50 (A) 01/13/2024     CT dated 1/12/24 personally reviewed, noting left renal atrophy, minimal right hydronephrosis with good placement of both pcn tubes      Assessment:   Metastatic bladder cancer  R pyelonephritis    Plan:    Agree with treatment with maxipime, daptomycin. Follow up blood and urine cultures. Will defer final antibiotic recommendations to ID. R PCN tube was changed approximately 2  weeks ago and appears to be in good position. L PCN tube changed approximately 3 weeks ago and appears to be in good position.   No surgical indication at this time. Please call with any questions. Will sign off.

## 2024-01-13 NOTE — ASSESSMENT & PLAN NOTE
Secondary to infection, dehydration    --treat underlying causes  --Fall risk  --Delirium precautions  --improving

## 2024-01-13 NOTE — ASSESSMENT & PLAN NOTE
This patient does have evidence of infective focus  My overall impression is sepsis.  Source: Urinary Tract  Antibiotics given-   Antibiotics (72h ago, onward)      Start     Stop Route Frequency Ordered    01/13/24 0000  ceFEPIme (MAXIPIME) 2 g in dextrose 5 % in water (D5W) 100 mL IVPB (MB+)         -- IV Every 12 hours (non-standard times) 01/12/24 1245    01/12/24 1345  DAPTOmycin (CUBICIN) 580 mg in sodium chloride 0.9% SolP 50 mL IVPB         -- IV Every 24 hours (non-standard times) 01/12/24 1244          Latest lactate reviewed-  Recent Labs   Lab 01/12/24  1545   LACTATE 2.6*       Organ dysfunction indicated by Acute kidney injury and Encephalopathy    Fluid challenge Ideal Body Weight- The patient's ideal body weight is Ideal body weight: 70.7 kg (155 lb 13.8 oz) which will be used to calculate fluid bolus of 30 ml/kg for treatment of septic shock.      Post- resuscitation assessment No - Post resuscitation assessment not needed       Will Not start Pressors- Levophed for MAP of 65  Source control achieved by: Cefepime, Daptomycin    --Improving

## 2024-01-13 NOTE — ASSESSMENT & PLAN NOTE
Per spouse, urine collection bags are overfilling during the night and urine is leaking from the tube sites. Reported limited PO intake over the past 1-2 days    --S/P IV Fluids 30cc/hr  --Antiemetics PRN  --Encourage PO intake  --Improving

## 2024-01-13 NOTE — SUBJECTIVE & OBJECTIVE
Interval History: Patient reported fall overnight, abrasion to left flank, Nephrostomy tube in place. Urology following, cont Abx for now. +Mag citrate.     Review of Systems   Constitutional:  Positive for appetite change, fatigue and fever.   Gastrointestinal:  Positive for abdominal distention and nausea.   Musculoskeletal:  Positive for arthralgias and back pain.   Skin:  Positive for wound.   Neurological:  Positive for dizziness and weakness.   All other systems reviewed and are negative.    Objective:     Vital Signs (Most Recent):  Temp: 98 °F (36.7 °C) (01/13/24 1159)  Pulse: 93 (01/13/24 1159)  Resp: 18 (01/13/24 1159)  BP: (!) 140/65 (01/13/24 1159)  SpO2: 97 % (01/13/24 1159) Vital Signs (24h Range):  Temp:  [97.7 °F (36.5 °C)-98.2 °F (36.8 °C)] 98 °F (36.7 °C)  Pulse:  [] 93  Resp:  [14-26] 18  SpO2:  [96 %-98 %] 97 %  BP: ()/(48-71) 140/65     Weight: 69.3 kg (152 lb 12.5 oz)  Body mass index is 22.56 kg/m².    Intake/Output Summary (Last 24 hours) at 1/13/2024 1200  Last data filed at 1/13/2024 1025  Gross per 24 hour   Intake 2935.61 ml   Output 1850 ml   Net 1085.61 ml         Physical Exam  Vitals and nursing note reviewed.   Constitutional:       General: He is not in acute distress.     Appearance: He is ill-appearing. He is not diaphoretic.   HENT:      Head: Normocephalic and atraumatic.      Right Ear: Hearing and external ear normal.      Left Ear: Hearing and external ear normal.      Nose: Nose normal. No mucosal edema or rhinorrhea.      Mouth/Throat:      Pharynx: Uvula midline.   Eyes:      General:         Right eye: No discharge.         Left eye: No discharge.      Conjunctiva/sclera: Conjunctivae normal.      Right eye: No chemosis.     Left eye: No chemosis.     Pupils: Pupils are equal, round, and reactive to light.   Neck:      Thyroid: No thyroid mass or thyromegaly.      Trachea: Trachea normal.   Cardiovascular:      Rate and Rhythm: Regular rhythm. Tachycardia  present.      Pulses:           Dorsalis pedis pulses are 2+ on the right side and 2+ on the left side.      Heart sounds: Normal heart sounds. No murmur heard.  Pulmonary:      Effort: Pulmonary effort is normal. No respiratory distress.      Breath sounds: Examination of the right-lower field reveals decreased breath sounds. Examination of the left-lower field reveals decreased breath sounds. Decreased breath sounds present. No wheezing.   Abdominal:      General: Bowel sounds are decreased. There is no distension.      Palpations: Abdomen is soft.      Tenderness: There is generalized abdominal tenderness.   Musculoskeletal:         General: Normal range of motion.      Cervical back: Normal range of motion and neck supple.   Lymphadenopathy:      Cervical: No cervical adenopathy.      Upper Body:      Right upper body: No supraclavicular adenopathy.      Left upper body: No supraclavicular adenopathy.   Skin:     General: Skin is warm and dry.      Capillary Refill: Capillary refill takes less than 2 seconds.      Findings: Abrasion present.          Neurological:      Mental Status: He is alert and oriented to person, place, and time.   Psychiatric:         Mood and Affect: Mood is not anxious. Affect is angry.         Behavior: Behavior is not slowed.         Cognition and Memory: He does not exhibit impaired recent memory or impaired remote memory.             Significant Labs: All pertinent labs within the past 24 hours have been reviewed.  CBC:   Recent Labs   Lab 01/12/24  0926 01/12/24  1129 01/13/24  0747   WBC 13.24* 13.89* 8.21   HGB 9.8* 10.6* 9.3*   HCT 30.9* 33.3* 29.5*    161 134*     CMP:   Recent Labs   Lab 01/12/24  0926 01/12/24  1129 01/13/24  0747   * 136 141   K 4.4 4.3 4.0    101 110   CO2 22* 21* 24   * 133* 106   BUN 34* 35* 22   CREATININE 2.1* 2.2* 1.5*   CALCIUM 9.4 9.7 9.7   PROT 7.3 7.7 6.7   ALBUMIN 3.0* 3.1* 2.6*   BILITOT 0.9 0.9 0.5   ALKPHOS 111 117 106    AST 7* 8* 10   ALT 10 11 8*   ANIONGAP 11 14 7*       Significant Imaging: I have reviewed all pertinent imaging results/findings within the past 24 hours.

## 2024-01-13 NOTE — ASSESSMENT & PLAN NOTE
CT Abdomen: significant stool burden. +BS, +flatus    --Glycerin suppository  --Miralax BID  --+1/2 bottle Mag citrate AM, follow for results

## 2024-01-13 NOTE — PLAN OF CARE
[facility]  Initial Discharge Assessment       Primary Care Provider: Faizan Antoine MD    Admission Diagnosis: Weakness [R53.1]  Personal history of bladder cancer [Z85.51]  Complication of nephrostomy [N99.528]  Urinary tract infection without hematuria, site unspecified [N39.0]  Chronic renal failure, unspecified CKD stage [N18.9]    Admission Date: 1/12/2024  Expected Discharge Date:     Transition of Care Barriers: None    Payor: MEDICARE / Plan: MEDICARE PART A & B / Product Type: Government /     Extended Emergency Contact Information  Primary Emergency Contact: Stephania Mann  Address: [address]           [place] [address] [place]  Mobile Phone: [phone]  Relation: Healthcare Power of   Secondary Emergency Contact: ELIAS GILLILAND  Mobile Phone: [phone]  Relation: Healthcare Power of   Preferred language: English   needed? No    Discharge Plan A: Home with family, Home Health  Discharge Plan B: Home with family      [id number] [place] - [address]  [address] [place] [address]  Phone: [phone] Fax: [phone]    [id number] - [place] - [address]  [address] [address] [address]  Phone: [phone] Fax: [phone]      Initial Assessment (most recent)       Adult Discharge Assessment - 01/13/24 8829          Discharge Assessment    Assessment Type Discharge Planning Assessment     Confirmed/corrected address, phone number and insurance Yes     Source of Information family     People in Home spouse;child(qing), adult     Current cognitive status: Alert/Oriented     Walking or Climbing Stairs Difficulty no     Dressing/Bathing Difficulty no     Equipment Currently Used at Home walker, rolling;wheelchair;bath bench     Do you currently have service(s) that help you manage your care at home? Yes     Name and Contact number of agency  [facility]     Is the pt/caregiver preference to resume services with current agency Yes     Do you take prescription medications? Yes     Do you have prescription coverage? Yes     Do you have any problems affording any of your prescribed medications? No     Is the patient taking medications as prescribed? yes     How do you get to doctors appointments? family or friend will provide     Are you on dialysis? No     Do you take coumadin? No     Discharge Plan A Home with family;Home Health     Discharge Plan B Home with family     DME Needed Upon Discharge  none     Discharge Plan discussed with: POA;Spouse/sig other     Transition of Care Barriers None                      Current with [facility].  No anticipated needs.

## 2024-01-13 NOTE — PLAN OF CARE
Problem: Adult Inpatient Plan of Care  Goal: Plan of Care Review  Outcome: Ongoing, Progressing     Problem: Adult Inpatient Plan of Care  Goal: Patient-Specific Goal (Individualized)  Outcome: Ongoing, Progressing  Flowsheets (Taken 1/13/2024 0342)  Anxieties, Fears or Concerns: pain control  Individualized Care Needs: light off     Problem: Impaired Wound Healing  Goal: Optimal Wound Healing  Outcome: Ongoing, Progressing     Problem: Skin Injury Risk Increased  Goal: Skin Health and Integrity  Outcome: Ongoing, Progressing     Problem: Fall Injury Risk  Goal: Absence of Fall and Fall-Related Injury  Outcome: Ongoing, Progressing         Discussed POC with pt and wife, verbalized understanding.  Patient remains free from injury.  Safety precautions maintained.   Call light and personal belongings within reach, bed in lowest position with bed wheels locked.   No s/s of acute distress.  Purposeful rounding continued this shift.  Pain controlled per MD order.  Cardiac monitoring in place, tele box number [id number]. Reading normal sinus  Blood glucose monitoring continued this shift.   Diet orders continued, pt diet: regular  Vital signs continued per orders this shift.   Q2 repositioning independently  Patient mobility status up with assistance to bedside commode.  Chart and orders review completed. Pt education about care completed.

## 2024-01-14 LAB
ALBUMIN SERPL BCP-MCNC: 2.7 G/DL (ref 3.5–5.2)
ALP SERPL-CCNC: 122 U/L (ref 55–135)
ALT SERPL W/O P-5'-P-CCNC: 15 U/L (ref 10–44)
ANION GAP SERPL CALC-SCNC: 8 MMOL/L (ref 8–16)
AST SERPL-CCNC: 13 U/L (ref 10–40)
BASOPHILS # BLD AUTO: 0.04 K/UL (ref 0–0.2)
BASOPHILS NFR BLD: 0.5 % (ref 0–1.9)
BILIRUB SERPL-MCNC: 0.5 MG/DL (ref 0.1–1)
BUN SERPL-MCNC: 18 MG/DL (ref 8–23)
CALCIUM SERPL-MCNC: 10 MG/DL (ref 8.7–10.5)
CHLORIDE SERPL-SCNC: 106 MMOL/L (ref 95–110)
CO2 SERPL-SCNC: 24 MMOL/L (ref 23–29)
CREAT SERPL-MCNC: 1.4 MG/DL (ref 0.5–1.4)
DIFFERENTIAL METHOD BLD: ABNORMAL
EOSINOPHIL # BLD AUTO: 0.1 K/UL (ref 0–0.5)
EOSINOPHIL NFR BLD: 1.7 % (ref 0–8)
ERYTHROCYTE [DISTWIDTH] IN BLOOD BY AUTOMATED COUNT: 17.9 % (ref 11.5–14.5)
EST. GFR  (NO RACE VARIABLE): 54 ML/MIN/1.73 M^2
GLUCOSE SERPL-MCNC: 135 MG/DL (ref 70–110)
HCT VFR BLD AUTO: 30.8 % (ref 40–54)
HGB BLD-MCNC: 9.6 G/DL (ref 14–18)
IMM GRANULOCYTES # BLD AUTO: 0.03 K/UL (ref 0–0.04)
IMM GRANULOCYTES NFR BLD AUTO: 0.4 % (ref 0–0.5)
LYMPHOCYTES # BLD AUTO: 0.7 K/UL (ref 1–4.8)
LYMPHOCYTES NFR BLD: 8.5 % (ref 18–48)
MAGNESIUM SERPL-MCNC: 1.7 MG/DL (ref 1.6–2.6)
MCH RBC QN AUTO: 27.3 PG (ref 27–31)
MCHC RBC AUTO-ENTMCNC: 31.2 G/DL (ref 32–36)
MCV RBC AUTO: 88 FL (ref 82–98)
MONOCYTES # BLD AUTO: 0.6 K/UL (ref 0.3–1)
MONOCYTES NFR BLD: 7.5 % (ref 4–15)
NEUTROPHILS # BLD AUTO: 6.9 K/UL (ref 1.8–7.7)
NEUTROPHILS NFR BLD: 81.4 % (ref 38–73)
NRBC BLD-RTO: 0 /100 WBC
PHOSPHATE SERPL-MCNC: 2.8 MG/DL (ref 2.7–4.5)
PLATELET # BLD AUTO: 174 K/UL (ref 150–450)
PMV BLD AUTO: 10.2 FL (ref 9.2–12.9)
POTASSIUM SERPL-SCNC: 4.6 MMOL/L (ref 3.5–5.1)
PROT SERPL-MCNC: 7.2 G/DL (ref 6–8.4)
RBC # BLD AUTO: 3.52 M/UL (ref 4.6–6.2)
SODIUM SERPL-SCNC: 138 MMOL/L (ref 136–145)
WBC # BLD AUTO: 8.44 K/UL (ref 3.9–12.7)

## 2024-01-14 PROCEDURE — 84100 ASSAY OF PHOSPHORUS: CPT | Performed by: NURSE PRACTITIONER

## 2024-01-14 PROCEDURE — 25000003 PHARM REV CODE 250: Performed by: NURSE PRACTITIONER

## 2024-01-14 PROCEDURE — 36415 COLL VENOUS BLD VENIPUNCTURE: CPT | Performed by: NURSE PRACTITIONER

## 2024-01-14 PROCEDURE — 63600175 PHARM REV CODE 636 W HCPCS: Performed by: NURSE PRACTITIONER

## 2024-01-14 PROCEDURE — 11000001 HC ACUTE MED/SURG PRIVATE ROOM

## 2024-01-14 PROCEDURE — 80053 COMPREHEN METABOLIC PANEL: CPT | Performed by: NURSE PRACTITIONER

## 2024-01-14 PROCEDURE — 83735 ASSAY OF MAGNESIUM: CPT | Performed by: NURSE PRACTITIONER

## 2024-01-14 PROCEDURE — 85025 COMPLETE CBC W/AUTO DIFF WBC: CPT | Performed by: NURSE PRACTITIONER

## 2024-01-14 RX ORDER — DOCUSATE SODIUM 100 MG/1
100 CAPSULE, LIQUID FILLED ORAL 2 TIMES DAILY
Status: DISCONTINUED | OUTPATIENT
Start: 2024-01-14 | End: 2024-01-15 | Stop reason: HOSPADM

## 2024-01-14 RX ORDER — LACTULOSE 10 G/15ML
30 SOLUTION ORAL 3 TIMES DAILY
Status: DISCONTINUED | OUTPATIENT
Start: 2024-01-14 | End: 2024-01-15 | Stop reason: HOSPADM

## 2024-01-14 RX ORDER — ALPRAZOLAM 1 MG/1
1 TABLET ORAL 3 TIMES DAILY PRN
Status: DISCONTINUED | OUTPATIENT
Start: 2024-01-14 | End: 2024-01-15 | Stop reason: HOSPADM

## 2024-01-14 RX ORDER — LEVOTHYROXINE SODIUM 125 UG/1
125 TABLET ORAL
Status: DISCONTINUED | OUTPATIENT
Start: 2024-01-14 | End: 2024-01-15 | Stop reason: HOSPADM

## 2024-01-14 RX ORDER — ESCITALOPRAM OXALATE 10 MG/1
20 TABLET ORAL DAILY
Status: DISCONTINUED | OUTPATIENT
Start: 2024-01-14 | End: 2024-01-15 | Stop reason: HOSPADM

## 2024-01-14 RX ORDER — SYRING-NEEDL,DISP,INSUL,0.3 ML 29 G X1/2"
296 SYRINGE, EMPTY DISPOSABLE MISCELLANEOUS ONCE
Status: COMPLETED | OUTPATIENT
Start: 2024-01-14 | End: 2024-01-14

## 2024-01-14 RX ORDER — BISACODYL 10 MG/1
10 SUPPOSITORY RECTAL ONCE
Status: COMPLETED | OUTPATIENT
Start: 2024-01-14 | End: 2024-01-14

## 2024-01-14 RX ADMIN — BISACODYL 10 MG: 10 SUPPOSITORY RECTAL at 09:01

## 2024-01-14 RX ADMIN — POLYETHYLENE GLYCOL 3350 17 G: 17 POWDER, FOR SOLUTION ORAL at 09:01

## 2024-01-14 RX ADMIN — CEFEPIME 2 G: 2 INJECTION, POWDER, FOR SOLUTION INTRAVENOUS at 01:01

## 2024-01-14 RX ADMIN — LEVOTHYROXINE SODIUM 125 MCG: 125 TABLET ORAL at 09:01

## 2024-01-14 RX ADMIN — CEFEPIME 2 G: 2 INJECTION, POWDER, FOR SOLUTION INTRAVENOUS at 11:01

## 2024-01-14 RX ADMIN — ALPRAZOLAM 1 MG: 1 TABLET ORAL at 09:01

## 2024-01-14 RX ADMIN — ESCITALOPRAM OXALATE 20 MG: 10 TABLET ORAL at 09:01

## 2024-01-14 RX ADMIN — MAGNESIUM CITRATE 296 ML: 1.75 LIQUID ORAL at 09:01

## 2024-01-14 RX ADMIN — DAPTOMYCIN 580 MG: 500 INJECTION, POWDER, LYOPHILIZED, FOR SOLUTION INTRAVENOUS at 02:01

## 2024-01-14 RX ADMIN — HYDROCODONE BITARTRATE AND ACETAMINOPHEN 1 TABLET: 10; 325 TABLET ORAL at 09:01

## 2024-01-14 RX ADMIN — DOCUSATE SODIUM 100 MG: 100 CAPSULE, LIQUID FILLED ORAL at 09:01

## 2024-01-14 RX ADMIN — LACTULOSE 30 G: 20 SOLUTION ORAL at 09:01

## 2024-01-14 RX ADMIN — HYDROCODONE BITARTRATE AND ACETAMINOPHEN 1 TABLET: 10; 325 TABLET ORAL at 02:01

## 2024-01-14 NOTE — SUBJECTIVE & OBJECTIVE
Interval History: KUB: no obvious impaction. BM x3 since admission, ordered Additional Magnesium Citrate,     Review of Systems   Constitutional:  Positive for appetite change. Negative for fatigue and fever.   Gastrointestinal:  Positive for abdominal distention. Negative for nausea.   Musculoskeletal:  Positive for arthralgias and back pain.   Skin:  Positive for wound.   Neurological:  Positive for weakness. Negative for dizziness.   All other systems reviewed and are negative.    Objective:     Vital Signs (Most Recent):  Temp: 97.9 °F (36.6 °C) (01/14/24 1210)  Pulse: 110 (01/14/24 1210)  Resp: 18 (01/14/24 1442)  BP: (!) 144/72 (01/14/24 1210)  SpO2: 96 % (01/14/24 1210) Vital Signs (24h Range):  Temp:  [97.9 °F (36.6 °C)-98.5 °F (36.9 °C)] 97.9 °F (36.6 °C)  Pulse:  [] 110  Resp:  [16-18] 18  SpO2:  [95 %-98 %] 96 %  BP: (121-146)/(57-72) 144/72     Weight: 69.3 kg (152 lb 12.5 oz)  Body mass index is 22.56 kg/m².    Intake/Output Summary (Last 24 hours) at 1/14/2024 1508  Last data filed at 1/14/2024 1446  Gross per 24 hour   Intake 603.16 ml   Output 3440 ml   Net -2836.84 ml         Physical Exam  Vitals and nursing note reviewed.   Constitutional:       General: He is not in acute distress.     Appearance: He is not ill-appearing or diaphoretic.   HENT:      Head: Normocephalic and atraumatic.      Right Ear: Hearing and external ear normal.      Left Ear: Hearing and external ear normal.      Nose: Nose normal. No mucosal edema or rhinorrhea.      Mouth/Throat:      Pharynx: Uvula midline.   Eyes:      General:         Right eye: No discharge.         Left eye: No discharge.      Conjunctiva/sclera: Conjunctivae normal.      Right eye: No chemosis.     Left eye: No chemosis.     Pupils: Pupils are equal, round, and reactive to light.   Neck:      Thyroid: No thyroid mass or thyromegaly.      Trachea: Trachea normal.   Cardiovascular:      Rate and Rhythm: Regular rhythm. Tachycardia present.       Pulses:           Dorsalis pedis pulses are 2+ on the right side and 2+ on the left side.      Heart sounds: Normal heart sounds. No murmur heard.  Pulmonary:      Effort: Pulmonary effort is normal. No respiratory distress.      Breath sounds: Examination of the right-lower field reveals decreased breath sounds. Examination of the left-lower field reveals decreased breath sounds. Decreased breath sounds present. No wheezing.   Abdominal:      General: Bowel sounds are decreased. There is no distension.      Palpations: Abdomen is soft.      Tenderness: There is no abdominal tenderness.   Musculoskeletal:         General: Normal range of motion.      Cervical back: Normal range of motion and neck supple.   Lymphadenopathy:      Cervical: No cervical adenopathy.      Upper Body:      Right upper body: No supraclavicular adenopathy.      Left upper body: No supraclavicular adenopathy.   Skin:     General: Skin is warm and dry.      Capillary Refill: Capillary refill takes less than 2 seconds.      Findings: Abrasion present.          Neurological:      Mental Status: He is alert and oriented to person, place, and time.   Psychiatric:         Mood and Affect: Mood is not anxious. Affect is angry.         Behavior: Behavior is not slowed.         Cognition and Memory: He does not exhibit impaired recent memory or impaired remote memory.             Significant Labs: All pertinent labs within the past 24 hours have been reviewed.  CBC:   Recent Labs   Lab 01/13/24  0747 01/14/24  0805   WBC 8.21 8.44   HGB 9.3* 9.6*   HCT 29.5* 30.8*   * 174     CMP:   Recent Labs   Lab 01/13/24  0747 01/14/24  0805    138   K 4.0 4.6    106   CO2 24 24    135*   BUN 22 18   CREATININE 1.5* 1.4   CALCIUM 9.7 10.0   PROT 6.7 7.2   ALBUMIN 2.6* 2.7*   BILITOT 0.5 0.5   ALKPHOS 106 122   AST 10 13   ALT 8* 15   ANIONGAP 7* 8       Significant Imaging: I have reviewed all pertinent imaging results/findings within  the past 24 hours.

## 2024-01-14 NOTE — PLAN OF CARE
Problem: Adult Inpatient Plan of Care  Goal: Plan of Care Review  Outcome: Ongoing, Progressing     Problem: Adult Inpatient Plan of Care  Goal: Patient-Specific Goal (Individualized)  Outcome: Ongoing, Progressing  Flowsheets (Taken 1/14/2024 0315)  Anxieties, Fears or Concerns: urostomy emptied often  Individualized Care Needs: light off     Problem: Impaired Wound Healing  Goal: Optimal Wound Healing  Outcome: Ongoing, Progressing     Problem: Skin Injury Risk Increased  Goal: Skin Health and Integrity  Outcome: Ongoing, Progressing     Problem: Fall Injury Risk  Goal: Absence of Fall and Fall-Related Injury  Outcome: Ongoing, Progressing    Discussed POC with pt and spouse, verbalized understanding.  Patient remains free from injury.  Safety precautions maintained.   Call light and personal belongings within reach, bed in lowest position with bed wheels locked.   No s/s of acute distress.  Purposeful rounding continued this shift.  Pain controlled per MD order.  Cardiac monitoring in place, tele box number [id number]. Reading normal sinus to sinus tachycardia  Diet orders continued, pt diet: regular  Vital signs continued per orders this shift.   Avasys at bedside for patient's safety.  Q2 repositioning independently  Patient mobility status side of bed with assistance  Chart and orders review completed. Pt education about care completed.

## 2024-01-14 NOTE — ASSESSMENT & PLAN NOTE
Erythema, Edema around right tube site. CT: right hydronephrosis    --Consult Urology  --Per Urology, no indication to exchange tubes at this time.

## 2024-01-14 NOTE — PROGRESS NOTES
[facility] Medicine  Progress Note    Patient Name: Geovani Currie  MRN: [id number]  Patient Class: IP- Inpatient   Admission Date: 1/12/2024  Length of Stay: 2 days  Attending Physician: Rosio Davila MD  Primary Care Provider: Faizan Antoine MD        Subjective:     Principal Problem:Sepsis        HPI:  68 y.o. male patient with a PMHx of bladder and lung cancer. Presented to the Emergency Department for evaluation of weakness, hypotension. Referred from Infectious Disease clinic due to BP: 70/50. Family member reports pt has hx of stage 3 CKD and invasive bladder cancer that is unsuitable for chemo. Pt is a poor historian. Patient denies any fever, chills, N/V/D, and all other sxs at this time. No prior tx reported. In the ED, vitals: 74/37, improved to 98/49. HR: 76, afebrile. Significant labs: WBC: 13.89, H/H: 10.6/33.3, Cr: 2.2, Lactic: 1.5, Procal: 0.47, TSH: 0.188, UA: Occult Blood+, Nitrite +, Leuk: 3+, Bact: Few. Blood Cultures collected, Urine Culture pending. ID recommendations reviewed. Treated with IV antibiotics, IV fluids. Urology consulted. Patient is a full code. Admitted to Hospital Medicine for management of Sepsis.     Overview/Hospital Course:  68 year old male, under the care of Hospital Medicine for management of Sepsis, UTI, Constipation. Patient reported falling overnight, assessed patient, abrasion to left flank inferior to nephrostomy tube, scabbed, scant blood to bed linen. nephrostomy tube in place. Denies pain to left hip pain, FROM on exam. Significant stool burden on CT, treated with glycerin suppository on day of admit, ordered additional laxatives 3x BM since admission, active BS, ordered KUB: no obvious impaction. Ordered Lactulose.  Urology evaluated, recommend to continue IV antibiotics, will consider nephrostomy tube exchange pending clinical course. Erythematous area around Right Nephrostomy tube, improved. If has multiple BM's can d/c home with  recommended PO Antibiotic regimen.     Interval History: KUB: no obvious impaction. BM x3 since admission, ordered Additional Magnesium Citrate,     Review of Systems   Constitutional:  Positive for appetite change. Negative for fatigue and fever.   Gastrointestinal:  Positive for abdominal distention. Negative for nausea.   Musculoskeletal:  Positive for arthralgias and back pain.   Skin:  Positive for wound.   Neurological:  Positive for weakness. Negative for dizziness.   All other systems reviewed and are negative.    Objective:     Vital Signs (Most Recent):  Temp: 97.9 °F (36.6 °C) (01/14/24 1210)  Pulse: 110 (01/14/24 1210)  Resp: 18 (01/14/24 1442)  BP: (!) 144/72 (01/14/24 1210)  SpO2: 96 % (01/14/24 1210) Vital Signs (24h Range):  Temp:  [97.9 °F (36.6 °C)-98.5 °F (36.9 °C)] 97.9 °F (36.6 °C)  Pulse:  [] 110  Resp:  [16-18] 18  SpO2:  [95 %-98 %] 96 %  BP: (121-146)/(57-72) 144/72     Weight: 69.3 kg (152 lb 12.5 oz)  Body mass index is 22.56 kg/m².    Intake/Output Summary (Last 24 hours) at 1/14/2024 1508  Last data filed at 1/14/2024 1446  Gross per 24 hour   Intake 603.16 ml   Output 3440 ml   Net -2836.84 ml         Physical Exam  Vitals and nursing note reviewed.   Constitutional:       General: He is not in acute distress.     Appearance: He is not ill-appearing or diaphoretic.   HENT:      Head: Normocephalic and atraumatic.      Right Ear: Hearing and external ear normal.      Left Ear: Hearing and external ear normal.      Nose: Nose normal. No mucosal edema or rhinorrhea.      Mouth/Throat:      Pharynx: Uvula midline.   Eyes:      General:         Right eye: No discharge.         Left eye: No discharge.      Conjunctiva/sclera: Conjunctivae normal.      Right eye: No chemosis.     Left eye: No chemosis.     Pupils: Pupils are equal, round, and reactive to light.   Neck:      Thyroid: No thyroid mass or thyromegaly.      Trachea: Trachea normal.   Cardiovascular:      Rate and Rhythm:  Regular rhythm. Tachycardia present.      Pulses:           Dorsalis pedis pulses are 2+ on the right side and 2+ on the left side.      Heart sounds: Normal heart sounds. No murmur heard.  Pulmonary:      Effort: Pulmonary effort is normal. No respiratory distress.      Breath sounds: Examination of the right-lower field reveals decreased breath sounds. Examination of the left-lower field reveals decreased breath sounds. Decreased breath sounds present. No wheezing.   Abdominal:      General: Bowel sounds are decreased. There is no distension.      Palpations: Abdomen is soft.      Tenderness: There is no abdominal tenderness.   Musculoskeletal:         General: Normal range of motion.      Cervical back: Normal range of motion and neck supple.   Lymphadenopathy:      Cervical: No cervical adenopathy.      Upper Body:      Right upper body: No supraclavicular adenopathy.      Left upper body: No supraclavicular adenopathy.   Skin:     General: Skin is warm and dry.      Capillary Refill: Capillary refill takes less than 2 seconds.      Findings: Abrasion present.          Neurological:      Mental Status: He is alert and oriented to person, place, and time.   Psychiatric:         Mood and Affect: Mood is not anxious. Affect is angry.         Behavior: Behavior is not slowed.         Cognition and Memory: He does not exhibit impaired recent memory or impaired remote memory.             Significant Labs: All pertinent labs within the past 24 hours have been reviewed.  CBC:   Recent Labs   Lab 01/13/24  0747 01/14/24  0805   WBC 8.21 8.44   HGB 9.3* 9.6*   HCT 29.5* 30.8*   * 174     CMP:   Recent Labs   Lab 01/13/24  0747 01/14/24  0805    138   K 4.0 4.6    106   CO2 24 24    135*   BUN 22 18   CREATININE 1.5* 1.4   CALCIUM 9.7 10.0   PROT 6.7 7.2   ALBUMIN 2.6* 2.7*   BILITOT 0.5 0.5   ALKPHOS 106 122   AST 10 13   ALT 8* 15   ANIONGAP 7* 8       Significant Imaging: I have reviewed all  pertinent imaging results/findings within the past 24 hours.    Assessment/Plan:      * Sepsis  This patient does have evidence of infective focus  My overall impression is sepsis.  Source: Urinary Tract  Antibiotics given-   Antibiotics (72h ago, onward)      Start     Stop Route Frequency Ordered    01/13/24 0000  ceFEPIme (MAXIPIME) 2 g in dextrose 5 % in water (D5W) 100 mL IVPB (MB+)         -- IV Every 12 hours (non-standard times) 01/12/24 1245    01/12/24 1345  DAPTOmycin (CUBICIN) 580 mg in sodium chloride 0.9% SolP 50 mL IVPB         -- IV Every 24 hours (non-standard times) 01/12/24 1244          Latest lactate reviewed-  Recent Labs   Lab 01/12/24  1545   LACTATE 2.6*       Organ dysfunction indicated by Acute kidney injury and Encephalopathy    Fluid challenge Ideal Body Weight- The patient's ideal body weight is Ideal body weight: 70.7 kg (155 lb 13.8 oz) which will be used to calculate fluid bolus of 30 ml/kg for treatment of septic shock.      Post- resuscitation assessment No - Post resuscitation assessment not needed       Will Not start Pressors- Levophed for MAP of 65  Source control achieved by: Cefepime, Daptomycin    --Improving    Constipation  CT Abdomen: significant stool burden. +BS, +flatus    --Glycerin suppository  --Miralax BID  --1 bottle Mag citrate 1/14, + Lactulose  --KUB: neg for impaction      Malfunction of nephrostomy tube  Erythema, Edema around right tube site. CT: right hydronephrosis    --Consult Urology  --Per Urology, no indication to exchange tubes at this time.     Encephalopathy, metabolic  Secondary to infection, dehydration    --treat underlying causes  --Fall risk  --Delirium precautions  --improving      Malignant neoplasm of urinary bladder    Followed by Urology, Oncology    --F/U OP    Squamous cell carcinoma of right lung  Followed by Oncology      --F/U OP      UTI (urinary tract infection)  Multifactorial, severe constipation, presence of nephrostomy tubes,  bladder cancer  Seen by ID in clinic prior to presentation to ED, reviewed note, recommended Daptomycin and Ceftriaxone. Reviewed previous culture report, intermediate to Ceftriaxone, Sensitive to Cefepime. Will treat with cefepime due to sepsis    --Cefepime IV for now  --Daptomycin  --PO recommendations per ID for d/c unless culture changes: PO linezolid 600 mg BID and cefdinir 300 mg BID for 14 days        Elevated serum creatinine  Per spouse, urine collection bags are overfilling during the night and urine is leaking from the tube sites. Reported limited PO intake over the past 1-2 days    --S/P IV Fluids 30cc/hr  --Antiemetics PRN  --Encourage PO intake  --Improving        VTE Risk Mitigation (From admission, onward)           Ordered     IP VTE HIGH RISK PATIENT  Once         01/12/24 1252     Place sequential compression device  Until discontinued         01/12/24 1252                    Discharge Planning   TANIA:      Code Status: Full Code   Is the patient medically ready for discharge?:     Reason for patient still in hospital (select all that apply): Patient trending condition  Discharge Plan A: Home with family, Home Health                  Antoinette Epstein NP  Department of Hospital Medicine   [facility]

## 2024-01-14 NOTE — ASSESSMENT & PLAN NOTE
CT Abdomen: significant stool burden. +BS, +flatus    --Glycerin suppository  --Miralax BID  --1 bottle Mag citrate 1/14, + Lactulose  --KUB: neg for impaction

## 2024-01-14 NOTE — PLAN OF CARE
Discussed poc with pt, pt verbalized understanding    Purposeful rounding every 2hours    VS wnl  Cardiac monitoring in use, pt is NSR, tele monitor # [id number]  Fall precautions in place, remains injury free  Pain and nausea under control with PRN meds  Pt had significant BM on BSC this afternoon.   IVFs  Accurate I&Os  Abx given as prescribed  Bed locked at lowest position  Call light within reach    Chart check complete  Will cont with POC

## 2024-01-15 VITALS
HEART RATE: 107 BPM | HEIGHT: 69 IN | DIASTOLIC BLOOD PRESSURE: 70 MMHG | RESPIRATION RATE: 14 BRPM | BODY MASS INDEX: 22.63 KG/M2 | OXYGEN SATURATION: 96 % | SYSTOLIC BLOOD PRESSURE: 130 MMHG | WEIGHT: 152.75 LBS | TEMPERATURE: 98 F

## 2024-01-15 LAB
ALBUMIN SERPL BCP-MCNC: 2.7 G/DL (ref 3.5–5.2)
ALP SERPL-CCNC: 119 U/L (ref 55–135)
ALT SERPL W/O P-5'-P-CCNC: 15 U/L (ref 10–44)
ANION GAP SERPL CALC-SCNC: 12 MMOL/L (ref 8–16)
AST SERPL-CCNC: 12 U/L (ref 10–40)
BACTERIA UR CULT: ABNORMAL
BASOPHILS # BLD AUTO: 0.03 K/UL (ref 0–0.2)
BASOPHILS NFR BLD: 0.4 % (ref 0–1.9)
BILIRUB SERPL-MCNC: 0.4 MG/DL (ref 0.1–1)
BUN SERPL-MCNC: 21 MG/DL (ref 8–23)
CALCIUM SERPL-MCNC: 9.3 MG/DL (ref 8.7–10.5)
CHLORIDE SERPL-SCNC: 106 MMOL/L (ref 95–110)
CO2 SERPL-SCNC: 23 MMOL/L (ref 23–29)
CREAT SERPL-MCNC: 1.4 MG/DL (ref 0.5–1.4)
DIFFERENTIAL METHOD BLD: ABNORMAL
EOSINOPHIL # BLD AUTO: 0.2 K/UL (ref 0–0.5)
EOSINOPHIL NFR BLD: 3 % (ref 0–8)
ERYTHROCYTE [DISTWIDTH] IN BLOOD BY AUTOMATED COUNT: 18 % (ref 11.5–14.5)
EST. GFR  (NO RACE VARIABLE): 54 ML/MIN/1.73 M^2
GLUCOSE SERPL-MCNC: 113 MG/DL (ref 70–110)
HCT VFR BLD AUTO: 29.4 % (ref 40–54)
HGB BLD-MCNC: 9.4 G/DL (ref 14–18)
IMM GRANULOCYTES # BLD AUTO: 0.02 K/UL (ref 0–0.04)
IMM GRANULOCYTES NFR BLD AUTO: 0.3 % (ref 0–0.5)
LYMPHOCYTES # BLD AUTO: 0.9 K/UL (ref 1–4.8)
LYMPHOCYTES NFR BLD: 12.4 % (ref 18–48)
MAGNESIUM SERPL-MCNC: 2.2 MG/DL (ref 1.6–2.6)
MCH RBC QN AUTO: 27.9 PG (ref 27–31)
MCHC RBC AUTO-ENTMCNC: 32 G/DL (ref 32–36)
MCV RBC AUTO: 87 FL (ref 82–98)
MONOCYTES # BLD AUTO: 0.7 K/UL (ref 0.3–1)
MONOCYTES NFR BLD: 10 % (ref 4–15)
NEUTROPHILS # BLD AUTO: 5.5 K/UL (ref 1.8–7.7)
NEUTROPHILS NFR BLD: 73.9 % (ref 38–73)
NRBC BLD-RTO: 0 /100 WBC
PHOSPHATE SERPL-MCNC: 3 MG/DL (ref 2.7–4.5)
PLATELET # BLD AUTO: 175 K/UL (ref 150–450)
PMV BLD AUTO: 10.2 FL (ref 9.2–12.9)
POCT GLUCOSE: 111 MG/DL (ref 70–110)
POTASSIUM SERPL-SCNC: 4.3 MMOL/L (ref 3.5–5.1)
PROT SERPL-MCNC: 6.8 G/DL (ref 6–8.4)
RBC # BLD AUTO: 3.37 M/UL (ref 4.6–6.2)
SODIUM SERPL-SCNC: 141 MMOL/L (ref 136–145)
WBC # BLD AUTO: 7.42 K/UL (ref 3.9–12.7)

## 2024-01-15 PROCEDURE — 25000003 PHARM REV CODE 250: Performed by: NURSE PRACTITIONER

## 2024-01-15 PROCEDURE — 63600175 PHARM REV CODE 636 W HCPCS: Performed by: NURSE PRACTITIONER

## 2024-01-15 PROCEDURE — 83735 ASSAY OF MAGNESIUM: CPT | Performed by: NURSE PRACTITIONER

## 2024-01-15 PROCEDURE — 36415 COLL VENOUS BLD VENIPUNCTURE: CPT | Performed by: NURSE PRACTITIONER

## 2024-01-15 PROCEDURE — 25000003 PHARM REV CODE 250: Performed by: INTERNAL MEDICINE

## 2024-01-15 PROCEDURE — 85025 COMPLETE CBC W/AUTO DIFF WBC: CPT | Performed by: NURSE PRACTITIONER

## 2024-01-15 PROCEDURE — 84100 ASSAY OF PHOSPHORUS: CPT | Performed by: NURSE PRACTITIONER

## 2024-01-15 PROCEDURE — 80053 COMPREHEN METABOLIC PANEL: CPT | Performed by: NURSE PRACTITIONER

## 2024-01-15 RX ORDER — POLYETHYLENE GLYCOL 3350 17 G/17G
17 POWDER, FOR SOLUTION ORAL DAILY PRN
Qty: 30 EACH | Refills: 0 | Status: SHIPPED | OUTPATIENT
Start: 2024-01-15 | End: 2024-02-14

## 2024-01-15 RX ORDER — CIPROFLOXACIN 750 MG/1
750 TABLET, FILM COATED ORAL EVERY 12 HOURS
Status: DISCONTINUED | OUTPATIENT
Start: 2024-01-15 | End: 2024-01-15 | Stop reason: HOSPADM

## 2024-01-15 RX ORDER — CIPROFLOXACIN 500 MG/1
500 TABLET ORAL EVERY 12 HOURS
Qty: 14 TABLET | Refills: 0 | Status: SHIPPED | OUTPATIENT
Start: 2024-01-15 | End: 2024-02-02 | Stop reason: ALTCHOICE

## 2024-01-15 RX ADMIN — CIPROFLOXACIN 750 MG: 750 TABLET, FILM COATED ORAL at 01:01

## 2024-01-15 RX ADMIN — LEVOTHYROXINE SODIUM 125 MCG: 125 TABLET ORAL at 05:01

## 2024-01-15 RX ADMIN — ESCITALOPRAM OXALATE 20 MG: 10 TABLET ORAL at 09:01

## 2024-01-15 RX ADMIN — HYDROCODONE BITARTRATE AND ACETAMINOPHEN 1 TABLET: 10; 325 TABLET ORAL at 09:01

## 2024-01-15 RX ADMIN — DOCUSATE SODIUM 100 MG: 100 CAPSULE, LIQUID FILLED ORAL at 09:01

## 2024-01-15 RX ADMIN — CEFEPIME 2 G: 2 INJECTION, POWDER, FOR SOLUTION INTRAVENOUS at 12:01

## 2024-01-15 NOTE — PROGRESS NOTES
Pharmacist Renal Dose Adjustment Note    Geovani Currie is a 68 y.o. male being treated with the medication ciprofloxacin.     Patient Data:    Vital Signs (Most Recent):  Temp: 98 °F (36.7 °C) (01/15/24 0702)  Pulse: 107 (01/15/24 0702)  Resp: 14 (01/15/24 0918)  BP: 130/70 (01/15/24 0702)  SpO2: 96 % (01/15/24 0702) Vital Signs (72h Range):  Temp:  [97.7 °F (36.5 °C)-98.5 °F (36.9 °C)]   Pulse:  []   Resp:  [14-26]   BP: ()/(48-72)   SpO2:  [95 %-98 %]      Recent Labs   Lab 01/13/24  0747 01/14/24  0805 01/15/24  0442   CREATININE 1.5* 1.4 1.4     Serum creatinine: 1.4 mg/dL 01/15/24 0442  Estimated creatinine clearance: 48.8 mL/min    Ciprofloxacin 500 mg oral every 12 hours will be changed to ciprofloxacin 750 mg oral every 12 hours per renal dose protocol for CrCl > 30 ml/min.     Thank you,   Pharmacist's Name: Suresh Liu

## 2024-01-15 NOTE — PLAN OF CARE
[facility]  Discharge Final Note    Primary Care Provider: Faizan Antoine MD    Expected Discharge Date: 1/15/2024    Final Discharge Note (most recent)       Final Note - 01/15/24 1123          Final Note    Assessment Type Final Discharge Note     Anticipated Discharge Disposition Home-Health Care Svc        Post-Acute Status    Post-Acute Authorization Home Health     Discharge Delays None known at this time                     Pt was current with [facility] home [facility]. Swer notified liaison, pt will resume care with [facility] home [facility].     Important Message from Medicare             Contact Info       Faizan Antoine MD   Specialty: Family Medicine   Relationship: PCP - General    [address]  [facility] [address]   Phone: [phone]       Next Steps: Schedule an appointment as soon as possible for a visit in 3 day(s)    Jeremiah Hartman DO   Specialty: Infectious Diseases    [address] [facility] [address]   Phone: [phone]       Next Steps: Schedule an appointment as soon as possible for a visit    Joshua Jorgensen MD   Specialty: Urology    [id number] [place] [id number]   Phone: [phone]       Next Steps: Follow up    Instructions: followup as prev scheduled

## 2024-01-15 NOTE — DISCHARGE SUMMARY
[facility]  Discharge Summary      Patient Name: Geovani Currie  MRN: [id number]  CARMELLA: [id number]  Patient Class: IP- Inpatient  Admission Date: 1/12/2024  Hospital Length of Stay: 3 days  Discharge Date and Time:  01/15/2024 3:51 PM  Attending Physician: Hetal Santos MD   Discharging Provider: Hetal Santos MD  Primary Care Provider: Faizan Antoine MD    Primary Care Team: [facility] MEDICINE D    HPI:   68 y.o. male patient with a PMHx of bladder and lung cancer. Presented to the Emergency Department for evaluation of weakness, hypotension. Referred from Infectious Disease clinic due to BP: 70/50. Family member reports pt has hx of stage 3 CKD and invasive bladder cancer that is unsuitable for chemo. Pt is a poor historian. Patient denies any fever, chills, N/V/D, and all other sxs at this time. No prior tx reported. In the ED, vitals: 74/37, improved to 98/49. HR: 76, afebrile. Significant labs: WBC: 13.89, H/H: 10.6/33.3, Cr: 2.2, Lactic: 1.5, Procal: 0.47, TSH: 0.188, UA: Occult Blood+, Nitrite +, Leuk: 3+, Bact: Few. Blood Cultures collected, Urine Culture pending. ID recommendations reviewed. Treated with IV antibiotics, IV fluids. Urology consulted. Patient is a full code. Admitted to [facility] for management of Sepsis.     * No surgery found *      Hospital Course:   68 year old male, under the care of [facility] for management of Sepsis, UTI, Constipation.     01/13-Patient reported falling overnight, assessed patient, abrasion to left flank inferior to nephrostomy tube, scabbed, scant blood to bed linen. nephrostomy tube in place. Denies pain to left hip pain, FROM on exam.     Significant stool burden on CT. Urology evaluated, recommend to continue IV antibiotics, no acute intervention recommended. Erythematous area around Right Nephrostomy tube, improved. Pt was started on aggressive bowel regimen and was able to have multiple bowel movements  prior to discharge.     Urine culture returned with pansensitive Pseudomonas, abx adjusted from Dapto + Cefepime to PO Ciprofloxacin based on culture. Cr stable at 1.4. Pt seen and examined on day of discharge and discharged home with HH in stable condition per my exam.     Will continue bowel regimen, abx as above, followup with PCP within 1 week. Followup with infectious disease and ID as prev scheduled.        Goals of Care Treatment Preferences:  Code Status: Full Code    Health care agent: 1. Wife: Chantal Currie: [phone] Daughter: Stephania Mann: [phone]  Health care agent number: No value filed.          What is most important right now is to focus on symptom/pain control, extending life as long as possible, even it it means sacrificing quality, curative/life-prolongation (regardless of treatment burdens).  Accordingly, we have decided that the best plan to meet the patient's goals includes continuing with treatment.      Consults:   Consults (From admission, onward)          Status Ordering Provider     Inpatient consult to Social Work/Case Management  Once        Provider:  (Not yet assigned)    Completed ZAYRA CABALLERO     Inpatient consult to Urology  Once        Provider:  (Not yet assigned)    Completed FARIDEH HERNANDEZ            No new Assessment & Plan notes have been filed under this hospital service since the last note was generated.  Service: Hospital Medicine    Final Active Diagnoses:    Diagnosis Date Noted POA    PRINCIPAL PROBLEM:  Sepsis [A41.9] 01/12/2024 Yes    Encephalopathy, metabolic [G93.41] 01/12/2024 Yes    Malfunction of nephrostomy tube [T83.098A] 01/12/2024 Yes    Constipation [K59.00] 01/12/2024 Yes    Malignant neoplasm of urinary bladder [C67.9] 11/15/2023 Yes    Squamous cell carcinoma of right lung [C34.91] 11/09/2023 Yes    Elevated serum creatinine [R79.89] 10/21/2023 Yes    UTI (urinary tract infection) [N39.0] 10/21/2023 Yes      Problems Resolved During  this Admission:       Discharged Condition: good    Disposition: Home-Health Care [facility]    Follow Up:   Follow-up Information       Faizan Antoine MD. Schedule an appointment as soon as possible for a visit in 3 day(s).    Specialty: Family Medicine  Contact information:  [address] [place] [address]  [phone]               Jeremiah Hartman DO. Schedule an appointment as soon as possible for a visit.    Specialty: Infectious Diseases  Contact information:  [address]  [place] [address] [phone]               Joshua Jorgensen MD Follow up.    Specialty: Urology  Why: followup as prev scheduled  Contact information:  [address] [facility]  [place] [address] [phone]                           Patient Instructions:      Diet Adult Regular     SUBSEQUENT HOME HEALTH ORDERS   Order Comments: Wife requesting [employer] Premium Dressing Change Kit with Guardiva and StatLock ([id number]) and night bag for nephrostomies     Order Specific Question Answer Comments   What Home Health Agency is the patient currently using? [facility]      Notify your health care provider if you experience any of the following:  persistent nausea and vomiting or diarrhea     Notify your health care provider if you experience any of the following:  redness, tenderness, or signs of infection (pain, swelling, redness, odor or green/yellow discharge around incision site)     Activity as tolerated       Significant Diagnostic Studies: See Hospital Course     Pending Diagnostic Studies:       None           Medications:  Reconciled Home Medications:      Medication List        START taking these medications      ciprofloxacin HCl 500 MG tablet  Commonly known as: CIPRO  Take 1 tablet (500 mg total) by mouth every 12 (twelve) hours. for 7 days     polyethylene glycol 17 gram Pwpk  Commonly known as: GLYCOLAX  Take 17 g by mouth daily as needed for Constipation.            CONTINUE taking these  medications      * albuterol 0.63 mg/3 mL Nebu  Commonly known as: ACCUNEB  Take 3 mLs (0.63 mg total) by nebulization 3 (three) times daily as needed (sob, wheezing). Rescue     * albuterol 90 mcg/actuation inhaler  Commonly known as: PROVENTIL/VENTOLIN HFA  Inhale 1-2 puffs into the lungs every 4 (four) hours as needed for Wheezing. Rescue     ALPRAZolam 0.5 MG tablet  Commonly known as: XANAX  Take 2 tablets (1 mg total) by mouth 3 (three) times daily as needed for Anxiety.     BREO ELLIPTA 100-25 mcg/dose diskus inhaler  Generic drug: fluticasone furoate-vilanteroL  Inhale 1 puff into the lungs once daily. Controller     cyclobenzaprine 10 MG tablet  Commonly known as: FLEXERIL  Take 1 tablet (10 mg total) by mouth 3 (three) times daily as needed for Muscle spasms.     docusate sodium 100 MG capsule  Commonly known as: COLACE  Take 100 mg by mouth 2 (two) times daily.     EScitalopram oxalate 20 MG tablet  Commonly known as: LEXAPRO  Take 1 tablet (20 mg total) by mouth once daily.     HYDROcodone-acetaminophen 5-325 mg per tablet  Commonly known as: NORCO  Take 2 tablets by mouth every 6 (six) hours as needed for Pain.     levothyroxine 125 MCG tablet  Commonly known as: SYNTHROID  Take 1 tablet (125 mcg total) by mouth before breakfast.     naloxone 4 mg/actuation Spry  Commonly known as: NARCAN  1 spray once.     nicotine 14 mg/24 hr  Commonly known as: NICODERM CQ  Place 1 patch onto the skin every 24 hours.     vitamin D 1000 units Tab  Commonly known as: VITAMIN D3  Take 25 mcg by mouth once daily.           * This list has 2 medication(s) that are the same as other medications prescribed for you. Read the directions carefully, and ask your doctor or other care provider to review them with you.                STOP taking these medications      CEFDINIR ORAL              Indwelling Lines/Drains at time of discharge:   Lines/Drains/Airways       Drain  Duration                  Nephrostomy Left -- days          Nephrostomy Left 8 Fr. -- days         Nephrostomy Right -- days         Nephrostomy 11/17/23 1232 Left 8 Fr. 59 days         Nephrostomy 12/28/23 0959 Right 10.2 Fr. 18 days                    Time spent on the discharge of patient: 45 minutes         Hetal Santos MD  Department of Hospital Medicine  '[facility]

## 2024-01-15 NOTE — NURSING
Entered patients room due to bed alarm and [employer] alerting. Upon entering room patient was on bedside commode and patient's spouse was on couch across room from patient. Stopped the alarms and assisted patient in clean up and back to bed. Patient's spouse reports that bedside commode was directly next to bed, However during last rounding at 2300, bedside commode was against wall across from patient's bed. Educated patient and spouse about injury from fall risk and importance of calling for staff's assistance when getting out of the bed. Bed alarm reset. [employer] at bedside for patient safety. Bed in lowest position, bed wheels locked, call light and personal items within reach, side rails raised x3 Bedside commode placed against wall and bedpan in patients room.

## 2024-01-15 NOTE — PLAN OF CARE
Problem: Adult Inpatient Plan of Care  Goal: Plan of Care Review  Outcome: Ongoing, Progressing     Problem: Adult Inpatient Plan of Care  Goal: Patient-Specific Goal (Individualized)  Outcome: Ongoing, Progressing  Flowsheets (Taken 1/15/2024 0131)  Anxieties, Fears or Concerns: pain control  Individualized Care Needs: light off    Problem: Impaired Wound Healing  Goal: Optimal Wound Healing  Outcome: Ongoing, Progressing     Problem: Renal Function Impairment (Acute Kidney Injury/Impairment)  Goal: Effective Renal Function  Outcome: Ongoing, Progressing     Problem: Skin Injury Risk Increased  Goal: Skin Health and Integrity  Outcome: Ongoing, Progressing     Problem: Fall Injury Risk  Goal: Absence of Fall and Fall-Related Injury  Outcome: Ongoing, Progressing       Discussed POC with pt and spouse, verbalized understanding.  Patient remains free from injury.  Safety precautions maintained.   Call light and personal belongings within reach, bed in lowest position with bed wheels locked.   No s/s of acute distress.  Purposeful rounding continued this shift.  Pain controlled per MD order.  Cardiac monitoring in place, tele box number [id number]. Reading normal sinus  Diet orders continued, pt diet: regular  Vital signs continued per orders this shift.   Drain care continued this shift.  [employer] at bedside for patient safety.   Q2 repositioning independently  Patient mobility status up with assistance  Chart and orders review completed. Pt education about care completed.

## 2024-01-16 PROCEDURE — 77293 RESPIRATOR MOTION MGMT SIMUL: CPT | Mod: TC | Performed by: SPECIALIST

## 2024-01-16 PROCEDURE — 77301 RADIOTHERAPY DOSE PLAN IMRT: CPT | Mod: 26,,, | Performed by: SPECIALIST

## 2024-01-16 PROCEDURE — 77293 RESPIRATOR MOTION MGMT SIMUL: CPT | Mod: 26,,, | Performed by: SPECIALIST

## 2024-01-16 PROCEDURE — 77301 RADIOTHERAPY DOSE PLAN IMRT: CPT | Mod: TC | Performed by: SPECIALIST

## 2024-01-16 PROCEDURE — 77370 RADIATION PHYSICS CONSULT: CPT | Performed by: SPECIALIST

## 2024-01-17 ENCOUNTER — DOCUMENTATION ONLY (OUTPATIENT)
Dept: RADIATION ONCOLOGY | Facility: CLINIC | Age: 69
End: 2024-01-17
Payer: MEDICARE

## 2024-01-17 LAB
BACTERIA BLD CULT: NORMAL
BACTERIA BLD CULT: NORMAL

## 2024-01-17 PROCEDURE — 77338 DESIGN MLC DEVICE FOR IMRT: CPT | Mod: 26,,, | Performed by: SPECIALIST

## 2024-01-17 PROCEDURE — 77338 DESIGN MLC DEVICE FOR IMRT: CPT | Mod: TC | Performed by: SPECIALIST

## 2024-01-17 PROCEDURE — 77470 SPECIAL RADIATION TREATMENT: CPT | Mod: 59,TC | Performed by: SPECIALIST

## 2024-01-17 PROCEDURE — 77300 RADIATION THERAPY DOSE PLAN: CPT | Mod: TC | Performed by: SPECIALIST

## 2024-01-17 PROCEDURE — 77470 SPECIAL RADIATION TREATMENT: CPT | Mod: 26,59,, | Performed by: SPECIALIST

## 2024-01-17 PROCEDURE — 77300 RADIATION THERAPY DOSE PLAN: CPT | Mod: 26,,, | Performed by: SPECIALIST

## 2024-01-17 NOTE — PROGRESS NOTES
Day 1 outpatient xrt to the lung. Lung handout and verbal instructions given. Skin care and side effects reviewed. Contact info provided. Patient verbalized understanding.

## 2024-01-18 ENCOUNTER — OFFICE VISIT (OUTPATIENT)
Dept: PALLIATIVE MEDICINE | Facility: CLINIC | Age: 69
End: 2024-01-18
Payer: MEDICARE

## 2024-01-18 VITALS
OXYGEN SATURATION: 94 % | BODY MASS INDEX: 21.91 KG/M2 | TEMPERATURE: 98 F | SYSTOLIC BLOOD PRESSURE: 90 MMHG | DIASTOLIC BLOOD PRESSURE: 51 MMHG | HEART RATE: 75 BPM | HEIGHT: 69 IN | WEIGHT: 147.94 LBS

## 2024-01-18 DIAGNOSIS — K59.03 THERAPEUTIC OPIOID INDUCED CONSTIPATION: ICD-10-CM

## 2024-01-18 DIAGNOSIS — Z85.118 HX OF CANCER OF LUNG: ICD-10-CM

## 2024-01-18 DIAGNOSIS — G89.3 NEOPLASM RELATED PAIN: ICD-10-CM

## 2024-01-18 DIAGNOSIS — F41.9 ANXIETY AND DEPRESSION: ICD-10-CM

## 2024-01-18 DIAGNOSIS — M25.551 RIGHT HIP PAIN: ICD-10-CM

## 2024-01-18 DIAGNOSIS — T40.2X5A THERAPEUTIC OPIOID INDUCED CONSTIPATION: ICD-10-CM

## 2024-01-18 DIAGNOSIS — Z51.5 PALLIATIVE CARE ENCOUNTER: ICD-10-CM

## 2024-01-18 DIAGNOSIS — F32.A ANXIETY AND DEPRESSION: ICD-10-CM

## 2024-01-18 DIAGNOSIS — Z85.51 HX OF BLADDER CANCER: Primary | ICD-10-CM

## 2024-01-18 PROCEDURE — 99499 UNLISTED E&M SERVICE: CPT | Mod: S$PBB,,,

## 2024-01-18 PROCEDURE — 99999 PR PBB SHADOW E&M-EST. PATIENT-LVL III: CPT | Mod: PBBFAC,,,

## 2024-01-18 PROCEDURE — 99213 OFFICE O/P EST LOW 20 MIN: CPT | Mod: PBBFAC,25

## 2024-01-18 PROCEDURE — 99214 OFFICE O/P EST MOD 30 MIN: CPT | Mod: S$PBB,,,

## 2024-01-18 PROCEDURE — 77373 STRTCTC BDY RAD THER TX DLVR: CPT | Performed by: SPECIALIST

## 2024-01-18 PROCEDURE — 77014 PR  CT GUIDANCE PLACEMENT RAD THERAPY FIELDS: CPT | Mod: 26,,, | Performed by: SPECIALIST

## 2024-01-18 PROCEDURE — 77435 SBRT MANAGEMENT: CPT | Mod: ,,, | Performed by: SPECIALIST

## 2024-01-18 NOTE — PROGRESS NOTES
Palliative Medicine  Follow-up   Consult Requested By: No ref. provider found  Reason for Consult: Symptom Management/Advance Care Planning/Goals of Care Discussion        SUBJECTIVE:     History of Present Illness:  Geovani Currie is a 68 y.o. male with Recurrent Stage 3B Bladder Cancer and Stage 1A Lung Cancer.   Plans to receive SBRT for Lung cancer. He is a poor surgical candidate for lung resection, per Dr. Wagoner and Dr. Antunez (Rad Onc).   S/p: TURBT and gemcitabine/radiotherapy last on 11/25/2020, with repeat TURBT on 08/30/2023   S/p: Right Nephrostomy tube replacement-12/28/2023.  S/P; Left Kidney Stent  S/p; R Hip ORIF on 11/21/23.   PMHX: CKD Stage III, HTN, Spinal stenosis, progressive functional decline.   Multiple UTIs requiring frequent hospital admissions.  He is followed by Dr. Wagoner, Dr. Frazier, Urology, and Rad Onc.    Chief Complain: Pain/Constipation    1/18/2024: Pt attended clinic with wife, Ms. Cornejo. He was in no acute distress, cognition has worsened since last visit. He does not remember the specific details of his hospital admission on 1/12/2024.   Vital signs stable on room air.  Constipation- Takes Glycolax daily. Last BM- 3 days ago. Passing gas.   Lower back pain and right hip pain 7/10. Pain poorly managed on Norco 5mg Q8H PRN. Pt wants to increase the frequency, but he and his wife are concerned about constipation   Attempted GOC conversation, pt's wife stated they are still recovering from recent admission and would like to defer code status/GOC discussion for now.     LANNY JEWELL Reviewed and Summarized:      Past Visits:    Pt attended clinic with his daughter, Stephania. He was in no acute distress. Hypotension: 86/51- Asymptomatic.   I explained the role palliative care often plays in supporting patients with serious illness and their families regarding symptom management, advance care planning, and goals of care discussion.  Pt and daughter verbalized understanding.     Pt  reported Right hip pain 8/10 following his ORIF and pelvic/lower back pain. He is inconsistent with physiotherapy due to pain, fatigue, and declining functional status. He is taking Norco 5-10mg QID PRN.   He also noted anxiety especially during the day, managed with Xanax 0.5mg PRN. He stated his anxiety slightly improves with Xanax 0.5mg, and wants to increase to Xanax 1mg BID PRN.  He notes he sometimes takes Flexeril for anxiety with no significant effect. He is sometimes unsure of how to take his medications, but his daughter and wife assist with managing his medications.  His daughter expressed concerns about pt falling due to taking medications with sedative effects.    He is unsure if Lexapro is improving his mood, and would like to stop/change it, per his wife's request.   Pt stated he will defer behavioral health counseling for now.     We discussed advance care planning, goals of care, and code status, Full Code vs. DNR including risks, benefits, and alternatives.   Pt and his daughter informed me they would like to discuss his cancer diagnosis with Dr. Frazier and explore other treatment options prior to choosing hospice care. Pt and his family had previously considered [facility], but would like to defer further conversations with them for now.       Furthermore,  pt stated he would like to remain FULL CODE, which is a change from his previous wish for DNR code status during a previous conversation with my colleague, Randa Castillo NP. Pt's daughter also noted this is change she was unaware of. She stated pt had expressed his wish for DNR code status since he lived in [place]. However, she stated she respects his wishes and would like to discuss this further.    Pt stated he is unsure of how long he would like to remain on life support if no physical/neurological recovery, but he will discuss this further with his family.   Given, pt's change in code status, I explained to pt and his daughter  that there is no resuscitation attempt in hospice care. We discussed the focus of hospice care in optimizing symptom management and quality of life without aggressive medical treatments including chemotherapy. Pt's daughter stated she was unaware that pt would not be resuscitated should he enrol in hospice care.      Pt's elected HCPOAs are: 1. Wife: Chantal Currie: [phone]  Daughter: Stephania Mann: [phone]    LA  reviewed and summarized:        Past Medical History:   Diagnosis Date    COPD (chronic obstructive pulmonary disease)     Hypertension      Past Surgical History:   Procedure Laterality Date    APPENDECTOMY      CATARACT EXTRACTION      NEPHROSTOMY      OPEN REDUCTION AND INTERNAL FIXATION (ORIF) OF INTERTROCHANTERIC FRACTURE OF FEMUR Right 11/21/2023    Procedure: ORIF, FRACTURE, FEMUR, INTERTROCHANTERIC;  Surgeon: Tanisha Guidry DO;  Location: [facility];  Service: Orthopedics;  Laterality: Right;  Gamma nail     Family History   Problem Relation Age of Onset    Cancer Mother         NOS    Cancer Father         NOS    Cancer Maternal Grandfather         NOS    Bladder Cancer Neg Hx      Review of patient's allergies indicates:  No Known Allergies    Medications:    Current Outpatient Medications:     albuterol (ACCUNEB) 0.63 mg/3 mL Nebu, Take 3 mLs (0.63 mg total) by nebulization 3 (three) times daily as needed (sob, wheezing). Rescue, Disp: 75 mL, Rfl: 3    albuterol (PROVENTIL/VENTOLIN HFA) 90 mcg/actuation inhaler, Inhale 1-2 puffs into the lungs every 4 (four) hours as needed for Wheezing. Rescue, Disp: 18 g, Rfl: 11    ALPRAZolam (XANAX) 0.5 MG tablet, Take 2 tablets (1 mg total) by mouth 3 (three) times daily as needed for Anxiety., Disp: 60 tablet, Rfl: 2    ciprofloxacin HCl (CIPRO) 500 MG tablet, Take 1 tablet (500 mg total) by mouth every 12 (twelve) hours. for 7 days, Disp: 14 tablet, Rfl: 0    cyclobenzaprine (FLEXERIL) 10 MG tablet, Take 1 tablet (10 mg total) by mouth 3  (three) times daily as needed for Muscle spasms., Disp: 270 tablet, Rfl: 3    docusate sodium (COLACE) 100 MG capsule, Take 100 mg by mouth 2 (two) times daily., Disp: , Rfl:     EScitalopram oxalate (LEXAPRO) 20 MG tablet, Take 1 tablet (20 mg total) by mouth once daily., Disp: 90 tablet, Rfl: 3    fluticasone furoate-vilanteroL (BREO ELLIPTA) 100-25 mcg/dose diskus inhaler, Inhale 1 puff into the lungs once daily. Controller, Disp: 30 each, Rfl: 11    HYDROcodone-acetaminophen (NORCO) 5-325 mg per tablet, Take 2 tablets by mouth every 6 (six) hours as needed for Pain., Disp: 240 tablet, Rfl: 0    levothyroxine (SYNTHROID) 125 MCG tablet, Take 1 tablet (125 mcg total) by mouth before breakfast., Disp: 90 tablet, Rfl: 3    naloxone (NARCAN) 4 mg/actuation Spry, 1 spray once., Disp: , Rfl:     nicotine (NICODERM CQ) 14 mg/24 hr, Place 1 patch onto the skin every 24 hours., Disp: , Rfl:     polyethylene glycol (GLYCOLAX) 17 gram PwPk, Take 17 g by mouth daily as needed for Constipation., Disp: 30 each, Rfl: 0    vitamin D (VITAMIN D3) 1000 units Tab, Take 25 mcg by mouth once daily., Disp: , Rfl:     OBJECTIVE:     ROS:  Review of Systems   Constitutional:  Positive for activity change and fatigue. Negative for appetite change, fever and unexpected weight change.   HENT:  Negative for trouble swallowing and voice change.    Eyes: Negative.    Respiratory:  Negative for cough, chest tightness, shortness of breath and wheezing.    Cardiovascular:  Negative for chest pain, palpitations and leg swelling.   Gastrointestinal:  Positive for abdominal pain (Lower Abdomen/Pelvis) and constipation. Negative for abdominal distention, diarrhea, nausea and vomiting.   Genitourinary:         Bilat Nephrostomy bag   Musculoskeletal:  Positive for back pain, gait problem (Ambulates wi/ Walker/Wheelchair) and myalgias. Negative for arthralgias and joint swelling.   Skin:  Negative for color change, pallor, rash and wound.    Neurological:  Positive for weakness (Right leg). Negative for dizziness, tremors, speech difficulty, numbness and headaches.   Psychiatric/Behavioral:  Positive for dysphoric mood and sleep disturbance (Due to pain). Negative for agitation, behavioral problems, confusion and suicidal ideas. The patient is nervous/anxious.        Review of Symptoms      Symptom Assessment (ESAS 0-10 Scale)  Pain:  7  Dyspnea:  0  Anxiety:  7  Nausea:  0  Depression:  5  Anorexia:  0  Fatigue:  7  Insomnia:  5  Restlessness:  0  Agitation:  5     CAM / Delirium:  Negative  Constipation:  Positive  Diarrhea:  Negative    Anxiety:  Is nervous/anxious  Constipation:  Constipation    Bowel Management Plan (BMP):  No      Pain Assessment:    Location(s): abdomen and leg    Abdomen       Location: bilateral, anterior and posterior        Quality: Throbbing and pressure-like        Quantity: 7/10 in intensity        Chronicity: Onset 0 year(s) ago, gradually worsening        Aggravating Factors: Movement        Alleviating Factors: Opiates        Associated Symptoms: Arthralgias and myalgias  Leg       Location: right (Hip)        Quality: Sharp and aching        Quantity: 8/10 in intensity        Chronicity: Onset 1 month(s) ago, unchanged        Aggravating Factors: Movement        Alleviating Factors: Opiates        Associated Symptoms: Arthralgias and myalgias    Modified Cony Scale:  0    Performance Status:  50    Living Arrangements:  Lives with family    Psychosocial/Cultural:   See Palliative Psychosocial Note: Yes  Retired. Pt and his wife live with their daughter and her five children. Pt's daughter often travels for work.   **Primary  to Follow**  Palliative Care  Consult: Yes      Advance Care Planning   Advance Directives:   Living Will: No    LaPOST: No    Do Not Resuscitate Status: No    Medical Power of : Yes    Agent's Name:  1. Wife: Chantal Kush: [phone] Daughter: Stephania Mann:  [phone]    Decision Making:  Patient answered questions and Family answered questions  Goals of Care: What is most important right now is to focus on symptom/pain control, extending life as long as possible, even it it means sacrificing quality, curative/life-prolongation (regardless of treatment burdens). Accordingly, we have decided that the best plan to meet the patient's goals includes continuing with treatment.            Physical Exam:  Vitals: Temp: 97.8 °F (36.6 °C) (01/18/24 1356)  Pulse: 75 (01/18/24 1356)  BP: (!) 90/51 (01/18/24 1356)  SpO2: (!) 94 % (01/18/24 1356)  Physical Exam  Vitals and nursing note reviewed.   Constitutional:       General: He is not in acute distress.     Appearance: He is underweight. He is ill-appearing.   HENT:      Head: Normocephalic and atraumatic.      Nose: Nose normal. No congestion or rhinorrhea.      Mouth/Throat:      Mouth: Mucous membranes are moist.      Pharynx: Oropharynx is clear. No oropharyngeal exudate or posterior oropharyngeal erythema.   Eyes:      General: No scleral icterus.        Right eye: No discharge.         Left eye: No discharge.      Conjunctiva/sclera: Conjunctivae normal.      Pupils: Pupils are equal, round, and reactive to light.   Cardiovascular:      Rate and Rhythm: Normal rate and regular rhythm.      Pulses: Normal pulses.      Heart sounds: Murmur heard.      No gallop.   Pulmonary:      Effort: Pulmonary effort is normal. No respiratory distress.      Breath sounds: Normal breath sounds. No stridor. No wheezing.   Chest:      Chest wall: No tenderness.   Abdominal:      General: Bowel sounds are normal. There is no distension.      Palpations: Abdomen is soft.      Tenderness: There is abdominal tenderness (Lower abdomen/Pelvis). There is no right CVA tenderness or left CVA tenderness.   Genitourinary:     Comments: Bilat nephrostomy bags. Site CDI. Urine normal color  Musculoskeletal:         General: Tenderness (R hip) and  signs of injury (s/p R hip ORIF) present. No swelling.      Cervical back: Normal range of motion and neck supple.      Right lower leg: No edema.      Left lower leg: No edema.   Skin:     General: Skin is warm and dry.      Capillary Refill: Capillary refill takes less than 2 seconds.      Coloration: Skin is not jaundiced or pale.      Findings: No rash.   Neurological:      Mental Status: He is alert and oriented to person, place, and time.      Motor: Weakness present.      Gait: Gait abnormal (Ambulates with walker/wheelchair).   Psychiatric:         Attention and Perception: Attention normal.         Mood and Affect: Affect normal. Mood is anxious.         Speech: Speech normal.         Behavior: Behavior normal. Behavior is cooperative.         Thought Content: Thought content normal.         Cognition and Memory: Cognition normal. Memory is impaired.         Judgment: Judgment normal.         Labs and radiology data reviewed    ASSESSMENT/PLAN:   Recurrent Stage 3B Bladder Cancer and Stage 1A Lung Cancer  Followed by Dr. Wagoner/Dr. Frazier (Uro Onc)  Plans to receive SBRT for Lung cancer. He is a poor surgical candidate for lung resection, per Dr. Wagoner and Dr. Antunez (Rad Onc).  Plan to receive Keytruda Q3W   PET 12/13/2023: Neck: No abnormal foci of FDG uptake identified.  Chest: Hypermetabolic spiculated nodule measuring 2.0 x 1.3 cm in the right lung apex.  No other pulmonary nodules or masses.  No hypermetabolic intrathoracic or axillary lymphadenopathy.  Aortic atherosclerosis and coronary artery calcifications. Abdomen: Bilateral nephrostomy tubes in satisfactory position.  Left renal atrophy with cortical thinning. Pelvis: Circumferential urinary bladder wall thickening with circumferential heterogeneous increased uptake that involves the left side of the mesorectal fascia.  Curvilinear intraluminal calcifications within the urinary bladder along the left lateral wall.  Nine hypermetabolic pelvic  lymphadenopathy. Musculoskeletal: Right hip fracture transfixed orthopedic hardware.  Neoplasm related pain in the setting on Stage 3 Bladder Cancer/Right hip post-op pain  Reviewed scans with collaborating physician, Dr. Rebolledo.   Uncontrolled-   Norco 7.5mg Q6H PRN. Can titrate as tolerated and if constipation improves  Narcan RX given and explained use to pt and family. Pt and family verbalized understanding.    Pt and his daughter informed to take Norco 1-2 hours before or after Xanax to minimize risks for CNS depression.  Ortho following for R hip ORIF  Pt and daughter instructed to stop Flexeril if not effective, and causing CNS depression.  -Pain Contract- To be completed prior to prescribing   -UDS-  N/A   Opioid-Induced constipation  Miralax 1pack/day or Senna 2 tabs at bedtime for opioid-induced constipation    OIC sheet given and explained to pt and his wife.   Anxiety/Depression  Chronic and exacerbated by cancer diagnosis  Xanax 1mg Q8H PRN  Pt and daughter to discuss with Dr. Antoine if ok to change Lexapro to another antidepressant.   Will refer to behavioral health for counseling once pt consents.   Encounter for Palliative Care  -Code status: Full Code. Pt does not wish to remain DNR.  -HCPOA: 1. Wife: Chantal Currie: [phone]  Daughter: Stephania Mann: [phone]  -GOC: Defer hospice. Wants to proceed with Keytruda.   -See HPI for further details      Follow up: 1 month      35 minutes of total time spent on the encounter, which includes face to face time and non-face to face time preparing to see the patient (eg, review of tests), Obtaining and/or reviewing separately obtained history, Documenting clinical information in the electronic or other health record, Independently interpreting results (not separately reported) and communicating results to the patient/family/caregiver, or Care coordination (not separately reported).    Signature: PADILLA CARDONA NP

## 2024-01-18 NOTE — PATIENT INSTRUCTIONS
Miralax 1pack/day or Senna 2 tabs at bedtime for opioid-induced constipation    Avoid taking Magnesium for constipation. This is dangerous for your kidneys.   Norco 7.5mg (1 and 1/2 pills) Every 6 hours as needed for pain. Up to 4 doses per day.   Please call EB to tell  me how many Norco pills you have left.   Take Xanax 1-2 hours before or after Norco. Do not take them together to avoid becoming unconscious.

## 2024-01-19 PROCEDURE — 77014 PR  CT GUIDANCE PLACEMENT RAD THERAPY FIELDS: CPT | Mod: 26,,, | Performed by: SPECIALIST

## 2024-01-19 PROCEDURE — 77373 STRTCTC BDY RAD THER TX DLVR: CPT | Performed by: SPECIALIST

## 2024-01-20 ENCOUNTER — EXTERNAL HOME HEALTH (OUTPATIENT)
Dept: HOME HEALTH SERVICES | Facility: HOSPITAL | Age: 69
End: 2024-01-20
Payer: MEDICARE

## 2024-01-22 ENCOUNTER — DOCUMENTATION ONLY (OUTPATIENT)
Dept: RADIATION ONCOLOGY | Facility: CLINIC | Age: 69
End: 2024-01-22
Payer: MEDICARE

## 2024-01-22 PROCEDURE — 77373 STRTCTC BDY RAD THER TX DLVR: CPT | Performed by: SPECIALIST

## 2024-01-22 PROCEDURE — 77014 PR  CT GUIDANCE PLACEMENT RAD THERAPY FIELDS: CPT | Mod: 26,,, | Performed by: SPECIALIST

## 2024-01-22 NOTE — PHYSICIAN QUERY
PT Name: Geovani Currie  MR #: [id number]    DOCUMENTATION CLARIFICATION     CDS/: Lissa Powers RN              Contact information: [email or web address]    This form is a permanent document in the medical record.     Query Date: January 22, 2024    By submitting this query, we are merely seeking further clarification of documentation. Please utilize your independent clinical judgment when addressing the question(s) below.    Supporting Clinical Findings Location in Medical Record   Sepsis  My overall impression is sepsis.  Source: Urinary Tract  Organ dysfunction indicated by Acute kidney injury and Encephalopathy    Malfunction of nephrostomy tube  Erythema, Edema around right tube sit 1/12 HP       Provider, please clarify if there is any clinical correlation between ___Sepsis/UTI___ and ___Nephrostomy tube_____.           Are the conditions:      [  ] Due to or associated with each other   [  ] Unrelated to each other   [  ] Other explanation (Please Specify): ______________   [ X ] Clinically Undetermined                                                                               Please document in your progress notes daily for the duration of treatment until resolved and include in your discharge summary.

## 2024-01-22 NOTE — PROGRESS NOTES
Day 3 outpatient xrt to the lung. Tolerating therapy without change or complaint. Will continue to monitor.

## 2024-01-23 ENCOUNTER — PATIENT MESSAGE (OUTPATIENT)
Dept: PALLIATIVE MEDICINE | Facility: CLINIC | Age: 69
End: 2024-01-23
Payer: MEDICARE

## 2024-01-23 PROCEDURE — 77373 STRTCTC BDY RAD THER TX DLVR: CPT | Performed by: SPECIALIST

## 2024-01-23 PROCEDURE — 77014 PR  CT GUIDANCE PLACEMENT RAD THERAPY FIELDS: CPT | Mod: 26,,, | Performed by: SPECIALIST

## 2024-01-24 ENCOUNTER — DOCUMENTATION ONLY (OUTPATIENT)
Dept: RADIATION ONCOLOGY | Facility: CLINIC | Age: 69
End: 2024-01-24
Payer: MEDICARE

## 2024-01-24 PROCEDURE — 77014 PR  CT GUIDANCE PLACEMENT RAD THERAPY FIELDS: CPT | Mod: 26,,, | Performed by: SPECIALIST

## 2024-01-24 PROCEDURE — 77373 STRTCTC BDY RAD THER TX DLVR: CPT | Performed by: SPECIALIST

## 2024-01-24 NOTE — PROGRESS NOTES
Completed outpatient xrt to the lung today 1/24/24. Follow up appt made and given to the patient.

## 2024-01-25 ENCOUNTER — PATIENT MESSAGE (OUTPATIENT)
Dept: PALLIATIVE MEDICINE | Facility: CLINIC | Age: 69
End: 2024-01-25
Payer: MEDICARE

## 2024-01-25 DIAGNOSIS — G89.3 NEOPLASM RELATED PAIN: Primary | ICD-10-CM

## 2024-01-25 DIAGNOSIS — Z85.51 HX OF BLADDER CANCER: ICD-10-CM

## 2024-01-25 PROCEDURE — 77336 RADIATION PHYSICS CONSULT: CPT | Performed by: SPECIALIST

## 2024-01-25 RX ORDER — HYDROCODONE BITARTRATE AND ACETAMINOPHEN 7.5; 325 MG/1; MG/1
1 TABLET ORAL EVERY 6 HOURS PRN
Qty: 60 TABLET | Refills: 0 | Status: SHIPPED | OUTPATIENT
Start: 2024-01-25 | End: 2024-02-09

## 2024-01-26 ENCOUNTER — OFFICE VISIT (OUTPATIENT)
Dept: INFECTIOUS DISEASES | Facility: CLINIC | Age: 69
End: 2024-01-26
Payer: MEDICARE

## 2024-01-26 DIAGNOSIS — Z85.51 HX OF BLADDER CANCER: ICD-10-CM

## 2024-01-26 DIAGNOSIS — Z85.118 HX OF CANCER OF LUNG: ICD-10-CM

## 2024-01-26 DIAGNOSIS — N30.00 ACUTE CYSTITIS WITHOUT HEMATURIA: Primary | ICD-10-CM

## 2024-01-26 PROCEDURE — 99213 OFFICE O/P EST LOW 20 MIN: CPT | Mod: 95,,, | Performed by: STUDENT IN AN ORGANIZED HEALTH CARE EDUCATION/TRAINING PROGRAM

## 2024-01-26 NOTE — PROGRESS NOTES
The patient location is: Home  The chief complaint leading to consultation is: Hospital follow up (UTI)     Visit type: audiovisual    Notes:    Subjective:    HPI: 68 y.o. male with pertinent PMHx of bladder and lung cancer who has been seen by ID outpatient as hospital follow up after being brought to [facility] on 10/20 with altered mental status. Patient [place] from the [place] with worsening lethargy and shallow respiration. He was noted to be saturating around 90% on room air and was placed on 2 L. Patient just moved from [place] to [place] to be closer to family for cancer treatments. Family is concerned that over the past few days he had become increasingly weak. In the ER, arrival vitals were significant for hypoxia, patient was placed on 5 L. CBC showed normocytic anemia. CMP was significant for low albumin, elevated sugars. TSH , lactic acid, troponin within normal range. U/A was concerning for pyuria >100. Urine culture grew Enterobacter cloacae >100,000 cfu/mL. CT head with moderate area of hypoattenuation in the parietal cortex suggesting infarct although radiologist favors chronic process. Small probable remote areas of lacunar infarct involving the left caudate, bilateral thalamus and right lateral basal ganglia. CTA head and neck with no evidence of acute intracranial abnormality. No high-grade stenosis or major vessel occlusion. Spiculated lesion noticed in the right posterior lung apex. Chest x-ray showed no acute or significant focal chest abnormality. MRI brain with no acute stroke. Infectious Diseases consulted. Recommend stopping cefepime patient had been on due to risk for neurotoxicity. Switched to ertapenem 1 g daily for total of 14 days (stop date 11/4). Per daughter, patient had received 10 days of oral antibiotics while in [place] and nephrostomy tube was infected. They are waiting for referral to heme/onc for 2 months of chemo. Still establishing care since moving to  [place]. Will be in rehab for 2 weeks. Seeing urologist 11/2. Stent in left kidney 3 months ago for stones and kidney failed. 9/18 both kidneys failed so stent placed and nephrostomy tube on right. Only dribbles from penis. All urine from nephrostomy tube. High grade bladder cancer, lung cancer, and lymph node involvement. Patient admitted 11/10/23 for accidentally removing nephrostomy tube at home. Per IR note, indwelling right dislodged PCNU was removed and replaced. Prior to discharge patient spiked fever to 101.1 F. Very mild leukocytosis noted. UA with 76 WBC. Urine culture in process. ID consulted for UTI. Discharged on 14 day course of linezolid for VRE in urine. Then admitted 12/21. Urine culture no growth. Given 14 day course of linezolid for prior VRE isolate.      1/12/24: Today wife reports new lump along right nephrostomy site, inability to stand, chills, and lack of energy. BP low in 70s systolic. Holding metoprolol. Frustrated trying to figure out how keep nephrostomy tubes in place. They overflow overnight and then urine comes out of patient's back. No fever at home. Wife convinced patient has an infection. Hypotensive in 70s systolic after two checks in office today. Notably patient took dose of metoprolol and Norco prior to appointment. Has been on Norco for years per daughter. O2 sat 89%. Recommend going to ER for hypotensive workup. Would benefit from chest and renal imaging as well as viral URI workup. Can begin empiric UTI coverage and attempt sterile urine collection for culture. Also needs urology consultation. Family voiced understanding.    1/26: Patient admitted 1/12-1/15 for weakness and hypotension. Summary as follows: 01/13-Patient reported falling overnight, assessed patient, abrasion to left flank inferior to nephrostomy tube, scabbed, scant blood to bed linen. nephrostomy tube in place. Denies pain to left hip pain, FROM on exam. Significant stool burden on CT. Urology evaluated,  recommend to continue IV antibiotics, no acute intervention recommended. Erythematous area around Right Nephrostomy tube, improved. Pt was started on aggressive bowel regimen and was able to have multiple bowel movements prior to discharge. Urine culture returned with pan sensitive Pseudomonas, abx adjusted from Dapto + Cefepime to PO Ciprofloxacin based on culture. Cr stable at 1.4. Pt seen and examined on day of discharge and discharged home with HH in stable condition per my exam. Will continue bowel regimen, abx as above, followup with PCP within 1 week. Follow up with ID as prev scheduled. Patient completed outpatient XRT to the lung 1/24/24 for squamous cell carcinoma of right lung. Per last heme/onc visit 1/12 plan is to evaluate for pembrolizumab based on functional status to treat bladder cancer. Next visit with them is 2/2. Patient has completed 7 day course of cipro. Today he and family report he has been eating tremendously well and is regaining his strength every day. Has done really well since radiation treatment. Positive regarding candidacy for chemotherapy. Has not had any complications with PCN since discharge. Should be receiving nighttime bags shortly but so far no notable urine leak when waking up. Tolerating ciprofloxacin without incident. No symptoms indicative of UTI at this time. Discussed vitamin C and cranberry supplement daily. Voiced understanding.           Urine Culture, Routine  Abnormal   PSEUDOMONAS AERUGINOSA  >100,000 cfu/ml     Resulting Agency [facility]      Susceptibility   Pseudomonas aeruginosa     CULTURE, URINE     Amikacin <=16 mcg/mL Sensitive     Cefepime <=2 mcg/mL Sensitive     Ciprofloxacin <=1 mcg/mL Sensitive     Gentamicin <=4 mcg/mL Sensitive     Levofloxacin <=2 mcg/mL Sensitive     Meropenem <=1 mcg/mL Sensitive     Piperacillin/Tazo <=16 mcg/mL Sensitive     Tobramycin <=4 mcg/mL Sensitive               Linear View        Narrative  Performed by: [facility]  Specimen  Source->Urine      Specimen Collected: 01/12/24 11:55 CST Last Resulted: 01/15/24 10:28 CST             Last urology note 1/13: Agree with treatment with maxipime, daptomycin. Follow up blood and urine cultures. Will defer final antibiotic recommendations to ID. R PCN tube was changed approximately 2 weeks ago and appears to be in good position. L PCN tube changed approximately 3 weeks ago and appears to be in good position.   No surgical indication at this time.     Review of Systems:   Negative unless otherwise noted positive-  Gen- Weakness/ Fatigue  Neuro- Confusion  CV- Chest Pain/ Palpitations  Resp: Cough/ SOB  GI- Nausea/Vomiting  Extrem- Pain/Swelling      Objective:    Physical Exam:  General- Patient alert and oriented x3 in NAD  HEENT- PERRLA, EOMI, OP clear  Resp- No increased WOB noted. Not using accessory muscles.  Extrem- No cyanosis, clubbing, edema.   Skin-  No Jaundice. No visible skin lesions.      Plan:  UTI (urinary tract infection)  --Patient completed 7 day course of ciprofloxacin for Pseudomonas UTI this month   --Has b/l PCN intact; properly positioned per last urology evaluation   --PCN places patient at risk for recurrent infection   --Recommend treating each acute episode as they occur; there is no good option for suppression due to polymicrobial UTI history   --Needs close urology follow up moving forward    --Recommend daily vitamin C and cranberry supplement   --Follow up with ID in 1 month      Hx of cancer of lung  --As of 1/24 has completed XRT   --Follow up with rad onc     Hx of bladder cancer  --Heme/onc to re-evaluate for pembrolizumab initiation on 2/2       Face to Face time with patient: 10 minutes   25 minutes of total time spent on the encounter, which includes face to face time and non-face to face time preparing to see the patient (eg, review of tests), Obtaining and/or reviewing separately obtained history, Documenting clinical information in the electronic or other  health record, Independently interpreting results (not separately reported) and communicating results to the patient/family/caregiver, or Care coordination (not separately reported).     Each patient to whom he or she provides medical services by telemedicine is:  (1) informed of the relationship between the physician and patient and the respective role of any other health care provider with respect to management of the patient; and (2) notified that he or she may decline to receive medical services by telemedicine and may withdraw from such care at any time.

## 2024-01-30 ENCOUNTER — TELEPHONE (OUTPATIENT)
Dept: PAIN MEDICINE | Facility: CLINIC | Age: 69
End: 2024-01-30
Payer: MEDICARE

## 2024-01-30 ENCOUNTER — OFFICE VISIT (OUTPATIENT)
Dept: PAIN MEDICINE | Facility: CLINIC | Age: 69
End: 2024-01-30
Payer: MEDICARE

## 2024-01-30 ENCOUNTER — LAB VISIT (OUTPATIENT)
Dept: LAB | Facility: HOSPITAL | Age: 69
End: 2024-01-30
Attending: FAMILY MEDICINE
Payer: MEDICARE

## 2024-01-30 VITALS
HEIGHT: 69 IN | BODY MASS INDEX: 21.85 KG/M2 | SYSTOLIC BLOOD PRESSURE: 113 MMHG | DIASTOLIC BLOOD PRESSURE: 69 MMHG | HEART RATE: 109 BPM

## 2024-01-30 DIAGNOSIS — G89.3 CANCER RELATED PAIN: ICD-10-CM

## 2024-01-30 DIAGNOSIS — M48.00 SPINAL STENOSIS, UNSPECIFIED SPINAL REGION: ICD-10-CM

## 2024-01-30 DIAGNOSIS — C34.91 SQUAMOUS CELL CARCINOMA OF RIGHT LUNG: ICD-10-CM

## 2024-01-30 DIAGNOSIS — Z85.51 HX OF BLADDER CANCER: ICD-10-CM

## 2024-01-30 DIAGNOSIS — D63.0 ANEMIA IN NEOPLASTIC DISEASE: ICD-10-CM

## 2024-01-30 DIAGNOSIS — M51.36 DDD (DEGENERATIVE DISC DISEASE), LUMBAR: ICD-10-CM

## 2024-01-30 DIAGNOSIS — C67.9 MALIGNANT NEOPLASM OF URINARY BLADDER, UNSPECIFIED SITE: Primary | ICD-10-CM

## 2024-01-30 DIAGNOSIS — M54.16 LUMBAR RADICULOPATHY: ICD-10-CM

## 2024-01-30 DIAGNOSIS — G89.3 NEOPLASM RELATED PAIN: ICD-10-CM

## 2024-01-30 DIAGNOSIS — C67.9 MALIGNANT NEOPLASM OF URINARY BLADDER, UNSPECIFIED SITE: ICD-10-CM

## 2024-01-30 DIAGNOSIS — Z79.899 LONG TERM CURRENT USE OF THERAPEUTIC DRUG: ICD-10-CM

## 2024-01-30 DIAGNOSIS — M47.816 LUMBAR SPONDYLOSIS: ICD-10-CM

## 2024-01-30 LAB
ALBUMIN SERPL BCP-MCNC: 2.9 G/DL (ref 3.5–5.2)
ALP SERPL-CCNC: 111 U/L (ref 55–135)
ALT SERPL W/O P-5'-P-CCNC: 12 U/L (ref 10–44)
ANION GAP SERPL CALC-SCNC: 11 MMOL/L (ref 8–16)
AST SERPL-CCNC: 10 U/L (ref 10–40)
BILIRUB SERPL-MCNC: 0.3 MG/DL (ref 0.1–1)
BUN SERPL-MCNC: 19 MG/DL (ref 8–23)
CALCIUM SERPL-MCNC: 9.8 MG/DL (ref 8.7–10.5)
CHLORIDE SERPL-SCNC: 104 MMOL/L (ref 95–110)
CO2 SERPL-SCNC: 26 MMOL/L (ref 23–29)
CREAT SERPL-MCNC: 1.5 MG/DL (ref 0.5–1.4)
ERYTHROCYTE [DISTWIDTH] IN BLOOD BY AUTOMATED COUNT: 18.1 % (ref 11.5–14.5)
EST. GFR  (NO RACE VARIABLE): 50 ML/MIN/1.73 M^2
FERRITIN SERPL-MCNC: 436 NG/ML (ref 20–300)
GLUCOSE SERPL-MCNC: 104 MG/DL (ref 70–110)
HCT VFR BLD AUTO: 32.3 % (ref 40–54)
HGB BLD-MCNC: 10.2 G/DL (ref 14–18)
IMM GRANULOCYTES # BLD AUTO: 0.02 K/UL (ref 0–0.04)
IRON SERPL-MCNC: 21 UG/DL (ref 45–160)
MCH RBC QN AUTO: 28.1 PG (ref 27–31)
MCHC RBC AUTO-ENTMCNC: 31.6 G/DL (ref 32–36)
MCV RBC AUTO: 89 FL (ref 82–98)
NEUTROPHILS # BLD AUTO: 5.1 K/UL (ref 1.8–7.7)
PLATELET # BLD AUTO: 258 K/UL (ref 150–450)
PMV BLD AUTO: 9.8 FL (ref 9.2–12.9)
POTASSIUM SERPL-SCNC: 4 MMOL/L (ref 3.5–5.1)
PROT SERPL-MCNC: 7.4 G/DL (ref 6–8.4)
RBC # BLD AUTO: 3.63 M/UL (ref 4.6–6.2)
SATURATED IRON: 9 % (ref 20–50)
SODIUM SERPL-SCNC: 141 MMOL/L (ref 136–145)
TOTAL IRON BINDING CAPACITY: 223 UG/DL (ref 250–450)
TRANSFERRIN SERPL-MCNC: 151 MG/DL (ref 200–375)
TSH SERPL DL<=0.005 MIU/L-ACNC: 1.45 UIU/ML (ref 0.4–4)
WBC # BLD AUTO: 6.86 K/UL (ref 3.9–12.7)

## 2024-01-30 PROCEDURE — 83540 ASSAY OF IRON: CPT | Performed by: HOSPITALIST

## 2024-01-30 PROCEDURE — 85027 COMPLETE CBC AUTOMATED: CPT | Performed by: HOSPITALIST

## 2024-01-30 PROCEDURE — 99205 OFFICE O/P NEW HI 60 MIN: CPT | Mod: S$PBB,,, | Performed by: ANESTHESIOLOGY

## 2024-01-30 PROCEDURE — 82728 ASSAY OF FERRITIN: CPT | Performed by: HOSPITALIST

## 2024-01-30 PROCEDURE — 36415 COLL VENOUS BLD VENIPUNCTURE: CPT | Mod: PN | Performed by: HOSPITALIST

## 2024-01-30 PROCEDURE — 84443 ASSAY THYROID STIM HORMONE: CPT | Performed by: HOSPITALIST

## 2024-01-30 PROCEDURE — 80053 COMPREHEN METABOLIC PANEL: CPT | Performed by: HOSPITALIST

## 2024-01-30 PROCEDURE — 99214 OFFICE O/P EST MOD 30 MIN: CPT | Mod: PBBFAC,PN | Performed by: ANESTHESIOLOGY

## 2024-01-30 PROCEDURE — 82607 VITAMIN B-12: CPT | Performed by: HOSPITALIST

## 2024-01-30 PROCEDURE — 99999 PR PBB SHADOW E&M-EST. PATIENT-LVL IV: CPT | Mod: PBBFAC,,, | Performed by: ANESTHESIOLOGY

## 2024-01-30 RX ORDER — HYDROCODONE BITARTRATE AND ACETAMINOPHEN 7.5; 325 MG/1; MG/1
1 TABLET ORAL EVERY 6 HOURS PRN
Qty: 120 TABLET | Refills: 0 | Status: SHIPPED | OUTPATIENT
Start: 2024-02-09 | End: 2024-03-12 | Stop reason: DRUGHIGH

## 2024-01-30 RX ORDER — PREGABALIN 75 MG/1
75 CAPSULE ORAL NIGHTLY
Qty: 30 CAPSULE | Refills: 1 | Status: SHIPPED | OUTPATIENT
Start: 2024-01-30 | End: 2024-03-12 | Stop reason: SDUPTHER

## 2024-01-30 NOTE — PROGRESS NOTES
New Patient Interventional Pain Note (Initial Visit)    Referring Physician: Faizan Antoine MD    PCP: Faizan Antoine MD    Chief Complaint:   Abdominal and lower back pain      SUBJECTIVE:    Geovani Currie is a 68 y.o. male who presents to the clinic for the evaluation of abdominal and lower back pain.   Patient reports 20 year history of lower back pain.  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  Patient is status post TURP, bilateral nephrostomy tube placement, chemoradiation for lung cancer, as well as right femur medullary nail.  Lower back pain is described as a stabbing aching pain that starts in the midline of his lower back.  This pain then radiates down his right lower extremity on the lateral and posterior aspect to his toes in a burning numbness pain.  Patient denies any significant radiation down his left lower extremity.  Pain is worse with standing, better with sitting.   Abdominal pain described as a stabbing aching pain primarily over the pubis.  Patient denies any significant radiation in his pain.  Patient denies any significant alleviating or aggravating factors for the pain.  Is currently rated as 7/10. Denies any fevers, chills, changes in gait, saddle anesthesia, or bowel and bladder incontinence      Non-Pharmacologic Treatments:  Physical Therapy/Home Exercise: yes  Ice/Heat:yes  TENS: no  Acupuncture: no  Massage: yes  Chiropractic: no        Previous Pain Medications:  NSAIDs, Tylenol, muscle relaxers, neuropathics, opioids, topicals       report:  Reviewed and consistent with medication use as prescribed.    Pain Procedures:   None    Pain Disability Index Review:         1/30/2024     2:56 PM   Last 3 PDI Scores   Pain Disability Index (PDI) 44       Imaging:     CT ABDOMEN PELVIS WITHOUT CONTRAST     CLINICAL HISTORY:  Nephrostomy;     TECHNIQUE:  Standard abdomen and pelvis CT protocol without oral or IV contrast was performed.      COMPARISON:  12/24/2023 and 12/09/2022     FINDINGS:  Finding: The size of the heart is within normal limits.  There is a mild amount of atherosclerosis in the left anterior descending, left circumflex, and right main coronary arteries.  There are mild dependent atelectatic changes in both lungs.  There is no pneumothorax or pleural effusion.     The liver, gallbladder, pancreas, spleen, and adrenals are normal in appearance.  Bilateral nephrostomy tubes remain in place.  There has been interval development of a mild amount of right hydronephrosis.  There is mild bilateral perinephric stranding.  The ureters are normal in appearance.  There is persistent thickening of the wall of the urinary bladder.  The wall thickness measures 14 mm on the current examination.  On the prior examination it measured 14 mm.  There is persistent calcification on the right side of the urinary bladder.  The prostate is not well seen.  The appendix is absent.  There is a prominent amount of fecal material within the colon.  There is no free fluid within the abdomen or pelvis. There is no pneumoperitoneum.  There is an intramedullary nail across a healing fracture in the intertrochanteric portion of the right femur.  There are healing fractures in the right 8th, 9th, 10th, and 11th ribs.     Impression:     1. Bilateral nephrostomy tubes remain in place.  There has been interval development of a mild amount of right hydronephrosis.  2. There is persistent thickening of the wall of the urinary bladder. The wall thickness measures 14 mm on the current examination.  On the prior examination it measured 14 mm. There is persistent calcification on the right side of the urinary bladder.  This is characteristic of chronic cystitis.  3. There are healing fractures in the right 8th, 9th, 10th, and 11th ribs.  4. There is an intramedullary nail across a healing fracture in the intertrochanteric portion of the right femur.  All CT scans at this  facility use dose modulation, iterative reconstruction, and/or weight base dosing when appropriate to reduce radiation dose when appropriate to reduce radiation dose to as low as reasonably achievable.    Past Medical History:   Diagnosis Date    COPD (chronic obstructive pulmonary disease)     Hypertension      Past Surgical History:   Procedure Laterality Date    APPENDECTOMY      CATARACT EXTRACTION      NEPHROSTOMY      OPEN REDUCTION AND INTERNAL FIXATION (ORIF) OF INTERTROCHANTERIC FRACTURE OF FEMUR Right 2023    Procedure: ORIF, FRACTURE, FEMUR, INTERTROCHANTERIC;  Surgeon: Tanisha Guidry DO;  Location: [facility] OR;  Service: Orthopedics;  Laterality: Right;  Gamma nail     Social History     Socioeconomic History    Marital status:    Tobacco Use    Smoking status: Former     Current packs/day: 0.00     Types: Cigarettes     Quit date: 10/2023     Years since quittin.3     Passive exposure: Never    Smokeless tobacco: Never   Substance and Sexual Activity    Alcohol use: Never    Drug use: Never    Sexual activity: Not Currently     Partners: Female   Social History Narrative    Lives in [place] with daughter, Sera. Accompanied by Sera and wife Kailey. Long term smoking history. No longer smoking; uses a vape pen.     Social Determinants of Health     Financial Resource Strain: High Risk (11/10/2023)    Overall Financial Resource Strain (CARDIA)     Difficulty of Paying Living Expenses: Very hard   Food Insecurity: Food Insecurity Present (11/10/2023)    Hunger Vital Sign     Worried About Running Out of Food in the Last Year: Often true     Ran Out of Food in the Last Year: Often true   Transportation Needs: Unmet Transportation Needs (11/10/2023)    PRAPARE - Transportation     Lack of Transportation (Medical): Yes     Lack of Transportation (Non-Medical): Yes   Physical Activity: Inactive (11/10/2023)    Exercise Vital Sign     Days of Exercise per Week: 0 days     Minutes of Exercise  per Session: 0 min   Stress: Stress Concern Present (11/10/2023)    [employer] - Occupational Stress Questionnaire     Feeling of Stress : Rather much   Social Connections: Unknown (11/10/2023)    Social Connection and Isolation Panel [NHANES]     Frequency of Communication with Friends and Family: More than three times a week     Frequency of Social Gatherings with Friends and Family: Never     Attends Voodoo Services: Patient declined     Active Member of Clubs or Organizations: No     Attends Club or Organization Meetings: Never     Marital Status:    Housing Stability: High Risk (11/10/2023)    Housing Stability Vital Sign     Unable to Pay for Housing in the Last Year: Yes     Number of Places Lived in the Last Year: 3     Unstable Housing in the Last Year: No     Family History   Problem Relation Age of Onset    Cancer Mother         NOS    Cancer Father         NOS    Cancer Maternal Grandfather         NOS    Bladder Cancer Neg Hx        Review of patient's allergies indicates:  No Known Allergies    Current Outpatient Medications   Medication Sig    albuterol (ACCUNEB) 0.63 mg/3 mL Nebu Take 3 mLs (0.63 mg total) by nebulization 3 (three) times daily as needed (sob, wheezing). Rescue    albuterol (PROVENTIL/VENTOLIN HFA) 90 mcg/actuation inhaler Inhale 1-2 puffs into the lungs every 4 (four) hours as needed for Wheezing. Rescue    ALPRAZolam (XANAX) 0.5 MG tablet Take 2 tablets (1 mg total) by mouth 3 (three) times daily as needed for Anxiety.    cyclobenzaprine (FLEXERIL) 10 MG tablet Take 1 tablet (10 mg total) by mouth 3 (three) times daily as needed for Muscle spasms.    docusate sodium (COLACE) 100 MG capsule Take 100 mg by mouth 2 (two) times daily.    EScitalopram oxalate (LEXAPRO) 20 MG tablet Take 1 tablet (20 mg total) by mouth once daily.    fluticasone furoate-vilanteroL (BREO ELLIPTA) 100-25 mcg/dose diskus inhaler Inhale 1 puff into the lungs once daily.  Controller    HYDROcodone-acetaminophen (NORCO) 7.5-325 mg per tablet Take 1 tablet by mouth every 6 (six) hours as needed for Pain (Max 4 doses/day).    levothyroxine (SYNTHROID) 125 MCG tablet Take 1 tablet (125 mcg total) by mouth before breakfast.    naloxone (NARCAN) 4 mg/actuation Spry 1 spray once.    nicotine (NICODERM CQ) 14 mg/24 hr Place 1 patch onto the skin every 24 hours.    polyethylene glycol (GLYCOLAX) 17 gram PwPk Take 17 g by mouth daily as needed for Constipation.    vitamin D (VITAMIN D3) 1000 units Tab Take 25 mcg by mouth once daily.    [START ON 2/9/2024] HYDROcodone-acetaminophen (NORCO) 7.5-325 mg per tablet Take 1 tablet by mouth every 6 (six) hours as needed for Pain.    pregabalin (LYRICA) 75 MG capsule Take 1 capsule (75 mg total) by mouth every evening.     No current facility-administered medications for this visit.         ROS  Review of Systems   Constitutional:  Negative for activity change, appetite change and fever.   HENT:  Negative for facial swelling, rhinorrhea and sore throat.    Eyes:  Negative for pain and redness.   Respiratory:  Positive for shortness of breath. Negative for cough, chest tightness, wheezing and stridor.    Cardiovascular:  Positive for leg swelling. Negative for chest pain and palpitations.   Gastrointestinal:  Positive for abdominal pain and constipation. Negative for blood in stool, diarrhea, nausea and vomiting.   Endocrine: Negative for polydipsia, polyphagia and polyuria.   Genitourinary:  Positive for flank pain and penile discharge. Negative for dysuria and hematuria.   Musculoskeletal:  Positive for arthralgias, back pain, gait problem, joint swelling and myalgias. Negative for neck pain and neck stiffness.   Skin:  Negative for rash.   Allergic/Immunologic: Negative for food allergies.   Neurological:  Positive for weakness, numbness and headaches. Negative for dizziness, tremors, seizures, syncope, facial asymmetry, speech difficulty and  "light-headedness.   Psychiatric/Behavioral:  Negative for agitation, hallucinations, self-injury and suicidal ideas. The patient is not nervous/anxious and is not hyperactive.             OBJECTIVE:  /69   Pulse 109   Ht 5' 9" (1.753 m)   BMI 21.85 kg/m²         Physical Exam  Constitutional:       Appearance: Normal appearance.   HENT:      Head: Normocephalic and atraumatic.   Eyes:      Extraocular Movements: Extraocular movements intact.      Pupils: Pupils are equal, round, and reactive to light.   Pulmonary:      Effort: Pulmonary effort is normal.   Abdominal:      General: Abdomen is flat.      Comments: Mild tenderness over right lower quadrant of abdomen pain as well as over pubis.  No significant rebound tenderness.   Skin:     General: Skin is warm.      Capillary Refill: Capillary refill takes 2 to 3 seconds.   Neurological:      Mental Status: He is alert and oriented to person, place, and time.      Sensory: Sensory deficit present.      Motor: Weakness present. No abnormal muscle tone.      Gait: Gait abnormal.      Deep Tendon Reflexes:      Reflex Scores:       Patellar reflexes are 1+ on the right side and 1+ on the left side.       Achilles reflexes are 0 on the right side and 0 on the left side.     Comments: Antalgic gait, uses wheelchair for most most transportation  Decreased light touch sensation over the bilateral feet  4/5 strength in bilateral plantar flexion, dorsiflexion, and knee extension   Psychiatric:         Mood and Affect: Mood normal.         Behavior: Behavior normal.           Musculoskeletal:        Lumbar Exam  Incision: no  Pain with Flexion: yes  Pain with Extension: yes  ROM:  Decreased  Paraspinous TTP:  Positive bilaterally  Facet TTP:  L5-S1  Facet Loading:  Positive bilaterally  SLR:  Positive on the right at 75°  SIJ TTP:  Negative bilaterally  GLO:  Negative bilaterally      LABS:  Lab Results   Component Value Date    WBC 7.42 01/15/2024    HGB 9.4 (L) " "01/15/2024    HCT 29.4 (L) 01/15/2024    MCV 87 01/15/2024     01/15/2024       CMP  Sodium   Date Value Ref Range Status   01/15/2024 141 136 - 145 mmol/L Final     Potassium   Date Value Ref Range Status   01/15/2024 4.3 3.5 - 5.1 mmol/L Final     Chloride   Date Value Ref Range Status   01/15/2024 106 95 - 110 mmol/L Final     CO2   Date Value Ref Range Status   01/15/2024 23 23 - 29 mmol/L Final     Glucose   Date Value Ref Range Status   01/15/2024 113 (H) 70 - 110 mg/dL Final     BUN   Date Value Ref Range Status   01/15/2024 21 8 - 23 mg/dL Final     Creatinine   Date Value Ref Range Status   01/15/2024 1.4 0.5 - 1.4 mg/dL Final     Calcium   Date Value Ref Range Status   01/15/2024 9.3 8.7 - 10.5 mg/dL Final     Total Protein   Date Value Ref Range Status   01/15/2024 6.8 6.0 - 8.4 g/dL Final     Albumin   Date Value Ref Range Status   01/15/2024 2.7 (L) 3.5 - 5.2 g/dL Final     Total Bilirubin   Date Value Ref Range Status   01/15/2024 0.4 0.1 - 1.0 mg/dL Final     Comment:     For infants and newborns, interpretation of results should be based  on gestational age, weight and in agreement with clinical  observations.    Premature Infant recommended reference ranges:  Up to 24 hours.............<8.0 mg/dL  Up to 48 hours............<12.0 mg/dL  3-5 days..................<15.0 mg/dL  6-29 days.................<15.0 mg/dL       Alkaline Phosphatase   Date Value Ref Range Status   01/15/2024 119 55 - 135 U/L Final     AST   Date Value Ref Range Status   01/15/2024 12 10 - 40 U/L Final     ALT   Date Value Ref Range Status   01/15/2024 15 10 - 44 U/L Final     Anion Gap   Date Value Ref Range Status   01/15/2024 12 8 - 16 mmol/L Final       No results found for: "LABA1C", "HGBA1C"          ASSESSMENT:       68 y.o. year old male with abdominal and lower back pain, consistent with     1. Malignant neoplasm of urinary bladder, unspecified site  HYDROcodone-acetaminophen (NORCO) 7.5-325 mg per tablet    " pregabalin (LYRICA) 75 MG capsule      2. Spinal stenosis, unspecified spinal region  Ambulatory referral/consult to Pain Clinic    HYDROcodone-acetaminophen (NORCO) 7.5-325 mg per tablet    pregabalin (LYRICA) 75 MG capsule      3. Squamous cell carcinoma of right lung  HYDROcodone-acetaminophen (NORCO) 7.5-325 mg per tablet    pregabalin (LYRICA) 75 MG capsule      4. Cancer related pain  HYDROcodone-acetaminophen (NORCO) 7.5-325 mg per tablet    pregabalin (LYRICA) 75 MG capsule      5. DDD (degenerative disc disease), lumbar  HYDROcodone-acetaminophen (NORCO) 7.5-325 mg per tablet    pregabalin (LYRICA) 75 MG capsule      6. Lumbar spondylosis  HYDROcodone-acetaminophen (NORCO) 7.5-325 mg per tablet    pregabalin (LYRICA) 75 MG capsule      7. Lumbar radiculopathy  HYDROcodone-acetaminophen (NORCO) 7.5-325 mg per tablet    pregabalin (LYRICA) 75 MG capsule      8. Neoplasm related pain  HYDROcodone-acetaminophen (NORCO) 7.5-325 mg per tablet    pregabalin (LYRICA) 75 MG capsule      9. Hx of bladder cancer  HYDROcodone-acetaminophen (NORCO) 7.5-325 mg per tablet    pregabalin (LYRICA) 75 MG capsule        Malignant neoplasm of urinary bladder, unspecified site  -     HYDROcodone-acetaminophen (NORCO) 7.5-325 mg per tablet; Take 1 tablet by mouth every 6 (six) hours as needed for Pain.  Dispense: 120 tablet; Refill: 0  -     pregabalin (LYRICA) 75 MG capsule; Take 1 capsule (75 mg total) by mouth every evening.  Dispense: 30 capsule; Refill: 1    Spinal stenosis, unspecified spinal region  -     Ambulatory referral/consult to Pain Clinic  -     HYDROcodone-acetaminophen (NORCO) 7.5-325 mg per tablet; Take 1 tablet by mouth every 6 (six) hours as needed for Pain.  Dispense: 120 tablet; Refill: 0  -     pregabalin (LYRICA) 75 MG capsule; Take 1 capsule (75 mg total) by mouth every evening.  Dispense: 30 capsule; Refill: 1    Squamous cell carcinoma of right lung  -     HYDROcodone-acetaminophen (NORCO) 7.5-325 mg  per tablet; Take 1 tablet by mouth every 6 (six) hours as needed for Pain.  Dispense: 120 tablet; Refill: 0  -     pregabalin (LYRICA) 75 MG capsule; Take 1 capsule (75 mg total) by mouth every evening.  Dispense: 30 capsule; Refill: 1    Cancer related pain  -     HYDROcodone-acetaminophen (NORCO) 7.5-325 mg per tablet; Take 1 tablet by mouth every 6 (six) hours as needed for Pain.  Dispense: 120 tablet; Refill: 0  -     pregabalin (LYRICA) 75 MG capsule; Take 1 capsule (75 mg total) by mouth every evening.  Dispense: 30 capsule; Refill: 1    DDD (degenerative disc disease), lumbar  -     HYDROcodone-acetaminophen (NORCO) 7.5-325 mg per tablet; Take 1 tablet by mouth every 6 (six) hours as needed for Pain.  Dispense: 120 tablet; Refill: 0  -     pregabalin (LYRICA) 75 MG capsule; Take 1 capsule (75 mg total) by mouth every evening.  Dispense: 30 capsule; Refill: 1    Lumbar spondylosis  -     HYDROcodone-acetaminophen (NORCO) 7.5-325 mg per tablet; Take 1 tablet by mouth every 6 (six) hours as needed for Pain.  Dispense: 120 tablet; Refill: 0  -     pregabalin (LYRICA) 75 MG capsule; Take 1 capsule (75 mg total) by mouth every evening.  Dispense: 30 capsule; Refill: 1    Lumbar radiculopathy  -     HYDROcodone-acetaminophen (NORCO) 7.5-325 mg per tablet; Take 1 tablet by mouth every 6 (six) hours as needed for Pain.  Dispense: 120 tablet; Refill: 0  -     pregabalin (LYRICA) 75 MG capsule; Take 1 capsule (75 mg total) by mouth every evening.  Dispense: 30 capsule; Refill: 1    Neoplasm related pain  -     HYDROcodone-acetaminophen (NORCO) 7.5-325 mg per tablet; Take 1 tablet by mouth every 6 (six) hours as needed for Pain.  Dispense: 120 tablet; Refill: 0  -     pregabalin (LYRICA) 75 MG capsule; Take 1 capsule (75 mg total) by mouth every evening.  Dispense: 30 capsule; Refill: 1    Hx of bladder cancer  -     HYDROcodone-acetaminophen (NORCO) 7.5-325 mg per tablet; Take 1 tablet by mouth every 6 (six) hours as  needed for Pain.  Dispense: 120 tablet; Refill: 0  -     pregabalin (LYRICA) 75 MG capsule; Take 1 capsule (75 mg total) by mouth every evening.  Dispense: 30 capsule; Refill: 1             PLAN:   - Interventions:   None at this time.  Patient has just finished antibiotics for UTI.  Nephrostomy tubes for now correct position with no significant drainage.  Can consider lumbar epidural steroid injection for lower back pain as well as superior hypogastric plexus block for abdominal pain.    - Anticoagulation use:   Yes aspirin    - Medications:   Start Lyrica 75 mg at night   Refill Hydrocodone 7.5mg PO q6h PRN, #120, 2/9/2024.    Patient reports previous constipation side effects from hydrocodone medication.  Patient reports that he is now controlled with senna and MiraLax.  Patient will continue to monitor bowel movements    - Therapy:    Continue physical therapy for right femur intramedullary nail    - Imaging/Diagnostic:   CT abdomen pelvis reveals appropriate placement of bilateral nephrostomy tubes.  There is persistent thickening of the bladder wall.  There is no enhancement of the L3-4 endplates likely reflecting metastatic disease.  There is noted right-greater-than-left foraminal stenosis L4-5 and L3-4.    - Consults:   Continue follow up with Oncology for primary bladder squamous cell carcinoma and primarily lung carcinoma.  Patient has completed radiation therapy for lung cancer, and continues chemotherapy.  Continue follow up with Oncology for nephrosis with bilateral nephrostomy tubes in place  Continue follow up with palliative care      - Patient Questions: Answered all of the patient's questions regarding diagnosis, therapy, and treatment     This condition does not require this patient to take time off of work, and the primary goal of our Pain Management services is to improve the patient's functional capacity.     - Follow up visit: return to clinic in 4 weeks        The above plan and management  options were discussed at length with patient. Patient is in agreement with the above and verbalized understanding.    I discussed the goals of interventional chronic pain management with the patient on today's visit.  I explained the utility of injections for diagnostic and therapeutic purposes.  We discussed a multimodal approach to pain including treating the patient's given worst pain at any given time.  We will use a systematic approach to addressing pain.  We will also adopt a multimodal approach that includes injections, adjuvant medications, physical therapy, at times psychiatry.  There may be a limited role for opioid use intermittently in the treatment of pain, more particularly for acute pain although no one approach can be used as a sole treatment modality.    I emphasized the importance of regular exercise, core strengthening and stretching, diet and weight loss as a cornerstone of long-term pain management.      Jorge Rico MD  Interventional Pain Management  [facility]    Disclaimer:  This note was prepared using voice recognition system and is likely to have sound alike errors that may have been overlooked even after proof reading.  Please call me with any questions

## 2024-01-31 LAB — VIT B12 SERPL-MCNC: 313 PG/ML (ref 210–950)

## 2024-01-31 NOTE — PROGRESS NOTES
MEDICAL ONCOLOGY - NEW PATIENT VISIT    Best Contact Phone Number(s): [phone] (home)      Cancer/Stage/TNM:    Cancer Staging   Squamous cell carcinoma of right lung  Staging form: Lung, AJCC 8th Edition  - Clinical: Stage IA2 (cT1b, cN0, cM0) - Signed by Jett Wagoner IV, MD on 11/26/2023       HPI: Geovani Currie is a 68 y.o. male found to have muscle invasive bladder cancer treated with definitive gemcitabine based CRT in [place] 09/2020 - 11/2020. Subsequently with recurrent biopsy proven recurrent disease by cystoscopy 08/2023 in the setting of urinary obstruction and hydronephrosis requiring ureteral stenting and percutaneous nephrostomy placement. Staging imaging has been equivocal for metastatic disease, and notable for a separate primary stage I SCC of the RUL with plan for RT. He presents to  medical oncology clinic prior to starting palliative intent pembrolizumab.    History has been obtained by chart review and discussion with the patient.    Interval Events:    Hospitalized for UTI. Completed course of ciprofloxacin. Plan for potential pembrolizumab vs BSC. Undergoing RT to his concurrent lung cancer. Recently seen by palliative care and ID. Bilateral PCN tubes in place.Clear yellow urine from both; less on the right. No spontaneous voiding. Has some issues with constipation.     Reports ongoing typical right sided sciatica and lower back pain. Seeing pain management with plan for possible injection.  Since discharge home he has been feeling generally well. Ambulating short distances with walker with home PT/OT. Has done some very small chores around the house (making coffee). Toileting indepdently.  Gets out of bed on his own.       Oncology History   Squamous cell carcinoma of right lung   7/22/2023 Imaging Significant Findings    PET CT ([facility])  - 1.1 x 1.0 cm spiculated RUL hypermetabolic pulmonary nodule, SUV 3.32  - L sided urinary bladder mass w/ L sided nephroureteral stents in  place, and severe hydroureteronephrosis  - Multiple prominent or mildly enlarged L para-aortic LN w/ mildly increased radiotracer activity, could be reactive or represent metastatic involvement from bladder tumor     9/26/2023 Imaging Significant Findings    CT C, Non-Con ([facility])  - 1.7 x 1.2 cm spiculated nodule in RUL, increased from prior  - 0.7 mm satellite nodule, increased in size     10/11/2023 Procedure    RUL, CT Guided Bx ([facility])  - Minute focus (approximately 16 cells), marked atypical cells identified. Favor squamous cell carcinoma (p40, AE1/AE3 positive; TTF-1, napsin A, synaptophysin, CD68 negative)     10/21/2023 Imaging Significant Findings    MRI Brain  - No evidence of intracranial metastatic disease     11/26/2023 Cancer Staged    Staging form: Lung, AJCC 8th Edition  - Clinical: Stage IA2 (cT1b, cN0, cM0)     12/13/2023 Imaging Significant Findings    PET CT  - 2.0 x 1.3 cm R lung apex nodule, hypermetabolic  - No hypermetabolic intrathoracic or axillary LAD  - Circumferential urinary bladder wall thickening w/ circumferential heterogenous increased uptake involving L side of mesorectal fascia  - Curvilinear intraluminal calcifications within urinary bladder along L lateral wall  - 9 hypermetabolic pelvic LN     1/18/2024 - 1/24/2024 Radiation Therapy    68-year-old man with complicated medical history including history of muscle invasive bladder cancer treated with radiation and Gemzar in 2020, with pathologic confirmation of recurrence in August 2023, when he moved to [place] to be near family during his workup and treatment.  Imaging shows thickening, recurrence in the bladder with suspicious retroperitoneal adenopathy.  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     Treating physician: Harmony  Total Dose: 50 Gy  Fractions: 5     Malignant neoplasm of urinary bladder   7/6/2020 Procedure    TURBT  - Reportedly stage cT3b UCB, however radiation  "oncology notes describe T2a disease       9/29/2020 - 11/25/2020 Radiation Therapy    CRT with Gemcitabine  35 fractions IMRT/IGRT     8/30/2023 Procedure    TURBT  Path: Invasive high-grade urothelial carcinoma, muscularis propria is present and involved by carcinoma     10/16/2023 Notable Event    Urology appointment  "Discussed urinary retention, will need L ureteral stent exchange and exchange of R PCNU in 12/2023. Will need salvage chemothearpy or immunotherapy for recurrent metastatic bladder cancer"  - Dr. Grimaldo     10/20/2023 - 10/25/2023 Hospital Admission    Presented from airport after flying from [place] for worsening weakness and lethargy. Imaging demonstrate remote infarctions but no acute CNS processes. Found to have an enterobacter infection. Started on ertapenem.      11/10/2023 - 11/17/2023 Hospital Admission    Admitted after inadvertently pulling out nephrostomy tube. Was on treatment for Enterobacter UTI with ertapenem, found that this was not sufficient coverage and changed to linezolid. Nephrostomy tube replaced.      12/13/2023 Imaging Significant Findings    PET CT  - 2.0 x 1.3 cm R lung apex nodule, hypermetabolic  - No hypermetabolic intrathoracic or axillary LAD  - Circumferential urinary bladder wall thickening w/ circumferential heterogenous increased uptake involving L side of mesorectal fascia  - Curvilinear intraluminal calcifications within urinary bladder along L lateral wall  - 9 hypermetabolic pelvic LN     2/9/2024 -  Chemotherapy    Treatment Summary   Plan Name: OP pembrolizumab 200mg Q3W  Treatment Goal: Palliative  Status: Active  Start Date: 2/9/2024 (Planned)  End Date: 1/23/2026 (Planned)  Provider: Demetrius Frazier MD  Chemotherapy: [No matching medication found in this treatment plan]          Past Medical History:   Diagnosis Date    COPD (chronic obstructive pulmonary disease)     Hypertension         Past Surgical History:   Procedure Laterality Date    APPENDECTOMY      " CATARACT EXTRACTION      NEPHROSTOMY      OPEN REDUCTION AND INTERNAL FIXATION (ORIF) OF INTERTROCHANTERIC FRACTURE OF FEMUR Right 2023    Procedure: ORIF, FRACTURE, FEMUR, INTERTROCHANTERIC;  Surgeon: Tanisha Guidry DO;  Location: [facility];  Service: Orthopedics;  Laterality: Right;  Gamma nail        Family History   Problem Relation Age of Onset    Cancer Mother         NOS    Cancer Father         NOS    Cancer Maternal Grandfather         NOS    Bladder Cancer Neg Hx         Social History     Tobacco Use    Smoking status: Former     Current packs/day: 0.00     Types: Cigarettes     Quit date: 10/2023     Years since quittin.3     Passive exposure: Never    Smokeless tobacco: Never   Substance Use Topics    Alcohol use: Never        I have reviewed and updated the patient's past medical, surgical, family and social histories.    Review of patient's allergies indicates:  No Known Allergies     Current Outpatient Medications   Medication Sig Dispense Refill    albuterol (ACCUNEB) 0.63 mg/3 mL Nebu Take 3 mLs (0.63 mg total) by nebulization 3 (three) times daily as needed (sob, wheezing). Rescue 75 mL 3    albuterol (PROVENTIL/VENTOLIN HFA) 90 mcg/actuation inhaler Inhale 1-2 puffs into the lungs every 4 (four) hours as needed for Wheezing. Rescue 18 g 11    ALPRAZolam (XANAX) 0.5 MG tablet Take 2 tablets (1 mg total) by mouth 3 (three) times daily as needed for Anxiety. 60 tablet 2    cyclobenzaprine (FLEXERIL) 10 MG tablet Take 1 tablet (10 mg total) by mouth 3 (three) times daily as needed for Muscle spasms. 270 tablet 3    docusate sodium (COLACE) 100 MG capsule Take 100 mg by mouth 2 (two) times daily.      EScitalopram oxalate (LEXAPRO) 20 MG tablet Take 1 tablet (20 mg total) by mouth once daily. 90 tablet 3    fluticasone furoate-vilanteroL (BREO ELLIPTA) 100-25 mcg/dose diskus inhaler Inhale 1 puff into the lungs once daily. Controller 30 each 11    HYDROcodone-acetaminophen (NORCO) 7.5-325  "mg per tablet Take 1 tablet by mouth every 6 (six) hours as needed for Pain (Max 4 doses/day). 60 tablet 0    [START ON 2/9/2024] HYDROcodone-acetaminophen (NORCO) 7.5-325 mg per tablet Take 1 tablet by mouth every 6 (six) hours as needed for Pain. 120 tablet 0    levothyroxine (SYNTHROID) 125 MCG tablet Take 1 tablet (125 mcg total) by mouth before breakfast. 90 tablet 3    naloxone (NARCAN) 4 mg/actuation Spry 1 spray once.      nicotine (NICODERM CQ) 14 mg/24 hr Place 1 patch onto the skin every 24 hours.      polyethylene glycol (GLYCOLAX) 17 gram PwPk Take 17 g by mouth daily as needed for Constipation. 30 each 0    pregabalin (LYRICA) 75 MG capsule Take 1 capsule (75 mg total) by mouth every evening. 30 capsule 1    vitamin D (VITAMIN D3) 1000 units Tab Take 25 mcg by mouth once daily.       No current facility-administered medications for this visit.        Physical Exam:   BP (!) 95/59   Pulse (!) 113   Temp 97.6 °F (36.4 °C) (Temporal)   Ht 5' 8" (1.727 m)   Wt 70.5 kg (155 lb 6.8 oz)   SpO2 99%   BMI 23.63 kg/m²      ECOG Performance status: 3            Physical Exam  Constitutional:       Appearance: He is ill-appearing.      Comments: Somnelent in wheelchair. Oriented to person, place and time. Defers much history to his family.   HENT:      Head: Normocephalic.   Eyes:      General: No scleral icterus.     Extraocular Movements: Extraocular movements intact.      Conjunctiva/sclera: Conjunctivae normal.   Cardiovascular:      Rate and Rhythm: Normal rate.      Heart sounds: No murmur heard.  Pulmonary:      Effort: Pulmonary effort is normal. No respiratory distress.   Abdominal:      General: There is no distension.      Palpations: Abdomen is soft.   Skin:     General: Skin is warm and dry.   Neurological:      Mental Status: He is oriented to person, place, and time.      Motor: Weakness present.   Psychiatric:         Mood and Affect: Mood normal.         Behavior: Behavior normal.         " Thought Content: Thought content normal.           Labs:   Recent Results (from the past 48 hour(s))   CBC Oncology    Collection Time: 02/02/24 10:10 AM   Result Value Ref Range    WBC 7.33 3.90 - 12.70 K/uL    RBC 3.47 (L) 4.60 - 6.20 M/uL    Hemoglobin 9.7 (L) 14.0 - 18.0 g/dL    Hematocrit 31.0 (L) 40.0 - 54.0 %    MCV 89 82 - 98 fL    MCH 28.0 27.0 - 31.0 pg    MCHC 31.3 (L) 32.0 - 36.0 g/dL    RDW 17.7 (H) 11.5 - 14.5 %    Platelets 248 150 - 450 K/uL    MPV 9.7 9.2 - 12.9 fL    Gran # (ANC) 5.9 1.8 - 7.7 K/uL    Immature Grans (Abs) 0.01 0.00 - 0.04 K/uL   Comprehensive Metabolic Panel    Collection Time: 02/02/24 10:10 AM   Result Value Ref Range    Sodium 135 (L) 136 - 145 mmol/L    Potassium 4.3 3.5 - 5.1 mmol/L    Chloride 100 95 - 110 mmol/L    CO2 23 23 - 29 mmol/L    Glucose 152 (H) 70 - 110 mg/dL    BUN 29 (H) 8 - 23 mg/dL    Creatinine 2.0 (H) 0.5 - 1.4 mg/dL    Calcium 9.5 8.7 - 10.5 mg/dL    Total Protein 7.7 6.0 - 8.4 g/dL    Albumin 2.9 (L) 3.5 - 5.2 g/dL    Total Bilirubin 0.5 0.1 - 1.0 mg/dL    Alkaline Phosphatase 111 55 - 135 U/L    AST 11 10 - 40 U/L    ALT 12 10 - 44 U/L    eGFR 35 (A) >60 mL/min/1.73 m^2    Anion Gap 12 8 - 16 mmol/L   TSH    Collection Time: 02/02/24 10:10 AM   Result Value Ref Range    TSH 1.034 0.400 - 4.000 uIU/mL          Imaging:         I have personally reviewed the above imaging    Path:   Reviewed pathology as documented in oncology history      Assessment and Plan:   1. Malignant neoplasm of urinary bladder, unspecified site  Overview:  Metachronous recurrence MIBC following definitive CRT 11/2020. Extensive local disease with bilateral hydronephrosis, renal failure, and recurrent infection. Review of PET imaging suggestive of emily metastases as well.     Assessment & Plan:  - Plan to start palliative intent pembrolizumab next week  - FU in 3 weeks prior to C2  - Repeat imaging after C4      2. Centrilobular emphysema  Assessment & Plan:  Stable COPD. Not  currently using O2 at home. Using his home Breo Ellipta intermittently      3. Hypotension, unspecified hypotension type  Assessment & Plan:  - No longer on tamsulosin and metoprolol      4. Acute cystitis without hematuria  Assessment & Plan:  - Recently compelted treatment with ciprofloxacin      5. Squamous cell carcinoma of right lung  Assessment & Plan:  - Completed SBRT to lung 2024      6. Hypothyroidism, unspecified type                 Follow up:   Route Chart for Scheduling    Med Onc Chart Routing      Follow up with physician . Freddie 2/23/24   Follow up with [facility]    Infusion scheduling note   pembro 2/27/24   Injection scheduling note    Labs CBC, CMP and TSH   Scheduling:  Preferred lab:  Lab interval:     Imaging    Pharmacy appointment    Other referrals                  Treatment Plan Information   OP pembrolizumab 200mg Q3W   Demetrius Frazier MD   Upcoming Treatment Dates - OP pembrolizumab 200mg Q3W    2/9/2024       Chemotherapy       pembrolizumab (KEYTRUDA) 200 mg in sodium chloride 0.9% SolP 108 mL infusion  3/1/2024       Chemotherapy       pembrolizumab (KEYTRUDA) 200 mg in sodium chloride 0.9% SolP 108 mL infusion  3/22/2024       Chemotherapy       pembrolizumab (KEYTRUDA) 200 mg in sodium chloride 0.9% SolP 108 mL infusion  4/12/2024       Chemotherapy       pembrolizumab (KEYTRUDA) 200 mg in sodium chloride 0.9% SolP 108 mL infusion        The above information has been reviewed with the patient and all questions have been answered to their apparent satisfaction.  They understand that they can call the clinic with any questions.    Demetrius Frazier MD  Hematology/Oncology  [facility]

## 2024-02-01 ENCOUNTER — TELEPHONE (OUTPATIENT)
Dept: HEMATOLOGY/ONCOLOGY | Facility: CLINIC | Age: 69
End: 2024-02-01
Payer: MEDICARE

## 2024-02-02 ENCOUNTER — OFFICE VISIT (OUTPATIENT)
Dept: HEMATOLOGY/ONCOLOGY | Facility: CLINIC | Age: 69
DRG: 300 | End: 2024-02-02
Payer: MEDICARE

## 2024-02-02 ENCOUNTER — HOSPITAL ENCOUNTER (INPATIENT)
Facility: HOSPITAL | Age: 69
LOS: 4 days | Discharge: HOME HEALTH | DRG: 300 | End: 2024-02-07
Attending: EMERGENCY MEDICINE | Admitting: HOSPITALIST
Payer: MEDICARE

## 2024-02-02 VITALS
DIASTOLIC BLOOD PRESSURE: 59 MMHG | SYSTOLIC BLOOD PRESSURE: 95 MMHG | OXYGEN SATURATION: 99 % | HEART RATE: 113 BPM | BODY MASS INDEX: 23.56 KG/M2 | WEIGHT: 155.44 LBS | TEMPERATURE: 98 F | HEIGHT: 68 IN

## 2024-02-02 DIAGNOSIS — Z93.6 NEPHROSTOMY STATUS: ICD-10-CM

## 2024-02-02 DIAGNOSIS — I82.412 ACUTE DEEP VEIN THROMBOSIS (DVT) OF FEMORAL VEIN OF LEFT LOWER EXTREMITY: Primary | ICD-10-CM

## 2024-02-02 DIAGNOSIS — J43.2 CENTRILOBULAR EMPHYSEMA: ICD-10-CM

## 2024-02-02 DIAGNOSIS — I82.402 ACUTE DEEP VEIN THROMBOSIS (DVT) OF LEFT LOWER EXTREMITY: ICD-10-CM

## 2024-02-02 DIAGNOSIS — N39.0 URINARY TRACT INFECTION WITHOUT HEMATURIA, SITE UNSPECIFIED: ICD-10-CM

## 2024-02-02 DIAGNOSIS — C67.9 MALIGNANT NEOPLASM OF URINARY BLADDER, UNSPECIFIED SITE: Primary | ICD-10-CM

## 2024-02-02 DIAGNOSIS — I95.9 HYPOTENSION, UNSPECIFIED HYPOTENSION TYPE: ICD-10-CM

## 2024-02-02 DIAGNOSIS — T83.022A NEPHROSTOMY TUBE DISPLACED: ICD-10-CM

## 2024-02-02 DIAGNOSIS — M79.605 LEFT LEG PAIN: ICD-10-CM

## 2024-02-02 DIAGNOSIS — R00.0 TACHYCARDIA: ICD-10-CM

## 2024-02-02 DIAGNOSIS — R07.9 CHEST PAIN: ICD-10-CM

## 2024-02-02 DIAGNOSIS — N30.00 ACUTE CYSTITIS WITHOUT HEMATURIA: ICD-10-CM

## 2024-02-02 DIAGNOSIS — E03.9 HYPOTHYROIDISM, UNSPECIFIED TYPE: ICD-10-CM

## 2024-02-02 DIAGNOSIS — C34.91 SQUAMOUS CELL CARCINOMA OF RIGHT LUNG: ICD-10-CM

## 2024-02-02 LAB
ALBUMIN SERPL BCP-MCNC: 3.3 G/DL (ref 3.5–5.2)
ALP SERPL-CCNC: 122 U/L (ref 55–135)
ALT SERPL W/O P-5'-P-CCNC: 14 U/L (ref 10–44)
ANION GAP SERPL CALC-SCNC: 17 MMOL/L (ref 8–16)
APTT PPP: 31 SEC (ref 21–32)
AST SERPL-CCNC: 13 U/L (ref 10–40)
BACTERIA #/AREA URNS HPF: ABNORMAL /HPF
BASOPHILS # BLD AUTO: 0.05 K/UL (ref 0–0.2)
BASOPHILS NFR BLD: 0.5 % (ref 0–1.9)
BILIRUB SERPL-MCNC: 0.5 MG/DL (ref 0.1–1)
BILIRUB UR QL STRIP: NEGATIVE
BUN SERPL-MCNC: 30 MG/DL (ref 8–23)
CALCIUM SERPL-MCNC: 8.9 MG/DL (ref 8.7–10.5)
CHLORIDE SERPL-SCNC: 96 MMOL/L (ref 95–110)
CLARITY UR: ABNORMAL
CO2 SERPL-SCNC: 20 MMOL/L (ref 23–29)
COLOR UR: YELLOW
CREAT SERPL-MCNC: 2.1 MG/DL (ref 0.5–1.4)
DIFFERENTIAL METHOD BLD: ABNORMAL
EOSINOPHIL # BLD AUTO: 0.2 K/UL (ref 0–0.5)
EOSINOPHIL NFR BLD: 2.2 % (ref 0–8)
ERYTHROCYTE [DISTWIDTH] IN BLOOD BY AUTOMATED COUNT: 17.6 % (ref 11.5–14.5)
EST. GFR  (NO RACE VARIABLE): 33 ML/MIN/1.73 M^2
GLUCOSE SERPL-MCNC: 126 MG/DL (ref 70–110)
GLUCOSE UR QL STRIP: NEGATIVE
GRAN CASTS #/AREA URNS LPF: 4 /LPF
HCT VFR BLD AUTO: 30.5 % (ref 40–54)
HGB BLD-MCNC: 9.6 G/DL (ref 14–18)
HGB UR QL STRIP: ABNORMAL
HYALINE CASTS #/AREA URNS LPF: 4 /LPF
IMM GRANULOCYTES # BLD AUTO: 0.04 K/UL (ref 0–0.04)
IMM GRANULOCYTES NFR BLD AUTO: 0.4 % (ref 0–0.5)
INR PPP: 1.1 (ref 0.8–1.2)
KETONES UR QL STRIP: NEGATIVE
LACTATE SERPL-SCNC: 1.5 MMOL/L (ref 0.5–2.2)
LACTATE SERPL-SCNC: 2 MMOL/L (ref 0.5–2.2)
LEUKOCYTE ESTERASE UR QL STRIP: ABNORMAL
LYMPHOCYTES # BLD AUTO: 1.1 K/UL (ref 1–4.8)
LYMPHOCYTES NFR BLD: 11.2 % (ref 18–48)
MCH RBC QN AUTO: 27.7 PG (ref 27–31)
MCHC RBC AUTO-ENTMCNC: 31.5 G/DL (ref 32–36)
MCV RBC AUTO: 88 FL (ref 82–98)
MICROSCOPIC COMMENT: ABNORMAL
MONOCYTES # BLD AUTO: 0.5 K/UL (ref 0.3–1)
MONOCYTES NFR BLD: 5.7 % (ref 4–15)
NEUTROPHILS # BLD AUTO: 7.5 K/UL (ref 1.8–7.7)
NEUTROPHILS NFR BLD: 80 % (ref 38–73)
NITRITE UR QL STRIP: NEGATIVE
NRBC BLD-RTO: 0 /100 WBC
PH UR STRIP: 6 [PH] (ref 5–8)
PLATELET # BLD AUTO: 269 K/UL (ref 150–450)
PMV BLD AUTO: 9.7 FL (ref 9.2–12.9)
POTASSIUM SERPL-SCNC: 4 MMOL/L (ref 3.5–5.1)
PROT SERPL-MCNC: 7.7 G/DL (ref 6–8.4)
PROT UR QL STRIP: ABNORMAL
PROTHROMBIN TIME: 11.3 SEC (ref 9–12.5)
RBC # BLD AUTO: 3.46 M/UL (ref 4.6–6.2)
RBC #/AREA URNS HPF: 17 /HPF (ref 0–4)
SODIUM SERPL-SCNC: 133 MMOL/L (ref 136–145)
SP GR UR STRIP: 1.01 (ref 1–1.03)
URN SPEC COLLECT METH UR: ABNORMAL
UROBILINOGEN UR STRIP-ACNC: NEGATIVE EU/DL
WBC # BLD AUTO: 9.41 K/UL (ref 3.9–12.7)
WBC #/AREA URNS HPF: >100 /HPF (ref 0–5)
WBC CLUMPS URNS QL MICRO: ABNORMAL

## 2024-02-02 PROCEDURE — 63600175 PHARM REV CODE 636 W HCPCS: Performed by: EMERGENCY MEDICINE

## 2024-02-02 PROCEDURE — 83605 ASSAY OF LACTIC ACID: CPT | Mod: 91 | Performed by: NURSE PRACTITIONER

## 2024-02-02 PROCEDURE — 25000003 PHARM REV CODE 250: Performed by: EMERGENCY MEDICINE

## 2024-02-02 PROCEDURE — 81000 URINALYSIS NONAUTO W/SCOPE: CPT | Performed by: EMERGENCY MEDICINE

## 2024-02-02 PROCEDURE — 87186 SC STD MICRODIL/AGAR DIL: CPT | Performed by: EMERGENCY MEDICINE

## 2024-02-02 PROCEDURE — 99999 PR PBB SHADOW E&M-EST. PATIENT-LVL III: CPT | Mod: PBBFAC,,, | Performed by: HOSPITALIST

## 2024-02-02 PROCEDURE — 85730 THROMBOPLASTIN TIME PARTIAL: CPT | Performed by: EMERGENCY MEDICINE

## 2024-02-02 PROCEDURE — 93010 ELECTROCARDIOGRAM REPORT: CPT | Mod: ,,, | Performed by: STUDENT IN AN ORGANIZED HEALTH CARE EDUCATION/TRAINING PROGRAM

## 2024-02-02 PROCEDURE — 80053 COMPREHEN METABOLIC PANEL: CPT | Performed by: NURSE PRACTITIONER

## 2024-02-02 PROCEDURE — 93005 ELECTROCARDIOGRAM TRACING: CPT

## 2024-02-02 PROCEDURE — 87077 CULTURE AEROBIC IDENTIFY: CPT | Performed by: EMERGENCY MEDICINE

## 2024-02-02 PROCEDURE — 87088 URINE BACTERIA CULTURE: CPT | Performed by: EMERGENCY MEDICINE

## 2024-02-02 PROCEDURE — 99215 OFFICE O/P EST HI 40 MIN: CPT | Mod: S$PBB,,, | Performed by: HOSPITALIST

## 2024-02-02 PROCEDURE — 85025 COMPLETE CBC W/AUTO DIFF WBC: CPT | Performed by: NURSE PRACTITIONER

## 2024-02-02 PROCEDURE — 99285 EMERGENCY DEPT VISIT HI MDM: CPT | Mod: 25,27

## 2024-02-02 PROCEDURE — 96361 HYDRATE IV INFUSION ADD-ON: CPT

## 2024-02-02 PROCEDURE — 85610 PROTHROMBIN TIME: CPT | Performed by: EMERGENCY MEDICINE

## 2024-02-02 PROCEDURE — 96365 THER/PROPH/DIAG IV INF INIT: CPT

## 2024-02-02 PROCEDURE — 87086 URINE CULTURE/COLONY COUNT: CPT | Performed by: EMERGENCY MEDICINE

## 2024-02-02 PROCEDURE — 87040 BLOOD CULTURE FOR BACTERIA: CPT | Performed by: NURSE PRACTITIONER

## 2024-02-02 PROCEDURE — 99213 OFFICE O/P EST LOW 20 MIN: CPT | Mod: PBBFAC,25 | Performed by: HOSPITALIST

## 2024-02-02 RX ORDER — HEPARIN SODIUM,PORCINE/D5W 25000/250
0-40 INTRAVENOUS SOLUTION INTRAVENOUS CONTINUOUS
Status: DISCONTINUED | OUTPATIENT
Start: 2024-02-02 | End: 2024-02-07

## 2024-02-02 RX ADMIN — PIPERACILLIN SODIUM AND TAZOBACTAM SODIUM 4.5 G: 4; .5 INJECTION, POWDER, FOR SOLUTION INTRAVENOUS at 11:02

## 2024-02-02 RX ADMIN — HEPARIN SODIUM 18 UNITS/KG/HR: 10000 INJECTION, SOLUTION INTRAVENOUS at 11:02

## 2024-02-02 RX ADMIN — SODIUM CHLORIDE 1000 ML: 9 INJECTION, SOLUTION INTRAVENOUS at 10:02

## 2024-02-02 NOTE — Clinical Note
Diagnosis: Acute deep vein thrombosis (DVT) of femoral vein of left lower extremity [[id number]]   Future Attending Provider: BRODERICK GALAN [[id number]]   Reason for IP Medical Treatment  (Clinical interventions that can only be accomplished in the IP setting? ) :: dvt, displaced nephrostomy tube   I certify that Inpatient services for greater than or equal to 2 midnights are medically necessary:: Yes   Plans for Post-Acute care--if anticipated (pick the single best option):: A. No post acute care anticipated at this time

## 2024-02-02 NOTE — ASSESSMENT & PLAN NOTE
- Plan to start palliative intent pembrolizumab next week  - FU in 3 weeks prior to C2  - Repeat imaging after C4

## 2024-02-03 PROBLEM — I82.402 ACUTE DEEP VEIN THROMBOSIS (DVT) OF LEFT LOWER EXTREMITY: Status: ACTIVE | Noted: 2024-02-03

## 2024-02-03 PROBLEM — J43.2 CENTRILOBULAR EMPHYSEMA: Chronic | Status: ACTIVE | Noted: 2023-11-09

## 2024-02-03 PROBLEM — M79.605 LEFT LEG PAIN: Chronic | Status: ACTIVE | Noted: 2024-02-03

## 2024-02-03 PROBLEM — D64.9 ANEMIA: Chronic | Status: ACTIVE | Noted: 2023-10-21

## 2024-02-03 PROBLEM — F41.9 ANXIETY: Status: ACTIVE | Noted: 2024-02-03

## 2024-02-03 PROBLEM — E03.9 HYPOTHYROIDISM: Chronic | Status: ACTIVE | Noted: 2024-01-12

## 2024-02-03 PROBLEM — M79.605 LEFT LEG PAIN: Status: ACTIVE | Noted: 2024-02-03

## 2024-02-03 PROBLEM — N18.30 ACUTE RENAL FAILURE SUPERIMPOSED ON STAGE 3 CHRONIC KIDNEY DISEASE: Status: ACTIVE | Noted: 2024-02-03

## 2024-02-03 PROBLEM — R00.0 TACHYCARDIA: Status: ACTIVE | Noted: 2024-02-03

## 2024-02-03 PROBLEM — Z93.6 NEPHROSTOMY STATUS: Status: RESOLVED | Noted: 2024-02-03 | Resolved: 2024-02-03

## 2024-02-03 PROBLEM — I82.412 ACUTE DEEP VEIN THROMBOSIS (DVT) OF FEMORAL VEIN OF LEFT LOWER EXTREMITY: Status: ACTIVE | Noted: 2024-02-03

## 2024-02-03 PROBLEM — Z93.6 NEPHROSTOMY STATUS: Status: ACTIVE | Noted: 2024-02-03

## 2024-02-03 PROBLEM — R00.0 TACHYCARDIA: Status: RESOLVED | Noted: 2024-02-03 | Resolved: 2024-02-03

## 2024-02-03 PROBLEM — N17.9 ACUTE RENAL FAILURE SUPERIMPOSED ON STAGE 3 CHRONIC KIDNEY DISEASE: Status: ACTIVE | Noted: 2024-02-03

## 2024-02-03 LAB
APTT PPP: 50.7 SEC (ref 21–32)
APTT PPP: 73.5 SEC (ref 21–32)
BASOPHILS # BLD AUTO: 0.04 K/UL (ref 0–0.2)
BASOPHILS NFR BLD: 0.5 % (ref 0–1.9)
DIFFERENTIAL METHOD BLD: ABNORMAL
EOSINOPHIL # BLD AUTO: 0.1 K/UL (ref 0–0.5)
EOSINOPHIL NFR BLD: 1.7 % (ref 0–8)
ERYTHROCYTE [DISTWIDTH] IN BLOOD BY AUTOMATED COUNT: 17.7 % (ref 11.5–14.5)
HCT VFR BLD AUTO: 28.1 % (ref 40–54)
HGB BLD-MCNC: 9.1 G/DL (ref 14–18)
IMM GRANULOCYTES # BLD AUTO: 0.03 K/UL (ref 0–0.04)
IMM GRANULOCYTES NFR BLD AUTO: 0.4 % (ref 0–0.5)
LYMPHOCYTES # BLD AUTO: 0.6 K/UL (ref 1–4.8)
LYMPHOCYTES NFR BLD: 7.8 % (ref 18–48)
MCH RBC QN AUTO: 28.3 PG (ref 27–31)
MCHC RBC AUTO-ENTMCNC: 32.4 G/DL (ref 32–36)
MCV RBC AUTO: 88 FL (ref 82–98)
MONOCYTES # BLD AUTO: 0.6 K/UL (ref 0.3–1)
MONOCYTES NFR BLD: 6.7 % (ref 4–15)
NEUTROPHILS # BLD AUTO: 6.8 K/UL (ref 1.8–7.7)
NEUTROPHILS NFR BLD: 82.9 % (ref 38–73)
NRBC BLD-RTO: 0 /100 WBC
PLATELET # BLD AUTO: 218 K/UL (ref 150–450)
PMV BLD AUTO: 9.5 FL (ref 9.2–12.9)
RBC # BLD AUTO: 3.21 M/UL (ref 4.6–6.2)
WBC # BLD AUTO: 8.21 K/UL (ref 3.9–12.7)

## 2024-02-03 PROCEDURE — 96366 THER/PROPH/DIAG IV INF ADDON: CPT | Mod: 59

## 2024-02-03 PROCEDURE — 85730 THROMBOPLASTIN TIME PARTIAL: CPT | Mod: 91 | Performed by: HOSPITALIST

## 2024-02-03 PROCEDURE — 63600175 PHARM REV CODE 636 W HCPCS: Performed by: INTERNAL MEDICINE

## 2024-02-03 PROCEDURE — 63600175 PHARM REV CODE 636 W HCPCS: Performed by: EMERGENCY MEDICINE

## 2024-02-03 PROCEDURE — 11000001 HC ACUTE MED/SURG PRIVATE ROOM

## 2024-02-03 PROCEDURE — 25000242 PHARM REV CODE 250 ALT 637 W/ HCPCS: Performed by: INTERNAL MEDICINE

## 2024-02-03 PROCEDURE — 25000003 PHARM REV CODE 250: Performed by: HOSPITALIST

## 2024-02-03 PROCEDURE — 25000003 PHARM REV CODE 250: Performed by: INTERNAL MEDICINE

## 2024-02-03 PROCEDURE — 0T25X0Z CHANGE DRAINAGE DEVICE IN KIDNEY, EXTERNAL APPROACH: ICD-10-PCS | Performed by: RADIOLOGY

## 2024-02-03 PROCEDURE — 94640 AIRWAY INHALATION TREATMENT: CPT

## 2024-02-03 PROCEDURE — 85025 COMPLETE CBC W/AUTO DIFF WBC: CPT | Performed by: EMERGENCY MEDICINE

## 2024-02-03 PROCEDURE — 36415 COLL VENOUS BLD VENIPUNCTURE: CPT | Performed by: HOSPITALIST

## 2024-02-03 PROCEDURE — 63600175 PHARM REV CODE 636 W HCPCS: Performed by: RADIOLOGY

## 2024-02-03 PROCEDURE — 21400001 HC TELEMETRY ROOM

## 2024-02-03 RX ORDER — BUDESONIDE 0.5 MG/2ML
0.5 INHALANT ORAL EVERY 12 HOURS
Status: DISCONTINUED | OUTPATIENT
Start: 2024-02-03 | End: 2024-02-05

## 2024-02-03 RX ORDER — LINEZOLID 600 MG/1
600 TABLET, FILM COATED ORAL EVERY 12 HOURS
Status: DISCONTINUED | OUTPATIENT
Start: 2024-02-03 | End: 2024-02-04

## 2024-02-03 RX ORDER — ACETAMINOPHEN 650 MG/1
650 SUPPOSITORY RECTAL EVERY 6 HOURS PRN
Status: DISCONTINUED | OUTPATIENT
Start: 2024-02-03 | End: 2024-02-07 | Stop reason: HOSPADM

## 2024-02-03 RX ORDER — IPRATROPIUM BROMIDE AND ALBUTEROL SULFATE 2.5; .5 MG/3ML; MG/3ML
3 SOLUTION RESPIRATORY (INHALATION) EVERY 6 HOURS PRN
Status: DISCONTINUED | OUTPATIENT
Start: 2024-02-03 | End: 2024-02-07 | Stop reason: HOSPADM

## 2024-02-03 RX ORDER — PREGABALIN 75 MG/1
75 CAPSULE ORAL NIGHTLY
Status: DISCONTINUED | OUTPATIENT
Start: 2024-02-03 | End: 2024-02-07 | Stop reason: HOSPADM

## 2024-02-03 RX ORDER — IBUPROFEN 200 MG
24 TABLET ORAL
Status: DISCONTINUED | OUTPATIENT
Start: 2024-02-03 | End: 2024-02-07 | Stop reason: HOSPADM

## 2024-02-03 RX ORDER — DOCUSATE SODIUM 100 MG/1
100 CAPSULE, LIQUID FILLED ORAL 2 TIMES DAILY
Status: DISCONTINUED | OUTPATIENT
Start: 2024-02-03 | End: 2024-02-07 | Stop reason: HOSPADM

## 2024-02-03 RX ORDER — MORPHINE SULFATE 4 MG/ML
2 INJECTION, SOLUTION INTRAMUSCULAR; INTRAVENOUS EVERY 4 HOURS PRN
Status: DISCONTINUED | OUTPATIENT
Start: 2024-02-03 | End: 2024-02-07 | Stop reason: HOSPADM

## 2024-02-03 RX ORDER — ALPRAZOLAM 1 MG/1
1 TABLET ORAL 3 TIMES DAILY PRN
Status: DISCONTINUED | OUTPATIENT
Start: 2024-02-03 | End: 2024-02-07 | Stop reason: HOSPADM

## 2024-02-03 RX ORDER — ESCITALOPRAM OXALATE 10 MG/1
20 TABLET ORAL DAILY
Status: DISCONTINUED | OUTPATIENT
Start: 2024-02-03 | End: 2024-02-07 | Stop reason: HOSPADM

## 2024-02-03 RX ORDER — NALOXONE HCL 0.4 MG/ML
0.02 VIAL (ML) INJECTION
Status: DISCONTINUED | OUTPATIENT
Start: 2024-02-03 | End: 2024-02-07 | Stop reason: HOSPADM

## 2024-02-03 RX ORDER — PROMETHAZINE HYDROCHLORIDE 25 MG/1
25 TABLET ORAL EVERY 6 HOURS PRN
Status: DISCONTINUED | OUTPATIENT
Start: 2024-02-03 | End: 2024-02-07 | Stop reason: HOSPADM

## 2024-02-03 RX ORDER — ALUMINUM HYDROXIDE, MAGNESIUM HYDROXIDE, AND SIMETHICONE 1200; 120; 1200 MG/30ML; MG/30ML; MG/30ML
30 SUSPENSION ORAL 4 TIMES DAILY PRN
Status: DISCONTINUED | OUTPATIENT
Start: 2024-02-03 | End: 2024-02-07 | Stop reason: HOSPADM

## 2024-02-03 RX ORDER — SODIUM CHLORIDE 0.9 % (FLUSH) 0.9 %
10 SYRINGE (ML) INJECTION EVERY 12 HOURS PRN
Status: DISCONTINUED | OUTPATIENT
Start: 2024-02-03 | End: 2024-02-07 | Stop reason: HOSPADM

## 2024-02-03 RX ORDER — IBUPROFEN 200 MG
16 TABLET ORAL
Status: DISCONTINUED | OUTPATIENT
Start: 2024-02-03 | End: 2024-02-07 | Stop reason: HOSPADM

## 2024-02-03 RX ORDER — GLUCAGON 1 MG
1 KIT INJECTION
Status: DISCONTINUED | OUTPATIENT
Start: 2024-02-03 | End: 2024-02-07 | Stop reason: HOSPADM

## 2024-02-03 RX ORDER — LEVOTHYROXINE SODIUM 125 UG/1
125 TABLET ORAL
Status: DISCONTINUED | OUTPATIENT
Start: 2024-02-03 | End: 2024-02-07 | Stop reason: HOSPADM

## 2024-02-03 RX ORDER — ONDANSETRON HYDROCHLORIDE 2 MG/ML
4 INJECTION, SOLUTION INTRAVENOUS EVERY 8 HOURS PRN
Status: DISCONTINUED | OUTPATIENT
Start: 2024-02-03 | End: 2024-02-07 | Stop reason: HOSPADM

## 2024-02-03 RX ORDER — HYDROCODONE BITARTRATE AND ACETAMINOPHEN 5; 325 MG/1; MG/1
1 TABLET ORAL EVERY 6 HOURS PRN
Status: DISCONTINUED | OUTPATIENT
Start: 2024-02-03 | End: 2024-02-07 | Stop reason: HOSPADM

## 2024-02-03 RX ORDER — TALC
6 POWDER (GRAM) TOPICAL NIGHTLY PRN
Status: DISCONTINUED | OUTPATIENT
Start: 2024-02-03 | End: 2024-02-07 | Stop reason: HOSPADM

## 2024-02-03 RX ORDER — ACETAMINOPHEN 325 MG/1
650 TABLET ORAL EVERY 8 HOURS PRN
Status: DISCONTINUED | OUTPATIENT
Start: 2024-02-03 | End: 2024-02-07 | Stop reason: HOSPADM

## 2024-02-03 RX ORDER — ARFORMOTEROL TARTRATE 15 UG/2ML
15 SOLUTION RESPIRATORY (INHALATION) 2 TIMES DAILY
Status: DISCONTINUED | OUTPATIENT
Start: 2024-02-03 | End: 2024-02-07 | Stop reason: HOSPADM

## 2024-02-03 RX ORDER — FLUTICASONE FUROATE AND VILANTEROL 100; 25 UG/1; UG/1
1 POWDER RESPIRATORY (INHALATION) DAILY
Status: DISCONTINUED | OUTPATIENT
Start: 2024-02-03 | End: 2024-02-03

## 2024-02-03 RX ORDER — FENTANYL CITRATE 50 UG/ML
INJECTION, SOLUTION INTRAMUSCULAR; INTRAVENOUS CODE/TRAUMA/SEDATION MEDICATION
Status: COMPLETED | OUTPATIENT
Start: 2024-02-03 | End: 2024-02-03

## 2024-02-03 RX ORDER — AMOXICILLIN 250 MG
1 CAPSULE ORAL 2 TIMES DAILY PRN
Status: DISCONTINUED | OUTPATIENT
Start: 2024-02-03 | End: 2024-02-07 | Stop reason: HOSPADM

## 2024-02-03 RX ORDER — CYCLOBENZAPRINE HCL 10 MG
10 TABLET ORAL 3 TIMES DAILY PRN
Status: DISCONTINUED | OUTPATIENT
Start: 2024-02-03 | End: 2024-02-07 | Stop reason: HOSPADM

## 2024-02-03 RX ADMIN — DOCUSATE SODIUM 100 MG: 100 CAPSULE, LIQUID FILLED ORAL at 09:02

## 2024-02-03 RX ADMIN — HEPARIN SODIUM 16 UNITS/KG/HR: 10000 INJECTION, SOLUTION INTRAVENOUS at 07:02

## 2024-02-03 RX ADMIN — FENTANYL CITRATE 50 MCG: 50 INJECTION, SOLUTION INTRAMUSCULAR; INTRAVENOUS at 10:02

## 2024-02-03 RX ADMIN — LINEZOLID 600 MG: 600 TABLET, FILM COATED ORAL at 09:02

## 2024-02-03 RX ADMIN — HEPARIN SODIUM 16 UNITS/KG/HR: 10000 INJECTION, SOLUTION INTRAVENOUS at 12:02

## 2024-02-03 RX ADMIN — LINEZOLID 600 MG: 600 TABLET, FILM COATED ORAL at 08:02

## 2024-02-03 RX ADMIN — ARFORMOTEROL TARTRATE 15 MCG: 15 SOLUTION RESPIRATORY (INHALATION) at 07:02

## 2024-02-03 RX ADMIN — PREGABALIN 75 MG: 75 CAPSULE ORAL at 08:02

## 2024-02-03 RX ADMIN — ALPRAZOLAM 1 MG: 1 TABLET ORAL at 07:02

## 2024-02-03 RX ADMIN — LEVOTHYROXINE SODIUM 125 MCG: 125 TABLET ORAL at 09:02

## 2024-02-03 RX ADMIN — ESCITALOPRAM OXALATE 20 MG: 10 TABLET ORAL at 09:02

## 2024-02-03 RX ADMIN — HEPARIN SODIUM 16 UNITS/KG/HR: 10000 INJECTION, SOLUTION INTRAVENOUS at 09:02

## 2024-02-03 RX ADMIN — CEFEPIME 1 G: 1 INJECTION, POWDER, FOR SOLUTION INTRAMUSCULAR; INTRAVENOUS at 09:02

## 2024-02-03 RX ADMIN — DOCUSATE SODIUM 100 MG: 100 CAPSULE, LIQUID FILLED ORAL at 08:02

## 2024-02-03 RX ADMIN — BUDESONIDE 0.5 MG: 0.5 INHALANT RESPIRATORY (INHALATION) at 07:02

## 2024-02-03 RX ADMIN — HYDROCODONE BITARTRATE AND ACETAMINOPHEN 1 TABLET: 5; 325 TABLET ORAL at 05:02

## 2024-02-03 RX ADMIN — CEFEPIME 1 G: 1 INJECTION, POWDER, FOR SOLUTION INTRAMUSCULAR; INTRAVENOUS at 08:02

## 2024-02-03 NOTE — ASSESSMENT & PLAN NOTE
Patient with acute kidney injury/acute renal failure likely due to post-obstructive d/t dislodged nephrostomy tube  BANDAR is currently  awaiting IR . Baseline creatinine  1.4  - Labs reviewed- Renal function/electrolytes with Estimated Creatinine Clearance: 32.1 mL/min (A) (based on SCr of 2.1 mg/dL (H)). according to latest data. Monitor urine output and serial BMP and adjust therapy as needed. Avoid nephrotoxins and renally dose meds for GFR listed above.

## 2024-02-03 NOTE — PLAN OF CARE
Brief Plan of Care Update    Pt is a 67 YO male with PMH notable for CKD 3, lung cancer, bladder cancer, b/l hydronephrosis s/p b/l PCN, recurrent UTIs who was admitted to hospital medicine earlier this AM for management of dislodged nephrostomy tube, extensive occlusive DVT of LLE and acute cystitis.     Pt seen in the ED, no family at bedside. He is awake, alert, in NAD. Reports leakage from R PCN tube and L leg swelling that has been ongoing x 1 day. Denies CP, SOB, dizziness, palpitations, nausea, vomiting.     On exam, LLE is edematous, BLE cool to touch. Pulses to LLE dopplerable.     IR consulted for PCN revision and LLE thrombectomy. Discussed with Dr. Vora- ramya plans for exchange of PCN tubes today and patient will be evaluated for thrombectomy on Monday   Continue heparin drip; monitor PTT per protocol   Start neurovascular checks of LLE   Will start Cefepime + Zyvox for UTI pending urine culture.     Full note to follow in AM    Hetal Santos MD   [facility]

## 2024-02-03 NOTE — ASSESSMENT & PLAN NOTE
Patient has chronic hypothyroidism. TFTs reviewed-   Lab Results   Component Value Date    TSH 1.034 02/02/2024   Plan:  -Will continue chronic levothyroxine and adjust for and clinical changes

## 2024-02-03 NOTE — ASSESSMENT & PLAN NOTE
Patient with chronic bilateral nephrostomy tubes found to have evidence of dislodgement as noted on imaging below. UA also positive for 3+ LE, 17 RBCs, >100 WBC's, many WBC clumps, and occasional bacteria.  Patient completely asymptomatic and remains afebrile and without leukocytosis.  Patient recently completed 7 day course of ciprofloxacin following prior hospitalization. Patient s/p zosyn x1 in ED.    CT Renal  Right-sided nephrostomy tube the has been displaced inferiorly, now within the lower pole right renal calyx (previously within the right renal pelvis). Unchanged appearance of the left nephrostomy.  Mild right hydroureteronephrosis.  Nonobstructive right nephrolithiasis.  Chronic diffuse circumferential bladder wall thickening with associated bladder wall calcification compatible with chronic cystitis, follow-up cystoscopy should be considered.     Asymmetric subcutaneous edema and swelling of the left proximal thigh.      Plan:  -f/u IR  -will defer additional antibiotics to day provider

## 2024-02-03 NOTE — ASSESSMENT & PLAN NOTE
Patient presented with acute onset and rapidly progressive left lower extremity pain and swelling x1 day duration and was found to have extensive occlusive DVT on venous doppler. Patient initiated on heparin drip. IR consulted and awaiting further evaluation/recommendations regarding potential thrombectomy.  Plan:  -NPO  -continue heparin drip  -optimize pain control  -bowel regimen  -f/u IR

## 2024-02-03 NOTE — PLAN OF CARE
Discussed poc with pt, pt verbalized understanding  Purposeful rounding every 2hours  VS wnl  Cardiac monitoring in use, pt is PHILLIP, tele monitor # [id number]  Blood glucose monitoring   Fall precautions in place, remains injury free  Pain and nausea under control with PRN meds  IVFs  Accurate I&Os  Abx given as prescribed  Bed locked at lowest position  Call light within reach  Chart check complete  Will cont with POC  Problem: Adult Inpatient Plan of Care  Goal: Plan of Care Review  Outcome: Ongoing, Progressing  Goal: Patient-Specific Goal (Individualized)  Outcome: Ongoing, Progressing  Goal: Absence of Hospital-Acquired Illness or Injury  Outcome: Ongoing, Progressing  Goal: Optimal Comfort and Wellbeing  Outcome: Ongoing, Progressing  Goal: Readiness for Transition of Care  Outcome: Ongoing, Progressing     Problem: Adjustment to Illness (Sepsis/Septic Shock)  Goal: Optimal Coping  Outcome: Ongoing, Progressing     Problem: Bleeding (Sepsis/Septic Shock)  Goal: Absence of Bleeding  Outcome: Ongoing, Progressing     Problem: Glycemic Control Impaired (Sepsis/Septic Shock)  Goal: Blood Glucose Level Within Desired Range  Outcome: Ongoing, Progressing     Problem: Infection Progression (Sepsis/Septic Shock)  Goal: Absence of Infection Signs and Symptoms  Outcome: Ongoing, Progressing     Problem: Nutrition Impaired (Sepsis/Septic Shock)  Goal: Optimal Nutrition Intake  Outcome: Ongoing, Progressing     Problem: Fluid and Electrolyte Imbalance (Acute Kidney Injury/Impairment)  Goal: Fluid and Electrolyte Balance  Outcome: Ongoing, Progressing     Problem: Oral Intake Inadequate (Acute Kidney Injury/Impairment)  Goal: Optimal Nutrition Intake  Outcome: Ongoing, Progressing     Problem: Renal Function Impairment (Acute Kidney Injury/Impairment)  Goal: Effective Renal Function  Outcome: Ongoing, Progressing     Problem: Impaired Wound Healing  Goal: Optimal Wound Healing  Outcome: Ongoing, Progressing

## 2024-02-03 NOTE — ASSESSMENT & PLAN NOTE
Patient's COPD is controlled currently.  Patient is currently off COPD Pathway. Continue scheduled inhalers Supplemental oxygen and monitor respiratory status closely.

## 2024-02-03 NOTE — INTERVAL H&P NOTE
The patient has been examined and the H&P has been reviewed:    I concur with the findings and no changes have occurred since H&P was written.        Active Hospital Problems    Diagnosis  POA    *Left leg pain [M79.605]  Yes     Chronic    Acute deep vein thrombosis (DVT) of femoral vein of left lower extremity [I82.412]  Yes    Anxiety [F41.9]  Yes    Acute renal failure superimposed on stage 3 chronic kidney disease [N17.9, N18.30]  Yes    Hypothyroidism [E03.9]  Yes     Chronic    Malignant neoplasm of urinary bladder [C67.9]  Yes     Metachronous recurrence MIBC following definitive CRT 11/2020. Extensive local disease with bilateral hydronephrosis, renal failure, and recurrent infection. Review of PET imaging suggestive of emily metastases as well.       Nephrostomy tube displaced [T83.022A]  Yes    Centrilobular emphysema [J43.2]  Yes     Chronic    Squamous cell carcinoma of right lung [C34.91]  Yes    Anemia [D64.9]  Yes     Chronic      Resolved Hospital Problems    Diagnosis Date Resolved POA    Nephrostomy status [Z93.6] 02/03/2024 Not Applicable    Tachycardia [R00.0] 02/03/2024 Unknown

## 2024-02-03 NOTE — HPI
"Geovani Currie is a 68 y.o. male with a PMH  has a past medical history of Anxiety disorder, unspecified, COPD (chronic obstructive pulmonary disease), Hypertension, and Thyroid disease. who presented to the ED accompanied by his daughter for further evaluation of worsening left lower extremity pain and swelling as well as concerns of nephrostomy tube dislodgement.  Patient's leg was noted to be swollen and was complaining of increased swelling/tightness/cramping with intermittent burning/stinging sensations beginning around 11:00 a.m. and progressively worsened throughout the day.  Patient reported negative history of PE/DVTs not currently on any blood thinners/anticoagulants.  Patient reported no known alleviating factors with aggravating factors including weight-bearing, movement, and palpation to the affected area.  Patient is on also reported mild confusion and was reported to be saying "weird things".  Patient currently denies endorsing any lightheadedness, dizziness, headache, visual changes, fever, chills, sweats, nausea, vomiting, chest pain, shortness of breath, abdominal pain, flank pain, dysuria, hematuria, melena, hematochezia, diarrhea, or onset neurological deficits.  Prior to onset of symptoms, patient reported being in his usual state of health with no other concerns or complaints.  All other review of systems negative except as noted above.  Of note, patient recently underwent hip arthroplasty and continues to do physical rehab and ambulates via aid of walker.  Initial workup in the ED revealed patient had extensive occlusive clot throughout his left lower extremity and was initiated on heparin.  Patient UA also concerning for residual UTI following completion of ciprofloxacin last month and was provided cefepime by ED staff.  Patient admitted to Hospital Medicine inpatient for continued medical management with IR consult pending.    PCP: Faizan Antoine  "

## 2024-02-03 NOTE — SEDATION DOCUMENTATION
Called pt's wife, Kailey, and updated on pt being moved to room 560 and that procedure is completed and went well.   
Procedure completed at this time. Bilateral nephrostomy tubes replaced. Sutures, gauze, and tegaderm placed to tube insertion sites C/D/I. Right neph tube is 10 fr and left neph tube is 8fr. VSS, NADN, and pt tolerated procedure well.   
Pt placed prone on fluoro table at this time. CM in place, VSS, NADN, and pt verbalizes understanding of procedure.   
Pt transferred to bed and transported to room 560 at this time.   
Report called to Juan ATKINS and verbalized understanding of all.   
oral

## 2024-02-03 NOTE — PROGRESS NOTES
Pharmacist Renal Dose Adjustment Note    Geovani Currie is a 68 y.o. male being treated with the medication cefepime for UTI    Patient Data:    Vital Signs (Most Recent):  Temp: 97.9 °F (36.6 °C) (02/02/24 1815)  Pulse: 100 (02/03/24 0530)  Resp: 18 (02/03/24 0530)  BP: (!) 112/59 (02/03/24 0530)  SpO2: 97 % (02/03/24 0530) Vital Signs (72h Range):  Temp:  [97.6 °F (36.4 °C)-97.9 °F (36.6 °C)]   Pulse:  []   Resp:  [14-21]   BP: ()/(51-77)   SpO2:  [94 %-99 %]      Recent Labs   Lab 01/30/24  1433 02/02/24  1010 02/02/24  1859   CREATININE 1.5* 2.0* 2.1*     Serum creatinine: 2.1 mg/dL (H) 02/02/24 1859  Estimated creatinine clearance: 32.1 mL/min (A)    Per protocol for a mild to moderate infection & for CrCl 30-60 ml/min, dose will be increased from 1 gm IV every 24 hours to 1 gm IV every 12 hours.     Pharmacist's Name: Katherine E Mcardle  Pharmacist's Extension: [phone]

## 2024-02-03 NOTE — ED NOTES
Attempt to return IR call. Went to [facility] assisting patient to [facility] floor via stretcher. No distress noted.

## 2024-02-03 NOTE — SUBJECTIVE & OBJECTIVE
Past Medical History:   Diagnosis Date    COPD (chronic obstructive pulmonary disease)     Hypertension        Past Surgical History:   Procedure Laterality Date    APPENDECTOMY      CATARACT EXTRACTION      NEPHROSTOMY      OPEN REDUCTION AND INTERNAL FIXATION (ORIF) OF INTERTROCHANTERIC FRACTURE OF FEMUR Right 11/21/2023    Procedure: ORIF, FRACTURE, FEMUR, INTERTROCHANTERIC;  Surgeon: Tanisha Guidry DO;  Location: [facility];  Service: Orthopedics;  Laterality: Right;  Gamma nail       Review of patient's allergies indicates:  No Known Allergies    No current facility-administered medications on file prior to encounter.     Current Outpatient Medications on File Prior to Encounter   Medication Sig    albuterol (ACCUNEB) 0.63 mg/3 mL Nebu Take 3 mLs (0.63 mg total) by nebulization 3 (three) times daily as needed (sob, wheezing). Rescue    albuterol (PROVENTIL/VENTOLIN HFA) 90 mcg/actuation inhaler Inhale 1-2 puffs into the lungs every 4 (four) hours as needed for Wheezing. Rescue    ALPRAZolam (XANAX) 0.5 MG tablet Take 2 tablets (1 mg total) by mouth 3 (three) times daily as needed for Anxiety.    cyclobenzaprine (FLEXERIL) 10 MG tablet Take 1 tablet (10 mg total) by mouth 3 (three) times daily as needed for Muscle spasms.    docusate sodium (COLACE) 100 MG capsule Take 100 mg by mouth 2 (two) times daily.    EScitalopram oxalate (LEXAPRO) 20 MG tablet Take 1 tablet (20 mg total) by mouth once daily.    fluticasone furoate-vilanteroL (BREO ELLIPTA) 100-25 mcg/dose diskus inhaler Inhale 1 puff into the lungs once daily. Controller    HYDROcodone-acetaminophen (NORCO) 7.5-325 mg per tablet Take 1 tablet by mouth every 6 (six) hours as needed for Pain (Max 4 doses/day).    [START ON 2/9/2024] HYDROcodone-acetaminophen (NORCO) 7.5-325 mg per tablet Take 1 tablet by mouth every 6 (six) hours as needed for Pain.    levothyroxine (SYNTHROID) 125 MCG tablet Take 1 tablet (125 mcg total) by mouth before breakfast.     naloxone (NARCAN) 4 mg/actuation Spry 1 spray once.    nicotine (NICODERM CQ) 14 mg/24 hr Place 1 patch onto the skin every 24 hours.    polyethylene glycol (GLYCOLAX) 17 gram PwPk Take 17 g by mouth daily as needed for Constipation.    pregabalin (LYRICA) 75 MG capsule Take 1 capsule (75 mg total) by mouth every evening.    vitamin D (VITAMIN D3) 1000 units Tab Take 25 mcg by mouth once daily.     Family History       Problem Relation (Age of Onset)    Cancer Mother, Father, Maternal Grandfather          Tobacco Use    Smoking status: Former     Current packs/day: 0.00     Types: Cigarettes     Quit date: 10/2023     Years since quittin.3     Passive exposure: Never    Smokeless tobacco: Never   Substance and Sexual Activity    Alcohol use: Never    Drug use: Never    Sexual activity: Not Currently     Partners: Female     Review of Systems   All other systems reviewed and are negative.    Objective:     Vital Signs (Most Recent):  Temp: 97.9 °F (36.6 °C) (24 1815)  Pulse: 98 (24 0300)  Resp: 16 (24 0300)  BP: 104/68 (24 0300)  SpO2: 99 % (24 0300) Vital Signs (24h Range):  Temp:  [97.6 °F (36.4 °C)-97.9 °F (36.6 °C)] 97.9 °F (36.6 °C)  Pulse:  [] 98  Resp:  [14-21] 16  SpO2:  [94 %-99 %] 99 %  BP: ()/(51-77) 104/68     Weight: 70.3 kg (155 lb)  Body mass index is 23.57 kg/m².     Physical Exam  Vitals reviewed.   Constitutional:       General: He is not in acute distress.     Appearance: Normal appearance. He is normal weight. He is not ill-appearing, toxic-appearing or diaphoretic.   HENT:      Head: Normocephalic and atraumatic.      Right Ear: External ear normal.      Left Ear: External ear normal.      Nose: Nose normal. No congestion or rhinorrhea.      Mouth/Throat:      Mouth: Mucous membranes are moist.      Pharynx: Oropharynx is clear. No oropharyngeal exudate or posterior oropharyngeal erythema.   Eyes:      General: No scleral icterus.     Extraocular  Movements: Extraocular movements intact.      Conjunctiva/sclera: Conjunctivae normal.      Pupils: Pupils are equal, round, and reactive to light.   Neck:      Vascular: No carotid bruit.   Cardiovascular:      Rate and Rhythm: Normal rate and regular rhythm.      Pulses: Normal pulses.      Heart sounds: Normal heart sounds. No murmur heard.     No friction rub. No gallop.   Pulmonary:      Effort: Pulmonary effort is normal. No respiratory distress.      Breath sounds: Normal breath sounds. No stridor. No wheezing, rhonchi or rales.   Chest:      Chest wall: No tenderness.   Abdominal:      General: Abdomen is flat. Bowel sounds are normal. There is no distension.      Palpations: Abdomen is soft. There is no mass.      Tenderness: There is no abdominal tenderness. There is no guarding or rebound.      Hernia: No hernia is present.   Musculoskeletal:         General: Swelling and tenderness present. No deformity or signs of injury. Normal range of motion.      Cervical back: Normal range of motion and neck supple. No rigidity or tenderness.      Left lower leg: Edema present.      Comments: Bilateral nephrostomy tubes present without evidence of bleeding, drainage, signs of infection noted.  Diffuse edema and erythema noted throughout left lower extremity with TTP throughout.   Lymphadenopathy:      Cervical: No cervical adenopathy.   Skin:     General: Skin is warm and dry.      Capillary Refill: Capillary refill takes less than 2 seconds.      Coloration: Skin is not jaundiced or pale.      Findings: Erythema present. No bruising, lesion or rash.      Comments: Skin findings as noted above in MSK section    Neurological:      General: No focal deficit present.      Mental Status: He is alert and oriented to person, place, and time. Mental status is at baseline.      Cranial Nerves: No cranial nerve deficit.      Sensory: No sensory deficit.      Motor: No weakness.      Coordination: Coordination normal.       Comments: Patient yielding 5/5 strength throughout.  Sensation to light touch grossly intact throughout.    Psychiatric:         Mood and Affect: Mood normal.         Behavior: Behavior normal.         Thought Content: Thought content normal.         Judgment: Judgment normal.              CRANIAL NERVES     CN III, IV, VI   Pupils are equal, round, and reactive to light.       Significant Labs: All pertinent labs within the past 24 hours have been reviewed.    Significant Imaging: I have reviewed all pertinent imaging results/findings within the past 24 hours.    LABS:  Recent Results (from the past 24 hour(s))   CBC Oncology    Collection Time: 02/02/24 10:10 AM   Result Value Ref Range    WBC 7.33 3.90 - 12.70 K/uL    RBC 3.47 (L) 4.60 - 6.20 M/uL    Hemoglobin 9.7 (L) 14.0 - 18.0 g/dL    Hematocrit 31.0 (L) 40.0 - 54.0 %    MCV 89 82 - 98 fL    MCH 28.0 27.0 - 31.0 pg    MCHC 31.3 (L) 32.0 - 36.0 g/dL    RDW 17.7 (H) 11.5 - 14.5 %    Platelets 248 150 - 450 K/uL    MPV 9.7 9.2 - 12.9 fL    Gran # (ANC) 5.9 1.8 - 7.7 K/uL    Immature Grans (Abs) 0.01 0.00 - 0.04 K/uL   Comprehensive Metabolic Panel    Collection Time: 02/02/24 10:10 AM   Result Value Ref Range    Sodium 135 (L) 136 - 145 mmol/L    Potassium 4.3 3.5 - 5.1 mmol/L    Chloride 100 95 - 110 mmol/L    CO2 23 23 - 29 mmol/L    Glucose 152 (H) 70 - 110 mg/dL    BUN 29 (H) 8 - 23 mg/dL    Creatinine 2.0 (H) 0.5 - 1.4 mg/dL    Calcium 9.5 8.7 - 10.5 mg/dL    Total Protein 7.7 6.0 - 8.4 g/dL    Albumin 2.9 (L) 3.5 - 5.2 g/dL    Total Bilirubin 0.5 0.1 - 1.0 mg/dL    Alkaline Phosphatase 111 55 - 135 U/L    AST 11 10 - 40 U/L    ALT 12 10 - 44 U/L    eGFR 35 (A) >60 mL/min/1.73 m^2    Anion Gap 12 8 - 16 mmol/L   TSH    Collection Time: 02/02/24 10:10 AM   Result Value Ref Range    TSH 1.034 0.400 - 4.000 uIU/mL   CBC auto differential    Collection Time: 02/02/24  6:59 PM   Result Value Ref Range    WBC 9.41 3.90 - 12.70 K/uL    RBC 3.46 (L) 4.60 - 6.20  M/uL    Hemoglobin 9.6 (L) 14.0 - 18.0 g/dL    Hematocrit 30.5 (L) 40.0 - 54.0 %    MCV 88 82 - 98 fL    MCH 27.7 27.0 - 31.0 pg    MCHC 31.5 (L) 32.0 - 36.0 g/dL    RDW 17.6 (H) 11.5 - 14.5 %    Platelets 269 150 - 450 K/uL    MPV 9.7 9.2 - 12.9 fL    Immature Granulocytes 0.4 0.0 - 0.5 %    Gran # (ANC) 7.5 1.8 - 7.7 K/uL    Immature Grans (Abs) 0.04 0.00 - 0.04 K/uL    Lymph # 1.1 1.0 - 4.8 K/uL    Mono # 0.5 0.3 - 1.0 K/uL    Eos # 0.2 0.0 - 0.5 K/uL    Baso # 0.05 0.00 - 0.20 K/uL    nRBC 0 0 /100 WBC    Gran % 80.0 (H) 38.0 - 73.0 %    Lymph % 11.2 (L) 18.0 - 48.0 %    Mono % 5.7 4.0 - 15.0 %    Eosinophil % 2.2 0.0 - 8.0 %    Basophil % 0.5 0.0 - 1.9 %    Differential Method Automated    Comprehensive metabolic panel    Collection Time: 02/02/24  6:59 PM   Result Value Ref Range    Sodium 133 (L) 136 - 145 mmol/L    Potassium 4.0 3.5 - 5.1 mmol/L    Chloride 96 95 - 110 mmol/L    CO2 20 (L) 23 - 29 mmol/L    Glucose 126 (H) 70 - 110 mg/dL    BUN 30 (H) 8 - 23 mg/dL    Creatinine 2.1 (H) 0.5 - 1.4 mg/dL    Calcium 8.9 8.7 - 10.5 mg/dL    Total Protein 7.7 6.0 - 8.4 g/dL    Albumin 3.3 (L) 3.5 - 5.2 g/dL    Total Bilirubin 0.5 0.1 - 1.0 mg/dL    Alkaline Phosphatase 122 55 - 135 U/L    AST 13 10 - 40 U/L    ALT 14 10 - 44 U/L    eGFR 33 (A) >60 mL/min/1.73 m^2    Anion Gap 17 (H) 8 - 16 mmol/L   Lactic acid, plasma #1    Collection Time: 02/02/24  6:59 PM   Result Value Ref Range    Lactate (Lactic Acid) 2.0 0.5 - 2.2 mmol/L   Urinalysis, Reflex to Urine Culture Urine, Clean Catch    Collection Time: 02/02/24 10:06 PM    Specimen: Urine   Result Value Ref Range    Specimen UA Urine, Clean Catch     Color, UA Yellow Yellow, Straw, Andra    Appearance, UA Hazy (A) Clear    pH, UA 6.0 5.0 - 8.0    Specific Gravity, UA 1.015 1.005 - 1.030    Protein, UA 1+ (A) Negative    Glucose, UA Negative Negative    Ketones, UA Negative Negative    Bilirubin (UA) Negative Negative    Occult Blood UA 2+ (A) Negative    Nitrite,  UA Negative Negative    Urobilinogen, UA Negative <2.0 EU/dL    Leukocytes, UA 3+ (A) Negative   Urinalysis Microscopic    Collection Time: 02/02/24 10:06 PM   Result Value Ref Range    RBC, UA 17 (H) 0 - 4 /hpf    WBC, UA >100 (H) 0 - 5 /hpf    WBC Clumps, UA Many (A) None-Rare    Bacteria Occasional None-Occ /hpf    Hyaline Casts, UA 4 (A) 0-1/lpf /lpf    Granular Casts, UA 4 (A) None /lpf    Microscopic Comment SEE COMMENT    Lactic acid, plasma #2    Collection Time: 02/02/24 10:18 PM   Result Value Ref Range    Lactate (Lactic Acid) 1.5 0.5 - 2.2 mmol/L   APTT    Collection Time: 02/02/24 10:54 PM   Result Value Ref Range    aPTT 31.0 21.0 - 32.0 sec   Protime-INR    Collection Time: 02/02/24 10:54 PM   Result Value Ref Range    Prothrombin Time 11.3 9.0 - 12.5 sec    INR 1.1 0.8 - 1.2       RADIOLOGY  CT Renal Stone Study ABD Pelvis WO    Result Date: 2/3/2024  EXAMINATION: CT RENAL STONE STUDY ABD PELVIS WO CLINICAL HISTORY: Nephrostomy catheter displacement; Other artificial openings of urinary tract status TECHNIQUE: Low dose axial images, sagittal and coronal reformations were obtained from the lung bases to the pubic symphysis.  Contrast was not administered. COMPARISON: 01/12/2024 FINDINGS: Right-sided nephrostomy tube the has been displaced inferiorly, now within the lower pole right renal calyx (previously within the right renal pelvis).  Unchanged appearance of the left nephrostomy.  Left kidney is atrophic.  Mild right hydroureteronephrosis.  Nonobstructive right nephrolithiasis.  Chronic diffuse circumferential bladder wall thickening with associated bladder wall calcification compatible with chronic cystitis, follow-up cystoscopy should be considered. Bibasilar dependent atelectasis. Unremarkable liver and gallbladder.  Unremarkable spleen and pancreas.  Unremarkable adrenal glands. Moderate presacral soft tissue fullness. Large amount of stool throughout the colon and rectum.  No small bowel  obstruction. Mild aneurysmal dilatation of the abdominal aorta.  Severe multifocal atherosclerosis of the abdominal aorta and its branches. Postsurgical changes of right proximal femur.  Unchanged appearance of the right-sided rib fractures. Asymmetric subcutaneous edema and swelling of the left proximal thigh.     Right-sided nephrostomy tube the has been displaced inferiorly, now within the lower pole right renal calyx (previously within the right renal pelvis). Unchanged appearance of the left nephrostomy.  Mild right hydroureteronephrosis.  Nonobstructive right nephrolithiasis.  Chronic diffuse circumferential bladder wall thickening with associated bladder wall calcification compatible with chronic cystitis, follow-up cystoscopy should be considered. Asymmetric subcutaneous edema and swelling of the left proximal thigh. Additional findings, as above. Electronically signed by: Joselyn Ge Date:    02/03/2024 Time:    01:11    US Lower Extremity Veins Left    Result Date: 2/2/2024  EXAMINATION: US LOWER EXTREMITY VEINS LEFT CLINICAL HISTORY: Pain in left leg TECHNIQUE: Duplex and color flow Doppler evaluation and graded compression of the left lower extremity veins was performed. COMPARISON: None FINDINGS: Left sided veins: Extensive occlusive DVT in the entire left leg. . Contralateral CFV: The contralateral (right) common femoral vein is patent and free of thrombus. Miscellaneous: None     Extensive occlusive DVT involving the entire left leg.  Dr. Helms notified at 2220 by the sonographer Electronically signed by: Jefferson Kunz Date:    02/02/2024 Time:    22:45    X-Ray Chest AP Portable    Result Date: 2/2/2024  EXAMINATION: XR CHEST AP PORTABLE CLINICAL HISTORY: Sepsis; TECHNIQUE: Single frontal view of the chest was performed. COMPARISON: None FINDINGS: The lungs are clear, with normal appearance of pulmonary vasculature and no pleural effusion or pneumothorax. The cardiac silhouette is normal  in size. The hilar and mediastinal contours are unremarkable. Bones are intact.     No acute abnormality. Electronically signed by: Jefferson Joaquina Date:    02/02/2024 Time:    18:30    X-Ray Abdomen AP 1 View    Result Date: 1/14/2024  EXAMINATION: XR ABDOMEN AP 1 VIEW CLINICAL HISTORY: Constipation; TECHNIQUE: Single AP View of the abdomen was performed. COMPARISON: 12/28/2023 x-ray FINDINGS: There are bilateral percutaneous nephrostomy tubes.  There are postop changes of the right hip. There is gaseous distention of the stomach. There is an appropriate volume of stool in the large bowel.  No obvious rectal impaction is seen. Lumbar degenerative disc disease.  The splenic artery is calcified.     No obvious rectal fecal impaction.  See above. Electronically signed by: Rainer Chandler MD Date:    01/14/2024 Time:    12:01    CT Abdomen Pelvis  Without Contrast    Result Date: 1/12/2024  EXAMINATION: CT ABDOMEN PELVIS WITHOUT CONTRAST CLINICAL HISTORY: Nephrostomy; TECHNIQUE: Standard abdomen and pelvis CT protocol without oral or IV contrast was performed. COMPARISON: 12/24/2023 and 12/09/2022 FINDINGS: Finding: The size of the heart is within normal limits.  There is a mild amount of atherosclerosis in the left anterior descending, left circumflex, and right main coronary arteries.  There are mild dependent atelectatic changes in both lungs.  There is no pneumothorax or pleural effusion. The liver, gallbladder, pancreas, spleen, and adrenals are normal in appearance.  Bilateral nephrostomy tubes remain in place.  There has been interval development of a mild amount of right hydronephrosis.  There is mild bilateral perinephric stranding.  The ureters are normal in appearance.  There is persistent thickening of the wall of the urinary bladder.  The wall thickness measures 14 mm on the current examination.  On the prior examination it measured 14 mm.  There is persistent calcification on the right side of the urinary  bladder.  The prostate is not well seen.  The appendix is absent.  There is a prominent amount of fecal material within the colon.  There is no free fluid within the abdomen or pelvis. There is no pneumoperitoneum.  There is an intramedullary nail across a healing fracture in the intertrochanteric portion of the right femur.  There are healing fractures in the right 8th, 9th, 10th, and 11th ribs.     1. Bilateral nephrostomy tubes remain in place.  There has been interval development of a mild amount of right hydronephrosis. 2. There is persistent thickening of the wall of the urinary bladder. The wall thickness measures 14 mm on the current examination.  On the prior examination it measured 14 mm. There is persistent calcification on the right side of the urinary bladder.  This is characteristic of chronic cystitis. 3. There are healing fractures in the right 8th, 9th, 10th, and 11th ribs. 4. There is an intramedullary nail across a healing fracture in the intertrochanteric portion of the right femur. All CT scans at this facility use dose modulation, iterative reconstruction, and/or weight base dosing when appropriate to reduce radiation dose when appropriate to reduce radiation dose to as low as reasonably achievable. Electronically signed by: Ildefonso Prabhakar MD Date:    01/12/2024 Time:    14:29    CT Head Without Contrast    Result Date: 1/12/2024  EXAMINATION: CT HEAD WITHOUT CONTRAST CLINICAL HISTORY: Mental status change, unknown cause; TECHNIQUE: Low dose axial CT images obtained throughout the head without intravenous contrast. Sagittal and coronal reconstructions were performed.  All CT scans at this facility use dose modulation, iterative reconstruction and/or weight based dosing when appropriate to reduce radiation dose to as low as reasonably achievable. COMPARISON: 12/21/2023 FINDINGS: Intracranial compartment: The brain parenchyma demonstrates areas of decreased attenuation with mild to moderate  periventricular white matter consistent with chronic microvascular ischemic changes.  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  Remote left-sided lacunar infarct..  No parenchymal mass, hemorrhage, edema or major vascular distribution infarct.  Vascular calcifications are noted. Mild prominence of the sulci and ventricles are consistent with age-related involutional changes. No extra-axial blood or fluid collections. Skull/extracranial contents (limited evaluation): No fracture. Mastoid air cells and paranasal sinuses are essentially clear.     No acute intracranial abnormalities identified. Electronically signed by: Oleg Sr MD Date:    01/12/2024 Time:    14:19    X-Ray Chest 1 View    Result Date: 1/12/2024  EXAMINATION: XR CHEST 1 VIEW CLINICAL HISTORY: Weakness COMPARISON: 12/21/2023 FINDINGS: The size of the heart is normal. The lungs are clear. There is no pneumothorax.  The costophrenic angles are sharp.  There are old fractures on the right side of the thoracic cage.     The lungs are clear.. Electronically signed by: Ildefonso Prabhakar MD Date:    01/12/2024 Time:    11:33      EKG    MICROBIOLOGY    MDM

## 2024-02-03 NOTE — PHARMACY MED REC
"Admission Medication History     The home medication history was taken by Landon Hurt.    You may go to "Admission" then "Reconcile Home Medications" tabs to review and/or act upon these items.     The home medication list has been updated by the Pharmacy department.   Please read ALL comments highlighted in yellow.   Please address this information as you see fit.    Feel free to contact us if you have any questions or require assistance.      Medications listed below were obtained from: Analytic software- [employer] and Medical records  (Not in a hospital admission)      LAST [facility] REC COMPLETED: 12/21/2023    Landon Hurt  [id number]    Current Outpatient Medications on File Prior to Encounter   Medication Sig Dispense Refill Last Dose    albuterol (ACCUNEB) 0.63 mg/3 mL Nebu Take 3 mLs (0.63 mg total) by nebulization 3 (three) times daily as needed (sob, wheezing). Rescue 75 mL 3     albuterol (PROVENTIL/VENTOLIN HFA) 90 mcg/actuation inhaler Inhale 1-2 puffs into the lungs every 4 (four) hours as needed for Wheezing. Rescue 18 g 11     ALPRAZolam (XANAX) 0.5 MG tablet Take 2 tablets (1 mg total) by mouth 3 (three) times daily as needed for Anxiety. 60 tablet 2     cyclobenzaprine (FLEXERIL) 10 MG tablet Take 1 tablet (10 mg total) by mouth 3 (three) times daily as needed for Muscle spasms. 270 tablet 3     docusate sodium (COLACE) 100 MG capsule Take 100 mg by mouth 2 (two) times daily.       EScitalopram oxalate (LEXAPRO) 20 MG tablet Take 1 tablet (20 mg total) by mouth once daily. 90 tablet 3     fluticasone furoate-vilanteroL (BREO ELLIPTA) 100-25 mcg/dose diskus inhaler Inhale 1 puff into the lungs once daily. Controller 30 each 11     HYDROcodone-acetaminophen (NORCO) 7.5-325 mg per tablet Take 1 tablet by mouth every 6 (six) hours as needed for Pain (Max 4 doses/day). 60 tablet 0     [START ON 2/9/2024] HYDROcodone-acetaminophen (NORCO) 7.5-325 mg per tablet Take 1 tablet by mouth every 6 (six) " hours as needed for Pain. 120 tablet 0     levothyroxine (SYNTHROID) 125 MCG tablet Take 1 tablet (125 mcg total) by mouth before breakfast. 90 tablet 3     naloxone (NARCAN) 4 mg/actuation Spry 1 spray once.       nicotine (NICODERM CQ) 14 mg/24 hr Place 1 patch onto the skin every 24 hours.       polyethylene glycol (GLYCOLAX) 17 gram PwPk Take 17 g by mouth daily as needed for Constipation. 30 each 0     pregabalin (LYRICA) 75 MG capsule Take 1 capsule (75 mg total) by mouth every evening. 30 capsule 1     vitamin D (VITAMIN D3) 1000 units Tab Take 25 mcg by mouth once daily.                                  .

## 2024-02-03 NOTE — OP NOTE
Successful exchange and reposition of Bilateral Neph tubes. No complication. Pt will be evaluated on Monday For thrombectomy.

## 2024-02-03 NOTE — FIRST PROVIDER EVALUATION
Medical screening examination initiated.  I have conducted a focused provider triage encounter, findings are as follows:    Brief history of present illness:  Patient with history of COPD, emphysema, bladder cancer and renal failure presents with swelling to the left leg and drainage from the nephrostomy tube.    Vitals:    02/02/24 1815   BP: (!) 103/57   BP Location: Right arm   Patient Position: Sitting   Pulse: (!) 122   Resp: 16   Temp: 97.9 °F (36.6 °C)   TempSrc: Oral   SpO2: (!) 94%   Weight: 70.3 kg (155 lb)       Pertinent physical exam:    Tachycardic, swelling of left leg    Brief workup plan:   sepsis workup    Preliminary workup initiated; this workup will be continued and followed by the physician or advanced practice provider that is assigned to the patient when roomed.

## 2024-02-03 NOTE — ASSESSMENT & PLAN NOTE
Appears near baseline without evidence of active bleeding noted.   Recent Labs   Lab 01/30/24  1433 02/02/24  1010 02/02/24  1859 02/03/24  0644   HGB 10.2* 9.7* 9.6* 9.1*   HCT 32.3* 31.0* 30.5* 28.1*   MCV 89 89 88 88   MCHC 31.6* 31.3* 31.5* 32.4   RDW 18.1* 17.7* 17.6* 17.7*    248 269 218   IRON 21*  --   --   --    TIBC 223*  --   --   --    FERRITIN 436*  --   --   --    [id number] 313  --   --   --    Plan:  -Monitor H/H and plts  -Type and screen, transfuse as needed   -Continue home medications

## 2024-02-03 NOTE — H&P
"  [facility] - Emergency Dept.  [facility] Medicine  History & Physical    Patient Name: Geovani Currie  MRN: [id number]  Patient Class: IP- Inpatient  Admission Date: 2/2/2024  Attending Physician: Hetal Santos MD   Primary Care Provider: Faizan Antoine MD         Patient information was obtained from patient, past medical records, and ER records.     Subjective:     Principal Problem:Left leg pain    Chief Complaint:   Chief Complaint   Patient presents with    Leg Swelling     Family states pt has leg swelling in his left leg. Family also states pt has been saying some weird things and not acting right.         HPI: Geovani Currie is a 68 y.o. male with a PMH  has a past medical history of Anxiety disorder, unspecified, COPD (chronic obstructive pulmonary disease), Hypertension, and Thyroid disease. who presented to the ED accompanied by his daughter for further evaluation of worsening left lower extremity pain and swelling as well as concerns of nephrostomy tube dislodgement.  Patient's leg was noted to be swollen and was complaining of increased swelling/tightness/cramping with intermittent burning/stinging sensations beginning around 11:00 a.m. and progressively worsened throughout the day.  Patient reported negative history of PE/DVTs not currently on any blood thinners/anticoagulants.  Patient reported no known alleviating factors with aggravating factors including weight-bearing, movement, and palpation to the affected area.  Patient is on also reported mild confusion and was reported to be saying "weird things".  Patient currently denies endorsing any lightheadedness, dizziness, headache, visual changes, fever, chills, sweats, nausea, vomiting, chest pain, shortness of breath, abdominal pain, flank pain, dysuria, hematuria, melena, hematochezia, diarrhea, or onset neurological deficits.  Prior to onset of symptoms, patient reported being in his usual state of health with no other concerns or " complaints.  All other review of systems negative except as noted above.  Of note, patient recently underwent hip arthroplasty and continues to do physical rehab and ambulates via aid of walker.  Initial workup in the ED revealed patient had extensive occlusive clot throughout his left lower extremity and was initiated on heparin.  Patient UA also concerning for residual UTI following completion of ciprofloxacin last month and was provided cefepime by ED staff.  Patient admitted to Hospital Medicine inpatient for continued medical management with IR consult pending.    PCP: Faizan Antoine    Past Medical History:   Diagnosis Date    COPD (chronic obstructive pulmonary disease)     Hypertension        Past Surgical History:   Procedure Laterality Date    APPENDECTOMY      CATARACT EXTRACTION      NEPHROSTOMY      OPEN REDUCTION AND INTERNAL FIXATION (ORIF) OF INTERTROCHANTERIC FRACTURE OF FEMUR Right 11/21/2023    Procedure: ORIF, FRACTURE, FEMUR, INTERTROCHANTERIC;  Surgeon: Tanisha Guidry DO;  Location: [facility];  Service: Orthopedics;  Laterality: Right;  Gamma nail       Review of patient's allergies indicates:  No Known Allergies    No current facility-administered medications on file prior to encounter.     Current Outpatient Medications on File Prior to Encounter   Medication Sig    albuterol (ACCUNEB) 0.63 mg/3 mL Nebu Take 3 mLs (0.63 mg total) by nebulization 3 (three) times daily as needed (sob, wheezing). Rescue    albuterol (PROVENTIL/VENTOLIN HFA) 90 mcg/actuation inhaler Inhale 1-2 puffs into the lungs every 4 (four) hours as needed for Wheezing. Rescue    ALPRAZolam (XANAX) 0.5 MG tablet Take 2 tablets (1 mg total) by mouth 3 (three) times daily as needed for Anxiety.    cyclobenzaprine (FLEXERIL) 10 MG tablet Take 1 tablet (10 mg total) by mouth 3 (three) times daily as needed for Muscle spasms.    docusate sodium (COLACE) 100 MG capsule Take 100 mg by mouth 2 (two) times daily.     EScitalopram oxalate (LEXAPRO) 20 MG tablet Take 1 tablet (20 mg total) by mouth once daily.    fluticasone furoate-vilanteroL (BREO ELLIPTA) 100-25 mcg/dose diskus inhaler Inhale 1 puff into the lungs once daily. Controller    HYDROcodone-acetaminophen (NORCO) 7.5-325 mg per tablet Take 1 tablet by mouth every 6 (six) hours as needed for Pain (Max 4 doses/day).    [START ON 2024] HYDROcodone-acetaminophen (NORCO) 7.5-325 mg per tablet Take 1 tablet by mouth every 6 (six) hours as needed for Pain.    levothyroxine (SYNTHROID) 125 MCG tablet Take 1 tablet (125 mcg total) by mouth before breakfast.    naloxone (NARCAN) 4 mg/actuation Spry 1 spray once.    nicotine (NICODERM CQ) 14 mg/24 hr Place 1 patch onto the skin every 24 hours.    polyethylene glycol (GLYCOLAX) 17 gram PwPk Take 17 g by mouth daily as needed for Constipation.    pregabalin (LYRICA) 75 MG capsule Take 1 capsule (75 mg total) by mouth every evening.    vitamin D (VITAMIN D3) 1000 units Tab Take 25 mcg by mouth once daily.     Family History       Problem Relation (Age of Onset)    Cancer Mother, Father, Maternal Grandfather          Tobacco Use    Smoking status: Former     Current packs/day: 0.00     Types: Cigarettes     Quit date: 10/2023     Years since quittin.3     Passive exposure: Never    Smokeless tobacco: Never   Substance and Sexual Activity    Alcohol use: Never    Drug use: Never    Sexual activity: Not Currently     Partners: Female     Review of Systems   All other systems reviewed and are negative.    Objective:     Vital Signs (Most Recent):  Temp: 97.9 °F (36.6 °C) (24 181)  Pulse: 98 (24 0300)  Resp: 16 (24 0300)  BP: 104/68 (24 0300)  SpO2: 99 % (24 0300) Vital Signs (24h Range):  Temp:  [97.6 °F (36.4 °C)-97.9 °F (36.6 °C)] 97.9 °F (36.6 °C)  Pulse:  [] 98  Resp:  [14-21] 16  SpO2:  [94 %-99 %] 99 %  BP: ()/(51-77) 104/68     Weight: 70.3 kg (155 lb)  Body mass index is 23.57  kg/m².     Physical Exam  Vitals reviewed.   Constitutional:       General: He is not in acute distress.     Appearance: Normal appearance. He is normal weight. He is not ill-appearing, toxic-appearing or diaphoretic.   HENT:      Head: Normocephalic and atraumatic.      Right Ear: External ear normal.      Left Ear: External ear normal.      Nose: Nose normal. No congestion or rhinorrhea.      Mouth/Throat:      Mouth: Mucous membranes are moist.      Pharynx: Oropharynx is clear. No oropharyngeal exudate or posterior oropharyngeal erythema.   Eyes:      General: No scleral icterus.     Extraocular Movements: Extraocular movements intact.      Conjunctiva/sclera: Conjunctivae normal.      Pupils: Pupils are equal, round, and reactive to light.   Neck:      Vascular: No carotid bruit.   Cardiovascular:      Rate and Rhythm: Normal rate and regular rhythm.      Pulses: Normal pulses.      Heart sounds: Normal heart sounds. No murmur heard.     No friction rub. No gallop.   Pulmonary:      Effort: Pulmonary effort is normal. No respiratory distress.      Breath sounds: Normal breath sounds. No stridor. No wheezing, rhonchi or rales.   Chest:      Chest wall: No tenderness.   Abdominal:      General: Abdomen is flat. Bowel sounds are normal. There is no distension.      Palpations: Abdomen is soft. There is no mass.      Tenderness: There is no abdominal tenderness. There is no guarding or rebound.      Hernia: No hernia is present.   Musculoskeletal:         General: Swelling and tenderness present. No deformity or signs of injury. Normal range of motion.      Cervical back: Normal range of motion and neck supple. No rigidity or tenderness.      Left lower leg: Edema present.      Comments: Bilateral nephrostomy tubes present without evidence of bleeding, drainage, signs of infection noted.  Diffuse edema and erythema noted throughout left lower extremity with TTP throughout.   Lymphadenopathy:      Cervical: No  cervical adenopathy.   Skin:     General: Skin is warm and dry.      Capillary Refill: Capillary refill takes less than 2 seconds.      Coloration: Skin is not jaundiced or pale.      Findings: Erythema present. No bruising, lesion or rash.      Comments: Skin findings as noted above in MSK section    Neurological:      General: No focal deficit present.      Mental Status: He is alert and oriented to person, place, and time. Mental status is at baseline.      Cranial Nerves: No cranial nerve deficit.      Sensory: No sensory deficit.      Motor: No weakness.      Coordination: Coordination normal.      Comments: Patient yielding 5/5 strength throughout.  Sensation to light touch grossly intact throughout.    Psychiatric:         Mood and Affect: Mood normal.         Behavior: Behavior normal.         Thought Content: Thought content normal.         Judgment: Judgment normal.              CRANIAL NERVES     CN III, IV, VI   Pupils are equal, round, and reactive to light.       Significant Labs: All pertinent labs within the past 24 hours have been reviewed.    Significant Imaging: I have reviewed all pertinent imaging results/findings within the past 24 hours.    LABS:  Recent Results (from the past 24 hour(s))   CBC Oncology    Collection Time: 02/02/24 10:10 AM   Result Value Ref Range    WBC 7.33 3.90 - 12.70 K/uL    RBC 3.47 (L) 4.60 - 6.20 M/uL    Hemoglobin 9.7 (L) 14.0 - 18.0 g/dL    Hematocrit 31.0 (L) 40.0 - 54.0 %    MCV 89 82 - 98 fL    MCH 28.0 27.0 - 31.0 pg    MCHC 31.3 (L) 32.0 - 36.0 g/dL    RDW 17.7 (H) 11.5 - 14.5 %    Platelets 248 150 - 450 K/uL    MPV 9.7 9.2 - 12.9 fL    Gran # (ANC) 5.9 1.8 - 7.7 K/uL    Immature Grans (Abs) 0.01 0.00 - 0.04 K/uL   Comprehensive Metabolic Panel    Collection Time: 02/02/24 10:10 AM   Result Value Ref Range    Sodium 135 (L) 136 - 145 mmol/L    Potassium 4.3 3.5 - 5.1 mmol/L    Chloride 100 95 - 110 mmol/L    CO2 23 23 - 29 mmol/L    Glucose 152 (H) 70 - 110  mg/dL    BUN 29 (H) 8 - 23 mg/dL    Creatinine 2.0 (H) 0.5 - 1.4 mg/dL    Calcium 9.5 8.7 - 10.5 mg/dL    Total Protein 7.7 6.0 - 8.4 g/dL    Albumin 2.9 (L) 3.5 - 5.2 g/dL    Total Bilirubin 0.5 0.1 - 1.0 mg/dL    Alkaline Phosphatase 111 55 - 135 U/L    AST 11 10 - 40 U/L    ALT 12 10 - 44 U/L    eGFR 35 (A) >60 mL/min/1.73 m^2    Anion Gap 12 8 - 16 mmol/L   TSH    Collection Time: 02/02/24 10:10 AM   Result Value Ref Range    TSH 1.034 0.400 - 4.000 uIU/mL   CBC auto differential    Collection Time: 02/02/24  6:59 PM   Result Value Ref Range    WBC 9.41 3.90 - 12.70 K/uL    RBC 3.46 (L) 4.60 - 6.20 M/uL    Hemoglobin 9.6 (L) 14.0 - 18.0 g/dL    Hematocrit 30.5 (L) 40.0 - 54.0 %    MCV 88 82 - 98 fL    MCH 27.7 27.0 - 31.0 pg    MCHC 31.5 (L) 32.0 - 36.0 g/dL    RDW 17.6 (H) 11.5 - 14.5 %    Platelets 269 150 - 450 K/uL    MPV 9.7 9.2 - 12.9 fL    Immature Granulocytes 0.4 0.0 - 0.5 %    Gran # (ANC) 7.5 1.8 - 7.7 K/uL    Immature Grans (Abs) 0.04 0.00 - 0.04 K/uL    Lymph # 1.1 1.0 - 4.8 K/uL    Mono # 0.5 0.3 - 1.0 K/uL    Eos # 0.2 0.0 - 0.5 K/uL    Baso # 0.05 0.00 - 0.20 K/uL    nRBC 0 0 /100 WBC    Gran % 80.0 (H) 38.0 - 73.0 %    Lymph % 11.2 (L) 18.0 - 48.0 %    Mono % 5.7 4.0 - 15.0 %    Eosinophil % 2.2 0.0 - 8.0 %    Basophil % 0.5 0.0 - 1.9 %    Differential Method Automated    Comprehensive metabolic panel    Collection Time: 02/02/24  6:59 PM   Result Value Ref Range    Sodium 133 (L) 136 - 145 mmol/L    Potassium 4.0 3.5 - 5.1 mmol/L    Chloride 96 95 - 110 mmol/L    CO2 20 (L) 23 - 29 mmol/L    Glucose 126 (H) 70 - 110 mg/dL    BUN 30 (H) 8 - 23 mg/dL    Creatinine 2.1 (H) 0.5 - 1.4 mg/dL    Calcium 8.9 8.7 - 10.5 mg/dL    Total Protein 7.7 6.0 - 8.4 g/dL    Albumin 3.3 (L) 3.5 - 5.2 g/dL    Total Bilirubin 0.5 0.1 - 1.0 mg/dL    Alkaline Phosphatase 122 55 - 135 U/L    AST 13 10 - 40 U/L    ALT 14 10 - 44 U/L    eGFR 33 (A) >60 mL/min/1.73 m^2    Anion Gap 17 (H) 8 - 16 mmol/L   Lactic acid,  plasma #1    Collection Time: 02/02/24  6:59 PM   Result Value Ref Range    Lactate (Lactic Acid) 2.0 0.5 - 2.2 mmol/L   Urinalysis, Reflex to Urine Culture Urine, Clean Catch    Collection Time: 02/02/24 10:06 PM    Specimen: Urine   Result Value Ref Range    Specimen UA Urine, Clean Catch     Color, UA Yellow Yellow, Straw, Andra    Appearance, UA Hazy (A) Clear    pH, UA 6.0 5.0 - 8.0    Specific Gravity, UA 1.015 1.005 - 1.030    Protein, UA 1+ (A) Negative    Glucose, UA Negative Negative    Ketones, UA Negative Negative    Bilirubin (UA) Negative Negative    Occult Blood UA 2+ (A) Negative    Nitrite, UA Negative Negative    Urobilinogen, UA Negative <2.0 EU/dL    Leukocytes, UA 3+ (A) Negative   Urinalysis Microscopic    Collection Time: 02/02/24 10:06 PM   Result Value Ref Range    RBC, UA 17 (H) 0 - 4 /hpf    WBC, UA >100 (H) 0 - 5 /hpf    WBC Clumps, UA Many (A) None-Rare    Bacteria Occasional None-Occ /hpf    Hyaline Casts, UA 4 (A) 0-1/lpf /lpf    Granular Casts, UA 4 (A) None /lpf    Microscopic Comment SEE COMMENT    Lactic acid, plasma #2    Collection Time: 02/02/24 10:18 PM   Result Value Ref Range    Lactate (Lactic Acid) 1.5 0.5 - 2.2 mmol/L   APTT    Collection Time: 02/02/24 10:54 PM   Result Value Ref Range    aPTT 31.0 21.0 - 32.0 sec   Protime-INR    Collection Time: 02/02/24 10:54 PM   Result Value Ref Range    Prothrombin Time 11.3 9.0 - 12.5 sec    INR 1.1 0.8 - 1.2       RADIOLOGY  CT Renal Stone Study ABD Pelvis WO    Result Date: 2/3/2024  EXAMINATION: CT RENAL STONE STUDY ABD PELVIS WO CLINICAL HISTORY: Nephrostomy catheter displacement; Other artificial openings of urinary tract status TECHNIQUE: Low dose axial images, sagittal and coronal reformations were obtained from the lung bases to the pubic symphysis.  Contrast was not administered. COMPARISON: 01/12/2024 FINDINGS: Right-sided nephrostomy tube the has been displaced inferiorly, now within the lower pole right renal calyx  (previously within the right renal pelvis).  Unchanged appearance of the left nephrostomy.  Left kidney is atrophic.  Mild right hydroureteronephrosis.  Nonobstructive right nephrolithiasis.  Chronic diffuse circumferential bladder wall thickening with associated bladder wall calcification compatible with chronic cystitis, follow-up cystoscopy should be considered. Bibasilar dependent atelectasis. Unremarkable liver and gallbladder.  Unremarkable spleen and pancreas.  Unremarkable adrenal glands. Moderate presacral soft tissue fullness. Large amount of stool throughout the colon and rectum.  No small bowel obstruction. Mild aneurysmal dilatation of the abdominal aorta.  Severe multifocal atherosclerosis of the abdominal aorta and its branches. Postsurgical changes of right proximal femur.  Unchanged appearance of the right-sided rib fractures. Asymmetric subcutaneous edema and swelling of the left proximal thigh.     Right-sided nephrostomy tube the has been displaced inferiorly, now within the lower pole right renal calyx (previously within the right renal pelvis). Unchanged appearance of the left nephrostomy.  Mild right hydroureteronephrosis.  Nonobstructive right nephrolithiasis.  Chronic diffuse circumferential bladder wall thickening with associated bladder wall calcification compatible with chronic cystitis, follow-up cystoscopy should be considered. Asymmetric subcutaneous edema and swelling of the left proximal thigh. Additional findings, as above. Electronically signed by: Joselyn Ge Date:    02/03/2024 Time:    01:11    US Lower Extremity Veins Left    Result Date: 2/2/2024  EXAMINATION: US LOWER EXTREMITY VEINS LEFT CLINICAL HISTORY: Pain in left leg TECHNIQUE: Duplex and color flow Doppler evaluation and graded compression of the left lower extremity veins was performed. COMPARISON: None FINDINGS: Left sided veins: Extensive occlusive DVT in the entire left leg. . Contralateral CFV: The  contralateral (right) common femoral vein is patent and free of thrombus. Miscellaneous: None     Extensive occlusive DVT involving the entire left leg.  Dr. Helms notified at 2220 by the sonographer Electronically signed by: Jefferson Kunz Date:    02/02/2024 Time:    22:45    X-Ray Chest AP Portable    Result Date: 2/2/2024  EXAMINATION: XR CHEST AP PORTABLE CLINICAL HISTORY: Sepsis; TECHNIQUE: Single frontal view of the chest was performed. COMPARISON: None FINDINGS: The lungs are clear, with normal appearance of pulmonary vasculature and no pleural effusion or pneumothorax. The cardiac silhouette is normal in size. The hilar and mediastinal contours are unremarkable. Bones are intact.     No acute abnormality. Electronically signed by: Jefferson Kunz Date:    02/02/2024 Time:    18:30    X-Ray Abdomen AP 1 View    Result Date: 1/14/2024  EXAMINATION: XR ABDOMEN AP 1 VIEW CLINICAL HISTORY: Constipation; TECHNIQUE: Single AP View of the abdomen was performed. COMPARISON: 12/28/2023 x-ray FINDINGS: There are bilateral percutaneous nephrostomy tubes.  There are postop changes of the right hip. There is gaseous distention of the stomach. There is an appropriate volume of stool in the large bowel.  No obvious rectal impaction is seen. Lumbar degenerative disc disease.  The splenic artery is calcified.     No obvious rectal fecal impaction.  See above. Electronically signed by: Rainer Chandler MD Date:    01/14/2024 Time:    12:01    CT Abdomen Pelvis  Without Contrast    Result Date: 1/12/2024  EXAMINATION: CT ABDOMEN PELVIS WITHOUT CONTRAST CLINICAL HISTORY: Nephrostomy; TECHNIQUE: Standard abdomen and pelvis CT protocol without oral or IV contrast was performed. COMPARISON: 12/24/2023 and 12/09/2022 FINDINGS: Finding: The size of the heart is within normal limits.  There is a mild amount of atherosclerosis in the left anterior descending, left circumflex, and right main coronary arteries.  There are mild  dependent atelectatic changes in both lungs.  There is no pneumothorax or pleural effusion. The liver, gallbladder, pancreas, spleen, and adrenals are normal in appearance.  Bilateral nephrostomy tubes remain in place.  There has been interval development of a mild amount of right hydronephrosis.  There is mild bilateral perinephric stranding.  The ureters are normal in appearance.  There is persistent thickening of the wall of the urinary bladder.  The wall thickness measures 14 mm on the current examination.  On the prior examination it measured 14 mm.  There is persistent calcification on the right side of the urinary bladder.  The prostate is not well seen.  The appendix is absent.  There is a prominent amount of fecal material within the colon.  There is no free fluid within the abdomen or pelvis. There is no pneumoperitoneum.  There is an intramedullary nail across a healing fracture in the intertrochanteric portion of the right femur.  There are healing fractures in the right 8th, 9th, 10th, and 11th ribs.     1. Bilateral nephrostomy tubes remain in place.  There has been interval development of a mild amount of right hydronephrosis. 2. There is persistent thickening of the wall of the urinary bladder. The wall thickness measures 14 mm on the current examination.  On the prior examination it measured 14 mm. There is persistent calcification on the right side of the urinary bladder.  This is characteristic of chronic cystitis. 3. There are healing fractures in the right 8th, 9th, 10th, and 11th ribs. 4. There is an intramedullary nail across a healing fracture in the intertrochanteric portion of the right femur. All CT scans at this facility use dose modulation, iterative reconstruction, and/or weight base dosing when appropriate to reduce radiation dose when appropriate to reduce radiation dose to as low as reasonably achievable. Electronically signed by: Ildefonso Prabhakar MD Date:    01/12/2024  Time:    14:29    CT Head Without Contrast    Result Date: 1/12/2024  EXAMINATION: CT HEAD WITHOUT CONTRAST CLINICAL HISTORY: Mental status change, unknown cause; TECHNIQUE: Low dose axial CT images obtained throughout the head without intravenous contrast. Sagittal and coronal reconstructions were performed.  All CT scans at this facility use dose modulation, iterative reconstruction and/or weight based dosing when appropriate to reduce radiation dose to as low as reasonably achievable. COMPARISON: 12/21/2023 FINDINGS: Intracranial compartment: The brain parenchyma demonstrates areas of decreased attenuation with mild to moderate periventricular white matter consistent with chronic microvascular ischemic changes.  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  Remote left-sided lacunar infarct..  No parenchymal mass, hemorrhage, edema or major vascular distribution infarct.  Vascular calcifications are noted. Mild prominence of the sulci and ventricles are consistent with age-related involutional changes. No extra-axial blood or fluid collections. Skull/extracranial contents (limited evaluation): No fracture. Mastoid air cells and paranasal sinuses are essentially clear.     No acute intracranial abnormalities identified. Electronically signed by: lOeg Sr MD Date:    01/12/2024 Time:    14:19    X-Ray Chest 1 View    Result Date: 1/12/2024  EXAMINATION: XR CHEST 1 VIEW CLINICAL HISTORY: Weakness COMPARISON: 12/21/2023 FINDINGS: The size of the heart is normal. The lungs are clear. There is no pneumothorax.  The costophrenic angles are sharp.  There are old fractures on the right side of the thoracic cage.     The lungs are clear.. Electronically signed by: Ildefonso Prabhakar MD Date:    01/12/2024 Time:    11:33      EKG    MICROBIOLOGY    [facility]    Assessment/Plan:     * Left leg pain    Acute deep vein thrombosis (DVT) of femoral vein of left lower extremity  Patient presented with  acute onset and rapidly progressive left lower extremity pain and swelling x1 day duration and was found to have extensive occlusive DVT on venous doppler. Patient initiated on heparin drip. IR consulted and awaiting further evaluation/recommendations regarding potential thrombectomy.  Plan:  -NPO  -continue heparin drip  -optimize pain control  -bowel regimen  -f/u IR      Nephrostomy tube displaced  Patient with chronic bilateral nephrostomy tubes found to have evidence of dislodgement as noted on imaging below. UA also positive for 3+ LE, 17 RBCs, >100 WBC's, many WBC clumps, and occasional bacteria.  Patient completely asymptomatic and remains afebrile and without leukocytosis.  Patient recently completed 7 day course of ciprofloxacin following prior hospitalization. Patient s/p zosyn x1 in ED.    CT Renal  Right-sided nephrostomy tube the has been displaced inferiorly, now within the lower pole right renal calyx (previously within the right renal pelvis). Unchanged appearance of the left nephrostomy.  Mild right hydroureteronephrosis.  Nonobstructive right nephrolithiasis.  Chronic diffuse circumferential bladder wall thickening with associated bladder wall calcification compatible with chronic cystitis, follow-up cystoscopy should be considered.     Asymmetric subcutaneous edema and swelling of the left proximal thigh.      Plan:  -f/u IR  -will defer additional antibiotics to day provider      Acute renal failure superimposed on stage 3 chronic kidney disease  Patient with acute kidney injury/acute renal failure likely due to post-obstructive d/t dislodged nephrostomy tube  BANDAR is currently  awaiting IR . Baseline creatinine  1.4  - Labs reviewed- Renal function/electrolytes with Estimated Creatinine Clearance: 32.1 mL/min (A) (based on SCr of 2.1 mg/dL (H)). according to latest data. Monitor urine output and serial BMP and adjust therapy as needed. Avoid nephrotoxins and renally dose meds for GFR listed  above.      Anemia  Appears near baseline without evidence of active bleeding noted.   Recent Labs   Lab 01/30/24  1433 02/02/24  1010 02/02/24  1859 02/03/24  0644   HGB 10.2* 9.7* 9.6* 9.1*   HCT 32.3* 31.0* 30.5* 28.1*   MCV 89 89 88 88   MCHC 31.6* 31.3* 31.5* 32.4   RDW 18.1* 17.7* 17.6* 17.7*    248 269 218   IRON 21*  --   --   --    TIBC 223*  --   --   --    FERRITIN 436*  --   --   --    [id number]  --   --   --    Plan:  -Monitor H/H and plts  -Type and screen, transfuse as needed   -Continue home medications       Centrilobular emphysema  Patient's COPD is controlled currently.  Patient is currently off COPD Pathway. Continue scheduled inhalers Supplemental oxygen and monitor respiratory status closely.       Anxiety  Chronic. Stable. Not in acute exacerbation and currently denies endorsing any suicidal/homicidal ideations.   Plan:  -Continue home medications       Hypothyroidism  Patient has chronic hypothyroidism. TFTs reviewed-   Lab Results   Component Value Date    TSH 1.034 02/02/2024   Plan:  -Will continue chronic levothyroxine and adjust for and clinical changes      Malignant neoplasm of urinary bladder    Squamous cell carcinoma of right lung  Followed by urology and hem/onc outpatient.  Plan:  -f/u outpatient as directed         VTE Risk Mitigation (From admission, onward)           Ordered     Reason for No Pharmacological VTE Prophylaxis  Once        Question:  Reasons:  Answer:  Physician Provided (leave comment)  Comment:  currently on heparin drip    02/03/24 0756     IP VTE HIGH RISK PATIENT  Once         02/03/24 0756     Place sequential compression device  Until discontinued         02/03/24 0756     heparin 25,000 units in dextrose 5% (100 units/ml) IV bolus from bag - ADDITIONAL PRN BOLUS - 60 units/kg  As needed (PRN)        Question:  Heparin Infusion Adjustment (DO NOT MODIFY ANSWER)  Answer:  [email or web address]\[email or web address]\Images\Pharmacy\HeparinInfusions\heparin HIGH INTENSITY  nomogram for [facility] [id number]    02/02/24 2232     heparin 25,000 units in dextrose 5% (100 units/ml) IV bolus from bag - ADDITIONAL PRN BOLUS - 30 units/kg  As needed (PRN)        Question:  Heparin Infusion Adjustment (DO NOT MODIFY ANSWER)  Answer:  [email or web address]\[email or web address]\Images\Pharmacy\HeparinInfusions\heparin HIGH INTENSITY nomogram for [facility] [id number]    02/02/24 2232     heparin 25,000 units in dextrose 5% 250 mL (100 units/mL) infusion HIGH INTENSITY nomogram - [facility]  Continuous        Question:  Begin at (units/kg/hr)  Answer:  18 02/02/24 2232                  //Core Measures   -DVT proph: SCDs, heparin drip  -Code status: Full    -Surrogate: daughter       Components of this note were documented using a voice recognition system and are subject to errors not corrected at the time the document was proof read. Please contact the author for any clarifications.       Pharmacist Renal Dose Adjustment Note    Geovani Currie is a 68 y.o. male being treated with the medication cefepime for UTI    Patient Data:    Vital Signs (Most Recent):  Temp: 97.9 °F (36.6 °C) (02/02/24 1815)  Pulse: 100 (02/03/24 0530)  Resp: 18 (02/03/24 0530)  BP: (!) 112/59 (02/03/24 0530)  SpO2: 97 % (02/03/24 0530) Vital Signs (72h Range):  Temp:  [97.6 °F (36.4 °C)-97.9 °F (36.6 °C)]   Pulse:  []   Resp:  [14-21]   BP: ()/(51-77)   SpO2:  [94 %-99 %]      Recent Labs   Lab 01/30/24  1433 02/02/24  1010 02/02/24  1859   CREATININE 1.5* 2.0* 2.1*     Serum creatinine: 2.1 mg/dL (H) 02/02/24 1859  Estimated creatinine clearance: 32.1 mL/min (A)    Per protocol for a mild to moderate infection & for CrCl 30-60 ml/min, dose will be increased from 1 gm IV every 24 hours to 1 gm IV every 12 hours.     Pharmacist's Name: Katherine E Mcardle  Pharmacist's Extension: [phone]      Juan Carlos Parra MD  Department of Hospital Medicine  [facility].

## 2024-02-03 NOTE — ED PROVIDER NOTES
SCRIBE #1 NOTE: I, Maxim Uzma, am scribing for, and in the presence of, Rubén Helms Jr., MD. I have scribed the HPI, ROS, and PEx..     SCRIBE #2 NOTE: I, Sri Manuel, am scribing for, and in the presence of,  Rubén Helms Jr., MD. I have scribed the remaining portions of the note not scribed by Scribe #1.      History     Chief Complaint   Patient presents with    Leg Swelling     Family states pt has leg swelling in his left leg. Family also states pt has been saying some weird things and not acting right.      Review of patient's allergies indicates:  No Known Allergies      History of Present Illness     HPI    2/2/2024, 9:33 PM  History obtained from the patient and daughter      History of Present Illness: Geovani Currie is a 68 y.o. male patient with a PMHx of bilateral nephrostomy, COPD, HTN, bladder CA who presents to the Emergency Department for evaluation of LLE swelling and pain which onset gradually at 11 AM today. Daughter states patient initially c/o cramping pain to his LLE that is now a burning pain. Patient denies any history of DVT and any blood thinner use. Symptoms are constant and moderate in severity. No mitigating or exacerbating factors reported. Daughter report patient has mild confusion and has been saying some weird things. Patient denies any numbness, weakness, CP, SOB, fever, back pain, flank pain, hematuria, and all other sxs at this time. No further complaints or concerns at this time.       Arrival mode: Personal vehicle    PCP: Faizan Antoine MD        Past Medical History:  Past Medical History:   Diagnosis Date    Anxiety disorder, unspecified     COPD (chronic obstructive pulmonary disease)     Hypertension     Thyroid disease        Past Surgical History:  Past Surgical History:   Procedure Laterality Date    APPENDECTOMY      CATARACT EXTRACTION      NEPHROSTOMY      OPEN REDUCTION AND INTERNAL FIXATION (ORIF) OF INTERTROCHANTERIC FRACTURE OF FEMUR Right  2023    Procedure: ORIF, FRACTURE, FEMUR, INTERTROCHANTERIC;  Surgeon: Tanisha Guidry DO;  Location: [facility];  Service: Orthopedics;  Laterality: Right;  Gamma nail         Family History:  Family History   Problem Relation Age of Onset    Cancer Mother         NOS    Cancer Father         NOS    Cancer Maternal Grandfather         NOS    Bladder Cancer Neg Hx        Social History:  Social History     Tobacco Use    Smoking status: Former     Current packs/day: 0.00     Types: Cigarettes     Quit date: 10/2023     Years since quittin.3     Passive exposure: Never    Smokeless tobacco: Never   Substance and Sexual Activity    Alcohol use: Never    Drug use: Never    Sexual activity: Not Currently     Partners: Female        Review of Systems     Review of Systems   Constitutional:  Negative for fever.   HENT:  Negative for sore throat.    Respiratory:  Negative for shortness of breath.    Cardiovascular:  Positive for leg swelling (LLE w/ calf pain). Negative for chest pain.   Gastrointestinal:  Negative for nausea.   Genitourinary:  Negative for flank pain and hematuria.   Musculoskeletal:  Negative for back pain.   Skin:  Negative for rash.   Neurological:  Negative for weakness.   Hematological:  Does not bruise/bleed easily.   Psychiatric/Behavioral:  Positive for confusion.    All other systems reviewed and are negative.       Physical Exam     Initial Vitals [24 1815]   BP Pulse Resp Temp SpO2   (!) 103/57 (!) 122 16 97.9 °F (36.6 °C) (!) 94 %      MAP       --          Physical Exam   Nursing Notes and Vital Signs Reviewed.  Constitutional: Patient is in no acute distress. Well-developed and well-nourished.  Head: Atraumatic. Normocephalic.  Eyes: PERRL. EOM intact. Conjunctivae are not pale. No scleral icterus.  ENT: Mucous membranes are moist. Oropharynx is clear and symmetric.    Neck: Supple. Full ROM. No lymphadenopathy.  Cardiovascular: Regular rate. Regular rhythm. No murmurs,  "rubs, or gallops. DP pulses are 2+ and symmetric. Normal capillary refill  Pulmonary/Chest: No respiratory distress. Clear to auscultation bilaterally. No wheezing or rales.  Abdominal: Soft and non-distended.  There is no tenderness.  No rebound, guarding, or rigidity.   Genitourinary: No CVA tenderness. Bilateral nephrostomy tubes in place with no acute issues.   Musculoskeletal: Moves all extremities. No obvious deformities. There is left calf tenderness with edema. Neurovascularly intact distally. Tendons intact.  Skin: Warm and dry.  Neurological:  Alert, awake, and appropriate.  Normal speech.  No acute focal neurological deficits are appreciated.  Psychiatric: Normal affect. Good eye contact. Appropriate in content.     ED Course   Procedures  ED Vital Signs:  Vitals:    02/03/24 0430 02/03/24 0500 02/03/24 0530 02/03/24 0915   BP: 113/63 (!) 124/55 (!) 112/59 (!) 106/58   Pulse: 101 101 100    Resp: 19 19 18    Temp:       TempSrc:       SpO2: 96% (!) 94% 97%    Weight:       Height:        02/03/24 1111 02/03/24 1204 02/03/24 1303 02/03/24 1432   BP: (!) 112/57 (!) 113/55     Pulse: 102 98 96    Resp: 18 14     Temp: 98.1 °F (36.7 °C) 98 °F (36.7 °C)     TempSrc: Oral Oral     SpO2: 96% 96%     Weight:       Height:    5' 8" (1.727 m)    02/03/24 1515 02/03/24 1523 02/03/24 1717 02/03/24 1735   BP:  (!) 96/54     Pulse: 105 103 101    Resp:  14  18   Temp:  97.8 °F (36.6 °C)     TempSrc:  Oral     SpO2:       Weight:       Height:        02/03/24 1947 02/03/24 2006 02/03/24 2012   BP:  (!) 91/54    Pulse: 98 93 105   Resp: 18 16    Temp:  98 °F (36.7 °C)    TempSrc:  Oral    SpO2: (!) 94% 96%    Weight:      Height:          Abnormal Lab Results:  Labs Reviewed   CBC W/ AUTO DIFFERENTIAL - Abnormal; Notable for the following components:       Result Value    RBC 3.46 (*)     Hemoglobin 9.6 (*)     Hematocrit 30.5 (*)     MCHC 31.5 (*)     RDW 17.6 (*)     Gran % 80.0 (*)     Lymph % 11.2 (*)     All other " components within normal limits   COMPREHENSIVE METABOLIC PANEL - Abnormal; Notable for the following components:    Sodium 133 (*)     CO2 20 (*)     Glucose 126 (*)     BUN 30 (*)     Creatinine 2.1 (*)     Albumin 3.3 (*)     eGFR 33 (*)     Anion Gap 17 (*)     All other components within normal limits   URINALYSIS, REFLEX TO URINE CULTURE - Abnormal; Notable for the following components:    Appearance, UA Hazy (*)     Protein, UA 1+ (*)     Occult Blood UA 2+ (*)     Leukocytes, UA 3+ (*)     All other components within normal limits    Narrative:     Specimen Source->Urine   URINALYSIS MICROSCOPIC - Abnormal; Notable for the following components:    RBC, UA 17 (*)     WBC, UA >100 (*)     WBC Clumps, UA Many (*)     Hyaline Casts, UA 4 (*)     Granular Casts, UA 4 (*)     All other components within normal limits    Narrative:     Specimen Source->Urine   CBC W/ AUTO DIFFERENTIAL - Abnormal; Notable for the following components:    RBC 3.21 (*)     Hemoglobin 9.1 (*)     Hematocrit 28.1 (*)     RDW 17.7 (*)     Lymph # 0.6 (*)     Gran % 82.9 (*)     Lymph % 7.8 (*)     All other components within normal limits    Narrative:     PTT daily if no changes in infusion rate   APTT - Abnormal; Notable for the following components:    aPTT 73.5 (*)     All other components within normal limits   CULTURE, BLOOD    Narrative:     Aerobic and anaerobic   CULTURE, BLOOD    Narrative:     Aerobic and anaerobic   CULTURE, URINE   LACTIC ACID, PLASMA   LACTIC ACID, PLASMA   APTT    Narrative:     Draw baseline aPTT prior to starting the heparin bolus or  infusion  (if patient is on warfarin prior to heparin therapy)   PROTIME-INR    Narrative:     Draw baseline aPTT prior to starting the heparin bolus or  infusion  (if patient is on warfarin prior to heparin therapy)        All Lab Results:  Results for orders placed or performed during the hospital encounter of 02/02/24   Blood culture x two cultures. Draw prior to  antibiotics.    Specimen: Peripheral, Hand, Right; Blood   Result Value Ref Range    Blood Culture, Routine No Growth to date    Blood culture x two cultures. Draw prior to antibiotics.    Specimen: Peripheral, Antecubital, Right; Blood   Result Value Ref Range    Blood Culture, Routine No Growth to date    CBC auto differential   Result Value Ref Range    WBC 9.41 3.90 - 12.70 K/uL    RBC 3.46 (L) 4.60 - 6.20 M/uL    Hemoglobin 9.6 (L) 14.0 - 18.0 g/dL    Hematocrit 30.5 (L) 40.0 - 54.0 %    MCV 88 82 - 98 fL    MCH 27.7 27.0 - 31.0 pg    MCHC 31.5 (L) 32.0 - 36.0 g/dL    RDW 17.6 (H) 11.5 - 14.5 %    Platelets 269 150 - 450 K/uL    MPV 9.7 9.2 - 12.9 fL    Immature Granulocytes 0.4 0.0 - 0.5 %    Gran # (ANC) 7.5 1.8 - 7.7 K/uL    Immature Grans (Abs) 0.04 0.00 - 0.04 K/uL    Lymph # 1.1 1.0 - 4.8 K/uL    Mono # 0.5 0.3 - 1.0 K/uL    Eos # 0.2 0.0 - 0.5 K/uL    Baso # 0.05 0.00 - 0.20 K/uL    nRBC 0 0 /100 WBC    Gran % 80.0 (H) 38.0 - 73.0 %    Lymph % 11.2 (L) 18.0 - 48.0 %    Mono % 5.7 4.0 - 15.0 %    Eosinophil % 2.2 0.0 - 8.0 %    Basophil % 0.5 0.0 - 1.9 %    Differential Method Automated    Comprehensive metabolic panel   Result Value Ref Range    Sodium 133 (L) 136 - 145 mmol/L    Potassium 4.0 3.5 - 5.1 mmol/L    Chloride 96 95 - 110 mmol/L    CO2 20 (L) 23 - 29 mmol/L    Glucose 126 (H) 70 - 110 mg/dL    BUN 30 (H) 8 - 23 mg/dL    Creatinine 2.1 (H) 0.5 - 1.4 mg/dL    Calcium 8.9 8.7 - 10.5 mg/dL    Total Protein 7.7 6.0 - 8.4 g/dL    Albumin 3.3 (L) 3.5 - 5.2 g/dL    Total Bilirubin 0.5 0.1 - 1.0 mg/dL    Alkaline Phosphatase 122 55 - 135 U/L    AST 13 10 - 40 U/L    ALT 14 10 - 44 U/L    eGFR 33 (A) >60 mL/min/1.73 m^2    Anion Gap 17 (H) 8 - 16 mmol/L   Lactic acid, plasma #1   Result Value Ref Range    Lactate (Lactic Acid) 2.0 0.5 - 2.2 mmol/L   Urinalysis, Reflex to Urine Culture Urine, Clean Catch    Specimen: Urine   Result Value Ref Range    Specimen UA Urine, Clean Catch     Color, UA Yellow  Yellow, Straw, Andra    Appearance, UA Hazy (A) Clear    pH, UA 6.0 5.0 - 8.0    Specific Gravity, UA 1.015 1.005 - 1.030    Protein, UA 1+ (A) Negative    Glucose, UA Negative Negative    Ketones, UA Negative Negative    Bilirubin (UA) Negative Negative    Occult Blood UA 2+ (A) Negative    Nitrite, UA Negative Negative    Urobilinogen, UA Negative <2.0 EU/dL    Leukocytes, UA 3+ (A) Negative   Lactic acid, plasma #2   Result Value Ref Range    Lactate (Lactic Acid) 1.5 0.5 - 2.2 mmol/L   Urinalysis Microscopic   Result Value Ref Range    RBC, UA 17 (H) 0 - 4 /hpf    WBC, UA >100 (H) 0 - 5 /hpf    WBC Clumps, UA Many (A) None-Rare    Bacteria Occasional None-Occ /hpf    Hyaline Casts, UA 4 (A) 0-1/lpf /lpf    Granular Casts, UA 4 (A) None /lpf    Microscopic Comment SEE COMMENT    APTT   Result Value Ref Range    aPTT 31.0 21.0 - 32.0 sec   Protime-INR   Result Value Ref Range    Prothrombin Time 11.3 9.0 - 12.5 sec    INR 1.1 0.8 - 1.2   CBC auto differential   Result Value Ref Range    WBC 8.21 3.90 - 12.70 K/uL    RBC 3.21 (L) 4.60 - 6.20 M/uL    Hemoglobin 9.1 (L) 14.0 - 18.0 g/dL    Hematocrit 28.1 (L) 40.0 - 54.0 %    MCV 88 82 - 98 fL    MCH 28.3 27.0 - 31.0 pg    MCHC 32.4 32.0 - 36.0 g/dL    RDW 17.7 (H) 11.5 - 14.5 %    Platelets 218 150 - 450 K/uL    MPV 9.5 9.2 - 12.9 fL    Immature Granulocytes 0.4 0.0 - 0.5 %    Gran # (ANC) 6.8 1.8 - 7.7 K/uL    Immature Grans (Abs) 0.03 0.00 - 0.04 K/uL    Lymph # 0.6 (L) 1.0 - 4.8 K/uL    Mono # 0.6 0.3 - 1.0 K/uL    Eos # 0.1 0.0 - 0.5 K/uL    Baso # 0.04 0.00 - 0.20 K/uL    nRBC 0 0 /100 WBC    Gran % 82.9 (H) 38.0 - 73.0 %    Lymph % 7.8 (L) 18.0 - 48.0 %    Mono % 6.7 4.0 - 15.0 %    Eosinophil % 1.7 0.0 - 8.0 %    Basophil % 0.5 0.0 - 1.9 %    Differential Method Automated    APTT   Result Value Ref Range    aPTT 73.5 (H) 21.0 - 32.0 sec   APTT   Result Value Ref Range    aPTT 50.7 (H) 21.0 - 32.0 sec         Imaging Results:  Imaging Results              IR  Nephrostomy Tube Change (In process)  Result time 02/03/24 11:20:09                     CT Renal Stone Study ABD Pelvis WO (Final result)  Result time 02/03/24 01:11:41      Final result by Joselyn Ge MD (02/03/24 01:11:41)                   Impression:      Right-sided nephrostomy tube the has been displaced inferiorly, now within the lower pole right renal calyx (previously within the right renal pelvis). Unchanged appearance of the left nephrostomy.  Mild right hydroureteronephrosis.  Nonobstructive right nephrolithiasis.  Chronic diffuse circumferential bladder wall thickening with associated bladder wall calcification compatible with chronic cystitis, follow-up cystoscopy should be considered.    Asymmetric subcutaneous edema and swelling of the left proximal thigh.    Additional findings, as above.      Electronically signed by: Joselyn Ge  Date:    02/03/2024  Time:    01:11               Narrative:    EXAMINATION:  CT RENAL STONE STUDY ABD PELVIS WO    CLINICAL HISTORY:  Nephrostomy catheter displacement; Other artificial openings of urinary tract status    TECHNIQUE:  Low dose axial images, sagittal and coronal reformations were obtained from the lung bases to the pubic symphysis.  Contrast was not administered.    COMPARISON:  01/12/2024    FINDINGS:  Right-sided nephrostomy tube the has been displaced inferiorly, now within the lower pole right renal calyx (previously within the right renal pelvis).  Unchanged appearance of the left nephrostomy.  Left kidney is atrophic.  Mild right hydroureteronephrosis.  Nonobstructive right nephrolithiasis.  Chronic diffuse circumferential bladder wall thickening with associated bladder wall calcification compatible with chronic cystitis, follow-up cystoscopy should be considered.    Bibasilar dependent atelectasis.    Unremarkable liver and gallbladder.  Unremarkable spleen and pancreas.  Unremarkable adrenal glands.    Moderate presacral soft  tissue fullness.    Large amount of stool throughout the colon and rectum.  No small bowel obstruction.    Mild aneurysmal dilatation of the abdominal aorta.  Severe multifocal atherosclerosis of the abdominal aorta and its branches.    Postsurgical changes of right proximal femur.  Unchanged appearance of the right-sided rib fractures.    Asymmetric subcutaneous edema and swelling of the left proximal thigh.                                       US Lower Extremity Veins Left (Final result)  Result time 02/02/24 22:45:57      Final result by Jefferson Kunz MD (02/02/24 22:45:57)                   Impression:      Extensive occlusive DVT involving the entire left leg.  Dr. Helms notified at 2220 by the sonographer      Electronically signed by: Jefferson Kunz  Date:    02/02/2024  Time:    22:45               Narrative:    EXAMINATION:  US LOWER EXTREMITY VEINS LEFT    CLINICAL HISTORY:  Pain in left leg    TECHNIQUE:  Duplex and color flow Doppler evaluation and graded compression of the left lower extremity veins was performed.    COMPARISON:  None    FINDINGS:  Left sided veins: Extensive occlusive DVT in the entire left leg.    .    Contralateral CFV: The contralateral (right) common femoral vein is patent and free of thrombus.    Miscellaneous: None                                       X-Ray Chest AP Portable (Final result)  Result time 02/02/24 18:30:59      Final result by Jefferson Kunz MD (02/02/24 18:30:59)                   Impression:      No acute abnormality.      Electronically signed by: Jefferson Kunz  Date:    02/02/2024  Time:    18:30               Narrative:    EXAMINATION:  XR CHEST AP PORTABLE    CLINICAL HISTORY:  Sepsis;    TECHNIQUE:  Single frontal view of the chest was performed.    COMPARISON:  None    FINDINGS:  The lungs are clear, with normal appearance of pulmonary vasculature and no pleural effusion or pneumothorax.    The cardiac silhouette is normal in size. The hilar and  mediastinal contours are unremarkable.    Bones are intact.                                       The EKG was ordered, reviewed, and independently interpreted by the ED provider.  Interpretation time: 18:45  Rate: 117 BPM  Rhythm: sinus tachycardia with premature atrial complexes  Interpretation: Possible Lateral infarct. Inferior infarct. No STEMI.    The Emergency Provider reviewed the vital signs and test results, which are outlined above.     ED Discussion     1:30 AM: Discussed pt's case with Dr. Mckeon (Urology) who stated that the pt's nephrostomy tube will need to be replaced by IR. Dr. Mckeon recommends keeping pt NPO.     2:07 AM: Discussed case with Fazian Parra NP ([facility]). Faizan Parra NP agrees with current care and management of pt and accepts admission.   Admitting Service: [facility]  Admitting Physician: Dr. Parra  Admit to: med/tele inpatient            Medical Decision Making  Amount and/or Complexity of Data Reviewed  External Data Reviewed: labs, radiology and ECG.  Labs: ordered. Decision-making details documented in ED Course.  Radiology: ordered and independent interpretation performed. Decision-making details documented in ED Course.  ECG/medicine tests: ordered and independent interpretation performed. Decision-making details documented in ED Course.    Risk  OTC drugs.  Prescription drug management.  Parenteral controlled substances.  Decision regarding hospitalization.  Risk Details: OTC drugs, prescription drugs and controlled substances considered.  Due to patient's symptoms improving and pain controlled pain medications ordered appropriately.  Ddx: contusion, sprain, strain, dvt                 ED Medication(s):  Medications   heparin 25,000 units in dextrose 5% 250 mL (100 units/mL) infusion HIGH INTENSITY nomogram - [facility] (16 Units/kg/hr × 70.3 kg Intravenous New Bag 2/3/24 1936)   heparin 25,000 units in dextrose 5% (100 units/ml) IV bolus from bag -  ADDITIONAL PRN BOLUS - 60 units/kg (has no administration in time range)   heparin 25,000 units in dextrose 5% (100 units/ml) IV bolus from bag - ADDITIONAL PRN BOLUS - 30 units/kg (has no administration in time range)   sodium chloride 0.9% flush 10 mL (has no administration in time range)   albuterol-ipratropium 2.5 mg-0.5 mg/3 mL nebulizer solution 3 mL (has no administration in time range)   melatonin tablet 6 mg (has no administration in time range)   ondansetron injection 4 mg (has no administration in time range)   promethazine tablet 25 mg (has no administration in time range)   senna-docusate 8.6-50 mg per tablet 1 tablet (has no administration in time range)   acetaminophen tablet 650 mg (has no administration in time range)   aluminum-magnesium hydroxide-simethicone 200-200-20 mg/5 mL suspension 30 mL (has no administration in time range)   acetaminophen suppository 650 mg (has no administration in time range)   HYDROcodone-acetaminophen 5-325 mg per tablet 1 tablet (1 tablet Oral Given 2/3/24 1735)   morphine injection 2 mg (has no administration in time range)   naloxone 0.4 mg/mL injection 0.02 mg (has no administration in time range)   glucose chewable tablet 16 g (has no administration in time range)   glucose chewable tablet 24 g (has no administration in time range)   glucagon (human recombinant) injection 1 mg (has no administration in time range)   dextrose 10% bolus 125 mL 125 mL (has no administration in time range)   dextrose 10% bolus 250 mL 250 mL (has no administration in time range)   ALPRAZolam tablet 1 mg (1 mg Oral Given 2/3/24 1931)   cyclobenzaprine tablet 10 mg (has no administration in time range)   docusate sodium capsule 100 mg (100 mg Oral Given 2/3/24 2057)   EScitalopram oxalate tablet 20 mg (20 mg Oral Given 2/3/24 0939)   levothyroxine tablet 125 mcg (125 mcg Oral Given 2/3/24 0939)   pregabalin capsule 75 mg (75 mg Oral Given 2/3/24 2057)   ceFEPIme (MAXIPIME) 1 g in  dextrose 5 % in water (D5W) 100 mL IVPB (MB+) (0 g Intravenous Stopped 2/3/24 2125)   budesonide nebulizer solution 0.5 mg (0.5 mg Nebulization Given by Other 2/3/24 1947)   arformoteroL nebulizer solution 15 mcg (15 mcg Nebulization Given by Other 2/3/24 1947)   linezolid tablet 600 mg (600 mg Oral Given 2/3/24 2057)   iohexoL (OMNIPAQUE 240) injection 20 mL (has no administration in time range)   iohexoL (OMNIPAQUE 240) injection 20 mL (has no administration in time range)   sodium chloride 0.9% bolus 1,000 mL 1,000 mL (0 mLs Intravenous Stopped 2/2/24 2318)   heparin 25,000 units in dextrose 5% (100 units/ml) IV bolus from bag INITIAL BOLUS (5,624 Units Intravenous Bolus from Bag 2/2/24 2340)   piperacillin-tazobactam (ZOSYN) 4.5 g in dextrose 5 % in water (D5W) 100 mL IVPB (MB+) (0 g Intravenous Stopped 2/2/24 2331)   fentaNYL 50 mcg/mL injection (50 mcg Intravenous Given 2/3/24 1021)       Current Discharge Medication List                  Scribe Attestation:   Scribe #1: I performed the above scribed service and the documentation accurately describes the services I performed. I attest to the accuracy of the note.     Attending:   Physician Attestation Statement for Scribe #1: I, Rubén Helms Jr., MD, personally performed the services described in this documentation, as scribed by Maxim Gusman, in my presence, and it is both accurate and complete.       Scribe Attestation:   Scribe #2: I performed the above scribed service and the documentation accurately describes the services I performed. I attest to the accuracy of the note.    Attending Attestation:           Physician Attestation for Scribe:    Physician Attestation Statement for Scribe #2: I, Rubén Helms Jr., MD, reviewed documentation, as scribed by Sri Manuel in my presence, and it is both accurate and complete. I also acknowledge and confirm the content of the note done by Scribe #1.           Clinical Impression       ICD-10-CM ICD-9-CM   1.  Acute deep vein thrombosis (DVT) of femoral vein of left lower extremity  I82.412 453.41   2. Tachycardia  R00.0 785.0   3. Left leg pain  M79.605 729.5   4. Nephrostomy status  Z93.6 V44.6   5. Nephrostomy tube displaced  T83.022A 997.5   6. Urinary tract infection without hematuria, site unspecified  N39.0 599.0   7. Chest pain  R07.9 786.50       Disposition:   Disposition: Admitted  Condition: Rubén Qureshi Jr., MD  02/03/24 5278

## 2024-02-04 PROBLEM — N30.00 ACUTE CYSTITIS: Status: ACTIVE | Noted: 2024-02-04

## 2024-02-04 LAB
ALBUMIN SERPL BCP-MCNC: 2.7 G/DL (ref 3.5–5.2)
ALP SERPL-CCNC: 110 U/L (ref 55–135)
ALT SERPL W/O P-5'-P-CCNC: 16 U/L (ref 10–44)
ANION GAP SERPL CALC-SCNC: 13 MMOL/L (ref 8–16)
APTT PPP: 52.1 SEC (ref 21–32)
APTT PPP: 57.1 SEC (ref 21–32)
AST SERPL-CCNC: 14 U/L (ref 10–40)
BASOPHILS # BLD AUTO: 0.03 K/UL (ref 0–0.2)
BASOPHILS # BLD AUTO: 0.04 K/UL (ref 0–0.2)
BASOPHILS NFR BLD: 0.4 % (ref 0–1.9)
BASOPHILS NFR BLD: 0.6 % (ref 0–1.9)
BILIRUB SERPL-MCNC: 0.4 MG/DL (ref 0.1–1)
BUN SERPL-MCNC: 28 MG/DL (ref 8–23)
CALCIUM SERPL-MCNC: 9.3 MG/DL (ref 8.7–10.5)
CHLORIDE SERPL-SCNC: 104 MMOL/L (ref 95–110)
CO2 SERPL-SCNC: 21 MMOL/L (ref 23–29)
CREAT SERPL-MCNC: 1.7 MG/DL (ref 0.5–1.4)
DIFFERENTIAL METHOD BLD: ABNORMAL
DIFFERENTIAL METHOD BLD: ABNORMAL
EOSINOPHIL # BLD AUTO: 0.1 K/UL (ref 0–0.5)
EOSINOPHIL # BLD AUTO: 0.2 K/UL (ref 0–0.5)
EOSINOPHIL NFR BLD: 2 % (ref 0–8)
EOSINOPHIL NFR BLD: 2.3 % (ref 0–8)
ERYTHROCYTE [DISTWIDTH] IN BLOOD BY AUTOMATED COUNT: 17.8 % (ref 11.5–14.5)
ERYTHROCYTE [DISTWIDTH] IN BLOOD BY AUTOMATED COUNT: 18 % (ref 11.5–14.5)
EST. GFR  (NO RACE VARIABLE): 43 ML/MIN/1.73 M^2
GLUCOSE SERPL-MCNC: 100 MG/DL (ref 70–110)
HCT VFR BLD AUTO: 26.6 % (ref 40–54)
HCT VFR BLD AUTO: 27.5 % (ref 40–54)
HGB BLD-MCNC: 8.4 G/DL (ref 14–18)
HGB BLD-MCNC: 8.6 G/DL (ref 14–18)
IMM GRANULOCYTES # BLD AUTO: 0.01 K/UL (ref 0–0.04)
IMM GRANULOCYTES # BLD AUTO: 0.02 K/UL (ref 0–0.04)
IMM GRANULOCYTES NFR BLD AUTO: 0.1 % (ref 0–0.5)
IMM GRANULOCYTES NFR BLD AUTO: 0.3 % (ref 0–0.5)
LYMPHOCYTES # BLD AUTO: 0.7 K/UL (ref 1–4.8)
LYMPHOCYTES # BLD AUTO: 0.7 K/UL (ref 1–4.8)
LYMPHOCYTES NFR BLD: 10.9 % (ref 18–48)
LYMPHOCYTES NFR BLD: 11.6 % (ref 18–48)
MCH RBC QN AUTO: 27.4 PG (ref 27–31)
MCH RBC QN AUTO: 27.7 PG (ref 27–31)
MCHC RBC AUTO-ENTMCNC: 31.3 G/DL (ref 32–36)
MCHC RBC AUTO-ENTMCNC: 31.6 G/DL (ref 32–36)
MCV RBC AUTO: 88 FL (ref 82–98)
MCV RBC AUTO: 88 FL (ref 82–98)
MONOCYTES # BLD AUTO: 0.6 K/UL (ref 0.3–1)
MONOCYTES # BLD AUTO: 0.6 K/UL (ref 0.3–1)
MONOCYTES NFR BLD: 8.8 % (ref 4–15)
MONOCYTES NFR BLD: 9.4 % (ref 4–15)
NEUTROPHILS # BLD AUTO: 4.9 K/UL (ref 1.8–7.7)
NEUTROPHILS # BLD AUTO: 5.2 K/UL (ref 1.8–7.7)
NEUTROPHILS NFR BLD: 76.7 % (ref 38–73)
NEUTROPHILS NFR BLD: 76.9 % (ref 38–73)
NRBC BLD-RTO: 0 /100 WBC
NRBC BLD-RTO: 0 /100 WBC
PLATELET # BLD AUTO: 236 K/UL (ref 150–450)
PLATELET # BLD AUTO: 238 K/UL (ref 150–450)
PMV BLD AUTO: 10.3 FL (ref 9.2–12.9)
PMV BLD AUTO: 9.7 FL (ref 9.2–12.9)
POTASSIUM SERPL-SCNC: 4 MMOL/L (ref 3.5–5.1)
PROT SERPL-MCNC: 7.1 G/DL (ref 6–8.4)
RBC # BLD AUTO: 3.03 M/UL (ref 4.6–6.2)
RBC # BLD AUTO: 3.14 M/UL (ref 4.6–6.2)
SODIUM SERPL-SCNC: 138 MMOL/L (ref 136–145)
WBC # BLD AUTO: 6.36 K/UL (ref 3.9–12.7)
WBC # BLD AUTO: 6.81 K/UL (ref 3.9–12.7)

## 2024-02-04 PROCEDURE — 94640 AIRWAY INHALATION TREATMENT: CPT

## 2024-02-04 PROCEDURE — 25000003 PHARM REV CODE 250: Performed by: INTERNAL MEDICINE

## 2024-02-04 PROCEDURE — 36415 COLL VENOUS BLD VENIPUNCTURE: CPT | Performed by: INTERNAL MEDICINE

## 2024-02-04 PROCEDURE — 85025 COMPLETE CBC W/AUTO DIFF WBC: CPT | Performed by: HOSPITALIST

## 2024-02-04 PROCEDURE — 85730 THROMBOPLASTIN TIME PARTIAL: CPT | Mod: 91 | Performed by: EMERGENCY MEDICINE

## 2024-02-04 PROCEDURE — 63600175 PHARM REV CODE 636 W HCPCS: Performed by: INTERNAL MEDICINE

## 2024-02-04 PROCEDURE — 85025 COMPLETE CBC W/AUTO DIFF WBC: CPT | Mod: 91 | Performed by: EMERGENCY MEDICINE

## 2024-02-04 PROCEDURE — 25000003 PHARM REV CODE 250: Performed by: HOSPITALIST

## 2024-02-04 PROCEDURE — 94799 UNLISTED PULMONARY SVC/PX: CPT | Mod: XB

## 2024-02-04 PROCEDURE — 11000001 HC ACUTE MED/SURG PRIVATE ROOM

## 2024-02-04 PROCEDURE — 63600175 PHARM REV CODE 636 W HCPCS: Performed by: EMERGENCY MEDICINE

## 2024-02-04 PROCEDURE — 80053 COMPREHEN METABOLIC PANEL: CPT | Performed by: HOSPITALIST

## 2024-02-04 PROCEDURE — 21400001 HC TELEMETRY ROOM

## 2024-02-04 PROCEDURE — 25000242 PHARM REV CODE 250 ALT 637 W/ HCPCS: Performed by: INTERNAL MEDICINE

## 2024-02-04 PROCEDURE — 85730 THROMBOPLASTIN TIME PARTIAL: CPT | Performed by: INTERNAL MEDICINE

## 2024-02-04 RX ADMIN — LEVOTHYROXINE SODIUM 125 MCG: 125 TABLET ORAL at 05:02

## 2024-02-04 RX ADMIN — ARFORMOTEROL TARTRATE 15 MCG: 15 SOLUTION RESPIRATORY (INHALATION) at 07:02

## 2024-02-04 RX ADMIN — HYDROCODONE BITARTRATE AND ACETAMINOPHEN 1 TABLET: 5; 325 TABLET ORAL at 08:02

## 2024-02-04 RX ADMIN — BUDESONIDE 0.5 MG: 0.5 INHALANT RESPIRATORY (INHALATION) at 07:02

## 2024-02-04 RX ADMIN — HEPARIN SODIUM 16 UNITS/KG/HR: 10000 INJECTION, SOLUTION INTRAVENOUS at 04:02

## 2024-02-04 RX ADMIN — ALPRAZOLAM 1 MG: 1 TABLET ORAL at 08:02

## 2024-02-04 RX ADMIN — PREGABALIN 75 MG: 75 CAPSULE ORAL at 08:02

## 2024-02-04 RX ADMIN — CEFEPIME 1 G: 1 INJECTION, POWDER, FOR SOLUTION INTRAMUSCULAR; INTRAVENOUS at 08:02

## 2024-02-04 RX ADMIN — DOCUSATE SODIUM 100 MG: 100 CAPSULE, LIQUID FILLED ORAL at 08:02

## 2024-02-04 RX ADMIN — ESCITALOPRAM OXALATE 20 MG: 10 TABLET ORAL at 08:02

## 2024-02-04 RX ADMIN — HYDROCODONE BITARTRATE AND ACETAMINOPHEN 1 TABLET: 5; 325 TABLET ORAL at 09:02

## 2024-02-04 NOTE — SUBJECTIVE & OBJECTIVE
Interval History: NAEON. Pt seen and examined, wife at bedside. Pt has no complaints today other than swelling to LLE. Denies CP, SOB, numbness/tingling to LLE.     Review of Systems  Objective:     Vital Signs (Most Recent):  Temp: 97.9 °F (36.6 °C) (02/04/24 1206)  Pulse: 81 (02/04/24 1303)  Resp: 18 (02/04/24 1206)  BP: (!) 106/57 (02/04/24 1206)  SpO2: 97 % (02/04/24 1206) Vital Signs (24h Range):  Temp:  [97.3 °F (36.3 °C)-98.6 °F (37 °C)] 97.9 °F (36.6 °C)  Pulse:  [] 81  Resp:  [14-18] 18  SpO2:  [94 %-97 %] 97 %  BP: ()/(54-62) 106/57     Weight: 70.3 kg (155 lb)  Body mass index is 23.57 kg/m².    Intake/Output Summary (Last 24 hours) at 2/4/2024 1518  Last data filed at 2/4/2024 0927  Gross per 24 hour   Intake --   Output 1300 ml   Net -1300 ml         Physical Exam  Vitals and nursing note reviewed.   Constitutional:       General: He is not in acute distress.     Appearance: He is ill-appearing.   Cardiovascular:      Rate and Rhythm: Normal rate and regular rhythm.      Heart sounds: No murmur heard.     No friction rub. No gallop.   Pulmonary:      Effort: Pulmonary effort is normal.      Breath sounds: Normal breath sounds. No wheezing, rhonchi or rales.   Abdominal:      General: Bowel sounds are normal. There is no distension.      Palpations: Abdomen is soft.      Tenderness: There is no abdominal tenderness.   Genitourinary:     Comments: B/l PCN tubes draining clear yellow urine   Musculoskeletal:      Right lower leg: No edema.      Left lower leg: Edema present.      Comments: Edema and blistering to LLE, foot warm to touch, SILT    Neurological:      Mental Status: He is alert and oriented to person, place, and time. Mental status is at baseline.             Significant Labs: All pertinent labs within the past 24 hours have been reviewed.    Significant Imaging: I have reviewed all pertinent imaging results/findings within the past 24 hours.

## 2024-02-04 NOTE — ASSESSMENT & PLAN NOTE
- IR consulted  - discussed with Dr. Cedeño- tentative plan for thrombectomy on 02/05, Dr. Rob on call   - continue heparin drip; monitor PTT per protocol   - continue neurovascular checks of LLE

## 2024-02-04 NOTE — HOSPITAL COURSE
Pt started on IV Cefepime + Zyvox for UTI pending urine culture. IR was consulted to eval for PCN revision and LLE thrombectomy. Underwent bilateral PCN exchange with IR Dr. Cedeño on 02/03. Per IR, tentative plan for thrombectomy on 02/05. Remains on heparin drip for anticoagulation.     2/5/24  Thrombectomy deferred today. Seen by IR today who plans to perform thrombectomy 2/7/2024. Continue heparin infusion. Urine grew Morganella sensitive to Meropenem. Discussed with Infectious Disease who recommends IV management x 14 days.     2/6/24  Ultrasound today again showed occlusive thrombus in the left common femoral, popliteal veins. Plans for thrombectomy tomorrow. PICC line placed. Outpatient meropenem arranged.     2/7/24  Patient was evaluated by IR today it was decided to defer thrombectomy. There has been appearance of left foot and it was felt thrombectomy in setting of infection increases risk to throw septic emboli in lungs. Patient will continue PO doxycycline for 6 days and will have a follow up venous ultrasound in one week. IR will determine if thrombectomy at this time is warranted. He will continue Meropenem x 13 days for Morganella morganii UTI. Home health has been arranged. He is stable for discharge.

## 2024-02-04 NOTE — ASSESSMENT & PLAN NOTE
Urology consulted  Underwent exchange of b/l PCN tubes on 02/03 with IR Dr. Cedeño   Monitor UOP

## 2024-02-04 NOTE — ASSESSMENT & PLAN NOTE
Patient with acute kidney injury/acute renal failure likely due to post-obstructive d/t dislodged nephrostomy tube  BANDAR is currently  improving . Baseline creatinine  1.4  - Labs reviewed- Renal function/electrolytes with Estimated Creatinine Clearance: 39.7 mL/min (A) (based on SCr of 1.7 mg/dL (H)). according to latest data. Monitor urine output and serial BMP and adjust therapy as needed. Avoid nephrotoxins and renally dose meds for GFR listed above.

## 2024-02-04 NOTE — ASSESSMENT & PLAN NOTE
- Prelim ucx with GNR  - Will stop Zyvox, continue IV Cefepime pending final culture   - followed by ID as outpatient

## 2024-02-04 NOTE — PLAN OF CARE
Discussed poc with pt, pt verbalized understanding  Purposeful rounding every 2hours  VS wnl  Cardiac monitoring in use, pt is PHILLIP, tele monitor #[id number]  Blood glucose monitoring   Fall precautions in place, remains injury free  Pain and nausea under control with PRN meds  IVFs  Accurate I&Os  Abx given as prescribed  Bed locked at lowest position  Call light within reach  Chart check complete  Will cont with POC    Problem: Adult Inpatient Plan of Care  Goal: Plan of Care Review  Outcome: Ongoing, Progressing  Goal: Patient-Specific Goal (Individualized)  Outcome: Ongoing, Progressing  Goal: Absence of Hospital-Acquired Illness or Injury  Outcome: Ongoing, Progressing  Goal: Optimal Comfort and Wellbeing  Outcome: Ongoing, Progressing  Goal: Readiness for Transition of Care  Outcome: Ongoing, Progressing     Problem: Adjustment to Illness (Sepsis/Septic Shock)  Goal: Optimal Coping  Outcome: Ongoing, Progressing     Problem: Bleeding (Sepsis/Septic Shock)  Goal: Absence of Bleeding  Outcome: Ongoing, Progressing     Problem: Glycemic Control Impaired (Sepsis/Septic Shock)  Goal: Blood Glucose Level Within Desired Range  Outcome: Ongoing, Progressing     Problem: Infection Progression (Sepsis/Septic Shock)  Goal: Absence of Infection Signs and Symptoms  Outcome: Ongoing, Progressing     Problem: Nutrition Impaired (Sepsis/Septic Shock)  Goal: Optimal Nutrition Intake  Outcome: Ongoing, Progressing     Problem: Fluid and Electrolyte Imbalance (Acute Kidney Injury/Impairment)  Goal: Fluid and Electrolyte Balance  Outcome: Ongoing, Progressing     Problem: Oral Intake Inadequate (Acute Kidney Injury/Impairment)  Goal: Optimal Nutrition Intake  Outcome: Ongoing, Progressing     Problem: Renal Function Impairment (Acute Kidney Injury/Impairment)  Goal: Effective Renal Function  Outcome: Ongoing, Progressing     Problem: Impaired Wound Healing  Goal: Optimal Wound Healing  Outcome: Ongoing, Progressing

## 2024-02-04 NOTE — PROGRESS NOTES
"[facility]  Progress Note    Patient Name: Geovani Currie  MRN: [id number]  Patient Class: IP- Inpatient   Admission Date: 2/2/2024  Length of Stay: 1 days  Attending Physician: Hetal Santos MD  Primary Care Provider: Faizan Antoine MD        Subjective:     Principal Problem:Acute deep vein thrombosis (DVT) of left lower extremity        HPI:  Geovani Currie is a 68 y.o. male with a PMH  has a past medical history of Anxiety disorder, unspecified, COPD (chronic obstructive pulmonary disease), Hypertension, and Thyroid disease. who presented to the ED accompanied by his daughter for further evaluation of worsening left lower extremity pain and swelling as well as concerns of nephrostomy tube dislodgement.  Patient's leg was noted to be swollen and was complaining of increased swelling/tightness/cramping with intermittent burning/stinging sensations beginning around 11:00 a.m. and progressively worsened throughout the day.  Patient reported negative history of PE/DVTs not currently on any blood thinners/anticoagulants.  Patient reported no known alleviating factors with aggravating factors including weight-bearing, movement, and palpation to the affected area.  Patient is on also reported mild confusion and was reported to be saying "weird things".  Patient currently denies endorsing any lightheadedness, dizziness, headache, visual changes, fever, chills, sweats, nausea, vomiting, chest pain, shortness of breath, abdominal pain, flank pain, dysuria, hematuria, melena, hematochezia, diarrhea, or onset neurological deficits.  Prior to onset of symptoms, patient reported being in his usual state of health with no other concerns or complaints.  All other review of systems negative except as noted above.  Of note, patient recently underwent hip arthroplasty and continues to do physical rehab and ambulates via aid of walker.  Initial workup in the ED revealed patient had extensive " occlusive clot throughout his left lower extremity and was initiated on heparin.  Patient UA also concerning for residual UTI following completion of ciprofloxacin last month and was provided cefepime by ED staff.  Patient admitted to Hospital Medicine inpatient for continued medical management with IR consult pending.    PCP: Faizan Antoine    Overview/Hospital Course:  Pt started on IV Cefepime + Zyvox for UTI pending urine culture. IR was consulted to eval for PCN revision and LLE thrombectomy. Underwent bilateral PCN exchange with IR Dr. Cedeño on 02/03. Per IR, tentative plan for thrombectomy on 02/05. Remains on heparin drip for anticoagulation.     Interval History: NAEON. Pt seen and examined, wife at bedside. Pt has no complaints today other than swelling to LLE. Denies CP, SOB, numbness/tingling to LLE.     Review of Systems  Objective:     Vital Signs (Most Recent):  Temp: 97.9 °F (36.6 °C) (02/04/24 1206)  Pulse: 81 (02/04/24 1303)  Resp: 18 (02/04/24 1206)  BP: (!) 106/57 (02/04/24 1206)  SpO2: 97 % (02/04/24 1206) Vital Signs (24h Range):  Temp:  [97.3 °F (36.3 °C)-98.6 °F (37 °C)] 97.9 °F (36.6 °C)  Pulse:  [] 81  Resp:  [14-18] 18  SpO2:  [94 %-97 %] 97 %  BP: ()/(54-62) 106/57     Weight: 70.3 kg (155 lb)  Body mass index is 23.57 kg/m².    Intake/Output Summary (Last 24 hours) at 2/4/2024 1518  Last data filed at 2/4/2024 0927  Gross per 24 hour   Intake --   Output 1300 ml   Net -1300 ml         Physical Exam  Vitals and nursing note reviewed.   Constitutional:       General: He is not in acute distress.     Appearance: He is ill-appearing.   Cardiovascular:      Rate and Rhythm: Normal rate and regular rhythm.      Heart sounds: No murmur heard.     No friction rub. No gallop.   Pulmonary:      Effort: Pulmonary effort is normal.      Breath sounds: Normal breath sounds. No wheezing, rhonchi or rales.   Abdominal:      General: Bowel sounds are normal. There is no distension.       Palpations: Abdomen is soft.      Tenderness: There is no abdominal tenderness.   Genitourinary:     Comments: B/l PCN tubes draining clear yellow urine   Musculoskeletal:      Right lower leg: No edema.      Left lower leg: Edema present.      Comments: Edema and blistering to LLE, foot warm to touch, SILT    Neurological:      Mental Status: He is alert and oriented to person, place, and time. Mental status is at baseline.             Significant Labs: All pertinent labs within the past 24 hours have been reviewed.    Significant Imaging: I have reviewed all pertinent imaging results/findings within the past 24 hours.    Assessment/Plan:      * Acute deep vein thrombosis (DVT) of left lower extremity  - IR consulted  - discussed with Dr. Cedeño- tentative plan for thrombectomy on 02/05, Dr. Rob on call   - continue heparin drip; monitor PTT per protocol   - continue neurovascular checks of LLE       Nephrostomy tube displaced  Urology consulted  Underwent exchange of b/l PCN tubes on 02/03 with IR Dr. Cedeño   Monitor UOP       Acute cystitis  - Prelim ucx with GNR  - Will stop Zyvox, continue IV Cefepime pending final culture   - followed by ID as outpatient       Acute renal failure superimposed on stage 3 chronic kidney disease  Patient with acute kidney injury/acute renal failure likely due to post-obstructive d/t dislodged nephrostomy tube  BANDAR is currently  improving . Baseline creatinine  1.4  - Labs reviewed- Renal function/electrolytes with Estimated Creatinine Clearance: 39.7 mL/min (A) (based on SCr of 1.7 mg/dL (H)). according to latest data. Monitor urine output and serial BMP and adjust therapy as needed. Avoid nephrotoxins and renally dose meds for GFR listed above.      Malignant neoplasm of urinary bladder  - followed by urology as outpatient   - s/p PCN exchange as above     Squamous cell carcinoma of right lung  Followed by urology and hem/onc outpatient.  Plan:  -f/u outpatient as  directed       Anemia  Appears near baseline without evidence of active bleeding noted.   Recent Labs   Lab 01/30/24  1433 02/02/24  1010 02/03/24  0644 02/04/24  0604 02/04/24  0605   HGB 10.2*   < > 9.1* 8.4* 8.6*   HCT 32.3*   < > 28.1* 26.6* 27.5*   MCV 89   < > 88 88 88   MCHC 31.6*   < > 32.4 31.6* 31.3*   RDW 18.1*   < > 17.7* 17.8* 18.0*      < > 218 236 238   IRON 21*  --   --   --   --    TIBC 223*  --   --   --   --    FERRITIN 436*  --   --   --   --    [id number] 313  --   --   --   --     < > = values in this interval not displayed.     Plan:  -Monitor H/H and plts  -Type and screen, transfuse as needed          Anxiety  Chronic. Stable   -Continue home medications       Acute deep vein thrombosis (DVT) of femoral vein of left lower extremity  Patient presented with acute onset and rapidly progressive left lower extremity pain and swelling x1 day duration and was found to have extensive occlusive DVT on venous doppler. Patient initiated on heparin drip. IR consulted and awaiting further evaluation/recommendations regarding potential thrombectomy.  Plan:  -NPO  -continue heparin drip  -optimize pain control  -bowel regimen  -f/u IR      Hypothyroidism  Patient has chronic hypothyroidism. TFTs reviewed-   Lab Results   Component Value Date    TSH 1.034 02/02/2024   Plan:  -Will continue chronic levothyroxine and adjust for and clinical changes      Centrilobular emphysema  Patient's COPD is controlled currently.  Patient is currently off COPD Pathway. Continue scheduled inhalers Supplemental oxygen and monitor respiratory status closely.         VTE Risk Mitigation (From admission, onward)           Ordered     Reason for No Pharmacological VTE Prophylaxis  Once        Question:  Reasons:  Answer:  Physician Provided (leave comment)  Comment:  currently on heparin drip    02/03/24 0756     IP VTE HIGH RISK PATIENT  Once         02/03/24 0756     Place sequential compression device  Until  discontinued         02/03/24 0756     heparin 25,000 units in dextrose 5% (100 units/ml) IV bolus from bag - ADDITIONAL PRN BOLUS - 60 units/kg  As needed (PRN)        Question:  Heparin Infusion Adjustment (DO NOT MODIFY ANSWER)  Answer:  \\[email or web address]\epic\Images\Pharmacy\HeparinInfusions\heparin HIGH INTENSITY nomogram for [facility] [id number].pdf    02/02/24 2232     heparin 25,000 units in dextrose 5% (100 units/ml) IV bolus from bag - ADDITIONAL PRN BOLUS - 30 units/kg  As needed (PRN)        Question:  Heparin Infusion Adjustment (DO NOT MODIFY ANSWER)  Answer:  \\[email or web address]\epic\Images\Pharmacy\HeparinInfusions\heparin HIGH INTENSITY nomogram for [facility] [id number].pdf    02/02/24 2232     heparin 25,000 units in dextrose 5% 250 mL (100 units/mL) infusion HIGH INTENSITY nomogram - [facility]  Continuous        Question:  Begin at (units/kg/hr)  Answer:  18    02/02/24 2232                    Discharge Planning   TANIA:      Code Status: Full Code   Is the patient medically ready for discharge?: No    Reason for patient still in hospital (select all that apply): Patient trending condition, Treatment, and Consult recommendations  Discharge Plan A: Home Health, Home with family                  Hetal Santos MD  Department of Hospital Medicine   '[place] [facility]

## 2024-02-04 NOTE — PLAN OF CARE
Discussed POC with patient, Pt verbalized understanding, purposeful rounding , Cardiac monitoring in use, tele monitor [id number], Fall precautions in place, patient remains injury free, heparin infusing, oral/IV medications given, I&O's documented,  call light within reach, will continue with plan of care.     Problem: Adult Inpatient Plan of Care  Goal: Plan of Care Review  Outcome: Ongoing, Progressing  Goal: Patient-Specific Goal (Individualized)  Outcome: Ongoing, Progressing  Goal: Absence of Hospital-Acquired Illness or Injury  Outcome: Ongoing, Progressing  Goal: Optimal Comfort and Wellbeing  Outcome: Ongoing, Progressing  Goal: Readiness for Transition of Care  Outcome: Ongoing, Progressing

## 2024-02-04 NOTE — ASSESSMENT & PLAN NOTE
Appears near baseline without evidence of active bleeding noted.   Recent Labs   Lab 01/30/24  1433 02/02/24  1010 02/03/24  0644 02/04/24  0604 02/04/24  0605   HGB 10.2*   < > 9.1* 8.4* 8.6*   HCT 32.3*   < > 28.1* 26.6* 27.5*   MCV 89   < > 88 88 88   MCHC 31.6*   < > 32.4 31.6* 31.3*   RDW 18.1*   < > 17.7* 17.8* 18.0*      < > 218 236 238   IRON 21*  --   --   --   --    TIBC 223*  --   --   --   --    FERRITIN 436*  --   --   --   --    [id number] 313  --   --   --   --     < > = values in this interval not displayed.     Plan:  -Monitor H/H and plts  -Type and screen, transfuse as needed

## 2024-02-04 NOTE — PLAN OF CARE
[facility] - Med Surg  Initial Discharge Assessment       Primary Care Provider: Faizan Antoine MD    Admission Diagnosis: Tachycardia [R00.0]  Left leg pain [M79.605]  Nephrostomy status [Z93.6]  Nephrostomy tube displaced [T83.022A]  Chest pain [R07.9]  Acute deep vein thrombosis (DVT) of femoral vein of left lower extremity [I82.412]  Urinary tract infection without hematuria, site unspecified [N39.0]    Admission Date: 2/2/2024  Expected Discharge Date:     Transition of Care Barriers: None    Payor: MEDICARE / Plan: MEDICARE PART A & B / Product Type: Government /     Extended Emergency Contact Information  Primary Emergency Contact: Stephania Mann  Address: [address]           [place] [address] [place]  Mobile Phone: [phone]  Relation: Healthcare Power of   Secondary Emergency Contact: ELIAS GILLILAND  Mobile Phone: [phone]  Relation: Healthcare Power of   Preferred language: English   needed? No    Discharge Plan A: Home Health, Home with family  Discharge Plan B: Home with family      [id number] [place] - [address]  [address] [place] [address]  Phone: [phone] Fax: [phone]    [id number] [place] [facility] [address] [facility]  [address] [address] [facility] [place] [address]  Phone: [phone] Fax: [phone]      Initial Assessment (most recent)       Adult Discharge Assessment - 02/04/24 1502          Discharge Assessment    Assessment Type Discharge Planning Assessment     Confirmed/corrected address, phone number and insurance Yes     Confirmed Demographics Correct on Facesheet     Source of Information patient     People in Home child(qing), adult;spouse     Prior to hospitilization cognitive status: Alert/Oriented     Current cognitive status: Alert/Oriented     Walking or Climbing Stairs Difficulty no     Dressing/Bathing Difficulty no     Equipment Currently  Used at Home walker, rolling;wheelchair;bath bench;rollator;oxygen;bedside commode     Patient currently being followed by outpatient case management? No     Do you currently have service(s) that help you manage your care at home? Yes     Name and Contact number of agency [facility]     Is the pt/caregiver preference to resume services with current agency Yes     Do you take prescription medications? Yes     Do you have prescription coverage? Yes     Do you have any problems affording any of your prescribed medications? No     Is the patient taking medications as prescribed? yes     Who is going to help you get home at discharge? wife     How do you get to doctors appointments? family or friend will provide     Are you on dialysis? No     Do you take coumadin? No     Discharge Plan A Home Health;Home with family     Discharge Plan B Home with family     DME Needed Upon Discharge  none     Discharge Plan discussed with: Patient;Spouse/sig other     Transition of Care Barriers None                      Per wfie patient is independent with adls.  Has [facility] and all dme.

## 2024-02-05 ENCOUNTER — PATIENT MESSAGE (OUTPATIENT)
Dept: INFECTIOUS DISEASES | Facility: CLINIC | Age: 69
End: 2024-02-05
Payer: MEDICARE

## 2024-02-05 ENCOUNTER — PATIENT MESSAGE (OUTPATIENT)
Dept: PAIN MEDICINE | Facility: CLINIC | Age: 69
End: 2024-02-05
Payer: MEDICARE

## 2024-02-05 ENCOUNTER — EXTERNAL HOME HEALTH (OUTPATIENT)
Dept: HOME HEALTH SERVICES | Facility: HOSPITAL | Age: 69
End: 2024-02-05
Payer: MEDICARE

## 2024-02-05 LAB
ALBUMIN SERPL BCP-MCNC: 2.5 G/DL (ref 3.5–5.2)
ALP SERPL-CCNC: 132 U/L (ref 55–135)
ALT SERPL W/O P-5'-P-CCNC: 28 U/L (ref 10–44)
ANION GAP SERPL CALC-SCNC: 14 MMOL/L (ref 8–16)
APTT PPP: 56 SEC (ref 21–32)
AST SERPL-CCNC: 25 U/L (ref 10–40)
BACTERIA UR CULT: ABNORMAL
BASOPHILS # BLD AUTO: 0.03 K/UL (ref 0–0.2)
BASOPHILS NFR BLD: 0.5 % (ref 0–1.9)
BILIRUB SERPL-MCNC: 0.4 MG/DL (ref 0.1–1)
BUN SERPL-MCNC: 26 MG/DL (ref 8–23)
CALCIUM SERPL-MCNC: 8.4 MG/DL (ref 8.7–10.5)
CHLORIDE SERPL-SCNC: 105 MMOL/L (ref 95–110)
CO2 SERPL-SCNC: 19 MMOL/L (ref 23–29)
CREAT SERPL-MCNC: 1.8 MG/DL (ref 0.5–1.4)
DIFFERENTIAL METHOD BLD: ABNORMAL
EOSINOPHIL # BLD AUTO: 0.2 K/UL (ref 0–0.5)
EOSINOPHIL NFR BLD: 3.3 % (ref 0–8)
ERYTHROCYTE [DISTWIDTH] IN BLOOD BY AUTOMATED COUNT: 17.7 % (ref 11.5–14.5)
EST. GFR  (NO RACE VARIABLE): 40 ML/MIN/1.73 M^2
GLUCOSE SERPL-MCNC: 96 MG/DL (ref 70–110)
HCT VFR BLD AUTO: 27.6 % (ref 40–54)
HGB BLD-MCNC: 8.6 G/DL (ref 14–18)
IMM GRANULOCYTES # BLD AUTO: 0.02 K/UL (ref 0–0.04)
IMM GRANULOCYTES NFR BLD AUTO: 0.3 % (ref 0–0.5)
LYMPHOCYTES # BLD AUTO: 0.8 K/UL (ref 1–4.8)
LYMPHOCYTES NFR BLD: 12.4 % (ref 18–48)
MCH RBC QN AUTO: 27.9 PG (ref 27–31)
MCHC RBC AUTO-ENTMCNC: 31.2 G/DL (ref 32–36)
MCV RBC AUTO: 90 FL (ref 82–98)
MONOCYTES # BLD AUTO: 0.5 K/UL (ref 0.3–1)
MONOCYTES NFR BLD: 8.3 % (ref 4–15)
NEUTROPHILS # BLD AUTO: 4.7 K/UL (ref 1.8–7.7)
NEUTROPHILS NFR BLD: 75.2 % (ref 38–73)
NRBC BLD-RTO: 0 /100 WBC
PLATELET # BLD AUTO: 234 K/UL (ref 150–450)
PMV BLD AUTO: 9.7 FL (ref 9.2–12.9)
POTASSIUM SERPL-SCNC: 4.2 MMOL/L (ref 3.5–5.1)
PROT SERPL-MCNC: 6.2 G/DL (ref 6–8.4)
RBC # BLD AUTO: 3.08 M/UL (ref 4.6–6.2)
SODIUM SERPL-SCNC: 138 MMOL/L (ref 136–145)
WBC # BLD AUTO: 6.28 K/UL (ref 3.9–12.7)

## 2024-02-05 PROCEDURE — 63600175 PHARM REV CODE 636 W HCPCS: Performed by: STUDENT IN AN ORGANIZED HEALTH CARE EDUCATION/TRAINING PROGRAM

## 2024-02-05 PROCEDURE — 63600175 PHARM REV CODE 636 W HCPCS: Performed by: INTERNAL MEDICINE

## 2024-02-05 PROCEDURE — 25000003 PHARM REV CODE 250: Performed by: HOSPITALIST

## 2024-02-05 PROCEDURE — 85730 THROMBOPLASTIN TIME PARTIAL: CPT | Performed by: EMERGENCY MEDICINE

## 2024-02-05 PROCEDURE — 25000242 PHARM REV CODE 250 ALT 637 W/ HCPCS: Performed by: EMERGENCY MEDICINE

## 2024-02-05 PROCEDURE — 94640 AIRWAY INHALATION TREATMENT: CPT

## 2024-02-05 PROCEDURE — 99222 1ST HOSP IP/OBS MODERATE 55: CPT | Mod: ,,, | Performed by: STUDENT IN AN ORGANIZED HEALTH CARE EDUCATION/TRAINING PROGRAM

## 2024-02-05 PROCEDURE — 25000003 PHARM REV CODE 250: Performed by: STUDENT IN AN ORGANIZED HEALTH CARE EDUCATION/TRAINING PROGRAM

## 2024-02-05 PROCEDURE — 11000001 HC ACUTE MED/SURG PRIVATE ROOM

## 2024-02-05 PROCEDURE — 94799 UNLISTED PULMONARY SVC/PX: CPT | Mod: XB

## 2024-02-05 PROCEDURE — 27000207 HC ISOLATION

## 2024-02-05 PROCEDURE — 94761 N-INVAS EAR/PLS OXIMETRY MLT: CPT

## 2024-02-05 PROCEDURE — 85025 COMPLETE CBC W/AUTO DIFF WBC: CPT | Performed by: HOSPITALIST

## 2024-02-05 PROCEDURE — 80053 COMPREHEN METABOLIC PANEL: CPT | Performed by: HOSPITALIST

## 2024-02-05 PROCEDURE — 36415 COLL VENOUS BLD VENIPUNCTURE: CPT | Performed by: EMERGENCY MEDICINE

## 2024-02-05 PROCEDURE — 63600175 PHARM REV CODE 636 W HCPCS: Performed by: EMERGENCY MEDICINE

## 2024-02-05 PROCEDURE — 25000003 PHARM REV CODE 250: Performed by: INTERNAL MEDICINE

## 2024-02-05 PROCEDURE — 21400001 HC TELEMETRY ROOM

## 2024-02-05 PROCEDURE — 25000242 PHARM REV CODE 250 ALT 637 W/ HCPCS: Performed by: INTERNAL MEDICINE

## 2024-02-05 PROCEDURE — 99900035 HC TECH TIME PER 15 MIN (STAT)

## 2024-02-05 RX ORDER — BUDESONIDE 0.25 MG/2ML
0.5 INHALANT ORAL EVERY 12 HOURS
Status: DISCONTINUED | OUTPATIENT
Start: 2024-02-05 | End: 2024-02-07 | Stop reason: HOSPADM

## 2024-02-05 RX ADMIN — DOCUSATE SODIUM 100 MG: 100 CAPSULE, LIQUID FILLED ORAL at 08:02

## 2024-02-05 RX ADMIN — ESCITALOPRAM OXALATE 20 MG: 10 TABLET ORAL at 09:02

## 2024-02-05 RX ADMIN — LEVOTHYROXINE SODIUM 125 MCG: 125 TABLET ORAL at 05:02

## 2024-02-05 RX ADMIN — ARFORMOTEROL TARTRATE 15 MCG: 15 SOLUTION RESPIRATORY (INHALATION) at 07:02

## 2024-02-05 RX ADMIN — HEPARIN SODIUM 16 UNITS/KG/HR: 10000 INJECTION, SOLUTION INTRAVENOUS at 03:02

## 2024-02-05 RX ADMIN — HYDROCODONE BITARTRATE AND ACETAMINOPHEN 1 TABLET: 5; 325 TABLET ORAL at 07:02

## 2024-02-05 RX ADMIN — HYDROCODONE BITARTRATE AND ACETAMINOPHEN 1 TABLET: 5; 325 TABLET ORAL at 09:02

## 2024-02-05 RX ADMIN — ARFORMOTEROL TARTRATE 15 MCG: 15 SOLUTION RESPIRATORY (INHALATION) at 08:02

## 2024-02-05 RX ADMIN — MEROPENEM 2 G: 500 INJECTION INTRAVENOUS at 03:02

## 2024-02-05 RX ADMIN — BUDESONIDE 0.5 MG: 0.25 INHALANT RESPIRATORY (INHALATION) at 08:02

## 2024-02-05 RX ADMIN — ALPRAZOLAM 1 MG: 1 TABLET ORAL at 08:02

## 2024-02-05 RX ADMIN — PREGABALIN 75 MG: 75 CAPSULE ORAL at 08:02

## 2024-02-05 RX ADMIN — CEFEPIME 1 G: 1 INJECTION, POWDER, FOR SOLUTION INTRAMUSCULAR; INTRAVENOUS at 09:02

## 2024-02-05 NOTE — SUBJECTIVE & OBJECTIVE
Past Medical History:   Diagnosis Date    Anxiety disorder, unspecified     COPD (chronic obstructive pulmonary disease)     Hypertension     Thyroid disease        Past Surgical History:   Procedure Laterality Date    APPENDECTOMY      CATARACT EXTRACTION      NEPHROSTOMY      OPEN REDUCTION AND INTERNAL FIXATION (ORIF) OF INTERTROCHANTERIC FRACTURE OF FEMUR Right 11/21/2023    Procedure: ORIF, FRACTURE, FEMUR, INTERTROCHANTERIC;  Surgeon: Tanisha Guidry DO;  Location: [facility];  Service: Orthopedics;  Laterality: Right;  Gamma nail       Review of patient's allergies indicates:  No Known Allergies    Medications:  Medications Prior to Admission   Medication Sig    albuterol (ACCUNEB) 0.63 mg/3 mL Nebu Take 3 mLs (0.63 mg total) by nebulization 3 (three) times daily as needed (sob, wheezing). Rescue    albuterol (PROVENTIL/VENTOLIN HFA) 90 mcg/actuation inhaler Inhale 1-2 puffs into the lungs every 4 (four) hours as needed for Wheezing. Rescue    ALPRAZolam (XANAX) 0.5 MG tablet Take 2 tablets (1 mg total) by mouth 3 (three) times daily as needed for Anxiety.    cyclobenzaprine (FLEXERIL) 10 MG tablet Take 1 tablet (10 mg total) by mouth 3 (three) times daily as needed for Muscle spasms.    docusate sodium (COLACE) 100 MG capsule Take 100 mg by mouth 2 (two) times daily.    EScitalopram oxalate (LEXAPRO) 20 MG tablet Take 1 tablet (20 mg total) by mouth once daily.    fluticasone furoate-vilanteroL (BREO ELLIPTA) 100-25 mcg/dose diskus inhaler Inhale 1 puff into the lungs once daily. Controller    HYDROcodone-acetaminophen (NORCO) 7.5-325 mg per tablet Take 1 tablet by mouth every 6 (six) hours as needed for Pain (Max 4 doses/day).    [START ON 2/9/2024] HYDROcodone-acetaminophen (NORCO) 7.5-325 mg per tablet Take 1 tablet by mouth every 6 (six) hours as needed for Pain.    levothyroxine (SYNTHROID) 125 MCG tablet Take 1 tablet (125 mcg total) by mouth before breakfast.    naloxone (NARCAN) 4 mg/actuation Spry  1 spray once.    nicotine (NICODERM CQ) 14 mg/24 hr Place 1 patch onto the skin every 24 hours.    polyethylene glycol (GLYCOLAX) 17 gram PwPk Take 17 g by mouth daily as needed for Constipation.    pregabalin (LYRICA) 75 MG capsule Take 1 capsule (75 mg total) by mouth every evening.    vitamin D (VITAMIN D3) 1000 units Tab Take 25 mcg by mouth once daily.     Antibiotics (From admission, onward)      Start     Stop Route Frequency Ordered    24 1530  meropenem (MERREM) 2 g in sodium chloride 0.9% 100 mL IVPB         -- IV Every 12 hours (non-standard times) 24 1415          Antifungals (From admission, onward)      None          Antivirals (From admission, onward)      None             There is no immunization history for the selected administration types on file for this patient.    Family History       Problem Relation (Age of Onset)    Cancer Mother, Father, Maternal Grandfather          Social History     Socioeconomic History    Marital status:    Tobacco Use    Smoking status: Former     Current packs/day: 0.00     Types: Cigarettes     Quit date: 10/2023     Years since quittin.3     Passive exposure: Never    Smokeless tobacco: Never   Substance and Sexual Activity    Alcohol use: Never    Drug use: Never    Sexual activity: Not Currently     Partners: Female   Social History Narrative    Lives in [place] with daughter, Sera. Accompanied by Sera and wife Kailey. Long term smoking history. No longer smoking; uses a vape pen.     Social Determinants of Health     Financial Resource Strain: High Risk (11/10/2023)    Overall Financial Resource Strain (CARDIA)     Difficulty of Paying Living Expenses: Very hard   Food Insecurity: Food Insecurity Present (11/10/2023)    Hunger Vital Sign     Worried About Running Out of Food in the Last Year: Often true     Ran Out of Food in the Last Year: Often true   Transportation Needs: Unmet Transportation Needs (11/10/2023)    PRAPARE -  Transportation     Lack of Transportation (Medical): Yes     Lack of Transportation (Non-Medical): Yes   Physical Activity: Inactive (11/10/2023)    Exercise Vital Sign     Days of Exercise per Week: 0 days     Minutes of Exercise per Session: 0 min   Stress: Stress Concern Present (11/10/2023)    [employer] - Occupational Stress Questionnaire     Feeling of Stress : Rather much   Social Connections: Unknown (11/10/2023)    Social Connection and Isolation Panel [NHANES]     Frequency of Communication with Friends and Family: More than three times a week     Frequency of Social Gatherings with Friends and Family: Never     Attends Evangelical Services: Patient declined     Active Member of Clubs or Organizations: No     Attends Club or Organization Meetings: Never     Marital Status:    Housing Stability: High Risk (11/10/2023)    Housing Stability Vital Sign     Unable to Pay for Housing in the Last Year: Yes     Number of Places Lived in the Last Year: 3     Unstable Housing in the Last Year: No     Review of Systems   Constitutional:  Positive for activity change and appetite change.   Cardiovascular:  Positive for leg swelling.   Musculoskeletal:  Positive for myalgias.   All other systems reviewed and are negative.    Objective:     Vital Signs (Most Recent):  Temp: 97.4 °F (36.3 °C) (02/05/24 1254)  Pulse: 107 (02/05/24 1313)  Resp: 20 (02/05/24 1254)  BP: (!) 99/56 (02/05/24 1254)  SpO2: (!) 93 % (02/05/24 1254) Vital Signs (24h Range):  Temp:  [97.4 °F (36.3 °C)-98.6 °F (37 °C)] 97.4 °F (36.3 °C)  Pulse:  [] 107  Resp:  [16-20] 20  SpO2:  [93 %-98 %] 93 %  BP: ()/(51-59) 99/56     Weight: 70.3 kg (155 lb)  Body mass index is 23.57 kg/m².    Estimated Creatinine Clearance: 37.5 mL/min (A) (based on SCr of 1.8 mg/dL (H)).     Physical Exam  Constitutional:       General: He is not in acute distress.     Appearance: Normal appearance. He is not ill-appearing.    Cardiovascular:      Rate and Rhythm: Normal rate and regular rhythm.      Pulses: Normal pulses.      Heart sounds: Normal heart sounds. No murmur heard.     No friction rub. No gallop.   Pulmonary:      Effort: Pulmonary effort is normal. No respiratory distress.      Breath sounds: Normal breath sounds.   Abdominal:      General: Abdomen is flat. Bowel sounds are normal. There is no distension.      Palpations: Abdomen is soft.      Tenderness: There is no abdominal tenderness.   Genitourinary:     Comments: Bilateral PCN in place  Skin:     General: Skin is warm and dry.      Findings: Erythema (mild on left LE; swelling decreased) present.   Neurological:      Mental Status: He is alert.          Significant Labs: Blood Culture:   Recent Labs   Lab 11/10/23  1859 12/22/23  0922 01/12/24  1129 01/12/24  1139 02/02/24  1859   LABBLOO No growth after 5 days.  No growth after 5 days. No growth after 5 days. No growth after 5 days. No growth after 5 days. No Growth to date  No Growth to date  No Growth to date  No Growth to date  No Growth to date  No Growth to date     CBC:   Recent Labs   Lab 02/04/24  0604 02/04/24  0605 02/05/24  0534   WBC 6.36 6.81 6.28   HGB 8.4* 8.6* 8.6*   HCT 26.6* 27.5* 27.6*    238 234     CMP:   Recent Labs   Lab 02/04/24  0605 02/05/24  0534    138   K 4.0 4.2    105   CO2 21* 19*    96   BUN 28* 26*   CREATININE 1.7* 1.8*   CALCIUM 9.3 8.4*   PROT 7.1 6.2   ALBUMIN 2.7* 2.5*   BILITOT 0.4 0.4   ALKPHOS 110 132   AST 14 25   ALT 16 28   ANIONGAP 13 14     Microbiology Results (last 7 days)       Procedure Component Value Units Date/Time    Urine culture [[id number]]  (Abnormal)  (Susceptibility) Collected: 02/02/24 2206    Order Status: Completed Specimen: Urine Updated: 02/05/24 1112     Urine Culture, Routine MORGANELLA MORGANII  >100,000 cfu/ml      Narrative:      Specimen Source->Urine    Blood culture x two cultures. Draw prior to antibiotics.  [[id number]] Collected: 02/02/24 1859    Order Status: Completed Specimen: Blood from Peripheral, Hand, Right Updated: 02/05/24 0612     Blood Culture, Routine No Growth to date      No Growth to date      No Growth to date    Narrative:      Aerobic and anaerobic    Blood culture x two cultures. Draw prior to antibiotics. [[id number]] Collected: 02/02/24 1859    Order Status: Completed Specimen: Blood from Peripheral, Antecubital, Right Updated: 02/05/24 0612     Blood Culture, Routine No Growth to date      No Growth to date      No Growth to date    Narrative:      Aerobic and anaerobic          Urine Culture:   Recent Labs   Lab 10/20/23  2224 11/11/23  0431 12/21/23  0829 01/12/24  1155 02/02/24 2206   LABURIN ENTEROBACTER CLOACAE  > 100,000 cfu/ml  * ENTEROCOCCUS FAECIUM VRE  >100,000 cfu/ml  No other significant isolate  * Multiple organisms isolated. None in predominance.  Repeat if  clinically necessary. PSEUDOMONAS AERUGINOSA  >100,000 cfu/ml  * MORGANELLA MORGANII  >100,000 cfu/ml  *     Urine Studies:   Recent Labs   Lab 10/20/23  2224 11/11/23  0431 02/02/24 2206   COLORU Yellow   < > Yellow   APPEARANCEUA Hazy*   < > Hazy*   PHUR 6.0   < > 6.0   SPECGRAV 1.020   < > 1.015   PROTEINUA 1+*   < > 1+*   GLUCUA Negative   < > Negative   KETONESU Negative   < > Negative   BILIRUBINUA Negative   < > Negative   OCCULTUA 2+*   < > 2+*   NITRITE Negative   < > Negative   UROBILINOGEN Negative   < > Negative   LEUKOCYTESUR 3+*   < > 3+*   RBCUA 24*   < > 17*   WBCUA >100*   < > >100*   BACTERIA Rare   < > Occasional   SQUAMEPITHEL 0  --   --    HYALINECASTS 2*   < > 4*    < > = values in this interval not displayed.     All pertinent labs within the past 24 hours have been reviewed.    Significant Imaging: I have reviewed all pertinent imaging results/findings within the past 24 hours.

## 2024-02-05 NOTE — PLAN OF CARE
Discussed poc with pt, pt verbalized understanding  Purposeful rounding every 2hours  VS wnl  Cardiac monitoring in use, pt is PHILLIP, tele monitor #[id number]  Blood glucose monitoring   Fall precautions in place, remains injury free  Pain and nausea under control with PRN meds  IVFs  Accurate I&Os  Abx given as prescribed  Bed locked at lowest position  Call light within reach  Chart check complete  Will cont with POC    Problem: Adult Inpatient Plan of Care  Goal: Plan of Care Review  Outcome: Ongoing, Progressing  Goal: Patient-Specific Goal (Individualized)  Outcome: Ongoing, Progressing  Goal: Absence of Hospital-Acquired Illness or Injury  Outcome: Ongoing, Progressing  Goal: Optimal Comfort and Wellbeing  Outcome: Ongoing, Progressing  Goal: Readiness for Transition of Care  Outcome: Ongoing, Progressing     Problem: Adjustment to Illness (Sepsis/Septic Shock)  Goal: Optimal Coping  Outcome: Ongoing, Progressing     Problem: Bleeding (Sepsis/Septic Shock)  Goal: Absence of Bleeding  Outcome: Ongoing, Progressing     Problem: Glycemic Control Impaired (Sepsis/Septic Shock)  Goal: Blood Glucose Level Within Desired Range  Outcome: Ongoing, Progressing     Problem: Infection Progression (Sepsis/Septic Shock)  Goal: Absence of Infection Signs and Symptoms  Outcome: Ongoing, Progressing     Problem: Nutrition Impaired (Sepsis/Septic Shock)  Goal: Optimal Nutrition Intake  Outcome: Ongoing, Progressing     Problem: Fluid and Electrolyte Imbalance (Acute Kidney Injury/Impairment)  Goal: Fluid and Electrolyte Balance  Outcome: Ongoing, Progressing     Problem: Oral Intake Inadequate (Acute Kidney Injury/Impairment)  Goal: Optimal Nutrition Intake  Outcome: Ongoing, Progressing     Problem: Renal Function Impairment (Acute Kidney Injury/Impairment)  Goal: Effective Renal Function  Outcome: Ongoing, Progressing     Problem: Impaired Wound Healing  Goal: Optimal Wound Healing  Outcome: Ongoing, Progressing     Problem:  Infection  Goal: Absence of Infection Signs and Symptoms  Outcome: Ongoing, Progressing

## 2024-02-05 NOTE — ASSESSMENT & PLAN NOTE
Patient with acute kidney injury/acute renal failure likely due to post-obstructive d/t dislodged nephrostomy tube  BANDAR is currently  improving . Baseline creatinine  1.4  - Labs reviewed- Renal function/electrolytes with Estimated Creatinine Clearance: 37.5 mL/min (A) (based on SCr of 1.8 mg/dL (H)). according to latest data. Monitor urine output and serial BMP and adjust therapy as needed. Avoid nephrotoxins and renally dose meds for GFR listed above.

## 2024-02-05 NOTE — CONSULTS
Addendum:    After further monitoring of the patient, he exhibits improvement of symptoms which include improved skin tone, improved ROM, decreased pain.  Recommend continued anticoagulation with no intervention at this time.  Recommend outpatient DVT after antibiotics have been completed. Reconsult PRN.        Chart reviewed by Dr. Gifford.       Thank you for the consult.

## 2024-02-05 NOTE — CONSULTS
Inpatient consult to Interventional Radiology  Consult performed by: Cortez Meredith PA-C  Consult ordered by: Juan Carlos Parra MD        Chart reviewed by Dr. Cedeño.       ASSESSMENT/PLAN:    Displaced nephrostomy tube    The order for a Nephrostomy tube replacement has been placed and the procedure will be performed asap.    This procedure has been completed.  Please reconsult IR if any further intervention is required.            Thank you for the consult.

## 2024-02-05 NOTE — CONSULTS
[facility]  Infectious Disease  Consult Note    Patient Name: Geovani Currie  MRN: [id number]  Admission Date: 2/2/2024  Hospital Length of Stay: 2 days  Attending Physician: Humza Robertson MD  Primary Care Provider: Faizan Antoine MD     Isolation Status: Contact    Patient information was obtained from patient, ER records, and primary team.      Inpatient consult to Infectious Diseases  Consult performed by: Jeremiah Hartman DO  Consult ordered by: Jp Norwood NP        Assessment/Plan:     Renal/  Acute cystitis  69 yo M with history of SCC dx by RUL nodule biopsy 10/18/23, also with satellite lesion seen on CT Chest with size increase to 7mm 9/2023 and muscle invasive bladder cancer s/p TMT in 2020 now with recurrence in the RP nodes and possible metastasis to the lungs vs primary lung CA who presented with displaced nephrostomy. Underwent bilateral PCN exchange on 2/3. U/A on admission with >100 WBC. Urine culture has grown MDR Morganella. Has been on ceftriaxone. Patient afebrile with no leukocytosis.     Recommendations:  --Due to discordance between cefepime and ceftriaxone, will cautiously treat using IV meropenem renally dosed by pharmacy   --Plan for total 14 day course given presence of nephrostomy tubes  --Needs close urology follow up  --Will schedule ID clinic follow up  --Above d/w primary team    Outpatient Antibiotic Therapy Plan:    1) Infection: MDR Morganella cystitis; b/l nephrostomy tubes present     2) Discharge Antibiotics:    Intravenous antibiotics:  IV meropenem renally dosed by pharmacy     3) Therapy Duration:  14 days     Estimated end date of IV antibiotics: 2/19/24    4) Outpatient Weekly Labs:    Order the following labs to be drawn on Mondays:   CBC  CMP     5) Fax Lab Results to Infectious Diseases Provider: Dr. [facility] Fax Number: [phone]       Nephrostomy tube displaced  Underwent bilateral PCN exchange with IR Dr. Cedeño on  02/03. See UTI.     Oncology  Malignant neoplasm of urinary bladder  Needs close urology/oncology follow up     Squamous cell carcinoma of right lung  Needs close oncology follow up       Thank you for your consult. I will sign off. Please contact us if you have any additional questions.    Jeremiah Hartman, DO  Infectious Disease  [facility]    Subjective:     Principal Problem: Acute deep vein thrombosis (DVT) of left lower extremity    HPI: 68 y.o. male with pertinent PMHx of bladder and lung cancer who has been seen by ID outpatient for a number of months most recently admitted 1/12-1/15 for weakness and hypotension. Summary as follows: 01/13-Patient reported falling overnight, assessed patient, abrasion to left flank inferior to nephrostomy tube, scabbed, scant blood to bed linen. nephrostomy tube in place. Denies pain to left hip pain, FROM on exam. Significant stool burden on CT. Urology evaluated, recommend to continue IV antibiotics, no acute intervention recommended. Erythematous area around Right Nephrostomy tube, improved. Pt was started on aggressive bowel regimen and was able to have multiple bowel movements prior to discharge. Urine culture returned with pan sensitive Pseudomonas, abx adjusted from Dapto + Cefepime to PO Ciprofloxacin based on culture. Cr stable at 1.4. Pt seen and examined on day of discharge and discharged home with HH in stable condition per my exam. Will continue bowel regimen, abx as above, followup with PCP within 1 week. Follow up with ID as prev scheduled. Patient completed outpatient XRT to the lung 1/24/24 for squamous cell carcinoma of right lung. Per last heme/onc visit 1/12 plan is to evaluate for pembrolizumab based on functional status to treat bladder cancer. Next visit with them is 2/2. Patient completed 7 day course of cipro. Per last ID clinic visit patient and family report he has been eating tremendously well and is regaining his strength every day. Has  done really well since radiation treatment. Positive regarding candidacy for chemotherapy. Has not had any complications with PCN since discharge. Should be receiving nighttime bags shortly but so far no notable urine leak when waking up. Tolerating ciprofloxacin without incident. No symptoms indicative of UTI at this time. Discussed vitamin C and cranberry supplement daily. Voiced understanding.      Now admitted due to worsening left lower extremity pain and swelling as well as concerns of nephrostomy tube dislodgement. Underwent bilateral PCN exchange with IR Dr. Cedeño on 02/03. Per IR, tentative plan for thrombectomy on 02/05. Remains on heparin drip for anticoagulation. Urine culture has grown MDR Morganella. ID consulted for UTI management.     Past Medical History:   Diagnosis Date    Anxiety disorder, unspecified     COPD (chronic obstructive pulmonary disease)     Hypertension     Thyroid disease        Past Surgical History:   Procedure Laterality Date    APPENDECTOMY      CATARACT EXTRACTION      NEPHROSTOMY      OPEN REDUCTION AND INTERNAL FIXATION (ORIF) OF INTERTROCHANTERIC FRACTURE OF FEMUR Right 11/21/2023    Procedure: ORIF, FRACTURE, FEMUR, INTERTROCHANTERIC;  Surgeon: Tanisha Guidry DO;  Location: [facility];  Service: Orthopedics;  Laterality: Right;  Gamma nail       Review of patient's allergies indicates:  No Known Allergies    Medications:  Medications Prior to Admission   Medication Sig    albuterol (ACCUNEB) 0.63 mg/3 mL Nebu Take 3 mLs (0.63 mg total) by nebulization 3 (three) times daily as needed (sob, wheezing). Rescue    albuterol (PROVENTIL/VENTOLIN HFA) 90 mcg/actuation inhaler Inhale 1-2 puffs into the lungs every 4 (four) hours as needed for Wheezing. Rescue    ALPRAZolam (XANAX) 0.5 MG tablet Take 2 tablets (1 mg total) by mouth 3 (three) times daily as needed for Anxiety.    cyclobenzaprine (FLEXERIL) 10 MG tablet Take 1 tablet (10 mg total) by mouth 3 (three) times daily as  needed for Muscle spasms.    docusate sodium (COLACE) 100 MG capsule Take 100 mg by mouth 2 (two) times daily.    EScitalopram oxalate (LEXAPRO) 20 MG tablet Take 1 tablet (20 mg total) by mouth once daily.    fluticasone furoate-vilanteroL (BREO ELLIPTA) 100-25 mcg/dose diskus inhaler Inhale 1 puff into the lungs once daily. Controller    HYDROcodone-acetaminophen (NORCO) 7.5-325 mg per tablet Take 1 tablet by mouth every 6 (six) hours as needed for Pain (Max 4 doses/day).    [START ON 2024] HYDROcodone-acetaminophen (NORCO) 7.5-325 mg per tablet Take 1 tablet by mouth every 6 (six) hours as needed for Pain.    levothyroxine (SYNTHROID) 125 MCG tablet Take 1 tablet (125 mcg total) by mouth before breakfast.    naloxone (NARCAN) 4 mg/actuation Spry 1 spray once.    nicotine (NICODERM CQ) 14 mg/24 hr Place 1 patch onto the skin every 24 hours.    polyethylene glycol (GLYCOLAX) 17 gram PwPk Take 17 g by mouth daily as needed for Constipation.    pregabalin (LYRICA) 75 MG capsule Take 1 capsule (75 mg total) by mouth every evening.    vitamin D (VITAMIN D3) 1000 units Tab Take 25 mcg by mouth once daily.     Antibiotics (From admission, onward)      Start     Stop Route Frequency Ordered    24 1530  meropenem (MERREM) 2 g in sodium chloride 0.9% 100 mL IVPB         -- IV Every 12 hours (non-standard times) 24 1415          Antifungals (From admission, onward)      None          Antivirals (From admission, onward)      None             There is no immunization history for the selected administration types on file for this patient.    Family History       Problem Relation (Age of Onset)    Cancer Mother, Father, Maternal Grandfather          Social History     Socioeconomic History    Marital status:    Tobacco Use    Smoking status: Former     Current packs/day: 0.00     Types: Cigarettes     Quit date: 10/2023     Years since quittin.3     Passive exposure: Never    Smokeless tobacco: Never    Substance and Sexual Activity    Alcohol use: Never    Drug use: Never    Sexual activity: Not Currently     Partners: Female   Social History Narrative    Lives in [place] with daughter, Sera. Accompanied by Sera and wife Kailey. Long term smoking history. No longer smoking; uses a vape pen.     Social Determinants of Health     Financial Resource Strain: High Risk (11/10/2023)    Overall Financial Resource Strain (CARDIA)     Difficulty of Paying Living Expenses: Very hard   Food Insecurity: Food Insecurity Present (11/10/2023)    Hunger Vital Sign     Worried About Running Out of Food in the Last Year: Often true     Ran Out of Food in the Last Year: Often true   Transportation Needs: Unmet Transportation Needs (11/10/2023)    PRAPARE - Transportation     Lack of Transportation (Medical): Yes     Lack of Transportation (Non-Medical): Yes   Physical Activity: Inactive (11/10/2023)    Exercise Vital Sign     Days of Exercise per Week: 0 days     Minutes of Exercise per Session: 0 min   Stress: Stress Concern Present (11/10/2023)    [employer] - Occupational Stress Questionnaire     Feeling of Stress : Rather much   Social Connections: Unknown (11/10/2023)    Social Connection and Isolation Panel [NHANES]     Frequency of Communication with Friends and Family: More than three times a week     Frequency of Social Gatherings with Friends and Family: Never     Attends Synagogue Services: Patient declined     Active Member of Clubs or Organizations: No     Attends Club or Organization Meetings: Never     Marital Status:    Housing Stability: High Risk (11/10/2023)    Housing Stability Vital Sign     Unable to Pay for Housing in the Last Year: Yes     Number of Places Lived in the Last Year: 3     Unstable Housing in the Last Year: No     Review of Systems   Constitutional:  Positive for activity change and appetite change.   Cardiovascular:  Positive for leg swelling.    Musculoskeletal:  Positive for myalgias.   All other systems reviewed and are negative.    Objective:     Vital Signs (Most Recent):  Temp: 97.4 °F (36.3 °C) (02/05/24 1254)  Pulse: 107 (02/05/24 1313)  Resp: 20 (02/05/24 1254)  BP: (!) 99/56 (02/05/24 1254)  SpO2: (!) 93 % (02/05/24 1254) Vital Signs (24h Range):  Temp:  [97.4 °F (36.3 °C)-98.6 °F (37 °C)] 97.4 °F (36.3 °C)  Pulse:  [] 107  Resp:  [16-20] 20  SpO2:  [93 %-98 %] 93 %  BP: ()/(51-59) 99/56     Weight: 70.3 kg (155 lb)  Body mass index is 23.57 kg/m².    Estimated Creatinine Clearance: 37.5 mL/min (A) (based on SCr of 1.8 mg/dL (H)).     Physical Exam  Constitutional:       General: He is not in acute distress.     Appearance: Normal appearance. He is not ill-appearing.   Cardiovascular:      Rate and Rhythm: Normal rate and regular rhythm.      Pulses: Normal pulses.      Heart sounds: Normal heart sounds. No murmur heard.     No friction rub. No gallop.   Pulmonary:      Effort: Pulmonary effort is normal. No respiratory distress.      Breath sounds: Normal breath sounds.   Abdominal:      General: Abdomen is flat. Bowel sounds are normal. There is no distension.      Palpations: Abdomen is soft.      Tenderness: There is no abdominal tenderness.   Genitourinary:     Comments: Bilateral PCN in place  Skin:     General: Skin is warm and dry.      Findings: Erythema (mild on left LE; swelling decreased) present.   Neurological:      Mental Status: He is alert.          Significant Labs: Blood Culture:   Recent Labs   Lab 11/10/23  1859 12/22/23  0922 01/12/24  1129 01/12/24  1139 02/02/24  1859   LABBLOO No growth after 5 days.  No growth after 5 days. No growth after 5 days. No growth after 5 days. No growth after 5 days. No Growth to date  No Growth to date  No Growth to date  No Growth to date  No Growth to date  No Growth to date     CBC:   Recent Labs   Lab 02/04/24  0604 02/04/24  0605 02/05/24  0534   WBC 6.36 6.81 6.28    HGB 8.4* 8.6* 8.6*   HCT 26.6* 27.5* 27.6*    238 234     CMP:   Recent Labs   Lab 02/04/24  0605 02/05/24  0534    138   K 4.0 4.2    105   CO2 21* 19*    96   BUN 28* 26*   CREATININE 1.7* 1.8*   CALCIUM 9.3 8.4*   PROT 7.1 6.2   ALBUMIN 2.7* 2.5*   BILITOT 0.4 0.4   ALKPHOS 110 132   AST 14 25   ALT 16 28   ANIONGAP 13 14     Microbiology Results (last 7 days)       Procedure Component Value Units Date/Time    Urine culture [[id number]]  (Abnormal)  (Susceptibility) Collected: 02/02/24 2206    Order Status: Completed Specimen: Urine Updated: 02/05/24 1112     Urine Culture, Routine MORGANELLA MORGANII  >100,000 cfu/ml      Narrative:      Specimen Source->Urine    Blood culture x two cultures. Draw prior to antibiotics. [[id number]] Collected: 02/02/24 1859    Order Status: Completed Specimen: Blood from Peripheral, Hand, Right Updated: 02/05/24 0612     Blood Culture, Routine No Growth to date      No Growth to date      No Growth to date    Narrative:      Aerobic and anaerobic    Blood culture x two cultures. Draw prior to antibiotics. [[id number]] Collected: 02/02/24 1859    Order Status: Completed Specimen: Blood from Peripheral, Antecubital, Right Updated: 02/05/24 0612     Blood Culture, Routine No Growth to date      No Growth to date      No Growth to date    Narrative:      Aerobic and anaerobic          Urine Culture:   Recent Labs   Lab 10/20/23  2224 11/11/23  0431 12/21/23  0829 01/12/24  1155 02/02/24 2206   LABURIN ENTEROBACTER CLOACAE  > 100,000 cfu/ml  * ENTEROCOCCUS FAECIUM VRE  >100,000 cfu/ml  No other significant isolate  * Multiple organisms isolated. None in predominance.  Repeat if  clinically necessary. PSEUDOMONAS AERUGINOSA  >100,000 cfu/ml  * MORGANELLA MORGANII  >100,000 cfu/ml  *     Urine Studies:   Recent Labs   Lab 10/20/23  2224 11/11/23  0431 02/02/24 2206   COLORU Yellow   < > Yellow   APPEARANCEUA Hazy*   < > Hazy*   PHUR 6.0   < > 6.0    SPECGRAV 1.020   < > 1.015   PROTEINUA 1+*   < > 1+*   GLUCUA Negative   < > Negative   KETONESU Negative   < > Negative   BILIRUBINUA Negative   < > Negative   OCCULTUA 2+*   < > 2+*   NITRITE Negative   < > Negative   UROBILINOGEN Negative   < > Negative   LEUKOCYTESUR 3+*   < > 3+*   RBCUA 24*   < > 17*   WBCUA >100*   < > >100*   BACTERIA Rare   < > Occasional   SQUAMEPITHEL 0  --   --    HYALINECASTS 2*   < > 4*    < > = values in this interval not displayed.     All pertinent labs within the past 24 hours have been reviewed.    Significant Imaging: I have reviewed all pertinent imaging results/findings within the past 24 hours.

## 2024-02-05 NOTE — NURSING
Patient has had a positive urine culture which grew VRE.   Please adhere to contact precautions in addition to standard precautions until removing contact isolation is approved by Dr. Baumgarten.     Strictly adhere to contact precautions by:   1. Donning and doffing of PPE (gown and gloves) upon entering and exiting the patient care environment.   2. Use of dedicated equipment for this patient, when possible  3. Following standard hand hygiene practices  4. Enhanced environmental surface cleaning

## 2024-02-05 NOTE — PROGRESS NOTES
"[facility] Medicine  Progress Note    Patient Name: Geovani Currie  MRN: [id number]  Patient Class: IP- Inpatient   Admission Date: 2/2/2024  Length of Stay: 2 days  Attending Physician: Humza Robertson MD  Primary Care Provider: Faizan Antoine MD    Subjective:     Principal Problem:Acute deep vein thrombosis (DVT) of left lower extremity        HPI:  Geovani Currie is a 68 y.o. male with a PMH  has a past medical history of Anxiety disorder, unspecified, COPD (chronic obstructive pulmonary disease), Hypertension, and Thyroid disease. who presented to the ED accompanied by his daughter for further evaluation of worsening left lower extremity pain and swelling as well as concerns of nephrostomy tube dislodgement.  Patient's leg was noted to be swollen and was complaining of increased swelling/tightness/cramping with intermittent burning/stinging sensations beginning around 11:00 a.m. and progressively worsened throughout the day.  Patient reported negative history of PE/DVTs not currently on any blood thinners/anticoagulants.  Patient reported no known alleviating factors with aggravating factors including weight-bearing, movement, and palpation to the affected area.  Patient is on also reported mild confusion and was reported to be saying "weird things".  Patient currently denies endorsing any lightheadedness, dizziness, headache, visual changes, fever, chills, sweats, nausea, vomiting, chest pain, shortness of breath, abdominal pain, flank pain, dysuria, hematuria, melena, hematochezia, diarrhea, or onset neurological deficits.  Prior to onset of symptoms, patient reported being in his usual state of health with no other concerns or complaints.  All other review of systems negative except as noted above.  Of note, patient recently underwent hip arthroplasty and continues to do physical rehab and ambulates via aid of walker.  Initial workup in the ED revealed patient had extensive occlusive " clot throughout his left lower extremity and was initiated on heparin.  Patient UA also concerning for residual UTI following completion of ciprofloxacin last month and was provided cefepime by ED staff.  Patient admitted to Hospital Medicine inpatient for continued medical management with IR consult pending.    PCP: Faizan Antoine    Overview/Hospital Course:  Pt started on IV Cefepime + Zyvox for UTI pending urine culture. IR was consulted to eval for PCN revision and LLE thrombectomy. Underwent bilateral PCN exchange with IR Dr. Cedeño on 02/03. Per IR, tentative plan for thrombectomy on 02/05. Remains on heparin drip for anticoagulation.     2/5/24  Thrombectomy deferred today. Seen by IR today who plans to perform thrombectomy 2/7/2024. Continue heparin infusion. Urine grew Morganella sensitive to Meropenem. Discussed with Infectious Disease who recommends IV management x 14 days.     Interval History: family members reports improved swelling to lower extremities.     Review of Systems   Constitutional:  Positive for activity change and appetite change.   Cardiovascular:  Positive for leg swelling.   Musculoskeletal:  Positive for myalgias.   All other systems reviewed and are negative.    Objective:     Vital Signs (Most Recent):  Temp: 98.1 °F (36.7 °C) (02/05/24 1549)  Pulse: 102 (02/05/24 1549)  Resp: 20 (02/05/24 1549)  BP: (!) 104/55 (02/05/24 1549)  SpO2: 96 % (02/05/24 1549) Vital Signs (24h Range):  Temp:  [97.4 °F (36.3 °C)-98.6 °F (37 °C)] 98.1 °F (36.7 °C)  Pulse:  [] 102  Resp:  [16-20] 20  SpO2:  [93 %-98 %] 96 %  BP: ()/(51-59) 104/55     Weight: 70.3 kg (155 lb)  Body mass index is 23.57 kg/m².    Intake/Output Summary (Last 24 hours) at 2/5/2024 1640  Last data filed at 2/5/2024 1313  Gross per 24 hour   Intake --   Output 1185 ml   Net -1185 ml         Physical Exam  Constitutional:       General: He is not in acute distress.     Appearance: Normal appearance. He is not  ill-appearing.   Cardiovascular:      Rate and Rhythm: Normal rate and regular rhythm.      Pulses: Normal pulses.      Heart sounds: Normal heart sounds. No murmur heard.     No friction rub. No gallop.   Pulmonary:      Effort: Pulmonary effort is normal. No respiratory distress.      Breath sounds: Normal breath sounds.   Abdominal:      General: Abdomen is flat. Bowel sounds are normal. There is no distension.      Palpations: Abdomen is soft.      Tenderness: There is no abdominal tenderness.   Genitourinary:     Comments: Bilateral PCN in place  Skin:     General: Skin is warm and dry.      Findings: Erythema (mild on left LE; swelling decreased) present.   Neurological:      Mental Status: He is alert.           Significant Labs: All pertinent labs within the past 24 hours have been reviewed.  CBC:   Recent Labs   Lab 02/04/24  0604 02/04/24  0605 02/05/24  0534   WBC 6.36 6.81 6.28   HGB 8.4* 8.6* 8.6*   HCT 26.6* 27.5* 27.6*    238 234     CMP:   Recent Labs   Lab 02/04/24  0605 02/05/24  0534    138   K 4.0 4.2    105   CO2 21* 19*    96   BUN 28* 26*   CREATININE 1.7* 1.8*   CALCIUM 9.3 8.4*   PROT 7.1 6.2   ALBUMIN 2.7* 2.5*   BILITOT 0.4 0.4   ALKPHOS 110 132   AST 14 25   ALT 16 28   ANIONGAP 13 14       Significant Imaging: I have reviewed all pertinent imaging results/findings within the past 24 hours.    Assessment/Plan:      * Acute deep vein thrombosis (DVT) of left lower extremity  - IR consulted  - discussed with Dr. Cedeño- tentative plan for thrombectomy on 02/05, Dr. Rob on call   - continue heparin drip; monitor PTT per protocol   - continue neurovascular checks of LLE     2/5/24  --Continue IV Heparin infusion  --Plans for thrombectomy 2/7/24  --continue empiric abx for now      Acute cystitis  - Prelim ucx with GNR  - Will stop Zyvox, continue IV Cefepime pending final culture   - followed by ID as outpatient     2/6/24  --urine culture grew morganella  morganii sensitive to Meropenem. Will plan for 14 days course of management.      Acute renal failure superimposed on stage 3 chronic kidney disease  Patient with acute kidney injury/acute renal failure likely due to post-obstructive d/t dislodged nephrostomy tube  BANDAR is currently  improving . Baseline creatinine  1.4  - Labs reviewed- Renal function/electrolytes with Estimated Creatinine Clearance: 37.5 mL/min (A) (based on SCr of 1.8 mg/dL (H)). according to latest data. Monitor urine output and serial BMP and adjust therapy as needed. Avoid nephrotoxins and renally dose meds for GFR listed above.      Anxiety  Chronic. Stable   -Continue home medications       Acute deep vein thrombosis (DVT) of femoral vein of left lower extremity  Patient presented with acute onset and rapidly progressive left lower extremity pain and swelling x1 day duration and was found to have extensive occlusive DVT on venous doppler. Patient initiated on heparin drip. IR consulted and awaiting further evaluation/recommendations regarding potential thrombectomy.  Plan:  -NPO  -continue heparin drip  -optimize pain control  -bowel regimen  -f/u IR      Hypothyroidism  Patient has chronic hypothyroidism. TFTs reviewed-   Lab Results   Component Value Date    TSH 1.034 02/02/2024   Plan:  -Will continue chronic levothyroxine and adjust for and clinical changes      Malignant neoplasm of urinary bladder  - followed by urology as outpatient   - s/p PCN exchange as above     Nephrostomy tube displaced  Urology consulted  Underwent exchange of b/l PCN tubes on 02/03 with IR Dr. Cedeño   Monitor UOP       Centrilobular emphysema  Patient's COPD is controlled currently.  Patient is currently off COPD Pathway. Continue scheduled inhalers Supplemental oxygen and monitor respiratory status closely.       Squamous cell carcinoma of right lung  Followed by urology and hem/onc outpatient.  Plan:  -f/u outpatient as directed       Anemia  Appears near  baseline without evidence of active bleeding noted.   Recent Labs   Lab 01/30/24  1433 02/02/24  1010 02/04/24  0604 02/04/24  0605 02/05/24  0534   HGB 10.2*   < > 8.4* 8.6* 8.6*   HCT 32.3*   < > 26.6* 27.5* 27.6*   MCV 89   < > 88 88 90   MCHC 31.6*   < > 31.6* 31.3* 31.2*   RDW 18.1*   < > 17.8* 18.0* 17.7*      < > 236 238 234   IRON 21*  --   --   --   --    TIBC 223*  --   --   --   --    FERRITIN 436*  --   --   --   --    [id number]  --   --   --   --     < > = values in this interval not displayed.     Plan:  -Monitor H/H and plts  -Type and screen, transfuse as needed            VTE Risk Mitigation (From admission, onward)           Ordered     Reason for No Pharmacological VTE Prophylaxis  Once        Question:  Reasons:  Answer:  Physician Provided (leave comment)  Comment:  currently on heparin drip    02/03/24 0756     IP VTE HIGH RISK PATIENT  Once         02/03/24 0756     Place sequential compression device  Until discontinued         02/03/24 0756     heparin 25,000 units in dextrose 5% (100 units/ml) IV bolus from bag - ADDITIONAL PRN BOLUS - 60 units/kg  As needed (PRN)        Question:  Heparin Infusion Adjustment (DO NOT MODIFY ANSWER)  Answer:  \\[email or web address]\epic\Images\Pharmacy\HeparinInfusions\heparin HIGH INTENSITY nomogram for [facility] [id number].pdf    02/02/24 2232     heparin 25,000 units in dextrose 5% (100 units/ml) IV bolus from bag - ADDITIONAL PRN BOLUS - 30 units/kg  As needed (PRN)        Question:  Heparin Infusion Adjustment (DO NOT MODIFY ANSWER)  Answer:  \\[facility]\epic\Images\Pharmacy\HeparinInfusions\heparin HIGH INTENSITY nomogram for [facility] [id number].pdf    02/02/24 2232     heparin 25,000 units in dextrose 5% 250 mL (100 units/mL) infusion HIGH INTENSITY nomogram - [facility]  Continuous        Question:  Begin at (units/kg/hr)  Answer:  18 02/02/24 2232                    Discharge Planning   TAINA:      Code Status: Full Code   Is the patient medically ready for discharge?:  No    Reason for patient still in hospital (select all that apply): Treatment  Discharge Plan A: Home Health, Home with family          Jp Norwood NP  Department of Hospital Medicine   '[facility]

## 2024-02-05 NOTE — SUBJECTIVE & OBJECTIVE
Interval History: family members reports improved swelling to lower extremities.     Review of Systems   Constitutional:  Positive for activity change and appetite change.   Cardiovascular:  Positive for leg swelling.   Musculoskeletal:  Positive for myalgias.   All other systems reviewed and are negative.    Objective:     Vital Signs (Most Recent):  Temp: 98.1 °F (36.7 °C) (02/05/24 1549)  Pulse: 102 (02/05/24 1549)  Resp: 20 (02/05/24 1549)  BP: (!) 104/55 (02/05/24 1549)  SpO2: 96 % (02/05/24 1549) Vital Signs (24h Range):  Temp:  [97.4 °F (36.3 °C)-98.6 °F (37 °C)] 98.1 °F (36.7 °C)  Pulse:  [] 102  Resp:  [16-20] 20  SpO2:  [93 %-98 %] 96 %  BP: ()/(51-59) 104/55     Weight: 70.3 kg (155 lb)  Body mass index is 23.57 kg/m².    Intake/Output Summary (Last 24 hours) at 2/5/2024 1640  Last data filed at 2/5/2024 1313  Gross per 24 hour   Intake --   Output 1185 ml   Net -1185 ml         Physical Exam  Constitutional:       General: He is not in acute distress.     Appearance: Normal appearance. He is not ill-appearing.   Cardiovascular:      Rate and Rhythm: Normal rate and regular rhythm.      Pulses: Normal pulses.      Heart sounds: Normal heart sounds. No murmur heard.     No friction rub. No gallop.   Pulmonary:      Effort: Pulmonary effort is normal. No respiratory distress.      Breath sounds: Normal breath sounds.   Abdominal:      General: Abdomen is flat. Bowel sounds are normal. There is no distension.      Palpations: Abdomen is soft.      Tenderness: There is no abdominal tenderness.   Genitourinary:     Comments: Bilateral PCN in place  Skin:     General: Skin is warm and dry.      Findings: Erythema (mild on left LE; swelling decreased) present.   Neurological:      Mental Status: He is alert.           Significant Labs: All pertinent labs within the past 24 hours have been reviewed.  CBC:   Recent Labs   Lab 02/04/24  0604 02/04/24  0605 02/05/24  0534   WBC 6.36 6.81 6.28   HGB 8.4*  8.6* 8.6*   HCT 26.6* 27.5* 27.6*    238 234     CMP:   Recent Labs   Lab 02/04/24  0605 02/05/24  0534    138   K 4.0 4.2    105   CO2 21* 19*    96   BUN 28* 26*   CREATININE 1.7* 1.8*   CALCIUM 9.3 8.4*   PROT 7.1 6.2   ALBUMIN 2.7* 2.5*   BILITOT 0.4 0.4   ALKPHOS 110 132   AST 14 25   ALT 16 28   ANIONGAP 13 14       Significant Imaging: I have reviewed all pertinent imaging results/findings within the past 24 hours.

## 2024-02-05 NOTE — ASSESSMENT & PLAN NOTE
- IR consulted  - discussed with Dr. Cedeño- tentative plan for thrombectomy on 02/05, Dr. Rob on call   - continue heparin drip; monitor PTT per protocol   - continue neurovascular checks of LLE     2/5/24  --Continue IV Heparin infusion  --Plans for thrombectomy 2/7/24  --continue empiric abx for now

## 2024-02-05 NOTE — ASSESSMENT & PLAN NOTE
- Prelim ucx with GNR  - Will stop Zyvox, continue IV Cefepime pending final culture   - followed by ID as outpatient     2/6/24  --urine culture grew morganella morganii sensitive to Meropenem. Will plan for 14 days course of management.

## 2024-02-05 NOTE — PLAN OF CARE
Discussed POC with patient, Pt verbalized understanding, purposeful rounding, Cardiac monitoring in use, tele monitor 8599, Fall precautions in place, patient remains injury free, IV heparin infusing, ordered medications given, call light within reach, will continue with plan of care.       Problem: Adult Inpatient Plan of Care  Goal: Plan of Care Review  Outcome: Ongoing, Progressing  Goal: Patient-Specific Goal (Individualized)  Outcome: Ongoing, Progressing  Goal: Absence of Hospital-Acquired Illness or Injury  Outcome: Ongoing, Progressing  Goal: Optimal Comfort and Wellbeing  Outcome: Ongoing, Progressing

## 2024-02-05 NOTE — HPI
68 y.o. male with pertinent PMHx of bladder and lung cancer who has been seen by ID outpatient for a number of months most recently admitted 1/12-1/15 for weakness and hypotension. Summary as follows: 01/13-Patient reported falling overnight, assessed patient, abrasion to left flank inferior to nephrostomy tube, scabbed, scant blood to bed linen. nephrostomy tube in place. Denies pain to left hip pain, FROM on exam. Significant stool burden on CT. Urology evaluated, recommend to continue IV antibiotics, no acute intervention recommended. Erythematous area around Right Nephrostomy tube, improved. Pt was started on aggressive bowel regimen and was able to have multiple bowel movements prior to discharge. Urine culture returned with pan sensitive Pseudomonas, abx adjusted from Dapto + Cefepime to PO Ciprofloxacin based on culture. Cr stable at 1.4. Pt seen and examined on day of discharge and discharged home with HH in stable condition per my exam. Will continue bowel regimen, abx as above, followup with PCP within 1 week. Follow up with ID as prev scheduled. Patient completed outpatient XRT to the lung 1/24/24 for squamous cell carcinoma of right lung. Per last heme/onc visit 1/12 plan is to evaluate for pembrolizumab based on functional status to treat bladder cancer. Next visit with them is 2/2. Patient completed 7 day course of cipro. Per last ID clinic visit patient and family report he has been eating tremendously well and is regaining his strength every day. Has done really well since radiation treatment. Positive regarding candidacy for chemotherapy. Has not had any complications with PCN since discharge. Should be receiving nighttime bags shortly but so far no notable urine leak when waking up. Tolerating ciprofloxacin without incident. No symptoms indicative of UTI at this time. Discussed vitamin C and cranberry supplement daily. Voiced understanding.      Now admitted due to worsening left lower extremity  pain and swelling as well as concerns of nephrostomy tube dislodgement. Underwent bilateral PCN exchange with IR Dr. Cedeño on 02/03. Per IR, tentative plan for thrombectomy on 02/05. Remains on heparin drip for anticoagulation. Urine culture has grown MDR Morganella. ID consulted for UTI management.

## 2024-02-05 NOTE — ASSESSMENT & PLAN NOTE
Appears near baseline without evidence of active bleeding noted.   Recent Labs   Lab 01/30/24  1433 02/02/24  1010 02/04/24  0604 02/04/24  0605 02/05/24  0534   HGB 10.2*   < > 8.4* 8.6* 8.6*   HCT 32.3*   < > 26.6* 27.5* 27.6*   MCV 89   < > 88 88 90   MCHC 31.6*   < > 31.6* 31.3* 31.2*   RDW 18.1*   < > 17.8* 18.0* 17.7*      < > 236 238 234   IRON 21*  --   --   --   --    TIBC 223*  --   --   --   --    FERRITIN 436*  --   --   --   --    [id number]  --   --   --   --     < > = values in this interval not displayed.     Plan:  -Monitor H/H and plts  -Type and screen, transfuse as needed

## 2024-02-05 NOTE — ASSESSMENT & PLAN NOTE
67 yo M with history of SCC dx by RUL nodule biopsy 10/18/23, also with satellite lesion seen on CT Chest with size increase to 7mm 9/2023 and muscle invasive bladder cancer s/p TMT in 2020 now with recurrence in the RP nodes and possible metastasis to the lungs vs primary lung CA who presented with displaced nephrostomy. Underwent bilateral PCN exchange on 2/3. U/A on admission with >100 WBC. Urine culture has grown MDR Morganella. Has been on ceftriaxone. Patient afebrile with no leukocytosis.     Recommendations:  --Due to discordance between cefepime and ceftriaxone, will cautiously treat using IV meropenem renally dosed by pharmacy   --Plan for total 14 day course given presence of nephrostomy tubes  --Needs close urology follow up  --Will schedule ID clinic follow up  --Above d/w primary team    Outpatient Antibiotic Therapy Plan:    1) Infection: MDR Morganella cystitis; b/l nephrostomy tubes present     2) Discharge Antibiotics:    Intravenous antibiotics:  IV meropenem renally dosed by pharmacy     3) Therapy Duration:  14 days     Estimated end date of IV antibiotics: 2/19/24    4) Outpatient Weekly Labs:    Order the following labs to be drawn on Mondays:   CBC  CMP     5) Fax Lab Results to Infectious Diseases Provider: Dr. Devan [facility] Fax Number: [phone]

## 2024-02-06 ENCOUNTER — PATIENT MESSAGE (OUTPATIENT)
Dept: PALLIATIVE MEDICINE | Facility: CLINIC | Age: 69
End: 2024-02-06
Payer: MEDICARE

## 2024-02-06 ENCOUNTER — PATIENT MESSAGE (OUTPATIENT)
Dept: HEMATOLOGY/ONCOLOGY | Facility: CLINIC | Age: 69
End: 2024-02-06
Payer: MEDICARE

## 2024-02-06 LAB
ALBUMIN SERPL BCP-MCNC: 2.4 G/DL (ref 3.5–5.2)
ALP SERPL-CCNC: 117 U/L (ref 55–135)
ALT SERPL W/O P-5'-P-CCNC: 23 U/L (ref 10–44)
ANION GAP SERPL CALC-SCNC: 9 MMOL/L (ref 8–16)
APTT PPP: 66.5 SEC (ref 21–32)
AST SERPL-CCNC: 13 U/L (ref 10–40)
BASOPHILS # BLD AUTO: 0.02 K/UL (ref 0–0.2)
BASOPHILS NFR BLD: 0.4 % (ref 0–1.9)
BILIRUB SERPL-MCNC: 0.4 MG/DL (ref 0.1–1)
BUN SERPL-MCNC: 25 MG/DL (ref 8–23)
CALCIUM SERPL-MCNC: 9 MG/DL (ref 8.7–10.5)
CHLORIDE SERPL-SCNC: 105 MMOL/L (ref 95–110)
CO2 SERPL-SCNC: 25 MMOL/L (ref 23–29)
CREAT SERPL-MCNC: 1.5 MG/DL (ref 0.5–1.4)
DIFFERENTIAL METHOD BLD: ABNORMAL
EOSINOPHIL # BLD AUTO: 0.2 K/UL (ref 0–0.5)
EOSINOPHIL NFR BLD: 3.5 % (ref 0–8)
ERYTHROCYTE [DISTWIDTH] IN BLOOD BY AUTOMATED COUNT: 17.8 % (ref 11.5–14.5)
EST. GFR  (NO RACE VARIABLE): 50 ML/MIN/1.73 M^2
GLUCOSE SERPL-MCNC: 105 MG/DL (ref 70–110)
HCT VFR BLD AUTO: 26.4 % (ref 40–54)
HGB BLD-MCNC: 8.4 G/DL (ref 14–18)
IMM GRANULOCYTES # BLD AUTO: 0.01 K/UL (ref 0–0.04)
IMM GRANULOCYTES NFR BLD AUTO: 0.2 % (ref 0–0.5)
LYMPHOCYTES # BLD AUTO: 0.7 K/UL (ref 1–4.8)
LYMPHOCYTES NFR BLD: 13.3 % (ref 18–48)
MCH RBC QN AUTO: 27.2 PG (ref 27–31)
MCHC RBC AUTO-ENTMCNC: 31.8 G/DL (ref 32–36)
MCV RBC AUTO: 85 FL (ref 82–98)
MONOCYTES # BLD AUTO: 0.4 K/UL (ref 0.3–1)
MONOCYTES NFR BLD: 6.9 % (ref 4–15)
NEUTROPHILS # BLD AUTO: 4.1 K/UL (ref 1.8–7.7)
NEUTROPHILS NFR BLD: 75.7 % (ref 38–73)
NRBC BLD-RTO: 0 /100 WBC
PLATELET # BLD AUTO: 285 K/UL (ref 150–450)
PMV BLD AUTO: 9.4 FL (ref 9.2–12.9)
POTASSIUM SERPL-SCNC: 4 MMOL/L (ref 3.5–5.1)
PROT SERPL-MCNC: 6.3 G/DL (ref 6–8.4)
RBC # BLD AUTO: 3.09 M/UL (ref 4.6–6.2)
SODIUM SERPL-SCNC: 139 MMOL/L (ref 136–145)
WBC # BLD AUTO: 5.4 K/UL (ref 3.9–12.7)

## 2024-02-06 PROCEDURE — 63600175 PHARM REV CODE 636 W HCPCS: Performed by: EMERGENCY MEDICINE

## 2024-02-06 PROCEDURE — 94640 AIRWAY INHALATION TREATMENT: CPT

## 2024-02-06 PROCEDURE — 63600175 PHARM REV CODE 636 W HCPCS: Performed by: STUDENT IN AN ORGANIZED HEALTH CARE EDUCATION/TRAINING PROGRAM

## 2024-02-06 PROCEDURE — 80053 COMPREHEN METABOLIC PANEL: CPT | Performed by: HOSPITALIST

## 2024-02-06 PROCEDURE — 85025 COMPLETE CBC W/AUTO DIFF WBC: CPT | Performed by: HOSPITALIST

## 2024-02-06 PROCEDURE — 25000242 PHARM REV CODE 250 ALT 637 W/ HCPCS: Performed by: INTERNAL MEDICINE

## 2024-02-06 PROCEDURE — 25000242 PHARM REV CODE 250 ALT 637 W/ HCPCS: Performed by: EMERGENCY MEDICINE

## 2024-02-06 PROCEDURE — 94761 N-INVAS EAR/PLS OXIMETRY MLT: CPT

## 2024-02-06 PROCEDURE — 21400001 HC TELEMETRY ROOM

## 2024-02-06 PROCEDURE — 25000003 PHARM REV CODE 250: Performed by: HOSPITALIST

## 2024-02-06 PROCEDURE — 94799 UNLISTED PULMONARY SVC/PX: CPT | Mod: XB

## 2024-02-06 PROCEDURE — 02HV33Z INSERTION OF INFUSION DEVICE INTO SUPERIOR VENA CAVA, PERCUTANEOUS APPROACH: ICD-10-PCS | Performed by: EMERGENCY MEDICINE

## 2024-02-06 PROCEDURE — C1751 CATH, INF, PER/CENT/MIDLINE: HCPCS

## 2024-02-06 PROCEDURE — 36573 INSJ PICC RS&I 5 YR+: CPT

## 2024-02-06 PROCEDURE — 27000207 HC ISOLATION

## 2024-02-06 PROCEDURE — 99900035 HC TECH TIME PER 15 MIN (STAT)

## 2024-02-06 PROCEDURE — 25000003 PHARM REV CODE 250: Performed by: STUDENT IN AN ORGANIZED HEALTH CARE EDUCATION/TRAINING PROGRAM

## 2024-02-06 PROCEDURE — 85730 THROMBOPLASTIN TIME PARTIAL: CPT | Performed by: EMERGENCY MEDICINE

## 2024-02-06 RX ADMIN — PREGABALIN 75 MG: 75 CAPSULE ORAL at 08:02

## 2024-02-06 RX ADMIN — ALPRAZOLAM 1 MG: 1 TABLET ORAL at 08:02

## 2024-02-06 RX ADMIN — ARFORMOTEROL TARTRATE 15 MCG: 15 SOLUTION RESPIRATORY (INHALATION) at 07:02

## 2024-02-06 RX ADMIN — BUDESONIDE 0.5 MG: 0.25 INHALANT RESPIRATORY (INHALATION) at 07:02

## 2024-02-06 RX ADMIN — HYDROCODONE BITARTRATE AND ACETAMINOPHEN 1 TABLET: 5; 325 TABLET ORAL at 07:02

## 2024-02-06 RX ADMIN — HYDROCODONE BITARTRATE AND ACETAMINOPHEN 1 TABLET: 5; 325 TABLET ORAL at 04:02

## 2024-02-06 RX ADMIN — HEPARIN SODIUM 16 UNITS/KG/HR: 10000 INJECTION, SOLUTION INTRAVENOUS at 02:02

## 2024-02-06 RX ADMIN — MEROPENEM 2 G: 500 INJECTION INTRAVENOUS at 03:02

## 2024-02-06 RX ADMIN — Medication 6 MG: at 08:02

## 2024-02-06 RX ADMIN — DOCUSATE SODIUM 100 MG: 100 CAPSULE, LIQUID FILLED ORAL at 08:02

## 2024-02-06 RX ADMIN — DOCUSATE SODIUM 100 MG: 100 CAPSULE, LIQUID FILLED ORAL at 09:02

## 2024-02-06 RX ADMIN — ESCITALOPRAM OXALATE 20 MG: 10 TABLET ORAL at 09:02

## 2024-02-06 RX ADMIN — LEVOTHYROXINE SODIUM 125 MCG: 125 TABLET ORAL at 05:02

## 2024-02-06 NOTE — PROGRESS NOTES
"[facility] Hospital Medicine  Progress Note    Patient Name: Geovani Currie  MRN: [id number]  Patient Class: IP- Inpatient   Admission Date: 2/2/2024  Length of Stay: 3 days  Attending Physician: Humza Robertson MD  Primary Care Provider: Faizan Antoine MD    Subjective:     Principal Problem:Acute deep vein thrombosis (DVT) of left lower extremity        HPI:  Geovain Currie is a 68 y.o. male with a PMH  has a past medical history of Anxiety disorder, unspecified, COPD (chronic obstructive pulmonary disease), Hypertension, and Thyroid disease. who presented to the ED accompanied by his daughter for further evaluation of worsening left lower extremity pain and swelling as well as concerns of nephrostomy tube dislodgement.  Patient's leg was noted to be swollen and was complaining of increased swelling/tightness/cramping with intermittent burning/stinging sensations beginning around 11:00 a.m. and progressively worsened throughout the day.  Patient reported negative history of PE/DVTs not currently on any blood thinners/anticoagulants.  Patient reported no known alleviating factors with aggravating factors including weight-bearing, movement, and palpation to the affected area.  Patient is on also reported mild confusion and was reported to be saying "weird things".  Patient currently denies endorsing any lightheadedness, dizziness, headache, visual changes, fever, chills, sweats, nausea, vomiting, chest pain, shortness of breath, abdominal pain, flank pain, dysuria, hematuria, melena, hematochezia, diarrhea, or onset neurological deficits.  Prior to onset of symptoms, patient reported being in his usual state of health with no other concerns or complaints.  All other review of systems negative except as noted above.  Of note, patient recently underwent hip arthroplasty and continues to do physical rehab and ambulates via aid of walker.  Initial workup in the ED revealed patient had extensive occlusive " clot throughout his left lower extremity and was initiated on heparin.  Patient UA also concerning for residual UTI following completion of ciprofloxacin last month and was provided cefepime by ED staff.  Patient admitted to Hospital Medicine inpatient for continued medical management with IR consult pending.    PCP: Faizan Antoine    Overview/Hospital Course:  Pt started on IV Cefepime + Zyvox for UTI pending urine culture. IR was consulted to eval for PCN revision and LLE thrombectomy. Underwent bilateral PCN exchange with IR Dr. Cedeño on 02/03. Per IR, tentative plan for thrombectomy on 02/05. Remains on heparin drip for anticoagulation.     2/5/24  Thrombectomy deferred today. Seen by IR today who plans to perform thrombectomy 2/7/2024. Continue heparin infusion. Urine grew Morganella sensitive to Meropenem. Discussed with Infectious Disease who recommends IV management x 14 days.     2/6/24  Ultrasound today again showed occlusive thrombus in the left common femoral, popliteal veins. Plans for thrombectomy tomorrow. PICC line placed. Outpatient meropenem arranged.     Interval History: patient denies problems. POC updated to patient and spouse. Plans for thrombectomy tomorrow.    Review of Systems   Constitutional:  Positive for activity change and appetite change.   Cardiovascular:  Positive for leg swelling.   Musculoskeletal:  Positive for myalgias.   All other systems reviewed and are negative.    Objective:     Vital Signs (Most Recent):  Temp: 98.3 °F (36.8 °C) (02/06/24 1517)  Pulse: 101 (02/06/24 1517)  Resp: 17 (02/06/24 1603)  BP: (!) 104/54 (02/06/24 1517)  SpO2: 96 % (02/06/24 1517) Vital Signs (24h Range):  Temp:  [97.8 °F (36.6 °C)-98.3 °F (36.8 °C)] 98.3 °F (36.8 °C)  Pulse:  [] 101  Resp:  [16-20] 17  SpO2:  [94 %-98 %] 96 %  BP: ()/(51-65) 104/54     Weight: 70.3 kg (155 lb)  Body mass index is 23.57 kg/m².    Intake/Output Summary (Last 24 hours) at 2/6/2024 1700  Last data  filed at 2/6/2024 1515  Gross per 24 hour   Intake 1527.78 ml   Output 1950 ml   Net -422.22 ml         Physical Exam  Constitutional:       General: He is not in acute distress.     Appearance: Normal appearance. He is not ill-appearing.   Cardiovascular:      Rate and Rhythm: Normal rate and regular rhythm.      Pulses: Normal pulses.      Heart sounds: Normal heart sounds. No murmur heard.     No friction rub. No gallop.   Pulmonary:      Effort: Pulmonary effort is normal. No respiratory distress.      Breath sounds: Normal breath sounds.   Abdominal:      General: Abdomen is flat. Bowel sounds are normal. There is no distension.      Palpations: Abdomen is soft.      Tenderness: There is no abdominal tenderness.   Genitourinary:     Comments: Bilateral PCN in place  Musculoskeletal:      Left lower leg: Edema (+1) present.   Skin:     General: Skin is warm and dry.      Findings: Erythema (mild on left LE; swelling decreased) present.   Neurological:      Mental Status: He is alert.             Significant Labs: All pertinent labs within the past 24 hours have been reviewed.  CBC:   Recent Labs   Lab 02/05/24  0534 02/06/24  0456   WBC 6.28 5.40   HGB 8.6* 8.4*   HCT 27.6* 26.4*    285     CMP:   Recent Labs   Lab 02/05/24  0534 02/06/24  0456    139   K 4.2 4.0    105   CO2 19* 25   GLU 96 105   BUN 26* 25*   CREATININE 1.8* 1.5*   CALCIUM 8.4* 9.0   PROT 6.2 6.3   ALBUMIN 2.5* 2.4*   BILITOT 0.4 0.4   ALKPHOS 132 117   AST 25 13   ALT 28 23   ANIONGAP 14 9       Significant Imaging: I have reviewed all pertinent imaging results/findings within the past 24 hours.    Assessment/Plan:      * Acute deep vein thrombosis (DVT) of left lower extremity  - IR consulted  - discussed with Dr. Cedeño- tentative plan for thrombectomy on 02/05, Dr. Rob on call   - continue heparin drip; monitor PTT per protocol   - continue neurovascular checks of LLE     2/5/24  --Continue IV Heparin  infusion  --Plans for thrombectomy 2/7/24  --continue empiric abx for now    2/6/24  --ultrasound today again shows occlusive thrombus noted in the left common, femoral, and popliteal vein. Plans for thrombectomy 2/7/24      Acute cystitis  - Prelim ucx with GNR  - Will stop Zyvox, continue IV Cefepime pending final culture   - followed by ID as outpatient     2/6/24  --urine culture grew morganella morganii sensitive to Meropenem. Will plan for 14 days course of management. PICC placed.      Acute renal failure superimposed on stage 3 chronic kidney disease  Patient with acute kidney injury/acute renal failure likely due to post-obstructive d/t dislodged nephrostomy tube  BANDAR is currently  improving . Baseline creatinine  1.4  - Labs reviewed- Renal function/electrolytes with Estimated Creatinine Clearance: 45 mL/min (A) (based on SCr of 1.5 mg/dL (H)). according to latest data. Monitor urine output and serial BMP and adjust therapy as needed. Avoid nephrotoxins and renally dose meds for GFR listed above.      Anxiety  Chronic. Stable   -Continue home medications       Acute deep vein thrombosis (DVT) of femoral vein of left lower extremity  Patient presented with acute onset and rapidly progressive left lower extremity pain and swelling x1 day duration and was found to have extensive occlusive DVT on venous doppler. Patient initiated on heparin drip. IR consulted and awaiting further evaluation/recommendations regarding potential thrombectomy.  Plan:  -NPO  -continue heparin drip  -optimize pain control  -bowel regimen  -f/u IR      Hypothyroidism  Patient has chronic hypothyroidism. TFTs reviewed-   Lab Results   Component Value Date    TSH 1.034 02/02/2024   Plan:  -Will continue chronic levothyroxine and adjust for and clinical changes      Malignant neoplasm of urinary bladder  - followed by urology as outpatient   - s/p PCN exchange as above     Nephrostomy tube displaced  Urology consulted  Underwent  exchange of b/l PCN tubes on 02/03 with IR Dr. Cedeño   Monitor UOP       Centrilobular emphysema  Patient's COPD is controlled currently.  Patient is currently off COPD Pathway. Continue scheduled inhalers Supplemental oxygen and monitor respiratory status closely.       Squamous cell carcinoma of right lung  Followed by urology and hem/onc outpatient.  Plan:  -f/u outpatient as directed       Anemia  Appears near baseline without evidence of active bleeding noted.   Recent Labs   Lab 01/30/24  1433 02/02/24  1010 02/04/24  0604 02/04/24  0605 02/05/24  0534   HGB 10.2*   < > 8.4* 8.6* 8.6*   HCT 32.3*   < > 26.6* 27.5* 27.6*   MCV 89   < > 88 88 90   MCHC 31.6*   < > 31.6* 31.3* 31.2*   RDW 18.1*   < > 17.8* 18.0* 17.7*      < > 236 238 234   IRON 21*  --   --   --   --    TIBC 223*  --   --   --   --    FERRITIN 436*  --   --   --   --    PEIVSZEY12 313  --   --   --   --     < > = values in this interval not displayed.     Plan:  -Monitor H/H and plts  -Type and screen, transfuse as needed            VTE Risk Mitigation (From admission, onward)           Ordered     Reason for No Pharmacological VTE Prophylaxis  Once        Question:  Reasons:  Answer:  Physician Provided (leave comment)  Comment:  currently on heparin drip    02/03/24 0756     IP VTE HIGH RISK PATIENT  Once         02/03/24 0756     Place sequential compression device  Until discontinued         02/03/24 0756     heparin 25,000 units in dextrose 5% (100 units/ml) IV bolus from bag - ADDITIONAL PRN BOLUS - 60 units/kg  As needed (PRN)        Question:  Heparin Infusion Adjustment (DO NOT MODIFY ANSWER)  Answer:  [email or web address]\epic\Images\Pharmacy\HeparinInfusions\heparin HIGH INTENSITY nomogram for [facility] [id number].pdf    02/02/24 2232     heparin 25,000 units in dextrose 5% (100 units/ml) IV bolus from bag - ADDITIONAL PRN BOLUS - 30 units/kg  As needed (PRN)        Question:  Heparin Infusion Adjustment (DO NOT MODIFY ANSWER)  Answer:   \\[email or web address]\epic\Images\Pharmacy\HeparinInfusions\heparin HIGH INTENSITY nomogram for [facility] [id number].pdf    02/02/24 2232     heparin 25,000 units in dextrose 5% 250 mL (100 units/mL) infusion HIGH INTENSITY nomogram - [facility]  Continuous        Question:  Begin at (units/kg/hr)  Answer:  18 02/02/24 2232                    Discharge Planning   TANIA:      Code Status: Full Code   Is the patient medically ready for discharge?: No    Reason for patient still in hospital (select all that apply): Treatment  Discharge Plan A: Home Health            Jp Norwood NP  Department of Hospital Medicine   [facility] - Med Surg

## 2024-02-06 NOTE — ASSESSMENT & PLAN NOTE
Patient with acute kidney injury/acute renal failure likely due to post-obstructive d/t dislodged nephrostomy tube  BANDAR is currently  improving . Baseline creatinine  1.4  - Labs reviewed- Renal function/electrolytes with Estimated Creatinine Clearance: 45 mL/min (A) (based on SCr of 1.5 mg/dL (H)). according to latest data. Monitor urine output and serial BMP and adjust therapy as needed. Avoid nephrotoxins and renally dose meds for GFR listed above.

## 2024-02-06 NOTE — ASSESSMENT & PLAN NOTE
- IR consulted  - discussed with Dr. Cedeño- tentative plan for thrombectomy on 02/05, Dr. Rob on call   - continue heparin drip; monitor PTT per protocol   - continue neurovascular checks of LLE     2/5/24  --Continue IV Heparin infusion  --Plans for thrombectomy 2/7/24  --continue empiric abx for now    2/6/24  --ultrasound today again shows occlusive thrombus noted in the left common, femoral, and popliteal vein. Plans for thrombectomy 2/7/24

## 2024-02-06 NOTE — PLAN OF CARE
Problem: Adult Inpatient Plan of Care  Goal: Plan of Care Review  Outcome: Ongoing, Progressing  Goal: Patient-Specific Goal (Individualized)  Outcome: Ongoing, Progressing  Goal: Absence of Hospital-Acquired Illness or Injury  Outcome: Ongoing, Progressing  Goal: Optimal Comfort and Wellbeing  Outcome: Ongoing, Progressing  Goal: Readiness for Transition of Care  Outcome: Ongoing, Progressing     Problem: Adjustment to Illness (Sepsis/Septic Shock)  Goal: Optimal Coping  Outcome: Ongoing, Progressing     Problem: Bleeding (Sepsis/Septic Shock)  Goal: Absence of Bleeding  Outcome: Ongoing, Progressing     Problem: Glycemic Control Impaired (Sepsis/Septic Shock)  Goal: Blood Glucose Level Within Desired Range  Outcome: Ongoing, Progressing     Problem: Infection Progression (Sepsis/Septic Shock)  Goal: Absence of Infection Signs and Symptoms  Outcome: Ongoing, Progressing     Problem: Nutrition Impaired (Sepsis/Septic Shock)  Goal: Optimal Nutrition Intake  Outcome: Ongoing, Progressing     Problem: Fluid and Electrolyte Imbalance (Acute Kidney Injury/Impairment)  Goal: Fluid and Electrolyte Balance  Outcome: Ongoing, Progressing     Problem: Oral Intake Inadequate (Acute Kidney Injury/Impairment)  Goal: Optimal Nutrition Intake  Outcome: Ongoing, Progressing     Problem: Renal Function Impairment (Acute Kidney Injury/Impairment)  Goal: Effective Renal Function  Outcome: Ongoing, Progressing     Problem: Impaired Wound Healing  Goal: Optimal Wound Healing  Outcome: Ongoing, Progressing     Problem: Infection  Goal: Absence of Infection Signs and Symptoms  Outcome: Ongoing, Progressing

## 2024-02-06 NOTE — PROCEDURES
"Geovani Currie is a 68 y.o. male patient.    Temp: 98.1 °F (36.7 °C) (02/06/24 0740)  Pulse: 87 (02/06/24 0933)  Resp: 18 (02/06/24 0754)  BP: 133/63 (02/06/24 0740)  SpO2: 95 % (02/06/24 0740)  Weight: 70.3 kg (155 lb) (02/02/24 1815)  Height: 5' 8" (172.7 cm) (02/03/24 1432)    PICC  Date/Time: 2/6/2024 11:48 AM  Performed by: Akin Em RN  Consent Done: Yes  Time out: Immediately prior to procedure a time out was called to verify the correct patient, procedure, equipment, support staff and site/side marked as required  Indications: med administration and vascular access  Anesthesia: local infiltration  Local anesthetic: lidocaine 1% without epinephrine  Anesthetic Total (mL): 2  Preparation: skin prepped with chlorhexidine (without alcohol)  Skin prep agent dried: skin prep agent completely dried prior to procedure  Sterile barriers: all five maximum sterile barriers used - cap, mask, sterile gown, sterile gloves, and large sterile sheet  Hand hygiene: hand hygiene performed prior to central venous catheter insertion  Location details: right basilic  Catheter type: double lumen  Catheter size: 4 Fr  Catheter Length: 34cm    Ultrasound guidance: yes  Vessel Caliber: medium and patent, compressibility normal  Needle advanced into vessel with real time Ultrasound guidance.  Guidewire confirmed in vessel.  Sterile sheath used.  Number of attempts: 1  Post-procedure: blood return through all ports, chlorhexidine patch and sterile dressing applied  Specimens: No  Implants: No  Comments: Awaiting xray for confirmation of placement           Akin Em RN  2/6/2024    "

## 2024-02-06 NOTE — ASSESSMENT & PLAN NOTE
- Prelim ucx with GNR  - Will stop Zyvox, continue IV Cefepime pending final culture   - followed by ID as outpatient     2/6/24  --urine culture grew morganella morganii sensitive to Meropenem. Will plan for 14 days course of management. PICC placed.

## 2024-02-06 NOTE — SUBJECTIVE & OBJECTIVE
Interval History: patient denies problems. POC updated to patient and spouse. Plans for thrombectomy tomorrow.    Review of Systems   Constitutional:  Positive for activity change and appetite change.   Cardiovascular:  Positive for leg swelling.   Musculoskeletal:  Positive for myalgias.   All other systems reviewed and are negative.    Objective:     Vital Signs (Most Recent):  Temp: 98.3 °F (36.8 °C) (02/06/24 1517)  Pulse: 101 (02/06/24 1517)  Resp: 17 (02/06/24 1603)  BP: (!) 104/54 (02/06/24 1517)  SpO2: 96 % (02/06/24 1517) Vital Signs (24h Range):  Temp:  [97.8 °F (36.6 °C)-98.3 °F (36.8 °C)] 98.3 °F (36.8 °C)  Pulse:  [] 101  Resp:  [16-20] 17  SpO2:  [94 %-98 %] 96 %  BP: ()/(51-65) 104/54     Weight: 70.3 kg (155 lb)  Body mass index is 23.57 kg/m².    Intake/Output Summary (Last 24 hours) at 2/6/2024 1700  Last data filed at 2/6/2024 1515  Gross per 24 hour   Intake 1527.78 ml   Output 1950 ml   Net -422.22 ml         Physical Exam  Constitutional:       General: He is not in acute distress.     Appearance: Normal appearance. He is not ill-appearing.   Cardiovascular:      Rate and Rhythm: Normal rate and regular rhythm.      Pulses: Normal pulses.      Heart sounds: Normal heart sounds. No murmur heard.     No friction rub. No gallop.   Pulmonary:      Effort: Pulmonary effort is normal. No respiratory distress.      Breath sounds: Normal breath sounds.   Abdominal:      General: Abdomen is flat. Bowel sounds are normal. There is no distension.      Palpations: Abdomen is soft.      Tenderness: There is no abdominal tenderness.   Genitourinary:     Comments: Bilateral PCN in place  Musculoskeletal:      Left lower leg: Edema (+1) present.   Skin:     General: Skin is warm and dry.      Findings: Erythema (mild on left LE; swelling decreased) present.   Neurological:      Mental Status: He is alert.             Significant Labs: All pertinent labs within the past 24 hours have been  reviewed.  CBC:   Recent Labs   Lab 02/05/24  0534 02/06/24  0456   WBC 6.28 5.40   HGB 8.6* 8.4*   HCT 27.6* 26.4*    285     CMP:   Recent Labs   Lab 02/05/24  0534 02/06/24  0456    139   K 4.2 4.0    105   CO2 19* 25   GLU 96 105   BUN 26* 25*   CREATININE 1.8* 1.5*   CALCIUM 8.4* 9.0   PROT 6.2 6.3   ALBUMIN 2.5* 2.4*   BILITOT 0.4 0.4   ALKPHOS 132 117   AST 25 13   ALT 28 23   ANIONGAP 14 9       Significant Imaging: I have reviewed all pertinent imaging results/findings within the past 24 hours.

## 2024-02-06 NOTE — PLAN OF CARE
02/06/24 0859   Post-Acute Status   Post-Acute Authorization IV Infusion;Home Health   Home Health Status Set-up Complete/Auth obtained   IV Infusion Status Referral(s) sent   Discharge Plan   Discharge Plan A Home Health     SW sent iv referral to [facility]. Pt current with [facility]. Pending [facility] to accept. [facility] will teach pt today. Pending medical clearance.

## 2024-02-07 VITALS
BODY MASS INDEX: 23.49 KG/M2 | HEIGHT: 68 IN | HEART RATE: 86 BPM | WEIGHT: 155 LBS | DIASTOLIC BLOOD PRESSURE: 56 MMHG | SYSTOLIC BLOOD PRESSURE: 111 MMHG | TEMPERATURE: 98 F | OXYGEN SATURATION: 97 % | RESPIRATION RATE: 22 BRPM

## 2024-02-07 LAB
APTT PPP: 46.9 SEC (ref 21–32)
BASOPHILS # BLD AUTO: 0.03 K/UL (ref 0–0.2)
BASOPHILS NFR BLD: 0.5 % (ref 0–1.9)
DIFFERENTIAL METHOD BLD: ABNORMAL
EOSINOPHIL # BLD AUTO: 0.2 K/UL (ref 0–0.5)
EOSINOPHIL NFR BLD: 3.5 % (ref 0–8)
ERYTHROCYTE [DISTWIDTH] IN BLOOD BY AUTOMATED COUNT: 17.6 % (ref 11.5–14.5)
HCT VFR BLD AUTO: 27 % (ref 40–54)
HGB BLD-MCNC: 8.5 G/DL (ref 14–18)
IMM GRANULOCYTES # BLD AUTO: 0.01 K/UL (ref 0–0.04)
IMM GRANULOCYTES NFR BLD AUTO: 0.2 % (ref 0–0.5)
LYMPHOCYTES # BLD AUTO: 0.6 K/UL (ref 1–4.8)
LYMPHOCYTES NFR BLD: 9.7 % (ref 18–48)
MCH RBC QN AUTO: 27.2 PG (ref 27–31)
MCHC RBC AUTO-ENTMCNC: 31.5 G/DL (ref 32–36)
MCV RBC AUTO: 87 FL (ref 82–98)
MONOCYTES # BLD AUTO: 0.4 K/UL (ref 0.3–1)
MONOCYTES NFR BLD: 5.9 % (ref 4–15)
NEUTROPHILS # BLD AUTO: 5 K/UL (ref 1.8–7.7)
NEUTROPHILS NFR BLD: 80.2 % (ref 38–73)
NRBC BLD-RTO: 0 /100 WBC
PLATELET # BLD AUTO: 266 K/UL (ref 150–450)
PMV BLD AUTO: 9.7 FL (ref 9.2–12.9)
RBC # BLD AUTO: 3.12 M/UL (ref 4.6–6.2)
WBC # BLD AUTO: 6.26 K/UL (ref 3.9–12.7)

## 2024-02-07 PROCEDURE — 25000242 PHARM REV CODE 250 ALT 637 W/ HCPCS: Performed by: INTERNAL MEDICINE

## 2024-02-07 PROCEDURE — 63600175 PHARM REV CODE 636 W HCPCS: Performed by: STUDENT IN AN ORGANIZED HEALTH CARE EDUCATION/TRAINING PROGRAM

## 2024-02-07 PROCEDURE — 99900035 HC TECH TIME PER 15 MIN (STAT)

## 2024-02-07 PROCEDURE — 25000242 PHARM REV CODE 250 ALT 637 W/ HCPCS: Performed by: EMERGENCY MEDICINE

## 2024-02-07 PROCEDURE — 25000003 PHARM REV CODE 250: Performed by: NURSE PRACTITIONER

## 2024-02-07 PROCEDURE — 85025 COMPLETE CBC W/AUTO DIFF WBC: CPT | Performed by: EMERGENCY MEDICINE

## 2024-02-07 PROCEDURE — 94761 N-INVAS EAR/PLS OXIMETRY MLT: CPT

## 2024-02-07 PROCEDURE — 94640 AIRWAY INHALATION TREATMENT: CPT

## 2024-02-07 PROCEDURE — 25000003 PHARM REV CODE 250: Performed by: STUDENT IN AN ORGANIZED HEALTH CARE EDUCATION/TRAINING PROGRAM

## 2024-02-07 PROCEDURE — 85730 THROMBOPLASTIN TIME PARTIAL: CPT | Performed by: EMERGENCY MEDICINE

## 2024-02-07 PROCEDURE — 25000003 PHARM REV CODE 250: Performed by: HOSPITALIST

## 2024-02-07 RX ORDER — DOXYCYCLINE 100 MG/1
100 CAPSULE ORAL EVERY 12 HOURS
Qty: 12 CAPSULE | Refills: 0 | Status: SHIPPED | OUTPATIENT
Start: 2024-02-07 | End: 2024-02-13

## 2024-02-07 RX ADMIN — ALPRAZOLAM 1 MG: 1 TABLET ORAL at 04:02

## 2024-02-07 RX ADMIN — MEROPENEM 2 G: 500 INJECTION INTRAVENOUS at 03:02

## 2024-02-07 RX ADMIN — ESCITALOPRAM OXALATE 20 MG: 10 TABLET ORAL at 09:02

## 2024-02-07 RX ADMIN — DOCUSATE SODIUM 100 MG: 100 CAPSULE, LIQUID FILLED ORAL at 09:02

## 2024-02-07 RX ADMIN — LEVOTHYROXINE SODIUM 125 MCG: 125 TABLET ORAL at 05:02

## 2024-02-07 RX ADMIN — BUDESONIDE 0.5 MG: 0.25 INHALANT RESPIRATORY (INHALATION) at 07:02

## 2024-02-07 RX ADMIN — ARFORMOTEROL TARTRATE 15 MCG: 15 SOLUTION RESPIRATORY (INHALATION) at 07:02

## 2024-02-07 RX ADMIN — APIXABAN 10 MG: 2.5 TABLET, FILM COATED ORAL at 04:02

## 2024-02-07 RX ADMIN — HYDROCODONE BITARTRATE AND ACETAMINOPHEN 1 TABLET: 5; 325 TABLET ORAL at 09:02

## 2024-02-07 NOTE — NURSING
IP rounded on patient.  Discussed hand hygiene, patient's nephrostomy, anchoring devices and alternatives, and CHG disk placement during a PICC line dressing change.  Patient and support person displayed understanding.

## 2024-02-07 NOTE — PLAN OF CARE
Pt being discharged Home with home health and IV antibiotics in stable condition. IV removed, catheter intact, pt tolerated well. Tele monitor removed, given to US. PICC line staying in for home infusion. Discharge instructions given to pt, pt verbalized understanding.

## 2024-02-07 NOTE — PLAN OF CARE
Patient admitted for acute deep vein thrombosis of left lower extremity. AAOX4, able to make needs known to staff. Heparin drip running w/o difficulty. Pt pain and anxiety needs met. Bed in lowest position, call light within reach, no c/o pain or discomfort at this time.     Problem: Adult Inpatient Plan of Care  Goal: Plan of Care Review  Outcome: Ongoing, Progressing  Goal: Patient-Specific Goal (Individualized)  Outcome: Ongoing, Progressing  Goal: Absence of Hospital-Acquired Illness or Injury  Outcome: Ongoing, Progressing  Goal: Optimal Comfort and Wellbeing  Outcome: Ongoing, Progressing  Goal: Readiness for Transition of Care  Outcome: Ongoing, Progressing     Problem: Adjustment to Illness (Sepsis/Septic Shock)  Goal: Optimal Coping  Outcome: Ongoing, Progressing     Problem: Bleeding (Sepsis/Septic Shock)  Goal: Absence of Bleeding  Outcome: Ongoing, Progressing     Problem: Glycemic Control Impaired (Sepsis/Septic Shock)  Goal: Blood Glucose Level Within Desired Range  Outcome: Ongoing, Progressing     Problem: Infection Progression (Sepsis/Septic Shock)  Goal: Absence of Infection Signs and Symptoms  Outcome: Ongoing, Progressing     Problem: Nutrition Impaired (Sepsis/Septic Shock)  Goal: Optimal Nutrition Intake  Outcome: Ongoing, Progressing     Problem: Fluid and Electrolyte Imbalance (Acute Kidney Injury/Impairment)  Goal: Fluid and Electrolyte Balance  Outcome: Ongoing, Progressing     Problem: Oral Intake Inadequate (Acute Kidney Injury/Impairment)  Goal: Optimal Nutrition Intake  Outcome: Ongoing, Progressing     Problem: Renal Function Impairment (Acute Kidney Injury/Impairment)  Goal: Effective Renal Function  Outcome: Ongoing, Progressing     Problem: Impaired Wound Healing  Goal: Optimal Wound Healing  Outcome: Ongoing, Progressing     Problem: Infection  Goal: Absence of Infection Signs and Symptoms  Outcome: Ongoing, Progressing

## 2024-02-07 NOTE — DISCHARGE SUMMARY
"[facility] Medicine  Discharge Summary      Patient Name: Geovani Currie  MRN: [id number]  [facility]: [id number]  Patient Class: IP- Inpatient  Admission Date: 2/2/2024  Hospital Length of Stay: 4 days  Discharge Date and Time:  02/07/2024 5:11 PM  Attending Physician: Humza Robertson MD   Discharging Provider: Jp Norwood NP  Primary Care Provider: Faizan Antoine MD    Primary Care Team: Networked reference to record PCT     HPI:   Geovani Currie is a 68 y.o. male with a PMH  has a past medical history of Anxiety disorder, unspecified, COPD (chronic obstructive pulmonary disease), Hypertension, and Thyroid disease. who presented to the ED accompanied by his daughter for further evaluation of worsening left lower extremity pain and swelling as well as concerns of nephrostomy tube dislodgement.  Patient's leg was noted to be swollen and was complaining of increased swelling/tightness/cramping with intermittent burning/stinging sensations beginning around 11:00 a.m. and progressively worsened throughout the day.  Patient reported negative history of PE/DVTs not currently on any blood thinners/anticoagulants.  Patient reported no known alleviating factors with aggravating factors including weight-bearing, movement, and palpation to the affected area.  Patient is on also reported mild confusion and was reported to be saying "weird things".  Patient currently denies endorsing any lightheadedness, dizziness, headache, visual changes, fever, chills, sweats, nausea, vomiting, chest pain, shortness of breath, abdominal pain, flank pain, dysuria, hematuria, melena, hematochezia, diarrhea, or onset neurological deficits.  Prior to onset of symptoms, patient reported being in his usual state of health with no other concerns or complaints.  All other review of systems negative except as noted above.  Of note, patient recently underwent hip arthroplasty and continues to do physical rehab and ambulates " via aid of walker.  Initial workup in the ED revealed patient had extensive occlusive clot throughout his left lower extremity and was initiated on heparin.  Patient UA also concerning for residual UTI following completion of ciprofloxacin last month and was provided cefepime by ED staff.  Patient admitted to Hospital Medicine inpatient for continued medical management with IR consult pending.    PCP: Faizan Antoine.    * No surgery found *      Hospital Course:   Pt started on IV Cefepime + Zyvox for UTI pending urine culture. IR was consulted to eval for PCN revision and LLE thrombectomy. Underwent bilateral PCN exchange with IR Dr. Cedeño on 02/03. Per IR, tentative plan for thrombectomy on 02/05. Remains on heparin drip for anticoagulation.     2/5/24  Thrombectomy deferred today. Seen by IR today who plans to perform thrombectomy 2/7/2024. Continue heparin infusion. Urine grew Morganella sensitive to Meropenem. Discussed with Infectious Disease who recommends IV management x 14 days.     2/6/24  Ultrasound today again showed occlusive thrombus in the left common femoral, popliteal veins. Plans for thrombectomy tomorrow. PICC line placed. Outpatient meropenem arranged.     2/7/24  Patient was evaluated by IR today it was decided to defer thrombectomy. There has been appearance of left foot and it was felt thrombectomy in setting of infection increases risk to throw septic emboli in lungs. Patient will continue PO doxycycline for 6 days and will have a follow up venous ultrasound in one week. IR will determine if thrombectomy at this time is warranted. He will continue Meropenem x 13 days for Morganella morganii UTI. Home health has been arranged. He is stable for discharge.     Goals of Care Treatment Preferences:  Code Status: Full Code    Health care agent: 1. Wife: Chantal Currie: [phone] Daughter: Stephania Mann: [phone]  Health care agent number: No value filed.          What is most  important right now is to focus on symptom/pain control, extending life as long as possible, even it it means sacrificing quality, curative/life-prolongation (regardless of treatment burdens).  Accordingly, we have decided that the best plan to meet the patient's goals includes continuing with treatment.      Consults:   Consults (From admission, onward)          Status Ordering Provider     Inpatient consult to Social Work  Once        Provider:  (Not yet assigned)    Completed NICK PADRON     Inpatient consult to Infectious Diseases  Once        Provider:  Jeremiah Hartman DO    Completed NICK PADRON     Inpatient consult to Interventional Radiology  Once        Provider:  Elmo Cedeño Jr., MD    Completed DENIA GROVES     Inpatient consult to Interventional Radiology  Once        Provider:  Elmo Cedeño Jr., MD    Completed BRODERICK GALAN            No new Assessment & Plan notes have been filed under this hospital service since the last note was generated.  Service: Hospital Medicine    Final Active Diagnoses:    Diagnosis Date Noted POA    PRINCIPAL PROBLEM:  Acute deep vein thrombosis (DVT) of left lower extremity [I82.402] 02/03/2024 Yes    Acute cystitis [N30.00] 02/04/2024 Yes    Anxiety [F41.9] 02/03/2024 Yes    Acute renal failure superimposed on stage 3 chronic kidney disease [N17.9, N18.30] 02/03/2024 Yes    Hypothyroidism [E03.9] 01/12/2024 Yes     Chronic    Malignant neoplasm of urinary bladder [C67.9] 11/15/2023 Yes    Nephrostomy tube displaced [T83.022A] 11/10/2023 Yes    Centrilobular emphysema [J43.2] 11/09/2023 Yes     Chronic    Squamous cell carcinoma of right lung [C34.91] 11/09/2023 Yes    Anemia [D64.9] 10/21/2023 Yes     Chronic      Problems Resolved During this Admission:    Diagnosis Date Noted Date Resolved POA    Nephrostomy status [Z93.6] 02/03/2024 02/03/2024 Not Applicable    Tachycardia [R00.0] 02/03/2024 02/03/2024 Unknown       Discharged  Condition: stable    Disposition: Home-Health Care [facility]    Follow Up:    Patient Instructions:      US Lower Extremity Veins Left   Standing Status: Future Standing Exp. Date: 02/07/25       Significant Diagnostic Studies: Labs: CMP   Recent Labs   Lab 02/06/24  0456      K 4.0      CO2 25      BUN 25*   CREATININE 1.5*   CALCIUM 9.0   PROT 6.3   ALBUMIN 2.4*   BILITOT 0.4   ALKPHOS 117   AST 13   ALT 23   ANIONGAP 9    and CBC   Recent Labs   Lab 02/06/24  0456 02/07/24  0553   WBC 5.40 6.26   HGB 8.4* 8.5*   HCT 26.4* 27.0*    266       Pending Diagnostic Studies:       Procedure Component Value Units Date/Time    IR Thrombectomy Veins Inc Thrombolysis and Fluoro [[id number]]     Order Status: Sent Lab Status: No result            Medications:  Reconciled Home Medications:      Medication List        START taking these medications      apixaban 5 mg Tab  Commonly known as: ELIQUIS  Take 10mg po BID x 7 days then 5mg po daily     doxycycline 100 MG Cap  Commonly known as: VIBRAMYCIN  Take 1 capsule (100 mg total) by mouth every 12 (twelve) hours. for 6 days     sodium chloride 0.9% SolP 100 mL with meropenem 1 gram SolR 2 g  Inject 2 g into the vein every 12 (twelve) hours. for 14 days            CONTINUE taking these medications      * albuterol 0.63 mg/3 mL Nebu  Commonly known as: ACCUNEB  Take 3 mLs (0.63 mg total) by nebulization 3 (three) times daily as needed (sob, wheezing). Rescue     * albuterol 90 mcg/actuation inhaler  Commonly known as: PROVENTIL/VENTOLIN HFA  Inhale 1-2 puffs into the lungs every 4 (four) hours as needed for Wheezing. Rescue     ALPRAZolam 0.5 MG tablet  Commonly known as: XANAX  Take 2 tablets (1 mg total) by mouth 3 (three) times daily as needed for Anxiety.     BREO ELLIPTA 100-25 mcg/dose diskus inhaler  Generic drug: fluticasone furoate-vilanteroL  Inhale 1 puff into the lungs once daily. Controller     cyclobenzaprine 10 MG tablet  Commonly known as:  FLEXERIL  Take 1 tablet (10 mg total) by mouth 3 (three) times daily as needed for Muscle spasms.     docusate sodium 100 MG capsule  Commonly known as: COLACE  Take 100 mg by mouth 2 (two) times daily.     EScitalopram oxalate 20 MG tablet  Commonly known as: LEXAPRO  Take 1 tablet (20 mg total) by mouth once daily.     * HYDROcodone-acetaminophen 7.5-325 mg per tablet  Commonly known as: NORCO  Take 1 tablet by mouth every 6 (six) hours as needed for Pain (Max 4 doses/day).     * HYDROcodone-acetaminophen 7.5-325 mg per tablet  Commonly known as: NORCO  Take 1 tablet by mouth every 6 (six) hours as needed for Pain.  Start taking on: February 9, 2024     levothyroxine 125 MCG tablet  Commonly known as: SYNTHROID  Take 1 tablet (125 mcg total) by mouth before breakfast.     naloxone 4 mg/actuation Spry  Commonly known as: NARCAN  1 spray once.     nicotine 14 mg/24 hr  Commonly known as: NICODERM CQ  Place 1 patch onto the skin every 24 hours.     polyethylene glycol 17 gram Pwpk  Commonly known as: GLYCOLAX  Take 17 g by mouth daily as needed for Constipation.     pregabalin 75 MG capsule  Commonly known as: LYRICA  Take 1 capsule (75 mg total) by mouth every evening.     vitamin D 1000 units Tab  Commonly known as: VITAMIN D3  Take 25 mcg by mouth once daily.           * This list has 4 medication(s) that are the same as other medications prescribed for you. Read the directions carefully, and ask your doctor or other care provider to review them with you.                  Indwelling Lines/Drains at time of discharge:   Lines/Drains/Airways       Peripherally Inserted Central Catheter Line  Duration             PICC Double Lumen 02/06/24 1130 right basilic 1 day              Drain  Duration                  Nephrostomy Left -- days         Nephrostomy 02/03/24 1044 Left 8 Fr. 4 days         Nephrostomy 02/03/24 1045 Right 10 Fr. 4 days                    Time spent on the discharge of patient: >35  etta Norwood NP  Department of [facility] Medicine  [facility] Surg

## 2024-02-07 NOTE — PLAN OF CARE
[facility] - Med Surg  Discharge Final Note    Primary Care Provider: Faizan Antoine MD    Expected Discharge Date: 2/7/2024    Final Discharge Note (most recent)       Final Note - 02/07/24 1514          Final Note    Assessment Type Final Discharge Note     Anticipated Discharge Disposition Home-Health Care Svc        Post-Acute Status    Post-Acute Authorization Home Health;IV Infusion     Home Health Status Set-up Complete/Auth obtained     IV Infusion Status Set-up Complete/Auth obtained

## 2024-02-08 LAB
BACTERIA BLD CULT: NORMAL
BACTERIA BLD CULT: NORMAL

## 2024-02-12 ENCOUNTER — LAB VISIT (OUTPATIENT)
Dept: LAB | Facility: HOSPITAL | Age: 69
End: 2024-02-12
Attending: STUDENT IN AN ORGANIZED HEALTH CARE EDUCATION/TRAINING PROGRAM
Payer: MEDICARE

## 2024-02-12 ENCOUNTER — PATIENT MESSAGE (OUTPATIENT)
Dept: PRIMARY CARE CLINIC | Facility: CLINIC | Age: 69
End: 2024-02-12
Payer: MEDICARE

## 2024-02-12 DIAGNOSIS — N39.0 URINARY TRACT INFECTION, SITE NOT SPECIFIED: Primary | ICD-10-CM

## 2024-02-12 DIAGNOSIS — M48.00 SPINAL STENOSIS, UNSPECIFIED SPINAL REGION: ICD-10-CM

## 2024-02-12 LAB
ALBUMIN SERPL BCP-MCNC: 2.5 G/DL (ref 3.5–5.2)
ALP SERPL-CCNC: 98 U/L (ref 55–135)
ALT SERPL W/O P-5'-P-CCNC: 14 U/L (ref 10–44)
ANION GAP SERPL CALC-SCNC: 12 MMOL/L (ref 8–16)
AST SERPL-CCNC: 13 U/L (ref 10–40)
BASOPHILS # BLD AUTO: 0.03 K/UL (ref 0–0.2)
BASOPHILS NFR BLD: 0.4 % (ref 0–1.9)
BILIRUB SERPL-MCNC: 0.3 MG/DL (ref 0.1–1)
BUN SERPL-MCNC: 19 MG/DL (ref 8–23)
CALCIUM SERPL-MCNC: 9.5 MG/DL (ref 8.7–10.5)
CHLORIDE SERPL-SCNC: 104 MMOL/L (ref 95–110)
CO2 SERPL-SCNC: 22 MMOL/L (ref 23–29)
CREAT SERPL-MCNC: 1.3 MG/DL (ref 0.5–1.4)
DIFFERENTIAL METHOD BLD: ABNORMAL
EOSINOPHIL # BLD AUTO: 0.2 K/UL (ref 0–0.5)
EOSINOPHIL NFR BLD: 1.9 % (ref 0–8)
ERYTHROCYTE [DISTWIDTH] IN BLOOD BY AUTOMATED COUNT: 17.2 % (ref 11.5–14.5)
EST. GFR  (NO RACE VARIABLE): 59 ML/MIN/1.73 M^2
GLUCOSE SERPL-MCNC: 77 MG/DL (ref 70–110)
HCT VFR BLD AUTO: 25.8 % (ref 40–54)
HGB BLD-MCNC: 8 G/DL (ref 14–18)
IMM GRANULOCYTES # BLD AUTO: 0.02 K/UL (ref 0–0.04)
IMM GRANULOCYTES NFR BLD AUTO: 0.2 % (ref 0–0.5)
LYMPHOCYTES # BLD AUTO: 1.1 K/UL (ref 1–4.8)
LYMPHOCYTES NFR BLD: 12.8 % (ref 18–48)
MCH RBC QN AUTO: 27.4 PG (ref 27–31)
MCHC RBC AUTO-ENTMCNC: 31 G/DL (ref 32–36)
MCV RBC AUTO: 88 FL (ref 82–98)
MONOCYTES # BLD AUTO: 0.6 K/UL (ref 0.3–1)
MONOCYTES NFR BLD: 7.2 % (ref 4–15)
NEUTROPHILS # BLD AUTO: 6.4 K/UL (ref 1.8–7.7)
NEUTROPHILS NFR BLD: 77.5 % (ref 38–73)
NRBC BLD-RTO: 0 /100 WBC
PLATELET # BLD AUTO: 245 K/UL (ref 150–450)
PMV BLD AUTO: 9.8 FL (ref 9.2–12.9)
POTASSIUM SERPL-SCNC: 4 MMOL/L (ref 3.5–5.1)
PROT SERPL-MCNC: 6.7 G/DL (ref 6–8.4)
RBC # BLD AUTO: 2.92 M/UL (ref 4.6–6.2)
SODIUM SERPL-SCNC: 138 MMOL/L (ref 136–145)
WBC # BLD AUTO: 8.21 K/UL (ref 3.9–12.7)

## 2024-02-12 PROCEDURE — 80053 COMPREHEN METABOLIC PANEL: CPT | Performed by: STUDENT IN AN ORGANIZED HEALTH CARE EDUCATION/TRAINING PROGRAM

## 2024-02-12 PROCEDURE — 85025 COMPLETE CBC W/AUTO DIFF WBC: CPT | Performed by: STUDENT IN AN ORGANIZED HEALTH CARE EDUCATION/TRAINING PROGRAM

## 2024-02-12 NOTE — TELEPHONE ENCOUNTER
No care due was identified.  [facility] Embedded Care Due Messages. Reference number: [id number].   2/12/2024 2:33:08 PM CST

## 2024-02-13 RX ORDER — CYCLOBENZAPRINE HCL 10 MG
10 TABLET ORAL 3 TIMES DAILY PRN
Qty: 270 TABLET | Refills: 3 | Status: SHIPPED | OUTPATIENT
Start: 2024-02-13 | End: 2024-04-16 | Stop reason: SDUPTHER

## 2024-02-16 ENCOUNTER — TELEPHONE (OUTPATIENT)
Dept: PRIMARY CARE CLINIC | Facility: CLINIC | Age: 69
End: 2024-02-16
Payer: MEDICARE

## 2024-02-16 NOTE — TELEPHONE ENCOUNTER
----- Message from Jessica Perales sent at 2/16/2024  8:03 AM CST -----  Contact: tp daughter - sada  Type:  Sooner Apoointment Request    Caller is requesting a sooner appointment.  Caller declined first available appointment listed below.  Caller will not accept being placed on the waitlist and is requesting a message be sent to doctor.  Name of Caller: sada   When is the first available appointment? 04/2024  Symptoms: hosp follw up DVT/ [facility]   Would the patient rather a call back or a response via [facility]?  phone  Best Call Back Number:[phone]  Additional Information:

## 2024-02-19 ENCOUNTER — LAB VISIT (OUTPATIENT)
Dept: LAB | Facility: HOSPITAL | Age: 69
End: 2024-02-19
Attending: STUDENT IN AN ORGANIZED HEALTH CARE EDUCATION/TRAINING PROGRAM
Payer: MEDICARE

## 2024-02-19 DIAGNOSIS — A04.8 INTESTINAL INFECTION DUE TO PROTEUS MIRABILIS: Primary | ICD-10-CM

## 2024-02-19 DIAGNOSIS — B96.4 INTESTINAL INFECTION DUE TO PROTEUS MIRABILIS: Primary | ICD-10-CM

## 2024-02-19 LAB
BASOPHILS # BLD AUTO: 0.03 K/UL (ref 0–0.2)
BASOPHILS NFR BLD: 0.4 % (ref 0–1.9)
DIFFERENTIAL METHOD BLD: ABNORMAL
EOSINOPHIL # BLD AUTO: 0.2 K/UL (ref 0–0.5)
EOSINOPHIL NFR BLD: 2 % (ref 0–8)
ERYTHROCYTE [DISTWIDTH] IN BLOOD BY AUTOMATED COUNT: 17.2 % (ref 11.5–14.5)
HCT VFR BLD AUTO: 28 % (ref 40–54)
HGB BLD-MCNC: 8.8 G/DL (ref 14–18)
IMM GRANULOCYTES # BLD AUTO: 0.02 K/UL (ref 0–0.04)
IMM GRANULOCYTES NFR BLD AUTO: 0.3 % (ref 0–0.5)
LYMPHOCYTES # BLD AUTO: 0.7 K/UL (ref 1–4.8)
LYMPHOCYTES NFR BLD: 8.5 % (ref 18–48)
MCH RBC QN AUTO: 27.9 PG (ref 27–31)
MCHC RBC AUTO-ENTMCNC: 31.4 G/DL (ref 32–36)
MCV RBC AUTO: 89 FL (ref 82–98)
MONOCYTES # BLD AUTO: 0.4 K/UL (ref 0.3–1)
MONOCYTES NFR BLD: 5.1 % (ref 4–15)
NEUTROPHILS # BLD AUTO: 6.6 K/UL (ref 1.8–7.7)
NEUTROPHILS NFR BLD: 83.7 % (ref 38–73)
NRBC BLD-RTO: 0 /100 WBC
PLATELET # BLD AUTO: 300 K/UL (ref 150–450)
PMV BLD AUTO: 10.2 FL (ref 9.2–12.9)
RBC # BLD AUTO: 3.15 M/UL (ref 4.6–6.2)
WBC # BLD AUTO: 7.84 K/UL (ref 3.9–12.7)

## 2024-02-19 PROCEDURE — 80053 COMPREHEN METABOLIC PANEL: CPT | Performed by: STUDENT IN AN ORGANIZED HEALTH CARE EDUCATION/TRAINING PROGRAM

## 2024-02-19 PROCEDURE — 85025 COMPLETE CBC W/AUTO DIFF WBC: CPT | Performed by: STUDENT IN AN ORGANIZED HEALTH CARE EDUCATION/TRAINING PROGRAM

## 2024-02-20 ENCOUNTER — HOSPITAL ENCOUNTER (OUTPATIENT)
Dept: RADIOLOGY | Facility: HOSPITAL | Age: 69
Discharge: HOME | End: 2024-02-20
Attending: NURSE PRACTITIONER
Payer: MEDICARE

## 2024-02-20 ENCOUNTER — HOSPITAL ENCOUNTER (EMERGENCY)
Facility: HOSPITAL | Age: 69
Discharge: HOME | End: 2024-02-20
Attending: EMERGENCY MEDICINE
Payer: MEDICARE

## 2024-02-20 ENCOUNTER — OFFICE VISIT (OUTPATIENT)
Dept: PALLIATIVE MEDICINE | Facility: CLINIC | Age: 69
End: 2024-02-20
Payer: MEDICARE

## 2024-02-20 VITALS
DIASTOLIC BLOOD PRESSURE: 78 MMHG | SYSTOLIC BLOOD PRESSURE: 125 MMHG | TEMPERATURE: 98 F | OXYGEN SATURATION: 99 % | WEIGHT: 158.94 LBS | HEART RATE: 90 BPM | BODY MASS INDEX: 24.17 KG/M2 | RESPIRATION RATE: 17 BRPM

## 2024-02-20 VITALS
TEMPERATURE: 99 F | BODY MASS INDEX: 23.83 KG/M2 | HEART RATE: 93 BPM | OXYGEN SATURATION: 80 % | DIASTOLIC BLOOD PRESSURE: 59 MMHG | SYSTOLIC BLOOD PRESSURE: 95 MMHG | WEIGHT: 156.75 LBS

## 2024-02-20 DIAGNOSIS — T40.2X5A THERAPEUTIC OPIOID INDUCED CONSTIPATION: ICD-10-CM

## 2024-02-20 DIAGNOSIS — I82.412 ACUTE DEEP VEIN THROMBOSIS (DVT) OF FEMORAL VEIN OF LEFT LOWER EXTREMITY: ICD-10-CM

## 2024-02-20 DIAGNOSIS — N48.89 PENILE BLEEDING: ICD-10-CM

## 2024-02-20 DIAGNOSIS — Z51.5 PALLIATIVE CARE ENCOUNTER: ICD-10-CM

## 2024-02-20 DIAGNOSIS — I26.99 ACUTE PULMONARY EMBOLISM WITHOUT ACUTE COR PULMONALE, UNSPECIFIED PULMONARY EMBOLISM TYPE: Primary | ICD-10-CM

## 2024-02-20 DIAGNOSIS — R31.9 HEMATURIA, UNSPECIFIED TYPE: ICD-10-CM

## 2024-02-20 DIAGNOSIS — R09.02 HYPOXIA: ICD-10-CM

## 2024-02-20 DIAGNOSIS — I82.402 ACUTE DEEP VEIN THROMBOSIS (DVT) OF LEFT LOWER EXTREMITY: ICD-10-CM

## 2024-02-20 DIAGNOSIS — Z85.118 HX OF CANCER OF LUNG: ICD-10-CM

## 2024-02-20 DIAGNOSIS — K59.03 THERAPEUTIC OPIOID INDUCED CONSTIPATION: ICD-10-CM

## 2024-02-20 DIAGNOSIS — G89.3 NEOPLASM RELATED PAIN: Primary | ICD-10-CM

## 2024-02-20 DIAGNOSIS — Z85.51 HX OF BLADDER CANCER: ICD-10-CM

## 2024-02-20 DIAGNOSIS — J96.91 RESPIRATORY FAILURE WITH HYPOXIA: ICD-10-CM

## 2024-02-20 LAB
ALBUMIN SERPL BCP-MCNC: 2.5 G/DL (ref 3.5–5.2)
ALBUMIN SERPL BCP-MCNC: 2.9 G/DL (ref 3.5–5.2)
ALP SERPL-CCNC: 107 U/L (ref 55–135)
ALP SERPL-CCNC: 123 U/L (ref 55–135)
ALT SERPL W/O P-5'-P-CCNC: 10 U/L (ref 10–44)
ALT SERPL W/O P-5'-P-CCNC: 11 U/L (ref 10–44)
ANION GAP SERPL CALC-SCNC: 12 MMOL/L (ref 8–16)
ANION GAP SERPL CALC-SCNC: 14 MMOL/L (ref 8–16)
APTT PPP: 37.5 SEC (ref 21–32)
AST SERPL-CCNC: 17 U/L (ref 10–40)
AST SERPL-CCNC: 18 U/L (ref 10–40)
BACTERIA #/AREA URNS HPF: ABNORMAL /HPF
BASOPHILS # BLD AUTO: 0.03 K/UL (ref 0–0.2)
BASOPHILS NFR BLD: 0.4 % (ref 0–1.9)
BILIRUB SERPL-MCNC: 0.3 MG/DL (ref 0.1–1)
BILIRUB SERPL-MCNC: 0.4 MG/DL (ref 0.1–1)
BILIRUB UR QL STRIP: NEGATIVE
BNP SERPL-MCNC: 208 PG/ML (ref 0–99)
BUN SERPL-MCNC: 19 MG/DL (ref 8–23)
BUN SERPL-MCNC: 21 MG/DL (ref 8–23)
CALCIUM SERPL-MCNC: 9.4 MG/DL (ref 8.7–10.5)
CALCIUM SERPL-MCNC: 9.9 MG/DL (ref 8.7–10.5)
CHLORIDE SERPL-SCNC: 104 MMOL/L (ref 95–110)
CHLORIDE SERPL-SCNC: 105 MMOL/L (ref 95–110)
CLARITY UR: ABNORMAL
CO2 SERPL-SCNC: 21 MMOL/L (ref 23–29)
CO2 SERPL-SCNC: 22 MMOL/L (ref 23–29)
COLOR UR: ABNORMAL
CREAT SERPL-MCNC: 1.4 MG/DL (ref 0.5–1.4)
CREAT SERPL-MCNC: 1.5 MG/DL (ref 0.5–1.4)
DIFFERENTIAL METHOD BLD: ABNORMAL
EOSINOPHIL # BLD AUTO: 0.1 K/UL (ref 0–0.5)
EOSINOPHIL NFR BLD: 0.7 % (ref 0–8)
ERYTHROCYTE [DISTWIDTH] IN BLOOD BY AUTOMATED COUNT: 17.2 % (ref 11.5–14.5)
EST. GFR  (NO RACE VARIABLE): 50 ML/MIN/1.73 M^2
EST. GFR  (NO RACE VARIABLE): 54.4 ML/MIN/1.73 M^2
GLUCOSE SERPL-MCNC: 80 MG/DL (ref 70–110)
GLUCOSE SERPL-MCNC: 95 MG/DL (ref 70–110)
GLUCOSE UR QL STRIP: NEGATIVE
HCT VFR BLD AUTO: 30.4 % (ref 40–54)
HGB BLD-MCNC: 9.6 G/DL (ref 14–18)
HGB UR QL STRIP: ABNORMAL
HYALINE CASTS #/AREA URNS LPF: 0 /LPF
IMM GRANULOCYTES # BLD AUTO: 0.02 K/UL (ref 0–0.04)
IMM GRANULOCYTES NFR BLD AUTO: 0.3 % (ref 0–0.5)
INR PPP: 1.1 (ref 0.8–1.2)
KETONES UR QL STRIP: NEGATIVE
LACTATE SERPL-SCNC: 0.8 MMOL/L (ref 0.5–2.2)
LEUKOCYTE ESTERASE UR QL STRIP: ABNORMAL
LYMPHOCYTES # BLD AUTO: 0.9 K/UL (ref 1–4.8)
LYMPHOCYTES NFR BLD: 12.4 % (ref 18–48)
MCH RBC QN AUTO: 27.7 PG (ref 27–31)
MCHC RBC AUTO-ENTMCNC: 31.6 G/DL (ref 32–36)
MCV RBC AUTO: 88 FL (ref 82–98)
MICROSCOPIC COMMENT: ABNORMAL
MONOCYTES # BLD AUTO: 0.4 K/UL (ref 0.3–1)
MONOCYTES NFR BLD: 5.6 % (ref 4–15)
NEUTROPHILS # BLD AUTO: 5.8 K/UL (ref 1.8–7.7)
NEUTROPHILS NFR BLD: 80.6 % (ref 38–73)
NITRITE UR QL STRIP: NEGATIVE
NRBC BLD-RTO: 0 /100 WBC
PH UR STRIP: 7 [PH] (ref 5–8)
PLATELET # BLD AUTO: 304 K/UL (ref 150–450)
PMV BLD AUTO: 9.1 FL (ref 9.2–12.9)
POTASSIUM SERPL-SCNC: 4 MMOL/L (ref 3.5–5.1)
POTASSIUM SERPL-SCNC: 4.1 MMOL/L (ref 3.5–5.1)
PROCALCITONIN SERPL IA-MCNC: 0.06 NG/ML
PROT SERPL-MCNC: 6.6 G/DL (ref 6–8.4)
PROT SERPL-MCNC: 7.6 G/DL (ref 6–8.4)
PROT UR QL STRIP: ABNORMAL
PROTHROMBIN TIME: 12.5 SEC (ref 9–12.5)
RBC # BLD AUTO: 3.47 M/UL (ref 4.6–6.2)
RBC #/AREA URNS HPF: >100 /HPF (ref 0–4)
SARS-COV-2 RDRP RESP QL NAA+PROBE: NEGATIVE
SODIUM SERPL-SCNC: 139 MMOL/L (ref 136–145)
SODIUM SERPL-SCNC: 139 MMOL/L (ref 136–145)
SP GR UR STRIP: 1.01 (ref 1–1.03)
TROPONIN I SERPL DL<=0.01 NG/ML-MCNC: <0.006 NG/ML (ref 0–0.03)
URN SPEC COLLECT METH UR: ABNORMAL
UROBILINOGEN UR STRIP-ACNC: NEGATIVE EU/DL
WBC # BLD AUTO: 7.16 K/UL (ref 3.9–12.7)
WBC #/AREA URNS HPF: >100 /HPF (ref 0–5)

## 2024-02-20 PROCEDURE — 85730 THROMBOPLASTIN TIME PARTIAL: CPT | Performed by: EMERGENCY MEDICINE

## 2024-02-20 PROCEDURE — 87040 BLOOD CULTURE FOR BACTERIA: CPT | Performed by: EMERGENCY MEDICINE

## 2024-02-20 PROCEDURE — U0002 COVID-19 LAB TEST NON-CDC: HCPCS | Performed by: EMERGENCY MEDICINE

## 2024-02-20 PROCEDURE — 99213 OFFICE O/P EST LOW 20 MIN: CPT | Mod: PBBFAC,25

## 2024-02-20 PROCEDURE — 81000 URINALYSIS NONAUTO W/SCOPE: CPT | Performed by: EMERGENCY MEDICINE

## 2024-02-20 PROCEDURE — 99214 OFFICE O/P EST MOD 30 MIN: CPT | Mod: S$PBB,,,

## 2024-02-20 PROCEDURE — 85610 PROTHROMBIN TIME: CPT | Performed by: EMERGENCY MEDICINE

## 2024-02-20 PROCEDURE — 80053 COMPREHEN METABOLIC PANEL: CPT | Performed by: EMERGENCY MEDICINE

## 2024-02-20 PROCEDURE — 83605 ASSAY OF LACTIC ACID: CPT | Performed by: EMERGENCY MEDICINE

## 2024-02-20 PROCEDURE — 99285 EMERGENCY DEPT VISIT HI MDM: CPT | Mod: 25,27

## 2024-02-20 PROCEDURE — 84484 ASSAY OF TROPONIN QUANT: CPT | Performed by: EMERGENCY MEDICINE

## 2024-02-20 PROCEDURE — 93010 ELECTROCARDIOGRAM REPORT: CPT | Mod: ,,, | Performed by: INTERNAL MEDICINE

## 2024-02-20 PROCEDURE — 34300047 RADIOPHARM REV CODE 343 W HCPCS: Performed by: EMERGENCY MEDICINE

## 2024-02-20 PROCEDURE — 93971 EXTREMITY STUDY: CPT | Mod: 26,LT,, | Performed by: RADIOLOGY

## 2024-02-20 PROCEDURE — 93005 ELECTROCARDIOGRAM TRACING: CPT

## 2024-02-20 PROCEDURE — 84145 PROCALCITONIN (PCT): CPT | Performed by: EMERGENCY MEDICINE

## 2024-02-20 PROCEDURE — 87086 URINE CULTURE/COLONY COUNT: CPT | Performed by: EMERGENCY MEDICINE

## 2024-02-20 PROCEDURE — 83880 ASSAY OF NATRIURETIC PEPTIDE: CPT | Performed by: EMERGENCY MEDICINE

## 2024-02-20 PROCEDURE — 85025 COMPLETE CBC W/AUTO DIFF WBC: CPT | Performed by: EMERGENCY MEDICINE

## 2024-02-20 PROCEDURE — 99999 PR PBB SHADOW E&M-EST. PATIENT-LVL III: CPT | Mod: PBBFAC,,,

## 2024-02-20 PROCEDURE — A9567 TECHNETIUM TC-99M AEROSOL: HCPCS | Performed by: EMERGENCY MEDICINE

## 2024-02-20 PROCEDURE — 93971 EXTREMITY STUDY: CPT | Mod: TC,LT

## 2024-02-20 PROCEDURE — A9540 TC99M MAA: HCPCS | Performed by: EMERGENCY MEDICINE

## 2024-02-20 RX ADMIN — KIT FOR THE PREPARATION OF TECHNETIUM TC 99M ALBUMIN AGGREGATED 5 MILLICURIE: 2 INJECTION, POWDER, LYOPHILIZED, FOR SUSPENSION INTRAPERITONEAL; INTRAVENOUS at 07:02

## 2024-02-20 RX ADMIN — KIT FOR THE PREPARATION OF TECHNETIUM TC 99M PENTETATE 33.5 MILLICURIE: 20 INJECTION, POWDER, LYOPHILIZED, FOR SOLUTION INTRAVENOUS; RESPIRATORY (INHALATION) at 06:02

## 2024-02-20 NOTE — ED PROVIDER NOTES
SCRIBE #1 NOTE: I, Maxim Uzma, am scribing for, and in the presence of, Dioni Dubose MD. I have scribed the entire note.       History     Chief Complaint   Patient presents with    Hypoxia     Sent from Md at clinic for O2 sat in 80's on RA. Placed on 3L NC at that time. Denies SOB    Leg Pain     Bilat leg pain. Dx with DVT last week. Pain has increased. Denies numbness or tingling.     Flank Pain     Bilat flank pain with recent nephrostomy tubes placed. Also c/o bleeding into drainage bag     HPI  2/20/2024, 5:19 PM  History obtained from the patient and [facility]    HPI:  Geovani Currie is a 68 y.o. male with a PMH of s/p bilateral nephrostomy tubes, s/p left kidney stent, stage 3b bladder CA and stage 1a lung CA, stage 3 CKD, HTN who presents to the [facility] emergency department for evaluation of multiple complaints. Patient was sent by MD at clinic today for spO2 in the 80's on RA. Patient denies any SOB or CP at this time. Patient also c/o worsening presence of blood in his right nephrostomy tube as well as continued penile blood-tinged discharge. She notes patient does not have had bladder urine output with his nephrostomy tubes and is on Eliquis due to DVT in his LLE. Associated symptoms include LLE swelling. No other complaints or concerns.     Arrival mode: Personal vehicle    An independent history was also taken from the patient's wife    External medical record reviewed: Note from today's visit with Palliative Medicine which shows patient has a hx of UTI's requiring admission. Patient is s/p right nephrostomy tube and left kidney stent. Hx of CKD stage 3. Patient was seen for blood tinged drainage from his penis and right nephrostomy tube. Patient is full code at this time and is being treated for stage 3b bladder CA and stage 1a lung CA.     PCP: Faizan Antoine MD    Review of patient's allergies indicates:  No Known Allergies   Past Medical History:   Diagnosis Date    Anxiety  disorder, unspecified     COPD (chronic obstructive pulmonary disease)     Hypertension     Thyroid disease      Past Surgical History:   Procedure Laterality Date    APPENDECTOMY      CATARACT EXTRACTION      NEPHROSTOMY      OPEN REDUCTION AND INTERNAL FIXATION (ORIF) OF INTERTROCHANTERIC FRACTURE OF FEMUR Right 2023    Procedure: ORIF, FRACTURE, FEMUR, INTERTROCHANTERIC;  Surgeon: Tanisha Guidry DO;  Location: [facility];  Service: Orthopedics;  Laterality: Right;  Gamma nail       Family History   Problem Relation Age of Onset    Cancer Mother         NOS    Cancer Father         NOS    Cancer Maternal Grandfather         NOS    Bladder Cancer Neg Hx      Social History     Tobacco Use    Smoking status: Every Day     Current packs/day: 0.00     Types: Vaping with nicotine, Cigarettes     Last attempt to quit: 10/2023     Years since quittin.3     Passive exposure: Never    Smokeless tobacco: Never   Substance and Sexual Activity    Alcohol use: Never    Drug use: Never    Sexual activity: Not Currently     Partners: Female      Review of Systems     Review of Systems   Constitutional: Negative.    HENT: Negative.     Eyes: Negative.    Respiratory: Negative.  Negative for shortness of breath.    Cardiovascular:  Positive for leg swelling (LLE). Negative for chest pain.   Gastrointestinal: Negative.    Endocrine: Negative.    Genitourinary:  Positive for hematuria (in right nephrostomy tube) and penile discharge.   Musculoskeletal: Negative.    Skin: Negative.    Allergic/Immunologic: Negative.    Neurological: Negative.    Hematological: Negative.    Psychiatric/Behavioral: Negative.            Physical Exam     Initial Vitals   BP Pulse Resp Temp SpO2   24 1532 24 1532 24 1532 24 1534 24 1534   (!) 101/45 91 17 97.6 °F (36.4 °C) 100 %      MAP       --                 Physical Exam  Nursing notes and vital signs reviewed.  Constitutional: Patient is in no acute  distress.   Head: Normocephalic. Atraumatic.   Eyes: Conjunctivae are not pale. No scleral icterus.   ENT: Mucous membranes moist.   Neck: Supple.   Cardiovascular: Regular rate. Regular rhythm.   Pulmonary: No respiratory distress.   Abdominal: Non-distended.   Musculoskeletal: Moves all extremities. No obvious deformities. No edema.   : Patient has bilateral nephrostomy tubes present with the right tube containing mild to moderate maroon colored blood. Patient also has blood at the urethral meatus.   Skin: Warm and dry.   Neurological:  Alert, awake, and appropriate. Normal speech. No acute lateralizing neurologic deficits appreciated.   Psychiatric: Normal affect.       ED Course   Procedures  Vitals:    02/20/24 1532 02/20/24 1534 02/20/24 1619 02/20/24 1950   BP: (!) 101/45  125/61 (!) 158/66   Pulse: 91  80 81   Resp: 17  16 (!) 21   Temp:  97.6 °F (36.4 °C)     TempSrc:  Oral     SpO2:  100%  98%   Weight:        02/20/24 1952 02/20/24 2000 02/20/24 2030 02/20/24 2100   BP:  (!) 162/73 (!) 167/82 (!) 157/75   Pulse:  74 81 91   Resp:  (!) 21 20 (!) 21   Temp:       TempSrc:       SpO2:  99% 100% 100%   Weight: 72.1 kg (158 lb 15.2 oz)       02/20/24 2130 02/20/24 2200 02/20/24 2325   BP: (!) 162/78 137/81 125/78   Pulse: 82 95 90   Resp: (!) 21 20 17   Temp:   97.8 °F (36.6 °C)   TempSrc:   Oral   SpO2: 100% 99% 99%   Weight:        Lab Results Interpreted as Abnormal:  Labs Reviewed   CBC W/ AUTO DIFFERENTIAL - Abnormal; Notable for the following components:       Result Value    RBC 3.47 (*)     Hemoglobin 9.6 (*)     Hematocrit 30.4 (*)     MCHC 31.6 (*)     RDW 17.2 (*)     MPV 9.1 (*)     Lymph # 0.9 (*)     Gran % 80.6 (*)     Lymph % 12.4 (*)     All other components within normal limits   COMPREHENSIVE METABOLIC PANEL - Abnormal; Notable for the following components:    CO2 22 (*)     Creatinine 1.5 (*)     Albumin 2.9 (*)     eGFR 50 (*)     All other components within normal limits   APTT -  Abnormal; Notable for the following components:    aPTT 37.5 (*)     All other components within normal limits   URINALYSIS, REFLEX TO URINE CULTURE - Abnormal; Notable for the following components:    Color, UA Orange (*)     Appearance, UA Hazy (*)     Protein, UA 2+ (*)     Occult Blood UA 3+ (*)     Leukocytes, UA 3+ (*)     All other components within normal limits    Narrative:     Specimen Source->Urine   B-TYPE NATRIURETIC PEPTIDE - Abnormal; Notable for the following components:     (*)     All other components within normal limits   URINALYSIS MICROSCOPIC - Abnormal; Notable for the following components:    RBC, UA >100 (*)     WBC, UA >100 (*)     All other components within normal limits    Narrative:     Specimen Source->Urine   CULTURE, BLOOD    Narrative:     Aerobic and anaerobic   CULTURE, BLOOD    Narrative:     Aerobic and anaerobic   CULTURE, URINE    Narrative:     Specimen Source->Urine   TROPONIN I   PROTIME-INR   LACTIC ACID, PLASMA   PROCALCITONIN   SARS-COV-2 RNA AMPLIFICATION, QUAL      All Lab Results:  Results for orders placed or performed during the hospital encounter of 02/20/24   Blood culture x two cultures. Draw prior to antibiotics.    Specimen: Peripheral, Antecubital, Left; Blood   Result Value Ref Range    Blood Culture, Routine No Growth to date     Blood Culture, Routine No Growth to date    Blood culture x two cultures. Draw prior to antibiotics.    Specimen: Peripheral, Antecubital, Right; Blood   Result Value Ref Range    Blood Culture, Routine No Growth to date     Blood Culture, Routine No Growth to date    Urine culture    Specimen: Urine   Result Value Ref Range    Urine Culture, Routine       Multiple organisms isolated. None in predominance.  Repeat if    Urine Culture, Routine clinically necessary.    CBC auto differential   Result Value Ref Range    WBC 7.16 3.90 - 12.70 K/uL    RBC 3.47 (L) 4.60 - 6.20 M/uL    Hemoglobin 9.6 (L) 14.0 - 18.0 g/dL     Hematocrit 30.4 (L) 40.0 - 54.0 %    MCV 88 82 - 98 fL    MCH 27.7 27.0 - 31.0 pg    MCHC 31.6 (L) 32.0 - 36.0 g/dL    RDW 17.2 (H) 11.5 - 14.5 %    Platelets 304 150 - 450 K/uL    MPV 9.1 (L) 9.2 - 12.9 fL    Immature Granulocytes 0.3 0.0 - 0.5 %    Gran # (ANC) 5.8 1.8 - 7.7 K/uL    Immature Grans (Abs) 0.02 0.00 - 0.04 K/uL    Lymph # 0.9 (L) 1.0 - 4.8 K/uL    Mono # 0.4 0.3 - 1.0 K/uL    Eos # 0.1 0.0 - 0.5 K/uL    Baso # 0.03 0.00 - 0.20 K/uL    nRBC 0 0 /100 WBC    Gran % 80.6 (H) 38.0 - 73.0 %    Lymph % 12.4 (L) 18.0 - 48.0 %    Mono % 5.6 4.0 - 15.0 %    Eosinophil % 0.7 0.0 - 8.0 %    Basophil % 0.4 0.0 - 1.9 %    Differential Method Automated    Comprehensive metabolic panel   Result Value Ref Range    Sodium 139 136 - 145 mmol/L    Potassium 4.0 3.5 - 5.1 mmol/L    Chloride 105 95 - 110 mmol/L    CO2 22 (L) 23 - 29 mmol/L    Glucose 80 70 - 110 mg/dL    BUN 21 8 - 23 mg/dL    Creatinine 1.5 (H) 0.5 - 1.4 mg/dL    Calcium 9.9 8.7 - 10.5 mg/dL    Total Protein 7.6 6.0 - 8.4 g/dL    Albumin 2.9 (L) 3.5 - 5.2 g/dL    Total Bilirubin 0.4 0.1 - 1.0 mg/dL    Alkaline Phosphatase 123 55 - 135 U/L    AST 17 10 - 40 U/L    ALT 10 10 - 44 U/L    eGFR 50 (A) >60 mL/min/1.73 m^2    Anion Gap 12 8 - 16 mmol/L   Troponin I   Result Value Ref Range    Troponin I <0.006 0.000 - 0.026 ng/mL   Protime-INR   Result Value Ref Range    Prothrombin Time 12.5 9.0 - 12.5 sec    INR 1.1 0.8 - 1.2   APTT   Result Value Ref Range    aPTT 37.5 (H) 21.0 - 32.0 sec   Lactic acid, plasma #1   Result Value Ref Range    Lactate (Lactic Acid) 0.8 0.5 - 2.2 mmol/L   Urinalysis, Reflex to Urine Culture Urine, Clean Catch (From right nephrostomy tube.)    Specimen: Urine   Result Value Ref Range    Specimen UA Urine, Clean Catch     Color, UA Orange (A) Yellow, Straw, Andra    Appearance, UA Hazy (A) Clear    pH, UA 7.0 5.0 - 8.0    Specific Gravity, UA 1.015 1.005 - 1.030    Protein, UA 2+ (A) Negative    Glucose, UA Negative Negative     Ketones, UA Negative Negative    Bilirubin (UA) Negative Negative    Occult Blood UA 3+ (A) Negative    Nitrite, UA Negative Negative    Urobilinogen, UA Negative <2.0 EU/dL    Leukocytes, UA 3+ (A) Negative   Brain natriuretic peptide   Result Value Ref Range     (H) 0 - 99 pg/mL   Procalcitonin   Result Value Ref Range    Procalcitonin 0.06 <0.25 ng/mL   COVID-19 Rapid Screening   Result Value Ref Range    SARS-CoV-2 RNA, Amplification, Qual Negative Negative   Urinalysis Microscopic   Result Value Ref Range    RBC, UA >100 (H) 0 - 4 /hpf    WBC, UA >100 (H) 0 - 5 /hpf    Bacteria None None-Occ /hpf    Hyaline Casts, UA 0 0-1/lpf /lpf    Microscopic Comment SEE COMMENT    EKG 12-lead   Result Value Ref Range    QRS Duration 90 ms    [facility] QTC Calculation 476 ms           Imaging Results              NM Lung Scan Ventilation Perfusion (Final result)  Result time 02/20/24 19:41:55      Final result by Le Calixto MD (02/20/24 19:41:55)                   Impression:      Low to intermediate probability for PE      Electronically signed by: Le Calixto  Date:    02/20/2024  Time:    19:41               Narrative:    EXAMINATION:  NM LUNG VENTILATION AND PERFUSION IMAGING    CLINICAL HISTORY:  Pulmonary embolism (PE) suspected, high prob;    COMPARISON:  Radiographic correlation    Radiotracer    One mCi technetium 9 9 M DTPA was used for ventilation imaging.    Five mCi of Tc-99m MAA was administered intravenously for the perfusion portion of the exam.  Anterior, lateral, posterior, and oblique images reveal    FINDINGS:  There are matching defects bilaterally.  No sizable mismatch ventilation perfusion defect identified                                       X-Ray Chest AP Portable (Final result)  Result time 02/20/24 17:46:23      Final result by Le Calixto MD (02/20/24 17:46:23)                   Impression:      No acute abnormality.      Electronically signed by: Le Rosen  Marily  Date:    02/20/2024  Time:    17:46               Narrative:    EXAMINATION:  XR CHEST AP PORTABLE    CLINICAL HISTORY:  Sepsis;.    TECHNIQUE:  Single frontal portable view of the chest was performed.    COMPARISON:  None    FINDINGS:  Support devices: None    The lungs are clear, with normal appearance of pulmonary vasculature and no pleural effusion or pneumothorax.    The cardiac silhouette is normal in size. The hilar and mediastinal contours are unremarkable.    Bones are intact.                                     ED Physician's independent review of the above imaging: agree with radiologist, Le Calixto MD    The EKG was ordered, reviewed, and independently interpreted by the ED Physician:  Interpretation time: 19:40  Rate: 82 bpm  Rhythm: Sinus rhythm with marked sinus arrhyhtmia.   Interpretation: Cannot rule out Inferior infarct. No STEMI.        The emergency physician reviewed the vital signs and test results, which are outlined above.     ED Discussion     5:22 PM: Discussed with patient and spouse the benefits and drawbacks of contrast CT scans. Patient verbalizes wanting to protect his kidney function. We will opt for VQ scan for imaging at this time.     10:34 PM: Patient's evaluation in the ED does not suggest any emergent or life-threatening medical conditions requiring immediate intervention beyond what was provided in the ED, and I believe patient is safe for discharge. Regardless, an unremarkable evaluation in the ED does not preclude the development or presence of a serious or life-threatening condition. As such, patient was given return instructions for any change or worsening of symptoms.          Medical Decision Making:   Clinical Tests:   Lab Tests: Ordered and Reviewed  Radiological Study: Ordered and Reviewed  Medical Tests: Ordered and Reviewed         ED Medication(s) Administered:  Medications   kit prep Tc 99m-pentetic acid (aerosol) 20 mg SolR 33.5 millicurie (33.5  millicuries Inhalation Given 2/20/24 1850)   kit for of prep Tc-99m-albumin 2 mg SolR 5 millicurie (5 millicuries Intravenous Given 2/20/24 1915)       Prescription Management: I performed a review of the patient's current Rx medication list as input by nursing staff.    Discharge Medication List as of 2/20/2024 10:33 PM        CONTINUE these medications which have NOT CHANGED    Details   albuterol (ACCUNEB) 0.63 mg/3 mL Nebu Take 3 mLs (0.63 mg total) by nebulization 3 (three) times daily as needed (sob, wheezing). Rescue, Starting Fri 11/3/2023, Normal      albuterol (PROVENTIL/VENTOLIN HFA) 90 mcg/actuation inhaler Inhale 1-2 puffs into the lungs every 4 (four) hours as needed for Wheezing. Rescue, Starting Fri 11/3/2023, Normal      ALPRAZolam (XANAX) 0.5 MG tablet Take 2 tablets (1 mg total) by mouth 3 (three) times daily as needed for Anxiety., Starting Tue 12/12/2023, Normal      apixaban (ELIQUIS) 5 mg Tab Take 10mg po BID x 7 days then 5mg po daily, Normal      cyclobenzaprine (FLEXERIL) 10 MG tablet Take 1 tablet (10 mg total) by mouth 3 (three) times daily as needed for Muscle spasms., Starting Tue 2/13/2024, Normal      docusate sodium (COLACE) 100 MG capsule Take 100 mg by mouth 2 (two) times daily., Starting Wed 11/1/2023, Historical Med      EScitalopram oxalate (LEXAPRO) 20 MG tablet Take 1 tablet (20 mg total) by mouth once daily., Starting Tue 12/12/2023, Normal      fluticasone furoate-vilanteroL (BREO ELLIPTA) 100-25 mcg/dose diskus inhaler Inhale 1 puff into the lungs once daily. Controller, Starting Thu 11/9/2023, Until Fri 11/8/2024, Normal      HYDROcodone-acetaminophen (NORCO) 7.5-325 mg per tablet Take 1 tablet by mouth every 6 (six) hours as needed for Pain., Starting Fri 2/9/2024, Normal      levothyroxine (SYNTHROID) 125 MCG tablet Take 1 tablet (125 mcg total) by mouth before breakfast., Starting Tue 12/12/2023, Normal      naloxone (NARCAN) 4 mg/actuation Spry 1 spray once., Starting  Tue 1/2/2024, Historical Med      nicotine (NICODERM CQ) 14 mg/24 hr Place 1 patch onto the skin every 24 hours., Historical Med      pregabalin (LYRICA) 75 MG capsule Take 1 capsule (75 mg total) by mouth every evening., Starting Tue 1/30/2024, Normal      sodium chloride 0.9% SolP 100 mL with meropenem 1 gram SolR 2 g Inject 2 g into the vein every 12 (twelve) hours. for 14 days, Starting Wed 2/7/2024, Until Wed 2/21/2024, No Print      vitamin D (VITAMIN D3) 1000 units Tab Take 25 mcg by mouth once daily., Starting Tue 8/29/2023, Historical Med                 Follow-up Information       Schedule an appointment as soon as possible for a visit  with Faizan Antoine MD.    Specialty: Family Medicine  Contact information:  [address] [place] [address]  [phone]               [facility] - Emergency Dept..    Specialty: Emergency Medicine  Why: As needed, If symptoms worsen  Contact information:  [address] [place] [id number] [phone]                           Scribe Attestation:   Scribe #1: I performed the above scribed service and the documentation accurately describes the services I performed. I attest to the accuracy of the note.     Attending:   Physician Attestation Statement for Scribe #1: I, Dioni Dubose MD, personally performed the services described in this documentation, as scribed by Maxim Gusman in my presence, and it is both accurate and complete. As with other dictation methods such as dictation software, small errors or inconsistencies may be overlooked due to the goal of spending more face-to-face time with patients.      Clinical Impression       ICD-10-CM ICD-9-CM   1. Acute pulmonary embolism without acute cor pulmonale, unspecified pulmonary embolism type  I26.99 415.19   2. Respiratory failure with hypoxia  J96.91 518.81      ED Disposition Condition    Discharge Stable               Dioni Dubose MD  02/22/24 1955

## 2024-02-20 NOTE — Clinical Note
Chad Ibarra,   Can you please help re-schedule Mr. Currie?  Is there a way you can help me with his pain contract? He will be here tomorrow to see Dr. Frazier.   I just need his signature and a witness signature. I can sign my name on it on Tuesday.   Thank you so much!  EB

## 2024-02-20 NOTE — PROGRESS NOTES
Palliative Medicine  Follow-up   Consult Requested By: No ref. provider found  Reason for Consult: Symptom Management/Advance Care Planning/Goals of Care Discussion        SUBJECTIVE:     History of Present Illness:  Geovani Currie is a 68 y.o. male with Recurrent Stage 3B Bladder Cancer and Stage 1A Lung Cancer.   Plans to receive SBRT for Lung cancer. He is a poor surgical candidate for lung resection, per Dr. Wagoner and Dr. Antunez (Rad Onc).   S/p: TURBT and gemcitabine/radiotherapy last on 11/25/2020, with repeat TURBT on 08/30/2023   S/p: Right Nephrostomy tube replacement-12/28/2023.  S/P; Left Kidney Stent  S/p; R Hip ORIF on 11/21/23.   PMHX: L leg (DVT on Eliquis), CKD Stage III, HTN, Spinal stenosis, progressive functional decline.   Multiple UTIs requiring frequent hospital admissions.   Most recent admission: Feb 02-Feb. 04-  Left leg DVT and UTI. On IV Meropenem x14 days.  Home health following,   He is followed by Dr. Wagoner, Dr. Frazier, Urology, and Rad Onc.    Chief Complain: Pain/Hypoxia/Hematuria/Penile Bleeding    2/22/2024: Visit initiated as a virtual visit following hospital discharge for left leg DVT and UTI (Feb 02-Feb. 04)  Transitioned to in-person due to pt and his wife reporting penile and blood-tinged drainage from nephrostomy bag.    Pt attended clinic with his wife, Ms. Cornejo. Seen sitting in the wheelchair.  O2 sat 80s on room air, improved to 90s on 3LNC and pt's wife administered PRN ventolin. Pulse 90s regularly irregular. Bp 90s-100s/60s.  O2 sat was monitored in the clinic with two different pulse oximeters for at least 10 mins in clinic, and it was consistently low in the 80s on room air.   Hematuria noted in right nephrostomy, scant penile bleeding. Pt's wife stated this started 1 week ago.   Pt noted his pelvic, left leg, and lower back pain did not improve with Norco 7.5mg PRN. Pt and his wife are unsure of how many doses used per day or how many tabs left.  His wife is  requesting increase to Norco 10mg. Constipation is improving.  Pt's wife reported he vomited 4 times the previous day.   Pt was referred to ED for hypoxia, hematuria, and penile bleeding. Palliative care visit was suspended.     Past Visits:     1/18/2024:  Pt attended clinic with wife, Ms. Cornejo. He was in no acute distress, cognition has worsened since last visit. He does not remember the specific details of his hospital admission on 1/12/2024.   Vital signs stable on room air.  Constipation- Takes Glycolax daily. Last BM- 3 days ago. Passing gas.   Lower back pain and right hip pain 7/10. Pain poorly managed on Norco 5mg Q8H PRN. Pt wants to increase the frequency, but he and his wife are concerned about constipation   Attempted GOC conversation, pt's wife stated they are still recovering from recent admission and would like to defer code status/GOC discussion for now.     LA BEST Reviewed and Summarized:          Pt attended clinic with his daughter, Stephania. He was in no acute distress. Hypotension: 86/51- Asymptomatic.   I explained the role palliative care often plays in supporting patients with serious illness and their families regarding symptom management, advance care planning, and goals of care discussion.  Pt and daughter verbalized understanding.     Pt reported Right hip pain 8/10 following his ORIF and pelvic/lower back pain. He is inconsistent with physiotherapy due to pain, fatigue, and declining functional status. He is taking Norco 5-10mg QID PRN.   He also noted anxiety especially during the day, managed with Xanax 0.5mg PRN. He stated his anxiety slightly improves with Xanax 0.5mg, and wants to increase to Xanax 1mg BID PRN.  He notes he sometimes takes Flexeril for anxiety with no significant effect. He is sometimes unsure of how to take his medications, but his daughter and wife assist with managing his medications.  His daughter expressed concerns about pt falling due to taking medications  with sedative effects.    He is unsure if Lexapro is improving his mood, and would like to stop/change it, per his wife's request.   Pt stated he will defer behavioral health counseling for now.     We discussed advance care planning, goals of care, and code status, Full Code vs. DNR including risks, benefits, and alternatives.   Pt and his daughter informed me they would like to discuss his cancer diagnosis with Dr. Frazier and explore other treatment options prior to choosing hospice care. Pt and his family had previously considered [facility], but would like to defer further conversations with them for now.       Furthermore,  pt stated he would like to remain FULL CODE, which is a change from his previous wish for DNR code status during a previous conversation with my colleague, Randa Castillo NP. Pt's daughter also noted this is change she was unaware of. She stated pt had expressed his wish for DNR code status since he lived in [place]. However, she stated she respects his wishes and would like to discuss this further.    Pt stated he is unsure of how long he would like to remain on life support if no physical/neurological recovery, but he will discuss this further with his family.   Given, pt's change in code status, I explained to pt and his daughter that there is no resuscitation attempt in hospice care. We discussed the focus of hospice care in optimizing symptom management and quality of life without aggressive medical treatments including chemotherapy. Pt's daughter stated she was unaware that pt would not be resuscitated should he enrol in hospice care.      Pt's elected HCPOAs are: 1. Wife: Chantal Currie: [phone]  Daughter: Stephania Mann: [phone]    LANNY JEWELL reviewed and summarized:        Past Medical History:   Diagnosis Date    Anxiety disorder, unspecified     COPD (chronic obstructive pulmonary disease)     Hypertension     Thyroid disease      Past Surgical History:   Procedure  Laterality Date    APPENDECTOMY      CATARACT EXTRACTION      NEPHROSTOMY      OPEN REDUCTION AND INTERNAL FIXATION (ORIF) OF INTERTROCHANTERIC FRACTURE OF FEMUR Right 11/21/2023    Procedure: ORIF, FRACTURE, FEMUR, INTERTROCHANTERIC;  Surgeon: Tanisha Guidry DO;  Location: [facility];  Service: Orthopedics;  Laterality: Right;  Gamma nail     Family History   Problem Relation Age of Onset    Cancer Mother         NOS    Cancer Father         NOS    Cancer Maternal Grandfather         NOS    Bladder Cancer Neg Hx      Review of patient's allergies indicates:  No Known Allergies    Medications:    Current Outpatient Medications:     albuterol (ACCUNEB) 0.63 mg/3 mL Nebu, Take 3 mLs (0.63 mg total) by nebulization 3 (three) times daily as needed (sob, wheezing). Rescue, Disp: 75 mL, Rfl: 3    albuterol (PROVENTIL/VENTOLIN HFA) 90 mcg/actuation inhaler, Inhale 1-2 puffs into the lungs every 4 (four) hours as needed for Wheezing. Rescue, Disp: 18 g, Rfl: 11    ALPRAZolam (XANAX) 0.5 MG tablet, Take 2 tablets (1 mg total) by mouth 3 (three) times daily as needed for Anxiety., Disp: 60 tablet, Rfl: 2    apixaban (ELIQUIS) 5 mg Tab, Take 10mg po BID x 7 days then 5mg po daily, Disp: 74 tablet, Rfl: 1    cyclobenzaprine (FLEXERIL) 10 MG tablet, Take 1 tablet (10 mg total) by mouth 3 (three) times daily as needed for Muscle spasms., Disp: 270 tablet, Rfl: 3    docusate sodium (COLACE) 100 MG capsule, Take 100 mg by mouth 2 (two) times daily., Disp: , Rfl:     EScitalopram oxalate (LEXAPRO) 20 MG tablet, Take 1 tablet (20 mg total) by mouth once daily., Disp: 90 tablet, Rfl: 3    fluticasone furoate-vilanteroL (BREO ELLIPTA) 100-25 mcg/dose diskus inhaler, Inhale 1 puff into the lungs once daily. Controller, Disp: 30 each, Rfl: 11    HYDROcodone-acetaminophen (NORCO) 7.5-325 mg per tablet, Take 1 tablet by mouth every 6 (six) hours as needed for Pain., Disp: 120 tablet, Rfl: 0    levothyroxine (SYNTHROID) 125 MCG tablet,  Take 1 tablet (125 mcg total) by mouth before breakfast., Disp: 90 tablet, Rfl: 3    naloxone (NARCAN) 4 mg/actuation Spry, 1 spray once., Disp: , Rfl:     nicotine (NICODERM CQ) 14 mg/24 hr, Place 1 patch onto the skin every 24 hours., Disp: , Rfl:     pregabalin (LYRICA) 75 MG capsule, Take 1 capsule (75 mg total) by mouth every evening., Disp: 30 capsule, Rfl: 1    vitamin D (VITAMIN D3) 1000 units Tab, Take 25 mcg by mouth once daily., Disp: , Rfl:     OBJECTIVE:     ROS:  Review of Systems   Constitutional:  Positive for activity change and fatigue. Negative for appetite change, fever and unexpected weight change.   HENT:  Negative for trouble swallowing and voice change.    Eyes: Negative.    Respiratory:  Negative for cough, chest tightness, shortness of breath and wheezing.    Cardiovascular:  Negative for chest pain, palpitations and leg swelling.   Gastrointestinal:  Positive for abdominal pain (Lower Abdomen/Pelvis) and constipation. Negative for abdominal distention, diarrhea, nausea and vomiting.   Genitourinary:  Positive for flank pain and hematuria.        Bilat Nephrostomy bag   Musculoskeletal:  Positive for back pain, gait problem (Ambulates wi/ Walker/Wheelchair) and myalgias. Negative for arthralgias and joint swelling.   Skin:  Negative for color change, pallor, rash and wound.   Neurological:  Positive for weakness (Right leg). Negative for dizziness, tremors, speech difficulty, numbness and headaches.   Psychiatric/Behavioral:  Positive for dysphoric mood and sleep disturbance (Due to pain). Negative for agitation, behavioral problems, confusion and suicidal ideas. The patient is nervous/anxious.        Review of Symptoms      Symptom Assessment (ESAS 0-10 Scale)  Pain:  7  Dyspnea:  0  Anxiety:  0  Nausea:  6  Depression:  2  Anorexia:  3  Fatigue:  6  Insomnia:  0  Restlessness:  0  Agitation:  0     CAM / Delirium:  Negative  Constipation:  Positive  Diarrhea:  Negative    Anxiety:  Is  nervous/anxious  Constipation:  Constipation    Bowel Management Plan (BMP):  No      Pain Assessment:    Location(s): abdomen and leg    Abdomen       Location: bilateral, anterior and posterior        Quality: Throbbing and pressure-like        Quantity: 7/10 in intensity        Chronicity: Onset 0 year(s) ago, gradually worsening        Aggravating Factors: Movement        Alleviating Factors: Opiates        Associated Symptoms: Arthralgias and myalgias  Leg       Location: right (Hip)        Quality: Sharp and aching        Quantity: 8/10 in intensity        Chronicity: Onset 1 month(s) ago, unchanged        Aggravating Factors: Movement        Alleviating Factors: Opiates        Associated Symptoms: Arthralgias and myalgias    Modified Cony Scale:  0    Performance Status:  50    Living Arrangements:  Lives with family    Psychosocial/Cultural:   See Palliative Psychosocial Note: Yes  Retired. Pt and his wife live with their daughter and her five children. Pt's daughter often travels for work.   **Primary  to Follow**  Palliative Care  Consult: Yes      Advance Care Planning   Advance Directives:   Living Will: No    LaPOST: No    Do Not Resuscitate Status: No    Medical Power of : Yes    Agent's Name:  1. Wife: Chantal Currie: [phone] Daughter: Stephania Mann: [phone]    Decision Making:  Patient answered questions and Family answered questions  Goals of Care: What is most important right now is to focus on symptom/pain control, extending life as long as possible, even it it means sacrificing quality, curative/life-prolongation (regardless of treatment burdens). Accordingly, we have decided that the best plan to meet the patient's goals includes continuing with treatment.            Physical Exam:  Vitals: Temp: 98.7 °F (37.1 °C) (02/20/24 1350)  Pulse: 93 (02/20/24 1350)  BP: (!) 95/59 (02/20/24 1350)  SpO2: (!) 80 % (02/20/24 1350)  Physical Exam  Vitals and nursing  note reviewed.   Constitutional:       General: He is not in acute distress.     Appearance: He is underweight. He is ill-appearing.   HENT:      Head: Normocephalic and atraumatic.      Nose: Nose normal. No congestion or rhinorrhea.      Mouth/Throat:      Mouth: Mucous membranes are moist.      Pharynx: Oropharynx is clear. No oropharyngeal exudate or posterior oropharyngeal erythema.   Eyes:      General: No scleral icterus.        Right eye: No discharge.         Left eye: No discharge.      Conjunctiva/sclera: Conjunctivae normal.      Pupils: Pupils are equal, round, and reactive to light.   Cardiovascular:      Rate and Rhythm: Normal rate and regular rhythm.      Pulses: Normal pulses.      Heart sounds: Murmur heard.      No gallop.   Pulmonary:      Effort: Pulmonary effort is normal. No respiratory distress.      Breath sounds: Normal breath sounds. No stridor. No wheezing.   Chest:      Chest wall: No tenderness.   Abdominal:      General: Bowel sounds are normal. There is no distension.      Palpations: Abdomen is soft.      Tenderness: There is abdominal tenderness (Lower abdomen/Pelvis). There is no right CVA tenderness or left CVA tenderness.   Genitourinary:     Comments: Bilat nephrostomy bags. Site CDI. Urine normal color  Musculoskeletal:         General: Tenderness (R hip) and signs of injury (s/p R hip ORIF) present. No swelling.      Cervical back: Normal range of motion and neck supple.      Right lower leg: No edema.      Left lower leg: No edema.   Skin:     General: Skin is warm and dry.      Capillary Refill: Capillary refill takes less than 2 seconds.      Coloration: Skin is not jaundiced or pale.      Findings: No rash.   Neurological:      Mental Status: He is alert and oriented to person, place, and time.      Motor: Weakness present.      Gait: Gait abnormal (Ambulates with walker/wheelchair).   Psychiatric:         Attention and Perception: Attention normal.         Mood and  Affect: Affect normal. Mood is anxious.         Speech: Speech normal.         Behavior: Behavior normal. Behavior is cooperative.         Thought Content: Thought content normal.         Cognition and Memory: Cognition normal. Memory is impaired.         Judgment: Judgment normal.         Labs and radiology data reviewed    ASSESSMENT/PLAN:   Recurrent Stage 3B Bladder Cancer and Stage 1A Lung Cancer  Followed by Dr. Wagoner/Dr. Frazier (Uro Onc)  Plans to receive SBRT for Lung cancer. He is a poor surgical candidate for lung resection, per Dr. Wagoner and Dr. Antunez (Rad Onc).  Plan to receive Keytruda Q3W   On IV Meropenem x14 days- Home health following.   PET 12/13/2023: Neck: No abnormal foci of FDG uptake identified.  Chest: Hypermetabolic spiculated nodule measuring 2.0 x 1.3 cm in the right lung apex.  No other pulmonary nodules or masses.  No hypermetabolic intrathoracic or axillary lymphadenopathy.  Aortic atherosclerosis and coronary artery calcifications. Abdomen: Bilateral nephrostomy tubes in satisfactory position.  Left renal atrophy with cortical thinning. Pelvis: Circumferential urinary bladder wall thickening with circumferential heterogeneous increased uptake that involves the left side of the mesorectal fascia.  Curvilinear intraluminal calcifications within the urinary bladder along the left lateral wall.  Nine hypermetabolic pelvic lymphadenopathy. Musculoskeletal: Right hip fracture transfixed orthopedic hardware.  Hypoxia/Hematuria/Penile Bleeding  VQ scan Feb 20, 2024: Low to intermediate probability for PE   Pt discharged from ED on Feb 20, 2024 as O2 sat improved and stable on room air. No acute abnormalities noted on investigations.  F/u with Urology and Dr. Frazier this week   Dr. Frazier and Ciaran informed of above  Pt should remain on Eliquis per Dr. Frazier  Neoplasm related pain in the setting on Stage 3 Bladder Cancer/Right hip post-op pain  Reviewed scans with collaborating  physician, Dr. Rebolledo.   Uncontrolled-   Norco 7.5mg Q6H PRN. Can titrate as tolerated and if constipation improves  Narcan RX given and explained use to pt and family. Pt and family verbalized understanding.    Pt and his daughter informed to take Norco 1-2 hours before or after Xanax to minimize risks for CNS depression.  Ortho following for R hip ORIF  Pt and daughter instructed to stop Flexeril if not effective, and causing CNS depression.  -Pain Contract- To be completed prior to prescribing   -UDS-  N/A   Opioid-Induced constipation  Miralax 1pack/day or Senna 2 tabs at bedtime for opioid-induced constipation    OIC sheet given and explained to pt and his wife.   Encounter for Palliative Care  -Code status: Full Code. Pt does not wish to remain DNR.  -HCPOA: 1. Wife: Chantal Currie: [phone]  Daughter: Stephania Mann: [phone]  -GOC: Defer hospice. Wants to proceed with Keytruda.   -See HPI for further details      Follow up: 1 month      35 minutes of total time spent on the encounter, which includes face to face time and non-face to face time preparing to see the patient (eg, review of tests), Obtaining and/or reviewing separately obtained history, Documenting clinical information in the electronic or other health record, Independently interpreting results (not separately reported) and communicating results to the patient/family/caregiver, or Care coordination (not separately reported).    Signature: PADILLA CARDONA NP

## 2024-02-20 NOTE — ED NOTES
Bed: 11  Expected date:   Expected time:   Means of arrival:   Comments:  Geovani lewis when clean

## 2024-02-21 LAB
OHS QRS DURATION: 90 MS
OHS QTC CALCULATION: 476 MS

## 2024-02-22 ENCOUNTER — DOCUMENT SCAN (OUTPATIENT)
Dept: HOME HEALTH SERVICES | Facility: HOSPITAL | Age: 69
End: 2024-02-22
Payer: MEDICARE

## 2024-02-22 ENCOUNTER — OFFICE VISIT (OUTPATIENT)
Dept: INFECTIOUS DISEASES | Facility: CLINIC | Age: 69
End: 2024-02-22
Payer: MEDICARE

## 2024-02-22 ENCOUNTER — OFFICE VISIT (OUTPATIENT)
Dept: UROLOGY | Facility: CLINIC | Age: 69
End: 2024-02-22
Payer: MEDICARE

## 2024-02-22 VITALS
BODY MASS INDEX: 24.09 KG/M2 | TEMPERATURE: 99 F | DIASTOLIC BLOOD PRESSURE: 76 MMHG | HEIGHT: 68 IN | SYSTOLIC BLOOD PRESSURE: 129 MMHG | RESPIRATION RATE: 16 BRPM | HEART RATE: 112 BPM | WEIGHT: 158.94 LBS

## 2024-02-22 DIAGNOSIS — C77.2 BLADDER CANCER METASTASIZED TO INTRA-ABDOMINAL LYMPH NODES: Primary | ICD-10-CM

## 2024-02-22 DIAGNOSIS — N30.00 ACUTE CYSTITIS WITHOUT HEMATURIA: Primary | ICD-10-CM

## 2024-02-22 DIAGNOSIS — Z85.118 HX OF CANCER OF LUNG: ICD-10-CM

## 2024-02-22 DIAGNOSIS — Z85.51 HX OF BLADDER CANCER: ICD-10-CM

## 2024-02-22 DIAGNOSIS — C67.9 BLADDER CANCER METASTASIZED TO INTRA-ABDOMINAL LYMPH NODES: Primary | ICD-10-CM

## 2024-02-22 DIAGNOSIS — N13.5 BILATERAL URETERAL OBSTRUCTION: ICD-10-CM

## 2024-02-22 LAB
BACTERIA UR CULT: NORMAL
BACTERIA UR CULT: NORMAL

## 2024-02-22 PROCEDURE — 99999 PR PBB SHADOW E&M-EST. PATIENT-LVL III: CPT | Mod: PBBFAC,,, | Performed by: UROLOGY

## 2024-02-22 PROCEDURE — 99213 OFFICE O/P EST LOW 20 MIN: CPT | Mod: PBBFAC,PN | Performed by: UROLOGY

## 2024-02-22 PROCEDURE — 99214 OFFICE O/P EST MOD 30 MIN: CPT | Mod: S$PBB,,, | Performed by: UROLOGY

## 2024-02-22 PROCEDURE — 99214 OFFICE O/P EST MOD 30 MIN: CPT | Mod: 95,,, | Performed by: STUDENT IN AN ORGANIZED HEALTH CARE EDUCATION/TRAINING PROGRAM

## 2024-02-22 NOTE — PROGRESS NOTES
MEDICAL ONCOLOGY - NEW PATIENT VISIT    Best Contact Phone Number(s): [phone] (home)      Cancer/Stage/TNM:    Cancer Staging   Squamous cell carcinoma of right lung  Staging form: Lung, AJCC 8th Edition  - Clinical: Stage IA2 (cT1b, cN0, cM0) - Signed by Jett Wagoner IV, MD on 11/26/2023       HPI: Geovani Currie is a 68 y.o. male found to have muscle invasive bladder cancer treated with definitive gemcitabine based CRT in [place] 09/2020 - 11/2020. Subsequently with recurrent biopsy proven recurrent disease by cystoscopy 08/2023 in the setting of urinary obstruction and hydronephrosis requiring ureteral stenting and percutaneous nephrostomy placement. Staging imaging has been equivocal for metastatic disease, and notable for a separate primary stage I SCC of the RUL. Medical course complicated by recurrent encephalopathy and large volume DVT with resultant hematuria on AC. Also with progressive debility. He presents to  medical oncology clinic for further treatment planning.    History has been obtained by chart review and discussion with the patient.    Interval Events:    After last clinic visit on 02/02/24, developed acute swelling and pain in LLE, found to have occlussive DVT. Admitted 2/2/24 - 2/7/24, and has been managed with anticoaguilation. Currently on apixaban. Also treatd for refracotry and cmoplicated UTI. Seen in ED on 2/20/24 with new hypoxia and hematuria into his PCN bags. Lung scan without evidence of PE. Returned home. PCN tubes have cleared but has also developed some BRBPR. Notes improvement in leg pain and swelling. He has ongoing lower abdominal pain and is wheelchari bound.       Oncology History   Squamous cell carcinoma of right lung   7/22/2023 Imaging Significant Findings    PET CT ([facility])  - 1.1 x 1.0 cm spiculated RUL hypermetabolic pulmonary nodule, SUV 3.32  - L sided urinary bladder mass w/ L sided nephroureteral stents in place, and severe hydroureteronephrosis  -  Multiple prominent or mildly enlarged L para-aortic LN w/ mildly increased radiotracer activity, could be reactive or represent metastatic involvement from bladder tumor     9/26/2023 Imaging Significant Findings    CT C, Non-Con ([facility])  - 1.7 x 1.2 cm spiculated nodule in RUL, increased from prior  - 0.7 mm satellite nodule, increased in size     10/11/2023 Procedure    RUL, CT Guided Bx ([facility])  - Minute focus (approximately 16 cells), marked atypical cells identified. Favor squamous cell carcinoma (p40, AE1/AE3 positive; TTF-1, napsin A, synaptophysin, CD68 negative)     10/21/2023 Imaging Significant Findings    MRI Brain  - No evidence of intracranial metastatic disease     11/26/2023 Cancer Staged    Staging form: Lung, AJCC 8th Edition  - Clinical: Stage IA2 (cT1b, cN0, cM0)     12/13/2023 Imaging Significant Findings    PET CT  - 2.0 x 1.3 cm R lung apex nodule, hypermetabolic  - No hypermetabolic intrathoracic or axillary LAD  - Circumferential urinary bladder wall thickening w/ circumferential heterogenous increased uptake involving L side of mesorectal fascia  - Curvilinear intraluminal calcifications within urinary bladder along L lateral wall  - 9 hypermetabolic pelvic LN     1/18/2024 - 1/24/2024 Radiation Therapy    68-year-old man with complicated medical history including history of muscle invasive bladder cancer treated with radiation and Gemzar in 2020, with pathologic confirmation of recurrence in August 2023, when he moved to [place] to be near family during his workup and treatment.  Imaging shows thickening, recurrence in the bladder with suspicious retroperitoneal adenopathy.  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     Treating physician: [facility]  Total Dose: 50 Gy  Fractions: 5     Malignant neoplasm of urinary bladder   7/6/2020 Procedure    TURBT  - Reportedly stage cT3b UCB, however radiation oncology notes describe T2a disease      "  9/29/2020 - 11/25/2020 Radiation Therapy    CRT with Gemcitabine  35 fractions IMRT/IGRT     8/30/2023 Procedure    TURBT  Path: Invasive high-grade urothelial carcinoma, muscularis propria is present and involved by carcinoma     10/16/2023 Notable Event    Urology appointment  "Discussed urinary retention, will need L ureteral stent exchange and exchange of R PCNU in 12/2023. Will need salvage chemothearpy or immunotherapy for recurrent metastatic bladder cancer"  - Dr. Grimaldo     10/20/2023 - 10/25/2023 Hospital Admission    Presented from airport after flying from [place] for worsening weakness and lethargy. Imaging demonstrate remote infarctions but no acute CNS processes. Found to have an enterobacter infection. Started on ertapenem.      11/10/2023 - 11/17/2023 Hospital Admission    Admitted after inadvertently pulling out nephrostomy tube. Was on treatment for Enterobacter UTI with ertapenem, found that this was not sufficient coverage and changed to linezolid. Nephrostomy tube replaced.      12/13/2023 Imaging Significant Findings    PET CT  - 2.0 x 1.3 cm R lung apex nodule, hypermetabolic  - No hypermetabolic intrathoracic or axillary LAD  - Circumferential urinary bladder wall thickening w/ circumferential heterogenous increased uptake involving L side of mesorectal fascia  - Curvilinear intraluminal calcifications within urinary bladder along L lateral wall  - 9 hypermetabolic pelvic LN     2/9/2024 -  Chemotherapy    Treatment Summary   Plan Name: OP pembrolizumab 200mg Q3W  Treatment Goal: Palliative  Status: Active  Start Date: 2/9/2024 (Planned)  End Date: 1/23/2026 (Planned)  Provider: Demetrius Frazier MD  Chemotherapy: [No matching medication found in this treatment plan]          Past Medical History:   Diagnosis Date    Anxiety disorder, unspecified     COPD (chronic obstructive pulmonary disease)     Hypertension     Thyroid disease         Past Surgical History:   Procedure Laterality Date    " APPENDECTOMY      CATARACT EXTRACTION      NEPHROSTOMY      OPEN REDUCTION AND INTERNAL FIXATION (ORIF) OF INTERTROCHANTERIC FRACTURE OF FEMUR Right 2023    Procedure: ORIF, FRACTURE, FEMUR, INTERTROCHANTERIC;  Surgeon: Tanisha Guidry DO;  Location: [facility];  Service: Orthopedics;  Laterality: Right;  Gamma nail        Family History   Problem Relation Age of Onset    Cancer Mother         NOS    Cancer Father         NOS    Cancer Maternal Grandfather         NOS    Bladder Cancer Neg Hx         Social History     Tobacco Use    Smoking status: Every Day     Current packs/day: 0.00     Types: Vaping with nicotine, Cigarettes     Last attempt to quit: 10/2023     Years since quittin.3     Passive exposure: Current    Smokeless tobacco: Never   Substance Use Topics    Alcohol use: Never        I have reviewed and updated the patient's past medical, surgical, family and social histories.    Review of patient's allergies indicates:  No Known Allergies     Current Outpatient Medications   Medication Sig Dispense Refill    albuterol (ACCUNEB) 0.63 mg/3 mL Nebu Take 3 mLs (0.63 mg total) by nebulization 3 (three) times daily as needed (sob, wheezing). Rescue 75 mL 3    albuterol (PROVENTIL/VENTOLIN HFA) 90 mcg/actuation inhaler Inhale 1-2 puffs into the lungs every 4 (four) hours as needed for Wheezing. Rescue 18 g 11    ALPRAZolam (XANAX) 0.5 MG tablet Take 2 tablets (1 mg total) by mouth 3 (three) times daily as needed for Anxiety. 60 tablet 2    apixaban (ELIQUIS) 5 mg Tab Take 10mg po BID x 7 days then 5mg po daily 74 tablet 1    atorvastatin (LIPITOR) 20 MG tablet Take 20 mg by mouth.      cyclobenzaprine (FLEXERIL) 10 MG tablet Take 1 tablet (10 mg total) by mouth 3 (three) times daily as needed for Muscle spasms. 270 tablet 3    docusate sodium (COLACE) 100 MG capsule Take 100 mg by mouth 2 (two) times daily.      EScitalopram oxalate (LEXAPRO) 20 MG tablet Take 1 tablet (20 mg total) by mouth once  "daily. 90 tablet 3    fluticasone furoate-vilanteroL (BREO ELLIPTA) 100-25 mcg/dose diskus inhaler Inhale 1 puff into the lungs once daily. Controller 30 each 11    HYDROcodone-acetaminophen (NORCO) 7.5-325 mg per tablet Take 1 tablet by mouth every 6 (six) hours as needed for Pain. 120 tablet 0    levothyroxine (SYNTHROID) 125 MCG tablet Take 1 tablet (125 mcg total) by mouth before breakfast. 90 tablet 3    meropenem (MERREM) 1 gram injection       naloxone (NARCAN) 4 mg/actuation Spry 1 spray once.      nicotine (NICODERM CQ) 14 mg/24 hr Place 1 patch onto the skin every 24 hours.      pregabalin (LYRICA) 75 MG capsule Take 1 capsule (75 mg total) by mouth every evening. 30 capsule 1    vitamin D (VITAMIN D3) 1000 units Tab Take 25 mcg by mouth once daily.       No current facility-administered medications for this visit.        Physical Exam:   /66   Pulse 86   Temp 98.3 °F (36.8 °C) (Temporal)   Ht 5' 8" (1.727 m)   Wt 72 kg (158 lb 9.9 oz)   SpO2 95%   BMI 24.12 kg/m²      ECOG Performance status: 3            Physical Exam  Constitutional:       Appearance: He is ill-appearing.      Comments: Somnelent in wheelchair. Oriented to person, place and time. Defers much history to his family.   HENT:      Head: Normocephalic.   Eyes:      General: No scleral icterus.     Extraocular Movements: Extraocular movements intact.      Conjunctiva/sclera: Conjunctivae normal.   Cardiovascular:      Rate and Rhythm: Normal rate.      Heart sounds: No murmur heard.  Pulmonary:      Effort: Pulmonary effort is normal. No respiratory distress.   Abdominal:      General: There is no distension.      Palpations: Abdomen is soft.   Skin:     General: Skin is warm and dry.   Neurological:      Mental Status: He is oriented to person, place, and time.      Motor: Weakness present.   Psychiatric:         Mood and Affect: Mood normal.         Behavior: Behavior normal.         Thought Content: Thought content normal.      "      Labs:   Recent Results (from the past 48 hour(s))   CBC Oncology    Collection Time: 02/23/24  8:41 AM   Result Value Ref Range    WBC 7.68 3.90 - 12.70 K/uL    RBC 3.00 (L) 4.60 - 6.20 M/uL    Hemoglobin 8.3 (L) 14.0 - 18.0 g/dL    Hematocrit 26.9 (L) 40.0 - 54.0 %    MCV 90 82 - 98 fL    MCH 27.7 27.0 - 31.0 pg    MCHC 30.9 (L) 32.0 - 36.0 g/dL    RDW 17.1 (H) 11.5 - 14.5 %    Platelets 252 150 - 450 K/uL    MPV 9.6 9.2 - 12.9 fL    Gran # (ANC) 6.1 1.8 - 7.7 K/uL    Immature Grans (Abs) 0.02 0.00 - 0.04 K/uL   Comprehensive Metabolic Panel    Collection Time: 02/23/24  8:41 AM   Result Value Ref Range    Sodium 139 136 - 145 mmol/L    Potassium 4.3 3.5 - 5.1 mmol/L    Chloride 105 95 - 110 mmol/L    CO2 24 23 - 29 mmol/L    Glucose 127 (H) 70 - 110 mg/dL    BUN 20 8 - 23 mg/dL    Creatinine 1.5 (H) 0.5 - 1.4 mg/dL    Calcium 9.3 8.7 - 10.5 mg/dL    Total Protein 6.8 6.0 - 8.4 g/dL    Albumin 2.7 (L) 3.5 - 5.2 g/dL    Total Bilirubin 0.4 0.1 - 1.0 mg/dL    Alkaline Phosphatase 102 55 - 135 U/L    AST 10 10 - 40 U/L    ALT 7 (L) 10 - 44 U/L    eGFR 50 (A) >60 mL/min/1.73 m^2    Anion Gap 10 8 - 16 mmol/L          Imaging:         I have personally reviewed the above imaging    Path:   Reviewed pathology as documented in oncology history      Assessment and Plan:   1. Malignant neoplasm of urinary bladder, unspecified site  Overview:  Metachronous recurrence MIBC following definitive CRT 11/2020. Extensive local disease with bilateral hydronephrosis, renal failure, and recurrent infection. Review of PET imaging suggestive of emily metastases as well.     Assessment & Plan:  Discussed with patient, and noted that given his progressive debility I think that focusing on quality of his life would be more beneficial than continuing to pursue cancer directed therapies, including immunotherapy. Notably, his wife indicates potential desire to return to [place]. Home dynamics remain complicated. Will contact social  work and refer to hospice.     Orders:  -     Ambulatory Referral/Consult to Oncology Social Work; Future; Expected date: 03/01/2024  -     Ambulatory referral/consult to Hospice; Future; Expected date: 03/01/2024    2. Acute deep vein thrombosis (DVT) of proximal vein of left lower extremity  Assessment & Plan:  - Large and occlussive clot recently diagnosed. On apixaban c/b low volume BRBPR and hematuria  - Would continue anticoagulation for now given size of clot      3. Stage 4 chronic kidney disease  Overview:  Recent baseline Cr ~2.5. Presumably from obstructive uropathy. B/L PCN in place.    Assessment & Plan:  - Maintain PCN tube to mamimize kidney function                     Follow up:   Route Chart for Scheduling    Med Onc Chart Routing      Follow up with physician No follow up needed.   Follow up with [facility]    Infusion scheduling note    Injection scheduling note    Labs    Imaging    Pharmacy appointment    Other referrals                  Treatment Plan Information   OP pembrolizumab 200mg Q3W   Demetrius Frazier MD   Upcoming Treatment Dates - OP pembrolizumab 200mg Q3W    2/9/2024       Chemotherapy       pembrolizumab (KEYTRUDA) 200 mg in sodium chloride 0.9% SolP 108 mL infusion  3/1/2024       Chemotherapy       pembrolizumab (KEYTRUDA) 200 mg in sodium chloride 0.9% SolP 108 mL infusion  3/22/2024       Chemotherapy       pembrolizumab (KEYTRUDA) 200 mg in sodium chloride 0.9% SolP 108 mL infusion  4/12/2024       Chemotherapy       pembrolizumab (KEYTRUDA) 200 mg in sodium chloride 0.9% SolP 108 mL infusion        The above information has been reviewed with the patient and all questions have been answered to their apparent satisfaction.  They understand that they can call the clinic with any questions.    Demetrius Frazier MD  Hematology/Oncology  [facility]

## 2024-02-22 NOTE — PROGRESS NOTES
The patient location is: Home  The chief complaint leading to consultation is: Hospital follow up     Visit type: audiovisual    Notes:    Subjective:    HPI: 68 y.o. male with pertinent PMHx of bladder and lung cancer who has been seen by ID outpatient for a number of months most recently admitted 1/12-1/15 for weakness and hypotension. Summary as follows: 01/13-Patient reported falling overnight, assessed patient, abrasion to left flank inferior to nephrostomy tube, scabbed, scant blood to bed linen. nephrostomy tube in place. Denies pain to left hip pain, FROM on exam. Significant stool burden on CT. Urology evaluated, recommend to continue IV antibiotics, no acute intervention recommended. Erythematous area around Right Nephrostomy tube, improved. Pt was started on aggressive bowel regimen and was able to have multiple bowel movements prior to discharge. Urine culture returned with pan sensitive Pseudomonas, abx adjusted from Dapto + Cefepime to PO Ciprofloxacin based on culture. Cr stable at 1.4. Pt seen and examined on day of discharge and discharged home with HH in stable condition per my exam. Will continue bowel regimen, abx as above, followup with PCP within 1 week. Follow up with ID as prev scheduled. Patient completed outpatient XRT to the lung 1/24/24 for squamous cell carcinoma of right lung. Per last heme/onc visit 1/12 plan is to evaluate for pembrolizumab based on functional status to treat bladder cancer. Next visit with them is 2/2. Patient completed 7 day course of cipro. Per last ID clinic visit patient and family report he has been eating tremendously well and is regaining his strength every day. Has done really well since radiation treatment. Positive regarding candidacy for chemotherapy. Has not had any complications with PCN since discharge. Should be receiving nighttime bags shortly but so far no notable urine leak when waking up. Tolerating ciprofloxacin without incident. No symptoms  indicative of UTI at this time. Discussed vitamin C and cranberry supplement daily. Voiced understanding.       Now admitted due to worsening left lower extremity pain and swelling as well as concerns of nephrostomy tube dislodgement. Underwent bilateral PCN exchange with IR Dr. Cedeño on 02/03. Per IR, tentative plan for thrombectomy on 02/05. Remains on heparin drip for anticoagulation. Urine culture has grown MDR Morganella. ID consulted for UTI management. Ucx with haddad R Akanksha. Discharged on 14 day course of meropenem.     2/22: Virtual hospital follow up. Per urology visit today: Metastatic bladder cancer with bilateral ureteral obstruction. Managed with bilateral nephrostomy tube. Recommend change these in May if they have not been changed yet. Reassured the wife that hematuria would be expected given his clinical scenario. Patient's wife is to monitor to ensure both tubes are draining. She is to notify us if there is any malfunction or dislodgement of the tubes. Therefore assured that blood in urine does not indicate infection. Discussed that UTI needs to be treated episodically due to high chance of resistance with continued exposure. No chronic suppression option at this time. Renal function also a challenge. Wife voiced understanding. Will complete current course then have PICC removed.     Review of Systems:   Negative unless otherwise noted positive-  Gen- Weakness/ Fatigue  Neuro- Confusion  CV- Chest Pain/ Palpitations  Resp: Cough/ SOB  GI- Nausea/Vomiting  Extrem- Pain/Swelling      Objective:    Physical Exam:  General- Patient alert and oriented x3 in NAD  HEENT- PERRLA, EOMI, OP clear  Resp- No increased WOB noted. Not using accessory muscles.  Extrem- No cyanosis, clubbing, edema.   Skin-  No Jaundice. No visible skin lesions.      Plan:  Acute cystitis  --Due to discordance between cefepime and ceftriaxone, will cautiously treat using IV meropenem renally dosed by pharmacy    --Completing a total 14 day course given presence of nephrostomy tubes  --PICC can be removed after final dose of meropenem; will update HH    --Needs close urology follow up  --Will begin vitamin C and cranberry supplement   --Follow up with ID as needed     Nephrostomy tube displaced  Underwent bilateral PCN exchange with BILL Cedeño on 02/03. See UTI.      Malignant neoplasm of urinary bladder  Needs close urology/oncology follow up      Squamous cell carcinoma of right lung  Needs close oncology follow up       Face to Face time with patient: 18 minutes   30 minutes of total time spent on the encounter, which includes face to face time and non-face to face time preparing to see the patient (eg, review of tests), Obtaining and/or reviewing separately obtained history, Documenting clinical information in the electronic or other health record, Independently interpreting results (not separately reported) and communicating results to the patient/family/caregiver, or Care coordination (not separately reported).     Each patient to whom he or she provides medical services by telemedicine is:  (1) informed of the relationship between the physician and patient and the respective role of any other health care provider with respect to management of the patient; and (2) notified that he or she may decline to receive medical services by telemedicine and may withdraw from such care at any time.

## 2024-02-22 NOTE — PROGRESS NOTES
Chief Complaint:   Encounter Diagnoses   Name Primary?    Bladder cancer metastasized to intra-abdominal lymph nodes Yes    Bilateral ureteral obstruction        HPI:  HPI Geovani Currie kennedi 68 y.o. male who presents with follow-up to recent hospitalization where his nephrostomy tubes had to be replaced.  Patient has a history of recurrent muscle invasive bladder cancer with metastasis.  He also has lung cancer.  He was scheduled to undergo palliative treatment with Keytruda.  It does not appear that he has had his 1st dose showed.  He has had trouble with recurrent UTIs.  He is currently getting IV antibiotics.  He is here with his wife.  His wife states that she would like to take him back to [place].  Patient is still full code and wants to pursue palliative therapy.  Patient in the interim has been diagnosed with a pulmonary embolus in his now on Eliquis.  He was having intermittent hematuria about from his urethra and his right nephrostomy tube.    History:  Social History     Tobacco Use    Smoking status: Every Day     Current packs/day: 0.00     Types: Vaping with nicotine, Cigarettes     Last attempt to quit: 10/2023     Years since quittin.3     Passive exposure: Never    Smokeless tobacco: Never   Substance Use Topics    Alcohol use: Never    Drug use: Never     Past Medical History:   Diagnosis Date    Anxiety disorder, unspecified     COPD (chronic obstructive pulmonary disease)     Hypertension     Thyroid disease      Past Surgical History:   Procedure Laterality Date    APPENDECTOMY      CATARACT EXTRACTION      NEPHROSTOMY      OPEN REDUCTION AND INTERNAL FIXATION (ORIF) OF INTERTROCHANTERIC FRACTURE OF FEMUR Right 2023    Procedure: ORIF, FRACTURE, FEMUR, INTERTROCHANTERIC;  Surgeon: Tanisha Guidry DO;  [facility]: [facility];  Service: Orthopedics;  Laterality: Right;  Gamma nail     Family History   Problem Relation Age of Onset    Cancer Mother         NOS    Cancer Father          NOS    Cancer Maternal Grandfather         NOS    Bladder Cancer Neg Hx        Current Outpatient Medications on File Prior to Visit   Medication Sig Dispense Refill    albuterol (ACCUNEB) 0.63 mg/3 mL Nebu Take 3 mLs (0.63 mg total) by nebulization 3 (three) times daily as needed (sob, wheezing). Rescue 75 mL 3    albuterol (PROVENTIL/VENTOLIN HFA) 90 mcg/actuation inhaler Inhale 1-2 puffs into the lungs every 4 (four) hours as needed for Wheezing. Rescue 18 g 11    ALPRAZolam (XANAX) 0.5 MG tablet Take 2 tablets (1 mg total) by mouth 3 (three) times daily as needed for Anxiety. 60 tablet 2    apixaban (ELIQUIS) 5 mg Tab Take 10mg po BID x 7 days then 5mg po daily 74 tablet 1    cyclobenzaprine (FLEXERIL) 10 MG tablet Take 1 tablet (10 mg total) by mouth 3 (three) times daily as needed for Muscle spasms. 270 tablet 3    docusate sodium (COLACE) 100 MG capsule Take 100 mg by mouth 2 (two) times daily.      EScitalopram oxalate (LEXAPRO) 20 MG tablet Take 1 tablet (20 mg total) by mouth once daily. 90 tablet 3    fluticasone furoate-vilanteroL (BREO ELLIPTA) 100-25 mcg/dose diskus inhaler Inhale 1 puff into the lungs once daily. Controller 30 each 11    HYDROcodone-acetaminophen (NORCO) 7.5-325 mg per tablet Take 1 tablet by mouth every 6 (six) hours as needed for Pain. 120 tablet 0    levothyroxine (SYNTHROID) 125 MCG tablet Take 1 tablet (125 mcg total) by mouth before breakfast. 90 tablet 3    naloxone (NARCAN) 4 mg/actuation Spry 1 spray once.      nicotine (NICODERM CQ) 14 mg/24 hr Place 1 patch onto the skin every 24 hours.      pregabalin (LYRICA) 75 MG capsule Take 1 capsule (75 mg total) by mouth every evening. 30 capsule 1    vitamin D (VITAMIN D3) 1000 units Tab Take 25 mcg by mouth once daily.      [] sodium chloride 0.9% SolP 100 mL with meropenem 1 gram SolR 2 g Inject 2 g into the vein every 12 (twelve) hours. for 14 days       No current facility-administered medications on file prior to visit.  "       Objective:     Vitals:    02/22/24 1140   BP: 129/76   BP Location: Left arm   Patient Position: Sitting   Pulse: (!) 112   Resp: 16   Temp: 98.5 °F (36.9 °C)   TempSrc: Oral   Weight: 72.1 kg (158 lb 15.2 oz)   Height: 5' 8" (1.727 m)      BMI Readings from Last 1 Encounters:   02/22/24 24.17 kg/m²          Physical Exam  Bilateral nephrostomy tubes draining clear yellow urine.    Lab Results   Component Value Date    CREATININE 1.5 (H) 02/20/2024      Assessment:       1. Bladder cancer metastasized to intra-abdominal lymph nodes    2. Bilateral ureteral obstruction        Plan:     1. Bladder cancer metastasized to intra-abdominal lymph nodes    2. Bilateral ureteral obstruction         Metastatic bladder cancer with bilateral ureteral obstruction.  Managed with bilateral nephrostomy tube.  Recommend change these in May if they have not been changed yet.  Reassured the wife that hematuria would be expected given his clinics all scenario.  Patient's wife is to monitor to ensure both tubes are draining.  She is to notify us if there is any malfunction or dislodgement of the tubes.    "

## 2024-02-23 ENCOUNTER — DOCUMENT SCAN (OUTPATIENT)
Dept: HOME HEALTH SERVICES | Facility: HOSPITAL | Age: 69
End: 2024-02-23
Payer: MEDICARE

## 2024-02-23 ENCOUNTER — LAB VISIT (OUTPATIENT)
Dept: LAB | Facility: HOSPITAL | Age: 69
End: 2024-02-23
Attending: HOSPITALIST
Payer: MEDICARE

## 2024-02-23 ENCOUNTER — DOCUMENTATION ONLY (OUTPATIENT)
Dept: HEMATOLOGY/ONCOLOGY | Facility: CLINIC | Age: 69
End: 2024-02-23

## 2024-02-23 ENCOUNTER — OFFICE VISIT (OUTPATIENT)
Dept: HEMATOLOGY/ONCOLOGY | Facility: CLINIC | Age: 69
End: 2024-02-23
Payer: MEDICARE

## 2024-02-23 VITALS
WEIGHT: 158.63 LBS | OXYGEN SATURATION: 95 % | DIASTOLIC BLOOD PRESSURE: 66 MMHG | HEIGHT: 68 IN | HEART RATE: 86 BPM | BODY MASS INDEX: 24.04 KG/M2 | TEMPERATURE: 98 F | SYSTOLIC BLOOD PRESSURE: 113 MMHG

## 2024-02-23 DIAGNOSIS — C67.9 MALIGNANT NEOPLASM OF URINARY BLADDER, UNSPECIFIED SITE: ICD-10-CM

## 2024-02-23 DIAGNOSIS — Z79.899 LONG TERM CURRENT USE OF THERAPEUTIC DRUG: ICD-10-CM

## 2024-02-23 DIAGNOSIS — N18.4 STAGE 4 CHRONIC KIDNEY DISEASE: ICD-10-CM

## 2024-02-23 DIAGNOSIS — C67.9 MALIGNANT NEOPLASM OF URINARY BLADDER, UNSPECIFIED SITE: Primary | ICD-10-CM

## 2024-02-23 DIAGNOSIS — I82.4Y2 ACUTE DEEP VEIN THROMBOSIS (DVT) OF PROXIMAL VEIN OF LEFT LOWER EXTREMITY: ICD-10-CM

## 2024-02-23 LAB
ALBUMIN SERPL BCP-MCNC: 2.7 G/DL (ref 3.5–5.2)
ALP SERPL-CCNC: 102 U/L (ref 55–135)
ALT SERPL W/O P-5'-P-CCNC: 7 U/L (ref 10–44)
ANION GAP SERPL CALC-SCNC: 10 MMOL/L (ref 8–16)
AST SERPL-CCNC: 10 U/L (ref 10–40)
BILIRUB SERPL-MCNC: 0.4 MG/DL (ref 0.1–1)
BUN SERPL-MCNC: 20 MG/DL (ref 8–23)
CALCIUM SERPL-MCNC: 9.3 MG/DL (ref 8.7–10.5)
CHLORIDE SERPL-SCNC: 105 MMOL/L (ref 95–110)
CO2 SERPL-SCNC: 24 MMOL/L (ref 23–29)
CREAT SERPL-MCNC: 1.5 MG/DL (ref 0.5–1.4)
ERYTHROCYTE [DISTWIDTH] IN BLOOD BY AUTOMATED COUNT: 17.1 % (ref 11.5–14.5)
EST. GFR  (NO RACE VARIABLE): 50 ML/MIN/1.73 M^2
GLUCOSE SERPL-MCNC: 127 MG/DL (ref 70–110)
HCT VFR BLD AUTO: 26.9 % (ref 40–54)
HGB BLD-MCNC: 8.3 G/DL (ref 14–18)
IMM GRANULOCYTES # BLD AUTO: 0.02 K/UL (ref 0–0.04)
MCH RBC QN AUTO: 27.7 PG (ref 27–31)
MCHC RBC AUTO-ENTMCNC: 30.9 G/DL (ref 32–36)
MCV RBC AUTO: 90 FL (ref 82–98)
NEUTROPHILS # BLD AUTO: 6.1 K/UL (ref 1.8–7.7)
PLATELET # BLD AUTO: 252 K/UL (ref 150–450)
PMV BLD AUTO: 9.6 FL (ref 9.2–12.9)
POTASSIUM SERPL-SCNC: 4.3 MMOL/L (ref 3.5–5.1)
PROT SERPL-MCNC: 6.8 G/DL (ref 6–8.4)
RBC # BLD AUTO: 3 M/UL (ref 4.6–6.2)
SODIUM SERPL-SCNC: 139 MMOL/L (ref 136–145)
TSH SERPL DL<=0.005 MIU/L-ACNC: 0.63 UIU/ML (ref 0.4–4)
WBC # BLD AUTO: 7.68 K/UL (ref 3.9–12.7)

## 2024-02-23 PROCEDURE — 80053 COMPREHEN METABOLIC PANEL: CPT | Performed by: HOSPITALIST

## 2024-02-23 PROCEDURE — 99215 OFFICE O/P EST HI 40 MIN: CPT | Mod: S$PBB,,, | Performed by: HOSPITALIST

## 2024-02-23 PROCEDURE — 99999 PR PBB SHADOW E&M-EST. PATIENT-LVL IV: CPT | Mod: PBBFAC,,, | Performed by: HOSPITALIST

## 2024-02-23 PROCEDURE — 99214 OFFICE O/P EST MOD 30 MIN: CPT | Mod: PBBFAC | Performed by: HOSPITALIST

## 2024-02-23 PROCEDURE — 85027 COMPLETE CBC AUTOMATED: CPT | Performed by: HOSPITALIST

## 2024-02-23 PROCEDURE — 36415 COLL VENOUS BLD VENIPUNCTURE: CPT | Performed by: HOSPITALIST

## 2024-02-23 PROCEDURE — 84443 ASSAY THYROID STIM HORMONE: CPT | Performed by: HOSPITALIST

## 2024-02-23 RX ORDER — ATORVASTATIN CALCIUM 20 MG/1
20 TABLET, FILM COATED ORAL
COMMUNITY
Start: 2024-02-08 | End: 2024-03-01

## 2024-02-23 RX ORDER — MEROPENEM 1 G/1
INJECTION, POWDER, FOR SOLUTION INTRAVENOUS
COMMUNITY
Start: 2024-02-19 | End: 2024-04-01

## 2024-02-23 NOTE — ASSESSMENT & PLAN NOTE
- Large and occlussive clot recently diagnosed. On apixaban c/b low volume BRBPR and hematuria  - Would continue anticoagulation for now given size of clot

## 2024-02-23 NOTE — ASSESSMENT & PLAN NOTE
Discussed with patient, and noted that given his progressive debility I think that focusing on quality of his life would be more beneficial than continuing to pursue cancer directed therapies, including immunotherapy. Notably, his wife indicates potential desire to return to [place]. Home dynamics remain complicated. Will contact social work and refer to hospice.

## 2024-02-23 NOTE — PROGRESS NOTES
Swer was consulted to assist in referral for hospice for pt. Swer met with pt and pt.'s spouse, and HCPOA, Pamella Currie. Swer discussed this transition of care with pt and spouse. Pt reports he feels fine with this transition. There was some discussion around their recent move from [place] to live in [place] with their daughter, who is also listed as pt.'s 2nd choice HCPOA. Pt's daughter has children that live at home, and there are some concern about having pt on hospice while living with his daughter and her children. Swer encouraged pt and pt's spouse to discuss with their daughter(s) (4 daughters living, 3 live near, 2 out of 3 they do not speak to) about hospice and the potential plans that may be placed. Pt and pts spouse may need to find an apartment/housing here in [place] if unable to live with pt's daughter. Pt may not be able to travel back to [place] safely. Swer also discussed nursing home placement as an option. Swer provided pt and pts spouse with list of hospice agencies in the area as well as [employer] business card. Swer reported to call to check-in with pt next week, to see if a decision has been made. Swer encouraged pt to begin hospice services while here in [place] as well as encouraged informational visit with hospice of choice. Swer will f/u with pt on next week, if no return call is received. Swer will remain available.

## 2024-02-24 PROCEDURE — G0179 MD RECERTIFICATION HHA PT: HCPCS | Mod: ,,, | Performed by: FAMILY MEDICINE

## 2024-02-25 LAB
BACTERIA BLD CULT: NORMAL
BACTERIA BLD CULT: NORMAL

## 2024-02-26 ENCOUNTER — PATIENT MESSAGE (OUTPATIENT)
Dept: PRIMARY CARE CLINIC | Facility: CLINIC | Age: 69
End: 2024-02-26
Payer: MEDICARE

## 2024-02-26 DIAGNOSIS — F41.9 ANXIETY: ICD-10-CM

## 2024-02-26 NOTE — TELEPHONE ENCOUNTER
Called and spoke with the pt. Informed the pt that they were giving him the medication wrong per sig on the bottle. Daughter agreed. Informed the pt she can pay for the medication out of pocket to have coverage for the next 9 days until she is able to  a new rx.  Reiterated pt is suppose to be taken 1 tablet  day now per sig. Pt understands and acknowledges

## 2024-02-26 NOTE — TELEPHONE ENCOUNTER
Called and spoke with the pt daughter. Informed the pt to go to back to the ER. Pt refused and wants to be seen by Pcp in regards to the swollen leg and hospice admit. Pt scheduled for in person visit on 3/1/24

## 2024-02-27 RX ORDER — ALPRAZOLAM 0.5 MG/1
1 TABLET ORAL 3 TIMES DAILY PRN
Qty: 60 TABLET | Refills: 2 | Status: SHIPPED | OUTPATIENT
Start: 2024-02-27 | End: 2024-03-21 | Stop reason: SDUPTHER

## 2024-02-27 NOTE — TELEPHONE ENCOUNTER
No care due was identified.  [facility] Embedded Care Due Messages. Reference number: [id number].   2/26/2024 7:56:16 PM CST

## 2024-02-28 ENCOUNTER — DOCUMENT SCAN (OUTPATIENT)
Dept: HOME HEALTH SERVICES | Facility: HOSPITAL | Age: 69
End: 2024-02-28
Payer: MEDICARE

## 2024-02-29 ENCOUNTER — DOCUMENT SCAN (OUTPATIENT)
Dept: HOME HEALTH SERVICES | Facility: HOSPITAL | Age: 69
End: 2024-02-29

## 2024-02-29 ENCOUNTER — DOCUMENT SCAN (OUTPATIENT)
Dept: HOME HEALTH SERVICES | Facility: HOSPITAL | Age: 69
End: 2024-02-29
Payer: MEDICARE

## 2024-03-01 ENCOUNTER — OFFICE VISIT (OUTPATIENT)
Dept: PRIMARY CARE CLINIC | Facility: CLINIC | Age: 69
End: 2024-03-01
Payer: MEDICARE

## 2024-03-01 ENCOUNTER — DOCUMENT SCAN (OUTPATIENT)
Dept: HOME HEALTH SERVICES | Facility: HOSPITAL | Age: 69
End: 2024-03-01
Payer: MEDICARE

## 2024-03-01 ENCOUNTER — TELEPHONE (OUTPATIENT)
Dept: HEMATOLOGY/ONCOLOGY | Facility: CLINIC | Age: 69
End: 2024-03-01
Payer: MEDICARE

## 2024-03-01 VITALS
WEIGHT: 159.81 LBS | BODY MASS INDEX: 24.22 KG/M2 | HEART RATE: 81 BPM | SYSTOLIC BLOOD PRESSURE: 134 MMHG | TEMPERATURE: 96 F | HEIGHT: 68 IN | DIASTOLIC BLOOD PRESSURE: 68 MMHG | OXYGEN SATURATION: 96 %

## 2024-03-01 DIAGNOSIS — Z85.51 HX OF BLADDER CANCER: ICD-10-CM

## 2024-03-01 DIAGNOSIS — Z85.118 HX OF CANCER OF LUNG: ICD-10-CM

## 2024-03-01 DIAGNOSIS — I82.412 ACUTE DEEP VEIN THROMBOSIS (DVT) OF FEMORAL VEIN OF LEFT LOWER EXTREMITY: Primary | ICD-10-CM

## 2024-03-01 DIAGNOSIS — T83.83XA NEPHROSTOMY TUBE BLEED: ICD-10-CM

## 2024-03-01 PROCEDURE — G2211 COMPLEX E/M VISIT ADD ON: HCPCS | Mod: S$PBB,,, | Performed by: FAMILY MEDICINE

## 2024-03-01 PROCEDURE — 99999 PR PBB SHADOW E&M-EST. PATIENT-LVL V: CPT | Mod: PBBFAC,,, | Performed by: FAMILY MEDICINE

## 2024-03-01 PROCEDURE — 99215 OFFICE O/P EST HI 40 MIN: CPT | Mod: PBBFAC,PN | Performed by: FAMILY MEDICINE

## 2024-03-01 PROCEDURE — 99215 OFFICE O/P EST HI 40 MIN: CPT | Mod: S$PBB,,, | Performed by: FAMILY MEDICINE

## 2024-03-01 NOTE — PATIENT INSTRUCTIONS
Since we have the clot in the legs, and the lungs, we definitely want to be on the Eliquis twice a day.    I understand it is quite expensive.  I am placing a referral to our pharmacy assistance team, along with sending them a prescription, to see if they can get you the Eliquis for free or at least a reduced price.  Someone from the team should contact you soon to give you details and to collect some financial info.      I do think hospice is a good idea.  This point, we really want to focus on quality of time, rather than the quantity.  If you do decide to move back home, you should be okay to fly.  The risks applying are much less than an extended car trip.  You should be able to take the portable oxygen bottles on the airplane, but I would check with the airline 1st.    In the meantime, continue to follow with Urology, Oncology, other specialists, as scheduled.

## 2024-03-01 NOTE — PROGRESS NOTES
"    [facility]       [address] [place] [address]      Phone: [phone]      Fax: [phone]    Faizan Antoine MD                Office Visit  03/01/2024        Subjective      HPI:  Geovani Currie is a 68 y.o. male presents today in clinic for "Follow-up  ."     68-year-old gentleman presents today for hospital follow-up.      His wife, Chantal, and his daughter, Stephania, are present.  They provide portions of the history.    Family states that since our last visit, he developed a DVT in his left leg.  Recently, they went to the hospital because he was having trouble breathing and they discovered suspicion was high for PE.  They diagnosed this via V/Q scan.  He was initially started on Eliquis 10 mg, twice a day, seven days.  Then, the prescription said to take it once daily.  Instead, he was taking 5 mg twice a day.  Prescription ran out.  They want to know if they should continue on it.  Apparently, they saw the hematologist/oncologist last week.  He definitely recommended staying on anticoagulation.  Fortunately, Eliquis is quite expensive for them.  They would like to know other options.      They also state that they have seen there urologist, oncologist, and are currently deliberating whether to go on hospice or not.  Long-term plan is to take him home to [place] if they decide to do hospice.  Would like to know if he is okay to travel via airplane as the for our plane flight would be much easier than a four day car trip.    States that they have had to replace the nephrostomy tubes on multiple occasions.  Finally seemed to have them in place.  Has been having some blood in the urine.  Seems to be clearing.    PMH:  Bladder cancer.  Lung cancer.  HTN.  HLD.  COPD.  Hypothyroid.  Spinal stenosis.  PSH: Left kidney stent.  Nephrostomy tube right kidney.  Appendectomy.  Cataracts.  Finger amputations (accident).  Right hip.  Allergies: NKDA  Social:  " Retired.  .  T: Denies current use.  Quit a couple of weeks ago.    A has   D:  Denies    Exercise:  No regular exercise program.  Limited mobility with his health issues.        The following were updated and reviewed by myself in the chart: medications, past medical history, past surgical history, family history, social history, and allergies.     Medications:  Current Outpatient Medications on File Prior to Visit   Medication Sig Dispense Refill    albuterol (ACCUNEB) 0.63 mg/3 mL Nebu Take 3 mLs (0.63 mg total) by nebulization 3 (three) times daily as needed (sob, wheezing). Rescue 75 mL 3    albuterol (PROVENTIL/VENTOLIN HFA) 90 mcg/actuation inhaler Inhale 1-2 puffs into the lungs every 4 (four) hours as needed for Wheezing. Rescue 18 g 11    ALPRAZolam (XANAX) 0.5 MG tablet TAKE 2 TABLETS (1 MG TOTAL) BY MOUTH 3 (THREE) TIMES DAILY AS NEEDED FOR ANXIETY. 60 tablet 2    apixaban (ELIQUIS) 5 mg Tab Take 10mg po BID x 7 days then 5mg po daily 74 tablet 1    cyclobenzaprine (FLEXERIL) 10 MG tablet Take 1 tablet (10 mg total) by mouth 3 (three) times daily as needed for Muscle spasms. 270 tablet 3    docusate sodium (COLACE) 100 MG capsule Take 100 mg by mouth 2 (two) times daily.      EScitalopram oxalate (LEXAPRO) 20 MG tablet Take 1 tablet (20 mg total) by mouth once daily. 90 tablet 3    fluticasone furoate-vilanteroL (BREO ELLIPTA) 100-25 mcg/dose diskus inhaler Inhale 1 puff into the lungs once daily. Controller 30 each 11    HYDROcodone-acetaminophen (NORCO) 7.5-325 mg per tablet Take 1 tablet by mouth every 6 (six) hours as needed for Pain. 120 tablet 0    levothyroxine (SYNTHROID) 125 MCG tablet Take 1 tablet (125 mcg total) by mouth before breakfast. 90 tablet 3    meropenem (MERREM) 1 gram injection       naloxone (NARCAN) 4 mg/actuation Spry 1 spray once.      nicotine (NICODERM CQ) 14 mg/24 hr Place 1 patch onto the skin every 24 hours.      pregabalin (LYRICA) 75 MG capsule Take 1 capsule  (75 mg total) by mouth every evening. 30 capsule 1    vitamin D (VITAMIN D3) 1000 units Tab Take 25 mcg by mouth once daily.      [DISCONTINUED] atorvastatin (LIPITOR) 20 MG tablet Take 20 mg by mouth.       No current facility-administered medications on file prior to visit.        PMHx:  Past Medical History:   Diagnosis Date    Anxiety disorder, unspecified     COPD (chronic obstructive pulmonary disease)     Hypertension     Thyroid disease       Patient Active Problem List    Diagnosis Date Noted    Acute cystitis 02/04/2024    Acute deep vein thrombosis (DVT) of femoral vein of left lower extremity 02/03/2024    Acute deep vein thrombosis (DVT) of left lower extremity 02/03/2024    Anxiety 02/03/2024    Acute renal failure superimposed on stage 3 chronic kidney disease 02/03/2024    Hypothyroidism 01/12/2024    Encephalopathy, metabolic 01/12/2024    Malfunction of nephrostomy tube 01/12/2024    Sepsis 01/12/2024    Constipation 01/12/2024    Hypotension 01/12/2024    Stage 4 chronic kidney disease 12/28/2023    Encounter for palliative care 12/21/2023    Right hip pain 11/21/2023    Need for tetanus, diphtheria, and acellular pertussis (Tdap) vaccine 11/20/2023    Abrasion of right elbow 11/20/2023    Displaced intertrochanteric fracture of right femur, initial encounter for closed fracture 11/20/2023    Preop examination 11/20/2023    Right elbow pain 11/20/2023    Malignant neoplasm of urinary bladder 11/15/2023    Ureteral obstruction, left 11/15/2023    Nephrostomy tube displaced 11/10/2023    Squamous cell carcinoma of right lung 11/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    Centrilobular emphysema 11/09/2023    Elevated serum creatinine 10/21/2023    Anemia 10/21/2023    Hx of cancer of lung 10/21/2023    UTI (urinary tract infection) 10/21/2023    Hyperglycemia 10/21/2023        PSHx:  Past Surgical History:   Procedure Laterality Date    APPENDECTOMY      CATARACT EXTRACTION      NEPHROSTOMY      OPEN REDUCTION AND INTERNAL FIXATION  (ORIF) OF INTERTROCHANTERIC FRACTURE OF FEMUR Right 2023    Procedure: ORIF, FRACTURE, FEMUR, INTERTROCHANTERIC;  Surgeon: Tanisha Guidry DO;  Location: [facility];  Service: Orthopedics;  Laterality: Right;  Gamma nail        FHx:  Family History   Problem Relation Age of Onset    Cancer Mother         NOS    Cancer Father         NOS    Cancer Maternal Grandfather         NOS    Bladder Cancer Neg Hx         Social:  Social History     Socioeconomic History    Marital status:    Tobacco Use    Smoking status: Every Day     Current packs/day: 0.00     Types: Vaping with nicotine, Cigarettes     Last attempt to quit: 10/2023     Years since quittin.4     Passive exposure: Current    Smokeless tobacco: Never   Substance and Sexual Activity    Alcohol use: Never    Drug use: Never    Sexual activity: Not Currently     Partners: Female   Social History Narrative    Lives in [place] with daughter, Sera. Accompanied by Sera and wife Kailey. Long term smoking history. No longer smoking; uses a vape pen.     Social Determinants of Health     Financial Resource Strain: High Risk (11/10/2023)    Overall Financial Resource Strain (CARDIA)     Difficulty of Paying Living Expenses: Very hard   Food Insecurity: Food Insecurity Present (11/10/2023)    Hunger Vital Sign     Worried About Running Out of Food in the Last Year: Often true     Ran Out of Food in the Last Year: Often true   Transportation Needs: Unmet Transportation Needs (11/10/2023)    PRAPARE - Transportation     Lack of Transportation (Medical): Yes     Lack of Transportation (Non-Medical): Yes   Physical Activity: Inactive (11/10/2023)    Exercise Vital Sign     Days of Exercise per Week: 0 days     Minutes of Exercise per Session: 0 min   Stress: Stress Concern Present (11/10/2023)    [employer] [place] [employer] - Occupational Stress Questionnaire     Feeling of Stress : Rather much   Social Connections: Unknown (11/10/2023)     "Social Connection and Isolation Panel [NHANES]     Frequency of Communication with Friends and Family: More than three times a week     Frequency of Social Gatherings with Friends and Family: Never     Attends Taoism Services: Patient declined     Active Member of Clubs or Organizations: No     Attends Club or Organization Meetings: Never     Marital Status:    Housing Stability: High Risk (11/10/2023)    Housing Stability Vital Sign     Unable to Pay for Housing in the Last Year: Yes     Number of Places Lived in the Last Year: 3     Unstable Housing in the Last Year: No        Allergies:  Review of patient's allergies indicates:  No Known Allergies     ROS:  Review of Systems   Constitutional:  Positive for activity change. Negative for appetite change, chills and fever.   HENT:  Negative for congestion, postnasal drip, rhinorrhea, sore throat and trouble swallowing.    Respiratory:  Negative for cough and shortness of breath.    Cardiovascular:  Negative for chest pain and palpitations.   Gastrointestinal:  Negative for abdominal pain, constipation, diarrhea, nausea and vomiting.   Genitourinary:  Positive for hematuria. Negative for difficulty urinating.   Musculoskeletal:  Positive for arthralgias, back pain and myalgias.   Skin:  Negative for color change and rash.   Neurological:  Negative for headaches.   All other systems reviewed and are negative.         Objective      /68   Pulse 81   Temp (!) 95.7 °F (35.4 °C)   Ht 5' 8" (1.727 m)   Wt 72.5 kg (159 lb 13.3 oz)   SpO2 96%   BMI 24.30 kg/m²   Ht Readings from Last 3 Encounters:   03/01/24 5' 8" (1.727 m)   02/23/24 5' 8" (1.727 m)   02/22/24 5' 8" (1.727 m)     Wt Readings from Last 3 Encounters:   03/01/24 72.5 kg (159 lb 13.3 oz)   02/23/24 72 kg (158 lb 9.9 oz)   02/22/24 72.1 kg (158 lb 15.2 oz)       PHYSICAL EXAM:  Physical Exam  Vitals and nursing note reviewed.   Constitutional:       General: He is not in acute " distress.  HENT:      Head: Normocephalic and atraumatic.      Right Ear: Tympanic membrane, ear canal and external ear normal.      Left Ear: Tympanic membrane, ear canal and external ear normal.      Nose: Nose normal. No congestion or rhinorrhea.      Mouth/Throat:      Mouth: Mucous membranes are moist.      Pharynx: Oropharynx is clear. No oropharyngeal exudate or posterior oropharyngeal erythema.   Eyes:      Extraocular Movements: Extraocular movements intact.      Conjunctiva/sclera: Conjunctivae normal.      Pupils: Pupils are equal, round, and reactive to light.   Cardiovascular:      Rate and Rhythm: Normal rate and regular rhythm.   Pulmonary:      Effort: Pulmonary effort is normal. No respiratory distress.      Breath sounds: No wheezing, rhonchi or rales.   Neurological:      Mental Status: He is alert.              LABS / IMAGING:  Recent Results (from the past 4368 hour(s))   POCT glucose    Collection Time: 10/20/23  9:55 PM   Result Value Ref Range    POCT Glucose 127 (H) 70 - 110 mg/dL   CBC W/ AUTO DIFFERENTIAL    Collection Time: 10/20/23 10:19 PM   Result Value Ref Range    WBC 11.39 3.90 - 12.70 K/uL    RBC 3.28 (L) 4.60 - 6.20 M/uL    Hemoglobin 9.1 (L) 14.0 - 18.0 g/dL    Hematocrit 28.3 (L) 40.0 - 54.0 %    MCV 86 82 - 98 fL    MCH 27.7 27.0 - 31.0 pg    MCHC 32.2 32.0 - 36.0 g/dL    RDW 14.1 11.5 - 14.5 %    Platelets 281 150 - 450 K/uL    MPV 9.7 9.2 - 12.9 fL    Immature Granulocytes 0.5 0.0 - 0.5 %    Gran # (ANC) 9.3 (H) 1.8 - 7.7 K/uL    Immature Grans (Abs) 0.06 (H) 0.00 - 0.04 K/uL    Lymph # 1.0 1.0 - 4.8 K/uL    Mono # 0.8 0.3 - 1.0 K/uL    Eos # 0.3 0.0 - 0.5 K/uL    Baso # 0.03 0.00 - 0.20 K/uL    nRBC 0 0 /100 WBC    Gran % 81.5 (H) 38.0 - 73.0 %    Lymph % 8.3 (L) 18.0 - 48.0 %    Mono % 6.6 4.0 - 15.0 %    Eosinophil % 2.8 0.0 - 8.0 %    Basophil % 0.3 0.0 - 1.9 %    Differential Method Automated    Comprehensive metabolic panel    Collection Time: 10/20/23 10:19 PM   Result  Value Ref Range    Sodium 137 136 - 145 mmol/L    Potassium 4.7 3.5 - 5.1 mmol/L    Chloride 102 95 - 110 mmol/L    CO2 21 (L) 23 - 29 mmol/L    Glucose 124 (H) 70 - 110 mg/dL    BUN 30 (H) 8 - 23 mg/dL    Creatinine 1.9 (H) 0.5 - 1.4 mg/dL    Calcium 9.9 8.7 - 10.5 mg/dL    Total Protein 7.7 6.0 - 8.4 g/dL    Albumin 3.1 (L) 3.5 - 5.2 g/dL    Total Bilirubin 0.3 0.1 - 1.0 mg/dL    Alkaline Phosphatase 113 55 - 135 U/L    AST 19 10 - 40 U/L    ALT 16 10 - 44 U/L    eGFR 38 (A) >60 mL/min/1.73 m^2    Anion Gap 14 8 - 16 mmol/L   Protime-INR    Collection Time: 10/20/23 10:19 PM   Result Value Ref Range    Prothrombin Time 11.3 9.0 - 12.5 sec    INR 1.0 0.8 - 1.2   TSH    Collection Time: 10/20/23 10:19 PM   Result Value Ref Range    TSH 1.001 0.400 - 4.000 uIU/mL   LDL - Lipid Panel    Collection Time: 10/20/23 10:19 PM   Result Value Ref Range    Cholesterol 132 120 - 199 mg/dL    Triglycerides 55 30 - 150 mg/dL    HDL 54 40 - 75 mg/dL    LDL Cholesterol 67.0 63.0 - 159.0 mg/dL    HDL/Cholesterol Ratio 40.9 20.0 - 50.0 %    Total Cholesterol/HDL Ratio 2.4 2.0 - 5.0    Non-HDL Cholesterol 78 mg/dL   Troponin I    Collection Time: 10/20/23 10:19 PM   Result Value Ref Range    Troponin I 0.010 0.000 - 0.026 ng/mL   Lactic acid, plasma    Collection Time: 10/20/23 10:22 PM   Result Value Ref Range    Lactate (Lactic Acid) 1.3 0.5 - 2.2 mmol/L   Blood culture #1 **CANNOT BE ORDERED STAT**    Collection Time: 10/20/23 10:22 PM    Specimen: Peripheral, Antecubital, Left; Blood   Result Value Ref Range    Blood Culture, Routine No growth after 5 days.    Ammonia    Collection Time: 10/20/23 10:22 PM   Result Value Ref Range    Ammonia 26 10 - 50 umol/L   Urinalysis, Reflex to Urine Culture Urine, Clean Catch    Collection Time: 10/20/23 10:24 PM    Specimen: Urine   Result Value Ref Range    Specimen UA Urine, Clean Catch     Color, UA Yellow Yellow, Straw, Andra    Appearance, UA Hazy (A) Clear    pH, UA 6.0 5.0 - 8.0     Specific Gravity, UA 1.020 1.005 - 1.030    Protein, UA 1+ (A) Negative    Glucose, UA Negative Negative    Ketones, UA Negative Negative    Bilirubin (UA) Negative Negative    Occult Blood UA 2+ (A) Negative    Nitrite, UA Negative Negative    Urobilinogen, UA Negative <2.0 EU/dL    Leukocytes, UA 3+ (A) Negative   Urinalysis Microscopic    Collection Time: 10/20/23 10:24 PM   Result Value Ref Range    RBC, UA 24 (H) 0 - 4 /hpf    WBC, UA >100 (H) 0 - 5 /hpf    WBC Clumps, UA Few (A) None-Rare    Bacteria Rare None-Occ /hpf    Yeast, UA Occasional (A) None    Squam Epithel, UA 0 /hpf    Hyaline Casts, UA 2 (A) 0-1/lpf /lpf    Unclass Darling UA 3 None-Moderate    Microscopic Comment SEE COMMENT    Urine culture    Collection Time: 10/20/23 10:24 PM    Specimen: Urine   Result Value Ref Range    Urine Culture, Routine ENTEROBACTER CLOACAE  > 100,000 cfu/ml   (A)        Susceptibility    Enterobacter cloacae - CULTURE, URINE     Ceftriaxone 2 Intermediate mcg/mL     Ciprofloxacin 2 Intermediate mcg/mL     Cefepime 4 Sensitive mcg/mL     Ertapenem <=0.5 Sensitive mcg/mL     Nitrofurantoin >64 Resistant mcg/mL     Gentamicin <=4 Sensitive mcg/mL     Levofloxacin 4 Intermediate mcg/mL     Meropenem <=1 Sensitive mcg/mL     Piperacillin/Tazo <=16 Sensitive mcg/mL     Trimeth/Sulfa >2/38 Resistant mcg/mL     Tobramycin <=4 Sensitive mcg/mL   POCT ARTERIAL BLOOD GAS    Collection Time: 10/20/23 10:38 PM   Result Value Ref Range    POC PH 7.323 (L) 7.350 - 7.450    POC PCO2 43.2 35.0 - 45.0 mmHg    POC PO2 89.0 80.0 - 100 mmHg    POC SATURATED O2 97.0 95.0 - 100.0 %    POC HCO3 22.4 (L) 24.0 - 28.0 mmol/l    Base Deficit -3.5 (L) -2.0 - 2.0 mmol/l    Specimen source Arterial     Performed By: [email or web address]     Allens Test YES     FiO2 21.0 %   Blood culture #2 **CANNOT BE ORDERED STAT**    Collection Time: 10/20/23 11:34 PM    Specimen: Peripheral, Hand, Right; Blood   Result Value Ref Range    Blood Culture, Routine       Gram  stain aer bottle: Gram positive cocci in clusters resembling Staph    Blood Culture, Routine       Results called to and read back by: Estefany Jacob 10/22/2023  03:30    Blood Culture, Routine (A)      COAGULASE-NEGATIVE STAPHYLOCOCCUS SPECIES  Organism is a probable contaminant     MRSA/SA Rapid ID by PCR from Blood culture    Collection Time: 10/20/23 11:44 PM   Result Value Ref Range    Staph aureus ID by PCR Negative Negative    Methicillin Resistant ID by PCR Negative Negative   CBC with Automated Differential    Collection Time: 10/21/23  8:30 AM   Result Value Ref Range    WBC 9.19 3.90 - 12.70 K/uL    RBC 2.97 (L) 4.60 - 6.20 M/uL    Hemoglobin 8.3 (L) 14.0 - 18.0 g/dL    Hematocrit 25.4 (L) 40.0 - 54.0 %    MCV 86 82 - 98 fL    MCH 27.9 27.0 - 31.0 pg    MCHC 32.7 32.0 - 36.0 g/dL    RDW 14.0 11.5 - 14.5 %    Platelets 239 150 - 450 K/uL    MPV 9.5 9.2 - 12.9 fL    Immature Granulocytes 0.3 0.0 - 0.5 %    Gran # (ANC) 7.4 1.8 - 7.7 K/uL    Immature Grans (Abs) 0.03 0.00 - 0.04 K/uL    Lymph # 0.8 (L) 1.0 - 4.8 K/uL    Mono # 0.6 0.3 - 1.0 K/uL    Eos # 0.3 0.0 - 0.5 K/uL    Baso # 0.03 0.00 - 0.20 K/uL    nRBC 0 0 /100 WBC    Gran % 80.1 (H) 38.0 - 73.0 %    Lymph % 9.1 (L) 18.0 - 48.0 %    Mono % 6.6 4.0 - 15.0 %    Eosinophil % 3.6 0.0 - 8.0 %    Basophil % 0.3 0.0 - 1.9 %    Differential Method Automated    Basic Metabolic Panel (BMP)    Collection Time: 10/21/23  8:30 AM   Result Value Ref Range    Sodium 138 136 - 145 mmol/L    Potassium 4.4 3.5 - 5.1 mmol/L    Chloride 106 95 - 110 mmol/L    CO2 22 (L) 23 - 29 mmol/L    Glucose 115 (H) 70 - 110 mg/dL    BUN 25 (H) 8 - 23 mg/dL    Creatinine 1.6 (H) 0.5 - 1.4 mg/dL    Calcium 9.4 8.7 - 10.5 mg/dL    Anion Gap 10 8 - 16 mmol/L    eGFR 46 (A) >60 mL/min/1.73 m^2   POCT glucose    Collection Time: 10/21/23  4:31 PM   Result Value Ref Range    POCT Glucose 136 (H) 70 - 110 mg/dL   CBC with Automated Differential    Collection Time: 10/22/23  4:14 AM    Result Value Ref Range    WBC 8.53 3.90 - 12.70 K/uL    RBC 2.89 (L) 4.60 - 6.20 M/uL    Hemoglobin 7.8 (L) 14.0 - 18.0 g/dL    Hematocrit 24.9 (L) 40.0 - 54.0 %    MCV 86 82 - 98 fL    MCH 27.0 27.0 - 31.0 pg    MCHC 31.3 (L) 32.0 - 36.0 g/dL    RDW 14.1 11.5 - 14.5 %    Platelets 258 150 - 450 K/uL    MPV 9.3 9.2 - 12.9 fL    Immature Granulocytes 0.6 (H) 0.0 - 0.5 %    Gran # (ANC) 5.9 1.8 - 7.7 K/uL    Immature Grans (Abs) 0.05 (H) 0.00 - 0.04 K/uL    Lymph # 1.4 1.0 - 4.8 K/uL    Mono # 0.8 0.3 - 1.0 K/uL    Eos # 0.5 0.0 - 0.5 K/uL    Baso # 0.03 0.00 - 0.20 K/uL    nRBC 0 0 /100 WBC    Gran % 68.9 38.0 - 73.0 %    Lymph % 15.8 (L) 18.0 - 48.0 %    Mono % 9.0 4.0 - 15.0 %    Eosinophil % 5.3 0.0 - 8.0 %    Basophil % 0.4 0.0 - 1.9 %    Differential Method Automated    Basic Metabolic Panel (BMP)    Collection Time: 10/22/23  4:14 AM   Result Value Ref Range    Sodium 139 136 - 145 mmol/L    Potassium 5.0 3.5 - 5.1 mmol/L    Chloride 105 95 - 110 mmol/L    CO2 24 23 - 29 mmol/L    Glucose 100 70 - 110 mg/dL    BUN 19 8 - 23 mg/dL    Creatinine 1.3 0.5 - 1.4 mg/dL    Calcium 9.5 8.7 - 10.5 mg/dL    Anion Gap 10 8 - 16 mmol/L    eGFR 59 (A) >60 mL/min/1.73 m^2   POCT glucose    Collection Time: 10/22/23 12:04 PM   Result Value Ref Range    POCT Glucose 134 (H) 70 - 110 mg/dL   POCT glucose    Collection Time: 10/22/23  4:13 PM   Result Value Ref Range    POCT Glucose 130 (H) 70 - 110 mg/dL   POCT glucose    Collection Time: 10/22/23  8:00 PM   Result Value Ref Range    POCT Glucose 113 (H) 70 - 110 mg/dL   CBC with Automated Differential    Collection Time: 10/23/23  3:40 AM   Result Value Ref Range    WBC 7.48 3.90 - 12.70 K/uL    RBC 3.15 (L) 4.60 - 6.20 M/uL    Hemoglobin 8.5 (L) 14.0 - 18.0 g/dL    Hematocrit 26.6 (L) 40.0 - 54.0 %    MCV 84 82 - 98 fL    MCH 27.0 27.0 - 31.0 pg    MCHC 32.0 32.0 - 36.0 g/dL    RDW 14.4 11.5 - 14.5 %    Platelets 284 150 - 450 K/uL    MPV 9.1 (L) 9.2 - 12.9 fL    Immature  "Granulocytes 0.5 0.0 - 0.5 %    Gran # (ANC) 5.1 1.8 - 7.7 K/uL    Immature Grans (Abs) 0.04 0.00 - 0.04 K/uL    Lymph # 1.1 1.0 - 4.8 K/uL    Mono # 0.7 0.3 - 1.0 K/uL    Eos # 0.5 0.0 - 0.5 K/uL    Baso # 0.03 0.00 - 0.20 K/uL    nRBC 0 0 /100 WBC    Gran % 68.1 38.0 - 73.0 %    Lymph % 15.0 (L) 18.0 - 48.0 %    Mono % 9.2 4.0 - 15.0 %    Eosinophil % 6.8 0.0 - 8.0 %    Basophil % 0.4 0.0 - 1.9 %    Differential Method Automated    Basic Metabolic Panel (BMP)    Collection Time: 10/23/23  3:40 AM   Result Value Ref Range    Sodium 139 136 - 145 mmol/L    Potassium 4.5 3.5 - 5.1 mmol/L    Chloride 105 95 - 110 mmol/L    CO2 24 23 - 29 mmol/L    Glucose 112 (H) 70 - 110 mg/dL    BUN 21 8 - 23 mg/dL    Creatinine 1.3 0.5 - 1.4 mg/dL    Calcium 9.5 8.7 - 10.5 mg/dL    Anion Gap 10 8 - 16 mmol/L    eGFR 59 (A) >60 mL/min/1.73 m^2   POCT glucose    Collection Time: 10/23/23  4:36 AM   Result Value Ref Range    POCT Glucose 109 70 - 110 mg/dL   Echo    Collection Time: 10/23/23 11:00 AM   Result Value Ref Range    BSA 1.91 m2    Harper's Biplane MOD Ejection Fraction 61 %    LVOT stroke volume 97.51 cm3    LVIDd 5.33 3.5 - 6.0 cm    LV Systolic Volume 43.28 mL    LV Systolic Volume Index 22.7 mL/m2    LVIDs 3.27 2.1 - 4.0 cm    LV Diastolic Volume 137.27 mL    LV Diastolic Volume Index 71.87 mL/m2    IVS 1.08 0.6 - 1.1 cm    LVOT diameter 2.11 cm    LVOT area 3.5 cm2    FS 39 28 - 44 %    Left Ventricle Relative Wall Thickness 0.40 cm    Posterior Wall 1.07 0.6 - 1.1 cm    LV mass 222.82 g    LV Mass Index 117 g/m2    MV Peak E Froylan 1.05 m/s    TDI LATERAL 0.07 m/s    TDI SEPTAL 0.06 m/s    E/E' ratio 16.15 m/s    MV Peak A Froylan 1.39 m/s    TR Max Froylan 2.71 m/s    E/A ratio 0.76     E wave deceleration time 234.37 msec    MV "A" wave duration [id number] msec    LV SEPTAL E/E' RATIO 17.50 m/s    LV LATERAL E/E' RATIO 15.00 m/s    PV Peak S Froylan 0.53 m/s    PV Peak D Froylan 0.38 m/s    Pulm vein S/D ratio 1.39     LVOT " peak froylan 1.19 m/s    Left Ventricular Outflow Tract Mean Velocity 0.83 cm/s    Left Ventricular Outflow Tract Mean Gradient 3.06 mmHg    LA size 3.29 cm    Left Atrium Minor Axis 5.20 cm    Left Atrium Major Axis 4.32 cm    LA volume (mod) 47.45 cm3    LA Volume Index (Mod) 24.8 mL/m2    RVDD 3.33 cm    RV S' 14.34 cm/s    TAPSE 1.73 cm    RA Major Axis 4.87 cm    RA Width 4.16 cm    AV regurgitation pressure 1/2 time [id number] ms    AR Max Froylan 4.32 m/s    AV mean gradient 28 mmHg    AV peak gradient 41 mmHg    Ao peak froylan 3.20 m/s    Ao VTI 72.80 cm    LVOT peak VTI 27.90 cm    AV valve area 1.34 cm²    AV Velocity Ratio 0.37     AV index (prosthetic) 0.38     TOAN by Velocity Ratio 1.30 cm²    MV mean gradient 3 mmHg    MV peak gradient 8 mmHg    MV stenosis pressure 1/2 time 67.97 ms    MV valve area p 1/2 method 3.24 cm2    MV valve area by continuity eq 2.99 cm2    MV VTI 32.6 cm    Triscuspid Valve Regurgitation Peak Gradient 29 mmHg    PV PEAK VELOCITY 1.41 m/s    PV peak gradient 8 mmHg    Pulmonary Valve Mean Velocity 0.96 m/s    Sinus 3.71 cm    STJ 3.08 cm    Ascending aorta 3.54 cm    IVC diameter 1.82 cm    Mean e' 0.07 m/s    ZLVIDS -0.10     ZLVIDD -0.08     LA Volume Index 26.3 mL/m2    LA volume 50.15 cm3    LA WIDTH 3.8 cm    TV resting pulmonary artery pressure 37 mmHg    RV TB RVSP 11 mmHg    Est. RA pres 8 mmHg    EF 60 %   CBC auto differential    Collection Time: 10/24/23  9:10 AM   Result Value Ref Range    WBC 8.86 3.90 - 12.70 K/uL    RBC 3.54 (L) 4.60 - 6.20 M/uL    Hemoglobin 9.7 (L) 14.0 - 18.0 g/dL    Hematocrit 30.4 (L) 40.0 - 54.0 %    MCV 86 82 - 98 fL    MCH 27.4 27.0 - 31.0 pg    MCHC 31.9 (L) 32.0 - 36.0 g/dL    RDW 14.2 11.5 - 14.5 %    Platelets 349 150 - 450 K/uL    MPV 9.2 9.2 - 12.9 fL    Immature Granulocytes 0.6 (H) 0.0 - 0.5 %    Gran # (ANC) 6.7 1.8 - 7.7 K/uL    Immature Grans (Abs) 0.05 (H) 0.00 - 0.04 K/uL    Lymph # 1.0 1.0 - 4.8 K/uL    Mono # 0.5 0.3 - 1.0  K/uL    Eos # 0.5 0.0 - 0.5 K/uL    Baso # 0.05 0.00 - 0.20 K/uL    nRBC 0 0 /100 WBC    Gran % 75.8 (H) 38.0 - 73.0 %    Lymph % 11.6 (L) 18.0 - 48.0 %    Mono % 6.0 4.0 - 15.0 %    Eosinophil % 5.4 0.0 - 8.0 %    Basophil % 0.6 0.0 - 1.9 %    Differential Method Automated    Comprehensive metabolic panel    Collection Time: 10/24/23  9:11 AM   Result Value Ref Range    Sodium 138 136 - 145 mmol/L    Potassium 4.4 3.5 - 5.1 mmol/L    Chloride 104 95 - 110 mmol/L    CO2 19 (L) 23 - 29 mmol/L    Glucose 153 (H) 70 - 110 mg/dL    BUN 18 8 - 23 mg/dL    Creatinine 1.3 0.5 - 1.4 mg/dL    Calcium 10.0 8.7 - 10.5 mg/dL    Total Protein 7.1 6.0 - 8.4 g/dL    Albumin 2.9 (L) 3.5 - 5.2 g/dL    Total Bilirubin 0.2 0.1 - 1.0 mg/dL    Alkaline Phosphatase 128 55 - 135 U/L    AST 16 10 - 40 U/L    ALT 23 10 - 44 U/L    eGFR 59 (A) >60 mL/min/1.73 m^2    Anion Gap 15 8 - 16 mmol/L   COVID-19 Rapid Screening    Collection Time: 10/24/23 12:33 PM   Result Value Ref Range    SARS-CoV-2 RNA, Amplification, Qual Negative Negative   POCT glucose    Collection Time: 10/24/23 10:06 PM   Result Value Ref Range    POCT Glucose 150 (H) 70 - 110 mg/dL   Comprehensive metabolic panel    Collection Time: 10/25/23  4:50 AM   Result Value Ref Range    Sodium 138 136 - 145 mmol/L    Potassium 4.5 3.5 - 5.1 mmol/L    Chloride 105 95 - 110 mmol/L    CO2 24 23 - 29 mmol/L    Glucose 110 70 - 110 mg/dL    BUN 25 (H) 8 - 23 mg/dL    Creatinine 1.6 (H) 0.5 - 1.4 mg/dL    Calcium 9.6 8.7 - 10.5 mg/dL    Total Protein 7.2 6.0 - 8.4 g/dL    Albumin 3.0 (L) 3.5 - 5.2 g/dL    Total Bilirubin 0.2 0.1 - 1.0 mg/dL    Alkaline Phosphatase 121 55 - 135 U/L    AST 15 10 - 40 U/L    ALT 24 10 - 44 U/L    eGFR 46 (A) >60 mL/min/1.73 m^2    Anion Gap 9 8 - 16 mmol/L   CBC auto differential    Collection Time: 10/25/23  4:50 AM   Result Value Ref Range    WBC 9.39 3.90 - 12.70 K/uL    RBC 3.41 (L) 4.60 - 6.20 M/uL    Hemoglobin 9.3 (L) 14.0 - 18.0 g/dL    Hematocrit  28.7 (L) 40.0 - 54.0 %    MCV 84 82 - 98 fL    MCH 27.3 27.0 - 31.0 pg    MCHC 32.4 32.0 - 36.0 g/dL    RDW 14.1 11.5 - 14.5 %    Platelets 347 150 - 450 K/uL    MPV 9.2 9.2 - 12.9 fL    Immature Granulocytes 0.7 (H) 0.0 - 0.5 %    Gran # (ANC) 6.7 1.8 - 7.7 K/uL    Immature Grans (Abs) 0.07 (H) 0.00 - 0.04 K/uL    Lymph # 1.3 1.0 - 4.8 K/uL    Mono # 0.7 0.3 - 1.0 K/uL    Eos # 0.6 (H) 0.0 - 0.5 K/uL    Baso # 0.05 0.00 - 0.20 K/uL    nRBC 0 0 /100 WBC    Gran % 71.7 38.0 - 73.0 %    Lymph % 14.0 (L) 18.0 - 48.0 %    Mono % 7.0 4.0 - 15.0 %    Eosinophil % 6.1 0.0 - 8.0 %    Basophil % 0.5 0.0 - 1.9 %    Differential Method Automated    POCT glucose    Collection Time: 10/25/23  6:10 AM   Result Value Ref Range    POCT Glucose 166 (H) 70 - 110 mg/dL   CBC Auto Differential    Collection Time: 11/07/23 10:34 AM   Result Value Ref Range    WBC 8.37 3.90 - 12.70 K/uL    RBC 3.49 (L) 4.60 - 6.20 M/uL    Hemoglobin 9.4 (L) 14.0 - 18.0 g/dL    Hematocrit 30.6 (L) 40.0 - 54.0 %    MCV 88 82 - 98 fL    MCH 26.9 (L) 27.0 - 31.0 pg    MCHC 30.7 (L) 32.0 - 36.0 g/dL    RDW 14.6 (H) 11.5 - 14.5 %    Platelets 262 150 - 450 K/uL    MPV 10.3 9.2 - 12.9 fL    Immature Granulocytes 0.1 0.0 - 0.5 %    Gran # (ANC) 6.5 1.8 - 7.7 K/uL    Immature Grans (Abs) 0.01 0.00 - 0.04 K/uL    Lymph # 1.0 1.0 - 4.8 K/uL    Mono # 0.6 0.3 - 1.0 K/uL    Eos # 0.2 0.0 - 0.5 K/uL    Baso # 0.06 0.00 - 0.20 K/uL    nRBC 0 0 /100 WBC    Gran % 77.5 (H) 38.0 - 73.0 %    Lymph % 12.4 (L) 18.0 - 48.0 %    Mono % 6.6 4.0 - 15.0 %    Eosinophil % 2.7 0.0 - 8.0 %    Basophil % 0.7 0.0 - 1.9 %    Differential Method Automated    Comprehensive Metabolic Panel    Collection Time: 11/07/23 10:34 AM   Result Value Ref Range    Sodium 141 136 - 145 mmol/L    Potassium 4.9 3.5 - 5.1 mmol/L    Chloride 107 95 - 110 mmol/L    CO2 27 23 - 29 mmol/L    Glucose 89 70 - 110 mg/dL    BUN 20 8 - 23 mg/dL    Creatinine 1.4 0.5 - 1.4 mg/dL    Calcium 9.8 8.7 - 10.5 mg/dL     Total Protein 7.1 6.0 - 8.4 g/dL    Albumin 3.0 (L) 3.5 - 5.2 g/dL    Total Bilirubin 0.4 0.1 - 1.0 mg/dL    Alkaline Phosphatase 114 55 - 135 U/L    AST 10 10 - 40 U/L    ALT 11 10 - 44 U/L    eGFR 54.4 (A) >60 mL/min/1.73 m^2    Anion Gap 7 (L) 8 - 16 mmol/L   CBC auto differential    Collection Time: 11/10/23  6:58 PM   Result Value Ref Range    WBC 7.82 3.90 - 12.70 K/uL    RBC 3.55 (L) 4.60 - 6.20 M/uL    Hemoglobin 9.4 (L) 14.0 - 18.0 g/dL    Hematocrit 30.5 (L) 40.0 - 54.0 %    MCV 86 82 - 98 fL    MCH 26.5 (L) 27.0 - 31.0 pg    MCHC 30.8 (L) 32.0 - 36.0 g/dL    RDW 14.6 (H) 11.5 - 14.5 %    Platelets 222 150 - 450 K/uL    MPV 9.6 9.2 - 12.9 fL    Immature Granulocytes 0.3 0.0 - 0.5 %    Gran # (ANC) 5.6 1.8 - 7.7 K/uL    Immature Grans (Abs) 0.02 0.00 - 0.04 K/uL    Lymph # 1.3 1.0 - 4.8 K/uL    Mono # 0.6 0.3 - 1.0 K/uL    Eos # 0.3 0.0 - 0.5 K/uL    Baso # 0.04 0.00 - 0.20 K/uL    nRBC 0 0 /100 WBC    Gran % 72.0 38.0 - 73.0 %    Lymph % 16.0 (L) 18.0 - 48.0 %    Mono % 7.4 4.0 - 15.0 %    Eosinophil % 3.8 0.0 - 8.0 %    Basophil % 0.5 0.0 - 1.9 %    Differential Method Automated    Comprehensive metabolic panel    Collection Time: 11/10/23  6:58 PM   Result Value Ref Range    Sodium 139 136 - 145 mmol/L    Potassium 3.9 3.5 - 5.1 mmol/L    Chloride 103 95 - 110 mmol/L    CO2 21 (L) 23 - 29 mmol/L    Glucose 111 (H) 70 - 110 mg/dL    BUN 23 8 - 23 mg/dL    Creatinine 1.7 (H) 0.5 - 1.4 mg/dL    Calcium 9.7 8.7 - 10.5 mg/dL    Total Protein 7.5 6.0 - 8.4 g/dL    Albumin 3.3 (L) 3.5 - 5.2 g/dL    Total Bilirubin 0.3 0.1 - 1.0 mg/dL    Alkaline Phosphatase 112 55 - 135 U/L    AST 11 10 - 40 U/L    ALT 6 (L) 10 - 44 U/L    eGFR 43 (A) >60 mL/min/1.73 m^2    Anion Gap 15 8 - 16 mmol/L   Protime-INR    Collection Time: 11/10/23  6:58 PM   Result Value Ref Range    Prothrombin Time 11.2 9.0 - 12.5 sec    INR 1.1 0.8 - 1.2   APTT    Collection Time: 11/10/23  6:58 PM   Result Value Ref Range    aPTT 31.6 21.0 -  32.0 sec   Magnesium    Collection Time: 11/10/23  6:58 PM   Result Value Ref Range    Magnesium 2.2 1.6 - 2.6 mg/dL   Lactic acid, plasma    Collection Time: 11/10/23  6:58 PM   Result Value Ref Range    Lactate (Lactic Acid) 0.8 0.5 - 2.2 mmol/L   Blood culture #1 **CANNOT BE ORDERED STAT**    Collection Time: 11/10/23  6:59 PM    Specimen: Peripheral, Forearm, Right; Blood   Result Value Ref Range    Blood Culture, Routine No growth after 5 days.    Blood culture #2 **CANNOT BE ORDERED STAT**    Collection Time: 11/10/23  6:59 PM    Specimen: Peripheral, Antecubital, Right; Blood   Result Value Ref Range    Blood Culture, Routine No growth after 5 days.    Urinalysis, Reflex to Urine Culture Urine, Unspecified (nephrostomy)    Collection Time: 11/11/23  4:31 AM    Specimen: Urine   Result Value Ref Range    Specimen UA Urine, Unspecified     Color, UA Yellow Yellow, Straw, Andra    Appearance, UA Hazy (A) Clear    pH, UA 6.0 5.0 - 8.0    Specific Gravity, UA 1.010 1.005 - 1.030    Protein, UA 1+ (A) Negative    Glucose, UA Negative Negative    Ketones, UA Negative Negative    Bilirubin (UA) Negative Negative    Occult Blood UA 2+ (A) Negative    Nitrite, UA Negative Negative    Urobilinogen, UA Negative <2.0 EU/dL    Leukocytes, UA 3+ (A) Negative   Urinalysis Microscopic    Collection Time: 11/11/23  4:31 AM   Result Value Ref Range    RBC, UA >100 (H) 0 - 4 /hpf    WBC, UA 76 (H) 0 - 5 /hpf    WBC Clumps, UA Moderate (A) None-Rare    Bacteria Rare None-Occ /hpf    Yeast, UA Few (A) None    Hyaline Casts, UA 0 0-1/lpf /lpf    Unclass Darling UA Occasional None-Moderate    Microscopic Comment SEE COMMENT    Urine culture    Collection Time: 11/11/23  4:31 AM    Specimen: Urine   Result Value Ref Range    Urine Culture, Routine (A)      ENTEROCOCCUS FAECIUM VRE  >100,000 cfu/ml  No other significant isolate         Susceptibility    Enterococcus faecium VRE - CULTURE, URINE     Ampicillin >8 Resistant mcg/mL      Daptomycin 4 Sensitive mcg/mL     Nitrofurantoin <=32 Sensitive mcg/mL     Linezolid 2 Sensitive mcg/mL     Vancomycin >16 Resistant mcg/mL   Comprehensive Metabolic Panel (CMP)    Collection Time: 11/11/23  4:43 AM   Result Value Ref Range    Sodium 144 136 - 145 mmol/L    Potassium 3.8 3.5 - 5.1 mmol/L    Chloride 106 95 - 110 mmol/L    CO2 22 (L) 23 - 29 mmol/L    Glucose 108 70 - 110 mg/dL    BUN 19 8 - 23 mg/dL    Creatinine 1.2 0.5 - 1.4 mg/dL    Calcium 9.3 8.7 - 10.5 mg/dL    Total Protein 6.7 6.0 - 8.4 g/dL    Albumin 2.9 (L) 3.5 - 5.2 g/dL    Total Bilirubin 0.3 0.1 - 1.0 mg/dL    Alkaline Phosphatase 104 55 - 135 U/L    AST 9 (L) 10 - 40 U/L    ALT 5 (L) 10 - 44 U/L    eGFR >60 >60 mL/min/1.73 m^2    Anion Gap 16 8 - 16 mmol/L   CBC with Automated Differential    Collection Time: 11/11/23  4:43 AM   Result Value Ref Range    WBC 11.79 3.90 - 12.70 K/uL    RBC 3.49 (L) 4.60 - 6.20 M/uL    Hemoglobin 9.4 (L) 14.0 - 18.0 g/dL    Hematocrit 30.1 (L) 40.0 - 54.0 %    MCV 86 82 - 98 fL    MCH 26.9 (L) 27.0 - 31.0 pg    MCHC 31.2 (L) 32.0 - 36.0 g/dL    RDW 14.6 (H) 11.5 - 14.5 %    Platelets 228 150 - 450 K/uL    MPV 10.2 9.2 - 12.9 fL    Immature Granulocytes 0.3 0.0 - 0.5 %    Gran # (ANC) 10.0 (H) 1.8 - 7.7 K/uL    Immature Grans (Abs) 0.04 0.00 - 0.04 K/uL    Lymph # 0.7 (L) 1.0 - 4.8 K/uL    Mono # 0.8 0.3 - 1.0 K/uL    Eos # 0.3 0.0 - 0.5 K/uL    Baso # 0.04 0.00 - 0.20 K/uL    nRBC 0 0 /100 WBC    Gran % 84.5 (H) 38.0 - 73.0 %    Lymph % 6.0 (L) 18.0 - 48.0 %    Mono % 6.5 4.0 - 15.0 %    Eosinophil % 2.4 0.0 - 8.0 %    Basophil % 0.3 0.0 - 1.9 %    Differential Method Automated    Comprehensive Metabolic Panel (CMP)    Collection Time: 11/12/23  4:41 AM   Result Value Ref Range    Sodium 138 136 - 145 mmol/L    Potassium 4.0 3.5 - 5.1 mmol/L    Chloride 105 95 - 110 mmol/L    CO2 22 (L) 23 - 29 mmol/L    Glucose 113 (H) 70 - 110 mg/dL    BUN 17 8 - 23 mg/dL    Creatinine 1.4 0.5 - 1.4 mg/dL    Calcium  8.7 8.7 - 10.5 mg/dL    Total Protein 6.2 6.0 - 8.4 g/dL    Albumin 2.6 (L) 3.5 - 5.2 g/dL    Total Bilirubin 0.5 0.1 - 1.0 mg/dL    Alkaline Phosphatase 95 55 - 135 U/L    AST 8 (L) 10 - 40 U/L    ALT 8 (L) 10 - 44 U/L    eGFR 54 (A) >60 mL/min/1.73 m^2    Anion Gap 11 8 - 16 mmol/L   CBC with Automated Differential    Collection Time: 11/12/23  4:41 AM   Result Value Ref Range    WBC 12.69 3.90 - 12.70 K/uL    RBC 3.53 (L) 4.60 - 6.20 M/uL    Hemoglobin 9.5 (L) 14.0 - 18.0 g/dL    Hematocrit 30.4 (L) 40.0 - 54.0 %    MCV 86 82 - 98 fL    MCH 26.9 (L) 27.0 - 31.0 pg    MCHC 31.3 (L) 32.0 - 36.0 g/dL    RDW 14.8 (H) 11.5 - 14.5 %    Platelets 197 150 - 450 K/uL    MPV 10.5 9.2 - 12.9 fL    Immature Granulocytes 0.4 0.0 - 0.5 %    Gran # (ANC) 10.4 (H) 1.8 - 7.7 K/uL    Immature Grans (Abs) 0.05 (H) 0.00 - 0.04 K/uL    Lymph # 1.3 1.0 - 4.8 K/uL    Mono # 0.9 0.3 - 1.0 K/uL    Eos # 0.1 0.0 - 0.5 K/uL    Baso # 0.04 0.00 - 0.20 K/uL    nRBC 0 0 /100 WBC    Gran % 81.6 (H) 38.0 - 73.0 %    Lymph % 10.2 (L) 18.0 - 48.0 %    Mono % 7.0 4.0 - 15.0 %    Eosinophil % 0.5 0.0 - 8.0 %    Basophil % 0.3 0.0 - 1.9 %    Differential Method Automated    Comprehensive Metabolic Panel (CMP)    Collection Time: 11/13/23  3:25 AM   Result Value Ref Range    Sodium 137 136 - 145 mmol/L    Potassium 3.7 3.5 - 5.1 mmol/L    Chloride 103 95 - 110 mmol/L    CO2 23 23 - 29 mmol/L    Glucose 132 (H) 70 - 110 mg/dL    BUN 21 8 - 23 mg/dL    Creatinine 1.4 0.5 - 1.4 mg/dL    Calcium 8.7 8.7 - 10.5 mg/dL    Total Protein 6.2 6.0 - 8.4 g/dL    Albumin 2.4 (L) 3.5 - 5.2 g/dL    Total Bilirubin 0.3 0.1 - 1.0 mg/dL    Alkaline Phosphatase 122 55 - 135 U/L    AST 14 10 - 40 U/L    ALT 9 (L) 10 - 44 U/L    eGFR 54 (A) >60 mL/min/1.73 m^2    Anion Gap 11 8 - 16 mmol/L   CBC with Automated Differential    Collection Time: 11/13/23  3:25 AM   Result Value Ref Range    WBC 16.50 (H) 3.90 - 12.70 K/uL    RBC 3.08 (L) 4.60 - 6.20 M/uL    Hemoglobin 8.2  (L) 14.0 - 18.0 g/dL    Hematocrit 26.2 (L) 40.0 - 54.0 %    MCV 85 82 - 98 fL    MCH 26.6 (L) 27.0 - 31.0 pg    MCHC 31.3 (L) 32.0 - 36.0 g/dL    RDW 14.6 (H) 11.5 - 14.5 %    Platelets 212 150 - 450 K/uL    MPV 10.0 9.2 - 12.9 fL    Immature Granulocytes 0.5 0.0 - 0.5 %    Gran # (ANC) 14.1 (H) 1.8 - 7.7 K/uL    Immature Grans (Abs) 0.08 (H) 0.00 - 0.04 K/uL    Lymph # 1.0 1.0 - 4.8 K/uL    Mono # 1.2 (H) 0.3 - 1.0 K/uL    Eos # 0.1 0.0 - 0.5 K/uL    Baso # 0.04 0.00 - 0.20 K/uL    nRBC 0 0 /100 WBC    Gran % 85.7 (H) 38.0 - 73.0 %    Lymph % 6.2 (L) 18.0 - 48.0 %    Mono % 7.0 4.0 - 15.0 %    Eosinophil % 0.4 0.0 - 8.0 %    Basophil % 0.2 0.0 - 1.9 %    Differential Method Automated    CBC auto differential    Collection Time: 11/14/23  7:56 AM   Result Value Ref Range    WBC 11.66 3.90 - 12.70 K/uL    RBC 2.99 (L) 4.60 - 6.20 M/uL    Hemoglobin 7.9 (L) 14.0 - 18.0 g/dL    Hematocrit 25.6 (L) 40.0 - 54.0 %    MCV 86 82 - 98 fL    MCH 26.4 (L) 27.0 - 31.0 pg    MCHC 30.9 (L) 32.0 - 36.0 g/dL    RDW 14.7 (H) 11.5 - 14.5 %    Platelets 225 150 - 450 K/uL    MPV 10.2 9.2 - 12.9 fL    Immature Granulocytes 0.3 0.0 - 0.5 %    Gran # (ANC) 9.7 (H) 1.8 - 7.7 K/uL    Immature Grans (Abs) 0.04 0.00 - 0.04 K/uL    Lymph # 0.8 (L) 1.0 - 4.8 K/uL    Mono # 0.9 0.3 - 1.0 K/uL    Eos # 0.2 0.0 - 0.5 K/uL    Baso # 0.03 0.00 - 0.20 K/uL    nRBC 0 0 /100 WBC    Gran % 83.3 (H) 38.0 - 73.0 %    Lymph % 6.5 (L) 18.0 - 48.0 %    Mono % 7.7 4.0 - 15.0 %    Eosinophil % 1.9 0.0 - 8.0 %    Basophil % 0.3 0.0 - 1.9 %    Differential Method Automated    CBC Auto Differential    Collection Time: 11/15/23  3:44 AM   Result Value Ref Range    WBC 8.80 3.90 - 12.70 K/uL    RBC 3.24 (L) 4.60 - 6.20 M/uL    Hemoglobin 8.6 (L) 14.0 - 18.0 g/dL    Hematocrit 27.9 (L) 40.0 - 54.0 %    MCV 86 82 - 98 fL    MCH 26.5 (L) 27.0 - 31.0 pg    MCHC 30.8 (L) 32.0 - 36.0 g/dL    RDW 14.7 (H) 11.5 - 14.5 %    Platelets 272 150 - 450 K/uL    MPV 10.5 9.2 -  12.9 fL    Immature Granulocytes 0.2 0.0 - 0.5 %    Gran # (ANC) 6.7 1.8 - 7.7 K/uL    Immature Grans (Abs) 0.02 0.00 - 0.04 K/uL    Lymph # 1.0 1.0 - 4.8 K/uL    Mono # 0.7 0.3 - 1.0 K/uL    Eos # 0.4 0.0 - 0.5 K/uL    Baso # 0.04 0.00 - 0.20 K/uL    nRBC 0 0 /100 WBC    Gran % 76.3 (H) 38.0 - 73.0 %    Lymph % 11.3 (L) 18.0 - 48.0 %    Mono % 7.6 4.0 - 15.0 %    Eosinophil % 4.1 0.0 - 8.0 %    Basophil % 0.5 0.0 - 1.9 %    Differential Method Automated    Comprehensive Metabolic Panel    Collection Time: 11/15/23  3:44 AM   Result Value Ref Range    Sodium 141 136 - 145 mmol/L    Potassium 4.1 3.5 - 5.1 mmol/L    Chloride 106 95 - 110 mmol/L    CO2 20 (L) 23 - 29 mmol/L    Glucose 83 70 - 110 mg/dL    BUN 21 8 - 23 mg/dL    Creatinine 1.4 0.5 - 1.4 mg/dL    Calcium 8.9 8.7 - 10.5 mg/dL    Total Protein 6.1 6.0 - 8.4 g/dL    Albumin 2.3 (L) 3.5 - 5.2 g/dL    Total Bilirubin 0.3 0.1 - 1.0 mg/dL    Alkaline Phosphatase 114 55 - 135 U/L    AST 22 10 - 40 U/L    ALT 20 10 - 44 U/L    eGFR 54 (A) >60 mL/min/1.73 m^2    Anion Gap 15 8 - 16 mmol/L   CBC auto differential    Collection Time: 11/17/23  3:38 AM   Result Value Ref Range    WBC 9.96 3.90 - 12.70 K/uL    RBC 3.25 (L) 4.60 - 6.20 M/uL    Hemoglobin 8.6 (L) 14.0 - 18.0 g/dL    Hematocrit 27.9 (L) 40.0 - 54.0 %    MCV 86 82 - 98 fL    MCH 26.5 (L) 27.0 - 31.0 pg    MCHC 30.8 (L) 32.0 - 36.0 g/dL    RDW 14.8 (H) 11.5 - 14.5 %    Platelets 308 150 - 450 K/uL    MPV 9.8 9.2 - 12.9 fL    Immature Granulocytes 0.6 (H) 0.0 - 0.5 %    Gran # (ANC) 7.9 (H) 1.8 - 7.7 K/uL    Immature Grans (Abs) 0.06 (H) 0.00 - 0.04 K/uL    Lymph # 1.1 1.0 - 4.8 K/uL    Mono # 0.6 0.3 - 1.0 K/uL    Eos # 0.2 0.0 - 0.5 K/uL    Baso # 0.05 0.00 - 0.20 K/uL    nRBC 0 0 /100 WBC    Gran % 78.9 (H) 38.0 - 73.0 %    Lymph % 11.4 (L) 18.0 - 48.0 %    Mono % 6.4 4.0 - 15.0 %    Eosinophil % 2.2 0.0 - 8.0 %    Basophil % 0.5 0.0 - 1.9 %    Differential Method Automated    Renal Function Panel     Collection Time: 11/17/23  3:38 AM   Result Value Ref Range    Glucose 107 70 - 110 mg/dL    Sodium 143 136 - 145 mmol/L    Potassium 3.7 3.5 - 5.1 mmol/L    Chloride 105 95 - 110 mmol/L    CO2 25 23 - 29 mmol/L    BUN 13 8 - 23 mg/dL    Calcium 8.8 8.7 - 10.5 mg/dL    Creatinine 1.3 0.5 - 1.4 mg/dL    Albumin 2.4 (L) 3.5 - 5.2 g/dL    Phosphorus 3.5 2.7 - 4.5 mg/dL    eGFR 59 (A) >60 mL/min/1.73 m^2    Anion Gap 13 8 - 16 mmol/L   CBC auto differential    Collection Time: 11/20/23  9:18 PM   Result Value Ref Range    WBC 11.18 3.90 - 12.70 K/uL    RBC 3.03 (L) 4.60 - 6.20 M/uL    Hemoglobin 7.9 (L) 14.0 - 18.0 g/dL    Hematocrit 25.9 (L) 40.0 - 54.0 %    MCV 86 82 - 98 fL    MCH 26.1 (L) 27.0 - 31.0 pg    MCHC 30.5 (L) 32.0 - 36.0 g/dL    RDW 15.2 (H) 11.5 - 14.5 %    Platelets 258 150 - 450 K/uL    MPV 9.5 9.2 - 12.9 fL    Immature Granulocytes 0.5 0.0 - 0.5 %    Gran # (ANC) 9.0 (H) 1.8 - 7.7 K/uL    Immature Grans (Abs) 0.06 (H) 0.00 - 0.04 K/uL    Lymph # 1.2 1.0 - 4.8 K/uL    Mono # 0.5 0.3 - 1.0 K/uL    Eos # 0.4 0.0 - 0.5 K/uL    Baso # 0.04 0.00 - 0.20 K/uL    nRBC 0 0 /100 WBC    Gran % 80.8 (H) 38.0 - 73.0 %    Lymph % 10.6 (L) 18.0 - 48.0 %    Mono % 4.4 4.0 - 15.0 %    Eosinophil % 3.3 0.0 - 8.0 %    Basophil % 0.4 0.0 - 1.9 %    Differential Method Automated    Comprehensive metabolic panel    Collection Time: 11/20/23  9:18 PM   Result Value Ref Range    Sodium 138 136 - 145 mmol/L    Potassium 4.7 3.5 - 5.1 mmol/L    Chloride 102 95 - 110 mmol/L    CO2 23 23 - 29 mmol/L    Glucose 105 70 - 110 mg/dL    BUN 17 8 - 23 mg/dL    Creatinine 2.1 (H) 0.5 - 1.4 mg/dL    Calcium 9.6 8.7 - 10.5 mg/dL    Total Protein 6.8 6.0 - 8.4 g/dL    Albumin 2.8 (L) 3.5 - 5.2 g/dL    Total Bilirubin 0.3 0.1 - 1.0 mg/dL    Alkaline Phosphatase 95 55 - 135 U/L    AST 15 10 - 40 U/L    ALT 19 10 - 44 U/L    eGFR 33 (A) >60 mL/min/1.73 m^2    Anion Gap 13 8 - 16 mmol/L   TSH    Collection Time: 11/20/23  9:18 PM   Result  Value Ref Range    TSH 0.296 (L) 0.400 - 4.000 uIU/mL   T4, Free    Collection Time: 11/20/23  9:18 PM   Result Value Ref Range    Free T4 1.40 0.71 - 1.51 ng/dL   Comprehensive Metabolic Panel (CMP)    Collection Time: 11/21/23  6:09 AM   Result Value Ref Range    Sodium 142 136 - 145 mmol/L    Potassium 4.0 3.5 - 5.1 mmol/L    Chloride 107 95 - 110 mmol/L    CO2 24 23 - 29 mmol/L    Glucose 106 70 - 110 mg/dL    BUN 17 8 - 23 mg/dL    Creatinine 1.8 (H) 0.5 - 1.4 mg/dL    Calcium 8.8 8.7 - 10.5 mg/dL    Total Protein 6.0 6.0 - 8.4 g/dL    Albumin 2.4 (L) 3.5 - 5.2 g/dL    Total Bilirubin 0.3 0.1 - 1.0 mg/dL    Alkaline Phosphatase 94 55 - 135 U/L    AST 17 10 - 40 U/L    ALT 18 10 - 44 U/L    eGFR 40 (A) >60 mL/min/1.73 m^2    Anion Gap 11 8 - 16 mmol/L   CBC with Automated Differential    Collection Time: 11/21/23  6:09 AM   Result Value Ref Range    WBC 7.72 3.90 - 12.70 K/uL    RBC 2.78 (L) 4.60 - 6.20 M/uL    Hemoglobin 7.3 (L) 14.0 - 18.0 g/dL    Hematocrit 23.9 (L) 40.0 - 54.0 %    MCV 86 82 - 98 fL    MCH 26.3 (L) 27.0 - 31.0 pg    MCHC 30.5 (L) 32.0 - 36.0 g/dL    RDW 15.0 (H) 11.5 - 14.5 %    Platelets 227 150 - 450 K/uL    MPV 9.4 9.2 - 12.9 fL    Immature Granulocytes 0.5 0.0 - 0.5 %    Gran # (ANC) 5.9 1.8 - 7.7 K/uL    Immature Grans (Abs) 0.04 0.00 - 0.04 K/uL    Lymph # 1.0 1.0 - 4.8 K/uL    Mono # 0.4 0.3 - 1.0 K/uL    Eos # 0.4 0.0 - 0.5 K/uL    Baso # 0.04 0.00 - 0.20 K/uL    nRBC 0 0 /100 WBC    Gran % 76.0 (H) 38.0 - 73.0 %    Lymph % 13.0 (L) 18.0 - 48.0 %    Mono % 5.3 4.0 - 15.0 %    Eosinophil % 4.7 0.0 - 8.0 %    Basophil % 0.5 0.0 - 1.9 %    Differential Method Automated    Prepare RBC 1 Unit    Collection Time: 11/21/23  8:19 AM   Result Value Ref Range    UNIT NUMBER [id number]     Product Code [id number]     DISPENSE STATUS TRANSFUSED     CODING SYSTEM [id number]     Unit Blood Type Code 7300     Unit Blood Type B POS     Unit Expiration [id number]     CROSSMATCH INTERPRETATION  Compatible    Type & Screen    Collection Time: 11/21/23  8:19 AM   Result Value Ref Range    Group & Rh B POS     Indirect Bertin NEG     Specimen Outdate 11/24/2023 23:59    Comprehensive Metabolic Panel (CMP)    Collection Time: 11/22/23  3:22 AM   Result Value Ref Range    Sodium 143 136 - 145 mmol/L    Potassium 3.9 3.5 - 5.1 mmol/L    Chloride 107 95 - 110 mmol/L    CO2 26 23 - 29 mmol/L    Glucose 146 (H) 70 - 110 mg/dL    BUN 16 8 - 23 mg/dL    Creatinine 1.3 0.5 - 1.4 mg/dL    Calcium 8.5 (L) 8.7 - 10.5 mg/dL    Total Protein 5.4 (L) 6.0 - 8.4 g/dL    Albumin 2.2 (L) 3.5 - 5.2 g/dL    Total Bilirubin 0.3 0.1 - 1.0 mg/dL    Alkaline Phosphatase 78 55 - 135 U/L    AST 14 10 - 40 U/L    ALT 17 10 - 44 U/L    eGFR 59 (A) >60 mL/min/1.73 m^2    Anion Gap 10 8 - 16 mmol/L   CBC with Automated Differential    Collection Time: 11/22/23  3:22 AM   Result Value Ref Range    WBC 9.20 3.90 - 12.70 K/uL    RBC 2.86 (L) 4.60 - 6.20 M/uL    Hemoglobin 7.8 (L) 14.0 - 18.0 g/dL    Hematocrit 24.2 (L) 40.0 - 54.0 %    MCV 85 82 - 98 fL    MCH 27.3 27.0 - 31.0 pg    MCHC 32.2 32.0 - 36.0 g/dL    RDW 14.3 11.5 - 14.5 %    Platelets 177 150 - 450 K/uL    MPV 9.2 9.2 - 12.9 fL    Immature Granulocytes 0.8 (H) 0.0 - 0.5 %    Gran # (ANC) 8.4 (H) 1.8 - 7.7 K/uL    Immature Grans (Abs) 0.07 (H) 0.00 - 0.04 K/uL    Lymph # 0.5 (L) 1.0 - 4.8 K/uL    Mono # 0.2 (L) 0.3 - 1.0 K/uL    Eos # 0.0 0.0 - 0.5 K/uL    Baso # 0.01 0.00 - 0.20 K/uL    nRBC 0 0 /100 WBC    Gran % 90.7 (H) 38.0 - 73.0 %    Lymph % 5.9 (L) 18.0 - 48.0 %    Mono % 2.5 (L) 4.0 - 15.0 %    Eosinophil % 0.0 0.0 - 8.0 %    Basophil % 0.1 0.0 - 1.9 %    Differential Method Automated    Comprehensive Metabolic Panel (CMP)    Collection Time: 11/23/23  7:23 AM   Result Value Ref Range    Sodium 141 136 - 145 mmol/L    Potassium 3.8 3.5 - 5.1 mmol/L    Chloride 106 95 - 110 mmol/L    CO2 26 23 - 29 mmol/L    Glucose 116 (H) 70 - 110 mg/dL    BUN 18 8 - 23 mg/dL     Creatinine 1.3 0.5 - 1.4 mg/dL    Calcium 8.7 8.7 - 10.5 mg/dL    Total Protein 6.0 6.0 - 8.4 g/dL    Albumin 2.4 (L) 3.5 - 5.2 g/dL    Total Bilirubin 0.3 0.1 - 1.0 mg/dL    Alkaline Phosphatase 78 55 - 135 U/L    AST 12 10 - 40 U/L    ALT 10 10 - 44 U/L    eGFR 59 (A) >60 mL/min/1.73 m^2    Anion Gap 9 8 - 16 mmol/L   CBC with Automated Differential    Collection Time: 11/23/23  7:23 AM   Result Value Ref Range    WBC 10.52 3.90 - 12.70 K/uL    RBC 2.80 (L) 4.60 - 6.20 M/uL    Hemoglobin 7.6 (L) 14.0 - 18.0 g/dL    Hematocrit 24.2 (L) 40.0 - 54.0 %    MCV 86 82 - 98 fL    MCH 27.1 27.0 - 31.0 pg    MCHC 31.4 (L) 32.0 - 36.0 g/dL    RDW 15.0 (H) 11.5 - 14.5 %    Platelets 179 150 - 450 K/uL    MPV 9.4 9.2 - 12.9 fL    Immature Granulocytes 0.7 (H) 0.0 - 0.5 %    Gran # (ANC) 8.9 (H) 1.8 - 7.7 K/uL    Immature Grans (Abs) 0.07 (H) 0.00 - 0.04 K/uL    Lymph # 0.9 (L) 1.0 - 4.8 K/uL    Mono # 0.4 0.3 - 1.0 K/uL    Eos # 0.2 0.0 - 0.5 K/uL    Baso # 0.03 0.00 - 0.20 K/uL    nRBC 0 0 /100 WBC    Gran % 84.8 (H) 38.0 - 73.0 %    Lymph % 8.3 (L) 18.0 - 48.0 %    Mono % 3.8 (L) 4.0 - 15.0 %    Eosinophil % 2.1 0.0 - 8.0 %    Basophil % 0.3 0.0 - 1.9 %    Differential Method Automated    CBC Auto Differential    Collection Time: 11/24/23  8:09 AM   Result Value Ref Range    WBC 10.06 3.90 - 12.70 K/uL    RBC 2.48 (L) 4.60 - 6.20 M/uL    Hemoglobin 6.6 (L) 14.0 - 18.0 g/dL    Hematocrit 21.1 (L) 40.0 - 54.0 %    MCV 85 82 - 98 fL    MCH 26.6 (L) 27.0 - 31.0 pg    MCHC 31.3 (L) 32.0 - 36.0 g/dL    RDW 14.8 (H) 11.5 - 14.5 %    Platelets 172 150 - 450 K/uL    MPV 9.6 9.2 - 12.9 fL    Immature Granulocytes 0.6 (H) 0.0 - 0.5 %    Gran # (ANC) 8.5 (H) 1.8 - 7.7 K/uL    Immature Grans (Abs) 0.06 (H) 0.00 - 0.04 K/uL    Lymph # 0.9 (L) 1.0 - 4.8 K/uL    Mono # 0.3 0.3 - 1.0 K/uL    Eos # 0.3 0.0 - 0.5 K/uL    Baso # 0.02 0.00 - 0.20 K/uL    nRBC 0 0 /100 WBC    Gran % 84.5 (H) 38.0 - 73.0 %    Lymph % 8.6 (L) 18.0 - 48.0 %    Mono  % 3.3 (L) 4.0 - 15.0 %    Eosinophil % 2.8 0.0 - 8.0 %    Basophil % 0.2 0.0 - 1.9 %    Differential Method Automated    Basic Metabolic Panel    Collection Time: 11/26/23  7:55 AM   Result Value Ref Range    Sodium 143 136 - 145 mmol/L    Potassium 3.9 3.5 - 5.1 mmol/L    Chloride 105 95 - 110 mmol/L    CO2 25 23 - 29 mmol/L    Glucose 118 (H) 70 - 110 mg/dL    BUN 14 8 - 23 mg/dL    Creatinine 1.0 0.5 - 1.4 mg/dL    Calcium 8.6 (L) 8.7 - 10.5 mg/dL    Anion Gap 13 8 - 16 mmol/L    eGFR >60 >60 mL/min/1.73 m^2   Prepare RBC 1 Unit    Collection Time: 11/26/23  7:55 AM   Result Value Ref Range    UNIT NUMBER [id number]     Product Code [id number]     DISPENSE STATUS TRANSFUSED     CODING SYSTEM [id number]     Unit Blood Type Code 7300     Unit Blood Type B POS     Unit Expiration [id number]     CROSSMATCH INTERPRETATION Compatible    Type & Screen    Collection Time: 11/26/23  7:55 AM   Result Value Ref Range    Group & Rh B POS     Indirect Bertin NEG     Specimen Outdate 11/29/2023 23:59    CBC Auto Differential    Collection Time: 11/26/23  7:56 AM   Result Value Ref Range    WBC 7.42 3.90 - 12.70 K/uL    RBC 2.64 (L) 4.60 - 6.20 M/uL    Hemoglobin 7.0 (L) 14.0 - 18.0 g/dL    Hematocrit 21.9 (L) 40.0 - 54.0 %    MCV 83 82 - 98 fL    MCH 26.5 (L) 27.0 - 31.0 pg    MCHC 32.0 32.0 - 36.0 g/dL    RDW 14.9 (H) 11.5 - 14.5 %    Platelets 190 150 - 450 K/uL    MPV 9.6 9.2 - 12.9 fL    Immature Granulocytes 0.9 (H) 0.0 - 0.5 %    Gran # (ANC) 6.0 1.8 - 7.7 K/uL    Immature Grans (Abs) 0.07 (H) 0.00 - 0.04 K/uL    Lymph # 0.8 (L) 1.0 - 4.8 K/uL    Mono # 0.4 0.3 - 1.0 K/uL    Eos # 0.2 0.0 - 0.5 K/uL    Baso # 0.03 0.00 - 0.20 K/uL    nRBC 0 0 /100 WBC    Gran % 80.3 (H) 38.0 - 73.0 %    Lymph % 10.6 (L) 18.0 - 48.0 %    Mono % 5.1 4.0 - 15.0 %    Eosinophil % 2.7 0.0 - 8.0 %    Basophil % 0.4 0.0 - 1.9 %    Differential Method Automated    CBC Auto Differential    Collection Time: 11/27/23  4:39 AM   Result Value Ref  Range    WBC 7.12 3.90 - 12.70 K/uL    RBC 2.80 (L) 4.60 - 6.20 M/uL    Hemoglobin 7.8 (L) 14.0 - 18.0 g/dL    Hematocrit 23.8 (L) 40.0 - 54.0 %    MCV 85 82 - 98 fL    MCH 27.9 27.0 - 31.0 pg    MCHC 32.8 32.0 - 36.0 g/dL    RDW 14.8 (H) 11.5 - 14.5 %    Platelets 188 150 - 450 K/uL    MPV 9.2 9.2 - 12.9 fL    Immature Granulocytes 0.4 0.0 - 0.5 %    Gran # (ANC) 5.3 1.8 - 7.7 K/uL    Immature Grans (Abs) 0.03 0.00 - 0.04 K/uL    Lymph # 0.9 (L) 1.0 - 4.8 K/uL    Mono # 0.6 0.3 - 1.0 K/uL    Eos # 0.2 0.0 - 0.5 K/uL    Baso # 0.03 0.00 - 0.20 K/uL    nRBC 0 0 /100 WBC    Gran % 74.4 (H) 38.0 - 73.0 %    Lymph % 12.6 (L) 18.0 - 48.0 %    Mono % 9.0 4.0 - 15.0 %    Eosinophil % 3.2 0.0 - 8.0 %    Basophil % 0.4 0.0 - 1.9 %    Differential Method Automated    CBC auto differential    Collection Time: 12/09/23  5:52 PM   Result Value Ref Range    WBC 8.54 3.90 - 12.70 K/uL    RBC 2.75 (L) 4.60 - 6.20 M/uL    Hemoglobin 7.7 (L) 14.0 - 18.0 g/dL    Hematocrit 24.2 (L) 40.0 - 54.0 %    MCV 88 82 - 98 fL    MCH 28.0 27.0 - 31.0 pg    MCHC 31.8 (L) 32.0 - 36.0 g/dL    RDW 18.0 (H) 11.5 - 14.5 %    Platelets 311 150 - 450 K/uL    MPV 9.2 9.2 - 12.9 fL    Immature Granulocytes 1.3 (H) 0.0 - 0.5 %    Gran # (ANC) 6.0 1.8 - 7.7 K/uL    Immature Grans (Abs) 0.11 (H) 0.00 - 0.04 K/uL    Lymph # 1.4 1.0 - 4.8 K/uL    Mono # 0.7 0.3 - 1.0 K/uL    Eos # 0.3 0.0 - 0.5 K/uL    Baso # 0.02 0.00 - 0.20 K/uL    nRBC 0 0 /100 WBC    Gran % 70.1 38.0 - 73.0 %    Lymph % 16.9 (L) 18.0 - 48.0 %    Mono % 8.5 4.0 - 15.0 %    Eosinophil % 3.0 0.0 - 8.0 %    Basophil % 0.2 0.0 - 1.9 %    Differential Method Automated    Comprehensive metabolic panel    Collection Time: 12/09/23  5:52 PM   Result Value Ref Range    Sodium 139 136 - 145 mmol/L    Potassium 4.5 3.5 - 5.1 mmol/L    Chloride 102 95 - 110 mmol/L    CO2 26 23 - 29 mmol/L    Glucose 111 (H) 70 - 110 mg/dL    BUN 26 (H) 8 - 23 mg/dL    Creatinine 1.6 (H) 0.5 - 1.4 mg/dL    Calcium 9.5  8.7 - 10.5 mg/dL    Total Protein 6.9 6.0 - 8.4 g/dL    Albumin 2.9 (L) 3.5 - 5.2 g/dL    Total Bilirubin 0.3 0.1 - 1.0 mg/dL    Alkaline Phosphatase 142 (H) 55 - 135 U/L    AST 19 10 - 40 U/L    ALT 24 10 - 44 U/L    eGFR 46 (A) >60 mL/min/1.73 m^2    Anion Gap 11 8 - 16 mmol/L   CBC Auto Differential    Collection Time: 12/21/23  8:19 AM   Result Value Ref Range    WBC 16.75 (H) 3.90 - 12.70 K/uL    RBC 2.85 (L) 4.60 - 6.20 M/uL    Hemoglobin 8.0 (L) 14.0 - 18.0 g/dL    Hematocrit 25.1 (L) 40.0 - 54.0 %    MCV 88 82 - 98 fL    MCH 28.1 27.0 - 31.0 pg    MCHC 31.9 (L) 32.0 - 36.0 g/dL    RDW 18.5 (H) 11.5 - 14.5 %    Platelets 237 150 - 450 K/uL    MPV 9.6 9.2 - 12.9 fL    Immature Granulocytes 0.4 0.0 - 0.5 %    Gran # (ANC) 15.0 (H) 1.8 - 7.7 K/uL    Immature Grans (Abs) 0.07 (H) 0.00 - 0.04 K/uL    Lymph # 0.6 (L) 1.0 - 4.8 K/uL    Mono # 0.9 0.3 - 1.0 K/uL    Eos # 0.1 0.0 - 0.5 K/uL    Baso # 0.05 0.00 - 0.20 K/uL    nRBC 0 0 /100 WBC    Gran % 89.8 (H) 38.0 - 73.0 %    Lymph % 3.8 (L) 18.0 - 48.0 %    Mono % 5.4 4.0 - 15.0 %    Eosinophil % 0.3 0.0 - 8.0 %    Basophil % 0.3 0.0 - 1.9 %    Differential Method Automated      *Note: Due to a large number of results and/or encounters for the requested time period, some results have not been displayed. A complete set of results can be found in Results Review.         Assessment    1. Acute deep vein thrombosis (DVT) of femoral vein of left lower extremity    2. Hx of bladder cancer    3. Hx of cancer of lung    4. Nephrostomy tube bleed          Rajan Olivo was seen today for follow-up.    Diagnoses and all orders for this visit:    Acute deep vein thrombosis (DVT) of femoral vein of left lower extremity  -     apixaban (ELIQUIS) 5 mg Tab; Take 1 tablet (5 mg total) by mouth 2 (two) times daily.  -     Ambulatory referral/consult to Pharmacy Assistance; Future    Hx of bladder cancer    Hx of cancer of lung    Nephrostomy tube bleed    Especially considering  Heme-Onc recommendations to stay on anticoagulation, would agree with keeping him on the Eliquis.  Since it is expensive, we will send a prescription and referral to pharmacy assistance today.  Hopefully they will be able to get him this for free or reduced cost.  He and his wife only get his social security, so hope you will qualify.      Should be okay to travel via airplane.  They have portable oxygen tanks they can carry with them, as long as it is okay with the airline.    Agree with the idea of hospice.  At this point, his cancer burden is so extensive in his multiple medical issues.  Would rather they focus on quality of life, rather than quantity.      FOLLOW-UP:  Follow up if symptoms worsen or fail to improve.    I spent a total of 45 minutes face to face and non-face to face on the date of this visit.This includes time preparing to see the patient (eg, review of tests, notes), obtaining and/or reviewing additional history from an independent historian and/or outside medical records, documenting clinical information in the electronic health record, independently interpreting results and/or communicating results to the patient/family/caregiver, or care coordinator.  Visit today included increased complexity associated with the care of the episodic problem addressed and managing the longitudinal care of the patient due to the serious and/or complex managed problem(s).    Signed by:  Faizan Antoine MD

## 2024-03-01 NOTE — TELEPHONE ENCOUNTER
Tarir called pt and pt spouse ([phone]) to check-in today from last week's encounter. Swer calling to inquire if pt made a decision on hospice. Swer received no telephone pickup and left vm for call back. Swer observed pt met with primary today, and continued hospice discussion. Swer will remain available for return call.

## 2024-03-05 ENCOUNTER — TELEPHONE (OUTPATIENT)
Dept: PAIN MEDICINE | Facility: CLINIC | Age: 69
End: 2024-03-05
Payer: MEDICARE

## 2024-03-05 NOTE — TELEPHONE ENCOUNTER
Spoke with patient's daughter and patient is in pallative care now.  She cancelled appt for today and will reach out when an appt is needed.

## 2024-03-09 ENCOUNTER — PATIENT MESSAGE (OUTPATIENT)
Dept: PALLIATIVE MEDICINE | Facility: CLINIC | Age: 69
End: 2024-03-09
Payer: MEDICARE

## 2024-03-11 ENCOUNTER — PATIENT MESSAGE (OUTPATIENT)
Dept: PRIMARY CARE CLINIC | Facility: CLINIC | Age: 69
End: 2024-03-11
Payer: MEDICARE

## 2024-03-11 ENCOUNTER — HOSPITAL ENCOUNTER (EMERGENCY)
Facility: HOSPITAL | Age: 69
Discharge: HOME | End: 2024-03-11
Attending: EMERGENCY MEDICINE
Payer: MEDICARE

## 2024-03-11 ENCOUNTER — TELEPHONE (OUTPATIENT)
Dept: PAIN MEDICINE | Facility: CLINIC | Age: 69
End: 2024-03-11
Payer: MEDICARE

## 2024-03-11 ENCOUNTER — DOCUMENT SCAN (OUTPATIENT)
Dept: HOME HEALTH SERVICES | Facility: HOSPITAL | Age: 69
End: 2024-03-11
Payer: MEDICARE

## 2024-03-11 ENCOUNTER — TELEPHONE (OUTPATIENT)
Dept: PRIMARY CARE CLINIC | Facility: CLINIC | Age: 69
End: 2024-03-11
Payer: MEDICARE

## 2024-03-11 ENCOUNTER — PATIENT MESSAGE (OUTPATIENT)
Dept: PAIN MEDICINE | Facility: CLINIC | Age: 69
End: 2024-03-11
Payer: MEDICARE

## 2024-03-11 VITALS
SYSTOLIC BLOOD PRESSURE: 132 MMHG | DIASTOLIC BLOOD PRESSURE: 70 MMHG | HEART RATE: 88 BPM | RESPIRATION RATE: 16 BRPM | TEMPERATURE: 99 F | OXYGEN SATURATION: 100 % | WEIGHT: 160 LBS | BODY MASS INDEX: 24.33 KG/M2

## 2024-03-11 DIAGNOSIS — S00.01XA ABRASION OF SCALP, INITIAL ENCOUNTER: Primary | ICD-10-CM

## 2024-03-11 DIAGNOSIS — R53.1 WEAKNESS: ICD-10-CM

## 2024-03-11 DIAGNOSIS — R29.6 FREQUENT FALLS: ICD-10-CM

## 2024-03-11 DIAGNOSIS — Z51.5 PALLIATIVE CARE PATIENT: ICD-10-CM

## 2024-03-11 DIAGNOSIS — Z43.6 ATTENTION TO NEPHROSTOMY: ICD-10-CM

## 2024-03-11 DIAGNOSIS — S09.90XA TRAUMATIC INJURY OF HEAD, INITIAL ENCOUNTER: ICD-10-CM

## 2024-03-11 PROCEDURE — 99284 EMERGENCY DEPT VISIT MOD MDM: CPT | Mod: 25

## 2024-03-11 NOTE — FIRST PROVIDER EVALUATION
Medical screening examination initiated.  I have conducted a focused provider triage encounter, findings are as follows:    Brief history of present illness:  Frequent falls this week, head injury today    Vitals:    03/11/24 1755   BP: (!) 141/64   Pulse: 101   Resp: 18   Temp: 98.5 °F (36.9 °C)   TempSrc: Oral   SpO2: 97%   Weight: 72.6 kg (160 lb)       Pertinent physical exam:  Vital signs stable, patient alert and oriented    Brief workup plan:  Workup    Preliminary workup initiated; this workup will be continued and followed by the physician or advanced practice provider that is assigned to the patient when roomed.

## 2024-03-11 NOTE — TELEPHONE ENCOUNTER
Patient fell this morning, gash on head, advise patient and family to bring patient to ED, they refused. Lawanda did tell them the signs to watch for and she states that patient is doing okay.

## 2024-03-11 NOTE — TELEPHONE ENCOUNTER
----- Message from Carolina Bruno sent at 3/11/2024 10:42 AM CDT -----  Name of Who is Calling:aLwanda comer/ [facility]        What is the request in detail:Lawanda called in to report pt fell prior to her arrival this morning, pt did hit his head and Lawanda advised pt to go to ER for precaution measures pt declined. Lawanda states pt caregiver stated pt hasn't been his self lately.Please advise thank you       Can the clinic reply by [facility]:NO        What Number to Call Back if not in [facility]:Lawanda [phone]

## 2024-03-11 NOTE — TELEPHONE ENCOUNTER
Called pt in reguards of refill of lyrica. Spoke to Sear Mann , she informed me that the pt was in palliative care. She wanted to know would Dr. Cárdenas continue to fill the Lyrica. Informed her per the nurse that palliative care would handle the prescription Lyrica.    Brie Franks MA

## 2024-03-12 ENCOUNTER — DOCUMENT SCAN (OUTPATIENT)
Dept: HOME HEALTH SERVICES | Facility: HOSPITAL | Age: 69
End: 2024-03-12
Payer: MEDICARE

## 2024-03-12 ENCOUNTER — TELEPHONE (OUTPATIENT)
Dept: PALLIATIVE MEDICINE | Facility: CLINIC | Age: 69
End: 2024-03-12
Payer: MEDICARE

## 2024-03-12 ENCOUNTER — PATIENT MESSAGE (OUTPATIENT)
Dept: PALLIATIVE MEDICINE | Facility: CLINIC | Age: 69
End: 2024-03-12
Payer: MEDICARE

## 2024-03-12 DIAGNOSIS — C34.91 SQUAMOUS CELL CARCINOMA OF RIGHT LUNG: ICD-10-CM

## 2024-03-12 DIAGNOSIS — Z85.51 HX OF BLADDER CANCER: ICD-10-CM

## 2024-03-12 DIAGNOSIS — M47.816 LUMBAR SPONDYLOSIS: ICD-10-CM

## 2024-03-12 DIAGNOSIS — M54.16 LUMBAR RADICULOPATHY: ICD-10-CM

## 2024-03-12 DIAGNOSIS — M48.00 SPINAL STENOSIS, UNSPECIFIED SPINAL REGION: ICD-10-CM

## 2024-03-12 DIAGNOSIS — C67.9 MALIGNANT NEOPLASM OF URINARY BLADDER, UNSPECIFIED SITE: ICD-10-CM

## 2024-03-12 DIAGNOSIS — M51.36 DDD (DEGENERATIVE DISC DISEASE), LUMBAR: ICD-10-CM

## 2024-03-12 DIAGNOSIS — G89.3 CANCER RELATED PAIN: ICD-10-CM

## 2024-03-12 DIAGNOSIS — G89.3 NEOPLASM RELATED PAIN: ICD-10-CM

## 2024-03-12 DIAGNOSIS — I82.412 ACUTE DEEP VEIN THROMBOSIS (DVT) OF FEMORAL VEIN OF LEFT LOWER EXTREMITY: ICD-10-CM

## 2024-03-12 DIAGNOSIS — G89.3 CANCER RELATED PAIN: Primary | ICD-10-CM

## 2024-03-12 DIAGNOSIS — C67.9 MALIGNANT NEOPLASM OF URINARY BLADDER, UNSPECIFIED SITE: Primary | ICD-10-CM

## 2024-03-12 RX ORDER — HYDROCODONE BITARTRATE AND ACETAMINOPHEN 10; 325 MG/1; MG/1
1 TABLET ORAL EVERY 6 HOURS PRN
Qty: 56 TABLET | Refills: 0 | Status: SHIPPED | OUTPATIENT
Start: 2024-03-12 | End: 2024-03-26 | Stop reason: SDUPTHER

## 2024-03-12 RX ORDER — PREGABALIN 75 MG/1
75 CAPSULE ORAL NIGHTLY
Qty: 30 CAPSULE | Refills: 0 | Status: SHIPPED | OUTPATIENT
Start: 2024-03-12 | End: 2024-04-16 | Stop reason: SDUPTHER

## 2024-03-12 NOTE — TELEPHONE ENCOUNTER
"Spoke to pt's wife, Ms. Chantal Currie.   She notes pt is feeling weaker and losing his balance. Scalp injury is no longer bleeding,   She is encouraging pt to ask for help to avoid falls.   She notes pt is experiencing constipation.     Pt's wife also informed me that pt is taking Norco 7.5mg QID PRN, but sometimes takes 1.5 tabs if " he does not stay on top of the pain". They are currently out of pain medications.   Spoke to pt and he notes his pelvic pain has worsened. Norco 7.5mg has not been effective.     Discussed trial of Norco 10mg Q6H PRN, fall precautions, and monitoring for CNS depression.   Discussed daily laxative regimen to mitigate constipation. Miralax 1pack/day or Senna 2 tabs at bedtime for opioid-induced constipation.    They are still contemplating hospice care. We have discussed hospice criteria, and I have informed pt and his wife that he cannot remain Full Code while receiving hospice care.   They will inform me of their decision. I will send list of hospice for pt and family to review.       "

## 2024-03-12 NOTE — ED PROVIDER NOTES
SCRIBE #1 NOTE: I, Roderick Montaño, am scribing for, and in the presence of, Emily Randall MD. I have scribed the entire note.       History     Chief Complaint   Patient presents with    Fall     Tripped and fell this am, frequent falls this week. Laceration to side of head, denies loc      Review of patient's allergies indicates:  No Known Allergies      History of Present Illness     HPI    3/11/2024, 7:05 PM  History obtained from the patient and patient's daughter      History of Present Illness: Geovani Currie is a 68 y.o. male patient with a PMHx of HTN, COPD, thyroid disease, stage 4 kidney failure, bladder cancer, and anxiety disorder who presents to the Emergency Department for evaluation of possible head trauma which onset after a fall today. Pt tripped on the side of his bed and bumped his head causing an abrasion to the left side of his scalp. Daughter at bedside gives most of pt's hx. Pt is on palliative care and hospice is being intitated. Pt's daughter is concerned about nephrostomy tubes falling out of place and wants to make sure there is nothing wrong with pt's head. Pt's daughter does not request for lab work to be done. Pt's daughter states that the pt has fallen 4 times this past week while trying to use the restroom during the night. Symptoms are constant and moderate in severity. No mitigating or exacerbating factors reported. No associated sxs. Patient denies any abd pain, SOB, CP, fever, and all other sxs at this time. No prior Tx. Pt has a DVT in his left leg per the daughter. Pt has a hard time walking due to past hip breakages, so he uses a wheelchair to get around. No further complaints or concerns at this time.       Arrival mode: Personal vehicle     PCP: Faizan Antoine MD        Past Medical History:  Past Medical History:   Diagnosis Date    Anxiety disorder, unspecified     COPD (chronic obstructive pulmonary disease)     Hypertension     Thyroid disease        Past  Surgical History:  Past Surgical History:   Procedure Laterality Date    APPENDECTOMY      CATARACT EXTRACTION      NEPHROSTOMY      OPEN REDUCTION AND INTERNAL FIXATION (ORIF) OF INTERTROCHANTERIC FRACTURE OF FEMUR Right 2023    Procedure: ORIF, FRACTURE, FEMUR, INTERTROCHANTERIC;  Surgeon: Tanisha Guidry DO;  Location: [facility];  Service: Orthopedics;  Laterality: Right;  Gamma nail         Family History:  Family History   Problem Relation Age of Onset    Cancer Mother         NOS    Cancer Father         NOS    Cancer Maternal Grandfather         NOS    Bladder Cancer Neg Hx        Social History:  Social History     Tobacco Use    Smoking status: Every Day     Current packs/day: 0.00     Types: Vaping with nicotine, Cigarettes     Last attempt to quit: 10/2023     Years since quittin.4     Passive exposure: Current    Smokeless tobacco: Never   Substance and Sexual Activity    Alcohol use: Never    Drug use: Never    Sexual activity: Not Currently     Partners: Female        Review of Systems     Review of Systems   Constitutional:  Negative for chills and fever.   HENT:  Negative for congestion and sore throat.         (+) Possible head trauma   Respiratory:  Negative for cough and shortness of breath.    Cardiovascular:  Negative for chest pain.   Gastrointestinal:  Negative for abdominal pain, nausea and vomiting.   Genitourinary:  Negative for dysuria.   Musculoskeletal:  Negative for back pain.   Skin:  Positive for wound (abrasion to scalp). Negative for rash.   Neurological:  Negative for dizziness, weakness, light-headedness and headaches.   Hematological:  Does not bruise/bleed easily.   All other systems reviewed and are negative.       Physical Exam     Initial Vitals [24 1755]   BP Pulse Resp Temp SpO2   (!) 141/64 101 18 98.5 °F (36.9 °C) 97 %      MAP       --          Physical Exam  Nursing Notes and Vital Signs Reviewed.  Constitutional: Patient is in no acute distress.  Chronically ill-appearing and chronically debilitated.  Head: Normocephalic. Abrasion to left side of scalp. No contusion.  Eyes: PERRL. EOM intact. Conjunctivae are not pale. No scleral icterus.  ENT: Mucous membranes are moist. Oropharynx is clear and symmetric.    Neck: Supple. Full ROM. No lymphadenopathy.  Cardiovascular: Regular rate. Regular rhythm. No murmurs, rubs, or gallops. Distal pulses are 2+ and symmetric.  Pulmonary/Chest: No respiratory distress. Clear to auscultation bilaterally. No wheezing or rales.  Abdominal: Soft and non-distended.  There is no tenderness.  No rebound, guarding, or rigidity. Good bowel sounds.  Genitourinary: No CVA tenderness. Bilateral nephrostomy tubes in-place.  Musculoskeletal: Moves all extremities. No obvious deformities. No edema. No calf tenderness.  Skin: Warm and dry.  Neurological:  Alert, awake, and appropriate.  Normal speech.  No acute focal neurological deficits are appreciated.  Psychiatric: Normal affect. Good eye contact. Appropriate in content.     ED Course   Procedures  ED Vital Signs:  Vitals:    03/11/24 1755 03/11/24 1959   BP: (!) 141/64 132/70   Pulse: 101 88   Resp: 18 16   Temp: 98.5 °F (36.9 °C) 98.9 °F (37.2 °C)   TempSrc: Oral Oral   SpO2: 97% 100%   Weight: 72.6 kg (160 lb)        Abnormal Lab Results:  Labs Reviewed   CBC W/ AUTO DIFFERENTIAL   COMPREHENSIVE METABOLIC PANEL   TROPONIN I          Imaging Results:  Imaging Results              X-Ray Abdomen AP 1 View (KUB) (Final result)  Result time 03/11/24 19:54:32      Final result by Mark Cruz MD (03/11/24 19:54:32)                   Impression:      No definite acute abnormality.      Electronically signed by: Mark Cruz  Date:    03/11/2024  Time:    19:54               Narrative:    EXAMINATION:  XR ABDOMEN AP 1 VIEW    CLINICAL HISTORY:  Encounter for attention to other artificial openings of urinary tract    TECHNIQUE:  AP View(s) of the abdomen was  performed.    COMPARISON:  None    FINDINGS:  Bilateral nephrostomy tubes in place.  Position of the right nephrostomy tube minimally changed.    Bowel gas pattern appears unremarkable.                                       CT Head Without Contrast (Final result)  Result time 03/11/24 18:48:48      Final result by Jefferson Kunz MD (03/11/24 18:48:48)                   Impression:      No acute abnormality.  Similar findings to prior exam.  Chronic changes.  Atrophy and chronic white matter changes    All CT scans   are performed using dose optimization techniques including the following: automated exposure control; adjustment of the mA and/or kV; use of iterative reconstruction technique.  Dose modulation was employed for ALARA by means of: Automated exposure control; adjustment of the mA and/or kV according to patient size (this includes techniques or standardized protocols for targeted exams where dose is matched to indication/reason for exam; i.e. extremities or head); and/or use of iterative reconstructive technique.      Electronically signed by: Jefferson Kunz  Date:    03/11/2024  Time:    18:48               Narrative:    EXAMINATION:  CT HEAD WITHOUT CONTRAST    CLINICAL HISTORY:  Head trauma, minor (Age >= 65y);    TECHNIQUE:  Low dose axial CT images obtained throughout the head without intravenous contrast. Sagittal and coronal reconstructions were performed.    COMPARISON:  Prior    FINDINGS:  Atrophy and chronic white matter changes.  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    No extra-axial blood or fluid collections.    No parenchymal mass, hemorrhage, edema or major vascular distribution infarct.    Skull/extracranial contents (limited evaluation): No fracture. Mastoid air cells and paranasal sinuses are essentially clear.                                       X-Ray Chest AP Portable (Final result)  Result time 03/11/24 18:43:28      Final result by Jefferson Kunz MD (03/11/24  18:43:28)                   Impression:      No acute abnormality.      Electronically signed by: Jefferson Joaquina  Date:    03/11/2024  Time:    18:43               Narrative:    EXAMINATION:  XR CHEST AP PORTABLE    CLINICAL HISTORY:  Sepsis;    TECHNIQUE:  Single frontal view of the chest was performed.    COMPARISON:  None    FINDINGS:  The lungs are clear, with normal appearance of pulmonary vasculature and no pleural effusion or pneumothorax.    The cardiac silhouette is normal in size. The hilar and mediastinal contours are unremarkable.    Bones are intact.                                                  The Emergency Provider reviewed the vital signs and test results, which are outlined above.     ED Discussion       7:40 PM: Reassessed pt at this time. Discussed with pt all pertinent ED information and results. Discussed pt dx and plan of tx. Gave pt all f/u and return to the ED instructions. All questions and concerns were addressed at this time. Pt expresses understanding of information and instructions, and is comfortable with plan to discharge. Pt is stable for discharge.    I discussed with patient and/or family/caretaker that evaluation in the ED does not suggest any emergent or life threatening medical conditions requiring immediate intervention beyond what was provided in the ED, and I believe patient is safe for discharge.  Regardless, an unremarkable evaluation in the ED does not preclude the development or presence of a serious of life threatening condition. As such, patient was instructed to return immediately for any worsening or change in current symptoms.        Medical Decision Making  DDX:  1. ICH  2. Head Injury  3. Nephrostomy tube dislocation    Patient on palliative care, initiating hospice per the daughter at the bedside, CT head negative for ICH or other acute process, CXR negative, Abd xray bilateral nephrostomy tubes in place, lab work initially considered but has been discontinued  as daughter would like to just take patient home and continue with hospice as planned.     Amount and/or Complexity of Data Reviewed  Independent Historian: caregiver     Details: Daughter; see HPI.  External Data Reviewed: labs, radiology and notes.  Radiology: ordered and independent interpretation performed. Decision-making details documented in ED Course.     Details: Nephrostomy tubes are in-place.    Risk  Decision regarding hospitalization.  Decision not to resuscitate or to de-escalate care because of poor prognosis.  Diagnosis or treatment significantly limited by social determinants of health.                ED Medication(s):  Medications - No data to display    Discharge Medication List as of 3/11/2024  7:52 PM           Follow-up Information       Faizan Antoine MD. Schedule an appointment as soon as possible for a visit in 2 days.    Specialty: Family Medicine  Why: Return to the Emergency Room, If symptoms worsen  Contact information:  [address] [place] [address]  [phone]                                 Scribe Attestation:   Scribe #1: I performed the above scribed service and the documentation accurately describes the services I performed. I attest to the accuracy of the note.     Attending:   Physician Attestation Statement for Scribe #1: I, Emily Randall MD, personally performed the services described in this documentation, as scribed by Roderick Montaño, in my presence, and it is both accurate and complete.           Clinical Impression       ICD-10-CM ICD-9-CM   1. Abrasion of scalp, initial encounter  S00.01XA 910.0   2. Weakness  R53.1 780.79   3. Attention to nephrostomy  Z43.6 V55.6   4. Palliative care patient  Z51.5 V66.7   5. Traumatic injury of head, initial encounter  S09.90XA 959.01   6. Frequent falls  R29.6 V15.88       Disposition:   Disposition: Discharged  Condition: Stable        Emily Randall MD  03/13/24 1755

## 2024-03-12 NOTE — TELEPHONE ENCOUNTER
"Spoke to pt's daughter, Ms. Cat  She confirmed pt has been falling more often due to "tripping on things" in his immediate vicinity.   She states pt's balance and functional capacity have diminished. He uses the wheelchair more often, but attempts to use the walker during physiotherapy.   Pt's daughter also stated that they are contemplating enroling in hospice care, but her mum, Ms. Cornejo will have more information.   She stated to cong pt and her mum after 1030 today for more information.    We have discussed ensuring pt's safety related to taking CNS Depressants such as Norco, Lyrica, and Flexeril.   Will call pt and his wife at 1030 today.   "

## 2024-03-13 ENCOUNTER — TELEPHONE (OUTPATIENT)
Dept: PHARMACY | Facility: CLINIC | Age: 69
End: 2024-03-13
Payer: MEDICARE

## 2024-03-13 NOTE — TELEPHONE ENCOUNTER
I have reached out to Geovani Currie to inform him of the [employer]  application process for Eliquis and whats required to apply.  Geovani Currie did not answer. I left a voicemail and mailed a letter introducing him to the pharmacy patient assistance program. I will follow up in 5 business days.

## 2024-03-18 ENCOUNTER — DOCUMENT SCAN (OUTPATIENT)
Dept: HOME HEALTH SERVICES | Facility: HOSPITAL | Age: 69
End: 2024-03-18
Payer: MEDICARE

## 2024-03-21 ENCOUNTER — PATIENT MESSAGE (OUTPATIENT)
Dept: PRIMARY CARE CLINIC | Facility: CLINIC | Age: 69
End: 2024-03-21
Payer: MEDICARE

## 2024-03-21 DIAGNOSIS — F41.9 ANXIETY: ICD-10-CM

## 2024-03-21 RX ORDER — ALPRAZOLAM 1 MG/1
1 TABLET ORAL 3 TIMES DAILY PRN
Qty: 90 TABLET | Refills: 5 | Status: SHIPPED | OUTPATIENT
Start: 2024-03-21

## 2024-03-21 NOTE — TELEPHONE ENCOUNTER
No care due was identified.  [facility] Embedded Care Due Messages. Reference number: [id number].   3/21/2024 3:49:21 PM CDT

## 2024-03-26 ENCOUNTER — TELEPHONE (OUTPATIENT)
Dept: PALLIATIVE MEDICINE | Facility: CLINIC | Age: 69
End: 2024-03-26
Payer: MEDICARE

## 2024-03-26 DIAGNOSIS — C34.91 SQUAMOUS CELL CARCINOMA OF RIGHT LUNG: ICD-10-CM

## 2024-03-26 DIAGNOSIS — G89.3 CANCER RELATED PAIN: ICD-10-CM

## 2024-03-26 DIAGNOSIS — C67.9 MALIGNANT NEOPLASM OF URINARY BLADDER, UNSPECIFIED SITE: ICD-10-CM

## 2024-03-26 RX ORDER — HYDROCODONE BITARTRATE AND ACETAMINOPHEN 10; 325 MG/1; MG/1
1 TABLET ORAL EVERY 6 HOURS PRN
Qty: 56 TABLET | Refills: 0 | Status: ON HOLD | OUTPATIENT
Start: 2024-03-26 | End: 2024-04-12 | Stop reason: HOSPADM

## 2024-03-26 RX ORDER — OXYCODONE 9 MG/1
9 CAPSULE, EXTENDED RELEASE ORAL EVERY 12 HOURS
Qty: 28 EACH | Refills: 0 | Status: ON HOLD | OUTPATIENT
Start: 2024-03-26 | End: 2024-04-12 | Stop reason: HOSPADM

## 2024-03-26 NOTE — TELEPHONE ENCOUNTER
Spoke to pt's daughter, Stephania regarding pt's pain regimen.  Ms. Cat reported pt has been taking Norco 10-15mg up to 6 doses/day, as pt's pain is increasing.  We discussed adding long-acting Oxycodone (Xtampza) 9mg BID to improve pt's constant pain and continue Norco for breakthrough pain.   Pt's daughter verbalized understanding of above.   Will contact pt and his wife re: above information.

## 2024-03-26 NOTE — TELEPHONE ENCOUNTER
Spoke to pt's wife, Ms. Chantal Currie   She reported pt is taking Norco 15mg (1.5 tabs) every 4-6 hours PRN  Spoke to pt as well. He reported constant lower back pain, lower abdomen, and legs.   They have not made a decision regarding hospice, due to making plans to relocate to [place]  We have discussed trial of Xtampza 9mg BID for long acting pain management and Norco 10mg Q6H PRN for breakthrough pain.   Pt and his wife verbalized understanding of above.

## 2024-03-27 NOTE — TELEPHONE ENCOUNTER
Hello,       A Patient Assistance Application for Geovani Currie - MRN  [id number] was faxed to your office @ [phone]. Please have Faizan Antoine MD review the application to ensure the prescription is correct. If correct, sign and fax the application back to the Pharmacy Patient Assistance Team @[phone].     Please do not write any corrections on this application.  If changes are needed on this application, please inform the PAP team, and the corrected application will be faxed back to your office for approval and signature.            Pharmacy Patient Assistance  [address] [place] [address]  [id number] [place] [id number]  Fax: [phone]  Email: [email or web address]

## 2024-04-01 DIAGNOSIS — T83.098A MALFUNCTION OF NEPHROSTOMY TUBE: Primary | ICD-10-CM

## 2024-04-01 RX ORDER — CEFDINIR 300 MG/1
300 CAPSULE ORAL 2 TIMES DAILY
Qty: 20 CAPSULE | Refills: 0 | Status: ON HOLD | OUTPATIENT
Start: 2024-04-01 | End: 2024-04-12 | Stop reason: HOSPADM

## 2024-04-02 ENCOUNTER — OFFICE VISIT (OUTPATIENT)
Dept: UROLOGY | Facility: CLINIC | Age: 69
DRG: 330 | End: 2024-04-02
Payer: MEDICARE

## 2024-04-02 ENCOUNTER — TELEPHONE (OUTPATIENT)
Dept: RADIOLOGY | Facility: HOSPITAL | Age: 69
End: 2024-04-02
Payer: MEDICARE

## 2024-04-02 VITALS
DIASTOLIC BLOOD PRESSURE: 81 MMHG | WEIGHT: 160.06 LBS | HEIGHT: 68 IN | BODY MASS INDEX: 24.26 KG/M2 | SYSTOLIC BLOOD PRESSURE: 122 MMHG | HEART RATE: 102 BPM

## 2024-04-02 DIAGNOSIS — C67.0 MALIGNANT NEOPLASM OF TRIGONE OF URINARY BLADDER: ICD-10-CM

## 2024-04-02 DIAGNOSIS — N28.89 OTHER SPECIFIED DISORDERS OF KIDNEY AND URETER: Primary | ICD-10-CM

## 2024-04-02 PROBLEM — Z01.818 PREOP EXAMINATION: Status: RESOLVED | Noted: 2023-11-20 | Resolved: 2024-04-02

## 2024-04-02 PROCEDURE — 99999 PR PBB SHADOW E&M-EST. PATIENT-LVL IV: CPT | Mod: PBBFAC,,, | Performed by: NURSE PRACTITIONER

## 2024-04-02 PROCEDURE — 99214 OFFICE O/P EST MOD 30 MIN: CPT | Mod: PBBFAC | Performed by: NURSE PRACTITIONER

## 2024-04-02 PROCEDURE — 99214 OFFICE O/P EST MOD 30 MIN: CPT | Mod: S$PBB,,, | Performed by: NURSE PRACTITIONER

## 2024-04-02 NOTE — TELEPHONE ENCOUNTER
Interventional Radiology  Scheduled 1:00PM Nephrostomy tube exchange for 4/3/24 Roxi at the clinic.  She was relaying instructions to the patient.  Instructed that pt. needs to arrive by 12:00PM at hospital off [address], he must have a ride and NPO after 4:00AM tomorrow morning.  Pt. will stop Eliquis doses today and tomorrow morning.  He does not take other blood thinners, NSAIDS, fish oil, or GLP-1/GIP agonists.  Andra verbalized understanding of all instructions.

## 2024-04-02 NOTE — TELEPHONE ENCOUNTER
Interventional Radiology  Called and spoke w/daughter, Stephania Mann @ [phone].  Instructed that pt. needs to arrive by 12:00PM at hospital off [address], he must have a ride and NPO after 4:00AM tomorrow morning.  Pt. will stop Eliquis doses today and tomorrow morning.  He does not take other blood thinners, NSAIDS, fish oil, or GLP-1/GIP agonists. Pt.'s daughter verbalized understanding of all instructions.

## 2024-04-02 NOTE — PROGRESS NOTES
Chief Complaint:   Right lower abdominal pain    HPI:   Patient is a 68-year-old male that has been followed by Dr. Jorgensen secondary to a history of recurrent muscle invasive bladder cancer with metastasis.  Has a scheduled appointment with IR tomorrow for nephrostomy tubes had to be replaced.  Family member states patient has complained of right lower quadrant pain for 12 hours. Family communicated with Infectious Disease and Omnicef was empirically prescribed.  Jameel AMBRIZ  02/22/2024  Geovani Currie kennedi 68 y.o. male who presents with follow-up to recent hospitalization where his nephrostomy tubes had to be replaced.  Patient has a history of recurrent muscle invasive bladder cancer with metastasis.  He also has lung cancer.  He was scheduled to undergo palliative treatment with Keytruda.  It does not appear that he has had his 1st dose showed.  He has had trouble with recurrent UTIs.  He is currently getting IV antibiotics.  He is here with his wife.  His wife states that she would like to take him back to [place].  Patient is still full code and wants to pursue palliative therapy.  Patient in the interim has been diagnosed with a pulmonary embolus in his now on Eliquis.  He was having intermittent hematuria about from his urethra and his right nephrostomy tube.     Allergies:  Patient has no known allergies.    Medications:  has a current medication list which includes the following prescription(s): albuterol, albuterol, alprazolam, apixaban, apixaban, cefdinir, cyclobenzaprine, docusate sodium, escitalopram oxalate, breo ellipta, hydrocodone-acetaminophen, levothyroxine, naloxone, nicotine, xtampza er, pregabalin, and vitamin d.    Review of Systems:  General: No fever, chills  Skin: No rashes, itching, or changes in color or texture of skin.  Chest: Denies JUAREZ, cyanosis, wheezing, cough, and sputum production.  Abdomen:  Admits to right lower quadrant pain for 12 hours  Musculoskeletal: No joint stiffness or  swelling. Denies back pain.  : As above.      PMH:   has a past medical history of Anxiety disorder, unspecified, COPD (chronic obstructive pulmonary disease), Hypertension, and Thyroid disease.    PSH:   has a past surgical history that includes Appendectomy; Nephrostomy; Cataract extraction; and Open reduction and internal fixation (ORIF) of intertrochanteric fracture of femur (Right, 11/21/2023).    FamHx: family history includes Cancer in his father, maternal grandfather, and mother.    SocHx:  reports that he has been smoking vaping with nicotine and cigarettes. He has been exposed to tobacco smoke. He has never used smokeless tobacco. He reports that he does not drink alcohol and does not use drugs.      Physical Exam:  General: A&Ox3, no apparent distress, no deformities  Neck: No masses, normal thyroid  Lungs: normal inspiration, no use of accessory muscles  Heart: normal pulse, no arrhythmias  Abdomen: Soft, NT, ND, no masses, no hernias, no CVA tenderness    Impression/Plan:   Right lower quadrant pain with history of bladder cancer metastasized to intra-abdominal lymph nodes  Exam was unremarkable, I presented patient to Infectious Disease physician.  Recommendation was to proceed with CT urogram, stat, and patient follow-up tomorrow for nephrostomy tubes replacement.

## 2024-04-03 ENCOUNTER — TELEPHONE (OUTPATIENT)
Dept: INFECTIOUS DISEASES | Facility: CLINIC | Age: 69
End: 2024-04-03
Payer: MEDICARE

## 2024-04-03 ENCOUNTER — HOSPITAL ENCOUNTER (OUTPATIENT)
Dept: RADIOLOGY | Facility: HOSPITAL | Age: 69
Discharge: HOME | DRG: 330 | End: 2024-04-03
Attending: STUDENT IN AN ORGANIZED HEALTH CARE EDUCATION/TRAINING PROGRAM
Payer: MEDICARE

## 2024-04-03 ENCOUNTER — PATIENT MESSAGE (OUTPATIENT)
Dept: UROLOGY | Facility: CLINIC | Age: 69
End: 2024-04-03
Payer: MEDICARE

## 2024-04-03 ENCOUNTER — EXTERNAL HOME HEALTH (OUTPATIENT)
Dept: HOME HEALTH SERVICES | Facility: HOSPITAL | Age: 69
End: 2024-04-03
Payer: MEDICARE

## 2024-04-03 ENCOUNTER — HOSPITAL ENCOUNTER (OUTPATIENT)
Dept: RADIOLOGY | Facility: HOSPITAL | Age: 69
Discharge: HOME | DRG: 330 | End: 2024-04-03
Attending: NURSE PRACTITIONER
Payer: MEDICARE

## 2024-04-03 ENCOUNTER — HOSPITAL ENCOUNTER (INPATIENT)
Facility: HOSPITAL | Age: 69
LOS: 9 days | Discharge: HOME HEALTH | DRG: 330 | End: 2024-04-12
Attending: EMERGENCY MEDICINE | Admitting: INTERNAL MEDICINE
Payer: MEDICARE

## 2024-04-03 DIAGNOSIS — Z85.51 HISTORY OF BLADDER CANCER: ICD-10-CM

## 2024-04-03 DIAGNOSIS — G89.3 CANCER RELATED PAIN: ICD-10-CM

## 2024-04-03 DIAGNOSIS — R07.9 CHEST PAIN: ICD-10-CM

## 2024-04-03 DIAGNOSIS — T83.098A MALFUNCTION OF NEPHROSTOMY TUBE: ICD-10-CM

## 2024-04-03 DIAGNOSIS — C34.91 SQUAMOUS CELL CARCINOMA OF RIGHT LUNG: ICD-10-CM

## 2024-04-03 DIAGNOSIS — I82.4Y2 ACUTE DEEP VEIN THROMBOSIS (DVT) OF PROXIMAL VEIN OF LEFT LOWER EXTREMITY: ICD-10-CM

## 2024-04-03 DIAGNOSIS — C67.9 MALIGNANT NEOPLASM OF URINARY BLADDER, UNSPECIFIED SITE: ICD-10-CM

## 2024-04-03 DIAGNOSIS — K56.609 SBO (SMALL BOWEL OBSTRUCTION): Primary | ICD-10-CM

## 2024-04-03 DIAGNOSIS — Z43.6 ATTENTION TO NEPHROSTOMY: ICD-10-CM

## 2024-04-03 DIAGNOSIS — N28.89 OTHER SPECIFIED DISORDERS OF KIDNEY AND URETER: ICD-10-CM

## 2024-04-03 DIAGNOSIS — K56.600 PARTIAL INTESTINAL OBSTRUCTION, UNSPECIFIED CAUSE: ICD-10-CM

## 2024-04-03 DIAGNOSIS — R00.0 TACHYCARDIA: ICD-10-CM

## 2024-04-03 LAB
ALBUMIN SERPL BCP-MCNC: 2.8 G/DL (ref 3.5–5.2)
ALP SERPL-CCNC: 121 U/L (ref 55–135)
ALT SERPL W/O P-5'-P-CCNC: 8 U/L (ref 10–44)
ANION GAP SERPL CALC-SCNC: 11 MMOL/L (ref 8–16)
APTT PPP: 38.4 SEC (ref 21–32)
AST SERPL-CCNC: 15 U/L (ref 10–40)
BASOPHILS # BLD AUTO: 0.01 K/UL (ref 0–0.2)
BASOPHILS # BLD AUTO: 0.03 K/UL (ref 0–0.2)
BASOPHILS NFR BLD: 0.1 % (ref 0–1.9)
BASOPHILS NFR BLD: 0.3 % (ref 0–1.9)
BILIRUB SERPL-MCNC: 0.5 MG/DL (ref 0.1–1)
BUN SERPL-MCNC: 18 MG/DL (ref 8–23)
CALCIUM SERPL-MCNC: 9.6 MG/DL (ref 8.7–10.5)
CHLORIDE SERPL-SCNC: 104 MMOL/L (ref 95–110)
CO2 SERPL-SCNC: 22 MMOL/L (ref 23–29)
CREAT SERPL-MCNC: 1.4 MG/DL (ref 0.5–1.4)
DIFFERENTIAL METHOD BLD: ABNORMAL
DIFFERENTIAL METHOD BLD: ABNORMAL
EOSINOPHIL # BLD AUTO: 0.1 K/UL (ref 0–0.5)
EOSINOPHIL # BLD AUTO: 0.1 K/UL (ref 0–0.5)
EOSINOPHIL NFR BLD: 0.5 % (ref 0–8)
EOSINOPHIL NFR BLD: 0.6 % (ref 0–8)
ERYTHROCYTE [DISTWIDTH] IN BLOOD BY AUTOMATED COUNT: 16 % (ref 11.5–14.5)
ERYTHROCYTE [DISTWIDTH] IN BLOOD BY AUTOMATED COUNT: 16.3 % (ref 11.5–14.5)
EST. GFR  (NO RACE VARIABLE): 54 ML/MIN/1.73 M^2
GLUCOSE SERPL-MCNC: 125 MG/DL (ref 70–110)
HCT VFR BLD AUTO: 31.4 % (ref 40–54)
HCT VFR BLD AUTO: 32.1 % (ref 40–54)
HGB BLD-MCNC: 10 G/DL (ref 14–18)
HGB BLD-MCNC: 9.8 G/DL (ref 14–18)
IMM GRANULOCYTES # BLD AUTO: 0.02 K/UL (ref 0–0.04)
IMM GRANULOCYTES # BLD AUTO: 0.04 K/UL (ref 0–0.04)
IMM GRANULOCYTES NFR BLD AUTO: 0.2 % (ref 0–0.5)
IMM GRANULOCYTES NFR BLD AUTO: 0.4 % (ref 0–0.5)
INR PPP: 1.1 (ref 0.8–1.2)
LIPASE SERPL-CCNC: 16 U/L (ref 4–60)
LYMPHOCYTES # BLD AUTO: 0.8 K/UL (ref 1–4.8)
LYMPHOCYTES # BLD AUTO: 1.1 K/UL (ref 1–4.8)
LYMPHOCYTES NFR BLD: 10 % (ref 18–48)
LYMPHOCYTES NFR BLD: 8.3 % (ref 18–48)
MCH RBC QN AUTO: 26.5 PG (ref 27–31)
MCH RBC QN AUTO: 26.6 PG (ref 27–31)
MCHC RBC AUTO-ENTMCNC: 31.2 G/DL (ref 32–36)
MCHC RBC AUTO-ENTMCNC: 31.2 G/DL (ref 32–36)
MCV RBC AUTO: 85 FL (ref 82–98)
MCV RBC AUTO: 85 FL (ref 82–98)
MONOCYTES # BLD AUTO: 0.6 K/UL (ref 0.3–1)
MONOCYTES # BLD AUTO: 0.6 K/UL (ref 0.3–1)
MONOCYTES NFR BLD: 5.2 % (ref 4–15)
MONOCYTES NFR BLD: 6.1 % (ref 4–15)
NEUTROPHILS # BLD AUTO: 8 K/UL (ref 1.8–7.7)
NEUTROPHILS # BLD AUTO: 9.1 K/UL (ref 1.8–7.7)
NEUTROPHILS NFR BLD: 83.8 % (ref 38–73)
NEUTROPHILS NFR BLD: 84.5 % (ref 38–73)
NRBC BLD-RTO: 0 /100 WBC
NRBC BLD-RTO: 0 /100 WBC
PLATELET # BLD AUTO: 333 K/UL (ref 150–450)
PLATELET # BLD AUTO: 399 K/UL (ref 150–450)
PMV BLD AUTO: 9.4 FL (ref 9.2–12.9)
PMV BLD AUTO: 9.7 FL (ref 9.2–12.9)
POTASSIUM SERPL-SCNC: 3.9 MMOL/L (ref 3.5–5.1)
PROT SERPL-MCNC: 7.2 G/DL (ref 6–8.4)
PROTHROMBIN TIME: 12.5 SEC (ref 9–12.5)
RBC # BLD AUTO: 3.69 M/UL (ref 4.6–6.2)
RBC # BLD AUTO: 3.77 M/UL (ref 4.6–6.2)
SODIUM SERPL-SCNC: 137 MMOL/L (ref 136–145)
WBC # BLD AUTO: 10.86 K/UL (ref 3.9–12.7)
WBC # BLD AUTO: 9.47 K/UL (ref 3.9–12.7)

## 2024-04-03 PROCEDURE — 25000003 PHARM REV CODE 250: Performed by: NURSE PRACTITIONER

## 2024-04-03 PROCEDURE — 83690 ASSAY OF LIPASE: CPT | Performed by: NURSE PRACTITIONER

## 2024-04-03 PROCEDURE — 74178 CT ABD&PLV WO CNTR FLWD CNTR: CPT | Mod: 26,,, | Performed by: RADIOLOGY

## 2024-04-03 PROCEDURE — 85730 THROMBOPLASTIN TIME PARTIAL: CPT | Performed by: NURSE PRACTITIONER

## 2024-04-03 PROCEDURE — 25500020 PHARM REV CODE 255: Mod: PO | Performed by: NURSE PRACTITIONER

## 2024-04-03 PROCEDURE — 85610 PROTHROMBIN TIME: CPT | Performed by: NURSE PRACTITIONER

## 2024-04-03 PROCEDURE — 99223 1ST HOSP IP/OBS HIGH 75: CPT | Mod: ,,, | Performed by: SURGERY

## 2024-04-03 PROCEDURE — 74178 CT ABD&PLV WO CNTR FLWD CNTR: CPT | Mod: TC,PO

## 2024-04-03 PROCEDURE — 85025 COMPLETE CBC W/AUTO DIFF WBC: CPT | Performed by: NURSE PRACTITIONER

## 2024-04-03 PROCEDURE — 99232 SBSQ HOSP IP/OBS MODERATE 35: CPT | Mod: ,,, | Performed by: UROLOGY

## 2024-04-03 PROCEDURE — 85025 COMPLETE CBC W/AUTO DIFF WBC: CPT | Mod: 91 | Performed by: NURSE PRACTITIONER

## 2024-04-03 PROCEDURE — 63600175 PHARM REV CODE 636 W HCPCS: Performed by: NURSE PRACTITIONER

## 2024-04-03 PROCEDURE — 80053 COMPREHEN METABOLIC PANEL: CPT | Performed by: NURSE PRACTITIONER

## 2024-04-03 PROCEDURE — 99285 EMERGENCY DEPT VISIT HI MDM: CPT | Mod: 25

## 2024-04-03 PROCEDURE — 21400001 HC TELEMETRY ROOM

## 2024-04-03 RX ORDER — SODIUM CHLORIDE 0.9 % (FLUSH) 0.9 %
10 SYRINGE (ML) INJECTION EVERY 12 HOURS PRN
Status: DISCONTINUED | OUTPATIENT
Start: 2024-04-03 | End: 2024-04-12 | Stop reason: HOSPADM

## 2024-04-03 RX ORDER — ALUMINUM HYDROXIDE, MAGNESIUM HYDROXIDE, AND SIMETHICONE 1200; 120; 1200 MG/30ML; MG/30ML; MG/30ML
30 SUSPENSION ORAL 4 TIMES DAILY PRN
Status: DISCONTINUED | OUTPATIENT
Start: 2024-04-03 | End: 2024-04-04

## 2024-04-03 RX ORDER — PROCHLORPERAZINE EDISYLATE 5 MG/ML
5 INJECTION INTRAMUSCULAR; INTRAVENOUS EVERY 6 HOURS PRN
Status: DISCONTINUED | OUTPATIENT
Start: 2024-04-03 | End: 2024-04-12 | Stop reason: HOSPADM

## 2024-04-03 RX ORDER — LEVOTHYROXINE SODIUM 125 UG/1
125 TABLET ORAL
Status: DISCONTINUED | OUTPATIENT
Start: 2024-04-04 | End: 2024-04-05

## 2024-04-03 RX ORDER — NALOXONE HCL 0.4 MG/ML
0.02 VIAL (ML) INJECTION
Status: DISCONTINUED | OUTPATIENT
Start: 2024-04-03 | End: 2024-04-12 | Stop reason: HOSPADM

## 2024-04-03 RX ORDER — ONDANSETRON HYDROCHLORIDE 2 MG/ML
4 INJECTION, SOLUTION INTRAVENOUS EVERY 8 HOURS PRN
Status: DISCONTINUED | OUTPATIENT
Start: 2024-04-03 | End: 2024-04-12 | Stop reason: HOSPADM

## 2024-04-03 RX ORDER — TAMSULOSIN HYDROCHLORIDE 0.4 MG/1
1 CAPSULE ORAL DAILY
COMMUNITY
Start: 2024-03-29 | End: 2024-06-12

## 2024-04-03 RX ORDER — HEPARIN SODIUM,PORCINE/D5W 25000/250
0-40 INTRAVENOUS SOLUTION INTRAVENOUS CONTINUOUS
Status: DISCONTINUED | OUTPATIENT
Start: 2024-04-03 | End: 2024-04-04

## 2024-04-03 RX ORDER — IBUPROFEN 200 MG
24 TABLET ORAL
Status: DISCONTINUED | OUTPATIENT
Start: 2024-04-03 | End: 2024-04-05

## 2024-04-03 RX ORDER — POLYETHYLENE GLYCOL 3350 17 G/17G
17 POWDER, FOR SOLUTION ORAL DAILY
Status: DISCONTINUED | OUTPATIENT
Start: 2024-04-04 | End: 2024-04-05

## 2024-04-03 RX ORDER — MORPHINE SULFATE 4 MG/ML
2 INJECTION, SOLUTION INTRAMUSCULAR; INTRAVENOUS EVERY 4 HOURS PRN
Status: DISCONTINUED | OUTPATIENT
Start: 2024-04-03 | End: 2024-04-05

## 2024-04-03 RX ORDER — ALPRAZOLAM 1 MG/1
1 TABLET ORAL 3 TIMES DAILY PRN
Status: DISCONTINUED | OUTPATIENT
Start: 2024-04-03 | End: 2024-04-05

## 2024-04-03 RX ORDER — ACETAMINOPHEN 325 MG/1
650 TABLET ORAL EVERY 4 HOURS PRN
Status: DISCONTINUED | OUTPATIENT
Start: 2024-04-03 | End: 2024-04-05

## 2024-04-03 RX ORDER — TAMSULOSIN HYDROCHLORIDE 0.4 MG/1
1 CAPSULE ORAL DAILY
Status: DISCONTINUED | OUTPATIENT
Start: 2024-04-04 | End: 2024-04-05

## 2024-04-03 RX ORDER — PREGABALIN 75 MG/1
75 CAPSULE ORAL NIGHTLY
Status: DISCONTINUED | OUTPATIENT
Start: 2024-04-03 | End: 2024-04-05

## 2024-04-03 RX ORDER — GLUCAGON 1 MG
1 KIT INJECTION
Status: DISCONTINUED | OUTPATIENT
Start: 2024-04-03 | End: 2024-04-12 | Stop reason: HOSPADM

## 2024-04-03 RX ORDER — IBUPROFEN 200 MG
16 TABLET ORAL
Status: DISCONTINUED | OUTPATIENT
Start: 2024-04-03 | End: 2024-04-05

## 2024-04-03 RX ORDER — TALC
6 POWDER (GRAM) TOPICAL NIGHTLY PRN
Status: DISCONTINUED | OUTPATIENT
Start: 2024-04-03 | End: 2024-04-05

## 2024-04-03 RX ORDER — ESCITALOPRAM OXALATE 10 MG/1
20 TABLET ORAL DAILY
Status: DISCONTINUED | OUTPATIENT
Start: 2024-04-04 | End: 2024-04-05

## 2024-04-03 RX ADMIN — IOHEXOL 100 ML: 350 INJECTION, SOLUTION INTRAVENOUS at 09:04

## 2024-04-03 RX ADMIN — PREGABALIN 75 MG: 75 CAPSULE ORAL at 08:04

## 2024-04-03 RX ADMIN — CEFTRIAXONE 1 G: 1 INJECTION, POWDER, FOR SOLUTION INTRAMUSCULAR; INTRAVENOUS at 08:04

## 2024-04-03 RX ADMIN — HEPARIN SODIUM 12 UNITS/KG/HR: 10000 INJECTION, SOLUTION INTRAVENOUS at 09:04

## 2024-04-03 RX ADMIN — Medication 6 MG: at 10:04

## 2024-04-03 NOTE — ASSESSMENT & PLAN NOTE
Secondary to bladder cancer, progression noted on CT    --Consult Oncology and Urology for recommendations  --Consult General Surgery

## 2024-04-03 NOTE — ASSESSMENT & PLAN NOTE
Worsening findings on CT scan.  There is some invasion of the rectum as well as the pelvic sidewalls.      Small-bowel involvement with the cancer is likely the cause of obstruction and may require surgical intervention to relieve the bowel obstruction    Would recommend consulting Oncology to discuss the patient's course and prognosis going forward

## 2024-04-03 NOTE — ASSESSMENT & PLAN NOTE
Recommend urology consult and likely Interventional Radiology consult for further management per hospital Medicine

## 2024-04-03 NOTE — HOSPITAL COURSE
A CT urogram was concerning for a bowel obstruction as well as advancement of the bladder cancer.  The bowel obstruction likely involves a loop of small bowel adhered to the cancer where it meets the bladder and the rectum.      04/04/2024.  Feeling a bit better.  No significant abdominal pain no significant bowel movements.  For a Gastrografin enema today.  Patient needs maintenance care of his bilateral nephrostomy tubes    04/05/2024.  Small-bowel follow-through showed no entry of contrast through the colon abdominal films showed contrast in the colon but distended loops of bowel.  Patient feels bloated somewhat.  He has had several bowel movements.  Findings are consistent with a partial small-bowel obstruction.  Once again we discussed the likely malignant nature of the bowel obstruction secondary to the bowel being stuck to tumor and the surgical intervention with an internal bypass.  He has agreed to proceed with surgery    04/08/2024: Tolerated clear liquids.  Pain is controlled.  Continue clear and advance diet tomorrow if tolerated.  Okay to stop heparin and resume Eliquis    04/09/2024.  Patient had been tolerating full liquids.  He however states he had yogurt and milk and then developed some crampy abdominal pain.  He reports bowel movements.  He has no nausea or vomiting.  He was some abdominal soreness but otherwise pain is controlled      04/10/2024.  Patient was tolerating full liquids he reports bowel movements.  He was abdominal pain with activity.  He has no other episodes of nausea.    04/11/2024.  Tolerating full liquids.  Reports bowel movement.  No abdominal white count is normal.  Will advance to regular diet.  Conversion to oral anticoagulation hospital Medicine    4/12/2024.  Patient tolerated a regular diet.  Reports bowel movements.  Still on a heparin drip, space can the converted to oral anticoagulation and discharged home if there has no contraindications per hospital Medicine

## 2024-04-03 NOTE — FIRST PROVIDER EVALUATION
Medical screening examination initiated.  I have conducted a focused provider triage encounter, findings are as follows:    Brief history of present illness:  Patient is sent from doctor's office for small bowel obstruction    Vitals:    04/03/24 1247   BP: (!) 94/56   BP Location: Right arm   Patient Position: Sitting   Pulse: 85   Resp: 18   Temp: 98 °F (36.7 °C)   TempSrc: Oral   SpO2: 96%   Weight: 63 kg (138 lb 14.2 oz)       Pertinent physical exam:  nad    Brief workup plan:  labs, further eval    Preliminary workup initiated; this workup will be continued and followed by the physician or advanced practice provider that is assigned to the patient when roomed.

## 2024-04-03 NOTE — ED PROVIDER NOTES
SCRIBE #1 NOTE: I, Bruce Bashir, am scribing for, and in the presence of, Emily Randall MD. I have scribed the HPI, ROS, and PEx.     SCRIBE #2 NOTE: I, Guillermina Mckee, am scribing for, and in the presence of,  Lizett Fragoso MD. I have scribed the remaining portions of the note not scribed by Scribe #1.     History      Chief Complaint   Patient presents with    Abdominal Pain     Sent for further evaluation of SBO found today        Review of patient's allergies indicates:  No Known Allergies     HPI   HPI    4/3/2024, 3:40 PM   History obtained from the patient and spouse      History of Present Illness: Geovani Currie is a 68 y.o. male patient with a PMHx of COPD, HTN, metastatic bladder cancer s/p nephrostomy tube placement who presents to the Emergency Department for generalized abdominal pain, onset several days PTA. Pt was referred to the ED by Dr. Hartman (Interventional Radiology) after CT Urogram done earlier today showed small bowel obstruction. Pt was scheduled to have his nephrostomy tubes replaced today. Symptoms are constant and moderate in severity. No mitigating or exacerbating factors reported. Associated sxs include nausea, abdominal distention, and loss of appetite. Patient denies any fever, chills, vomiting, SOB, CP, weakness, numbness, dizziness, headache, and all other sxs at this time. Pt and spouse are from [place], and are trying to return home to set up hospice. He is currently on cefdinir, but is not receiving any cancer treatment. No further complaints or concerns at this time.     Arrival mode: Personal vehicle    PCP: No, Primary Doctor       Past Medical History:  Past Medical History:   Diagnosis Date    Anxiety disorder, unspecified     Cancer     COPD (chronic obstructive pulmonary disease)     Hypertension     Thyroid disease        Past Surgical History:  Past Surgical History:   Procedure Laterality Date    APPENDECTOMY      CATARACT EXTRACTION      NEPHROSTOMY       OPEN REDUCTION AND INTERNAL FIXATION (ORIF) OF INTERTROCHANTERIC FRACTURE OF FEMUR Right 11/21/2023    Procedure: ORIF, FRACTURE, FEMUR, INTERTROCHANTERIC;  Surgeon: Tanisha Guidry DO;  Location: [facility] OR;  Service: Orthopedics;  Laterality: Right;  Gamma nail         Family History:  Family History   Problem Relation Age of Onset    Cancer Mother         NOS    Cancer Father         NOS    Cancer Maternal Grandfather         NOS    Bladder Cancer Neg Hx        Social History:  Social History     Tobacco Use    Smoking status: Every Day     Types: Vaping with nicotine     Passive exposure: Current    Smokeless tobacco: Never   Substance and Sexual Activity    Alcohol use: Never    Drug use: Never    Sexual activity: Not Currently     Partners: Female       ROS   Review of Systems   Constitutional:  Positive for appetite change (loss of appetite). Negative for chills and fever.   HENT:  Negative for sore throat.    Respiratory:  Negative for shortness of breath.    Cardiovascular:  Negative for chest pain.   Gastrointestinal:  Positive for abdominal distention, abdominal pain (generalized) and nausea. Negative for vomiting.   Genitourinary:  Negative for dysuria.   Musculoskeletal:  Negative for back pain.   Skin:  Negative for rash.   Neurological:  Negative for dizziness, weakness, numbness and headaches.   Hematological:  Does not bruise/bleed easily.   All other systems reviewed and are negative.    Physical Exam      Initial Vitals [04/03/24 1247]   BP Pulse Resp Temp SpO2   (!) 94/56 85 18 98 °F (36.7 °C) 96 %      MAP       --          Physical Exam  Nursing Notes and Vital Signs Reviewed.  Constitutional: Patient is in no acute distress. Chronically ill appearing. Appears older than his stated age.  Head: Atraumatic. Normocephalic.  Eyes: PERRL. EOM intact. Conjunctivae are not pale. No scleral icterus.  ENT: Mucous membranes are moist. Oropharynx is clear and symmetric.    Neck: Supple. Full ROM. No  lymphadenopathy.  Cardiovascular: Regular rate. Regular rhythm. No murmurs, rubs, or gallops. Distal pulses are 2+ and symmetric.  Pulmonary/Chest: No respiratory distress. Clear to auscultation bilaterally. No wheezing or rales.  Abdominal: Distended. There is no tenderness.  No rebound, guarding, or rigidity.   : Bilateral nephrostomy tubes in place.  Musculoskeletal: Moves all extremities. No obvious deformities. No edema.  Skin: Warm and dry.  Neurological:  Alert, awake, and appropriate.  Normal speech.  No acute focal neurological deficits are appreciated.  Psychiatric: Normal affect. Good eye contact. Appropriate in content.    ED Course    Critical Care    Date/Time: 4/3/2024 4:41 PM    Performed by: Lizett Fragoso MD  Authorized by: Lizett Fragoso MD  Direct patient critical care time: 16 minutes  Additional history critical care time: 5 minutes  Ordering / reviewing critical care time: 4 minutes  Documentation critical care time: 7 minutes  Consulting other physicians critical care time: 8 minutes  Total critical care time (exclusive of procedural time) : 40 minutes  Critical care time was exclusive of separately billable procedures and treating other patients and teaching time.  Critical care was necessary to treat or prevent imminent or life-threatening deterioration of the following conditions: Small bowel obstruction.  Critical care was time spent personally by me on the following activities: blood draw for specimens, development of treatment plan with patient or surrogate, discussions with consultants, interpretation of cardiac output measurements, evaluation of patient's response to treatment, examination of patient, obtaining history from patient or surrogate, ordering and review of laboratory studies, ordering and performing treatments and interventions, ordering and review of radiographic studies, pulse oximetry, re-evaluation of patient's condition and review of old charts.        ED  Vital Signs:  Vitals:    04/03/24 1247 04/03/24 1532 04/03/24 1547 04/03/24 1548   BP: (!) 94/56 122/68 133/67    Pulse: 85 107 94 92   Resp: 18  18    Temp: 98 °F (36.7 °C)      TempSrc: Oral      SpO2: 96%  95%    Weight: 63 kg (138 lb 14.2 oz)       04/03/24 1645 04/03/24 1745   BP: (!) 129/58 116/62   Pulse: 94 97   Resp: 20 16   Temp:     TempSrc:     SpO2: 96% 97%   Weight:         Abnormal Lab Results:  Labs Reviewed   CBC W/ AUTO DIFFERENTIAL - Abnormal; Notable for the following components:       Result Value    RBC 3.77 (*)     Hemoglobin 10.0 (*)     Hematocrit 32.1 (*)     MCH 26.5 (*)     MCHC 31.2 (*)     RDW 16.3 (*)     Gran # (ANC) 9.1 (*)     Gran % 83.8 (*)     Lymph % 10.0 (*)     All other components within normal limits   COMPREHENSIVE METABOLIC PANEL - Abnormal; Notable for the following components:    CO2 22 (*)     Glucose 125 (*)     Albumin 2.8 (*)     ALT 8 (*)     eGFR 54 (*)     All other components within normal limits   LIPASE   APTT   PROTIME-INR   CBC W/ AUTO DIFFERENTIAL   URINALYSIS, REFLEX TO URINE CULTURE        All Lab Results:  Results for orders placed or performed during the hospital encounter of 04/03/24   CBC auto differential   Result Value Ref Range    WBC 10.86 3.90 - 12.70 K/uL    RBC 3.77 (L) 4.60 - 6.20 M/uL    Hemoglobin 10.0 (L) 14.0 - 18.0 g/dL    Hematocrit 32.1 (L) 40.0 - 54.0 %    MCV 85 82 - 98 fL    MCH 26.5 (L) 27.0 - 31.0 pg    MCHC 31.2 (L) 32.0 - 36.0 g/dL    RDW 16.3 (H) 11.5 - 14.5 %    Platelets 399 150 - 450 K/uL    MPV 9.7 9.2 - 12.9 fL    Immature Granulocytes 0.2 0.0 - 0.5 %    Gran # (ANC) 9.1 (H) 1.8 - 7.7 K/uL    Immature Grans (Abs) 0.02 0.00 - 0.04 K/uL    Lymph # 1.1 1.0 - 4.8 K/uL    Mono # 0.6 0.3 - 1.0 K/uL    Eos # 0.1 0.0 - 0.5 K/uL    Baso # 0.03 0.00 - 0.20 K/uL    nRBC 0 0 /100 WBC    Gran % 83.8 (H) 38.0 - 73.0 %    Lymph % 10.0 (L) 18.0 - 48.0 %    Mono % 5.2 4.0 - 15.0 %    Eosinophil % 0.5 0.0 - 8.0 %    Basophil % 0.3 0.0 - 1.9 %     Differential Method Automated    Comprehensive metabolic panel   Result Value Ref Range    Sodium 137 136 - 145 mmol/L    Potassium 3.9 3.5 - 5.1 mmol/L    Chloride 104 95 - 110 mmol/L    CO2 22 (L) 23 - 29 mmol/L    Glucose 125 (H) 70 - 110 mg/dL    BUN 18 8 - 23 mg/dL    Creatinine 1.4 0.5 - 1.4 mg/dL    Calcium 9.6 8.7 - 10.5 mg/dL    Total Protein 7.2 6.0 - 8.4 g/dL    Albumin 2.8 (L) 3.5 - 5.2 g/dL    Total Bilirubin 0.5 0.1 - 1.0 mg/dL    Alkaline Phosphatase 121 55 - 135 U/L    AST 15 10 - 40 U/L    ALT 8 (L) 10 - 44 U/L    eGFR 54 (A) >60 mL/min/1.73 m^2    Anion Gap 11 8 - 16 mmol/L   Lipase   Result Value Ref Range    Lipase 16 4 - 60 U/L     Imaging Results:  Imaging Results              X-Ray Abdomen Flat And Erect (Final result)  Result time 04/03/24 15:59:11      Final result by Elmo Garza MD (04/03/24 15:59:11)                   Impression:      Increasing small bowel distension mid upper abdomen indicating partial small bowel obstruction.      Electronically signed by: Elmo Garza MD  Date:    04/03/2024  Time:    15:59               Narrative:    EXAMINATION:  XR ABDOMEN FLAT AND ERECT    CLINICAL HISTORY:  Unspecified intestinal obstruction, unspecified as to partial versus complete obstruction    TECHNIQUE:  Routine exam.    COMPARISON:  03/11/2024    FINDINGS:  Increasing air-filled loops of small bowel throughout the mid upper abdomen.  Maximum diameter approximately 4 cm.Large amount stool right colon    No other changes.  Again noted are bilateral nephrostomy catheters.                                              The Emergency Provider reviewed the vital signs and test results, which are outlined above.    ED Discussion     4:00 PM: Dr. Randall transfers care of patient to Dr. Fragoso pending imaging results.    4:59 PM: Discussed pt's case with Dr. Ramos (General Surgery) who recommends admission to hospital medicine, oncology, and urology, consult, and a small bowl fall  through tomorrow.    5:21 PM: Discussed case with Lara Ribera MD ([facility] Medicine). Dr. Ribera agrees with current care and management of pt and accepts admission.   Admitting Service: Hospital Medicine  Admitting Physician: Dr. Ribera  Admit to: inpatient    5:21 PM: Re-evaluated pt. I have discussed test results, shared treatment plan, and the need for admission with patient and family at bedside. Pt and family express understanding at this time and agree with all information. All questions answered. Pt and family have no further questions or concerns at this time. Pt is ready for admit.             ED Medication(s):  Medications   sodium chloride 0.9% flush 10 mL (has no administration in time range)   melatonin tablet 6 mg (has no administration in time range)   ondansetron injection 4 mg (has no administration in time range)   prochlorperazine injection Soln 5 mg (has no administration in time range)   polyethylene glycol packet 17 g (has no administration in time range)   aluminum-magnesium hydroxide-simethicone 200-200-20 mg/5 mL suspension 30 mL (has no administration in time range)   acetaminophen tablet 650 mg (has no administration in time range)   naloxone 0.4 mg/mL injection 0.02 mg (has no administration in time range)   glucose chewable tablet 16 g (has no administration in time range)   glucose chewable tablet 24 g (has no administration in time range)   glucagon (human recombinant) injection 1 mg (has no administration in time range)   morphine injection 2 mg (has no administration in time range)   dextrose 10% bolus 125 mL 125 mL (has no administration in time range)   dextrose 10% bolus 250 mL 250 mL (has no administration in time range)   heparin 25,000 units in dextrose 5% (100 units/ml) IV bolus from bag LOW INTENSITY nomogram - [facility] (has no administration in time range)   heparin 25,000 units in dextrose 5% 250 mL (100 units/mL) infusion LOW INTENSITY nomogram - [facility] (has no  administration in time range)   heparin 25,000 units in dextrose 5% (100 units/ml) IV bolus from bag LOW INTENSITY nomogram - [facility] (has no administration in time range)   heparin 25,000 units in dextrose 5% (100 units/ml) IV bolus from bag LOW INTENSITY nomogram - [facility] (has no administration in time range)   ALPRAZolam tablet 1 mg (has no administration in time range)   cefTRIAXone (Rocephin) 1 g in dextrose 5 % in water (D5W) 100 mL IVPB (MB+) (has no administration in time range)   EScitalopram oxalate tablet 20 mg (has no administration in time range)   levothyroxine tablet 125 mcg (has no administration in time range)   pregabalin capsule 75 mg (has no administration in time range)   tamsulosin 24 hr capsule 0.4 mg (has no administration in time range)         New Prescriptions    No medications on file         Medical Decision Making    Medical Decision Making  DDX: 1. SBO 2. Ileus 3. Dehydration 4. Infection    WBC normal, kidney function and electrolytes normal, xray abdomen shows concerns for SBO, surgery consulted recommends admit to hosp med, small bowel follow through in am patient admitted to [facility] service.     Amount and/or Complexity of Data Reviewed  Independent Historian: spouse  External Data Reviewed: labs, radiology and notes.     Details: Urology notes from yesterday along with CT urogram results, patient under palliative care for metastatic bladder cancer, has bilateral nephrolostomy tubes, on antibiotics for infection of nephrostomy tubes, scheduled for exchange today with IR, now with worsening disease process, cancer spread and concerns for SBO on imaging  Labs: ordered. Decision-making details documented in ED Course.  Radiology: ordered. Decision-making details documented in ED Course.  Discussion of management or test interpretation with external provider(s): Discussed pt's case with Dr. Ramos (General Surgery) who recommends admission to hospital medicine, oncology, and urology,  consult, and a small bowl fall through tomorrow.    Risk  Decision regarding hospitalization.  Diagnosis or treatment significantly limited by social determinants of health.                Scribe Attestation:   Scribe #1: I performed the above scribed service and the documentation accurately describes the services I performed. I attest to the accuracy of the note.    Attending:   Physician Attestation Statement for Scribe #1: I, Emily Randall MD, personally performed the services described in this documentation, as scribed by Bruce Bashir, in my presence, and it is both accurate and complete.       Scribe Attestation:   Scribe #2: I performed the above scribed service and the documentation accurately describes the services I performed. I attest to the accuracy of the note.    Attending Attestation:           Physician Attestation for Scribe:    Physician Attestation Statement for Scribe #2: I, Lizett Fragoso MD, reviewed documentation, as scribed by Guillermina Mckee in my presence, and it is both accurate and complete. I also acknowledge and confirm the content of the note done by Scribe #1.          Clinical Impression       ICD-10-CM ICD-9-CM   1. SBO (small bowel obstruction)  K56.609 560.9   2. Chest pain  R07.9 786.50   3. History of bladder cancer  Z85.51 V10.51   4. Attention to nephrostomy  Z43.6 V55.6       Disposition:   Disposition: Admitted  Condition: Emily Fry MD  04/03/24 0965

## 2024-04-03 NOTE — ASSESSMENT & PLAN NOTE
On imaging studies the patient appears to have at least a partial small bowel obstruction likely related to the small bowel adherent to the bladder cancer/rectal area.    The options would include 1.  If this is indeed a partial obstruction in call liquids can be tolerated then no intervention would be required if the patient was going to pursue hospice care    Two.  Placement of a gastrostomy tube but this might proved to be challenging through minimally invasive techniques as there appears to be loops of small bowel overlying the stomach on his CT scan    Three.  Exploratory laparotomy with either resection of the small bowel with planned anastomosis or bypass of the small bowel to itself or to the colon to relieve the obstruction.  This carries the risk of a laparotomy including a wound infection, wound hernia, anastomotic leak or stricture

## 2024-04-03 NOTE — SUBJECTIVE & OBJECTIVE
Past Medical History:   Diagnosis Date    Anxiety disorder, unspecified     Cancer     COPD (chronic obstructive pulmonary disease)     Hypertension     Thyroid disease        Past Surgical History:   Procedure Laterality Date    APPENDECTOMY      CATARACT EXTRACTION      NEPHROSTOMY      OPEN REDUCTION AND INTERNAL FIXATION (ORIF) OF INTERTROCHANTERIC FRACTURE OF FEMUR Right 11/21/2023    Procedure: ORIF, FRACTURE, FEMUR, INTERTROCHANTERIC;  Surgeon: Tanisha Guidry DO;  Location: [facility];  Service: Orthopedics;  Laterality: Right;  Gamma nail       Review of patient's allergies indicates:  No Known Allergies    Current Facility-Administered Medications on File Prior to Encounter   Medication    [COMPLETED] iohexoL (OMNIPAQUE 350) injection 100 mL     Current Outpatient Medications on File Prior to Encounter   Medication Sig    albuterol (PROVENTIL/VENTOLIN HFA) 90 mcg/actuation inhaler Inhale 1-2 puffs into the lungs every 4 (four) hours as needed for Wheezing. Rescue    ALPRAZolam (XANAX) 1 MG tablet Take 1 tablet (1 mg total) by mouth 3 (three) times daily as needed for Anxiety.    cefdinir (OMNICEF) 300 MG capsule Take 1 capsule (300 mg total) by mouth 2 (two) times daily. for 10 days    cyclobenzaprine (FLEXERIL) 10 MG tablet Take 1 tablet (10 mg total) by mouth 3 (three) times daily as needed for Muscle spasms.    EScitalopram oxalate (LEXAPRO) 20 MG tablet Take 1 tablet (20 mg total) by mouth once daily.    fluticasone furoate-vilanteroL (BREO ELLIPTA) 100-25 mcg/dose diskus inhaler Inhale 1 puff into the lungs once daily. Controller    HYDROcodone-acetaminophen (NORCO)  mg per tablet Take 1 tablet by mouth every 6 (six) hours as needed for Pain (Max 4 doses/day. For Pain flare in between Xtampza doses. Take 1-2 hours before or after Xtampza).    levothyroxine (SYNTHROID) 125 MCG tablet Take 1 tablet (125 mcg total) by mouth before breakfast.    nicotine (NICODERM CQ) 14 mg/24 hr Place 1 patch onto  the skin every 24 hours.    oxyCODONE myristate (XTAMPZA ER) 9 mg CSpT Take 9 mg by mouth every 12 (twelve) hours. for 14 days    pregabalin (LYRICA) 75 MG capsule Take 1 capsule (75 mg total) by mouth every evening.    tamsulosin (FLOMAX) 0.4 mg Cap Take 1 capsule by mouth once daily.    vitamin D (VITAMIN D3) 1000 units Tab Take 25 mcg by mouth once daily.    [DISCONTINUED] docusate sodium (COLACE) 100 MG capsule Take 100 mg by mouth 2 (two) times daily.    albuterol (ACCUNEB) 0.63 mg/3 mL Nebu Take 3 mLs (0.63 mg total) by nebulization 3 (three) times daily as needed (sob, wheezing). Rescue    apixaban (ELIQUIS) 5 mg Tab Take 1 tablet (5 mg total) by mouth 2 (two) times daily.    naloxone (NARCAN) 4 mg/actuation Spry 1 spray once.    [DISCONTINUED] apixaban (ELIQUIS) 5 mg Tab Take 10mg po BID x 7 days then 5mg po daily     Family History       Problem Relation (Age of Onset)    Cancer Mother, Father, Maternal Grandfather          Tobacco Use    Smoking status: Every Day     Types: Vaping with nicotine     Passive exposure: Current    Smokeless tobacco: Never   Substance and Sexual Activity    Alcohol use: Never    Drug use: Never    Sexual activity: Not Currently     Partners: Female     Review of Systems   Constitutional:  Positive for activity change, appetite change and fatigue.   Respiratory:  Positive for chest tightness and shortness of breath.    Cardiovascular:  Positive for chest pain.   Gastrointestinal:  Positive for abdominal distention, abdominal pain and diarrhea.   Musculoskeletal:  Positive for arthralgias and gait problem.   Neurological:  Positive for weakness and headaches.     Objective:     Vital Signs (Most Recent):  Temp: 98 °F (36.7 °C) (04/03/24 1247)  Pulse: 92 (04/03/24 1548)  Resp: 18 (04/03/24 1547)  BP: 133/67 (04/03/24 1547)  SpO2: 95 % (04/03/24 1547) Vital Signs (24h Range):  Temp:  [98 °F (36.7 °C)] 98 °F (36.7 °C)  Pulse:  [] 92  Resp:  [18] 18  SpO2:  [95 %-96 %] 95  %  BP: ()/(56-68) 133/67     Weight: 63 kg (138 lb 14.2 oz)  Body mass index is 21.12 kg/m².     Physical Exam  Vitals and nursing note reviewed.   Constitutional:       General: He is not in acute distress.     Appearance: He is cachectic. He is ill-appearing. He is not diaphoretic.   HENT:      Head: Normocephalic and atraumatic.      Right Ear: Hearing normal.      Left Ear: Hearing normal.      Nose: Nose normal. No mucosal edema or rhinorrhea.      Mouth/Throat:      Pharynx: Uvula midline.   Eyes:      General:         Right eye: No discharge.         Left eye: No discharge.      Conjunctiva/sclera: Conjunctivae normal.      Right eye: No chemosis.     Left eye: No chemosis.     Pupils: Pupils are equal, round, and reactive to light.   Neck:      Thyroid: No thyroid mass or thyromegaly.      Trachea: Trachea normal.   Cardiovascular:      Rate and Rhythm: Normal rate and regular rhythm.      Pulses:           Dorsalis pedis pulses are 1+ on the right side and 1+ on the left side.      Heart sounds: Normal heart sounds. No murmur heard.  Pulmonary:      Effort: Pulmonary effort is normal. No respiratory distress.      Breath sounds: Examination of the right-lower field reveals decreased breath sounds. Examination of the left-lower field reveals decreased breath sounds. Decreased breath sounds present. No wheezing.   Abdominal:      General: Bowel sounds are normal. There is distension.      Palpations: Abdomen is soft.      Tenderness: There is generalized abdominal tenderness.   Musculoskeletal:         General: Normal range of motion.      Cervical back: Normal range of motion and neck supple.   Lymphadenopathy:      Cervical: No cervical adenopathy.      Upper Body:      Right upper body: No supraclavicular adenopathy.      Left upper body: No supraclavicular adenopathy.   Skin:     General: Skin is warm and dry.      Capillary Refill: Capillary refill takes less than 2 seconds.      Coloration: Skin  is pale.          Neurological:      Mental Status: He is oriented to person, place, and time. He is lethargic.   Psychiatric:      Comments: deferred              CRANIAL NERVES     CN III, IV, VI   Pupils are equal, round, and reactive to light.       Significant Labs: All pertinent labs within the past 24 hours have been reviewed.  CBC:   Recent Labs   Lab 04/03/24  1307   WBC 10.86   HGB 10.0*   HCT 32.1*        CMP:   Recent Labs   Lab 04/03/24  0806 04/03/24  1307   NA  --  137   K  --  3.9   CL  --  104   CO2  --  22*   GLU  --  125*   BUN  --  18   CREATININE 1.5* 1.4   CALCIUM  --  9.6   PROT  --  7.2   ALBUMIN  --  2.8*   BILITOT  --  0.5   ALKPHOS  --  121   AST  --  15   ALT  --  8*   ANIONGAP  --  11       Significant Imaging: I have reviewed all pertinent imaging results/findings within the past 24 hours.

## 2024-04-03 NOTE — PHARMACY MED REC
"Admission Medication History     The home medication history was taken by Randa Cardoso.    You may go to "Admission" then "Reconcile Home Medications" tabs to review and/or act upon these items.     The home medication list has been updated by the Pharmacy department.   Please read ALL comments highlighted in yellow.   Please address this information as you see fit.    Feel free to contact us if you have any questions or require assistance.      The medications listed below were removed from the home medication list. Please reorder if appropriate:  Patient reports no longer taking the following medication(s):  Docusate 100 mg        Medications listed below were obtained from: Patient/family and Analytic software- [employer]  (Not in a hospital admission)      LAST MED REC COMPLETED:         Randa Cardoso  [id number]    Current Outpatient Medications on File Prior to Encounter   Medication Sig Dispense Refill Last Dose    albuterol (PROVENTIL/VENTOLIN HFA) 90 mcg/actuation inhaler Inhale 1-2 puffs into the lungs every 4 (four) hours as needed for Wheezing. Rescue 18 g 11 4/3/2024    ALPRAZolam (XANAX) 1 MG tablet Take 1 tablet (1 mg total) by mouth 3 (three) times daily as needed for Anxiety. 90 tablet 5 4/3/2024    cefdinir (OMNICEF) 300 MG capsule Take 1 capsule (300 mg total) by mouth 2 (two) times daily. for 10 days 20 capsule 0 4/3/2024    cyclobenzaprine (FLEXERIL) 10 MG tablet Take 1 tablet (10 mg total) by mouth 3 (three) times daily as needed for Muscle spasms. 270 tablet 3 4/3/2024    EScitalopram oxalate (LEXAPRO) 20 MG tablet Take 1 tablet (20 mg total) by mouth once daily. 90 tablet 3 4/2/2024    fluticasone furoate-vilanteroL (BREO ELLIPTA) 100-25 mcg/dose diskus inhaler Inhale 1 puff into the lungs once daily. Controller 30 each 11 4/2/2024    HYDROcodone-acetaminophen (NORCO)  mg per tablet Take 1 tablet by mouth every 6 (six) hours as needed for Pain (Max 4 doses/day. For Pain flare in " between Xtampza doses. Take 1-2 hours before or after Xtampza). 56 tablet 0 4/3/2024    levothyroxine (SYNTHROID) 125 MCG tablet Take 1 tablet (125 mcg total) by mouth before breakfast. 90 tablet 3 4/2/2024    nicotine (NICODERM CQ) 14 mg/24 hr Place 1 patch onto the skin every 24 hours.   Past Month    oxyCODONE myristate (XTAMPZA ER) 9 mg CSpT Take 9 mg by mouth every 12 (twelve) hours. for 14 days 28 each 0 4/2/2024    pregabalin (LYRICA) 75 MG capsule Take 1 capsule (75 mg total) by mouth every evening. 30 capsule 0 4/2/2024    tamsulosin (FLOMAX) 0.4 mg Cap Take 1 capsule by mouth once daily.       vitamin D (VITAMIN D3) 1000 units Tab Take 25 mcg by mouth once daily.   4/2/2024    albuterol (ACCUNEB) 0.63 mg/3 mL Nebu Take 3 mLs (0.63 mg total) by nebulization 3 (three) times daily as needed (sob, wheezing). Rescue 75 mL 3 Unknown    apixaban (ELIQUIS) 5 mg Tab Take 1 tablet (5 mg total) by mouth 2 (two) times daily. 180 tablet 3 3/31/2024    naloxone (NARCAN) 4 mg/actuation Spry 1 spray once.   Unknown                           .

## 2024-04-03 NOTE — TELEPHONE ENCOUNTER
Provider daughter, stephania, with CT results, per Dr. Hartman there is a small bowel obstruction, evaluation needed at ER. Stephania verbalized understanding, will have pt check in at ER after nephrostomy tube exchange.

## 2024-04-03 NOTE — HPI
68 y.o. male patient with a PMHx of COPD, HTN, Lung Cancer, CKD, DVT, metastatic bladder cancer s/p nephrostomy tube placement. Presented to the Emergency Department for generalized abdominal pain, onset several days PTA. Pt was referred to the ED by Dr. Sr (Interventional Radiology) after CT Urogram showed small bowel obstruction. Pt was scheduled to have his nephrostomy tubes replaced on day of arrival.  Associated sxs include nausea, abdominal distention, and loss of appetite. Patient denies any fever, chills, vomiting, SOB, CP, weakness, numbness, dizziness, headache, and all other sxs at this time. Pt and spouse are from [place], and are trying to return home to set up hospice. He is currently on cefdinir, but is not receiving any cancer treatment. General Surgery consulted, recommended to admit and consult Oncology and Urology. In the ED, vitals: 94/56, 85, 18, 98F, 96% RA. Significant Labs: H/H: 10/32.1, CT abd/pel: findings consistent SBO, Interval worsening of bladder cancer. Patient is a full code. Admitted to Hospital Medicine for management of Small Bowel Obstruction.

## 2024-04-03 NOTE — H&P
[facility]  [facility]  History & Physical    Patient Name: Geovani Currie  MRN: [id number]  Patient Class: IP- Inpatient  Admission Date: 4/3/2024  Attending Physician: Lara Ribera MD   Primary Care Provider: Reg[facility] Primary Doctor         Patient information was obtained from patient, spouse/SO, past medical records, and ER records.     Subjective:     Principal Problem:Partial intestinal obstruction    Chief Complaint:   Chief Complaint   Patient presents with    Abdominal Pain     Sent for further evaluation of SBO found today         HPI: 68 y.o. male patient with a PMHx of COPD, HTN, Lung Cancer, CKD, DVT, metastatic bladder cancer s/p nephrostomy tube placement. Presented to the Emergency Department for generalized abdominal pain, onset several days PTA. Pt was referred to the ED by Dr. Sr (Interventional Radiology) after CT Urogram showed small bowel obstruction. Pt was scheduled to have his nephrostomy tubes replaced on day of arrival.  Associated sxs include nausea, abdominal distention, and loss of appetite. Patient denies any fever, chills, vomiting, SOB, CP, weakness, numbness, dizziness, headache, and all other sxs at this time. Pt and spouse are from [place], and are trying to return home to set up hospice. He is currently on cefdinir, but is not receiving any cancer treatment. General Surgery consulted, recommended to admit and consult Oncology and Urology. In the ED, vitals: 94/56, 85, 18, 98F, 96% RA. Significant Labs: H/H: 10/32.1, CT abd/pel: findings consistent SBO, Interval worsening of bladder cancer. Patient is a full code. Admitted to Hospital Medicine for management of Small Bowel Obstruction.     Past Medical History:   Diagnosis Date    Anxiety disorder, unspecified     Cancer     COPD (chronic obstructive pulmonary disease)     Hypertension     Thyroid disease        Past Surgical History:   Procedure Laterality Date    APPENDECTOMY      CATARACT  EXTRACTION      NEPHROSTOMY      OPEN REDUCTION AND INTERNAL FIXATION (ORIF) OF INTERTROCHANTERIC FRACTURE OF FEMUR Right 11/21/2023    Procedure: ORIF, FRACTURE, FEMUR, INTERTROCHANTERIC;  Surgeon: Tanisha Guidry DO;  Location: [facility];  Service: Orthopedics;  Laterality: Right;  Gamma nail       Review of patient's allergies indicates:  No Known Allergies    Current Facility-Administered Medications on File Prior to Encounter   Medication    [COMPLETED] iohexoL (OMNIPAQUE 350) injection 100 mL     Current Outpatient Medications on File Prior to Encounter   Medication Sig    albuterol (PROVENTIL/VENTOLIN HFA) 90 mcg/actuation inhaler Inhale 1-2 puffs into the lungs every 4 (four) hours as needed for Wheezing. Rescue    ALPRAZolam (XANAX) 1 MG tablet Take 1 tablet (1 mg total) by mouth 3 (three) times daily as needed for Anxiety.    cefdinir (OMNICEF) 300 MG capsule Take 1 capsule (300 mg total) by mouth 2 (two) times daily. for 10 days    cyclobenzaprine (FLEXERIL) 10 MG tablet Take 1 tablet (10 mg total) by mouth 3 (three) times daily as needed for Muscle spasms.    EScitalopram oxalate (LEXAPRO) 20 MG tablet Take 1 tablet (20 mg total) by mouth once daily.    fluticasone furoate-vilanteroL (BREO ELLIPTA) 100-25 mcg/dose diskus inhaler Inhale 1 puff into the lungs once daily. Controller    HYDROcodone-acetaminophen (NORCO)  mg per tablet Take 1 tablet by mouth every 6 (six) hours as needed for Pain (Max 4 doses/day. For Pain flare in between Xtampza doses. Take 1-2 hours before or after Xtampza).    levothyroxine (SYNTHROID) 125 MCG tablet Take 1 tablet (125 mcg total) by mouth before breakfast.    nicotine (NICODERM CQ) 14 mg/24 hr Place 1 patch onto the skin every 24 hours.    oxyCODONE myristate (XTAMPZA ER) 9 mg CSpT Take 9 mg by mouth every 12 (twelve) hours. for 14 days    pregabalin (LYRICA) 75 MG capsule Take 1 capsule (75 mg total) by mouth every evening.    tamsulosin (FLOMAX) 0.4 mg Cap Take  1 capsule by mouth once daily.    vitamin D (VITAMIN D3) 1000 units Tab Take 25 mcg by mouth once daily.    [DISCONTINUED] docusate sodium (COLACE) 100 MG capsule Take 100 mg by mouth 2 (two) times daily.    albuterol (ACCUNEB) 0.63 mg/3 mL Nebu Take 3 mLs (0.63 mg total) by nebulization 3 (three) times daily as needed (sob, wheezing). Rescue    apixaban (ELIQUIS) 5 mg Tab Take 1 tablet (5 mg total) by mouth 2 (two) times daily.    naloxone (NARCAN) 4 mg/actuation Spry 1 spray once.    [DISCONTINUED] apixaban (ELIQUIS) 5 mg Tab Take 10mg po BID x 7 days then 5mg po daily     Family History       Problem Relation (Age of Onset)    Cancer Mother, Father, Maternal Grandfather          Tobacco Use    Smoking status: Every Day     Types: Vaping with nicotine     Passive exposure: Current    Smokeless tobacco: Never   Substance and Sexual Activity    Alcohol use: Never    Drug use: Never    Sexual activity: Not Currently     Partners: Female     Review of Systems   Constitutional:  Positive for activity change, appetite change and fatigue.   Respiratory:  Positive for chest tightness and shortness of breath.    Cardiovascular:  Positive for chest pain.   Gastrointestinal:  Positive for abdominal distention, abdominal pain and diarrhea.   Musculoskeletal:  Positive for arthralgias and gait problem.   Neurological:  Positive for weakness and headaches.     Objective:     Vital Signs (Most Recent):  Temp: 98 °F (36.7 °C) (04/03/24 1247)  Pulse: 92 (04/03/24 1548)  Resp: 18 (04/03/24 1547)  BP: 133/67 (04/03/24 1547)  SpO2: 95 % (04/03/24 1547) Vital Signs (24h Range):  Temp:  [98 °F (36.7 °C)] 98 °F (36.7 °C)  Pulse:  [] 92  Resp:  [18] 18  SpO2:  [95 %-96 %] 95 %  BP: ()/(56-68) 133/67     Weight: 63 kg (138 lb 14.2 oz)  Body mass index is 21.12 kg/m².     Physical Exam  Vitals and nursing note reviewed.   Constitutional:       General: He is not in acute distress.     Appearance: He is cachectic. He is  ill-appearing. He is not diaphoretic.   HENT:      Head: Normocephalic and atraumatic.      Right Ear: Hearing normal.      Left Ear: Hearing normal.      Nose: Nose normal. No mucosal edema or rhinorrhea.      Mouth/Throat:      Pharynx: Uvula midline.   Eyes:      General:         Right eye: No discharge.         Left eye: No discharge.      Conjunctiva/sclera: Conjunctivae normal.      Right eye: No chemosis.     Left eye: No chemosis.     Pupils: Pupils are equal, round, and reactive to light.   Neck:      Thyroid: No thyroid mass or thyromegaly.      Trachea: Trachea normal.   Cardiovascular:      Rate and Rhythm: Normal rate and regular rhythm.      Pulses:           Dorsalis pedis pulses are 1+ on the right side and 1+ on the left side.      Heart sounds: Normal heart sounds. No murmur heard.  Pulmonary:      Effort: Pulmonary effort is normal. No respiratory distress.      Breath sounds: Examination of the right-lower field reveals decreased breath sounds. Examination of the left-lower field reveals decreased breath sounds. Decreased breath sounds present. No wheezing.   Abdominal:      General: Bowel sounds are normal. There is distension.      Palpations: Abdomen is soft.      Tenderness: There is generalized abdominal tenderness.   Musculoskeletal:         General: Normal range of motion.      Cervical back: Normal range of motion and neck supple.   Lymphadenopathy:      Cervical: No cervical adenopathy.      Upper Body:      Right upper body: No supraclavicular adenopathy.      Left upper body: No supraclavicular adenopathy.   Skin:     General: Skin is warm and dry.      Capillary Refill: Capillary refill takes less than 2 seconds.      Coloration: Skin is pale.          Neurological:      Mental Status: He is oriented to person, place, and time. He is lethargic.   Psychiatric:      Comments: deferred              CRANIAL NERVES     CN III, IV, VI   Pupils are equal, round, and reactive to light.        Significant Labs: All pertinent labs within the past 24 hours have been reviewed.  CBC:   Recent Labs   Lab 04/03/24  1307   WBC 10.86   HGB 10.0*   HCT 32.1*        CMP:   Recent Labs   Lab 04/03/24  0806 04/03/24  1307   NA  --  137   K  --  3.9   CL  --  104   CO2  --  22*   GLU  --  125*   BUN  --  18   CREATININE 1.5* 1.4   CALCIUM  --  9.6   PROT  --  7.2   ALBUMIN  --  2.8*   BILITOT  --  0.5   ALKPHOS  --  121   AST  --  15   ALT  --  8*   ANIONGAP  --  11       Significant Imaging: I have reviewed all pertinent imaging results/findings within the past 24 hours.  Assessment/Plan:     * Partial intestinal obstruction  Secondary to bladder cancer, progression noted on CT    --Consult Oncology and Urology for recommendations  --Consult General Surgery      Acute deep vein thrombosis (DVT) of femoral vein of left lower extremity  New diagnosis, managed with Eliquis as OP    --Hold Eliquis for now  --Heparin per nomogram      Hypothyroidism    Chronic, well controlled    --Cont home med    Malignant neoplasm of urinary bladder  Known hx of bladder cancer, CT abd/pel: Interval worsening of locally invasive bladder cancer extension to the pelvic sidewall and the presacral fat and sacrum on the left. There is involvement of the rectum as well as well as involvement of a small bowel loop in the pelvis causing a small bowel obstruction.     --Consult urology and oncology for recommendations      Nephrostomy tube displaced  Followed by Urology, planned for tube exchange on 4/3, referred to ED due to SBO    --Consult IR if needed       Centrilobular emphysema  Chronic, well controlled    --Cont home meds    Squamous cell carcinoma of right lung  Followed by oncology, treated with Radiation, therapy completed    --consult oncology      Anemia  Patient's anemia is currently controlled. Has not received any PRBCs to date. Etiology likely d/t chronic disease due to Malignancy  Current CBC reviewed-   Lab Results    Component Value Date    HGB 10.0 (L) 04/03/2024    HCT 32.1 (L) 04/03/2024     Monitor serial CBC and transfuse if patient becomes hemodynamically unstable, symptomatic or H/H drops below 7/21.    Elevated serum creatinine  Mild increase,likely secondary to tumor burden    --Repeat CMP in AM        VTE Risk Mitigation (From admission, onward)           Ordered     heparin 25,000 units in dextrose 5% (100 units/ml) IV bolus from bag LOW INTENSITY nomogram - [facility]  As needed (PRN)        Question:  Heparin Infusion Adjustment (DO NOT MODIFY ANSWER)  Answer:  \[email or web address]\Images\Pharmacy\HeparinInfusions\heparin LOW INTENSITY nomogram for [facility] [id number]    04/03/24 1818     heparin 25,000 units in dextrose 5% (100 units/ml) IV bolus from bag LOW INTENSITY nomogram - [facility]  As needed (PRN)        Question:  Heparin Infusion Adjustment (DO NOT MODIFY ANSWER)  Answer:  \[email or web address]\Images\Pharmacy\HeparinInfusions\heparin LOW INTENSITY nomogram for [facility] [id number].pdf    04/03/24 1818     heparin 25,000 units in dextrose 5% (100 units/ml) IV bolus from bag LOW INTENSITY nomogram - [facility]  Once        Question:  Heparin Infusion Adjustment (DO NOT MODIFY ANSWER)  Answer:  \[email or web address]\Images\Pharmacy\HeparinInfusions\heparin LOW INTENSITY nomogram for [facility] [id number].pdf    04/03/24 1818     heparin 25,000 units in dextrose 5% 250 mL (100 units/mL) infusion LOW INTENSITY nomogram - [facility]  Continuous        Question:  Begin at (units/kg/hr)  Answer:  12    04/03/24 1818     Reason for No Pharmacological VTE Prophylaxis  Once        Question:  Reasons:  Answer:  Risk of Bleeding    04/03/24 1724     IP VTE HIGH RISK PATIENT  Once         04/03/24 1724     Place sequential compression device  Until discontinued         04/03/24 1724                               AdmissionCare    Guideline: Intestinal Obstruction, Inpatient    Based on the indications selected for the patient, the bed status of Inpatient was determined to be  MET    The following indications were selected as present at the time of evaluation of the patient:      - Signs and symptoms of bowel obstruction (eg, vomiting, inability to tolerate PO intake, pain, distention) that are severe (feculent vomiting, Hypotension, electrolyte abnormality, evidence of bowel ischemia or suspected perforation), or persistent (eg, NG tube placed and will need to be continued, IV hydration support required)   - Imaging study consistent with bowel obstruction (ie, alternative diagnosis not more likely)    AdmissionCare documentation entered by: Antoinette Epstein    [facility] [employer], 27th edition, Copyright © 2023 [facility] All Rights Reserved.  [id number]:19:04-05:00    Antoinette Epstein NP  Department of Hospital Medicine  [facility] - Emergency Dept.

## 2024-04-03 NOTE — ASSESSMENT & PLAN NOTE
Patient's anemia is currently controlled. Has not received any PRBCs to date. 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

## 2024-04-03 NOTE — ASSESSMENT & PLAN NOTE
Followed by Urology, planned for tube exchange on 4/3, referred to ED due to SBO    --Consult IR if needed

## 2024-04-03 NOTE — HPI
68-year-old male with a known locally advanced and metastatic bladder cancer who underwent a CT urogram today which was concerning for a bowel obstruction.      Most of the history is obtained from the patient's wife.  She states that he had some purulent drainage around his nephrostomy tube.  He was seen by Urology earlier today and they communicated with Infectious Disease.  He underwent a CT urogram that was consistent with a bowel obstruction as well as advancement of his bladder cancer.  Surgery was consulted because of a bowel obstruction.      According to his wife he has had some abdominal discomfort and nausea and vomiting.      He was followed here by Oncology and been treated with chemotherapy.  The last plan was to treat him with the following:    /9/2024 -  Chemotherapy     Treatment Summary   Plan Name: OP pembrolizumab 200mg Q3W  Treatment Goal: Palliative  Status: Active  Start Date: 2/9/2024 (Planned)  End Date: 1/23/2026 (Planned)     The patient also has a history of a DVT with a pulmonary embolism that is treated with Eliquis.  He also has a history of a lung lesion that was treated with radiation.      Surgery was asked to see him because the bowel obstruction.      The patient appears cachectic.  He answers some questions but most information is from his wife.  They proceed palliative care consult.  They are trying to returned to [place] where they have more support.

## 2024-04-03 NOTE — ADMISSIONCARE
AdmissionCare    Guideline: Intestinal Obstruction, Inpatient    Based on the indications selected for the patient, the bed status of Inpatient was determined to be MET    The following indications were selected as present at the time of evaluation of the patient:      - Signs and symptoms of bowel obstruction (eg, vomiting, inability to tolerate PO intake, pain, distention) that are severe (feculent vomiting, Hypotension, electrolyte abnormality, evidence of bowel ischemia or suspected perforation), or persistent (eg, NG tube placed and will need to be continued, IV hydration support required)   - Imaging study consistent with bowel obstruction (ie, alternative diagnosis not more likely)    AdmissionCare documentation entered by: Antoinette Epstein    [facility] [employer], 27th edition, Copyright © 2023 [facility] [employer], [facility] All Rights Reserved.  [id number]:19:04-05:00

## 2024-04-03 NOTE — CONSULTS
[facility] - Emergency Dept.  General Surgery  Consult Note    Patient Name: Geovani Currie  MRN: [id number]  Code Status: Prior  Admission Date: 4/3/2024  Hospital Length of Stay: 0 days  Attending Physician: Lizett Fragoso MD  Primary Care Provider: Regi Primary Doctor    Patient information was obtained from patient, spouse/SO, past medical records, and ER records.     Inpatient consult to General surgery  Consult performed by: Silvestre Ramos MD  Consult ordered by: Emily Randall MD  Reason for consult: Bowel obstruction in the setting of metastatic and locally advanced bladder cancer  Assessment/Recommendations: Consultations with Urology, oncology and possibly palliative care   Options can include:  1.  If this is a partial obstruction a tolerates clear liquids no intervention however this would be with an understanding he be discharged to hospice care    2.  Placement of a gastrostomy tube however this may be challenging due to the loops of small bowel seen between the stomach in the abdominal wall and images    Three.  Laparotomy with either small-bowel resection anastomosis are internal bypass.  This carries the risk of surgery and its complications as well as a physiological set back    In moving forward consideration needs to be given to the patient's wishes in developing a treatment plan as all interventions would be palliative        Subjective:     Principal Problem: <principal problem not specified>    History of Present Illness: 68-year-old male with a known locally advanced and metastatic bladder cancer who underwent a CT urogram today which was concerning for a bowel obstruction.      Most of the history is obtained from the patient's wife.  She states that he had some purulent drainage around his nephrostomy tube.  He was seen by Urology earlier today and they communicated with Infectious Disease.  He underwent a CT urogram that was consistent with a bowel obstruction as well as advancement  of his bladder cancer.  Surgery was consulted because of a bowel obstruction.      According to his wife he has had some abdominal discomfort and nausea and vomiting.      He was followed here by Oncology and been treated with chemotherapy.  The last plan was to treat him with the following:    /9/2024 -  Chemotherapy     Treatment Summary   Plan Name: OP pembrolizumab 200mg Q3W  Treatment Goal: Palliative  Status: Active  Start Date: 2/9/2024 (Planned)  End Date: 1/23/2026 (Planned)     The patient also has a history of a DVT with a pulmonary embolism that is treated with Eliquis.  He also has a history of a lung lesion that was treated with radiation.      Surgery was asked to see him because the bowel obstruction.      The patient appears cachectic.  He answers some questions but most information is from his wife.  They proceed palliative care consult.  They are trying to returned to [place] where they have more support.    Current Facility-Administered Medications on File Prior to Encounter   Medication    [COMPLETED] iohexoL (OMNIPAQUE 350) injection 100 mL     Current Outpatient Medications on File Prior to Encounter   Medication Sig    albuterol (PROVENTIL/VENTOLIN HFA) 90 mcg/actuation inhaler Inhale 1-2 puffs into the lungs every 4 (four) hours as needed for Wheezing. Rescue    ALPRAZolam (XANAX) 1 MG tablet Take 1 tablet (1 mg total) by mouth 3 (three) times daily as needed for Anxiety.    cefdinir (OMNICEF) 300 MG capsule Take 1 capsule (300 mg total) by mouth 2 (two) times daily. for 10 days    cyclobenzaprine (FLEXERIL) 10 MG tablet Take 1 tablet (10 mg total) by mouth 3 (three) times daily as needed for Muscle spasms.    docusate sodium (COLACE) 100 MG capsule Take 100 mg by mouth 2 (two) times daily.    EScitalopram oxalate (LEXAPRO) 20 MG tablet Take 1 tablet (20 mg total) by mouth once daily.    fluticasone furoate-vilanteroL (BREO ELLIPTA) 100-25 mcg/dose diskus inhaler Inhale 1 puff into the  lungs once daily. Controller    HYDROcodone-acetaminophen (NORCO)  mg per tablet Take 1 tablet by mouth every 6 (six) hours as needed for Pain (Max 4 doses/day. For Pain flare in between Xtampza doses. Take 1-2 hours before or after Xtampza).    levothyroxine (SYNTHROID) 125 MCG tablet Take 1 tablet (125 mcg total) by mouth before breakfast.    nicotine (NICODERM CQ) 14 mg/24 hr Place 1 patch onto the skin every 24 hours.    oxyCODONE myristate (XTAMPZA ER) 9 mg CSpT Take 9 mg by mouth every 12 (twelve) hours. for 14 days    pregabalin (LYRICA) 75 MG capsule Take 1 capsule (75 mg total) by mouth every evening.    vitamin D (VITAMIN D3) 1000 units Tab Take 25 mcg by mouth once daily.    albuterol (ACCUNEB) 0.63 mg/3 mL Nebu Take 3 mLs (0.63 mg total) by nebulization 3 (three) times daily as needed (sob, wheezing). Rescue    apixaban (ELIQUIS) 5 mg Tab Take 10mg po BID x 7 days then 5mg po daily    apixaban (ELIQUIS) 5 mg Tab Take 1 tablet (5 mg total) by mouth 2 (two) times daily.    naloxone (NARCAN) 4 mg/actuation Spry 1 spray once.       Review of patient's allergies indicates:  No Known Allergies    Past Medical History:   Diagnosis Date    Anxiety disorder, unspecified     Cancer     COPD (chronic obstructive pulmonary disease)     Hypertension     Thyroid disease      Past Surgical History:   Procedure Laterality Date    APPENDECTOMY      CATARACT EXTRACTION      NEPHROSTOMY      OPEN REDUCTION AND INTERNAL FIXATION (ORIF) OF INTERTROCHANTERIC FRACTURE OF FEMUR Right 11/21/2023    Procedure: ORIF, FRACTURE, FEMUR, INTERTROCHANTERIC;  Surgeon: Tanisha Guidry DO;  Location: [facility] [place];  Service: Orthopedics;  Laterality: Right;  Gamma nail     Family History       Problem Relation (Age of Onset)    Cancer Mother, Father, Maternal Grandfather          Tobacco Use    Smoking status: Every Day     Types: Vaping with nicotine     Passive exposure: Current    Smokeless tobacco: Never   Substance and Sexual  Activity    Alcohol use: Never    Drug use: Never    Sexual activity: Not Currently     Partners: Female     Review of Systems   Constitutional:  Positive for appetite change, fatigue and unexpected weight change.   HENT: Negative.     Eyes: Negative.    Respiratory: Negative.     Cardiovascular: Negative.    Gastrointestinal:  Positive for abdominal pain and vomiting.   Endocrine: Negative.    Genitourinary: Negative.         Pain from the nephrostomy tube   Musculoskeletal: Negative.    Skin: Negative.    Allergic/Immunologic: Negative.    Neurological: Negative.    Hematological: Negative.    Psychiatric/Behavioral: Negative.       Objective:     Vital Signs (Most Recent):  Temp: 98 °F (36.7 °C) (04/03/24 1247)  Pulse: 94 (04/03/24 1547)  Resp: 18 (04/03/24 1547)  BP: 133/67 (04/03/24 1547)  SpO2: 95 % (04/03/24 1547) Vital Signs (24h Range):  Temp:  [98 °F (36.7 °C)] 98 °F (36.7 °C)  Pulse:  [] 94  Resp:  [18] 18  SpO2:  [95 %-96 %] 95 %  BP: ()/(56-68) 133/67     Weight: 63 kg (138 lb 14.2 oz)  Body mass index is 21.12 kg/m².     Physical Exam  Vitals reviewed.   Constitutional:       General: He is not in acute distress.     Appearance: He is well-developed. He is ill-appearing.      Comments: Cachectic   HENT:      Head: Normocephalic and atraumatic.   Eyes:      General: No scleral icterus.     Pupils: Pupils are equal, round, and reactive to light.   Neck:      Thyroid: No thyromegaly.      Vascular: No JVD.      Trachea: No tracheal deviation.   Cardiovascular:      Rate and Rhythm: Normal rate and regular rhythm.      Heart sounds: Normal heart sounds.   Pulmonary:      Effort: Pulmonary effort is normal.      Breath sounds: Normal breath sounds.   Abdominal:      General: Bowel sounds are normal. There is no distension (Mild).      Palpations: Abdomen is soft. There is no mass.      Tenderness: There is no abdominal tenderness. There is no guarding or rebound.   Musculoskeletal:          General: Normal range of motion.      Cervical back: Normal range of motion and neck supple.   Lymphadenopathy:      Cervical: No cervical adenopathy.   Skin:     General: Skin is warm and dry.      Comments: Bilateral nephrostomy tubes   Neurological:      Mental Status: He is alert and oriented to person, place, and time.   Psychiatric:         Thought Content: Thought content normal.         Judgment: Judgment normal.      Comments: Appears depressed            I have reviewed all pertinent lab results within the past 24 hours.  CBC:   Recent Labs   Lab 04/03/24  1307   WBC 10.86   RBC 3.77*   HGB 10.0*   HCT 32.1*      MCV 85   MCH 26.5*   MCHC 31.2*     BMP:   Recent Labs   Lab 04/03/24  1307   *      K 3.9      CO2 22*   BUN 18   CREATININE 1.4   CALCIUM 9.6     CMP:   Recent Labs   Lab 04/03/24  1307   *   CALCIUM 9.6   ALBUMIN 2.8*   PROT 7.2      K 3.9   CO2 22*      BUN 18   CREATININE 1.4   ALKPHOS 121   ALT 8*   AST 15   BILITOT 0.5       Significant Diagnostics:  I have reviewed all pertinent imaging results/findings within the past 24 hours.  CT: I have reviewed all pertinent results/findings within the past 24 hours and my personal findings are:  CT urogram with Findings concerning for a bowel obstruction   Abdominal films showing dilated loops of bowel    Reading Physician Reading Date Result Priority   Yayo Tobias MD (Timothy)  [phone] 4/3/2024      Narrative & Impression  EXAM: CT UROGRAM ABD PELVIS W WO     CLINICAL HISTORY: Renal mass, bladder cancer     FINDINGS:  Pre and postcontrast images were obtained.     Comparison CT abdomen pelvis 02/03/2024.     Discoid atelectasis at the right base.  No suspicious lung nodules.     No liver masses.  The spleen is unremarkable.  Pancreas is unremarkable gallbladder is unremarkable.     Adrenal glands appear normal.     There is atherosclerosis of the abdominal aorta no significant aneurysm.  There  are several prominent left retroperitoneal lymph nodes measure up to 1.5 cm concerning for lymph node metastasis.     There are bilateral nephrostomy tubes.  There are small renal cysts bilaterally.  No renal masses.  Small atrophic left kidney.  No evidence of hydronephrosis.           Extensive abnormality of the bladder with focal calcifications along the left posterior lateral bladder wall at the base.  There is diffuse bladder wall thickening and abnormal enhancement.  This extends to the pelvic sidewall bilaterally.  Bladder mass is noted posteriorly which appears to extend and involve the rectum with extension into the presacral space.  There is abnormal thickening of the presacral fat which has increased since previous exam measuring up to 2 cm in thickness.  There is extension into the sacrum on the left image 143 of series 6.  Additionally, there is interval development of small bowel obstruction which appears be related local extension to involve a small bowel loop in the pelvis.  Small bowel loops are distended up to 4 cm.     No distal bone metastasis.        Impression:   Interval worsening of locally invasive bladder cancer extension to the pelvic sidewall and the presacral fat and sacrum on the left.  There is involvement of the rectum as well as well as involvement of a small bowel loop in the pelvis causing a small bowel obstruction.     There is left retroperitoneal lymphadenopathy.     Finalized on: 4/3/2024 11:33 AM By:  Rahul Tobias MD  [facility]# [id number]      2024-04-03 11:35:[id number]    [facility]    EXAMINATION:  XR ABDOMEN FLAT AND ERECT     CLINICAL HISTORY:  Unspecified intestinal obstruction, unspecified as to partial versus complete obstruction     TECHNIQUE:  Routine exam.     COMPARISON:  03/11/2024     FINDINGS:  Increasing air-filled loops of small bowel throughout the mid upper abdomen.  Maximum diameter approximately 4 cm.Large amount stool right colon     No other changes.  Again noted are  bilateral nephrostomy catheters.     Impression:     Increasing small bowel distension mid upper abdomen indicating partial small bowel obstruction.    Assessment/Plan:     Partial intestinal obstruction  On imaging studies the patient appears to have at least a partial small bowel obstruction likely related to the small bowel adherent to the bladder cancer/rectal area.    The options would include 1.  If this is indeed a partial obstruction in call liquids can be tolerated then no intervention would be required if the patient was going to pursue hospice care    Two.  Placement of a gastrostomy tube but this might proved to be challenging through minimally invasive techniques as there appears to be loops of small bowel overlying the stomach on his CT scan    Three.  Exploratory laparotomy with either resection of the small bowel with planned anastomosis or bypass of the small bowel to itself or to the colon to relieve the obstruction.  This carries the risk of a laparotomy including a wound infection, wound hernia, anastomotic leak or stricture    Acute deep vein thrombosis (DVT) of femoral vein of left lower extremity  Patient has been anticoagulated with Eliquis with improvement of the edema.      Hold anticoagulation or use a short-acting agent like IV heparin or subcutaneous Lovenox.      If surgical intervention is to be undertaken it would be performed likely on Friday    Hypothyroidism  Management per hospital Medicine    Malignant neoplasm of urinary bladder  Worsening findings on CT scan.  There is some invasion of the rectum as well as the pelvic sidewalls.      Small-bowel involvement with the cancer is likely the cause of obstruction and may require surgical intervention to relieve the bowel obstruction    Would recommend consulting Oncology to discuss the patient's course and prognosis going forward    Nephrostomy tube displaced  Recommend urology consult and likely Interventional Radiology consult for  further management per hospital Medicine    Centrilobular emphysema  Increases the risk of patient was pulmonary complications of surgical intervention is required    Squamous cell carcinoma of right lung  Patient reports having completed radiation treatment    Anemia  Secondary to metastatic cancer.  Management per Medicine    Elevated serum creatinine  Likely secondary to partial obstruction of the ureters related to bladder cancer      VTE Risk Mitigation (From admission, onward)      None            Thank you for your consult. I will follow-up with patient. Please contact us if you have any additional questions.    Silvestre Ramos MD  General Surgery  [facility] - Emergency Dept.

## 2024-04-03 NOTE — ASSESSMENT & PLAN NOTE
Known hx of bladder cancer, CT abd/pel: Interval worsening of locally invasive bladder cancer extension to the pelvic sidewall and the presacral fat and sacrum on the left. There is involvement of the rectum as well as well as involvement of a small bowel loop in the pelvis causing a small bowel obstruction.     --Consult urology and oncology for recommendations

## 2024-04-03 NOTE — SUBJECTIVE & OBJECTIVE
Current Facility-Administered Medications on File Prior to Encounter   Medication    [COMPLETED] iohexoL (OMNIPAQUE 350) injection 100 mL     Current Outpatient Medications on File Prior to Encounter   Medication Sig    albuterol (PROVENTIL/VENTOLIN HFA) 90 mcg/actuation inhaler Inhale 1-2 puffs into the lungs every 4 (four) hours as needed for Wheezing. Rescue    ALPRAZolam (XANAX) 1 MG tablet Take 1 tablet (1 mg total) by mouth 3 (three) times daily as needed for Anxiety.    cefdinir (OMNICEF) 300 MG capsule Take 1 capsule (300 mg total) by mouth 2 (two) times daily. for 10 days    cyclobenzaprine (FLEXERIL) 10 MG tablet Take 1 tablet (10 mg total) by mouth 3 (three) times daily as needed for Muscle spasms.    docusate sodium (COLACE) 100 MG capsule Take 100 mg by mouth 2 (two) times daily.    EScitalopram oxalate (LEXAPRO) 20 MG tablet Take 1 tablet (20 mg total) by mouth once daily.    fluticasone furoate-vilanteroL (BREO ELLIPTA) 100-25 mcg/dose diskus inhaler Inhale 1 puff into the lungs once daily. Controller    HYDROcodone-acetaminophen (NORCO)  mg per tablet Take 1 tablet by mouth every 6 (six) hours as needed for Pain (Max 4 doses/day. For Pain flare in between Xtampza doses. Take 1-2 hours before or after Xtampza).    levothyroxine (SYNTHROID) 125 MCG tablet Take 1 tablet (125 mcg total) by mouth before breakfast.    nicotine (NICODERM CQ) 14 mg/24 hr Place 1 patch onto the skin every 24 hours.    oxyCODONE myristate (XTAMPZA ER) 9 mg CSpT Take 9 mg by mouth every 12 (twelve) hours. for 14 days    pregabalin (LYRICA) 75 MG capsule Take 1 capsule (75 mg total) by mouth every evening.    vitamin D (VITAMIN D3) 1000 units Tab Take 25 mcg by mouth once daily.    albuterol (ACCUNEB) 0.63 mg/3 mL Nebu Take 3 mLs (0.63 mg total) by nebulization 3 (three) times daily as needed (sob, wheezing). Rescue    apixaban (ELIQUIS) 5 mg Tab Take 10mg po BID x 7 days then 5mg po daily    apixaban (ELIQUIS) 5 mg Tab  Take 1 tablet (5 mg total) by mouth 2 (two) times daily.    naloxone (NARCAN) 4 mg/actuation Spry 1 spray once.       Review of patient's allergies indicates:  No Known Allergies    Past Medical History:   Diagnosis Date    Anxiety disorder, unspecified     Cancer     COPD (chronic obstructive pulmonary disease)     Hypertension     Thyroid disease      Past Surgical History:   Procedure Laterality Date    APPENDECTOMY      CATARACT EXTRACTION      NEPHROSTOMY      OPEN REDUCTION AND INTERNAL FIXATION (ORIF) OF INTERTROCHANTERIC FRACTURE OF FEMUR Right 11/21/2023    Procedure: ORIF, FRACTURE, FEMUR, INTERTROCHANTERIC;  Surgeon: Tanisha Guidry DO;  Location: [facility];  Service: Orthopedics;  Laterality: Right;  Gamma nail     Family History       Problem Relation (Age of Onset)    Cancer Mother, Father, Maternal Grandfather          Tobacco Use    Smoking status: Every Day     Types: Vaping with nicotine     Passive exposure: Current    Smokeless tobacco: Never   Substance and Sexual Activity    Alcohol use: Never    Drug use: Never    Sexual activity: Not Currently     Partners: Female     Review of Systems   Constitutional:  Positive for appetite change, fatigue and unexpected weight change.   HENT: Negative.     Eyes: Negative.    Respiratory: Negative.     Cardiovascular: Negative.    Gastrointestinal:  Positive for abdominal pain and vomiting.   Endocrine: Negative.    Genitourinary: Negative.         Pain from the nephrostomy tube   Musculoskeletal: Negative.    Skin: Negative.    Allergic/Immunologic: Negative.    Neurological: Negative.    Hematological: Negative.    Psychiatric/Behavioral: Negative.       Objective:     Vital Signs (Most Recent):  Temp: 98 °F (36.7 °C) (04/03/24 1247)  Pulse: 94 (04/03/24 1547)  Resp: 18 (04/03/24 1547)  BP: 133/67 (04/03/24 1547)  SpO2: 95 % (04/03/24 1547) Vital Signs (24h Range):  Temp:  [98 °F (36.7 °C)] 98 °F (36.7 °C)  Pulse:  [] 94  Resp:  [18] 18  SpO2:   [95 %-96 %] 95 %  BP: ()/(56-68) 133/67     Weight: 63 kg (138 lb 14.2 oz)  Body mass index is 21.12 kg/m².     Physical Exam  Vitals reviewed.   Constitutional:       General: He is not in acute distress.     Appearance: He is well-developed. He is ill-appearing.      Comments: Cachectic   HENT:      Head: Normocephalic and atraumatic.   Eyes:      General: No scleral icterus.     Pupils: Pupils are equal, round, and reactive to light.   Neck:      Thyroid: No thyromegaly.      Vascular: No JVD.      Trachea: No tracheal deviation.   Cardiovascular:      Rate and Rhythm: Normal rate and regular rhythm.      Heart sounds: Normal heart sounds.   Pulmonary:      Effort: Pulmonary effort is normal.      Breath sounds: Normal breath sounds.   Abdominal:      General: Bowel sounds are normal. There is no distension (Mild).      Palpations: Abdomen is soft. There is no mass.      Tenderness: There is no abdominal tenderness. There is no guarding or rebound.   Musculoskeletal:         General: Normal range of motion.      Cervical back: Normal range of motion and neck supple.   Lymphadenopathy:      Cervical: No cervical adenopathy.   Skin:     General: Skin is warm and dry.      Comments: Bilateral nephrostomy tubes   Neurological:      Mental Status: He is alert and oriented to person, place, and time.   Psychiatric:         Thought Content: Thought content normal.         Judgment: Judgment normal.      Comments: Appears depressed            I have reviewed all pertinent lab results within the past 24 hours.  CBC:   Recent Labs   Lab 04/03/24  1307   WBC 10.86   RBC 3.77*   HGB 10.0*   HCT 32.1*      MCV 85   MCH 26.5*   MCHC 31.2*     BMP:   Recent Labs   Lab 04/03/24  1307   *      K 3.9      CO2 22*   BUN 18   CREATININE 1.4   CALCIUM 9.6     CMP:   Recent Labs   Lab 04/03/24  1307   *   CALCIUM 9.6   ALBUMIN 2.8*   PROT 7.2      K 3.9   CO2 22*      BUN 18    CREATININE 1.4   ALKPHOS 121   ALT 8*   AST 15   BILITOT 0.5       Significant Diagnostics:  I have reviewed all pertinent imaging results/findings within the past 24 hours.  CT: I have reviewed all pertinent results/findings within the past 24 hours and my personal findings are:  CT urogram with Findings concerning for a bowel obstruction   Abdominal films showing dilated loops of bowel    Reading Physician Reading Date Result Priority   Yayo Tobias (SaulMD la  [phone] 4/3/2024      Narrative & Impression  EXAM: CT UROGRAM ABD PELVIS W WO     CLINICAL HISTORY: Renal mass, bladder cancer     FINDINGS:  Pre and postcontrast images were obtained.     Comparison CT abdomen pelvis 02/03/2024.     Discoid atelectasis at the right base.  No suspicious lung nodules.     No liver masses.  The spleen is unremarkable.  Pancreas is unremarkable gallbladder is unremarkable.     Adrenal glands appear normal.     There is atherosclerosis of the abdominal aorta no significant aneurysm.  There are several prominent left retroperitoneal lymph nodes measure up to 1.5 cm concerning for lymph node metastasis.     There are bilateral nephrostomy tubes.  There are small renal cysts bilaterally.  No renal masses.  Small atrophic left kidney.  No evidence of hydronephrosis.           Extensive abnormality of the bladder with focal calcifications along the left posterior lateral bladder wall at the base.  There is diffuse bladder wall thickening and abnormal enhancement.  This extends to the pelvic sidewall bilaterally.  Bladder mass is noted posteriorly which appears to extend and involve the rectum with extension into the presacral space.  There is abnormal thickening of the presacral fat which has increased since previous exam measuring up to 2 cm in thickness.  There is extension into the sacrum on the left image 143 of series 6.  Additionally, there is interval development of small bowel obstruction which appears be  related local extension to involve a small bowel loop in the pelvis.  Small bowel loops are distended up to 4 cm.     No distal bone metastasis.        Impression:   Interval worsening of locally invasive bladder cancer extension to the pelvic sidewall and the presacral fat and sacrum on the left.  There is involvement of the rectum as well as well as involvement of a small bowel loop in the pelvis causing a small bowel obstruction.     There is left retroperitoneal lymphadenopathy.     Finalized on: 4/3/2024 11:33 AM By:  Rahul Tobias MD  [facility]# [id number]      2024-04-03 11:35:40.195    [facility]    EXAMINATION:  XR ABDOMEN FLAT AND ERECT     CLINICAL HISTORY:  Unspecified intestinal obstruction, unspecified as to partial versus complete obstruction     TECHNIQUE:  Routine exam.     COMPARISON:  03/11/2024     FINDINGS:  Increasing air-filled loops of small bowel throughout the mid upper abdomen.  Maximum diameter approximately 4 cm.Large amount stool right colon     No other changes.  Again noted are bilateral nephrostomy catheters.     Impression:     Increasing small bowel distension mid upper abdomen indicating partial small bowel obstruction.

## 2024-04-03 NOTE — ASSESSMENT & PLAN NOTE
Patient has been anticoagulated with Eliquis with improvement of the edema.      Hold anticoagulation or use a short-acting agent like IV heparin or subcutaneous Lovenox.      If surgical intervention is to be undertaken it would be performed likely on Friday

## 2024-04-03 NOTE — NURSING
Pt and wife (fallon) brought to ED for evaluation of bowel obstruction per Dr. Hartman request. Per dr. Sr exchange of nephrostomy tube will be deferred until after evaluation of bowel obstruction. Sent dr. Hartman a secure chat to inform him of plan.

## 2024-04-04 LAB
ABO + RH BLD: NORMAL
ALBUMIN SERPL BCP-MCNC: 2.7 G/DL (ref 3.5–5.2)
ALP SERPL-CCNC: 121 U/L (ref 55–135)
ALT SERPL W/O P-5'-P-CCNC: 8 U/L (ref 10–44)
ANION GAP SERPL CALC-SCNC: 12 MMOL/L (ref 8–16)
APTT PPP: 35 SEC (ref 21–32)
APTT PPP: 40.6 SEC (ref 21–32)
APTT PPP: 42.1 SEC (ref 21–32)
APTT PPP: 45.9 SEC (ref 21–32)
AST SERPL-CCNC: 12 U/L (ref 10–40)
BASOPHILS # BLD AUTO: 0.03 K/UL (ref 0–0.2)
BASOPHILS # BLD AUTO: 0.03 K/UL (ref 0–0.2)
BASOPHILS NFR BLD: 0.4 % (ref 0–1.9)
BASOPHILS NFR BLD: 0.4 % (ref 0–1.9)
BILIRUB SERPL-MCNC: 0.4 MG/DL (ref 0.1–1)
BLD GP AB SCN CELLS X3 SERPL QL: NORMAL
BUN SERPL-MCNC: 15 MG/DL (ref 8–23)
CALCIUM SERPL-MCNC: 9.3 MG/DL (ref 8.7–10.5)
CHLORIDE SERPL-SCNC: 106 MMOL/L (ref 95–110)
CO2 SERPL-SCNC: 21 MMOL/L (ref 23–29)
CREAT SERPL-MCNC: 1.1 MG/DL (ref 0.5–1.4)
DIFFERENTIAL METHOD BLD: ABNORMAL
DIFFERENTIAL METHOD BLD: ABNORMAL
EOSINOPHIL # BLD AUTO: 0.1 K/UL (ref 0–0.5)
EOSINOPHIL # BLD AUTO: 0.1 K/UL (ref 0–0.5)
EOSINOPHIL NFR BLD: 0.6 % (ref 0–8)
EOSINOPHIL NFR BLD: 0.6 % (ref 0–8)
ERYTHROCYTE [DISTWIDTH] IN BLOOD BY AUTOMATED COUNT: 16.3 % (ref 11.5–14.5)
ERYTHROCYTE [DISTWIDTH] IN BLOOD BY AUTOMATED COUNT: 16.3 % (ref 11.5–14.5)
EST. GFR  (NO RACE VARIABLE): >60 ML/MIN/1.73 M^2
GLUCOSE SERPL-MCNC: 101 MG/DL (ref 70–110)
HCT VFR BLD AUTO: 32.7 % (ref 40–54)
HCT VFR BLD AUTO: 32.7 % (ref 40–54)
HGB BLD-MCNC: 10.2 G/DL (ref 14–18)
HGB BLD-MCNC: 10.2 G/DL (ref 14–18)
IMM GRANULOCYTES # BLD AUTO: 0.02 K/UL (ref 0–0.04)
IMM GRANULOCYTES # BLD AUTO: 0.02 K/UL (ref 0–0.04)
IMM GRANULOCYTES NFR BLD AUTO: 0.2 % (ref 0–0.5)
IMM GRANULOCYTES NFR BLD AUTO: 0.2 % (ref 0–0.5)
LYMPHOCYTES # BLD AUTO: 0.9 K/UL (ref 1–4.8)
LYMPHOCYTES # BLD AUTO: 0.9 K/UL (ref 1–4.8)
LYMPHOCYTES NFR BLD: 11.6 % (ref 18–48)
LYMPHOCYTES NFR BLD: 11.6 % (ref 18–48)
MAGNESIUM SERPL-MCNC: 1.7 MG/DL (ref 1.6–2.6)
MCH RBC QN AUTO: 26.4 PG (ref 27–31)
MCH RBC QN AUTO: 26.4 PG (ref 27–31)
MCHC RBC AUTO-ENTMCNC: 31.2 G/DL (ref 32–36)
MCHC RBC AUTO-ENTMCNC: 31.2 G/DL (ref 32–36)
MCV RBC AUTO: 85 FL (ref 82–98)
MCV RBC AUTO: 85 FL (ref 82–98)
MONOCYTES # BLD AUTO: 0.5 K/UL (ref 0.3–1)
MONOCYTES # BLD AUTO: 0.5 K/UL (ref 0.3–1)
MONOCYTES NFR BLD: 6.6 % (ref 4–15)
MONOCYTES NFR BLD: 6.6 % (ref 4–15)
NEUTROPHILS # BLD AUTO: 6.5 K/UL (ref 1.8–7.7)
NEUTROPHILS # BLD AUTO: 6.5 K/UL (ref 1.8–7.7)
NEUTROPHILS NFR BLD: 80.6 % (ref 38–73)
NEUTROPHILS NFR BLD: 80.6 % (ref 38–73)
NRBC BLD-RTO: 0 /100 WBC
NRBC BLD-RTO: 0 /100 WBC
PHOSPHATE SERPL-MCNC: 3.5 MG/DL (ref 2.7–4.5)
PLATELET # BLD AUTO: 362 K/UL (ref 150–450)
PLATELET # BLD AUTO: 362 K/UL (ref 150–450)
PMV BLD AUTO: 9.8 FL (ref 9.2–12.9)
PMV BLD AUTO: 9.8 FL (ref 9.2–12.9)
POTASSIUM SERPL-SCNC: 3.5 MMOL/L (ref 3.5–5.1)
PROT SERPL-MCNC: 6.2 G/DL (ref 6–8.4)
RBC # BLD AUTO: 3.87 M/UL (ref 4.6–6.2)
RBC # BLD AUTO: 3.87 M/UL (ref 4.6–6.2)
SODIUM SERPL-SCNC: 139 MMOL/L (ref 136–145)
SPECIMEN OUTDATE: NORMAL
WBC # BLD AUTO: 8.02 K/UL (ref 3.9–12.7)
WBC # BLD AUTO: 8.02 K/UL (ref 3.9–12.7)

## 2024-04-04 PROCEDURE — 63600175 PHARM REV CODE 636 W HCPCS: Performed by: HOSPITALIST

## 2024-04-04 PROCEDURE — 94761 N-INVAS EAR/PLS OXIMETRY MLT: CPT

## 2024-04-04 PROCEDURE — 36415 COLL VENOUS BLD VENIPUNCTURE: CPT | Mod: XB | Performed by: STUDENT IN AN ORGANIZED HEALTH CARE EDUCATION/TRAINING PROGRAM

## 2024-04-04 PROCEDURE — 25500020 PHARM REV CODE 255: Performed by: STUDENT IN AN ORGANIZED HEALTH CARE EDUCATION/TRAINING PROGRAM

## 2024-04-04 PROCEDURE — 25000242 PHARM REV CODE 250 ALT 637 W/ HCPCS: Performed by: STUDENT IN AN ORGANIZED HEALTH CARE EDUCATION/TRAINING PROGRAM

## 2024-04-04 PROCEDURE — 83735 ASSAY OF MAGNESIUM: CPT | Performed by: NURSE PRACTITIONER

## 2024-04-04 PROCEDURE — 36415 COLL VENOUS BLD VENIPUNCTURE: CPT | Performed by: INTERNAL MEDICINE

## 2024-04-04 PROCEDURE — 84100 ASSAY OF PHOSPHORUS: CPT | Performed by: NURSE PRACTITIONER

## 2024-04-04 PROCEDURE — 85730 THROMBOPLASTIN TIME PARTIAL: CPT | Mod: 91 | Performed by: STUDENT IN AN ORGANIZED HEALTH CARE EDUCATION/TRAINING PROGRAM

## 2024-04-04 PROCEDURE — 94640 AIRWAY INHALATION TREATMENT: CPT

## 2024-04-04 PROCEDURE — 99223 1ST HOSP IP/OBS HIGH 75: CPT | Mod: ,,, | Performed by: INTERNAL MEDICINE

## 2024-04-04 PROCEDURE — 63600175 PHARM REV CODE 636 W HCPCS: Performed by: NURSE PRACTITIONER

## 2024-04-04 PROCEDURE — 21400001 HC TELEMETRY ROOM

## 2024-04-04 PROCEDURE — 63600175 PHARM REV CODE 636 W HCPCS: Performed by: SURGERY

## 2024-04-04 PROCEDURE — 99233 SBSQ HOSP IP/OBS HIGH 50: CPT | Mod: 57,,, | Performed by: SURGERY

## 2024-04-04 PROCEDURE — 80053 COMPREHEN METABOLIC PANEL: CPT | Performed by: NURSE PRACTITIONER

## 2024-04-04 PROCEDURE — 25000003 PHARM REV CODE 250: Performed by: NURSE PRACTITIONER

## 2024-04-04 PROCEDURE — 36415 COLL VENOUS BLD VENIPUNCTURE: CPT | Mod: XB | Performed by: SURGERY

## 2024-04-04 PROCEDURE — 86850 RBC ANTIBODY SCREEN: CPT | Performed by: SURGERY

## 2024-04-04 PROCEDURE — 85730 THROMBOPLASTIN TIME PARTIAL: CPT | Performed by: INTERNAL MEDICINE

## 2024-04-04 PROCEDURE — 85025 COMPLETE CBC W/AUTO DIFF WBC: CPT | Performed by: NURSE PRACTITIONER

## 2024-04-04 RX ORDER — LEVALBUTEROL INHALATION SOLUTION 0.63 MG/3ML
0.63 SOLUTION RESPIRATORY (INHALATION) EVERY 8 HOURS PRN
Status: DISCONTINUED | OUTPATIENT
Start: 2024-04-04 | End: 2024-04-12 | Stop reason: HOSPADM

## 2024-04-04 RX ORDER — LORAZEPAM 2 MG/ML
1 INJECTION INTRAMUSCULAR ONCE
Status: COMPLETED | OUTPATIENT
Start: 2024-04-04 | End: 2024-04-04

## 2024-04-04 RX ORDER — HEPARIN SODIUM,PORCINE/D5W 25000/250
0-40 INTRAVENOUS SOLUTION INTRAVENOUS CONTINUOUS
Status: DISPENSED | OUTPATIENT
Start: 2024-04-04 | End: 2024-04-05

## 2024-04-04 RX ORDER — DEXTROSE MONOHYDRATE, SODIUM CHLORIDE, AND POTASSIUM CHLORIDE 50; 1.49; 4.5 G/1000ML; G/1000ML; G/1000ML
INJECTION, SOLUTION INTRAVENOUS CONTINUOUS
Status: DISCONTINUED | OUTPATIENT
Start: 2024-04-04 | End: 2024-04-07

## 2024-04-04 RX ADMIN — LORAZEPAM 1 MG: 2 INJECTION INTRAMUSCULAR; INTRAVENOUS at 10:04

## 2024-04-04 RX ADMIN — TAMSULOSIN HYDROCHLORIDE 0.4 MG: 0.4 CAPSULE ORAL at 08:04

## 2024-04-04 RX ADMIN — LEVALBUTEROL HYDROCHLORIDE 0.63 MG: 0.63 SOLUTION RESPIRATORY (INHALATION) at 04:04

## 2024-04-04 RX ADMIN — ALPRAZOLAM 1 MG: 1 TABLET ORAL at 01:04

## 2024-04-04 RX ADMIN — CEFTRIAXONE 1 G: 1 INJECTION, POWDER, FOR SOLUTION INTRAMUSCULAR; INTRAVENOUS at 08:04

## 2024-04-04 RX ADMIN — DEXTROSE, SODIUM CHLORIDE, AND POTASSIUM CHLORIDE: 5; .45; .15 INJECTION INTRAVENOUS at 11:04

## 2024-04-04 RX ADMIN — DIATRIZOATE MEGLUMINE AND DIATRIZOATE SODIUM 240 ML: 660; 100 LIQUID ORAL; RECTAL at 02:04

## 2024-04-04 RX ADMIN — MORPHINE SULFATE 2 MG: 4 INJECTION INTRAVENOUS at 03:04

## 2024-04-04 RX ADMIN — ESCITALOPRAM OXALATE 20 MG: 10 TABLET ORAL at 08:04

## 2024-04-04 RX ADMIN — LEVOTHYROXINE SODIUM 125 MCG: 125 TABLET ORAL at 08:04

## 2024-04-04 NOTE — ASSESSMENT & PLAN NOTE
Patient currently on a heparin drip.  This would need to be stopped 6 hours before any procedures or surgical intervention.

## 2024-04-04 NOTE — CONSULTS
Reason for consultation: Bladder cancer, obstructive progression on imaging    Provider requesting consultation:     Antoinette Epstein NP       History of Present Illness:   Geovani Currie is a 68 y.o. male, known to the urology service with a h/o recurrent muscle invasive bladder cancer with metastasis. He also has lung cancer. Wife reports that it was decided by oncology that he would not receive keytruda. He has complained of RLQ pain for the past 2 days and omnicef was empirically prescribed. He underwent CT scan, which I have personally reviewed, which shows interval worsening of locally invasive bladder cancer with rectal involvement and small bowel obstruction. Pt's wife reports that she just wants to tie up loose ends so that she can get him to [place] for hospice or find a better living situation for hospice here.         Review of Systems   Constitutional:  Positive for fatigue.   Gastrointestinal:  Positive for abdominal distention and abdominal pain.   Musculoskeletal:  Positive for arthralgias.   All other systems reviewed and are negative.      Past Medical History:   Diagnosis Date    Anxiety disorder, unspecified     Cancer     COPD (chronic obstructive pulmonary disease)     Hypertension     Thyroid disease        Past Surgical History:   Procedure Laterality Date    APPENDECTOMY      CATARACT EXTRACTION      NEPHROSTOMY      OPEN REDUCTION AND INTERNAL FIXATION (ORIF) OF INTERTROCHANTERIC FRACTURE OF FEMUR Right 11/21/2023    Procedure: ORIF, FRACTURE, FEMUR, INTERTROCHANTERIC;  Surgeon: Tanisha Guidry DO;  Location: [facility];  Service: Orthopedics;  Laterality: Right;  Gamma nail       Family History   Problem Relation Age of Onset    Cancer Mother         NOS    Cancer Father         NOS    Cancer Maternal Grandfather         NOS    Bladder Cancer Neg Hx        Social History     Tobacco Use    Smoking status: Every Day     Types: Vaping with nicotine     Passive exposure: Current     Smokeless tobacco: Never   Substance Use Topics    Alcohol use: Never    Drug use: Never       Current Facility-Administered Medications   Medication Dose Route Frequency Provider Last Rate Last Admin    acetaminophen tablet 650 mg  650 mg Oral Q4H PRN Antoinette Epstein NP        ALPRAZolam tablet 1 mg  1 mg Oral TID PRN Antoinette Epstein NP        aluminum-magnesium hydroxide-simethicone 200-200-20 mg/5 mL suspension 30 mL  30 mL Oral QID PRN Antoinette Epstein NP        cefTRIAXone (Rocephin) 1 g in dextrose 5 % in water (D5W) 100 mL IVPB (MB+)  1 g Intravenous Q24H Antoinette Epstein NP        dextrose 10% bolus 125 mL 125 mL  12.5 g Intravenous PRN Antoinette Epstein NP        dextrose 10% bolus 250 mL 250 mL  25 g Intravenous PRN Antoinette Epstein NP        [START ON 4/4/2024] EScitalopram oxalate tablet 20 mg  20 mg Oral Daily Antoinette Epstein NP        glucagon (human recombinant) injection 1 mg  1 mg Intramuscular PRN Antoinette Epstein NP        glucose chewable tablet 16 g  16 g Oral PRN Antoinette Epstein NP        glucose chewable tablet 24 g  24 g Oral PRN Antoinette Epstein NP        heparin 25,000 units in dextrose 5% (100 units/ml) IV bolus from bag LOW INTENSITY nomogram - [facility]  60 Units/kg Intravenous Once Antoinette Epstein NP        heparin 25,000 units in dextrose 5% (100 units/ml) IV bolus from bag LOW INTENSITY nomogram - [facility]  60 Units/kg Intravenous PRN Antoinette Epstein NP        heparin 25,000 units in dextrose 5% (100 units/ml) IV bolus from bag LOW INTENSITY nomogram - [facility]  30 Units/kg Intravenous PRN Antoinette Epstein NP        heparin 25,000 units in dextrose 5% 250 mL (100 units/mL) infusion LOW INTENSITY nomogram - [facility]  0-40 Units/kg/hr Intravenous Continuous Antoinette Epstein NP        [START ON 4/4/2024] levothyroxine tablet 125 mcg  125 mcg Oral Before breakfast Antoinette Epstein NP        melatonin tablet 6 mg  6 mg Oral Nightly PRN Antoinette Epstein NP        morphine  injection 2 mg  2 mg Intravenous Q4H PRN Antoinette Epstein NP        naloxone 0.4 mg/mL injection 0.02 mg  0.02 mg Intravenous PRN Antoinette Epstein NP        ondansetron injection 4 mg  4 mg Intravenous Q8H PRN Antoinette Epstein, NP        [START ON 4/4/2024] polyethylene glycol packet 17 g  17 g Oral Daily Antoinette Epstein NP        pregabalin capsule 75 mg  75 mg Oral QHS Antoinette Epstein NP        prochlorperazine injection Soln 5 mg  5 mg Intravenous Q6H PRN Antoinette Epstein, NP        sodium chloride 0.9% flush 10 mL  10 mL Intravenous Q12H PRN Antoinette Epstein NP        [START ON 4/4/2024] tamsulosin 24 hr capsule 0.4 mg  1 capsule Oral Daily Antoinette Epstein NP         Current Outpatient Medications   Medication Sig Dispense Refill    albuterol (PROVENTIL/VENTOLIN HFA) 90 mcg/actuation inhaler Inhale 1-2 puffs into the lungs every 4 (four) hours as needed for Wheezing. Rescue 18 g 11    ALPRAZolam (XANAX) 1 MG tablet Take 1 tablet (1 mg total) by mouth 3 (three) times daily as needed for Anxiety. 90 tablet 5    cefdinir (OMNICEF) 300 MG capsule Take 1 capsule (300 mg total) by mouth 2 (two) times daily. for 10 days 20 capsule 0    cyclobenzaprine (FLEXERIL) 10 MG tablet Take 1 tablet (10 mg total) by mouth 3 (three) times daily as needed for Muscle spasms. 270 tablet 3    EScitalopram oxalate (LEXAPRO) 20 MG tablet Take 1 tablet (20 mg total) by mouth once daily. 90 tablet 3    fluticasone furoate-vilanteroL (BREO ELLIPTA) 100-25 mcg/dose diskus inhaler Inhale 1 puff into the lungs once daily. Controller 30 each 11    HYDROcodone-acetaminophen (NORCO)  mg per tablet Take 1 tablet by mouth every 6 (six) hours as needed for Pain (Max 4 doses/day. For Pain flare in between Xtampza doses. Take 1-2 hours before or after Xtampza). 56 tablet 0    levothyroxine (SYNTHROID) 125 MCG tablet Take 1 tablet (125 mcg total) by mouth before breakfast. 90 tablet 3    nicotine (NICODERM CQ) 14 mg/24 hr Place  1 patch onto the skin every 24 hours.      oxyCODONE myristate (XTAMPZA ER) 9 mg CSpT Take 9 mg by mouth every 12 (twelve) hours. for 14 days 28 each 0    pregabalin (LYRICA) 75 MG capsule Take 1 capsule (75 mg total) by mouth every evening. 30 capsule 0    tamsulosin (FLOMAX) 0.4 mg Cap Take 1 capsule by mouth once daily.      vitamin D (VITAMIN D3) 1000 units Tab Take 25 mcg by mouth once daily.      albuterol (ACCUNEB) 0.63 mg/3 mL Nebu Take 3 mLs (0.63 mg total) by nebulization 3 (three) times daily as needed (sob, wheezing). Rescue 75 mL 3    apixaban (ELIQUIS) 5 mg Tab Take 1 tablet (5 mg total) by mouth 2 (two) times daily. 180 tablet 3    naloxone (NARCAN) 4 mg/actuation Spry 1 spray once.         Review of patient's allergies indicates:  No Known Allergies    Physical Exam  Vitals:    04/03/24 1745   BP: 116/62   Pulse: 97   Resp: 16   Temp:        General: Well-developed, well-nourished in no acute distress  HEENT: Normocephalic, atraumatic, Extraocular movements intact  Neck: supple, trachea midline, no cervical or supraclavicular lymphadenopathy  Respirations: even and unlabored  Back: midline spine, no CVA tenderness  Abdomen: distended, tender to palpation  Extremities: atraumatic, moves all equally, no clubbing, cyanosis or edema  Psych: normal affect  Skin: warm and dry, no lesions  Neuro: Alert and oriented, Cranial nerves II-XII grossly intact    Urinalysis  pH, UA   Date Value Ref Range Status   02/20/2024 7.0 5.0 - 8.0 Final     Glucose, UA   Date Value Ref Range Status   02/20/2024 Negative Negative Final     Occult Blood UA   Date Value Ref Range Status   02/20/2024 3+ (A) Negative Final     Nitrite, UA   Date Value Ref Range Status   02/20/2024 Negative Negative Final     Leukocytes, UA   Date Value Ref Range Status   02/20/2024 3+ (A) Negative Final         Assessment:     Locally advanced and metastatic bladder cancer  Small bowel obstruction    Plan:    Discussed with pt and his wife that I  cannot offer any surgical intervention for his advanced bladder cancer at this point. I would recommend hospice, which patient's wife appears open to. The alternative would be management of SBO as directed by general surgery. Would also recommend Heme/onc consultation to determine if any further palliative treatment could be offered.   Continue palliative nephrostomy tubes with change as needed.

## 2024-04-04 NOTE — PLAN OF CARE
A218/A218 IVETTE Currie is a 68 y.o.male admitted on 4/3/2024 for Partial intestinal obstruction   Code Status: Full Code MRN: [id number]   Review of patient's allergies indicates:  No Known Allergies  Past Medical History:   Diagnosis Date    Anxiety disorder, unspecified     Cancer     COPD (chronic obstructive pulmonary disease)     Hypertension     Thyroid disease       PRN meds    acetaminophen, 650 mg, Q4H PRN  ALPRAZolam, 1 mg, TID PRN  aluminum-magnesium hydroxide-simethicone, 30 mL, QID PRN  dextrose 10%, 12.5 g, PRN  dextrose 10%, 25 g, PRN  glucagon (human recombinant), 1 mg, PRN  glucose, 16 g, PRN  glucose, 24 g, PRN  heparin (PORCINE), 60 Units/kg, PRN  heparin (PORCINE), 30 Units/kg, PRN  levalbuterol, 0.63 mg, Q8H PRN  melatonin, 6 mg, Nightly PRN  morphine, 2 mg, Q4H PRN  naloxone, 0.02 mg, PRN  ondansetron, 4 mg, Q8H PRN  prochlorperazine, 5 mg, Q6H PRN  sodium chloride 0.9%, 10 mL, Q12H PRN      Chart check completed. Will continue plan of care.      Orientation: oriented x 4  Galindo Coma Scale Score: 15     Lead Monitored: Lead II Rhythm: sinus tachycardia Frequency/Ectopy: frequent, PACs, PVCs, greater than 6/min  Cardiac/Telemetry Box Number: [id number]  VTE Required Core Measure: Pharmacological prophylaxis initiated/maintained Last Bowel Movement: 04/02/24  Diet NPO  Voiding Characteristics: (S)  (Pt. has Bilat. Nephrostomy tubes in place)  Juanjo Score: 18  Fall Risk Score: 14  Accucheck []   Freq?      Lines/Drains/Airways       Drain  Duration                  Nephrostomy Left -- days         Nephrostomy 02/03/24 1044 Left 8 Fr. 61 days         Nephrostomy 02/03/24 1045 Right 10 Fr. 61 days              Peripheral Intravenous Line  Duration                  Peripheral IV - Single Lumen 04/04/24 1129 22 G Left;Posterior Forearm <1 day         Peripheral IV - Single Lumen 04/04/24 1134 22 G Posterior;Right Forearm <1 day

## 2024-04-04 NOTE — SUBJECTIVE & OBJECTIVE
Interval History:  Patient does not feel distended he is to undergo a small-bowel follow-through today    Medications:  Continuous Infusions:   heparin (porcine) in D5W 12 Units/kg/hr (04/03/24 2159)     Scheduled Meds:   cefTRIAXone (Rocephin) IV (PEDS and ADULTS)  1 g Intravenous Q24H    EScitalopram oxalate  20 mg Oral Daily    heparin (PORCINE)  60 Units/kg Intravenous Once    levothyroxine  125 mcg Oral Before breakfast    polyethylene glycol  17 g Oral Daily    pregabalin  75 mg Oral QHS    tamsulosin  1 capsule Oral Daily     PRN Meds:acetaminophen, ALPRAZolam, aluminum-magnesium hydroxide-simethicone, dextrose 10%, dextrose 10%, glucagon (human recombinant), glucose, glucose, heparin (PORCINE), heparin (PORCINE), melatonin, morphine, naloxone, ondansetron, prochlorperazine, sodium chloride 0.9%     Review of patient's allergies indicates:  No Known Allergies  Objective:     Vital Signs (Most Recent):  Temp: 98.2 °F (36.8 °C) (04/04/24 0743)  Pulse: (!) 115 (04/04/24 0743)  Resp: 16 (04/04/24 0743)  BP: 129/80 (04/04/24 0743)  SpO2: 96 % (04/04/24 0743) Vital Signs (24h Range):  Temp:  [98 °F (36.7 °C)-99.8 °F (37.7 °C)] 98.2 °F (36.8 °C)  Pulse:  [] 115  Resp:  [16-20] 16  SpO2:  [94 %-97 %] 96 %  BP: ()/(56-80) 129/80     Weight: 63 kg (138 lb 14.2 oz)  Body mass index is 21.12 kg/m².    Intake/Output - Last 3 Shifts         04/02 0700  04/03 0659 04/03 0700 04/04 0659 04/04 0700 04/05 0659    Urine (mL/kg/hr)   450 (4.1)    Total Output   450    Net   -450                    Physical Exam  Vitals and nursing note reviewed.   Constitutional:       General: He is not in acute distress.     Appearance: He is well-developed.   HENT:      Head: Normocephalic and atraumatic.   Eyes:      General: No scleral icterus.     Pupils: Pupils are equal, round, and reactive to light.   Neck:      Thyroid: No thyromegaly.      Vascular: No JVD.      Trachea: No tracheal deviation.   Cardiovascular:      Rate  and Rhythm: Normal rate and regular rhythm.      Heart sounds: Normal heart sounds.   Pulmonary:      Effort: Pulmonary effort is normal.      Breath sounds: Normal breath sounds.   Abdominal:      General: Abdomen is flat. Bowel sounds are normal. There is no distension.      Palpations: Abdomen is soft. There is no mass.      Tenderness: There is no abdominal tenderness. There is no guarding or rebound.   Musculoskeletal:         General: Normal range of motion.      Cervical back: Normal range of motion and neck supple.   Lymphadenopathy:      Cervical: No cervical adenopathy.   Skin:     General: Skin is warm and dry.   Neurological:      Mental Status: He is alert and oriented to person, place, and time.   Psychiatric:         Mood and Affect: Mood normal.         Behavior: Behavior normal.         Thought Content: Thought content normal.         Judgment: Judgment normal.          Significant Labs:  I have reviewed all pertinent lab results within the past 24 hours.  CBC:   Recent Labs   Lab 04/04/24 0426   WBC 8.02  8.02   RBC 3.87*  3.87*   HGB 10.2*  10.2*   HCT 32.7*  32.7*     362   MCV 85  85   MCH 26.4*  26.4*   MCHC 31.2*  31.2*     CMP:   Recent Labs   Lab 04/04/24 0426      CALCIUM 9.3   ALBUMIN 2.7*   PROT 6.2      K 3.5   CO2 21*      BUN 15   CREATININE 1.1   ALKPHOS 121   ALT 8*   AST 12   BILITOT 0.4       Significant Diagnostics:  I have reviewed all pertinent imaging results/findings within the past 24 hours.  Small-bowel follow-through today

## 2024-04-04 NOTE — CONSULTS
[facility] ([facility])  Hematology/Oncology  Consult Note    Patient Name: Geovani Currie  MRN: [id number]  Admission Date: 4/3/2024  Hospital Length of Stay: 1 days  Code Status: Full Code   Attending Provider: Yuval Orozoc MD  Consulting Provider: Paul Pool MD  Primary Care Physician: Regi, Primary Doctor  Principal Problem:Partial intestinal obstruction    Consults  Subjective:     HPI:  68-year-old male admitted to the hospital with abdominal pain in small-bowel obstruction.  The patient has been seen by surgery patient has also been seen by Dr. Demetrius Frazier  Oncology.  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  I was asked to see the patient for further evaluation ECOG status 2    Oncology Treatment Plan:   [No matching plan found]    Medications:  Continuous Infusions:   heparin (porcine) in D5W 12 Units/kg/hr (04/03/24 3909)     Scheduled Meds:   cefTRIAXone (Rocephin) IV (PEDS and ADULTS)  1 g Intravenous Q24H    EScitalopram oxalate  20 mg Oral Daily    heparin (PORCINE)  60 Units/kg Intravenous Once    levothyroxine  125 mcg Oral Before breakfast    polyethylene glycol  17 g Oral Daily    pregabalin  75 mg Oral QHS    tamsulosin  1 capsule Oral Daily     PRN Meds:acetaminophen, ALPRAZolam, aluminum-magnesium hydroxide-simethicone, dextrose 10%, dextrose 10%, glucagon (human recombinant), glucose, glucose, heparin (PORCINE), heparin (PORCINE), melatonin, morphine, naloxone, ondansetron, prochlorperazine, sodium chloride 0.9%     Review of patient's allergies indicates:  No Known Allergies     Past Medical History:   Diagnosis Date    Anxiety disorder, unspecified     Cancer     COPD (chronic obstructive pulmonary disease)     Hypertension     Thyroid disease      Past Surgical History:   Procedure Laterality Date    APPENDECTOMY      CATARACT EXTRACTION      NEPHROSTOMY      OPEN  REDUCTION AND INTERNAL FIXATION (ORIF) OF INTERTROCHANTERIC FRACTURE OF FEMUR Right 11/21/2023    Procedure: ORIF, FRACTURE, FEMUR, INTERTROCHANTERIC;  Surgeon: Tanisha Guidry DO;  Location: [facility] OR;  Service: Orthopedics;  Laterality: Right;  Gamma nail     Family History       Problem Relation (Age of Onset)    Cancer Mother, Father, Maternal Grandfather          Tobacco Use    Smoking status: Every Day     Types: Vaping with nicotine     Passive exposure: Current    Smokeless tobacco: Never   Substance and Sexual Activity    Alcohol use: Never    Drug use: Never    Sexual activity: Not Currently     Partners: Female       Review of Systems   Constitutional:  Positive for activity change, appetite change, fatigue and unexpected weight change. Negative for chills, diaphoresis and fever.   HENT:  Negative for congestion, dental problem, drooling, ear discharge, ear pain, facial swelling, hearing loss, mouth sores, nosebleeds, postnasal drip, rhinorrhea, sinus pressure, sneezing, sore throat, tinnitus, trouble swallowing and voice change.    Eyes:  Negative for photophobia, pain, discharge, redness, itching and visual disturbance.   Respiratory:  Negative for apnea, cough, choking, chest tightness, shortness of breath, wheezing and stridor.    Cardiovascular:  Negative for chest pain, palpitations and leg swelling.   Gastrointestinal:  Positive for abdominal pain. Negative for abdominal distention, anal bleeding, blood in stool, constipation, diarrhea, nausea, rectal pain and vomiting.   Endocrine: Negative for cold intolerance, heat intolerance, polydipsia, polyphagia and polyuria.   Genitourinary:  Negative for decreased urine volume, difficulty urinating, dysuria, enuresis, flank pain, frequency, genital sores, hematuria, penile discharge, penile pain, penile swelling, scrotal swelling, testicular pain and urgency.   Musculoskeletal:  Negative for arthralgias, back pain, gait problem, joint swelling,  myalgias, neck pain and neck stiffness.   Skin:  Negative for color change, pallor, rash and wound.   Allergic/Immunologic: Negative for environmental allergies, food allergies and immunocompromised state.   Neurological:  Positive for weakness. Negative for dizziness, tremors, seizures, syncope, facial asymmetry, speech difficulty, light-headedness, numbness and headaches.   Hematological:  Negative for adenopathy. Does not bruise/bleed easily.   Psychiatric/Behavioral:  Positive for dysphoric mood. Negative for agitation, behavioral problems, confusion, decreased concentration, hallucinations, self-injury, sleep disturbance and suicidal ideas. The patient is nervous/anxious. The patient is not hyperactive.      Objective:     Vital Signs (Most Recent):  Temp: 98.7 °F (37.1 °C) (04/04/24 0428)  Pulse: 81 (04/04/24 0428)  Resp: 18 (04/04/24 0428)  BP: 123/73 (04/04/24 0428)  SpO2: (!) 94 % (04/04/24 0428) Vital Signs (24h Range):  Temp:  [98 °F (36.7 °C)-99.8 °F (37.7 °C)] 98.7 °F (37.1 °C)  Pulse:  [] 81  Resp:  [16-20] 18  SpO2:  [94 %-97 %] 94 %  BP: ()/(56-75) 123/73     Weight: 63 kg (138 lb 14.2 oz)  Body mass index is 21.12 kg/m².  Body surface area is 1.74 meters squared.    No intake or output data in the 24 hours ending 04/04/24 0740     Physical Exam  Vitals reviewed.   Constitutional:       General: He is not in acute distress.     Appearance: He is well-developed. He is ill-appearing. He is not diaphoretic.   HENT:      Head: Normocephalic.      Right Ear: External ear normal.      Left Ear: External ear normal.      Nose: Nose normal.      Right Sinus: No maxillary sinus tenderness or frontal sinus tenderness.      Left Sinus: No maxillary sinus tenderness or frontal sinus tenderness.      Mouth/Throat:      Pharynx: No oropharyngeal exudate.   Eyes:      General: Lids are normal. No scleral icterus.        Right eye: No discharge.         Left eye: No discharge.      Extraocular Movements:       Right eye: Normal extraocular motion.      Left eye: Normal extraocular motion.      Conjunctiva/sclera:      Right eye: Right conjunctiva is not injected. No hemorrhage.     Left eye: Left conjunctiva is not injected. No hemorrhage.     Pupils: Pupils are equal, round, and reactive to light.   Neck:      Thyroid: No thyromegaly.      Vascular: No JVD.      Trachea: No tracheal deviation.   Cardiovascular:      Rate and Rhythm: Normal rate and regular rhythm.      Heart sounds: Normal heart sounds.   Pulmonary:      Effort: Pulmonary effort is normal. No respiratory distress.      Breath sounds: Normal breath sounds. No stridor.   Abdominal:      General: Bowel sounds are normal. There is distension.      Palpations: Abdomen is soft. There is no hepatomegaly, splenomegaly or mass.   Musculoskeletal:         General: No tenderness. Normal range of motion.      Cervical back: Normal range of motion and neck supple.   Lymphadenopathy:      Head:      Right side of head: No posterior auricular or occipital adenopathy.      Left side of head: No posterior auricular or occipital adenopathy.      Cervical: No cervical adenopathy.      Right cervical: No superficial, deep or posterior cervical adenopathy.     Left cervical: No superficial, deep or posterior cervical adenopathy.      Upper Body:      Right upper body: No supraclavicular adenopathy.      Left upper body: No supraclavicular adenopathy.   Skin:     General: Skin is dry.      Findings: No erythema or rash.      Nails: There is no clubbing.   Neurological:      Mental Status: He is alert and oriented to person, place, and time.      Cranial Nerves: No cranial nerve deficit.      Motor: Weakness present.      Coordination: Coordination normal.   Psychiatric:         Behavior: Behavior normal.         Thought Content: Thought content normal.         Judgment: Judgment normal.          Significant Labs:   BMP:   Recent Labs   Lab 04/03/24  0806 04/03/24  1307  "04/04/24 0426   GLU  --  125* 101   NA  --  137 139   K  --  3.9 3.5   CL  --  104 106   CO2  --  22* 21*   BUN  --  18 15   CREATININE 1.5* 1.4 1.1   CALCIUM  --  9.6 9.3   MG  --   --  1.7   , CBC:   Recent Labs   Lab 04/03/24  1307 04/03/24 1845 04/04/24 0426   WBC 10.86 9.47 8.02  8.02   HGB 10.0* 9.8* 10.2*  10.2*   HCT 32.1* 31.4* 32.7*  32.7*    333 362  362   , CMP:   Recent Labs   Lab 04/03/24  0806 04/03/24 1307 04/04/24 0426   NA  --  137 139   K  --  3.9 3.5   CL  --  104 106   CO2  --  22* 21*   GLU  --  125* 101   BUN  --  18 15   CREATININE 1.5* 1.4 1.1   CALCIUM  --  9.6 9.3   PROT  --  7.2 6.2   ALBUMIN  --  2.8* 2.7*   BILITOT  --  0.5 0.4   ALKPHOS  --  121 121   AST  --  15 12   ALT  --  8* 8*   ANIONGAP  --  11 12   , Coagulation:   Recent Labs   Lab 04/03/24 1845 04/04/24 0426   INR 1.1  --    APTT 38.4* 42.1*   , Haptoglobin: No results for input(s): "HAPTOGLOBIN" in the last 48 hours., Immunology: No results for input(s): "SPEP", "SAMIR", "YOSHI", "FREELAMBDALI" in the last 48 hours., LDH: No results for input(s): "LDHCSF", "BFSOURCE" in the last 48 hours., LFTs:   Recent Labs   Lab 04/03/24 1307 04/04/24 0426   ALT 8* 8*   AST 15 12   ALKPHOS 121 121   BILITOT 0.5 0.4   PROT 7.2 6.2   ALBUMIN 2.8* 2.7*   , Reticulocytes: No results for input(s): "RETIC" in the last 48 hours., Tumor Markers: No results for input(s): "PSA", "CEA", "", "AFPTM", "[id number]", "" in the last 48 hours.    Invalid input(s): "ALGTM", Uric Acid No results for input(s): "URICACID" in the last 48 hours., and Urine Studies: No results for input(s): "COLORU", "APPEARANCEUA", "PHUR", "SPECGRAV", "PROTEINUA", "GLUCUA", "KETONESU", "BILIRUBINUA", "OCCULTUA", "NITRITE", "UROBILINOGEN", "LEUKOCYTESUR", "RBCUA", "WBCUA", "BACTERIA", "SQUAMEPITHEL", "HYALINECASTS" in the last 48 hours.    Invalid input(s): "WRIGHTSUR"    Diagnostic Results:  I have reviewed all pertinent imaging results/findings within the " past 24 hours.  Assessment/Plan:     * Partial intestinal obstruction  Secondary to intra-abdominal carcinomatosis from metastatic bladder cancer    Malignant neoplasm of urinary bladder  Extensive conversation with the patient and family at bedside I also pulled up his most recent CT demonstrating his bowel obstruction as well as his widespread intra-abdominal lymph nodes in retroperitoneal lymphadenopathy.  My recommendations at this point would be to have patient is medically stable as possible if he wishes to receive immunotherapy in the outpatient setting certainly can do so in this has been recommended to him previously I have told him that this is not curative therapy.  It will take up to 12 weeks to see if there is response.  Strongly recommend that patient have a do not resuscitate order be placed on chart and be seen by palliative care if not already done so.  Answered questions family at bedside    Nephrostomy tube displaced  Cared for by primary care team    Elevated serum creatinine  Secondary to hydronephrosis secondary to metastatic bladder cancer        Thank you for your consult. I will follow-up with patient. Please contact us if you have any additional questions.    Paul Pool MD  Hematology/Oncology  [facility] ([facility])

## 2024-04-04 NOTE — SUBJECTIVE & OBJECTIVE
Oncology Treatment Plan:   [No matching plan found]    Medications:  Continuous Infusions:   heparin (porcine) in D5W 12 Units/kg/hr (04/03/24 2159)     Scheduled Meds:   cefTRIAXone (Rocephin) IV (PEDS and ADULTS)  1 g Intravenous Q24H    EScitalopram oxalate  20 mg Oral Daily    heparin (PORCINE)  60 Units/kg Intravenous Once    levothyroxine  125 mcg Oral Before breakfast    polyethylene glycol  17 g Oral Daily    pregabalin  75 mg Oral QHS    tamsulosin  1 capsule Oral Daily     PRN Meds:acetaminophen, ALPRAZolam, aluminum-magnesium hydroxide-simethicone, dextrose 10%, dextrose 10%, glucagon (human recombinant), glucose, glucose, heparin (PORCINE), heparin (PORCINE), melatonin, morphine, naloxone, ondansetron, prochlorperazine, sodium chloride 0.9%     Review of patient's allergies indicates:  No Known Allergies     Past Medical History:   Diagnosis Date    Anxiety disorder, unspecified     Cancer     COPD (chronic obstructive pulmonary disease)     Hypertension     Thyroid disease      Past Surgical History:   Procedure Laterality Date    APPENDECTOMY      CATARACT EXTRACTION      NEPHROSTOMY      OPEN REDUCTION AND INTERNAL FIXATION (ORIF) OF INTERTROCHANTERIC FRACTURE OF FEMUR Right 11/21/2023    Procedure: ORIF, FRACTURE, FEMUR, INTERTROCHANTERIC;  Surgeon: Tanisha Guidry DO;  Location: [facility];  Service: Orthopedics;  Laterality: Right;  Gamma nail     Family History       Problem Relation (Age of Onset)    Cancer Mother, Father, Maternal Grandfather          Tobacco Use    Smoking status: Every Day     Types: Vaping with nicotine     Passive exposure: Current    Smokeless tobacco: Never   Substance and Sexual Activity    Alcohol use: Never    Drug use: Never    Sexual activity: Not Currently     Partners: Female       Review of Systems   Constitutional:  Positive for activity change, appetite change, fatigue and unexpected weight change. Negative for chills, diaphoresis and fever.   HENT:  Negative  for congestion, dental problem, drooling, ear discharge, ear pain, facial swelling, hearing loss, mouth sores, nosebleeds, postnasal drip, rhinorrhea, sinus pressure, sneezing, sore throat, tinnitus, trouble swallowing and voice change.    Eyes:  Negative for photophobia, pain, discharge, redness, itching and visual disturbance.   Respiratory:  Negative for apnea, cough, choking, chest tightness, shortness of breath, wheezing and stridor.    Cardiovascular:  Negative for chest pain, palpitations and leg swelling.   Gastrointestinal:  Positive for abdominal pain. Negative for abdominal distention, anal bleeding, blood in stool, constipation, diarrhea, nausea, rectal pain and vomiting.   Endocrine: Negative for cold intolerance, heat intolerance, polydipsia, polyphagia and polyuria.   Genitourinary:  Negative for decreased urine volume, difficulty urinating, dysuria, enuresis, flank pain, frequency, genital sores, hematuria, penile discharge, penile pain, penile swelling, scrotal swelling, testicular pain and urgency.   Musculoskeletal:  Negative for arthralgias, back pain, gait problem, joint swelling, myalgias, neck pain and neck stiffness.   Skin:  Negative for color change, pallor, rash and wound.   Allergic/Immunologic: Negative for environmental allergies, food allergies and immunocompromised state.   Neurological:  Positive for weakness. Negative for dizziness, tremors, seizures, syncope, facial asymmetry, speech difficulty, light-headedness, numbness and headaches.   Hematological:  Negative for adenopathy. Does not bruise/bleed easily.   Psychiatric/Behavioral:  Positive for dysphoric mood. Negative for agitation, behavioral problems, confusion, decreased concentration, hallucinations, self-injury, sleep disturbance and suicidal ideas. The patient is nervous/anxious. The patient is not hyperactive.      Objective:     Vital Signs (Most Recent):  Temp: 98.7 °F (37.1 °C) (04/04/24 0428)  Pulse: 81 (04/04/24  0428)  Resp: 18 (04/04/24 0428)  BP: 123/73 (04/04/24 0428)  SpO2: (!) 94 % (04/04/24 0428) Vital Signs (24h Range):  Temp:  [98 °F (36.7 °C)-99.8 °F (37.7 °C)] 98.7 °F (37.1 °C)  Pulse:  [] 81  Resp:  [16-20] 18  SpO2:  [94 %-97 %] 94 %  BP: ()/(56-75) 123/73     Weight: 63 kg (138 lb 14.2 oz)  Body mass index is 21.12 kg/m².  Body surface area is 1.74 meters squared.    No intake or output data in the 24 hours ending 04/04/24 0740     Physical Exam  Vitals reviewed.   Constitutional:       General: He is not in acute distress.     Appearance: He is well-developed. He is ill-appearing. He is not diaphoretic.   HENT:      Head: Normocephalic.      Right Ear: External ear normal.      Left Ear: External ear normal.      Nose: Nose normal.      Right Sinus: No maxillary sinus tenderness or frontal sinus tenderness.      Left Sinus: No maxillary sinus tenderness or frontal sinus tenderness.      Mouth/Throat:      Pharynx: No oropharyngeal exudate.   Eyes:      General: Lids are normal. No scleral icterus.        Right eye: No discharge.         Left eye: No discharge.      Extraocular Movements:      Right eye: Normal extraocular motion.      Left eye: Normal extraocular motion.      Conjunctiva/sclera:      Right eye: Right conjunctiva is not injected. No hemorrhage.     Left eye: Left conjunctiva is not injected. No hemorrhage.     Pupils: Pupils are equal, round, and reactive to light.   Neck:      Thyroid: No thyromegaly.      Vascular: No JVD.      Trachea: No tracheal deviation.   Cardiovascular:      Rate and Rhythm: Normal rate and regular rhythm.      Heart sounds: Normal heart sounds.   Pulmonary:      Effort: Pulmonary effort is normal. No respiratory distress.      Breath sounds: Normal breath sounds. No stridor.   Abdominal:      General: Bowel sounds are normal. There is distension.      Palpations: Abdomen is soft. There is no hepatomegaly, splenomegaly or mass.   Musculoskeletal:          General: No tenderness. Normal range of motion.      Cervical back: Normal range of motion and neck supple.   Lymphadenopathy:      Head:      Right side of head: No posterior auricular or occipital adenopathy.      Left side of head: No posterior auricular or occipital adenopathy.      Cervical: No cervical adenopathy.      Right cervical: No superficial, deep or posterior cervical adenopathy.     Left cervical: No superficial, deep or posterior cervical adenopathy.      Upper Body:      Right upper body: No supraclavicular adenopathy.      Left upper body: No supraclavicular adenopathy.   Skin:     General: Skin is dry.      Findings: No erythema or rash.      Nails: There is no clubbing.   Neurological:      Mental Status: He is alert and oriented to person, place, and time.      Cranial Nerves: No cranial nerve deficit.      Motor: Weakness present.      Coordination: Coordination normal.   Psychiatric:         Behavior: Behavior normal.         Thought Content: Thought content normal.         Judgment: Judgment normal.          Significant Labs:   BMP:   Recent Labs   Lab 04/03/24  0806 04/03/24  1307 04/04/24  0426   GLU  --  125* 101   NA  --  137 139   K  --  3.9 3.5   CL  --  104 106   CO2  --  22* 21*   BUN  --  18 15   CREATININE 1.5* 1.4 1.1   CALCIUM  --  9.6 9.3   MG  --   --  1.7   , CBC:   Recent Labs   Lab 04/03/24  1307 04/03/24  1845 04/04/24  0426   WBC 10.86 9.47 8.02  8.02   HGB 10.0* 9.8* 10.2*  10.2*   HCT 32.1* 31.4* 32.7*  32.7*    333 362  362   , CMP:   Recent Labs   Lab 04/03/24  0806 04/03/24  1307 04/04/24  0426   NA  --  137 139   K  --  3.9 3.5   CL  --  104 106   CO2  --  22* 21*   GLU  --  125* 101   BUN  --  18 15   CREATININE 1.5* 1.4 1.1   CALCIUM  --  9.6 9.3   PROT  --  7.2 6.2   ALBUMIN  --  2.8* 2.7*   BILITOT  --  0.5 0.4   ALKPHOS  --  121 121   AST  --  15 12   ALT  --  8* 8*   ANIONGAP  --  11 12   , Coagulation:   Recent Labs   Lab 04/03/24  0109  "04/04/24  0426   INR 1.1  --    APTT 38.4* 42.1*   , Haptoglobin: No results for input(s): "HAPTOGLOBIN" in the last 48 hours., Immunology: No results for input(s): "SPEP", "SAMIR", "YOSHI", "FREELAMBDALI" in the last 48 hours., LDH: No results for input(s): "LDHCSF", "BFSOURCE" in the last 48 hours., LFTs:   Recent Labs   Lab 04/03/24  1307 04/04/24  0426   ALT 8* 8*   AST 15 12   ALKPHOS 121 121   BILITOT 0.5 0.4   PROT 7.2 6.2   ALBUMIN 2.8* 2.7*   , Reticulocytes: No results for input(s): "RETIC" in the last 48 hours., Tumor Markers: No results for input(s): "PSA", "CEA", "", "AFPTM", "[id number]", "" in the last 48 hours.    Invalid input(s): "ALGTM", Uric Acid No results for input(s): "URICACID" in the last 48 hours., and Urine Studies: No results for input(s): "COLORU", "APPEARANCEUA", "PHUR", "SPECGRAV", "PROTEINUA", "GLUCUA", "KETONESU", "BILIRUBINUA", "OCCULTUA", "NITRITE", "UROBILINOGEN", "LEUKOCYTESUR", "RBCUA", "WBCUA", "BACTERIA", "SQUAMEPITHEL", "HYALINECASTS" in the last 48 hours.    Invalid input(s): "WRIGHTSUR"    Diagnostic Results:  I have reviewed all pertinent imaging results/findings within the past 24 hours.  "

## 2024-04-04 NOTE — ASSESSMENT & PLAN NOTE
Small-bowel follow-through today, space based on imaging studies the patient would have the following options     The options would include 1.  If this is indeed a partial obstruction in call liquids can be tolerated then no intervention would be required if the patient was going to pursue hospice care    Two.  Placement of a gastrostomy tube but this might proved to be challenging through minimally invasive techniques as there appears to be loops of small bowel overlying the stomach on his CT scan    Three.  Exploratory laparotomy with either resection of the small bowel with planned anastomosis or bypass of the small bowel to itself or to the colon to relieve the obstruction.  This carries the risk of a laparotomy including a wound infection, wound hernia, anastomotic leak or stricture    Will discuss this with him after small-bowel follow-through

## 2024-04-04 NOTE — HPI
68-year-old male admitted to the hospital with abdominal pain in small-bowel obstruction.  The patient has been seen by surgery patient has also been seen by Dr. Demetrius Frazier  Oncology.  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  I was asked to see the patient for further evaluation ECOG status 2

## 2024-04-04 NOTE — ASSESSMENT & PLAN NOTE
Worsening findings on CT scan.  There is some invasion of the rectum as well as the pelvic sidewalls.      Small-bowel involvement with the cancer is likely the cause of obstruction and may require surgical intervention to relieve the bowel obstruction    According to Oncology there is an option of immunotherapy which could take up to 12 weeks to see a response.  They recommended a palliative care consult, which is still pending.      Will discuss surgical options for a partial bowel obstruction pending the results of small-bowel follow-through

## 2024-04-04 NOTE — PROGRESS NOTES
[facility] Medicine  Progress Note     Patient Name:  Geovani Currie  MRN:  [id number]  Patient Class: IP-Inpatient  Admission Date:  4/3/2024   Length of Stay:  1  Attending Physician: Yuval Orozco MD  Primary Care Provider: Regi, Primary Doctor    Subjective:      Subsequent Care: Follow up Partial intestinal obstruction        HPI:  68 y.o. male patient with a PMHx of COPD, HTN, Lung Cancer, CKD, DVT, metastatic bladder cancer s/p nephrostomy tube placement. Presented to the Emergency Department for generalized abdominal pain, onset several days PTA. Pt was referred to the ED by Dr. Sr (Interventional Radiology) after CT Urogram showed small bowel obstruction. Pt was scheduled to have his nephrostomy tubes replaced on day of arrival. Associated sxs include nausea, abdominal distention, and loss of appetite. Patient denies any fever, chills, vomiting, SOB, CP, weakness, numbness, dizziness, headache, and all other sxs at this time. Pt and spouse are from [place], and are trying to return home to set up hospice. He is currently on cefdinir, but is not receiving any cancer treatment. General Surgery consulted, recommended to admit and consult Oncology and Urology. In the ED, vitals: 94/56, 85, 18, 98F, 96% RA. Significant Labs: H/H: 10/32.1, CT abd/pel: findings consistent SBO, Interval worsening of bladder cancer. Patient is a full code. Admitted to Hospital Medicine for management of Small Bowel Obstruction.         Overview/Hospital Course:  04/04/2024  CT scan shows shows interval worsening of locally invasive bladder cancer with rectal involvement and small bowel obstruction. SBFT pending. Oncology evaluated, strongly recommends DNR order to be placed. Both specialists suggest palliative vs hospice discussion. Palliative care consulted to assist.      Interval Hx  Complains of persistent abdominal pain and nausea, but no vomiting.     Objective  /80 (BP Location: Left  arm, Patient Position: Lying)   Pulse (!) 126   Temp 98.2 °F (36.8 °C) (Oral)   Resp 16   Wt 63 kg (138 lb 14.2 oz)   SpO2 96%   BMI 21.12 kg/m²     Intake/Output Summary (Last 24 hours) at 4/4/2024 1027  Last data filed at 4/4/2024 0806  Gross per 24 hour   Intake --   Output 450 ml   Net -450 ml       PHYSICAL EXAM  Constitutional:       General: He is not in acute distress.     Appearance: He is cachectic. He is ill-appearing. He is not diaphoretic.   HENT:      Head: Normocephalic and atraumatic.      Right Ear: Hearing normal.      Left Ear: Hearing normal.      Nose: Nose normal. No mucosal edema or rhinorrhea.      Mouth/Throat:      Pharynx: Uvula midline.   Eyes:      General:         Right eye: No discharge.         Left eye: No discharge.      Conjunctiva/sclera: Conjunctivae normal.      Right eye: No chemosis.     Left eye: No chemosis.     Pupils: Pupils are equal, round, and reactive to light.   Neck:      Thyroid: No thyroid mass or thyromegaly.      Trachea: Trachea normal.   Cardiovascular:      Rate and Rhythm: Normal rate and regular rhythm.      Pulses:           Dorsalis pedis pulses are 1+ on the right side and 1+ on the left side.      Heart sounds: Normal heart sounds. No murmur heard.  Pulmonary:      Effort: Pulmonary effort is normal. No respiratory distress.      Breath sounds: Examination of the right-lower field reveals decreased breath sounds. Examination of the left-lower field reveals decreased breath sounds. Decreased breath sounds present. No wheezing.   Abdominal:      General: Bowel sounds are normal. There is distension.      Palpations: Abdomen is soft.      Tenderness: There is generalized abdominal tenderness.   Musculoskeletal:         General: Normal range of motion.      Cervical back: Normal range of motion and neck supple.     LABS  All labs from the past 24 hours were reviewed.     BMP:   Recent Labs   Lab 04/04/24  0426         K 3.5      CO2  "21*   BUN 15   CREATININE 1.1   CALCIUM 9.3   MG 1.7     CBC:   Recent Labs   Lab 04/03/24  1307 04/03/24  1845 04/04/24  0426   WBC 10.86 9.47 8.02  8.02   HGB 10.0* 9.8* 10.2*  10.2*   HCT 32.1* 31.4* 32.7*  32.7*    333 362  362     CMP:   Recent Labs   Lab 04/03/24  0806 04/03/24  1307 04/04/24  0426   NA  --  137 139   K  --  3.9 3.5   CL  --  104 106   CO2  --  22* 21*   GLU  --  125* 101   BUN  --  18 15   CREATININE 1.5* 1.4 1.1   CALCIUM  --  9.6 9.3   PROT  --  7.2 6.2   ALBUMIN  --  2.8* 2.7*   BILITOT  --  0.5 0.4   ALKPHOS  --  121 121   AST  --  15 12   ALT  --  8* 8*   ANIONGAP  --  11 12     Cardiac Markers: No results for input(s): "CKMB", "MYOGLOBIN", "BNP", "TROPISTAT" in the last 48 hours.  Coagulation:   Recent Labs   Lab 04/03/24  1845 04/04/24  0426   INR 1.1  --    APTT 38.4* 42.1*     Lactic Acid: No results for input(s): "LACTATE" in the last 48 hours.  Magnesium:   Recent Labs   Lab 04/04/24  0426   MG 1.7     Troponin: No results for input(s): "TROPONINI", "TROPONINIHS" in the last 48 hours.  TSH:   Recent Labs   Lab 02/23/24  0841   TSH 0.630     Urine Studies:   No results for input(s): "COLORU", "APPEARANCEUA", "PHUR", "SPECGRAV", "PROTEINUA", "GLUCUA", "KETONESU", "BILIRUBINUA", "OCCULTUA", "NITRITE", "UROBILINOGEN", "LEUKOCYTESUR", "RBCUA", "WBCUA", "BACTERIA", "SQUAMEPITHEL", "HYALINECASTS" in the last 48 hours.    Invalid input(s): "WRIGHTSUR"    IMAGING  All imaging from the past 24 hours were reviewed.     Imaging Results              X-Ray Abdomen Flat And Erect (Final result)  Result time 04/03/24 15:59:11      Final result by Elmo Garza MD (04/03/24 15:59:11)                   Impression:      Increasing small bowel distension mid upper abdomen indicating partial small bowel obstruction.      Electronically signed by: Elmo Garza MD  Date:    04/03/2024  Time:    15:59               Narrative:    EXAMINATION:  XR ABDOMEN FLAT AND ERECT    CLINICAL " "HISTORY:  Unspecified intestinal obstruction, unspecified as to partial versus complete obstruction    TECHNIQUE:  Routine exam.    COMPARISON:  03/11/2024    FINDINGS:  Increasing air-filled loops of small bowel throughout the mid upper abdomen.  Maximum diameter approximately 4 cm.Large amount stool right colon    No other changes.  Again noted are bilateral nephrostomy catheters.                                      Assessment/Plan:      * Partial intestinal obstruction  Secondary to bladder cancer, progression noted on CT  --Consult Oncology and Urology for recommendations  --Consult General Surgery    04/04/2024  --SBFT ordered for this morning  --follow up surgery recommendations  --hospice discussion on going (as discussed below)    Malignant neoplasm of urinary bladder  Known hx of bladder cancer, CT abd/pel: Interval worsening of locally invasive bladder cancer extension to the pelvic sidewall and the presacral fat and sacrum on the left. There is involvement of the rectum as well as well as involvement of a small bowel loop in the pelvis causing a small bowel obstruction.      --Consult urology and oncology for recommendations    04/04/2024  -Urology evaluated: "I cannot offer any surgical intervention for his advanced bladder cancer at this point. I would recommend hospice, which patient's wife appears open to."  -Oncology evaluated: "Strongly recommend that patient have a do not resuscitate order be placed on chart and be seen by palliative care if not already done so."  -Palliative care consulted, follow up recommendations           Acute deep vein thrombosis (DVT) of femoral vein of left lower extremity  New diagnosis, managed with Eliquis as OP     --Hold Eliquis for now  --Heparin per nomogram        Hypothyroidism     Chronic, well controlled     --Cont home med        Nephrostomy tube displaced  Followed by Urology, planned for tube exchange on 4/3, referred to ED due to SBO     --Consult IR if " needed         Centrilobular emphysema  Chronic, well controlled     --Cont home meds     Squamous cell carcinoma of right lung  Followed by oncology, treated with Radiation, therapy completed     --consult oncology        Anemia  Patient's anemia is currently controlled. Has not received any PRBCs to date. Etiology likely d/t chronic disease due to Malignancy  Current CBC reviewed-         Lab Results   Component Value Date     HGB 10.0 (L) 04/03/2024     HCT 32.1 (L) 04/03/2024      Monitor serial CBC and transfuse if patient becomes hemodynamically unstable, symptomatic or H/H drops below 7/21.     Elevated serum creatinine  Mild increase,likely secondary to tumor burden     --Repeat CMP in AM     CORE MEASURES:  VTE Risk Mitigation (From admission, onward)           Ordered     heparin 25,000 units in dextrose 5% (100 units/ml) IV bolus from bag LOW INTENSITY nomogram - [facility]  As needed (PRN)        Question:  Heparin Infusion Adjustment (DO NOT MODIFY ANSWER)  Answer:  \[email or web address]\epic\Images\Pharmacy\HeparinInfusions\heparin LOW INTENSITY nomogram for [facility] [id number].pdf    04/03/24 1818     heparin 25,000 units in dextrose 5% (100 units/ml) IV bolus from bag LOW INTENSITY nomogram - [facility]  As needed (PRN)        Question:  Heparin Infusion Adjustment (DO NOT MODIFY ANSWER)  Answer:  \[employer]\epic\Images\Pharmacy\HeparinInfusions\heparin LOW INTENSITY nomogram for [facility] [id number].pdf    04/03/24 1818     heparin 25,000 units in dextrose 5% (100 units/ml) IV bolus from bag LOW INTENSITY nomogram - [facility]  Once        Question:  Heparin Infusion Adjustment (DO NOT MODIFY ANSWER)  Answer:  \[employer]\epic\Images\Pharmacy\HeparinInfusions\heparin LOW INTENSITY nomogram for [facility] [id number].pdf    04/03/24 1818     heparin 25,000 units in dextrose 5% 250 mL (100 units/mL) infusion LOW INTENSITY nomogram - [facility]  Continuous        Question:  Begin at (units/kg/hr)  Answer:  12 04/03/24 1818     Reason for No Pharmacological VTE  Prophylaxis  Once        Question:  Reasons:  Answer:  Risk of Bleeding    04/03/24 1724     IP VTE HIGH RISK PATIENT  Once         04/03/24 1724     Place sequential compression device  Until discontinued         04/03/24 1724                    Code Status: FULL (palliative care discussion pending)    Diet: Diet NPO    Disposition: follow up palliative care discussion       Yuval Orozco MD  Department of Hospital Medicine   [facility] - Telemetry ([facility])

## 2024-04-04 NOTE — PLAN OF CARE
[facility] - Telemetry (Hospital)  Initial Discharge Assessment       Primary Care Provider: Faizan Antoine MD    Admission Diagnosis: SBO (small bowel obstruction) [K56.609]  History of bladder cancer [Z85.51]  Chest pain [R07.9]  Attention to nephrostomy [Z43.6]    Admission Date: 4/3/2024  Expected Discharge Date:     Transition of Care Barriers: None    Payor: MEDICARE / Plan: MEDICARE PART A & B / Product Type: Government /     Extended Emergency Contact Information  Primary Emergency Contact: Stephania Mann  Address: [address] [address]           [place] [address] [place]  Mobile Phone: [phone]  Relation: Healthcare Power of   Secondary Emergency Contact: ELIAS GILLILAND  Mobile Phone: [phone]  Relation: Healthcare Power of   Preferred language: English   needed? No    Discharge Plan A: Home Health  Discharge Plan B: Home with family      [id number] [place] - [address]  [address]  [place] [address]  Phone: [phone] Fax: [phone]    [id number] - [place] - [address]  [address] [address] [facility] [address]  Phone: [phone] Fax: [phone]    [id number] - [place] - [address] [facility] [address]  [address]  [place] [address]  Phone: [phone] Fax: [phone]    [facility]  [address], [address]  [facility] [address]  Phone: [phone] Fax: [phone]      Initial Assessment (most recent)       Adult Discharge Assessment - 04/04/24 1247          Discharge Assessment    Assessment Type Discharge Planning Assessment     Confirmed/corrected address, phone number and insurance Yes     Confirmed Demographics Correct on Facesheet     Source of Information family     When was your last doctors appointment? 04/02/24   Raya Kang NP - Urology    Communicated TANIA with  patient/caregiver Date not available/Unable to determine     Reason For Admission Partial intestinal obstruction     People in Home child(qing), adult;grandchild(qing);spouse     Facility Arrived From: Home - Patient lives at daughters home     Do you expect to return to your current living situation? Yes     Do you have help at home or someone to help you manage your care at home? Yes     Who are your caregiver(s) and their phone number(s)? Stephania Mann - daughter, Chantal Currie - spouse, and older grandsons     Prior to hospitilization cognitive status: Alert/Oriented     Current cognitive status: Unable to Assess     Walking or Climbing Stairs Difficulty yes     Walking or Climbing Stairs ambulation difficulty, requires equipment;ambulation difficulty, dependent     Mobility Management Rolling walker and wheelchair     Dressing/Bathing Difficulty yes     Dressing/Bathing bathing difficulty, requires equipment     Dressing/Bathing Management shower chair and grab bars     Home Accessibility wheelchair accessible     Equipment Currently Used at Home bedside commode;grab bar;shower chair;walker, rolling;wheelchair;oxygen     Readmission within 30 days? No     Patient currently being followed by outpatient case management? No     Do you currently have service(s) that help you manage your care at home? Yes     Name and Contact number of agency [facility] - RN, PT, OT     Is the pt/caregiver preference to resume services with current agency Yes     Do you take prescription medications? Yes     Do you have prescription coverage? Yes     Coverage Medicare Part D     Do you have any problems affording any of your prescribed medications? No     Is the patient taking medications as prescribed? yes     Who is going to help you get home at discharge? Stephania Mann - daughter, Chantal Currie - spouse, and older grandsons     How do you get to doctors appointments? family or friend will provide     Are you on dialysis?  No     Do you take coumadin? No     Discharge Plan A Home Health     Discharge Plan B Home with family     DME Needed Upon Discharge  none     Discharge Plan discussed with: Adult children     Transition of Care Barriers None        Transportation Needs    In the past 12 months, has lack of transportation kept you from medical appointments or from getting medications? No     In the past 12 months, has lack of transportation kept you from meetings, work, or from getting things needed for daily living? No                   Anticipated DC dispo: Resume [facility]   Prior Level of Function: limited with ADLs, patient's family assists  People in home: Stephania Mann - daughter, Chantal Rhodeston - spouse, and 6 grandchildren    Comments:  SW spoke to Patient's daughter, Stephania, over the phone to introduce role and discuss discharge planning. Patient's family will be help at home and can provide transport at time of discharge. Confirmed demographics, insurance, and emergency contacts. SW updated Patient's whiteboard with contact information and anticipated DC plan. CM discharge needs depends on hospital progress. SW will continue following to assist with other needs.

## 2024-04-04 NOTE — ASSESSMENT & PLAN NOTE
Extensive conversation with the patient and family at bedside I also pulled up his most recent CT demonstrating his bowel obstruction as well as his widespread intra-abdominal lymph nodes in retroperitoneal lymphadenopathy.  My recommendations at this point would be to have patient is medically stable as possible if he wishes to receive immunotherapy in the outpatient setting certainly can do so in this has been recommended to him previously I have told him that this is not curative therapy.  It will take up to 12 weeks to see if there is response.  Strongly recommend that patient have a do not resuscitate order be placed on chart and be seen by palliative care if not already done so.  Answered questions family at bedside

## 2024-04-05 ENCOUNTER — ANESTHESIA EVENT (OUTPATIENT)
Dept: SURGERY | Facility: HOSPITAL | Age: 69
DRG: 330 | End: 2024-04-05
Payer: MEDICARE

## 2024-04-05 ENCOUNTER — ANESTHESIA (OUTPATIENT)
Dept: SURGERY | Facility: HOSPITAL | Age: 69
DRG: 330 | End: 2024-04-05
Payer: MEDICARE

## 2024-04-05 PROBLEM — K56.601: Status: ACTIVE | Noted: 2024-04-05

## 2024-04-05 LAB — APTT PPP: 31.9 SEC (ref 21–32)

## 2024-04-05 PROCEDURE — 37000009 HC ANESTHESIA EA ADD 15 MINS: Performed by: SURGERY

## 2024-04-05 PROCEDURE — 36000709 HC OR TIME LEV III EA ADD 15 MIN: Performed by: SURGERY

## 2024-04-05 PROCEDURE — 99232 SBSQ HOSP IP/OBS MODERATE 35: CPT | Mod: ,,, | Performed by: INTERNAL MEDICINE

## 2024-04-05 PROCEDURE — 63600175 PHARM REV CODE 636 W HCPCS: Performed by: SURGERY

## 2024-04-05 PROCEDURE — 44160 REMOVAL OF COLON: CPT | Mod: AS,,,

## 2024-04-05 PROCEDURE — 63600175 PHARM REV CODE 636 W HCPCS: Performed by: STUDENT IN AN ORGANIZED HEALTH CARE EDUCATION/TRAINING PROGRAM

## 2024-04-05 PROCEDURE — 63600175 PHARM REV CODE 636 W HCPCS: Mod: JZ,JG | Performed by: SURGERY

## 2024-04-05 PROCEDURE — 71000033 HC RECOVERY, INTIAL HOUR: Performed by: SURGERY

## 2024-04-05 PROCEDURE — 25000003 PHARM REV CODE 250: Performed by: NURSE PRACTITIONER

## 2024-04-05 PROCEDURE — 25000003 PHARM REV CODE 250: Performed by: SURGERY

## 2024-04-05 PROCEDURE — 37000008 HC ANESTHESIA 1ST 15 MINUTES: Performed by: SURGERY

## 2024-04-05 PROCEDURE — 27201423 OPTIME MED/SURG SUP & DEVICES STERILE SUPPLY: Performed by: SURGERY

## 2024-04-05 PROCEDURE — 63600175 PHARM REV CODE 636 W HCPCS: Performed by: NURSE PRACTITIONER

## 2024-04-05 PROCEDURE — 99233 SBSQ HOSP IP/OBS HIGH 50: CPT | Mod: 57,,, | Performed by: SURGERY

## 2024-04-05 PROCEDURE — 36000708 HC OR TIME LEV III 1ST 15 MIN: Performed by: SURGERY

## 2024-04-05 PROCEDURE — 36415 COLL VENOUS BLD VENIPUNCTURE: CPT | Performed by: SURGERY

## 2024-04-05 PROCEDURE — 21400001 HC TELEMETRY ROOM

## 2024-04-05 PROCEDURE — 63600175 PHARM REV CODE 636 W HCPCS: Performed by: NURSE ANESTHETIST, CERTIFIED REGISTERED

## 2024-04-05 PROCEDURE — 44160 REMOVAL OF COLON: CPT | Mod: ,,, | Performed by: SURGERY

## 2024-04-05 PROCEDURE — 0D180ZL BYPASS SMALL INTESTINE TO TRANSVERSE COLON, OPEN APPROACH: ICD-10-PCS | Performed by: SURGERY

## 2024-04-05 PROCEDURE — 94799 UNLISTED PULMONARY SVC/PX: CPT | Mod: XB

## 2024-04-05 PROCEDURE — 25000003 PHARM REV CODE 250: Performed by: NURSE ANESTHETIST, CERTIFIED REGISTERED

## 2024-04-05 PROCEDURE — 99900035 HC TECH TIME PER 15 MIN (STAT)

## 2024-04-05 PROCEDURE — C9290 INJ, BUPIVACAINE LIPOSOME: HCPCS | Performed by: SURGERY

## 2024-04-05 PROCEDURE — 85730 THROMBOPLASTIN TIME PARTIAL: CPT | Performed by: SURGERY

## 2024-04-05 RX ORDER — PHENYLEPHRINE HYDROCHLORIDE 10 MG/ML
INJECTION INTRAVENOUS
Status: DISCONTINUED | OUTPATIENT
Start: 2024-04-05 | End: 2024-04-05

## 2024-04-05 RX ORDER — MORPHINE SULFATE 4 MG/ML
4 INJECTION, SOLUTION INTRAMUSCULAR; INTRAVENOUS
Status: DISCONTINUED | OUTPATIENT
Start: 2024-04-05 | End: 2024-04-06

## 2024-04-05 RX ORDER — CEFAZOLIN SODIUM 2 G/50ML
2 SOLUTION INTRAVENOUS ONCE
Status: COMPLETED | OUTPATIENT
Start: 2024-04-05 | End: 2024-04-05

## 2024-04-05 RX ORDER — CHLORHEXIDINE GLUCONATE ORAL RINSE 1.2 MG/ML
10 SOLUTION DENTAL 2 TIMES DAILY
Status: DISPENSED | OUTPATIENT
Start: 2024-04-05 | End: 2024-04-10

## 2024-04-05 RX ORDER — ONDANSETRON HYDROCHLORIDE 2 MG/ML
4 INJECTION, SOLUTION INTRAVENOUS DAILY PRN
Status: DISCONTINUED | OUTPATIENT
Start: 2024-04-05 | End: 2024-04-05 | Stop reason: HOSPADM

## 2024-04-05 RX ORDER — ACETAMINOPHEN 10 MG/ML
1000 INJECTION, SOLUTION INTRAVENOUS EVERY 8 HOURS
Status: COMPLETED | OUTPATIENT
Start: 2024-04-05 | End: 2024-04-06

## 2024-04-05 RX ORDER — MORPHINE SULFATE 10 MG/ML
5 INJECTION INTRAMUSCULAR; INTRAVENOUS; SUBCUTANEOUS
Status: DISCONTINUED | OUTPATIENT
Start: 2024-04-05 | End: 2024-04-06

## 2024-04-05 RX ORDER — PROPOFOL 10 MG/ML
VIAL (ML) INTRAVENOUS
Status: DISCONTINUED | OUTPATIENT
Start: 2024-04-05 | End: 2024-04-05

## 2024-04-05 RX ORDER — SUCCINYLCHOLINE CHLORIDE 20 MG/ML
INJECTION INTRAMUSCULAR; INTRAVENOUS
Status: DISCONTINUED | OUTPATIENT
Start: 2024-04-05 | End: 2024-04-05

## 2024-04-05 RX ORDER — BUPIVACAINE HYDROCHLORIDE 2.5 MG/ML
INJECTION, SOLUTION EPIDURAL; INFILTRATION; INTRACAUDAL
Status: DISCONTINUED | OUTPATIENT
Start: 2024-04-05 | End: 2024-04-05 | Stop reason: HOSPADM

## 2024-04-05 RX ORDER — DIPHENHYDRAMINE HYDROCHLORIDE 50 MG/ML
25 INJECTION INTRAMUSCULAR; INTRAVENOUS EVERY 4 HOURS PRN
Status: DISCONTINUED | OUTPATIENT
Start: 2024-04-05 | End: 2024-04-12 | Stop reason: HOSPADM

## 2024-04-05 RX ORDER — CEFAZOLIN SODIUM 1 G/3ML
INJECTION, POWDER, FOR SOLUTION INTRAMUSCULAR; INTRAVENOUS
Status: DISCONTINUED | OUTPATIENT
Start: 2024-04-05 | End: 2024-04-05

## 2024-04-05 RX ORDER — FAMOTIDINE 10 MG/ML
20 INJECTION INTRAVENOUS EVERY 12 HOURS
Status: DISCONTINUED | OUTPATIENT
Start: 2024-04-05 | End: 2024-04-12 | Stop reason: HOSPADM

## 2024-04-05 RX ORDER — SODIUM CHLORIDE, SODIUM LACTATE, POTASSIUM CHLORIDE, CALCIUM CHLORIDE 600; 310; 30; 20 MG/100ML; MG/100ML; MG/100ML; MG/100ML
INJECTION, SOLUTION INTRAVENOUS CONTINUOUS
Status: DISCONTINUED | OUTPATIENT
Start: 2024-04-05 | End: 2024-04-11

## 2024-04-05 RX ORDER — HYDROMORPHONE HYDROCHLORIDE 2 MG/ML
0.2 INJECTION, SOLUTION INTRAMUSCULAR; INTRAVENOUS; SUBCUTANEOUS EVERY 5 MIN PRN
Status: DISCONTINUED | OUTPATIENT
Start: 2024-04-05 | End: 2024-04-05 | Stop reason: HOSPADM

## 2024-04-05 RX ORDER — OXYCODONE AND ACETAMINOPHEN 5; 325 MG/1; MG/1
1 TABLET ORAL
Status: DISCONTINUED | OUTPATIENT
Start: 2024-04-05 | End: 2024-04-05 | Stop reason: HOSPADM

## 2024-04-05 RX ORDER — MORPHINE SULFATE 4 MG/ML
2 INJECTION, SOLUTION INTRAMUSCULAR; INTRAVENOUS EVERY 4 HOURS PRN
Status: DISCONTINUED | OUTPATIENT
Start: 2024-04-05 | End: 2024-04-06

## 2024-04-05 RX ORDER — HEPARIN SODIUM,PORCINE/D5W 25000/250
12 INTRAVENOUS SOLUTION INTRAVENOUS CONTINUOUS
Status: DISCONTINUED | OUTPATIENT
Start: 2024-04-06 | End: 2024-04-12

## 2024-04-05 RX ORDER — ROCURONIUM BROMIDE 10 MG/ML
INJECTION, SOLUTION INTRAVENOUS
Status: DISCONTINUED | OUTPATIENT
Start: 2024-04-05 | End: 2024-04-05

## 2024-04-05 RX ORDER — LIDOCAINE HYDROCHLORIDE 20 MG/ML
INJECTION INTRAVENOUS
Status: DISCONTINUED | OUTPATIENT
Start: 2024-04-05 | End: 2024-04-05

## 2024-04-05 RX ADMIN — CEFAZOLIN 2 G: 330 INJECTION, POWDER, FOR SOLUTION INTRAMUSCULAR; INTRAVENOUS at 05:04

## 2024-04-05 RX ADMIN — PHENYLEPHRINE HYDROCHLORIDE 100 MCG: 10 INJECTION INTRAVENOUS at 05:04

## 2024-04-05 RX ADMIN — LIDOCAINE HYDROCHLORIDE 50 MG: 20 INJECTION INTRAVENOUS at 04:04

## 2024-04-05 RX ADMIN — LEVOTHYROXINE SODIUM 125 MCG: 125 TABLET ORAL at 09:04

## 2024-04-05 RX ADMIN — ACETAMINOPHEN 1000 MG: 10 INJECTION INTRAVENOUS at 10:04

## 2024-04-05 RX ADMIN — SODIUM CHLORIDE, SODIUM LACTATE, POTASSIUM CHLORIDE, AND CALCIUM CHLORIDE: .6; .31; .03; .02 INJECTION, SOLUTION INTRAVENOUS at 04:04

## 2024-04-05 RX ADMIN — SUGAMMADEX 200 MG: 100 INJECTION, SOLUTION INTRAVENOUS at 06:04

## 2024-04-05 RX ADMIN — FAMOTIDINE 20 MG: 10 INJECTION, SOLUTION INTRAVENOUS at 10:04

## 2024-04-05 RX ADMIN — PHENYLEPHRINE HYDROCHLORIDE 200 MCG: 10 INJECTION INTRAVENOUS at 05:04

## 2024-04-05 RX ADMIN — SODIUM CHLORIDE, POTASSIUM CHLORIDE, SODIUM LACTATE AND CALCIUM CHLORIDE: 600; 310; 30; 20 INJECTION, SOLUTION INTRAVENOUS at 10:04

## 2024-04-05 RX ADMIN — CEFAZOLIN SODIUM 2 G: 2 SOLUTION INTRAVENOUS at 02:04

## 2024-04-05 RX ADMIN — MORPHINE SULFATE 2 MG: 4 INJECTION INTRAVENOUS at 09:04

## 2024-04-05 RX ADMIN — PROPOFOL 60 MG: 10 INJECTION, EMULSION INTRAVENOUS at 04:04

## 2024-04-05 RX ADMIN — SUCCINYLCHOLINE CHLORIDE 100 MG: 20 INJECTION, SOLUTION INTRAMUSCULAR; INTRAVENOUS; PARENTERAL at 04:04

## 2024-04-05 RX ADMIN — HEPARIN SODIUM 12 UNITS/KG/HR: 10000 INJECTION, SOLUTION INTRAVENOUS at 03:04

## 2024-04-05 RX ADMIN — DEXTROSE, SODIUM CHLORIDE, AND POTASSIUM CHLORIDE: 5; .45; .15 INJECTION INTRAVENOUS at 03:04

## 2024-04-05 RX ADMIN — DEXTROSE, SODIUM CHLORIDE, AND POTASSIUM CHLORIDE: 5; .45; .15 INJECTION INTRAVENOUS at 09:04

## 2024-04-05 RX ADMIN — ESCITALOPRAM OXALATE 20 MG: 10 TABLET ORAL at 09:04

## 2024-04-05 RX ADMIN — HYDROMORPHONE HYDROCHLORIDE 0.2 MG: 2 INJECTION INTRAMUSCULAR; INTRAVENOUS; SUBCUTANEOUS at 07:04

## 2024-04-05 RX ADMIN — ROCURONIUM BROMIDE 20 MG: 10 INJECTION, SOLUTION INTRAVENOUS at 05:04

## 2024-04-05 RX ADMIN — ROCURONIUM BROMIDE 45 MG: 10 INJECTION, SOLUTION INTRAVENOUS at 04:04

## 2024-04-05 RX ADMIN — MORPHINE SULFATE 4 MG: 4 INJECTION INTRAVENOUS at 10:04

## 2024-04-05 RX ADMIN — ROCURONIUM BROMIDE 5 MG: 10 INJECTION, SOLUTION INTRAVENOUS at 04:04

## 2024-04-05 RX ADMIN — CEFTRIAXONE 1 G: 1 INJECTION, POWDER, FOR SOLUTION INTRAMUSCULAR; INTRAVENOUS at 10:04

## 2024-04-05 RX ADMIN — CHLORHEXIDINE GLUCONATE 0.12% ORAL RINSE 10 ML: 1.2 LIQUID ORAL at 10:04

## 2024-04-05 NOTE — ANESTHESIA PROCEDURE NOTES
Intubation    Date/Time: 4/5/2024 4:20 PM    Performed by: Gary Ibanez CRNA  Authorized by: Suresh Larsen MD    Intubation:     Induction:  Rapid sequence induction    Intubated:  Postinduction    Mask Ventilation:  Not attempted    Attempts:  1    Attempted By:  CRNA    Method of Intubation:  Direct    Blade:  [place] 2    Laryngeal View Grade: Grade I - full view of cords      Difficult Airway Encountered?: No      Complications:  None    Airway Device:  Oral endotracheal tube    Airway Device Size:  7.5    Style/Cuff Inflation:  Cuffed (inflated to minimal occlusive pressure)    Tube secured:  22    Secured at:  The lips    Placement Verified By:  Capnometry    Complicating Factors:  None    Findings Post-Intubation:  BS equal bilateral

## 2024-04-05 NOTE — OP NOTE
[facility] - Surgery ([facility])  Surgery Department  Operative Note    SUMMARY     Date of Procedure: 4/5/2024     Procedure: Procedure(s) (LRB):  LAPAROTOMY, EXPLORATORY (N/A)  BLOCK, TRANSVERSUS ABDOMINIS PLANE (N/A)  ENTEROENTEROSTOMY, enterocolostomy, small bowel to transverse colon anastamosis (N/A)     Surgeon(s) and Role:     * Silvestre Ramos MD - Primary       Jessica Nelson PA-C     Assisting Surgeon: None    Pre-Operative Diagnosis: SBO (small bowel obstruction) [K56.609]  Partial intestinal obstruction, unspecified cause [K56.600]  Acute deep vein thrombosis (DVT) of proximal vein of left lower extremity [I82.4Y2]    Post-Operative Diagnosis: Post-Op Diagnosis Codes:     * SBO (small bowel obstruction) [K56.609]     * Partial intestinal obstruction, unspecified cause [K56.600]     * Acute deep vein thrombosis (DVT) of proximal vein of left lower extremity [I82.4Y2]    Anesthesia: General    Operative Findings (including complications, if any):     A loop of distal ileum adherent to his pelvic tumor causing obstruction.  Minimal collapse small-bowel between the tumor and the cecum    Description of Technical Procedures:     The patient was brought in the operative room placed on the operative table supine position.  General endotracheal anesthesia was induced.  Antibiotics were administered.  Pneumatic compression devices on lower extremity.  A central line was placed by the anesthesia service The patient had bilateral nephrostomy tubes.  The abdomen was clipped, prepped and draped in the standard fashion.      A time-out was performed.      A midline incision was made.  Subcutaneous tissues were dissected using electrocautery.  The fascia was identified and opened in the midline.  While the abdominal wall was being elevated the peritoneum was clamped with hemostats elevated and opened with Metzenbaum scissors.  There was no evidence of a bowel injury.  The opening in the peritoneum was then extended to  the length of the fascial incision.  A wound protector was inserted based inferiorly until was pelvis which was the point of obstruction.  Coming out of the pelvis there was maybe 6-8 cm of small bowel leading to the cecum.      The decision was made to perform a bypass procedure.  An area of the distal the small bowel was identified that easily reached  to the proximal transverse colon was identified.  we did not want to mobilize the ascending colon as the patient will have to be placed on heparin postoperatively.    The distal small bowel was approximated to the proximal transverse colon with 3-0 silk in interrupted fashion.  Enterotomies were made in the colon and the small bowel.  A FELICIANO 75 stapler was inserted and a side-to-side anastomosis was formed.      The opening from the area of stapler insertion was closed with 3-0 Vicryl in a running fashion followed by 3-0 silk in a Lembert fashion.  The omentum was then tacked over the area of the anastomosis.      The location of the NG tube was confirmed in the stomach in the small bowel was milked in a retrograde fashion.      An abdominal wall block was carried out by elevating the abdominal wall with Kocher clamps.  A mixture of Marcaine and Exparel was infiltrated bilaterally.      Fascia was closed with looped 1. PDS in a running fashion.  The skin was closed with staples.  Dry sterile dressings was placed.      Patient was transferred to the recovery room in hemodynamically stable condition.    A portable chest x-ray will be obtained for central line placement    Significant Surgical Tasks Conducted by the Assistant(s), if Applicable:  Assistance with retraction      Estimated Blood Loss (EBL):  25 mL   IV fluids a proximally 1 L   Marcaine 0.25% 30 mL   Exparel 266 mg           Implants: * No implants in log *    Specimens:   Specimen (24h ago, onward)      None                    Condition: Stable    Disposition: PACU - hemodynamically  stable.    Attestation: Op Note Attestation: I performed the procedure.

## 2024-04-05 NOTE — ASSESSMENT & PLAN NOTE
Based on the small-bowel follow-through and subsequent abdominal films.  The patient likely has a high-grade partial bowel obstruction related to his malignancy.      I once again discuss all the options with the patient as listed below     The options would include 1.  If this is indeed a partial obstruction in call liquids can be tolerated then no intervention would be required if the patient was going to pursue hospice care    Two.  Placement of a gastrostomy tube but this might proved to be challenging through minimally invasive techniques as there appears to be loops of small bowel overlying the stomach on his CT scan    Three.  Exploratory laparotomy with either resection of the small bowel with planned anastomosis or bypass of the small bowel to itself or to the colon to relieve the obstruction.  This carries the risk of a laparotomy including a wound infection, wound hernia, anastomotic leak or stricture    The patient has agreed to proceed with exploratory laparotomy.  The plan would be be a enteric bypass of the obstructed area unless there was a area of small bowel that can be easily resected or an adhesion easily lysed.      This was discussed with the patient was wife.      Consent will be obtained.

## 2024-04-05 NOTE — ANESTHESIA PROCEDURE NOTES
Central Line    Diagnosis: metastatic bladder cancer  Patient location during procedure: done in OR  Procedure start time: 4/5/2024 4:34 PM  Timeout: 4/5/2024 4:32 PM  Procedure end time: 4/5/2024 4:44 PM    Staffing  Authorizing Provider: Suresh Larsen MD  Performing Provider: Suresh Larsen MD    Staffing  Performed: anesthesiologist   Anesthesiologist: Suresh Larsen MD  Performed by: Suresh Larsen MD  Authorized by: Suresh Larsen MD    Anesthesiologist was present at the time of the procedure.  Preanesthetic Checklist  Completed: patient identified, IV checked, site marked, risks and benefits discussed, surgical consent, monitors and equipment checked, pre-op evaluation, timeout performed and anesthesia consent given  Indication   Indication: vascular access, med administration     Anesthesia   general anesthesia    Central Line   Skin Prep: skin prepped with ChloraPrep, skin prep agent completely dried prior to procedure  Sterile Barriers Followed: Yes    All five maximal barriers used- gloves, gown, cap, mask, and large sterile sheet    hand hygiene performed prior to central venous catheter insertion  Location: right internal jugular.   Catheter type: triple lumen  Catheter Size: 7 Fr  Inserted Catheter Length: 13 cm  Ultrasound: vascular probe with ultrasound   Vessel Caliber: small, patent  Vascular Doppler:  not done, compressibility normal  Needle advanced into vessel with real time Ultrasound guidance.  Guidewire confirmed in vessel.  sterile gel and probe cover used in ultrasound-guided central venous catheter insertion  Manometry: none  Insertion Attempts: 1   Securement:line sutured, chlorhexidine patch, sterile dressing applied and blood return through all ports    Post-Procedure        Guidewire Guidewire removed intact.

## 2024-04-05 NOTE — PROGRESS NOTES
[facility] Medicine  Progress Note     Patient Name:  Geovani Currie  MRN:  [id number]  Patient Class: IP-Inpatient  Admission Date:  4/3/2024   Length of Stay:  2  Attending Physician: Yuval Orozco MD  Primary Care Provider: Faizan Antoine MD    Subjective:      Subsequent Care: Follow up Complete obstruction of colon        HPI:  68 y.o. male patient with a PMHx of COPD, HTN, Lung Cancer, CKD, DVT, metastatic bladder cancer s/p nephrostomy tube placement. Presented to the Emergency Department for generalized abdominal pain, onset several days PTA. Pt was referred to the ED by Dr. Sr (Interventional Radiology) after CT Urogram showed small bowel obstruction. Pt was scheduled to have his nephrostomy tubes replaced on day of arrival. Associated sxs include nausea, abdominal distention, and loss of appetite. Patient denies any fever, chills, vomiting, SOB, CP, weakness, numbness, dizziness, headache, and all other sxs at this time. Pt and spouse are from [place], and are trying to return home to set up hospice. He is currently on cefdinir, but is not receiving any cancer treatment. General Surgery consulted, recommended to admit and consult Oncology and Urology. In the ED, vitals: 94/56, 85, 18, 98F, 96% RA. Significant Labs: H/H: 10/32.1, CT abd/pel: findings consistent SBO, Interval worsening of bladder cancer. Patient is a full code. Admitted to Hospital Medicine for management of Small Bowel Obstruction.         Overview/Hospital Course:  04/04/2024  CT scan shows shows interval worsening of locally invasive bladder cancer with rectal involvement and small bowel obstruction. SBFT pending. Oncology evaluated, strongly recommends DNR order to be placed. Both specialists suggest palliative vs hospice discussion. Palliative care consulted to assist.     04/05/2024  Came to discuss case with family and patient this morning one final time prior to their surgery. Went into  "meticulous detail regarding their wishes and goals at this encounter. Patient and family at bedside are of the understanding that the purpose of this pending surgery is to allow patient the ability to tolerate PO palliative immunotherapy. I also emphasized the fact that none of these interventions are curative in any way and that we are only establishing a path to allow further palliative measures. They seemed to acknowledge this fact as well. We discussed risks of surgery and both patient & family voiced their understanding and acceptance of these risks. Family then reiterated their desire to "stabilize" patient enough for him to get back to [place], where the patient (to my understanding) may or may not proceed with hospice care.      Will maintain Full Code in the perioperative period, but family seems amenable to transitioning to DNR at some point in the postoperative stage. Once stable, disposition will compose of likely discharge home with HH, NP at home and/or home based palliative care services in the interim while awaiting his move back to [place]. Discussed case with Palliative Care. Will consult their teams in the postoperative period if needed.      Interval Hx  NAEON. Large loose BM per the patient overnight.     Objective  /69 (Patient Position: Lying)   Pulse 95   Temp 98.1 °F (36.7 °C) (Oral)   Resp 18   Wt 63 kg (138 lb 14.2 oz)   SpO2 (!) 93%   BMI 21.12 kg/m²     Intake/Output Summary (Last 24 hours) at 4/5/2024 1352  Last data filed at 4/5/2024 0819  Gross per 24 hour   Intake 1694.06 ml   Output 725 ml   Net 969.06 ml       PHYSICAL EXAM  Vitals reviewed  Constitutional:       General: He is not in acute distress.     Appearance: He is cachectic. He is ill-appearing. He is not diaphoretic.   HENT:      Head: Normocephalic and atraumatic.      Right Ear: Hearing normal.      Left Ear: Hearing normal.      Nose: Nose normal. No mucosal edema or rhinorrhea.      Mouth/Throat:      " "Pharynx: Uvula midline.   Eyes:      General:         Right eye: No discharge.         Left eye: No discharge.      Conjunctiva/sclera: Conjunctivae normal.      Right eye: No chemosis.     Left eye: No chemosis.     Pupils: Pupils are equal, round, and reactive to light.   Neck:      Thyroid: No thyroid mass or thyromegaly.      Trachea: Trachea normal.   Cardiovascular:      Rate and Rhythm: Normal rate and regular rhythm.      Pulses:           Dorsalis pedis pulses are 1+ on the right side and 1+ on the left side.      Heart sounds: Normal heart sounds. No murmur heard.  Pulmonary:      Effort: Pulmonary effort is normal. No respiratory distress.      Breath sounds: Examination of the right-lower field reveals decreased breath sounds. Examination of the left-lower field reveals decreased breath sounds. Decreased breath sounds present. No wheezing.   Abdominal:      General: Bowel sounds are normal. There is distension.      Palpations: Abdomen is soft.      Tenderness: There is generalized abdominal tenderness.     LABS  All labs from the past 24 hours were reviewed.     BMP:   Recent Labs   Lab 04/04/24  0426         K 3.5      CO2 21*   BUN 15   CREATININE 1.1   CALCIUM 9.3   MG 1.7     CBC:   Recent Labs   Lab 04/03/24 1845 04/04/24 0426   WBC 9.47 8.02  8.02   HGB 9.8* 10.2*  10.2*   HCT 31.4* 32.7*  32.7*    362  362     CMP:   Recent Labs   Lab 04/04/24 0426      K 3.5      CO2 21*      BUN 15   CREATININE 1.1   CALCIUM 9.3   PROT 6.2   ALBUMIN 2.7*   BILITOT 0.4   ALKPHOS 121   AST 12   ALT 8*   ANIONGAP 12     Cardiac Markers: No results for input(s): "CKMB", "MYOGLOBIN", "BNP", "TROPISTAT" in the last 48 hours.  Coagulation:   Recent Labs   Lab 04/03/24 1845 04/04/24 0426 04/05/24  1125   INR 1.1  --   --    APTT 38.4*   < > 31.9    < > = values in this interval not displayed.     Lactic Acid: No results for input(s): "LACTATE" in the last 48 " "hours.  Magnesium:   Recent Labs   Lab 04/04/24  0426   MG 1.7     Troponin: No results for input(s): "TROPONINI", "TROPONINIHS" in the last 48 hours.  TSH:   Recent Labs   Lab 02/23/24  0841   TSH 0.630     Urine Studies:   No results for input(s): "COLORU", "APPEARANCEUA", "PHUR", "SPECGRAV", "PROTEINUA", "GLUCUA", "KETONESU", "BILIRUBINUA", "OCCULTUA", "NITRITE", "UROBILINOGEN", "LEUKOCYTESUR", "RBCUA", "WBCUA", "BACTERIA", "SQUAMEPITHEL", "HYALINECASTS" in the last 48 hours.    Invalid input(s): "WRIGHTSUR"    IMAGING  All imaging from the past 24 hours were reviewed.     Imaging Results              X-Ray Abdomen Flat And Erect (Final result)  Result time 04/03/24 15:59:11      Final result by Elmo Garza MD (04/03/24 15:59:11)                   Impression:      Increasing small bowel distension mid upper abdomen indicating partial small bowel obstruction.      Electronically signed by: Elmo Garza MD  Date:    04/03/2024  Time:    15:59               Narrative:    EXAMINATION:  XR ABDOMEN FLAT AND ERECT    CLINICAL HISTORY:  Unspecified intestinal obstruction, unspecified as to partial versus complete obstruction    TECHNIQUE:  Routine exam.    COMPARISON:  03/11/2024    FINDINGS:  Increasing air-filled loops of small bowel throughout the mid upper abdomen.  Maximum diameter approximately 4 cm.Large amount stool right colon    No other changes.  Again noted are bilateral nephrostomy catheters.                                      Assessment/Plan:      * Partial intestinal obstruction  Secondary to bladder cancer, progression noted on CT  --Consult Oncology and Urology for recommendations  --Consult General Surgery     04/04/2024  --SBFT ordered for this morning  --follow up surgery recommendations  --hospice discussion on going (as discussed below)    04/05/2024  --Exploratory laparotomy planned for today with either resection of the small bowel with planned anastomosis or bypass of the small bowel to " "itself or to the colon to relieve the obstruction.   --will follow up postoperatively     Malignant neoplasm of urinary bladder  Known hx of bladder cancer, CT abd/pel: Interval worsening of locally invasive bladder cancer extension to the pelvic sidewall and the presacral fat and sacrum on the left. There is involvement of the rectum as well as well as involvement of a small bowel loop in the pelvis causing a small bowel obstruction.      --Consult urology and oncology for recommendations     04/04/2024  -Urology evaluated: "I cannot offer any surgical intervention for his advanced bladder cancer at this point. I would recommend hospice, which patient's wife appears open to."  -Oncology evaluated: "Strongly recommend that patient have a do not resuscitate order be placed on chart and be seen by palliative care if not already done so."  -Palliative care consulted, follow up recommendations      04/05/2024  See advance care note below    Advanced care planning/counseling discussion  04/05/2024  Came to discuss case with family and patient this morning one final time prior to their surgery. Went into meticulous detail regarding their wishes and goals at this encounter. Patient and family at bedside are of the understanding that the purpose of this pending surgery is to allow patient the ability to tolerate PO palliative immunotherapy. I also emphasized the fact that none of these interventions are curative in any way and that we are only establishing a path to allow further palliative measures. They seemed to acknowledge this fact as well. We discussed risks of surgery and both patient & family voiced their understanding and acceptance of these risks. Family then reiterated their desire to "stabilize" patient enough for him to get back to [place], where the patient (to my understanding) may or may not proceed with hospice care.      Will maintain Full Code in the perioperative period, but family seems amenable to " transitioning to DNR at some point in the postoperative stage. Once stable, disposition will compose of likely discharge home with HH, NP at home and/or home based palliative care services in the interim while awaiting his move back to [place]. Discussed case with Palliative Care. Will consult their teams in the postoperative period if needed.   --Confirmed with patient and family at bedside that patient will be a full code during this hospitalization  --orders placed, discussion duration: < 3 minutes          Acute deep vein thrombosis (DVT) of femoral vein of left lower extremity  New diagnosis, managed with Eliquis as OP     --Hold Eliquis for now  --Heparin per nomogram        Hypothyroidism     Chronic, well controlled     --Cont home med        Nephrostomy tube displaced  Followed by Urology, planned for tube exchange on 4/3, referred to ED due to SBO     --Consult IR if needed         Centrilobular emphysema  Chronic, well controlled     --Cont home meds     Squamous cell carcinoma of right lung  Followed by oncology, treated with Radiation, therapy completed     --consult oncology        Anemia  Patient's anemia is currently controlled. Has not received any PRBCs to date. Etiology likely d/t chronic disease due to Malignancy  Current CBC reviewed-             Lab Results   Component Value Date     HGB 10.0 (L) 04/03/2024     HCT 32.1 (L) 04/03/2024      Monitor serial CBC and transfuse if patient becomes hemodynamically unstable, symptomatic or H/H drops below 7/21.     Elevated serum creatinine  Mild increase,likely secondary to tumor burden     --Repeat CMP in AM      CORE MEASURES:  VTE Risk Mitigation (From admission, onward)           Ordered     heparin 25,000 units in dextrose 5% 250 mL (100 units/mL) infusion LOW INTENSITY nomogram - [facility]  Continuous        Question:  Begin at (units/kg/hr)  Answer:  12 04/04/24 1920     Reason for No Pharmacological VTE Prophylaxis  Once        Question:   Reasons:  Answer:  Risk of Bleeding    04/03/24 1724     IP VTE HIGH RISK PATIENT  Once         04/03/24 1724     Place sequential compression device  Until discontinued         04/03/24 1724                    Code Status: FULL    Diet: Diet NPO    Disposition: exploratory laparotomy, follow up postoperatively       Yuval Orozco MD  Department of Hospital Medicine   [facility] ([facility])

## 2024-04-05 NOTE — PROGRESS NOTES
The small-bowel follow-through was reviewed.  The images were shared with the patient.  The findings were discussed    At 7-1/2 hours it does not appear that the contrast has entered the colon.      These findings are consistent with a near complete bowel obstruction.      I discussed with the patient that you will likely need surgical intervention tomorrow unless he decides that he will enter into hospice care.      I explained that the goal of surgery would be to relieve the internal obstruction either by a resection of the small bowel or more likely an internal bypass of the obstruction.      I briefly discuss the risks and benefits of the surgery.      We will obtain abdominal films in the morning and make a final decision then in conjunction with the patient's wife.      I also explained that if he vomits again you will need a nasogastric tube placement.      Orders were placed for the heparin drip to stop it 530 tomorrow morning for 522 and for a.m. labs as well as a PTT at noon tomorrow.      Surgery scheduled for early afternoon if he agrees to proceed.      Questions were answered

## 2024-04-05 NOTE — PROGRESS NOTES
[facility]  General Surgery  Progress Note    Subjective:     History of Present Illness:  68-year-old male with a known locally advanced and metastatic bladder cancer who underwent a CT urogram today which was concerning for a bowel obstruction.      Most of the history is obtained from the patient's wife.  She states that he had some purulent drainage around his nephrostomy tube.  He was seen by Urology earlier today and they communicated with Infectious Disease.  He underwent a CT urogram that was consistent with a bowel obstruction as well as advancement of his bladder cancer.  Surgery was consulted because of a bowel obstruction.      According to his wife he has had some abdominal discomfort and nausea and vomiting.      He was followed here by Oncology and been treated with chemotherapy.  The last plan was to treat him with the following:    /9/2024 -  Chemotherapy     Treatment Summary   Plan Name: OP pembrolizumab 200mg Q3W  Treatment Goal: Palliative  Status: Active  Start Date: 2/9/2024 (Planned)  End Date: 1/23/2026 (Planned)     The patient also has a history of a DVT with a pulmonary embolism that is treated with Eliquis.  He also has a history of a lung lesion that was treated with radiation.      Surgery was asked to see him because the bowel obstruction.      The patient appears cachectic.  He answers some questions but most information is from his wife.  They proceed palliative care consult.  They are trying to returned to [place] where they have more support.    Post-Op Info:  Procedure(s) (LRB):  LAPAROTOMY, EXPLORATORY (N/A)   Day of Surgery     Interval History:  Patient does not feel distended he is to undergo a small-bowel follow-through today    Medications:  Continuous Infusions:   heparin (porcine) in D5W 12 Units/kg/hr (04/03/24 6169)     Scheduled Meds:   cefTRIAXone (Rocephin) IV (PEDS and ADULTS)  1 g Intravenous Q24H    EScitalopram oxalate  20 mg Oral Daily     heparin (PORCINE)  60 Units/kg Intravenous Once    levothyroxine  125 mcg Oral Before breakfast    polyethylene glycol  17 g Oral Daily    pregabalin  75 mg Oral QHS    tamsulosin  1 capsule Oral Daily     PRN Meds:acetaminophen, ALPRAZolam, aluminum-magnesium hydroxide-simethicone, dextrose 10%, dextrose 10%, glucagon (human recombinant), glucose, glucose, heparin (PORCINE), heparin (PORCINE), melatonin, morphine, naloxone, ondansetron, prochlorperazine, sodium chloride 0.9%     Review of patient's allergies indicates:  No Known Allergies  Objective:     Vital Signs (Most Recent):  Temp: 98.2 °F (36.8 °C) (04/04/24 0743)  Pulse: (!) 115 (04/04/24 0743)  Resp: 16 (04/04/24 0743)  BP: 129/80 (04/04/24 0743)  SpO2: 96 % (04/04/24 0743) Vital Signs (24h Range):  Temp:  [98 °F (36.7 °C)-99.8 °F (37.7 °C)] 98.2 °F (36.8 °C)  Pulse:  [] 115  Resp:  [16-20] 16  SpO2:  [94 %-97 %] 96 %  BP: ()/(56-80) 129/80     Weight: 63 kg (138 lb 14.2 oz)  Body mass index is 21.12 kg/m².    Intake/Output - Last 3 Shifts         04/02 0700  04/03 0659 04/03 0700  04/04 0659 04/04 0700  04/05 0659    Urine (mL/kg/hr)   450 (4.1)    Total Output   450    Net   -450                    Physical Exam  Vitals and nursing note reviewed.   Constitutional:       General: He is not in acute distress.     Appearance: He is well-developed.   HENT:      Head: Normocephalic and atraumatic.   Eyes:      General: No scleral icterus.     Pupils: Pupils are equal, round, and reactive to light.   Neck:      Thyroid: No thyromegaly.      Vascular: No JVD.      Trachea: No tracheal deviation.   Cardiovascular:      Rate and Rhythm: Normal rate and regular rhythm.      Heart sounds: Normal heart sounds.   Pulmonary:      Effort: Pulmonary effort is normal.      Breath sounds: Normal breath sounds.   Abdominal:      General: Abdomen is flat. Bowel sounds are normal. There is no distension.      Palpations: Abdomen is soft. There is no mass.       Tenderness: There is no abdominal tenderness. There is no guarding or rebound.   Musculoskeletal:         General: Normal range of motion.      Cervical back: Normal range of motion and neck supple.   Lymphadenopathy:      Cervical: No cervical adenopathy.   Skin:     General: Skin is warm and dry.   Neurological:      Mental Status: He is alert and oriented to person, place, and time.   Psychiatric:         Mood and Affect: Mood normal.         Behavior: Behavior normal.         Thought Content: Thought content normal.         Judgment: Judgment normal.          Significant Labs:  I have reviewed all pertinent lab results within the past 24 hours.  CBC:   Recent Labs   Lab 04/04/24 0426   WBC 8.02  8.02   RBC 3.87*  3.87*   HGB 10.2*  10.2*   HCT 32.7*  32.7*     362   MCV 85  85   MCH 26.4*  26.4*   MCHC 31.2*  31.2*     CMP:   Recent Labs   Lab 04/04/24 0426      CALCIUM 9.3   ALBUMIN 2.7*   PROT 6.2      K 3.5   CO2 21*      BUN 15   CREATININE 1.1   ALKPHOS 121   ALT 8*   AST 12   BILITOT 0.4       Significant Diagnostics:  I have reviewed all pertinent imaging results/findings within the past 24 hours.  Small-bowel follow-through today  Assessment/Plan:     Partial intestinal obstruction  Small-bowel follow-through today, space based on imaging studies the patient would have the following options     The options would include 1.  If this is indeed a partial obstruction in call liquids can be tolerated then no intervention would be required if the patient was going to pursue hospice care    Two.  Placement of a gastrostomy tube but this might proved to be challenging through minimally invasive techniques as there appears to be loops of small bowel overlying the stomach on his CT scan    Three.  Exploratory laparotomy with either resection of the small bowel with planned anastomosis or bypass of the small bowel to itself or to the colon to relieve the obstruction.  This  carries the risk of a laparotomy including a wound infection, wound hernia, anastomotic leak or stricture    Will discuss this with him after small-bowel follow-through    Acute deep vein thrombosis (DVT) of femoral vein of left lower extremity  Patient currently on a heparin drip.  This would need to be stopped 6 hours before any procedures or surgical intervention.    Hypothyroidism  Management per hospital Medicine    Malignant neoplasm of urinary bladder  Worsening findings on CT scan.  There is some invasion of the rectum as well as the pelvic sidewalls.      Small-bowel involvement with the cancer is likely the cause of obstruction and may require surgical intervention to relieve the bowel obstruction    According to Oncology there is an option of immunotherapy which could take up to 12 weeks to see a response.  They recommended a palliative care consult, which is still pending.      Will discuss surgical options for a partial bowel obstruction pending the results of small-bowel follow-through    Nephrostomy tube displaced  Recommend urology consult and likely Interventional Radiology consult for further management per hospital Medicine    Centrilobular emphysema  Increases the risk of patient was pulmonary complications of surgical intervention is required    Squamous cell carcinoma of right lung  Patient reports having completed radiation treatment    Anemia  Secondary to metastatic cancer.  Management per Medicine    Elevated serum creatinine  Likely secondary to partial obstruction of the ureters related to bladder cancer        Silvestre Ramos MD  General Surgery  [facility] ([facility])

## 2024-04-05 NOTE — CARE UPDATE
"Came to discuss case with family and patient this morning one final time prior to their surgery. Went into meticulous detail regarding their wishes and goals at this encounter. Patient and family at bedside are of the understanding that the purpose of this pending surgery is to allow patient the ability to tolerate PO palliative immunotherapy. I also emphasized the fact that none of these interventions are curative in any way and that we are only establishing a path to allow further palliative measures. They seemed to acknowledge this fact as well. We discussed risks of surgery and both patient & family voiced their understanding and acceptance of these risks. Family then reiterated their desire to "stabilize" patient enough for him to get back to [place], where the patient (to my understanding) may or may not proceed with hospice care.     Will maintain Full Code in the perioperative period, but family seems amenable to transitioning to DNR at some point in the postoperative stage. Once stable, disposition will compose of likely discharge home with HH, NP at home and/or home based palliative care services in the interim while awaiting his move back to [place]. Discussed case with Palliative Care. Will consult their teams in the postoperative period if needed.     FULL note to follow later this morning.     Yuval Orozco MD  [facility] Medicine      "

## 2024-04-05 NOTE — TRANSFER OF CARE
Anesthesia Transfer of Care Note    Patient: Geovani Currie    Procedure(s) Performed: Procedure(s) (LRB):  LAPAROTOMY, EXPLORATORY (N/A)  BLOCK, TRANSVERSUS ABDOMINIS PLANE (N/A)  ENTEROENTEROSTOMY (N/A)    Patient location: PACU    Anesthesia Type: general    Transport from OR: Transported from OR on room air with adequate spontaneous ventilation    Post pain: adequate analgesia    Post assessment: no apparent anesthetic complications    Post vital signs: stable    Level of consciousness: sedated    Nausea/Vomiting: no nausea/vomiting    Complications: none    Transfer of care protocol was followed      Last vitals: Visit Vitals  /69 (Patient Position: Lying)   Pulse 95   Temp 36.7 °C (98.1 °F) (Oral)   Resp 18   Wt 63 kg (138 lb 14.2 oz)   SpO2 (!) 93%   BMI 21.12 kg/m²

## 2024-04-05 NOTE — ASSESSMENT & PLAN NOTE
Extensive conversation with the patient and family at bedside I also pulled up his most recent CT demonstrating his bowel obstruction as well as his widespread intra-abdominal lymph nodes in retroperitoneal lymphadenopathy.  My recommendations at this point would be to have patient is medically stable as possible if he wishes to receive immunotherapy in the outpatient setting certainly can do so in this has been recommended to him previously I have told him that this is not curative therapy.  It will take up to 12 weeks to see if there is response.  Strongly recommend that patient have a do not resuscitate order be placed on chart and be seen by palliative care if not already done so.  Answered questions family at bedside  04/05/2024.  At this time patient has been seen by surgery appears to have a bowel obstruction.  Although he did have 2 loose stools this morning at this time decision will need to be made whether exploratory surgery is warranted.  If the patient decides not to proceed with surgery I strongly urged hospice placement this time I have talked to the family concerning this yesterday.  He is ECOG status 3 at the present time in his not really a candidate for any type of systemic immunotherapy until patient's bowel obstruction is resolved performance status improves.  Would agree with palliative care consultation do not resuscitate order be placed on chart

## 2024-04-05 NOTE — ASSESSMENT & PLAN NOTE
Patient was heparin was held.  PTT will be reviewed.      Heparin can be restarted postoperatively without bolus

## 2024-04-05 NOTE — ANESTHESIA PREPROCEDURE EVALUATION
04/05/2024  Geovani Currie is a 68 y.o., male.    Patient Active Problem List   Diagnosis    Elevated serum creatinine    Anemia    Hx of cancer of lung    UTI (urinary tract infection)    Hyperglycemia    Squamous cell carcinoma of right lung    Centrilobular emphysema    Nephrostomy tube displaced    Malignant neoplasm of urinary bladder    Ureteral obstruction, left    Need for tetanus, diphtheria, and acellular pertussis (Tdap) vaccine    Abrasion of right elbow    Displaced intertrochanteric fracture of right femur, initial encounter for closed fracture    Right elbow pain    Right hip pain    Encounter for palliative care    Stage 4 chronic kidney disease    Hypothyroidism    Encephalopathy, metabolic    Malfunction of nephrostomy tube    Sepsis    Constipation    Hypotension    Acute deep vein thrombosis (DVT) of femoral vein of left lower extremity    Acute deep vein thrombosis (DVT) of left lower extremity    Anxiety    Acute renal failure superimposed on stage 3 chronic kidney disease    Acute cystitis    Partial intestinal obstruction    Near complete obstruction of colon     Past Medical History:   Diagnosis Date    Anxiety disorder, unspecified     Cancer     COPD (chronic obstructive pulmonary disease)     Hypertension     Thyroid disease      Past Surgical History:   Procedure Laterality Date    APPENDECTOMY      CATARACT EXTRACTION      NEPHROSTOMY      OPEN REDUCTION AND INTERNAL FIXATION (ORIF) OF INTERTROCHANTERIC FRACTURE OF FEMUR Right 11/21/2023    Procedure: ORIF, FRACTURE, FEMUR, INTERTROCHANTERIC;  Surgeon: Tanisha Guidry DO;  Location: [facility];  Service: Orthopedics;  Laterality: Right;  Gamma nail     ECHO: (10/22/23)    Left Ventricle: The left ventricle is normal in size. Normal wall thickness. There is eccentric hypertrophy. Normal wall motion. There is normal systolic  function. Ejection fraction by visual approximation is 60%. Grade I diastolic dysfunction.    Left Atrium: Agitated saline study of the atrial septum is negative after vasalva maneuver, suggesting absence of intracardiac shunt at the atrial level.    Right Ventricle: Systolic function is normal.    Aortic Valve: The aortic valve is a trileaflet valve. Severely calcified cusps. Moderately restricted motion. There is moderate stenosis. Aortic valve area by VTI is 1.34 cm². Aortic valve peak velocity is 3.20 m/s. Mean gradient is 28 mmHg. The dimensionless index is 0.38. There is mild aortic regurgitation.    Mitral Valve: There is moderate mitral annular calcification present.    Pulmonary Artery: The estimated pulmonary artery systolic pressure is 37 mmHg.    IVC/SVC: Intermediate venous pressure at 8 mmHg.    Pre-op Assessment    I have reviewed the Patient Summary Reports.     I have reviewed the Nursing Notes. I have reviewed the NPO Status.   I have reviewed the Medications.     Review of Systems  Anesthesia Hx:  No problems with previous Anesthesia   History of prior surgery of interest to airway management or planning:  Previous anesthesia: General          Denies Personal Hx of Anesthesia complications.                    Social:  Smoker Daily vaper - denies any recent fever, chills, new cough or new SOB      Hematology/Oncology:  Hematology Normal   Oncology Normal                Hematology Comments:  10.2/32.7                Oncology Comments: Lung cancer - SCC of Right lung (recently diagnosed, currently on no treatment, PET scan in near future to stage)      Cardiovascular:     Hypertension              ECG has been reviewed. Sinus rhythm with marked sinus arrythmia   Nonspecific T wave abnormality   Abnormal ECG   Confirmed by CRISS FUCHS MD ([id number]) on 2/21/2024 4:08:31 PM                            Pulmonary:   COPD, severe      Centrilobar emphysema                Renal/:  Chronic Renal Disease, ARF,  CKD   CKD-3             Hepatic/GI:  Hepatic/GI Normal                 Musculoskeletal:  Musculoskeletal Normal                Neurological:  Neurology Normal                                      Endocrine:   Hypothyroidism  BMI 28        Dermatological:  Skin Normal    Psych:  Psychiatric History                  Chemistry        Component Value Date/Time     04/04/2024 0426    K 3.5 04/04/2024 0426     04/04/2024 0426    CO2 21 (L) 04/04/2024 0426    BUN 15 04/04/2024 0426    CREATININE 1.1 04/04/2024 0426     04/04/2024 0426        Component Value Date/Time    CALCIUM 9.3 04/04/2024 0426    ALKPHOS 121 04/04/2024 0426    AST 12 04/04/2024 0426    ALT 8 (L) 04/04/2024 0426    BILITOT 0.4 04/04/2024 0426        Lab Results   Component Value Date    WBC 8.02 04/04/2024    WBC 8.02 04/04/2024    HGB 10.2 (L) 04/04/2024    HGB 10.2 (L) 04/04/2024    HCT 32.7 (L) 04/04/2024    HCT 32.7 (L) 04/04/2024    MCV 85 04/04/2024    MCV 85 04/04/2024     04/04/2024     04/04/2024     ECHO: (10/22/23)  Summary         Left Ventricle: The left ventricle is normal in size. Normal wall thickness. There is eccentric hypertrophy. Normal wall motion. There is normal systolic function. Ejection fraction by visual approximation is 60%. Grade I diastolic dysfunction.    Left Atrium: Agitated saline study of the atrial septum is negative after vasalva maneuver, suggesting absence of intracardiac shunt at the atrial level.    Right Ventricle: Systolic function is normal.    Aortic Valve: The aortic valve is a trileaflet valve. Severely calcified cusps. Moderately restricted motion. There is moderate stenosis. Aortic valve area by VTI is 1.34 cm². Aortic valve peak velocity is 3.20 m/s. Mean gradient is 28 mmHg. The dimensionless index is 0.38. There is mild aortic regurgitation.    Mitral Valve: There is moderate mitral annular calcification present.    Pulmonary Artery: The estimated pulmonary artery  systolic pressure is 37 mmHg.    IVC/SVC: Intermediate venous pressure at 8 mmH      Physical Exam  General: Cooperative, Alert, Oriented and Cachexia    Airway:  Mallampati: III / II/ III  Mouth Opening: Small, but > 3cm  TM Distance: Normal  Tongue: Normal  Neck ROM: Normal ROM    Dental:  Edentulous        Anesthesia Plan  Type of Anesthesia, risks & benefits discussed:    Anesthesia Type: Gen ETT  Intra-op Monitoring Plan: Standard ASA Monitors and Central Line  Post Op Pain Control Plan: multimodal analgesia and IV/PO Opioids PRN  Induction:  IV  Airway Plan: Direct, Post-Induction  Informed Consent: Informed consent signed with the Patient and all parties understand the risks and agree with anesthesia plan.  All questions answered.   ASA Score: 3  Day of Surgery Review of History & Physical: H&P Update referred to the surgeon/provider.I have interviewed and examined the patient. I have reviewed the patient's H&P dated:   Anesthesia Plan Notes: Intubation     Date/Time: 11/21/2023 3:30 PM     Performed by: Miguel Huynh CRNA  Authorized by: Suresh Larsen MD    Intubation:     Induction:  Intravenous    Intubated:  Postinduction    Mask Ventilation:  Easy mask    Attempts:  1    Attempted By:  CRNA    Method of Intubation:  Direct    Blade:  Sarahi 4    Laryngeal View Grade: Grade I - full view of cords      Difficult Airway Encountered?: No      Complications:  None    Airway Device:  Oral endotracheal tube    Airway Device Size:  7.5    Style/Cuff Inflation:  Cuffed (inflated to minimal occlusive pressure)    Tube secured:  23    Secured at:  The lips    Placement Verified By:  Capnometry    Findings Post-Intubation:  BS equal bilateral and atraumatic/condition of teeth unchanged      Ready For Surgery From Anesthesia Perspective.     .

## 2024-04-05 NOTE — PROGRESS NOTES
[facility]  General Surgery  Progress Note    Subjective:     History of Present Illness:  68-year-old male with a known locally advanced and metastatic bladder cancer who underwent a CT urogram today which was concerning for a bowel obstruction.      Most of the history is obtained from the patient's wife.  She states that he had some purulent drainage around his nephrostomy tube.  He was seen by Urology earlier today and they communicated with Infectious Disease.  He underwent a CT urogram that was consistent with a bowel obstruction as well as advancement of his bladder cancer.  Surgery was consulted because of a bowel obstruction.      According to his wife he has had some abdominal discomfort and nausea and vomiting.      He was followed here by Oncology and been treated with chemotherapy.  The last plan was to treat him with the following:    /9/2024 -  Chemotherapy     Treatment Summary   Plan Name: OP pembrolizumab 200mg Q3W  Treatment Goal: Palliative  Status: Active  Start Date: 2/9/2024 (Planned)  End Date: 1/23/2026 (Planned)     The patient also has a history of a DVT with a pulmonary embolism that is treated with Eliquis.  He also has a history of a lung lesion that was treated with radiation.      Surgery was asked to see him because the bowel obstruction.      The patient appears cachectic.  He answers some questions but most information is from his wife.  They proceed palliative care consult.  They are trying to returned to [place] where they have more support.    Post-Op Info:  Procedure(s) (LRB):  LAPAROTOMY, EXPLORATORY (N/A)   Day of Surgery     Interval History:  Patient was small-bowel follow-through and subsequent abdominal x-rays were consistent with a partial bowel obstruction.  This is likely due to involvement of the tumor in the small bowel.  The need for laparotomy and bypass of the obstruction versus possible resection was discussed with the patient was wife.  I  reviewed all the risks benefits and complications I discussed the alternatives.  They have agreed with surgical intervention.  I discussed the postop course.      I explained that no attempts would be made to remove any of the tumor as it is non operative    Medications:  Continuous Infusions:   dextrose 5 % and 0.45 % NaCl with KCl 20 mEq 75 mL/hr at 04/05/24 0910     Scheduled Meds:   ceFAZolin (Ancef) IV (PEDS and ADULTS)  2 g Intravenous Once    cefTRIAXone (Rocephin) IV (PEDS and ADULTS)  1 g Intravenous Q24H    EScitalopram oxalate  20 mg Oral Daily    levothyroxine  125 mcg Oral Before breakfast    pregabalin  75 mg Oral QHS    tamsulosin  1 capsule Oral Daily     PRN Meds:acetaminophen, ALPRAZolam, dextrose 10%, dextrose 10%, glucagon (human recombinant), glucose, glucose, levalbuterol, melatonin, morphine, naloxone, ondansetron, prochlorperazine, sodium chloride 0.9%     Review of patient's allergies indicates:  No Known Allergies  Objective:     Vital Signs (Most Recent):  Temp: 98.1 °F (36.7 °C) (04/05/24 1113)  Pulse: 95 (04/05/24 1113)  Resp: 18 (04/05/24 1113)  BP: 139/69 (04/05/24 1113)  SpO2: (!) 93 % (04/05/24 1113) Vital Signs (24h Range):  Temp:  [97.6 °F (36.4 °C)-98.3 °F (36.8 °C)] 98.1 °F (36.7 °C)  Pulse:  [] 95  Resp:  [16-20] 18  SpO2:  [93 %-99 %] 93 %  BP: (130-158)/(64-80) 139/69     Weight: 63 kg (138 lb 14.2 oz)  Body mass index is 21.12 kg/m².    Intake/Output - Last 3 Shifts         04/03 0700 04/04 0659 04/04 0700 04/05 0659 04/05 0700 04/06 0659    I.V. (mL/kg) 0 (0) 742.8 (11.8) 854.5 (13.6)    IV Piggyback 0  96.8    Total Intake(mL/kg) 0 (0) 742.8 (11.8) 951.3 (15.1)    Urine (mL/kg/hr)  925 (0.6) 250 (0.8)    Stool  0     Total Output  925 250    Net 0 -182.2 +701.3           Stool Occurrence  5 x              Physical Exam  Vitals and nursing note reviewed.   Constitutional:       General: He is not in acute distress.     Appearance: He is well-developed. He is  ill-appearing (chronically).   HENT:      Head: Normocephalic and atraumatic.   Eyes:      General: No scleral icterus.     Pupils: Pupils are equal, round, and reactive to light.   Neck:      Thyroid: No thyromegaly.      Vascular: No JVD.      Trachea: No tracheal deviation.   Cardiovascular:      Rate and Rhythm: Normal rate and regular rhythm.      Heart sounds: Normal heart sounds.   Pulmonary:      Effort: Pulmonary effort is normal.      Breath sounds: Normal breath sounds.   Abdominal:      General: Bowel sounds are normal. There is no distension.      Palpations: Abdomen is soft. There is no mass.      Tenderness: There is no abdominal tenderness (mild). There is no guarding or rebound.   Musculoskeletal:         General: Normal range of motion.      Cervical back: Normal range of motion and neck supple.   Lymphadenopathy:      Cervical: No cervical adenopathy.   Skin:     General: Skin is warm and dry.      Comments: Posterior nephrostomy tubes bilaterally   Neurological:      Mental Status: He is alert and oriented to person, place, and time.   Psychiatric:         Behavior: Behavior normal.         Thought Content: Thought content normal.         Judgment: Judgment normal.          Significant Labs:  I have reviewed all pertinent lab results within the past 24 hours.  CBC:   Recent Labs   Lab 04/04/24 0426   WBC 8.02  8.02   RBC 3.87*  3.87*   HGB 10.2*  10.2*   HCT 32.7*  32.7*     362   MCV 85  85   MCH 26.4*  26.4*   MCHC 31.2*  31.2*     CMP:   Recent Labs   Lab 04/04/24  0426      CALCIUM 9.3   ALBUMIN 2.7*   PROT 6.2      K 3.5   CO2 21*      BUN 15   CREATININE 1.1   ALKPHOS 121   ALT 8*   AST 12   BILITOT 0.4     Coagulation:   Recent Labs   Lab 04/03/24  1845 04/04/24  0426 04/04/24  2311   LABPROT 12.5  --   --    INR 1.1  --   --    APTT 38.4*   < > 40.6*    < > = values in this interval not displayed.       Significant Diagnostics:  I have reviewed all  pertinent imaging results/findings within the past 24 hours.  Small-bowel follow-through and abdominal film    Narrative & Impression  EXAMINATION:  XR SMALL BOWEL FOLLOW THROUGH     CLINICAL HISTORY:  Gastrografin.  Evaluate for complete or partial obstruction;     TECHNIQUE:  Supine imaging provided to 7-1/2 hour     COMPARISON:  CT correlation     FINDINGS:  Small bowel is distended with delayed transit time     Impression:     Findings consistent with distal small bowel obstruction        Electronically signed by: Leyue Calixto  Date:                                            04/04/2024  Time:                                           19:49    Reading Physician Reading Date Result Priority   Oleg Sr MD  [phone]  [phone] 4/5/2024 Routine     Narrative & Impression  EXAMINATION:  XR ABDOMEN FLAT AND ERECT     CLINICAL HISTORY:  Follow up after small-bowel follow-through; Unspecified intestinal obstruction, unspecified as to partial versus complete obstruction     TECHNIQUE:  Single frontal view of the chest was performed.     COMPARISON:  04/03/2024     FINDINGS:  Bilateral nephrostomy tubes are noted.  Prominent small bowel loops are seen throughout the abdomen without definite transition and measure up to 4.7 cm.  Contrast is seen within the large bowel.  Findings consistent with ileus.  In comparison to the prior study, there is no adverse interval changes     Impression:     In comparison to the prior study, there is no adverse interval changes.  Recommend continued surveillance        Electronically signed by: Oleg Sr MD  Date:                                            04/05/2024  Time:                                           09:20             Assessment/Plan:     Partial intestinal obstruction  Based on the small-bowel follow-through and subsequent abdominal films.  The patient likely has a high-grade partial bowel obstruction related to his malignancy.      I once again  discuss all the options with the patient as listed below     The options would include 1.  If this is indeed a partial obstruction in call liquids can be tolerated then no intervention would be required if the patient was going to pursue hospice care    Two.  Placement of a gastrostomy tube but this might proved to be challenging through minimally invasive techniques as there appears to be loops of small bowel overlying the stomach on his CT scan    Three.  Exploratory laparotomy with either resection of the small bowel with planned anastomosis or bypass of the small bowel to itself or to the colon to relieve the obstruction.  This carries the risk of a laparotomy including a wound infection, wound hernia, anastomotic leak or stricture    The patient has agreed to proceed with exploratory laparotomy.  The plan would be be a enteric bypass of the obstructed area unless there was a area of small bowel that can be easily resected or an adhesion easily lysed.      This was discussed with the patient was wife.      Consent will be obtained.    Acute deep vein thrombosis (DVT) of femoral vein of left lower extremity  Patient was heparin was held.  PTT will be reviewed.      Heparin can be restarted postoperatively without bolus    Hypothyroidism  Management per hospital Medicine    Malignant neoplasm of urinary bladder  Worsening findings on CT scan.  There is some invasion of the rectum as well as the pelvic sidewalls.      Small-bowel involvement with the cancer is likely the cause of obstruction and may require surgical intervention to relieve the bowel obstruction    According to Oncology there is an option of immunotherapy which could take up to 12 weeks to see a response.  They recommended a palliative care consult, which is still pending.      Will discuss surgical options for a partial bowel obstruction pending the results of small-bowel follow-through    Nephrostomy tube displaced  Recommend urology consult and  likely Interventional Radiology consult for further management per hospital Medicine    Centrilobular emphysema  Increases the risk of patient was pulmonary complications of surgical intervention is required    Squamous cell carcinoma of right lung  Patient reports having completed radiation treatment    Anemia  Secondary to metastatic cancer.  Management per Medicine    Elevated serum creatinine  Likely secondary to partial obstruction of the ureters related to bladder cancer        Silvestre Ramos MD  General Surgery  [facility] - Telemetry ([facility])

## 2024-04-05 NOTE — PROGRESS NOTES
[facility] ([facility])  Hematology/Oncology  Progress Note    Patient Name: Geovani Currie  Admission Date: 4/3/2024  Hospital Length of Stay: 2 days  Code Status: Full Code     Subjective:     HPI:  68-year-old male admitted to the hospital with abdominal pain in small-bowel obstruction.  The patient has been seen by surgery patient has also been seen by Dr. Demetrius Frazier  Oncology.  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  I was asked to see the patient for further evaluation ECOG status 2    Interval History:  Patient is alert but did pull his IV out as I was examining in talking to the patient this morning    Oncology Treatment Plan:   [No matching plan found]    Medications:  Continuous Infusions:   dextrose 5 % and 0.45 % NaCl with KCl 20 mEq 75 mL/hr at 04/05/24 0315     Scheduled Meds:   ceFAZolin (Ancef) IV (PEDS and ADULTS)  2 g Intravenous Once    cefTRIAXone (Rocephin) IV (PEDS and ADULTS)  1 g Intravenous Q24H    EScitalopram oxalate  20 mg Oral Daily    heparin (PORCINE)  60 Units/kg Intravenous Once    levothyroxine  125 mcg Oral Before breakfast    polyethylene glycol  17 g Oral Daily    pregabalin  75 mg Oral QHS    tamsulosin  1 capsule Oral Daily     PRN Meds:acetaminophen, ALPRAZolam, dextrose 10%, dextrose 10%, glucagon (human recombinant), glucose, glucose, heparin (PORCINE), heparin (PORCINE), levalbuterol, melatonin, morphine, naloxone, ondansetron, prochlorperazine, sodium chloride 0.9%     Review of Systems   Constitutional:  Positive for fatigue. Negative for activity change, appetite change, chills, diaphoresis, fever and unexpected weight change.   HENT:  Negative for congestion, dental problem, drooling, ear discharge, ear pain, facial swelling, hearing loss, mouth sores, nosebleeds, postnasal drip, rhinorrhea, sinus pressure, sneezing, sore throat, tinnitus, trouble  swallowing and voice change.    Eyes:  Negative for photophobia, pain, discharge, redness, itching and visual disturbance.   Respiratory:  Negative for apnea, cough, choking, chest tightness, shortness of breath, wheezing and stridor.    Cardiovascular:  Negative for chest pain, palpitations and leg swelling.   Gastrointestinal:  Negative for abdominal distention, abdominal pain, anal bleeding, blood in stool, constipation, diarrhea, nausea, rectal pain and vomiting.   Endocrine: Negative for cold intolerance, heat intolerance, polydipsia, polyphagia and polyuria.   Genitourinary:  Negative for decreased urine volume, difficulty urinating, dysuria, enuresis, flank pain, frequency, genital sores, hematuria, penile discharge, penile pain, penile swelling, scrotal swelling, testicular pain and urgency.   Musculoskeletal:  Negative for arthralgias, back pain, gait problem, joint swelling, myalgias, neck pain and neck stiffness.   Skin:  Negative for color change, pallor, rash and wound.   Allergic/Immunologic: Negative for environmental allergies, food allergies and immunocompromised state.   Neurological:  Positive for weakness. Negative for dizziness, tremors, seizures, syncope, facial asymmetry, speech difficulty, light-headedness, numbness and headaches.   Hematological:  Negative for adenopathy. Does not bruise/bleed easily.   Psychiatric/Behavioral:  Positive for decreased concentration and dysphoric mood. Negative for agitation, behavioral problems, confusion, hallucinations, self-injury, sleep disturbance and suicidal ideas. The patient is nervous/anxious. The patient is not hyperactive.      Objective:     Vital Signs (Most Recent):  Temp: 97.9 °F (36.6 °C) (04/05/24 0506)  Pulse: 108 (04/05/24 0506)  Resp: 20 (04/05/24 0506)  BP: 133/64 (04/05/24 0506)  SpO2: (!) 94 % (04/05/24 0506) Vital Signs (24h Range):  Temp:  [97.6 °F (36.4 °C)-98.2 °F (36.8 °C)] 97.9 °F (36.6 °C)  Pulse:  [108-131] 108  Resp:  [16-20]  "20  SpO2:  [94 %-99 %] 94 %  BP: (129-158)/(64-80) 133/64     Weight: 63 kg (138 lb 14.2 oz)  Body mass index is 21.12 kg/m².  Body surface area is 1.74 meters squared.      Intake/Output Summary (Last 24 hours) at 4/5/2024 0618  Last data filed at 4/4/2024 2211  Gross per 24 hour   Intake 742.79 ml   Output 925 ml   Net -182.21 ml        Physical Exam  Constitutional:       Appearance: He is ill-appearing.   Skin:     Findings: Bruising present.          Significant Labs:   BMP:   Recent Labs   Lab 04/03/24  0806 04/03/24  1307 04/04/24  0426   GLU  --  125* 101   NA  --  137 139   K  --  3.9 3.5   CL  --  104 106   CO2  --  22* 21*   BUN  --  18 15   CREATININE 1.5* 1.4 1.1   CALCIUM  --  9.6 9.3   MG  --   --  1.7   , CBC:   Recent Labs   Lab 04/03/24 1307 04/03/24 1845 04/04/24  0426   WBC 10.86 9.47 8.02  8.02   HGB 10.0* 9.8* 10.2*  10.2*   HCT 32.1* 31.4* 32.7*  32.7*    333 362  362   , CMP:   Recent Labs   Lab 04/03/24 0806 04/03/24 1307 04/04/24 0426   NA  --  137 139   K  --  3.9 3.5   CL  --  104 106   CO2  --  22* 21*   GLU  --  125* 101   BUN  --  18 15   CREATININE 1.5* 1.4 1.1   CALCIUM  --  9.6 9.3   PROT  --  7.2 6.2   ALBUMIN  --  2.8* 2.7*   BILITOT  --  0.5 0.4   ALKPHOS  --  121 121   AST  --  15 12   ALT  --  8* 8*   ANIONGAP  --  11 12   , Coagulation:   Recent Labs   Lab 04/03/24 1845 04/04/24 0426 04/04/24  1035 04/04/24  1645 04/04/24  2311   INR 1.1  --   --   --   --    APTT 38.4*   < > 35.0* 45.9* 40.6*    < > = values in this interval not displayed.   , Haptoglobin: No results for input(s): "HAPTOGLOBIN" in the last 48 hours., Immunology: No results for input(s): "SPEP", "SAMIR", "YOSHI", "FREELAMBDALI" in the last 48 hours., LDH: No results for input(s): "LDHCSF", "BFSOURCE" in the last 48 hours., LFTs:   Recent Labs   Lab 04/03/24  1307 04/04/24  0426   ALT 8* 8*   AST 15 12   ALKPHOS 121 121   BILITOT 0.5 0.4   PROT 7.2 6.2   ALBUMIN 2.8* 2.7*   , Reticulocytes: No " "results for input(s): "RETIC" in the last 48 hours., Tumor Markers: No results for input(s): "PSA", "CEA", "", "AFPTM", "[id number]", "" in the last 48 hours.    Invalid input(s): "ALGTM", Uric Acid No results for input(s): "URICACID" in the last 48 hours., and Urine Studies: No results for input(s): "COLORU", "APPEARANCEUA", "PHUR", "SPECGRAV", "PROTEINUA", "GLUCUA", "KETONESU", "BILIRUBINUA", "OCCULTUA", "NITRITE", "UROBILINOGEN", "LEUKOCYTESUR", "RBCUA", "WBCUA", "BACTERIA", "SQUAMEPITHEL", "HYALINECASTS" in the last 48 hours.    Invalid input(s): "WRIGHTSUR"    Diagnostic Results:  I have reviewed all pertinent imaging results/findings within the past 24 hours.  Assessment/Plan:     * Partial intestinal obstruction  Secondary to intra-abdominal carcinomatosis from metastatic bladder cancer    Malignant neoplasm of urinary bladder  Extensive conversation with the patient and family at bedside I also pulled up his most recent CT demonstrating his bowel obstruction as well as his widespread intra-abdominal lymph nodes in retroperitoneal lymphadenopathy.  My recommendations at this point would be to have patient is medically stable as possible if he wishes to receive immunotherapy in the outpatient setting certainly can do so in this has been recommended to him previously I have told him that this is not curative therapy.  It will take up to 12 weeks to see if there is response.  Strongly recommend that patient have a do not resuscitate order be placed on chart and be seen by palliative care if not already done so.  Answered questions family at bedside  04/05/2024.  At this time patient has been seen by surgery appears to have a bowel obstruction.  Although he did have 2 loose stools this morning at this time decision will need to be made whether exploratory surgery is warranted.  If the patient decides not to proceed with surgery I strongly urged hospice placement this time I have talked to the family " concerning this yesterday.  He is ECOG status 3 at the present time in his not really a candidate for any type of systemic immunotherapy until patient's bowel obstruction is resolved performance status improves.  Would agree with palliative care consultation do not resuscitate order be placed on chart    Nephrostomy tube displaced  Cared for by primary care team    Elevated serum creatinine  Secondary to hydronephrosis secondary to metastatic bladder cancer        Thank you for your consult. I will follow-up with patient. Please contact us if you have any additional questions.     Paul Pool MD  Hematology/Oncology  [facility] ([facility])

## 2024-04-05 NOTE — SUBJECTIVE & OBJECTIVE
Interval History:  Patient was small-bowel follow-through and subsequent abdominal x-rays were consistent with a partial bowel obstruction.  This is likely due to involvement of the tumor in the small bowel.  The need for laparotomy and bypass of the obstruction versus possible resection was discussed with the patient was wife.  I reviewed all the risks benefits and complications I discussed the alternatives.  They have agreed with surgical intervention.  I discussed the postop course.      I explained that no attempts would be made to remove any of the tumor as it is non operative    Medications:  Continuous Infusions:   dextrose 5 % and 0.45 % NaCl with KCl 20 mEq 75 mL/hr at 04/05/24 0910     Scheduled Meds:   ceFAZolin (Ancef) IV (PEDS and ADULTS)  2 g Intravenous Once    cefTRIAXone (Rocephin) IV (PEDS and ADULTS)  1 g Intravenous Q24H    EScitalopram oxalate  20 mg Oral Daily    levothyroxine  125 mcg Oral Before breakfast    pregabalin  75 mg Oral QHS    tamsulosin  1 capsule Oral Daily     PRN Meds:acetaminophen, ALPRAZolam, dextrose 10%, dextrose 10%, glucagon (human recombinant), glucose, glucose, levalbuterol, melatonin, morphine, naloxone, ondansetron, prochlorperazine, sodium chloride 0.9%     Review of patient's allergies indicates:  No Known Allergies  Objective:     Vital Signs (Most Recent):  Temp: 98.1 °F (36.7 °C) (04/05/24 1113)  Pulse: 95 (04/05/24 1113)  Resp: 18 (04/05/24 1113)  BP: 139/69 (04/05/24 1113)  SpO2: (!) 93 % (04/05/24 1113) Vital Signs (24h Range):  Temp:  [97.6 °F (36.4 °C)-98.3 °F (36.8 °C)] 98.1 °F (36.7 °C)  Pulse:  [] 95  Resp:  [16-20] 18  SpO2:  [93 %-99 %] 93 %  BP: (130-158)/(64-80) 139/69     Weight: 63 kg (138 lb 14.2 oz)  Body mass index is 21.12 kg/m².    Intake/Output - Last 3 Shifts         04/03 0700  04/04 0659 04/04 0700  04/05 0659 04/05 0700 04/06 0659    I.V. (mL/kg) 0 (0) 742.8 (11.8) 854.5 (13.6)    IV Piggyback 0  96.8    Total Intake(mL/kg) 0 (0)  742.8 (11.8) 951.3 (15.1)    Urine (mL/kg/hr)  925 (0.6) 250 (0.8)    Stool  0     Total Output  925 250    Net 0 -182.2 +701.3           Stool Occurrence  5 x              Physical Exam  Vitals and nursing note reviewed.   Constitutional:       General: He is not in acute distress.     Appearance: He is well-developed. He is ill-appearing (chronically).   HENT:      Head: Normocephalic and atraumatic.   Eyes:      General: No scleral icterus.     Pupils: Pupils are equal, round, and reactive to light.   Neck:      Thyroid: No thyromegaly.      Vascular: No JVD.      Trachea: No tracheal deviation.   Cardiovascular:      Rate and Rhythm: Normal rate and regular rhythm.      Heart sounds: Normal heart sounds.   Pulmonary:      Effort: Pulmonary effort is normal.      Breath sounds: Normal breath sounds.   Abdominal:      General: Bowel sounds are normal. There is no distension.      Palpations: Abdomen is soft. There is no mass.      Tenderness: There is no abdominal tenderness (mild). There is no guarding or rebound.   Musculoskeletal:         General: Normal range of motion.      Cervical back: Normal range of motion and neck supple.   Lymphadenopathy:      Cervical: No cervical adenopathy.   Skin:     General: Skin is warm and dry.      Comments: Posterior nephrostomy tubes bilaterally   Neurological:      Mental Status: He is alert and oriented to person, place, and time.   Psychiatric:         Behavior: Behavior normal.         Thought Content: Thought content normal.         Judgment: Judgment normal.          Significant Labs:  I have reviewed all pertinent lab results within the past 24 hours.  CBC:   Recent Labs   Lab 04/04/24  0426   WBC 8.02  8.02   RBC 3.87*  3.87*   HGB 10.2*  10.2*   HCT 32.7*  32.7*     362   MCV 85  85   MCH 26.4*  26.4*   MCHC 31.2*  31.2*     CMP:   Recent Labs   Lab 04/04/24  0426      CALCIUM 9.3   ALBUMIN 2.7*   PROT 6.2      K 3.5   CO2 21*       BUN 15   CREATININE 1.1   ALKPHOS 121   ALT 8*   AST 12   BILITOT 0.4     Coagulation:   Recent Labs   Lab 04/03/24  1845 04/04/24  0426 04/04/24  2311   LABPROT 12.5  --   --    INR 1.1  --   --    APTT 38.4*   < > 40.6*    < > = values in this interval not displayed.       Significant Diagnostics:  I have reviewed all pertinent imaging results/findings within the past 24 hours.  Small-bowel follow-through and abdominal film    Narrative & Impression  EXAMINATION:  XR SMALL BOWEL FOLLOW THROUGH     CLINICAL HISTORY:  Gastrografin.  Evaluate for complete or partial obstruction;     TECHNIQUE:  Supine imaging provided to 7-1/2 hour     COMPARISON:  CT correlation     FINDINGS:  Small bowel is distended with delayed transit time     Impression:     Findings consistent with distal small bowel obstruction        Electronically signed by: Le Calixto  Date:                                            04/04/2024  Time:                                           19:49    Reading Physician Reading Date Result Priority   Oleg Sr MD  [phone]  [phone] 4/5/2024 Routine     Narrative & Impression  EXAMINATION:  XR ABDOMEN FLAT AND ERECT     CLINICAL HISTORY:  Follow up after small-bowel follow-through; Unspecified intestinal obstruction, unspecified as to partial versus complete obstruction     TECHNIQUE:  Single frontal view of the chest was performed.     COMPARISON:  04/03/2024     FINDINGS:  Bilateral nephrostomy tubes are noted.  Prominent small bowel loops are seen throughout the abdomen without definite transition and measure up to 4.7 cm.  Contrast is seen within the large bowel.  Findings consistent with ileus.  In comparison to the prior study, there is no adverse interval changes     Impression:     In comparison to the prior study, there is no adverse interval changes.  Recommend continued surveillance        Electronically signed by: Oleg Sr MD  Date:                                             04/05/2024  Time:                                           09:20

## 2024-04-05 NOTE — PLAN OF CARE
[facility]  Geovani Crurie is a 68 y.o.male admitted on 4/3/2024 for Complete obstruction of colon   Code Status: Full Code MRN: [id number]   Review of patient's allergies indicates:  No Known Allergies  Past Medical History:   Diagnosis Date    Anxiety disorder, unspecified     Cancer     COPD (chronic obstructive pulmonary disease)     Hypertension     Thyroid disease       PRN meds    acetaminophen, 650 mg, Q4H PRN  ALPRAZolam, 1 mg, TID PRN  dextrose 10%, 12.5 g, PRN  dextrose 10%, 25 g, PRN  glucagon (human recombinant), 1 mg, PRN  glucose, 16 g, PRN  glucose, 24 g, PRN  HYDROmorphone, 0.2 mg, Q5 Min PRN  levalbuterol, 0.63 mg, Q8H PRN  melatonin, 6 mg, Nightly PRN  morphine, 2 mg, Q4H PRN  naloxone, 0.02 mg, PRN  ondansetron, 4 mg, Q8H PRN  ondansetron, 4 mg, Daily PRN  oxyCODONE-acetaminophen, 1 tablet, Q3H PRN  prochlorperazine, 5 mg, Q6H PRN  sodium chloride 0.9%, 10 mL, Q12H PRN      Chart check completed. Will continue plan of care.      Orientation: other (see comments) (pt sedated)  Galindo Coma Scale Score: 15     Lead Monitored: Lead II Rhythm: normal sinus rhythm Frequency/Ectopy: frequent, PACs, PVCs, greater than 6/min  Cardiac/Telemetry Box Number: [id number]  VTE Required Core Measure: (SCDs) Sequential compression device initiated/maintained Last Bowel Movement: 04/05/24  Diet NPO  Voiding Characteristics: incontinence, other (see comments) (bilateral nephrostomy)  Juanjo Score: 15  Fall Risk Score: 17  Accucheck []   Freq?      Lines/Drains/Airways       Central Venous Catheter Line  Duration             Percutaneous Central Line - Triple Lumen  04/05/24 1752 Internal Jugular Right <1 day              Drain  Duration                  Nephrostomy Left -- days         Nephrostomy 02/03/24 1044 Left 8 Fr. 62 days         Nephrostomy 02/03/24 1045 Right 10 Fr. 62 days              Peripheral Intravenous Line  Duration                  Peripheral IV - Single Lumen 04/04/24 1129 22 G  Left;Posterior Forearm 1 day         Peripheral IV - Single Lumen 04/04/24 1134 22 G Posterior;Right Forearm 1 day

## 2024-04-05 NOTE — SUBJECTIVE & OBJECTIVE
Interval History:  Patient is alert but did pull his IV out as I was examining in talking to the patient this morning    Oncology Treatment Plan:   [No matching plan found]    Medications:  Continuous Infusions:   dextrose 5 % and 0.45 % NaCl with KCl 20 mEq 75 mL/hr at 04/05/24 0315     Scheduled Meds:   ceFAZolin (Ancef) IV (PEDS and ADULTS)  2 g Intravenous Once    cefTRIAXone (Rocephin) IV (PEDS and ADULTS)  1 g Intravenous Q24H    EScitalopram oxalate  20 mg Oral Daily    heparin (PORCINE)  60 Units/kg Intravenous Once    levothyroxine  125 mcg Oral Before breakfast    polyethylene glycol  17 g Oral Daily    pregabalin  75 mg Oral QHS    tamsulosin  1 capsule Oral Daily     PRN Meds:acetaminophen, ALPRAZolam, dextrose 10%, dextrose 10%, glucagon (human recombinant), glucose, glucose, heparin (PORCINE), heparin (PORCINE), levalbuterol, melatonin, morphine, naloxone, ondansetron, prochlorperazine, sodium chloride 0.9%     Review of Systems   Constitutional:  Positive for fatigue. Negative for activity change, appetite change, chills, diaphoresis, fever and unexpected weight change.   HENT:  Negative for congestion, dental problem, drooling, ear discharge, ear pain, facial swelling, hearing loss, mouth sores, nosebleeds, postnasal drip, rhinorrhea, sinus pressure, sneezing, sore throat, tinnitus, trouble swallowing and voice change.    Eyes:  Negative for photophobia, pain, discharge, redness, itching and visual disturbance.   Respiratory:  Negative for apnea, cough, choking, chest tightness, shortness of breath, wheezing and stridor.    Cardiovascular:  Negative for chest pain, palpitations and leg swelling.   Gastrointestinal:  Negative for abdominal distention, abdominal pain, anal bleeding, blood in stool, constipation, diarrhea, nausea, rectal pain and vomiting.   Endocrine: Negative for cold intolerance, heat intolerance, polydipsia, polyphagia and polyuria.   Genitourinary:  Negative for decreased urine  volume, difficulty urinating, dysuria, enuresis, flank pain, frequency, genital sores, hematuria, penile discharge, penile pain, penile swelling, scrotal swelling, testicular pain and urgency.   Musculoskeletal:  Negative for arthralgias, back pain, gait problem, joint swelling, myalgias, neck pain and neck stiffness.   Skin:  Negative for color change, pallor, rash and wound.   Allergic/Immunologic: Negative for environmental allergies, food allergies and immunocompromised state.   Neurological:  Positive for weakness. Negative for dizziness, tremors, seizures, syncope, facial asymmetry, speech difficulty, light-headedness, numbness and headaches.   Hematological:  Negative for adenopathy. Does not bruise/bleed easily.   Psychiatric/Behavioral:  Positive for decreased concentration and dysphoric mood. Negative for agitation, behavioral problems, confusion, hallucinations, self-injury, sleep disturbance and suicidal ideas. The patient is nervous/anxious. The patient is not hyperactive.      Objective:     Vital Signs (Most Recent):  Temp: 97.9 °F (36.6 °C) (04/05/24 0506)  Pulse: 108 (04/05/24 0506)  Resp: 20 (04/05/24 0506)  BP: 133/64 (04/05/24 0506)  SpO2: (!) 94 % (04/05/24 0506) Vital Signs (24h Range):  Temp:  [97.6 °F (36.4 °C)-98.2 °F (36.8 °C)] 97.9 °F (36.6 °C)  Pulse:  [108-131] 108  Resp:  [16-20] 20  SpO2:  [94 %-99 %] 94 %  BP: (129-158)/(64-80) 133/64     Weight: 63 kg (138 lb 14.2 oz)  Body mass index is 21.12 kg/m².  Body surface area is 1.74 meters squared.      Intake/Output Summary (Last 24 hours) at 4/5/2024 0618  Last data filed at 4/4/2024 2211  Gross per 24 hour   Intake 742.79 ml   Output 925 ml   Net -182.21 ml        Physical Exam  Constitutional:       Appearance: He is ill-appearing.   Skin:     Findings: Bruising present.          Significant Labs:   BMP:   Recent Labs   Lab 04/03/24  0806 04/03/24  1307 04/04/24  0426   GLU  --  125* 101   NA  --  137 139   K  --  3.9 3.5   CL  --  104  "106   CO2  --  22* 21*   BUN  --  18 15   CREATININE 1.5* 1.4 1.1   CALCIUM  --  9.6 9.3   MG  --   --  1.7   , CBC:   Recent Labs   Lab 04/03/24  1307 04/03/24 1845 04/04/24  0426   WBC 10.86 9.47 8.02  8.02   HGB 10.0* 9.8* 10.2*  10.2*   HCT 32.1* 31.4* 32.7*  32.7*    333 362  362   , CMP:   Recent Labs   Lab 04/03/24  0806 04/03/24 1307 04/04/24 0426   NA  --  137 139   K  --  3.9 3.5   CL  --  104 106   CO2  --  22* 21*   GLU  --  125* 101   BUN  --  18 15   CREATININE 1.5* 1.4 1.1   CALCIUM  --  9.6 9.3   PROT  --  7.2 6.2   ALBUMIN  --  2.8* 2.7*   BILITOT  --  0.5 0.4   ALKPHOS  --  121 121   AST  --  15 12   ALT  --  8* 8*   ANIONGAP  --  11 12   , Coagulation:   Recent Labs   Lab 04/03/24 1845 04/04/24 0426 04/04/24  1035 04/04/24  1645 04/04/24  2311   INR 1.1  --   --   --   --    APTT 38.4*   < > 35.0* 45.9* 40.6*    < > = values in this interval not displayed.   , Haptoglobin: No results for input(s): "HAPTOGLOBIN" in the last 48 hours., Immunology: No results for input(s): "SPEP", "SAMIR", "YOSHI", "FREELAMBDALI" in the last 48 hours., LDH: No results for input(s): "LDHCSF", "BFSOURCE" in the last 48 hours., LFTs:   Recent Labs   Lab 04/03/24 1307 04/04/24 0426   ALT 8* 8*   AST 15 12   ALKPHOS 121 121   BILITOT 0.5 0.4   PROT 7.2 6.2   ALBUMIN 2.8* 2.7*   , Reticulocytes: No results for input(s): "RETIC" in the last 48 hours., Tumor Markers: No results for input(s): "PSA", "CEA", "", "AFPTM", "[id number]", "" in the last 48 hours.    Invalid input(s): "ALGTM", Uric Acid No results for input(s): "URICACID" in the last 48 hours., and Urine Studies: No results for input(s): "COLORU", "APPEARANCEUA", "PHUR", "SPECGRAV", "PROTEINUA", "GLUCUA", "KETONESU", "BILIRUBINUA", "OCCULTUA", "NITRITE", "UROBILINOGEN", "LEUKOCYTESUR", "RBCUA", "WBCUA", "BACTERIA", "SQUAMEPITHEL", "HYALINECASTS" in the last 48 hours.    Invalid input(s): "WRIGHTSUR"    Diagnostic Results:  I have reviewed all " pertinent imaging results/findings within the past 24 hours.

## 2024-04-06 ENCOUNTER — DOCUMENT SCAN (OUTPATIENT)
Dept: HOME HEALTH SERVICES | Facility: HOSPITAL | Age: 69
End: 2024-04-06
Payer: MEDICARE

## 2024-04-06 LAB
ALBUMIN SERPL BCP-MCNC: 2.3 G/DL (ref 3.5–5.2)
ALP SERPL-CCNC: 95 U/L (ref 55–135)
ALT SERPL W/O P-5'-P-CCNC: 9 U/L (ref 10–44)
ANION GAP SERPL CALC-SCNC: 10 MMOL/L (ref 8–16)
APTT PPP: 44.8 SEC (ref 21–32)
APTT PPP: 44.8 SEC (ref 21–32)
APTT PPP: 46 SEC (ref 21–32)
AST SERPL-CCNC: 14 U/L (ref 10–40)
BASOPHILS # BLD AUTO: 0.02 K/UL (ref 0–0.2)
BASOPHILS NFR BLD: 0.1 % (ref 0–1.9)
BILIRUB SERPL-MCNC: 0.4 MG/DL (ref 0.1–1)
BUN SERPL-MCNC: 17 MG/DL (ref 8–23)
CALCIUM SERPL-MCNC: 8.9 MG/DL (ref 8.7–10.5)
CHLORIDE SERPL-SCNC: 111 MMOL/L (ref 95–110)
CO2 SERPL-SCNC: 23 MMOL/L (ref 23–29)
CREAT SERPL-MCNC: 1.2 MG/DL (ref 0.5–1.4)
DIFFERENTIAL METHOD BLD: ABNORMAL
EOSINOPHIL # BLD AUTO: 0 K/UL (ref 0–0.5)
EOSINOPHIL NFR BLD: 0 % (ref 0–8)
ERYTHROCYTE [DISTWIDTH] IN BLOOD BY AUTOMATED COUNT: 16.5 % (ref 11.5–14.5)
EST. GFR  (NO RACE VARIABLE): >60 ML/MIN/1.73 M^2
GLUCOSE SERPL-MCNC: 108 MG/DL (ref 70–110)
HCT VFR BLD AUTO: 30.6 % (ref 40–54)
HGB BLD-MCNC: 9.3 G/DL (ref 14–18)
IMM GRANULOCYTES # BLD AUTO: 0.05 K/UL (ref 0–0.04)
IMM GRANULOCYTES NFR BLD AUTO: 0.3 % (ref 0–0.5)
LYMPHOCYTES # BLD AUTO: 0.9 K/UL (ref 1–4.8)
LYMPHOCYTES NFR BLD: 5.6 % (ref 18–48)
MAGNESIUM SERPL-MCNC: 1.6 MG/DL (ref 1.6–2.6)
MCH RBC QN AUTO: 25.9 PG (ref 27–31)
MCHC RBC AUTO-ENTMCNC: 30.4 G/DL (ref 32–36)
MCV RBC AUTO: 85 FL (ref 82–98)
MONOCYTES # BLD AUTO: 0.4 K/UL (ref 0.3–1)
MONOCYTES NFR BLD: 2.8 % (ref 4–15)
NEUTROPHILS # BLD AUTO: 14.1 K/UL (ref 1.8–7.7)
NEUTROPHILS NFR BLD: 91.2 % (ref 38–73)
NRBC BLD-RTO: 0 /100 WBC
PHOSPHATE SERPL-MCNC: 3.6 MG/DL (ref 2.7–4.5)
PLATELET # BLD AUTO: 332 K/UL (ref 150–450)
PMV BLD AUTO: 9.9 FL (ref 9.2–12.9)
POTASSIUM SERPL-SCNC: 4.7 MMOL/L (ref 3.5–5.1)
PROT SERPL-MCNC: 5.5 G/DL (ref 6–8.4)
RBC # BLD AUTO: 3.59 M/UL (ref 4.6–6.2)
SODIUM SERPL-SCNC: 144 MMOL/L (ref 136–145)
WBC # BLD AUTO: 15.5 K/UL (ref 3.9–12.7)

## 2024-04-06 PROCEDURE — 21400001 HC TELEMETRY ROOM

## 2024-04-06 PROCEDURE — 63600175 PHARM REV CODE 636 W HCPCS: Performed by: SURGERY

## 2024-04-06 PROCEDURE — 84100 ASSAY OF PHOSPHORUS: CPT | Performed by: SURGERY

## 2024-04-06 PROCEDURE — 85025 COMPLETE CBC W/AUTO DIFF WBC: CPT | Performed by: SURGERY

## 2024-04-06 PROCEDURE — 25000003 PHARM REV CODE 250: Performed by: SURGERY

## 2024-04-06 PROCEDURE — 85730 THROMBOPLASTIN TIME PARTIAL: CPT | Mod: 91 | Performed by: SURGERY

## 2024-04-06 PROCEDURE — 25000003 PHARM REV CODE 250: Performed by: STUDENT IN AN ORGANIZED HEALTH CARE EDUCATION/TRAINING PROGRAM

## 2024-04-06 PROCEDURE — 83735 ASSAY OF MAGNESIUM: CPT | Performed by: SURGERY

## 2024-04-06 PROCEDURE — 25000242 PHARM REV CODE 250 ALT 637 W/ HCPCS: Performed by: SURGERY

## 2024-04-06 PROCEDURE — 94760 N-INVAS EAR/PLS OXIMETRY 1: CPT

## 2024-04-06 PROCEDURE — 94640 AIRWAY INHALATION TREATMENT: CPT

## 2024-04-06 PROCEDURE — 94799 UNLISTED PULMONARY SVC/PX: CPT | Mod: XB

## 2024-04-06 PROCEDURE — 36415 COLL VENOUS BLD VENIPUNCTURE: CPT | Performed by: STUDENT IN AN ORGANIZED HEALTH CARE EDUCATION/TRAINING PROGRAM

## 2024-04-06 PROCEDURE — 25000003 PHARM REV CODE 250: Performed by: COLON & RECTAL SURGERY

## 2024-04-06 PROCEDURE — 99900035 HC TECH TIME PER 15 MIN (STAT)

## 2024-04-06 PROCEDURE — 85730 THROMBOPLASTIN TIME PARTIAL: CPT | Performed by: STUDENT IN AN ORGANIZED HEALTH CARE EDUCATION/TRAINING PROGRAM

## 2024-04-06 PROCEDURE — 80053 COMPREHEN METABOLIC PANEL: CPT | Performed by: SURGERY

## 2024-04-06 PROCEDURE — 99024 POSTOP FOLLOW-UP VISIT: CPT | Mod: POP,,, | Performed by: COLON & RECTAL SURGERY

## 2024-04-06 PROCEDURE — S4991 NICOTINE PATCH NONLEGEND: HCPCS | Performed by: STUDENT IN AN ORGANIZED HEALTH CARE EDUCATION/TRAINING PROGRAM

## 2024-04-06 RX ORDER — OXYCODONE HYDROCHLORIDE 5 MG/1
10 TABLET ORAL EVERY 4 HOURS PRN
Status: DISCONTINUED | OUTPATIENT
Start: 2024-04-06 | End: 2024-04-12 | Stop reason: HOSPADM

## 2024-04-06 RX ORDER — ACETAMINOPHEN 325 MG/1
650 TABLET ORAL EVERY 6 HOURS
Status: DISCONTINUED | OUTPATIENT
Start: 2024-04-07 | End: 2024-04-08

## 2024-04-06 RX ORDER — OXYCODONE HYDROCHLORIDE 5 MG/1
5 TABLET ORAL EVERY 4 HOURS PRN
Status: DISCONTINUED | OUTPATIENT
Start: 2024-04-06 | End: 2024-04-12 | Stop reason: HOSPADM

## 2024-04-06 RX ORDER — HYDROMORPHONE HYDROCHLORIDE 1 MG/ML
1 INJECTION, SOLUTION INTRAMUSCULAR; INTRAVENOUS; SUBCUTANEOUS EVERY 4 HOURS PRN
Status: DISCONTINUED | OUTPATIENT
Start: 2024-04-06 | End: 2024-04-06

## 2024-04-06 RX ORDER — MUPIROCIN 20 MG/G
OINTMENT TOPICAL DAILY
Status: DISCONTINUED | OUTPATIENT
Start: 2024-04-06 | End: 2024-04-12 | Stop reason: HOSPADM

## 2024-04-06 RX ORDER — SODIUM CHLORIDE 9 MG/ML
INJECTION, SOLUTION INTRAVENOUS
Status: DISCONTINUED | OUTPATIENT
Start: 2024-04-06 | End: 2024-04-12 | Stop reason: HOSPADM

## 2024-04-06 RX ORDER — MORPHINE SULFATE 2 MG/ML
2 INJECTION, SOLUTION INTRAMUSCULAR; INTRAVENOUS EVERY 4 HOURS PRN
Status: DISCONTINUED | OUTPATIENT
Start: 2024-04-06 | End: 2024-04-06

## 2024-04-06 RX ORDER — IBUPROFEN 200 MG
1 TABLET ORAL DAILY
Status: DISCONTINUED | OUTPATIENT
Start: 2024-04-06 | End: 2024-04-12 | Stop reason: HOSPADM

## 2024-04-06 RX ORDER — HYDROMORPHONE HYDROCHLORIDE 1 MG/ML
1 INJECTION, SOLUTION INTRAMUSCULAR; INTRAVENOUS; SUBCUTANEOUS EVERY 4 HOURS PRN
Status: DISCONTINUED | OUTPATIENT
Start: 2024-04-06 | End: 2024-04-12 | Stop reason: HOSPADM

## 2024-04-06 RX ADMIN — LEVALBUTEROL HYDROCHLORIDE 0.63 MG: 0.63 SOLUTION RESPIRATORY (INHALATION) at 07:04

## 2024-04-06 RX ADMIN — FAMOTIDINE 20 MG: 10 INJECTION, SOLUTION INTRAVENOUS at 08:04

## 2024-04-06 RX ADMIN — CEFTRIAXONE 1 G: 1 INJECTION, POWDER, FOR SOLUTION INTRAMUSCULAR; INTRAVENOUS at 09:04

## 2024-04-06 RX ADMIN — CHLORHEXIDINE GLUCONATE 0.12% ORAL RINSE 10 ML: 1.2 LIQUID ORAL at 10:04

## 2024-04-06 RX ADMIN — SODIUM CHLORIDE: 9 INJECTION, SOLUTION INTRAVENOUS at 01:04

## 2024-04-06 RX ADMIN — FAMOTIDINE 20 MG: 10 INJECTION, SOLUTION INTRAVENOUS at 10:04

## 2024-04-06 RX ADMIN — SODIUM CHLORIDE, POTASSIUM CHLORIDE, SODIUM LACTATE AND CALCIUM CHLORIDE: 600; 310; 30; 20 INJECTION, SOLUTION INTRAVENOUS at 10:04

## 2024-04-06 RX ADMIN — ACETAMINOPHEN 1000 MG: 10 INJECTION INTRAVENOUS at 05:04

## 2024-04-06 RX ADMIN — NICOTINE 1 PATCH: 21 PATCH, EXTENDED RELEASE TRANSDERMAL at 10:04

## 2024-04-06 RX ADMIN — ACETAMINOPHEN 1000 MG: 10 INJECTION INTRAVENOUS at 01:04

## 2024-04-06 RX ADMIN — OXYCODONE HYDROCHLORIDE 10 MG: 5 TABLET ORAL at 04:04

## 2024-04-06 RX ADMIN — MUPIROCIN: 20 OINTMENT TOPICAL at 01:04

## 2024-04-06 RX ADMIN — CHLORHEXIDINE GLUCONATE 0.12% ORAL RINSE 10 ML: 1.2 LIQUID ORAL at 08:04

## 2024-04-06 RX ADMIN — OXYCODONE HYDROCHLORIDE 10 MG: 5 TABLET ORAL at 10:04

## 2024-04-06 RX ADMIN — HEPARIN SODIUM 12 UNITS/KG/HR: 10000 INJECTION, SOLUTION INTRAVENOUS at 12:04

## 2024-04-06 NOTE — PROGRESS NOTES
[facility]  Progress Note     Patient Name:  Geovani Currie  MRN:  [id number]  Patient Class: IP-Inpatient  Admission Date:  4/3/2024   Length of Stay:  3  Attending Physician: Yuval Orozco MD  Primary Care Provider: Faizan Antoine MD    Subjective:      Subsequent Care: Follow up Complete obstruction of colon        HPI:  68 y.o. male patient with a PMHx of COPD, HTN, Lung Cancer, CKD, DVT, metastatic bladder cancer s/p nephrostomy tube placement. Presented to the Emergency Department for generalized abdominal pain, onset several days PTA. Pt was referred to the ED by Dr. Sr (Interventional Radiology) after CT Urogram showed small bowel obstruction. Pt was scheduled to have his nephrostomy tubes replaced on day of arrival. Associated sxs include nausea, abdominal distention, and loss of appetite. Patient denies any fever, chills, vomiting, SOB, CP, weakness, numbness, dizziness, headache, and all other sxs at this time. Pt and spouse are from [place], and are trying to return home to set up hospice. He is currently on cefdinir, but is not receiving any cancer treatment. General Surgery consulted, recommended to admit and consult Oncology and Urology. In the ED, vitals: 94/56, 85, 18, 98F, 96% RA. Significant Labs: H/H: 10/32.1, CT abd/pel: findings consistent SBO, Interval worsening of bladder cancer. Patient is a full code. Admitted to Hospital Medicine for management of Small Bowel Obstruction.         Overview/Hospital Course:  04/04/2024  CT scan shows shows interval worsening of locally invasive bladder cancer with rectal involvement and small bowel obstruction. SBFT pending. Oncology evaluated, strongly recommends DNR order to be placed. Both specialists suggest palliative vs hospice discussion. Palliative care consulted to assist.      04/05/2024  Came to discuss case with family and patient this morning one final time prior to their surgery. Went into  "meticulous detail regarding their wishes and goals at this encounter. Patient and family at bedside are of the understanding that the purpose of this pending surgery is to allow patient the ability to tolerate PO palliative immunotherapy. I also emphasized the fact that none of these interventions are curative in any way and that we are only establishing a path to allow further palliative measures. They seemed to acknowledge this fact as well. We discussed risks of surgery and both patient & family voiced their understanding and acceptance of these risks. Family then reiterated their desire to "stabilize" patient enough for him to get back to [place], where the patient (to my understanding) may or may not proceed with hospice care.      Will maintain Full Code in the perioperative period, but family seems amenable to transitioning to DNR at some point in the postoperative stage. Once stable, disposition will compose of likely discharge home with HH, NP at home and/or home based palliative care services in the interim while awaiting his move back to [place]. Discussed case with Palliative Care. Will consult their teams in the postoperative period if needed.     04/06/2024  POD#1, seems to have tolerated surgery well. Complains of breakthrough pain on current regimen.      Interval Hx  Complains of breakthrough pain    Objective  /64 (BP Location: Right arm, Patient Position: Lying)   Pulse 97   Temp 98.5 °F (36.9 °C) (Oral)   Resp 18   Wt 63.3 kg (139 lb 8.8 oz)   SpO2 95%   BMI 21.22 kg/m²     Intake/Output Summary (Last 24 hours) at 4/6/2024 1122  Last data filed at 4/6/2024 0519  Gross per 24 hour   Intake 3175.55 ml   Output 200 ml   Net 2975.55 ml       PHYSICAL EXAM  Vitals reviewed  Constitutional:       General: He is not in acute distress.     Appearance: He is cachectic. He is ill-appearing. He is not diaphoretic.   Cardiovascular:      Rate and Rhythm: Normal rate and regular rhythm.      " "Pulses:           Dorsalis pedis pulses are 1+ on the right side and 1+ on the left side.      Heart sounds: Normal heart sounds. No murmur heard.  Pulmonary:      Effort: Pulmonary effort is normal. No respiratory distress.      Breath sounds: Examination of the right-lower field reveals decreased breath sounds. Examination of the left-lower field reveals decreased breath sounds. Decreased breath sounds present. No wheezing.   Abdominal:      General: Bowel sounds are normal. There is distension.      Palpations: Abdomen is soft.      Tenderness: There is generalized abdominal tenderness.      Bilateral nephrostomy tubes, drainage noted around R tube  LABS  All labs from the past 24 hours were reviewed.     BMP:   Recent Labs   Lab 04/06/24 0627         K 4.7   *   CO2 23   BUN 17   CREATININE 1.2   CALCIUM 8.9   MG 1.6     CBC:   Recent Labs   Lab 04/06/24 0627   WBC 15.50*   HGB 9.3*   HCT 30.6*        CMP:   Recent Labs   Lab 04/06/24 0627      K 4.7   *   CO2 23      BUN 17   CREATININE 1.2   CALCIUM 8.9   PROT 5.5*   ALBUMIN 2.3*   BILITOT 0.4   ALKPHOS 95   AST 14   ALT 9*   ANIONGAP 10     Cardiac Markers: No results for input(s): "CKMB", "MYOGLOBIN", "BNP", "TROPISTAT" in the last 48 hours.  Coagulation:   Recent Labs   Lab 04/06/24 0627   APTT 44.8*  44.8*     Lactic Acid: No results for input(s): "LACTATE" in the last 48 hours.  Magnesium:   Recent Labs   Lab 04/06/24 0627   MG 1.6     Troponin: No results for input(s): "TROPONINI", "TROPONINIHS" in the last 48 hours.  TSH:   Recent Labs   Lab 02/23/24  0841   TSH 0.630     Urine Studies:   No results for input(s): "COLORU", "APPEARANCEUA", "PHUR", "SPECGRAV", "PROTEINUA", "GLUCUA", "KETONESU", "BILIRUBINUA", "OCCULTUA", "NITRITE", "UROBILINOGEN", "LEUKOCYTESUR", "RBCUA", "WBCUA", "BACTERIA", "SQUAMEPITHEL", "HYALINECASTS" in the last 48 hours.    Invalid input(s): "BAOSUR"    IMAGING  All imaging from " the past 24 hours were reviewed.     Imaging Results              X-Ray Abdomen Flat And Erect (Final result)  Result time 04/03/24 15:59:11      Final result by Elmo Garza MD (04/03/24 15:59:11)                   Impression:      Increasing small bowel distension mid upper abdomen indicating partial small bowel obstruction.      Electronically signed by: Elmo Garza MD  Date:    04/03/2024  Time:    15:59               Narrative:    EXAMINATION:  XR ABDOMEN FLAT AND ERECT    CLINICAL HISTORY:  Unspecified intestinal obstruction, unspecified as to partial versus complete obstruction    TECHNIQUE:  Routine exam.    COMPARISON:  03/11/2024    FINDINGS:  Increasing air-filled loops of small bowel throughout the mid upper abdomen.  Maximum diameter approximately 4 cm.Large amount stool right colon    No other changes.  Again noted are bilateral nephrostomy catheters.                                      Assessment/Plan:      * Partial intestinal obstruction  Secondary to bladder cancer, progression noted on CT  --Consult Oncology and Urology for recommendations  --Consult General Surgery     04/04/2024  --SBFT ordered for this morning  --follow up surgery recommendations  --hospice discussion on going (as discussed below)     04/05/2024  --Exploratory laparotomy planned for today with either resection of the small bowel with planned anastomosis or bypass of the small bowel to itself or to the colon to relieve the obstruction.   --will follow up postoperatively    04/06/2024  --s/p exlap, ileal-transverse anastomosis bypass on 4/5/24   --tolerated surgery well, but still with some breakthrough pain  --rest of management per surgery     Malignant neoplasm of urinary bladder  Known hx of bladder cancer, CT abd/pel: Interval worsening of locally invasive bladder cancer extension to the pelvic sidewall and the presacral fat and sacrum on the left. There is involvement of the rectum as well as well as involvement of a  "small bowel loop in the pelvis causing a small bowel obstruction.      --Consult urology and oncology for recommendations     04/04/2024  -Urology evaluated: "I cannot offer any surgical intervention for his advanced bladder cancer at this point. I would recommend hospice, which patient's wife appears open to."  -Oncology evaluated: "Strongly recommend that patient have a do not resuscitate order be placed on chart and be seen by palliative care if not already done so."  -Palliative care consulted, follow up recommendations      04/05/2024  See advance care note below    04/06/2024  Family complained about purulent leakage around R nephrostomy tubes. CT urogram doesn't show much change from previous imaging. Will ask Urology to evaluate for possible exchange.     Advanced care planning/counseling discussion  04/05/2024  Came to discuss case with family and patient this morning one final time prior to their surgery. Went into meticulous detail regarding their wishes and goals at this encounter. Patient and family at bedside are of the understanding that the purpose of this pending surgery is to allow patient the ability to tolerate PO palliative immunotherapy. I also emphasized the fact that none of these interventions are curative in any way and that we are only establishing a path to allow further palliative measures. They seemed to acknowledge this fact as well. We discussed risks of surgery and both patient & family voiced their understanding and acceptance of these risks. Family then reiterated their desire to "stabilize" patient enough for him to get back to [place], where the patient (to my understanding) may or may not proceed with hospice care.      Will maintain Full Code in the perioperative period, but family seems amenable to transitioning to DNR at some point in the postoperative stage. Once stable, disposition will compose of likely discharge home with HH, NP at home and/or home based palliative " care services in the interim while awaiting his move back to [place]. Discussed case with Palliative Care. Will consult their teams in the postoperative period if needed.   --Confirmed with patient and family at bedside that patient will be a full code during this hospitalization  --orders placed, discussion duration: < 3 minutes           Acute deep vein thrombosis (DVT) of femoral vein of left lower extremity  New diagnosis, managed with Eliquis as OP     --Hold Eliquis for now  --Heparin per nomogram        Hypothyroidism     Chronic, well controlled     --Cont home med        Nephrostomy tube displaced  Followed by Urology, planned for tube exchange on 4/3, referred to ED due to SBO     --Consult IR if needed         Centrilobular emphysema  Chronic, well controlled     --Cont home meds     Squamous cell carcinoma of right lung  Followed by oncology, treated with Radiation, therapy completed     --consult oncology        Anemia  Patient's anemia is currently controlled. Has not received any PRBCs to date. Etiology likely d/t chronic disease due to Malignancy  Current CBC reviewed-             Lab Results   Component Value Date     HGB 10.0 (L) 04/03/2024     HCT 32.1 (L) 04/03/2024      Monitor serial CBC and transfuse if patient becomes hemodynamically unstable, symptomatic or H/H drops below 7/21.     Elevated serum creatinine  Mild increase,likely secondary to tumor burden     --Repeat CMP in AM       CORE MEASURES:  VTE Risk Mitigation (From admission, onward)           Ordered     heparin 25,000 units in dextrose 5% 250 ml (100 units/mL) infusion MINIMAL INTENSITY nomogram - [facility]  Continuous        Question:  Begin at (units/kg/hr)  Answer:  12 04/05/24 2007     heparin 25,000 units in dextrose 5% 250 mL (100 units/mL) infusion LOW INTENSITY nomogram - [facility]  Continuous        Question:  Begin at (units/kg/hr)  Answer:  12 04/04/24 1920     Reason for No Pharmacological VTE Prophylaxis  Once         Question:  Reasons:  Answer:  Risk of Bleeding    04/03/24 1724     IP VTE HIGH RISK PATIENT  Once         04/03/24 1724     Place sequential compression device  Until discontinued         04/03/24 1724                    Code Status: FULL     Diet: Diet Clear Liquid    Disposition: Continue code status conversation, follow up urology evaluation, adat per surgical team       Yuval Orozco MD  Department of Hospital Medicine   '[place] - Telemetry ([facility])

## 2024-04-06 NOTE — PLAN OF CARE
Patient remained free from falls throughout shift, call bell within reach. POD #1 Ex Lap. Heparin gtt initiated at 0000 per orders. LR infusing. Midline incision noted, dressing CDI. Patient NPO except for ice chips. Q6h IV tylenol given. 1 BM so far this shift. Vitals stable. Wife at bedside.

## 2024-04-06 NOTE — ASSESSMENT & PLAN NOTE
Now s/p exlap, ileal-transverse anastomosis bypass on 4/5/24    - trial clear liquids  - OOB, ambulation  - IV and PO pain control

## 2024-04-06 NOTE — PROGRESS NOTES
[facility] - Telemetry ([facility])  General Surgery  Progress Note    Subjective:     Post-Op Info:  Procedure(s) (LRB):  LAPAROTOMY, EXPLORATORY (N/A)  BLOCK, TRANSVERSUS ABDOMINIS PLANE (N/A)  ENTEROENTEROSTOMY (N/A)   1 Day Post-Op     Interval History: No acute events overnight. +Bowel movements. Pain well controlled.    Medications:  Continuous Infusions:   dextrose 5 % and 0.45 % NaCl with KCl 20 mEq Stopped (04/05/24 2214)    heparin (porcine) in D5W 12 Units/kg/hr (04/06/24 0339)    lactated ringers 80 mL/hr at 04/06/24 0339     Scheduled Meds:   acetaminophen  1,000 mg Intravenous Q8H    cefTRIAXone (Rocephin) IV (PEDS and ADULTS)  1 g Intravenous Q24H    chlorhexidine  10 mL Mouth/Throat BID    famotidine (PF)  20 mg Intravenous Q12H     PRN Meds:dextrose 10%, dextrose 10%, diphenhydrAMINE, glucagon (human recombinant), levalbuterol, morphine, morphine, morphine, naloxone, ondansetron, prochlorperazine, sodium chloride 0.9%     Review of patient's allergies indicates:  No Known Allergies  Objective:     Vital Signs (Most Recent):  Temp: 98.5 °F (36.9 °C) (04/06/24 0839)  Pulse: 97 (04/06/24 0839)  Resp: 18 (04/06/24 0839)  BP: 124/64 (04/06/24 0839)  SpO2: 95 % (04/06/24 0839) Vital Signs (24h Range):  Temp:  [97.5 °F (36.4 °C)-99 °F (37.2 °C)] 98.5 °F (36.9 °C)  Pulse:  [] 97  Resp:  [16-28] 18  SpO2:  [90 %-100 %] 95 %  BP: (107-139)/(55-69) 124/64     Weight: 63.3 kg (139 lb 8.8 oz)  Body mass index is 21.22 kg/m².    Intake/Output - Last 3 Shifts         04/04 0700  04/05 0659 04/05 0700  04/06 0659 04/06 0700  04/07 0659    I.V. (mL/kg) 742.8 (11.8) 2189.1 (34.6)     IV Piggyback 0 1937.7     Total Intake(mL/kg) 742.8 (11.8) 4126.8 (65.2)     Urine (mL/kg/hr) 925 (0.6) 450 (0.3)     Stool 0 0     Total Output 925 450     Net -182.2 +3676.8            Stool Occurrence 5 x 4 x              Physical Exam  Vitals and nursing note reviewed.   Constitutional:       General: He is not in acute distress.      Appearance: He is well-developed. He is ill-appearing (chronically).   HENT:      Head: Normocephalic and atraumatic.   Eyes:      General: No scleral icterus.     Pupils: Pupils are equal, round, and reactive to light.   Neck:      Thyroid: No thyromegaly.      Vascular: No JVD.      Trachea: No tracheal deviation.   Cardiovascular:      Rate and Rhythm: Normal rate and regular rhythm.      Heart sounds: Normal heart sounds.   Pulmonary:      Effort: Pulmonary effort is normal.      Breath sounds: Normal breath sounds.   Abdominal:      Comments: Soft, nondistended, appropriately TTP; incision c/d/I without erythema or drainage   Musculoskeletal:         General: Normal range of motion.      Cervical back: Normal range of motion and neck supple.   Lymphadenopathy:      Cervical: No cervical adenopathy.   Skin:     General: Skin is warm and dry.      Comments: Posterior nephrostomy tubes bilaterally   Neurological:      Mental Status: He is alert and oriented to person, place, and time.   Psychiatric:         Behavior: Behavior normal.         Thought Content: Thought content normal.         Judgment: Judgment normal.          Significant Labs:  I have reviewed all pertinent lab results within the past 24 hours.  CBC:   Recent Labs   Lab 04/06/24 0627   WBC 15.50*   RBC 3.59*   HGB 9.3*   HCT 30.6*      MCV 85   MCH 25.9*   MCHC 30.4*     BMP:   Recent Labs   Lab 04/06/24 0627         K 4.7   *   CO2 23   BUN 17   CREATININE 1.2   CALCIUM 8.9   MG 1.6     CMP:   Recent Labs   Lab 04/06/24 0627      CALCIUM 8.9   ALBUMIN 2.3*   PROT 5.5*      K 4.7   CO2 23   *   BUN 17   CREATININE 1.2   ALKPHOS 95   ALT 9*   AST 14   BILITOT 0.4     LFTs:   Recent Labs   Lab 04/06/24 0627   ALT 9*   AST 14   ALKPHOS 95   BILITOT 0.4   PROT 5.5*   ALBUMIN 2.3*       Significant Diagnostics:  I have reviewed all pertinent imaging results/findings within the past 24  hours.  Assessment/Plan:     Partial intestinal obstruction  Now s/p exlap, ileal-transverse anastomosis bypass on 4/5/24    - trial clear liquids  - OOB, ambulation  - IV and PO pain control    Acute deep vein thrombosis (DVT) of femoral vein of left lower extremity  Okay for hep gtt from surgical standpoint    Hypothyroidism  Care per primary team    Malignant neoplasm of urinary bladder  Care per primary team    Nephrostomy tube displaced  Care per primary team    Centrilobular emphysema  Care per primary team    Squamous cell carcinoma of right lung  Care per primary team    Anemia  Care per primary team    Elevated serum creatinine  Care per primary team      Cuco Arnold MD  General Surgery  [facility] Tele[facility] ([facility])

## 2024-04-06 NOTE — PROGRESS NOTES
Chest x-ray reviewed.  Central line in good position.  Lung fields essentially clear.  No evidence of a pneumothorax

## 2024-04-06 NOTE — ANESTHESIA POSTPROCEDURE EVALUATION
Anesthesia Post Evaluation    Patient: Geovani Currie    Procedure(s) Performed: Procedure(s) (LRB):  LAPAROTOMY, EXPLORATORY (N/A)  BLOCK, TRANSVERSUS ABDOMINIS PLANE (N/A)  ENTEROENTEROSTOMY (N/A)    Final Anesthesia Type: general      Patient location during evaluation: PACU  Patient participation: Yes- Able to Participate  Level of consciousness: awake and alert and oriented  Post-procedure vital signs: reviewed and stable  Pain management: adequate  Airway patency: patent  EM mitigation strategies: Verification of full reversal of neuromuscular block  PONV status at discharge: No PONV  Anesthetic complications: no      Cardiovascular status: blood pressure returned to baseline and hemodynamically stable  Respiratory status: unassisted  Hydration status: euvolemic  Follow-up not needed.              Vitals Value Taken Time   /57 04/05/24 1930   Temp 36.4 °C (97.5 °F) 04/05/24 1930   Pulse 90 04/05/24 2000   Resp 18 04/05/24 1930   SpO2 95 % 04/05/24 1930         Event Time   Out of Recovery 04/05/2024 19:10:00         Pain/Susy Score: Pain Rating Prior to Med Admin: 7 (4/5/2024  7:05 PM)  Susy Score: 8 (4/5/2024  7:15 PM)

## 2024-04-06 NOTE — SUBJECTIVE & OBJECTIVE
Interval History: No acute events overnight. +Bowel movements. Pain well controlled.    Medications:  Continuous Infusions:   dextrose 5 % and 0.45 % NaCl with KCl 20 mEq Stopped (04/05/24 2214)    heparin (porcine) in D5W 12 Units/kg/hr (04/06/24 0339)    lactated ringers 80 mL/hr at 04/06/24 0339     Scheduled Meds:   acetaminophen  1,000 mg Intravenous Q8H    cefTRIAXone (Rocephin) IV (PEDS and ADULTS)  1 g Intravenous Q24H    chlorhexidine  10 mL Mouth/Throat BID    famotidine (PF)  20 mg Intravenous Q12H     PRN Meds:dextrose 10%, dextrose 10%, diphenhydrAMINE, glucagon (human recombinant), levalbuterol, morphine, morphine, morphine, naloxone, ondansetron, prochlorperazine, sodium chloride 0.9%     Review of patient's allergies indicates:  No Known Allergies  Objective:     Vital Signs (Most Recent):  Temp: 98.5 °F (36.9 °C) (04/06/24 0839)  Pulse: 97 (04/06/24 0839)  Resp: 18 (04/06/24 0839)  BP: 124/64 (04/06/24 0839)  SpO2: 95 % (04/06/24 0839) Vital Signs (24h Range):  Temp:  [97.5 °F (36.4 °C)-99 °F (37.2 °C)] 98.5 °F (36.9 °C)  Pulse:  [] 97  Resp:  [16-28] 18  SpO2:  [90 %-100 %] 95 %  BP: (107-139)/(55-69) 124/64     Weight: 63.3 kg (139 lb 8.8 oz)  Body mass index is 21.22 kg/m².    Intake/Output - Last 3 Shifts         04/04 0700 04/05 0659 04/05 0700 04/06 0659 04/06 0700 04/07 0659    I.V. (mL/kg) 742.8 (11.8) 2189.1 (34.6)     IV Piggyback 0 1937.7     Total Intake(mL/kg) 742.8 (11.8) 4126.8 (65.2)     Urine (mL/kg/hr) 925 (0.6) 450 (0.3)     Stool 0 0     Total Output 925 450     Net -182.2 +3676.8            Stool Occurrence 5 x 4 x              Physical Exam  Vitals and nursing note reviewed.   Constitutional:       General: He is not in acute distress.     Appearance: He is well-developed. He is ill-appearing (chronically).   HENT:      Head: Normocephalic and atraumatic.   Eyes:      General: No scleral icterus.     Pupils: Pupils are equal, round, and reactive to light.   Neck:       Thyroid: No thyromegaly.      Vascular: No JVD.      Trachea: No tracheal deviation.   Cardiovascular:      Rate and Rhythm: Normal rate and regular rhythm.      Heart sounds: Normal heart sounds.   Pulmonary:      Effort: Pulmonary effort is normal.      Breath sounds: Normal breath sounds.   Abdominal:      Comments: Soft, nondistended, appropriately TTP; incision c/d/I without erythema or drainage   Musculoskeletal:         General: Normal range of motion.      Cervical back: Normal range of motion and neck supple.   Lymphadenopathy:      Cervical: No cervical adenopathy.   Skin:     General: Skin is warm and dry.      Comments: Posterior nephrostomy tubes bilaterally   Neurological:      Mental Status: He is alert and oriented to person, place, and time.   Psychiatric:         Behavior: Behavior normal.         Thought Content: Thought content normal.         Judgment: Judgment normal.          Significant Labs:  I have reviewed all pertinent lab results within the past 24 hours.  CBC:   Recent Labs   Lab 04/06/24 0627   WBC 15.50*   RBC 3.59*   HGB 9.3*   HCT 30.6*      MCV 85   MCH 25.9*   MCHC 30.4*     BMP:   Recent Labs   Lab 04/06/24 0627         K 4.7   *   CO2 23   BUN 17   CREATININE 1.2   CALCIUM 8.9   MG 1.6     CMP:   Recent Labs   Lab 04/06/24 0627      CALCIUM 8.9   ALBUMIN 2.3*   PROT 5.5*      K 4.7   CO2 23   *   BUN 17   CREATININE 1.2   ALKPHOS 95   ALT 9*   AST 14   BILITOT 0.4     LFTs:   Recent Labs   Lab 04/06/24 0627   ALT 9*   AST 14   ALKPHOS 95   BILITOT 0.4   PROT 5.5*   ALBUMIN 2.3*       Significant Diagnostics:  I have reviewed all pertinent imaging results/findings within the past 24 hours.

## 2024-04-07 LAB
APTT PPP: 56.2 SEC (ref 21–32)
APTT PPP: 59.2 SEC (ref 21–32)
BASOPHILS # BLD AUTO: 0.01 K/UL (ref 0–0.2)
BASOPHILS NFR BLD: 0.1 % (ref 0–1.9)
DIFFERENTIAL METHOD BLD: ABNORMAL
EOSINOPHIL # BLD AUTO: 0 K/UL (ref 0–0.5)
EOSINOPHIL NFR BLD: 0.2 % (ref 0–8)
ERYTHROCYTE [DISTWIDTH] IN BLOOD BY AUTOMATED COUNT: 16.3 % (ref 11.5–14.5)
HCT VFR BLD AUTO: 27.1 % (ref 40–54)
HGB BLD-MCNC: 8.4 G/DL (ref 14–18)
IMM GRANULOCYTES # BLD AUTO: 0.07 K/UL (ref 0–0.04)
IMM GRANULOCYTES NFR BLD AUTO: 0.5 % (ref 0–0.5)
LYMPHOCYTES # BLD AUTO: 0.7 K/UL (ref 1–4.8)
LYMPHOCYTES NFR BLD: 5.2 % (ref 18–48)
MCH RBC QN AUTO: 26.1 PG (ref 27–31)
MCHC RBC AUTO-ENTMCNC: 31 G/DL (ref 32–36)
MCV RBC AUTO: 84 FL (ref 82–98)
MONOCYTES # BLD AUTO: 0.4 K/UL (ref 0.3–1)
MONOCYTES NFR BLD: 2.7 % (ref 4–15)
NEUTROPHILS # BLD AUTO: 11.9 K/UL (ref 1.8–7.7)
NEUTROPHILS NFR BLD: 91.3 % (ref 38–73)
NRBC BLD-RTO: 0 /100 WBC
PLATELET # BLD AUTO: 293 K/UL (ref 150–450)
PMV BLD AUTO: 10.1 FL (ref 9.2–12.9)
RBC # BLD AUTO: 3.22 M/UL (ref 4.6–6.2)
WBC # BLD AUTO: 12.99 K/UL (ref 3.9–12.7)

## 2024-04-07 PROCEDURE — 25000003 PHARM REV CODE 250: Performed by: SURGERY

## 2024-04-07 PROCEDURE — 36415 COLL VENOUS BLD VENIPUNCTURE: CPT | Performed by: SURGERY

## 2024-04-07 PROCEDURE — 63600175 PHARM REV CODE 636 W HCPCS: Performed by: SURGERY

## 2024-04-07 PROCEDURE — 85730 THROMBOPLASTIN TIME PARTIAL: CPT | Performed by: SURGERY

## 2024-04-07 PROCEDURE — 21400001 HC TELEMETRY ROOM

## 2024-04-07 PROCEDURE — 99024 POSTOP FOLLOW-UP VISIT: CPT | Mod: POP,,, | Performed by: COLON & RECTAL SURGERY

## 2024-04-07 PROCEDURE — S4991 NICOTINE PATCH NONLEGEND: HCPCS | Performed by: STUDENT IN AN ORGANIZED HEALTH CARE EDUCATION/TRAINING PROGRAM

## 2024-04-07 PROCEDURE — 36415 COLL VENOUS BLD VENIPUNCTURE: CPT | Performed by: STUDENT IN AN ORGANIZED HEALTH CARE EDUCATION/TRAINING PROGRAM

## 2024-04-07 PROCEDURE — 85730 THROMBOPLASTIN TIME PARTIAL: CPT | Mod: 91 | Performed by: STUDENT IN AN ORGANIZED HEALTH CARE EDUCATION/TRAINING PROGRAM

## 2024-04-07 PROCEDURE — 94799 UNLISTED PULMONARY SVC/PX: CPT | Mod: XB

## 2024-04-07 PROCEDURE — 25000242 PHARM REV CODE 250 ALT 637 W/ HCPCS: Performed by: STUDENT IN AN ORGANIZED HEALTH CARE EDUCATION/TRAINING PROGRAM

## 2024-04-07 PROCEDURE — 25000003 PHARM REV CODE 250: Performed by: COLON & RECTAL SURGERY

## 2024-04-07 PROCEDURE — 99900035 HC TECH TIME PER 15 MIN (STAT)

## 2024-04-07 PROCEDURE — 99222 1ST HOSP IP/OBS MODERATE 55: CPT | Mod: ,,, | Performed by: UROLOGY

## 2024-04-07 PROCEDURE — 25000003 PHARM REV CODE 250: Performed by: STUDENT IN AN ORGANIZED HEALTH CARE EDUCATION/TRAINING PROGRAM

## 2024-04-07 PROCEDURE — 63600175 PHARM REV CODE 636 W HCPCS: Performed by: STUDENT IN AN ORGANIZED HEALTH CARE EDUCATION/TRAINING PROGRAM

## 2024-04-07 PROCEDURE — 94640 AIRWAY INHALATION TREATMENT: CPT

## 2024-04-07 PROCEDURE — 85025 COMPLETE CBC W/AUTO DIFF WBC: CPT | Performed by: SURGERY

## 2024-04-07 RX ORDER — ARFORMOTEROL TARTRATE 15 UG/2ML
15 SOLUTION RESPIRATORY (INHALATION) 2 TIMES DAILY
Status: DISCONTINUED | OUTPATIENT
Start: 2024-04-07 | End: 2024-04-12 | Stop reason: HOSPADM

## 2024-04-07 RX ORDER — BUDESONIDE 0.5 MG/2ML
0.5 INHALANT ORAL EVERY 12 HOURS
Status: DISCONTINUED | OUTPATIENT
Start: 2024-04-07 | End: 2024-04-12 | Stop reason: HOSPADM

## 2024-04-07 RX ADMIN — OXYCODONE HYDROCHLORIDE 10 MG: 5 TABLET ORAL at 09:04

## 2024-04-07 RX ADMIN — ARFORMOTEROL TARTRATE 15 MCG: 15 SOLUTION RESPIRATORY (INHALATION) at 07:04

## 2024-04-07 RX ADMIN — CEFTRIAXONE 1 G: 1 INJECTION, POWDER, FOR SOLUTION INTRAMUSCULAR; INTRAVENOUS at 09:04

## 2024-04-07 RX ADMIN — HEPARIN SODIUM 12 UNITS/KG/HR: 10000 INJECTION, SOLUTION INTRAVENOUS at 07:04

## 2024-04-07 RX ADMIN — SODIUM CHLORIDE, POTASSIUM CHLORIDE, SODIUM LACTATE AND CALCIUM CHLORIDE: 600; 310; 30; 20 INJECTION, SOLUTION INTRAVENOUS at 01:04

## 2024-04-07 RX ADMIN — ACETAMINOPHEN 650 MG: 325 TABLET ORAL at 12:04

## 2024-04-07 RX ADMIN — CHLORHEXIDINE GLUCONATE 0.12% ORAL RINSE 10 ML: 1.2 LIQUID ORAL at 09:04

## 2024-04-07 RX ADMIN — HYDROMORPHONE HYDROCHLORIDE 1 MG: 1 INJECTION, SOLUTION INTRAMUSCULAR; INTRAVENOUS; SUBCUTANEOUS at 09:04

## 2024-04-07 RX ADMIN — SODIUM CHLORIDE, POTASSIUM CHLORIDE, SODIUM LACTATE AND CALCIUM CHLORIDE: 600; 310; 30; 20 INJECTION, SOLUTION INTRAVENOUS at 05:04

## 2024-04-07 RX ADMIN — FAMOTIDINE 20 MG: 10 INJECTION, SOLUTION INTRAVENOUS at 09:04

## 2024-04-07 RX ADMIN — DEXTROSE, SODIUM CHLORIDE, AND POTASSIUM CHLORIDE: 5; .45; .15 INJECTION INTRAVENOUS at 02:04

## 2024-04-07 RX ADMIN — ACETAMINOPHEN 650 MG: 325 TABLET ORAL at 01:04

## 2024-04-07 RX ADMIN — ACETAMINOPHEN 325 MG: 325 TABLET ORAL at 11:04

## 2024-04-07 RX ADMIN — MUPIROCIN: 20 OINTMENT TOPICAL at 10:04

## 2024-04-07 RX ADMIN — ACETAMINOPHEN 650 MG: 325 TABLET ORAL at 06:04

## 2024-04-07 RX ADMIN — ACETAMINOPHEN 650 MG: 325 TABLET ORAL at 05:04

## 2024-04-07 RX ADMIN — NICOTINE 1 PATCH: 21 PATCH, EXTENDED RELEASE TRANSDERMAL at 09:04

## 2024-04-07 RX ADMIN — BUDESONIDE 0.5 MG: 0.5 INHALANT ORAL at 07:04

## 2024-04-07 NOTE — PROGRESS NOTES
[facility] Medicine  Progress Note     Patient Name:  Geovani Currie  MRN:  [id number]  Patient Class: IP-Inpatient  Admission Date:  4/3/2024   Length of Stay:  4  Attending Physician: Yuval Orozco MD  Primary Care Provider: Faizan Antoine MD    Subjective:      Subsequent Care: Follow up Complete obstruction of colon        HPI:  68 y.o. male patient with a PMHx of COPD, HTN, Lung Cancer, CKD, DVT, metastatic bladder cancer s/p nephrostomy tube placement. Presented to the Emergency Department for generalized abdominal pain, onset several days PTA. Pt was referred to the ED by Dr. Sr (Interventional Radiology) after CT Urogram showed small bowel obstruction. Pt was scheduled to have his nephrostomy tubes replaced on day of arrival. Associated sxs include nausea, abdominal distention, and loss of appetite. Patient denies any fever, chills, vomiting, SOB, CP, weakness, numbness, dizziness, headache, and all other sxs at this time. Pt and spouse are from [place], and are trying to return home to set up hospice. He is currently on cefdinir, but is not receiving any cancer treatment. General Surgery consulted, recommended to admit and consult Oncology and Urology. In the ED, vitals: 94/56, 85, 18, 98F, 96% RA. Significant Labs: H/H: 10/32.1, CT abd/pel: findings consistent SBO, Interval worsening of bladder cancer. Patient is a full code. Admitted to Hospital Medicine for management of Small Bowel Obstruction.         Overview/Hospital Course:  04/04/2024  CT scan shows shows interval worsening of locally invasive bladder cancer with rectal involvement and small bowel obstruction. SBFT pending. Oncology evaluated, strongly recommends DNR order to be placed. Both specialists suggest palliative vs hospice discussion. Palliative care consulted to assist.      04/05/2024  Came to discuss case with family and patient this morning one final time prior to their surgery. Went into  "meticulous detail regarding their wishes and goals at this encounter. Patient and family at bedside are of the understanding that the purpose of this pending surgery is to allow patient the ability to tolerate PO palliative immunotherapy. I also emphasized the fact that none of these interventions are curative in any way and that we are only establishing a path to allow further palliative measures. They seemed to acknowledge this fact as well. We discussed risks of surgery and both patient & family voiced their understanding and acceptance of these risks. Family then reiterated their desire to "stabilize" patient enough for him to get back to [place], where the patient (to my understanding) may or may not proceed with hospice care.      Will maintain Full Code in the perioperative period, but family seems amenable to transitioning to DNR at some point in the postoperative stage. Once stable, disposition will compose of likely discharge home with HH, NP at home and/or home based palliative care services in the interim while awaiting his move back to [place]. Discussed case with Palliative Care. Will consult their teams in the postoperative period if needed.      04/06/2024  POD#1, seems to have tolerated surgery well. Complains of breakthrough pain on current regimen.      Interval Hx  No acute events overnight. Doing well this AM with no complaints at our encounter. Denies CP, SOB, Abdominal pain, fevers/chills.    Objective  /75 (BP Location: Left arm, Patient Position: Lying)   Pulse 94   Temp 98.8 °F (37.1 °C) (Oral)   Resp 20   Wt 63.3 kg (139 lb 8.8 oz)   SpO2 (!) 94%   BMI 21.22 kg/m²     Intake/Output Summary (Last 24 hours) at 4/7/2024 0834  Last data filed at 4/6/2024 1920  Gross per 24 hour   Intake 2153.43 ml   Output 200 ml   Net 1953.43 ml       PHYSICAL EXAM  Vitals reviewed  Constitutional:       General: He is not in acute distress.     Appearance: He is cachectic. He is " "ill-appearing. He is not diaphoretic.   Cardiovascular:      Rate and Rhythm: Normal rate and regular rhythm.      Pulses:           Dorsalis pedis pulses are 1+ on the right side and 1+ on the left side.      Heart sounds: Normal heart sounds. No murmur heard.  Pulmonary:      Effort: Pulmonary effort is normal. No respiratory distress.      Breath sounds: Examination of the right-lower field reveals decreased breath sounds. Examination of the left-lower field reveals decreased breath sounds. Decreased breath sounds present. No wheezing.   Abdominal:      Surgical incisions stable on inspection, no dehiscence or signs of developing infection     General: Bowel sounds are normal. There is distension.      Palpations: Abdomen is soft.      Tenderness: There is generalized abdominal tenderness.      Bilateral nephrostomy tubes, drainage noted around R tube    LABS  All labs from the past 24 hours were reviewed.     BMP:   Recent Labs   Lab 04/06/24 0627         K 4.7   *   CO2 23   BUN 17   CREATININE 1.2   CALCIUM 8.9   MG 1.6     CBC:   Recent Labs   Lab 04/06/24 0627 04/07/24  0438   WBC 15.50* 12.99*   HGB 9.3* 8.4*   HCT 30.6* 27.1*    293     CMP:   Recent Labs   Lab 04/06/24  0627      K 4.7   *   CO2 23      BUN 17   CREATININE 1.2   CALCIUM 8.9   PROT 5.5*   ALBUMIN 2.3*   BILITOT 0.4   ALKPHOS 95   AST 14   ALT 9*   ANIONGAP 10     Cardiac Markers: No results for input(s): "CKMB", "MYOGLOBIN", "BNP", "TROPISTAT" in the last 48 hours.  Coagulation:   Recent Labs   Lab 04/07/24  0438   APTT 56.2*     Lactic Acid: No results for input(s): "LACTATE" in the last 48 hours.  Magnesium:   Recent Labs   Lab 04/06/24  0627   MG 1.6     Troponin: No results for input(s): "TROPONINI", "TROPONINIHS" in the last 48 hours.  TSH:   Recent Labs   Lab 02/23/24  0841   TSH 0.630     Urine Studies:   No results for input(s): "COLORU", "APPEARANCEUA", "PHUR", "SPECGRAV", " ""PROTEINUA", "GLUCUA", "KETONESU", "BILIRUBINUA", "OCCULTUA", "NITRITE", "UROBILINOGEN", "LEUKOCYTESUR", "RBCUA", "WBCUA", "BACTERIA", "SQUAMEPITHEL", "HYALINECASTS" in the last 48 hours.    Invalid input(s): "WRIGHTSUR"    IMAGING  All imaging from the past 24 hours were reviewed.     Imaging Results              X-Ray Abdomen Flat And Erect (Final result)  Result time 04/03/24 15:59:11      Final result by Elmo Garza MD (04/03/24 15:59:11)                   Impression:      Increasing small bowel distension mid upper abdomen indicating partial small bowel obstruction.      Electronically signed by: Elmo Garza MD  Date:    04/03/2024  Time:    15:59               Narrative:    EXAMINATION:  XR ABDOMEN FLAT AND ERECT    CLINICAL HISTORY:  Unspecified intestinal obstruction, unspecified as to partial versus complete obstruction    TECHNIQUE:  Routine exam.    COMPARISON:  03/11/2024    FINDINGS:  Increasing air-filled loops of small bowel throughout the mid upper abdomen.  Maximum diameter approximately 4 cm.Large amount stool right colon    No other changes.  Again noted are bilateral nephrostomy catheters.                                      Assessment/Plan:      * Partial intestinal obstruction  Secondary to bladder cancer, progression noted on CT  --Consult Oncology and Urology for recommendations  --Consult General Surgery     04/04/2024  --SBFT ordered for this morning  --follow up surgery recommendations  --hospice discussion on going (as discussed below)     04/05/2024  --Exploratory laparotomy planned for today with either resection of the small bowel with planned anastomosis or bypass of the small bowel to itself or to the colon to relieve the obstruction.   --will follow up postoperatively     04/06/2024  --s/p exlap, ileal-transverse anastomosis bypass on 4/5/24   --tolerated surgery well, but still with some breakthrough pain  --rest of management per surgery    04/07/2024  --pain controlled on " "current regimen  --will attempt to wean IV meds as tolerated  --diet advanced to CLD by surgery  --maintain IV fluids for now  --rest of management per their team    Anemia  04/07/2024  -Expected downtrend after surgery  -no signs of bleeding on exam  -trend daily, monitor clinically     Malignant neoplasm of urinary bladder  Known hx of bladder cancer, CT abd/pel: Interval worsening of locally invasive bladder cancer extension to the pelvic sidewall and the presacral fat and sacrum on the left. There is involvement of the rectum as well as well as involvement of a small bowel loop in the pelvis causing a small bowel obstruction.      --Consult urology and oncology for recommendations     04/04/2024  -Urology evaluated: "I cannot offer any surgical intervention for his advanced bladder cancer at this point. I would recommend hospice, which patient's wife appears open to."  -Oncology evaluated: "Strongly recommend that patient have a do not resuscitate order be placed on chart and be seen by palliative care if not already done so."  -Palliative care consulted, follow up recommendations      04/05/2024  See advance care note below     04/06/2024  Family complained about purulent leakage around R nephrostomy tubes. CT urogram doesn't show much change from previous imaging. Will ask Urology to evaluate for possible exchange.     Advanced care planning/counseling discussion  04/05/2024  Came to discuss case with family and patient this morning one final time prior to their surgery. Went into meticulous detail regarding their wishes and goals at this encounter. Patient and family at bedside are of the understanding that the purpose of this pending surgery is to allow patient the ability to tolerate PO palliative immunotherapy. I also emphasized the fact that none of these interventions are curative in any way and that we are only establishing a path to allow further palliative measures. They seemed to acknowledge this " "fact as well. We discussed risks of surgery and both patient & family voiced their understanding and acceptance of these risks. Family then reiterated their desire to "stabilize" patient enough for him to get back to [place], where the patient (to my understanding) may or may not proceed with hospice care.      Will maintain Full Code in the perioperative period, but family seems amenable to transitioning to DNR at some point in the postoperative stage. Once stable, disposition will compose of likely discharge home with HH, NP at home and/or home based palliative care services in the interim while awaiting his move back to [place]. Discussed case with Palliative Care. Will consult their teams in the postoperative period if needed.   --Confirmed with patient and family at bedside that patient will be a full code during this hospitalization  --orders placed, discussion duration: < 3 minutes           Acute deep vein thrombosis (DVT) of femoral vein of left lower extremity  New diagnosis, managed with Eliquis as OP     --Hold Eliquis for now  --Heparin per nomogram        Hypothyroidism     Chronic, well controlled     --Cont home med        Nephrostomy tube displaced  Followed by Urology, planned for tube exchange on 4/3, referred to ED due to SBO     --Consult IR if needed         Centrilobular emphysema  Chronic, well controlled     --Cont home meds     Squamous cell carcinoma of right lung  Followed by oncology, treated with Radiation, therapy completed     --consult oncology             Elevated serum creatinine  Mild increase,likely secondary to tumor burden     --Repeat CMP in AM       CORE MEASURES:  VTE Risk Mitigation (From admission, onward)           Ordered     heparin 25,000 units in dextrose 5% 250 ml (100 units/mL) infusion MINIMAL INTENSITY nomogram - [facility]  Continuous        Question:  Begin at (units/kg/hr)  Answer:  12    04/05/24 2007     heparin 25,000 units in dextrose 5% 250 mL (100 " units/mL) infusion LOW INTENSITY nomogram - [facility]  Continuous        Question:  Begin at (units/kg/hr)  Answer:  12    04/04/24 1920     Reason for No Pharmacological VTE Prophylaxis  Once        Question:  Reasons:  Answer:  Risk of Bleeding    04/03/24 1724     IP VTE HIGH RISK PATIENT  Once         04/03/24 1724     Place sequential compression device  Until discontinued         04/03/24 1724                    Code Status: FULL    Diet: Diet Clear Liquid    Disposition: trend H&H, wean IV pain meds, rest of management per surgery       Yuval Orozco MD  Department of Hospital Medicine   [facility] Tele[facility] ([facility])

## 2024-04-07 NOTE — SUBJECTIVE & OBJECTIVE
Interval History: +BM and flatus. No nausea or vomiting. Pain well controlled.    Medications:  Continuous Infusions:   dextrose 5 % and 0.45 % NaCl with KCl 20 mEq 75 mL/hr at 04/07/24 0245    heparin (porcine) in D5W 12 Units/kg/hr (04/07/24 0713)    lactated ringers 80 mL/hr at 04/07/24 0120     Scheduled Meds:   acetaminophen  650 mg Oral Q6H    cefTRIAXone (Rocephin) IV (PEDS and ADULTS)  1 g Intravenous Q24H    chlorhexidine  10 mL Mouth/Throat BID    famotidine (PF)  20 mg Intravenous Q12H    mupirocin   Topical (Top) Daily    nicotine  1 patch Transdermal Daily     PRN Meds:sodium chloride 0.9%, dextrose 10%, dextrose 10%, diphenhydrAMINE, glucagon (human recombinant), HYDROmorphone, levalbuterol, naloxone, ondansetron, oxyCODONE, oxyCODONE, prochlorperazine, sodium chloride 0.9%     Review of patient's allergies indicates:  No Known Allergies  Objective:     Vital Signs (Most Recent):  Temp: 98.8 °F (37.1 °C) (04/07/24 0758)  Pulse: 99 (04/07/24 0918)  Resp: 20 (04/07/24 0758)  BP: 138/75 (04/07/24 0758)  SpO2: (!) 94 % (04/07/24 0758) Vital Signs (24h Range):  Temp:  [98.1 °F (36.7 °C)-99.2 °F (37.3 °C)] 98.8 °F (37.1 °C)  Pulse:  [] 99  Resp:  [16-22] 20  SpO2:  [93 %-98 %] 94 %  BP: (135-170)/(65-82) 138/75     Weight: 63.3 kg (139 lb 8.8 oz)  Body mass index is 21.22 kg/m².    Intake/Output - Last 3 Shifts         04/05 0700  04/06 0659 04/06 0700 04/07 0659 04/07 0700  04/08 0659    P.O.  300     I.V. (mL/kg) 2189.1 (34.6) 1768.6 (27.9)     IV Piggyback 1937.7 84.8     Total Intake(mL/kg) 4126.8 (65.2) 2153.4 (34)     Urine (mL/kg/hr) 450 (0.3) 300 (0.2)     Stool 0      Total Output 450 300     Net +3676.8 +1853.4            Stool Occurrence 4 x               Physical Exam  Vitals and nursing note reviewed.   Constitutional:       General: He is not in acute distress.     Appearance: He is well-developed. He is ill-appearing (chronically).   HENT:      Head: Normocephalic and atraumatic.    Eyes:      General: No scleral icterus.     Pupils: Pupils are equal, round, and reactive to light.   Neck:      Thyroid: No thyromegaly.      Vascular: No JVD.      Trachea: No tracheal deviation.   Cardiovascular:      Rate and Rhythm: Normal rate and regular rhythm.      Heart sounds: Normal heart sounds.   Pulmonary:      Effort: Pulmonary effort is normal.      Breath sounds: Normal breath sounds.   Abdominal:      Comments: Soft, nondistended, appropriately TTP; incision c/d/I without erythema or drainage   Musculoskeletal:         General: Normal range of motion.      Cervical back: Normal range of motion and neck supple.   Lymphadenopathy:      Cervical: No cervical adenopathy.   Skin:     General: Skin is warm and dry.      Comments: Posterior nephrostomy tubes bilaterally   Neurological:      Mental Status: He is alert and oriented to person, place, and time.   Psychiatric:         Behavior: Behavior normal.         Thought Content: Thought content normal.         Judgment: Judgment normal.          Significant Labs:  I have reviewed all pertinent lab results within the past 24 hours.  CBC:   Recent Labs   Lab 04/07/24  0438   WBC 12.99*   RBC 3.22*   HGB 8.4*   HCT 27.1*      MCV 84   MCH 26.1*   MCHC 31.0*     BMP:   Recent Labs   Lab 04/06/24  0627         K 4.7   *   CO2 23   BUN 17   CREATININE 1.2   CALCIUM 8.9   MG 1.6       Significant Diagnostics:  I have reviewed all pertinent imaging results/findings within the past 24 hours.

## 2024-04-07 NOTE — PLAN OF CARE
A218/A218 IVETTE Currie is a 68 y.o.male admitted on 4/3/2024 for Complete obstruction of colon   Code Status: Full Code MRN: [id number]   Review of patient's allergies indicates:  No Known Allergies  Past Medical History:   Diagnosis Date    Anxiety disorder, unspecified     Cancer     COPD (chronic obstructive pulmonary disease)     Hypertension     Thyroid disease       PRN meds    sodium chloride 0.9%, , PRN  dextrose 10%, 12.5 g, PRN  dextrose 10%, 25 g, PRN  diphenhydrAMINE, 25 mg, Q4H PRN  glucagon (human recombinant), 1 mg, PRN  HYDROmorphone, 1 mg, Q4H PRN  levalbuterol, 0.63 mg, Q8H PRN  naloxone, 0.02 mg, PRN  ondansetron, 4 mg, Q8H PRN  oxyCODONE, 10 mg, Q4H PRN  oxyCODONE, 5 mg, Q4H PRN  prochlorperazine, 5 mg, Q6H PRN  sodium chloride 0.9%, 10 mL, Q12H PRN      Chart check completed. Will continue plan of care.      Orientation: disoriented to, situation (occasionally)  [place] Coma Scale Score: 15     Lead Monitored: Lead II Rhythm: sinus tachycardia, normal sinus rhythm Frequency/Ectopy: frequent, PACs, PVCs, greater than 6/min  Cardiac/Telemetry Box Number: [id number]  VTE Required Core Measure: (SCDs) Sequential compression device initiated/maintained Last Bowel Movement: 04/06/24  Diet Clear Liquid  Voiding Characteristics: other (see comments) (bilateral nephrostomy tubes)  Juanjo Score: 16  Fall Risk Score: 14  Accucheck []   Freq?      Lines/Drains/Airways       Central Venous Catheter Line  Duration             Percutaneous Central Line - Triple Lumen  04/05/24 1752 Internal Jugular Right 1 day              Drain  Duration                  Nephrostomy Left -- days         Nephrostomy 02/03/24 1044 Left 8 Fr. 63 days         Nephrostomy 02/03/24 1045 Right 10 Fr. 63 days              Peripheral Intravenous Line  Duration                  Peripheral IV - Single Lumen 04/04/24 1129 22 G Left;Posterior Forearm 2 days         Peripheral IV - Single Lumen 04/04/24 1134 22 G Posterior;Right Forearm 2  days

## 2024-04-07 NOTE — PROGRESS NOTES
[facility] - [facility] ([facility])  General Surgery  Progress Note    Subjective:     Interval History: +BM and flatus. No nausea or vomiting. Pain well controlled.    Medications:  Continuous Infusions:   dextrose 5 % and 0.45 % NaCl with KCl 20 mEq 75 mL/hr at 04/07/24 0245    heparin (porcine) in D5W 12 Units/kg/hr (04/07/24 0713)    lactated ringers 80 mL/hr at 04/07/24 0120     Scheduled Meds:   acetaminophen  650 mg Oral Q6H    cefTRIAXone (Rocephin) IV (PEDS and ADULTS)  1 g Intravenous Q24H    chlorhexidine  10 mL Mouth/Throat BID    famotidine (PF)  20 mg Intravenous Q12H    mupirocin   Topical (Top) Daily    nicotine  1 patch Transdermal Daily     PRN Meds:sodium chloride 0.9%, dextrose 10%, dextrose 10%, diphenhydrAMINE, glucagon (human recombinant), HYDROmorphone, levalbuterol, naloxone, ondansetron, oxyCODONE, oxyCODONE, prochlorperazine, sodium chloride 0.9%     Review of patient's allergies indicates:  No Known Allergies  Objective:     Vital Signs (Most Recent):  Temp: 98.8 °F (37.1 °C) (04/07/24 0758)  Pulse: 99 (04/07/24 0918)  Resp: 20 (04/07/24 0758)  BP: 138/75 (04/07/24 0758)  SpO2: (!) 94 % (04/07/24 0758) Vital Signs (24h Range):  Temp:  [98.1 °F (36.7 °C)-99.2 °F (37.3 °C)] 98.8 °F (37.1 °C)  Pulse:  [] 99  Resp:  [16-22] 20  SpO2:  [93 %-98 %] 94 %  BP: (135-170)/(65-82) 138/75     Weight: 63.3 kg (139 lb 8.8 oz)  Body mass index is 21.22 kg/m².    Intake/Output - Last 3 Shifts         04/05 0700 04/06 0659 04/06 0700 04/07 0659 04/07 0700 04/08 0659    P.O.  300     I.V. (mL/kg) 2189.1 (34.6) 1768.6 (27.9)     IV Piggyback 1937.7 84.8     Total Intake(mL/kg) 4126.8 (65.2) 2153.4 (34)     Urine (mL/kg/hr) 450 (0.3) 300 (0.2)     Stool 0      Total Output 450 300     Net +3676.8 +1853.4            Stool Occurrence 4 x               Physical Exam  Vitals and nursing note reviewed.   Constitutional:       General: He is not in acute distress.     Appearance: He is well-developed. He is  ill-appearing (chronically).   HENT:      Head: Normocephalic and atraumatic.   Eyes:      General: No scleral icterus.     Pupils: Pupils are equal, round, and reactive to light.   Neck:      Thyroid: No thyromegaly.      Vascular: No JVD.      Trachea: No tracheal deviation.   Cardiovascular:      Rate and Rhythm: Normal rate and regular rhythm.      Heart sounds: Normal heart sounds.   Pulmonary:      Effort: Pulmonary effort is normal.      Breath sounds: Normal breath sounds.   Abdominal:      Comments: Soft, nondistended, appropriately TTP; incision c/d/I without erythema or drainage   Musculoskeletal:         General: Normal range of motion.      Cervical back: Normal range of motion and neck supple.   Lymphadenopathy:      Cervical: No cervical adenopathy.   Skin:     General: Skin is warm and dry.      Comments: Posterior nephrostomy tubes bilaterally   Neurological:      Mental Status: He is alert and oriented to person, place, and time.   Psychiatric:         Behavior: Behavior normal.         Thought Content: Thought content normal.         Judgment: Judgment normal.          Significant Labs:  I have reviewed all pertinent lab results within the past 24 hours.  CBC:   Recent Labs   Lab 04/07/24  0438   WBC 12.99*   RBC 3.22*   HGB 8.4*   HCT 27.1*      MCV 84   MCH 26.1*   MCHC 31.0*     BMP:   Recent Labs   Lab 04/06/24  0627         K 4.7   *   CO2 23   BUN 17   CREATININE 1.2   CALCIUM 8.9   MG 1.6       Significant Diagnostics:  I have reviewed all pertinent imaging results/findings within the past 24 hours.  Assessment/Plan:     Partial intestinal obstruction  Now s/p exlap, ileal-transverse anastomosis bypass on 4/5/24    - trial full liquids today.  Encouraged judicious intake and back down to NPO status developing nausea, vomiting or distention  - OOB, ambulation  - IV and PO pain control    Acute deep vein thrombosis (DVT) of femoral vein of left lower extremity  Okay  for hep gtt from surgical standpoint    Hypothyroidism  Care per primary team    Malignant neoplasm of urinary bladder  Care per primary team    Nephrostomy tube displaced  Care per primary team    Centrilobular emphysema  Care per primary team    Squamous cell carcinoma of right lung  Care per primary team    Anemia  Care per primary team    Elevated serum creatinine  Care per primary team      Cuco Arnold MD  General Surgery  [facility] Telemetry ([facility]

## 2024-04-07 NOTE — ASSESSMENT & PLAN NOTE
Now s/p exlap, ileal-transverse anastomosis bypass on 4/5/24    - trial full liquids today.  Encouraged judicious intake and back down to NPO status developing nausea, vomiting or distention  - OOB, ambulation  - IV and PO pain control

## 2024-04-07 NOTE — CONSULTS
[facility] ([facility])  Urology  Consult Note    Patient Name: Geovani Currie  MRN: [id number]  Admission Date: 4/3/2024  Hospital Length of Stay: 4 days  Code Status: Full Code   Attending Provider: Yuval Orozco MD   Consulting Provider: Arya Worley MD  Primary Care Physician: Faizan Antoine MD  Principal Problem:Complete obstruction of colon    Consults    Subjective:     HPI: 58 year old male who has metastatic bladder cancer to his intra abdominal lymph nodes with bilateral ureteral obstruction being managed with bilateral nephrostomy tubes.  His last bilateral nephrostomy tube changes were by IR on 2-3-24.  The patient had a CT urogram done on 4-2-24 that showed interval worsening of locally invasive bladder cancer extension to the pelvic sidewall and the presacral fat and sacrum on the left.  There is involvement of the rectum as well as well as involvement of a small bowel loop in the pelvis causing a small bowel obstruction.  There is left retroperitoneal lymphadenopathy.  The patient underwent a laparotomy with ileal-transverse colon anastomosis bypass on 4-5-24 with General Surgery.  Urology was consulted to assess his bilateral nephrostomy tubes.  Per the patient' wife, she says she saw some pus coming from around his right nephrostomy tube site recently.  I ordered for mupirocin 2% ointment to be applied to the skin at the nephrostomy tube sites daily with his daily dressing changes.  No gross hematuria.  His bilateral nephrostomy tubes are working well.      Past Medical History:   Diagnosis Date    Anxiety disorder, unspecified     Cancer     COPD (chronic obstructive pulmonary disease)     Hypertension     Thyroid disease        Past Surgical History:   Procedure Laterality Date    APPENDECTOMY      CATARACT EXTRACTION      NEPHROSTOMY      OPEN REDUCTION AND INTERNAL FIXATION (ORIF) OF INTERTROCHANTERIC FRACTURE OF FEMUR Right 11/21/2023    Procedure: ORIF, FRACTURE, FEMUR,  INTERTROCHANTERIC;  Surgeon: Tanisha Guidry DO;  Location: [facility];  Service: Orthopedics;  Laterality: Right;  Gamma nail       Review of patient's allergies indicates:  No Known Allergies    Family History       Problem Relation (Age of Onset)    Cancer Mother, Father, Maternal Grandfather            Tobacco Use    Smoking status: Every Day     Types: Vaping with nicotine     Passive exposure: Current    Smokeless tobacco: Never   Substance and Sexual Activity    Alcohol use: Never    Drug use: Never    Sexual activity: Not Currently     Partners: Female       Review of Systems    Objective:     Temp:  [98.1 °F (36.7 °C)-99.2 °F (37.3 °C)] 98.8 °F (37.1 °C)  Pulse:  [] 94  Resp:  [16-22] 20  SpO2:  [93 %-98 %] 94 %  BP: (135-170)/(65-82) 138/75     Body mass index is 21.22 kg/m².           Lines/Drains/Airways       Central Venous Catheter Line  Duration             Percutaneous Central Line - Triple Lumen  04/05/24 1752 Internal Jugular Right 1 day              Drain  Duration                  Nephrostomy Left -- days         Nephrostomy 02/03/24 1044 Left 8 Fr. 63 days         Nephrostomy 02/03/24 1045 Right 10 Fr. 63 days              Peripheral Intravenous Line  Duration                  Peripheral IV - Single Lumen 04/04/24 1129 22 G Left;Posterior Forearm 2 days         Peripheral IV - Single Lumen 04/04/24 1134 22 G Posterior;Right Forearm 2 days                    Physical Exam      NAD  A&OX4  Resp even and unlabored  Abd soft, benign, ND  His bilateral nephrostomy tubes are in good position and are both draining yellow colored urine.  No gross hematuria, no blood clots, and no debris or cloudiness of the urine.  The dressings are both in place on his bilateral nephrostomy tubes.  I do not see any purulent drainage at his bilateral nephrostomy tube sites, no fluctuance, no leakage around the nephrostomy tube sites, and no evidence of cellulitis at the skin of his bilateral nephrostomy tube  sites.       Significant Labs:  BMP:  Recent Labs   Lab 24  1307 24  0426 24  0627    139 144   K 3.9 3.5 4.7    106 111*   CO2 22* 21* 23   BUN 18 15 17   CREATININE 1.4 1.1 1.2   CALCIUM 9.6 9.3 8.9       CBC:  Recent Labs   Lab 24  0426 24  0627 24  0438   WBC 8.02  8.02 15.50* 12.99*   HGB 10.2*  10.2* 9.3* 8.4*   HCT 32.7*  32.7* 30.6* 27.1*     362 332 293       CMP:   Recent Labs   Lab 24  1307 24  0426 24  0627   * 101 108    139 144   K 3.9 3.5 4.7    106 111*   CO2 22* 21* 23   BUN 18 15 17   CREATININE 1.4 1.1 1.2   CALCIUM 9.6 9.3 8.9   MG  --  1.7 1.6       Significant Imagin-2-24  CT urogram.  See report.  Interval worsening of locally invasive bladder cancer extension to the pelvic sidewall and the presacral fat and sacrum on the left.  There is involvement of the rectum as well as well as involvement of a small bowel loop in the pelvis causing a small bowel obstruction. There is left retroperitoneal lymphadenopathy.      Assessment and Plan:     Active Diagnoses:    Diagnosis Date Noted POA    PRINCIPAL PROBLEM:  Near complete obstruction of colon [K56.601] 2024 Unknown    Partial intestinal obstruction [K56.600] 2024 Yes    Acute deep vein thrombosis (DVT) of femoral vein of left lower extremity [I82.412] 2024 Yes    Hypothyroidism [E03.9] 2024 Yes     Chronic    Malignant neoplasm of urinary bladder [C67.9] 11/15/2023 Yes    Nephrostomy tube displaced [T83.022A] 11/10/2023 Yes    Centrilobular emphysema [J43.2] 2023 Yes     Chronic    Squamous cell carcinoma of right lung [C34.91] 2023 Yes    Anemia [D64.9] 10/21/2023 Yes     Chronic    Elevated serum creatinine [R79.89] 10/21/2023 Yes      Problems Resolved During this Admission:       VTE Risk Mitigation (From admission, onward)           Ordered     heparin 25,000 units in dextrose 5% 250 ml (100 units/mL)  infusion MINIMAL INTENSITY nomogram - [facility]  Continuous        Question:  Begin at (units/kg/hr)  Answer:  12 04/05/24 2007     heparin 25,000 units in dextrose 5% 250 mL (100 units/mL) infusion LOW INTENSITY nomogram - [facility]  Continuous        Question:  Begin at (units/kg/hr)  Answer:  12 04/04/24 1920     Reason for No Pharmacological VTE Prophylaxis  Once        Question:  Reasons:  Answer:  Risk of Bleeding    04/03/24 1724     IP VTE HIGH RISK PATIENT  Once         04/03/24 1724     Place sequential compression device  Until discontinued         04/03/24 1724                    Assessment:  - Metastatic bladder cancer to his intra abdominal lymph nodes with bilateral ureteral obstruction being managed with bilateral nephrostomy tubes.  His last bilateral nephrostomy tube changes were by IR on 2-3-24.    - CT urogram done on 4-2-24 that showed interval worsening of locally invasive bladder cancer extension to the pelvic sidewall and the presacral fat and sacrum on the left.  There is involvement of the rectum as well as well as involvement of a small bowel loop in the pelvis causing a small bowel obstruction.  There is left retroperitoneal lymphadenopathy.    - He is s/p laparotomy with ileal-transverse colon anastomosis bypass on 4-5-24 with General Surgery.    Plan:  - I discussed with the patient and his wife today the option of having his bilateral nephrostomy tubes exchanged by IR this week while he is in the hospital.  I explained that at this time, there is no urgent or acute reason to have his bilateral nephrostomy tubes changes.  His wife says that they are good with the patient keeping his current bilateral nephrostomy tubes in place at this time.  He had a follow up appointment scheduled with his established Urologist, Dr Jorgensen, on 4-11-24 and his bilateral nephrostomy tube changes can be arranged at this visit.  - Continue daily mupirocin 2% ointment to apply to the skin at the nephrostomy tube  sites with his daily cleaning and dressing changes.  - Continue IV antibiotic while in the hospital.  He is currently on IV rocephin daily.  - Thank you for your consult. I will sign off. Please contact us if you have any additional questions.  Reconsult as needed.    Arya Worley MD  Urology  [facility] ([facility])

## 2024-04-08 ENCOUNTER — CLINICAL SUPPORT (OUTPATIENT)
Dept: SMOKING CESSATION | Facility: CLINIC | Age: 69
End: 2024-04-08
Payer: COMMERCIAL

## 2024-04-08 DIAGNOSIS — F17.200 NICOTINE DEPENDENCE: Primary | ICD-10-CM

## 2024-04-08 PROBLEM — K56.601: Status: RESOLVED | Noted: 2024-04-05 | Resolved: 2024-04-08

## 2024-04-08 LAB
APTT PPP: 49.9 SEC (ref 21–32)
APTT PPP: 51.8 SEC (ref 21–32)
BASOPHILS # BLD AUTO: 0.02 K/UL (ref 0–0.2)
BASOPHILS NFR BLD: 0.1 % (ref 0–1.9)
DIFFERENTIAL METHOD BLD: ABNORMAL
EOSINOPHIL # BLD AUTO: 0.1 K/UL (ref 0–0.5)
EOSINOPHIL NFR BLD: 0.8 % (ref 0–8)
ERYTHROCYTE [DISTWIDTH] IN BLOOD BY AUTOMATED COUNT: 16.1 % (ref 11.5–14.5)
HCT VFR BLD AUTO: 30.7 % (ref 40–54)
HGB BLD-MCNC: 9.8 G/DL (ref 14–18)
IMM GRANULOCYTES # BLD AUTO: 0.06 K/UL (ref 0–0.04)
IMM GRANULOCYTES NFR BLD AUTO: 0.4 % (ref 0–0.5)
LYMPHOCYTES # BLD AUTO: 0.5 K/UL (ref 1–4.8)
LYMPHOCYTES NFR BLD: 3.4 % (ref 18–48)
MCH RBC QN AUTO: 26.5 PG (ref 27–31)
MCHC RBC AUTO-ENTMCNC: 31.9 G/DL (ref 32–36)
MCV RBC AUTO: 83 FL (ref 82–98)
MONOCYTES # BLD AUTO: 0.4 K/UL (ref 0.3–1)
MONOCYTES NFR BLD: 2.7 % (ref 4–15)
NEUTROPHILS # BLD AUTO: 12.6 K/UL (ref 1.8–7.7)
NEUTROPHILS NFR BLD: 92.6 % (ref 38–73)
NRBC BLD-RTO: 0 /100 WBC
PLATELET # BLD AUTO: 323 K/UL (ref 150–450)
PMV BLD AUTO: 10.2 FL (ref 9.2–12.9)
RBC # BLD AUTO: 3.7 M/UL (ref 4.6–6.2)
WBC # BLD AUTO: 13.65 K/UL (ref 3.9–12.7)

## 2024-04-08 PROCEDURE — 21400001 HC TELEMETRY ROOM

## 2024-04-08 PROCEDURE — 25000003 PHARM REV CODE 250: Performed by: COLON & RECTAL SURGERY

## 2024-04-08 PROCEDURE — 85730 THROMBOPLASTIN TIME PARTIAL: CPT | Mod: 91 | Performed by: STUDENT IN AN ORGANIZED HEALTH CARE EDUCATION/TRAINING PROGRAM

## 2024-04-08 PROCEDURE — 99900035 HC TECH TIME PER 15 MIN (STAT)

## 2024-04-08 PROCEDURE — 85025 COMPLETE CBC W/AUTO DIFF WBC: CPT | Performed by: SURGERY

## 2024-04-08 PROCEDURE — 94761 N-INVAS EAR/PLS OXIMETRY MLT: CPT

## 2024-04-08 PROCEDURE — 94640 AIRWAY INHALATION TREATMENT: CPT

## 2024-04-08 PROCEDURE — 25000003 PHARM REV CODE 250: Performed by: SURGERY

## 2024-04-08 PROCEDURE — 25000242 PHARM REV CODE 250 ALT 637 W/ HCPCS: Performed by: STUDENT IN AN ORGANIZED HEALTH CARE EDUCATION/TRAINING PROGRAM

## 2024-04-08 PROCEDURE — 94799 UNLISTED PULMONARY SVC/PX: CPT | Mod: XB

## 2024-04-08 PROCEDURE — S4991 NICOTINE PATCH NONLEGEND: HCPCS | Performed by: STUDENT IN AN ORGANIZED HEALTH CARE EDUCATION/TRAINING PROGRAM

## 2024-04-08 PROCEDURE — 25000003 PHARM REV CODE 250: Performed by: STUDENT IN AN ORGANIZED HEALTH CARE EDUCATION/TRAINING PROGRAM

## 2024-04-08 PROCEDURE — 99406 BEHAV CHNG SMOKING 3-10 MIN: CPT | Mod: S$GLB,,,

## 2024-04-08 PROCEDURE — 36415 COLL VENOUS BLD VENIPUNCTURE: CPT | Performed by: STUDENT IN AN ORGANIZED HEALTH CARE EDUCATION/TRAINING PROGRAM

## 2024-04-08 PROCEDURE — 63600175 PHARM REV CODE 636 W HCPCS: Performed by: SURGERY

## 2024-04-08 RX ORDER — LEVOTHYROXINE SODIUM 125 UG/1
125 TABLET ORAL
Status: DISCONTINUED | OUTPATIENT
Start: 2024-04-09 | End: 2024-04-12 | Stop reason: HOSPADM

## 2024-04-08 RX ORDER — ALPRAZOLAM 1 MG/1
1 TABLET ORAL 3 TIMES DAILY PRN
Status: DISCONTINUED | OUTPATIENT
Start: 2024-04-08 | End: 2024-04-12 | Stop reason: HOSPADM

## 2024-04-08 RX ORDER — ESCITALOPRAM OXALATE 10 MG/1
20 TABLET ORAL DAILY
Status: DISCONTINUED | OUTPATIENT
Start: 2024-04-08 | End: 2024-04-12 | Stop reason: HOSPADM

## 2024-04-08 RX ORDER — ACETAMINOPHEN 325 MG/1
650 TABLET ORAL EVERY 6 HOURS PRN
Status: DISCONTINUED | OUTPATIENT
Start: 2024-04-08 | End: 2024-04-12 | Stop reason: HOSPADM

## 2024-04-08 RX ORDER — PREGABALIN 75 MG/1
75 CAPSULE ORAL NIGHTLY
Status: DISCONTINUED | OUTPATIENT
Start: 2024-04-08 | End: 2024-04-12 | Stop reason: HOSPADM

## 2024-04-08 RX ORDER — ALPRAZOLAM 1 MG/1
1 TABLET ORAL ONCE
Status: COMPLETED | OUTPATIENT
Start: 2024-04-08 | End: 2024-04-08

## 2024-04-08 RX ORDER — CYCLOBENZAPRINE HCL 10 MG
10 TABLET ORAL 3 TIMES DAILY PRN
Status: DISCONTINUED | OUTPATIENT
Start: 2024-04-08 | End: 2024-04-12 | Stop reason: HOSPADM

## 2024-04-08 RX ORDER — TAMSULOSIN HYDROCHLORIDE 0.4 MG/1
1 CAPSULE ORAL DAILY
Status: DISCONTINUED | OUTPATIENT
Start: 2024-04-08 | End: 2024-04-12 | Stop reason: HOSPADM

## 2024-04-08 RX ADMIN — SODIUM CHLORIDE, POTASSIUM CHLORIDE, SODIUM LACTATE AND CALCIUM CHLORIDE: 600; 310; 30; 20 INJECTION, SOLUTION INTRAVENOUS at 07:04

## 2024-04-08 RX ADMIN — CHLORHEXIDINE GLUCONATE 0.12% ORAL RINSE 10 ML: 1.2 LIQUID ORAL at 09:04

## 2024-04-08 RX ADMIN — FAMOTIDINE 20 MG: 10 INJECTION, SOLUTION INTRAVENOUS at 10:04

## 2024-04-08 RX ADMIN — OXYCODONE HYDROCHLORIDE 10 MG: 5 TABLET ORAL at 01:04

## 2024-04-08 RX ADMIN — FAMOTIDINE 20 MG: 10 INJECTION, SOLUTION INTRAVENOUS at 09:04

## 2024-04-08 RX ADMIN — HEPARIN SODIUM 10 UNITS/KG/HR: 10000 INJECTION, SOLUTION INTRAVENOUS at 10:04

## 2024-04-08 RX ADMIN — TAMSULOSIN HYDROCHLORIDE 0.4 MG: 0.4 CAPSULE ORAL at 01:04

## 2024-04-08 RX ADMIN — ARFORMOTEROL TARTRATE 15 MCG: 15 SOLUTION RESPIRATORY (INHALATION) at 07:04

## 2024-04-08 RX ADMIN — MUPIROCIN: 20 OINTMENT TOPICAL at 09:04

## 2024-04-08 RX ADMIN — PREGABALIN 75 MG: 75 CAPSULE ORAL at 10:04

## 2024-04-08 RX ADMIN — SODIUM CHLORIDE, POTASSIUM CHLORIDE, SODIUM LACTATE AND CALCIUM CHLORIDE: 600; 310; 30; 20 INJECTION, SOLUTION INTRAVENOUS at 10:04

## 2024-04-08 RX ADMIN — ESCITALOPRAM OXALATE 20 MG: 10 TABLET ORAL at 01:04

## 2024-04-08 RX ADMIN — ALPRAZOLAM 1 MG: 1 TABLET ORAL at 01:04

## 2024-04-08 RX ADMIN — BUDESONIDE 0.5 MG: 0.5 INHALANT ORAL at 07:04

## 2024-04-08 RX ADMIN — NICOTINE 1 PATCH: 21 PATCH, EXTENDED RELEASE TRANSDERMAL at 09:04

## 2024-04-08 NOTE — PROGRESS NOTES
[facility]  General Surgery  Progress Note    Subjective:     History of Present Illness:  68-year-old male with a known locally advanced and metastatic bladder cancer who underwent a CT urogram today which was concerning for a bowel obstruction.      Most of the history is obtained from the patient's wife.  She states that he had some purulent drainage around his nephrostomy tube.  He was seen by Urology earlier today and they communicated with Infectious Disease.  He underwent a CT urogram that was consistent with a bowel obstruction as well as advancement of his bladder cancer.  Surgery was consulted because of a bowel obstruction.      According to his wife he has had some abdominal discomfort and nausea and vomiting.      He was followed here by Oncology and been treated with chemotherapy.  The last plan was to treat him with the following:    /9/2024 -  Chemotherapy     Treatment Summary   Plan Name: OP pembrolizumab 200mg Q3W  Treatment Goal: Palliative  Status: Active  Start Date: 2/9/2024 (Planned)  End Date: 1/23/2026 (Planned)     The patient also has a history of a DVT with a pulmonary embolism that is treated with Eliquis.  He also has a history of a lung lesion that was treated with radiation.      Surgery was asked to see him because the bowel obstruction.      The patient appears cachectic.  He answers some questions but most information is from his wife.  They proceed palliative care consult.  They are trying to returned to [place] where they have more support.    Post-Op Info:  Procedure(s) (LRB):  LAPAROTOMY, EXPLORATORY (N/A)  BLOCK, TRANSVERSUS ABDOMINIS PLANE (N/A)  ENTEROENTEROSTOMY (N/A)   3 Days Post-Op     Interval History:  Tolerated full liquids.  Will continue continue full iquids today.  Conversion from heparin to  Eliquis per hospital Medicine    Medications:  Continuous Infusions:   heparin (porcine) in D5W 10 Units/kg/hr (04/07/24 9886)    lactated ringers 80  mL/hr at 04/08/24 0737     Scheduled Meds:   acetaminophen  650 mg Oral Q6H    arformoteroL  15 mcg Nebulization BID    budesonide  0.5 mg Nebulization Q12H    cefTRIAXone (Rocephin) IV (PEDS and ADULTS)  1 g Intravenous Q24H    chlorhexidine  10 mL Mouth/Throat BID    famotidine (PF)  20 mg Intravenous Q12H    mupirocin   Topical (Top) Daily    nicotine  1 patch Transdermal Daily     PRN Meds:sodium chloride 0.9%, dextrose 10%, dextrose 10%, diphenhydrAMINE, glucagon (human recombinant), HYDROmorphone, levalbuterol, naloxone, ondansetron, oxyCODONE, oxyCODONE, prochlorperazine, sodium chloride 0.9%     Review of patient's allergies indicates:  No Known Allergies  Objective:     Vital Signs (Most Recent):  Temp: 97.9 °F (36.6 °C) (04/08/24 0736)  Pulse: (!) 120 (04/08/24 0817)  Resp: 18 (04/08/24 0736)  BP: (!) 163/83 (04/08/24 0736)  SpO2: (!) 94 % (04/08/24 0736) Vital Signs (24h Range):  Temp:  [97.9 °F (36.6 °C)-98.4 °F (36.9 °C)] 97.9 °F (36.6 °C)  Pulse:  [] 120  Resp:  [16-20] 18  SpO2:  [94 %-100 %] 94 %  BP: (132-163)/(65-83) 163/83     Weight: 69.4 kg (153 lb)  Body mass index is 23.26 kg/m².    Intake/Output - Last 3 Shifts         04/06 0700 04/07 0659 04/07 0700 04/08 0659 04/08 0700 04/09 0659    P.O. 300      I.V. (mL/kg) 1768.6 (27.9) 1608.6 (23.2)     IV Piggyback 84.8      Total Intake(mL/kg) 2153.4 (34) 1608.6 (23.2)     Urine (mL/kg/hr) 1050 (0.7) 3300 (2) 800 (7.8)    Stool  0     Total Output 1050 3300 800    Net +1103.4 -1691.5 -800           Stool Occurrence  1 x              Physical Exam  Vitals and nursing note reviewed.   Constitutional:       General: He is not in acute distress.     Appearance: He is well-developed. Ill appearance: Chronically.   HENT:      Head: Normocephalic and atraumatic.      Comments: Has temporal wasting  Eyes:      General: No scleral icterus.     Pupils: Pupils are equal, round, and reactive to light.   Neck:      Thyroid: No thyromegaly.       Vascular: No JVD.      Trachea: No tracheal deviation.   Cardiovascular:      Rate and Rhythm: Normal rate and regular rhythm.      Heart sounds: Normal heart sounds.   Pulmonary:      Effort: Pulmonary effort is normal.      Breath sounds: Normal breath sounds.   Abdominal:      General: Abdomen is flat. Bowel sounds are normal. There is no distension.      Palpations: Abdomen is soft. There is no mass.      Tenderness: There is no abdominal tenderness. There is no guarding or rebound.      Comments: Incision is clean   Genitourinary:     Comments: Bilateral nephrostomy tube  Musculoskeletal:         General: Normal range of motion.      Cervical back: Normal range of motion and neck supple.      Right lower leg: No edema.      Left lower leg: No edema.   Lymphadenopathy:      Cervical: No cervical adenopathy.   Skin:     General: Skin is warm and dry.   Neurological:      Mental Status: He is alert and oriented to person, place, and time.   Psychiatric:         Behavior: Behavior normal.         Thought Content: Thought content normal.         Judgment: Judgment normal.          Significant Labs:  I have reviewed all pertinent lab results within the past 24 hours.  CBC:   Recent Labs   Lab 04/08/24  0419   WBC 13.65*   RBC 3.70*   HGB 9.8*   HCT 30.7*      MCV 83   MCH 26.5*   MCHC 31.9*     BMP:   Recent Labs   Lab 04/06/24  0627         K 4.7   *   CO2 23   BUN 17   CREATININE 1.2   CALCIUM 8.9   MG 1.6     Coagulation:   Recent Labs   Lab 04/03/24  1845 04/04/24  0426 04/08/24  0419   LABPROT 12.5  --   --    INR 1.1  --   --    APTT 38.4*   < > 49.9*    < > = values in this interval not displayed.       Significant Diagnostics:  I have reviewed all pertinent imaging results/findings within the past 24 hours.  No new x-rays  Assessment/Plan:     Partial intestinal obstruction  Now s/p exlap, ileal-transverse anastomosis bypass on 4/5/24    - tolerating full liquids, will continue full  liquids today.   - OOB, ambulation  - IV and PO pain control  DVT prophylaxis with IV heparin with conversion to Eliquis    Acute deep vein thrombosis (DVT) of femoral vein of left lower extremity  Convert from heparin to Eliquis per hospital Medicine    Hypothyroidism  Synthroid orally    Malignant neoplasm of urinary bladder  Care per primary team    Nephrostomy tube displaced  Care per primary team    Centrilobular emphysema  Care per primary team    Squamous cell carcinoma of right lung  Care per primary team    Anemia  Care per primary team    Elevated serum creatinine  Care per primary team        Silvestre Ramos MD  General Surgery  [facility] ([facility])

## 2024-04-08 NOTE — ASSESSMENT & PLAN NOTE
Now s/p exlap, ileal-transverse anastomosis bypass on 4/5/24    - tolerating full liquids, will continue full liquids today.   - OOB, ambulation  - IV and PO pain control  DVT prophylaxis with IV heparin with conversion to Eliquis

## 2024-04-08 NOTE — CONSULTS
Patient doesn't have a urostomy upon assessment from ostomy/ wound care order. Please re consult if needed.

## 2024-04-08 NOTE — PLAN OF CARE
A218/A218 IVETTE Currie is a 68 y.o.male admitted on 4/3/2024 for Complete obstruction of colon   Code Status: Full Code MRN: [id number]   Review of patient's allergies indicates:  No Known Allergies  Past Medical History:   Diagnosis Date    Anxiety disorder, unspecified     Cancer     COPD (chronic obstructive pulmonary disease)     Hypertension     Thyroid disease       PRN meds    sodium chloride 0.9%, , PRN  dextrose 10%, 12.5 g, PRN  dextrose 10%, 25 g, PRN  diphenhydrAMINE, 25 mg, Q4H PRN  glucagon (human recombinant), 1 mg, PRN  HYDROmorphone, 1 mg, Q4H PRN  levalbuterol, 0.63 mg, Q8H PRN  naloxone, 0.02 mg, PRN  ondansetron, 4 mg, Q8H PRN  oxyCODONE, 10 mg, Q4H PRN  oxyCODONE, 5 mg, Q4H PRN  prochlorperazine, 5 mg, Q6H PRN  sodium chloride 0.9%, 10 mL, Q12H PRN      Chart check completed. Will continue plan of care.      Orientation: disoriented to, time, situation  Galindo Coma Scale Score: 15     Lead Monitored: Lead II Rhythm: normal sinus rhythm Frequency/Ectopy: frequent, PACs, PVCs, greater than 6/min  Cardiac/Telemetry Box Number: [id number]  VTE Required Core Measure: (SCDs) Sequential compression device initiated/maintained Last Bowel Movement: 04/06/24  Diet Full Liquid Standard Tray  Voiding Characteristics: other (see comments) (bilateral nephrostomy tubes)  Juanjo Score: 16  Fall Risk Score: 14  Accucheck []   Freq?      Lines/Drains/Airways       Central Venous Catheter Line  Duration             Percutaneous Central Line - Triple Lumen  04/05/24 1752 Internal Jugular Right 2 days              Drain  Duration                  Nephrostomy Left -- days         Nephrostomy 02/03/24 1044 Left 8 Fr. 64 days         Nephrostomy 02/03/24 1045 Right 10 Fr. 64 days              Peripheral Intravenous Line  Duration                  Peripheral IV - Single Lumen 04/04/24 1134 22 G Posterior;Right Forearm 3 days

## 2024-04-08 NOTE — PROGRESS NOTES
[facility] Medicine  Progress Note     Patient Name:  Geovani Currie  MRN:  [id number]  Patient Class: IP-Inpatient  Admission Date:  4/3/2024   Length of Stay:  5  Attending Physician: Yuval Orozco MD  Primary Care Provider: Faizan Antoine MD    Subjective:      Subsequent Care: Follow up Complete obstruction of colon        HPI:  68 y.o. male patient with a PMHx of COPD, HTN, Lung Cancer, CKD, DVT, metastatic bladder cancer s/p nephrostomy tube placement. Presented to the Emergency Department for generalized abdominal pain, onset several days PTA. Pt was referred to the ED by Dr. Sr (Interventional Radiology) after CT Urogram showed small bowel obstruction. Pt was scheduled to have his nephrostomy tubes replaced on day of arrival. Associated sxs include nausea, abdominal distention, and loss of appetite. Patient denies any fever, chills, vomiting, SOB, CP, weakness, numbness, dizziness, headache, and all other sxs at this time. Pt and spouse are from [place], and are trying to return home to set up hospice. He is currently on cefdinir, but is not receiving any cancer treatment. General Surgery consulted, recommended to admit and consult Oncology and Urology. In the ED, vitals: 94/56, 85, 18, 98F, 96% RA. Significant Labs: H/H: 10/32.1, CT abd/pel: findings consistent SBO, Interval worsening of bladder cancer. Patient is a full code. Admitted to Hospital Medicine for management of Small Bowel Obstruction.         Overview/Hospital Course:  04/04/2024  CT scan shows shows interval worsening of locally invasive bladder cancer with rectal involvement and small bowel obstruction. SBFT pending. Oncology evaluated, strongly recommends DNR order to be placed. Both specialists suggest palliative vs hospice discussion. Palliative care consulted to assist.      04/05/2024  Came to discuss case with family and patient this morning one final time prior to their surgery. Went into  "meticulous detail regarding their wishes and goals at this encounter. Patient and family at bedside are of the understanding that the purpose of this pending surgery is to allow patient the ability to tolerate PO palliative immunotherapy. I also emphasized the fact that none of these interventions are curative in any way and that we are only establishing a path to allow further palliative measures. They seemed to acknowledge this fact as well. We discussed risks of surgery and both patient & family voiced their understanding and acceptance of these risks. Family then reiterated their desire to "stabilize" patient enough for him to get back to [place], where the patient (to my understanding) may or may not proceed with hospice care.      Will maintain Full Code in the perioperative period, but family seems amenable to transitioning to DNR at some point in the postoperative stage. Once stable, disposition will compose of likely discharge home with HH, NP at home and/or home based palliative care services in the interim while awaiting his move back to [place]. Discussed case with Palliative Care. Will consult their teams in the postoperative period if needed.      04/06/2024  POD#1, seems to have tolerated surgery well. Complains of breakthrough pain on current regimen.     04/08/2024  Diet advanced by surgical team. Patient tolerating it well. Will restart some of home medications today.      Interval Hx  Complains of some anxiety. NAEON. Tolerating diet. No abdominal pain, n/v.     Objective  BP (!) 143/77 (BP Location: Right arm, Patient Position: Lying)   Pulse (!) 112   Temp 98.1 °F (36.7 °C) (Oral)   Resp 18   Wt 69.4 kg (153 lb)   SpO2 95%   BMI 23.26 kg/m²     Intake/Output Summary (Last 24 hours) at 4/8/2024 1241  Last data filed at 4/8/2024 0738  Gross per 24 hour   Intake 1608.55 ml   Output 2650 ml   Net -1041.45 ml       PHYSICAL EXAM  Vitals reviewed  Constitutional:       General: He is not " "in acute distress.     Appearance: He is cachectic. He is ill-appearing. He is not diaphoretic.   Cardiovascular:      Rate and Rhythm: Normal rate and regular rhythm.      Pulses:           Dorsalis pedis pulses are 1+ on the right side and 1+ on the left side.      Heart sounds: Normal heart sounds. No murmur heard.  Pulmonary:      Effort: Pulmonary effort is normal. No respiratory distress.      Breath sounds: Examination of the right-lower field reveals decreased breath sounds. Examination of the left-lower field reveals decreased breath sounds. Decreased breath sounds present. No wheezing.   Abdominal:      Surgical incisions stable on inspection, no dehiscence or signs of developing infection     General: Bowel sounds are normal. There is distension.      Palpations: Abdomen is soft.      Tenderness: There is generalized abdominal tenderness.      Bilateral nephrostomy tubes, drainage noted around R tube    LABS  All labs from the past 24 hours were reviewed.     BMP:   No results for input(s): "GLU", "NA", "K", "CL", "CO2", "BUN", "CREATININE", "CALCIUM", "MG" in the last 48 hours.  CBC:   Recent Labs   Lab 04/07/24  0438 04/08/24  0419   WBC 12.99* 13.65*   HGB 8.4* 9.8*   HCT 27.1* 30.7*    323     CMP:   No results for input(s): "NA", "K", "CL", "CO2", "GLU", "BUN", "CREATININE", "CALCIUM", "PROT", "ALBUMIN", "BILITOT", "ALKPHOS", "AST", "ALT", "ANIONGAP", "EGFRNONAA" in the last 48 hours.    Invalid input(s): "ESTGFAFRICA"  Cardiac Markers: No results for input(s): "CKMB", "MYOGLOBIN", "BNP", "TROPISTAT" in the last 48 hours.  Coagulation:   Recent Labs   Lab 04/08/24  1126   APTT 51.8*     Lactic Acid: No results for input(s): "LACTATE" in the last 48 hours.  Magnesium: No results for input(s): "MG" in the last 48 hours.  Troponin: No results for input(s): "TROPONINI", "TROPONINIHS" in the last 48 hours.  TSH:   Recent Labs   Lab 02/23/24  0841   TSH 0.630     Urine Studies:   No results for " "input(s): "COLORU", "APPEARANCEUA", "PHUR", "SPECGRAV", "PROTEINUA", "GLUCUA", "KETONESU", "BILIRUBINUA", "OCCULTUA", "NITRITE", "UROBILINOGEN", "LEUKOCYTESUR", "RBCUA", "WBCUA", "BACTERIA", "SQUAMEPITHEL", "HYALINECASTS" in the last 48 hours.    Invalid input(s): "WRIGHTSUR"    IMAGING  All imaging from the past 24 hours were reviewed.     Imaging Results              X-Ray Abdomen Flat And Erect (Final result)  Result time 04/03/24 15:59:11      Final result by Elmo Garza MD (04/03/24 15:59:11)                   Impression:      Increasing small bowel distension mid upper abdomen indicating partial small bowel obstruction.      Electronically signed by: Elmo Garza MD  Date:    04/03/2024  Time:    15:59               Narrative:    EXAMINATION:  XR ABDOMEN FLAT AND ERECT    CLINICAL HISTORY:  Unspecified intestinal obstruction, unspecified as to partial versus complete obstruction    TECHNIQUE:  Routine exam.    COMPARISON:  03/11/2024    FINDINGS:  Increasing air-filled loops of small bowel throughout the mid upper abdomen.  Maximum diameter approximately 4 cm.Large amount stool right colon    No other changes.  Again noted are bilateral nephrostomy catheters.                                      Assessment/Plan:      * Partial intestinal obstruction  Secondary to bladder cancer, progression noted on CT  --Consult Oncology and Urology for recommendations  --Consult General Surgery     04/04/2024  --SBFT ordered for this morning  --follow up surgery recommendations  --hospice discussion on going (as discussed below)     04/05/2024  --Exploratory laparotomy planned for today with either resection of the small bowel with planned anastomosis or bypass of the small bowel to itself or to the colon to relieve the obstruction.   --will follow up postoperatively     04/06/2024  --s/p exlap, ileal-transverse anastomosis bypass on 4/5/24   --tolerated surgery well, but still with some breakthrough pain  --rest of " "management per surgery     04/07/2024  --pain controlled on current regimen  --will attempt to wean IV meds as tolerated  --diet advanced to CLD by surgery  --maintain IV fluids for now  --rest of management per their team     Anemia  04/07/2024  -Expected downtrend after surgery  -no signs of bleeding on exam  -trend daily, monitor clinically     Malignant neoplasm of urinary bladder  Known hx of bladder cancer, CT abd/pel: Interval worsening of locally invasive bladder cancer extension to the pelvic sidewall and the presacral fat and sacrum on the left. There is involvement of the rectum as well as well as involvement of a small bowel loop in the pelvis causing a small bowel obstruction.      --Consult urology and oncology for recommendations     04/04/2024  -Urology evaluated: "I cannot offer any surgical intervention for his advanced bladder cancer at this point. I would recommend hospice, which patient's wife appears open to."  -Oncology evaluated: "Strongly recommend that patient have a do not resuscitate order be placed on chart and be seen by palliative care if not already done so."  -Palliative care consulted, follow up recommendations      04/05/2024  See advance care note below     04/06/2024  Family complained about purulent leakage around R nephrostomy tubes. CT urogram doesn't show much change from previous imaging. Will ask Urology to evaluate for possible exchange.     Advanced care planning/counseling discussion  04/05/2024  Came to discuss case with family and patient this morning one final time prior to their surgery. Went into meticulous detail regarding their wishes and goals at this encounter. Patient and family at bedside are of the understanding that the purpose of this pending surgery is to allow patient the ability to tolerate PO palliative immunotherapy. I also emphasized the fact that none of these interventions are curative in any way and that we are only establishing a path to allow " "further palliative measures. They seemed to acknowledge this fact as well. We discussed risks of surgery and both patient & family voiced their understanding and acceptance of these risks. Family then reiterated their desire to "stabilize" patient enough for him to get back to [place], where the patient (to my understanding) may or may not proceed with hospice care.      Will maintain Full Code in the perioperative period, but family seems amenable to transitioning to DNR at some point in the postoperative stage. Once stable, disposition will compose of likely discharge home with HH, NP at home and/or home based palliative care services in the interim while awaiting his move back to [place]. Discussed case with Palliative Care. Will consult their teams in the postoperative period if needed.   --Confirmed with patient and family at bedside that patient will be a full code during this hospitalization  --orders placed, discussion duration: < 3 minutes           Acute deep vein thrombosis (DVT) of femoral vein of left lower extremity  New diagnosis, managed with Eliquis as OP     --Hold Eliquis for now  --Heparin per nomogram        Hypothyroidism     Chronic, well controlled     --Cont home med        Nephrostomy tube displaced  Followed by Urology, planned for tube exchange on 4/3, referred to ED due to SBO     --Consult IR if needed         Centrilobular emphysema  Chronic, well controlled     --Cont home meds     Squamous cell carcinoma of right lung  Followed by oncology, treated with Radiation, therapy completed     --consult oncology              Elevated serum creatinine  Mild increase,likely secondary to tumor burden     --Repeat CMP in AM       CORE MEASURES:  VTE Risk Mitigation (From admission, onward)           Ordered     heparin 25,000 units in dextrose 5% 250 ml (100 units/mL) infusion MINIMAL INTENSITY nomogram - [facility]  Continuous        Question:  Begin at (units/kg/hr)  Answer:  12 04/05/24 " 2007     heparin 25,000 units in dextrose 5% 250 mL (100 units/mL) infusion LOW INTENSITY nomogram - [facility]  Continuous        Question:  Begin at (units/kg/hr)  Answer:  12    04/04/24 1920     Reason for No Pharmacological VTE Prophylaxis  Once        Question:  Reasons:  Answer:  Risk of Bleeding    04/03/24 1724     IP VTE HIGH RISK PATIENT  Once         04/03/24 1724     Place sequential compression device  Until discontinued         04/03/24 1724                    Code Status: FULL    Diet: Diet Full Liquid Standard Tray    Disposition: pending surgical clearance, progress with PT/OT, goal is to discharge home once stable with HH and HBPC.        Yuval Orozco MD  Department of Hospital Medicine   [facility] - Telemetry ([facility])

## 2024-04-08 NOTE — PROGRESS NOTES
Patient denies need for smoking cessation appointment at this time.  Patient has a nicotine patch in use.

## 2024-04-08 NOTE — SUBJECTIVE & OBJECTIVE
Interval History:  Tolerated full liquids.  Will continue continue full iquids today.  Conversion from heparin to  Eliquis per hospital Medicine    Medications:  Continuous Infusions:   heparin (porcine) in D5W 10 Units/kg/hr (04/07/24 2146)    lactated ringers 80 mL/hr at 04/08/24 0737     Scheduled Meds:   acetaminophen  650 mg Oral Q6H    arformoteroL  15 mcg Nebulization BID    budesonide  0.5 mg Nebulization Q12H    cefTRIAXone (Rocephin) IV (PEDS and ADULTS)  1 g Intravenous Q24H    chlorhexidine  10 mL Mouth/Throat BID    famotidine (PF)  20 mg Intravenous Q12H    mupirocin   Topical (Top) Daily    nicotine  1 patch Transdermal Daily     PRN Meds:sodium chloride 0.9%, dextrose 10%, dextrose 10%, diphenhydrAMINE, glucagon (human recombinant), HYDROmorphone, levalbuterol, naloxone, ondansetron, oxyCODONE, oxyCODONE, prochlorperazine, sodium chloride 0.9%     Review of patient's allergies indicates:  No Known Allergies  Objective:     Vital Signs (Most Recent):  Temp: 97.9 °F (36.6 °C) (04/08/24 0736)  Pulse: (!) 120 (04/08/24 0817)  Resp: 18 (04/08/24 0736)  BP: (!) 163/83 (04/08/24 0736)  SpO2: (!) 94 % (04/08/24 0736) Vital Signs (24h Range):  Temp:  [97.9 °F (36.6 °C)-98.4 °F (36.9 °C)] 97.9 °F (36.6 °C)  Pulse:  [] 120  Resp:  [16-20] 18  SpO2:  [94 %-100 %] 94 %  BP: (132-163)/(65-83) 163/83     Weight: 69.4 kg (153 lb)  Body mass index is 23.26 kg/m².    Intake/Output - Last 3 Shifts         04/06 0700  04/07 0659 04/07 0700 04/08 0659 04/08 0700 04/09 0659    P.O. 300      I.V. (mL/kg) 1768.6 (27.9) 1608.6 (23.2)     IV Piggyback 84.8      Total Intake(mL/kg) 2153.4 (34) 1608.6 (23.2)     Urine (mL/kg/hr) 1050 (0.7) 3300 (2) 800 (7.8)    Stool  0     Total Output 1050 3300 800    Net +1103.4 -1691.5 -800           Stool Occurrence  1 x              Physical Exam  Vitals and nursing note reviewed.   Constitutional:       General: He is not in acute distress.     Appearance: He is well-developed.  Ill appearance: Chronically.   HENT:      Head: Normocephalic and atraumatic.      Comments: Has temporal wasting  Eyes:      General: No scleral icterus.     Pupils: Pupils are equal, round, and reactive to light.   Neck:      Thyroid: No thyromegaly.      Vascular: No JVD.      Trachea: No tracheal deviation.   Cardiovascular:      Rate and Rhythm: Normal rate and regular rhythm.      Heart sounds: Normal heart sounds.   Pulmonary:      Effort: Pulmonary effort is normal.      Breath sounds: Normal breath sounds.   Abdominal:      General: Abdomen is flat. Bowel sounds are normal. There is no distension.      Palpations: Abdomen is soft. There is no mass.      Tenderness: There is no abdominal tenderness. There is no guarding or rebound.      Comments: Incision is clean   Genitourinary:     Comments: Bilateral nephrostomy tube  Musculoskeletal:         General: Normal range of motion.      Cervical back: Normal range of motion and neck supple.      Right lower leg: No edema.      Left lower leg: No edema.   Lymphadenopathy:      Cervical: No cervical adenopathy.   Skin:     General: Skin is warm and dry.   Neurological:      Mental Status: He is alert and oriented to person, place, and time.   Psychiatric:         Behavior: Behavior normal.         Thought Content: Thought content normal.         Judgment: Judgment normal.          Significant Labs:  I have reviewed all pertinent lab results within the past 24 hours.  CBC:   Recent Labs   Lab 04/08/24 0419   WBC 13.65*   RBC 3.70*   HGB 9.8*   HCT 30.7*      MCV 83   MCH 26.5*   MCHC 31.9*     BMP:   Recent Labs   Lab 04/06/24  0627         K 4.7   *   CO2 23   BUN 17   CREATININE 1.2   CALCIUM 8.9   MG 1.6     Coagulation:   Recent Labs   Lab 04/03/24  1845 04/04/24  0426 04/08/24  0419   LABPROT 12.5  --   --    INR 1.1  --   --    APTT 38.4*   < > 49.9*    < > = values in this interval not displayed.       Significant Diagnostics:  I  have reviewed all pertinent imaging results/findings within the past 24 hours.  No new x-rays

## 2024-04-08 NOTE — PLAN OF CARE
A218/A218 IVETTE Currie is a 68 y.o.male admitted on 4/3/2024 for Complete obstruction of colon   Code Status: Full Code MRN: [id number]   Review of patient's allergies indicates:  No Known Allergies  Past Medical History:   Diagnosis Date    Anxiety disorder, unspecified     Cancer     COPD (chronic obstructive pulmonary disease)     Hypertension     Thyroid disease       PRN meds    sodium chloride 0.9%, , PRN  acetaminophen, 650 mg, Q6H PRN  ALPRAZolam, 1 mg, TID PRN  cyclobenzaprine, 10 mg, TID PRN  dextrose 10%, 12.5 g, PRN  dextrose 10%, 25 g, PRN  diphenhydrAMINE, 25 mg, Q4H PRN  glucagon (human recombinant), 1 mg, PRN  HYDROmorphone, 1 mg, Q4H PRN  levalbuterol, 0.63 mg, Q8H PRN  naloxone, 0.02 mg, PRN  ondansetron, 4 mg, Q8H PRN  oxyCODONE, 10 mg, Q4H PRN  oxyCODONE, 5 mg, Q4H PRN  prochlorperazine, 5 mg, Q6H PRN  sodium chloride 0.9%, 10 mL, Q12H PRN      Chart check completed. Will continue plan of care.      Orientation: oriented x 4  Robinson Coma Scale Score: 15     Lead Monitored: Lead II Rhythm: sinus tachycardia Frequency/Ectopy: frequent, PACs, PVCs, greater than 6/min  Cardiac/Telemetry Box Number: [id number]  VTE Required Core Measure: (SCDs) Sequential compression device initiated/maintained Last Bowel Movement: 04/07/24  Diet Full Liquid Standard Tray  Voiding Characteristics: other (see comments) (KIERAN nephrostomy tubes)  Juanjo Score: 16  Fall Risk Score: 14  Accucheck []   Freq?      Lines/Drains/Airways       Central Venous Catheter Line  Duration             Percutaneous Central Line - Triple Lumen  04/05/24 1752 Internal Jugular Right 2 days              Drain  Duration                  Nephrostomy Left -- days         Nephrostomy 02/03/24 1044 Left 8 Fr. 65 days         Nephrostomy 02/03/24 1045 Right 10 Fr. 65 days              Peripheral Intravenous Line  Duration                  Peripheral IV - Single Lumen 04/04/24 1134 22 G Posterior;Right Forearm 4 days                       Problem:  Adult Inpatient Plan of Care  Goal: Plan of Care Review  Outcome: Ongoing, Progressing  Goal: Patient-Specific Goal (Individualized)  Outcome: Ongoing, Progressing  Goal: Absence of Hospital-Acquired Illness or Injury  Outcome: Ongoing, Progressing  Goal: Optimal Comfort and Wellbeing  Outcome: Ongoing, Progressing  Goal: Readiness for Transition of Care  Outcome: Ongoing, Progressing     Problem: Coping Ineffective  Goal: Effective Coping  Outcome: Ongoing, Progressing     Problem: Adjustment to Illness (Sepsis/Septic Shock)  Goal: Optimal Coping  Outcome: Ongoing, Progressing     Problem: Bleeding (Sepsis/Septic Shock)  Goal: Absence of Bleeding  Outcome: Ongoing, Progressing     Problem: Glycemic Control Impaired (Sepsis/Septic Shock)  Goal: Blood Glucose Level Within Desired Range  Outcome: Ongoing, Progressing     Problem: Infection Progression (Sepsis/Septic Shock)  Goal: Absence of Infection Signs and Symptoms  Outcome: Ongoing, Progressing     Problem: Nutrition Impaired (Sepsis/Septic Shock)  Goal: Optimal Nutrition Intake  Outcome: Ongoing, Progressing     Problem: Fluid and Electrolyte Imbalance (Acute Kidney Injury/Impairment)  Goal: Fluid and Electrolyte Balance  Outcome: Ongoing, Progressing     Problem: Oral Intake Inadequate (Acute Kidney Injury/Impairment)  Goal: Optimal Nutrition Intake  Outcome: Ongoing, Progressing     Problem: Renal Function Impairment (Acute Kidney Injury/Impairment)  Goal: Effective Renal Function  Outcome: Ongoing, Progressing     Problem: Infection  Goal: Absence of Infection Signs and Symptoms  Outcome: Ongoing, Progressing     Problem: Impaired Wound Healing  Goal: Optimal Wound Healing  Outcome: Ongoing, Progressing     Problem: Skin Injury Risk Increased  Goal: Skin Health and Integrity  Outcome: Ongoing, Progressing     Problem: Fall Injury Risk  Goal: Absence of Fall and Fall-Related Injury  Outcome: Ongoing, Progressing

## 2024-04-09 PROBLEM — R79.89 ELEVATED SERUM CREATININE: Status: RESOLVED | Noted: 2023-10-21 | Resolved: 2024-04-09

## 2024-04-09 LAB
APTT PPP: 51.4 SEC (ref 21–32)
APTT PPP: 51.4 SEC (ref 21–32)
BASOPHILS # BLD AUTO: 0.02 K/UL (ref 0–0.2)
BASOPHILS NFR BLD: 0.2 % (ref 0–1.9)
DIFFERENTIAL METHOD BLD: ABNORMAL
EOSINOPHIL # BLD AUTO: 0.3 K/UL (ref 0–0.5)
EOSINOPHIL NFR BLD: 2.4 % (ref 0–8)
ERYTHROCYTE [DISTWIDTH] IN BLOOD BY AUTOMATED COUNT: 16.2 % (ref 11.5–14.5)
HCT VFR BLD AUTO: 26.7 % (ref 40–54)
HGB BLD-MCNC: 8.5 G/DL (ref 14–18)
IMM GRANULOCYTES # BLD AUTO: 0.05 K/UL (ref 0–0.04)
IMM GRANULOCYTES NFR BLD AUTO: 0.4 % (ref 0–0.5)
LYMPHOCYTES # BLD AUTO: 0.7 K/UL (ref 1–4.8)
LYMPHOCYTES NFR BLD: 6.1 % (ref 18–48)
MCH RBC QN AUTO: 26.1 PG (ref 27–31)
MCHC RBC AUTO-ENTMCNC: 31.8 G/DL (ref 32–36)
MCV RBC AUTO: 82 FL (ref 82–98)
MONOCYTES # BLD AUTO: 0.5 K/UL (ref 0.3–1)
MONOCYTES NFR BLD: 3.8 % (ref 4–15)
NEUTROPHILS # BLD AUTO: 10.5 K/UL (ref 1.8–7.7)
NEUTROPHILS NFR BLD: 87.1 % (ref 38–73)
NRBC BLD-RTO: 0 /100 WBC
OHS QRS DURATION: 92 MS
OHS QTC CALCULATION: 496 MS
PLATELET # BLD AUTO: 285 K/UL (ref 150–450)
PMV BLD AUTO: 9.8 FL (ref 9.2–12.9)
POCT GLUCOSE: 137 MG/DL (ref 70–110)
RBC # BLD AUTO: 3.26 M/UL (ref 4.6–6.2)
WBC # BLD AUTO: 12.1 K/UL (ref 3.9–12.7)

## 2024-04-09 PROCEDURE — 36415 COLL VENOUS BLD VENIPUNCTURE: CPT | Performed by: SURGERY

## 2024-04-09 PROCEDURE — 25000003 PHARM REV CODE 250: Performed by: STUDENT IN AN ORGANIZED HEALTH CARE EDUCATION/TRAINING PROGRAM

## 2024-04-09 PROCEDURE — 25000003 PHARM REV CODE 250: Performed by: SURGERY

## 2024-04-09 PROCEDURE — 97162 PT EVAL MOD COMPLEX 30 MIN: CPT

## 2024-04-09 PROCEDURE — 94640 AIRWAY INHALATION TREATMENT: CPT

## 2024-04-09 PROCEDURE — 25000003 PHARM REV CODE 250: Performed by: COLON & RECTAL SURGERY

## 2024-04-09 PROCEDURE — 63600175 PHARM REV CODE 636 W HCPCS: Performed by: STUDENT IN AN ORGANIZED HEALTH CARE EDUCATION/TRAINING PROGRAM

## 2024-04-09 PROCEDURE — 85730 THROMBOPLASTIN TIME PARTIAL: CPT | Performed by: SURGERY

## 2024-04-09 PROCEDURE — 94760 N-INVAS EAR/PLS OXIMETRY 1: CPT

## 2024-04-09 PROCEDURE — 63600175 PHARM REV CODE 636 W HCPCS: Performed by: SURGERY

## 2024-04-09 PROCEDURE — 25000242 PHARM REV CODE 250 ALT 637 W/ HCPCS: Performed by: STUDENT IN AN ORGANIZED HEALTH CARE EDUCATION/TRAINING PROGRAM

## 2024-04-09 PROCEDURE — 85025 COMPLETE CBC W/AUTO DIFF WBC: CPT | Performed by: SURGERY

## 2024-04-09 PROCEDURE — 93010 ELECTROCARDIOGRAM REPORT: CPT | Mod: ,,, | Performed by: INTERNAL MEDICINE

## 2024-04-09 PROCEDURE — 97535 SELF CARE MNGMENT TRAINING: CPT

## 2024-04-09 PROCEDURE — S4991 NICOTINE PATCH NONLEGEND: HCPCS | Performed by: STUDENT IN AN ORGANIZED HEALTH CARE EDUCATION/TRAINING PROGRAM

## 2024-04-09 PROCEDURE — 93005 ELECTROCARDIOGRAM TRACING: CPT

## 2024-04-09 PROCEDURE — 97166 OT EVAL MOD COMPLEX 45 MIN: CPT

## 2024-04-09 PROCEDURE — 97530 THERAPEUTIC ACTIVITIES: CPT

## 2024-04-09 PROCEDURE — 21400001 HC TELEMETRY ROOM

## 2024-04-09 RX ADMIN — NICOTINE 1 PATCH: 21 PATCH, EXTENDED RELEASE TRANSDERMAL at 09:04

## 2024-04-09 RX ADMIN — BUDESONIDE 0.5 MG: 0.5 INHALANT ORAL at 08:04

## 2024-04-09 RX ADMIN — MUPIROCIN: 20 OINTMENT TOPICAL at 09:04

## 2024-04-09 RX ADMIN — PREGABALIN 75 MG: 75 CAPSULE ORAL at 08:04

## 2024-04-09 RX ADMIN — CHLORHEXIDINE GLUCONATE 0.12% ORAL RINSE 10 ML: 1.2 LIQUID ORAL at 09:04

## 2024-04-09 RX ADMIN — SODIUM CHLORIDE, POTASSIUM CHLORIDE, SODIUM LACTATE AND CALCIUM CHLORIDE: 600; 310; 30; 20 INJECTION, SOLUTION INTRAVENOUS at 12:04

## 2024-04-09 RX ADMIN — OXYCODONE HYDROCHLORIDE 10 MG: 5 TABLET ORAL at 03:04

## 2024-04-09 RX ADMIN — HYDROMORPHONE HYDROCHLORIDE 1 MG: 1 INJECTION, SOLUTION INTRAMUSCULAR; INTRAVENOUS; SUBCUTANEOUS at 09:04

## 2024-04-09 RX ADMIN — LEVOTHYROXINE SODIUM 125 MCG: 125 TABLET ORAL at 06:04

## 2024-04-09 RX ADMIN — FAMOTIDINE 20 MG: 10 INJECTION, SOLUTION INTRAVENOUS at 09:04

## 2024-04-09 RX ADMIN — FAMOTIDINE 20 MG: 10 INJECTION, SOLUTION INTRAVENOUS at 08:04

## 2024-04-09 RX ADMIN — CHLORHEXIDINE GLUCONATE 0.12% ORAL RINSE 10 ML: 1.2 LIQUID ORAL at 08:04

## 2024-04-09 RX ADMIN — ESCITALOPRAM OXALATE 20 MG: 10 TABLET ORAL at 09:04

## 2024-04-09 RX ADMIN — ARFORMOTEROL TARTRATE 15 MCG: 15 SOLUTION RESPIRATORY (INHALATION) at 07:04

## 2024-04-09 NOTE — ASSESSMENT & PLAN NOTE
Patient seen and evaluated by urology during admission by Dr. Worley and discussion held regarding exchange by IR or keeping current tubes as they are function properly and not needing emergent replacement. Patient and spouse agreed to keep current tubes and f/u with Dr. Jorgensen on 4/11/24. Wound care/nephrostomy tube dressing recommendation noted below per urology prior to signing off.   Plan:  - Continue daily mupirocin 2% ointment to apply to the skin at the nephrostomy tube sites with his daily cleaning and dressing changes.  - Continue IV antibiotic while in the hospital.  He is currently on IV rocephin daily.

## 2024-04-09 NOTE — HOSPITAL COURSE
"04/04/2024  CT scan shows shows interval worsening of locally invasive bladder cancer with rectal involvement and small bowel obstruction. SBFT pending. Oncology evaluated, strongly recommends DNR order to be placed. Both specialists suggest palliative vs hospice discussion. Palliative care consulted to assist.      04/05/2024  Came to discuss case with family and patient this morning one final time prior to their surgery. Went into meticulous detail regarding their wishes and goals at this encounter. Patient and family at bedside are of the understanding that the purpose of this pending surgery is to allow patient the ability to tolerate PO palliative immunotherapy. I also emphasized the fact that none of these interventions are curative in any way and that we are only establishing a path to allow further palliative measures. They seemed to acknowledge this fact as well. We discussed risks of surgery and both patient & family voiced their understanding and acceptance of these risks. Family then reiterated their desire to "stabilize" patient enough for him to get back to [place], where the patient (to my understanding) may or may not proceed with hospice care.      Will maintain Full Code in the perioperative period, but family seems amenable to transitioning to DNR at some point in the postoperative stage. Once stable, disposition will compose of likely discharge home with HH, NP at home and/or home based palliative care services in the interim while awaiting his move back to [place]. Discussed case with Palliative Care. Will consult their teams in the postoperative period if needed.      04/06/2024  POD#1, seems to have tolerated surgery well. Complains of breakthrough pain on current regimen.      04/08/2024  Diet advanced by surgical team. Patient tolerating it well. Will restart some of home medications today.     04/09/2024  Patient tolerated liquid diet but developed abdominal cramping after eating " yogurt. Pain controlled on current regimen. Patient noted to be tachy during and following PT earlier today but was denying any chest pain or shortness of breath and currently back to NSR.    04/10/2024  Patient tolerating liquid diet but still complaining of some abdominal cramping.  Patient seen by surgery and continued on liquid diet for the time being.    04/11/2024  Patient advanced to regular diet. Abdominal pain ok this morning. IVFs stopped to limit volume overload as patient tolerating p.o. intake.

## 2024-04-09 NOTE — PROGRESS NOTES
[facility]  General Surgery  Progress Note    Subjective:     History of Present Illness:  68-year-old male with a known locally advanced and metastatic bladder cancer who underwent a CT urogram today which was concerning for a bowel obstruction.      Most of the history is obtained from the patient's wife.  She states that he had some purulent drainage around his nephrostomy tube.  He was seen by Urology earlier today and they communicated with Infectious Disease.  He underwent a CT urogram that was consistent with a bowel obstruction as well as advancement of his bladder cancer.  Surgery was consulted because of a bowel obstruction.      According to his wife he has had some abdominal discomfort and nausea and vomiting.      He was followed here by Oncology and been treated with chemotherapy.  The last plan was to treat him with the following:    /9/2024 -  Chemotherapy     Treatment Summary   Plan Name: OP pembrolizumab 200mg Q3W  Treatment Goal: Palliative  Status: Active  Start Date: 2/9/2024 (Planned)  End Date: 1/23/2026 (Planned)     The patient also has a history of a DVT with a pulmonary embolism that is treated with Eliquis.  He also has a history of a lung lesion that was treated with radiation.      Surgery was asked to see him because the bowel obstruction.      The patient appears cachectic.  He answers some questions but most information is from his wife.  They proceed palliative care consult.  They are trying to returned to [place] where they have more support.    Post-Op Info:  Procedure(s) (LRB):  LAPAROTOMY, EXPLORATORY (N/A)  BLOCK, TRANSVERSUS ABDOMINIS PLANE (N/A)  ENTEROENTEROSTOMY (N/A)   4 Days Post-Op     Interval History:  Patient had been tolerating full liquids.  He however states he had yogurt and milk and then developed some crampy abdominal pain.  He reports bowel movements.  He has no nausea or vomiting.  He was some abdominal soreness but otherwise pain is  controlled      Medications:  Continuous Infusions:   heparin (porcine) in D5W 10 Units/kg/hr (04/08/24 2219)    lactated ringers 80 mL/hr at 04/08/24 2217     Scheduled Meds:   arformoteroL  15 mcg Nebulization BID    budesonide  0.5 mg Nebulization Q12H    chlorhexidine  10 mL Mouth/Throat BID    EScitalopram oxalate  20 mg Oral Daily    famotidine (PF)  20 mg Intravenous Q12H    levothyroxine  125 mcg Oral Before breakfast    mupirocin   Topical (Top) Daily    nicotine  1 patch Transdermal Daily    pregabalin  75 mg Oral QHS    tamsulosin  1 capsule Oral Daily     PRN Meds:sodium chloride 0.9%, acetaminophen, ALPRAZolam, cyclobenzaprine, dextrose 10%, dextrose 10%, diphenhydrAMINE, glucagon (human recombinant), HYDROmorphone, levalbuterol, naloxone, ondansetron, oxyCODONE, oxyCODONE, prochlorperazine, sodium chloride 0.9%     Review of patient's allergies indicates:  No Known Allergies  Objective:     Vital Signs (Most Recent):  Temp: 99.3 °F (37.4 °C) (04/09/24 0820)  Pulse: (!) 134 (04/09/24 0949)  Resp: (!) 22 (04/09/24 0901)  BP: 128/61 (04/09/24 0820)  SpO2: (!) 92 % (04/09/24 0820) Vital Signs (24h Range):  Temp:  [97.8 °F (36.6 °C)-100.3 °F (37.9 °C)] 99.3 °F (37.4 °C)  Pulse:  [] 134  Resp:  [18-22] 22  SpO2:  [91 %-95 %] 92 %  BP: (116-143)/(61-77) 128/61     Weight: 68 kg (149 lb 14.6 oz)  Body mass index is 22.79 kg/m².    Intake/Output - Last 3 Shifts         04/07 0700  04/08 0659 04/08 0700 04/09 0659 04/09 0700  04/10 0659    P.O.       I.V. (mL/kg) 1608.6 (23.2) 958.2 (14.1)     IV Piggyback 0      Total Intake(mL/kg) 1608.6 (23.2) 958.2 (14.1)     Urine (mL/kg/hr) 3300 (2) 2450 (1.5)     Stool 0 0     Total Output 3300 2450     Net -1691.5 -1491.8            Stool Occurrence 1 x 0 x              Physical Exam  Vitals and nursing note reviewed.   Constitutional:       General: He is not in acute distress.     Appearance: He is well-developed. He is ill-appearing (Chronically).   HENT:       Head: Normocephalic and atraumatic.   Eyes:      General: No scleral icterus.     Pupils: Pupils are equal, round, and reactive to light.   Neck:      Thyroid: No thyromegaly.      Vascular: No JVD.      Trachea: No tracheal deviation.   Cardiovascular:      Rate and Rhythm: Normal rate and regular rhythm.      Heart sounds: Normal heart sounds.   Pulmonary:      Effort: Pulmonary effort is normal.      Breath sounds: Normal breath sounds.   Abdominal:      General: Bowel sounds are normal. There is no distension (mild).      Palpations: Abdomen is soft. There is no mass.      Tenderness: There is no abdominal tenderness (incisional). There is no guarding or rebound.      Comments: Incision is clean   Musculoskeletal:         General: Normal range of motion.      Cervical back: Normal range of motion and neck supple.   Lymphadenopathy:      Cervical: No cervical adenopathy.   Skin:     General: Skin is warm and dry.   Neurological:      General: No focal deficit present.      Mental Status: He is alert and oriented to person, place, and time.   Psychiatric:         Mood and Affect: Mood normal.         Behavior: Behavior normal.         Thought Content: Thought content normal.         Judgment: Judgment normal.          Significant Labs:  I have reviewed all pertinent lab results within the past 24 hours.  CBC:   Recent Labs   Lab 04/09/24  0452   WBC 12.10   RBC 3.26*   HGB 8.5*   HCT 26.7*      MCV 82   MCH 26.1*   MCHC 31.8*     BMP:   Recent Labs   Lab 04/06/24  0627         K 4.7   *   CO2 23   BUN 17   CREATININE 1.2   CALCIUM 8.9   MG 1.6     CMP:   Recent Labs   Lab 04/06/24  0627      CALCIUM 8.9   ALBUMIN 2.3*   PROT 5.5*      K 4.7   CO2 23   *   BUN 17   CREATININE 1.2   ALKPHOS 95   ALT 9*   AST 14   BILITOT 0.4     LFTs:   Recent Labs   Lab 04/06/24  0627   ALT 9*   AST 14   ALKPHOS 95   BILITOT 0.4   PROT 5.5*   ALBUMIN 2.3*     Coagulation:   Recent Labs    Lab 04/03/24  1845 04/04/24  0426 04/09/24  0452   LABPROT 12.5  --   --    INR 1.1  --   --    APTT 38.4*   < > 51.4*  51.4*    < > = values in this interval not displayed.       Significant Diagnostics:  I have reviewed all pertinent imaging results/findings within the past 24 hours.  Will obtain abdominal x-rays in the morning  Assessment/Plan:     Partial intestinal obstruction  Now s/p exlap, ileal-transverse anastomosis bypass on 4/5/24    - tolerating full liquids, the patient however reports some abdominal cramping with dairy based products   Continue full liquids   Check abdominal films in the morning  - OOB, ambulation  - IV and PO pain control  DVT prophylaxis with IV heparin with conversion to Eliquis per hospital    Acute deep vein thrombosis (DVT) of femoral vein of left lower extremity  Convert from heparin to Eliquis per hospital Medicine    Hypothyroidism  Synthroid orally    Malignant neoplasm of urinary bladder  Care per primary team    Nephrostomy tube displaced  Care per primary team    Centrilobular emphysema  Care per primary team    Squamous cell carcinoma of right lung  Care per primary team    Anemia  Care per primary team    Elevated serum creatinine  Care per primary team        Silvestre Ramos MD  General Surgery  [facility] - Telemetry ([facility])

## 2024-04-09 NOTE — ASSESSMENT & PLAN NOTE
Secondary to bladder cancer, progression noted on CT  Plan:  -Oncology and Urology consulted   -General Surgery following    04/04/2024  -SBFT ordered for this morning  -follow up surgery recommendations  -hospice discussion on going (as discussed below)     04/05/2024  -Exploratory laparotomy planned for today with either resection of the small bowel with planned anastomosis or bypass of the small bowel to itself or to the colon to relieve the obstruction.   -will follow up postoperatively     04/06/2024  -s/p exlap, ileal-transverse anastomosis bypass on 4/5/24   -tolerated surgery well, but still with some breakthrough pain  -rest of management per surgery     04/07/2024  -pain controlled on current regimen  -will attempt to wean IV meds as tolerated  -diet advanced to CLD by surgery  -maintain IV fluids for now  -rest of management per their team    04/09/24  -pain controlled but developed abdominal cramping with dairy products this am. Placed back on CLD per surgery

## 2024-04-09 NOTE — SUBJECTIVE & OBJECTIVE
Interval History:  Patient had been tolerating full liquids.  He however states he had yogurt and milk and then developed some crampy abdominal pain.  He reports bowel movements.  He has no nausea or vomiting.  He was some abdominal soreness but otherwise pain is controlled      Medications:  Continuous Infusions:   heparin (porcine) in D5W 10 Units/kg/hr (04/08/24 2219)    lactated ringers 80 mL/hr at 04/08/24 2217     Scheduled Meds:   arformoteroL  15 mcg Nebulization BID    budesonide  0.5 mg Nebulization Q12H    chlorhexidine  10 mL Mouth/Throat BID    EScitalopram oxalate  20 mg Oral Daily    famotidine (PF)  20 mg Intravenous Q12H    levothyroxine  125 mcg Oral Before breakfast    mupirocin   Topical (Top) Daily    nicotine  1 patch Transdermal Daily    pregabalin  75 mg Oral QHS    tamsulosin  1 capsule Oral Daily     PRN Meds:sodium chloride 0.9%, acetaminophen, ALPRAZolam, cyclobenzaprine, dextrose 10%, dextrose 10%, diphenhydrAMINE, glucagon (human recombinant), HYDROmorphone, levalbuterol, naloxone, ondansetron, oxyCODONE, oxyCODONE, prochlorperazine, sodium chloride 0.9%     Review of patient's allergies indicates:  No Known Allergies  Objective:     Vital Signs (Most Recent):  Temp: 99.3 °F (37.4 °C) (04/09/24 0820)  Pulse: (!) 134 (04/09/24 0949)  Resp: (!) 22 (04/09/24 0901)  BP: 128/61 (04/09/24 0820)  SpO2: (!) 92 % (04/09/24 0820) Vital Signs (24h Range):  Temp:  [97.8 °F (36.6 °C)-100.3 °F (37.9 °C)] 99.3 °F (37.4 °C)  Pulse:  [] 134  Resp:  [18-22] 22  SpO2:  [91 %-95 %] 92 %  BP: (116-143)/(61-77) 128/61     Weight: 68 kg (149 lb 14.6 oz)  Body mass index is 22.79 kg/m².    Intake/Output - Last 3 Shifts         04/07 0700 04/08 0659 04/08 0700 04/09 0659 04/09 0700  04/10 0659    P.O.       I.V. (mL/kg) 1608.6 (23.2) 958.2 (14.1)     IV Piggyback 0      Total Intake(mL/kg) 1608.6 (23.2) 958.2 (14.1)     Urine (mL/kg/hr) 3300 (2) 2450 (1.5)     Stool 0 0     Total Output 3300 2450      Net -1691.5 -1491.8            Stool Occurrence 1 x 0 x              Physical Exam  Vitals and nursing note reviewed.   Constitutional:       General: He is not in acute distress.     Appearance: He is well-developed. He is ill-appearing (Chronically).   HENT:      Head: Normocephalic and atraumatic.   Eyes:      General: No scleral icterus.     Pupils: Pupils are equal, round, and reactive to light.   Neck:      Thyroid: No thyromegaly.      Vascular: No JVD.      Trachea: No tracheal deviation.   Cardiovascular:      Rate and Rhythm: Normal rate and regular rhythm.      Heart sounds: Normal heart sounds.   Pulmonary:      Effort: Pulmonary effort is normal.      Breath sounds: Normal breath sounds.   Abdominal:      General: Bowel sounds are normal. There is no distension (mild).      Palpations: Abdomen is soft. There is no mass.      Tenderness: There is no abdominal tenderness (incisional). There is no guarding or rebound.      Comments: Incision is clean   Musculoskeletal:         General: Normal range of motion.      Cervical back: Normal range of motion and neck supple.   Lymphadenopathy:      Cervical: No cervical adenopathy.   Skin:     General: Skin is warm and dry.   Neurological:      General: No focal deficit present.      Mental Status: He is alert and oriented to person, place, and time.   Psychiatric:         Mood and Affect: Mood normal.         Behavior: Behavior normal.         Thought Content: Thought content normal.         Judgment: Judgment normal.          Significant Labs:  I have reviewed all pertinent lab results within the past 24 hours.  CBC:   Recent Labs   Lab 04/09/24  0452   WBC 12.10   RBC 3.26*   HGB 8.5*   HCT 26.7*      MCV 82   MCH 26.1*   MCHC 31.8*     BMP:   Recent Labs   Lab 04/06/24  0627         K 4.7   *   CO2 23   BUN 17   CREATININE 1.2   CALCIUM 8.9   MG 1.6     CMP:   Recent Labs   Lab 04/06/24  0627      CALCIUM 8.9   ALBUMIN 2.3*    PROT 5.5*      K 4.7   CO2 23   *   BUN 17   CREATININE 1.2   ALKPHOS 95   ALT 9*   AST 14   BILITOT 0.4     LFTs:   Recent Labs   Lab 04/06/24  0627   ALT 9*   AST 14   ALKPHOS 95   BILITOT 0.4   PROT 5.5*   ALBUMIN 2.3*     Coagulation:   Recent Labs   Lab 04/03/24  1845 04/04/24  0426 04/09/24  0452   LABPROT 12.5  --   --    INR 1.1  --   --    APTT 38.4*   < > 51.4*  51.4*    < > = values in this interval not displayed.       Significant Diagnostics:  I have reviewed all pertinent imaging results/findings within the past 24 hours.  Will obtain abdominal x-rays in the morning

## 2024-04-09 NOTE — ASSESSMENT & PLAN NOTE
Chronic, well controlled.  Plan:  -Continue home meds  -Incentive spirometry   -Duo-nebs prn  -Titrate oxygen therapy as needed

## 2024-04-09 NOTE — PLAN OF CARE
P.T. EVAL COMPLETE.  PT CURRENTLY REQUIRES MI[facility] FOR BED MOBILITY, TF'S, AND GAIT.  P.T. RECOMMENDS LOW INTENSITY THERAPY UPON DISCHARGE WITH 24 HOUR CARE FOR SAFETY

## 2024-04-09 NOTE — SUBJECTIVE & OBJECTIVE
Interval History: Patient tolerated liquid diet but developed abdominal cramping after eating yogurt. Pain controlled on current regimen. Patient noted to be tachy during and following PT earlier today but was denying any chest pain or shortness of breath and currently back to NSR. Patient placed back on liquid diet.     Review of Systems   All other systems reviewed and are negative.    Objective:     Vital Signs (Most Recent):  Temp: 98.8 °F (37.1 °C) (04/09/24 1206)  Pulse: (!) 119 (04/09/24 1206)  Resp: 18 (04/09/24 1206)  BP: 137/83 (04/09/24 1206)  SpO2: 95 % (04/09/24 1206) Vital Signs (24h Range):  Temp:  [97.8 °F (36.6 °C)-100.3 °F (37.9 °C)] 98.8 °F (37.1 °C)  Pulse:  [] 119  Resp:  [18-22] 18  SpO2:  [91 %-95 %] 95 %  BP: (116-137)/(61-83) 137/83     Weight: 68 kg (149 lb 14.6 oz)  Body mass index is 22.79 kg/m².    Intake/Output Summary (Last 24 hours) at 4/9/2024 1420  Last data filed at 4/9/2024 0642  Gross per 24 hour   Intake 958.17 ml   Output 1650 ml   Net -691.83 ml         Physical Exam  Vitals reviewed.   Constitutional:       General: He is not in acute distress.     Appearance: Normal appearance. He is normal weight. He is not ill-appearing, toxic-appearing or diaphoretic.   HENT:      Head: Normocephalic and atraumatic.      Right Ear: External ear normal.      Left Ear: External ear normal.      Nose: Nose normal. No congestion or rhinorrhea.      Mouth/Throat:      Mouth: Mucous membranes are moist.      Pharynx: Oropharynx is clear. No oropharyngeal exudate or posterior oropharyngeal erythema.   Eyes:      General: No scleral icterus.     Extraocular Movements: Extraocular movements intact.      Conjunctiva/sclera: Conjunctivae normal.      Pupils: Pupils are equal, round, and reactive to light.   Neck:      Vascular: No carotid bruit.   Cardiovascular:      Rate and Rhythm: Regular rhythm. Tachycardia present.      Pulses: Normal pulses.      Heart sounds: Normal heart sounds. No  murmur heard.     No friction rub. No gallop.   Pulmonary:      Effort: Pulmonary effort is normal. No respiratory distress.      Breath sounds: Normal breath sounds. No stridor. No wheezing, rhonchi or rales.   Chest:      Chest wall: No tenderness.   Abdominal:      General: Abdomen is flat. Bowel sounds are normal. There is distension.      Palpations: Abdomen is soft. There is no mass.      Tenderness: There is abdominal tenderness. There is no guarding or rebound.      Hernia: No hernia is present.      Comments: Mildly distended with mild TTP throughout. Surgical incision and staples intact without evidence of bleeding, drainage, dehiscence, or signs of infection noted.     Musculoskeletal:         General: No swelling, tenderness, deformity or signs of injury. Normal range of motion.      Cervical back: Normal range of motion and neck supple. No rigidity or tenderness.      Comments: Bilateral nephrotomy tubes present. Right nephrotomy tube leaking from cap site.    Lymphadenopathy:      Cervical: No cervical adenopathy.   Skin:     General: Skin is warm and dry.      Capillary Refill: Capillary refill takes less than 2 seconds.      Coloration: Skin is not jaundiced or pale.      Findings: No bruising, erythema, lesion or rash.      Comments: Surgical scar with staples noted and well appearing as mentioned in abdominal section   Neurological:      Mental Status: He is alert. Mental status is at baseline.      Cranial Nerves: No cranial nerve deficit.      Sensory: No sensory deficit.      Motor: No weakness.      Coordination: Coordination normal.      Comments: Awake and alert, following commands and answering questions.    Psychiatric:         Mood and Affect: Mood normal.         Behavior: Behavior normal.         Thought Content: Thought content normal.         Judgment: Judgment normal.             Significant Labs: All pertinent labs within the past 24 hours have been reviewed.    Significant Imaging:  I have reviewed all pertinent imaging results/findings within the past 24 hours.    LABS:  Recent Results (from the past 24 hour(s))   APTT    Collection Time: 04/09/24  4:52 AM   Result Value Ref Range    aPTT 51.4 (H) 21.0 - 32.0 sec   CBC auto differential    Collection Time: 04/09/24  4:52 AM   Result Value Ref Range    WBC 12.10 3.90 - 12.70 K/uL    RBC 3.26 (L) 4.60 - 6.20 M/uL    Hemoglobin 8.5 (L) 14.0 - 18.0 g/dL    Hematocrit 26.7 (L) 40.0 - 54.0 %    MCV 82 82 - 98 fL    MCH 26.1 (L) 27.0 - 31.0 pg    MCHC 31.8 (L) 32.0 - 36.0 g/dL    RDW 16.2 (H) 11.5 - 14.5 %    Platelets 285 150 - 450 K/uL    MPV 9.8 9.2 - 12.9 fL    Immature Granulocytes 0.4 0.0 - 0.5 %    Gran # (ANC) 10.5 (H) 1.8 - 7.7 K/uL    Immature Grans (Abs) 0.05 (H) 0.00 - 0.04 K/uL    Lymph # 0.7 (L) 1.0 - 4.8 K/uL    Mono # 0.5 0.3 - 1.0 K/uL    Eos # 0.3 0.0 - 0.5 K/uL    Baso # 0.02 0.00 - 0.20 K/uL    nRBC 0 0 /100 WBC    Gran % 87.1 (H) 38.0 - 73.0 %    Lymph % 6.1 (L) 18.0 - 48.0 %    Mono % 3.8 (L) 4.0 - 15.0 %    Eosinophil % 2.4 0.0 - 8.0 %    Basophil % 0.2 0.0 - 1.9 %    Differential Method Automated    APTT    Collection Time: 04/09/24  4:52 AM   Result Value Ref Range    aPTT 51.4 (H) 21.0 - 32.0 sec   EKG 12-lead    Collection Time: 04/09/24  8:40 AM   Result Value Ref Range    QRS Duration 92 ms    [facility] QTC Calculation 496 ms   POCT glucose    Collection Time: 04/09/24 12:00 PM   Result Value Ref Range    POCT Glucose 137 (H) 70 - 110 mg/dL       RADIOLOGY  X-Ray Chest AP Portable    Result Date: 4/5/2024  EXAMINATION: XR CHEST AP PORTABLE CLINICAL HISTORY: Central line placement; TECHNIQUE: Single frontal portable view of the chest was performed. COMPARISON: 03/11/2024 FINDINGS: Right central line in apparent good position distal tip expected SVC.  No convincing pneumothorax.  Lungs unchanged     Satisfactory line placement radiograph Electronically signed by: Le Calixto Date:    04/05/2024 Time:    19:54    X-Ray  Abdomen Flat And Erect    Result Date: 4/5/2024  EXAMINATION: XR ABDOMEN FLAT AND ERECT CLINICAL HISTORY: Follow up after small-bowel follow-through; Unspecified intestinal obstruction, unspecified as to partial versus complete obstruction TECHNIQUE: Single frontal view of the chest was performed. COMPARISON: 04/03/2024 FINDINGS: Bilateral nephrostomy tubes are noted.  Prominent small bowel loops are seen throughout the abdomen without definite transition and measure up to 4.7 cm.  Contrast is seen within the large bowel.  Findings consistent with ileus.  In comparison to the prior study, there is no adverse interval changes     In comparison to the prior study, there is no adverse interval changes.  Recommend continued surveillance Electronically signed by: Oleg Sr MD Date:    04/05/2024 Time:    09:20    XR Small Bowel Follow Through    Result Date: 4/4/2024  EXAMINATION: XR SMALL BOWEL FOLLOW THROUGH CLINICAL HISTORY: Gastrografin.  Evaluate for complete or partial obstruction; TECHNIQUE: Supine imaging provided to 7-1/2 hour COMPARISON: CT correlation FINDINGS: Small bowel is distended with delayed transit time     Findings consistent with distal small bowel obstruction Electronically signed by: Le Calixto Date:    04/04/2024 Time:    19:49    X-Ray Abdomen Flat And Erect    Result Date: 4/3/2024  EXAMINATION: XR ABDOMEN FLAT AND ERECT CLINICAL HISTORY: Unspecified intestinal obstruction, unspecified as to partial versus complete obstruction TECHNIQUE: Routine exam. COMPARISON: 03/11/2024 FINDINGS: Increasing air-filled loops of small bowel throughout the mid upper abdomen.  Maximum diameter approximately 4 cm.Large amount stool right colon No other changes.  Again noted are bilateral nephrostomy catheters.     Increasing small bowel distension mid upper abdomen indicating partial small bowel obstruction. Electronically signed by: Elmo Garza MD Date:    04/03/2024 Time:    15:59    CT Urogram  Abd Pelvis W WO    Result Date: 4/3/2024  EXAM: CT UROGRAM ABD PELVIS W WO CLINICAL HISTORY: Renal mass, bladder cancer FINDINGS:  Pre and postcontrast images were obtained. Comparison CT abdomen pelvis 02/03/2024. Discoid atelectasis at the right base.  No suspicious lung nodules. No liver masses.  The spleen is unremarkable.  Pancreas is unremarkable gallbladder is unremarkable. Adrenal glands appear normal. There is atherosclerosis of the abdominal aorta no significant aneurysm.  There are several prominent left retroperitoneal lymph nodes measure up to 1.5 cm concerning for lymph node metastasis. There are bilateral nephrostomy tubes.  There are small renal cysts bilaterally.  No renal masses.  Small atrophic left kidney.  No evidence of hydronephrosis. Extensive abnormality of the bladder with focal calcifications along the left posterior lateral bladder wall at the base.  There is diffuse bladder wall thickening and abnormal enhancement.  This extends to the pelvic sidewall bilaterally.  Bladder mass is noted posteriorly which appears to extend and involve the rectum with extension into the presacral space.  There is abnormal thickening of the presacral fat which has increased since previous exam measuring up to 2 cm in thickness.  There is extension into the sacrum on the left image 143 of series 6.  Additionally, there is interval development of small bowel obstruction which appears be related local extension to involve a small bowel loop in the pelvis.  Small bowel loops are distended up to 4 cm. No distal bone metastasis.      Interval worsening of locally invasive bladder cancer extension to the pelvic sidewall and the presacral fat and sacrum on the left.  There is involvement of the rectum as well as well as involvement of a small bowel loop in the pelvis causing a small bowel obstruction. There is left retroperitoneal lymphadenopathy. Finalized on: 4/3/2024 11:33 AM By:  Rahul Tobias MD [facility]# [id number]       2024-04-03 11:[id number]    [facility]    X-Ray Abdomen AP 1 View (KUB)    Result Date: 3/11/2024  EXAMINATION: XR ABDOMEN AP 1 VIEW CLINICAL HISTORY: Encounter for attention to other artificial openings of urinary tract TECHNIQUE: AP View(s) of the abdomen was performed. COMPARISON: None FINDINGS: Bilateral nephrostomy tubes in place.  Position of the right nephrostomy tube minimally changed. Bowel gas pattern appears unremarkable.     No definite acute abnormality. Electronically signed by: Mark Cruz Date:    03/11/2024 Time:    19:54    CT Head Without Contrast    Result Date: 3/11/2024  EXAMINATION: CT HEAD WITHOUT CONTRAST CLINICAL HISTORY: Head trauma, minor (Age >= 65y); TECHNIQUE: Low dose axial CT images obtained throughout the head without intravenous contrast. Sagittal and coronal reconstructions were performed. COMPARISON: Prior FINDINGS: Atrophy and chronic white matter changes.  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 No extra-axial blood or fluid collections. No parenchymal mass, hemorrhage, edema or major vascular distribution infarct. Skull/extracranial contents (limited evaluation): No fracture. Mastoid air cells and paranasal sinuses are essentially clear.     No acute abnormality.  Similar findings to prior exam.  Chronic changes.  Atrophy and chronic white matter changes All CT scans   are performed using dose optimization techniques including the following: automated exposure control; adjustment of the mA and/or kV; use of iterative reconstruction technique.  Dose modulation was employed for ALARA by means of: Automated exposure control; adjustment of the mA and/or kV according to patient size (this includes techniques or standardized protocols for targeted exams where dose is matched to indication/reason for exam; i.e. extremities or head); and/or use of iterative reconstructive technique. Electronically signed by: Jefferson Kunz Date:    03/11/2024 Time:    18:48    X-Ray Chest  AP Portable    Result Date: 3/11/2024  EXAMINATION: XR CHEST AP PORTABLE CLINICAL HISTORY: Sepsis; TECHNIQUE: Single frontal view of the chest was performed. COMPARISON: None FINDINGS: The lungs are clear, with normal appearance of pulmonary vasculature and no pleural effusion or pneumothorax. The cardiac silhouette is normal in size. The hilar and mediastinal contours are unremarkable. Bones are intact.     No acute abnormality. Electronically signed by: Jefferson Kunz Date:    03/11/2024 Time:    18:43      EKG    MICROBIOLOGY    MDM

## 2024-04-09 NOTE — PROGRESS NOTES
[facility] Medicine  Progress Note    Patient Name: Geovani Currie  MRN: [id number]  Patient Class: IP- Inpatient   Admission Date: 4/3/2024  Length of Stay: 6 days  Attending Physician: Juan Carlos Parra MD  Primary Care Provider: Faizan Antoine MD        Subjective:     Principal Problem:Partial intestinal obstruction        HPI:  68 y.o. male patient with a PMHx of COPD, HTN, Lung Cancer, CKD, DVT, metastatic bladder cancer s/p nephrostomy tube placement. Presented to the Emergency Department for generalized abdominal pain, onset several days PTA. Pt was referred to the ED by Dr. Sr (Interventional Radiology) after CT Urogram showed small bowel obstruction. Pt was scheduled to have his nephrostomy tubes replaced on day of arrival.  Associated sxs include nausea, abdominal distention, and loss of appetite. Patient denies any fever, chills, vomiting, SOB, CP, weakness, numbness, dizziness, headache, and all other sxs at this time. Pt and spouse are from [place], and are trying to return home to set up hospice. He is currently on cefdinir, but is not receiving any cancer treatment. General Surgery consulted, recommended to admit and consult Oncology and Urology. In the ED, vitals: 94/56, 85, 18, 98F, 96% RA. Significant Labs: H/H: 10/32.1, CT abd/pel: findings consistent SBO, Interval worsening of bladder cancer. Patient is a full code. Admitted to Hospital Medicine for management of Small Bowel Obstruction.     Overview/Hospital Course:  04/04/2024  CT scan shows shows interval worsening of locally invasive bladder cancer with rectal involvement and small bowel obstruction. SBFT pending. Oncology evaluated, strongly recommends DNR order to be placed. Both specialists suggest palliative vs hospice discussion. Palliative care consulted to assist.      04/05/2024  Came to discuss case with family and patient this morning one final time prior to their surgery. Went into  "meticulous detail regarding their wishes and goals at this encounter. Patient and family at bedside are of the understanding that the purpose of this pending surgery is to allow patient the ability to tolerate PO palliative immunotherapy. I also emphasized the fact that none of these interventions are curative in any way and that we are only establishing a path to allow further palliative measures. They seemed to acknowledge this fact as well. We discussed risks of surgery and both patient & family voiced their understanding and acceptance of these risks. Family then reiterated their desire to "stabilize" patient enough for him to get back to [place], where the patient (to my understanding) may or may not proceed with hospice care.      Will maintain Full Code in the perioperative period, but family seems amenable to transitioning to DNR at some point in the postoperative stage. Once stable, disposition will compose of likely discharge home with HH, NP at home and/or home based palliative care services in the interim while awaiting his move back to [place]. Discussed case with Palliative Care. Will consult their teams in the postoperative period if needed.      04/06/2024  POD#1, seems to have tolerated surgery well. Complains of breakthrough pain on current regimen.      04/08/2024  Diet advanced by surgical team. Patient tolerating it well. Will restart some of home medications today.     04/09/2024  Patient tolerated liquid diet but developed abdominal cramping after eating yogurt. Pain controlled on current regimen. Patient noted to be tachy during and following PT earlier today but was denying any chest pain or shortness of breath and currently back to [facility].    Interval History: Patient tolerated liquid diet but developed abdominal cramping after eating yogurt. Pain controlled on current regimen. Patient noted to be tachy during and following PT earlier today but was denying any chest pain or shortness of " breath and currently back to NSR. Patient placed back on liquid diet.     Review of Systems   All other systems reviewed and are negative.    Objective:     Vital Signs (Most Recent):  Temp: 98.8 °F (37.1 °C) (04/09/24 1206)  Pulse: (!) 119 (04/09/24 1206)  Resp: 18 (04/09/24 1206)  BP: 137/83 (04/09/24 1206)  SpO2: 95 % (04/09/24 1206) Vital Signs (24h Range):  Temp:  [97.8 °F (36.6 °C)-100.3 °F (37.9 °C)] 98.8 °F (37.1 °C)  Pulse:  [] 119  Resp:  [18-22] 18  SpO2:  [91 %-95 %] 95 %  BP: (116-137)/(61-83) 137/83     Weight: 68 kg (149 lb 14.6 oz)  Body mass index is 22.79 kg/m².    Intake/Output Summary (Last 24 hours) at 4/9/2024 1420  Last data filed at 4/9/2024 0642  Gross per 24 hour   Intake 958.17 ml   Output 1650 ml   Net -691.83 ml         Physical Exam  Vitals reviewed.   Constitutional:       General: He is not in acute distress.     Appearance: Normal appearance. He is normal weight. He is not ill-appearing, toxic-appearing or diaphoretic.   HENT:      Head: Normocephalic and atraumatic.      Right Ear: External ear normal.      Left Ear: External ear normal.      Nose: Nose normal. No congestion or rhinorrhea.      Mouth/Throat:      Mouth: Mucous membranes are moist.      Pharynx: Oropharynx is clear. No oropharyngeal exudate or posterior oropharyngeal erythema.   Eyes:      General: No scleral icterus.     Extraocular Movements: Extraocular movements intact.      Conjunctiva/sclera: Conjunctivae normal.      Pupils: Pupils are equal, round, and reactive to light.   Neck:      Vascular: No carotid bruit.   Cardiovascular:      Rate and Rhythm: Regular rhythm. Tachycardia present.      Pulses: Normal pulses.      Heart sounds: Normal heart sounds. No murmur heard.     No friction rub. No gallop.   Pulmonary:      Effort: Pulmonary effort is normal. No respiratory distress.      Breath sounds: Normal breath sounds. No stridor. No wheezing, rhonchi or rales.   Chest:      Chest wall: No tenderness.    Abdominal:      General: Abdomen is flat. Bowel sounds are normal. There is distension.      Palpations: Abdomen is soft. There is no mass.      Tenderness: There is abdominal tenderness. There is no guarding or rebound.      Hernia: No hernia is present.      Comments: Mildly distended with mild TTP throughout. Surgical incision and staples intact without evidence of bleeding, drainage, dehiscence, or signs of infection noted.     Musculoskeletal:         General: No swelling, tenderness, deformity or signs of injury. Normal range of motion.      Cervical back: Normal range of motion and neck supple. No rigidity or tenderness.      Comments: Bilateral nephrotomy tubes present. Right nephrotomy tube leaking from cap site.    Lymphadenopathy:      Cervical: No cervical adenopathy.   Skin:     General: Skin is warm and dry.      Capillary Refill: Capillary refill takes less than 2 seconds.      Coloration: Skin is not jaundiced or pale.      Findings: No bruising, erythema, lesion or rash.      Comments: Surgical scar with staples noted and well appearing as mentioned in abdominal section   Neurological:      Mental Status: He is alert. Mental status is at baseline.      Cranial Nerves: No cranial nerve deficit.      Sensory: No sensory deficit.      Motor: No weakness.      Coordination: Coordination normal.      Comments: Awake and alert, following commands and answering questions.    Psychiatric:         Mood and Affect: Mood normal.         Behavior: Behavior normal.         Thought Content: Thought content normal.         Judgment: Judgment normal.             Significant Labs: All pertinent labs within the past 24 hours have been reviewed.    Significant Imaging: I have reviewed all pertinent imaging results/findings within the past 24 hours.    LABS:  Recent Results (from the past 24 hour(s))   APTT    Collection Time: 04/09/24  4:52 AM   Result Value Ref Range    aPTT 51.4 (H) 21.0 - 32.0 sec   CBC auto  differential    Collection Time: 04/09/24  4:52 AM   Result Value Ref Range    WBC 12.10 3.90 - 12.70 K/uL    RBC 3.26 (L) 4.60 - 6.20 M/uL    Hemoglobin 8.5 (L) 14.0 - 18.0 g/dL    Hematocrit 26.7 (L) 40.0 - 54.0 %    MCV 82 82 - 98 fL    MCH 26.1 (L) 27.0 - 31.0 pg    MCHC 31.8 (L) 32.0 - 36.0 g/dL    RDW 16.2 (H) 11.5 - 14.5 %    Platelets 285 150 - 450 K/uL    MPV 9.8 9.2 - 12.9 fL    Immature Granulocytes 0.4 0.0 - 0.5 %    Gran # (ANC) 10.5 (H) 1.8 - 7.7 K/uL    Immature Grans (Abs) 0.05 (H) 0.00 - 0.04 K/uL    Lymph # 0.7 (L) 1.0 - 4.8 K/uL    Mono # 0.5 0.3 - 1.0 K/uL    Eos # 0.3 0.0 - 0.5 K/uL    Baso # 0.02 0.00 - 0.20 K/uL    nRBC 0 0 /100 WBC    Gran % 87.1 (H) 38.0 - 73.0 %    Lymph % 6.1 (L) 18.0 - 48.0 %    Mono % 3.8 (L) 4.0 - 15.0 %    Eosinophil % 2.4 0.0 - 8.0 %    Basophil % 0.2 0.0 - 1.9 %    Differential Method Automated    APTT    Collection Time: 04/09/24  4:52 AM   Result Value Ref Range    aPTT 51.4 (H) 21.0 - 32.0 sec   EKG 12-lead    Collection Time: 04/09/24  8:40 AM   Result Value Ref Range    QRS Duration 92 ms    [facility] QTC Calculation 496 ms   POCT glucose    Collection Time: 04/09/24 12:00 PM   Result Value Ref Range    POCT Glucose 137 (H) 70 - 110 mg/dL       RADIOLOGY  X-Ray Chest AP Portable    Result Date: 4/5/2024  EXAMINATION: XR CHEST AP PORTABLE CLINICAL HISTORY: Central line placement; TECHNIQUE: Single frontal portable view of the chest was performed. COMPARISON: 03/11/2024 FINDINGS: Right central line in apparent good position distal tip expected SVC.  No convincing pneumothorax.  Lungs unchanged     Satisfactory line placement radiograph Electronically signed by: Le Calixto Date:    04/05/2024 Time:    19:54    X-Ray Abdomen Flat And Erect    Result Date: 4/5/2024  EXAMINATION: XR ABDOMEN FLAT AND ERECT CLINICAL HISTORY: Follow up after small-bowel follow-through; Unspecified intestinal obstruction, unspecified as to partial versus complete obstruction TECHNIQUE:  Single frontal view of the chest was performed. COMPARISON: 04/03/2024 FINDINGS: Bilateral nephrostomy tubes are noted.  Prominent small bowel loops are seen throughout the abdomen without definite transition and measure up to 4.7 cm.  Contrast is seen within the large bowel.  Findings consistent with ileus.  In comparison to the prior study, there is no adverse interval changes     In comparison to the prior study, there is no adverse interval changes.  Recommend continued surveillance Electronically signed by: Oleg Sr MD Date:    04/05/2024 Time:    09:20    XR Small Bowel Follow Through    Result Date: 4/4/2024  EXAMINATION: XR SMALL BOWEL FOLLOW THROUGH CLINICAL HISTORY: Gastrografin.  Evaluate for complete or partial obstruction; TECHNIQUE: Supine imaging provided to 7-1/2 hour COMPARISON: CT correlation FINDINGS: Small bowel is distended with delayed transit time     Findings consistent with distal small bowel obstruction Electronically signed by: Le Calixto Date:    04/04/2024 Time:    19:49    X-Ray Abdomen Flat And Erect    Result Date: 4/3/2024  EXAMINATION: XR ABDOMEN FLAT AND ERECT CLINICAL HISTORY: Unspecified intestinal obstruction, unspecified as to partial versus complete obstruction TECHNIQUE: Routine exam. COMPARISON: 03/11/2024 FINDINGS: Increasing air-filled loops of small bowel throughout the mid upper abdomen.  Maximum diameter approximately 4 cm.Large amount stool right colon No other changes.  Again noted are bilateral nephrostomy catheters.     Increasing small bowel distension mid upper abdomen indicating partial small bowel obstruction. Electronically signed by: Elmo Garza MD Date:    04/03/2024 Time:    15:59    CT Urogram Abd Pelvis W WO    Result Date: 4/3/2024  EXAM: CT UROGRAM ABD PELVIS W WO CLINICAL HISTORY: Renal mass, bladder cancer FINDINGS:  Pre and postcontrast images were obtained. Comparison CT abdomen pelvis 02/03/2024. Discoid atelectasis at the right base.   No suspicious lung nodules. No liver masses.  The spleen is unremarkable.  Pancreas is unremarkable gallbladder is unremarkable. Adrenal glands appear normal. There is atherosclerosis of the abdominal aorta no significant aneurysm.  There are several prominent left retroperitoneal lymph nodes measure up to 1.5 cm concerning for lymph node metastasis. There are bilateral nephrostomy tubes.  There are small renal cysts bilaterally.  No renal masses.  Small atrophic left kidney.  No evidence of hydronephrosis. Extensive abnormality of the bladder with focal calcifications along the left posterior lateral bladder wall at the base.  There is diffuse bladder wall thickening and abnormal enhancement.  This extends to the pelvic sidewall bilaterally.  Bladder mass is noted posteriorly which appears to extend and involve the rectum with extension into the presacral space.  There is abnormal thickening of the presacral fat which has increased since previous exam measuring up to 2 cm in thickness.  There is extension into the sacrum on the left image 143 of series 6.  Additionally, there is interval development of small bowel obstruction which appears be related local extension to involve a small bowel loop in the pelvis.  Small bowel loops are distended up to 4 cm. No distal bone metastasis.      Interval worsening of locally invasive bladder cancer extension to the pelvic sidewall and the presacral fat and sacrum on the left.  There is involvement of the rectum as well as well as involvement of a small bowel loop in the pelvis causing a small bowel obstruction. There is left retroperitoneal lymphadenopathy. Finalized on: 4/3/2024 11:33 AM By:  Rahul Tobias MD [facility]# [id number]      2024-04-03 11:[id number]    [facility]    X-Ray Abdomen AP 1 View (KUB)    Result Date: 3/11/2024  EXAMINATION: XR ABDOMEN AP 1 VIEW CLINICAL HISTORY: Encounter for attention to other artificial openings of urinary tract TECHNIQUE: AP View(s) of the abdomen  was performed. COMPARISON: None FINDINGS: Bilateral nephrostomy tubes in place.  Position of the right nephrostomy tube minimally changed. Bowel gas pattern appears unremarkable.     No definite acute abnormality. Electronically signed by: Mark Cruz Date:    03/11/2024 Time:    19:54    CT Head Without Contrast    Result Date: 3/11/2024  EXAMINATION: CT HEAD WITHOUT CONTRAST CLINICAL HISTORY: Head trauma, minor (Age >= 65y); TECHNIQUE: Low dose axial CT images obtained throughout the head without intravenous contrast. Sagittal and coronal reconstructions were performed. COMPARISON: Prior FINDINGS: Atrophy and chronic white matter changes.  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 No extra-axial blood or fluid collections. No parenchymal mass, hemorrhage, edema or major vascular distribution infarct. Skull/extracranial contents (limited evaluation): No fracture. Mastoid air cells and paranasal sinuses are essentially clear.     No acute abnormality.  Similar findings to prior exam.  Chronic changes.  Atrophy and chronic white matter changes All CT scans   are performed using dose optimization techniques including the following: automated exposure control; adjustment of the mA and/or kV; use of iterative reconstruction technique.  Dose modulation was employed for ALARA by means of: Automated exposure control; adjustment of the mA and/or kV according to patient size (this includes techniques or standardized protocols for targeted exams where dose is matched to indication/reason for exam; i.e. extremities or head); and/or use of iterative reconstructive technique. Electronically signed by: Jefferson Kunz Date:    03/11/2024 Time:    18:48    X-Ray Chest AP Portable    Result Date: 3/11/2024  EXAMINATION: XR CHEST AP PORTABLE CLINICAL HISTORY: Sepsis; TECHNIQUE: Single frontal view of the chest was performed. COMPARISON: None FINDINGS: The lungs are clear, with normal appearance of pulmonary  vasculature and no pleural effusion or pneumothorax. The cardiac silhouette is normal in size. The hilar and mediastinal contours are unremarkable. Bones are intact.     No acute abnormality. Electronically signed by: Jefferson Kunz Date:    03/11/2024 Time:    18:43      EKG    MICROBIOLOGY    MDM      Assessment/Plan:      * Partial intestinal obstruction  Secondary to bladder cancer, progression noted on CT  Plan:  -Oncology and Urology consulted   -General Surgery following    04/04/2024  -SBFT ordered for this morning  -follow up surgery recommendations  -hospice discussion on going (as discussed below)     04/05/2024  -Exploratory laparotomy planned for today with either resection of the small bowel with planned anastomosis or bypass of the small bowel to itself or to the colon to relieve the obstruction.   -will follow up postoperatively     04/06/2024  -s/p exlap, ileal-transverse anastomosis bypass on 4/5/24   -tolerated surgery well, but still with some breakthrough pain  -rest of management per surgery     04/07/2024  -pain controlled on current regimen  -will attempt to wean IV meds as tolerated  -diet advanced to CLD by surgery  -maintain IV fluids for now  -rest of management per their team    04/09/24  -pain controlled but developed abdominal cramping with dairy products this am. Placed back on CLD per surgery      Acute deep vein thrombosis (DVT) of femoral vein of left lower extremity  New diagnosis, managed with Eliquis as OP  Plan:  -Hold Eliquis for now  -Heparin per nomogram      Nephrostomy tube displaced  Patient seen and evaluated by urology during admission by Dr. Worley and discussion held regarding exchange by IR or keeping current tubes as they are function properly and not needing emergent replacement. Patient and spouse agreed to keep current tubes and f/u with Dr. Jorgensen on 4/11/24. Wound care/nephrostomy tube dressing recommendation noted below per urology prior to signing off.  "  Plan:  - Continue daily mupirocin 2% ointment to apply to the skin at the nephrostomy tube sites with his daily cleaning and dressing changes.  - Continue IV antibiotic while in the hospital.  He is currently on IV rocephin daily.      Anemia  Patient's anemia is currently controlled. Has not received any PRBCs to date. 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      Centrilobular emphysema  Chronic, well controlled.  Plan:  -Continue home meds  -Incentive spirometry   -Duo-nebs prn  -Titrate oxygen therapy as needed      Hypothyroidism  Patient has chronic hypothyroidism. TFTs reviewed-   Lab Results   Component Value Date    TSH 0.630 02/23/2024   Plan:  -Will continue chronic levothyroxine and adjust for and clinical changes      Malignant neoplasm of urinary bladder  Known hx of bladder cancer, CT abd/pel: Interval worsening of locally invasive bladder cancer extension to the pelvic sidewall and the presacral fat and sacrum on the left. There is involvement of the rectum as well as well as involvement of a small bowel loop in the pelvis causing a small bowel obstruction.   Plan:    04/04/2024  -Urology evaluated: "I cannot offer any surgical intervention for his advanced bladder cancer at this point. I would recommend hospice, which patient's wife appears open to."  -Oncology evaluated: "Strongly recommend that patient have a do not resuscitate order be placed on chart and be seen by palliative care if not already done so."  -Palliative care consulted, follow up recommendations      04/05/2024  Came to discuss case with family and patient this morning one final time prior to their surgery. Went into meticulous detail regarding their wishes and goals at this encounter. " "Patient and family at bedside are of the understanding that the purpose of this pending surgery is to allow patient the ability to tolerate PO palliative immunotherapy. I also emphasized the fact that none of these interventions are curative in any way and that we are only establishing a path to allow further palliative measures. They seemed to acknowledge this fact as well. We discussed risks of surgery and both patient & family voiced their understanding and acceptance of these risks. Family then reiterated their desire to "stabilize" patient enough for him to get back to [place], where the patient (to my understanding) may or may not proceed with hospice care.      Will maintain Full Code in the perioperative period, but family seems amenable to transitioning to DNR at some point in the postoperative stage. Once stable, disposition will compose of likely discharge home with HH, NP at home and/or home based palliative care services in the interim while awaiting his move back to [place]. Discussed case with Palliative Care. Will consult their teams in the postoperative period if needed. -Confirmed with patient and family at bedside that patient will be a full code during this hospitalization  -orders placed, discussion duration: < 3 minutes      Squamous cell carcinoma of right lung  Followed by oncology, treated with Radiation, therapy completed  Plan:  -f/u with oncology as directed         VTE Risk Mitigation (From admission, onward)           Ordered     heparin 25,000 units in dextrose 5% 250 ml (100 units/mL) infusion MINIMAL INTENSITY nomogram - [facility]  Continuous        Question:  Begin at (units/kg/hr)  Answer:  12 04/05/24 2007     heparin 25,000 units in dextrose 5% 250 mL (100 units/mL) infusion LOW INTENSITY nomogram - [facility]  Continuous        Question:  Begin at (units/kg/hr)  Answer:  12 04/04/24 1920     Reason for No Pharmacological VTE Prophylaxis  Once        Question:  Reasons:  Answer:  " Risk of Bleeding    04/03/24 1724     IP VTE HIGH RISK PATIENT  Once         04/03/24 1724     Place sequential compression device  Until discontinued         04/03/24 1724                    Discharge Planning   TANIA:      Code Status: Full Code   Is the patient medically ready for discharge?:     Reason for patient still in hospital (select all that apply): Patient trending condition, Treatment, Consult recommendations, PT / OT recommendations, and Pending disposition  Discharge Plan A: Home Health          Juan Carlos Parra MD  Department of Hospital Medicine   [facility] ([facility])

## 2024-04-09 NOTE — PT/OT/SLP EVAL
"Occupational Therapy Evaluation and Treatment    Name: Geovani Currie  MRN: [id number]  Admitting Diagnosis: Complete obstruction of colon  Recent Surgery: Procedure(s) (LRB):  LAPAROTOMY, EXPLORATORY (N/A)  BLOCK, TRANSVERSUS ABDOMINIS PLANE (N/A)  ENTEROENTEROSTOMY (N/A) 4 Days Post-Op    Recommendations:     Discharge Recommendations: Low Intensity Therapy (24/7 SPV and A)  Level of Assistance Recommended: 24 hours significant assistance  Discharge Equipment Recommendations: none  Barriers to discharge: None    Assessment:     Geovani Currie is a 68 y.o. male with a medical diagnosis of Complete obstruction of colon. He presents with performance deficits affecting function including weakness, impaired endurance, impaired self care skills, impaired functional mobility, impaired balance, pain, impaired cardiopulmonary response to activity, decreased safety awareness.    Rehab Prognosis: Fair and Poor; patient would benefit from acute OT services to address these deficits and reach maximum level of function.    Plan:     Patient to be seen 2 x/week to address the above listed problems via self-care/home management, therapeutic exercises, therapeutic activities  Plan of Care Expires: 04/23/24  Plan of Care Reviewed with: patient, spouse    Subjective     Chief Complaint: Reported "I want to walk."  Patient Comments/Goals: increase independence  Pain/Comfort:  Pain Rating 1: 7/10  Location 1: abdomen  Pain Addressed 1:  (activity pacing)    Social History:  Living Environment: Patient lives with their spouse in a single story home with  no steps/stairs to enter.  Prior Level of Function: Prior to admission, patient's functional status waxed and waned in independence. Wife reports that on "good days" he could walk community distances with RW, and others he used w/c in the home for mobility. He requires PRN A with ADLs, but typically is able to complete (including sponge bath)  Roles and Routines: Patient was not driving " and not working prior to admission.  Equipment Used at Home: rollator, walker, rolling, wheelchair, bedside commode, shower chair, grab bar (hand held shower head)  DME owned (not currently used): O2  Assistance Upon Discharge: family    Objective:     Communicated with nurse, Farzaneh, prior to session. Patient found supine with peripheral IV, telemetry, central line, SCD (B nephrostomy) upon OT entry to room.    General Precautions: Standard, fall   Orthopedic Precautions: N/A   Braces: N/A    Respiratory Status: Room air    Occupational Performance    Bed Mobility:   Supine to sit from left side of bed with minimum assistance    Functional Mobility/Transfers:  Sit <> Stand Transfer with minimum assistance with rolling walker  Completed x4 trials from EOB prior to forward mobility for toileting hygiene  Rapid fatigue in standing  Bed <> Chair Transfer using Step Transfer technique with minimum assistance with rolling walker  Functional Mobility: Patient completed x15ft functional mobility with RW and min A to increase dynamic standing balance and activity tolerance needed for ADL completion.  Provided with v/c for technique with transfers to increase safety and independence with completion.  HR noted to be 180s after mobility, decreased with seated rest break. Nurse aware.    Activities of Daily Living:  Upper Body Dressing: moderate assistance  Lower Body Dressing: total assistance- donned socks while seated EOB  Toileting: total assistance- noted to be soiled after transition to EOB sitting. Completed toileting hygiene and dependent brief change while standing EOB  Noted to have redness and sensitivity to buttock. Applied barrier cream for protection    Cognitive/Visual Perceptual:  Cognitive/Psychosocial Skills:    -     Oriented to: Person and Place  -     Follows Commands/attention: Easily distracted and Follows one-step commands    Physical Exam:  Balance:    -     Sitting: contact guard assistance  -      Standing: minimum assistance  Upper Extremity Range of Motion:     -       Right Upper Extremity: WFL  -       Left Upper Extremity: WFL  Upper Extremity Strength:    -       Right Upper Extremity: Deficits: grossly 4-/5  -       Left Upper Extremity: Deficits: grossly 4-/5   Strength:    -       Right Upper Extremity: Deficits: poor  -       Left Upper Extremity: Deficits: poor  Declined B UE sensation deficits.    AMPA 6 Click ADL:  AMPAC Total Score: 15    Treatment & Education:  Therapist provided facilitation and instruction of proper body mechanics, energy conservation, and fall prevention strategies during tasks listed above  Patient educated on role of OT, POC, and goals for therapy  Patient educated on importance of OOB activities with staff member assistance and sitting OOB majority of the day  Encouraged completion of B UE AROM therex throughout the day to increase functional strength and activity tolerance needed for ADL completion.    Patient left up in chair with all lines intact, call button in reach, chair alarm on, and spouse present.    GOALS:   Multidisciplinary Problems       Occupational Therapy Goals          Problem: Occupational Therapy    Goal Priority Disciplines Outcome Interventions   Occupational Therapy Goal     OT, PT/OT     Description: Goals to be met by: 4/23/24     Patient will increase functional independence with ADLs by performing:    Toileting from bedside commode with Minimal Assistance for hygiene and clothing management.   Toilet transfer to bedside commode with CGA.  Increased functional strength in B UE grossly by 1/2 MM grade.                         History:     Past Medical History:   Diagnosis Date    Anxiety disorder, unspecified     Cancer     COPD (chronic obstructive pulmonary disease)     Hypertension     Thyroid disease          Past Surgical History:   Procedure Laterality Date    APPENDECTOMY      CATARACT EXTRACTION      ENTEROENTEROSTOMY N/A 4/5/2024     Procedure: ENTEROENTEROSTOMY;  Surgeon: Silvestre Ramos MD;  Location: [facility] OR;  Service: General;  Laterality: N/A;  enterocolostomy, small bowel to transverse colon anastamosis    INJECTION OF ANESTHETIC AGENT INTO TISSUE PLANE DEFINED BY TRANSVERSUS ABDOMINIS MUSCLE N/A 4/5/2024    Procedure: BLOCK, TRANSVERSUS ABDOMINIS PLANE;  Surgeon: Silvestre Ramos MD;  Location: [facility] OR;  Service: General;  Laterality: N/A;    LAPAROTOMY, EXPLORATORY N/A 4/5/2024    Procedure: LAPAROTOMY, EXPLORATORY;  Surgeon: Silvestre Ramos MD;  Location: [facility] OR;  Service: General;  Laterality: N/A;    NEPHROSTOMY      OPEN REDUCTION AND INTERNAL FIXATION (ORIF) OF INTERTROCHANTERIC FRACTURE OF FEMUR Right 11/21/2023    Procedure: ORIF, FRACTURE, FEMUR, INTERTROCHANTERIC;  Surgeon: Tanisha Guidry DO;  Location: [facility];  Service: Orthopedics;  Laterality: Right;  Gamma nail       Time Tracking:     OT Date of Treatment: 04/09/24  OT Start Time: 0725  OT Stop Time: 0805  OT Total Time (min): 40 min    Billable Minutes: Evaluation 15, Self Care/Home Management 15, and Therapeutic Activity 10    Farzaneh Alvarez OT  4/9/2024

## 2024-04-09 NOTE — ASSESSMENT & PLAN NOTE
"Known hx of bladder cancer, CT abd/pel: Interval worsening of locally invasive bladder cancer extension to the pelvic sidewall and the presacral fat and sacrum on the left. There is involvement of the rectum as well as well as involvement of a small bowel loop in the pelvis causing a small bowel obstruction.   Plan:    04/04/2024  -Urology evaluated: "I cannot offer any surgical intervention for his advanced bladder cancer at this point. I would recommend hospice, which patient's wife appears open to."  -Oncology evaluated: "Strongly recommend that patient have a do not resuscitate order be placed on chart and be seen by palliative care if not already done so."  -Palliative care consulted, follow up recommendations      04/05/2024  Came to discuss case with family and patient this morning one final time prior to their surgery. Went into meticulous detail regarding their wishes and goals at this encounter. Patient and family at bedside are of the understanding that the purpose of this pending surgery is to allow patient the ability to tolerate PO palliative immunotherapy. I also emphasized the fact that none of these interventions are curative in any way and that we are only establishing a path to allow further palliative measures. They seemed to acknowledge this fact as well. We discussed risks of surgery and both patient & family voiced their understanding and acceptance of these risks. Family then reiterated their desire to "stabilize" patient enough for him to get back to [place], where the patient (to my understanding) may or may not proceed with hospice care.      Will maintain Full Code in the perioperative period, but family seems amenable to transitioning to DNR at some point in the postoperative stage. Once stable, disposition will compose of likely discharge home with HH, NP at home and/or home based palliative care services in the interim while awaiting his move back to [place]. Discussed case with " Palliative Care. Will consult their teams in the postoperative period if needed. -Confirmed with patient and family at bedside that patient will be a full code during this hospitalization  -orders placed, discussion duration: < 3 minutes

## 2024-04-09 NOTE — PLAN OF CARE
OT edie completed. Sup>sit min A, sit>stand min A, functional mobility x15ft with RW and min A, step>pivot to bedside chair with RW and min A. Recommending low intensity intervention with 24/7 SPV and A at d/c.

## 2024-04-09 NOTE — ASSESSMENT & PLAN NOTE
Patient has chronic hypothyroidism. TFTs reviewed-   Lab Results   Component Value Date    TSH 0.630 02/23/2024   Plan:  -Will continue chronic levothyroxine and adjust for and clinical changes

## 2024-04-09 NOTE — ASSESSMENT & PLAN NOTE
Patient's anemia is currently controlled. Has not received any PRBCs to date. 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

## 2024-04-09 NOTE — ASSESSMENT & PLAN NOTE
Now s/p exlap, ileal-transverse anastomosis bypass on 4/5/24    - tolerating full liquids, the patient however reports some abdominal cramping with dairy based products   Continue full liquids   Check abdominal films in the morning  - OOB, ambulation  - IV and PO pain control  DVT prophylaxis with IV heparin with conversion to Eliquis [facility]

## 2024-04-09 NOTE — ASSESSMENT & PLAN NOTE
Followed by oncology, treated with Radiation, therapy completed  Plan:  -f/u with oncology as directed

## 2024-04-09 NOTE — PT/OT/SLP EVAL
Physical Therapy Evaluation and Treatment    Patient Name:  Geovani Currie   MRN:  [id number]    Recommendations:     Discharge Recommendations: Low Intensity Therapy (WITH 24 HOUR CARE)   Discharge Equipment Recommendations: none   Barriers to discharge: None    Assessment:     Geovani Currie is a 68 y.o. male admitted with a medical diagnosis of Complete obstruction of colon.  He presents with the following impairments/functional limitations: weakness, impaired endurance, impaired functional mobility, gait instability, impaired balance, pain, decreased safety awareness, decreased lower extremity function, decreased coordination.    Rehab Prognosis: Good; patient would benefit from acute skilled PT services to address these deficits and reach maximum level of function.    Recent Surgery: Procedure(s) (LRB):  LAPAROTOMY, EXPLORATORY (N/A)  BLOCK, TRANSVERSUS ABDOMINIS PLANE (N/A)  ENTEROENTEROSTOMY (N/A) 4 Days Post-Op    Plan:     During this hospitalization, patient to be seen 3 x/week to address the identified rehab impairments via gait training, therapeutic activities, therapeutic exercises and progress toward the following goals:    Plan of Care Expires:  04/23/24    Subjective     Chief Complaint: TIRED OF BEING IN BED  Patient/Family Comments/goals: READY TO WALK  Pain/Comfort:  Pain Rating 1: 7/10  Location 1: abdomen  Pain Addressed 1: Reposition    Patients cultural, spiritual, Protestant conflicts given the current situation:      Living Environment:  PT LIVES WITH WIFE WHO IS HIS 24 HOUR CAREGIVER, 1 STORY HOUSE NO STEPS TO ENTER, PT AMB LIMITED DISTANCES WITH RW OR USES W/C FOR MOBILITY DEPENDING ON HOW STRONG HE FEELS, WIFE REPORTS FALLS AT HOME, PT USES O2 AS NEEDED, NEEDS ASSIST FOR ADL'S, DOES NOT DRIVE OR WORK.  Equipment used at home: bedside commode, walker, rolling, wheelchair, grab bar, oxygen, shower chair, rollator (HAND HELD SHOWER).  DME owned (not currently used): none.  Upon discharge,  patient will have assistance from WIFE.    Objective:     Communicated with NURSE GUPTA prior to session.  Patient found supine with telemetry, peripheral IV, central line (BILATERAL NEPHROSTOMY)  upon PT entry to room.    General Precautions: Standard, fall  Orthopedic Precautions:N/A   Braces: N/A  Respiratory Status: Room air    Exams:  Cognitive Exam:  Patient is oriented to Person, Place, Time, Situation, and SLOW/DELAYED BUT APPROPRIATE, ABLE TO FOLLOW COMMANDS AND ANSWER MOST QUESTIONS APPROPRIATELY  Postural Exam:  Patient presented with the following abnormalities:    -       Rounded shoulders  -       Forward head  Sensation:    -       Intact  RLE ROM: WFL  RLE Strength: GROSSLY 3+/5  LLE ROM: WFL  LLE Strength: GROSSLY 3/5    Functional Mobility:  Bed Mobility:     Rolling Left:  minimum assistance  Scooting: minimum assistance  Supine to Sit: minimum assistance-EDUCATED IN LOG ROLLING FOR BED MOBILITY  Transfers:     Sit to Stand:  minimum assistance with rolling walker-4 TRIALS FOR CLEANING AND CHANGING SOILED BRIEF, REQUIRED FREQUENT SEATED REST BREAKS  Bed to Chair: minimum assistance with  rolling walker  using  Step Transfer  Gait: PT AMB 15' IN ROOM WITH RW AND MILE, SLOW PACE, VERY QUICK TO FATIGUE, SHORT SHUFFLING STEPS, BENT L KNEE DURING STANCE PHASE OF GAIT, CUES FOR UPRIGHT POSTURE AND RW SAFETY, SEATED REST AS NEEDED, INCREASED WORK OF BREATHING AFTER GAIT TRIAL, 'S PER NURSE DURING ACTIVITY  Balance: FAIR SITTING BALANCE, POOR+ DYNAMIC BALANCE DURING GAIT  PT EDUCATED IN AND PERFORMED BLE THEREX X 5 REPS: HIP FLEX/EXT, LAQ, QUAD SET, HEEL SLIDES, AP'S    AM-PAC 6 CLICK MOBILITY  Total Score:16     Treatment & Education:  PT AND WIFE EDUCATION:  - ROLE OF P.T. AND POC IN ACUTE CARE HOSPITAL SETTING  - RW USE AND SAFETY DURING TF'S AND GAIT  - EDUCATED ON IMPORTANCE OF ACTIVITY PACING  - ENCOURAGED TO INCREASE TIME OOB IN CHAIR TO TOLERANCE   - TO CONTINUE THERAPUETIC EXERCISES  "THROUGHOUT THE DAY TO INCREASE ACTIVITY TOLERANCE AND DECREASE RISK FOR PNEUMONIA AND BLOOD CLOTS: HIP FLEX/EXT, HIP ABD/ADD, QUAD SET, HEEL SLIDE, AP  - RISK FOR FALLS DUE TO GENERALIZED WEAKNESS, EDUCATED ON "CALL DON'T FALL", ENCOURAGED TO CALL FOR ASSISTANCE WITH ALL NEEDS SUCH AS BED<>CHAIR TRANSFERS OR TRIPS TO BATHROOM, PT AGREEABLE TO SAFETY PRECAUTIONS    Patient left up in chair with all lines intact, call button in reach, chair alarm on, NURSE notified, and WIFE present.    GOALS:   Multidisciplinary Problems       Physical Therapy Goals          Problem: Physical Therapy    Goal Priority Disciplines Outcome Goal Variances Interventions   Physical Therapy Goal     PT, PT/OT      Description: LTG'S TO BE MET IN 14 DAYS (4-23-24)  PT WILL REQUIRE SBA FOR BED MOBILITY  PT WILL REQUIRE CGA FOR BED<>CHAIR TF'S  PT WILL  FEET WITH RW AND CGA  PT WILL INC AMPAC SCORE BY 2 POINTS TO PROGRESS GROSS FUNC MOBILITY                         History:     Past Medical History:   Diagnosis Date    Anxiety disorder, unspecified     Cancer     COPD (chronic obstructive pulmonary disease)     Hypertension     Thyroid disease        Past Surgical History:   Procedure Laterality Date    APPENDECTOMY      CATARACT EXTRACTION      ENTEROENTEROSTOMY N/A 4/5/2024    Procedure: ENTEROENTEROSTOMY;  Surgeon: Silvestre Ramos MD;  Location: [facility] OR;  Service: General;  Laterality: N/A;  enterocolostomy, small bowel to transverse colon anastamosis    INJECTION OF ANESTHETIC AGENT INTO TISSUE PLANE DEFINED BY TRANSVERSUS ABDOMINIS MUSCLE N/A 4/5/2024    Procedure: BLOCK, TRANSVERSUS ABDOMINIS PLANE;  Surgeon: Silvestre Ramos MD;  Location: [facility] OR;  Service: General;  Laterality: N/A;    LAPAROTOMY, EXPLORATORY N/A 4/5/2024    Procedure: LAPAROTOMY, EXPLORATORY;  Surgeon: Silvestre Ramos MD;  Location: [facility] OR;  Service: General;  Laterality: N/A;    NEPHROSTOMY      OPEN REDUCTION AND INTERNAL FIXATION (ORIF) OF " INTERTROCHANTERIC FRACTURE OF FEMUR Right 11/21/2023    Procedure: ORIF, FRACTURE, FEMUR, INTERTROCHANTERIC;  Surgeon: Tanisha Guidry DO;  Location: [facility];  Service: Orthopedics;  Laterality: Right;  Gamma nail       Time Tracking:     PT Received On: 04/09/24  PT Start Time: 0710     PT Stop Time: 0750  PT Total Time (min): 40 min     Billable Minutes: Evaluation 15 and Therapeutic Activity 25 04/09/2024

## 2024-04-10 LAB
APTT PPP: 38.9 SEC (ref 21–32)
APTT PPP: 43.3 SEC (ref 21–32)
APTT PPP: 43.8 SEC (ref 21–32)
BASOPHILS # BLD AUTO: 0.03 K/UL (ref 0–0.2)
BASOPHILS NFR BLD: 0.2 % (ref 0–1.9)
DIFFERENTIAL METHOD BLD: ABNORMAL
EOSINOPHIL # BLD AUTO: 0.3 K/UL (ref 0–0.5)
EOSINOPHIL NFR BLD: 2.5 % (ref 0–8)
ERYTHROCYTE [DISTWIDTH] IN BLOOD BY AUTOMATED COUNT: 16.3 % (ref 11.5–14.5)
HCT VFR BLD AUTO: 28.9 % (ref 40–54)
HGB BLD-MCNC: 9.1 G/DL (ref 14–18)
IMM GRANULOCYTES # BLD AUTO: 0.07 K/UL (ref 0–0.04)
IMM GRANULOCYTES NFR BLD AUTO: 0.5 % (ref 0–0.5)
LYMPHOCYTES # BLD AUTO: 1 K/UL (ref 1–4.8)
LYMPHOCYTES NFR BLD: 7.6 % (ref 18–48)
MCH RBC QN AUTO: 26.1 PG (ref 27–31)
MCHC RBC AUTO-ENTMCNC: 31.5 G/DL (ref 32–36)
MCV RBC AUTO: 83 FL (ref 82–98)
MONOCYTES # BLD AUTO: 0.6 K/UL (ref 0.3–1)
MONOCYTES NFR BLD: 4.4 % (ref 4–15)
NEUTROPHILS # BLD AUTO: 10.9 K/UL (ref 1.8–7.7)
NEUTROPHILS NFR BLD: 84.8 % (ref 38–73)
NRBC BLD-RTO: 0 /100 WBC
PLATELET # BLD AUTO: 272 K/UL (ref 150–450)
PMV BLD AUTO: 9.9 FL (ref 9.2–12.9)
RBC # BLD AUTO: 3.48 M/UL (ref 4.6–6.2)
WBC # BLD AUTO: 12.8 K/UL (ref 3.9–12.7)

## 2024-04-10 PROCEDURE — 94761 N-INVAS EAR/PLS OXIMETRY MLT: CPT

## 2024-04-10 PROCEDURE — 97530 THERAPEUTIC ACTIVITIES: CPT | Mod: CQ

## 2024-04-10 PROCEDURE — 63600175 PHARM REV CODE 636 W HCPCS: Performed by: SURGERY

## 2024-04-10 PROCEDURE — 25000003 PHARM REV CODE 250: Performed by: STUDENT IN AN ORGANIZED HEALTH CARE EDUCATION/TRAINING PROGRAM

## 2024-04-10 PROCEDURE — 85025 COMPLETE CBC W/AUTO DIFF WBC: CPT | Performed by: SURGERY

## 2024-04-10 PROCEDURE — 85730 THROMBOPLASTIN TIME PARTIAL: CPT | Performed by: SURGERY

## 2024-04-10 PROCEDURE — 94640 AIRWAY INHALATION TREATMENT: CPT

## 2024-04-10 PROCEDURE — 36415 COLL VENOUS BLD VENIPUNCTURE: CPT | Performed by: SURGERY

## 2024-04-10 PROCEDURE — 21400001 HC TELEMETRY ROOM

## 2024-04-10 PROCEDURE — S4991 NICOTINE PATCH NONLEGEND: HCPCS | Performed by: STUDENT IN AN ORGANIZED HEALTH CARE EDUCATION/TRAINING PROGRAM

## 2024-04-10 PROCEDURE — 25000242 PHARM REV CODE 250 ALT 637 W/ HCPCS: Performed by: STUDENT IN AN ORGANIZED HEALTH CARE EDUCATION/TRAINING PROGRAM

## 2024-04-10 PROCEDURE — 25000003 PHARM REV CODE 250: Performed by: SURGERY

## 2024-04-10 PROCEDURE — 25000003 PHARM REV CODE 250: Performed by: COLON & RECTAL SURGERY

## 2024-04-10 PROCEDURE — 85730 THROMBOPLASTIN TIME PARTIAL: CPT | Mod: 91 | Performed by: HOSPITALIST

## 2024-04-10 PROCEDURE — 36415 COLL VENOUS BLD VENIPUNCTURE: CPT | Mod: XB | Performed by: HOSPITALIST

## 2024-04-10 RX ORDER — BISACODYL 5 MG
10 TABLET, DELAYED RELEASE (ENTERIC COATED) ORAL ONCE
Status: COMPLETED | OUTPATIENT
Start: 2024-04-10 | End: 2024-04-10

## 2024-04-10 RX ADMIN — ALPRAZOLAM 1 MG: 1 TABLET ORAL at 07:04

## 2024-04-10 RX ADMIN — OXYCODONE HYDROCHLORIDE 10 MG: 5 TABLET ORAL at 12:04

## 2024-04-10 RX ADMIN — BISACODYL 10 MG: 5 TABLET, COATED ORAL at 04:04

## 2024-04-10 RX ADMIN — BUDESONIDE 0.5 MG: 0.5 INHALANT ORAL at 07:04

## 2024-04-10 RX ADMIN — FAMOTIDINE 20 MG: 10 INJECTION, SOLUTION INTRAVENOUS at 08:04

## 2024-04-10 RX ADMIN — ARFORMOTEROL TARTRATE 15 MCG: 15 SOLUTION RESPIRATORY (INHALATION) at 07:04

## 2024-04-10 RX ADMIN — HEPARIN SODIUM 11 UNITS/KG/HR: 10000 INJECTION, SOLUTION INTRAVENOUS at 07:04

## 2024-04-10 RX ADMIN — SODIUM CHLORIDE, POTASSIUM CHLORIDE, SODIUM LACTATE AND CALCIUM CHLORIDE: 600; 310; 30; 20 INJECTION, SOLUTION INTRAVENOUS at 01:04

## 2024-04-10 RX ADMIN — NICOTINE 1 PATCH: 21 PATCH, EXTENDED RELEASE TRANSDERMAL at 08:04

## 2024-04-10 RX ADMIN — CHLORHEXIDINE GLUCONATE 0.12% ORAL RINSE 10 ML: 1.2 LIQUID ORAL at 08:04

## 2024-04-10 RX ADMIN — OXYCODONE HYDROCHLORIDE 10 MG: 5 TABLET ORAL at 07:04

## 2024-04-10 RX ADMIN — PREGABALIN 75 MG: 75 CAPSULE ORAL at 08:04

## 2024-04-10 RX ADMIN — ALPRAZOLAM 1 MG: 1 TABLET ORAL at 12:04

## 2024-04-10 RX ADMIN — LEVOTHYROXINE SODIUM 125 MCG: 125 TABLET ORAL at 05:04

## 2024-04-10 RX ADMIN — TAMSULOSIN HYDROCHLORIDE 0.4 MG: 0.4 CAPSULE ORAL at 08:04

## 2024-04-10 RX ADMIN — ESCITALOPRAM OXALATE 20 MG: 10 TABLET ORAL at 08:04

## 2024-04-10 NOTE — PROGRESS NOTES
[facility]  General Surgery  Progress Note    Subjective:     History of Present Illness:  68-year-old male with a known locally advanced and metastatic bladder cancer who underwent a CT urogram today which was concerning for a bowel obstruction.      Most of the history is obtained from the patient's wife.  She states that he had some purulent drainage around his nephrostomy tube.  He was seen by Urology earlier today and they communicated with Infectious Disease.  He underwent a CT urogram that was consistent with a bowel obstruction as well as advancement of his bladder cancer.  Surgery was consulted because of a bowel obstruction.      According to his wife he has had some abdominal discomfort and nausea and vomiting.      He was followed here by Oncology and been treated with chemotherapy.  The last plan was to treat him with the following:    /9/2024 -  Chemotherapy     Treatment Summary   Plan Name: OP pembrolizumab 200mg Q3W  Treatment Goal: Palliative  Status: Active  Start Date: 2/9/2024 (Planned)  End Date: 1/23/2026 (Planned)     The patient also has a history of a DVT with a pulmonary embolism that is treated with Eliquis.  He also has a history of a lung lesion that was treated with radiation.      Surgery was asked to see him because the bowel obstruction.      The patient appears cachectic.  He answers some questions but most information is from his wife.  They proceed palliative care consult.  They are trying to returned to [place] where they have more support.    Post-Op Info:  Procedure(s) (LRB):  LAPAROTOMY, EXPLORATORY (N/A)  BLOCK, TRANSVERSUS ABDOMINIS PLANE (N/A)  ENTEROENTEROSTOMY (N/A)   5 Days Post-Op     Interval History:  Feels he was less abdominal pain.  For with moving.  No additional episodes of abdominal cramping or bloating.  She was tolerating his diet and having bowel movement    Medications:  Continuous Infusions:   heparin (porcine) in D5W 11 Units/kg/hr  (04/10/24 0715)    lactated ringers 80 mL/hr at 04/10/24 0133     Scheduled Meds:   arformoteroL  15 mcg Nebulization BID    budesonide  0.5 mg Nebulization Q12H    chlorhexidine  10 mL Mouth/Throat BID    EScitalopram oxalate  20 mg Oral Daily    famotidine (PF)  20 mg Intravenous Q12H    levothyroxine  125 mcg Oral Before breakfast    mupirocin   Topical (Top) Daily    nicotine  1 patch Transdermal Daily    pregabalin  75 mg Oral QHS    tamsulosin  1 capsule Oral Daily     PRN Meds:sodium chloride 0.9%, acetaminophen, ALPRAZolam, cyclobenzaprine, dextrose 10%, dextrose 10%, diphenhydrAMINE, glucagon (human recombinant), HYDROmorphone, levalbuterol, naloxone, ondansetron, oxyCODONE, oxyCODONE, prochlorperazine, sodium chloride 0.9%     Review of patient's allergies indicates:  No Known Allergies  Objective:     Vital Signs (Most Recent):  Temp: 98 °F (36.7 °C) (04/10/24 1342)  Pulse: 106 (04/10/24 1342)  Resp: 18 (04/10/24 1342)  BP: (!) 107/56 (04/10/24 1342)  SpO2: (!) 92 % (04/10/24 1342) Vital Signs (24h Range):  Temp:  [97.7 °F (36.5 °C)-99.7 °F (37.6 °C)] 98 °F (36.7 °C)  Pulse:  [] 106  Resp:  [15-20] 18  SpO2:  [92 %-95 %] 92 %  BP: (107-165)/(56-86) 107/56     Weight: 68.3 kg (150 lb 9.2 oz)  Body mass index is 22.89 kg/m².    Intake/Output - Last 3 Shifts         04/08 0700  04/09 0659 04/09 0700  04/10 0659 04/10 0700  04/11 0659    I.V. (mL/kg) 958.2 (14.1)      IV Piggyback       Total Intake(mL/kg) 958.2 (14.1)      Urine (mL/kg/hr) 2450 (1.5) 1825 (1.1) 750 (1.5)    Stool 0      Total Output 2450 1825 750    Net -1491.8 -1825 -750           Stool Occurrence 0 x               Physical Exam  Vitals and nursing note reviewed.   Constitutional:       General: He is not in acute distress.     Appearance: He is well-developed. Ill appearance: Chronically, mildly acute.   HENT:      Head: Normocephalic and atraumatic.   Eyes:      General: No scleral icterus.     Pupils: Pupils are equal, round, and  reactive to light.   Neck:      Thyroid: No thyromegaly.      Vascular: No JVD.      Trachea: No tracheal deviation.   Cardiovascular:      Rate and Rhythm: Normal rate and regular rhythm.      Heart sounds: Normal heart sounds.   Pulmonary:      Effort: Pulmonary effort is normal.      Breath sounds: Normal breath sounds.   Abdominal:      General: Bowel sounds are normal. There is no distension (mild).      Palpations: Abdomen is soft. There is no mass.      Tenderness: There is abdominal tenderness (incisional). There is no guarding or rebound.      Comments: Incision is clean   Genitourinary:     Comments: Bilateral nephrostomy tube  Musculoskeletal:         General: Normal range of motion.      Cervical back: Normal range of motion and neck supple.      Right lower leg: No edema.      Left lower leg: No edema.   Lymphadenopathy:      Cervical: No cervical adenopathy.   Skin:     General: Skin is warm and dry.   Neurological:      Mental Status: He is alert and oriented to person, place, and time.   Psychiatric:         Mood and Affect: Mood normal.         Behavior: Behavior normal.         Thought Content: Thought content normal.         Judgment: Judgment normal.          Significant Labs:  I have reviewed all pertinent lab results within the past 24 hours.  CBC:   Recent Labs   Lab 04/10/24  0433   WBC 12.80*   RBC 3.48*   HGB 9.1*   HCT 28.9*      MCV 83   MCH 26.1*   MCHC 31.5*     BMP:   Recent Labs   Lab 04/06/24  0627         K 4.7   *   CO2 23   BUN 17   CREATININE 1.2   CALCIUM 8.9   MG 1.6     Coagulation:   Recent Labs   Lab 04/03/24  1845 04/04/24  0426 04/10/24  0433   LABPROT 12.5  --   --    INR 1.1  --   --    APTT 38.4*   < > 38.9*    < > = values in this interval not displayed.       Significant Diagnostics:  I have reviewed all pertinent imaging results/findings within the past 24 hours.  Abdominal     CLINICAL HISTORY:  Evaluated bowel distention, status post  ileocolonic anastomosis for intestines bivalve due to obstruction due to metastatic bladder cancer;     COMPARISON:  CT abdomen pelvis April 3, 2024.     FINDINGS:  Bilateral nephrostomy tubes.  Midline surgical staples.  Central venous catheter is present.  A large volume of stool in the ascending colon.  Transverse colon is gas-filled and prominent.  Intra-abdominal free air is probably related to recent open surgery.     Impression:     Intra-abdominal free air is probably related to recent abdominal surgery; clinical correlation recommended.  Prominent gas-filled transverse colon may represent colonic ileus.        Electronically signed by: Ihsan Rob  Date:                                            04/10/2024  Time:                                           12:38  Assessment/Plan:     * Partial intestinal obstruction  Now s/p exlap, ileal-transverse anastomosis bypass on 4/5/24    - tolerating full liquids, no additional abdominal cramping, reports bowel  Continue full liquids   Mild elevation of white blood cell count  - OOB, ambulation  - IV and PO pain control  DVT prophylaxis with IV heparin with conversion to Eliquis per hospital    Abdominal x-ray was reviewed.  Small amount of free air is not unexpected 5 days after surgery.  That he has a ileo transverse colon anastomosis    If the patient has worsening abdominal pain, fever or rising white blood cell count I would obtain a CT scan of his abdomen pelvis with oral contrast tomorrow to evaluate potential leak.  At this point I have a low suspicion.    Acute deep vein thrombosis (DVT) of femoral vein of left lower extremity  Convert from heparin to Eliquis per hospital Medicine    Hypothyroidism  Synthroid orally    Malignant neoplasm of urinary bladder  Care per primary team    Nephrostomy tube displaced  Care per primary team    Centrilobular emphysema  Care per primary team    Squamous cell carcinoma of right lung  Care per primary  team    Anemia  Care per primary team        Silvestre Ramos MD  General Surgery  [facility] - Telemetry ([facility])

## 2024-04-10 NOTE — SUBJECTIVE & OBJECTIVE
Interval History:  Feels he was less abdominal pain.  For with moving.  No additional episodes of abdominal cramping or bloating.  She was tolerating his diet and having bowel movement    Medications:  Continuous Infusions:   heparin (porcine) in D5W 11 Units/kg/hr (04/10/24 0715)    lactated ringers 80 mL/hr at 04/10/24 0133     Scheduled Meds:   arformoteroL  15 mcg Nebulization BID    budesonide  0.5 mg Nebulization Q12H    chlorhexidine  10 mL Mouth/Throat BID    EScitalopram oxalate  20 mg Oral Daily    famotidine (PF)  20 mg Intravenous Q12H    levothyroxine  125 mcg Oral Before breakfast    mupirocin   Topical (Top) Daily    nicotine  1 patch Transdermal Daily    pregabalin  75 mg Oral QHS    tamsulosin  1 capsule Oral Daily     PRN Meds:sodium chloride 0.9%, acetaminophen, ALPRAZolam, cyclobenzaprine, dextrose 10%, dextrose 10%, diphenhydrAMINE, glucagon (human recombinant), HYDROmorphone, levalbuterol, naloxone, ondansetron, oxyCODONE, oxyCODONE, prochlorperazine, sodium chloride 0.9%     Review of patient's allergies indicates:  No Known Allergies  Objective:     Vital Signs (Most Recent):  Temp: 98 °F (36.7 °C) (04/10/24 1342)  Pulse: 106 (04/10/24 1342)  Resp: 18 (04/10/24 1342)  BP: (!) 107/56 (04/10/24 1342)  SpO2: (!) 92 % (04/10/24 1342) Vital Signs (24h Range):  Temp:  [97.7 °F (36.5 °C)-99.7 °F (37.6 °C)] 98 °F (36.7 °C)  Pulse:  [] 106  Resp:  [15-20] 18  SpO2:  [92 %-95 %] 92 %  BP: (107-165)/(56-86) 107/56     Weight: 68.3 kg (150 lb 9.2 oz)  Body mass index is 22.89 kg/m².    Intake/Output - Last 3 Shifts         04/08 0700  04/09 0659 04/09 0700  04/10 0659 04/10 0700  04/11 0659    I.V. (mL/kg) 958.2 (14.1)      IV Piggyback       Total Intake(mL/kg) 958.2 (14.1)      Urine (mL/kg/hr) 2450 (1.5) 1825 (1.1) 750 (1.5)    Stool 0      Total Output 2450 1825 750    Net -1491.8 -1825 -750           Stool Occurrence 0 x               Physical Exam  Vitals and nursing note reviewed.    Constitutional:       General: He is not in acute distress.     Appearance: He is well-developed. Ill appearance: Chronically, mildly acute.   HENT:      Head: Normocephalic and atraumatic.   Eyes:      General: No scleral icterus.     Pupils: Pupils are equal, round, and reactive to light.   Neck:      Thyroid: No thyromegaly.      Vascular: No JVD.      Trachea: No tracheal deviation.   Cardiovascular:      Rate and Rhythm: Normal rate and regular rhythm.      Heart sounds: Normal heart sounds.   Pulmonary:      Effort: Pulmonary effort is normal.      Breath sounds: Normal breath sounds.   Abdominal:      General: Bowel sounds are normal. There is no distension (mild).      Palpations: Abdomen is soft. There is no mass.      Tenderness: There is abdominal tenderness (incisional). There is no guarding or rebound.      Comments: Incision is clean   Genitourinary:     Comments: Bilateral nephrostomy tube  Musculoskeletal:         General: Normal range of motion.      Cervical back: Normal range of motion and neck supple.      Right lower leg: No edema.      Left lower leg: No edema.   Lymphadenopathy:      Cervical: No cervical adenopathy.   Skin:     General: Skin is warm and dry.   Neurological:      Mental Status: He is alert and oriented to person, place, and time.   Psychiatric:         Mood and Affect: Mood normal.         Behavior: Behavior normal.         Thought Content: Thought content normal.         Judgment: Judgment normal.          Significant Labs:  I have reviewed all pertinent lab results within the past 24 hours.  CBC:   Recent Labs   Lab 04/10/24  0433   WBC 12.80*   RBC 3.48*   HGB 9.1*   HCT 28.9*      MCV 83   MCH 26.1*   MCHC 31.5*     BMP:   Recent Labs   Lab 04/06/24  0627         K 4.7   *   CO2 23   BUN 17   CREATININE 1.2   CALCIUM 8.9   MG 1.6     Coagulation:   Recent Labs   Lab 04/03/24  1845 04/04/24  0426 04/10/24  0433   LABPROT 12.5  --   --    INR 1.1   --   --    APTT 38.4*   < > 38.9*    < > = values in this interval not displayed.       Significant Diagnostics:  I have reviewed all pertinent imaging results/findings within the past 24 hours.  Abdominal     CLINICAL HISTORY:  Evaluated bowel distention, status post ileocolonic anastomosis for intestines bivalve due to obstruction due to metastatic bladder cancer;     COMPARISON:  CT abdomen pelvis April 3, 2024.     FINDINGS:  Bilateral nephrostomy tubes.  Midline surgical staples.  Central venous catheter is present.  A large volume of stool in the ascending colon.  Transverse colon is gas-filled and prominent.  Intra-abdominal free air is probably related to recent open surgery.     Impression:     Intra-abdominal free air is probably related to recent abdominal surgery; clinical correlation recommended.  Prominent gas-filled transverse colon may represent colonic ileus.        Electronically signed by: Ihsan Rob  Date:                                            04/10/2024  Time:                                           12:38

## 2024-04-10 NOTE — PROGRESS NOTES
[facility]  Progress Note    Patient Name: Geovani Currie  MRN: [id number]  Patient Class: IP- Inpatient   Admission Date: 4/3/2024  Length of Stay: 7 days  Attending Physician: Juan Carlos Parra MD  Primary Care Provider: Faizan Antoine MD        Subjective:     Principal Problem:Partial intestinal obstruction        HPI:  68 y.o. male patient with a PMHx of COPD, HTN, Lung Cancer, CKD, DVT, metastatic bladder cancer s/p nephrostomy tube placement. Presented to the Emergency Department for generalized abdominal pain, onset several days PTA. Pt was referred to the ED by Dr. Sr (Interventional Radiology) after CT Urogram showed small bowel obstruction. Pt was scheduled to have his nephrostomy tubes replaced on day of arrival.  Associated sxs include nausea, abdominal distention, and loss of appetite. Patient denies any fever, chills, vomiting, SOB, CP, weakness, numbness, dizziness, headache, and all other sxs at this time. Pt and spouse are from [place], and are trying to return home to set up hospice. He is currently on cefdinir, but is not receiving any cancer treatment. General Surgery consulted, recommended to admit and consult Oncology and Urology. In the ED, vitals: 94/56, 85, 18, 98F, 96% RA. Significant Labs: H/H: 10/32.1, CT abd/pel: findings consistent SBO, Interval worsening of bladder cancer. Patient is a full code. Admitted to Hospital Medicine for management of Small Bowel Obstruction.     Overview/Hospital Course:  04/04/2024  CT scan shows shows interval worsening of locally invasive bladder cancer with rectal involvement and small bowel obstruction. SBFT pending. Oncology evaluated, strongly recommends DNR order to be placed. Both specialists suggest palliative vs hospice discussion. Palliative care consulted to assist.      04/05/2024  Came to discuss case with family and patient this morning one final time prior to their surgery. Went into  "meticulous detail regarding their wishes and goals at this encounter. Patient and family at bedside are of the understanding that the purpose of this pending surgery is to allow patient the ability to tolerate PO palliative immunotherapy. I also emphasized the fact that none of these interventions are curative in any way and that we are only establishing a path to allow further palliative measures. They seemed to acknowledge this fact as well. We discussed risks of surgery and both patient & family voiced their understanding and acceptance of these risks. Family then reiterated their desire to "stabilize" patient enough for him to get back to [place], where the patient (to my understanding) may or may not proceed with hospice care.      Will maintain Full Code in the perioperative period, but family seems amenable to transitioning to DNR at some point in the postoperative stage. Once stable, disposition will compose of likely discharge home with HH, NP at home and/or home based palliative care services in the interim while awaiting his move back to [place]. Discussed case with Palliative Care. Will consult their teams in the postoperative period if needed.      04/06/2024  POD#1, seems to have tolerated surgery well. Complains of breakthrough pain on current regimen.      04/08/2024  Diet advanced by surgical team. Patient tolerating it well. Will restart some of home medications today.     04/09/2024  Patient tolerated liquid diet but developed abdominal cramping after eating yogurt. Pain controlled on current regimen. Patient noted to be tachy during and following PT earlier today but was denying any chest pain or shortness of breath and currently back to NSR.    04/10/2024  Patient tolerating liquid diet but still complaining of some abdominal cramping.  Patient seen by surgery and continued on liquid diet for the time being.      Interval History:  Patient seen lying in bed in no acute distress upon " entering room with spouse at bedside.  Patient tolerated liquid diet however still complaining of abdominal cramping.  Spouse at bedside still complaining that his right nephrostomy tube continues to leak and is asking if it can be replacement/exchanged.        Review of Systems   All other systems reviewed and are negative.    Objective:     Vital Signs (Most Recent):  Temp: 98 °F (36.7 °C) (04/10/24 1342)  Pulse: 106 (04/10/24 1342)  Resp: 18 (04/10/24 1342)  BP: (!) 107/56 (04/10/24 1342)  SpO2: (!) 92 % (04/10/24 1342) Vital Signs (24h Range):  Temp:  [97.7 °F (36.5 °C)-99.7 °F (37.6 °C)] 98 °F (36.7 °C)  Pulse:  [] 106  Resp:  [15-20] 18  SpO2:  [92 %-95 %] 92 %  BP: (107-165)/(56-86) 107/56     Weight: 68.3 kg (150 lb 9.2 oz)  Body mass index is 22.89 kg/m².    Intake/Output Summary (Last 24 hours) at 4/10/2024 1640  Last data filed at 4/10/2024 1636  Gross per 24 hour   Intake --   Output 2375 ml   Net -2375 ml         Physical Exam  Vitals reviewed.   Constitutional:       General: He is not in acute distress.     Appearance: Normal appearance. He is obese. He is not ill-appearing, toxic-appearing or diaphoretic.   HENT:      Head: Normocephalic and atraumatic.      Right Ear: External ear normal.      Left Ear: External ear normal.      Nose: Nose normal. No congestion or rhinorrhea.      Mouth/Throat:      Mouth: Mucous membranes are moist.      Pharynx: Oropharynx is clear. No oropharyngeal exudate or posterior oropharyngeal erythema.   Eyes:      General: No scleral icterus.     Extraocular Movements: Extraocular movements intact.      Conjunctiva/sclera: Conjunctivae normal.      Pupils: Pupils are equal, round, and reactive to light.   Neck:      Vascular: No carotid bruit.   Cardiovascular:      Rate and Rhythm: Normal rate and regular rhythm.      Pulses: Normal pulses.      Heart sounds: Normal heart sounds. No murmur heard.     No friction rub. No gallop.   Pulmonary:      Effort: Pulmonary  effort is normal. No respiratory distress.      Breath sounds: Normal breath sounds. No stridor. No wheezing, rhonchi or rales.   Chest:      Chest wall: No tenderness.   Abdominal:      General: Abdomen is flat. Bowel sounds are normal. There is distension.      Palpations: Abdomen is soft. There is no mass.      Tenderness: There is abdominal tenderness. There is no guarding or rebound.      Hernia: No hernia is present.      Comments: Mildly distended with mild TTP throughout. Surgical incision and staples intact without evidence of bleeding, drainage, dehiscence, or signs of infection noted.    Musculoskeletal:         General: No swelling, tenderness, deformity or signs of injury. Normal range of motion.      Cervical back: Normal range of motion and neck supple. No rigidity or tenderness.      Comments: Bilateral nephrotomy tubes present. Right nephrotomy tube leaking from cap site.     Lymphadenopathy:      Cervical: No cervical adenopathy.   Skin:     General: Skin is warm and dry.      Capillary Refill: Capillary refill takes less than 2 seconds.      Coloration: Skin is not jaundiced or pale.      Findings: No bruising, erythema, lesion or rash.      Comments: Surgical scar with staples noted and well appearing as mentioned in abdominal section    Neurological:      General: No focal deficit present.      Mental Status: He is alert.      Cranial Nerves: No cranial nerve deficit.      Sensory: No sensory deficit.      Motor: No weakness.      Coordination: Coordination normal.      Comments: Awake and alert, following commands and answering questions however, still intermittently confused.   Psychiatric:         Mood and Affect: Mood normal.         Behavior: Behavior normal.         Thought Content: Thought content normal.         Judgment: Judgment normal.             Significant Labs: All pertinent labs within the past 24 hours have been reviewed.    Significant Imaging: I have reviewed all pertinent  imaging results/findings within the past 24 hours.    LABS:  Recent Results (from the past 24 hour(s))   APTT    Collection Time: 04/10/24  4:33 AM   Result Value Ref Range    aPTT 38.9 (H) 21.0 - 32.0 sec   CBC auto differential    Collection Time: 04/10/24  4:33 AM   Result Value Ref Range    WBC 12.80 (H) 3.90 - 12.70 K/uL    RBC 3.48 (L) 4.60 - 6.20 M/uL    Hemoglobin 9.1 (L) 14.0 - 18.0 g/dL    Hematocrit 28.9 (L) 40.0 - 54.0 %    MCV 83 82 - 98 fL    MCH 26.1 (L) 27.0 - 31.0 pg    MCHC 31.5 (L) 32.0 - 36.0 g/dL    RDW 16.3 (H) 11.5 - 14.5 %    Platelets 272 150 - 450 K/uL    MPV 9.9 9.2 - 12.9 fL    Immature Granulocytes 0.5 0.0 - 0.5 %    Gran # (ANC) 10.9 (H) 1.8 - 7.7 K/uL    Immature Grans (Abs) 0.07 (H) 0.00 - 0.04 K/uL    Lymph # 1.0 1.0 - 4.8 K/uL    Mono # 0.6 0.3 - 1.0 K/uL    Eos # 0.3 0.0 - 0.5 K/uL    Baso # 0.03 0.00 - 0.20 K/uL    nRBC 0 0 /100 WBC    Gran % 84.8 (H) 38.0 - 73.0 %    Lymph % 7.6 (L) 18.0 - 48.0 %    Mono % 4.4 4.0 - 15.0 %    Eosinophil % 2.5 0.0 - 8.0 %    Basophil % 0.2 0.0 - 1.9 %    Differential Method Automated    APTT    Collection Time: 04/10/24  1:35 PM   Result Value Ref Range    aPTT 43.8 (H) 21.0 - 32.0 sec       RADIOLOGY  X-Ray Abdomen Flat And Erect    Result Date: 4/10/2024  EXAMINATION: XR ABDOMEN FLAT AND ERECT CLINICAL HISTORY: Evaluated bowel distention, status post ileocolonic anastomosis for intestines bivalve due to obstruction due to metastatic bladder cancer; COMPARISON: CT abdomen pelvis April 3, 2024. FINDINGS: Bilateral nephrostomy tubes.  Midline surgical staples.  Central venous catheter is present.  A large volume of stool in the ascending colon.  Transverse colon is gas-filled and prominent.  Intra-abdominal free air is probably related to recent open surgery.     Intra-abdominal free air is probably related to recent abdominal surgery; clinical correlation recommended.  Prominent gas-filled transverse colon may represent colonic ileus.  Electronically signed by: Ihsan Rob Date:    04/10/2024 Time:    12:38    X-Ray Chest AP Portable    Result Date: 4/5/2024  EXAMINATION: XR CHEST AP PORTABLE CLINICAL HISTORY: Central line placement; TECHNIQUE: Single frontal portable view of the chest was performed. COMPARISON: 03/11/2024 FINDINGS: Right central line in apparent good position distal tip expected SVC.  No convincing pneumothorax.  Lungs unchanged     Satisfactory line placement radiograph Electronically signed by: Le Calixto Date:    04/05/2024 Time:    19:54    X-Ray Abdomen Flat And Erect    Result Date: 4/5/2024  EXAMINATION: XR ABDOMEN FLAT AND ERECT CLINICAL HISTORY: Follow up after small-bowel follow-through; Unspecified intestinal obstruction, unspecified as to partial versus complete obstruction TECHNIQUE: Single frontal view of the chest was performed. COMPARISON: 04/03/2024 FINDINGS: Bilateral nephrostomy tubes are noted.  Prominent small bowel loops are seen throughout the abdomen without definite transition and measure up to 4.7 cm.  Contrast is seen within the large bowel.  Findings consistent with ileus.  In comparison to the prior study, there is no adverse interval changes     In comparison to the prior study, there is no adverse interval changes.  Recommend continued surveillance Electronically signed by: Oleg Sr MD Date:    04/05/2024 Time:    09:20    XR Small Bowel Follow Through    Result Date: 4/4/2024  EXAMINATION: XR SMALL BOWEL FOLLOW THROUGH CLINICAL HISTORY: Gastrografin.  Evaluate for complete or partial obstruction; TECHNIQUE: Supine imaging provided to 7-1/2 hour COMPARISON: CT correlation FINDINGS: Small bowel is distended with delayed transit time     Findings consistent with distal small bowel obstruction Electronically signed by: Le Calixto Date:    04/04/2024 Time:    19:49    X-Ray Abdomen Flat And Erect    Result Date: 4/3/2024  EXAMINATION: XR ABDOMEN FLAT AND ERECT CLINICAL HISTORY:  Unspecified intestinal obstruction, unspecified as to partial versus complete obstruction TECHNIQUE: Routine exam. COMPARISON: 03/11/2024 FINDINGS: Increasing air-filled loops of small bowel throughout the mid upper abdomen.  Maximum diameter approximately 4 cm.Large amount stool right colon No other changes.  Again noted are bilateral nephrostomy catheters.     Increasing small bowel distension mid upper abdomen indicating partial small bowel obstruction. Electronically signed by: Elmo Garza MD Date:    04/03/2024 Time:    15:59    CT Urogram Abd Pelvis W WO    Result Date: 4/3/2024  EXAM: CT UROGRAM ABD PELVIS W WO CLINICAL HISTORY: Renal mass, bladder cancer FINDINGS:  Pre and postcontrast images were obtained. Comparison CT abdomen pelvis 02/03/2024. Discoid atelectasis at the right base.  No suspicious lung nodules. No liver masses.  The spleen is unremarkable.  Pancreas is unremarkable gallbladder is unremarkable. Adrenal glands appear normal. There is atherosclerosis of the abdominal aorta no significant aneurysm.  There are several prominent left retroperitoneal lymph nodes measure up to 1.5 cm concerning for lymph node metastasis. There are bilateral nephrostomy tubes.  There are small renal cysts bilaterally.  No renal masses.  Small atrophic left kidney.  No evidence of hydronephrosis. Extensive abnormality of the bladder with focal calcifications along the left posterior lateral bladder wall at the base.  There is diffuse bladder wall thickening and abnormal enhancement.  This extends to the pelvic sidewall bilaterally.  Bladder mass is noted posteriorly which appears to extend and involve the rectum with extension into the presacral space.  There is abnormal thickening of the presacral fat which has increased since previous exam measuring up to 2 cm in thickness.  There is extension into the sacrum on the left image 143 of series 6.  Additionally, there is interval development of small bowel  obstruction which appears be related local extension to involve a small bowel loop in the pelvis.  Small bowel loops are distended up to 4 cm. No distal bone metastasis.      Interval worsening of locally invasive bladder cancer extension to the pelvic sidewall and the presacral fat and sacrum on the left.  There is involvement of the rectum as well as well as involvement of a small bowel loop in the pelvis causing a small bowel obstruction. There is left retroperitoneal lymphadenopathy. Finalized on: 4/3/2024 11:33 AM By:  Rahul Tobias MD [facility]# [id number]      2024-04-03 11:[id number]    [facility]    X-Ray Abdomen AP 1 View (KUB)    Result Date: 3/11/2024  EXAMINATION: XR ABDOMEN AP 1 VIEW CLINICAL HISTORY: Encounter for attention to other artificial openings of urinary tract TECHNIQUE: AP View(s) of the abdomen was performed. COMPARISON: None FINDINGS: Bilateral nephrostomy tubes in place.  Position of the right nephrostomy tube minimally changed. Bowel gas pattern appears unremarkable.     No definite acute abnormality. Electronically signed by: Mark Cruz Date:    03/11/2024 Time:    19:54    CT Head Without Contrast    Result Date: 3/11/2024  EXAMINATION: CT HEAD WITHOUT CONTRAST CLINICAL HISTORY: Head trauma, minor (Age >= 65y); TECHNIQUE: Low dose axial CT images obtained throughout the head without intravenous contrast. Sagittal and coronal reconstructions were performed. COMPARISON: Prior FINDINGS: Atrophy and chronic white matter changes.  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 No extra-axial blood or fluid collections. No parenchymal mass, hemorrhage, edema or major vascular distribution infarct. Skull/extracranial contents (limited evaluation): No fracture. Mastoid air cells and paranasal sinuses are essentially clear.     No acute abnormality.  Similar findings to prior exam.  Chronic changes.  Atrophy and chronic white matter changes All CT scans   are performed using dose optimization  techniques including the following: automated exposure control; adjustment of the mA and/or kV; use of iterative reconstruction technique.  Dose modulation was employed for ALARA by means of: Automated exposure control; adjustment of the mA and/or kV according to patient size (this includes techniques or standardized protocols for targeted exams where dose is matched to indication/reason for exam; i.e. extremities or head); and/or use of iterative reconstructive technique. Electronically signed by: Jefferson Kunz Date:    03/11/2024 Time:    18:48    X-Ray Chest AP Portable    Result Date: 3/11/2024  EXAMINATION: XR CHEST AP PORTABLE CLINICAL HISTORY: Sepsis; TECHNIQUE: Single frontal view of the chest was performed. COMPARISON: None FINDINGS: The lungs are clear, with normal appearance of pulmonary vasculature and no pleural effusion or pneumothorax. The cardiac silhouette is normal in size. The hilar and mediastinal contours are unremarkable. Bones are intact.     No acute abnormality. Electronically signed by: Jefferson Kunz Date:    03/11/2024 Time:    18:43      EKG    MICROBIOLOGY    [facility]      Assessment/Plan:      * Partial intestinal obstruction  Secondary to bladder cancer, progression noted on CT  Plan:  -Oncology and Urology consulted   -General Surgery following    04/04/2024  -SBFT ordered for this morning  -follow up surgery recommendations  -hospice discussion on going (as discussed below)     04/05/2024  -Exploratory laparotomy planned for today with either resection of the small bowel with planned anastomosis or bypass of the small bowel to itself or to the colon to relieve the obstruction.   -will follow up postoperatively     04/06/2024  -s/p exlap, ileal-transverse anastomosis bypass on 4/5/24   -tolerated surgery well, but still with some breakthrough pain  -rest of management per surgery     04/07/2024  -pain controlled on current regimen  -will attempt to wean IV meds as tolerated  -diet advanced  to CLD by surgery  -maintain IV fluids for now  -rest of management per their team    04/09/24  -pain controlled but developed abdominal cramping with dairy products this am. Placed back on CLD per surgery    04/10/24   -pain controlled. Continued on CLD per surgery      Acute deep vein thrombosis (DVT) of femoral vein of left lower extremity  New diagnosis, managed with Eliquis as OP  Plan:  -Hold Eliquis for now  -Heparin per nomogram      Nephrostomy tube displaced  Patient seen and evaluated by urology during admission by Dr. Worley and discussion held regarding exchange by IR or keeping current tubes as they are function properly and not needing emergent replacement. Patient and spouse agreed to keep current tubes and f/u with Dr. Jorgensen on 4/11/24. Wound care/nephrostomy tube dressing recommendation noted below per urology prior to signing off.   Plan:  -Continue daily mupirocin 2% ointment to apply to the skin at the nephrostomy tube sites with his daily cleaning and dressing changes.  -IV antibiotic discontinued by general surgery      Anemia  Patient's anemia is currently controlled. Has not received any PRBCs to date. 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      Centrilobular emphysema  Chronic, well controlled.  Plan:  -Continue home meds  -Incentive spirometry   -Duo-nebs prn  -Titrate oxygen therapy as needed      Hypothyroidism  Patient has chronic hypothyroidism. TFTs reviewed-   Lab Results   Component Value Date    TSH 0.630 02/23/2024   Plan:  -Will continue chronic levothyroxine and adjust for and clinical changes      Malignant neoplasm of urinary bladder  Known hx of bladder cancer, CT abd/pel: Interval worsening of locally invasive bladder cancer  "extension to the pelvic sidewall and the presacral fat and sacrum on the left. There is involvement of the rectum as well as well as involvement of a small bowel loop in the pelvis causing a small bowel obstruction.   Plan:    04/04/2024  -Urology evaluated: "I cannot offer any surgical intervention for his advanced bladder cancer at this point. I would recommend hospice, which patient's wife appears open to."  -Oncology evaluated: "Strongly recommend that patient have a do not resuscitate order be placed on chart and be seen by palliative care if not already done so."  -Palliative care consulted, follow up recommendations      04/05/2024  Came to discuss case with family and patient this morning one final time prior to their surgery. Went into meticulous detail regarding their wishes and goals at this encounter. Patient and family at bedside are of the understanding that the purpose of this pending surgery is to allow patient the ability to tolerate PO palliative immunotherapy. I also emphasized the fact that none of these interventions are curative in any way and that we are only establishing a path to allow further palliative measures. They seemed to acknowledge this fact as well. We discussed risks of surgery and both patient & family voiced their understanding and acceptance of these risks. Family then reiterated their desire to "stabilize" patient enough for him to get back to [place], where the patient (to my understanding) may or may not proceed with hospice care.      Will maintain Full Code in the perioperative period, but family seems amenable to transitioning to DNR at some point in the postoperative stage. Once stable, disposition will compose of likely discharge home with HH, NP at home and/or home based palliative care services in the interim while awaiting his move back to [place]. Discussed case with Palliative Care. Will consult their teams in the postoperative period if needed. -Confirmed " with patient and family at bedside that patient will be a full code during this hospitalization  -orders placed, discussion duration: < 3 minutes      Squamous cell carcinoma of right lung  Followed by oncology, treated with Radiation, therapy completed  Plan:  -f/u with oncology as directed         VTE Risk Mitigation (From admission, onward)           Ordered     heparin 25,000 units in dextrose 5% 250 ml (100 units/mL) infusion MINIMAL INTENSITY nomogram - [facility]  Continuous        Question:  Begin at (units/kg/hr)  Answer:  12 04/05/24 2007     heparin 25,000 units in dextrose 5% 250 mL (100 units/mL) infusion LOW INTENSITY nomogram - [facility]  Continuous        Question:  Begin at (units/kg/hr)  Answer:  12 04/04/24 1920     Reason for No Pharmacological VTE Prophylaxis  Once        Question:  Reasons:  Answer:  Risk of Bleeding    04/03/24 1724     IP VTE HIGH RISK PATIENT  Once         04/03/24 1724     Place sequential compression device  Until discontinued         04/03/24 1724                    Discharge Planning   TANIA:      Code Status: Full Code   Is the patient medically ready for discharge?:     Reason for patient still in hospital (select all that apply): Patient trending condition, Treatment, Consult recommendations, PT / OT recommendations, and Pending disposition  Discharge Plan A: Home Health        Juan Carlos Parra MD  Department of Hospital Medicine   [facility] - Telemetry ([facility])

## 2024-04-10 NOTE — PT/OT/SLP PROGRESS
Physical Therapy  Treatment    Geovani Currie   MRN: [id number]   Admitting Diagnosis: Partial intestinal obstruction    PT Received On: 04/10/24  PT Start Time: 0715     PT Stop Time: 0740    PT Total Time (min): 25 min       Billable Minutes:  Therapeutic Activity 25    Treatment Type: Treatment  PT/PTA: PTA     Number of PTA visits since last PT visit: 1       General Precautions: Standard, fall  Orthopedic Precautions: N/A  Braces: N/A  Respiratory Status: Room air         Subjective:  Communicated with patient's nurse, Mavis, and completed Epic chart review prior to session.  Patient agreed to PT session with some encouragement from therapist.     Pain/Comfort  Pain Rating 1: 5/10  Location 1: abdomen  Pain Rating Post-Intervention 1: 5/10    Objective:   Patient found with: telemetry, peripheral IV, central line, nephrostomy    Roll R/L x2 trials to change brief: Min A     Supine > sit EOB: Min A     Forward scoot towards EOB: SBA    Seated EOB x 10 min total focusing on increased tolerance to upright position, core stability, trunk control and CV endurance.   Maintained self supported sitting balance with CGA  Maintained unsupported sitting balance with  CGA    STS from EOB > RW: Min A (VC for hand placement)    Stand pivot T/F to chair w/ RW: Min A (VC for safety w/ RW mgmt)    At this time, patient declined to attempt gait training due to feeling unwell.     Reviewed AROM TE to BLE including: hip flex/ext, knee flex/ext, ankle PF/DF  To be completed a minimum of 10 reps for each LE in order to promote return of function, strength and ROM.     Educated patient on importance of increased tolerance to upright position and direct impact on CV endurance and strength. Patient encouraged to sit up in chair/ EOB, for a minimum of 2 consecutive hours, 3x per day. Encouraged patient to perform AROM TE to BLE throughout the day within all available planes of motion. Re enforced importance of utilizing call light to  meet needs in room and not attempt to get up without staff assistance. Patient verbalized understanding and agreed to comply.     AM-PAC 6 CLICK MOBILITY  How much help from another person does this patient currently need?   1 = Unable, Total/Dependent Assistance  2 = A lot, Maximum/Moderate Assistance  3 = A little, Minimum/Contact Guard/Supervision  4 = None, Modified [place]/Independent    Turning over in bed (including adjusting bedclothes, sheets and blankets)?: 3  Sitting down on and standing up from a chair with arms (e.g., wheelchair, bedside commode, etc.): 3  Moving from lying on back to sitting on the side of the bed?: 3  Moving to and from a bed to a chair (including a wheelchair)?: 3  Need to walk in hospital room?: 1 (REF)  Climbing 3-5 steps with a railing?: 1 (NT)  Basic Mobility Total Score: 14    AM-PAC Raw Score CMS G-Code Modifier Level of Impairment Assistance   6 % Total / Unable   7 - 9 CM 80 - 100% Maximal Assist   10 - 14 CL 60 - 80% Moderate Assist   15 - 19 CK 40 - 60% Moderate Assist   20 - 22 CJ 20 - 40% Minimal Assist   23 CI 1-20% SBA / CGA   24 CH 0% Independent/ Mod I     Patient left up in chair with call button in reach, chair alarm on, and visitor present.    Assessment:  Geovani Currie is a 68 y.o. male with a medical diagnosis of Partial intestinal obstruction and presents with overall decline in functional mobility. Patient would continue to benefit from skilled PT to address functional limitations listed below in order to return to PLOF/decrease caregiver burden.     Rehab identified problem list/impairments: weakness, impaired endurance, impaired self care skills, impaired functional mobility, gait instability, impaired balance, impaired cognition, decreased coordination, decreased upper extremity function, decreased lower extremity function, decreased safety awareness, pain, decreased ROM, impaired cardiopulmonary response to activity    Rehab potential is  fair.    Activity tolerance: Fair    Discharge recommendations: Low Intensity Therapy (24/7 SPV & A)      Barriers to discharge:      Equipment recommendations: none     GOALS:   Multidisciplinary Problems       Physical Therapy Goals          Problem: Physical Therapy    Goal Priority Disciplines Outcome Goal Variances Interventions   Physical Therapy Goal     PT, PT/OT      Description: LTG'S TO BE MET IN 14 DAYS (4-23-24)  PT WILL REQUIRE SBA FOR BED MOBILITY  PT WILL REQUIRE CGA FOR BED<>CHAIR TF'S  PT WILL  FEET WITH RW AND CGA  PT WILL INC AMPAC SCORE BY 2 POINTS TO PROGRESS GROSS FUNC MOBILITY                         PLAN:    Patient to be seen 3 x/week to address the above listed problems via gait training, therapeutic activities, therapeutic exercises  Plan of Care expires: 04/23/24  Plan of Care reviewed with: patient, spouse         04/10/2024

## 2024-04-10 NOTE — PLAN OF CARE
Problem: Adult Inpatient Plan of Care  Goal: Plan of Care Review  Outcome: Ongoing, Progressing  Goal: Patient-Specific Goal (Individualized)  Outcome: Ongoing, Progressing  Goal: Absence of Hospital-Acquired Illness or Injury  Outcome: Ongoing, Progressing  Goal: Optimal Comfort and Wellbeing  Outcome: Ongoing, Progressing  Goal: Readiness for Transition of Care  Outcome: Ongoing, Progressing     Problem: Coping Ineffective  Goal: Effective Coping  Outcome: Ongoing, Progressing    Medications given as ordered. Clear liquid diet. Heparin infusing at 11 units/kg. Nephrostomy bag replaced.  Monitoring labs. Will continue plan of care.

## 2024-04-10 NOTE — ASSESSMENT & PLAN NOTE
Now s/p exlap, ileal-transverse anastomosis bypass on 4/5/24    - tolerating full liquids, no additional abdominal cramping, reports bowel  Continue full liquids   Mild elevation of white blood cell count  - OOB, ambulation  - IV and PO pain control  DVT prophylaxis with IV heparin with conversion to Eliquis per hospital    Abdominal x-ray was reviewed.  Small amount of free air is not unexpected 5 days after surgery.  That he has a ileo transverse colon anastomosis    If the patient has worsening abdominal pain, fever or rising white blood cell count I would obtain a CT scan of his abdomen pelvis with oral contrast tomorrow to evaluate potential leak.  At this point I have a low suspicion.

## 2024-04-10 NOTE — SUBJECTIVE & OBJECTIVE
Interval History:  Patient seen lying in bed in no acute distress upon entering room with spouse at bedside.  Patient tolerated liquid diet however still complaining of abdominal cramping.  Spouse at bedside still complaining that his right nephrostomy tube continues to leak and is asking if it can be replacement/exchanged.        Review of Systems   All other systems reviewed and are negative.    Objective:     Vital Signs (Most Recent):  Temp: 98 °F (36.7 °C) (04/10/24 1342)  Pulse: 106 (04/10/24 1342)  Resp: 18 (04/10/24 1342)  BP: (!) 107/56 (04/10/24 1342)  SpO2: (!) 92 % (04/10/24 1342) Vital Signs (24h Range):  Temp:  [97.7 °F (36.5 °C)-99.7 °F (37.6 °C)] 98 °F (36.7 °C)  Pulse:  [] 106  Resp:  [15-20] 18  SpO2:  [92 %-95 %] 92 %  BP: (107-165)/(56-86) 107/56     Weight: 68.3 kg (150 lb 9.2 oz)  Body mass index is 22.89 kg/m².    Intake/Output Summary (Last 24 hours) at 4/10/2024 1640  Last data filed at 4/10/2024 1636  Gross per 24 hour   Intake --   Output 2375 ml   Net -2375 ml         Physical Exam  Vitals reviewed.   Constitutional:       General: He is not in acute distress.     Appearance: Normal appearance. He is obese. He is not ill-appearing, toxic-appearing or diaphoretic.   HENT:      Head: Normocephalic and atraumatic.      Right Ear: External ear normal.      Left Ear: External ear normal.      Nose: Nose normal. No congestion or rhinorrhea.      Mouth/Throat:      Mouth: Mucous membranes are moist.      Pharynx: Oropharynx is clear. No oropharyngeal exudate or posterior oropharyngeal erythema.   Eyes:      General: No scleral icterus.     Extraocular Movements: Extraocular movements intact.      Conjunctiva/sclera: Conjunctivae normal.      Pupils: Pupils are equal, round, and reactive to light.   Neck:      Vascular: No carotid bruit.   Cardiovascular:      Rate and Rhythm: Normal rate and regular rhythm.      Pulses: Normal pulses.      Heart sounds: Normal heart sounds. No murmur  heard.     No friction rub. No gallop.   Pulmonary:      Effort: Pulmonary effort is normal. No respiratory distress.      Breath sounds: Normal breath sounds. No stridor. No wheezing, rhonchi or rales.   Chest:      Chest wall: No tenderness.   Abdominal:      General: Abdomen is flat. Bowel sounds are normal. There is distension.      Palpations: Abdomen is soft. There is no mass.      Tenderness: There is abdominal tenderness. There is no guarding or rebound.      Hernia: No hernia is present.      Comments: Mildly distended with mild TTP throughout. Surgical incision and staples intact without evidence of bleeding, drainage, dehiscence, or signs of infection noted.    Musculoskeletal:         General: No swelling, tenderness, deformity or signs of injury. Normal range of motion.      Cervical back: Normal range of motion and neck supple. No rigidity or tenderness.      Comments: Bilateral nephrotomy tubes present. Right nephrotomy tube leaking from cap site.     Lymphadenopathy:      Cervical: No cervical adenopathy.   Skin:     General: Skin is warm and dry.      Capillary Refill: Capillary refill takes less than 2 seconds.      Coloration: Skin is not jaundiced or pale.      Findings: No bruising, erythema, lesion or rash.      Comments: Surgical scar with staples noted and well appearing as mentioned in abdominal section    Neurological:      General: No focal deficit present.      Mental Status: He is alert.      Cranial Nerves: No cranial nerve deficit.      Sensory: No sensory deficit.      Motor: No weakness.      Coordination: Coordination normal.      Comments: Awake and alert, following commands and answering questions however, still intermittently confused.   Psychiatric:         Mood and Affect: Mood normal.         Behavior: Behavior normal.         Thought Content: Thought content normal.         Judgment: Judgment normal.             Significant Labs: All pertinent labs within the past 24 hours  have been reviewed.    Significant Imaging: I have reviewed all pertinent imaging results/findings within the past 24 hours.    LABS:  Recent Results (from the past 24 hour(s))   APTT    Collection Time: 04/10/24  4:33 AM   Result Value Ref Range    aPTT 38.9 (H) 21.0 - 32.0 sec   CBC auto differential    Collection Time: 04/10/24  4:33 AM   Result Value Ref Range    WBC 12.80 (H) 3.90 - 12.70 K/uL    RBC 3.48 (L) 4.60 - 6.20 M/uL    Hemoglobin 9.1 (L) 14.0 - 18.0 g/dL    Hematocrit 28.9 (L) 40.0 - 54.0 %    MCV 83 82 - 98 fL    MCH 26.1 (L) 27.0 - 31.0 pg    MCHC 31.5 (L) 32.0 - 36.0 g/dL    RDW 16.3 (H) 11.5 - 14.5 %    Platelets 272 150 - 450 K/uL    MPV 9.9 9.2 - 12.9 fL    Immature Granulocytes 0.5 0.0 - 0.5 %    Gran # (ANC) 10.9 (H) 1.8 - 7.7 K/uL    Immature Grans (Abs) 0.07 (H) 0.00 - 0.04 K/uL    Lymph # 1.0 1.0 - 4.8 K/uL    Mono # 0.6 0.3 - 1.0 K/uL    Eos # 0.3 0.0 - 0.5 K/uL    Baso # 0.03 0.00 - 0.20 K/uL    nRBC 0 0 /100 WBC    Gran % 84.8 (H) 38.0 - 73.0 %    Lymph % 7.6 (L) 18.0 - 48.0 %    Mono % 4.4 4.0 - 15.0 %    Eosinophil % 2.5 0.0 - 8.0 %    Basophil % 0.2 0.0 - 1.9 %    Differential Method Automated    APTT    Collection Time: 04/10/24  1:35 PM   Result Value Ref Range    aPTT 43.8 (H) 21.0 - 32.0 sec       RADIOLOGY  X-Ray Abdomen Flat And Erect    Result Date: 4/10/2024  EXAMINATION: XR ABDOMEN FLAT AND ERECT CLINICAL HISTORY: Evaluated bowel distention, status post ileocolonic anastomosis for intestines bivalve due to obstruction due to metastatic bladder cancer; COMPARISON: CT abdomen pelvis April 3, 2024. FINDINGS: Bilateral nephrostomy tubes.  Midline surgical staples.  Central venous catheter is present.  A large volume of stool in the ascending colon.  Transverse colon is gas-filled and prominent.  Intra-abdominal free air is probably related to recent open surgery.     Intra-abdominal free air is probably related to recent abdominal surgery; clinical correlation recommended.   Prominent gas-filled transverse colon may represent colonic ileus. Electronically signed by: Ihsan Rob Date:    04/10/2024 Time:    12:38    X-Ray Chest AP Portable    Result Date: 4/5/2024  EXAMINATION: XR CHEST AP PORTABLE CLINICAL HISTORY: Central line placement; TECHNIQUE: Single frontal portable view of the chest was performed. COMPARISON: 03/11/2024 FINDINGS: Right central line in apparent good position distal tip expected SVC.  No convincing pneumothorax.  Lungs unchanged     Satisfactory line placement radiograph Electronically signed by: Le Calixto Date:    04/05/2024 Time:    19:54    X-Ray Abdomen Flat And Erect    Result Date: 4/5/2024  EXAMINATION: XR ABDOMEN FLAT AND ERECT CLINICAL HISTORY: Follow up after small-bowel follow-through; Unspecified intestinal obstruction, unspecified as to partial versus complete obstruction TECHNIQUE: Single frontal view of the chest was performed. COMPARISON: 04/03/2024 FINDINGS: Bilateral nephrostomy tubes are noted.  Prominent small bowel loops are seen throughout the abdomen without definite transition and measure up to 4.7 cm.  Contrast is seen within the large bowel.  Findings consistent with ileus.  In comparison to the prior study, there is no adverse interval changes     In comparison to the prior study, there is no adverse interval changes.  Recommend continued surveillance Electronically signed by: Oleg Sr MD Date:    04/05/2024 Time:    09:20    XR Small Bowel Follow Through    Result Date: 4/4/2024  EXAMINATION: XR SMALL BOWEL FOLLOW THROUGH CLINICAL HISTORY: Gastrografin.  Evaluate for complete or partial obstruction; TECHNIQUE: Supine imaging provided to 7-1/2 hour COMPARISON: CT correlation FINDINGS: Small bowel is distended with delayed transit time     Findings consistent with distal small bowel obstruction Electronically signed by: Le Calixto Date:    04/04/2024 Time:    19:49    X-Ray Abdomen Flat And Erect    Result Date:  4/3/2024  EXAMINATION: XR ABDOMEN FLAT AND ERECT CLINICAL HISTORY: Unspecified intestinal obstruction, unspecified as to partial versus complete obstruction TECHNIQUE: Routine exam. COMPARISON: 03/11/2024 FINDINGS: Increasing air-filled loops of small bowel throughout the mid upper abdomen.  Maximum diameter approximately 4 cm.Large amount stool right colon No other changes.  Again noted are bilateral nephrostomy catheters.     Increasing small bowel distension mid upper abdomen indicating partial small bowel obstruction. Electronically signed by: Elmo Garza MD Date:    04/03/2024 Time:    15:59    CT Urogram Abd Pelvis W WO    Result Date: 4/3/2024  EXAM: CT UROGRAM ABD PELVIS W WO CLINICAL HISTORY: Renal mass, bladder cancer FINDINGS:  Pre and postcontrast images were obtained. Comparison CT abdomen pelvis 02/03/2024. Discoid atelectasis at the right base.  No suspicious lung nodules. No liver masses.  The spleen is unremarkable.  Pancreas is unremarkable gallbladder is unremarkable. Adrenal glands appear normal. There is atherosclerosis of the abdominal aorta no significant aneurysm.  There are several prominent left retroperitoneal lymph nodes measure up to 1.5 cm concerning for lymph node metastasis. There are bilateral nephrostomy tubes.  There are small renal cysts bilaterally.  No renal masses.  Small atrophic left kidney.  No evidence of hydronephrosis. Extensive abnormality of the bladder with focal calcifications along the left posterior lateral bladder wall at the base.  There is diffuse bladder wall thickening and abnormal enhancement.  This extends to the pelvic sidewall bilaterally.  Bladder mass is noted posteriorly which appears to extend and involve the rectum with extension into the presacral space.  There is abnormal thickening of the presacral fat which has increased since previous exam measuring up to 2 cm in thickness.  There is extension into the sacrum on the left image 143 of series 6.   Additionally, there is interval development of small bowel obstruction which appears be related local extension to involve a small bowel loop in the pelvis.  Small bowel loops are distended up to 4 cm. No distal bone metastasis.      Interval worsening of locally invasive bladder cancer extension to the pelvic sidewall and the presacral fat and sacrum on the left.  There is involvement of the rectum as well as well as involvement of a small bowel loop in the pelvis causing a small bowel obstruction. There is left retroperitoneal lymphadenopathy. Finalized on: 4/3/2024 11:33 AM By:  Rahul Tobias MD [facility]# [id number]      2024-04-03 11:35:[id number]    [facility]    X-Ray Abdomen AP 1 View (KUB)    Result Date: 3/11/2024  EXAMINATION: XR ABDOMEN AP 1 VIEW CLINICAL HISTORY: Encounter for attention to other artificial openings of urinary tract TECHNIQUE: AP View(s) of the abdomen was performed. COMPARISON: None FINDINGS: Bilateral nephrostomy tubes in place.  Position of the right nephrostomy tube minimally changed. Bowel gas pattern appears unremarkable.     No definite acute abnormality. Electronically signed by: Mark Cruz Date:    03/11/2024 Time:    19:54    CT Head Without Contrast    Result Date: 3/11/2024  EXAMINATION: CT HEAD WITHOUT CONTRAST CLINICAL HISTORY: Head trauma, minor (Age >= 65y); TECHNIQUE: Low dose axial CT images obtained throughout the head without intravenous contrast. Sagittal and coronal reconstructions were performed. COMPARISON: Prior FINDINGS: Atrophy and chronic white matter changes.  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 No extra-axial blood or fluid collections. No parenchymal mass, hemorrhage, edema or major vascular distribution infarct. Skull/extracranial contents (limited evaluation): No fracture. Mastoid air cells and paranasal sinuses are essentially clear.     No acute abnormality.  Similar findings to prior exam.  Chronic changes.  Atrophy and chronic white matter changes  All CT scans   are performed using dose optimization techniques including the following: automated exposure control; adjustment of the mA and/or kV; use of iterative reconstruction technique.  Dose modulation was employed for ALARA by means of: Automated exposure control; adjustment of the mA and/or kV according to patient size (this includes techniques or standardized protocols for targeted exams where dose is matched to indication/reason for exam; i.e. extremities or head); and/or use of iterative reconstructive technique. Electronically signed by: Jefferson Kunz Date:    03/11/2024 Time:    18:48    X-Ray Chest AP Portable    Result Date: 3/11/2024  EXAMINATION: XR CHEST AP PORTABLE CLINICAL HISTORY: Sepsis; TECHNIQUE: Single frontal view of the chest was performed. COMPARISON: None FINDINGS: The lungs are clear, with normal appearance of pulmonary vasculature and no pleural effusion or pneumothorax. The cardiac silhouette is normal in size. The hilar and mediastinal contours are unremarkable. Bones are intact.     No acute abnormality. Electronically signed by: Jefferson Kunz Date:    03/11/2024 Time:    18:43      EKG    MICROBIOLOGY    MDM

## 2024-04-10 NOTE — ASSESSMENT & PLAN NOTE
Patient seen and evaluated by urology during admission by Dr. Worley and discussion held regarding exchange by IR or keeping current tubes as they are function properly and not needing emergent replacement. Patient and spouse agreed to keep current tubes and f/u with Dr. Jorgensen on 4/11/24. Wound care/nephrostomy tube dressing recommendation noted below per urology prior to signing off.   Plan:  -Continue daily mupirocin 2% ointment to apply to the skin at the nephrostomy tube sites with his daily cleaning and dressing changes.  -IV antibiotic discontinued by general surgery

## 2024-04-10 NOTE — ASSESSMENT & PLAN NOTE
Secondary to bladder cancer, progression noted on CT  Plan:  -Oncology and Urology consulted   -General Surgery following    04/04/2024  -SBFT ordered for this morning  -follow up surgery recommendations  -hospice discussion on going (as discussed below)     04/05/2024  -Exploratory laparotomy planned for today with either resection of the small bowel with planned anastomosis or bypass of the small bowel to itself or to the colon to relieve the obstruction.   -will follow up postoperatively     04/06/2024  -s/p exlap, ileal-transverse anastomosis bypass on 4/5/24   -tolerated surgery well, but still with some breakthrough pain  -rest of management per surgery     04/07/2024  -pain controlled on current regimen  -will attempt to wean IV meds as tolerated  -diet advanced to [facility]D by surgery  -maintain IV fluids for now  -rest of management per their team    04/09/24  -pain controlled but developed abdominal cramping with dairy products this am. Placed back on CLD per surgery    04/10/24   -pain controlled. Continued on CLD per surgery

## 2024-04-10 NOTE — ASSESSMENT & PLAN NOTE
Patient's anemia is currently controlled. Has not received any PRBCs to date. 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

## 2024-04-11 LAB
ANION GAP SERPL CALC-SCNC: 9 MMOL/L (ref 8–16)
APTT PPP: 44.4 SEC (ref 21–32)
BASOPHILS # BLD AUTO: 0.03 K/UL (ref 0–0.2)
BASOPHILS NFR BLD: 0.3 % (ref 0–1.9)
BUN SERPL-MCNC: 7 MG/DL (ref 8–23)
CALCIUM SERPL-MCNC: 8.6 MG/DL (ref 8.7–10.5)
CHLORIDE SERPL-SCNC: 108 MMOL/L (ref 95–110)
CO2 SERPL-SCNC: 25 MMOL/L (ref 23–29)
CREAT SERPL-MCNC: 0.9 MG/DL (ref 0.5–1.4)
DIFFERENTIAL METHOD BLD: ABNORMAL
EOSINOPHIL # BLD AUTO: 0.3 K/UL (ref 0–0.5)
EOSINOPHIL NFR BLD: 3.2 % (ref 0–8)
ERYTHROCYTE [DISTWIDTH] IN BLOOD BY AUTOMATED COUNT: 16.6 % (ref 11.5–14.5)
EST. GFR  (NO RACE VARIABLE): >60 ML/MIN/1.73 M^2
GLUCOSE SERPL-MCNC: 90 MG/DL (ref 70–110)
HCT VFR BLD AUTO: 28.1 % (ref 40–54)
HGB BLD-MCNC: 8.4 G/DL (ref 14–18)
IMM GRANULOCYTES # BLD AUTO: 0.06 K/UL (ref 0–0.04)
IMM GRANULOCYTES NFR BLD AUTO: 0.6 % (ref 0–0.5)
LYMPHOCYTES # BLD AUTO: 0.9 K/UL (ref 1–4.8)
LYMPHOCYTES NFR BLD: 10.1 % (ref 18–48)
MCH RBC QN AUTO: 25.8 PG (ref 27–31)
MCHC RBC AUTO-ENTMCNC: 29.9 G/DL (ref 32–36)
MCV RBC AUTO: 86 FL (ref 82–98)
MONOCYTES # BLD AUTO: 0.5 K/UL (ref 0.3–1)
MONOCYTES NFR BLD: 5.6 % (ref 4–15)
NEUTROPHILS # BLD AUTO: 7.4 K/UL (ref 1.8–7.7)
NEUTROPHILS NFR BLD: 80.2 % (ref 38–73)
NRBC BLD-RTO: 0 /100 WBC
PLATELET # BLD AUTO: 261 K/UL (ref 150–450)
PMV BLD AUTO: 9.6 FL (ref 9.2–12.9)
POTASSIUM SERPL-SCNC: 3.2 MMOL/L (ref 3.5–5.1)
RBC # BLD AUTO: 3.26 M/UL (ref 4.6–6.2)
SODIUM SERPL-SCNC: 142 MMOL/L (ref 136–145)
WBC # BLD AUTO: 9.28 K/UL (ref 3.9–12.7)

## 2024-04-11 PROCEDURE — 97530 THERAPEUTIC ACTIVITIES: CPT

## 2024-04-11 PROCEDURE — 94761 N-INVAS EAR/PLS OXIMETRY MLT: CPT

## 2024-04-11 PROCEDURE — 25000003 PHARM REV CODE 250: Performed by: COLON & RECTAL SURGERY

## 2024-04-11 PROCEDURE — S4991 NICOTINE PATCH NONLEGEND: HCPCS | Performed by: STUDENT IN AN ORGANIZED HEALTH CARE EDUCATION/TRAINING PROGRAM

## 2024-04-11 PROCEDURE — 97110 THERAPEUTIC EXERCISES: CPT

## 2024-04-11 PROCEDURE — 25000242 PHARM REV CODE 250 ALT 637 W/ HCPCS: Performed by: STUDENT IN AN ORGANIZED HEALTH CARE EDUCATION/TRAINING PROGRAM

## 2024-04-11 PROCEDURE — 25000003 PHARM REV CODE 250: Performed by: SURGERY

## 2024-04-11 PROCEDURE — 85730 THROMBOPLASTIN TIME PARTIAL: CPT | Performed by: SURGERY

## 2024-04-11 PROCEDURE — 21400001 HC TELEMETRY ROOM

## 2024-04-11 PROCEDURE — 80048 BASIC METABOLIC PNL TOTAL CA: CPT | Performed by: SURGERY

## 2024-04-11 PROCEDURE — 94760 N-INVAS EAR/PLS OXIMETRY 1: CPT

## 2024-04-11 PROCEDURE — 94640 AIRWAY INHALATION TREATMENT: CPT

## 2024-04-11 PROCEDURE — 36415 COLL VENOUS BLD VENIPUNCTURE: CPT | Performed by: SURGERY

## 2024-04-11 PROCEDURE — 85025 COMPLETE CBC W/AUTO DIFF WBC: CPT | Performed by: SURGERY

## 2024-04-11 PROCEDURE — 63600175 PHARM REV CODE 636 W HCPCS: Performed by: SURGERY

## 2024-04-11 PROCEDURE — 25000003 PHARM REV CODE 250: Performed by: STUDENT IN AN ORGANIZED HEALTH CARE EDUCATION/TRAINING PROGRAM

## 2024-04-11 PROCEDURE — 94799 UNLISTED PULMONARY SVC/PX: CPT | Mod: XB

## 2024-04-11 RX ADMIN — TAMSULOSIN HYDROCHLORIDE 0.4 MG: 0.4 CAPSULE ORAL at 09:04

## 2024-04-11 RX ADMIN — MUPIROCIN: 20 OINTMENT TOPICAL at 09:04

## 2024-04-11 RX ADMIN — ARFORMOTEROL TARTRATE 15 MCG: 15 SOLUTION RESPIRATORY (INHALATION) at 07:04

## 2024-04-11 RX ADMIN — OXYCODONE HYDROCHLORIDE 10 MG: 5 TABLET ORAL at 09:04

## 2024-04-11 RX ADMIN — SODIUM CHLORIDE, POTASSIUM CHLORIDE, SODIUM LACTATE AND CALCIUM CHLORIDE: 600; 310; 30; 20 INJECTION, SOLUTION INTRAVENOUS at 02:04

## 2024-04-11 RX ADMIN — OXYCODONE HYDROCHLORIDE 10 MG: 5 TABLET ORAL at 12:04

## 2024-04-11 RX ADMIN — PREGABALIN 75 MG: 75 CAPSULE ORAL at 09:04

## 2024-04-11 RX ADMIN — ESCITALOPRAM OXALATE 20 MG: 10 TABLET ORAL at 09:04

## 2024-04-11 RX ADMIN — BUDESONIDE 0.5 MG: 0.5 INHALANT ORAL at 07:04

## 2024-04-11 RX ADMIN — HEPARIN SODIUM 11 UNITS/KG/HR: 10000 INJECTION, SOLUTION INTRAVENOUS at 09:04

## 2024-04-11 RX ADMIN — FAMOTIDINE 20 MG: 10 INJECTION, SOLUTION INTRAVENOUS at 09:04

## 2024-04-11 RX ADMIN — OXYCODONE HYDROCHLORIDE 10 MG: 5 TABLET ORAL at 07:04

## 2024-04-11 RX ADMIN — LEVOTHYROXINE SODIUM 125 MCG: 125 TABLET ORAL at 05:04

## 2024-04-11 RX ADMIN — ALPRAZOLAM 1 MG: 1 TABLET ORAL at 12:04

## 2024-04-11 RX ADMIN — NICOTINE 1 PATCH: 21 PATCH, EXTENDED RELEASE TRANSDERMAL at 09:04

## 2024-04-11 RX ADMIN — ALPRAZOLAM 1 MG: 1 TABLET ORAL at 09:04

## 2024-04-11 NOTE — ASSESSMENT & PLAN NOTE
Patient's anemia is currently controlled. Has not received any PRBCs to date. 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

## 2024-04-11 NOTE — PROGRESS NOTES
[facility]  Progress Note    Patient Name: Geovani Currie  MRN: [id number]  Patient Class: IP- Inpatient   Admission Date: 4/3/2024  Length of Stay: 8 days  Attending Physician: Juan Carlos Parra MD  Primary Care Provider: Faizan Antoine MD        Subjective:     Principal Problem:Partial intestinal obstruction        HPI:  68 y.o. male patient with a PMHx of COPD, HTN, Lung Cancer, CKD, DVT, metastatic bladder cancer s/p nephrostomy tube placement. Presented to the Emergency Department for generalized abdominal pain, onset several days PTA. Pt was referred to the ED by Dr. Sr (Interventional Radiology) after CT Urogram showed small bowel obstruction. Pt was scheduled to have his nephrostomy tubes replaced on day of arrival.  Associated sxs include nausea, abdominal distention, and loss of appetite. Patient denies any fever, chills, vomiting, SOB, CP, weakness, numbness, dizziness, headache, and all other sxs at this time. Pt and spouse are from [place], and are trying to return home to set up hospice. He is currently on cefdinir, but is not receiving any cancer treatment. General Surgery consulted, recommended to admit and consult Oncology and Urology. In the ED, vitals: 94/56, 85, 18, 98F, 96% RA. Significant Labs: H/H: 10/32.1, CT abd/pel: findings consistent SBO, Interval worsening of bladder cancer. Patient is a full code. Admitted to Hospital Medicine for management of Small Bowel Obstruction.     Overview/Hospital Course:  04/04/2024  CT scan shows shows interval worsening of locally invasive bladder cancer with rectal involvement and small bowel obstruction. SBFT pending. Oncology evaluated, strongly recommends DNR order to be placed. Both specialists suggest palliative vs hospice discussion. Palliative care consulted to assist.      04/05/2024  Came to discuss case with family and patient this morning one final time prior to their surgery. Went into  "meticulous detail regarding their wishes and goals at this encounter. Patient and family at bedside are of the understanding that the purpose of this pending surgery is to allow patient the ability to tolerate PO palliative immunotherapy. I also emphasized the fact that none of these interventions are curative in any way and that we are only establishing a path to allow further palliative measures. They seemed to acknowledge this fact as well. We discussed risks of surgery and both patient & family voiced their understanding and acceptance of these risks. Family then reiterated their desire to "stabilize" patient enough for him to get back to [place], where the patient (to my understanding) may or may not proceed with hospice care.      Will maintain Full Code in the perioperative period, but family seems amenable to transitioning to DNR at some point in the postoperative stage. Once stable, disposition will compose of likely discharge home with HH, NP at home and/or home based palliative care services in the interim while awaiting his move back to [place]. Discussed case with Palliative Care. Will consult their teams in the postoperative period if needed.      04/06/2024  POD#1, seems to have tolerated surgery well. Complains of breakthrough pain on current regimen.      04/08/2024  Diet advanced by surgical team. Patient tolerating it well. Will restart some of home medications today.     04/09/2024  Patient tolerated liquid diet but developed abdominal cramping after eating yogurt. Pain controlled on current regimen. Patient noted to be tachy during and following PT earlier today but was denying any chest pain or shortness of breath and currently back to NSR.    04/10/2024  Patient tolerating liquid diet but still complaining of some abdominal cramping.  Patient seen by surgery and continued on liquid diet for the time being.    04/11/2024  Patient advanced to regular diet. Abdominal pain ok this morning. " IVFs stopped to limit volume overload as patient tolerating p.o. intake.       Interval History:  Patient seen lying in bed in no acute distress upon entering room earlier this morning with spouse at bedside.  Patient reported tolerating diet without complications and stated his abdominal pain is doing okay today compared to yesterday.      Review of Systems   All other systems reviewed and are negative.    Objective:     Vital Signs (Most Recent):  Temp: 97.8 °F (36.6 °C) (04/11/24 1135)  Pulse: 103 (04/11/24 1440)  Resp: 16 (04/11/24 1226)  BP: 134/63 (04/11/24 1135)  SpO2: (!) 92 % (04/11/24 0725) Vital Signs (24h Range):  Temp:  [97.8 °F (36.6 °C)-98.9 °F (37.2 °C)] 97.8 °F (36.6 °C)  Pulse:  [] 103  Resp:  [15-20] 16  SpO2:  [91 %-93 %] 92 %  BP: (121-163)/(62-72) 134/63     Weight: 66.7 kg (147 lb 0.8 oz)  Body mass index is 22.36 kg/m².    Intake/Output Summary (Last 24 hours) at 4/11/2024 1559  Last data filed at 4/11/2024 0807  Gross per 24 hour   Intake --   Output 1150 ml   Net -1150 ml         Physical Exam  Vitals reviewed.   Constitutional:       General: He is not in acute distress.     Appearance: Normal appearance. He is obese. He is not ill-appearing, toxic-appearing or diaphoretic.   HENT:      Head: Normocephalic and atraumatic.      Right Ear: External ear normal.      Left Ear: External ear normal.      Nose: Nose normal. No congestion or rhinorrhea.      Mouth/Throat:      Mouth: Mucous membranes are moist.      Pharynx: Oropharynx is clear. No oropharyngeal exudate or posterior oropharyngeal erythema.   Eyes:      General: No scleral icterus.     Extraocular Movements: Extraocular movements intact.      Conjunctiva/sclera: Conjunctivae normal.      Pupils: Pupils are equal, round, and reactive to light.   Neck:      Vascular: No carotid bruit.   Cardiovascular:      Rate and Rhythm: Normal rate and regular rhythm.      Pulses: Normal pulses.      Heart sounds: Normal heart sounds. No  murmur heard.     No friction rub. No gallop.   Pulmonary:      Effort: Pulmonary effort is normal. No respiratory distress.      Breath sounds: Normal breath sounds. No stridor. No wheezing, rhonchi or rales.   Chest:      Chest wall: No tenderness.   Abdominal:      General: Abdomen is flat. Bowel sounds are normal. There is distension.      Palpations: Abdomen is soft. There is no mass.      Tenderness: There is abdominal tenderness. There is no guarding or rebound.      Hernia: No hernia is present.      Comments: Mildly distended with mild TTP throughout but improved from yesterday. Surgical incision and staples intact without evidence of bleeding, drainage, dehiscence, or signs of infection noted.     Musculoskeletal:         General: No swelling, tenderness, deformity or signs of injury. Normal range of motion.      Cervical back: Normal range of motion and neck supple. No rigidity or tenderness.      Comments: Bilateral nephrotomy tubes present. Right nephrotomy tube leaking from cap site.      Lymphadenopathy:      Cervical: No cervical adenopathy.   Skin:     General: Skin is warm and dry.      Capillary Refill: Capillary refill takes less than 2 seconds.      Coloration: Skin is not jaundiced or pale.      Findings: No bruising, erythema, lesion or rash.      Comments: Surgical scar with staples noted and well appearing as mentioned in abdominal section     Neurological:      General: No focal deficit present.      Mental Status: He is alert and oriented to person, place, and time. Mental status is at baseline.      Cranial Nerves: No cranial nerve deficit.      Sensory: No sensory deficit.      Motor: No weakness.      Coordination: Coordination normal.   Psychiatric:         Mood and Affect: Mood normal.         Behavior: Behavior normal.         Thought Content: Thought content normal.         Judgment: Judgment normal.             Significant Labs: All pertinent labs within the past 24 hours have been  reviewed.    Significant Imaging: I have reviewed all pertinent imaging results/findings within the past 24 hours.    LABS:  Recent Results (from the past 24 hour(s))   APTT    Collection Time: 04/10/24  7:29 PM   Result Value Ref Range    aPTT 43.3 (H) 21.0 - 32.0 sec   APTT    Collection Time: 04/11/24  4:42 AM   Result Value Ref Range    aPTT 44.4 (H) 21.0 - 32.0 sec   CBC auto differential    Collection Time: 04/11/24  4:42 AM   Result Value Ref Range    WBC 9.28 3.90 - 12.70 K/uL    RBC 3.26 (L) 4.60 - 6.20 M/uL    Hemoglobin 8.4 (L) 14.0 - 18.0 g/dL    Hematocrit 28.1 (L) 40.0 - 54.0 %    MCV 86 82 - 98 fL    MCH 25.8 (L) 27.0 - 31.0 pg    MCHC 29.9 (L) 32.0 - 36.0 g/dL    RDW 16.6 (H) 11.5 - 14.5 %    Platelets 261 150 - 450 K/uL    MPV 9.6 9.2 - 12.9 fL    Immature Granulocytes 0.6 (H) 0.0 - 0.5 %    Gran # (ANC) 7.4 1.8 - 7.7 K/uL    Immature Grans (Abs) 0.06 (H) 0.00 - 0.04 K/uL    Lymph # 0.9 (L) 1.0 - 4.8 K/uL    Mono # 0.5 0.3 - 1.0 K/uL    Eos # 0.3 0.0 - 0.5 K/uL    Baso # 0.03 0.00 - 0.20 K/uL    nRBC 0 0 /100 WBC    Gran % 80.2 (H) 38.0 - 73.0 %    Lymph % 10.1 (L) 18.0 - 48.0 %    Mono % 5.6 4.0 - 15.0 %    Eosinophil % 3.2 0.0 - 8.0 %    Basophil % 0.3 0.0 - 1.9 %    Differential Method Automated    Basic Metabolic Panel    Collection Time: 04/11/24  4:42 AM   Result Value Ref Range    Sodium 142 136 - 145 mmol/L    Potassium 3.2 (L) 3.5 - 5.1 mmol/L    Chloride 108 95 - 110 mmol/L    CO2 25 23 - 29 mmol/L    Glucose 90 70 - 110 mg/dL    BUN 7 (L) 8 - 23 mg/dL    Creatinine 0.9 0.5 - 1.4 mg/dL    Calcium 8.6 (L) 8.7 - 10.5 mg/dL    Anion Gap 9 8 - 16 mmol/L    eGFR >60 >60 mL/min/1.73 m^2       RADIOLOGY  X-Ray Abdomen Flat And Erect    Result Date: 4/10/2024  EXAMINATION: XR ABDOMEN FLAT AND ERECT CLINICAL HISTORY: Evaluated bowel distention, status post ileocolonic anastomosis for intestines bivalve due to obstruction due to metastatic bladder cancer; COMPARISON: CT abdomen pelvis April 3, 2024.  FINDINGS: Bilateral nephrostomy tubes.  Midline surgical staples.  Central venous catheter is present.  A large volume of stool in the ascending colon.  Transverse colon is gas-filled and prominent.  Intra-abdominal free air is probably related to recent open surgery.     Intra-abdominal free air is probably related to recent abdominal surgery; clinical correlation recommended.  Prominent gas-filled transverse colon may represent colonic ileus. Electronically signed by: Ihsan Rob Date:    04/10/2024 Time:    12:38    X-Ray Chest AP Portable    Result Date: 4/5/2024  EXAMINATION: XR CHEST AP PORTABLE CLINICAL HISTORY: Central line placement; TECHNIQUE: Single frontal portable view of the chest was performed. COMPARISON: 03/11/2024 FINDINGS: Right central line in apparent good position distal tip expected SVC.  No convincing pneumothorax.  Lungs unchanged     Satisfactory line placement radiograph Electronically signed by: Le Calixto Date:    04/05/2024 Time:    19:54    X-Ray Abdomen Flat And Erect    Result Date: 4/5/2024  EXAMINATION: XR ABDOMEN FLAT AND ERECT CLINICAL HISTORY: Follow up after small-bowel follow-through; Unspecified intestinal obstruction, unspecified as to partial versus complete obstruction TECHNIQUE: Single frontal view of the chest was performed. COMPARISON: 04/03/2024 FINDINGS: Bilateral nephrostomy tubes are noted.  Prominent small bowel loops are seen throughout the abdomen without definite transition and measure up to 4.7 cm.  Contrast is seen within the large bowel.  Findings consistent with ileus.  In comparison to the prior study, there is no adverse interval changes     In comparison to the prior study, there is no adverse interval changes.  Recommend continued surveillance Electronically signed by: Oleg Sr MD Date:    04/05/2024 Time:    09:20    XR Small Bowel Follow Through    Result Date: 4/4/2024  EXAMINATION: XR SMALL BOWEL FOLLOW THROUGH CLINICAL HISTORY:  Gastrografin.  Evaluate for complete or partial obstruction; TECHNIQUE: Supine imaging provided to 7-1/2 hour COMPARISON: CT correlation FINDINGS: Small bowel is distended with delayed transit time     Findings consistent with distal small bowel obstruction Electronically signed by: Le Calixto Date:    04/04/2024 Time:    19:49    X-Ray Abdomen Flat And Erect    Result Date: 4/3/2024  EXAMINATION: XR ABDOMEN FLAT AND ERECT CLINICAL HISTORY: Unspecified intestinal obstruction, unspecified as to partial versus complete obstruction TECHNIQUE: Routine exam. COMPARISON: 03/11/2024 FINDINGS: Increasing air-filled loops of small bowel throughout the mid upper abdomen.  Maximum diameter approximately 4 cm.Large amount stool right colon No other changes.  Again noted are bilateral nephrostomy catheters.     Increasing small bowel distension mid upper abdomen indicating partial small bowel obstruction. Electronically signed by: Elmo Garza MD Date:    04/03/2024 Time:    15:59    CT Urogram Abd Pelvis W WO    Result Date: 4/3/2024  EXAM: CT UROGRAM ABD PELVIS W WO CLINICAL HISTORY: Renal mass, bladder cancer FINDINGS:  Pre and postcontrast images were obtained. Comparison CT abdomen pelvis 02/03/2024. Discoid atelectasis at the right base.  No suspicious lung nodules. No liver masses.  The spleen is unremarkable.  Pancreas is unremarkable gallbladder is unremarkable. Adrenal glands appear normal. There is atherosclerosis of the abdominal aorta no significant aneurysm.  There are several prominent left retroperitoneal lymph nodes measure up to 1.5 cm concerning for lymph node metastasis. There are bilateral nephrostomy tubes.  There are small renal cysts bilaterally.  No renal masses.  Small atrophic left kidney.  No evidence of hydronephrosis. Extensive abnormality of the bladder with focal calcifications along the left posterior lateral bladder wall at the base.  There is diffuse bladder wall thickening and  abnormal enhancement.  This extends to the pelvic sidewall bilaterally.  Bladder mass is noted posteriorly which appears to extend and involve the rectum with extension into the presacral space.  There is abnormal thickening of the presacral fat which has increased since previous exam measuring up to 2 cm in thickness.  There is extension into the sacrum on the left image 143 of series 6.  Additionally, there is interval development of small bowel obstruction which appears be related local extension to involve a small bowel loop in the pelvis.  Small bowel loops are distended up to 4 cm. No distal bone metastasis.      Interval worsening of locally invasive bladder cancer extension to the pelvic sidewall and the presacral fat and sacrum on the left.  There is involvement of the rectum as well as well as involvement of a small bowel loop in the pelvis causing a small bowel obstruction. There is left retroperitoneal lymphadenopathy. Finalized on: 4/3/2024 11:33 AM By:  Rahul Tobias MD [facility]# [id number]      2024-04-03 11:35:[id number]    [facility]      EKG    MICROBIOLOGY    MDM      Assessment/Plan:      * Partial intestinal obstruction  Secondary to bladder cancer, progression noted on CT  Plan:  -Oncology and Urology consulted   -General Surgery following    04/04/2024  -SBFT ordered for this morning  -follow up surgery recommendations  -hospice discussion on going (as discussed below)     04/05/2024  -Exploratory laparotomy planned for today with either resection of the small bowel with planned anastomosis or bypass of the small bowel to itself or to the colon to relieve the obstruction.   -will follow up postoperatively     04/06/2024  -s/p exlap, ileal-transverse anastomosis bypass on 4/5/24   -tolerated surgery well, but still with some breakthrough pain  -rest of management per surgery     04/07/2024  -pain controlled on current regimen  -will attempt to wean IV meds as tolerated  -diet advanced to CLD by surgery  -maintain  IV fluids for now  -rest of management per their team    04/09/24  -pain controlled but developed abdominal cramping with dairy products this am. Placed back on CLD per surgery    04/10/24   -pain controlled. Continued on CLD per surgery    04/11/24  -pain controlled. Advanced to regular diet. IVF's discontinued.       Acute deep vein thrombosis (DVT) of femoral vein of left lower extremity  New diagnosis, managed with Eliquis as OP  Plan:  -Hold Eliquis for now  -Heparin per nomogram      Nephrostomy tube displaced  Patient seen and evaluated by urology during admission by Dr. Worley and discussion held regarding exchange by IR or keeping current tubes as they are function properly and not needing emergent replacement. Patient and spouse agreed to keep current tubes and f/u with Dr. Jorgensen on 4/11/24. Wound care/nephrostomy tube dressing recommendation noted below per urology prior to signing off.   Plan:  -Continue daily mupirocin 2% ointment to apply to the skin at the nephrostomy tube sites with his daily cleaning and dressing changes.  -IV antibiotic discontinued by general surgery  -f/u outpatient as directed      Anemia  Patient's anemia is currently controlled. Has not received any PRBCs to date. 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      Centrilobular emphysema  Chronic, well controlled.  Plan:  -Continue home meds  -Incentive spirometry   -Duo-nebs prn  -Titrate oxygen therapy as needed      Hypothyroidism  Patient has chronic hypothyroidism. TFTs reviewed-   Lab Results   Component Value Date    TSH 0.630 02/23/2024   Plan:  -Will continue chronic levothyroxine and adjust for and clinical changes      Malignant neoplasm of urinary bladder  Known hx of bladder  "cancer, CT abd/pel: Interval worsening of locally invasive bladder cancer extension to the pelvic sidewall and the presacral fat and sacrum on the left. There is involvement of the rectum as well as well as involvement of a small bowel loop in the pelvis causing a small bowel obstruction.   Plan:    04/04/2024  -Urology evaluated: "I cannot offer any surgical intervention for his advanced bladder cancer at this point. I would recommend hospice, which patient's wife appears open to."  -Oncology evaluated: "Strongly recommend that patient have a do not resuscitate order be placed on chart and be seen by palliative care if not already done so."  -Palliative care consulted, follow up recommendations      04/05/2024  Came to discuss case with family and patient this morning one final time prior to their surgery. Went into meticulous detail regarding their wishes and goals at this encounter. Patient and family at bedside are of the understanding that the purpose of this pending surgery is to allow patient the ability to tolerate PO palliative immunotherapy. I also emphasized the fact that none of these interventions are curative in any way and that we are only establishing a path to allow further palliative measures. They seemed to acknowledge this fact as well. We discussed risks of surgery and both patient & family voiced their understanding and acceptance of these risks. Family then reiterated their desire to "stabilize" patient enough for him to get back to [place], where the patient (to my understanding) may or may not proceed with hospice care.      Will maintain Full Code in the perioperative period, but family seems amenable to transitioning to DNR at some point in the postoperative stage. Once stable, disposition will compose of likely discharge home with HH, NP at home and/or home based palliative care services in the interim while awaiting his move back to [place]. Discussed case with Palliative Care. " Will consult their teams in the postoperative period if needed. -Confirmed with patient and family at bedside that patient will be a full code during this hospitalization  -orders placed, discussion duration: < 3 minutes      Squamous cell carcinoma of right lung  Followed by oncology, treated with Radiation, therapy completed  Plan:  -f/u with oncology as directed         VTE Risk Mitigation (From admission, onward)           Ordered     heparin 25,000 units in dextrose 5% 250 ml (100 units/mL) infusion MINIMAL INTENSITY nomogram - [facility]  Continuous        Question:  Begin at (units/kg/hr)  Answer:  12 04/05/24 2007     heparin 25,000 units in dextrose 5% 250 mL (100 units/mL) infusion LOW INTENSITY nomogram - [facility]  Continuous        Question:  Begin at (units/kg/hr)  Answer:  12 04/04/24 1920     Reason for No Pharmacological VTE Prophylaxis  Once        Question:  Reasons:  Answer:  Risk of Bleeding    04/03/24 1724     IP VTE HIGH RISK PATIENT  Once         04/03/24 1724     Place sequential compression device  Until discontinued         04/03/24 1724                    Discharge Planning   TANIA:      Code Status: Full Code   Is the patient medically ready for discharge?:     Reason for patient still in hospital (select all that apply): Patient trending condition, Treatment, Consult recommendations, PT / OT recommendations, and Pending disposition  Discharge Plan A: Home Health        Juan Carlos Parra MD  Department of Hospital Medicine   [facility] ([facility])

## 2024-04-11 NOTE — ASSESSMENT & PLAN NOTE
New diagnosis, managed with Eliquis as OP  Plan:  -Hold Eliquis for now  -Heparin per nomogram     Detail Level: Zone

## 2024-04-11 NOTE — ASSESSMENT & PLAN NOTE
Secondary to bladder cancer, progression noted on CT  Plan:  -Oncology and Urology consulted   -General Surgery following    04/04/2024  -SBFT ordered for this morning  -follow up surgery recommendations  -hospice discussion on going (as discussed below)     04/05/2024  -Exploratory laparotomy planned for today with either resection of the small bowel with planned anastomosis or bypass of the small bowel to itself or to the colon to relieve the obstruction.   -will follow up postoperatively     04/06/2024  -s/p exlap, ileal-transverse anastomosis bypass on 4/5/24   -tolerated surgery well, but still with some breakthrough pain  -rest of management per surgery     04/07/2024  -pain controlled on current regimen  -will attempt to wean IV meds as tolerated  -diet advanced to CLD by surgery  -maintain IV fluids for now  -rest of management per their team    04/09/24  -pain controlled but developed abdominal cramping with dairy products this am. Placed back on CLD per surgery    04/10/24   -pain controlled. Continued on CLD per surgery    04/11/24  -pain controlled. Advanced to regular diet. IVF's discontinued.

## 2024-04-11 NOTE — ASSESSMENT & PLAN NOTE
Now s/p exlap, ileal-transverse anastomosis bypass on 4/5/24    - advanced to a regular diet Continue full liquids   White blood cell count is normalized- OOB, ambulation  - IV and PO pain control  Stop IV fluids if he has good oral intake    Conversion from heparin to Eliquis per hospital Medicine    As patient has clinically improved and his white count is normalized the for any additional imaging/CT scans

## 2024-04-11 NOTE — DISCHARGE INSTRUCTIONS
At home there has no lifting more than 20 lb for 4 weeks.      It is okay to shower and get everything wet.    Please call for any fever, increase in pain, nausea or vomiting or redness or drainage from incision(s).    No driving until seen in follow-up    I would recommend taking Colace or a stool softener daily.  I would also take Glycolax or MiraLax on any days where you had no bowel movements for the previous 24 hours.  If you go for 48 hours without having a bowel movement I would take a double dose of the Glycolax or MiraLax.      Our office phone numbers are  [phone] and     Our office fax  number is #[phone]

## 2024-04-11 NOTE — PT/OT/SLP PROGRESS
Physical Therapy  Treatment    Geovani Currie   MRN: [id number]   Admitting Diagnosis: Partial intestinal obstruction    PT Received On: 04/11/24  PT Start Time: 0900     PT Stop Time: 0925    PT Total Time (min): 25 min       Billable Minutes:  Therapeutic Activity 15 and Therapeutic Exercise 10    Treatment Type: Treatment  PT/PTA: PT     Number of PTA visits since last PT visit: 0       General Precautions: Standard, fall  Orthopedic Precautions: N/A  Braces: N/A  Respiratory Status: Room air         Subjective:  Communicated with EPIC CHART REVIEW  prior to session.   PT AGREED TO TX     Pain/Comfort  Pain Rating 1: 4/10  Location 1: abdomen  Pain Addressed 1: Cessation of Activity  Pain Rating Post-Intervention 1: 4/10    Objective:   Patient found with: peripheral IV, telemetry, nephrostomy, central line    Functional Mobility:  PT MET IN RM SUP IN BED AGREED TO TX. PT SUP>SIT EOB WITH SBA AND HOB ELEVATED. PT SCOOTED TO EOB AND GT. BELT AND  SOCKS DONNED PRIOR TO OOB MOBILITY.  PT STOOD WITH RW AND COMPLETED STAND PIVOT T/F TO CHAIR WITH FF POSTURE AND CGA. PT DECLINED GT TRAINING THIS AM AND REPORTED HE DOESN'T WALK MUCH AT HOME. HE REPORTED HE OFTEN JUST USES WC. PT SEATED IN CHAIR FOR OOB TOLERANCE     Treatment and Education:  PT COMPLETED 10 REPS OF AP, TKE AND MIP WITH EMPHASIS ON LE STRENGTHENING. PT NOTED TO HAVE IN TREMORS IN LE D/T INC FATIGUE.  PT LEFT SEATED IN CHAIR AND SET UP TRAY TABLE AND LET WITH ALL NEEDS MET     AM-PAC 6 CLICK MOBILITY  How much help from another person does this patient currently need?   1 = Unable, Total/Dependent Assistance  2 = A lot, Maximum/Moderate Assistance  3 = A little, Minimum/Contact Guard/Supervision  4 = None, Modified [place]/Independent    Turning over in bed (including adjusting bedclothes, sheets and blankets)?: 4  Sitting down on and standing up from a chair with arms (e.g., wheelchair, bedside commode, etc.): 4  Moving from lying on back to  sitting on the side of the bed?: 4  Moving to and from a bed to a chair (including a wheelchair)?: 3  Need to walk in hospital room?: 3  Climbing 3-5 steps with a railing?: 1  Basic Mobility Total Score: 19    AM-PAC Raw Score CMS G-Code Modifier Level of Impairment Assistance   6 % Total / Unable   7 - 9 CM 80 - 100% Maximal Assist   10 - 14 CL 60 - 80% Moderate Assist   15 - 19 CK 40 - 60% Moderate Assist   20 - 22 CJ 20 - 40% Minimal Assist   23 CI 1-20% SBA / CGA   24 CH 0% Independent/ Mod I     Patient left up in chair with call button in reach, chair alarm on, and WIFE present.    Assessment:  PT GAYLE TX WITH INC FATIGUE.     Rehab identified problem list/impairments: weakness, impaired endurance, impaired self care skills, impaired functional mobility, gait instability, impaired balance, pain, decreased lower extremity function, decreased upper extremity function, decreased ROM    Rehab potential is good.    Activity tolerance: Fair    Discharge recommendations: Low Intensity Therapy      Barriers to discharge:      Equipment recommendations: none     GOALS:   Multidisciplinary Problems       Physical Therapy Goals          Problem: Physical Therapy    Goal Priority Disciplines Outcome Goal Variances Interventions   Physical Therapy Goal     PT, PT/OT Ongoing, Progressing     Description: LTG'S TO BE MET IN 14 DAYS (4-23-24)  PT WILL REQUIRE SBA FOR BED MOBILITY  PT WILL REQUIRE CGA FOR BED<>CHAIR TF'S  PT WILL  FEET WITH RW AND CGA  PT WILL INC AMPAC SCORE BY 2 POINTS TO PROGRESS GROSS FUNC MOBILITY                         PLAN:    Patient to be seen 3 x/week to address the above listed problems via gait training, therapeutic activities, therapeutic exercises  Plan of Care expires: 04/23/24  Plan of Care reviewed with: patient, spouse         04/11/2024

## 2024-04-11 NOTE — PROGRESS NOTES
[facility]  General Surgery  Progress Note    Subjective:     History of Present Illness:  68-year-old male with a known locally advanced and metastatic bladder cancer who underwent a CT urogram today which was concerning for a bowel obstruction.      Most of the history is obtained from the patient's wife.  She states that he had some purulent drainage around his nephrostomy tube.  He was seen by Urology earlier today and they communicated with Infectious Disease.  He underwent a CT urogram that was consistent with a bowel obstruction as well as advancement of his bladder cancer.  Surgery was consulted because of a bowel obstruction.      According to his wife he has had some abdominal discomfort and nausea and vomiting.      He was followed here by Oncology and been treated with chemotherapy.  The last plan was to treat him with the following:    /9/2024 -  Chemotherapy     Treatment Summary   Plan Name: OP pembrolizumab 200mg Q3W  Treatment Goal: Palliative  Status: Active  Start Date: 2/9/2024 (Planned)  End Date: 1/23/2026 (Planned)     The patient also has a history of a DVT with a pulmonary embolism that is treated with Eliquis.  He also has a history of a lung lesion that was treated with radiation.      Surgery was asked to see him because the bowel obstruction.      The patient appears cachectic.  He answers some questions but most information is from his wife.  They proceed palliative care consult.  They are trying to returned to [place] where they have more support.    Post-Op Info:  Procedure(s) (LRB):  LAPAROTOMY, EXPLORATORY (N/A)  BLOCK, TRANSVERSUS ABDOMINIS PLANE (N/A)  ENTEROENTEROSTOMY (N/A)   6 Days Post-Op     Interval History:  Full liquid diet he repair improved abdominal pain.  He has no nausea.  He was having bowel movements.      We will advance to a regular diet.  Can be converted to oral anticoagulation per hospital Medicine    Medications:  Continuous Infusions:    heparin (porcine) in D5W 11 Units/kg/hr (04/10/24 0715)    lactated ringers 80 mL/hr at 04/11/24 0233     Scheduled Meds:   arformoteroL  15 mcg Nebulization BID    budesonide  0.5 mg Nebulization Q12H    EScitalopram oxalate  20 mg Oral Daily    famotidine (PF)  20 mg Intravenous Q12H    levothyroxine  125 mcg Oral Before breakfast    mupirocin   Topical (Top) Daily    nicotine  1 patch Transdermal Daily    pregabalin  75 mg Oral QHS    tamsulosin  1 capsule Oral Daily     PRN Meds:sodium chloride 0.9%, acetaminophen, ALPRAZolam, cyclobenzaprine, dextrose 10%, dextrose 10%, diphenhydrAMINE, glucagon (human recombinant), HYDROmorphone, levalbuterol, naloxone, ondansetron, oxyCODONE, oxyCODONE, prochlorperazine, sodium chloride 0.9%     Review of patient's allergies indicates:  No Known Allergies  Objective:     Vital Signs (Most Recent):  Temp: 98.1 °F (36.7 °C) (04/11/24 0725)  Pulse: 103 (04/11/24 0725)  Resp: 16 (04/11/24 0730)  BP: 133/62 (04/11/24 0725)  SpO2: (!) 92 % (04/11/24 0725) Vital Signs (24h Range):  Temp:  [98 °F (36.7 °C)-98.9 °F (37.2 °C)] 98.1 °F (36.7 °C)  Pulse:  [] 103  Resp:  [15-20] 16  SpO2:  [91 %-95 %] 92 %  BP: (107-165)/(56-72) 133/62     Weight: 66.7 kg (147 lb 0.8 oz)  Body mass index is 22.36 kg/m².    Intake/Output - Last 3 Shifts         04/09 0700  04/10 0659 04/10 0700  04/11 0659 04/11 0700  04/12 0659    I.V. (mL/kg)       Total Intake(mL/kg)       Urine (mL/kg/hr) 1825 (1.1) 1500 (0.9) 400 (4.5)    Stool  0     Total Output 1825 1500 400    Net -1825 -1500 -400           Stool Occurrence  1 x              Physical Exam  Constitutional:       General: He is not in acute distress.     Appearance: He is well-developed. He is ill-appearing (chronically, minimally acute).   HENT:      Head: Normocephalic and atraumatic.   Eyes:      General: No scleral icterus.     Pupils: Pupils are equal, round, and reactive to light.   Neck:      Thyroid: No thyromegaly.      Vascular: No  JVD.      Trachea: No tracheal deviation.   Cardiovascular:      Rate and Rhythm: Normal rate and regular rhythm.      Heart sounds: Normal heart sounds.   Pulmonary:      Effort: Pulmonary effort is normal.      Breath sounds: Normal breath sounds.   Abdominal:      General: Abdomen is flat. Bowel sounds are normal. There is no distension.      Palpations: Abdomen is soft. There is no mass.      Tenderness: There is no abdominal tenderness. There is no guarding or rebound.      Comments: Incision is clean   Musculoskeletal:         General: Normal range of motion.      Cervical back: Normal range of motion and neck supple.      Right lower leg: No edema.      Left lower leg: No edema.   Lymphadenopathy:      Cervical: No cervical adenopathy.   Skin:     General: Skin is warm and dry.   Neurological:      Mental Status: He is alert and oriented to person, place, and time.   Psychiatric:         Mood and Affect: Mood normal.         Behavior: Behavior normal.         Thought Content: Thought content normal.         Judgment: Judgment normal.          Significant Labs:  I have reviewed all pertinent lab results within the past 24 hours.  CBC:   Recent Labs   Lab 04/11/24  0442   WBC 9.28   RBC 3.26*   HGB 8.4*   HCT 28.1*      MCV 86   MCH 25.8*   MCHC 29.9*     BMP:   Recent Labs   Lab 04/06/24  0627 04/11/24  0442    90    142   K 4.7 3.2*   * 108   CO2 23 25   BUN 17 7*   CREATININE 1.2 0.9   CALCIUM 8.9 8.6*   MG 1.6  --      Coagulation:   Recent Labs   Lab 04/11/24  0442   APTT 44.4*       Significant Diagnostics:  I have reviewed all pertinent imaging results/findings within the past 24 hours.  None  Assessment/Plan:     * Partial intestinal obstruction  Now s/p exlap, ileal-transverse anastomosis bypass on 4/5/24    - advanced to a regular diet Continue full liquids   White blood cell count is normalized- OOB, ambulation  - IV and PO pain control  Stop IV fluids if he has good oral  intake    Conversion from heparin to Eliquis per hospital Medicine    As patient has clinically improved and his white count is normalized the for any additional imaging/CT scans    Acute deep vein thrombosis (DVT) of femoral vein of left lower extremity  Convert from heparin to Eliquis per hospital Medicine    Hypothyroidism  Synthroid orally    Malignant neoplasm of urinary bladder  Care per primary team    Nephrostomy tube displaced  Care per primary team    Centrilobular emphysema  Care per primary team    Squamous cell carcinoma of right lung  Care per primary team    Anemia  Care per primary team        Silvestre Ramos MD  General Surgery  [facility] - Telemetry ([facility])

## 2024-04-11 NOTE — SUBJECTIVE & OBJECTIVE
Interval History:  Patient seen lying in bed in no acute distress upon entering room earlier this morning with spouse at bedside.  Patient reported tolerating diet without complications and stated his abdominal pain is doing okay today compared to yesterday.      Review of Systems   All other systems reviewed and are negative.    Objective:     Vital Signs (Most Recent):  Temp: 97.8 °F (36.6 °C) (04/11/24 1135)  Pulse: 103 (04/11/24 1440)  Resp: 16 (04/11/24 1226)  BP: 134/63 (04/11/24 1135)  SpO2: (!) 92 % (04/11/24 0725) Vital Signs (24h Range):  Temp:  [97.8 °F (36.6 °C)-98.9 °F (37.2 °C)] 97.8 °F (36.6 °C)  Pulse:  [] 103  Resp:  [15-20] 16  SpO2:  [91 %-93 %] 92 %  BP: (121-163)/(62-72) 134/63     Weight: 66.7 kg (147 lb 0.8 oz)  Body mass index is 22.36 kg/m².    Intake/Output Summary (Last 24 hours) at 4/11/2024 1559  Last data filed at 4/11/2024 0807  Gross per 24 hour   Intake --   Output 1150 ml   Net -1150 ml         Physical Exam  Vitals reviewed.   Constitutional:       General: He is not in acute distress.     Appearance: Normal appearance. He is obese. He is not ill-appearing, toxic-appearing or diaphoretic.   HENT:      Head: Normocephalic and atraumatic.      Right Ear: External ear normal.      Left Ear: External ear normal.      Nose: Nose normal. No congestion or rhinorrhea.      Mouth/Throat:      Mouth: Mucous membranes are moist.      Pharynx: Oropharynx is clear. No oropharyngeal exudate or posterior oropharyngeal erythema.   Eyes:      General: No scleral icterus.     Extraocular Movements: Extraocular movements intact.      Conjunctiva/sclera: Conjunctivae normal.      Pupils: Pupils are equal, round, and reactive to light.   Neck:      Vascular: No carotid bruit.   Cardiovascular:      Rate and Rhythm: Normal rate and regular rhythm.      Pulses: Normal pulses.      Heart sounds: Normal heart sounds. No murmur heard.     No friction rub. No gallop.   Pulmonary:      Effort: Pulmonary  effort is normal. No respiratory distress.      Breath sounds: Normal breath sounds. No stridor. No wheezing, rhonchi or rales.   Chest:      Chest wall: No tenderness.   Abdominal:      General: Abdomen is flat. Bowel sounds are normal. There is distension.      Palpations: Abdomen is soft. There is no mass.      Tenderness: There is abdominal tenderness. There is no guarding or rebound.      Hernia: No hernia is present.      Comments: Mildly distended with mild TTP throughout but improved from yesterday. Surgical incision and staples intact without evidence of bleeding, drainage, dehiscence, or signs of infection noted.     Musculoskeletal:         General: No swelling, tenderness, deformity or signs of injury. Normal range of motion.      Cervical back: Normal range of motion and neck supple. No rigidity or tenderness.      Comments: Bilateral nephrotomy tubes present. Right nephrotomy tube leaking from cap site.      Lymphadenopathy:      Cervical: No cervical adenopathy.   Skin:     General: Skin is warm and dry.      Capillary Refill: Capillary refill takes less than 2 seconds.      Coloration: Skin is not jaundiced or pale.      Findings: No bruising, erythema, lesion or rash.      Comments: Surgical scar with staples noted and well appearing as mentioned in abdominal section     Neurological:      General: No focal deficit present.      Mental Status: He is alert and oriented to person, place, and time. Mental status is at baseline.      Cranial Nerves: No cranial nerve deficit.      Sensory: No sensory deficit.      Motor: No weakness.      Coordination: Coordination normal.   Psychiatric:         Mood and Affect: Mood normal.         Behavior: Behavior normal.         Thought Content: Thought content normal.         Judgment: Judgment normal.             Significant Labs: All pertinent labs within the past 24 hours have been reviewed.    Significant Imaging: I have reviewed all pertinent imaging  results/findings within the past 24 hours.    LABS:  Recent Results (from the past 24 hour(s))   APTT    Collection Time: 04/10/24  7:29 PM   Result Value Ref Range    aPTT 43.3 (H) 21.0 - 32.0 sec   APTT    Collection Time: 04/11/24  4:42 AM   Result Value Ref Range    aPTT 44.4 (H) 21.0 - 32.0 sec   CBC auto differential    Collection Time: 04/11/24  4:42 AM   Result Value Ref Range    WBC 9.28 3.90 - 12.70 K/uL    RBC 3.26 (L) 4.60 - 6.20 M/uL    Hemoglobin 8.4 (L) 14.0 - 18.0 g/dL    Hematocrit 28.1 (L) 40.0 - 54.0 %    MCV 86 82 - 98 fL    MCH 25.8 (L) 27.0 - 31.0 pg    MCHC 29.9 (L) 32.0 - 36.0 g/dL    RDW 16.6 (H) 11.5 - 14.5 %    Platelets 261 150 - 450 K/uL    MPV 9.6 9.2 - 12.9 fL    Immature Granulocytes 0.6 (H) 0.0 - 0.5 %    Gran # (ANC) 7.4 1.8 - 7.7 K/uL    Immature Grans (Abs) 0.06 (H) 0.00 - 0.04 K/uL    Lymph # 0.9 (L) 1.0 - 4.8 K/uL    Mono # 0.5 0.3 - 1.0 K/uL    Eos # 0.3 0.0 - 0.5 K/uL    Baso # 0.03 0.00 - 0.20 K/uL    nRBC 0 0 /100 WBC    Gran % 80.2 (H) 38.0 - 73.0 %    Lymph % 10.1 (L) 18.0 - 48.0 %    Mono % 5.6 4.0 - 15.0 %    Eosinophil % 3.2 0.0 - 8.0 %    Basophil % 0.3 0.0 - 1.9 %    Differential Method Automated    Basic Metabolic Panel    Collection Time: 04/11/24  4:42 AM   Result Value Ref Range    Sodium 142 136 - 145 mmol/L    Potassium 3.2 (L) 3.5 - 5.1 mmol/L    Chloride 108 95 - 110 mmol/L    CO2 25 23 - 29 mmol/L    Glucose 90 70 - 110 mg/dL    BUN 7 (L) 8 - 23 mg/dL    Creatinine 0.9 0.5 - 1.4 mg/dL    Calcium 8.6 (L) 8.7 - 10.5 mg/dL    Anion Gap 9 8 - 16 mmol/L    eGFR >60 >60 mL/min/1.73 m^2       RADIOLOGY  X-Ray Abdomen Flat And Erect    Result Date: 4/10/2024  EXAMINATION: XR ABDOMEN FLAT AND ERECT CLINICAL HISTORY: Evaluated bowel distention, status post ileocolonic anastomosis for intestines bivalve due to obstruction due to metastatic bladder cancer; COMPARISON: CT abdomen pelvis April 3, 2024. FINDINGS: Bilateral nephrostomy tubes.  Midline surgical staples.   Central venous catheter is present.  A large volume of stool in the ascending colon.  Transverse colon is gas-filled and prominent.  Intra-abdominal free air is probably related to recent open surgery.     Intra-abdominal free air is probably related to recent abdominal surgery; clinical correlation recommended.  Prominent gas-filled transverse colon may represent colonic ileus. Electronically signed by: Ihsan Rob Date:    04/10/2024 Time:    12:38    X-Ray Chest AP Portable    Result Date: 4/5/2024  EXAMINATION: XR CHEST AP PORTABLE CLINICAL HISTORY: Central line placement; TECHNIQUE: Single frontal portable view of the chest was performed. COMPARISON: 03/11/2024 FINDINGS: Right central line in apparent good position distal tip expected SVC.  No convincing pneumothorax.  Lungs unchanged     Satisfactory line placement radiograph Electronically signed by: Le Calixto Date:    04/05/2024 Time:    19:54    X-Ray Abdomen Flat And Erect    Result Date: 4/5/2024  EXAMINATION: XR ABDOMEN FLAT AND ERECT CLINICAL HISTORY: Follow up after small-bowel follow-through; Unspecified intestinal obstruction, unspecified as to partial versus complete obstruction TECHNIQUE: Single frontal view of the chest was performed. COMPARISON: 04/03/2024 FINDINGS: Bilateral nephrostomy tubes are noted.  Prominent small bowel loops are seen throughout the abdomen without definite transition and measure up to 4.7 cm.  Contrast is seen within the large bowel.  Findings consistent with ileus.  In comparison to the prior study, there is no adverse interval changes     In comparison to the prior study, there is no adverse interval changes.  Recommend continued surveillance Electronically signed by: lOeg Sr MD Date:    04/05/2024 Time:    09:20    XR Small Bowel Follow Through    Result Date: 4/4/2024  EXAMINATION: XR SMALL BOWEL FOLLOW THROUGH CLINICAL HISTORY: Gastrografin.  Evaluate for complete or partial obstruction; TECHNIQUE:  Supine imaging provided to 7-1/2 hour COMPARISON: CT correlation FINDINGS: Small bowel is distended with delayed transit time     Findings consistent with distal small bowel obstruction Electronically signed by: Le Calixto Date:    04/04/2024 Time:    19:49    X-Ray Abdomen Flat And Erect    Result Date: 4/3/2024  EXAMINATION: XR ABDOMEN FLAT AND ERECT CLINICAL HISTORY: Unspecified intestinal obstruction, unspecified as to partial versus complete obstruction TECHNIQUE: Routine exam. COMPARISON: 03/11/2024 FINDINGS: Increasing air-filled loops of small bowel throughout the mid upper abdomen.  Maximum diameter approximately 4 cm.Large amount stool right colon No other changes.  Again noted are bilateral nephrostomy catheters.     Increasing small bowel distension mid upper abdomen indicating partial small bowel obstruction. Electronically signed by: Elmo Garza MD Date:    04/03/2024 Time:    15:59    CT Urogram Abd Pelvis W WO    Result Date: 4/3/2024  EXAM: CT UROGRAM ABD PELVIS W WO CLINICAL HISTORY: Renal mass, bladder cancer FINDINGS:  Pre and postcontrast images were obtained. Comparison CT abdomen pelvis 02/03/2024. Discoid atelectasis at the right base.  No suspicious lung nodules. No liver masses.  The spleen is unremarkable.  Pancreas is unremarkable gallbladder is unremarkable. Adrenal glands appear normal. There is atherosclerosis of the abdominal aorta no significant aneurysm.  There are several prominent left retroperitoneal lymph nodes measure up to 1.5 cm concerning for lymph node metastasis. There are bilateral nephrostomy tubes.  There are small renal cysts bilaterally.  No renal masses.  Small atrophic left kidney.  No evidence of hydronephrosis. Extensive abnormality of the bladder with focal calcifications along the left posterior lateral bladder wall at the base.  There is diffuse bladder wall thickening and abnormal enhancement.  This extends to the pelvic sidewall bilaterally.   Bladder mass is noted posteriorly which appears to extend and involve the rectum with extension into the presacral space.  There is abnormal thickening of the presacral fat which has increased since previous exam measuring up to 2 cm in thickness.  There is extension into the sacrum on the left image 143 of series 6.  Additionally, there is interval development of small bowel obstruction which appears be related local extension to involve a small bowel loop in the pelvis.  Small bowel loops are distended up to 4 cm. No distal bone metastasis.      Interval worsening of locally invasive bladder cancer extension to the pelvic sidewall and the presacral fat and sacrum on the left.  There is involvement of the rectum as well as well as involvement of a small bowel loop in the pelvis causing a small bowel obstruction. There is left retroperitoneal lymphadenopathy. Finalized on: 4/3/2024 11:33 AM By:  Rahul Tobias MD [facility]# [id number]      2024-04-03 11:35:[id number]    [facility]      EKG    MICROBIOLOGY    MDM

## 2024-04-11 NOTE — ASSESSMENT & PLAN NOTE
Patient seen and evaluated by urology during admission by Dr. Worley and discussion held regarding exchange by IR or keeping current tubes as they are function properly and not needing emergent replacement. Patient and spouse agreed to keep current tubes and f/u with Dr. Jorgensen on 4/11/24. Wound care/nephrostomy tube dressing recommendation noted below per urology prior to signing off.   Plan:  -Continue daily mupirocin 2% ointment to apply to the skin at the nephrostomy tube sites with his daily cleaning and dressing changes.  -IV antibiotic discontinued by general surgery  -f/u outpatient as directed

## 2024-04-11 NOTE — SUBJECTIVE & OBJECTIVE
Interval History:  Full liquid diet he repair improved abdominal pain.  He has no nausea.  He was having bowel movements.      We will advance to a regular diet.  Can be converted to oral anticoagulation per hospital Medicine    Medications:  Continuous Infusions:   heparin (porcine) in D5W 11 Units/kg/hr (04/10/24 0715)    lactated ringers 80 mL/hr at 04/11/24 0233     Scheduled Meds:   arformoteroL  15 mcg Nebulization BID    budesonide  0.5 mg Nebulization Q12H    EScitalopram oxalate  20 mg Oral Daily    famotidine (PF)  20 mg Intravenous Q12H    levothyroxine  125 mcg Oral Before breakfast    mupirocin   Topical (Top) Daily    nicotine  1 patch Transdermal Daily    pregabalin  75 mg Oral QHS    tamsulosin  1 capsule Oral Daily     PRN Meds:sodium chloride 0.9%, acetaminophen, ALPRAZolam, cyclobenzaprine, dextrose 10%, dextrose 10%, diphenhydrAMINE, glucagon (human recombinant), HYDROmorphone, levalbuterol, naloxone, ondansetron, oxyCODONE, oxyCODONE, prochlorperazine, sodium chloride 0.9%     Review of patient's allergies indicates:  No Known Allergies  Objective:     Vital Signs (Most Recent):  Temp: 98.1 °F (36.7 °C) (04/11/24 0725)  Pulse: 103 (04/11/24 0725)  Resp: 16 (04/11/24 0730)  BP: 133/62 (04/11/24 0725)  SpO2: (!) 92 % (04/11/24 0725) Vital Signs (24h Range):  Temp:  [98 °F (36.7 °C)-98.9 °F (37.2 °C)] 98.1 °F (36.7 °C)  Pulse:  [] 103  Resp:  [15-20] 16  SpO2:  [91 %-95 %] 92 %  BP: (107-165)/(56-72) 133/62     Weight: 66.7 kg (147 lb 0.8 oz)  Body mass index is 22.36 kg/m².    Intake/Output - Last 3 Shifts         04/09 0700  04/10 0659 04/10 0700  04/11 0659 04/11 0700  04/12 0659    I.V. (mL/kg)       Total Intake(mL/kg)       Urine (mL/kg/hr) 1825 (1.1) 1500 (0.9) 400 (4.5)    Stool  0     Total Output 1825 1500 400    Net -1825 -1500 -400           Stool Occurrence  1 x              Physical Exam  Constitutional:       General: He is not in acute distress.     Appearance: He is  well-developed. He is ill-appearing (chronically, minimally acute).   HENT:      Head: Normocephalic and atraumatic.   Eyes:      General: No scleral icterus.     Pupils: Pupils are equal, round, and reactive to light.   Neck:      Thyroid: No thyromegaly.      Vascular: No JVD.      Trachea: No tracheal deviation.   Cardiovascular:      Rate and Rhythm: Normal rate and regular rhythm.      Heart sounds: Normal heart sounds.   Pulmonary:      Effort: Pulmonary effort is normal.      Breath sounds: Normal breath sounds.   Abdominal:      General: Abdomen is flat. Bowel sounds are normal. There is no distension.      Palpations: Abdomen is soft. There is no mass.      Tenderness: There is no abdominal tenderness. There is no guarding or rebound.      Comments: Incision is clean   Musculoskeletal:         General: Normal range of motion.      Cervical back: Normal range of motion and neck supple.      Right lower leg: No edema.      Left lower leg: No edema.   Lymphadenopathy:      Cervical: No cervical adenopathy.   Skin:     General: Skin is warm and dry.   Neurological:      Mental Status: He is alert and oriented to person, place, and time.   Psychiatric:         Mood and Affect: Mood normal.         Behavior: Behavior normal.         Thought Content: Thought content normal.         Judgment: Judgment normal.          Significant Labs:  I have reviewed all pertinent lab results within the past 24 hours.  CBC:   Recent Labs   Lab 04/11/24  0442   WBC 9.28   RBC 3.26*   HGB 8.4*   HCT 28.1*      MCV 86   MCH 25.8*   MCHC 29.9*     BMP:   Recent Labs   Lab 04/06/24  0627 04/11/24  0442    90    142   K 4.7 3.2*   * 108   CO2 23 25   BUN 17 7*   CREATININE 1.2 0.9   CALCIUM 8.9 8.6*   MG 1.6  --      Coagulation:   Recent Labs   Lab 04/11/24  0442   APTT 44.4*       Significant Diagnostics:  I have reviewed all pertinent imaging results/findings within the past 24 hours.  None

## 2024-04-12 ENCOUNTER — DOCUMENT SCAN (OUTPATIENT)
Dept: HOME HEALTH SERVICES | Facility: HOSPITAL | Age: 69
End: 2024-04-12
Payer: MEDICARE

## 2024-04-12 VITALS
BODY MASS INDEX: 22.89 KG/M2 | SYSTOLIC BLOOD PRESSURE: 128 MMHG | HEART RATE: 108 BPM | RESPIRATION RATE: 16 BRPM | TEMPERATURE: 98 F | WEIGHT: 150.56 LBS | OXYGEN SATURATION: 96 % | DIASTOLIC BLOOD PRESSURE: 66 MMHG

## 2024-04-12 PROBLEM — K56.600 PARTIAL INTESTINAL OBSTRUCTION: Status: RESOLVED | Noted: 2024-04-03 | Resolved: 2024-04-12

## 2024-04-12 PROBLEM — T83.022A NEPHROSTOMY TUBE DISPLACED: Status: RESOLVED | Noted: 2023-11-10 | Resolved: 2024-04-12

## 2024-04-12 LAB
APTT PPP: 40.8 SEC (ref 21–32)
BASOPHILS # BLD AUTO: 0.03 K/UL (ref 0–0.2)
BASOPHILS NFR BLD: 0.4 % (ref 0–1.9)
DIFFERENTIAL METHOD BLD: ABNORMAL
EOSINOPHIL # BLD AUTO: 0.2 K/UL (ref 0–0.5)
EOSINOPHIL NFR BLD: 2.3 % (ref 0–8)
ERYTHROCYTE [DISTWIDTH] IN BLOOD BY AUTOMATED COUNT: 16.6 % (ref 11.5–14.5)
HCT VFR BLD AUTO: 29.3 % (ref 40–54)
HGB BLD-MCNC: 8.8 G/DL (ref 14–18)
IMM GRANULOCYTES # BLD AUTO: 0.05 K/UL (ref 0–0.04)
IMM GRANULOCYTES NFR BLD AUTO: 0.6 % (ref 0–0.5)
LYMPHOCYTES # BLD AUTO: 0.8 K/UL (ref 1–4.8)
LYMPHOCYTES NFR BLD: 10 % (ref 18–48)
MCH RBC QN AUTO: 25.7 PG (ref 27–31)
MCHC RBC AUTO-ENTMCNC: 30 G/DL (ref 32–36)
MCV RBC AUTO: 85 FL (ref 82–98)
MONOCYTES # BLD AUTO: 0.4 K/UL (ref 0.3–1)
MONOCYTES NFR BLD: 4.6 % (ref 4–15)
NEUTROPHILS # BLD AUTO: 6.8 K/UL (ref 1.8–7.7)
NEUTROPHILS NFR BLD: 82.1 % (ref 38–73)
NRBC BLD-RTO: 0 /100 WBC
PLATELET # BLD AUTO: 253 K/UL (ref 150–450)
PMV BLD AUTO: 9.9 FL (ref 9.2–12.9)
RBC # BLD AUTO: 3.43 M/UL (ref 4.6–6.2)
WBC # BLD AUTO: 8.23 K/UL (ref 3.9–12.7)

## 2024-04-12 PROCEDURE — 25000003 PHARM REV CODE 250: Performed by: COLON & RECTAL SURGERY

## 2024-04-12 PROCEDURE — 99900035 HC TECH TIME PER 15 MIN (STAT)

## 2024-04-12 PROCEDURE — 25000003 PHARM REV CODE 250: Performed by: SURGERY

## 2024-04-12 PROCEDURE — 25000003 PHARM REV CODE 250: Performed by: HOSPITALIST

## 2024-04-12 PROCEDURE — 85730 THROMBOPLASTIN TIME PARTIAL: CPT | Performed by: SURGERY

## 2024-04-12 PROCEDURE — 97530 THERAPEUTIC ACTIVITIES: CPT

## 2024-04-12 PROCEDURE — 25000003 PHARM REV CODE 250: Performed by: STUDENT IN AN ORGANIZED HEALTH CARE EDUCATION/TRAINING PROGRAM

## 2024-04-12 PROCEDURE — 25000242 PHARM REV CODE 250 ALT 637 W/ HCPCS: Performed by: STUDENT IN AN ORGANIZED HEALTH CARE EDUCATION/TRAINING PROGRAM

## 2024-04-12 PROCEDURE — 27000221 HC OXYGEN, UP TO 24 HOURS

## 2024-04-12 PROCEDURE — S4991 NICOTINE PATCH NONLEGEND: HCPCS | Performed by: STUDENT IN AN ORGANIZED HEALTH CARE EDUCATION/TRAINING PROGRAM

## 2024-04-12 PROCEDURE — 97530 THERAPEUTIC ACTIVITIES: CPT | Mod: CQ

## 2024-04-12 PROCEDURE — 36415 COLL VENOUS BLD VENIPUNCTURE: CPT | Performed by: SURGERY

## 2024-04-12 PROCEDURE — 94640 AIRWAY INHALATION TREATMENT: CPT

## 2024-04-12 PROCEDURE — 85025 COMPLETE CBC W/AUTO DIFF WBC: CPT | Performed by: SURGERY

## 2024-04-12 PROCEDURE — 94761 N-INVAS EAR/PLS OXIMETRY MLT: CPT

## 2024-04-12 RX ORDER — OXYCODONE HYDROCHLORIDE 5 MG/1
5 TABLET ORAL EVERY 6 HOURS PRN
Qty: 21 TABLET | Refills: 0 | Status: SHIPPED | OUTPATIENT
Start: 2024-04-12 | End: 2024-04-26 | Stop reason: ALTCHOICE

## 2024-04-12 RX ADMIN — NICOTINE 1 PATCH: 21 PATCH, EXTENDED RELEASE TRANSDERMAL at 08:04

## 2024-04-12 RX ADMIN — LEVOTHYROXINE SODIUM 125 MCG: 125 TABLET ORAL at 05:04

## 2024-04-12 RX ADMIN — OXYCODONE HYDROCHLORIDE 10 MG: 5 TABLET ORAL at 12:04

## 2024-04-12 RX ADMIN — ARFORMOTEROL TARTRATE 15 MCG: 15 SOLUTION RESPIRATORY (INHALATION) at 07:04

## 2024-04-12 RX ADMIN — APIXABAN 5 MG: 2.5 TABLET, FILM COATED ORAL at 05:04

## 2024-04-12 RX ADMIN — MUPIROCIN: 20 OINTMENT TOPICAL at 08:04

## 2024-04-12 RX ADMIN — ESCITALOPRAM OXALATE 20 MG: 10 TABLET ORAL at 08:04

## 2024-04-12 RX ADMIN — TAMSULOSIN HYDROCHLORIDE 0.4 MG: 0.4 CAPSULE ORAL at 08:04

## 2024-04-12 RX ADMIN — FAMOTIDINE 20 MG: 10 INJECTION, SOLUTION INTRAVENOUS at 08:04

## 2024-04-12 RX ADMIN — BUDESONIDE 0.5 MG: 0.5 INHALANT ORAL at 07:04

## 2024-04-12 NOTE — SUBJECTIVE & OBJECTIVE
Interval History:  Patient reports tolerating a diet.  This.  He reports having bowel movements.  Remains on a heparin drip.      He was stable for discharge from the surgical point of view    Medications:  Continuous Infusions:   heparin (porcine) in D5W 11 Units/kg/hr (04/11/24 2128)     Scheduled Meds:   arformoteroL  15 mcg Nebulization BID    budesonide  0.5 mg Nebulization Q12H    EScitalopram oxalate  20 mg Oral Daily    famotidine (PF)  20 mg Intravenous Q12H    levothyroxine  125 mcg Oral Before breakfast    mupirocin   Topical (Top) Daily    nicotine  1 patch Transdermal Daily    pregabalin  75 mg Oral QHS    tamsulosin  1 capsule Oral Daily     PRN Meds:sodium chloride 0.9%, acetaminophen, ALPRAZolam, cyclobenzaprine, dextrose 10%, dextrose 10%, diphenhydrAMINE, glucagon (human recombinant), HYDROmorphone, levalbuterol, naloxone, ondansetron, oxyCODONE, oxyCODONE, prochlorperazine, sodium chloride 0.9%     Review of patient's allergies indicates:  No Known Allergies  Objective:     Vital Signs (Most Recent):  Temp: 98.9 °F (37.2 °C) (04/12/24 0826)  Pulse: 88 (04/12/24 0826)  Resp: 16 (04/12/24 0826)  BP: (!) 119/57 (04/12/24 0826)  SpO2: (!) 93 % (04/12/24 0826) Vital Signs (24h Range):  Temp:  [97.8 °F (36.6 °C)-99.5 °F (37.5 °C)] 98.9 °F (37.2 °C)  Pulse:  [] 88  Resp:  [15-18] 16  SpO2:  [90 %-94 %] 93 %  BP: (112-169)/(57-73) 119/57     Weight: 68.3 kg (150 lb 9.2 oz)  Body mass index is 22.89 kg/m².    Intake/Output - Last 3 Shifts         04/10 0700  04/11 0659 04/11 0700  04/12 0659 04/12 0700  04/13 0659    Urine (mL/kg/hr) 1500 (0.9) 1125 (0.7)     Stool 0      Total Output 1500 1125     Net -1500 -1125            Stool Occurrence 1 x               Physical Exam  Vitals and nursing note reviewed.   Constitutional:       General: He is not in acute distress.     Appearance: He is well-developed. Ill appearance: chronic, in minimal acute.   HENT:      Head: Normocephalic and atraumatic.    Eyes:      General: No scleral icterus.     Pupils: Pupils are equal, round, and reactive to light.   Neck:      Thyroid: No thyromegaly.      Vascular: No JVD.      Trachea: No tracheal deviation.   Cardiovascular:      Rate and Rhythm: Normal rate and regular rhythm.      Heart sounds: Normal heart sounds.   Pulmonary:      Effort: Pulmonary effort is normal.      Breath sounds: Normal breath sounds.   Abdominal:      General: Abdomen is flat. Bowel sounds are normal. There is no distension.      Palpations: Abdomen is soft. There is no mass.      Tenderness: There is no abdominal tenderness (mild incisional). There is no guarding or rebound.      Comments: Midline incision is clean   Musculoskeletal:         General: Normal range of motion.      Cervical back: Normal range of motion and neck supple.      Right lower leg: No edema.      Left lower leg: No edema.   Lymphadenopathy:      Cervical: No cervical adenopathy.   Skin:     General: Skin is warm and dry.   Neurological:      Mental Status: He is alert and oriented to person, place, and time.   Psychiatric:         Mood and Affect: Mood normal.         Behavior: Behavior normal.         Thought Content: Thought content normal.         Judgment: Judgment normal.          Significant Labs:  I have reviewed all pertinent lab results within the past 24 hours.  CBC:   Recent Labs   Lab 04/12/24  0510   WBC 8.23   RBC 3.43*   HGB 8.8*   HCT 29.3*      MCV 85   MCH 25.7*   MCHC 30.0*     BMP:   Recent Labs   Lab 04/06/24  0627 04/11/24  0442    90    142   K 4.7 3.2*   * 108   CO2 23 25   BUN 17 7*   CREATININE 1.2 0.9   CALCIUM 8.9 8.6*   MG 1.6  --      Coagulation:   Recent Labs   Lab 04/12/24  0510   APTT 40.8*       Significant Diagnostics:  I have reviewed all pertinent imaging results/findings within the past 24 hours.  No new

## 2024-04-12 NOTE — PLAN OF CARE
[facility] - Telemetry (Hospital)  Discharge Final Note    Primary Care Provider: Faizan Antoine MD    Expected Discharge Date: 4/12/2024    Final Discharge Note (most recent)       Final Note - 04/12/24 1506          Final Note    Assessment Type Final Discharge Note (P)      Anticipated Discharge Disposition Home-Health Care Svc (P)      Hospital Resources/Appts/Education Provided Appointments scheduled and added to AVS (P)         Post-Acute Status    Post-Acute Authorization Home Health (P)      Home Health Status Set-up Complete/Auth obtained (P)    [facility]    Discharge Delays None known at this time (P)                      Important Message from Medicare             Contact Info       Silvestre Ramos MD   Specialty: General Surgery    [address] [facility] [place] [address]   Phone: [phone]       Next Steps: Follow up on 4/22/2024    Instructions: post op appt, staple removal    Faizan Antoine MD   Specialty: Family Medicine   Relationship: PCP - General    [address] [place] [address]   Phone: [phone]       Next Steps: Follow up in 3 day(s)

## 2024-04-12 NOTE — H&P (VIEW-ONLY)
[facility]  General Surgery  Progress Note    Subjective:     History of Present Illness:  68-year-old male with a known locally advanced and metastatic bladder cancer who underwent a CT urogram today which was concerning for a bowel obstruction.      Most of the history is obtained from the patient's wife.  She states that he had some purulent drainage around his nephrostomy tube.  He was seen by Urology earlier today and they communicated with Infectious Disease.  He underwent a CT urogram that was consistent with a bowel obstruction as well as advancement of his bladder cancer.  Surgery was consulted because of a bowel obstruction.      According to his wife he has had some abdominal discomfort and nausea and vomiting.      He was followed here by Oncology and been treated with chemotherapy.  The last plan was to treat him with the following:    /9/2024 -  Chemotherapy     Treatment Summary   Plan Name: OP pembrolizumab 200mg Q3W  Treatment Goal: Palliative  Status: Active  Start Date: 2/9/2024 (Planned)  End Date: 1/23/2026 (Planned)     The patient also has a history of a DVT with a pulmonary embolism that is treated with Eliquis.  He also has a history of a lung lesion that was treated with radiation.      Surgery was asked to see him because the bowel obstruction.      The patient appears cachectic.  He answers some questions but most information is from his wife.  They proceed palliative care consult.  They are trying to returned to [place] where they have more support.    Post-Op Info:  Procedure(s) (LRB):  LAPAROTOMY, EXPLORATORY (N/A)  BLOCK, TRANSVERSUS ABDOMINIS PLANE (N/A)  ENTEROENTEROSTOMY (N/A)   7 Days Post-Op     Interval History:  Patient reports tolerating a diet.  This.  He reports having bowel movements.  Remains on a heparin drip.      He was stable for discharge from the surgical point of view    Medications:  Continuous Infusions:   heparin (porcine) in D5W 11  Units/kg/hr (04/11/24 2128)     Scheduled Meds:   arformoteroL  15 mcg Nebulization BID    budesonide  0.5 mg Nebulization Q12H    EScitalopram oxalate  20 mg Oral Daily    famotidine (PF)  20 mg Intravenous Q12H    levothyroxine  125 mcg Oral Before breakfast    mupirocin   Topical (Top) Daily    nicotine  1 patch Transdermal Daily    pregabalin  75 mg Oral QHS    tamsulosin  1 capsule Oral Daily     PRN Meds:sodium chloride 0.9%, acetaminophen, ALPRAZolam, cyclobenzaprine, dextrose 10%, dextrose 10%, diphenhydrAMINE, glucagon (human recombinant), HYDROmorphone, levalbuterol, naloxone, ondansetron, oxyCODONE, oxyCODONE, prochlorperazine, sodium chloride 0.9%     Review of patient's allergies indicates:  No Known Allergies  Objective:     Vital Signs (Most Recent):  Temp: 98.9 °F (37.2 °C) (04/12/24 0826)  Pulse: 88 (04/12/24 0826)  Resp: 16 (04/12/24 0826)  BP: (!) 119/57 (04/12/24 0826)  SpO2: (!) 93 % (04/12/24 0826) Vital Signs (24h Range):  Temp:  [97.8 °F (36.6 °C)-99.5 °F (37.5 °C)] 98.9 °F (37.2 °C)  Pulse:  [] 88  Resp:  [15-18] 16  SpO2:  [90 %-94 %] 93 %  BP: (112-169)/(57-73) 119/57     Weight: 68.3 kg (150 lb 9.2 oz)  Body mass index is 22.89 kg/m².    Intake/Output - Last 3 Shifts         04/10 0700  04/11 0659 04/11 0700  04/12 0659 04/12 0700 04/13 0659    Urine (mL/kg/hr) 1500 (0.9) 1125 (0.7)     Stool 0      Total Output 1500 1125     Net -1500 -1125            Stool Occurrence 1 x               Physical Exam  Vitals and nursing note reviewed.   Constitutional:       General: He is not in acute distress.     Appearance: He is well-developed. Ill appearance: chronic, in minimal acute.   HENT:      Head: Normocephalic and atraumatic.   Eyes:      General: No scleral icterus.     Pupils: Pupils are equal, round, and reactive to light.   Neck:      Thyroid: No thyromegaly.      Vascular: No JVD.      Trachea: No tracheal deviation.   Cardiovascular:      Rate and Rhythm: Normal rate and regular  rhythm.      Heart sounds: Normal heart sounds.   Pulmonary:      Effort: Pulmonary effort is normal.      Breath sounds: Normal breath sounds.   Abdominal:      General: Abdomen is flat. Bowel sounds are normal. There is no distension.      Palpations: Abdomen is soft. There is no mass.      Tenderness: There is no abdominal tenderness (mild incisional). There is no guarding or rebound.      Comments: Midline incision is clean   Musculoskeletal:         General: Normal range of motion.      Cervical back: Normal range of motion and neck supple.      Right lower leg: No edema.      Left lower leg: No edema.   Lymphadenopathy:      Cervical: No cervical adenopathy.   Skin:     General: Skin is warm and dry.   Neurological:      Mental Status: He is alert and oriented to person, place, and time.   Psychiatric:         Mood and Affect: Mood normal.         Behavior: Behavior normal.         Thought Content: Thought content normal.         Judgment: Judgment normal.          Significant Labs:  I have reviewed all pertinent lab results within the past 24 hours.  CBC:   Recent Labs   Lab 04/12/24  0510   WBC 8.23   RBC 3.43*   HGB 8.8*   HCT 29.3*      MCV 85   MCH 25.7*   MCHC 30.0*     BMP:   Recent Labs   Lab 04/06/24  0627 04/11/24  0442    90    142   K 4.7 3.2*   * 108   CO2 23 25   BUN 17 7*   CREATININE 1.2 0.9   CALCIUM 8.9 8.6*   MG 1.6  --      Coagulation:   Recent Labs   Lab 04/12/24  0510   APTT 40.8*       Significant Diagnostics:  I have reviewed all pertinent imaging results/findings within the past 24 hours.  No new  Assessment/Plan:     * Partial intestinal obstruction  Now s/p exlap, ileal-transverse anastomosis bypass on 4/5/24    - a tolerating a regular diet.  Reports bowel movements.  Pain patient was stable for discharge from the surgical point of view.  He would need up in a proximally 10 days  White blood cell count is normal    Conversion from heparin to Eliquis per  hospital Medicine      Acute deep vein thrombosis (DVT) of femoral vein of left lower extremity  Convert from heparin to Eliquis per hospital Medicine    Hypothyroidism  Synthroid orally    Malignant neoplasm of urinary bladder  Care per primary team    The patient should follow up with his oncologist postoperatively to discuss continued treatment options versus hospice care    Nephrostomy tube displaced  Care per primary team    Follow up with his cardiologist per their recommendations    Centrilobular emphysema  Care per primary team    Squamous cell carcinoma of right lung  Care per primary team    Anemia  Care per primary team        Silvestre Ramos MD  General Surgery  [facility] - Telemetry ([facility])

## 2024-04-12 NOTE — ASSESSMENT & PLAN NOTE
Now s/p exlap, ileal-transverse anastomosis bypass on 4/5/24    - a tolerating a regular diet.  Reports bowel movements.  Pain patient was stable for discharge from the surgical point of view.  He would need up in a proximally 10 days  White blood cell count is normal    Conversion from heparin to Eliquis per hospital Medicine

## 2024-04-12 NOTE — PROGRESS NOTES
[facility]  General Surgery  Progress Note    Subjective:     History of Present Illness:  68-year-old male with a known locally advanced and metastatic bladder cancer who underwent a CT urogram today which was concerning for a bowel obstruction.      Most of the history is obtained from the patient's wife.  She states that he had some purulent drainage around his nephrostomy tube.  He was seen by Urology earlier today and they communicated with Infectious Disease.  He underwent a CT urogram that was consistent with a bowel obstruction as well as advancement of his bladder cancer.  Surgery was consulted because of a bowel obstruction.      According to his wife he has had some abdominal discomfort and nausea and vomiting.      He was followed here by Oncology and been treated with chemotherapy.  The last plan was to treat him with the following:    /9/2024 -  Chemotherapy     Treatment Summary   Plan Name: OP pembrolizumab 200mg Q3W  Treatment Goal: Palliative  Status: Active  Start Date: 2/9/2024 (Planned)  End Date: 1/23/2026 (Planned)     The patient also has a history of a DVT with a pulmonary embolism that is treated with Eliquis.  He also has a history of a lung lesion that was treated with radiation.      Surgery was asked to see him because the bowel obstruction.      The patient appears cachectic.  He answers some questions but most information is from his wife.  They proceed palliative care consult.  They are trying to returned to [place] where they have more support.    Post-Op Info:  Procedure(s) (LRB):  LAPAROTOMY, EXPLORATORY (N/A)  BLOCK, TRANSVERSUS ABDOMINIS PLANE (N/A)  ENTEROENTEROSTOMY (N/A)   7 Days Post-Op     Interval History:  Patient reports tolerating a diet.  This.  He reports having bowel movements.  Remains on a heparin drip.      He was stable for discharge from the surgical point of view    Medications:  Continuous Infusions:   heparin (porcine) in D5W 11  Units/kg/hr (04/11/24 2128)     Scheduled Meds:   arformoteroL  15 mcg Nebulization BID    budesonide  0.5 mg Nebulization Q12H    EScitalopram oxalate  20 mg Oral Daily    famotidine (PF)  20 mg Intravenous Q12H    levothyroxine  125 mcg Oral Before breakfast    mupirocin   Topical (Top) Daily    nicotine  1 patch Transdermal Daily    pregabalin  75 mg Oral QHS    tamsulosin  1 capsule Oral Daily     PRN Meds:sodium chloride 0.9%, acetaminophen, ALPRAZolam, cyclobenzaprine, dextrose 10%, dextrose 10%, diphenhydrAMINE, glucagon (human recombinant), HYDROmorphone, levalbuterol, naloxone, ondansetron, oxyCODONE, oxyCODONE, prochlorperazine, sodium chloride 0.9%     Review of patient's allergies indicates:  No Known Allergies  Objective:     Vital Signs (Most Recent):  Temp: 98.9 °F (37.2 °C) (04/12/24 0826)  Pulse: 88 (04/12/24 0826)  Resp: 16 (04/12/24 0826)  BP: (!) 119/57 (04/12/24 0826)  SpO2: (!) 93 % (04/12/24 0826) Vital Signs (24h Range):  Temp:  [97.8 °F (36.6 °C)-99.5 °F (37.5 °C)] 98.9 °F (37.2 °C)  Pulse:  [] 88  Resp:  [15-18] 16  SpO2:  [90 %-94 %] 93 %  BP: (112-169)/(57-73) 119/57     Weight: 68.3 kg (150 lb 9.2 oz)  Body mass index is 22.89 kg/m².    Intake/Output - Last 3 Shifts         04/10 0700  04/11 0659 04/11 0700  04/12 0659 04/12 0700 04/13 0659    Urine (mL/kg/hr) 1500 (0.9) 1125 (0.7)     Stool 0      Total Output 1500 1125     Net -1500 -1125            Stool Occurrence 1 x               Physical Exam  Vitals and nursing note reviewed.   Constitutional:       General: He is not in acute distress.     Appearance: He is well-developed. Ill appearance: chronic, in minimal acute.   HENT:      Head: Normocephalic and atraumatic.   Eyes:      General: No scleral icterus.     Pupils: Pupils are equal, round, and reactive to light.   Neck:      Thyroid: No thyromegaly.      Vascular: No JVD.      Trachea: No tracheal deviation.   Cardiovascular:      Rate and Rhythm: Normal rate and regular  rhythm.      Heart sounds: Normal heart sounds.   Pulmonary:      Effort: Pulmonary effort is normal.      Breath sounds: Normal breath sounds.   Abdominal:      General: Abdomen is flat. Bowel sounds are normal. There is no distension.      Palpations: Abdomen is soft. There is no mass.      Tenderness: There is no abdominal tenderness (mild incisional). There is no guarding or rebound.      Comments: Midline incision is clean   Musculoskeletal:         General: Normal range of motion.      Cervical back: Normal range of motion and neck supple.      Right lower leg: No edema.      Left lower leg: No edema.   Lymphadenopathy:      Cervical: No cervical adenopathy.   Skin:     General: Skin is warm and dry.   Neurological:      Mental Status: He is alert and oriented to person, place, and time.   Psychiatric:         Mood and Affect: Mood normal.         Behavior: Behavior normal.         Thought Content: Thought content normal.         Judgment: Judgment normal.          Significant Labs:  I have reviewed all pertinent lab results within the past 24 hours.  CBC:   Recent Labs   Lab 04/12/24  0510   WBC 8.23   RBC 3.43*   HGB 8.8*   HCT 29.3*      MCV 85   MCH 25.7*   MCHC 30.0*     BMP:   Recent Labs   Lab 04/06/24  0627 04/11/24  0442    90    142   K 4.7 3.2*   * 108   CO2 23 25   BUN 17 7*   CREATININE 1.2 0.9   CALCIUM 8.9 8.6*   MG 1.6  --      Coagulation:   Recent Labs   Lab 04/12/24  0510   APTT 40.8*       Significant Diagnostics:  I have reviewed all pertinent imaging results/findings within the past 24 hours.  No new  Assessment/Plan:     * Partial intestinal obstruction  Now s/p exlap, ileal-transverse anastomosis bypass on 4/5/24    - a tolerating a regular diet.  Reports bowel movements.  Pain patient was stable for discharge from the surgical point of view.  He would need up in a proximally 10 days  White blood cell count is normal    Conversion from heparin to Eliquis per  hospital Medicine      Acute deep vein thrombosis (DVT) of femoral vein of left lower extremity  Convert from heparin to Eliquis per hospital Medicine    Hypothyroidism  Synthroid orally    Malignant neoplasm of urinary bladder  Care per primary team    The patient should follow up with his oncologist postoperatively to discuss continued treatment options versus hospice care    Nephrostomy tube displaced  Care per primary team    Follow up with his cardiologist per their recommendations    Centrilobular emphysema  Care per primary team    Squamous cell carcinoma of right lung  Care per primary team    Anemia  Care per primary team        Silvestre Ramos MD  General Surgery  [facility] - Telemetry ([facility])

## 2024-04-12 NOTE — DISCHARGE SUMMARY
[facility] Medicine  Discharge Summary      Patient Name: Geovani Currie  MRN: [id number]  Admission Date: 4/3/2024  Hospital Length of Stay: 9 days  Discharge Date and Time:  04/12/2024 1:49 PM  Attending Physician: Juan Carlos Parra MD   Discharging Provider: Juan Carlos Parra MD  Discharge Provider Team: Networked reference to record PCT   Primary Care Provider: Faizan Antoine MD        HPI: 68 y.o. male patient with a PMHx of COPD, HTN, Lung Cancer, CKD, DVT, metastatic bladder cancer s/p nephrostomy tube placement. Presented to the Emergency Department for generalized abdominal pain, onset several days PTA. Pt was referred to the ED by Dr. Sr (Interventional Radiology) after CT Urogram showed small bowel obstruction. Pt was scheduled to have his nephrostomy tubes replaced on day of arrival. Associated sxs include nausea, abdominal distention, and loss of appetite. Patient denies any fever, chills, vomiting, SOB, CP, weakness, numbness, dizziness, headache, and all other sxs at this time. Pt and spouse are from [place], and are trying to return home to set up hospice. He is currently on cefdinir, but is not receiving any cancer treatment. General Surgery consulted, recommended to admit and consult Oncology and Urology. In the ED, vitals: 94/56, 85, 18, 98F, 96% RA. Significant Labs: H/H: 10/32.1, CT abd/pel: findings consistent SBO, Interval worsening of bladder cancer. Patient is a full code. Admitted to Hospital Medicine for management of Small Bowel Obstruction.        Procedure(s) (LRB):  LAPAROTOMY, EXPLORATORY (N/A)  BLOCK, TRANSVERSUS ABDOMINIS PLANE (N/A)  ENTEROENTEROSTOMY (N/A)        Hospital Course:     04/04/2024  CT scan shows shows interval worsening of locally invasive bladder cancer with rectal involvement and small bowel obstruction. SBFT pending. Oncology evaluated, strongly recommends DNR order to be placed. Both specialists suggest palliative vs  "hospice discussion. Palliative care consulted to assist.      04/05/2024  Came to discuss case with family and patient this morning one final time prior to their surgery. Went into meticulous detail regarding their wishes and goals at this encounter. Patient and family at bedside are of the understanding that the purpose of this pending surgery is to allow patient the ability to tolerate PO palliative immunotherapy. I also emphasized the fact that none of these interventions are curative in any way and that we are only establishing a path to allow further palliative measures. They seemed to acknowledge this fact as well. We discussed risks of surgery and both patient & family voiced their understanding and acceptance of these risks. Family then reiterated their desire to "stabilize" patient enough for him to get back to [place], where the patient (to my understanding) may or may not proceed with hospice care.      Will maintain Full Code in the perioperative period, but family seems amenable to transitioning to DNR at some point in the postoperative stage. Once stable, disposition will compose of likely discharge home with HH, NP at home and/or home based palliative care services in the interim while awaiting his move back to [place]. Discussed case with Palliative Care. Will consult their teams in the postoperative period if needed.      04/06/2024  POD#1, seems to have tolerated surgery well. Complains of breakthrough pain on current regimen.      04/08/2024  Diet advanced by surgical team. Patient tolerating it well. Will restart some of home medications today.      04/09/2024  Patient tolerated liquid diet but developed abdominal cramping after eating yogurt. Pain controlled on current regimen. Patient noted to be tachy during and following PT earlier today but was denying any chest pain or shortness of breath and currently back to NSR.     04/10/2024  Patient tolerating liquid diet but still complaining " of some abdominal cramping.  Patient seen by surgery and continued on liquid diet for the time being.     04/11/2024  Patient advanced to regular diet. Abdominal pain ok this morning. IVFs stopped to limit volume overload as patient tolerating p.o. intake.       Partial intestinal obstruction  Secondary to bladder cancer, progression noted on CT. SBFT obtained on 4/4/24 followed by ex-lap on 4/5/24 resulting in ileal-transverse anastomosis bypass. Patient tolerated procedure well and was able to advance back to regular diet. Pain currently controlled and cleared for discharge by surgery on 4/12/24. Patient instructed to follow up outpatient in clinic and discharged home with home health, pain medications, and stool softeners.          Acute deep vein thrombosis (DVT) of femoral vein of left lower extremity  New diagnosis, managed with heparin drip and transitioned back to Eliquis upon discharge.         Nephrostomy tube displaced  Patient seen and evaluated by urology during admission by Dr. Worley and discussion held regarding exchange by IR or keeping current tubes as they are function properly and not needing emergent replacement. Patient and spouse agreed to keep current tubes and f/u with Dr. Jorgensen on 4/11/24. Wound care/nephrostomy tube dressing recommendation noted below per urology prior to signing off. Patient continue daily mupirocin 2% ointment to apply to the skin at the nephrostomy tube sites with his daily cleaning and dressing changes.        Anemia  Patient's anemia is currently controlled. Has not received any PRBCs to date. Etiology likely d/t chronic disease due to Malignancy and has remained near baseline throughout hospitalization.        Centrilobular emphysema  Chronic, well controlled and remained stable throughout hospitalization.         Hypothyroidism  Patient has chronic hypothyroidism. TSH wnl. Patient continued on home medication.         Malignant neoplasm of urinary bladder  Known hx  "of bladder cancer, CT abd/pel: Interval worsening of locally invasive bladder cancer extension to the pelvic sidewall and the presacral fat and sacrum on the left. There is involvement of the rectum as well as well as involvement of a small bowel loop in the pelvis causing a small bowel obstruction.   Plan:     04/04/2024  -Urology evaluated: "I cannot offer any surgical intervention for his advanced bladder cancer at this point. I would recommend hospice, which patient's wife appears open to."  -Oncology evaluated: "Strongly recommend that patient have a do not resuscitate order be placed on chart and be seen by palliative care if not already done so."  -Palliative care consulted, follow up recommendations       04/05/2024  Came to discuss case with family and patient this morning one final time prior to their surgery. Went into meticulous detail regarding their wishes and goals at this encounter. Patient and family at bedside are of the understanding that the purpose of this pending surgery is to allow patient the ability to tolerate PO palliative immunotherapy. I also emphasized the fact that none of these interventions are curative in any way and that we are only establishing a path to allow further palliative measures. They seemed to acknowledge this fact as well. We discussed risks of surgery and both patient & family voiced their understanding and acceptance of these risks. Family then reiterated their desire to "stabilize" patient enough for him to get back to [place], where the patient (to my understanding) may or may not proceed with hospice care.      Will maintain Full Code in the perioperative period, but family seems amenable to transitioning to DNR at some point in the postoperative stage. Once stable, disposition will compose of likely discharge home with HH, NP at home and/or home based palliative care services in the interim while awaiting his move back to [place]. Discussed case with " Palliative Care. Will consult their teams in the postoperative period if needed. -Confirmed with patient and family at bedside that patient will be a full code during this hospitalization  -orders placed, discussion duration: < 3 minutes        Squamous cell carcinoma of right lung  Followed by oncology, treated with Radiation, therapy completed. Patient to follow up outpatient as directed.       Consults:   Consults (From admission, onward)          Status Ordering Provider     Inpatient consult to Urology  Once        Provider:  Tiffany Mckeon MD    Completed FARIDEH HERNANDEZ     Inpatient consult to Hematology/Oncology  Once        Provider:  Paul Pool MD    Acknowledged EDWIN ESPINOZA     Inpatient consult to General surgery  Once        Provider:  Edwin Espinoza MD    Completed CASSIA DIEGO            Final Active Diagnoses:    Diagnosis Date Noted POA    Acute deep vein thrombosis (DVT) of femoral vein of left lower extremity [I82.412] 02/03/2024 Yes    Anemia [D64.9] 10/21/2023 Yes     Chronic    Centrilobular emphysema [J43.2] 11/09/2023 Yes     Chronic    Hypothyroidism [E03.9] 01/12/2024 Yes     Chronic    Malignant neoplasm of urinary bladder [C67.9] 11/15/2023 Yes    Squamous cell carcinoma of right lung [C34.91] 11/09/2023 Yes      Problems Resolved During this Admission:    Diagnosis Date Noted Date Resolved POA    PRINCIPAL PROBLEM:  Partial intestinal obstruction [K56.600] 04/03/2024 04/12/2024 Yes    Nephrostomy tube displaced [T83.022A] 11/10/2023 04/12/2024 Yes    Elevated serum creatinine [R79.89] 10/21/2023 04/09/2024 Yes    Near complete obstruction of colon [K56.601] 04/05/2024 04/08/2024 Yes      Discharged Condition: stable    Disposition: Home-Health Care [facility]    Follow Up:   Follow-up Information       Edwin Espinoza MD Follow up on 4/22/2024.    Specialty: General Surgery  Why: post op appt, staple removal  Contact information:  [address]  [place] [address]  [phone]               Faizan Antoine MD Follow up in 3 day(s).    Specialty: Family Medicine  Contact information:  [address] [place] [address]  [phone]                           Patient Instructions:      Ambulatory referral/consult to Home Health   Standing Status: Future   Referral Priority: Routine Referral Type: Home Health   Referral Reason: Specialty Services Required   Requested Specialty: Home Health Services   Number of Visits Requested: 1     Diet Adult Regular     Other restrictions (specify):   Order Comments: Adhere to dressing changes as directed by surgical team as well as activity restrictions instructed by PT/OT and surgery on day of discharge.     Notify your health care provider if you experience any of the following:  temperature >100.4     Notify your health care provider if you experience any of the following:  persistent nausea and vomiting or diarrhea     Notify your health care provider if you experience any of the following:  severe uncontrolled pain     Notify your health care provider if you experience any of the following:  redness, tenderness, or signs of infection (pain, swelling, redness, odor or green/yellow discharge around incision site)     Notify your health care provider if you experience any of the following:  increased confusion or weakness     Change dressing (specify)   Order Comments: Adhere to dressing changes as directed by surgery at time of discharge.     Reason for not Ordering Smoking Cessation Referral     Order Specific Question Answer Comments   Reason for not ordering: Not medically appropriate at this time patient no longer smoking     Activity as tolerated     Medications:  Reconciled Home Medications:      Medication List        START taking these medications      bisacodyL 5 mg Tab  Take 1 tablet by mouth Daily.     oxyCODONE 5 MG immediate release tablet  Commonly known as: ROXICODONE  Take 1 tablet (5 mg total) by  mouth every 6 (six) hours as needed for Pain.            CONTINUE taking these medications      * albuterol 0.63 mg/3 mL Nebu  Commonly known as: ACCUNEB  Take 3 mLs (0.63 mg total) by nebulization 3 (three) times daily as needed (sob, wheezing). Rescue     * albuterol 90 mcg/actuation inhaler  Commonly known as: PROVENTIL/VENTOLIN HFA  Inhale 1-2 puffs into the lungs every 4 (four) hours as needed for Wheezing. Rescue     ALPRAZolam 1 MG tablet  Commonly known as: XANAX  Take 1 tablet (1 mg total) by mouth 3 (three) times daily as needed for Anxiety.     apixaban 5 mg Tab  Commonly known as: ELIQUIS  Take 1 tablet (5 mg total) by mouth 2 (two) times daily.     BREO ELLIPTA 100-25 mcg/dose diskus inhaler  Generic drug: fluticasone furoate-vilanteroL  Inhale 1 puff into the lungs once daily. Controller     cyclobenzaprine 10 MG tablet  Commonly known as: FLEXERIL  Take 1 tablet (10 mg total) by mouth 3 (three) times daily as needed for Muscle spasms.     EScitalopram oxalate 20 MG tablet  Commonly known as: LEXAPRO  Take 1 tablet (20 mg total) by mouth once daily.     levothyroxine 125 MCG tablet  Commonly known as: SYNTHROID  Take 1 tablet (125 mcg total) by mouth before breakfast.     naloxone 4 mg/actuation Spry  Commonly known as: NARCAN  1 spray once.     nicotine 14 mg/24 hr  Commonly known as: NICODERM CQ  Place 1 patch onto the skin every 24 hours.     pregabalin 75 MG capsule  Commonly known as: LYRICA  Take 1 capsule (75 mg total) by mouth every evening.     tamsulosin 0.4 mg Cap  Commonly known as: FLOMAX  Take 1 capsule by mouth once daily.     vitamin D 1000 units Tab  Commonly known as: VITAMIN D3  Take 25 mcg by mouth once daily.           * This list has 2 medication(s) that are the same as other medications prescribed for you. Read the directions carefully, and ask your doctor or other care provider to review them with you.                STOP taking these medications      cefdinir 300 MG  capsule  Commonly known as: OMNICEF     HYDROcodone-acetaminophen  mg per tablet  Commonly known as: NORCO     XTAMPZA ER 9 mg Cspt  Generic drug: oxyCODONE myristate              Significant Diagnostic Studies: Labs: CMP   Recent Labs   Lab 04/11/24  0442      K 3.2*      CO2 25   GLU 90   BUN 7*   CREATININE 0.9   CALCIUM 8.6*   ANIONGAP 9   , CBC   Recent Labs   Lab 04/11/24  0442 04/12/24  0510   WBC 9.28 8.23   HGB 8.4* 8.8*   HCT 28.1* 29.3*    253   , INR   Lab Results   Component Value Date    INR 1.1 04/03/2024    INR 1.1 02/20/2024    INR 1.1 02/02/2024   , and All labs within the past 24 hours have been reviewed  Radiology: X-Ray: CXR: X-Ray Chest 1 View (CXR): No results found for this visit on 04/03/24. and KUB: X-Ray Abdomen AP 1 View (KUB): No results found for this visit on 04/03/24.  Cardiac Graphics: ECG:     Pending Diagnostic Studies:       None          Indwelling Lines/Drains at time of discharge:   Lines/Drains/Airways       Central Venous Catheter Line  Duration             Percutaneous Central Line - Triple Lumen  04/05/24 1752 Internal Jugular Right 6 days              Drain  Duration                  Nephrostomy Left -- days         Nephrostomy 02/03/24 1044 Left 8 Fr. 69 days         Nephrostomy 02/03/24 1045 Right 10 Fr. 69 days                    Time spent on the discharge of patient: 30 minutes        Juan Carlos Parra MD  Department of Hospital Medicine  [facility] - Telemetry ([facility])

## 2024-04-12 NOTE — PLAN OF CARE
Patient resuming services with [facility].       04/12/24 8790   Post-Acute Status   Post-Acute Authorization Home Health   Home Health Status Set-up Complete/Auth obtained   Discharge Plan   Discharge Plan A [facility]

## 2024-04-12 NOTE — PLAN OF CARE
Continue OT POC.  Pt refusing OT session at this time despite max encouragement. Pt appears lethargic.

## 2024-04-12 NOTE — PT/OT/SLP PROGRESS
"Physical Therapy  Treatment    Geovani Currie   MRN: [id number]   Admitting Diagnosis: Partial intestinal obstruction    PT Received On: 04/12/24  PT Start Time: 1010     PT Stop Time: 1020    PT Total Time (min): 10 min       Billable Minutes:  Therapeutic Activity 10    Treatment Type: Treatment  PT/PTA: PTA     Number of PTA visits since last PT visit: 1       General Precautions: Standard, fall  Orthopedic Precautions: N/A  Braces: N/A  Respiratory Status: Room air         Subjective:  Communicated with patient's nurse, Mavis, and completed Epic chart review prior to session.  Patient refused to participate in PT session due to complaints of bladder pain. Patient also appeared lethargic throughout session.     Pain/Comfort  Pain Rating 1: 5/10 ("BLADDER")  Pain Addressed 1: Other (see comments) (ACTIVITY PACING)  Pain Rating Post-Intervention 1: 5/10    Objective:   Patient found with: central line, nephrostomy, telemetry, bed alarm    Patient found askew in bed.   Adamantly refused participation in PT session despite max encouragement.   Educated patient on importance of full and active participation in functional mobility training to prevent further loss of ROM and strength.   Encouraged patient to request assistance from nursing staff to get OOB at least 3x/day with all meals in addition to ambulating to/from the bathroom to promote recovery of CV endurance. Also encouraged patient to perform AROM TE to BLE throughout the day within all available planes of motion. Re enforced importance of utilizing call light to meet needs in room and not attempt to get up without staff assistance. Patient verbalized understanding of all education given and agreed to comply.    Patient also refused offer to assist with repositioning.     AM-PAC 6 CLICK MOBILITY  How much help from another person does this patient currently need?   1 = Unable, Total/Dependent Assistance  2 = A lot, Maximum/Moderate Assistance  3 = A little, " Minimum/Contact Guard/Supervision  4 = None, Modified [place]/Independent    Turning over in bed (including adjusting bedclothes, sheets and blankets)?: 1 (REF)  Sitting down on and standing up from a chair with arms (e.g., wheelchair, bedside commode, etc.): 1 (REF)  Moving from lying on back to sitting on the side of the bed?: 1 (REF)  Moving to and from a bed to a chair (including a wheelchair)?: 1 (REF)  Need to walk in hospital room?: 1 (REF)  Climbing 3-5 steps with a railing?: 1 (NT)  Basic Mobility Total Score: 6    AM-PAC Raw Score CMS G-Code Modifier Level of Impairment Assistance   6 % Total / Unable   7 - 9 CM 80 - 100% Maximal Assist   10 - 14 CL 60 - 80% Moderate Assist   15 - 19 CK 40 - 60% Moderate Assist   20 - 22 CJ 20 - 40% Minimal Assist   23 CI 1-20% SBA / CGA   24 CH 0% Independent/ Mod I     Patient left HOB elevated with call button in reach, bed alarm on, and wife present.    Assessment:  Geovani Currie is a 68 y.o. male with a medical diagnosis of Partial intestinal obstruction and presents with overall decline in functional mobility. Patient would continue to benefit from skilled PT to address functional limitations listed below in order to return to PLOF/decrease caregiver burden.     Rehab identified problem list/impairments: weakness, impaired endurance, impaired self care skills, impaired functional mobility, gait instability, impaired balance, pain, decreased safety awareness, decreased lower extremity function, decreased upper extremity function, impaired cognition, decreased ROM, impaired cardiopulmonary response to activity    Rehab potential is fair.    Activity tolerance: unable to determine    Discharge recommendations: Low Intensity Therapy      Barriers to discharge:      Equipment recommendations: none     GOALS:   Multidisciplinary Problems       Physical Therapy Goals          Problem: Physical Therapy    Goal Priority Disciplines Outcome Goal Variances  Interventions   Physical Therapy Goal     PT, PT/OT Ongoing, Progressing     Description: LTG'S TO BE MET IN 14 DAYS (4-23-24)  PT WILL REQUIRE SBA FOR BED MOBILITY  PT WILL REQUIRE CGA FOR BED<>CHAIR TF'S  PT WILL  FEET WITH RW AND CGA  PT WILL INC AMPAC SCORE BY 2 POINTS TO PROGRESS GROSS FUNC MOBILITY                         PLAN:    Patient to be seen 3 x/week to address the above listed problems via gait training, therapeutic activities, therapeutic exercises  Plan of Care expires: 04/23/24  Plan of Care reviewed with: patient, spouse         04/12/2024

## 2024-04-12 NOTE — PLAN OF CARE
Pt oriented x4 VSS  Plan of care reviewed. pt verbalizes understanding.  Patient moving/ turning with assistance.  Patient sinus tachy on monitor  Bed low, side rails up x2, wheels locked, call light in reach.  Pt instructed to call for assistance

## 2024-04-12 NOTE — PT/OT/SLP PROGRESS
"Occupational Therapy   Treatment    Name: Geovani Currie  MRN: [id number]  Admitting Diagnosis:  Partial intestinal obstruction  7 Days Post-Op    Recommendations:     Discharge Recommendations: Low Intensity Therapy  Discharge Equipment Recommendations:  none  Barriers to discharge:  None    Assessment:     Geovani Curire is a 68 y.o. male with a medical diagnosis of Partial intestinal obstruction.  He presents with the following performance deficits affecting function are weakness, impaired endurance, impaired self care skills, impaired functional mobility, gait instability, impaired balance, impaired cognition, decreased upper extremity function, decreased lower extremity function, decreased safety awareness, pain, decreased ROM, impaired cardiopulmonary response to activity.     Rehab Prognosis:  Fair; patient would benefit from acute skilled OT services to address these deficits and reach maximum level of function.       Plan:     Patient to be seen 2 x/week to address the above listed problems via self-care/home management, therapeutic activities, therapeutic exercises  Plan of Care Expires: 04/23/24  Plan of Care Reviewed with: patient, spouse    Subjective     Chief Complaint: pain, fatigue  Patient/Family Comments/goals: none stated  Pain/Comfort:  Pain Rating 1: 5/10  Location - Orientation 1: generalized  Location 1:  ("bladder")  Pain Addressed 1: Other (see comments) (acknowledged)  Pain Rating Post-Intervention 1: 5/10    Pt adamantly refusing participation in OT session at this time despite max encouragement d/t bladder pain. Pt also refusing assist with repositioning in bed. Pt appeared lethargic throughout.   Objective:     Communicated with: Nurse and epic chart review prior to session.  Patient found  askew in bed with HOB elevated, BLE hanging off bed  with central line, nephrostomy, telemetry, bed alarm upon OT entry to room.    General Precautions: Standard, fall    Orthopedic " Precautions:N/A  Braces: N/A  Respiratory Status: Room air     AMPAC 6 Click ADL: 15    Treatment & Education:  Patient educated on role of OT in acute setting and benefits of participation. Educated on importance of OOB activity and calling for A to transfer and meet needs. Encouraged completion of B UE AROM therex throughout the day to tolerance to increase functional strength and activity tolerance. Educated patient on importance of increased tolerance to upright position and direct impact on CV endurance and strength. Patient encouraged to sit up in chair for a minimum of 2 consecutive hours per day. Encouraged patient to request assistance from nursing staff to get OOB at least 3x/day to ambulate to/from the bathroom. Patient stated understanding and in agreement with POC.     Patient left HOB elevated with all lines intact, call button in reach, bed alarm on, and wife present    GOALS:   Multidisciplinary Problems       Occupational Therapy Goals          Problem: Occupational Therapy    Goal Priority Disciplines Outcome Interventions   Occupational Therapy Goal     OT, PT/OT Ongoing, Progressing    Description: Goals to be met by: 4/23/24     Patient will increase functional independence with ADLs by performing:    Toileting from bedside commode with Minimal Assistance for hygiene and clothing management.   Toilet transfer to bedside commode with CGA.  Increased functional strength in B UE grossly by 1/2 MM grade.                         Time Tracking:     OT Date of Treatment: 04/12/24  OT Start Time: 1010  OT Stop Time: 1020  OT Total Time (min): 10 min    Billable Minutes:Therapeutic Activity 10    OT/CHAPARRITA: SEBASTIAN Saldivar, OT     4/12/2024

## 2024-04-12 NOTE — ASSESSMENT & PLAN NOTE
Care per primary team    The patient should follow up with his oncologist postoperatively to discuss continued treatment options versus hospice care

## 2024-04-15 PROBLEM — N39.0 UTI (URINARY TRACT INFECTION): Status: RESOLVED | Noted: 2023-10-21 | Resolved: 2024-04-15

## 2024-04-15 PROBLEM — A41.9 SEPSIS: Status: RESOLVED | Noted: 2024-01-12 | Resolved: 2024-04-15

## 2024-04-16 ENCOUNTER — PATIENT MESSAGE (OUTPATIENT)
Dept: PALLIATIVE MEDICINE | Facility: CLINIC | Age: 69
End: 2024-04-16
Payer: MEDICARE

## 2024-04-16 DIAGNOSIS — E03.9 HYPOTHYROIDISM, UNSPECIFIED TYPE: ICD-10-CM

## 2024-04-16 DIAGNOSIS — G89.3 CANCER RELATED PAIN: ICD-10-CM

## 2024-04-16 DIAGNOSIS — M54.16 LUMBAR RADICULOPATHY: ICD-10-CM

## 2024-04-16 DIAGNOSIS — F41.9 ANXIETY: ICD-10-CM

## 2024-04-16 DIAGNOSIS — M48.00 SPINAL STENOSIS, UNSPECIFIED SPINAL REGION: ICD-10-CM

## 2024-04-16 DIAGNOSIS — Z85.51 HX OF BLADDER CANCER: ICD-10-CM

## 2024-04-16 DIAGNOSIS — M47.816 LUMBAR SPONDYLOSIS: ICD-10-CM

## 2024-04-16 DIAGNOSIS — G89.3 NEOPLASM RELATED PAIN: ICD-10-CM

## 2024-04-16 DIAGNOSIS — C34.91 SQUAMOUS CELL CARCINOMA OF RIGHT LUNG: ICD-10-CM

## 2024-04-16 DIAGNOSIS — C67.9 MALIGNANT NEOPLASM OF URINARY BLADDER, UNSPECIFIED SITE: Primary | ICD-10-CM

## 2024-04-16 DIAGNOSIS — M51.36 DDD (DEGENERATIVE DISC DISEASE), LUMBAR: ICD-10-CM

## 2024-04-16 PROCEDURE — 99499 UNLISTED E&M SERVICE: CPT | Mod: S$PBB,,, | Performed by: FAMILY MEDICINE

## 2024-04-16 RX ORDER — CYCLOBENZAPRINE HCL 10 MG
10 TABLET ORAL 3 TIMES DAILY PRN
Qty: 270 TABLET | Refills: 3 | Status: SHIPPED | OUTPATIENT
Start: 2024-04-16

## 2024-04-16 RX ORDER — LEVOTHYROXINE SODIUM 125 UG/1
125 TABLET ORAL
Qty: 90 TABLET | Refills: 3 | Status: SHIPPED | OUTPATIENT
Start: 2024-04-16

## 2024-04-16 RX ORDER — ESCITALOPRAM OXALATE 20 MG/1
20 TABLET ORAL DAILY
Qty: 90 TABLET | Refills: 3 | Status: SHIPPED | OUTPATIENT
Start: 2024-04-16

## 2024-04-16 RX ORDER — ALPRAZOLAM 1 MG/1
1 TABLET ORAL 3 TIMES DAILY PRN
Qty: 90 TABLET | Refills: 5 | OUTPATIENT
Start: 2024-04-16

## 2024-04-16 RX ORDER — PREGABALIN 75 MG/1
75 CAPSULE ORAL NIGHTLY
Qty: 30 CAPSULE | Refills: 0 | Status: SHIPPED | OUTPATIENT
Start: 2024-04-16 | End: 2024-06-12

## 2024-04-16 NOTE — TELEPHONE ENCOUNTER
No care due was identified.  [facility] Embedded Care Due Messages. Reference number: [id number].   4/16/2024 2:51:52 PM CDT

## 2024-04-16 NOTE — TELEPHONE ENCOUNTER
No care due was identified.  [facility] Embedded Care Due Messages. Reference number: [id number].   4/16/2024 2:41:03 PM CDT

## 2024-04-22 ENCOUNTER — DOCUMENT SCAN (OUTPATIENT)
Dept: HOME HEALTH SERVICES | Facility: HOSPITAL | Age: 69
End: 2024-04-22
Payer: MEDICARE

## 2024-04-22 ENCOUNTER — PATIENT MESSAGE (OUTPATIENT)
Dept: PRIMARY CARE CLINIC | Facility: CLINIC | Age: 69
End: 2024-04-22
Payer: MEDICARE

## 2024-04-22 ENCOUNTER — PATIENT MESSAGE (OUTPATIENT)
Dept: UROLOGY | Facility: CLINIC | Age: 69
End: 2024-04-22
Payer: MEDICARE

## 2024-04-22 NOTE — TELEPHONE ENCOUNTER
Spoke with pts daughter and she advised me that she need to reschedule appt to get Nephrostomy tubes changed. I gave pts daughter the number to call. Pts daughter advised me that she will call and rescheduled appt

## 2024-04-23 ENCOUNTER — PATIENT MESSAGE (OUTPATIENT)
Dept: PRIMARY CARE CLINIC | Facility: CLINIC | Age: 69
End: 2024-04-23
Payer: MEDICARE

## 2024-04-23 ENCOUNTER — TELEPHONE (OUTPATIENT)
Dept: UROLOGY | Facility: CLINIC | Age: 69
End: 2024-04-23
Payer: MEDICARE

## 2024-04-23 NOTE — TELEPHONE ENCOUNTER
Called pts daughter to find out if dad got orders to have his nephrostomy tubes changed; I was told that they received a call from IR on yesterday.

## 2024-04-24 ENCOUNTER — OFFICE VISIT (OUTPATIENT)
Dept: PRIMARY CARE CLINIC | Facility: CLINIC | Age: 69
End: 2024-04-24
Payer: MEDICARE

## 2024-04-24 ENCOUNTER — PATIENT MESSAGE (OUTPATIENT)
Dept: INFECTIOUS DISEASES | Facility: CLINIC | Age: 69
End: 2024-04-24
Payer: MEDICARE

## 2024-04-24 ENCOUNTER — HOSPITAL ENCOUNTER (OUTPATIENT)
Dept: RADIOLOGY | Facility: HOSPITAL | Age: 69
Discharge: HOME | End: 2024-04-24
Attending: STUDENT IN AN ORGANIZED HEALTH CARE EDUCATION/TRAINING PROGRAM
Payer: MEDICARE

## 2024-04-24 VITALS
HEART RATE: 72 BPM | TEMPERATURE: 97 F | OXYGEN SATURATION: 96 % | SYSTOLIC BLOOD PRESSURE: 130 MMHG | HEIGHT: 68 IN | DIASTOLIC BLOOD PRESSURE: 64 MMHG | WEIGHT: 157.44 LBS | BODY MASS INDEX: 23.86 KG/M2

## 2024-04-24 VITALS
HEIGHT: 68 IN | WEIGHT: 150 LBS | SYSTOLIC BLOOD PRESSURE: 138 MMHG | OXYGEN SATURATION: 98 % | DIASTOLIC BLOOD PRESSURE: 61 MMHG | BODY MASS INDEX: 22.73 KG/M2 | RESPIRATION RATE: 18 BRPM | HEART RATE: 62 BPM

## 2024-04-24 DIAGNOSIS — Z09 HOSPITAL DISCHARGE FOLLOW-UP: Primary | ICD-10-CM

## 2024-04-24 DIAGNOSIS — T83.022A NEPHROSTOMY TUBE DISPLACED: ICD-10-CM

## 2024-04-24 DIAGNOSIS — M79.89 LEG SWELLING: ICD-10-CM

## 2024-04-24 LAB
INR PPP: 1.1 (ref 0.8–1.2)
PROTHROMBIN TIME: 13.1 SEC (ref 9–12.5)

## 2024-04-24 PROCEDURE — G2211 COMPLEX E/M VISIT ADD ON: HCPCS | Mod: S$PBB,,, | Performed by: FAMILY MEDICINE

## 2024-04-24 PROCEDURE — 99999 PR PBB SHADOW E&M-EST. PATIENT-LVL V: CPT | Mod: PBBFAC,,, | Performed by: FAMILY MEDICINE

## 2024-04-24 PROCEDURE — 87205 SMEAR GRAM STAIN: CPT | Mod: 59 | Performed by: RADIOLOGY

## 2024-04-24 PROCEDURE — 50435 EXCHANGE NEPHROSTOMY CATH: CPT | Mod: 50 | Performed by: RADIOLOGY

## 2024-04-24 PROCEDURE — 99215 OFFICE O/P EST HI 40 MIN: CPT | Mod: PBBFAC,PN | Performed by: FAMILY MEDICINE

## 2024-04-24 PROCEDURE — 87077 CULTURE AEROBIC IDENTIFY: CPT | Performed by: RADIOLOGY

## 2024-04-24 PROCEDURE — 87070 CULTURE OTHR SPECIMN AEROBIC: CPT | Performed by: RADIOLOGY

## 2024-04-24 PROCEDURE — G0179 MD RECERTIFICATION HHA PT: HCPCS | Mod: ,,, | Performed by: FAMILY MEDICINE

## 2024-04-24 PROCEDURE — 27200199 IR NEPHROSTOMY TUBE CHANGE

## 2024-04-24 PROCEDURE — 25000003 PHARM REV CODE 250: Performed by: RADIOLOGY

## 2024-04-24 PROCEDURE — 85610 PROTHROMBIN TIME: CPT | Performed by: RADIOLOGY

## 2024-04-24 PROCEDURE — 25500020 PHARM REV CODE 255: Performed by: RADIOLOGY

## 2024-04-24 PROCEDURE — 87075 CULTR BACTERIA EXCEPT BLOOD: CPT | Mod: 59 | Performed by: RADIOLOGY

## 2024-04-24 PROCEDURE — 87076 CULTURE ANAEROBE IDENT EACH: CPT | Mod: 59 | Performed by: RADIOLOGY

## 2024-04-24 PROCEDURE — 87186 SC STD MICRODIL/AGAR DIL: CPT | Mod: 59 | Performed by: RADIOLOGY

## 2024-04-24 PROCEDURE — 99215 OFFICE O/P EST HI 40 MIN: CPT | Mod: S$PBB,,, | Performed by: FAMILY MEDICINE

## 2024-04-24 RX ORDER — FUROSEMIDE 20 MG/1
20 TABLET ORAL 2 TIMES DAILY PRN
Qty: 60 TABLET | Refills: 11 | Status: SHIPPED | OUTPATIENT
Start: 2024-04-24 | End: 2025-04-24

## 2024-04-24 RX ORDER — LIDOCAINE HYDROCHLORIDE 20 MG/ML
INJECTION, SOLUTION EPIDURAL; INFILTRATION; INTRACAUDAL; PERINEURAL CODE/TRAUMA/SEDATION MEDICATION
Status: COMPLETED | OUTPATIENT
Start: 2024-04-24 | End: 2024-04-24

## 2024-04-24 RX ADMIN — LIDOCAINE HYDROCHLORIDE 5 ML: 20 INJECTION, SOLUTION EPIDURAL; INFILTRATION; INTRACAUDAL; PERINEURAL at 03:04

## 2024-04-24 RX ADMIN — IOHEXOL 15 ML: 300 INJECTION, SOLUTION INTRAVENOUS at 03:04

## 2024-04-24 NOTE — PLAN OF CARE
Bilateral nephrostomy tubes changed, C/D/I with no bleeding/redness/swelling noted. VSS, NADN, and pt meets criteria for discharge. Discharge instructions given to and reviewed with pt, and pt verbalized understanding of all. Pt discharged to home, taken out via wheelchair and driven home by spouse.

## 2024-04-24 NOTE — PATIENT INSTRUCTIONS
Was able to speak with your Interventional Radiology.  They can remove/replace the nephrostomy tubes today.  Please go to the [facility] hospital on [address], 1st floor, radiology department and they will take it from there.      Orders have been placed to collect the clean urine sample once they replace the tubes.  Please remind them when you get there, but they should be able to make that happen.      I will send some Lasix to the pharmacy later today to keep on hand.  This way, if we get some swelling in the legs, or if we get some trouble breathing, we can try that to get some fluid off.      If we are not going back to [place] in the next week or so, I do recommend getting established with a hospice agency here.  We can always transfer to one in [place] when you are able to make it back home.

## 2024-04-24 NOTE — PROGRESS NOTES
"    [facility]       [address] [place] [address]      Phone: [phone]      Fax: [phone]    Faizan Antoine MD                Office Visit  04/24/2024        Subjective      HPI:  Geovani Currie is a 68 y.o. male presents today in clinic for "Follow-up (Pt c/o right foot swollen, chest congestion, tubes bleeding and low B/P)  ."     68-year-old gentleman presents today for hospital follow-up.      His wife, Chantal, is present.  She provides the history.    Patient was recently in the hospital again.  He went to Interventional Radiology to get his urostomy tubes replaced.  At that time, he was having some abdominal pains, and the scan they did prior to to replacement showed a small bowel obstruction.  Wound up being admitted, had surgery on his small bowel.    Wife states that the hospital stay was a very stressful situation.  Patient was acting crazy.  Pulled out his IV.  [place] like the O2 mask was monitoring him.  Was extremely agitated.  She thinks it was because he was in pain after the surgery.  Once he had a bowel movement, he was able to settled down.    After he had a bowel movement, they were fairly quickly discharged from the hospital.  She states he still did not get his urostomy tubes replaced.  They were able to schedule an appointment for next Monday to get this done, but today she noticed the left nephrostomy tube had some blood on it.  Also appeared to be leaking urine.  The right tube looks like it was pulled out a bit, but no leakage at this time.      He has a known DVT in his left leg.  When it was discovered, the leg was swelling.  She states that at home the other day his right foot started swelling up.  Swelling has increased a bit since then.    Separately, she states that the infectious disease doctor, Dr. Hartman, wanted to get a clean urine sample when he gets the tubes replaced.      Additionally, when he had the scan done a " few weeks ago, it did show his cancer was progressing.  They are actively trying to get back home to [place].  His plan was always to be back home when he passes.  Every time they think he stable enough for travel, something happens to let him back in the hospital.    PMH:  Bladder cancer.  Lung cancer.  HTN.  HLD.  COPD.  Hypothyroid.  Spinal stenosis.  PSH: Left kidney stent.  Nephrostomy tube right kidney.  Appendectomy.  Cataracts.  Finger amputations (accident).  Right hip.  Allergies: NKDA  Social:  Retired.  .  T: Denies current use.  Quit a couple of weeks ago.    A has   D:  Denies    Exercise:  No regular exercise program.  Limited mobility with his health issues.        The following were updated and reviewed by myself in the chart: medications, past medical history, past surgical history, family history, social history, and allergies.     Medications:  Current Outpatient Medications on File Prior to Visit   Medication Sig Dispense Refill    albuterol (ACCUNEB) 0.63 mg/3 mL Nebu Take 3 mLs (0.63 mg total) by nebulization 3 (three) times daily as needed (sob, wheezing). Rescue 75 mL 3    albuterol (PROVENTIL/VENTOLIN HFA) 90 mcg/actuation inhaler Inhale 1-2 puffs into the lungs every 4 (four) hours as needed for Wheezing. Rescue 18 g 11    ALPRAZolam (XANAX) 1 MG tablet Take 1 tablet (1 mg total) by mouth 3 (three) times daily as needed for Anxiety. 90 tablet 5    apixaban (ELIQUIS) 5 mg Tab Take 1 tablet (5 mg total) by mouth 2 (two) times daily. 180 tablet 3    bisacodyL 5 mg Tab Take 1 tablet by mouth Daily. 7 tablet 0    cyclobenzaprine (FLEXERIL) 10 MG tablet Take 1 tablet (10 mg total) by mouth 3 (three) times daily as needed for Muscle spasms. 270 tablet 3    EScitalopram oxalate (LEXAPRO) 20 MG tablet Take 1 tablet (20 mg total) by mouth once daily. 90 tablet 3    fluticasone furoate-vilanteroL (BREO ELLIPTA) 100-25 mcg/dose diskus inhaler Inhale 1 puff into the lungs once daily.  Controller 30 each 11    levothyroxine (SYNTHROID) 125 MCG tablet Take 1 tablet (125 mcg total) by mouth before breakfast. 90 tablet 3    naloxone (NARCAN) 4 mg/actuation Spry 1 spray once.      nicotine (NICODERM CQ) 14 mg/24 hr Place 1 patch onto the skin every 24 hours.      oxyCODONE (ROXICODONE) 5 MG immediate release tablet Take 1 tablet (5 mg total) by mouth every 6 (six) hours as needed for Pain. 21 tablet 0    pregabalin (LYRICA) 75 MG capsule Take 1 capsule (75 mg total) by mouth every evening. 30 capsule 0    tamsulosin (FLOMAX) 0.4 mg Cap Take 1 capsule by mouth once daily.      vitamin D (VITAMIN D3) 1000 units Tab Take 25 mcg by mouth once daily.       No current facility-administered medications on file prior to visit.        PMHx:  Past Medical History:   Diagnosis Date    Anxiety disorder, unspecified     Cancer     COPD (chronic obstructive pulmonary disease)     Hypertension     Thyroid disease       Patient Active Problem List    Diagnosis Date Noted    Acute cystitis 02/04/2024    Acute deep vein thrombosis (DVT) of femoral vein of left lower extremity 02/03/2024    Acute deep vein thrombosis (DVT) of left lower extremity 02/03/2024    Anxiety 02/03/2024    Acute renal failure superimposed on stage 3 chronic kidney disease 02/03/2024    Hypothyroidism 01/12/2024    Encephalopathy, metabolic 01/12/2024    Malfunction of nephrostomy tube 01/12/2024    Constipation 01/12/2024    Hypotension 01/12/2024    Stage 4 chronic kidney disease 12/28/2023    Encounter for palliative care 12/21/2023    Right hip pain 11/21/2023    Need for tetanus, diphtheria, and acellular pertussis (Tdap) vaccine 11/20/2023    Abrasion of right elbow 11/20/2023    Displaced intertrochanteric fracture of right femur, initial encounter for closed fracture 11/20/2023    Right elbow pain 11/20/2023    Malignant neoplasm of urinary bladder 11/15/2023    Ureteral obstruction, left 11/15/2023    Squamous cell carcinoma of right  lung 11/09/2023    Centrilobular emphysema 11/09/2023    Anemia 10/21/2023    Hx of cancer of lung 10/21/2023    Hyperglycemia 10/21/2023        PSHx:  Past Surgical History:   Procedure Laterality Date    APPENDECTOMY      CATARACT EXTRACTION      ENTEROENTEROSTOMY N/A 04/05/2024    Procedure: ENTEROENTEROSTOMY;  Surgeon: Silvestre Ramos MD;  Location: [facility] OR;  Service: General;  Laterality: N/A;  enterocolostomy, small bowel to transverse colon anastamosis    INJECTION OF ANESTHETIC AGENT INTO TISSUE PLANE DEFINED BY TRANSVERSUS ABDOMINIS MUSCLE N/A 04/05/2024    Procedure: BLOCK, TRANSVERSUS ABDOMINIS PLANE;  Surgeon: Silvestre Ramos MD;  Location: [facility] OR;  Service: General;  Laterality: N/A;    LAPAROTOMY, EXPLORATORY N/A 04/05/2024    Procedure: LAPAROTOMY, EXPLORATORY;  Surgeon: Silvestre Ramos MD;  Location: [facility] OR;  Service: General;  Laterality: N/A;    NEPHROSTOMY      OPEN REDUCTION AND INTERNAL FIXATION (ORIF) OF INTERTROCHANTERIC FRACTURE OF FEMUR Right 11/21/2023    Procedure: ORIF, FRACTURE, FEMUR, INTERTROCHANTERIC;  Surgeon: Tanisha Guidry DO;  Location: [facility] OR;  Service: Orthopedics;  Laterality: Right;  Gamma nail    small bowel obstruction  N/A 04/03/2024        FHx:  Family History   Problem Relation Name Age of Onset    Cancer Mother          NOS    Cancer Father          NOS    Cancer Maternal Grandfather          NOS    Bladder Cancer Neg Hx          Social:  Social History     Socioeconomic History    Marital status:    Tobacco Use    Smoking status: Every Day     Types: Vaping with nicotine     Passive exposure: Current    Smokeless tobacco: Never   Substance and Sexual Activity    Alcohol use: Never    Drug use: Never    Sexual activity: Not Currently     Partners: Female   Social History Narrative    Lives in [place] with daughter, Sera. Accompanied by Sera and wife Kailey. Long term smoking history. No longer smoking; uses a vape pen.     Social Determinants of  "Health     Financial Resource Strain: High Risk (11/10/2023)    Overall Financial Resource Strain (CARDIA)     Difficulty of Paying Living Expenses: Very hard   Food Insecurity: Food Insecurity Present (11/10/2023)    Hunger Vital Sign     Worried About Running Out of Food in the Last Year: Often true     Ran Out of Food in the Last Year: Often true   Transportation Needs: No Transportation Needs (4/4/2024)    PRAPARE - Transportation     Lack of Transportation (Medical): No     Lack of Transportation (Non-Medical): No   Physical Activity: Inactive (11/10/2023)    Exercise Vital Sign     Days of Exercise per Week: 0 days     Minutes of Exercise per Session: 0 min   Stress: Stress Concern Present (11/10/2023)    [employer] - Occupational Stress Questionnaire     Feeling of Stress : Rather much   Social Connections: Unknown (11/10/2023)    Social Connection and Isolation Panel [NHANES]     Frequency of Communication with Friends and Family: More than three times a week     Frequency of Social Gatherings with Friends and Family: Never     Attends Restoration Services: Patient declined     Active Member of Clubs or Organizations: No     Attends Club or Organization Meetings: Never     Marital Status:    Housing Stability: High Risk (11/10/2023)    Housing Stability Vital Sign     Unable to Pay for Housing in the Last Year: Yes     Number of Places Lived in the Last Year: 3     Unstable Housing in the Last Year: No        Allergies:  Review of patient's allergies indicates:  No Known Allergies     ROS:  Review of Systems   Respiratory:  Positive for shortness of breath.    Cardiovascular:  Positive for leg swelling.          Objective      /64   Pulse 72   Temp 97.1 °F (36.2 °C)   Ht 5' 8" (1.727 m)   Wt 71.4 kg (157 lb 6.5 oz)   SpO2 96%   BMI 23.93 kg/m²   Ht Readings from Last 3 Encounters:   04/24/24 5' 8" (1.727 m)   04/24/24 5' 8" (1.727 m)   04/02/24 5' 8" (1.727 m) "     Wt Readings from Last 3 Encounters:   24 68 kg (150 lb)   24 71.4 kg (157 lb 6.5 oz)   24 68.3 kg (150 lb 9.2 oz)       PHYSICAL EXAM:  Physical Exam  Vitals and nursing note reviewed.   Constitutional:       General: He is not in acute distress.     Appearance: He is cachectic.   HENT:      Head: Normocephalic and atraumatic.      Nose: Nose normal.      Mouth/Throat:      Mouth: Mucous membranes are moist.      Pharynx: Oropharynx is clear. No oropharyngeal exudate or posterior oropharyngeal erythema.   Eyes:      Extraocular Movements: Extraocular movements intact.      Conjunctiva/sclera: Conjunctivae normal.      Pupils: Pupils are equal, round, and reactive to light.   Cardiovascular:      Rate and Rhythm: Normal rate and regular rhythm.   Pulmonary:      Effort: Pulmonary effort is normal.      Breath sounds: No wheezing, rhonchi or rales.   Musculoskeletal:         General: Normal range of motion.      Cervical back: Normal range of motion.      Right lower le+ Pitting Edema present.      Left lower le+ Pitting Edema present.   Lymphadenopathy:      Cervical: No cervical adenopathy.   Skin:     General: Skin is warm and dry.             Comments: Left nephrostomy tube appears to be leaking, some bloody discharge.  Right tube appears okay.  Has surgical incision on lower abdomen.  Staples in place.  Wound appears to be healing okay.  No erythema, discharge.  Nontender.   Neurological:      General: No focal deficit present.      Mental Status: He is alert.              LABS / IMAGING:  Recent Results (from the past 4368 hour(s))   CBC Auto Differential    Collection Time: 23 10:34 AM   Result Value Ref Range    WBC 8.37 3.90 - 12.70 K/uL    RBC 3.49 (L) 4.60 - 6.20 M/uL    Hemoglobin 9.4 (L) 14.0 - 18.0 g/dL    Hematocrit 30.6 (L) 40.0 - 54.0 %    MCV 88 82 - 98 fL    MCH 26.9 (L) 27.0 - 31.0 pg    MCHC 30.7 (L) 32.0 - 36.0 g/dL    RDW 14.6 (H) 11.5 - 14.5 %    Platelets  262 150 - 450 K/uL    MPV 10.3 9.2 - 12.9 fL    Immature Granulocytes 0.1 0.0 - 0.5 %    Gran # (ANC) 6.5 1.8 - 7.7 K/uL    Immature Grans (Abs) 0.01 0.00 - 0.04 K/uL    Lymph # 1.0 1.0 - 4.8 K/uL    Mono # 0.6 0.3 - 1.0 K/uL    Eos # 0.2 0.0 - 0.5 K/uL    Baso # 0.06 0.00 - 0.20 K/uL    nRBC 0 0 /100 WBC    Gran % 77.5 (H) 38.0 - 73.0 %    Lymph % 12.4 (L) 18.0 - 48.0 %    Mono % 6.6 4.0 - 15.0 %    Eosinophil % 2.7 0.0 - 8.0 %    Basophil % 0.7 0.0 - 1.9 %    Differential Method Automated    Comprehensive Metabolic Panel    Collection Time: 11/07/23 10:34 AM   Result Value Ref Range    Sodium 141 136 - 145 mmol/L    Potassium 4.9 3.5 - 5.1 mmol/L    Chloride 107 95 - 110 mmol/L    CO2 27 23 - 29 mmol/L    Glucose 89 70 - 110 mg/dL    BUN 20 8 - 23 mg/dL    Creatinine 1.4 0.5 - 1.4 mg/dL    Calcium 9.8 8.7 - 10.5 mg/dL    Total Protein 7.1 6.0 - 8.4 g/dL    Albumin 3.0 (L) 3.5 - 5.2 g/dL    Total Bilirubin 0.4 0.1 - 1.0 mg/dL    Alkaline Phosphatase 114 55 - 135 U/L    AST 10 10 - 40 U/L    ALT 11 10 - 44 U/L    eGFR 54.4 (A) >60 mL/min/1.73 m^2    Anion Gap 7 (L) 8 - 16 mmol/L   CBC auto differential    Collection Time: 11/10/23  6:58 PM   Result Value Ref Range    WBC 7.82 3.90 - 12.70 K/uL    RBC 3.55 (L) 4.60 - 6.20 M/uL    Hemoglobin 9.4 (L) 14.0 - 18.0 g/dL    Hematocrit 30.5 (L) 40.0 - 54.0 %    MCV 86 82 - 98 fL    MCH 26.5 (L) 27.0 - 31.0 pg    MCHC 30.8 (L) 32.0 - 36.0 g/dL    RDW 14.6 (H) 11.5 - 14.5 %    Platelets 222 150 - 450 K/uL    MPV 9.6 9.2 - 12.9 fL    Immature Granulocytes 0.3 0.0 - 0.5 %    Gran # (ANC) 5.6 1.8 - 7.7 K/uL    Immature Grans (Abs) 0.02 0.00 - 0.04 K/uL    Lymph # 1.3 1.0 - 4.8 K/uL    Mono # 0.6 0.3 - 1.0 K/uL    Eos # 0.3 0.0 - 0.5 K/uL    Baso # 0.04 0.00 - 0.20 K/uL    nRBC 0 0 /100 WBC    Gran % 72.0 38.0 - 73.0 %    Lymph % 16.0 (L) 18.0 - 48.0 %    Mono % 7.4 4.0 - 15.0 %    Eosinophil % 3.8 0.0 - 8.0 %    Basophil % 0.5 0.0 - 1.9 %    Differential Method Automated     Comprehensive metabolic panel    Collection Time: 11/10/23  6:58 PM   Result Value Ref Range    Sodium 139 136 - 145 mmol/L    Potassium 3.9 3.5 - 5.1 mmol/L    Chloride 103 95 - 110 mmol/L    CO2 21 (L) 23 - 29 mmol/L    Glucose 111 (H) 70 - 110 mg/dL    BUN 23 8 - 23 mg/dL    Creatinine 1.7 (H) 0.5 - 1.4 mg/dL    Calcium 9.7 8.7 - 10.5 mg/dL    Total Protein 7.5 6.0 - 8.4 g/dL    Albumin 3.3 (L) 3.5 - 5.2 g/dL    Total Bilirubin 0.3 0.1 - 1.0 mg/dL    Alkaline Phosphatase 112 55 - 135 U/L    AST 11 10 - 40 U/L    ALT 6 (L) 10 - 44 U/L    eGFR 43 (A) >60 mL/min/1.73 m^2    Anion Gap 15 8 - 16 mmol/L   Protime-INR    Collection Time: 11/10/23  6:58 PM   Result Value Ref Range    Prothrombin Time 11.2 9.0 - 12.5 sec    INR 1.1 0.8 - 1.2   APTT    Collection Time: 11/10/23  6:58 PM   Result Value Ref Range    aPTT 31.6 21.0 - 32.0 sec   Magnesium    Collection Time: 11/10/23  6:58 PM   Result Value Ref Range    Magnesium 2.2 1.6 - 2.6 mg/dL   Lactic acid, plasma    Collection Time: 11/10/23  6:58 PM   Result Value Ref Range    Lactate (Lactic Acid) 0.8 0.5 - 2.2 mmol/L   Blood culture #1 **CANNOT BE ORDERED STAT**    Collection Time: 11/10/23  6:59 PM    Specimen: Peripheral, Forearm, Right; Blood   Result Value Ref Range    Blood Culture, Routine No growth after 5 days.    Blood culture #2 **CANNOT BE ORDERED STAT**    Collection Time: 11/10/23  6:59 PM    Specimen: Peripheral, Antecubital, Right; Blood   Result Value Ref Range    Blood Culture, Routine No growth after 5 days.    Urinalysis, Reflex to Urine Culture Urine, Unspecified (nephrostomy)    Collection Time: 11/11/23  4:31 AM    Specimen: Urine   Result Value Ref Range    Specimen UA Urine, Unspecified     Color, UA Yellow Yellow, Straw, Andra    Appearance, UA Hazy (A) Clear    pH, UA 6.0 5.0 - 8.0    Specific Gravity, UA 1.010 1.005 - 1.030    Protein, UA 1+ (A) Negative    Glucose, UA Negative Negative    Ketones, UA Negative Negative    Bilirubin (UA)  Negative Negative    Occult Blood UA 2+ (A) Negative    Nitrite, UA Negative Negative    Urobilinogen, UA Negative <2.0 EU/dL    Leukocytes, UA 3+ (A) Negative   Urinalysis Microscopic    Collection Time: 11/11/23  4:31 AM   Result Value Ref Range    RBC, UA >100 (H) 0 - 4 /hpf    WBC, UA 76 (H) 0 - 5 /hpf    WBC Clumps, UA Moderate (A) None-Rare    Bacteria Rare None-Occ /hpf    Yeast, UA Few (A) None    Hyaline Casts, UA 0 0-1/lpf /lpf    Unclass [place]ling UA Occasional None-Moderate    Microscopic Comment SEE COMMENT    Urine culture    Collection Time: 11/11/23  4:31 AM    Specimen: Urine   Result Value Ref Range    Urine Culture, Routine (A)      ENTEROCOCCUS FAECIUM VRE  >100,000 cfu/ml  No other significant isolate         Susceptibility    Enterococcus faecium VRE - CULTURE, URINE     Ampicillin >8 Resistant mcg/mL     Daptomycin 4 Sensitive mcg/mL     Nitrofurantoin <=32 Sensitive mcg/mL     Linezolid 2 Sensitive mcg/mL     Vancomycin >16 Resistant mcg/mL   Comprehensive Metabolic Panel (CMP)    Collection Time: 11/11/23  4:43 AM   Result Value Ref Range    Sodium 144 136 - 145 mmol/L    Potassium 3.8 3.5 - 5.1 mmol/L    Chloride 106 95 - 110 mmol/L    CO2 22 (L) 23 - 29 mmol/L    Glucose 108 70 - 110 mg/dL    BUN 19 8 - 23 mg/dL    Creatinine 1.2 0.5 - 1.4 mg/dL    Calcium 9.3 8.7 - 10.5 mg/dL    Total Protein 6.7 6.0 - 8.4 g/dL    Albumin 2.9 (L) 3.5 - 5.2 g/dL    Total Bilirubin 0.3 0.1 - 1.0 mg/dL    Alkaline Phosphatase 104 55 - 135 U/L    AST 9 (L) 10 - 40 U/L    ALT 5 (L) 10 - 44 U/L    eGFR >60 >60 mL/min/1.73 m^2    Anion Gap 16 8 - 16 mmol/L   CBC with Automated Differential    Collection Time: 11/11/23  4:43 AM   Result Value Ref Range    WBC 11.79 3.90 - 12.70 K/uL    RBC 3.49 (L) 4.60 - 6.20 M/uL    Hemoglobin 9.4 (L) 14.0 - 18.0 g/dL    Hematocrit 30.1 (L) 40.0 - 54.0 %    MCV 86 82 - 98 fL    MCH 26.9 (L) 27.0 - 31.0 pg    MCHC 31.2 (L) 32.0 - 36.0 g/dL    RDW 14.6 (H) 11.5 - 14.5 %    Platelets  228 150 - 450 K/uL    MPV 10.2 9.2 - 12.9 fL    Immature Granulocytes 0.3 0.0 - 0.5 %    Gran # (ANC) 10.0 (H) 1.8 - 7.7 K/uL    Immature Grans (Abs) 0.04 0.00 - 0.04 K/uL    Lymph # 0.7 (L) 1.0 - 4.8 K/uL    Mono # 0.8 0.3 - 1.0 K/uL    Eos # 0.3 0.0 - 0.5 K/uL    Baso # 0.04 0.00 - 0.20 K/uL    nRBC 0 0 /100 WBC    Gran % 84.5 (H) 38.0 - 73.0 %    Lymph % 6.0 (L) 18.0 - 48.0 %    Mono % 6.5 4.0 - 15.0 %    Eosinophil % 2.4 0.0 - 8.0 %    Basophil % 0.3 0.0 - 1.9 %    Differential Method Automated    Comprehensive Metabolic Panel (CMP)    Collection Time: 11/12/23  4:41 AM   Result Value Ref Range    Sodium 138 136 - 145 mmol/L    Potassium 4.0 3.5 - 5.1 mmol/L    Chloride 105 95 - 110 mmol/L    CO2 22 (L) 23 - 29 mmol/L    Glucose 113 (H) 70 - 110 mg/dL    BUN 17 8 - 23 mg/dL    Creatinine 1.4 0.5 - 1.4 mg/dL    Calcium 8.7 8.7 - 10.5 mg/dL    Total Protein 6.2 6.0 - 8.4 g/dL    Albumin 2.6 (L) 3.5 - 5.2 g/dL    Total Bilirubin 0.5 0.1 - 1.0 mg/dL    Alkaline Phosphatase 95 55 - 135 U/L    AST 8 (L) 10 - 40 U/L    ALT 8 (L) 10 - 44 U/L    eGFR 54 (A) >60 mL/min/1.73 m^2    Anion Gap 11 8 - 16 mmol/L   CBC with Automated Differential    Collection Time: 11/12/23  4:41 AM   Result Value Ref Range    WBC 12.69 3.90 - 12.70 K/uL    RBC 3.53 (L) 4.60 - 6.20 M/uL    Hemoglobin 9.5 (L) 14.0 - 18.0 g/dL    Hematocrit 30.4 (L) 40.0 - 54.0 %    MCV 86 82 - 98 fL    MCH 26.9 (L) 27.0 - 31.0 pg    MCHC 31.3 (L) 32.0 - 36.0 g/dL    RDW 14.8 (H) 11.5 - 14.5 %    Platelets 197 150 - 450 K/uL    MPV 10.5 9.2 - 12.9 fL    Immature Granulocytes 0.4 0.0 - 0.5 %    Gran # (ANC) 10.4 (H) 1.8 - 7.7 K/uL    Immature Grans (Abs) 0.05 (H) 0.00 - 0.04 K/uL    Lymph # 1.3 1.0 - 4.8 K/uL    Mono # 0.9 0.3 - 1.0 K/uL    Eos # 0.1 0.0 - 0.5 K/uL    Baso # 0.04 0.00 - 0.20 K/uL    nRBC 0 0 /100 WBC    Gran % 81.6 (H) 38.0 - 73.0 %    Lymph % 10.2 (L) 18.0 - 48.0 %    Mono % 7.0 4.0 - 15.0 %    Eosinophil % 0.5 0.0 - 8.0 %    Basophil % 0.3 0.0  - 1.9 %    Differential Method Automated    Comprehensive Metabolic Panel (CMP)    Collection Time: 11/13/23  3:25 AM   Result Value Ref Range    Sodium 137 136 - 145 mmol/L    Potassium 3.7 3.5 - 5.1 mmol/L    Chloride 103 95 - 110 mmol/L    CO2 23 23 - 29 mmol/L    Glucose 132 (H) 70 - 110 mg/dL    BUN 21 8 - 23 mg/dL    Creatinine 1.4 0.5 - 1.4 mg/dL    Calcium 8.7 8.7 - 10.5 mg/dL    Total Protein 6.2 6.0 - 8.4 g/dL    Albumin 2.4 (L) 3.5 - 5.2 g/dL    Total Bilirubin 0.3 0.1 - 1.0 mg/dL    Alkaline Phosphatase 122 55 - 135 U/L    AST 14 10 - 40 U/L    ALT 9 (L) 10 - 44 U/L    eGFR 54 (A) >60 mL/min/1.73 m^2    Anion Gap 11 8 - 16 mmol/L   CBC with Automated Differential    Collection Time: 11/13/23  3:25 AM   Result Value Ref Range    WBC 16.50 (H) 3.90 - 12.70 K/uL    RBC 3.08 (L) 4.60 - 6.20 M/uL    Hemoglobin 8.2 (L) 14.0 - 18.0 g/dL    Hematocrit 26.2 (L) 40.0 - 54.0 %    MCV 85 82 - 98 fL    MCH 26.6 (L) 27.0 - 31.0 pg    MCHC 31.3 (L) 32.0 - 36.0 g/dL    RDW 14.6 (H) 11.5 - 14.5 %    Platelets 212 150 - 450 K/uL    MPV 10.0 9.2 - 12.9 fL    Immature Granulocytes 0.5 0.0 - 0.5 %    Gran # (ANC) 14.1 (H) 1.8 - 7.7 K/uL    Immature Grans (Abs) 0.08 (H) 0.00 - 0.04 K/uL    Lymph # 1.0 1.0 - 4.8 K/uL    Mono # 1.2 (H) 0.3 - 1.0 K/uL    Eos # 0.1 0.0 - 0.5 K/uL    Baso # 0.04 0.00 - 0.20 K/uL    nRBC 0 0 /100 WBC    Gran % 85.7 (H) 38.0 - 73.0 %    Lymph % 6.2 (L) 18.0 - 48.0 %    Mono % 7.0 4.0 - 15.0 %    Eosinophil % 0.4 0.0 - 8.0 %    Basophil % 0.2 0.0 - 1.9 %    Differential Method Automated    CBC auto differential    Collection Time: 11/14/23  7:56 AM   Result Value Ref Range    WBC 11.66 3.90 - 12.70 K/uL    RBC 2.99 (L) 4.60 - 6.20 M/uL    Hemoglobin 7.9 (L) 14.0 - 18.0 g/dL    Hematocrit 25.6 (L) 40.0 - 54.0 %    MCV 86 82 - 98 fL    MCH 26.4 (L) 27.0 - 31.0 pg    MCHC 30.9 (L) 32.0 - 36.0 g/dL    RDW 14.7 (H) 11.5 - 14.5 %    Platelets 225 150 - 450 K/uL    MPV 10.2 9.2 - 12.9 fL    Immature  Granulocytes 0.3 0.0 - 0.5 %    Gran # (ANC) 9.7 (H) 1.8 - 7.7 K/uL    Immature Grans (Abs) 0.04 0.00 - 0.04 K/uL    Lymph # 0.8 (L) 1.0 - 4.8 K/uL    Mono # 0.9 0.3 - 1.0 K/uL    Eos # 0.2 0.0 - 0.5 K/uL    Baso # 0.03 0.00 - 0.20 K/uL    nRBC 0 0 /100 WBC    Gran % 83.3 (H) 38.0 - 73.0 %    Lymph % 6.5 (L) 18.0 - 48.0 %    Mono % 7.7 4.0 - 15.0 %    Eosinophil % 1.9 0.0 - 8.0 %    Basophil % 0.3 0.0 - 1.9 %    Differential Method Automated    CBC Auto Differential    Collection Time: 11/15/23  3:44 AM   Result Value Ref Range    WBC 8.80 3.90 - 12.70 K/uL    RBC 3.24 (L) 4.60 - 6.20 M/uL    Hemoglobin 8.6 (L) 14.0 - 18.0 g/dL    Hematocrit 27.9 (L) 40.0 - 54.0 %    MCV 86 82 - 98 fL    MCH 26.5 (L) 27.0 - 31.0 pg    MCHC 30.8 (L) 32.0 - 36.0 g/dL    RDW 14.7 (H) 11.5 - 14.5 %    Platelets 272 150 - 450 K/uL    MPV 10.5 9.2 - 12.9 fL    Immature Granulocytes 0.2 0.0 - 0.5 %    Gran # (ANC) 6.7 1.8 - 7.7 K/uL    Immature Grans (Abs) 0.02 0.00 - 0.04 K/uL    Lymph # 1.0 1.0 - 4.8 K/uL    Mono # 0.7 0.3 - 1.0 K/uL    Eos # 0.4 0.0 - 0.5 K/uL    Baso # 0.04 0.00 - 0.20 K/uL    nRBC 0 0 /100 WBC    Gran % 76.3 (H) 38.0 - 73.0 %    Lymph % 11.3 (L) 18.0 - 48.0 %    Mono % 7.6 4.0 - 15.0 %    Eosinophil % 4.1 0.0 - 8.0 %    Basophil % 0.5 0.0 - 1.9 %    Differential Method Automated    Comprehensive Metabolic Panel    Collection Time: 11/15/23  3:44 AM   Result Value Ref Range    Sodium 141 136 - 145 mmol/L    Potassium 4.1 3.5 - 5.1 mmol/L    Chloride 106 95 - 110 mmol/L    CO2 20 (L) 23 - 29 mmol/L    Glucose 83 70 - 110 mg/dL    BUN 21 8 - 23 mg/dL    Creatinine 1.4 0.5 - 1.4 mg/dL    Calcium 8.9 8.7 - 10.5 mg/dL    Total Protein 6.1 6.0 - 8.4 g/dL    Albumin 2.3 (L) 3.5 - 5.2 g/dL    Total Bilirubin 0.3 0.1 - 1.0 mg/dL    Alkaline Phosphatase 114 55 - 135 U/L    AST 22 10 - 40 U/L    ALT 20 10 - 44 U/L    eGFR 54 (A) >60 mL/min/1.73 m^2    Anion Gap 15 8 - 16 mmol/L   CBC auto differential    Collection Time: 11/17/23   3:38 AM   Result Value Ref Range    WBC 9.96 3.90 - 12.70 K/uL    RBC 3.25 (L) 4.60 - 6.20 M/uL    Hemoglobin 8.6 (L) 14.0 - 18.0 g/dL    Hematocrit 27.9 (L) 40.0 - 54.0 %    MCV 86 82 - 98 fL    MCH 26.5 (L) 27.0 - 31.0 pg    MCHC 30.8 (L) 32.0 - 36.0 g/dL    RDW 14.8 (H) 11.5 - 14.5 %    Platelets 308 150 - 450 K/uL    MPV 9.8 9.2 - 12.9 fL    Immature Granulocytes 0.6 (H) 0.0 - 0.5 %    Gran # (ANC) 7.9 (H) 1.8 - 7.7 K/uL    Immature Grans (Abs) 0.06 (H) 0.00 - 0.04 K/uL    Lymph # 1.1 1.0 - 4.8 K/uL    Mono # 0.6 0.3 - 1.0 K/uL    Eos # 0.2 0.0 - 0.5 K/uL    Baso # 0.05 0.00 - 0.20 K/uL    nRBC 0 0 /100 WBC    Gran % 78.9 (H) 38.0 - 73.0 %    Lymph % 11.4 (L) 18.0 - 48.0 %    Mono % 6.4 4.0 - 15.0 %    Eosinophil % 2.2 0.0 - 8.0 %    Basophil % 0.5 0.0 - 1.9 %    Differential Method Automated    Renal Function Panel    Collection Time: 11/17/23  3:38 AM   Result Value Ref Range    Glucose 107 70 - 110 mg/dL    Sodium 143 136 - 145 mmol/L    Potassium 3.7 3.5 - 5.1 mmol/L    Chloride 105 95 - 110 mmol/L    CO2 25 23 - 29 mmol/L    BUN 13 8 - 23 mg/dL    Calcium 8.8 8.7 - 10.5 mg/dL    Creatinine 1.3 0.5 - 1.4 mg/dL    Albumin 2.4 (L) 3.5 - 5.2 g/dL    Phosphorus 3.5 2.7 - 4.5 mg/dL    eGFR 59 (A) >60 mL/min/1.73 m^2    Anion Gap 13 8 - 16 mmol/L   CBC auto differential    Collection Time: 11/20/23  9:18 PM   Result Value Ref Range    WBC 11.18 3.90 - 12.70 K/uL    RBC 3.03 (L) 4.60 - 6.20 M/uL    Hemoglobin 7.9 (L) 14.0 - 18.0 g/dL    Hematocrit 25.9 (L) 40.0 - 54.0 %    MCV 86 82 - 98 fL    MCH 26.1 (L) 27.0 - 31.0 pg    MCHC 30.5 (L) 32.0 - 36.0 g/dL    RDW 15.2 (H) 11.5 - 14.5 %    Platelets 258 150 - 450 K/uL    MPV 9.5 9.2 - 12.9 fL    Immature Granulocytes 0.5 0.0 - 0.5 %    Gran # (ANC) 9.0 (H) 1.8 - 7.7 K/uL    Immature Grans (Abs) 0.06 (H) 0.00 - 0.04 K/uL    Lymph # 1.2 1.0 - 4.8 K/uL    Mono # 0.5 0.3 - 1.0 K/uL    Eos # 0.4 0.0 - 0.5 K/uL    Baso # 0.04 0.00 - 0.20 K/uL    nRBC 0 0 /100 WBC    Gran %  80.8 (H) 38.0 - 73.0 %    Lymph % 10.6 (L) 18.0 - 48.0 %    Mono % 4.4 4.0 - 15.0 %    Eosinophil % 3.3 0.0 - 8.0 %    Basophil % 0.4 0.0 - 1.9 %    Differential Method Automated    Comprehensive metabolic panel    Collection Time: 11/20/23  9:18 PM   Result Value Ref Range    Sodium 138 136 - 145 mmol/L    Potassium 4.7 3.5 - 5.1 mmol/L    Chloride 102 95 - 110 mmol/L    CO2 23 23 - 29 mmol/L    Glucose 105 70 - 110 mg/dL    BUN 17 8 - 23 mg/dL    Creatinine 2.1 (H) 0.5 - 1.4 mg/dL    Calcium 9.6 8.7 - 10.5 mg/dL    Total Protein 6.8 6.0 - 8.4 g/dL    Albumin 2.8 (L) 3.5 - 5.2 g/dL    Total Bilirubin 0.3 0.1 - 1.0 mg/dL    Alkaline Phosphatase 95 55 - 135 U/L    AST 15 10 - 40 U/L    ALT 19 10 - 44 U/L    eGFR 33 (A) >60 mL/min/1.73 m^2    Anion Gap 13 8 - 16 mmol/L   TSH    Collection Time: 11/20/23  9:18 PM   Result Value Ref Range    TSH 0.296 (L) 0.400 - 4.000 uIU/mL   T4, Free    Collection Time: 11/20/23  9:18 PM   Result Value Ref Range    Free T4 1.40 0.71 - 1.51 ng/dL   Comprehensive Metabolic Panel (CMP)    Collection Time: 11/21/23  6:09 AM   Result Value Ref Range    Sodium 142 136 - 145 mmol/L    Potassium 4.0 3.5 - 5.1 mmol/L    Chloride 107 95 - 110 mmol/L    CO2 24 23 - 29 mmol/L    Glucose 106 70 - 110 mg/dL    BUN 17 8 - 23 mg/dL    Creatinine 1.8 (H) 0.5 - 1.4 mg/dL    Calcium 8.8 8.7 - 10.5 mg/dL    Total Protein 6.0 6.0 - 8.4 g/dL    Albumin 2.4 (L) 3.5 - 5.2 g/dL    Total Bilirubin 0.3 0.1 - 1.0 mg/dL    Alkaline Phosphatase 94 55 - 135 U/L    AST 17 10 - 40 U/L    ALT 18 10 - 44 U/L    eGFR 40 (A) >60 mL/min/1.73 m^2    Anion Gap 11 8 - 16 mmol/L   CBC with Automated Differential    Collection Time: 11/21/23  6:09 AM   Result Value Ref Range    WBC 7.72 3.90 - 12.70 K/uL    RBC 2.78 (L) 4.60 - 6.20 M/uL    Hemoglobin 7.3 (L) 14.0 - 18.0 g/dL    Hematocrit 23.9 (L) 40.0 - 54.0 %    MCV 86 82 - 98 fL    MCH 26.3 (L) 27.0 - 31.0 pg    MCHC 30.5 (L) 32.0 - 36.0 g/dL    RDW 15.0 (H) 11.5 - 14.5 %     Platelets 227 150 - 450 K/uL    MPV 9.4 9.2 - 12.9 fL    Immature Granulocytes 0.5 0.0 - 0.5 %    Gran # (ANC) 5.9 1.8 - 7.7 K/uL    Immature Grans (Abs) 0.04 0.00 - 0.04 K/uL    Lymph # 1.0 1.0 - 4.8 K/uL    Mono # 0.4 0.3 - 1.0 K/uL    Eos # 0.4 0.0 - 0.5 K/uL    Baso # 0.04 0.00 - 0.20 K/uL    nRBC 0 0 /100 WBC    Gran % 76.0 (H) 38.0 - 73.0 %    Lymph % 13.0 (L) 18.0 - 48.0 %    Mono % 5.3 4.0 - 15.0 %    Eosinophil % 4.7 0.0 - 8.0 %    Basophil % 0.5 0.0 - 1.9 %    Differential Method Automated    Prepare RBC 1 Unit    Collection Time: 11/21/23  8:19 AM   Result Value Ref Range    UNIT NUMBER [id number]     Product Code [id number]     DISPENSE STATUS TRANSFUSED     CODING SYSTEM [id number]     Unit Blood Type Code 7300     Unit Blood Type B POS     Unit Expiration [id number]     CROSSMATCH INTERPRETATION Compatible    Type & Screen    Collection Time: 11/21/23  8:19 AM   Result Value Ref Range    Group & Rh B POS     Indirect Bertin NEG     Specimen Outdate 11/24/2023 23:59    Comprehensive Metabolic Panel (CMP)    Collection Time: 11/22/23  3:22 AM   Result Value Ref Range    Sodium 143 136 - 145 mmol/L    Potassium 3.9 3.5 - 5.1 mmol/L    Chloride 107 95 - 110 mmol/L    CO2 26 23 - 29 mmol/L    Glucose 146 (H) 70 - 110 mg/dL    BUN 16 8 - 23 mg/dL    Creatinine 1.3 0.5 - 1.4 mg/dL    Calcium 8.5 (L) 8.7 - 10.5 mg/dL    Total Protein 5.4 (L) 6.0 - 8.4 g/dL    Albumin 2.2 (L) 3.5 - 5.2 g/dL    Total Bilirubin 0.3 0.1 - 1.0 mg/dL    Alkaline Phosphatase 78 55 - 135 U/L    AST 14 10 - 40 U/L    ALT 17 10 - 44 U/L    eGFR 59 (A) >60 mL/min/1.73 m^2    Anion Gap 10 8 - 16 mmol/L   CBC with Automated Differential    Collection Time: 11/22/23  3:22 AM   Result Value Ref Range    WBC 9.20 3.90 - 12.70 K/uL    RBC 2.86 (L) 4.60 - 6.20 M/uL    Hemoglobin 7.8 (L) 14.0 - 18.0 g/dL    Hematocrit 24.2 (L) 40.0 - 54.0 %    MCV 85 82 - 98 fL    MCH 27.3 27.0 - 31.0 pg    MCHC 32.2 32.0 - 36.0 g/dL    RDW 14.3 11.5 - 14.5 %     Platelets 177 150 - 450 K/uL    MPV 9.2 9.2 - 12.9 fL    Immature Granulocytes 0.8 (H) 0.0 - 0.5 %    Gran # (ANC) 8.4 (H) 1.8 - 7.7 K/uL    Immature Grans (Abs) 0.07 (H) 0.00 - 0.04 K/uL    Lymph # 0.5 (L) 1.0 - 4.8 K/uL    Mono # 0.2 (L) 0.3 - 1.0 K/uL    Eos # 0.0 0.0 - 0.5 K/uL    Baso # 0.01 0.00 - 0.20 K/uL    nRBC 0 0 /100 WBC    Gran % 90.7 (H) 38.0 - 73.0 %    Lymph % 5.9 (L) 18.0 - 48.0 %    Mono % 2.5 (L) 4.0 - 15.0 %    Eosinophil % 0.0 0.0 - 8.0 %    Basophil % 0.1 0.0 - 1.9 %    Differential Method Automated    Comprehensive Metabolic Panel (CMP)    Collection Time: 11/23/23  7:23 AM   Result Value Ref Range    Sodium 141 136 - 145 mmol/L    Potassium 3.8 3.5 - 5.1 mmol/L    Chloride 106 95 - 110 mmol/L    CO2 26 23 - 29 mmol/L    Glucose 116 (H) 70 - 110 mg/dL    BUN 18 8 - 23 mg/dL    Creatinine 1.3 0.5 - 1.4 mg/dL    Calcium 8.7 8.7 - 10.5 mg/dL    Total Protein 6.0 6.0 - 8.4 g/dL    Albumin 2.4 (L) 3.5 - 5.2 g/dL    Total Bilirubin 0.3 0.1 - 1.0 mg/dL    Alkaline Phosphatase 78 55 - 135 U/L    AST 12 10 - 40 U/L    ALT 10 10 - 44 U/L    eGFR 59 (A) >60 mL/min/1.73 m^2    Anion Gap 9 8 - 16 mmol/L   CBC with Automated Differential    Collection Time: 11/23/23  7:23 AM   Result Value Ref Range    WBC 10.52 3.90 - 12.70 K/uL    RBC 2.80 (L) 4.60 - 6.20 M/uL    Hemoglobin 7.6 (L) 14.0 - 18.0 g/dL    Hematocrit 24.2 (L) 40.0 - 54.0 %    MCV 86 82 - 98 fL    MCH 27.1 27.0 - 31.0 pg    MCHC 31.4 (L) 32.0 - 36.0 g/dL    RDW 15.0 (H) 11.5 - 14.5 %    Platelets 179 150 - 450 K/uL    MPV 9.4 9.2 - 12.9 fL    Immature Granulocytes 0.7 (H) 0.0 - 0.5 %    Gran # (ANC) 8.9 (H) 1.8 - 7.7 K/uL    Immature Grans (Abs) 0.07 (H) 0.00 - 0.04 K/uL    Lymph # 0.9 (L) 1.0 - 4.8 K/uL    Mono # 0.4 0.3 - 1.0 K/uL    Eos # 0.2 0.0 - 0.5 K/uL    Baso # 0.03 0.00 - 0.20 K/uL    nRBC 0 0 /100 WBC    Gran % 84.8 (H) 38.0 - 73.0 %    Lymph % 8.3 (L) 18.0 - 48.0 %    Mono % 3.8 (L) 4.0 - 15.0 %    Eosinophil % 2.1 0.0 - 8.0 %     Basophil % 0.3 0.0 - 1.9 %    Differential Method Automated    CBC Auto Differential    Collection Time: 11/24/23  8:09 AM   Result Value Ref Range    WBC 10.06 3.90 - 12.70 K/uL    RBC 2.48 (L) 4.60 - 6.20 M/uL    Hemoglobin 6.6 (L) 14.0 - 18.0 g/dL    Hematocrit 21.1 (L) 40.0 - 54.0 %    MCV 85 82 - 98 fL    MCH 26.6 (L) 27.0 - 31.0 pg    MCHC 31.3 (L) 32.0 - 36.0 g/dL    RDW 14.8 (H) 11.5 - 14.5 %    Platelets 172 150 - 450 K/uL    MPV 9.6 9.2 - 12.9 fL    Immature Granulocytes 0.6 (H) 0.0 - 0.5 %    Gran # (ANC) 8.5 (H) 1.8 - 7.7 K/uL    Immature Grans (Abs) 0.06 (H) 0.00 - 0.04 K/uL    Lymph # 0.9 (L) 1.0 - 4.8 K/uL    Mono # 0.3 0.3 - 1.0 K/uL    Eos # 0.3 0.0 - 0.5 K/uL    Baso # 0.02 0.00 - 0.20 K/uL    nRBC 0 0 /100 WBC    Gran % 84.5 (H) 38.0 - 73.0 %    Lymph % 8.6 (L) 18.0 - 48.0 %    Mono % 3.3 (L) 4.0 - 15.0 %    Eosinophil % 2.8 0.0 - 8.0 %    Basophil % 0.2 0.0 - 1.9 %    Differential Method Automated    Basic Metabolic Panel    Collection Time: 11/26/23  7:55 AM   Result Value Ref Range    Sodium 143 136 - 145 mmol/L    Potassium 3.9 3.5 - 5.1 mmol/L    Chloride 105 95 - 110 mmol/L    CO2 25 23 - 29 mmol/L    Glucose 118 (H) 70 - 110 mg/dL    BUN 14 8 - 23 mg/dL    Creatinine 1.0 0.5 - 1.4 mg/dL    Calcium 8.6 (L) 8.7 - 10.5 mg/dL    Anion Gap 13 8 - 16 mmol/L    eGFR >60 >60 mL/min/1.73 m^2   Prepare RBC 1 Unit    Collection Time: 11/26/23  7:55 AM   Result Value Ref Range    UNIT NUMBER [id number]     Product Code [id number]     DISPENSE STATUS TRANSFUSED     CODING SYSTEM [id number]     Unit Blood Type Code 7300     Unit Blood Type B POS     Unit Expiration [id number]     CROSSMATCH INTERPRETATION Compatible    Type & Screen    Collection Time: 11/26/23  7:55 AM   Result Value Ref Range    Group & Rh B POS     Indirect Bertin NEG     Specimen Outdate 11/29/2023 23:59    CBC Auto Differential    Collection Time: 11/26/23  7:56 AM   Result Value Ref Range    WBC 7.42 3.90 - 12.70 K/uL    RBC  2.64 (L) 4.60 - 6.20 M/uL    Hemoglobin 7.0 (L) 14.0 - 18.0 g/dL    Hematocrit 21.9 (L) 40.0 - 54.0 %    MCV 83 82 - 98 fL    MCH 26.5 (L) 27.0 - 31.0 pg    MCHC 32.0 32.0 - 36.0 g/dL    RDW 14.9 (H) 11.5 - 14.5 %    Platelets 190 150 - 450 K/uL    MPV 9.6 9.2 - 12.9 fL    Immature Granulocytes 0.9 (H) 0.0 - 0.5 %    Gran # (ANC) 6.0 1.8 - 7.7 K/uL    Immature Grans (Abs) 0.07 (H) 0.00 - 0.04 K/uL    Lymph # 0.8 (L) 1.0 - 4.8 K/uL    Mono # 0.4 0.3 - 1.0 K/uL    Eos # 0.2 0.0 - 0.5 K/uL    Baso # 0.03 0.00 - 0.20 K/uL    nRBC 0 0 /100 WBC    Gran % 80.3 (H) 38.0 - 73.0 %    Lymph % 10.6 (L) 18.0 - 48.0 %    Mono % 5.1 4.0 - 15.0 %    Eosinophil % 2.7 0.0 - 8.0 %    Basophil % 0.4 0.0 - 1.9 %    Differential Method Automated    CBC Auto Differential    Collection Time: 11/27/23  4:39 AM   Result Value Ref Range    WBC 7.12 3.90 - 12.70 K/uL    RBC 2.80 (L) 4.60 - 6.20 M/uL    Hemoglobin 7.8 (L) 14.0 - 18.0 g/dL    Hematocrit 23.8 (L) 40.0 - 54.0 %    MCV 85 82 - 98 fL    MCH 27.9 27.0 - 31.0 pg    MCHC 32.8 32.0 - 36.0 g/dL    RDW 14.8 (H) 11.5 - 14.5 %    Platelets 188 150 - 450 K/uL    MPV 9.2 9.2 - 12.9 fL    Immature Granulocytes 0.4 0.0 - 0.5 %    Gran # (ANC) 5.3 1.8 - 7.7 K/uL    Immature Grans (Abs) 0.03 0.00 - 0.04 K/uL    Lymph # 0.9 (L) 1.0 - 4.8 K/uL    Mono # 0.6 0.3 - 1.0 K/uL    Eos # 0.2 0.0 - 0.5 K/uL    Baso # 0.03 0.00 - 0.20 K/uL    nRBC 0 0 /100 WBC    Gran % 74.4 (H) 38.0 - 73.0 %    Lymph % 12.6 (L) 18.0 - 48.0 %    Mono % 9.0 4.0 - 15.0 %    Eosinophil % 3.2 0.0 - 8.0 %    Basophil % 0.4 0.0 - 1.9 %    Differential Method Automated    CBC auto differential    Collection Time: 12/09/23  5:52 PM   Result Value Ref Range    WBC 8.54 3.90 - 12.70 K/uL    RBC 2.75 (L) 4.60 - 6.20 M/uL    Hemoglobin 7.7 (L) 14.0 - 18.0 g/dL    Hematocrit 24.2 (L) 40.0 - 54.0 %    MCV 88 82 - 98 fL    MCH 28.0 27.0 - 31.0 pg    MCHC 31.8 (L) 32.0 - 36.0 g/dL    RDW 18.0 (H) 11.5 - 14.5 %    Platelets 311 150 - 450  K/uL    MPV 9.2 9.2 - 12.9 fL    Immature Granulocytes 1.3 (H) 0.0 - 0.5 %    Gran # (ANC) 6.0 1.8 - 7.7 K/uL    Immature Grans (Abs) 0.11 (H) 0.00 - 0.04 K/uL    Lymph # 1.4 1.0 - 4.8 K/uL    Mono # 0.7 0.3 - 1.0 K/uL    Eos # 0.3 0.0 - 0.5 K/uL    Baso # 0.02 0.00 - 0.20 K/uL    nRBC 0 0 /100 WBC    Gran % 70.1 38.0 - 73.0 %    Lymph % 16.9 (L) 18.0 - 48.0 %    Mono % 8.5 4.0 - 15.0 %    Eosinophil % 3.0 0.0 - 8.0 %    Basophil % 0.2 0.0 - 1.9 %    Differential Method Automated    Comprehensive metabolic panel    Collection Time: 12/09/23  5:52 PM   Result Value Ref Range    Sodium 139 136 - 145 mmol/L    Potassium 4.5 3.5 - 5.1 mmol/L    Chloride 102 95 - 110 mmol/L    CO2 26 23 - 29 mmol/L    Glucose 111 (H) 70 - 110 mg/dL    BUN 26 (H) 8 - 23 mg/dL    Creatinine 1.6 (H) 0.5 - 1.4 mg/dL    Calcium 9.5 8.7 - 10.5 mg/dL    Total Protein 6.9 6.0 - 8.4 g/dL    Albumin 2.9 (L) 3.5 - 5.2 g/dL    Total Bilirubin 0.3 0.1 - 1.0 mg/dL    Alkaline Phosphatase 142 (H) 55 - 135 U/L    AST 19 10 - 40 U/L    ALT 24 10 - 44 U/L    eGFR 46 (A) >60 mL/min/1.73 m^2    Anion Gap 11 8 - 16 mmol/L   CBC Auto Differential    Collection Time: 12/21/23  8:19 AM   Result Value Ref Range    WBC 16.75 (H) 3.90 - 12.70 K/uL    RBC 2.85 (L) 4.60 - 6.20 M/uL    Hemoglobin 8.0 (L) 14.0 - 18.0 g/dL    Hematocrit 25.1 (L) 40.0 - 54.0 %    MCV 88 82 - 98 fL    MCH 28.1 27.0 - 31.0 pg    MCHC 31.9 (L) 32.0 - 36.0 g/dL    RDW 18.5 (H) 11.5 - 14.5 %    Platelets 237 150 - 450 K/uL    MPV 9.6 9.2 - 12.9 fL    Immature Granulocytes 0.4 0.0 - 0.5 %    Gran # (ANC) 15.0 (H) 1.8 - 7.7 K/uL    Immature Grans (Abs) 0.07 (H) 0.00 - 0.04 K/uL    Lymph # 0.6 (L) 1.0 - 4.8 K/uL    Mono # 0.9 0.3 - 1.0 K/uL    Eos # 0.1 0.0 - 0.5 K/uL    Baso # 0.05 0.00 - 0.20 K/uL    nRBC 0 0 /100 WBC    Gran % 89.8 (H) 38.0 - 73.0 %    Lymph % 3.8 (L) 18.0 - 48.0 %    Mono % 5.4 4.0 - 15.0 %    Eosinophil % 0.3 0.0 - 8.0 %    Basophil % 0.3 0.0 - 1.9 %    Differential Method  Automated    Comprehensive Metabolic Panel    Collection Time: 12/21/23  8:19 AM   Result Value Ref Range    Sodium 135 (L) 136 - 145 mmol/L    Potassium 4.5 3.5 - 5.1 mmol/L    Chloride 102 95 - 110 mmol/L    CO2 22 (L) 23 - 29 mmol/L    Glucose 124 (H) 70 - 110 mg/dL    BUN 28 (H) 8 - 23 mg/dL    Creatinine 2.5 (H) 0.5 - 1.4 mg/dL    Calcium 9.7 8.7 - 10.5 mg/dL    Total Protein 6.6 6.0 - 8.4 g/dL    Albumin 2.7 (L) 3.5 - 5.2 g/dL    Total Bilirubin 0.4 0.1 - 1.0 mg/dL    Alkaline Phosphatase 113 55 - 135 U/L    AST 11 10 - 40 U/L    ALT 10 10 - 44 U/L    eGFR 27 (A) >60 mL/min/1.73 m^2    Anion Gap 11 8 - 16 mmol/L   BNP    Collection Time: 12/21/23  8:19 AM   Result Value Ref Range     (H) 0 - 99 pg/mL   CK    Collection Time: 12/21/23  8:19 AM   Result Value Ref Range     20 - 200 U/L   Troponin I    Collection Time: 12/21/23  8:19 AM   Result Value Ref Range    Troponin I <0.006 0.000 - 0.026 ng/mL   Ammonia    Collection Time: 12/21/23  8:28 AM   Result Value Ref Range    Ammonia 19 10 - 50 umol/L   Urinalysis, Reflex to Urine Culture Urine, Clean Catch    Collection Time: 12/21/23  8:29 AM    Specimen: Urine   Result Value Ref Range    Specimen UA Urine, Clean Catch     Color, UA Yellow Yellow, Straw, Andra    Appearance, UA Clear Clear    pH, UA 8.0 5.0 - 8.0    Specific Gravity, UA 1.015 1.005 - 1.030    Protein, UA 1+ (A) Negative    Glucose, UA Negative Negative    Ketones, UA Negative Negative    Bilirubin (UA) Negative Negative    Occult Blood UA 1+ (A) Negative    Nitrite, UA Negative Negative    Urobilinogen, UA Negative <2.0 EU/dL    Leukocytes, UA 2+ (A) Negative   Urinalysis Microscopic    Collection Time: 12/21/23  8:29 AM   Result Value Ref Range    RBC, UA 10 (H) 0 - 4 /hpf    WBC, UA >100 (H) 0 - 5 /hpf    WBC Clumps, UA Occasional (A) None-Rare    Bacteria Moderate (A) None-Occ /hpf    Hyaline Casts, UA 0 0-1/lpf /lpf    Microscopic Comment SEE COMMENT    Urine culture     Collection Time: 12/21/23  8:29 AM    Specimen: Urine   Result Value Ref Range    Urine Culture, Routine       Multiple organisms isolated. None in predominance.  Repeat if    Urine Culture, Routine clinically necessary.    CBC Auto Differential    Collection Time: 12/22/23  5:26 AM   Result Value Ref Range    WBC 14.18 (H) 3.90 - 12.70 K/uL    RBC 2.70 (L) 4.60 - 6.20 M/uL    Hemoglobin 7.3 (L) 14.0 - 18.0 g/dL    Hematocrit 24.1 (L) 40.0 - 54.0 %    MCV 89 82 - 98 fL    MCH 27.0 27.0 - 31.0 pg    MCHC 30.3 (L) 32.0 - 36.0 g/dL    RDW 18.6 (H) 11.5 - 14.5 %    Platelets 211 150 - 450 K/uL    MPV 9.4 9.2 - 12.9 fL    Immature Granulocytes 0.6 (H) 0.0 - 0.5 %    Gran # (ANC) 12.0 (H) 1.8 - 7.7 K/uL    Immature Grans (Abs) 0.08 (H) 0.00 - 0.04 K/uL    Lymph # 0.9 (L) 1.0 - 4.8 K/uL    Mono # 1.0 0.3 - 1.0 K/uL    Eos # 0.2 0.0 - 0.5 K/uL    Baso # 0.04 0.00 - 0.20 K/uL    nRBC 0 0 /100 WBC    Gran % 84.6 (H) 38.0 - 73.0 %    Lymph % 6.3 (L) 18.0 - 48.0 %    Mono % 7.1 4.0 - 15.0 %    Eosinophil % 1.1 0.0 - 8.0 %    Basophil % 0.3 0.0 - 1.9 %    Differential Method Automated    Comprehensive Metabolic Panel    Collection Time: 12/22/23  5:26 AM   Result Value Ref Range    Sodium 138 136 - 145 mmol/L    Potassium 4.5 3.5 - 5.1 mmol/L    Chloride 107 95 - 110 mmol/L    CO2 21 (L) 23 - 29 mmol/L    Glucose 107 70 - 110 mg/dL    BUN 28 (H) 8 - 23 mg/dL    Creatinine 2.4 (H) 0.5 - 1.4 mg/dL    Calcium 9.1 8.7 - 10.5 mg/dL    Total Protein 6.0 6.0 - 8.4 g/dL    Albumin 2.3 (L) 3.5 - 5.2 g/dL    Total Bilirubin 0.3 0.1 - 1.0 mg/dL    Alkaline Phosphatase 111 55 - 135 U/L    AST 20 10 - 40 U/L    ALT 12 10 - 44 U/L    eGFR 28 (A) >60 mL/min/1.73 m^2    Anion Gap 10 8 - 16 mmol/L   Blood culture    Collection Time: 12/22/23  9:22 AM    Specimen: Antecubital, Left Arm; Blood   Result Value Ref Range    Blood Culture, Routine No growth after 5 days.    CBC Auto Differential    Collection Time: 12/23/23  4:22 AM   Result Value Ref  Range    WBC 8.07 3.90 - 12.70 K/uL    RBC 2.56 (L) 4.60 - 6.20 M/uL    Hemoglobin 7.0 (L) 14.0 - 18.0 g/dL    Hematocrit 22.8 (L) 40.0 - 54.0 %    MCV 89 82 - 98 fL    MCH 27.3 27.0 - 31.0 pg    MCHC 30.7 (L) 32.0 - 36.0 g/dL    RDW 18.6 (H) 11.5 - 14.5 %    Platelets 218 150 - 450 K/uL    MPV 10.1 9.2 - 12.9 fL    Immature Granulocytes 0.2 0.0 - 0.5 %    Gran # (ANC) 6.0 1.8 - 7.7 K/uL    Immature Grans (Abs) 0.02 0.00 - 0.04 K/uL    Lymph # 0.9 (L) 1.0 - 4.8 K/uL    Mono # 0.8 0.3 - 1.0 K/uL    Eos # 0.4 0.0 - 0.5 K/uL    Baso # 0.03 0.00 - 0.20 K/uL    nRBC 0 0 /100 WBC    Gran % 74.5 (H) 38.0 - 73.0 %    Lymph % 10.8 (L) 18.0 - 48.0 %    Mono % 9.4 4.0 - 15.0 %    Eosinophil % 4.7 0.0 - 8.0 %    Basophil % 0.4 0.0 - 1.9 %    Differential Method Automated    Renal Function Panel    Collection Time: 12/23/23  4:22 AM   Result Value Ref Range    Glucose 110 70 - 110 mg/dL    Sodium 139 136 - 145 mmol/L    Potassium 3.7 3.5 - 5.1 mmol/L    Chloride 108 95 - 110 mmol/L    CO2 21 (L) 23 - 29 mmol/L    BUN 26 (H) 8 - 23 mg/dL    Calcium 8.7 8.7 - 10.5 mg/dL    Creatinine 1.9 (H) 0.5 - 1.4 mg/dL    Albumin 2.1 (L) 3.5 - 5.2 g/dL    Phosphorus 4.1 2.7 - 4.5 mg/dL    eGFR 38 (A) >60 mL/min/1.73 m^2    Anion Gap 10 8 - 16 mmol/L   Prepare RBC 2 Units; symptomatic anemia and on going gross hematuria    Collection Time: 12/23/23 12:09 PM   Result Value Ref Range    UNIT NUMBER [id number]     Product Code K8521A03     DISPENSE STATUS TRANSFUSED     CODING SYSTEM [id number]     Unit Blood Type Code 7300     Unit Blood Type B POS     Unit Expiration [id number]     CROSSMATCH INTERPRETATION Compatible     UNIT NUMBER [id number]     Product Code [id number]     DISPENSE STATUS TRANSFUSED     CODING SYSTEM [id number]     Unit Blood Type Code 7300     Unit Blood Type B POS     Unit Expiration [id number]     CROSSMATCH INTERPRETATION Compatible    Type & Screen    Collection Time: 12/23/23 12:09 PM   Result Value Ref Range     Group & Rh B POS     Indirect Bertin NEG     Specimen Outdate 12/26/2023 23:59    CBC Auto Differential    Collection Time: 12/24/23  5:42 AM   Result Value Ref Range    WBC 7.91 3.90 - 12.70 K/uL    RBC 3.33 (L) 4.60 - 6.20 M/uL    Hemoglobin 9.3 (L) 14.0 - 18.0 g/dL    Hematocrit 30.1 (L) 40.0 - 54.0 %    MCV 90 82 - 98 fL    MCH 27.9 27.0 - 31.0 pg    MCHC 30.9 (L) 32.0 - 36.0 g/dL    RDW 17.0 (H) 11.5 - 14.5 %    Platelets 234 150 - 450 K/uL    MPV 10.0 9.2 - 12.9 fL    Immature Granulocytes 0.4 0.0 - 0.5 %    Gran # (ANC) 5.9 1.8 - 7.7 K/uL    Immature Grans (Abs) 0.03 0.00 - 0.04 K/uL    Lymph # 0.9 (L) 1.0 - 4.8 K/uL    Mono # 0.6 0.3 - 1.0 K/uL    Eos # 0.4 0.0 - 0.5 K/uL    Baso # 0.03 0.00 - 0.20 K/uL    nRBC 0 0 /100 WBC    Gran % 75.0 (H) 38.0 - 73.0 %    Lymph % 11.8 (L) 18.0 - 48.0 %    Mono % 7.3 4.0 - 15.0 %    Eosinophil % 5.1 0.0 - 8.0 %    Basophil % 0.4 0.0 - 1.9 %    Differential Method Automated    Renal Function Panel    Collection Time: 12/24/23  5:42 AM   Result Value Ref Range    Glucose 90 70 - 110 mg/dL    Sodium 139 136 - 145 mmol/L    Potassium 4.6 3.5 - 5.1 mmol/L    Chloride 105 95 - 110 mmol/L    CO2 24 23 - 29 mmol/L    BUN 21 8 - 23 mg/dL    Calcium 9.2 8.7 - 10.5 mg/dL    Creatinine 1.8 (H) 0.5 - 1.4 mg/dL    Albumin 2.2 (L) 3.5 - 5.2 g/dL    Phosphorus 4.2 2.7 - 4.5 mg/dL    eGFR 40 (A) >60 mL/min/1.73 m^2    Anion Gap 10 8 - 16 mmol/L   CBC auto differential    Collection Time: 12/28/23  4:48 AM   Result Value Ref Range    WBC 9.47 3.90 - 12.70 K/uL    RBC 3.68 (L) 4.60 - 6.20 M/uL    Hemoglobin 10.1 (L) 14.0 - 18.0 g/dL    Hematocrit 32.1 (L) 40.0 - 54.0 %    MCV 87 82 - 98 fL    MCH 27.4 27.0 - 31.0 pg    MCHC 31.5 (L) 32.0 - 36.0 g/dL    RDW 16.9 (H) 11.5 - 14.5 %    Platelets 279 150 - 450 K/uL    MPV 8.8 (L) 9.2 - 12.9 fL    Immature Granulocytes 0.3 0.0 - 0.5 %    Gran # (ANC) 7.5 1.8 - 7.7 K/uL    Immature Grans (Abs) 0.03 0.00 - 0.04 K/uL    Lymph # 1.1 1.0 - 4.8 K/uL     Mono # 0.6 0.3 - 1.0 K/uL    Eos # 0.3 0.0 - 0.5 K/uL    Baso # 0.04 0.00 - 0.20 K/uL    nRBC 0 0 /100 WBC    Gran % 78.9 (H) 38.0 - 73.0 %    Lymph % 11.4 (L) 18.0 - 48.0 %    Mono % 5.9 4.0 - 15.0 %    Eosinophil % 3.1 0.0 - 8.0 %    Basophil % 0.4 0.0 - 1.9 %    Differential Method Automated    Comprehensive metabolic panel    Collection Time: 12/28/23  4:48 AM   Result Value Ref Range    Sodium 140 136 - 145 mmol/L    Potassium 4.1 3.5 - 5.1 mmol/L    Chloride 105 95 - 110 mmol/L    CO2 22 (L) 23 - 29 mmol/L    Glucose 115 (H) 70 - 110 mg/dL    BUN 30 (H) 8 - 23 mg/dL    Creatinine 3.1 (H) 0.5 - 1.4 mg/dL    Calcium 9.0 8.7 - 10.5 mg/dL    Total Protein 6.8 6.0 - 8.4 g/dL    Albumin 2.7 (L) 3.5 - 5.2 g/dL    Total Bilirubin 0.3 0.1 - 1.0 mg/dL    Alkaline Phosphatase 109 55 - 135 U/L    AST 11 10 - 40 U/L    ALT 14 10 - 44 U/L    eGFR 21 (A) >60 mL/min/1.73 m^2    Anion Gap 13 8 - 16 mmol/L   Basic Metabolic Panel    Collection Time: 12/28/23  2:40 PM   Result Value Ref Range    Sodium 141 136 - 145 mmol/L    Potassium 4.2 3.5 - 5.1 mmol/L    Chloride 105 95 - 110 mmol/L    CO2 23 23 - 29 mmol/L    Glucose 121 (H) 70 - 110 mg/dL    BUN 26 (H) 8 - 23 mg/dL    Creatinine 2.6 (H) 0.5 - 1.4 mg/dL    Calcium 8.7 8.7 - 10.5 mg/dL    Anion Gap 13 8 - 16 mmol/L    eGFR 26 (A) >60 mL/min/1.73 m^2   CBC Oncology    Collection Time: 01/12/24  9:26 AM   Result Value Ref Range    WBC 13.24 (H) 3.90 - 12.70 K/uL    RBC 3.52 (L) 4.60 - 6.20 M/uL    Hemoglobin 9.8 (L) 14.0 - 18.0 g/dL    Hematocrit 30.9 (L) 40.0 - 54.0 %    MCV 88 82 - 98 fL    MCH 27.8 27.0 - 31.0 pg    MCHC 31.7 (L) 32.0 - 36.0 g/dL    RDW 17.8 (H) 11.5 - 14.5 %    Platelets 163 150 - 450 K/uL    MPV 10.1 9.2 - 12.9 fL    Gran # (ANC) 11.3 (H) 1.8 - 7.7 K/uL    Immature Grans (Abs) 0.05 (H) 0.00 - 0.04 K/uL   Comprehensive Metabolic Panel    Collection Time: 01/12/24  9:26 AM   Result Value Ref Range    Sodium 135 (L) 136 - 145 mmol/L    Potassium 4.4 3.5 -  5.1 mmol/L    Chloride 102 95 - 110 mmol/L    CO2 22 (L) 23 - 29 mmol/L    Glucose 132 (H) 70 - 110 mg/dL    BUN 34 (H) 8 - 23 mg/dL    Creatinine 2.1 (H) 0.5 - 1.4 mg/dL    Calcium 9.4 8.7 - 10.5 mg/dL    Total Protein 7.3 6.0 - 8.4 g/dL    Albumin 3.0 (L) 3.5 - 5.2 g/dL    Total Bilirubin 0.9 0.1 - 1.0 mg/dL    Alkaline Phosphatase 111 55 - 135 U/L    AST 7 (L) 10 - 40 U/L    ALT 10 10 - 44 U/L    eGFR 33 (A) >60 mL/min/1.73 m^2    Anion Gap 11 8 - 16 mmol/L   TSH    Collection Time: 01/12/24  9:26 AM   Result Value Ref Range    TSH 0.188 (L) 0.400 - 4.000 uIU/mL   T4, Free    Collection Time: 01/12/24  9:26 AM   Result Value Ref Range    Free T4 1.51 0.71 - 1.51 ng/dL   CBC auto differential    Collection Time: 01/12/24 11:29 AM   Result Value Ref Range    WBC 13.89 (H) 3.90 - 12.70 K/uL    RBC 3.76 (L) 4.60 - 6.20 M/uL    Hemoglobin 10.6 (L) 14.0 - 18.0 g/dL    Hematocrit 33.3 (L) 40.0 - 54.0 %    MCV 89 82 - 98 fL    MCH 28.2 27.0 - 31.0 pg    MCHC 31.8 (L) 32.0 - 36.0 g/dL    RDW 17.8 (H) 11.5 - 14.5 %    Platelets 161 150 - 450 K/uL    MPV 9.8 9.2 - 12.9 fL    Immature Granulocytes 0.4 0.0 - 0.5 %    Gran # (ANC) 11.6 (H) 1.8 - 7.7 K/uL    Immature Grans (Abs) 0.05 (H) 0.00 - 0.04 K/uL    Lymph # 1.1 1.0 - 4.8 K/uL    Mono # 1.0 0.3 - 1.0 K/uL    Eos # 0.1 0.0 - 0.5 K/uL    Baso # 0.06 0.00 - 0.20 K/uL    nRBC 0 0 /100 WBC    Gran % 83.7 (H) 38.0 - 73.0 %    Lymph % 8.1 (L) 18.0 - 48.0 %    Mono % 7.0 4.0 - 15.0 %    Eosinophil % 0.4 0.0 - 8.0 %    Basophil % 0.4 0.0 - 1.9 %    Differential Method Automated    Comprehensive metabolic panel    Collection Time: 01/12/24 11:29 AM   Result Value Ref Range    Sodium 136 136 - 145 mmol/L    Potassium 4.3 3.5 - 5.1 mmol/L    Chloride 101 95 - 110 mmol/L    CO2 21 (L) 23 - 29 mmol/L    Glucose 133 (H) 70 - 110 mg/dL    BUN 35 (H) 8 - 23 mg/dL    Creatinine 2.2 (H) 0.5 - 1.4 mg/dL    Calcium 9.7 8.7 - 10.5 mg/dL    Total Protein 7.7 6.0 - 8.4 g/dL    Albumin 3.1 (L)  3.5 - 5.2 g/dL    Total Bilirubin 0.9 0.1 - 1.0 mg/dL    Alkaline Phosphatase 117 55 - 135 U/L    AST 8 (L) 10 - 40 U/L    ALT 11 10 - 44 U/L    eGFR 32 (A) >60 mL/min/1.73 m^2    Anion Gap 14 8 - 16 mmol/L   Lactic acid, plasma    Collection Time: 01/12/24 11:29 AM   Result Value Ref Range    Lactate (Lactic Acid) 1.5 0.5 - 2.2 mmol/L   Blood Culture #1 **CANNOT BE ORDERED STAT**    Collection Time: 01/12/24 11:29 AM    Specimen: Peripheral, Forearm, Left; Blood   Result Value Ref Range    Blood Culture, Routine No growth after 5 days.    Troponin I    Collection Time: 01/12/24 11:29 AM   Result Value Ref Range    Troponin I <0.006 0.000 - 0.026 ng/mL   Procalcitonin    Collection Time: 01/12/24 11:29 AM   Result Value Ref Range    Procalcitonin 0.47 (H) <0.25 ng/mL   Blood Culture #2 **CANNOT BE ORDERED STAT**    Collection Time: 01/12/24 11:39 AM    Specimen: Peripheral, Antecubital, Left; Blood   Result Value Ref Range    Blood Culture, Routine No growth after 5 days.    Urinalysis, Reflex to Urine Culture (left urostomy)    Collection Time: 01/12/24 11:55 AM    Specimen: Urine   Result Value Ref Range    Specimen UA Urine, Clean Catch     Color, UA Yellow Yellow, Straw, Andra    Appearance, UA Hazy (A) Clear    pH, UA 8.0 5.0 - 8.0    Specific Gravity, UA 1.010 1.005 - 1.030    Protein, UA 1+ (A) Negative    Glucose, UA Trace (A) Negative    Ketones, UA Negative Negative    Bilirubin (UA) Negative Negative    Occult Blood UA 1+ (A) Negative    Nitrite, UA Positive (A) Negative    Urobilinogen, UA Negative <2.0 EU/dL    Leukocytes, UA 3+ (A) Negative   Urinalysis Microscopic    Collection Time: 01/12/24 11:55 AM   Result Value Ref Range    RBC, UA 19 (H) 0 - 4 /hpf    WBC, UA 57 (H) 0 - 5 /hpf    WBC Clumps, UA Many (A) None-Rare    Bacteria Few (A) None-Occ /hpf    Hyaline Casts, UA 0 0-1/lpf /lpf    Microscopic Comment SEE COMMENT    Urine culture    Collection Time: 01/12/24 11:55 AM    Specimen: Urine    Result Value Ref Range    Urine Culture, Routine PSEUDOMONAS AERUGINOSA  >100,000 cfu/ml   (A)        Susceptibility    Pseudomonas aeruginosa - CULTURE, URINE     Amikacin <=16 Sensitive mcg/mL     Ciprofloxacin <=1 Sensitive mcg/mL     Cefepime <=2 Sensitive mcg/mL     Gentamicin <=4 Sensitive mcg/mL     Levofloxacin <=2 Sensitive mcg/mL     Meropenem <=1 Sensitive mcg/mL     Piperacillin/Tazo <=16 Sensitive mcg/mL     Tobramycin <=4 Sensitive mcg/mL   POCT glucose    Collection Time: 01/12/24  1:40 PM   Result Value Ref Range    POCT Glucose 113 (H) 70 - 110 mg/dL   Lactic acid, plasma #2    Collection Time: 01/12/24  3:45 PM   Result Value Ref Range    Lactate (Lactic Acid) 2.6 (H) 0.5 - 2.2 mmol/L   CBC auto differential (daily)    Collection Time: 01/13/24  7:47 AM   Result Value Ref Range    WBC 8.21 3.90 - 12.70 K/uL    RBC 3.31 (L) 4.60 - 6.20 M/uL    Hemoglobin 9.3 (L) 14.0 - 18.0 g/dL    Hematocrit 29.5 (L) 40.0 - 54.0 %    MCV 89 82 - 98 fL    MCH 28.1 27.0 - 31.0 pg    MCHC 31.5 (L) 32.0 - 36.0 g/dL    RDW 17.9 (H) 11.5 - 14.5 %    Platelets 134 (L) 150 - 450 K/uL    MPV 9.6 9.2 - 12.9 fL    Immature Granulocytes 0.5 0.0 - 0.5 %    Gran # (ANC) 6.7 1.8 - 7.7 K/uL    Immature Grans (Abs) 0.04 0.00 - 0.04 K/uL    Lymph # 0.5 (L) 1.0 - 4.8 K/uL    Mono # 0.8 0.3 - 1.0 K/uL    Eos # 0.2 0.0 - 0.5 K/uL    Baso # 0.03 0.00 - 0.20 K/uL    nRBC 0 0 /100 WBC    Gran % 81.4 (H) 38.0 - 73.0 %    Lymph % 5.7 (L) 18.0 - 48.0 %    Mono % 9.1 4.0 - 15.0 %    Eosinophil % 2.9 0.0 - 8.0 %    Basophil % 0.4 0.0 - 1.9 %    Differential Method Automated      *Note: Due to a large number of results and/or encounters for the requested time period, some results have not been displayed. A complete set of results can be found in Results Review.         Assessment    1. Hospital discharge follow-up    2. Nephrostomy tube displaced    3. Leg swelling          Plan    Geovani was seen today for follow-up.    Diagnoses and all  orders for this visit:    Hospital discharge follow-up  -     Ambulatory referral/consult to General Surgery; Future    Nephrostomy tube displaced  -     Urinalysis; Future  -     Urinalysis Microscopic; Future  -     Urine culture; Future    Leg swelling  -     furosemide (LASIX) 20 MG tablet; Take 1 tablet (20 mg total) by mouth 2 (two) times daily as needed (swelling).    Reached out to Interventional Radiology.  They are able to add him to the schedule today.  They want him to come directly to the hospital to get the tubes replaced.      They can also collect a urine sample at that time.  Orders placed.    Okay to use Lasix, as needed, for leg swelling.    With his cancer advancing, recommended they do their best to get back to [place] asap.  If they are not going back for a few weeks at least, recommended that we get established with hospice here to keep him comfortable.  She has not decided on which hospice agency they want to use yet.    Needs follow up with General surgery.  Referral placed.    FOLLOW-UP:  Follow up if symptoms worsen or fail to improve.    I spent a total of 45 minutes face to face and non-face to face on the date of this visit.This includes time preparing to see the patient (eg, review of tests, notes), obtaining and/or reviewing additional history from an independent historian and/or outside medical records, documenting clinical information in the electronic health record, independently interpreting results and/or communicating results to the patient/family/caregiver, or care coordinator.  Visit today included increased complexity associated with the care of the episodic problem addressed and managing the longitudinal care of the patient due to the serious and/or complex managed problem(s).    Signed by:  Faizan Antoine MD

## 2024-04-25 ENCOUNTER — DOCUMENTATION ONLY (OUTPATIENT)
Dept: PALLIATIVE MEDICINE | Facility: CLINIC | Age: 69
End: 2024-04-25
Payer: MEDICARE

## 2024-04-25 ENCOUNTER — PATIENT MESSAGE (OUTPATIENT)
Dept: PALLIATIVE MEDICINE | Facility: CLINIC | Age: 69
End: 2024-04-25
Payer: MEDICARE

## 2024-04-25 DIAGNOSIS — C34.91 SQUAMOUS CELL CARCINOMA OF RIGHT LUNG: ICD-10-CM

## 2024-04-25 DIAGNOSIS — G89.3 NEOPLASM RELATED PAIN: ICD-10-CM

## 2024-04-25 DIAGNOSIS — C67.9 MALIGNANT NEOPLASM OF URINARY BLADDER, UNSPECIFIED SITE: Primary | ICD-10-CM

## 2024-04-25 LAB
GRAM STN SPEC: NORMAL

## 2024-04-25 NOTE — PROGRESS NOTES
Nurse reached out to pt's wife to explained his needeing a refill on pain meds prescribed from ER, pt's wife understands [facility] is suggesting he comes in for a vist due to not being int prescriber of his pain meds. Nurse explained to pt's wife if he gets admitted to hospice before this apt please notify us to cancle this visit and hospice will prescribe all meds needed under his admitting diagnosis. Wife verbalized full understanding, she knows to keep the visit if hopice doesn't come for admit prior to this apt  with [facility].

## 2024-04-26 RX ORDER — HYDROCODONE BITARTRATE AND ACETAMINOPHEN 10; 325 MG/1; MG/1
1 TABLET ORAL EVERY 6 HOURS PRN
Qty: 28 TABLET | Refills: 0 | Status: SHIPPED | OUTPATIENT
Start: 2024-04-26 | End: 2024-04-26 | Stop reason: SDUPTHER

## 2024-04-26 RX ORDER — HYDROCODONE BITARTRATE AND ACETAMINOPHEN 10; 325 MG/1; MG/1
1 TABLET ORAL EVERY 6 HOURS PRN
Qty: 28 TABLET | Refills: 0 | Status: SHIPPED | OUTPATIENT
Start: 2024-04-26 | End: 2024-05-03

## 2024-04-27 ENCOUNTER — PATIENT MESSAGE (OUTPATIENT)
Dept: PALLIATIVE MEDICINE | Facility: CLINIC | Age: 69
End: 2024-04-27
Payer: MEDICARE

## 2024-04-28 LAB
BACTERIA SPEC AEROBE CULT: ABNORMAL

## 2024-04-29 ENCOUNTER — TELEPHONE (OUTPATIENT)
Dept: PALLIATIVE MEDICINE | Facility: CLINIC | Age: 69
End: 2024-04-29
Payer: MEDICARE

## 2024-04-29 ENCOUNTER — PATIENT MESSAGE (OUTPATIENT)
Dept: INFECTIOUS DISEASES | Facility: CLINIC | Age: 69
End: 2024-04-29
Payer: MEDICARE

## 2024-04-29 ENCOUNTER — OFFICE VISIT (OUTPATIENT)
Dept: SURGERY | Facility: CLINIC | Age: 69
End: 2024-04-29
Payer: MEDICARE

## 2024-04-29 ENCOUNTER — PATIENT MESSAGE (OUTPATIENT)
Dept: PHARMACY | Facility: CLINIC | Age: 69
End: 2024-04-29
Payer: MEDICARE

## 2024-04-29 VITALS
HEART RATE: 72 BPM | BODY MASS INDEX: 22.73 KG/M2 | HEIGHT: 68 IN | WEIGHT: 150 LBS | TEMPERATURE: 98 F | SYSTOLIC BLOOD PRESSURE: 123 MMHG | DIASTOLIC BLOOD PRESSURE: 76 MMHG

## 2024-04-29 DIAGNOSIS — Z87.19 HX OF SMALL BOWEL OBSTRUCTION: Primary | ICD-10-CM

## 2024-04-29 DIAGNOSIS — Z09 HOSPITAL DISCHARGE FOLLOW-UP: ICD-10-CM

## 2024-04-29 LAB
BACTERIA SPEC ANAEROBE CULT: NORMAL
BACTERIA SPEC ANAEROBE CULT: NORMAL

## 2024-04-29 PROCEDURE — 99214 OFFICE O/P EST MOD 30 MIN: CPT | Mod: PBBFAC | Performed by: SURGERY

## 2024-04-29 PROCEDURE — 99999 PR PBB SHADOW E&M-EST. PATIENT-LVL IV: CPT | Mod: PBBFAC,,, | Performed by: SURGERY

## 2024-04-29 PROCEDURE — 99024 POSTOP FOLLOW-UP VISIT: CPT | Mod: POP,,, | Performed by: SURGERY

## 2024-04-29 NOTE — TELEPHONE ENCOUNTER
----- Message from Magdy Mackay sent at 4/29/2024 11:07 AM CDT -----  Contact: [phone]  Stephania is returning your call and would like to get a call back

## 2024-04-29 NOTE — PATIENT INSTRUCTIONS
Fill the open area of the wound with gauze once a day.      It was okay for him to shower and get this area wet or sponge bath.  Do not soak in the tub.      If you run into any problems or concerns please call the office    Our office phone numbers are  [phone] and

## 2024-04-29 NOTE — PROGRESS NOTES
Subjective:       Patient ID: Geovani Currie is a 68 y.o. male.    Chief Complaint: No chief complaint on file.    68-year-old male who follows up after an entero colostomy for a malignant obstruction.  He states that he is eating and moving his bowels are though his bowels are liquid.  He has had his nephrostomy tubes changed.      He has been seen by Oncology and they recommend no further treatment and hospice consultation.      He was accompanied by his wife.  Review of Systems   Constitutional:  Positive for unexpected weight change.   Gastrointestinal:  Negative for diarrhea.     Objective:      Physical Exam  Constitutional:       Appearance: Ill appearance: he was chronically.      Comments: Cachectic   Abdominal:      General: Abdomen is flat. Bowel sounds are normal.      Comments: Incision has a slight amount of clear drainage from the lower aspect.  Staples removed.  Wound opened.  Seroma drained.  Wound patch   Skin:     Coloration: Skin is pale.   Neurological:      Mental Status: He is alert.       Assessment:    Nonresectable bladder cancer status post entero colostomy for a malignant obstruction  No additional chemotherapy is recommended per Oncology  Hospice recommended per on-call      Plan:         Agree with hospice consult as quality of life is most important as opposed a quantity.  Seroma with local wound care.  Wound care explained to his wife.      Follow up in 3 weeks

## 2024-04-29 NOTE — TELEPHONE ENCOUNTER
We regret to inform you Geovani Currie has been denied enrollment by [employer].    Denial Date: 04/15/2024  Denial Reason:   Documentation of 3% out-of-pocket prescription expenses,based on household adjusted gross income, not met           Patient has been notified of this decision via Portal message  and letter

## 2024-05-02 ENCOUNTER — DOCUMENTATION ONLY (OUTPATIENT)
Dept: PALLIATIVE MEDICINE | Facility: CLINIC | Age: 69
End: 2024-05-02
Payer: MEDICARE

## 2024-05-02 ENCOUNTER — DOCUMENT SCAN (OUTPATIENT)
Dept: HOME HEALTH SERVICES | Facility: HOSPITAL | Age: 69
End: 2024-05-02
Payer: MEDICARE

## 2024-05-02 NOTE — PROGRESS NOTES
Palliative no show apt nurse note    Nurse reached out to pt's dgt regarding his apt today with palliative, his daughter stated he was admit to hospice this morning, please cx apt.

## 2024-05-06 ENCOUNTER — EXTERNAL HOME HEALTH (OUTPATIENT)
Dept: HOME HEALTH SERVICES | Facility: HOSPITAL | Age: 69
End: 2024-05-06
Payer: MEDICARE

## 2024-05-06 PROBLEM — N17.9 ACUTE RENAL FAILURE SUPERIMPOSED ON STAGE 3 CHRONIC KIDNEY DISEASE: Status: RESOLVED | Noted: 2024-02-03 | Resolved: 2024-05-06

## 2024-05-06 PROBLEM — N18.30 ACUTE RENAL FAILURE SUPERIMPOSED ON STAGE 3 CHRONIC KIDNEY DISEASE: Status: RESOLVED | Noted: 2024-02-03 | Resolved: 2024-05-06

## 2024-05-07 DIAGNOSIS — J43.2 CENTRILOBULAR EMPHYSEMA: ICD-10-CM

## 2024-05-07 RX ORDER — FLUTICASONE PROPIONATE AND SALMETEROL XINAFOATE 45; 21 UG/1; UG/1
2 AEROSOL, METERED RESPIRATORY (INHALATION) EVERY 12 HOURS
Qty: 12 G | Refills: 5 | Status: SHIPPED | OUTPATIENT
Start: 2024-05-07 | End: 2024-05-07

## 2024-05-07 RX ORDER — FLUTICASONE PROPIONATE AND SALMETEROL 250; 50 UG/1; UG/1
1 POWDER RESPIRATORY (INHALATION) 2 TIMES DAILY
Qty: 60 EACH | Refills: 5 | Status: SHIPPED | OUTPATIENT
Start: 2024-05-07 | End: 2024-05-07

## 2024-05-08 ENCOUNTER — DOCUMENT SCAN (OUTPATIENT)
Dept: HOME HEALTH SERVICES | Facility: HOSPITAL | Age: 69
End: 2024-05-08
Payer: MEDICARE

## 2024-05-13 ENCOUNTER — DOCUMENT SCAN (OUTPATIENT)
Dept: HOME HEALTH SERVICES | Facility: HOSPITAL | Age: 69
End: 2024-05-13
Payer: MEDICARE

## 2024-05-20 ENCOUNTER — PATIENT MESSAGE (OUTPATIENT)
Dept: SURGERY | Facility: HOSPITAL | Age: 69
End: 2024-05-20
Payer: MEDICARE

## 2024-05-29 ENCOUNTER — PATIENT MESSAGE (OUTPATIENT)
Dept: SURGERY | Facility: HOSPITAL | Age: 69
End: 2024-05-29
Payer: MEDICARE

## 2024-06-12 ENCOUNTER — HOSPITAL ENCOUNTER (EMERGENCY)
Facility: HOSPITAL | Age: 69
Discharge: HOME | End: 2024-06-12
Attending: EMERGENCY MEDICINE
Payer: MEDICARE

## 2024-06-12 VITALS
HEART RATE: 81 BPM | RESPIRATION RATE: 18 BRPM | OXYGEN SATURATION: 95 % | TEMPERATURE: 99 F | SYSTOLIC BLOOD PRESSURE: 112 MMHG | DIASTOLIC BLOOD PRESSURE: 60 MMHG | BODY MASS INDEX: 20.85 KG/M2 | WEIGHT: 137.13 LBS

## 2024-06-12 DIAGNOSIS — C67.9 MALIGNANT NEOPLASM OF URINARY BLADDER, UNSPECIFIED SITE: ICD-10-CM

## 2024-06-12 DIAGNOSIS — R50.9 FEVER, UNSPECIFIED FEVER CAUSE: Primary | ICD-10-CM

## 2024-06-12 DIAGNOSIS — R53.1 WEAKNESS: ICD-10-CM

## 2024-06-12 DIAGNOSIS — N39.0 URINARY TRACT INFECTION WITHOUT HEMATURIA, SITE UNSPECIFIED: ICD-10-CM

## 2024-06-12 DIAGNOSIS — T83.022A NEPHROSTOMY TUBE DISPLACED: ICD-10-CM

## 2024-06-12 LAB
ALBUMIN SERPL BCP-MCNC: 2.3 G/DL (ref 3.5–5.2)
ALP SERPL-CCNC: 1140 U/L (ref 55–135)
ALT SERPL W/O P-5'-P-CCNC: 33 U/L (ref 10–44)
ANION GAP SERPL CALC-SCNC: 12 MMOL/L (ref 8–16)
AST SERPL-CCNC: 45 U/L (ref 10–40)
BACTERIA #/AREA URNS HPF: ABNORMAL /HPF
BASOPHILS # BLD AUTO: 0.06 K/UL (ref 0–0.2)
BASOPHILS NFR BLD: 0.4 % (ref 0–1.9)
BILIRUB SERPL-MCNC: 1.1 MG/DL (ref 0.1–1)
BILIRUB UR QL STRIP: NEGATIVE
BNP SERPL-MCNC: 293 PG/ML (ref 0–99)
BUN SERPL-MCNC: 34 MG/DL (ref 8–23)
CALCIUM SERPL-MCNC: 9.6 MG/DL (ref 8.7–10.5)
CHLORIDE SERPL-SCNC: 94 MMOL/L (ref 95–110)
CLARITY UR: ABNORMAL
CO2 SERPL-SCNC: 28 MMOL/L (ref 23–29)
COLOR UR: YELLOW
CREAT SERPL-MCNC: 2.3 MG/DL (ref 0.5–1.4)
DIFFERENTIAL METHOD BLD: ABNORMAL
EOSINOPHIL # BLD AUTO: 0.1 K/UL (ref 0–0.5)
EOSINOPHIL NFR BLD: 0.7 % (ref 0–8)
ERYTHROCYTE [DISTWIDTH] IN BLOOD BY AUTOMATED COUNT: 17.8 % (ref 11.5–14.5)
EST. GFR  (NO RACE VARIABLE): 30 ML/MIN/1.73 M^2
GLUCOSE SERPL-MCNC: 112 MG/DL (ref 70–110)
GLUCOSE UR QL STRIP: NEGATIVE
HCT VFR BLD AUTO: 22.4 % (ref 40–54)
HGB BLD-MCNC: 6.6 G/DL (ref 14–18)
HGB UR QL STRIP: ABNORMAL
HYALINE CASTS #/AREA URNS LPF: 0 /LPF
IMM GRANULOCYTES # BLD AUTO: 0.09 K/UL (ref 0–0.04)
IMM GRANULOCYTES NFR BLD AUTO: 0.6 % (ref 0–0.5)
KETONES UR QL STRIP: NEGATIVE
LACTATE SERPL-SCNC: 1 MMOL/L (ref 0.5–2.2)
LEUKOCYTE ESTERASE UR QL STRIP: ABNORMAL
LYMPHOCYTES # BLD AUTO: 1.4 K/UL (ref 1–4.8)
LYMPHOCYTES NFR BLD: 8.3 % (ref 18–48)
MCH RBC QN AUTO: 24.4 PG (ref 27–31)
MCHC RBC AUTO-ENTMCNC: 29.5 G/DL (ref 32–36)
MCV RBC AUTO: 83 FL (ref 82–98)
MICROSCOPIC COMMENT: ABNORMAL
MONOCYTES # BLD AUTO: 0.9 K/UL (ref 0.3–1)
MONOCYTES NFR BLD: 5.4 % (ref 4–15)
NEUTROPHILS # BLD AUTO: 13.7 K/UL (ref 1.8–7.7)
NEUTROPHILS NFR BLD: 84.6 % (ref 38–73)
NITRITE UR QL STRIP: NEGATIVE
NRBC BLD-RTO: 0 /100 WBC
OHS QRS DURATION: 98 MS
OHS QTC CALCULATION: 467 MS
PH UR STRIP: 7 [PH] (ref 5–8)
PLATELET # BLD AUTO: 357 K/UL (ref 150–450)
PMV BLD AUTO: 10.4 FL (ref 9.2–12.9)
POTASSIUM SERPL-SCNC: 3.3 MMOL/L (ref 3.5–5.1)
PROCALCITONIN SERPL IA-MCNC: 0.7 NG/ML
PROT SERPL-MCNC: 7.3 G/DL (ref 6–8.4)
PROT UR QL STRIP: ABNORMAL
RBC # BLD AUTO: 2.71 M/UL (ref 4.6–6.2)
RBC #/AREA URNS HPF: >100 /HPF (ref 0–4)
SODIUM SERPL-SCNC: 134 MMOL/L (ref 136–145)
SP GR UR STRIP: 1.01 (ref 1–1.03)
TROPONIN I SERPL DL<=0.01 NG/ML-MCNC: 0.02 NG/ML (ref 0–0.03)
URN SPEC COLLECT METH UR: ABNORMAL
UROBILINOGEN UR STRIP-ACNC: NEGATIVE EU/DL
WBC # BLD AUTO: 16.21 K/UL (ref 3.9–12.7)
WBC #/AREA URNS HPF: >100 /HPF (ref 0–5)
WBC CLUMPS URNS QL MICRO: ABNORMAL

## 2024-06-12 PROCEDURE — 93010 ELECTROCARDIOGRAM REPORT: CPT | Mod: ,,, | Performed by: INTERNAL MEDICINE

## 2024-06-12 PROCEDURE — 63600175 PHARM REV CODE 636 W HCPCS: Performed by: NURSE PRACTITIONER

## 2024-06-12 PROCEDURE — 81000 URINALYSIS NONAUTO W/SCOPE: CPT | Performed by: EMERGENCY MEDICINE

## 2024-06-12 PROCEDURE — 96365 THER/PROPH/DIAG IV INF INIT: CPT

## 2024-06-12 PROCEDURE — 87088 URINE BACTERIA CULTURE: CPT | Performed by: EMERGENCY MEDICINE

## 2024-06-12 PROCEDURE — 87186 SC STD MICRODIL/AGAR DIL: CPT | Performed by: EMERGENCY MEDICINE

## 2024-06-12 PROCEDURE — 80053 COMPREHEN METABOLIC PANEL: CPT | Performed by: EMERGENCY MEDICINE

## 2024-06-12 PROCEDURE — 84145 PROCALCITONIN (PCT): CPT | Performed by: EMERGENCY MEDICINE

## 2024-06-12 PROCEDURE — 83605 ASSAY OF LACTIC ACID: CPT | Performed by: EMERGENCY MEDICINE

## 2024-06-12 PROCEDURE — 85025 COMPLETE CBC W/AUTO DIFF WBC: CPT | Performed by: EMERGENCY MEDICINE

## 2024-06-12 PROCEDURE — 84484 ASSAY OF TROPONIN QUANT: CPT | Performed by: EMERGENCY MEDICINE

## 2024-06-12 PROCEDURE — 87077 CULTURE AEROBIC IDENTIFY: CPT | Performed by: EMERGENCY MEDICINE

## 2024-06-12 PROCEDURE — 25000003 PHARM REV CODE 250: Performed by: NURSE PRACTITIONER

## 2024-06-12 PROCEDURE — 87086 URINE CULTURE/COLONY COUNT: CPT | Performed by: EMERGENCY MEDICINE

## 2024-06-12 PROCEDURE — 96361 HYDRATE IV INFUSION ADD-ON: CPT

## 2024-06-12 PROCEDURE — 99285 EMERGENCY DEPT VISIT HI MDM: CPT | Mod: 25

## 2024-06-12 PROCEDURE — 93005 ELECTROCARDIOGRAM TRACING: CPT

## 2024-06-12 PROCEDURE — 96366 THER/PROPH/DIAG IV INF ADDON: CPT

## 2024-06-12 PROCEDURE — 25000003 PHARM REV CODE 250: Performed by: EMERGENCY MEDICINE

## 2024-06-12 PROCEDURE — 83880 ASSAY OF NATRIURETIC PEPTIDE: CPT | Performed by: EMERGENCY MEDICINE

## 2024-06-12 PROCEDURE — 96367 TX/PROPH/DG ADDL SEQ IV INF: CPT

## 2024-06-12 PROCEDURE — 87040 BLOOD CULTURE FOR BACTERIA: CPT | Performed by: EMERGENCY MEDICINE

## 2024-06-12 RX ORDER — CIPROFLOXACIN 250 MG/1
250 TABLET, FILM COATED ORAL EVERY 12 HOURS
COMMUNITY
Start: 2024-06-05

## 2024-06-12 RX ORDER — HALOPERIDOL 2 MG/ML
1 SOLUTION ORAL EVERY 4 HOURS PRN
COMMUNITY
Start: 2024-05-16

## 2024-06-12 RX ORDER — HYOSCYAMINE SULFATE 0.125 MG
125 TABLET ORAL EVERY 6 HOURS PRN
COMMUNITY
Start: 2024-05-16

## 2024-06-12 RX ORDER — MORPHINE SULFATE 30 MG/1
30 TABLET ORAL 2 TIMES DAILY
COMMUNITY
Start: 2024-06-06

## 2024-06-12 RX ORDER — LINEZOLID 2 MG/ML
600 INJECTION, SOLUTION INTRAVENOUS
Status: COMPLETED | OUTPATIENT
Start: 2024-06-12 | End: 2024-06-12

## 2024-06-12 RX ORDER — ACETAMINOPHEN 325 MG/1
650 TABLET ORAL
Status: COMPLETED | OUTPATIENT
Start: 2024-06-12 | End: 2024-06-12

## 2024-06-12 RX ORDER — HYDROCODONE BITARTRATE AND ACETAMINOPHEN 10; 325 MG/1; MG/1
1 TABLET ORAL EVERY 6 HOURS PRN
COMMUNITY
Start: 2024-06-06

## 2024-06-12 RX ADMIN — SODIUM CHLORIDE 1866 ML: 9 INJECTION, SOLUTION INTRAVENOUS at 04:06

## 2024-06-12 RX ADMIN — LINEZOLID 600 MG: 600 INJECTION, SOLUTION INTRAVENOUS at 05:06

## 2024-06-12 RX ADMIN — ACETAMINOPHEN 650 MG: 325 TABLET ORAL at 04:06

## 2024-06-12 RX ADMIN — MEROPENEM 1 G: 1 INJECTION INTRAVENOUS at 07:06

## 2024-06-12 NOTE — ASSESSMENT & PLAN NOTE
Per review of previous cultures, resistance to Cipro noted. Treated with Merrem/Zyvox in the ED. Plan for d/c home with hospice. Discussed his previous records with Hospice on call. Discussed resistance to Cipro. Can treat with Levaquin PO (intermediate) and Zyvox. Nurse to discuss with Hospice MD. Advised patient would be treated in the ED prior to leaving.     --Plan to discharge home, Hospice to assume care.   --Report called to On Call Nurse

## 2024-06-12 NOTE — ASSESSMENT & PLAN NOTE
Tumor compression of ureteral tubes. Imaging shows significant progression of tumor. Discussed these findings with his spouse. Spouse had multiple questions about prognosis. Advised unable to give exact timelines but patient is showing progression and weight loss. These are poor prognostic indicators. Spouse stated understanding of the inability to give exact timelines.     --Discharge home with Hospice

## 2024-06-12 NOTE — PHARMACY MED REC
"Admission Medication History     The home medication history was taken by Randa Cardoso.    You may go to "Admission" then "Reconcile Home Medications" tabs to review and/or act upon these items.     The home medication list has been updated by the Pharmacy department.   Please read ALL comments highlighted in yellow.   Please address this information as you see fit.    Feel free to contact us if you have any questions or require assistance.      The medications listed below were removed from the home medication list. Please reorder if appropriate:  Patient reports no longer taking the following medication(s):  albuterol 90 mcg/actuation inhaler  Trelegy Ellipta 100-62.5-25 mcg  Nicoder CQ 24hr patch   Flomax 0.4 mg      Medications listed below were obtained from: Patient/family and Analytic software- Roxie,daughter was called Stephania Mann      LAST MED REC COMPLETED:     Randa Cardoso  [id number]      Current Outpatient Medications on File Prior to Encounter   Medication Sig Dispense Refill Last Dose    albuterol (ACCUNEB) 0.63 mg/3 mL Nebu Take 3 mLs (0.63 mg total) by nebulization 3 (three) times daily as needed (sob, wheezing). Rescue 75 mL 3 6/11/2024    ALPRAZolam (XANAX) 1 MG tablet Take 1 tablet (1 mg total) by mouth 3 (three) times daily as needed for Anxiety. 90 tablet 5 6/11/2024    apixaban (ELIQUIS) 5 mg Tab Take 1 tablet (5 mg total) by mouth 2 (two) times daily. 180 tablet 3 6/11/2024    ciprofloxacin HCl (CIPRO) 250 MG tablet Take 250 mg by mouth every 12 (twelve) hours.   6/11/2024    cyclobenzaprine (FLEXERIL) 10 MG tablet Take 1 tablet (10 mg total) by mouth 3 (three) times daily as needed for Muscle spasms. 270 tablet 3 6/11/2024    EScitalopram oxalate (LEXAPRO) 20 MG tablet Take 1 tablet (20 mg total) by mouth once daily. 90 tablet 3 6/11/2024    haloperidol (HALDOL) 2 mg/mL solution Take 1 mg by mouth every 4 (four) hours as needed.   Past Week    HYDROcodone-acetaminophen (NORCO) "  mg per tablet Take 1 tablet by mouth every 6 (six) hours as needed for Pain.   6/12/2024 at 11:00am    levothyroxine (SYNTHROID) 125 MCG tablet Take 1 tablet (125 mcg total) by mouth before breakfast. 90 tablet 3 6/11/2024    MS CONTIN 30 mg 12 hr tablet Take 30 mg by mouth 2 (two) times daily.   6/11/2024    pregabalin (LYRICA) 75 MG capsule Take 1 capsule (75 mg total) by mouth every evening. 30 capsule 0 6/11/2024    vitamin D (VITAMIN D3) 1000 units Tab Take 25 mcg by mouth once daily.   6/11/2024    bisacodyL 5 mg Tab Take 1 tablet by mouth Daily. 7 tablet 0 Unknown    furosemide (LASIX) 20 MG tablet Take 1 tablet (20 mg total) by mouth 2 (two) times daily as needed (swelling). 60 tablet 11 Unknown    hyoscyamine (ANASPAZ,LEVSIN) 0.125 mg Tab Take 125 mcg by mouth every 6 (six) hours as needed.   Unknown    naloxone (NARCAN) 4 mg/actuation Spry 1 spray once.   Unknown                           .

## 2024-06-12 NOTE — ED PROVIDER NOTES
SCRIBE #1 NOTE: IAlejo am scribing for, and in the presence of,  Vin Cat MD. I have scribed the following portions of the note - Other sections scribed: HPI, ROS, Physical.   SCRIBE #2 NOTE: IRadha am scribing for, and in the presence of,  Dexter Wong MD. I have scribed the remaining portions of the note not scribed by Scribe #1.      History     Chief Complaint   Patient presents with    Fever     Reports fever x 2 days. Recently had R nephrostomy tube placed. Also reports possible dislodgment of tube.      Review of patient's allergies indicates:  No Known Allergies      History of Present Illness     HPI    6/12/2024, 2:04 PM  History obtained from the patient      History of Present Illness: Geovani Currie is a 68 y.o. male patient who presents to the Emergency Department for evaluation of fever with drainage coming from nephrostomy which onset a few days ago. No further complaints or concerns at this time.       Arrival mode: Personal vehicle    PCP: Faizan Antoine MD        Past Medical History:  Past Medical History:   Diagnosis Date    Anxiety disorder, unspecified     Cancer     COPD (chronic obstructive pulmonary disease)     Hypertension     Thyroid disease        Past Surgical History:  Past Surgical History:   Procedure Laterality Date    APPENDECTOMY      CATARACT EXTRACTION      ENTEROENTEROSTOMY N/A 04/05/2024    Procedure: ENTEROENTEROSTOMY;  Surgeon: Silvestre Ramos MD;  Location: [facility] OR;  Service: General;  Laterality: N/A;  enterocolostomy, small bowel to transverse colon anastamosis    INJECTION OF ANESTHETIC AGENT INTO TISSUE PLANE DEFINED BY TRANSVERSUS ABDOMINIS MUSCLE N/A 04/05/2024    Procedure: BLOCK, TRANSVERSUS ABDOMINIS PLANE;  Surgeon: Silvesrte Ramos MD;  Location: [facility] OR;  Service: General;  Laterality: N/A;    LAPAROTOMY, EXPLORATORY N/A 04/05/2024    Procedure: LAPAROTOMY, EXPLORATORY;  Surgeon: Silvestre Ramos MD;  Location:  [facility] OR;  Service: General;  Laterality: N/A;    NEPHROSTOMY      OPEN REDUCTION AND INTERNAL FIXATION (ORIF) OF INTERTROCHANTERIC FRACTURE OF FEMUR Right 11/21/2023    Procedure: ORIF, FRACTURE, FEMUR, INTERTROCHANTERIC;  Surgeon: Tanisha Guidry DO;  Location: [facility] OR;  Service: Orthopedics;  Laterality: Right;  Gamma nail    small bowel obstruction  N/A 04/03/2024         Family History:  Family History   Problem Relation Name Age of Onset    Cancer Mother          NOS    Cancer Father          NOS    Cancer Maternal Grandfather          NOS    Bladder Cancer Neg Hx         Social History:  Social History     Tobacco Use    Smoking status: Every Day     Types: Vaping with nicotine     Passive exposure: Current    Smokeless tobacco: Never   Substance and Sexual Activity    Alcohol use: Never    Drug use: Never    Sexual activity: Not Currently     Partners: Female        Review of Systems     Review of Systems   Constitutional:  Positive for fever.   HENT:  Negative for sore throat.    Respiratory:  Negative for shortness of breath.    Cardiovascular:  Negative for chest pain.   Gastrointestinal:  Negative for nausea.   Genitourinary:  Negative for dysuria.        (+) drainage from nephrostomy   Musculoskeletal:  Negative for back pain.   Skin:  Negative for rash.   Neurological:  Negative for weakness.   Hematological:  Does not bruise/bleed easily.   All other systems reviewed and are negative.       Physical Exam     Initial Vitals [06/12/24 1304]   BP Pulse Resp Temp SpO2   120/69 88 18 97.9 °F (36.6 °C) (S) (!) 88 %      MAP       --          Physical Exam  Nursing Notes and Vital Signs Reviewed.  Constitutional: Patient is in no acute distress. Elderly and frail.  Head: Atraumatic. Normocephalic.  Eyes: PERRL. EOM intact. Conjunctivae are not pale. No scleral icterus.  ENT: Mucous membranes are moist.    Neck: Supple. Full ROM.   Cardiovascular: Regular rate. Regular rhythm. No murmurs, rubs, or gallops.  Distal pulses are 2+ and symmetric.  Pulmonary/Chest: No respiratory distress. Clear to auscultation bilaterally. No wheezing or rales.  Abdominal: Soft and non-distended.  There is no tenderness.  No rebound, guarding, or rigidity.   Genitourinary: No CVA tenderness. Nephrostomy tube dislodged with purulent drainage present.  Musculoskeletal: Moves all extremities. No obvious deformities. No edema.   Skin: Warm and dry.  Neurological:  Awake and somewhat distant. Sluggish speech.  No acute focal neurological deficits are appreciated.  Psychiatric: Normal affect. Appropriate in content.     ED Course   Procedures  ED Vital Signs:  Vitals:    06/12/24 1304 06/12/24 1412 06/12/24 1504 06/12/24 1517   BP: 120/69  (S) (!) 86/51    Pulse: 88 82 89    Resp: 18  (!) 22    Temp: 97.9 °F (36.6 °C)   (!) 100.6 °F (38.1 °C)   TempSrc:    Oral   SpO2: (S) (!) 88%  97%    Weight:        06/12/24 1632 06/12/24 1640 06/12/24 1702 06/12/24 1731   BP: 113/62  (!) 116/59 128/67   Pulse: 86  85 93   Resp: (!) 21   16   Temp:       TempSrc:       SpO2: 97%  98%    Weight:  62.2 kg (137 lb 2 oz)      06/12/24 1900 06/12/24 2000   BP: (!) 101/55 112/60   Pulse: 81    Resp: 11 18   Temp: 98.5 °F (36.9 °C) 98.5 °F (36.9 °C)   TempSrc: Oral Oral   SpO2: 95% 95%   Weight:         Abnormal Lab Results:  Labs Reviewed   CBC W/ AUTO DIFFERENTIAL - Abnormal; Notable for the following components:       Result Value    WBC 16.21 (*)     RBC 2.71 (*)     Hemoglobin 6.6 (*)     Hematocrit 22.4 (*)     MCH 24.4 (*)     MCHC 29.5 (*)     RDW 17.8 (*)     Immature Granulocytes 0.6 (*)     Gran # (ANC) 13.7 (*)     Immature Grans (Abs) 0.09 (*)     Gran % 84.6 (*)     Lymph % 8.3 (*)     All other components within normal limits   COMPREHENSIVE METABOLIC PANEL - Abnormal; Notable for the following components:    Sodium 134 (*)     Potassium 3.3 (*)     Chloride 94 (*)     Glucose 112 (*)     BUN 34 (*)     Creatinine 2.3 (*)     Albumin 2.3 (*)      Total Bilirubin 1.1 (*)     Alkaline Phosphatase 1,140 (*)     AST 45 (*)     eGFR 30 (*)     All other components within normal limits   URINALYSIS, REFLEX TO URINE CULTURE - Abnormal; Notable for the following components:    Appearance, UA Cloudy (*)     Protein, UA 2+ (*)     Occult Blood UA 3+ (*)     Leukocytes, UA 3+ (*)     All other components within normal limits    Narrative:     Specimen Source->Urine   PROCALCITONIN - Abnormal; Notable for the following components:    Procalcitonin 0.70 (*)     All other components within normal limits   B-TYPE NATRIURETIC PEPTIDE - Abnormal; Notable for the following components:     (*)     All other components within normal limits   URINALYSIS MICROSCOPIC - Abnormal; Notable for the following components:    RBC, UA >100 (*)     WBC, UA >100 (*)     WBC Clumps, UA Many (*)     Bacteria Many (*)     All other components within normal limits    Narrative:     Specimen Source->Urine   CULTURE, BLOOD   CULTURE, BLOOD   CULTURE, URINE   LACTIC ACID, PLASMA   TROPONIN I   LACTIC ACID, PLASMA        All Lab Results:  Results for orders placed or performed during the hospital encounter of 06/12/24   CBC auto differential   Result Value Ref Range    WBC 16.21 (H) 3.90 - 12.70 K/uL    RBC 2.71 (L) 4.60 - 6.20 M/uL    Hemoglobin 6.6 (L) 14.0 - 18.0 g/dL    Hematocrit 22.4 (L) 40.0 - 54.0 %    MCV 83 82 - 98 fL    MCH 24.4 (L) 27.0 - 31.0 pg    MCHC 29.5 (L) 32.0 - 36.0 g/dL    RDW 17.8 (H) 11.5 - 14.5 %    Platelets 357 150 - 450 K/uL    MPV 10.4 9.2 - 12.9 fL    Immature Granulocytes 0.6 (H) 0.0 - 0.5 %    Gran # (ANC) 13.7 (H) 1.8 - 7.7 K/uL    Immature Grans (Abs) 0.09 (H) 0.00 - 0.04 K/uL    Lymph # 1.4 1.0 - 4.8 K/uL    Mono # 0.9 0.3 - 1.0 K/uL    Eos # 0.1 0.0 - 0.5 K/uL    Baso # 0.06 0.00 - 0.20 K/uL    nRBC 0 0 /100 WBC    Gran % 84.6 (H) 38.0 - 73.0 %    Lymph % 8.3 (L) 18.0 - 48.0 %    Mono % 5.4 4.0 - 15.0 %    Eosinophil % 0.7 0.0 - 8.0 %    Basophil % 0.4 0.0  - 1.9 %    Differential Method Automated    Comprehensive metabolic panel   Result Value Ref Range    Sodium 134 (L) 136 - 145 mmol/L    Potassium 3.3 (L) 3.5 - 5.1 mmol/L    Chloride 94 (L) 95 - 110 mmol/L    CO2 28 23 - 29 mmol/L    Glucose 112 (H) 70 - 110 mg/dL    BUN 34 (H) 8 - 23 mg/dL    Creatinine 2.3 (H) 0.5 - 1.4 mg/dL    Calcium 9.6 8.7 - 10.5 mg/dL    Total Protein 7.3 6.0 - 8.4 g/dL    Albumin 2.3 (L) 3.5 - 5.2 g/dL    Total Bilirubin 1.1 (H) 0.1 - 1.0 mg/dL    Alkaline Phosphatase 1,140 (H) 55 - 135 U/L    AST 45 (H) 10 - 40 U/L    ALT 33 10 - 44 U/L    eGFR 30 (A) >60 mL/min/1.73 m^2    Anion Gap 12 8 - 16 mmol/L   Lactic acid, plasma #1   Result Value Ref Range    Lactate (Lactic Acid) 1.0 0.5 - 2.2 mmol/L   Urinalysis, Reflex to Urine Culture Urine, Clean Catch    Specimen: Urine   Result Value Ref Range    Specimen UA Urine, Clean Catch     Color, UA Yellow Yellow, Straw, Andra    Appearance, UA Cloudy (A) Clear    pH, UA 7.0 5.0 - 8.0    Specific Gravity, UA 1.010 1.005 - 1.030    Protein, UA 2+ (A) Negative    Glucose, UA Negative Negative    Ketones, UA Negative Negative    Bilirubin (UA) Negative Negative    Occult Blood UA 3+ (A) Negative    Nitrite, UA Negative Negative    Urobilinogen, UA Negative <2.0 EU/dL    Leukocytes, UA 3+ (A) Negative   Procalcitonin   Result Value Ref Range    Procalcitonin 0.70 (H) <0.25 ng/mL   Troponin I   Result Value Ref Range    Troponin I 0.019 0.000 - 0.026 ng/mL   Brain natriuretic peptide   Result Value Ref Range     (H) 0 - 99 pg/mL   Urinalysis Microscopic   Result Value Ref Range    RBC, UA >100 (H) 0 - 4 /hpf    WBC, UA >100 (H) 0 - 5 /hpf    WBC Clumps, UA Many (A) None-Rare    Bacteria Many (A) None-Occ /hpf    Hyaline Casts, UA 0 0-1/lpf /lpf    Microscopic Comment SEE COMMENT    EKG 12-lead   Result Value Ref Range    QRS Duration 98 ms    [facility] QTC Calculation 467 ms         Imaging Results:  Imaging Results              X-Ray Chest AP  Portable (Final result)  Result time 06/12/24 15:22:27      Final result by Oleg Sr MD (06/12/24 15:22:27)                   Impression:      No acute process seen.      Electronically signed by: Oleg Sr MD  Date:    06/12/2024  Time:    15:22               Narrative:    EXAMINATION:  XR CHEST AP PORTABLE    CLINICAL HISTORY:  Sepsis;    FINDINGS:  Single view of the chest.  Comparison 04/05/2024.    Cardiac silhouette is normal.  The lungs demonstrate no evidence of active disease.  No evidence of pleural effusion or pneumothorax.  Bones appear intact.  Moderate degenerative changes and moderate atherosclerotic disease.                                       CT Renal Stone Study ABD Pelvis WO (Final result)  Result time 06/12/24 14:53:26      Final result by Mulugeta Harper MD (06/12/24 14:53:26)                   Impression:      1.  There is marked right hydronephrosis, despite the presence of a right nephrostomy tube.  The marker along the catheter of the right nephrostomy tube projects outside of the posterior right kidney.  Nonfunctioning of the right ureteral stent, either due to the fact that is clogged versus mild positioning, is most likely the source of the hydronephrosis.  Interventional radiology consultation recommended.    2.  Findings indicating progression of the patient's disease process, with findings concerning for invasion of the left side of the sacrum by the presacral mass, as well as interval increase in size of a right lower lobe pulmonary nodule measuring 1.4 cm currently.  There is also a new 7 mm nodule along the right major fissure.    3.  Stable findings as noted above.  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    All CT scans at this facility are performed  using dose modulation techniques as appropriate to performed exam including the following:  automated exposure control;  adjustment of mA and/or kV according to the patients size (this includes techniques or standardized protocols for targeted exams where dose is matched to indication/reason for exam: i.e. extremities or head);  iterative reconstruction technique.      Electronically signed by: Mulugeta Harper MD  Date:    06/12/2024  Time:    14:53               Narrative:    EXAMINATION:  CT RENAL STONE STUDY ABD PELVIS WO, multiplanar reconstructions    CLINICAL HISTORY:  Flank pain, kidney stone suspected;    TECHNIQUE:  Axial images through the abdomen and pelvis were obtained without the use of IV contrast.  Sagittal and coronal reconstructions are provided for review.  Oral contrast was not utilized.    COMPARISON:  Studies dating back to May 04/3/2024    FINDINGS:  LUNG BASES: Increase in size of nodular opacity within the posterior right costophrenic angle measuring 1.4 cm on axial image 25 currently.  Development of left basilar dependent atelectasis.  Development of subtle area of nodularity along the right major fissure measuring 7 mm on axial image 10. Lung bases are otherwise clear.  Negative for pleural or pericardial effusions. The distal esophagus is normal.  Extensive coronary artery calcifications, aortic valve plane and mitral valve plane calcifications again seen.    ABDOMEN: The patient has indwelling bilateral nephrostomy tubes.  Despite the presence of a right nephrostomy tube, there is marked right hydronephrosis and hydroureter, indicating that this nephrostomy tube is nonfunctional.  Of note, the metallic marker of the right nephrostomy tube projects outside of the posterior right kidney.  The left nephrostomy tube is in good position without left hydronephrosis.  Atrophy of the left kidney again seen.    The unenhanced liver, spleen, adrenal glands and pancreas are without discrete lesions.  The gallbladder and biliary tree are normal.    Negative for adenopathy, free fluid or inflammatory change noted within  the abdomen or pelvis.    Again seen are vascular calcifications with fusiform mild aneurysmal change of the aorta measuring a maximum of 3.1 cm in diameter.    Increased stool throughout the colon.  Normal appendix.  The large and small bowel loops are otherwise normal.  Negative for free air.    PELVIS: Again seen is marked urinary bladder wall thickening with calcifications along the left urinary bladder lumen with a small amount of fluid centrally.  There is soft tissue material that extends from the markedly thick walled urinary bladder into the cul-de-sac soft tissues, as well as toward the presacral space.  It appears to this soft tissue is invading the left side of the sacrum.  The male pelvic organs are otherwise unchanged.  There are pelvic phleboliths.    The abdominal wall is intact.    The osseous structures are unchanged.  Stable marked degenerative disc changes at L3/L4.  Stable postoperative changes involving the right femur.    Negative for groin adenopathy.                                       The EKG was ordered, reviewed, and independently interpreted by the ED provider.  Interpretation time: 2:36 PM  Rate: 84 BPM  Rhythm: Sinus rhythm with marked sinus arrhythmia with occasional and consecutive premature ventricular complexes  Interpretation: Septal infarct, age undetermined. No STEMI.           The Emergency Provider reviewed the vital signs and test results, which are outlined above.     ED Discussion           ED Course as of 06/13/24 0200   Wed Jun 12, 2024 1956 DDx includes sepsis, nephrostomy tube malfunction, UTI, abscess, kidney failure [BA]   Thu Jun 13, 2024   0200 See Hospitalist consult note. Patient would like to go home on hospice. Abx reccomendations called to hospice nurse for discussion with their MD. Patient okay with discharge plans.  [BA]      ED Course User Index  [BA] Dexter Wong MD     Medical Decision Making  Amount and/or Complexity of Data Reviewed  Labs:  ordered. Decision-making details documented in ED Course.  Radiology: ordered. Decision-making details documented in ED Course.  ECG/medicine tests: ordered and independent interpretation performed. Decision-making details documented in ED Course.  Discussion of management or test interpretation with external provider(s): 3:37 PM: Discussed pt's case with Dr. Sr (Diagnostic radiology) who came down to see the pt; secured neph tube and is draining now. He recommends abx therapy and replacement in 2-3 months.    4:00 PM: Dr. Cat transfers care of patient to Dr. Wong pending lab results.    4:34 PM: Discussed pt's case with Antoinette Epstein NP () who states that the pt has voiced that he would prefer to go home; he is currently with [facility].    6:53 PM: Reassessed pt at this time. Discussed with pt all pertinent ED information and results. Discussed pt dx and plan of tx. Gave pt all f/u and return to the ED instructions. All questions and concerns were addressed at this time. Pt expresses understanding of information and instructions, and is comfortable with plan to discharge. Pt is stable for discharge.    I discussed with patient and/or family/caretaker that evaluation in the ED does not suggest any emergent or life threatening medical conditions requiring immediate intervention beyond what was provided in the ED, and I believe patient is safe for discharge.  Regardless, an unremarkable evaluation in the ED does not preclude the development or presence of a serious of life threatening condition. As such, patient was instructed to return immediately for any worsening or change in current symptoms.    Risk  OTC drugs.  Decision regarding hospitalization.  Decision not to resuscitate or to de-escalate care because of poor prognosis.  Risk Details: Differential diagnosis: Dehydration, electrolyte abnormality, arrhythmia, infection, STEMI, NSTEMI, UTI                  ED Medication(s):  Medications    acetaminophen tablet 650 mg (650 mg Oral Given 6/12/24 1641)   sodium chloride 0.9% bolus 1,866 mL 1,866 mL (0 mLs Intravenous Stopped 6/12/24 1900)   meropenem (MERREM) 1 g in sodium chloride 0.9 % 100 mL IVPB (MB+) (0 g Intravenous Stopped 6/12/24 2005)   linezolid 600 mg/300 mL IVPB 600 mg (0 mg Intravenous Stopped 6/12/24 1900)       New Prescriptions    No medications on file        Follow-up Information       Your Hospice MD. Call today.    Why: For re-evaluation and further treatment             [facility] - Emergency Dept.. Go today.    Specialty: Emergency Medicine  Why: If symptoms worsen, For re-evaluation and further treatment, As needed  Contact information:  [address] [facility] [address] [phone]                                     Scribe Attestation:   Scribe #1: I performed the above scribed service and the documentation accurately describes the services I performed. I attest to the accuracy of the note.  Scribe #2: I performed the above scribed service and the documentation accurately describes the services I performed. I attest to the accuracy of the note.    Attending Attestation:           Physician Attestation for Scribe:  Physician Attestation Statement for Scribe #1: I, Vin Cat MD, reviewed documentation, as scribed by Alejo Gerardo in my presence, and it is both accurate and complete.   Physician Attestation Statement for Scribe #2: I, Dexter Wong MD, reviewed documentation, as scribed by Radha Nur in my presence, and it is both accurate and complete. I also acknowledge and confirm the content of the note done by Scribe #1.           Clinical Impression       ICD-10-CM ICD-9-CM   1. Fever, unspecified fever cause  R50.9 780.60   2. Weakness  R53.1 780.79   3. Malignant neoplasm of urinary bladder, unspecified site  C67.9 188.9   4. Nephrostomy tube displaced  T83.022A 997.5   5. Urinary tract infection without hematuria, site unspecified   N39.0 599.0       Disposition:   Disposition: Discharged  Condition: Stable         Dexter Wong MD  06/13/24 0200

## 2024-06-12 NOTE — HPI
68 y.o. male patient, PMHx:  Lung Cx, Bladder Cx, Ureteral Obstruction, Bilateral Nephrostomy tubes. Presented to the Emergency Department for evaluation of fever with drainage coming from nephrostomy which onset a few days PTA. Patient is current with [facility], family reported to hospice he had fever and urine appeared infected. Patient was placed on Cipro per Hospice. No improvement in fever. On day of arrival, patient c/o of worsened lower back pain, with drainage around nephrostomy tubes. CT scan in ED:marked right hydronephrosis, despite the presence of a right nephrostomy tube.  The marker along the catheter of the right nephrostomy tube projects outside of the posterior right kidney.  Nonfunctioning of the right ureteral stent, either due to the fact that is clogged versus mild positioning. IR consulted, repositioned nephrostomy tube,  good urine output. Patient reported relief of pain. Reviewed previous cultures, cultures resistant to cipro. Sensitivities reviewed, discussed with pharmacist, will treat with 1x dose of merrem and zyvox. Patient and family preferred to discharge back home with Hospice support. Called [facility], discussed patient case with on-call nurse. In agreement with plan. Further discussion in regard to constipation with spouse at bedside. Vitals: 120/69, 88, 18, 97.9, 88% RA, SpO2 improved to 97% RA without intervention. Temp increased to 100.6, treated with APAP. Temp improved. Treated with IV fluids, IV antibiotics. Plan for discharge home with [facility]. Hospice aware of plan to return home. Hospital Medicine consulted for consideration for admission. Per patient and family wishes, they prefer to discharge home.   Patient is a DNR.

## 2024-06-12 NOTE — CONSULTS
[facility] - Emergency Dept.  Hospital Medicine  Consult Note    Patient Name: Geovani Currie  MRN: [id number]  Admission Date: 6/12/2024  Hospital Length of Stay: 0 days  Attending Physician: Dexter Wong MD   Primary Care Provider: Faizan Antoine MD           Patient information was obtained from patient, spouse/SO, past medical records, and ER records.     Consults  Subjective:     Principal Problem: Acute cystitis    Chief Complaint:   Chief Complaint   Patient presents with    Fever     Reports fever x 2 days. Recently had R nephrostomy tube placed. Also reports possible dislodgment of tube.         HPI:  68 y.o. male patient, PMHx:  Lung Cx, Bladder Cx, Ureteral Obstruction, Bilateral Nephrostomy tubes. Presented to the Emergency Department for evaluation of fever with drainage coming from nephrostomy which onset a few days PTA. Patient is current with [facility], family reported to hospice he had fever and urine appeared infected. Patient was placed on Cipro per Hospice. No improvement in fever. On day of arrival, patient c/o of worsened lower back pain, with drainage around nephrostomy tubes. CT scan in ED:marked right hydronephrosis, despite the presence of a right nephrostomy tube.  The marker along the catheter of the right nephrostomy tube projects outside of the posterior right kidney.  Nonfunctioning of the right ureteral stent, either due to the fact that is clogged versus mild positioning. IR consulted, repositioned nephrostomy tube,  good urine output. Patient reported relief of pain. Reviewed previous cultures, cultures resistant to cipro. Sensitivities reviewed, discussed with pharmacist, will treat with 1x dose of merrem and zyvox. Patient and family preferred to discharge back home with Hospice support. [facility], discussed patient case with on-call nurse. In agreement with plan. Further discussion in regard to constipation with spouse at bedside. Vitals: 120/69, 88, 18,  97.9, 88% RA, SpO2 improved to 97% RA without intervention. Temp increased to 100.6, treated with APAP. Temp improved. Treated with IV fluids, IV antibiotics. Plan for [facility]. Hospice aware of plan to return home. Hospital Medicine consulted for consideration for admission. Per patient and family wishes, they prefer to discharge home.   Patient is a DNR.     Past Medical History:   Diagnosis Date    Anxiety disorder, unspecified     Cancer     COPD (chronic obstructive pulmonary disease)     Hypertension     Thyroid disease        Past Surgical History:   Procedure Laterality Date    APPENDECTOMY      CATARACT EXTRACTION      ENTEROENTEROSTOMY N/A 04/05/2024    Procedure: ENTEROENTEROSTOMY;  Surgeon: Silvestre Ramos MD;  Location: [facility] OR;  Service: General;  Laterality: N/A;  enterocolostomy, small bowel to transverse colon anastamosis    INJECTION OF ANESTHETIC AGENT INTO TISSUE PLANE DEFINED BY TRANSVERSUS ABDOMINIS MUSCLE N/A 04/05/2024    Procedure: BLOCK, TRANSVERSUS ABDOMINIS PLANE;  Surgeon: Silvestre Ramos MD;  Location: [facility] OR;  Service: General;  Laterality: N/A;    LAPAROTOMY, EXPLORATORY N/A 04/05/2024    Procedure: LAPAROTOMY, EXPLORATORY;  Surgeon: Silvestre Ramos MD;  Location: [facility] OR;  Service: General;  Laterality: N/A;    NEPHROSTOMY      OPEN REDUCTION AND INTERNAL FIXATION (ORIF) OF INTERTROCHANTERIC FRACTURE OF FEMUR Right 11/21/2023    Procedure: ORIF, FRACTURE, FEMUR, INTERTROCHANTERIC;  Surgeon: Tanisha Guidry DO;  Location: [facility] OR;  Service: Orthopedics;  Laterality: Right;  Gamma nail    small bowel obstruction  N/A 04/03/2024       Review of patient's allergies indicates:  No Known Allergies    No current facility-administered medications on file prior to encounter.     Current Outpatient Medications on File Prior to Encounter   Medication Sig    ciprofloxacin HCl (CIPRO) 250 MG tablet     albuterol (ACCUNEB) 0.63 mg/3 mL Nebu Take 3 mLs  (0.63 mg total) by nebulization 3 (three) times daily as needed (sob, wheezing). Rescue    albuterol (PROVENTIL/VENTOLIN HFA) 90 mcg/actuation inhaler Inhale 1-2 puffs into the lungs every 4 (four) hours as needed for Wheezing. Rescue    ALPRAZolam (XANAX) 1 MG tablet Take 1 tablet (1 mg total) by mouth 3 (three) times daily as needed for Anxiety.    apixaban (ELIQUIS) 5 mg Tab Take 1 tablet (5 mg total) by mouth 2 (two) times daily.    bisacodyL 5 mg Tab Take 1 tablet by mouth Daily.    cyclobenzaprine (FLEXERIL) 10 MG tablet Take 1 tablet (10 mg total) by mouth 3 (three) times daily as needed for Muscle spasms.    EScitalopram oxalate (LEXAPRO) 20 MG tablet Take 1 tablet (20 mg total) by mouth once daily.    fluticasone-umeclidin-vilanter (TRELEGY ELLIPTA) 100-62.5-25 mcg DsDv Inhale 1 puff into the lungs once daily.    furosemide (LASIX) 20 MG tablet Take 1 tablet (20 mg total) by mouth 2 (two) times daily as needed (swelling).    levothyroxine (SYNTHROID) 125 MCG tablet Take 1 tablet (125 mcg total) by mouth before breakfast.    naloxone (NARCAN) 4 mg/actuation Spry 1 spray once.    nicotine (NICODERM CQ) 14 mg/24 hr Place 1 patch onto the skin every 24 hours.    pregabalin (LYRICA) 75 MG capsule Take 1 capsule (75 mg total) by mouth every evening.    tamsulosin (FLOMAX) 0.4 mg Cap Take 1 capsule by mouth once daily.    vitamin D (VITAMIN D3) 1000 units Tab Take 25 mcg by mouth once daily.     Family History       Problem Relation (Age of Onset)    Cancer Mother, Father, Maternal Grandfather          Tobacco Use    Smoking status: Every Day     Types: Vaping with nicotine     Passive exposure: Current    Smokeless tobacco: Never   Substance and Sexual Activity    Alcohol use: Never    Drug use: Never    Sexual activity: Not Currently     Partners: Female     Review of Systems   Constitutional:  Positive for activity change, appetite change, chills, fatigue and fever.   Gastrointestinal:  Positive for abdominal  distention, abdominal pain and constipation.   Musculoskeletal:  Positive for back pain.   Neurological:  Positive for weakness.   All other systems reviewed and are negative.    Objective:     Vital Signs (Most Recent):  Temp: (!) 100.6 °F (38.1 °C) (06/12/24 1517)  Pulse: 85 (06/12/24 1702)  Resp: (!) 21 (06/12/24 1632)  BP: (!) 116/59 (06/12/24 1702)  SpO2: 98 % (06/12/24 1702) Vital Signs (24h Range):  Temp:  [97.9 °F (36.6 °C)-100.6 °F (38.1 °C)] 100.6 °F (38.1 °C)  Pulse:  [82-89] 85  Resp:  [18-22] 21  SpO2:  [88 %-98 %] 98 %  BP: ()/(51-69) 116/59     Weight: 62.2 kg (137 lb 2 oz)  Body mass index is 20.85 kg/m².     Physical Exam  Vitals and nursing note reviewed.   Constitutional:       General: He is not in acute distress.     Appearance: He is well-developed. He is cachectic. He is ill-appearing. He is not diaphoretic.   HENT:      Head: Atraumatic.      Right Ear: Hearing normal.      Left Ear: Hearing normal.      Nose: Nose normal.      Mouth/Throat:      Lips: Pink.   Eyes:      General:         Right eye: No discharge.         Left eye: No discharge.      Conjunctiva/sclera: Conjunctivae normal.      Pupils: Pupils are equal, round, and reactive to light.   Cardiovascular:      Rate and Rhythm: Normal rate and regular rhythm.      Pulses:           Dorsalis pedis pulses are 1+ on the right side and 1+ on the left side.      Heart sounds: Normal heart sounds. No murmur heard.  Pulmonary:      Effort: Pulmonary effort is normal. No respiratory distress.      Breath sounds: Examination of the right-lower field reveals decreased breath sounds. Examination of the left-lower field reveals decreased breath sounds. Decreased breath sounds present. No wheezing.   Abdominal:      General: Bowel sounds are decreased.      Palpations: Abdomen is soft.      Tenderness: There is abdominal tenderness in the suprapubic area and left lower quadrant.   Musculoskeletal:         General: Normal range of motion.       Cervical back: Normal range of motion and neck supple.   Lymphadenopathy:      Upper Body:      Right upper body: No supraclavicular adenopathy.      Left upper body: No supraclavicular adenopathy.   Skin:     General: Skin is warm and dry.      Capillary Refill: Capillary refill takes less than 2 seconds.      Findings: No rash.   Neurological:      Mental Status: He is alert and oriented to person, place, and time.   Psychiatric:         Mood and Affect: Mood is not anxious or depressed.              CRANIAL NERVES     CN III, IV, VI   Pupils are equal, round, and reactive to light.       Significant Labs: All pertinent labs within the past 24 hours have been reviewed.  CBC:   Recent Labs   Lab 06/12/24  1450   WBC 16.21*   HGB 6.6*   HCT 22.4*        CMP:   Recent Labs   Lab 06/12/24  1450   *   K 3.3*   CL 94*   CO2 28   *   BUN 34*   CREATININE 2.3*   CALCIUM 9.6   PROT 7.3   ALBUMIN 2.3*   BILITOT 1.1*   ALKPHOS 1,140*   AST 45*   ALT 33   ANIONGAP 12     Lactic Acid:   Recent Labs   Lab 06/12/24  1450   LACTATE 1.0     Urine Studies:   Recent Labs   Lab 06/12/24  1548   COLORU Yellow   APPEARANCEUA Cloudy*   PHUR 7.0   SPECGRAV 1.010   PROTEINUA 2+*   GLUCUA Negative   KETONESU Negative   BILIRUBINUA Negative   OCCULTUA 3+*   NITRITE Negative   UROBILINOGEN Negative   LEUKOCYTESUR 3+*   RBCUA >100*   WBCUA >100*   BACTERIA Many*   HYALINECASTS 0       Significant Imaging: I have reviewed all pertinent imaging results/findings within the past 24 hours.  Assessment/Plan:     * Acute cystitis  Per review of previous cultures, resistance to Cipro noted. Treated with Merrem/Zyvox in the ED. Plan for d/c home with hospice. Discussed his previous records with Hospice on call. Discussed resistance to Cipro. Can treat with Levaquin PO (intermediate) and Zyvox. Nurse to discuss with Hospice MD. Advised patient would be treated in the ED prior to leaving.     --Plan to discharge home, Hospice to  assume care.   --Report called to On Call Nurse      Malfunction of nephrostomy tube  Nephrostomy tube repositioned per IR      Malignant neoplasm of urinary bladder  Tumor compression of ureteral tubes. Imaging shows significant progression of tumor. Discussed these findings with his spouse. Spouse had multiple questions about prognosis. Advised unable to give exact timelines but patient is showing progression and weight loss. These are poor prognostic indicators. Spouse stated understanding of the inability to give exact timelines.     --Discharge home with Hospice      VTE Risk Mitigation (From admission, onward)      None                Thank you for your consult. I will sign off. Please contact us if you have any additional questions.    Antoinette Epstein NP  Department of Hospital Medicine   [facility] - Emergency Dept.

## 2024-06-12 NOTE — SUBJECTIVE & OBJECTIVE
Past Medical History:   Diagnosis Date    Anxiety disorder, unspecified     Cancer     COPD (chronic obstructive pulmonary disease)     Hypertension     Thyroid disease        Past Surgical History:   Procedure Laterality Date    APPENDECTOMY      CATARACT EXTRACTION      ENTEROENTEROSTOMY N/A 04/05/2024    Procedure: ENTEROENTEROSTOMY;  Surgeon: Silvestre Ramos MD;  Location: [facility] OR;  Service: General;  Laterality: N/A;  enterocolostomy, small bowel to transverse colon anastamosis    INJECTION OF ANESTHETIC AGENT INTO TISSUE PLANE DEFINED BY TRANSVERSUS ABDOMINIS MUSCLE N/A 04/05/2024    Procedure: BLOCK, TRANSVERSUS ABDOMINIS PLANE;  Surgeon: Silvestre Ramos MD;  Location: [facility] OR;  Service: General;  Laterality: N/A;    LAPAROTOMY, EXPLORATORY N/A 04/05/2024    Procedure: LAPAROTOMY, EXPLORATORY;  Surgeon: Silvestre Ramos MD;  Location: [facility] OR;  Service: General;  Laterality: N/A;    NEPHROSTOMY      OPEN REDUCTION AND INTERNAL FIXATION (ORIF) OF INTERTROCHANTERIC FRACTURE OF FEMUR Right 11/21/2023    Procedure: ORIF, FRACTURE, FEMUR, INTERTROCHANTERIC;  Surgeon: Tanisha Guidry DO;  Location: [facility] OR;  Service: Orthopedics;  Laterality: Right;  Gamma nail    small bowel obstruction  N/A 04/03/2024       Review of patient's allergies indicates:  No Known Allergies    No current facility-administered medications on file prior to encounter.     Current Outpatient Medications on File Prior to Encounter   Medication Sig    ciprofloxacin HCl (CIPRO) 250 MG tablet     albuterol (ACCUNEB) 0.63 mg/3 mL Nebu Take 3 mLs (0.63 mg total) by nebulization 3 (three) times daily as needed (sob, wheezing). Rescue    albuterol (PROVENTIL/VENTOLIN HFA) 90 mcg/actuation inhaler Inhale 1-2 puffs into the lungs every 4 (four) hours as needed for Wheezing. Rescue    ALPRAZolam (XANAX) 1 MG tablet Take 1 tablet (1 mg total) by mouth 3 (three) times daily as needed for Anxiety.    apixaban (ELIQUIS) 5 mg Tab Take 1 tablet  (5 mg total) by mouth 2 (two) times daily.    bisacodyL 5 mg Tab Take 1 tablet by mouth Daily.    cyclobenzaprine (FLEXERIL) 10 MG tablet Take 1 tablet (10 mg total) by mouth 3 (three) times daily as needed for Muscle spasms.    EScitalopram oxalate (LEXAPRO) 20 MG tablet Take 1 tablet (20 mg total) by mouth once daily.    fluticasone-umeclidin-vilanter (TRELEGY ELLIPTA) 100-62.5-25 mcg DsDv Inhale 1 puff into the lungs once daily.    furosemide (LASIX) 20 MG tablet Take 1 tablet (20 mg total) by mouth 2 (two) times daily as needed (swelling).    levothyroxine (SYNTHROID) 125 MCG tablet Take 1 tablet (125 mcg total) by mouth before breakfast.    naloxone (NARCAN) 4 mg/actuation Spry 1 spray once.    nicotine (NICODERM CQ) 14 mg/24 hr Place 1 patch onto the skin every 24 hours.    pregabalin (LYRICA) 75 MG capsule Take 1 capsule (75 mg total) by mouth every evening.    tamsulosin (FLOMAX) 0.4 mg Cap Take 1 capsule by mouth once daily.    vitamin D (VITAMIN D3) 1000 units Tab Take 25 mcg by mouth once daily.     Family History       Problem Relation (Age of Onset)    Cancer Mother, Father, Maternal Grandfather          Tobacco Use    Smoking status: Every Day     Types: Vaping with nicotine     Passive exposure: Current    Smokeless tobacco: Never   Substance and Sexual Activity    Alcohol use: Never    Drug use: Never    Sexual activity: Not Currently     Partners: Female     Review of Systems   Constitutional:  Positive for activity change, appetite change, chills, fatigue and fever.   Gastrointestinal:  Positive for abdominal distention, abdominal pain and constipation.   Musculoskeletal:  Positive for back pain.   Neurological:  Positive for weakness.   All other systems reviewed and are negative.    Objective:     Vital Signs (Most Recent):  Temp: (!) 100.6 °F (38.1 °C) (06/12/24 1517)  Pulse: 85 (06/12/24 1702)  Resp: (!) 21 (06/12/24 1632)  BP: (!) 116/59 (06/12/24 1702)  SpO2: 98 % (06/12/24 1702) Vital Signs  (24h Range):  Temp:  [97.9 °F (36.6 °C)-100.6 °F (38.1 °C)] 100.6 °F (38.1 °C)  Pulse:  [82-89] 85  Resp:  [18-22] 21  SpO2:  [88 %-98 %] 98 %  BP: ()/(51-69) 116/59     Weight: 62.2 kg (137 lb 2 oz)  Body mass index is 20.85 kg/m².     Physical Exam  Vitals and nursing note reviewed.   Constitutional:       General: He is not in acute distress.     Appearance: He is well-developed. He is cachectic. He is ill-appearing. He is not diaphoretic.   HENT:      Head: Atraumatic.      Right Ear: Hearing normal.      Left Ear: Hearing normal.      Nose: Nose normal.      Mouth/Throat:      Lips: Pink.   Eyes:      General:         Right eye: No discharge.         Left eye: No discharge.      Conjunctiva/sclera: Conjunctivae normal.      Pupils: Pupils are equal, round, and reactive to light.   Cardiovascular:      Rate and Rhythm: Normal rate and regular rhythm.      Pulses:           Dorsalis pedis pulses are 1+ on the right side and 1+ on the left side.      Heart sounds: Normal heart sounds. No murmur heard.  Pulmonary:      Effort: Pulmonary effort is normal. No respiratory distress.      Breath sounds: Examination of the right-lower field reveals decreased breath sounds. Examination of the left-lower field reveals decreased breath sounds. Decreased breath sounds present. No wheezing.   Abdominal:      General: Bowel sounds are decreased.      Palpations: Abdomen is soft.      Tenderness: There is abdominal tenderness in the suprapubic area and left lower quadrant.   Musculoskeletal:         General: Normal range of motion.      Cervical back: Normal range of motion and neck supple.   Lymphadenopathy:      Upper Body:      Right upper body: No supraclavicular adenopathy.      Left upper body: No supraclavicular adenopathy.   Skin:     General: Skin is warm and dry.      Capillary Refill: Capillary refill takes less than 2 seconds.      Findings: No rash.   Neurological:      Mental Status: He is alert and oriented  to person, place, and time.   Psychiatric:         Mood and Affect: Mood is not anxious or depressed.              CRANIAL NERVES     CN III, IV, VI   Pupils are equal, round, and reactive to light.       Significant Labs: All pertinent labs within the past 24 hours have been reviewed.  CBC:   Recent Labs   Lab 06/12/24  1450   WBC 16.21*   HGB 6.6*   HCT 22.4*        CMP:   Recent Labs   Lab 06/12/24  1450   *   K 3.3*   CL 94*   CO2 28   *   BUN 34*   CREATININE 2.3*   CALCIUM 9.6   PROT 7.3   ALBUMIN 2.3*   BILITOT 1.1*   ALKPHOS 1,140*   AST 45*   ALT 33   ANIONGAP 12     Lactic Acid:   Recent Labs   Lab 06/12/24  1450   LACTATE 1.0     Urine Studies:   Recent Labs   Lab 06/12/24  1548   COLORU Yellow   APPEARANCEUA Cloudy*   PHUR 7.0   SPECGRAV 1.010   PROTEINUA 2+*   GLUCUA Negative   KETONESU Negative   BILIRUBINUA Negative   OCCULTUA 3+*   NITRITE Negative   UROBILINOGEN Negative   LEUKOCYTESUR 3+*   RBCUA >100*   WBCUA >100*   BACTERIA Many*   HYALINECASTS 0       Significant Imaging: I have reviewed all pertinent imaging results/findings within the past 24 hours.

## 2024-06-15 LAB — BACTERIA UR CULT: ABNORMAL

## 2024-06-17 LAB
BACTERIA BLD CULT: NORMAL
BACTERIA BLD CULT: NORMAL

## 2024-06-26 NOTE — TELEPHONE ENCOUNTER
Refill Routing Note   Medication(s) are not appropriate for processing by [facility] for the following reason(s):        Outside of protocol    [facility] action(s):  Route               Appointments  past 12m or future 3m with PCP    Date Provider   Last Visit   4/24/2024 Faizan Antoine MD   Next Visit   Visit date not found Faizan Antoine MD   ED visits in past 90 days: 1        Note composed:8:30 AM 06/26/2024

## 2024-06-27 RX ORDER — ALPRAZOLAM 0.5 MG/1
TABLET ORAL
Qty: 60 TABLET | OUTPATIENT
Start: 2024-06-27

## 2024-07-08 ENCOUNTER — DOCUMENT SCAN (OUTPATIENT)
Dept: HOME HEALTH SERVICES | Facility: HOSPITAL | Age: 69
End: 2024-07-08
Payer: MEDICARE

## 2024-07-09 ENCOUNTER — PATIENT MESSAGE (OUTPATIENT)
Dept: UROLOGY | Facility: CLINIC | Age: 69
End: 2024-07-09
Payer: MEDICARE

## 2024-07-09 DIAGNOSIS — N13.5 BILATERAL URETERAL OBSTRUCTION: Primary | ICD-10-CM

## 2024-07-17 ENCOUNTER — TELEPHONE (OUTPATIENT)
Dept: RADIOLOGY | Facility: HOSPITAL | Age: 69
End: 2024-07-17
Payer: MEDICARE

## 2024-07-17 NOTE — TELEPHONE ENCOUNTER
Interventional Radiology:    Spoke to pt's daughter and got pt scheduled for 7/24 @ 1pm. Informed pt's daughter that pt needs to be here for 11:30am, NPO after midnight, may take morning meds with a sip of water, have someone with him to drive him home, make sure that pt does not take any aspirin/blood thinners/fish oil/GLP-1 medicaitons. Pt's daughter verbalizes understanding of all discussed.

## 2024-07-23 ENCOUNTER — TELEPHONE (OUTPATIENT)
Dept: RADIOLOGY | Facility: HOSPITAL | Age: 69
End: 2024-07-23
Payer: MEDICARE

## 2024-07-23 NOTE — TELEPHONE ENCOUNTER
INTERVENTIONAL RADIOLOGY    CONFIRMED 1300 APPOINTMENT ON 07/24/2024 WITH MS. CONLEY (DAUGHTER) FOR MR. RAYNA GILLILAND. INSTRUCTED TO ARRIVE @ 1130 TO FRONT ENTRANCE OF HOSPITAL ([address] OFF [address]) . DENIES GLP-1/GIP AGONISTS, ASA, NSAIDS AND FISH OIL.  INSTRUCTED NOT TO EAT OR DRINK ANYTHING AFTER MIDNIGHT  THE NIGHT PRIOR TO PROCEDURE AND HAVE A  ON MORNING OF PROCEDURE TO DRIVE HOME. STATES LAST DOSE OF ELIQUIS WAS THIS AM. INSTRUCTED TO HOLD NEXT TWO DOSES OF ELIQUIS PER DR. OLIVARES QUESTIONS ANSWERED.  INSTRUCTED TO TAKE NECESSARY MEDICATIONS ON MORNING OF PROCEDURE WITH A SMALL SIP OF WATER.  VERBALIZED UNDERSTANDING.

## 2024-07-24 ENCOUNTER — HOSPITAL ENCOUNTER (OUTPATIENT)
Dept: RADIOLOGY | Facility: HOSPITAL | Age: 69
Discharge: HOME | End: 2024-07-24
Attending: UROLOGY
Payer: MEDICARE

## 2024-07-24 VITALS
HEART RATE: 104 BPM | BODY MASS INDEX: 20.76 KG/M2 | HEIGHT: 68 IN | WEIGHT: 137 LBS | SYSTOLIC BLOOD PRESSURE: 121 MMHG | DIASTOLIC BLOOD PRESSURE: 62 MMHG | RESPIRATION RATE: 18 BRPM | OXYGEN SATURATION: 97 %

## 2024-07-24 DIAGNOSIS — N13.5 BILATERAL URETERAL OBSTRUCTION: ICD-10-CM

## 2024-07-24 LAB
ALBUMIN SERPL BCP-MCNC: 2.1 G/DL (ref 3.5–5.2)
ALP SERPL-CCNC: 1537 U/L (ref 55–135)
ALT SERPL W/O P-5'-P-CCNC: 43 U/L (ref 10–44)
ANION GAP SERPL CALC-SCNC: 17 MMOL/L (ref 8–16)
ANISOCYTOSIS BLD QL SMEAR: SLIGHT
AST SERPL-CCNC: 38 U/L (ref 10–40)
BASOPHILS # BLD AUTO: 0.02 K/UL (ref 0–0.2)
BASOPHILS NFR BLD: 0.1 % (ref 0–1.9)
BILIRUB SERPL-MCNC: 3 MG/DL (ref 0.1–1)
BUN SERPL-MCNC: 34 MG/DL (ref 8–23)
CALCIUM SERPL-MCNC: 9.4 MG/DL (ref 8.7–10.5)
CHLORIDE SERPL-SCNC: 102 MMOL/L (ref 95–110)
CO2 SERPL-SCNC: 20 MMOL/L (ref 23–29)
CREAT SERPL-MCNC: 1.7 MG/DL (ref 0.5–1.4)
DIFFERENTIAL METHOD BLD: ABNORMAL
EOSINOPHIL # BLD AUTO: 0 K/UL (ref 0–0.5)
EOSINOPHIL NFR BLD: 0 % (ref 0–8)
ERYTHROCYTE [DISTWIDTH] IN BLOOD BY AUTOMATED COUNT: 22.3 % (ref 11.5–14.5)
EST. GFR  (NO RACE VARIABLE): 43 ML/MIN/1.73 M^2
GLUCOSE SERPL-MCNC: 179 MG/DL (ref 70–110)
HCT VFR BLD AUTO: 25.1 % (ref 40–54)
HGB BLD-MCNC: 7.4 G/DL (ref 14–18)
IMM GRANULOCYTES # BLD AUTO: 0.14 K/UL (ref 0–0.04)
IMM GRANULOCYTES NFR BLD AUTO: 0.7 % (ref 0–0.5)
INR PPP: 1.3 (ref 0.8–1.2)
LYMPHOCYTES # BLD AUTO: 1.4 K/UL (ref 1–4.8)
LYMPHOCYTES NFR BLD: 7.1 % (ref 18–48)
MCH RBC QN AUTO: 26.3 PG (ref 27–31)
MCHC RBC AUTO-ENTMCNC: 29.5 G/DL (ref 32–36)
MCV RBC AUTO: 89 FL (ref 82–98)
MONOCYTES # BLD AUTO: 0.5 K/UL (ref 0.3–1)
MONOCYTES NFR BLD: 2.7 % (ref 4–15)
NEUTROPHILS # BLD AUTO: 17.6 K/UL (ref 1.8–7.7)
NEUTROPHILS NFR BLD: 89.4 % (ref 38–73)
NRBC BLD-RTO: 0 /100 WBC
OVALOCYTES BLD QL SMEAR: ABNORMAL
PLATELET # BLD AUTO: 659 K/UL (ref 150–450)
PLATELET BLD QL SMEAR: ABNORMAL
PMV BLD AUTO: 10.6 FL (ref 9.2–12.9)
POIKILOCYTOSIS BLD QL SMEAR: SLIGHT
POLYCHROMASIA BLD QL SMEAR: ABNORMAL
POTASSIUM SERPL-SCNC: 3.8 MMOL/L (ref 3.5–5.1)
PROT SERPL-MCNC: 7.1 G/DL (ref 6–8.4)
PROTHROMBIN TIME: 14.9 SEC (ref 9–12.5)
RBC # BLD AUTO: 2.81 M/UL (ref 4.6–6.2)
SODIUM SERPL-SCNC: 139 MMOL/L (ref 136–145)
TARGETS BLD QL SMEAR: ABNORMAL
WBC # BLD AUTO: 19.65 K/UL (ref 3.9–12.7)

## 2024-07-24 PROCEDURE — 85610 PROTHROMBIN TIME: CPT | Performed by: UROLOGY

## 2024-07-24 PROCEDURE — 87075 CULTR BACTERIA EXCEPT BLOOD: CPT | Performed by: UROLOGY

## 2024-07-24 PROCEDURE — 63600175 PHARM REV CODE 636 W HCPCS: Performed by: STUDENT IN AN ORGANIZED HEALTH CARE EDUCATION/TRAINING PROGRAM

## 2024-07-24 PROCEDURE — 99152 MOD SED SAME PHYS/QHP 5/>YRS: CPT | Mod: ,,, | Performed by: STUDENT IN AN ORGANIZED HEALTH CARE EDUCATION/TRAINING PROGRAM

## 2024-07-24 PROCEDURE — 85025 COMPLETE CBC W/AUTO DIFF WBC: CPT | Performed by: UROLOGY

## 2024-07-24 PROCEDURE — 87070 CULTURE OTHR SPECIMN AEROBIC: CPT | Mod: 59 | Performed by: UROLOGY

## 2024-07-24 PROCEDURE — 87106 FUNGI IDENTIFICATION YEAST: CPT | Performed by: UROLOGY

## 2024-07-24 PROCEDURE — 99152 MOD SED SAME PHYS/QHP 5/>YRS: CPT | Performed by: STUDENT IN AN ORGANIZED HEALTH CARE EDUCATION/TRAINING PROGRAM

## 2024-07-24 PROCEDURE — 99153 MOD SED SAME PHYS/QHP EA: CPT | Performed by: STUDENT IN AN ORGANIZED HEALTH CARE EDUCATION/TRAINING PROGRAM

## 2024-07-24 PROCEDURE — 87205 SMEAR GRAM STAIN: CPT | Mod: 59 | Performed by: UROLOGY

## 2024-07-24 PROCEDURE — 87186 SC STD MICRODIL/AGAR DIL: CPT | Performed by: UROLOGY

## 2024-07-24 PROCEDURE — C1729 CATH, DRAINAGE: HCPCS

## 2024-07-24 PROCEDURE — 50435 EXCHANGE NEPHROSTOMY CATH: CPT | Mod: 50 | Performed by: STUDENT IN AN ORGANIZED HEALTH CARE EDUCATION/TRAINING PROGRAM

## 2024-07-24 PROCEDURE — 25000003 PHARM REV CODE 250: Performed by: STUDENT IN AN ORGANIZED HEALTH CARE EDUCATION/TRAINING PROGRAM

## 2024-07-24 PROCEDURE — 80053 COMPREHEN METABOLIC PANEL: CPT | Performed by: UROLOGY

## 2024-07-24 RX ORDER — MIDAZOLAM HYDROCHLORIDE 1 MG/ML
INJECTION, SOLUTION INTRAMUSCULAR; INTRAVENOUS CODE/TRAUMA/SEDATION MEDICATION
Status: COMPLETED | OUTPATIENT
Start: 2024-07-24 | End: 2024-07-24

## 2024-07-24 RX ORDER — LIDOCAINE HYDROCHLORIDE 20 MG/ML
INJECTION, SOLUTION EPIDURAL; INFILTRATION; INTRACAUDAL; PERINEURAL CODE/TRAUMA/SEDATION MEDICATION
Status: COMPLETED | OUTPATIENT
Start: 2024-07-24 | End: 2024-07-24

## 2024-07-24 RX ORDER — ONDANSETRON HYDROCHLORIDE 2 MG/ML
INJECTION, SOLUTION INTRAVENOUS CODE/TRAUMA/SEDATION MEDICATION
Status: COMPLETED | OUTPATIENT
Start: 2024-07-24 | End: 2024-07-24

## 2024-07-24 RX ORDER — FENTANYL CITRATE 50 UG/ML
INJECTION, SOLUTION INTRAMUSCULAR; INTRAVENOUS CODE/TRAUMA/SEDATION MEDICATION
Status: COMPLETED | OUTPATIENT
Start: 2024-07-24 | End: 2024-07-24

## 2024-07-24 RX ADMIN — LIDOCAINE HYDROCHLORIDE 5 ML: 20 INJECTION, SOLUTION EPIDURAL; INFILTRATION; INTRACAUDAL; PERINEURAL at 01:07

## 2024-07-24 RX ADMIN — CEFTRIAXONE 1 G: 1 INJECTION, POWDER, FOR SOLUTION INTRAMUSCULAR; INTRAVENOUS at 01:07

## 2024-07-24 RX ADMIN — FENTANYL CITRATE 50 MCG: 0.05 INJECTION, SOLUTION INTRAMUSCULAR; INTRAVENOUS at 01:07

## 2024-07-24 RX ADMIN — ONDANSETRON 4 MG: 2 INJECTION INTRAMUSCULAR; INTRAVENOUS at 01:07

## 2024-07-24 RX ADMIN — MIDAZOLAM 1 MG: 1 INJECTION INTRAMUSCULAR; INTRAVENOUS at 01:07

## 2024-07-24 NOTE — DISCHARGE SUMMARY
[facility] - Lab & Imaging (Hospital)  Discharge Note  Short Stay    IR Nephrostomy Tube Change      OUTCOME: Patient tolerated treatment/procedure well without complication and is now ready for discharge.    DISPOSITION: Home or Self Care    FINAL DIAGNOSIS:  <principal problem not specified>    FOLLOWUP: In clinic    DISCHARGE INSTRUCTIONS:  No discharge procedures on file.      Clinical Reference Documents Added to Patient Instructions         Document    NEPHROSTOMY, PERCUTANEOUS DISCHARGE INSTRUCTIONS (ENGLISH)    PROCEDURAL SEDATION, ADULT ED (ENGLISH)            TIME SPENT ON DISCHARGE: 15 minutes    Pre Op Diagnosis: bilateral nephrostomy tubes     Post Op Diagnosis: same     Procedure:  exchange     Procedure performed by: Jane AMBRIZ, Marsha DEL REAL     Written Informed Consent Obtained: Yes     Specimen Removed:  yes     Estimated Blood Loss:  minimal     Findings: Local anesthesia     Sedation:  yes     The patient tolerated the procedure well and there were no complications.      Disposition:  F/U in clinic or with ordering physician    Discharge instructions:  Light activity for 24 hours.  Remove band aid in 24 hours.  No baths (showers are appropriate).      Sterile technique was performed in the bilateral flanks, lidocaine was used as a local anesthetic.  Bilateral nephrostomy tubes successfully exchanged over a wire.  Pt tolerated the procedure well without immediate complications.  Please see radiologist report for details. F/u with PCP and/or ordering physician.

## 2024-07-24 NOTE — H&P
[facility] (Hospital)  History & Physical    Subjective:      Chief Complaint/Reason for Admission: bilateral nephrostomy exchange    Geovani Currie is a 68 y.o. male here today for bilateral nephrostomy exchange.  No complaints today    Past Medical History:   Diagnosis Date    Anxiety disorder, unspecified     Cancer     COPD (chronic obstructive pulmonary disease)     Hypertension     Thyroid disease      Past Surgical History:   Procedure Laterality Date    APPENDECTOMY      CATARACT EXTRACTION      ENTEROENTEROSTOMY N/A 04/05/2024    Procedure: ENTEROENTEROSTOMY;  Surgeon: Silvsetre Ramos MD;  Location: [facility] OR;  Service: General;  Laterality: N/A;  enterocolostomy, small bowel to transverse colon anastamosis    INJECTION OF ANESTHETIC AGENT INTO TISSUE PLANE DEFINED BY TRANSVERSUS ABDOMINIS MUSCLE N/A 04/05/2024    Procedure: BLOCK, TRANSVERSUS ABDOMINIS PLANE;  Surgeon: Silvestre Ramos MD;  Location: [facility] OR;  Service: General;  Laterality: N/A;    LAPAROTOMY, EXPLORATORY N/A 04/05/2024    Procedure: LAPAROTOMY, EXPLORATORY;  Surgeon: Silvestre Ramos MD;  Location: [facility] OR;  Service: General;  Laterality: N/A;    NEPHROSTOMY      OPEN REDUCTION AND INTERNAL FIXATION (ORIF) OF INTERTROCHANTERIC FRACTURE OF FEMUR Right 11/21/2023    Procedure: ORIF, FRACTURE, FEMUR, INTERTROCHANTERIC;  Surgeon: Tanisha Guidry DO;  Location: [facility] OR;  Service: Orthopedics;  Laterality: Right;  Gamma nail    small bowel obstruction  N/A 04/03/2024     Social History     Tobacco Use    Smoking status: Every Day     Types: Vaping with nicotine     Passive exposure: Current    Smokeless tobacco: Never   Substance Use Topics    Alcohol use: Never    Drug use: Never       (Not in a hospital admission)    Review of patient's allergies indicates:  No Known Allergies     Review of Systems   Constitutional:  Negative for fever.   HENT:  Negative for sore throat.    Eyes:  Negative for blurred vision.    Respiratory:  Negative for cough.    Cardiovascular:  Negative for chest pain.   Gastrointestinal:  Negative for nausea and vomiting.   Genitourinary:  Negative for dysuria.   Musculoskeletal:  Negative for myalgias.   Skin:  Negative for itching.   Neurological:  Negative for tingling and headaches.       Objective:      Vital Signs (Most Recent)  Pulse: (!) 111 (07/24/24 1206)  Resp: 20 (07/24/24 1206)  BP: 121/63 (07/24/24 1206)  SpO2: 95 % (07/24/24 1206)    Vital Signs Range (Last 24H):  Pulse:  [111]   Resp:  [20]   BP: (121)/(63)   SpO2:  [95 %]     Physical Exam  Constitutional:       General: He is not in acute distress.  HENT:      Head: Normocephalic and atraumatic.   Eyes:      Extraocular Movements: Extraocular movements intact.      Pupils: Pupils are equal, round, and reactive to light.   Cardiovascular:      Rate and Rhythm: Normal rate.   Pulmonary:      Effort: No respiratory distress.   Abdominal:      General: There is no distension.   Musculoskeletal:         General: No swelling or deformity.      Cervical back: Normal range of motion.   Skin:     General: Skin is warm.   Neurological:      General: No focal deficit present.      Mental Status: He is alert and oriented to person, place, and time.   Psychiatric:         Mood and Affect: Mood normal.         Thought Content: Thought content normal.         Judgment: Judgment normal.             Assessment:      Bilateral ureteral obstruction      Plan:    Bilateral nephrostomy exchange

## 2024-07-24 NOTE — DISCHARGE INSTRUCTIONS
Please return to ER if any of these symptoms occur:  Fever over 101 degrees,  Bleeding from the puncture site not controlled, (Hold pressure for 5 minutes, if bleeding does not stop then go to the ER.)  Pain not controlled with Aleve or Tylenol,      Do not lift anything heavy, nothing over the size of a gallon of milk, for at least 2 days.    Resume home medications and diet

## 2024-07-24 NOTE — PLAN OF CARE
Bilateral nephrostomy tubes in place with dressing C/D/I with no bleeding/redness/swelling noted. VSS, NADN, and pt meets criteria for discharge. Discharge instructions given to and reviewed with pt, and pt's wife and both verbalized understanding of all. Pt discharged to home, taken out via wheelchair and driven home by wife.

## 2024-07-25 LAB
GRAM STN SPEC: NORMAL

## 2024-07-26 LAB
BACTERIA SPEC ANAEROBE CULT: NORMAL
BACTERIA SPEC ANAEROBE CULT: NORMAL

## 2024-07-27 LAB
BACTERIA SPEC AEROBE CULT: ABNORMAL

## 2025-02-21 DIAGNOSIS — Z00.00 ENCOUNTER FOR MEDICARE ANNUAL WELLNESS EXAM: ICD-10-CM

## 2025-04-06 PROBLEM — N18.32 STAGE 3B CHRONIC KIDNEY DISEASE: Status: ACTIVE | Noted: 2023-12-28

## 2025-05-09 ENCOUNTER — PATIENT OUTREACH (OUTPATIENT)
Dept: ADMINISTRATIVE | Facility: HOSPITAL | Age: 70
End: 2025-05-09
Payer: MEDICARE

## 2025-09-05 ENCOUNTER — PATIENT OUTREACH (OUTPATIENT)
Dept: ADMINISTRATIVE | Facility: HOSPITAL | Age: 70
End: 2025-09-05
Payer: MEDICARE

## (undated) DEVICE — ELECTRODE BLADE E-Z CLEAN 4IN

## (undated) DEVICE — COVER LIGHT HANDLE 80/CA

## (undated) DEVICE — SUT VICRYL 3-0 27 SH

## (undated) DEVICE — DRAPE INCISE IOBAN 2 23X33IN

## (undated) DEVICE — SUT VICRYL PLUS 3-0 SH 18IN

## (undated) DEVICE — GOWN POLY REINF BRTH SLV XL

## (undated) DEVICE — SUT SILK 3-0 SH 18IN BLACK

## (undated) DEVICE — SUT 1 36IN PDS II VIO MONO

## (undated) DEVICE — TOWEL OR DISP STRL BLUE 4/PK

## (undated) DEVICE — EVACUATOR PENCIL SMOKE NEPTUNE

## (undated) DEVICE — DRESSING TRANS 4X4 TEGADERM

## (undated) DEVICE — PIN PRECISION 3.2-3.9X450MM

## (undated) DEVICE — SUT 1 48IN PDS II VIO MONO

## (undated) DEVICE — MANIFOLD 4 PORT

## (undated) DEVICE — DRESSING GAUZE XEROFORM 5X9

## (undated) DEVICE — ELECTRODE BLD EXT 6.50 ST DISP

## (undated) DEVICE — SUT VICRYL 2-0 27 CT-1

## (undated) DEVICE — ADHESIVE DERMABOND ADVANCED

## (undated) DEVICE — TUBING MEDI-VAC 20FT .25IN

## (undated) DEVICE — DRAPE IOBAN 2 STERI

## (undated) DEVICE — APPLICATOR CHLORAPREP ORN 26ML

## (undated) DEVICE — DRAPE U SPLIT SHEET 54X76IN

## (undated) DEVICE — DRAPE CORETEMP FLD WRM 56X62IN

## (undated) DEVICE — SUT SILK 3-0 STRANDS 30IN

## (undated) DEVICE — DRESSING TELFA N ADH 3X8

## (undated) DEVICE — REAMER SHAFT MOD TRINKLE 8X510

## (undated) DEVICE — SUT VICRYL PLUS 0 CT1 36IN

## (undated) DEVICE — SPONGE COTTON TRAY 4X4IN

## (undated) DEVICE — BLADE SURG CARBON STEEL #10

## (undated) DEVICE — DRAPE C-ARMOR EQUIPMENT COVER

## (undated) DEVICE — UNDERGLOVE BIOGEL PI SZ 6.5 LF

## (undated) DEVICE — SPONGE LAP 18X18 PREWASHED

## (undated) DEVICE — SOL NACL IRR 3000ML

## (undated) DEVICE — TAPE SURG MEDIPORE 6X72IN

## (undated) DEVICE — CONNECTOR TUBING STR 5 IN 1

## (undated) DEVICE — PACK BASIC SETUP SC BR

## (undated) DEVICE — Device

## (undated) DEVICE — DRAPE MOBILE C-ARM

## (undated) DEVICE — DRILL T2 ALPH FREHND 4.2X185MM

## (undated) DEVICE — HEADREST ROUND DISP FOAM 9IN

## (undated) DEVICE — SUT MONOCRYL 3-0 SH U/D

## (undated) DEVICE — DRESSING XEROFORM FOIL PK 1X8

## (undated) DEVICE — DRAPE LAP T SHT W/ INSTR PAD

## (undated) DEVICE — DRAPE INCISE IOBAN 2 23X17IN

## (undated) DEVICE — ELECTRODE REM PLYHSV RETURN 9

## (undated) DEVICE — SUT SILK 2-0 STRANDS 30IN

## (undated) DEVICE — SOL NACL IRR 1000ML BTL

## (undated) DEVICE — CUTTER PROXIMATE BLUE 75MM

## (undated) DEVICE — GOWN NONREINF SET-IN SLV 2XL

## (undated) DEVICE — K-WIRE
Type: IMPLANTABLE DEVICE | Site: HIP | Status: NON-FUNCTIONAL
Removed: 2023-11-21

## (undated) DEVICE — GUIDE WIRE, BALL-TIPPED, STERILE
Type: IMPLANTABLE DEVICE | Site: HIP | Status: NON-FUNCTIONAL
Removed: 2023-11-21